# Patient Record
Sex: MALE | Race: WHITE | NOT HISPANIC OR LATINO | Employment: OTHER | URBAN - METROPOLITAN AREA
[De-identification: names, ages, dates, MRNs, and addresses within clinical notes are randomized per-mention and may not be internally consistent; named-entity substitution may affect disease eponyms.]

---

## 2017-01-31 ENCOUNTER — TELEPHONE (OUTPATIENT)
Dept: ENDOCRINOLOGY | Age: 77
End: 2017-01-31

## 2017-01-31 RX ORDER — INSULIN DETEMIR 100 [IU]/ML
50 INJECTION, SOLUTION SUBCUTANEOUS 2 TIMES DAILY
Qty: 90 ML | Refills: 1 | Status: SHIPPED | OUTPATIENT
Start: 2017-01-31 | End: 2017-08-15 | Stop reason: SDUPTHER

## 2017-01-31 RX ORDER — GLIPIZIDE 10 MG/1
TABLET ORAL
Qty: 360 TABLET | Refills: 1 | Status: SHIPPED | OUTPATIENT
Start: 2017-01-31 | End: 2017-09-11 | Stop reason: SDUPTHER

## 2017-01-31 RX ORDER — SITAGLIPTIN 100 MG/1
100 TABLET, FILM COATED ORAL DAILY
Qty: 30 TABLET | Refills: 5 | Status: SHIPPED | OUTPATIENT
Start: 2017-01-31 | End: 2017-04-28 | Stop reason: ALTCHOICE

## 2017-01-31 NOTE — TELEPHONE ENCOUNTER
----- Message from DANNY Degroot sent at 1/31/2017  1:17 PM EST -----  I reviewed his blood sugar readings which are staying too high consistently.  I'm going to increase his [id number] mg 2 pill twice daily with food.  In am also going up with him on Levemir 50 units twice daily.  The goal for his morning blood sugars of less than 110 mg/dL daily.  With the insulin increase I want him to monitor closely for hypoglycemic reactions.  He cannot restart his Oseni due to his active bladder cancer but I am going to put him on Januvia 100 mg once daily.  I sent a prescription for 30 days to his local pharmacy.  This is part of the Oseni that is not contraindicated with bladder disease.  If his blood sugars failed to improve I need to know sooner rather than later because we may need to go to mealtime insulin.      I spoke with the patient and let him know of the changes and to call back in two weeks with BG readings.

## 2017-03-04 DIAGNOSIS — E78.5 DYSLIPIDEMIA: ICD-10-CM

## 2017-03-06 RX ORDER — ROSUVASTATIN CALCIUM 10 MG/1
TABLET, COATED ORAL
Qty: 90 TABLET | Refills: 1 | OUTPATIENT
Start: 2017-03-06

## 2017-03-28 ENCOUNTER — OFFICE VISIT (OUTPATIENT)
Dept: FAMILY MEDICINE CLINIC | Facility: CLINIC | Age: 77
End: 2017-03-28

## 2017-03-28 VITALS
WEIGHT: 280 LBS | BODY MASS INDEX: 42.57 KG/M2 | HEART RATE: 79 BPM | TEMPERATURE: 98.1 F | RESPIRATION RATE: 16 BRPM | DIASTOLIC BLOOD PRESSURE: 72 MMHG | SYSTOLIC BLOOD PRESSURE: 128 MMHG

## 2017-03-28 DIAGNOSIS — M10.00 IDIOPATHIC GOUT, UNSPECIFIED CHRONICITY, UNSPECIFIED SITE: ICD-10-CM

## 2017-03-28 DIAGNOSIS — Z00.00 ANNUAL PHYSICAL EXAM: Primary | ICD-10-CM

## 2017-03-28 DIAGNOSIS — J30.1 SEASONAL ALLERGIC RHINITIS DUE TO POLLEN: ICD-10-CM

## 2017-03-28 DIAGNOSIS — E78.5 DYSLIPIDEMIA: ICD-10-CM

## 2017-03-28 PROCEDURE — G0438 PPPS, INITIAL VISIT: HCPCS | Performed by: FAMILY MEDICINE

## 2017-03-28 PROCEDURE — 99214 OFFICE O/P EST MOD 30 MIN: CPT | Performed by: FAMILY MEDICINE

## 2017-03-28 RX ORDER — ALLOPURINOL 300 MG/1
300 TABLET ORAL DAILY
Qty: 90 TABLET | Refills: 1 | Status: SHIPPED | OUTPATIENT
Start: 2017-03-28 | End: 2017-09-28 | Stop reason: SDUPTHER

## 2017-03-28 RX ORDER — ROSUVASTATIN CALCIUM 10 MG/1
10 TABLET, COATED ORAL DAILY
Qty: 90 TABLET | Refills: 1 | Status: SHIPPED | OUTPATIENT
Start: 2017-03-28 | End: 2017-09-28 | Stop reason: SDUPTHER

## 2017-03-28 RX ORDER — FLUTICASONE PROPIONATE 50 MCG
2 SPRAY, SUSPENSION (ML) NASAL DAILY
Qty: 3 EACH | Refills: 1 | Status: SHIPPED | OUTPATIENT
Start: 2017-03-28 | End: 2017-09-28 | Stop reason: SDUPTHER

## 2017-03-28 NOTE — PATIENT INSTRUCTIONS
Medicare Wellness  Personal Prevention Plan of Service     Date of Office Visit:  2017  Encounter Provider:  Magdy Ricardo MD  Place of Service:  [facility]  Patient Name: Jesus Beckham  :  1940    As part of the Medicare Wellness portion of your visit today, we are providing you with this personalized preventive plan of services (PPPS). This plan is based upon recommendations of the [place] Preventive Services Task Force (USPSTF) and the Advisory Committee on Immunization Practices (ACIP).    This lists the preventive care services that should be considered, and provides dates of when you are due. Items listed as completed are up-to-date and do not require any further intervention.    Health Maintenance   Topic Date Due   • PROSTATE CANCER SCREENING  2017   • PNEUMOCOCCAL VACCINES (65+ LOW/MEDIUM RISK) (1 of 2 - PCV13) 2017 (Originally 10/26/2006)   • HEMOGLOBIN A1C  2017   • DIABETIC EYE EXAM  2017   • DIABETIC FOOT EXAM  2017   • LIPID PANEL  2017   • URINE MICROALBUMIN  2017   • MEDICARE ANNUAL WELLNESS  2018   • COLONOSCOPY  2025   • INFLUENZA VACCINE  Completed   • AAA SCREEN (ONE-TIME)  Addressed   • ZOSTER VACCINE  Completed   • TDAP/TD VACCINES  Excluded       No orders of the defined types were placed in this encounter.      Return in about 6 months (around 2017) for Recheck.

## 2017-03-28 NOTE — PROGRESS NOTES
QUICK REFERENCE INFORMATION:  The ABCs of the Annual Wellness Visit    Initial Medicare Wellness Visit    HEALTH RISK ASSESSMENT    1940    Recent Hospitalizations:  Recently treated at the following:  Other: Psychiatric, for bladder cancer..        Current Medical Providers:  Patient Care Team:  Magdy Ricardo MD as PCP - General  Magdy Ricardo MD as PCP - Family Medicine        Smoking Status:  History   Smoking Status   • Former Smoker   Smokeless Tobacco   • Never Used       Alcohol Consumption:  History   Alcohol Use No       Depression Screen:   PHQ-9 Depression Screening 3/28/2017   Little interest or pleasure in doing things 0   Feeling down, depressed, or hopeless 0   Trouble falling or staying asleep, or sleeping too much 0   Feeling tired or having little energy 2   Poor appetite or overeating 0   Feeling bad about yourself - or that you are a failure or have let yourself or your family down 0   Trouble concentrating on things, such as reading the newspaper or watching television 0   Moving or speaking so slowly that other people could have noticed. Or the opposite - being so fidgety or restless that you have been moving around a lot more than usual 0   Thoughts that you would be better off dead, or of hurting yourself in some way 0   PHQ-9 Total Score 2       Health Habits and Functional and Cognitive Screening:  Functional & Cognitive Status 3/28/2017   Do you have difficulty preparing food and eating? No   Do you have difficulty bathing yourself? No   Do you have difficulty getting dressed? No   Do you have difficulty using the toilet? No   Do you have difficulty moving around from place to place? No   In the past year have you fallen or experienced a near fall? No   Do you need help using the phone?  No   Are you deaf or do you have serious difficulty hearing?  No   Do you need help with transportation? No   Do you need help shopping? No   Do you need help preparing meals?  No   Do you need help  with housework?  No   Do you need help with laundry? No   Do you need help taking your medications? No   Do you need help managing money? No   Do you have difficulty concentrating, remembering or making decisions? No       Health Habits  Current Diet: Well Balanced Diet  Dental Exam: Up to date  Eye Exam: Up to date  Exercise (times per week): 5 times per week  Current Exercise Activities Include: Swimming          Does the patient have evidence of cognitive impairment? No    Asprin use counseling:not applicable      Recent Lab Results:    Visual Acuity:  No exam data present    Age-appropriate Screening Schedule:  Refer to the list below for future screening recommendations based on patient's age, sex and/or medical conditions. Orders for these recommended tests are listed in the plan section. The patient has been provided with a written plan.    Health Maintenance   Topic Date Due   • PROSTATE CANCER SCREENING  04/19/2017   • PNEUMOCOCCAL VACCINES (65+ LOW/MEDIUM RISK) (1 of 2 - PCV13) 05/14/2017 (Originally 10/26/2006)   • HEMOGLOBIN A1C  06/08/2017   • DIABETIC EYE EXAM  09/07/2017   • DIABETIC FOOT EXAM  09/30/2017   • LIPID PANEL  12/08/2017   • URINE MICROALBUMIN  12/08/2017   • COLONOSCOPY  11/02/2025   • INFLUENZA VACCINE  Completed   • ZOSTER VACCINE  Completed   • TDAP/TD VACCINES  Excluded        Subjective   History of Present Illness    Jesus Beckham is a 76 y.o. male who presents for an Annual Wellness Visit.    The following portions of the patient's history were reviewed and updated as appropriate: allergies, current medications, past family history, past medical history, past social history, past surgical history and problem list.    Outpatient Medications Prior to Visit   Medication Sig Dispense Refill   • trospium 60 MG capsule sustained-release 24 hr capsule Take 1 capsule by mouth every morning. 30 capsule 5   • allopurinol (ZYLOPRIM) 300 MG tablet Take 1 tablet by mouth Daily. 90 tablet 1    • fluticasone (FLONASE) 50 MCG/ACT nasal spray 2 sprays into each nostril Daily. 3 each 1   • rosuvastatin (CRESTOR) 10 MG tablet Take 1 tablet by mouth Daily. 90 tablet 1   • Acetaminophen (TYLENOL PO) Take  by mouth as needed.     • folic acid-vit B6-vit B12 (FOLTABS) 0.8-10-0.115 MG tablet tablet Take 1 tablet by mouth Daily. 90 tablet 1   • glipiZIDE (GLUCOTROL) 10 MG tablet Take 2 po in am and 2 in pm 360 tablet 1   • glucose blood test strip CHECK BLOOD SUGARS 2 TIMES A DAY AS DIRECTED 180 each 1   • Insulin Pen Needle 32G X 4 MM misc INJECT UP TO THREE TIMES DAILY OR AS DIRECTED 500 each 1   • JANUVIA 100 MG tablet Take 1 tablet by mouth Daily. 30 tablet 5   • l-methylfolate-B6-B12 (METANX) 3-35-2 MG tablet tablet Take 1 tablet by mouth 2 (Two) Times a Day. 180 tablet 2   • LEVEMIR FLEXTOUCH 100 UNIT/ML injection Inject 50 Units under the skin 2 (Two) Times a Day. 90 mL 1   • lisinopril (PRINIVIL,ZESTRIL) 10 MG tablet Take 1 tablet by mouth Daily. 90 tablet 2   • MAGNESIUM PO Take 1 tablet by mouth daily.     • Omega-3 Fatty Acids (FISH OIL) 1000 MG capsule capsule Take  by mouth daily with breakfast.     • pregabalin (LYRICA) 100 MG capsule Take 1 capsule by mouth 3 (three) times a day. 270 capsule 1   • Probiotic Product (PROBIOTIC DAILY PO) Take  by mouth daily.     • Psyllium (METAMUCIL PO) Take 1 tablet by mouth daily.     • tamsulosin (FLOMAX) 0.4 MG capsule 24 hr capsule Take 0.4 mg by mouth 2 (Two) Times a Day.       No facility-administered medications prior to visit.        Patient Active Problem List   Diagnosis   • Gout   • Diabetic peripheral neuropathy   • Dyslipidemia   • Uncontrolled type 2 diabetes mellitus   • Primary osteoarthritis involving multiple joints   • Allergic rhinitis due to pollen   • Essential hypertension   • PVC (premature ventricular contraction)       Advanced Care Planning:  has an advanced directive - a copy HAS NOT been provided    Identification of Risk Factors:  Risk  factors include: weight  and cardiovascular risk.    Review of Systems    Compared to one year ago, the patient feels his physical health is the same.  Compared to one year ago, the patient feels his mental health is the same.    Objective     Physical Exam    Vitals:    03/28/17 0757 03/28/17 0817   BP: 128/72    Pulse: 79    Resp: 16    Temp: 98.1 °F (36.7 °C)    TempSrc: Oral    Weight: 280 lb (127 kg)    PainSc:    2       Body mass index is 42.57 kg/(m^2).  Discussed the patient's BMI with him. The BMI is above average; BMI management plan is completed.    Assessment/Plan   Patient Self-Management and Personalized Health Advice  The patient has been provided with information about: diet, exercise and weight management and preventive services including:   · Exercise counseling provided, Fall Risk assessment done, Nutrition counseling provided.    Visit Diagnoses:    ICD-10-CM ICD-9-CM   1. Annual physical exam Z00.00 V70.0   2. Dyslipidemia E78.5 272.4   3. Idiopathic gout, unspecified chronicity, unspecified site M10.00 274.9   4. Seasonal allergic rhinitis due to pollen J30.1 477.0       No orders of the defined types were placed in this encounter.      Outpatient Encounter Prescriptions as of 3/28/2017   Medication Sig Dispense Refill   • allopurinol (ZYLOPRIM) 300 MG tablet Take 1 tablet by mouth Daily. 90 tablet 1   • fluticasone (FLONASE) 50 MCG/ACT nasal spray 2 sprays into each nostril Daily. 3 each 1   • rosuvastatin (CRESTOR) 10 MG tablet Take 1 tablet by mouth Daily. 90 tablet 1   • trospium 60 MG capsule sustained-release 24 hr capsule Take 1 capsule by mouth every morning. 30 capsule 5   • [DISCONTINUED] allopurinol (ZYLOPRIM) 300 MG tablet Take 1 tablet by mouth Daily. 90 tablet 1   • [DISCONTINUED] fluticasone (FLONASE) 50 MCG/ACT nasal spray 2 sprays into each nostril Daily. 3 each 1   • [DISCONTINUED] rosuvastatin (CRESTOR) 10 MG tablet Take 1 tablet by mouth Daily. 90 tablet 1   • Acetaminophen  (TYLENOL PO) Take  by mouth as needed.     • folic acid-vit B6-vit B12 (FOLTABS) 0.8-10-0.115 MG tablet tablet Take 1 tablet by mouth Daily. 90 tablet 1   • glipiZIDE (GLUCOTROL) 10 MG tablet Take 2 po in am and 2 in pm 360 tablet 1   • glucose blood test strip CHECK BLOOD SUGARS 2 TIMES A DAY AS DIRECTED 180 each 1   • Insulin Pen Needle 32G X 4 MM misc INJECT UP TO THREE TIMES DAILY OR AS DIRECTED 500 each 1   • JANUVIA 100 MG tablet Take 1 tablet by mouth Daily. 30 tablet 5   • l-methylfolate-B6-B12 (METANX) 3-35-2 MG tablet tablet Take 1 tablet by mouth 2 (Two) Times a Day. 180 tablet 2   • LEVEMIR FLEXTOUCH 100 UNIT/ML injection Inject 50 Units under the skin 2 (Two) Times a Day. 90 mL 1   • lisinopril (PRINIVIL,ZESTRIL) 10 MG tablet Take 1 tablet by mouth Daily. 90 tablet 2   • MAGNESIUM PO Take 1 tablet by mouth daily.     • Omega-3 Fatty Acids (FISH OIL) 1000 MG capsule capsule Take  by mouth daily with breakfast.     • pregabalin (LYRICA) 100 MG capsule Take 1 capsule by mouth 3 (three) times a day. 270 capsule 1   • Probiotic Product (PROBIOTIC DAILY PO) Take  by mouth daily.     • Psyllium (METAMUCIL PO) Take 1 tablet by mouth daily.     • tamsulosin (FLOMAX) 0.4 MG capsule 24 hr capsule Take 0.4 mg by mouth 2 (Two) Times a Day.       No facility-administered encounter medications on file as of 3/28/2017.        Reviewed use of high risk medication in the elderly: yes  Reviewed for potential of harmful drug interactions in the elderly: yes    Follow Up:  Return in about 6 months (around 9/28/2017) for Recheck.     An After Visit Summary and PPPS with all of these plans were given to the patient.

## 2017-03-28 NOTE — PROGRESS NOTES
Subjective   Jesus Beckham is a 76 y.o. male.     History of Present Illness     Chief Complaint:   Chief Complaint   Patient presents with   • Hyperlipidemia     med refill - no daily asa / cigar smoker / phq2 /phq10 done jms  dx with bladder cancer    • Gout       Jesus Beckham 76 y.o. male who presents today for Medical Management of the below listed issues and medication refills.  he has a history of   Patient Active Problem List   Diagnosis   • Gout   • Diabetic peripheral neuropathy   • Dyslipidemia   • Uncontrolled type 2 diabetes mellitus   • Primary osteoarthritis involving multiple joints   • Allergic rhinitis due to pollen   • Essential hypertension   • PVC (premature ventricular contraction)   .  Since the last visit, he has overall felt well and has recovered from his bladder cancer surgery.  he has been compliant with   Current Outpatient Prescriptions:   •  allopurinol (ZYLOPRIM) 300 MG tablet, Take 1 tablet by mouth Daily., Disp: 90 tablet, Rfl: 1  •  fluticasone (FLONASE) 50 MCG/ACT nasal spray, 2 sprays into each nostril Daily., Disp: 3 each, Rfl: 1  •  rosuvastatin (CRESTOR) 10 MG tablet, Take 1 tablet by mouth Daily., Disp: 90 tablet, Rfl: 1  •  trospium 60 MG capsule sustained-release 24 hr capsule, Take 1 capsule by mouth every morning., Disp: 30 capsule, Rfl: 5  •  Acetaminophen (TYLENOL PO), Take  by mouth as needed., Disp: , Rfl:   •  folic acid-vit B6-vit B12 (FOLTABS) 0.8-10-0.115 MG tablet tablet, Take 1 tablet by mouth Daily., Disp: 90 tablet, Rfl: 1  •  glipiZIDE (GLUCOTROL) 10 MG tablet, Take 2 po in am and 2 in pm, Disp: 360 tablet, Rfl: 1  •  glucose blood test strip, CHECK BLOOD SUGARS 2 TIMES A DAY AS DIRECTED, Disp: 180 each, Rfl: 1  •  Insulin Pen Needle 32G X 4 MM misc, INJECT UP TO THREE TIMES DAILY OR AS DIRECTED, Disp: 500 each, Rfl: 1  •  JANUVIA 100 MG tablet, Take 1 tablet by mouth Daily., Disp: 30 tablet, Rfl: 5  •  l-methylfolate-B6-B12 (METANX) 3-35-2 MG tablet  tablet, Take 1 tablet by mouth 2 (Two) Times a Day., Disp: 180 tablet, Rfl: 2  •  LEVEMIR FLEXTOUCH 100 UNIT/ML injection, Inject 50 Units under the skin 2 (Two) Times a Day., Disp: 90 mL, Rfl: 1  •  lisinopril (PRINIVIL,ZESTRIL) 10 MG tablet, Take 1 tablet by mouth Daily., Disp: 90 tablet, Rfl: 2  •  MAGNESIUM PO, Take 1 tablet by mouth daily., Disp: , Rfl:   •  Omega-3 Fatty Acids (FISH OIL) 1000 MG capsule capsule, Take  by mouth daily with breakfast., Disp: , Rfl:   •  pregabalin (LYRICA) 100 MG capsule, Take 1 capsule by mouth 3 (three) times a day., Disp: 270 capsule, Rfl: 1  •  Probiotic Product (PROBIOTIC DAILY PO), Take  by mouth daily., Disp: , Rfl:   •  Psyllium (METAMUCIL PO), Take 1 tablet by mouth daily., Disp: , Rfl:   •  tamsulosin (FLOMAX) 0.4 MG capsule 24 hr capsule, Take 0.4 mg by mouth 2 (Two) Times a Day., Disp: , Rfl: .  he denies medication side effects.    All of the chronic condition(s) listed above are stable w/o issues.    /72  Pulse 79  Temp 98.1 °F (36.7 °C) (Oral)   Resp 16  Wt 280 lb (127 kg)  BMI 42.57 kg/m2    Results for orders placed or performed in visit on 11/29/16   Comprehensive metabolic panel   Result Value Ref Range    Glucose 124 (H) 65 - 99 mg/dL    BUN 19 8 - 23 mg/dL    Creatinine 0.99 0.76 - 1.27 mg/dL    eGFR Non African Am 74 >60 mL/min/1.73    eGFR African Am 89 >60 mL/min/1.73    BUN/Creatinine Ratio 19.2 7.0 - 25.0    Sodium 143 136 - 145 mmol/L    Potassium 5.0 3.5 - 5.2 mmol/L    Chloride 105 98 - 107 mmol/L    Total CO2 24.4 22.0 - 29.0 mmol/L    Calcium 9.6 8.6 - 10.5 mg/dL    Total Protein 6.4 6.0 - 8.5 g/dL    Albumin 4.50 3.50 - 5.20 g/dL    Globulin 1.9 gm/dL    A/G Ratio 2.4 g/dL    Total Bilirubin 0.4 0.1 - 1.2 mg/dL    Alkaline Phosphatase 75 39 - 117 U/L    AST (SGOT) 20 1 - 40 U/L    ALT (SGPT) 22 1 - 41 U/L   C-peptide   Result Value Ref Range    C-Peptide 2.8 1.1 - 4.4 ng/mL   Hemoglobin A1c   Result Value Ref Range    Hemoglobin A1C 7.00  (H) 4.80 - 5.60 %   T4 and TSH ([facility] Only)   Result Value Ref Range    TSH 2.080 0.270 - 4.200 mIU/mL    T4, Total 6.01 4.50 - 11.70 mcg/dL   Vitamin D 25 hydroxy   Result Value Ref Range    25 Hydroxy, Vitamin D 32.4 30.0 - 100.0 ng/mL   Uric acid   Result Value Ref Range    Uric Acid 3.9 3.4 - 7.0 mg/dL   Lipid panel   Result Value Ref Range    Total Cholesterol 109 0 - 200 mg/dL    Triglycerides 81 0 - 150 mg/dL    HDL Cholesterol 45 40 - 60 mg/dL    VLDL Cholesterol 16.2 5 - 40 mg/dL    LDL Cholesterol  48 0 - 100 mg/dL   MicroAlbumin, Urine, Random   Result Value Ref Range    Microalbumin, Urine 51.9 Not Estab. ug/mL         The following portions of the patient's history were reviewed and updated as appropriate: allergies, current medications, past family history, past medical history, past social history, past surgical history and problem list.    Review of Systems   Constitutional: Negative for activity change, chills, fatigue and fever.   Respiratory: Negative for cough and chest tightness.    Cardiovascular: Negative for chest pain and palpitations.   Gastrointestinal: Negative for abdominal pain and nausea.   Endocrine: Negative for cold intolerance and polydipsia.   Psychiatric/Behavioral: Negative for behavioral problems and dysphoric mood.   All other systems reviewed and are negative.      Objective   Physical Exam   Constitutional: He appears well-developed and well-nourished.   Neck: Neck supple. No thyromegaly present.   Cardiovascular: Normal rate and regular rhythm.    No murmur heard.  Pulmonary/Chest: Effort normal and breath sounds normal.   Abdominal: Bowel sounds are normal.   Psychiatric: He has a normal mood and affect. His behavior is normal.   Nursing note and vitals reviewed.      Assessment/Plan   Jesus was seen today for hyperlipidemia and gout.    Diagnoses and all orders for this visit:    Dyslipidemia  -     rosuvastatin (CRESTOR) 10 MG tablet; Take 1 tablet by mouth  Daily.    Idiopathic gout, unspecified chronicity, unspecified site  -     allopurinol (ZYLOPRIM) 300 MG tablet; Take 1 tablet by mouth Daily.    Seasonal allergic rhinitis due to pollen  -     fluticasone (FLONASE) 50 MCG/ACT nasal spray; 2 sprays into each nostril Daily.

## 2017-04-12 DIAGNOSIS — E78.5 DYSLIPIDEMIA: ICD-10-CM

## 2017-04-12 DIAGNOSIS — E55.9 VITAMIN D DEFICIENCY: Primary | ICD-10-CM

## 2017-04-12 DIAGNOSIS — M10.00 IDIOPATHIC GOUT, UNSPECIFIED CHRONICITY, UNSPECIFIED SITE: ICD-10-CM

## 2017-04-12 DIAGNOSIS — IMO0002 UNCONTROLLED TYPE 2 DIABETES MELLITUS WITH OTHER SPECIFIED COMPLICATION: Primary | ICD-10-CM

## 2017-04-14 ENCOUNTER — CONVERSION ENCOUNTER (OUTPATIENT)
Dept: ENDOCRINOLOGY | Age: 77
End: 2017-04-14

## 2017-04-14 ENCOUNTER — LAB (OUTPATIENT)
Dept: ENDOCRINOLOGY | Age: 77
End: 2017-04-14

## 2017-04-14 DIAGNOSIS — E55.9 VITAMIN D DEFICIENCY: ICD-10-CM

## 2017-04-14 DIAGNOSIS — M10.00 IDIOPATHIC GOUT, UNSPECIFIED CHRONICITY, UNSPECIFIED SITE: ICD-10-CM

## 2017-04-14 DIAGNOSIS — IMO0002 UNCONTROLLED TYPE 2 DIABETES MELLITUS WITH OTHER SPECIFIED COMPLICATION: ICD-10-CM

## 2017-04-14 DIAGNOSIS — E78.5 DYSLIPIDEMIA: ICD-10-CM

## 2017-04-15 LAB
25(OH)D3+25(OH)D2 SERPL-MCNC: 38.6 NG/ML (ref 30–100)
ALBUMIN SERPL-MCNC: 4.4 G/DL (ref 3.5–5.2)
ALBUMIN/GLOB SERPL: 1.8 G/DL
ALP SERPL-CCNC: 77 U/L (ref 39–117)
ALT SERPL-CCNC: 33 U/L (ref 1–41)
AST SERPL-CCNC: 32 U/L (ref 1–40)
BILIRUB SERPL-MCNC: 0.4 MG/DL (ref 0.1–1.2)
BUN SERPL-MCNC: 22 MG/DL (ref 8–23)
BUN/CREAT SERPL: 24.4 (ref 7–25)
C PEPTIDE SERPL-MCNC: 2.8 NG/ML (ref 1.1–4.4)
CALCIUM SERPL-MCNC: 9.7 MG/DL (ref 8.6–10.5)
CHLORIDE SERPL-SCNC: 104 MMOL/L (ref 98–107)
CHOLEST SERPL-MCNC: 122 MG/DL (ref 0–200)
CO2 SERPL-SCNC: 22.8 MMOL/L (ref 22–29)
CREAT SERPL-MCNC: 0.9 MG/DL (ref 0.76–1.27)
GLOBULIN SER CALC-MCNC: 2.5 GM/DL
GLUCOSE SERPL-MCNC: 190 MG/DL (ref 65–99)
HBA1C MFR BLD: 8.31 % (ref 4.8–5.6)
HDLC SERPL-MCNC: 38 MG/DL (ref 40–60)
LDLC SERPL CALC-MCNC: 50 MG/DL (ref 0–100)
MICROALBUMIN UR-MCNC: 44.8 UG/ML
POTASSIUM SERPL-SCNC: 5.3 MMOL/L (ref 3.5–5.2)
PROT SERPL-MCNC: 6.9 G/DL (ref 6–8.5)
SODIUM SERPL-SCNC: 140 MMOL/L (ref 136–145)
T4 SERPL-MCNC: 5.88 MCG/DL (ref 4.5–11.7)
TRIGL SERPL-MCNC: 170 MG/DL (ref 0–150)
TSH SERPL DL<=0.005 MIU/L-ACNC: 1.9 MIU/ML (ref 0.27–4.2)
URATE SERPL-MCNC: 3.3 MG/DL (ref 3.4–7)
VLDLC SERPL CALC-MCNC: 34 MG/DL (ref 5–40)

## 2017-04-28 ENCOUNTER — OFFICE VISIT (OUTPATIENT)
Dept: ENDOCRINOLOGY | Age: 77
End: 2017-04-28

## 2017-04-28 VITALS
BODY MASS INDEX: 40.11 KG/M2 | HEIGHT: 70 IN | DIASTOLIC BLOOD PRESSURE: 76 MMHG | WEIGHT: 280.2 LBS | SYSTOLIC BLOOD PRESSURE: 136 MMHG | RESPIRATION RATE: 16 BRPM

## 2017-04-28 DIAGNOSIS — M10.00 IDIOPATHIC GOUT, UNSPECIFIED CHRONICITY, UNSPECIFIED SITE: ICD-10-CM

## 2017-04-28 DIAGNOSIS — IMO0002 UNCONTROLLED TYPE 2 DIABETES MELLITUS WITH OTHER SPECIFIED COMPLICATION: Primary | ICD-10-CM

## 2017-04-28 DIAGNOSIS — E55.9 VITAMIN D DEFICIENCY: ICD-10-CM

## 2017-04-28 DIAGNOSIS — E78.5 DYSLIPIDEMIA: ICD-10-CM

## 2017-04-28 DIAGNOSIS — E11.42 DIABETIC PERIPHERAL NEUROPATHY (HCC): ICD-10-CM

## 2017-04-28 DIAGNOSIS — I10 ESSENTIAL HYPERTENSION: ICD-10-CM

## 2017-04-28 DIAGNOSIS — N40.0 BENIGN NON-NODULAR PROSTATIC HYPERPLASIA WITHOUT LOWER URINARY TRACT SYMPTOMS: ICD-10-CM

## 2017-04-28 PROCEDURE — 99214 OFFICE O/P EST MOD 30 MIN: CPT | Performed by: INTERNAL MEDICINE

## 2017-04-28 RX ORDER — ASCORBIC ACID 500 MG
500 TABLET ORAL DAILY
Status: ON HOLD | COMMUNITY
End: 2021-11-28

## 2017-04-28 RX ORDER — EMPAGLIFLOZIN AND LINAGLIPTIN 10; 5 MG/1; MG/1
1 TABLET, FILM COATED ORAL DAILY
Qty: 90 TABLET | Refills: 3 | Status: SHIPPED | OUTPATIENT
Start: 2017-04-28 | End: 2017-09-29 | Stop reason: SDUPTHER

## 2017-04-28 RX ORDER — GUAIFENESIN/PHENYLPROPANOLAMIN
EXPECTORANT ORAL
COMMUNITY
End: 2017-12-19 | Stop reason: ALTCHOICE

## 2017-04-28 RX ORDER — ERGOCALCIFEROL 1.25 MG/1
50000 CAPSULE ORAL WEEKLY
Qty: 13 CAPSULE | Refills: 3 | Status: SHIPPED | OUTPATIENT
Start: 2017-04-28 | End: 2017-09-29 | Stop reason: SDUPTHER

## 2017-04-28 RX ORDER — CELECOXIB 200 MG/1
200 CAPSULE ORAL DAILY
COMMUNITY
End: 2017-09-28

## 2017-04-28 RX ORDER — CHOLECALCIFEROL (VITAMIN D3) 125 MCG
CAPSULE ORAL
COMMUNITY
End: 2017-04-28 | Stop reason: ALTCHOICE

## 2017-04-28 NOTE — PROGRESS NOTES
"Subjective   Jesus Beckham is a 76 y.o. male seen for follow up for DM2, hyperlipidemia, vit d deficiency, lab review. Patient is checking BG twice a day. No BG readings. Patient states that medication was changed at last office visit and BG was running high. He was having bladder cancer treatment and BG stayed high during the treatment. He states that until about 10 days ago his medication was not keeping his BG within range. He would like to discuss changing back to [facility] and if so he needs a 90 day RX sent to his mail order. He needs a refill on insulin pen needles also sent to his mail order pharmacy.      History of Present Illness this is a 76-year-old gentleman known patient with type II diabetes hyper tension and dyslipidemia as well as vitamin D deficiency.  Since the month of November 2016 when he was diagnosed as having get bladder cancer he underwent surgery as well as chemotherapy and has been told that he is cancer free with further follow-up.  During his the chemotherapy his blood sugar was running high but lately is better but nevertheless it still is little bit high.    /76  Resp 16  Ht 70\" (177.8 cm)  Wt 280 lb 3.2 oz (127 kg)  BMI 40.2 kg/m2     No Known Allergies    Current Outpatient Prescriptions:   •  Acetaminophen (TYLENOL PO), Take  by mouth as needed., Disp: , Rfl:   •  allopurinol (ZYLOPRIM) 300 MG tablet, Take 1 tablet by mouth Daily., Disp: 90 tablet, Rfl: 1  •  celecoxib (CeleBREX) 200 MG capsule, Take 200 mg by mouth Daily., Disp: , Rfl:   •  Cholecalciferol (VITAMIN D3) 2000 UNITS tablet, Take  by mouth., Disp: , Rfl:   •  fluticasone (FLONASE) 50 MCG/ACT nasal spray, 2 sprays into each nostril Daily., Disp: 3 each, Rfl: 1  •  glipiZIDE (GLUCOTROL) 10 MG tablet, Take 2 po in am and 2 in pm, Disp: 360 tablet, Rfl: 1  •  glucose blood test strip, CHECK BLOOD SUGARS 2 TIMES A DAY AS DIRECTED, Disp: 200 each, Rfl: 1  •  Insulin Pen Needle 32G X 4 MM misc, INJECT UP TO THREE " TIMES DAILY OR AS DIRECTED, Disp: 500 each, Rfl: 1  •  JANUVIA 100 MG tablet, Take 1 tablet by mouth Daily., Disp: 30 tablet, Rfl: 5  •  l-methylfolate-B6-B12 (METANX) 3-35-2 MG tablet tablet, Take 1 tablet by mouth 2 (Two) Times a Day., Disp: 180 tablet, Rfl: 2  •  LEVEMIR FLEXTOUCH 100 UNIT/ML injection, Inject 50 Units under the skin 2 (Two) Times a Day., Disp: 90 mL, Rfl: 1  •  lisinopril (PRINIVIL,ZESTRIL) 10 MG tablet, Take 1 tablet by mouth Daily., Disp: 90 tablet, Rfl: 2  •  Multiple Vitamins-Minerals (MULTIVITAMIN ADULT PO), Take  by mouth., Disp: , Rfl:   •  Omega-3 Fatty Acids (FISH OIL) 1000 MG capsule capsule, Take  by mouth daily with breakfast., Disp: , Rfl:   •  pregabalin (LYRICA) 100 MG capsule, Take 1 capsule by mouth 3 (three) times a day., Disp: 270 capsule, Rfl: 1  •  rosuvastatin (CRESTOR) 10 MG tablet, Take 1 tablet by mouth Daily., Disp: 90 tablet, Rfl: 1  •  Saw Palmetto 500 MG capsule, Take  by mouth., Disp: , Rfl:   •  trospium 60 MG capsule sustained-release 24 hr capsule, Take 1 capsule by mouth every morning., Disp: 30 capsule, Rfl: 5  •  vitamin C (ASCORBIC ACID) 500 MG tablet, Take 500 mg by mouth Daily., Disp: , Rfl:     The following portions of the patient's history were reviewed and updated as appropriate: allergies, current medications, past family history, past medical history, past social history, past surgical history and problem list.    Review of Systems   Constitutional: Negative.    HENT: Negative.    Eyes: Negative.    Respiratory: Negative.    Cardiovascular: Negative.    Gastrointestinal: Negative.    Endocrine: Negative.    Genitourinary: Negative.    Musculoskeletal: Negative.    Skin: Negative.    Allergic/Immunologic: Negative.    Neurological: Negative.    Hematological: Negative.    Psychiatric/Behavioral: Negative.        Objective   Physical Exam   Constitutional: He is oriented to person, place, and time. He appears well-developed and well-nourished. No  distress.   HENT:   Head: Normocephalic and atraumatic.   Right Ear: External ear normal.   Left Ear: External ear normal.   Nose: Nose normal.   Mouth/Throat: Oropharynx is clear and moist.   Eyes: Conjunctivae and EOM are normal. Pupils are equal, round, and reactive to light. Right eye exhibits no discharge. Left eye exhibits no discharge. No scleral icterus.   Neck: Normal range of motion. Neck supple. No JVD present. No tracheal deviation present. No thyromegaly present.   Cardiovascular: Normal rate, regular rhythm, normal heart sounds and intact distal pulses.  Exam reveals no gallop and no friction rub.    No murmur heard.  Pulmonary/Chest: Effort normal and breath sounds normal. No stridor. No respiratory distress. He has no wheezes. He has no rales. He exhibits no tenderness.   Abdominal: Soft. Bowel sounds are normal. He exhibits no distension and no mass. There is no tenderness. There is no rebound and no guarding. No hernia.   Musculoskeletal: He exhibits no edema, tenderness or deformity.   Lymphadenopathy:     He has no cervical adenopathy.   Neurological: He is alert and oriented to person, place, and time. He has normal reflexes. He displays normal reflexes. No cranial nerve deficit. He exhibits normal muscle tone. Coordination normal.   Skin: Skin is warm. No rash noted. He is not diaphoretic. No erythema. No pallor.   Psychiatric: He has a normal mood and affect. His behavior is normal. Judgment and thought content normal.   Nursing note and vitals reviewed.     Results for orders placed or performed in visit on 04/14/17   Comprehensive Metabolic Panel   Result Value Ref Range    Glucose 190 (H) 65 - 99 mg/dL    BUN 22 8 - 23 mg/dL    Creatinine 0.90 0.76 - 1.27 mg/dL    eGFR Non African Am 82 >60 mL/min/1.73    eGFR African Am 100 >60 mL/min/1.73    BUN/Creatinine Ratio 24.4 7.0 - 25.0    Sodium 140 136 - 145 mmol/L    Potassium 5.3 (H) 3.5 - 5.2 mmol/L    Chloride 104 98 - 107 mmol/L    Total  CO2 22.8 22.0 - 29.0 mmol/L    Calcium 9.7 8.6 - 10.5 mg/dL    Total Protein 6.9 6.0 - 8.5 g/dL    Albumin 4.40 3.50 - 5.20 g/dL    Globulin 2.5 gm/dL    A/G Ratio 1.8 g/dL    Total Bilirubin 0.4 0.1 - 1.2 mg/dL    Alkaline Phosphatase 77 39 - 117 U/L    AST (SGOT) 32 1 - 40 U/L    ALT (SGPT) 33 1 - 41 U/L   C-Peptide   Result Value Ref Range    C-Peptide 2.8 1.1 - 4.4 ng/mL   Hemoglobin A1c   Result Value Ref Range    Hemoglobin A1C 8.31 (H) 4.80 - 5.60 %   Lipid Panel   Result Value Ref Range    Total Cholesterol 122 0 - 200 mg/dL    Triglycerides 170 (H) 0 - 150 mg/dL    HDL Cholesterol 38 (L) 40 - 60 mg/dL    VLDL Cholesterol 34 5 - 40 mg/dL    LDL Cholesterol  50 0 - 100 mg/dL   Uric Acid   Result Value Ref Range    Uric Acid 3.3 (L) 3.4 - 7.0 mg/dL   T4 & TSH ([facility])   Result Value Ref Range    TSH 1.900 0.270 - 4.200 mIU/mL    T4, Total 5.88 4.50 - 11.70 mcg/dL   MicroAlbumin, Urine, Random   Result Value Ref Range    Microalbumin, Urine 44.8 Not Estab. ug/mL           Assessment/Plan   Diagnoses and all orders for this visit:    Uncontrolled type 2 diabetes mellitus with other specified complication  -     T4 & TSH ([facility]); Future  -     T3, Free; Future  -     T4, Free; Future  -     Uric Acid; Future  -     Vitamin D 25 Hydroxy; Future  -     Comprehensive Metabolic Panel; Future  -     C-Peptide; Future  -     Hemoglobin A1c; Future  -     Lipid Panel; Future  -     MicroAlbumin, Urine, Random; Future  -     TestT+TestF+SHBG; Future  -     CBC & Differential; Future  -     PSA; Future    Essential hypertension  -     T4 & TSH ([facility]); Future  -     T3, Free; Future  -     T4, Free; Future  -     Uric Acid; Future  -     Vitamin D 25 Hydroxy; Future  -     Comprehensive Metabolic Panel; Future  -     C-Peptide; Future  -     Hemoglobin A1c; Future  -     Lipid Panel; Future  -     MicroAlbumin, Urine, Random; Future  -     TestT+TestF+SHBG; Future  -     CBC & Differential; Future  -     PSA;  Future    Dyslipidemia  -     T4 & TSH ([facility]); Future  -     T3, Free; Future  -     T4, Free; Future  -     Uric Acid; Future  -     Vitamin D 25 Hydroxy; Future  -     Comprehensive Metabolic Panel; Future  -     C-Peptide; Future  -     Hemoglobin A1c; Future  -     Lipid Panel; Future  -     MicroAlbumin, Urine, Random; Future  -     TestT+TestF+SHBG; Future  -     CBC & Differential; Future  -     PSA; Future    Idiopathic gout, unspecified chronicity, unspecified site  -     T4 & TSH ([facility]); Future  -     T3, Free; Future  -     T4, Free; Future  -     Uric Acid; Future  -     Vitamin D 25 Hydroxy; Future  -     Comprehensive Metabolic Panel; Future  -     C-Peptide; Future  -     Hemoglobin A1c; Future  -     Lipid Panel; Future  -     MicroAlbumin, Urine, Random; Future  -     TestT+TestF+SHBG; Future  -     CBC & Differential; Future  -     PSA; Future    Diabetic peripheral neuropathy  -     T4 & TSH ([facility]); Future  -     T3, Free; Future  -     T4, Free; Future  -     Uric Acid; Future  -     Vitamin D 25 Hydroxy; Future  -     Comprehensive Metabolic Panel; Future  -     C-Peptide; Future  -     Hemoglobin A1c; Future  -     Lipid Panel; Future  -     MicroAlbumin, Urine, Random; Future  -     TestT+TestF+SHBG; Future  -     CBC & Differential; Future  -     PSA; Future    Vitamin D deficiency  -     T4 & TSH ([facility]); Future  -     T3, Free; Future  -     T4, Free; Future  -     Uric Acid; Future  -     Vitamin D 25 Hydroxy; Future  -     Comprehensive Metabolic Panel; Future  -     C-Peptide; Future  -     Hemoglobin A1c; Future  -     Lipid Panel; Future  -     MicroAlbumin, Urine, Random; Future  -     TestT+TestF+SHBG; Future  -     CBC & Differential; Future  -     PSA; Future    Benign non-nodular prostatic hyperplasia without lower urinary tract symptoms  -     TestT+TestF+SHBG; Future  -     CBC & Differential; Future  -     PSA; Future    Other orders  -     GLYXAMBI 10-5 MG tablet;  Take 1 tablet by mouth Daily.  -     ergocalciferol (DRISDOL) 76708 UNITS capsule; Take 1 capsule by mouth 1 (One) Time Per Week.  -     Discontinue: Insulin Pen Needle 31G X 8 MM misc; Used daily for insulin injection  -     Insulin Pen Needle 32G X 4 MM misc; INJECT UP TO THREE TIMES DAILY OR AS DIRECTED               In summary I saw and examined this 76-year-old gentleman for above-mentioned problems.  I reviewed his laboratory evaluation of the 04/14/2017 and provided him with a hard copy of it.  His hemoglobin A1c which was 7.0 on the 12/08/2016 is now 8.31.  This is partly because of the stress of cancer treatment which he had observed happening regardless of what was being done to get his sugar under control he said he was running real high.  He is otherwise clinically and metabolically stable and therefore we will go ahead and the make some changes in his medications to improve his glycemic control.  He will see Ms. Elizabeth Valverde in 4 months with laboratory evaluation prior to each office visit.

## 2017-05-18 DIAGNOSIS — J30.1 SEASONAL ALLERGIC RHINITIS DUE TO POLLEN: ICD-10-CM

## 2017-05-18 RX ORDER — FLUTICASONE PROPIONATE 50 MCG
SPRAY, SUSPENSION (ML) NASAL
Qty: 48 G | Refills: 1 | OUTPATIENT
Start: 2017-05-18

## 2017-06-10 DIAGNOSIS — M15.9 PRIMARY OSTEOARTHRITIS INVOLVING MULTIPLE JOINTS: ICD-10-CM

## 2017-06-12 RX ORDER — CELECOXIB 200 MG/1
CAPSULE ORAL
Qty: 90 CAPSULE | Refills: 1 | OUTPATIENT
Start: 2017-06-12

## 2017-06-13 RX ORDER — PREGABALIN 100 MG/1
100 CAPSULE ORAL 3 TIMES DAILY
Qty: 270 CAPSULE | Refills: 1 | Status: SHIPPED | OUTPATIENT
Start: 2017-06-13 | End: 2017-06-23 | Stop reason: SDUPTHER

## 2017-06-20 ENCOUNTER — TELEPHONE (OUTPATIENT)
Dept: FAMILY MEDICINE CLINIC | Facility: CLINIC | Age: 77
End: 2017-06-20

## 2017-06-21 DIAGNOSIS — M19.90 OSTEOARTHRITIS, UNSPECIFIED OSTEOARTHRITIS TYPE, UNSPECIFIED SITE: Primary | ICD-10-CM

## 2017-06-21 RX ORDER — CELECOXIB 200 MG/1
200 CAPSULE ORAL DAILY
Qty: 90 CAPSULE | Refills: 1 | Status: SHIPPED | OUTPATIENT
Start: 2017-06-21 | End: 2017-09-28 | Stop reason: SDUPTHER

## 2017-06-23 RX ORDER — PREGABALIN 100 MG/1
100 CAPSULE ORAL 3 TIMES DAILY
Qty: 90 CAPSULE | Refills: 0 | OUTPATIENT
Start: 2017-06-23 | End: 2018-01-22 | Stop reason: SDUPTHER

## 2017-07-25 RX ORDER — LISINOPRIL 10 MG/1
10 TABLET ORAL DAILY
Qty: 90 TABLET | Refills: 2 | Status: SHIPPED | OUTPATIENT
Start: 2017-07-25 | End: 2017-09-29 | Stop reason: SDUPTHER

## 2017-08-15 RX ORDER — INSULIN DETEMIR 100 [IU]/ML
50 INJECTION, SOLUTION SUBCUTANEOUS 2 TIMES DAILY
Qty: 90 ML | Refills: 0 | Status: SHIPPED | OUTPATIENT
Start: 2017-08-15 | End: 2017-09-29 | Stop reason: SDUPTHER

## 2017-08-23 ENCOUNTER — RESULTS ENCOUNTER (OUTPATIENT)
Dept: ENDOCRINOLOGY | Age: 77
End: 2017-08-23

## 2017-08-23 DIAGNOSIS — E11.42 DIABETIC PERIPHERAL NEUROPATHY (HCC): ICD-10-CM

## 2017-08-23 DIAGNOSIS — E78.5 DYSLIPIDEMIA: ICD-10-CM

## 2017-08-23 DIAGNOSIS — E55.9 VITAMIN D DEFICIENCY: ICD-10-CM

## 2017-08-23 DIAGNOSIS — N40.0 BENIGN NON-NODULAR PROSTATIC HYPERPLASIA WITHOUT LOWER URINARY TRACT SYMPTOMS: ICD-10-CM

## 2017-08-23 DIAGNOSIS — I10 ESSENTIAL HYPERTENSION: ICD-10-CM

## 2017-08-23 DIAGNOSIS — IMO0002 UNCONTROLLED TYPE 2 DIABETES MELLITUS WITH OTHER SPECIFIED COMPLICATION: ICD-10-CM

## 2017-08-23 DIAGNOSIS — M10.00 IDIOPATHIC GOUT, UNSPECIFIED CHRONICITY, UNSPECIFIED SITE: ICD-10-CM

## 2017-09-06 DIAGNOSIS — IMO0002 UNCONTROLLED TYPE 2 DIABETES MELLITUS WITH OTHER SPECIFIED COMPLICATION: ICD-10-CM

## 2017-09-06 DIAGNOSIS — E55.9 VITAMIN D DEFICIENCY: ICD-10-CM

## 2017-09-06 DIAGNOSIS — E11.42 DIABETIC PERIPHERAL NEUROPATHY (HCC): Primary | ICD-10-CM

## 2017-09-06 DIAGNOSIS — M10.00 IDIOPATHIC GOUT, UNSPECIFIED CHRONICITY, UNSPECIFIED SITE: ICD-10-CM

## 2017-09-09 DIAGNOSIS — M10.00 IDIOPATHIC GOUT, UNSPECIFIED CHRONICITY, UNSPECIFIED SITE: ICD-10-CM

## 2017-09-09 DIAGNOSIS — E78.5 DYSLIPIDEMIA: ICD-10-CM

## 2017-09-11 RX ORDER — MECOBAL/LEVOMEFOLAT CA/B6 PHOS 2-3-35 MG
1 TABLET ORAL 2 TIMES DAILY
Qty: 180 TABLET | Refills: 0 | Status: SHIPPED | OUTPATIENT
Start: 2017-09-11 | End: 2017-09-29 | Stop reason: SDUPTHER

## 2017-09-11 RX ORDER — ROSUVASTATIN CALCIUM 10 MG/1
TABLET, COATED ORAL
Qty: 90 TABLET | Refills: 1 | OUTPATIENT
Start: 2017-09-11

## 2017-09-11 RX ORDER — GLIPIZIDE 10 MG/1
TABLET ORAL
Qty: 360 TABLET | Refills: 0 | Status: SHIPPED | OUTPATIENT
Start: 2017-09-11 | End: 2017-09-29 | Stop reason: SDUPTHER

## 2017-09-11 RX ORDER — ALLOPURINOL 300 MG/1
TABLET ORAL
Qty: 90 TABLET | Refills: 1 | OUTPATIENT
Start: 2017-09-11

## 2017-09-19 ENCOUNTER — LAB (OUTPATIENT)
Dept: ENDOCRINOLOGY | Age: 77
End: 2017-09-19

## 2017-09-19 DIAGNOSIS — M10.00 IDIOPATHIC GOUT, UNSPECIFIED CHRONICITY, UNSPECIFIED SITE: ICD-10-CM

## 2017-09-19 DIAGNOSIS — E78.5 DYSLIPIDEMIA: ICD-10-CM

## 2017-09-19 DIAGNOSIS — IMO0002 UNCONTROLLED TYPE 2 DIABETES MELLITUS WITH OTHER SPECIFIED COMPLICATION: ICD-10-CM

## 2017-09-19 DIAGNOSIS — E11.42 DIABETIC PERIPHERAL NEUROPATHY (HCC): ICD-10-CM

## 2017-09-19 DIAGNOSIS — E55.9 VITAMIN D DEFICIENCY: ICD-10-CM

## 2017-09-19 DIAGNOSIS — I10 ESSENTIAL HYPERTENSION: ICD-10-CM

## 2017-09-20 LAB
25(OH)D3+25(OH)D2 SERPL-MCNC: 36.9 NG/ML (ref 30–100)
ALBUMIN SERPL-MCNC: 4.5 G/DL (ref 3.5–5.2)
ALBUMIN/GLOB SERPL: 2 G/DL
ALP SERPL-CCNC: 86 U/L (ref 39–117)
ALT SERPL-CCNC: 32 U/L (ref 1–41)
AST SERPL-CCNC: 20 U/L (ref 1–40)
BILIRUB SERPL-MCNC: 0.4 MG/DL (ref 0.1–1.2)
BUN SERPL-MCNC: 18 MG/DL (ref 8–23)
BUN/CREAT SERPL: 18.4 (ref 7–25)
C PEPTIDE SERPL-MCNC: 3.5 NG/ML (ref 1.1–4.4)
CALCIUM SERPL-MCNC: 9.8 MG/DL (ref 8.6–10.5)
CHLORIDE SERPL-SCNC: 107 MMOL/L (ref 98–107)
CHOLEST SERPL-MCNC: 133 MG/DL (ref 0–200)
CO2 SERPL-SCNC: 24.8 MMOL/L (ref 22–29)
CREAT SERPL-MCNC: 0.98 MG/DL (ref 0.76–1.27)
GLOBULIN SER CALC-MCNC: 2.3 GM/DL
GLUCOSE SERPL-MCNC: 192 MG/DL (ref 65–99)
HBA1C MFR BLD: 8 % (ref 4.8–5.6)
HDLC SERPL-MCNC: 40 MG/DL (ref 40–60)
LDLC SERPL CALC-MCNC: 64 MG/DL (ref 0–100)
MICROALBUMIN UR-MCNC: 26.5 UG/ML
POTASSIUM SERPL-SCNC: 5 MMOL/L (ref 3.5–5.2)
PROT SERPL-MCNC: 6.8 G/DL (ref 6–8.5)
SODIUM SERPL-SCNC: 144 MMOL/L (ref 136–145)
TRIGL SERPL-MCNC: 145 MG/DL (ref 0–150)
URATE SERPL-MCNC: 3.1 MG/DL (ref 3.4–7)
VLDLC SERPL CALC-MCNC: 29 MG/DL (ref 5–40)

## 2017-09-28 ENCOUNTER — OFFICE VISIT (OUTPATIENT)
Dept: FAMILY MEDICINE CLINIC | Facility: CLINIC | Age: 77
End: 2017-09-28

## 2017-09-28 VITALS
TEMPERATURE: 97.6 F | BODY MASS INDEX: 39.69 KG/M2 | DIASTOLIC BLOOD PRESSURE: 68 MMHG | SYSTOLIC BLOOD PRESSURE: 123 MMHG | WEIGHT: 268 LBS | HEART RATE: 74 BPM | HEIGHT: 69 IN | RESPIRATION RATE: 16 BRPM

## 2017-09-28 DIAGNOSIS — J30.1 SEASONAL ALLERGIC RHINITIS DUE TO POLLEN: ICD-10-CM

## 2017-09-28 DIAGNOSIS — E78.5 DYSLIPIDEMIA: Primary | ICD-10-CM

## 2017-09-28 DIAGNOSIS — M19.90 OSTEOARTHRITIS, UNSPECIFIED OSTEOARTHRITIS TYPE, UNSPECIFIED SITE: ICD-10-CM

## 2017-09-28 DIAGNOSIS — M10.00 IDIOPATHIC GOUT, UNSPECIFIED CHRONICITY, UNSPECIFIED SITE: ICD-10-CM

## 2017-09-28 PROBLEM — N39.41 URGE INCONTINENCE: Status: ACTIVE | Noted: 2017-09-28

## 2017-09-28 PROCEDURE — 99214 OFFICE O/P EST MOD 30 MIN: CPT | Performed by: FAMILY MEDICINE

## 2017-09-28 RX ORDER — CELECOXIB 200 MG/1
200 CAPSULE ORAL DAILY
Qty: 90 CAPSULE | Refills: 1 | Status: SHIPPED | OUTPATIENT
Start: 2017-09-28 | End: 2018-03-13 | Stop reason: HOSPADM

## 2017-09-28 RX ORDER — TROSPIUM CHLORIDE ER 60 MG/1
60 CAPSULE ORAL EVERY MORNING
Qty: 90 CAPSULE | Refills: 1 | Status: CANCELLED | OUTPATIENT
Start: 2017-09-28

## 2017-09-28 RX ORDER — ROSUVASTATIN CALCIUM 10 MG/1
10 TABLET, COATED ORAL DAILY
Qty: 90 TABLET | Refills: 1 | Status: SHIPPED | OUTPATIENT
Start: 2017-09-28 | End: 2017-09-29 | Stop reason: SDUPTHER

## 2017-09-28 RX ORDER — FLUTICASONE PROPIONATE 50 MCG
2 SPRAY, SUSPENSION (ML) NASAL DAILY
Qty: 3 BOTTLE | Refills: 3 | Status: SHIPPED | OUTPATIENT
Start: 2017-09-28 | End: 2018-08-16 | Stop reason: SDUPTHER

## 2017-09-28 RX ORDER — ALLOPURINOL 300 MG/1
300 TABLET ORAL DAILY
Qty: 90 TABLET | Refills: 1 | Status: SHIPPED | OUTPATIENT
Start: 2017-09-28 | End: 2017-09-29 | Stop reason: SDUPTHER

## 2017-09-28 NOTE — PROGRESS NOTES
Subjective   Jesus Beckham is a 76 y.o. male.     History of Present Illness     Chief Complaint:   Chief Complaint   Patient presents with   • Hyperlipidemia     med refill    • Arthritis   • Gout       Jesus Beckham 76 y.o. male who presents today for Medical Management of the below listed issues and medication refills.  he has a problem list of   Patient Active Problem List   Diagnosis   • Gout   • Diabetic peripheral neuropathy   • Dyslipidemia   • Uncontrolled type 2 diabetes mellitus   • Primary osteoarthritis involving multiple joints   • Allergic rhinitis due to pollen   • Essential hypertension   • PVC (premature ventricular contraction)   • Vitamin D deficiency   • Benign non-nodular prostatic hyperplasia without lower urinary tract symptoms   • Osteoarthritis   • Urge incontinence   .  Since the last visit, he has overall felt well.  he has been compliant with   Current Outpatient Prescriptions:   •  allopurinol (ZYLOPRIM) 300 MG tablet, Take 1 tablet by mouth Daily., Disp: 90 tablet, Rfl: 1  •  celecoxib (CELEBREX) 200 MG capsule, Take 1 capsule by mouth Daily., Disp: 90 capsule, Rfl: 1  •  fluticasone (FLONASE) 50 MCG/ACT nasal spray, 2 sprays into each nostril Daily., Disp: 3 bottle, Rfl: 3  •  rosuvastatin (CRESTOR) 10 MG tablet, Take 1 tablet by mouth Daily., Disp: 90 tablet, Rfl: 1  •  Acetaminophen (TYLENOL PO), Take  by mouth as needed., Disp: , Rfl:   •  ergocalciferol (DRISDOL) 90216 UNITS capsule, Take 1 capsule by mouth 1 (One) Time Per Week., Disp: 13 capsule, Rfl: 3  •  glipiZIDE (GLUCOTROL) 10 MG tablet, Take 2 po in am and 2 in pm, Disp: 360 tablet, Rfl: 0  •  glucose blood test strip, CHECK BLOOD SUGARS 2 TIMES A DAY AS DIRECTED, Disp: 200 each, Rfl: 1  •  GLYXAMBI 10-5 MG tablet, Take 1 tablet by mouth Daily., Disp: 90 tablet, Rfl: 3  •  Insulin Pen Needle 32G X 4 MM misc, INJECT UP TO THREE TIMES DAILY OR AS DIRECTED, Disp: 500 each, Rfl: 1  •  L-Methylfolate-B6-B12 3-35-2 MG  "tablet, Take 1 tablet by mouth 2 (Two) Times a Day., Disp: 180 tablet, Rfl: 0  •  LEVEMIR FLEXTOUCH 100 UNIT/ML injection, Inject 50 Units under the skin 2 (Two) Times a Day., Disp: 90 mL, Rfl: 0  •  lisinopril (PRINIVIL,ZESTRIL) 10 MG tablet, Take 1 tablet by mouth Daily., Disp: 90 tablet, Rfl: 2  •  Multiple Vitamins-Minerals (MULTIVITAMIN ADULT PO), Take  by mouth., Disp: , Rfl:   •  Omega-3 Fatty Acids (FISH OIL) 1000 MG capsule capsule, Take  by mouth daily with breakfast., Disp: , Rfl:   •  pregabalin (LYRICA) 100 MG capsule, Take 1 capsule by mouth 3 (Three) Times a Day., Disp: 90 capsule, Rfl: 0  •  Saw Palmetto 500 MG capsule, Take  by mouth., Disp: , Rfl:   •  trospium 60 MG capsule sustained-release 24 hr capsule, Take 1 capsule by mouth every morning., Disp: 30 capsule, Rfl: 5  •  vitamin C (ASCORBIC ACID) 500 MG tablet, Take 500 mg by mouth Daily., Disp: , Rfl: .  he denies medication side effects.    All of the chronic condition(s) listed above are stable w/o issues.    /68  Pulse 74  Temp 97.6 °F (36.4 °C) (Oral)   Resp 16  Ht 69\" (175.3 cm)  Wt 268 lb (122 kg)  BMI 39.58 kg/m2    Results for orders placed or performed in visit on 09/19/17   Comprehensive Metabolic Panel   Result Value Ref Range    Glucose 192 (H) 65 - 99 mg/dL    BUN 18 8 - 23 mg/dL    Creatinine 0.98 0.76 - 1.27 mg/dL    eGFR Non African Am 75 >60 mL/min/1.73    eGFR African Am 90 >60 mL/min/1.73    BUN/Creatinine Ratio 18.4 7.0 - 25.0    Sodium 144 136 - 145 mmol/L    Potassium 5.0 3.5 - 5.2 mmol/L    Chloride 107 98 - 107 mmol/L    Total CO2 24.8 22.0 - 29.0 mmol/L    Calcium 9.8 8.6 - 10.5 mg/dL    Total Protein 6.8 6.0 - 8.5 g/dL    Albumin 4.50 3.50 - 5.20 g/dL    Globulin 2.3 gm/dL    A/G Ratio 2.0 g/dL    Total Bilirubin 0.4 0.1 - 1.2 mg/dL    Alkaline Phosphatase 86 39 - 117 U/L    AST (SGOT) 20 1 - 40 U/L    ALT (SGPT) 32 1 - 41 U/L   Lipid Panel   Result Value Ref Range    Total Cholesterol 133 0 - 200 mg/dL    " Triglycerides 145 0 - 150 mg/dL    HDL Cholesterol 40 40 - 60 mg/dL    VLDL Cholesterol 29 5 - 40 mg/dL    LDL Cholesterol  64 0 - 100 mg/dL   Vitamin D 25 Hydroxy   Result Value Ref Range    25 Hydroxy, Vitamin D 36.9 30.0 - 100.0 ng/mL   Hemoglobin A1c   Result Value Ref Range    Hemoglobin A1C 8.00 (H) 4.80 - 5.60 %   C-Peptide   Result Value Ref Range    C-Peptide 3.5 1.1 - 4.4 ng/mL   Uric Acid   Result Value Ref Range    Uric Acid 3.1 (L) 3.4 - 7.0 mg/dL   MicroAlbumin, Urine, Random   Result Value Ref Range    Microalbumin, Urine 26.5 Not Estab. ug/mL         The following portions of the patient's history were reviewed and updated as appropriate: allergies, current medications, past family history, past medical history, past social history, past surgical history and problem list.    Review of Systems   Constitutional: Negative for activity change, chills, fatigue and fever.   Respiratory: Negative for cough and shortness of breath.    Cardiovascular: Negative for chest pain and palpitations.   Gastrointestinal: Negative for abdominal pain.   Endocrine: Negative for cold intolerance.   Psychiatric/Behavioral: Negative for behavioral problems and dysphoric mood. The patient is not nervous/anxious.        Objective   Physical Exam   Constitutional: He appears well-developed and well-nourished.   Neck: Neck supple. No thyromegaly present.   Cardiovascular: Normal rate and regular rhythm.    No murmur heard.  Pulmonary/Chest: Effort normal and breath sounds normal.   Abdominal: Bowel sounds are normal.   Psychiatric: He has a normal mood and affect. His behavior is normal.   Nursing note and vitals reviewed.  Labs reviewed with pt today during visit. All questions answered.      Assessment/Plan   Jesus was seen today for hyperlipidemia, arthritis and gout.    Diagnoses and all orders for this visit:    Dyslipidemia  -     rosuvastatin (CRESTOR) 10 MG tablet; Take 1 tablet by mouth Daily.    Seasonal allergic  rhinitis due to pollen  -     fluticasone (FLONASE) 50 MCG/ACT nasal spray; 2 sprays into each nostril Daily.    Osteoarthritis, unspecified osteoarthritis type, unspecified site  -     celecoxib (CELEBREX) 200 MG capsule; Take 1 capsule by mouth Daily.    Idiopathic gout, unspecified chronicity, unspecified site  -     allopurinol (ZYLOPRIM) 300 MG tablet; Take 1 tablet by mouth Daily.    Other orders  -     Cancel: trospium 60 MG capsule sustained-release 24 hr capsule; Take 1 capsule by mouth Every Morning.

## 2017-09-29 ENCOUNTER — OFFICE VISIT (OUTPATIENT)
Dept: ENDOCRINOLOGY | Age: 77
End: 2017-09-29

## 2017-09-29 VITALS
DIASTOLIC BLOOD PRESSURE: 76 MMHG | HEIGHT: 69 IN | SYSTOLIC BLOOD PRESSURE: 126 MMHG | WEIGHT: 268 LBS | BODY MASS INDEX: 39.69 KG/M2

## 2017-09-29 DIAGNOSIS — M10.00 IDIOPATHIC GOUT, UNSPECIFIED CHRONICITY, UNSPECIFIED SITE: ICD-10-CM

## 2017-09-29 DIAGNOSIS — I10 ESSENTIAL HYPERTENSION: ICD-10-CM

## 2017-09-29 DIAGNOSIS — E78.5 DYSLIPIDEMIA: ICD-10-CM

## 2017-09-29 DIAGNOSIS — IMO0002 UNCONTROLLED TYPE 2 DIABETES MELLITUS WITH COMPLICATION, WITH LONG-TERM CURRENT USE OF INSULIN: Primary | ICD-10-CM

## 2017-09-29 DIAGNOSIS — E11.42 DIABETIC PERIPHERAL NEUROPATHY (HCC): ICD-10-CM

## 2017-09-29 PROCEDURE — 99214 OFFICE O/P EST MOD 30 MIN: CPT | Performed by: NURSE PRACTITIONER

## 2017-09-29 RX ORDER — LISINOPRIL 10 MG/1
10 TABLET ORAL DAILY
Qty: 90 TABLET | Refills: 1 | Status: SHIPPED | OUTPATIENT
Start: 2017-09-29 | End: 2017-12-19 | Stop reason: SDUPTHER

## 2017-09-29 RX ORDER — ALLOPURINOL 300 MG/1
300 TABLET ORAL DAILY
Qty: 90 TABLET | Refills: 1 | Status: SHIPPED | OUTPATIENT
Start: 2017-09-29 | End: 2017-12-19 | Stop reason: SDUPTHER

## 2017-09-29 RX ORDER — INSULIN DETEMIR 100 [IU]/ML
50 INJECTION, SOLUTION SUBCUTANEOUS 2 TIMES DAILY
Qty: 90 ML | Refills: 1 | Status: SHIPPED | OUTPATIENT
Start: 2017-09-29 | End: 2017-12-19 | Stop reason: SDUPTHER

## 2017-09-29 RX ORDER — ERGOCALCIFEROL 1.25 MG/1
50000 CAPSULE ORAL WEEKLY
Qty: 13 CAPSULE | Refills: 1 | Status: SHIPPED | OUTPATIENT
Start: 2017-09-29 | End: 2017-12-19 | Stop reason: SDUPTHER

## 2017-09-29 RX ORDER — GLIPIZIDE 10 MG/1
TABLET ORAL
Qty: 360 TABLET | Refills: 1 | Status: SHIPPED | OUTPATIENT
Start: 2017-09-29 | End: 2017-12-19 | Stop reason: SDUPTHER

## 2017-09-29 RX ORDER — ROSUVASTATIN CALCIUM 10 MG/1
10 TABLET, COATED ORAL DAILY
Qty: 90 TABLET | Refills: 1 | Status: SHIPPED | OUTPATIENT
Start: 2017-09-29 | End: 2017-12-19 | Stop reason: SDUPTHER

## 2017-09-29 RX ORDER — MECOBAL/LEVOMEFOLAT CA/B6 PHOS 2-3-35 MG
1 TABLET ORAL 2 TIMES DAILY
Qty: 180 TABLET | Refills: 1 | Status: SHIPPED | OUTPATIENT
Start: 2017-09-29 | End: 2017-10-24 | Stop reason: SDUPTHER

## 2017-09-29 RX ORDER — EMPAGLIFLOZIN AND LINAGLIPTIN 10; 5 MG/1; MG/1
1 TABLET, FILM COATED ORAL DAILY
Qty: 90 TABLET | Refills: 1 | Status: SHIPPED | OUTPATIENT
Start: 2017-09-29 | End: 2017-12-19

## 2017-09-29 NOTE — PATIENT INSTRUCTIONS
Continue all current meds  Call office if blood sugars start staying greater than 180 mg/dl  Or if drop less than 70.

## 2017-09-29 NOTE — PROGRESS NOTES
"Subjective   Jesus Beckham is a 76 y.o. male is here today for follow-up.  Chief Complaint   Patient presents with   • Diabetes     recent labs, testing BG 2 times daily, pt brought log book   • Hyperlipidemia   • Hypertension   • hyperuricemia   • Vitamin D Deficiency     /76  Ht 69\" (175.3 cm)  Wt 268 lb (122 kg)  BMI 39.58 kg/m2  Current Outpatient Prescriptions on File Prior to Visit   Medication Sig   • Acetaminophen (TYLENOL PO) Take  by mouth as needed.   • allopurinol (ZYLOPRIM) 300 MG tablet Take 1 tablet by mouth Daily.   • celecoxib (CELEBREX) 200 MG capsule Take 1 capsule by mouth Daily.   • ergocalciferol (DRISDOL) 33067 UNITS capsule Take 1 capsule by mouth 1 (One) Time Per Week.   • fluticasone (FLONASE) 50 MCG/ACT nasal spray 2 sprays into each nostril Daily.   • glipiZIDE (GLUCOTROL) 10 MG tablet Take 2 po in am and 2 in pm   • glucose blood test strip CHECK BLOOD SUGARS 2 TIMES A DAY AS DIRECTED   • GLYXAMBI 10-5 MG tablet Take 1 tablet by mouth Daily.   • Insulin Pen Needle 32G X 4 MM misc INJECT UP TO THREE TIMES DAILY OR AS DIRECTED   • L-Methylfolate-B6-B12 3-35-2 MG tablet Take 1 tablet by mouth 2 (Two) Times a Day.   • LEVEMIR FLEXTOUCH 100 UNIT/ML injection Inject 50 Units under the skin 2 (Two) Times a Day.   • lisinopril (PRINIVIL,ZESTRIL) 10 MG tablet Take 1 tablet by mouth Daily.   • Multiple Vitamins-Minerals (MULTIVITAMIN ADULT PO) Take  by mouth.   • Omega-3 Fatty Acids (FISH OIL) 1000 MG capsule capsule Take  by mouth daily with breakfast.   • pregabalin (LYRICA) 100 MG capsule Take 1 capsule by mouth 3 (Three) Times a Day.   • rosuvastatin (CRESTOR) 10 MG tablet Take 1 tablet by mouth Daily.   • Saw Palmetto 500 MG capsule Take  by mouth.   • trospium 60 MG capsule sustained-release 24 hr capsule Take 1 capsule by mouth every morning.   • vitamin C (ASCORBIC ACID) 500 MG tablet Take 500 mg by mouth Daily.     No current facility-administered medications on file prior to " visit.      Family History   Problem Relation Age of Onset   • Cancer Mother      Social History   Substance Use Topics   • Smoking status: Former Smoker   • Smokeless tobacco: Never Used   • Alcohol use No     No Known Allergies      History of Present Illness  Encounter Diagnoses   Name Primary?   • Uncontrolled type 2 diabetes mellitus with complication, with long-term current use of insulin Yes   • Diabetic peripheral neuropathy    • Essential hypertension    • Dyslipidemia    This is a 76-year-old male patient here today for routine follow-up visit.  He has been seen for the above-mentioned problems.  He had recent labs which were reviewed his provided a copy.  He has a history of arthritic pain as well as peripheral neuropathy.  He states he's been taken 3 or 4 Tylenol pills at a time.  He was educated today on maximum dose and how to dose Tylenol.  He also is aware of the risk of liver damage with taking too much of it.  He has a history of cancer that has been cured.  He states his blood sugars go up if he gets sick.  He will sometimes forget to take his insulin shot or his pills.  He recently bought a vibrating machine on [employer] which she states as a result of standing on it has helped him lose 12 pounds as well as improved circulation and neuropathy in his feet.  He has complaints that his medications are not being filled by his pharmacy.  We will contact his pharmacy today a refill his medications per his request  The following portions of the patient's history were reviewed and updated as appropriate: allergies, current medications, past family history, past medical history, past social history, past surgical history and problem list.    Review of Systems   Constitutional: Negative for fatigue.   HENT: Negative for trouble swallowing.    Eyes: Negative for visual disturbance.   Respiratory: Negative for shortness of breath.    Cardiovascular: Negative for leg swelling.   Endocrine: Negative for polyuria.    Skin: Negative for wound.   Neurological: Negative for numbness.       Objective   Physical Exam   Constitutional: He is oriented to person, place, and time. He appears well-developed and well-nourished. No distress.   HENT:   Head: Normocephalic and atraumatic.   Right Ear: External ear normal.   Left Ear: External ear normal.   Nose: Nose normal.   Eyes: Pupils are equal, round, and reactive to light. Right eye exhibits no discharge. Left eye exhibits no discharge.   Neck: Normal range of motion. Neck supple. Carotid bruit is not present. No tracheal deviation, no edema and no erythema present. No thyromegaly present.   Cardiovascular: Normal rate, regular rhythm, normal heart sounds and intact distal pulses.  Exam reveals no gallop and no friction rub.    No murmur heard.  Pulmonary/Chest: Effort normal and breath sounds normal. No respiratory distress. He has no wheezes. He has no rales.   Abdominal: Soft. Bowel sounds are normal. He exhibits no distension. There is no tenderness.   Musculoskeletal: Normal range of motion. He exhibits no edema or deformity.   Lymphadenopathy:     He has no cervical adenopathy.   Neurological: He is alert and oriented to person, place, and time. Coordination normal.   Skin: Skin is warm and dry. No rash noted. He is not diaphoretic. No erythema. No pallor.   Lipoatrophy in abdomen. Instruction in site rotation   Psychiatric: He has a normal mood and affect. His behavior is normal. Judgment and thought content normal.   Nursing note and vitals reviewed.    Results for orders placed or performed in visit on 09/19/17   Comprehensive Metabolic Panel   Result Value Ref Range    Glucose 192 (H) 65 - 99 mg/dL    BUN 18 8 - 23 mg/dL    Creatinine 0.98 0.76 - 1.27 mg/dL    eGFR Non African Am 75 >60 mL/min/1.73    eGFR African Am 90 >60 mL/min/1.73    BUN/Creatinine Ratio 18.4 7.0 - 25.0    Sodium 144 136 - 145 mmol/L    Potassium 5.0 3.5 - 5.2 mmol/L    Chloride 107 98 - 107 mmol/L     Total CO2 24.8 22.0 - 29.0 mmol/L    Calcium 9.8 8.6 - 10.5 mg/dL    Total Protein 6.8 6.0 - 8.5 g/dL    Albumin 4.50 3.50 - 5.20 g/dL    Globulin 2.3 gm/dL    A/G Ratio 2.0 g/dL    Total Bilirubin 0.4 0.1 - 1.2 mg/dL    Alkaline Phosphatase 86 39 - 117 U/L    AST (SGOT) 20 1 - 40 U/L    ALT (SGPT) 32 1 - 41 U/L   Lipid Panel   Result Value Ref Range    Total Cholesterol 133 0 - 200 mg/dL    Triglycerides 145 0 - 150 mg/dL    HDL Cholesterol 40 40 - 60 mg/dL    VLDL Cholesterol 29 5 - 40 mg/dL    LDL Cholesterol  64 0 - 100 mg/dL   Vitamin D 25 Hydroxy   Result Value Ref Range    25 Hydroxy, Vitamin D 36.9 30.0 - 100.0 ng/mL   Hemoglobin A1c   Result Value Ref Range    Hemoglobin A1C 8.00 (H) 4.80 - 5.60 %   C-Peptide   Result Value Ref Range    C-Peptide 3.5 1.1 - 4.4 ng/mL   Uric Acid   Result Value Ref Range    Uric Acid 3.1 (L) 3.4 - 7.0 mg/dL   MicroAlbumin, Urine, Random   Result Value Ref Range    Microalbumin, Urine 26.5 Not Estab. ug/mL         Assessment/Plan   Jesus was seen today for diabetes, hyperlipidemia, hypertension, hyperuricemia and vitamin d deficiency.    Diagnoses and all orders for this visit:    Uncontrolled type 2 diabetes mellitus with complication, with long-term current use of insulin    Diabetic peripheral neuropathy    Essential hypertension    Dyslipidemia        In summary, patient was seen and examined.  He will continue on his current medications as prescribed.  His hemoglobin A1c is 8 and he does not want to increase any medication to try to bring this down further.  He does not have any hypoglycemic events.  He is occasionally forgetful to take his medications.  He is working on weight loss.  I've advised to start rotating his insulin injections since he does have some lipoatrophy in his abdomen.  This also could be affecting his blood sugars.  He states his peripheral neuropathy is controlled.  He will follow-up in 4 months with labs prior.  Metabolically he is stable.  His  blood pressure is an satisfactory range.  No medications were changed at today's visit.  All his medications were reviewed.

## 2017-10-24 RX ORDER — MECOBAL/LEVOMEFOLAT CA/B6 PHOS 2-3-35 MG
1 TABLET ORAL 2 TIMES DAILY
Qty: 180 TABLET | Refills: 0 | Status: SHIPPED | OUTPATIENT
Start: 2017-10-24 | End: 2017-11-09 | Stop reason: SDUPTHER

## 2017-11-09 RX ORDER — MECOBAL/LEVOMEFOLAT CA/B6 PHOS 2-3-35 MG
1 TABLET ORAL 2 TIMES DAILY
Qty: 60 TABLET | Refills: 0 | Status: SHIPPED | OUTPATIENT
Start: 2017-11-09 | End: 2017-12-19 | Stop reason: SDUPTHER

## 2017-11-13 RX ORDER — GLIPIZIDE 10 MG/1
TABLET ORAL
Qty: 360 TABLET | Refills: 0 | Status: SHIPPED | OUTPATIENT
Start: 2017-11-13 | End: 2017-12-19 | Stop reason: SDUPTHER

## 2017-12-11 LAB
25(OH)D3+25(OH)D2 SERPL-MCNC: 41.9 NG/ML (ref 30–100)
ALBUMIN SERPL-MCNC: 4.5 G/DL (ref 3.5–4.8)
ALBUMIN/GLOB SERPL: 2 {RATIO} (ref 1.2–2.2)
ALP SERPL-CCNC: 82 IU/L (ref 39–117)
ALT SERPL-CCNC: 26 IU/L (ref 0–44)
AST SERPL-CCNC: 26 IU/L (ref 0–40)
BASOPHILS # BLD AUTO: 0 X10E3/UL (ref 0–0.2)
BASOPHILS NFR BLD AUTO: 0 %
BILIRUB SERPL-MCNC: 0.3 MG/DL (ref 0–1.2)
BUN SERPL-MCNC: 20 MG/DL (ref 8–27)
BUN/CREAT SERPL: 21 (ref 10–24)
C PEPTIDE SERPL-MCNC: 2.2 NG/ML (ref 1.1–4.4)
CALCIUM SERPL-MCNC: 9.4 MG/DL (ref 8.6–10.2)
CHLORIDE SERPL-SCNC: 107 MMOL/L (ref 96–106)
CHOLEST SERPL-MCNC: 132 MG/DL (ref 100–199)
CO2 SERPL-SCNC: 15 MMOL/L (ref 18–29)
CREAT SERPL-MCNC: 0.96 MG/DL (ref 0.76–1.27)
EOSINOPHIL # BLD AUTO: 0.1 X10E3/UL (ref 0–0.4)
EOSINOPHIL NFR BLD AUTO: 3 %
ERYTHROCYTE [DISTWIDTH] IN BLOOD BY AUTOMATED COUNT: 15.6 % (ref 12.3–15.4)
GFR SERPLBLD CREATININE-BSD FMLA CKD-EPI: 76 ML/MIN/1.73
GFR SERPLBLD CREATININE-BSD FMLA CKD-EPI: 88 ML/MIN/1.73
GLOBULIN SER CALC-MCNC: 2.3 G/DL (ref 1.5–4.5)
GLUCOSE SERPL-MCNC: 94 MG/DL (ref 65–99)
HBA1C MFR BLD: 7.5 % (ref 4.8–5.6)
HCT VFR BLD AUTO: 44.2 % (ref 37.5–51)
HDLC SERPL-MCNC: 42 MG/DL
HGB BLD-MCNC: 14.8 G/DL (ref 13–17.7)
IMM GRANULOCYTES # BLD: 0 X10E3/UL (ref 0–0.1)
IMM GRANULOCYTES NFR BLD: 0 %
INTERPRETATION: NORMAL
LDLC SERPL CALC-MCNC: 56 MG/DL (ref 0–99)
LYMPHOCYTES # BLD AUTO: 1.1 X10E3/UL (ref 0.7–3.1)
LYMPHOCYTES NFR BLD AUTO: 24 %
Lab: NORMAL
MCH RBC QN AUTO: 30.5 PG (ref 26.6–33)
MCHC RBC AUTO-ENTMCNC: 33.5 G/DL (ref 31.5–35.7)
MCV RBC AUTO: 91 FL (ref 79–97)
MONOCYTES # BLD AUTO: 0.4 X10E3/UL (ref 0.1–0.9)
MONOCYTES NFR BLD AUTO: 9 %
NEUTROPHILS # BLD AUTO: 3 X10E3/UL (ref 1.4–7)
NEUTROPHILS NFR BLD AUTO: 64 %
PLATELET # BLD AUTO: 142 X10E3/UL (ref 150–379)
POTASSIUM SERPL-SCNC: 5.3 MMOL/L (ref 3.5–5.2)
PROT SERPL-MCNC: 6.8 G/DL (ref 6–8.5)
PSA SERPL-MCNC: 0.4 NG/ML (ref 0–4)
RBC # BLD AUTO: 4.85 X10E6/UL (ref 4.14–5.8)
SHBG SERPL-SCNC: 25.3 NMOL/L (ref 19.3–76.4)
SODIUM SERPL-SCNC: 144 MMOL/L (ref 134–144)
T3FREE SERPL-MCNC: 3.1 PG/ML (ref 2–4.4)
T4 FREE SERPL-MCNC: 0.87 NG/DL (ref 0.82–1.77)
T4 SERPL-MCNC: 4.9 UG/DL (ref 4.5–12)
TESTOST FREE SERPL-MCNC: 2.8 PG/ML (ref 6.6–18.1)
TESTOST SERPL-MCNC: 142 NG/DL (ref 264–916)
TRIGL SERPL-MCNC: 169 MG/DL (ref 0–149)
TSH SERPL DL<=0.005 MIU/L-ACNC: 2.09 UIU/ML (ref 0.45–4.5)
URATE SERPL-MCNC: 3.4 MG/DL (ref 3.7–8.6)
VLDLC SERPL CALC-MCNC: 34 MG/DL (ref 5–40)
WBC # BLD AUTO: 4.7 X10E3/UL (ref 3.4–10.8)

## 2017-12-19 ENCOUNTER — OFFICE VISIT (OUTPATIENT)
Dept: ENDOCRINOLOGY | Age: 77
End: 2017-12-19

## 2017-12-19 VITALS
BODY MASS INDEX: 38.95 KG/M2 | RESPIRATION RATE: 16 BRPM | SYSTOLIC BLOOD PRESSURE: 128 MMHG | DIASTOLIC BLOOD PRESSURE: 70 MMHG | HEIGHT: 69 IN | WEIGHT: 263 LBS

## 2017-12-19 DIAGNOSIS — IMO0002 UNCONTROLLED TYPE 2 DIABETES MELLITUS WITH COMPLICATION, WITH LONG-TERM CURRENT USE OF INSULIN: Primary | ICD-10-CM

## 2017-12-19 DIAGNOSIS — I10 ESSENTIAL HYPERTENSION: ICD-10-CM

## 2017-12-19 DIAGNOSIS — M10.00 IDIOPATHIC GOUT, UNSPECIFIED CHRONICITY, UNSPECIFIED SITE: ICD-10-CM

## 2017-12-19 DIAGNOSIS — E78.5 DYSLIPIDEMIA: ICD-10-CM

## 2017-12-19 DIAGNOSIS — N40.0 BENIGN NON-NODULAR PROSTATIC HYPERPLASIA WITHOUT LOWER URINARY TRACT SYMPTOMS: ICD-10-CM

## 2017-12-19 DIAGNOSIS — E55.9 VITAMIN D DEFICIENCY: ICD-10-CM

## 2017-12-19 DIAGNOSIS — E11.42 DIABETIC PERIPHERAL NEUROPATHY (HCC): ICD-10-CM

## 2017-12-19 PROCEDURE — 99214 OFFICE O/P EST MOD 30 MIN: CPT | Performed by: INTERNAL MEDICINE

## 2017-12-19 RX ORDER — MECOBAL/LEVOMEFOLAT CA/B6 PHOS 2-3-35 MG
1 TABLET ORAL 2 TIMES DAILY
Qty: 180 TABLET | Refills: 3 | Status: SHIPPED | OUTPATIENT
Start: 2017-12-19 | End: 2018-05-22 | Stop reason: SDUPTHER

## 2017-12-19 RX ORDER — LISINOPRIL 10 MG/1
10 TABLET ORAL DAILY
Qty: 90 TABLET | Refills: 3 | Status: SHIPPED | OUTPATIENT
Start: 2017-12-19 | End: 2018-05-22 | Stop reason: SDUPTHER

## 2017-12-19 RX ORDER — ALLOPURINOL 300 MG/1
300 TABLET ORAL DAILY
Qty: 90 TABLET | Refills: 1 | Status: SHIPPED | OUTPATIENT
Start: 2017-12-19 | End: 2018-05-22 | Stop reason: SDUPTHER

## 2017-12-19 RX ORDER — GLIPIZIDE 10 MG/1
10 TABLET ORAL
Qty: 360 TABLET | Refills: 3 | Status: SHIPPED | OUTPATIENT
Start: 2017-12-19 | End: 2018-05-22 | Stop reason: SDUPTHER

## 2017-12-19 RX ORDER — TESTOSTERONE 16.2 MG/G
GEL TRANSDERMAL
Qty: 450 G | Refills: 1 | Status: SHIPPED | OUTPATIENT
Start: 2017-12-19 | End: 2018-05-22

## 2017-12-19 RX ORDER — ERGOCALCIFEROL 1.25 MG/1
50000 CAPSULE ORAL WEEKLY
Qty: 13 CAPSULE | Refills: 3 | Status: SHIPPED | OUTPATIENT
Start: 2017-12-19 | End: 2018-05-22 | Stop reason: SDUPTHER

## 2017-12-19 RX ORDER — EMPAGLIFLOZIN AND LINAGLIPTIN 25; 5 MG/1; MG/1
1 TABLET, FILM COATED ORAL
Qty: 90 TABLET | Refills: 3 | Status: SHIPPED | OUTPATIENT
Start: 2017-12-19 | End: 2018-05-22 | Stop reason: SDUPTHER

## 2017-12-19 RX ORDER — INSULIN DETEMIR 100 [IU]/ML
INJECTION, SOLUTION SUBCUTANEOUS
Qty: 90 ML | Refills: 3 | Status: SHIPPED | OUTPATIENT
Start: 2017-12-19 | End: 2018-05-22 | Stop reason: SDUPTHER

## 2017-12-19 RX ORDER — ROSUVASTATIN CALCIUM 10 MG/1
10 TABLET, COATED ORAL DAILY
Qty: 90 TABLET | Refills: 3 | Status: SHIPPED | OUTPATIENT
Start: 2017-12-19 | End: 2018-05-22 | Stop reason: SDUPTHER

## 2017-12-19 NOTE — PROGRESS NOTES
"Subjective   Jesus Beckham is a 77 y.o. male seen for follow up for DM2, hyperlipidemia, vit d deficiency, lab review. Patient states that he has not started taking Metanx due to not getting a RX from [facility]. His neuropathy in his hands and feet is worse. He is checking BG twice a day. He denies any problems or concerns.     History of Present Illness this is a 77-year-old gentleman known patient with type II diabetes hypertension and dyslipidemia as well as vitamin D deficiency.  Over the course of last 6 months he has had no significant health problems for which to go to the emergency room or hospital.    /70  Resp 16  Ht 175.3 cm (69\")  Wt 119 kg (263 lb)  BMI 38.84 kg/m2     No Known Allergies    Current Outpatient Prescriptions:   •  Acetaminophen (TYLENOL PO), Take  by mouth as needed., Disp: , Rfl:   •  allopurinol (ZYLOPRIM) 300 MG tablet, Take 1 tablet by mouth Daily., Disp: 90 tablet, Rfl: 1  •  celecoxib (CELEBREX) 200 MG capsule, Take 1 capsule by mouth Daily., Disp: 90 capsule, Rfl: 1  •  ergocalciferol (DRISDOL) 51689 units capsule, Take 1 capsule by mouth 1 (One) Time Per Week., Disp: 13 capsule, Rfl: 1  •  fluticasone (FLONASE) 50 MCG/ACT nasal spray, 2 sprays into each nostril Daily., Disp: 3 bottle, Rfl: 3  •  glipiZIDE (GLUCOTROL) 10 MG tablet, TAKE 2 TABLETS IN THE      MORNING AND 2 TABLETS IN   THE EVENING, Disp: 360 tablet, Rfl: 0  •  glucose blood test strip, CHECK BLOOD SUGARS 2 TIMES A DAY AS DIRECTED, Disp: 200 each, Rfl: 1  •  GLYXAMBI 10-5 MG tablet, Take 1 tablet by mouth Daily., Disp: 90 tablet, Rfl: 1  •  Insulin Pen Needle 32G X 4 MM misc, INJECT UP TO THREE TIMES DAILY OR AS DIRECTED, Disp: 500 each, Rfl: 1  •  L-Methylfolate-B6-B12 3-35-2 MG tablet, Take 1 tablet by mouth 2 (Two) Times a Day., Disp: 60 tablet, Rfl: 0  •  LEVEMIR FLEXTOUCH 100 UNIT/ML injection, Inject 50 Units under the skin 2 (Two) Times a Day., Disp: 90 mL, Rfl: 1  •  lisinopril (PRINIVIL,ZESTRIL) 10 " MG tablet, Take 1 tablet by mouth Daily., Disp: 90 tablet, Rfl: 1  •  Mirabegron ER (MYRBETRIQ) 25 MG tablet sustained-release 24 hour 24 hr tablet, Take 25 mg by mouth Daily., Disp: , Rfl:   •  Multiple Vitamins-Minerals (MULTIVITAMIN ADULT PO), Take  by mouth., Disp: , Rfl:   •  Omega-3 Fatty Acids (FISH OIL) 1000 MG capsule capsule, Take  by mouth daily with breakfast., Disp: , Rfl:   •  pregabalin (LYRICA) 100 MG capsule, Take 1 capsule by mouth 3 (Three) Times a Day., Disp: 90 capsule, Rfl: 0  •  rosuvastatin (CRESTOR) 10 MG tablet, Take 1 tablet by mouth Daily., Disp: 90 tablet, Rfl: 1  •  vitamin C (ASCORBIC ACID) 500 MG tablet, Take 500 mg by mouth Daily., Disp: , Rfl:       The following portions of the patient's history were reviewed and updated as appropriate: allergies, current medications, past family history, past medical history, past social history, past surgical history and problem list.    Review of Systems   Constitutional: Negative.    HENT: Negative.    Eyes: Negative.    Respiratory: Negative.    Cardiovascular: Negative.    Gastrointestinal: Negative.    Endocrine: Negative.    Genitourinary: Negative.    Musculoskeletal: Negative.    Skin: Negative.    Allergic/Immunologic: Negative.    Neurological: Negative.    Hematological: Negative.    Psychiatric/Behavioral: Negative.        Objective   Physical Exam   Constitutional: He is oriented to person, place, and time. He appears well-developed and well-nourished. No distress.   HENT:   Head: Normocephalic and atraumatic.   Right Ear: External ear normal.   Left Ear: External ear normal.   Nose: Nose normal.   Mouth/Throat: Oropharynx is clear and moist.   Eyes: Conjunctivae and EOM are normal. Pupils are equal, round, and reactive to light. Right eye exhibits no discharge. Left eye exhibits no discharge. No scleral icterus.   Neck: Normal range of motion. Neck supple. No JVD present. No tracheal deviation present. No thyromegaly present.    Cardiovascular: Normal rate, regular rhythm, normal heart sounds and intact distal pulses.  Exam reveals no gallop and no friction rub.    No murmur heard.  Pulmonary/Chest: Effort normal and breath sounds normal. No stridor. No respiratory distress. He has no wheezes. He has no rales. He exhibits no tenderness.   Abdominal: Soft. Bowel sounds are normal. He exhibits no distension and no mass. There is no tenderness. There is no rebound and no guarding. No hernia.   Musculoskeletal: He exhibits no edema, tenderness or deformity.   Lymphadenopathy:     He has no cervical adenopathy.   Neurological: He is alert and oriented to person, place, and time. He has normal reflexes. He displays normal reflexes. No cranial nerve deficit. He exhibits normal muscle tone. Coordination normal.   Skin: Skin is warm. No rash noted. He is not diaphoretic. No erythema. No pallor.   Psychiatric: He has a normal mood and affect. His behavior is normal. Judgment and thought content normal.   Nursing note and vitals reviewed.       Lab Results   Component Value Date    GLUCOSE 187 (H) 01/20/2016    BUN 20 12/06/2017    CREATININE 0.96 12/06/2017    EGFRIFNONA 76 12/06/2017    EGFRIFAFRI 88 12/06/2017    BCR 21 12/06/2017    K 5.3 (H) 12/06/2017    CO2 15 (L) 12/06/2017    CALCIUM 9.4 12/06/2017    PROTENTOTREF 6.8 12/06/2017    ALBUMIN 4.5 12/06/2017    LABIL2 2.0 12/06/2017    AST 26 12/06/2017    ALT 26 12/06/2017     Lab Results   Component Value Date    TSH 2.090 12/06/2017     Lab Results   Component Value Date    CHLPL 132 12/06/2017    CHLPL 133 09/19/2017    CHLPL 122 04/14/2017     Lab Results   Component Value Date    TRIG 169 (H) 12/06/2017    TRIG 145 09/19/2017    TRIG 170 (H) 04/14/2017     Lab Results   Component Value Date    HDL 42 12/06/2017    HDL 40 09/19/2017    HDL 38 (L) 04/14/2017     Lab Results   Component Value Date    LDL 56 12/06/2017    LDL 64 09/19/2017    LDL 50 04/14/2017     Lab Results   Component  Value Date    HGBA1C 7.5 (H) 12/06/2017           Assessment/Plan   Diagnoses and all orders for this visit:    Uncontrolled type 2 diabetes mellitus with complication, with long-term current use of insulin  -     T4 & TSH ([facility]); Future  -     TestT+TestF+SHBG; Future  -     Uric Acid; Future  -     Vitamin D 25 Hydroxy; Future  -     Comprehensive Metabolic Panel; Future  -     C-Peptide; Future  -     Hemoglobin A1c; Future  -     Lipid Panel; Future  -     PSA; Future  -     Hemoglobin & Hematocrit, Blood; Future    Essential hypertension  -     T4 & TSH ([facility]); Future  -     TestT+TestF+SHBG; Future  -     Uric Acid; Future  -     Vitamin D 25 Hydroxy; Future  -     Comprehensive Metabolic Panel; Future  -     C-Peptide; Future  -     Hemoglobin A1c; Future  -     Lipid Panel; Future  -     PSA; Future  -     Hemoglobin & Hematocrit, Blood; Future    Vitamin D deficiency  -     T4 & TSH ([facility]); Future  -     TestT+TestF+SHBG; Future  -     Uric Acid; Future  -     Vitamin D 25 Hydroxy; Future  -     Comprehensive Metabolic Panel; Future  -     C-Peptide; Future  -     Hemoglobin A1c; Future  -     Lipid Panel; Future  -     PSA; Future  -     Hemoglobin & Hematocrit, Blood; Future    Diabetic peripheral neuropathy  -     T4 & TSH ([facility]); Future  -     TestT+TestF+SHBG; Future  -     Uric Acid; Future  -     Vitamin D 25 Hydroxy; Future  -     Comprehensive Metabolic Panel; Future  -     C-Peptide; Future  -     Hemoglobin A1c; Future  -     Lipid Panel; Future  -     PSA; Future  -     Hemoglobin & Hematocrit, Blood; Future    Dyslipidemia  -     rosuvastatin (CRESTOR) 10 MG tablet; Take 1 tablet by mouth Daily.  -     T4 & TSH ([facility]); Future  -     TestT+TestF+SHBG; Future  -     Uric Acid; Future  -     Vitamin D 25 Hydroxy; Future  -     Comprehensive Metabolic Panel; Future  -     C-Peptide; Future  -     Hemoglobin A1c; Future  -     Lipid Panel; Future  -     PSA; Future  -     Hemoglobin &  Hematocrit, Blood; Future    Idiopathic gout, unspecified chronicity, unspecified site  -     allopurinol (ZYLOPRIM) 300 MG tablet; Take 1 tablet by mouth Daily.  -     T4 & TSH ([facility]); Future  -     TestT+TestF+SHBG; Future  -     Uric Acid; Future  -     Vitamin D 25 Hydroxy; Future  -     Comprehensive Metabolic Panel; Future  -     C-Peptide; Future  -     Hemoglobin A1c; Future  -     Lipid Panel; Future  -     PSA; Future  -     Hemoglobin & Hematocrit, Blood; Future    Benign non-nodular prostatic hyperplasia without lower urinary tract symptoms  -     T4 & TSH ([facility]); Future  -     TestT+TestF+SHBG; Future  -     Uric Acid; Future  -     Vitamin D 25 Hydroxy; Future  -     Comprehensive Metabolic Panel; Future  -     C-Peptide; Future  -     Hemoglobin A1c; Future  -     Lipid Panel; Future  -     PSA; Future  -     Hemoglobin & Hematocrit, Blood; Future    Other orders  -     GLYXAMBI 25-5 MG tablet; Take 1 tablet by mouth Daily With Breakfast.  -     lisinopril (PRINIVIL,ZESTRIL) 10 MG tablet; Take 1 tablet by mouth Daily.  -     LEVEMIR FLEXTOUCH 100 UNIT/ML injection; 50 units twice daily.  -     glipiZIDE (GLUCOTROL) 10 MG tablet; Take 1 tablet by mouth 2 (Two) Times a Day Before Meals.  -     ergocalciferol (DRISDOL) 73150 units capsule; Take 1 capsule by mouth 1 (One) Time Per Week.  -     L-Methylfolate-B6-B12 3-35-2 MG tablet; Take 1 tablet by mouth 2 (Two) Times a Day.  -     ANDROGEL PUMP 20.25 MG/ACT (1.62%) gel; 2 pump actuation on each shoulder daily             in summary I saw and examined this 77-year-old gentleman for above-mentioned problems.  I reviewed his laboratory evaluation of 12/06/2017 and provided him with a hard copy of it.  Overall he is clinically and metabolically stable and his hemoglobin A1c which was the 8.0 in the month of September is now 7.5.  We will continue all his current prescriptions and will see [facility] in 4 months or sooner if needed with  laboratory evaluation prior to each office visit.

## 2018-01-22 RX ORDER — PREGABALIN 100 MG/1
100 CAPSULE ORAL 3 TIMES DAILY
Qty: 90 CAPSULE | Refills: 5 | Status: ON HOLD | OUTPATIENT
Start: 2018-01-22 | End: 2018-03-13

## 2018-02-22 ENCOUNTER — HOSPITAL ENCOUNTER (INPATIENT)
Facility: HOSPITAL | Age: 78
LOS: 18 days | Discharge: NURSING, REHAB OR LONG-TERM CARE | End: 2018-03-13
Attending: EMERGENCY MEDICINE | Admitting: INTERNAL MEDICINE

## 2018-02-22 DIAGNOSIS — R26.89 DECREASED MOBILITY: ICD-10-CM

## 2018-02-22 DIAGNOSIS — R10.11 RUQ ABDOMINAL PAIN: ICD-10-CM

## 2018-02-22 DIAGNOSIS — R19.01 RUQ ABDOMINAL MASS: ICD-10-CM

## 2018-02-22 DIAGNOSIS — K80.20 CALCULUS OF GALLBLADDER WITHOUT CHOLECYSTITIS WITHOUT OBSTRUCTION: Primary | ICD-10-CM

## 2018-02-22 DIAGNOSIS — K81.0 GANGRENOUS CHOLECYSTITIS: ICD-10-CM

## 2018-02-22 DIAGNOSIS — A41.51 SEPSIS DUE TO ESCHERICHIA COLI (HCC): ICD-10-CM

## 2018-02-22 DIAGNOSIS — A41.9 SEPSIS, DUE TO UNSPECIFIED ORGANISM: ICD-10-CM

## 2018-02-22 PROCEDURE — 99285 EMERGENCY DEPT VISIT HI MDM: CPT

## 2018-02-22 RX ORDER — SODIUM CHLORIDE 0.9 % (FLUSH) 0.9 %
10 SYRINGE (ML) INJECTION AS NEEDED
Status: DISCONTINUED | OUTPATIENT
Start: 2018-02-22 | End: 2018-03-13 | Stop reason: HOSPADM

## 2018-02-23 ENCOUNTER — APPOINTMENT (OUTPATIENT)
Dept: CT IMAGING | Facility: HOSPITAL | Age: 78
End: 2018-02-23

## 2018-02-23 ENCOUNTER — APPOINTMENT (OUTPATIENT)
Dept: GENERAL RADIOLOGY | Facility: HOSPITAL | Age: 78
End: 2018-02-23

## 2018-02-23 ENCOUNTER — APPOINTMENT (OUTPATIENT)
Dept: CARDIOLOGY | Facility: HOSPITAL | Age: 78
End: 2018-02-23
Attending: INTERNAL MEDICINE

## 2018-02-23 PROBLEM — I95.9 HYPOTENSION: Status: ACTIVE | Noted: 2018-02-23

## 2018-02-23 PROBLEM — R60.0 LEG EDEMA: Status: ACTIVE | Noted: 2018-02-23

## 2018-02-23 PROBLEM — J96.01 ACUTE RESPIRATORY FAILURE WITH HYPOXIA (HCC): Status: ACTIVE | Noted: 2018-02-23

## 2018-02-23 PROBLEM — J21.0 RSV (ACUTE BRONCHIOLITIS DUE TO RESPIRATORY SYNCYTIAL VIRUS): Status: ACTIVE | Noted: 2018-02-23

## 2018-02-23 PROBLEM — A41.9 SEPSIS (HCC): Status: ACTIVE | Noted: 2018-02-23

## 2018-02-23 PROBLEM — D69.59 SECONDARY THROMBOCYTOPENIA: Status: ACTIVE | Noted: 2018-02-23

## 2018-02-23 PROBLEM — G47.33 OSA (OBSTRUCTIVE SLEEP APNEA): Status: ACTIVE | Noted: 2018-02-23

## 2018-02-23 LAB
ALBUMIN SERPL-MCNC: 3.9 G/DL (ref 3.5–5.2)
ALBUMIN/GLOB SERPL: 1.4 G/DL
ALP SERPL-CCNC: 72 U/L (ref 39–117)
ALT SERPL W P-5'-P-CCNC: 24 U/L (ref 1–41)
ANION GAP SERPL CALCULATED.3IONS-SCNC: 14.3 MMOL/L
ASCENDING AORTA: 3.2 CM
AST SERPL-CCNC: 23 U/L (ref 1–40)
B PERT DNA SPEC QL NAA+PROBE: NOT DETECTED
BACTERIA BLD CULT: ABNORMAL
BACTERIA BLD CULT: ABNORMAL
BASOPHILS # BLD AUTO: 0 10*3/MM3 (ref 0–0.2)
BASOPHILS NFR BLD AUTO: 0 % (ref 0–1.5)
BH CV ECHO MEAS - AO MAX PG (FULL): 2.6 MMHG
BH CV ECHO MEAS - AO MAX PG: 8.2 MMHG
BH CV ECHO MEAS - AO MEAN PG (FULL): 2 MMHG
BH CV ECHO MEAS - AO MEAN PG: 5 MMHG
BH CV ECHO MEAS - AO ROOT AREA (BSA CORRECTED): 1.4
BH CV ECHO MEAS - AO ROOT AREA: 8 CM^2
BH CV ECHO MEAS - AO ROOT DIAM: 3.2 CM
BH CV ECHO MEAS - AO V2 MAX: 143 CM/SEC
BH CV ECHO MEAS - AO V2 MEAN: 98 CM/SEC
BH CV ECHO MEAS - AO V2 VTI: 20.6 CM
BH CV ECHO MEAS - ASC AORTA: 3.2 CM
BH CV ECHO MEAS - AVA(I,A): 4.1 CM^2
BH CV ECHO MEAS - AVA(I,D): 4.1 CM^2
BH CV ECHO MEAS - AVA(V,A): 3.4 CM^2
BH CV ECHO MEAS - AVA(V,D): 3.4 CM^2
BH CV ECHO MEAS - BSA(HAYCOCK): 2.5 M^2
BH CV ECHO MEAS - BSA: 2.3 M^2
BH CV ECHO MEAS - BZI_BMI: 37.7 KILOGRAMS/M^2
BH CV ECHO MEAS - BZI_METRIC_HEIGHT: 177.8 CM
BH CV ECHO MEAS - BZI_METRIC_WEIGHT: 119.3 KG
BH CV ECHO MEAS - CONTRAST EF (2CH): 44.4 ML/M^2
BH CV ECHO MEAS - CONTRAST EF 4CH: 50.3 ML/M^2
BH CV ECHO MEAS - EDV(CUBED): 157.5 ML
BH CV ECHO MEAS - EDV(MOD-SP2): 162 ML
BH CV ECHO MEAS - EDV(MOD-SP4): 161 ML
BH CV ECHO MEAS - EDV(TEICH): 141.3 ML
BH CV ECHO MEAS - EF(CUBED): 72.8 %
BH CV ECHO MEAS - EF(MOD-SP2): 44.4 %
BH CV ECHO MEAS - EF(MOD-SP4): 50.3 %
BH CV ECHO MEAS - EF(TEICH): 64 %
BH CV ECHO MEAS - ESV(CUBED): 42.9 ML
BH CV ECHO MEAS - ESV(MOD-SP2): 90 ML
BH CV ECHO MEAS - ESV(MOD-SP4): 80 ML
BH CV ECHO MEAS - ESV(TEICH): 50.9 ML
BH CV ECHO MEAS - FS: 35.2 %
BH CV ECHO MEAS - IVS/LVPW: 1
BH CV ECHO MEAS - IVSD: 1.1 CM
BH CV ECHO MEAS - LAT PEAK E' VEL: 16 CM/SEC
BH CV ECHO MEAS - LV DIASTOLIC VOL/BSA (35-75): 68.7 ML/M^2
BH CV ECHO MEAS - LV MASS(C)D: 234.8 GRAMS
BH CV ECHO MEAS - LV MASS(C)DI: 100.1 GRAMS/M^2
BH CV ECHO MEAS - LV MAX PG: 5.6 MMHG
BH CV ECHO MEAS - LV MEAN PG: 3 MMHG
BH CV ECHO MEAS - LV SYSTOLIC VOL/BSA (12-30): 34.1 ML/M^2
BH CV ECHO MEAS - LV V1 MAX: 118 CM/SEC
BH CV ECHO MEAS - LV V1 MEAN: 75.5 CM/SEC
BH CV ECHO MEAS - LV V1 VTI: 20.4 CM
BH CV ECHO MEAS - LVIDD: 5.4 CM
BH CV ECHO MEAS - LVIDS: 3.5 CM
BH CV ECHO MEAS - LVLD AP2: 9.4 CM
BH CV ECHO MEAS - LVLD AP4: 10.3 CM
BH CV ECHO MEAS - LVLS AP2: 8.7 CM
BH CV ECHO MEAS - LVLS AP4: 8.4 CM
BH CV ECHO MEAS - LVOT AREA (M): 4.2 CM^2
BH CV ECHO MEAS - LVOT AREA: 4.2 CM^2
BH CV ECHO MEAS - LVOT DIAM: 2.3 CM
BH CV ECHO MEAS - LVPWD: 1.1 CM
BH CV ECHO MEAS - MED PEAK E' VEL: 13 CM/SEC
BH CV ECHO MEAS - MV A DUR: 0.08 SEC
BH CV ECHO MEAS - MV A MAX VEL: 90.3 CM/SEC
BH CV ECHO MEAS - MV DEC SLOPE: 1039 CM/SEC^2
BH CV ECHO MEAS - MV DEC TIME: 0.11 SEC
BH CV ECHO MEAS - MV E MAX VEL: 125 CM/SEC
BH CV ECHO MEAS - MV E/A: 1.4
BH CV ECHO MEAS - MV MAX PG: 11.4 MMHG
BH CV ECHO MEAS - MV MEAN PG: 6 MMHG
BH CV ECHO MEAS - MV P1/2T MAX VEL: 177 CM/SEC
BH CV ECHO MEAS - MV P1/2T: 49.9 MSEC
BH CV ECHO MEAS - MV V2 MAX: 169 CM/SEC
BH CV ECHO MEAS - MV V2 MEAN: 112 CM/SEC
BH CV ECHO MEAS - MV V2 VTI: 27.2 CM
BH CV ECHO MEAS - MVA P1/2T LCG: 1.2 CM^2
BH CV ECHO MEAS - MVA(P1/2T): 4.4 CM^2
BH CV ECHO MEAS - MVA(VTI): 3.1 CM^2
BH CV ECHO MEAS - PA ACC TIME: 0.1 SEC
BH CV ECHO MEAS - PA MAX PG (FULL): 3.8 MMHG
BH CV ECHO MEAS - PA MAX PG: 9.6 MMHG
BH CV ECHO MEAS - PA PR(ACCEL): 36.3 MMHG
BH CV ECHO MEAS - PA V2 MAX: 155 CM/SEC
BH CV ECHO MEAS - PVA(V,A): 4.1 CM^2
BH CV ECHO MEAS - PVA(V,D): 4.1 CM^2
BH CV ECHO MEAS - QP/QS: 1.1
BH CV ECHO MEAS - RAP SYSTOLE: 3 MMHG
BH CV ECHO MEAS - RV MAX PG: 5.9 MMHG
BH CV ECHO MEAS - RV MEAN PG: 3 MMHG
BH CV ECHO MEAS - RV V1 MAX: 121 CM/SEC
BH CV ECHO MEAS - RV V1 MEAN: 75.1 CM/SEC
BH CV ECHO MEAS - RV V1 VTI: 17.4 CM
BH CV ECHO MEAS - RVOT AREA: 5.3 CM^2
BH CV ECHO MEAS - RVOT DIAM: 2.6 CM
BH CV ECHO MEAS - SI(AO): 70.7 ML/M^2
BH CV ECHO MEAS - SI(CUBED): 48.9 ML/M^2
BH CV ECHO MEAS - SI(LVOT): 36.1 ML/M^2
BH CV ECHO MEAS - SI(MOD-SP2): 30.7 ML/M^2
BH CV ECHO MEAS - SI(MOD-SP4): 34.5 ML/M^2
BH CV ECHO MEAS - SI(TEICH): 38.6 ML/M^2
BH CV ECHO MEAS - SUP REN AO DIAM: 1.7 CM
BH CV ECHO MEAS - SV(AO): 165.7 ML
BH CV ECHO MEAS - SV(CUBED): 114.6 ML
BH CV ECHO MEAS - SV(LVOT): 84.8 ML
BH CV ECHO MEAS - SV(MOD-SP2): 72 ML
BH CV ECHO MEAS - SV(MOD-SP4): 81 ML
BH CV ECHO MEAS - SV(RVOT): 92.4 ML
BH CV ECHO MEAS - SV(TEICH): 90.4 ML
BH CV ECHO MEAS - TAPSE (>1.6): 3.8 CM2
BH CV VAS BP RIGHT ARM: NORMAL MMHG
BH CV XLRA - RV BASE: 3.2 CM
BH CV XLRA - TDI S': 22 CM/SEC
BILIRUB SERPL-MCNC: 1.3 MG/DL (ref 0.1–1.2)
BILIRUB UR QL STRIP: NEGATIVE
BUN BLD-MCNC: 31 MG/DL (ref 8–23)
BUN/CREAT SERPL: 25.4 (ref 7–25)
C PNEUM DNA NPH QL NAA+NON-PROBE: NOT DETECTED
CALCIUM SPEC-SCNC: 9.1 MG/DL (ref 8.6–10.5)
CHLORIDE SERPL-SCNC: 102 MMOL/L (ref 98–107)
CLARITY UR: CLEAR
CO2 SERPL-SCNC: 19.7 MMOL/L (ref 22–29)
COLOR UR: ABNORMAL
CREAT BLD-MCNC: 1.22 MG/DL (ref 0.76–1.27)
D-LACTATE SERPL-SCNC: 1.6 MMOL/L (ref 0.5–2)
D-LACTATE SERPL-SCNC: 1.7 MMOL/L (ref 0.5–2)
D-LACTATE SERPL-SCNC: 2 MMOL/L (ref 0.5–2)
DEPRECATED RDW RBC AUTO: 49.6 FL (ref 37–54)
E/E' RATIO: 9
EOSINOPHIL # BLD AUTO: 0 10*3/MM3 (ref 0–0.7)
EOSINOPHIL NFR BLD AUTO: 0 % (ref 0.3–6.2)
ERYTHROCYTE [DISTWIDTH] IN BLOOD BY AUTOMATED COUNT: 14.5 % (ref 11.5–14.5)
FLUAV AG NPH QL: NEGATIVE
FLUAV H1 2009 PAND RNA NPH QL NAA+PROBE: NOT DETECTED
FLUAV H1 HA GENE NPH QL NAA+PROBE: NOT DETECTED
FLUAV H3 RNA NPH QL NAA+PROBE: NOT DETECTED
FLUAV SUBTYP SPEC NAA+PROBE: NOT DETECTED
FLUBV AG NPH QL IA: NEGATIVE
FLUBV RNA ISLT QL NAA+PROBE: NOT DETECTED
GFR SERPL CREATININE-BSD FRML MDRD: 58 ML/MIN/1.73
GLOBULIN UR ELPH-MCNC: 2.8 GM/DL
GLUCOSE BLD-MCNC: 189 MG/DL (ref 65–99)
GLUCOSE BLDC GLUCOMTR-MCNC: 139 MG/DL (ref 70–130)
GLUCOSE BLDC GLUCOMTR-MCNC: 152 MG/DL (ref 70–130)
GLUCOSE BLDC GLUCOMTR-MCNC: 183 MG/DL (ref 70–130)
GLUCOSE BLDC GLUCOMTR-MCNC: 264 MG/DL (ref 70–130)
GLUCOSE UR STRIP-MCNC: ABNORMAL MG/DL
HADV DNA SPEC NAA+PROBE: NOT DETECTED
HBA1C MFR BLD: 7.7 % (ref 4.8–5.6)
HCOV 229E RNA SPEC QL NAA+PROBE: NOT DETECTED
HCOV HKU1 RNA SPEC QL NAA+PROBE: NOT DETECTED
HCOV NL63 RNA SPEC QL NAA+PROBE: NOT DETECTED
HCOV OC43 RNA SPEC QL NAA+PROBE: NOT DETECTED
HCT VFR BLD AUTO: 47.1 % (ref 40.4–52.2)
HGB BLD-MCNC: 15.5 G/DL (ref 13.7–17.6)
HGB UR QL STRIP.AUTO: NEGATIVE
HMPV RNA NPH QL NAA+NON-PROBE: NOT DETECTED
HOLD SPECIMEN: NORMAL
HOLD SPECIMEN: NORMAL
HPIV1 RNA SPEC QL NAA+PROBE: NOT DETECTED
HPIV2 RNA SPEC QL NAA+PROBE: NOT DETECTED
HPIV3 RNA NPH QL NAA+PROBE: NOT DETECTED
HPIV4 P GENE NPH QL NAA+PROBE: NOT DETECTED
IMM GRANULOCYTES # BLD: 0.02 10*3/MM3 (ref 0–0.03)
IMM GRANULOCYTES NFR BLD: 0.2 % (ref 0–0.5)
KETONES UR QL STRIP: NEGATIVE
LEFT ATRIUM VOLUME INDEX: 21 ML/M2
LEUKOCYTE ESTERASE UR QL STRIP.AUTO: NEGATIVE
LYMPHOCYTES # BLD AUTO: 0.38 10*3/MM3 (ref 0.9–4.8)
LYMPHOCYTES NFR BLD AUTO: 4.7 % (ref 19.6–45.3)
M PNEUMO IGG SER IA-ACNC: NOT DETECTED
MAXIMAL PREDICTED HEART RATE: 144 BPM
MCH RBC QN AUTO: 31.3 PG (ref 27–32.7)
MCHC RBC AUTO-ENTMCNC: 32.9 G/DL (ref 32.6–36.4)
MCV RBC AUTO: 95 FL (ref 79.8–96.2)
MONOCYTES # BLD AUTO: 0.15 10*3/MM3 (ref 0.2–1.2)
MONOCYTES NFR BLD AUTO: 1.9 % (ref 5–12)
NEUTROPHILS # BLD AUTO: 7.48 10*3/MM3 (ref 1.9–8.1)
NEUTROPHILS NFR BLD AUTO: 93.2 % (ref 42.7–76)
NITRITE UR QL STRIP: NEGATIVE
PH UR STRIP.AUTO: <=5 [PH] (ref 5–8)
PLATELET # BLD AUTO: 122 10*3/MM3 (ref 140–500)
PMV BLD AUTO: 11.8 FL (ref 6–12)
POTASSIUM BLD-SCNC: 3.9 MMOL/L (ref 3.5–5.2)
PROCALCITONIN SERPL-MCNC: 13.7 NG/ML (ref 0.1–0.25)
PROT SERPL-MCNC: 6.7 G/DL (ref 6–8.5)
PROT UR QL STRIP: NEGATIVE
RBC # BLD AUTO: 4.96 10*6/MM3 (ref 4.6–6)
RHINOVIRUS RNA SPEC NAA+PROBE: NOT DETECTED
RSV RNA NPH QL NAA+NON-PROBE: DETECTED
SODIUM BLD-SCNC: 136 MMOL/L (ref 136–145)
SP GR UR STRIP: >=1.03 (ref 1–1.03)
STRESS TARGET HR: 122 BPM
TROPONIN T SERPL-MCNC: 0.03 NG/ML (ref 0–0.03)
UROBILINOGEN UR QL STRIP: ABNORMAL
WBC NRBC COR # BLD: 8.03 10*3/MM3 (ref 4.5–10.7)
WHOLE BLOOD HOLD SPECIMEN: NORMAL
WHOLE BLOOD HOLD SPECIMEN: NORMAL

## 2018-02-23 PROCEDURE — 63710000001 INSULIN DETEMER PER 5 UNITS: Performed by: HOSPITALIST

## 2018-02-23 PROCEDURE — 93010 ELECTROCARDIOGRAM REPORT: CPT | Performed by: INTERNAL MEDICINE

## 2018-02-23 PROCEDURE — 99223 1ST HOSP IP/OBS HIGH 75: CPT | Performed by: INTERNAL MEDICINE

## 2018-02-23 PROCEDURE — 83036 HEMOGLOBIN GLYCOSYLATED A1C: CPT | Performed by: INTERNAL MEDICINE

## 2018-02-23 PROCEDURE — 82962 GLUCOSE BLOOD TEST: CPT

## 2018-02-23 PROCEDURE — 83605 ASSAY OF LACTIC ACID: CPT | Performed by: EMERGENCY MEDICINE

## 2018-02-23 PROCEDURE — 87486 CHLMYD PNEUM DNA AMP PROBE: CPT | Performed by: EMERGENCY MEDICINE

## 2018-02-23 PROCEDURE — 93306 TTE W/DOPPLER COMPLETE: CPT | Performed by: INTERNAL MEDICINE

## 2018-02-23 PROCEDURE — 87040 BLOOD CULTURE FOR BACTERIA: CPT | Performed by: EMERGENCY MEDICINE

## 2018-02-23 PROCEDURE — 83605 ASSAY OF LACTIC ACID: CPT | Performed by: INTERNAL MEDICINE

## 2018-02-23 PROCEDURE — 25010000003 CEFTRIAXONE PER 250 MG: Performed by: EMERGENCY MEDICINE

## 2018-02-23 PROCEDURE — 94799 UNLISTED PULMONARY SVC/PX: CPT

## 2018-02-23 PROCEDURE — 25010000002 ENOXAPARIN PER 10 MG: Performed by: INTERNAL MEDICINE

## 2018-02-23 PROCEDURE — 84145 PROCALCITONIN (PCT): CPT | Performed by: EMERGENCY MEDICINE

## 2018-02-23 PROCEDURE — 80053 COMPREHEN METABOLIC PANEL: CPT | Performed by: EMERGENCY MEDICINE

## 2018-02-23 PROCEDURE — 84484 ASSAY OF TROPONIN QUANT: CPT | Performed by: INTERNAL MEDICINE

## 2018-02-23 PROCEDURE — 87633 RESP VIRUS 12-25 TARGETS: CPT | Performed by: EMERGENCY MEDICINE

## 2018-02-23 PROCEDURE — 81003 URINALYSIS AUTO W/O SCOPE: CPT | Performed by: INTERNAL MEDICINE

## 2018-02-23 PROCEDURE — 87798 DETECT AGENT NOS DNA AMP: CPT | Performed by: EMERGENCY MEDICINE

## 2018-02-23 PROCEDURE — 94640 AIRWAY INHALATION TREATMENT: CPT

## 2018-02-23 PROCEDURE — 71046 X-RAY EXAM CHEST 2 VIEWS: CPT

## 2018-02-23 PROCEDURE — 71250 CT THORAX DX C-: CPT

## 2018-02-23 PROCEDURE — 93306 TTE W/DOPPLER COMPLETE: CPT

## 2018-02-23 PROCEDURE — 25010000002 AZITHROMYCIN PER 500 MG: Performed by: EMERGENCY MEDICINE

## 2018-02-23 PROCEDURE — 63710000001 INSULIN ASPART PER 5 UNITS: Performed by: INTERNAL MEDICINE

## 2018-02-23 PROCEDURE — 87150 DNA/RNA AMPLIFIED PROBE: CPT | Performed by: EMERGENCY MEDICINE

## 2018-02-23 PROCEDURE — 93005 ELECTROCARDIOGRAM TRACING: CPT | Performed by: INTERNAL MEDICINE

## 2018-02-23 PROCEDURE — 85025 COMPLETE CBC W/AUTO DIFF WBC: CPT | Performed by: EMERGENCY MEDICINE

## 2018-02-23 PROCEDURE — 87186 SC STD MICRODIL/AGAR DIL: CPT | Performed by: EMERGENCY MEDICINE

## 2018-02-23 PROCEDURE — 87804 INFLUENZA ASSAY W/OPTIC: CPT | Performed by: EMERGENCY MEDICINE

## 2018-02-23 PROCEDURE — 87581 M.PNEUMON DNA AMP PROBE: CPT | Performed by: EMERGENCY MEDICINE

## 2018-02-23 RX ORDER — IPRATROPIUM BROMIDE AND ALBUTEROL SULFATE 2.5; .5 MG/3ML; MG/3ML
3 SOLUTION RESPIRATORY (INHALATION)
Status: DISCONTINUED | OUTPATIENT
Start: 2018-02-23 | End: 2018-02-25

## 2018-02-23 RX ORDER — SENNA AND DOCUSATE SODIUM 50; 8.6 MG/1; MG/1
2 TABLET, FILM COATED ORAL 2 TIMES DAILY
Status: DISCONTINUED | OUTPATIENT
Start: 2018-02-23 | End: 2018-03-13 | Stop reason: HOSPADM

## 2018-02-23 RX ORDER — PREGABALIN 100 MG/1
100 CAPSULE ORAL 3 TIMES DAILY
Status: DISCONTINUED | OUTPATIENT
Start: 2018-02-23 | End: 2018-03-13 | Stop reason: HOSPADM

## 2018-02-23 RX ORDER — ACETAMINOPHEN 325 MG/1
650 TABLET ORAL EVERY 4 HOURS PRN
Status: DISCONTINUED | OUTPATIENT
Start: 2018-02-23 | End: 2018-03-07

## 2018-02-23 RX ORDER — NICOTINE POLACRILEX 4 MG
15 LOZENGE BUCCAL
Status: DISCONTINUED | OUTPATIENT
Start: 2018-02-23 | End: 2018-03-13 | Stop reason: HOSPADM

## 2018-02-23 RX ORDER — DEXTROSE MONOHYDRATE 25 G/50ML
25 INJECTION, SOLUTION INTRAVENOUS
Status: DISCONTINUED | OUTPATIENT
Start: 2018-02-23 | End: 2018-03-13 | Stop reason: HOSPADM

## 2018-02-23 RX ORDER — ONDANSETRON 2 MG/ML
4 INJECTION INTRAMUSCULAR; INTRAVENOUS EVERY 6 HOURS PRN
Status: DISCONTINUED | OUTPATIENT
Start: 2018-02-23 | End: 2018-03-13 | Stop reason: HOSPADM

## 2018-02-23 RX ORDER — SODIUM CHLORIDE 0.9 % (FLUSH) 0.9 %
1-10 SYRINGE (ML) INJECTION AS NEEDED
Status: DISCONTINUED | OUTPATIENT
Start: 2018-02-23 | End: 2018-03-13 | Stop reason: HOSPADM

## 2018-02-23 RX ORDER — ALLOPURINOL 300 MG/1
300 TABLET ORAL DAILY
Status: DISCONTINUED | OUTPATIENT
Start: 2018-02-23 | End: 2018-02-24

## 2018-02-23 RX ORDER — GLIPIZIDE 10 MG/1
10 TABLET ORAL
Status: DISCONTINUED | OUTPATIENT
Start: 2018-02-23 | End: 2018-03-13 | Stop reason: HOSPADM

## 2018-02-23 RX ORDER — FLUTICASONE PROPIONATE 50 MCG
2 SPRAY, SUSPENSION (ML) NASAL DAILY
Status: DISCONTINUED | OUTPATIENT
Start: 2018-02-23 | End: 2018-03-13 | Stop reason: HOSPADM

## 2018-02-23 RX ORDER — ROSUVASTATIN CALCIUM 10 MG/1
10 TABLET, COATED ORAL DAILY
Status: DISCONTINUED | OUTPATIENT
Start: 2018-02-23 | End: 2018-02-28

## 2018-02-23 RX ORDER — HYDROCODONE BITARTRATE AND ACETAMINOPHEN 5; 325 MG/1; MG/1
1 TABLET ORAL EVERY 4 HOURS PRN
Status: DISCONTINUED | OUTPATIENT
Start: 2018-02-23 | End: 2018-03-02

## 2018-02-23 RX ORDER — CEFTRIAXONE SODIUM 2 G/50ML
2 INJECTION, SOLUTION INTRAVENOUS ONCE
Status: COMPLETED | OUTPATIENT
Start: 2018-02-23 | End: 2018-02-23

## 2018-02-23 RX ADMIN — INSULIN ASPART 2 UNITS: 100 INJECTION, SOLUTION INTRAVENOUS; SUBCUTANEOUS at 18:41

## 2018-02-23 RX ADMIN — GLIPIZIDE 10 MG: 10 TABLET ORAL at 18:00

## 2018-02-23 RX ADMIN — SODIUM CHLORIDE 500 ML: 9 INJECTION, SOLUTION INTRAVENOUS at 04:51

## 2018-02-23 RX ADMIN — INSULIN DETEMIR 20 UNITS: 100 INJECTION, SOLUTION SUBCUTANEOUS at 22:35

## 2018-02-23 RX ADMIN — AZITHROMYCIN 500 MG: 500 INJECTION, POWDER, LYOPHILIZED, FOR SOLUTION INTRAVENOUS at 02:47

## 2018-02-23 RX ADMIN — INSULIN ASPART 2 UNITS: 100 INJECTION, SOLUTION INTRAVENOUS; SUBCUTANEOUS at 22:04

## 2018-02-23 RX ADMIN — ROSUVASTATIN CALCIUM 10 MG: 10 TABLET, FILM COATED ORAL at 09:47

## 2018-02-23 RX ADMIN — ACETAMINOPHEN 650 MG: 325 TABLET, FILM COATED ORAL at 14:52

## 2018-02-23 RX ADMIN — INSULIN DETEMIR 20 UNITS: 100 INJECTION, SOLUTION SUBCUTANEOUS at 09:47

## 2018-02-23 RX ADMIN — PREGABALIN 100 MG: 100 CAPSULE ORAL at 18:00

## 2018-02-23 RX ADMIN — ENOXAPARIN SODIUM 40 MG: 40 INJECTION SUBCUTANEOUS at 09:53

## 2018-02-23 RX ADMIN — DOCUSATE SODIUM -SENNOSIDES 2 TABLET: 50; 8.6 TABLET, COATED ORAL at 22:03

## 2018-02-23 RX ADMIN — IPRATROPIUM BROMIDE AND ALBUTEROL SULFATE 3 ML: .5; 3 SOLUTION RESPIRATORY (INHALATION) at 22:56

## 2018-02-23 RX ADMIN — ALLOPURINOL 300 MG: 300 TABLET ORAL at 09:47

## 2018-02-23 RX ADMIN — SODIUM CHLORIDE 3570 ML: 9 INJECTION, SOLUTION INTRAVENOUS at 00:34

## 2018-02-23 RX ADMIN — CEFTRIAXONE SODIUM 2 G: 2 INJECTION, SOLUTION INTRAVENOUS at 01:55

## 2018-02-23 RX ADMIN — ACETAMINOPHEN 650 MG: 325 TABLET, FILM COATED ORAL at 22:03

## 2018-02-23 RX ADMIN — PREGABALIN 100 MG: 100 CAPSULE ORAL at 22:04

## 2018-02-23 RX ADMIN — IPRATROPIUM BROMIDE AND ALBUTEROL SULFATE 3 ML: .5; 3 SOLUTION RESPIRATORY (INHALATION) at 16:06

## 2018-02-23 RX ADMIN — LINAGLIPTIN 5 MG: 5 TABLET, FILM COATED ORAL at 14:52

## 2018-02-23 RX ADMIN — PREGABALIN 100 MG: 100 CAPSULE ORAL at 09:47

## 2018-02-23 RX ADMIN — SODIUM CHLORIDE 500 ML: 9 INJECTION, SOLUTION INTRAVENOUS at 05:41

## 2018-02-23 RX ADMIN — IPRATROPIUM BROMIDE AND ALBUTEROL SULFATE 3 ML: .5; 3 SOLUTION RESPIRATORY (INHALATION) at 12:04

## 2018-02-23 RX ADMIN — SODIUM CHLORIDE 500 ML: 9 INJECTION, SOLUTION INTRAVENOUS at 06:43

## 2018-02-24 ENCOUNTER — APPOINTMENT (OUTPATIENT)
Dept: ULTRASOUND IMAGING | Facility: HOSPITAL | Age: 78
End: 2018-02-24

## 2018-02-24 PROBLEM — R10.11 RUQ ABDOMINAL PAIN: Status: ACTIVE | Noted: 2018-02-24

## 2018-02-24 PROBLEM — K80.20 CALCULUS OF GALLBLADDER WITHOUT CHOLECYSTITIS WITHOUT OBSTRUCTION: Status: ACTIVE | Noted: 2018-02-24

## 2018-02-24 PROBLEM — R78.81 BACTEREMIA DUE TO ESCHERICHIA COLI: Status: ACTIVE | Noted: 2018-02-24

## 2018-02-24 PROBLEM — B96.20 BACTEREMIA DUE TO ESCHERICHIA COLI: Status: ACTIVE | Noted: 2018-02-24

## 2018-02-24 LAB
ALBUMIN SERPL-MCNC: 3.1 G/DL (ref 3.5–5.2)
ALP SERPL-CCNC: 61 U/L (ref 39–117)
ALT SERPL W P-5'-P-CCNC: 30 U/L (ref 1–41)
ANION GAP SERPL CALCULATED.3IONS-SCNC: 10.1 MMOL/L
AST SERPL-CCNC: 37 U/L (ref 1–40)
BILIRUB CONJ SERPL-MCNC: 0.3 MG/DL (ref 0–0.3)
BILIRUB INDIRECT SERPL-MCNC: 0.3 MG/DL
BILIRUB SERPL-MCNC: 0.6 MG/DL (ref 0.1–1.2)
BUN BLD-MCNC: 25 MG/DL (ref 8–23)
BUN/CREAT SERPL: 25.5 (ref 7–25)
CALCIUM SPEC-SCNC: 8.5 MG/DL (ref 8.6–10.5)
CHLORIDE SERPL-SCNC: 102 MMOL/L (ref 98–107)
CO2 SERPL-SCNC: 20.9 MMOL/L (ref 22–29)
CREAT BLD-MCNC: 0.98 MG/DL (ref 0.76–1.27)
D-LACTATE SERPL-SCNC: 0.9 MMOL/L (ref 0.5–2)
DEPRECATED RDW RBC AUTO: 52.8 FL (ref 37–54)
ERYTHROCYTE [DISTWIDTH] IN BLOOD BY AUTOMATED COUNT: 15.1 % (ref 11.5–14.5)
GFR SERPL CREATININE-BSD FRML MDRD: 74 ML/MIN/1.73
GLUCOSE BLD-MCNC: 135 MG/DL (ref 65–99)
GLUCOSE BLDC GLUCOMTR-MCNC: 126 MG/DL (ref 70–130)
GLUCOSE BLDC GLUCOMTR-MCNC: 196 MG/DL (ref 70–130)
GLUCOSE BLDC GLUCOMTR-MCNC: 220 MG/DL (ref 70–130)
GLUCOSE BLDC GLUCOMTR-MCNC: 252 MG/DL (ref 70–130)
HCT VFR BLD AUTO: 42 % (ref 40.4–52.2)
HGB BLD-MCNC: 13.3 G/DL (ref 13.7–17.6)
MCH RBC QN AUTO: 30.5 PG (ref 27–32.7)
MCHC RBC AUTO-ENTMCNC: 31.7 G/DL (ref 32.6–36.4)
MCV RBC AUTO: 96.3 FL (ref 79.8–96.2)
PLATELET # BLD AUTO: 83 10*3/MM3 (ref 140–500)
PMV BLD AUTO: 12.2 FL (ref 6–12)
POTASSIUM BLD-SCNC: 4.2 MMOL/L (ref 3.5–5.2)
PROT SERPL-MCNC: 6.1 G/DL (ref 6–8.5)
RBC # BLD AUTO: 4.36 10*6/MM3 (ref 4.6–6)
SODIUM BLD-SCNC: 133 MMOL/L (ref 136–145)
TROPONIN T SERPL-MCNC: 0.03 NG/ML (ref 0–0.03)
TROPONIN T SERPL-MCNC: 0.04 NG/ML (ref 0–0.03)
WBC NRBC COR # BLD: 9.99 10*3/MM3 (ref 4.5–10.7)

## 2018-02-24 PROCEDURE — 25010000002 CEFTRIAXONE IN SWFI 2 GRAMS/20ML IV PUSH SYRINGE (SIMPLE): Performed by: INTERNAL MEDICINE

## 2018-02-24 PROCEDURE — 80048 BASIC METABOLIC PNL TOTAL CA: CPT | Performed by: INTERNAL MEDICINE

## 2018-02-24 PROCEDURE — 63710000001 INSULIN ASPART PER 5 UNITS: Performed by: INTERNAL MEDICINE

## 2018-02-24 PROCEDURE — 25010000002 ENOXAPARIN PER 10 MG: Performed by: INTERNAL MEDICINE

## 2018-02-24 PROCEDURE — 99221 1ST HOSP IP/OBS SF/LOW 40: CPT | Performed by: SURGERY

## 2018-02-24 PROCEDURE — 85027 COMPLETE CBC AUTOMATED: CPT | Performed by: INTERNAL MEDICINE

## 2018-02-24 PROCEDURE — 84484 ASSAY OF TROPONIN QUANT: CPT | Performed by: INTERNAL MEDICINE

## 2018-02-24 PROCEDURE — 83605 ASSAY OF LACTIC ACID: CPT | Performed by: INTERNAL MEDICINE

## 2018-02-24 PROCEDURE — 94799 UNLISTED PULMONARY SVC/PX: CPT

## 2018-02-24 PROCEDURE — 82962 GLUCOSE BLOOD TEST: CPT

## 2018-02-24 PROCEDURE — 99233 SBSQ HOSP IP/OBS HIGH 50: CPT | Performed by: INTERNAL MEDICINE

## 2018-02-24 PROCEDURE — 87040 BLOOD CULTURE FOR BACTERIA: CPT | Performed by: INTERNAL MEDICINE

## 2018-02-24 PROCEDURE — 76705 ECHO EXAM OF ABDOMEN: CPT

## 2018-02-24 PROCEDURE — 80076 HEPATIC FUNCTION PANEL: CPT | Performed by: SURGERY

## 2018-02-24 RX ADMIN — DOCUSATE SODIUM -SENNOSIDES 2 TABLET: 50; 8.6 TABLET, COATED ORAL at 22:22

## 2018-02-24 RX ADMIN — ROSUVASTATIN CALCIUM 10 MG: 10 TABLET, FILM COATED ORAL at 08:49

## 2018-02-24 RX ADMIN — IPRATROPIUM BROMIDE AND ALBUTEROL SULFATE 3 ML: .5; 3 SOLUTION RESPIRATORY (INHALATION) at 08:56

## 2018-02-24 RX ADMIN — INSULIN ASPART 4 UNITS: 100 INJECTION, SOLUTION INTRAVENOUS; SUBCUTANEOUS at 22:22

## 2018-02-24 RX ADMIN — IPRATROPIUM BROMIDE AND ALBUTEROL SULFATE 3 ML: .5; 3 SOLUTION RESPIRATORY (INHALATION) at 15:38

## 2018-02-24 RX ADMIN — IPRATROPIUM BROMIDE AND ALBUTEROL SULFATE 3 ML: .5; 3 SOLUTION RESPIRATORY (INHALATION) at 20:55

## 2018-02-24 RX ADMIN — CEFTRIAXONE 2 G: 2 INJECTION, POWDER, FOR SOLUTION INTRAMUSCULAR; INTRAVENOUS at 02:20

## 2018-02-24 RX ADMIN — DOCUSATE SODIUM -SENNOSIDES 2 TABLET: 50; 8.6 TABLET, COATED ORAL at 08:49

## 2018-02-24 RX ADMIN — INSULIN ASPART 3 UNITS: 100 INJECTION, SOLUTION INTRAVENOUS; SUBCUTANEOUS at 18:08

## 2018-02-24 RX ADMIN — ENOXAPARIN SODIUM 40 MG: 40 INJECTION SUBCUTANEOUS at 08:49

## 2018-02-24 RX ADMIN — ACETAMINOPHEN 650 MG: 325 TABLET, FILM COATED ORAL at 18:08

## 2018-02-24 RX ADMIN — GLIPIZIDE 10 MG: 10 TABLET ORAL at 18:08

## 2018-02-24 RX ADMIN — INSULIN DETEMIR 20 UNITS: 100 INJECTION, SOLUTION SUBCUTANEOUS at 08:49

## 2018-02-24 RX ADMIN — PREGABALIN 100 MG: 100 CAPSULE ORAL at 18:08

## 2018-02-24 RX ADMIN — PREGABALIN 100 MG: 100 CAPSULE ORAL at 08:49

## 2018-02-24 RX ADMIN — PREGABALIN 100 MG: 100 CAPSULE ORAL at 22:21

## 2018-02-24 RX ADMIN — FLUTICASONE PROPIONATE 2 SPRAY: 50 SPRAY, METERED NASAL at 08:49

## 2018-02-24 RX ADMIN — GLIPIZIDE 10 MG: 10 TABLET ORAL at 08:49

## 2018-02-24 RX ADMIN — INSULIN ASPART 2 UNITS: 100 INJECTION, SOLUTION INTRAVENOUS; SUBCUTANEOUS at 12:23

## 2018-02-24 RX ADMIN — HYDROCODONE BITARTRATE AND ACETAMINOPHEN 1 TABLET: 5; 325 TABLET ORAL at 22:21

## 2018-02-24 RX ADMIN — ALLOPURINOL 300 MG: 300 TABLET ORAL at 08:49

## 2018-02-24 RX ADMIN — INSULIN DETEMIR 20 UNITS: 100 INJECTION, SOLUTION SUBCUTANEOUS at 22:09

## 2018-02-24 RX ADMIN — LINAGLIPTIN 5 MG: 5 TABLET, FILM COATED ORAL at 08:50

## 2018-02-24 RX ADMIN — IPRATROPIUM BROMIDE AND ALBUTEROL SULFATE 3 ML: .5; 3 SOLUTION RESPIRATORY (INHALATION) at 12:16

## 2018-02-25 ENCOUNTER — APPOINTMENT (OUTPATIENT)
Dept: GENERAL RADIOLOGY | Facility: HOSPITAL | Age: 78
End: 2018-02-25

## 2018-02-25 ENCOUNTER — ANESTHESIA (OUTPATIENT)
Dept: PERIOP | Facility: HOSPITAL | Age: 78
End: 2018-02-25

## 2018-02-25 ENCOUNTER — ANESTHESIA EVENT (OUTPATIENT)
Dept: PERIOP | Facility: HOSPITAL | Age: 78
End: 2018-02-25

## 2018-02-25 PROBLEM — K80.00 CALCULUS OF GALLBLADDER WITH ACUTE CHOLECYSTITIS WITHOUT OBSTRUCTION: Status: ACTIVE | Noted: 2018-02-24

## 2018-02-25 PROBLEM — K81.0 ACUTE GANGRENOUS CHOLECYSTITIS: Status: ACTIVE | Noted: 2018-02-24

## 2018-02-25 PROBLEM — I48.91 A-FIB (HCC): Status: ACTIVE | Noted: 2018-02-25

## 2018-02-25 LAB
ALBUMIN SERPL-MCNC: 2.9 G/DL (ref 3.5–5.2)
ALBUMIN/GLOB SERPL: 0.9 G/DL
ALP SERPL-CCNC: 89 U/L (ref 39–117)
ALT SERPL W P-5'-P-CCNC: 42 U/L (ref 1–41)
AMYLASE SERPL-CCNC: 32 U/L (ref 28–100)
ANION GAP SERPL CALCULATED.3IONS-SCNC: 12.7 MMOL/L
AST SERPL-CCNC: 54 U/L (ref 1–40)
BACTERIA SPEC AEROBE CULT: ABNORMAL
BASOPHILS # BLD AUTO: 0 10*3/MM3 (ref 0–0.2)
BASOPHILS NFR BLD AUTO: 0 % (ref 0–1.5)
BILIRUB SERPL-MCNC: 0.6 MG/DL (ref 0.1–1.2)
BUN BLD-MCNC: 21 MG/DL (ref 8–23)
BUN/CREAT SERPL: 23.3 (ref 7–25)
CALCIUM SPEC-SCNC: 8.8 MG/DL (ref 8.6–10.5)
CHLORIDE SERPL-SCNC: 102 MMOL/L (ref 98–107)
CO2 SERPL-SCNC: 23.3 MMOL/L (ref 22–29)
CREAT BLD-MCNC: 0.9 MG/DL (ref 0.76–1.27)
DEPRECATED RDW RBC AUTO: 51.6 FL (ref 37–54)
EOSINOPHIL # BLD AUTO: 0.01 10*3/MM3 (ref 0–0.7)
EOSINOPHIL NFR BLD AUTO: 0.1 % (ref 0.3–6.2)
ERYTHROCYTE [DISTWIDTH] IN BLOOD BY AUTOMATED COUNT: 14.9 % (ref 11.5–14.5)
GFR SERPL CREATININE-BSD FRML MDRD: 82 ML/MIN/1.73
GLOBULIN UR ELPH-MCNC: 3.2 GM/DL
GLUCOSE BLD-MCNC: 158 MG/DL (ref 65–99)
GLUCOSE BLDC GLUCOMTR-MCNC: 202 MG/DL (ref 70–130)
GLUCOSE BLDC GLUCOMTR-MCNC: 207 MG/DL (ref 70–130)
GLUCOSE BLDC GLUCOMTR-MCNC: 207 MG/DL (ref 70–130)
GRAM STN SPEC: ABNORMAL
HCT VFR BLD AUTO: 41.5 % (ref 40.4–52.2)
HGB BLD-MCNC: 13.4 G/DL (ref 13.7–17.6)
IMM GRANULOCYTES # BLD: 0.02 10*3/MM3 (ref 0–0.03)
IMM GRANULOCYTES NFR BLD: 0.2 % (ref 0–0.5)
LIPASE SERPL-CCNC: 30 U/L (ref 13–60)
LYMPHOCYTES # BLD AUTO: 0.56 10*3/MM3 (ref 0.9–4.8)
LYMPHOCYTES NFR BLD AUTO: 6.4 % (ref 19.6–45.3)
MAGNESIUM SERPL-MCNC: 2.2 MG/DL (ref 1.6–2.4)
MCH RBC QN AUTO: 30.7 PG (ref 27–32.7)
MCHC RBC AUTO-ENTMCNC: 32.3 G/DL (ref 32.6–36.4)
MCV RBC AUTO: 95 FL (ref 79.8–96.2)
MONOCYTES # BLD AUTO: 0.73 10*3/MM3 (ref 0.2–1.2)
MONOCYTES NFR BLD AUTO: 8.4 % (ref 5–12)
NEUTROPHILS # BLD AUTO: 7.41 10*3/MM3 (ref 1.9–8.1)
NEUTROPHILS NFR BLD AUTO: 84.9 % (ref 42.7–76)
PLATELET # BLD AUTO: 83 10*3/MM3 (ref 140–500)
PMV BLD AUTO: 11.9 FL (ref 6–12)
POTASSIUM BLD-SCNC: 4.4 MMOL/L (ref 3.5–5.2)
POTASSIUM BLD-SCNC: 5.2 MMOL/L (ref 3.5–5.2)
PROT SERPL-MCNC: 6.1 G/DL (ref 6–8.5)
RBC # BLD AUTO: 4.37 10*6/MM3 (ref 4.6–6)
SODIUM BLD-SCNC: 138 MMOL/L (ref 136–145)
TROPONIN T SERPL-MCNC: 0.01 NG/ML (ref 0–0.03)
TROPONIN T SERPL-MCNC: 0.02 NG/ML (ref 0–0.03)
TROPONIN T SERPL-MCNC: 0.03 NG/ML (ref 0–0.03)
WBC NRBC COR # BLD: 8.73 10*3/MM3 (ref 4.5–10.7)

## 2018-02-25 PROCEDURE — 0 IOTHALAMATE 60 % SOLUTION: Performed by: SURGERY

## 2018-02-25 PROCEDURE — 88304 TISSUE EXAM BY PATHOLOGIST: CPT | Performed by: SURGERY

## 2018-02-25 PROCEDURE — 25010000002 PIPERACILLIN SOD-TAZOBACTAM PER 1 G: Performed by: SURGERY

## 2018-02-25 PROCEDURE — 84484 ASSAY OF TROPONIN QUANT: CPT | Performed by: INTERNAL MEDICINE

## 2018-02-25 PROCEDURE — 47563 LAPARO CHOLECYSTECTOMY/GRAPH: CPT | Performed by: SURGERY

## 2018-02-25 PROCEDURE — 93010 ELECTROCARDIOGRAM REPORT: CPT | Performed by: INTERNAL MEDICINE

## 2018-02-25 PROCEDURE — 93005 ELECTROCARDIOGRAM TRACING: CPT | Performed by: INTERNAL MEDICINE

## 2018-02-25 PROCEDURE — BF131ZZ FLUOROSCOPY OF GALLBLADDER AND BILE DUCTS USING LOW OSMOLAR CONTRAST: ICD-10-PCS | Performed by: SURGERY

## 2018-02-25 PROCEDURE — 74300 X-RAY BILE DUCTS/PANCREAS: CPT

## 2018-02-25 PROCEDURE — 84484 ASSAY OF TROPONIN QUANT: CPT | Performed by: NURSE PRACTITIONER

## 2018-02-25 PROCEDURE — 82962 GLUCOSE BLOOD TEST: CPT

## 2018-02-25 PROCEDURE — 94799 UNLISTED PULMONARY SVC/PX: CPT

## 2018-02-25 PROCEDURE — 99221 1ST HOSP IP/OBS SF/LOW 40: CPT | Performed by: INTERNAL MEDICINE

## 2018-02-25 PROCEDURE — 99232 SBSQ HOSP IP/OBS MODERATE 35: CPT | Performed by: INTERNAL MEDICINE

## 2018-02-25 PROCEDURE — 80053 COMPREHEN METABOLIC PANEL: CPT | Performed by: SURGERY

## 2018-02-25 PROCEDURE — 82150 ASSAY OF AMYLASE: CPT | Performed by: SURGERY

## 2018-02-25 PROCEDURE — 84132 ASSAY OF SERUM POTASSIUM: CPT | Performed by: NURSE PRACTITIONER

## 2018-02-25 PROCEDURE — 25010000002 HYDROMORPHONE PER 4 MG: Performed by: NURSE ANESTHETIST, CERTIFIED REGISTERED

## 2018-02-25 PROCEDURE — 71045 X-RAY EXAM CHEST 1 VIEW: CPT

## 2018-02-25 PROCEDURE — 25010000002 ONDANSETRON PER 1 MG: Performed by: INTERNAL MEDICINE

## 2018-02-25 PROCEDURE — 25010000002 CEFTRIAXONE IN SWFI 2 GRAMS/20ML IV PUSH SYRINGE (SIMPLE): Performed by: INTERNAL MEDICINE

## 2018-02-25 PROCEDURE — 25010000002 HYDROMORPHONE PER 4 MG

## 2018-02-25 PROCEDURE — 25010000002 SUCCINYLCHOLINE PER 20 MG: Performed by: NURSE ANESTHETIST, CERTIFIED REGISTERED

## 2018-02-25 PROCEDURE — 83690 ASSAY OF LIPASE: CPT | Performed by: SURGERY

## 2018-02-25 PROCEDURE — 83735 ASSAY OF MAGNESIUM: CPT | Performed by: NURSE PRACTITIONER

## 2018-02-25 PROCEDURE — 63710000001 INSULIN ASPART PER 5 UNITS: Performed by: INTERNAL MEDICINE

## 2018-02-25 PROCEDURE — 47563 LAPARO CHOLECYSTECTOMY/GRAPH: CPT | Performed by: PHYSICIAN ASSISTANT

## 2018-02-25 PROCEDURE — 85025 COMPLETE CBC W/AUTO DIFF WBC: CPT | Performed by: SURGERY

## 2018-02-25 PROCEDURE — 0FT44ZZ RESECTION OF GALLBLADDER, PERCUTANEOUS ENDOSCOPIC APPROACH: ICD-10-PCS | Performed by: SURGERY

## 2018-02-25 PROCEDURE — 25010000002 FENTANYL CITRATE (PF) 100 MCG/2ML SOLUTION: Performed by: ANESTHESIOLOGY

## 2018-02-25 PROCEDURE — 25010000002 PROPOFOL 10 MG/ML EMULSION: Performed by: NURSE ANESTHETIST, CERTIFIED REGISTERED

## 2018-02-25 RX ORDER — FLUMAZENIL 0.1 MG/ML
0.2 INJECTION INTRAVENOUS AS NEEDED
Status: DISCONTINUED | OUTPATIENT
Start: 2018-02-25 | End: 2018-02-25 | Stop reason: HOSPADM

## 2018-02-25 RX ORDER — FENTANYL CITRATE 50 UG/ML
50 INJECTION, SOLUTION INTRAMUSCULAR; INTRAVENOUS
Status: DISCONTINUED | OUTPATIENT
Start: 2018-02-25 | End: 2018-02-25 | Stop reason: HOSPADM

## 2018-02-25 RX ORDER — SUCCINYLCHOLINE CHLORIDE 20 MG/ML
INJECTION INTRAMUSCULAR; INTRAVENOUS AS NEEDED
Status: DISCONTINUED | OUTPATIENT
Start: 2018-02-25 | End: 2018-02-25 | Stop reason: SURG

## 2018-02-25 RX ORDER — PROMETHAZINE HYDROCHLORIDE 25 MG/1
25 SUPPOSITORY RECTAL ONCE AS NEEDED
Status: DISCONTINUED | OUTPATIENT
Start: 2018-02-25 | End: 2018-02-25 | Stop reason: HOSPADM

## 2018-02-25 RX ORDER — SODIUM CHLORIDE 9 MG/ML
INJECTION, SOLUTION INTRAVENOUS AS NEEDED
Status: DISCONTINUED | OUTPATIENT
Start: 2018-02-25 | End: 2018-02-25 | Stop reason: HOSPADM

## 2018-02-25 RX ORDER — SODIUM CHLORIDE, SODIUM LACTATE, POTASSIUM CHLORIDE, CALCIUM CHLORIDE 600; 310; 30; 20 MG/100ML; MG/100ML; MG/100ML; MG/100ML
9 INJECTION, SOLUTION INTRAVENOUS CONTINUOUS
Status: DISCONTINUED | OUTPATIENT
Start: 2018-02-25 | End: 2018-02-25

## 2018-02-25 RX ORDER — FENTANYL CITRATE 50 UG/ML
INJECTION, SOLUTION INTRAMUSCULAR; INTRAVENOUS
Status: COMPLETED
Start: 2018-02-25 | End: 2018-02-25

## 2018-02-25 RX ORDER — HYDROCODONE BITARTRATE AND ACETAMINOPHEN 7.5; 325 MG/1; MG/1
1 TABLET ORAL ONCE AS NEEDED
Status: DISCONTINUED | OUTPATIENT
Start: 2018-02-25 | End: 2018-02-25 | Stop reason: HOSPADM

## 2018-02-25 RX ORDER — PROMETHAZINE HYDROCHLORIDE 25 MG/ML
12.5 INJECTION, SOLUTION INTRAMUSCULAR; INTRAVENOUS ONCE AS NEEDED
Status: DISCONTINUED | OUTPATIENT
Start: 2018-02-25 | End: 2018-02-25 | Stop reason: HOSPADM

## 2018-02-25 RX ORDER — PROPOFOL 10 MG/ML
VIAL (ML) INTRAVENOUS AS NEEDED
Status: DISCONTINUED | OUTPATIENT
Start: 2018-02-25 | End: 2018-02-25 | Stop reason: SURG

## 2018-02-25 RX ORDER — FAMOTIDINE 10 MG/ML
20 INJECTION, SOLUTION INTRAVENOUS ONCE
Status: COMPLETED | OUTPATIENT
Start: 2018-02-25 | End: 2018-02-25

## 2018-02-25 RX ORDER — PROMETHAZINE HYDROCHLORIDE 25 MG/1
25 TABLET ORAL ONCE AS NEEDED
Status: DISCONTINUED | OUTPATIENT
Start: 2018-02-25 | End: 2018-02-25 | Stop reason: HOSPADM

## 2018-02-25 RX ORDER — HYDROMORPHONE HCL 110MG/55ML
0.5 PATIENT CONTROLLED ANALGESIA SYRINGE INTRAVENOUS
Status: DISCONTINUED | OUTPATIENT
Start: 2018-02-25 | End: 2018-02-25 | Stop reason: HOSPADM

## 2018-02-25 RX ORDER — PROMETHAZINE HYDROCHLORIDE 25 MG/1
12.5 TABLET ORAL ONCE AS NEEDED
Status: DISCONTINUED | OUTPATIENT
Start: 2018-02-25 | End: 2018-02-25 | Stop reason: HOSPADM

## 2018-02-25 RX ORDER — EPHEDRINE SULFATE 50 MG/ML
5 INJECTION, SOLUTION INTRAVENOUS ONCE AS NEEDED
Status: DISCONTINUED | OUTPATIENT
Start: 2018-02-25 | End: 2018-02-25 | Stop reason: HOSPADM

## 2018-02-25 RX ORDER — MAGNESIUM HYDROXIDE 1200 MG/15ML
LIQUID ORAL AS NEEDED
Status: DISCONTINUED | OUTPATIENT
Start: 2018-02-25 | End: 2018-02-25 | Stop reason: HOSPADM

## 2018-02-25 RX ORDER — LIDOCAINE HYDROCHLORIDE 20 MG/ML
INJECTION, SOLUTION INFILTRATION; PERINEURAL AS NEEDED
Status: DISCONTINUED | OUTPATIENT
Start: 2018-02-25 | End: 2018-02-25 | Stop reason: SURG

## 2018-02-25 RX ORDER — HYDROMORPHONE HCL 110MG/55ML
PATIENT CONTROLLED ANALGESIA SYRINGE INTRAVENOUS
Status: COMPLETED
Start: 2018-02-25 | End: 2018-02-25

## 2018-02-25 RX ORDER — DIPHENHYDRAMINE HYDROCHLORIDE 50 MG/ML
12.5 INJECTION INTRAMUSCULAR; INTRAVENOUS
Status: DISCONTINUED | OUTPATIENT
Start: 2018-02-25 | End: 2018-02-25 | Stop reason: HOSPADM

## 2018-02-25 RX ORDER — ONDANSETRON 2 MG/ML
4 INJECTION INTRAMUSCULAR; INTRAVENOUS ONCE AS NEEDED
Status: DISCONTINUED | OUTPATIENT
Start: 2018-02-25 | End: 2018-02-25 | Stop reason: HOSPADM

## 2018-02-25 RX ORDER — SODIUM CHLORIDE 0.9 % (FLUSH) 0.9 %
1-10 SYRINGE (ML) INJECTION AS NEEDED
Status: DISCONTINUED | OUTPATIENT
Start: 2018-02-25 | End: 2018-02-25 | Stop reason: HOSPADM

## 2018-02-25 RX ORDER — MEPERIDINE HYDROCHLORIDE 25 MG/ML
12.5 INJECTION INTRAMUSCULAR; INTRAVENOUS; SUBCUTANEOUS
Status: DISCONTINUED | OUTPATIENT
Start: 2018-02-25 | End: 2018-02-25 | Stop reason: HOSPADM

## 2018-02-25 RX ORDER — MIDAZOLAM HYDROCHLORIDE 1 MG/ML
2 INJECTION INTRAMUSCULAR; INTRAVENOUS
Status: DISCONTINUED | OUTPATIENT
Start: 2018-02-25 | End: 2018-02-25 | Stop reason: HOSPADM

## 2018-02-25 RX ORDER — HYDRALAZINE HYDROCHLORIDE 20 MG/ML
5 INJECTION INTRAMUSCULAR; INTRAVENOUS
Status: DISCONTINUED | OUTPATIENT
Start: 2018-02-25 | End: 2018-02-25 | Stop reason: HOSPADM

## 2018-02-25 RX ORDER — LABETALOL HYDROCHLORIDE 5 MG/ML
5 INJECTION, SOLUTION INTRAVENOUS
Status: DISCONTINUED | OUTPATIENT
Start: 2018-02-25 | End: 2018-02-25 | Stop reason: HOSPADM

## 2018-02-25 RX ORDER — BUPIVACAINE HYDROCHLORIDE AND EPINEPHRINE 5; 5 MG/ML; UG/ML
INJECTION, SOLUTION EPIDURAL; INTRACAUDAL; PERINEURAL AS NEEDED
Status: DISCONTINUED | OUTPATIENT
Start: 2018-02-25 | End: 2018-02-25 | Stop reason: HOSPADM

## 2018-02-25 RX ORDER — NALOXONE HCL 0.4 MG/ML
0.2 VIAL (ML) INJECTION AS NEEDED
Status: DISCONTINUED | OUTPATIENT
Start: 2018-02-25 | End: 2018-02-25 | Stop reason: HOSPADM

## 2018-02-25 RX ORDER — MIDAZOLAM HYDROCHLORIDE 1 MG/ML
1 INJECTION INTRAMUSCULAR; INTRAVENOUS
Status: DISCONTINUED | OUTPATIENT
Start: 2018-02-25 | End: 2018-02-25 | Stop reason: HOSPADM

## 2018-02-25 RX ORDER — IPRATROPIUM BROMIDE AND ALBUTEROL SULFATE 2.5; .5 MG/3ML; MG/3ML
3 SOLUTION RESPIRATORY (INHALATION) EVERY 4 HOURS PRN
Status: DISCONTINUED | OUTPATIENT
Start: 2018-02-25 | End: 2018-02-28

## 2018-02-25 RX ORDER — IPRATROPIUM BROMIDE AND ALBUTEROL SULFATE 2.5; .5 MG/3ML; MG/3ML
3 SOLUTION RESPIRATORY (INHALATION) ONCE
Status: COMPLETED | OUTPATIENT
Start: 2018-02-25 | End: 2018-02-25

## 2018-02-25 RX ORDER — OXYCODONE AND ACETAMINOPHEN 7.5; 325 MG/1; MG/1
1 TABLET ORAL ONCE AS NEEDED
Status: DISCONTINUED | OUTPATIENT
Start: 2018-02-25 | End: 2018-02-25 | Stop reason: HOSPADM

## 2018-02-25 RX ORDER — LIDOCAINE HYDROCHLORIDE 10 MG/ML
0.5 INJECTION, SOLUTION EPIDURAL; INFILTRATION; INTRACAUDAL; PERINEURAL ONCE AS NEEDED
Status: DISCONTINUED | OUTPATIENT
Start: 2018-02-25 | End: 2018-02-25 | Stop reason: HOSPADM

## 2018-02-25 RX ORDER — ROCURONIUM BROMIDE 10 MG/ML
INJECTION, SOLUTION INTRAVENOUS AS NEEDED
Status: DISCONTINUED | OUTPATIENT
Start: 2018-02-25 | End: 2018-02-25 | Stop reason: SURG

## 2018-02-25 RX ADMIN — HYDROMORPHONE HYDROCHLORIDE 0.5 MG: 2 INJECTION INTRAMUSCULAR; INTRAVENOUS; SUBCUTANEOUS at 10:20

## 2018-02-25 RX ADMIN — SUGAMMADEX 240 MG: 100 INJECTION, SOLUTION INTRAVENOUS at 10:00

## 2018-02-25 RX ADMIN — SODIUM CHLORIDE, POTASSIUM CHLORIDE, SODIUM LACTATE AND CALCIUM CHLORIDE: 600; 310; 30; 20 INJECTION, SOLUTION INTRAVENOUS at 09:57

## 2018-02-25 RX ADMIN — CEFTRIAXONE 2 G: 2 INJECTION, POWDER, FOR SOLUTION INTRAMUSCULAR; INTRAVENOUS at 02:14

## 2018-02-25 RX ADMIN — FENTANYL CITRATE 50 MCG: 50 INJECTION INTRAMUSCULAR; INTRAVENOUS at 08:51

## 2018-02-25 RX ADMIN — ROSUVASTATIN CALCIUM 10 MG: 10 TABLET, FILM COATED ORAL at 20:43

## 2018-02-25 RX ADMIN — IPRATROPIUM BROMIDE AND ALBUTEROL SULFATE 3 ML: .5; 3 SOLUTION RESPIRATORY (INHALATION) at 11:44

## 2018-02-25 RX ADMIN — IPRATROPIUM BROMIDE AND ALBUTEROL SULFATE 3 ML: .5; 3 SOLUTION RESPIRATORY (INHALATION) at 07:51

## 2018-02-25 RX ADMIN — ROCURONIUM BROMIDE 10 MG: 10 INJECTION INTRAVENOUS at 08:53

## 2018-02-25 RX ADMIN — PREGABALIN 100 MG: 100 CAPSULE ORAL at 18:38

## 2018-02-25 RX ADMIN — PREGABALIN 100 MG: 100 CAPSULE ORAL at 22:40

## 2018-02-25 RX ADMIN — PROPOFOL 170 MG: 10 INJECTION, EMULSION INTRAVENOUS at 08:31

## 2018-02-25 RX ADMIN — IPRATROPIUM BROMIDE AND ALBUTEROL SULFATE 3 ML: .5; 3 SOLUTION RESPIRATORY (INHALATION) at 15:21

## 2018-02-25 RX ADMIN — INSULIN ASPART 3 UNITS: 100 INJECTION, SOLUTION INTRAVENOUS; SUBCUTANEOUS at 18:39

## 2018-02-25 RX ADMIN — FENTANYL CITRATE 50 MCG: 50 INJECTION INTRAMUSCULAR; INTRAVENOUS at 09:09

## 2018-02-25 RX ADMIN — HYDROMORPHONE HYDROCHLORIDE 0.5 MG: 2 INJECTION INTRAMUSCULAR; INTRAVENOUS; SUBCUTANEOUS at 10:50

## 2018-02-25 RX ADMIN — FAMOTIDINE 20 MG: 10 INJECTION INTRAVENOUS at 07:51

## 2018-02-25 RX ADMIN — SUCCINYLCHOLINE CHLORIDE 140 MG: 20 INJECTION, SOLUTION INTRAMUSCULAR; INTRAVENOUS; PARENTERAL at 08:31

## 2018-02-25 RX ADMIN — METOPROLOL TARTRATE 2.5 MG: 5 INJECTION, SOLUTION INTRAVENOUS at 14:02

## 2018-02-25 RX ADMIN — INSULIN ASPART 3 UNITS: 100 INJECTION, SOLUTION INTRAVENOUS; SUBCUTANEOUS at 14:05

## 2018-02-25 RX ADMIN — PREGABALIN 100 MG: 100 CAPSULE ORAL at 14:05

## 2018-02-25 RX ADMIN — GLIPIZIDE 10 MG: 10 TABLET ORAL at 18:39

## 2018-02-25 RX ADMIN — ONDANSETRON 4 MG: 2 INJECTION INTRAMUSCULAR; INTRAVENOUS at 08:46

## 2018-02-25 RX ADMIN — FENTANYL CITRATE 50 MCG: 50 INJECTION INTRAMUSCULAR; INTRAVENOUS at 08:29

## 2018-02-25 RX ADMIN — HYDROCODONE BITARTRATE AND ACETAMINOPHEN 1 TABLET: 5; 325 TABLET ORAL at 19:38

## 2018-02-25 RX ADMIN — INSULIN DETEMIR 20 UNITS: 100 INJECTION, SOLUTION SUBCUTANEOUS at 22:41

## 2018-02-25 RX ADMIN — METOPROLOL TARTRATE 25 MG: 25 TABLET ORAL at 20:42

## 2018-02-25 RX ADMIN — FENTANYL CITRATE 50 MCG: 50 INJECTION INTRAMUSCULAR; INTRAVENOUS at 09:16

## 2018-02-25 RX ADMIN — ROCURONIUM BROMIDE 10 MG: 10 INJECTION INTRAVENOUS at 08:31

## 2018-02-25 RX ADMIN — LIDOCAINE HYDROCHLORIDE 70 MG: 20 INJECTION, SOLUTION INFILTRATION; PERINEURAL at 08:31

## 2018-02-25 RX ADMIN — SODIUM CHLORIDE, POTASSIUM CHLORIDE, SODIUM LACTATE AND CALCIUM CHLORIDE 9 ML/HR: 600; 310; 30; 20 INJECTION, SOLUTION INTRAVENOUS at 07:52

## 2018-02-25 RX ADMIN — TAZOBACTAM SODIUM AND PIPERACILLIN SODIUM 3.38 G: 375; 3 INJECTION, SOLUTION INTRAVENOUS at 20:42

## 2018-02-25 RX ADMIN — TAZOBACTAM SODIUM AND PIPERACILLIN SODIUM 3.38 G: 375; 3 INJECTION, SOLUTION INTRAVENOUS at 14:05

## 2018-02-25 RX ADMIN — INSULIN ASPART 3 UNITS: 100 INJECTION, SOLUTION INTRAVENOUS; SUBCUTANEOUS at 22:40

## 2018-02-25 NOTE — ANESTHESIA PROCEDURE NOTES
Airway  Urgency: elective    Date/Time: 2/25/2018 8:34 AM  Airway not difficult    General Information and Staff    Patient location during procedure: OR  Anesthesiologist: SOURAV ODELL  CRNA: SHANTEL HUTTON    Indications and Patient Condition  Indications for airway management: airway protection    Preoxygenated: yes  Mask difficulty assessment: 1 - vent by mask    Final Airway Details  Final airway type: endotracheal airway      Successful airway: ETT  Cuffed: yes   Successful intubation technique: direct laryngoscopy  Facilitating devices/methods: Bougie  Endotracheal tube insertion site: oral  Blade: Nasir  Blade size: #4  ETT size: 7.5 mm  Cormack-Lehane Classification: grade III - view of epiglottis only  Placement verified by: chest auscultation and capnometry   Cuff volume (mL): 5  Measured from: lips  ETT to lips (cm): 21  Number of attempts at approach: 1    Additional Comments  Smooth IV induction. Trachea intubated. Cuff up. Dentition intact without injury.

## 2018-02-25 NOTE — ANESTHESIA PREPROCEDURE EVALUATION
Anesthesia Evaluation     Patient summary reviewed and Nursing notes reviewed   NPO Solid Status: > 8 hours  NPO Liquid Status: > 8 hours           Airway   Mallampati: III  Neck ROM: full  possible difficult intubation  Dental    (+) poor dentition    Pulmonary    (+) sleep apnea, wheezes,   Cardiovascular     Rhythm: regular    (+) hypertension, dysrhythmias PAC, PVC, hyperlipidemia      Neuro/Psych  (+) numbness,     GI/Hepatic/Renal/Endo    (+) obesity, morbid obesity, diabetes mellitus using insulin,     Musculoskeletal     Abdominal   (+) obese,    Substance History      OB/GYN          Other   (+) arthritis   history of cancer                    Anesthesia Plan    ASA 3     general     intravenous induction   Anesthetic plan and risks discussed with patient.

## 2018-02-25 NOTE — ANESTHESIA POSTPROCEDURE EVALUATION
"Patient: Jesus Beckham    Procedure Summary     Date Anesthesia Start Anesthesia Stop Room / Location    02/25/18 0825 1022  [facility] /  [facility]       Procedure Diagnosis Surgeon Provider    CHOLECYSTECTOMY LAPAROSCOPIC INTRAOPERATIVE CHOLANGIOGRAM (N/A Abdomen) No diagnosis on file. MD Henry Díaz MD          Anesthesia Type: general  Last vitals  BP   179/92 (02/25/18 1025)   Temp   37.1 °C (98.7 °F) (02/25/18 1018)   Pulse   109 (02/25/18 1025)   Resp   26 (02/25/18 1025)     SpO2   94 % (02/25/18 1025)     Post Anesthesia Care and Evaluation    Patient location during evaluation: bedside  Patient participation: complete - patient participated  Level of consciousness: sleepy but conscious  Pain score: 0  Pain management: adequate  Airway patency: patent  Anesthetic complications: No anesthetic complications    Cardiovascular status: acceptable  Respiratory status: acceptable  Hydration status: acceptable    Comments: /92  Pulse 109  Temp 37.1 °C (98.7 °F) (Oral)   Resp 26  Ht 177.8 cm (70\")  Wt 119 kg (263 lb)  SpO2 94%  BMI 37.74 kg/m2        "

## 2018-02-26 LAB
ALBUMIN SERPL-MCNC: 2.4 G/DL (ref 3.5–5.2)
ALBUMIN/GLOB SERPL: 0.8 G/DL
ALP SERPL-CCNC: 83 U/L (ref 39–117)
ALT SERPL W P-5'-P-CCNC: 56 U/L (ref 1–41)
ANION GAP SERPL CALCULATED.3IONS-SCNC: 10.1 MMOL/L
AST SERPL-CCNC: 54 U/L (ref 1–40)
BASOPHILS # BLD AUTO: 0.01 10*3/MM3 (ref 0–0.2)
BASOPHILS NFR BLD AUTO: 0.1 % (ref 0–1.5)
BILIRUB SERPL-MCNC: 0.6 MG/DL (ref 0.1–1.2)
BUN BLD-MCNC: 27 MG/DL (ref 8–23)
BUN/CREAT SERPL: 30 (ref 7–25)
CALCIUM SPEC-SCNC: 8.2 MG/DL (ref 8.6–10.5)
CHLORIDE SERPL-SCNC: 97 MMOL/L (ref 98–107)
CO2 SERPL-SCNC: 23.9 MMOL/L (ref 22–29)
CREAT BLD-MCNC: 0.9 MG/DL (ref 0.76–1.27)
DEPRECATED RDW RBC AUTO: 51.6 FL (ref 37–54)
EOSINOPHIL # BLD AUTO: 0.03 10*3/MM3 (ref 0–0.7)
EOSINOPHIL NFR BLD AUTO: 0.4 % (ref 0.3–6.2)
ERYTHROCYTE [DISTWIDTH] IN BLOOD BY AUTOMATED COUNT: 14.9 % (ref 11.5–14.5)
GFR SERPL CREATININE-BSD FRML MDRD: 82 ML/MIN/1.73
GLOBULIN UR ELPH-MCNC: 3.2 GM/DL
GLUCOSE BLD-MCNC: 169 MG/DL (ref 65–99)
GLUCOSE BLDC GLUCOMTR-MCNC: 169 MG/DL (ref 70–130)
GLUCOSE BLDC GLUCOMTR-MCNC: 169 MG/DL (ref 70–130)
GLUCOSE BLDC GLUCOMTR-MCNC: 205 MG/DL (ref 70–130)
GLUCOSE BLDC GLUCOMTR-MCNC: 212 MG/DL (ref 70–130)
HCT VFR BLD AUTO: 38.6 % (ref 40.4–52.2)
HGB BLD-MCNC: 12.5 G/DL (ref 13.7–17.6)
IMM GRANULOCYTES # BLD: 0.03 10*3/MM3 (ref 0–0.03)
IMM GRANULOCYTES NFR BLD: 0.4 % (ref 0–0.5)
LYMPHOCYTES # BLD AUTO: 0.83 10*3/MM3 (ref 0.9–4.8)
LYMPHOCYTES NFR BLD AUTO: 10.2 % (ref 19.6–45.3)
MCH RBC QN AUTO: 30.7 PG (ref 27–32.7)
MCHC RBC AUTO-ENTMCNC: 32.4 G/DL (ref 32.6–36.4)
MCV RBC AUTO: 94.8 FL (ref 79.8–96.2)
MONOCYTES # BLD AUTO: 0.91 10*3/MM3 (ref 0.2–1.2)
MONOCYTES NFR BLD AUTO: 11.2 % (ref 5–12)
NEUTROPHILS # BLD AUTO: 6.3 10*3/MM3 (ref 1.9–8.1)
NEUTROPHILS NFR BLD AUTO: 77.7 % (ref 42.7–76)
PLATELET # BLD AUTO: 84 10*3/MM3 (ref 140–500)
PMV BLD AUTO: 12 FL (ref 6–12)
POTASSIUM BLD-SCNC: 4.8 MMOL/L (ref 3.5–5.2)
PROT SERPL-MCNC: 5.6 G/DL (ref 6–8.5)
RBC # BLD AUTO: 4.07 10*6/MM3 (ref 4.6–6)
SODIUM BLD-SCNC: 131 MMOL/L (ref 136–145)
TROPONIN T SERPL-MCNC: 0.02 NG/ML (ref 0–0.03)
WBC NRBC COR # BLD: 8.11 10*3/MM3 (ref 4.5–10.7)

## 2018-02-26 PROCEDURE — 85025 COMPLETE CBC W/AUTO DIFF WBC: CPT | Performed by: SURGERY

## 2018-02-26 PROCEDURE — 25010000002 PIPERACILLIN SOD-TAZOBACTAM PER 1 G: Performed by: SURGERY

## 2018-02-26 PROCEDURE — 82962 GLUCOSE BLOOD TEST: CPT

## 2018-02-26 PROCEDURE — 99232 SBSQ HOSP IP/OBS MODERATE 35: CPT | Performed by: INTERNAL MEDICINE

## 2018-02-26 PROCEDURE — 84484 ASSAY OF TROPONIN QUANT: CPT | Performed by: INTERNAL MEDICINE

## 2018-02-26 PROCEDURE — 97162 PT EVAL MOD COMPLEX 30 MIN: CPT

## 2018-02-26 PROCEDURE — 99024 POSTOP FOLLOW-UP VISIT: CPT | Performed by: SURGERY

## 2018-02-26 PROCEDURE — 80053 COMPREHEN METABOLIC PANEL: CPT | Performed by: SURGERY

## 2018-02-26 PROCEDURE — 97110 THERAPEUTIC EXERCISES: CPT

## 2018-02-26 PROCEDURE — 63710000001 INSULIN ASPART PER 5 UNITS: Performed by: INTERNAL MEDICINE

## 2018-02-26 PROCEDURE — 25010000002 HYDROMORPHONE PER 4 MG: Performed by: SURGERY

## 2018-02-26 RX ADMIN — HYDROMORPHONE HYDROCHLORIDE 0.5 MG: 1 INJECTION, SOLUTION INTRAMUSCULAR; INTRAVENOUS; SUBCUTANEOUS at 05:01

## 2018-02-26 RX ADMIN — METOPROLOL TARTRATE 25 MG: 25 TABLET ORAL at 21:30

## 2018-02-26 RX ADMIN — HYDROCODONE BITARTRATE AND ACETAMINOPHEN 1 TABLET: 5; 325 TABLET ORAL at 21:33

## 2018-02-26 RX ADMIN — HYDROMORPHONE HYDROCHLORIDE 0.5 MG: 1 INJECTION, SOLUTION INTRAMUSCULAR; INTRAVENOUS; SUBCUTANEOUS at 12:50

## 2018-02-26 RX ADMIN — ROSUVASTATIN CALCIUM 10 MG: 10 TABLET, FILM COATED ORAL at 09:06

## 2018-02-26 RX ADMIN — INSULIN DETEMIR 20 UNITS: 100 INJECTION, SOLUTION SUBCUTANEOUS at 21:34

## 2018-02-26 RX ADMIN — DOCUSATE SODIUM -SENNOSIDES 2 TABLET: 50; 8.6 TABLET, COATED ORAL at 09:05

## 2018-02-26 RX ADMIN — INSULIN DETEMIR 20 UNITS: 100 INJECTION, SOLUTION SUBCUTANEOUS at 09:07

## 2018-02-26 RX ADMIN — TAZOBACTAM SODIUM AND PIPERACILLIN SODIUM 3.38 G: 375; 3 INJECTION, SOLUTION INTRAVENOUS at 21:31

## 2018-02-26 RX ADMIN — LINAGLIPTIN 5 MG: 5 TABLET, FILM COATED ORAL at 09:05

## 2018-02-26 RX ADMIN — METOPROLOL TARTRATE 25 MG: 25 TABLET ORAL at 09:06

## 2018-02-26 RX ADMIN — FLUTICASONE PROPIONATE 2 SPRAY: 50 SPRAY, METERED NASAL at 09:05

## 2018-02-26 RX ADMIN — GLIPIZIDE 10 MG: 10 TABLET ORAL at 17:57

## 2018-02-26 RX ADMIN — PREGABALIN 100 MG: 100 CAPSULE ORAL at 21:30

## 2018-02-26 RX ADMIN — INSULIN ASPART 3 UNITS: 100 INJECTION, SOLUTION INTRAVENOUS; SUBCUTANEOUS at 11:53

## 2018-02-26 RX ADMIN — INSULIN ASPART 3 UNITS: 100 INJECTION, SOLUTION INTRAVENOUS; SUBCUTANEOUS at 21:30

## 2018-02-26 RX ADMIN — PREGABALIN 100 MG: 100 CAPSULE ORAL at 17:57

## 2018-02-26 RX ADMIN — DOCUSATE SODIUM -SENNOSIDES 2 TABLET: 50; 8.6 TABLET, COATED ORAL at 21:30

## 2018-02-26 RX ADMIN — TAZOBACTAM SODIUM AND PIPERACILLIN SODIUM 3.38 G: 375; 3 INJECTION, SOLUTION INTRAVENOUS at 15:21

## 2018-02-26 RX ADMIN — INSULIN ASPART 2 UNITS: 100 INJECTION, SOLUTION INTRAVENOUS; SUBCUTANEOUS at 09:06

## 2018-02-26 RX ADMIN — TAZOBACTAM SODIUM AND PIPERACILLIN SODIUM 3.38 G: 375; 3 INJECTION, SOLUTION INTRAVENOUS at 06:10

## 2018-02-26 RX ADMIN — HYDROCODONE BITARTRATE AND ACETAMINOPHEN 1 TABLET: 5; 325 TABLET ORAL at 15:21

## 2018-02-26 RX ADMIN — INSULIN ASPART 2 UNITS: 100 INJECTION, SOLUTION INTRAVENOUS; SUBCUTANEOUS at 17:57

## 2018-02-26 RX ADMIN — GLIPIZIDE 10 MG: 10 TABLET ORAL at 09:06

## 2018-02-26 RX ADMIN — PREGABALIN 100 MG: 100 CAPSULE ORAL at 09:06

## 2018-02-27 LAB
ALBUMIN SERPL-MCNC: 2.6 G/DL (ref 3.5–5.2)
ALBUMIN/GLOB SERPL: 0.8 G/DL
ALP SERPL-CCNC: 91 U/L (ref 39–117)
ALT SERPL W P-5'-P-CCNC: 54 U/L (ref 1–41)
ANION GAP SERPL CALCULATED.3IONS-SCNC: 9.6 MMOL/L
AST SERPL-CCNC: 45 U/L (ref 1–40)
BASOPHILS # BLD AUTO: 0.01 10*3/MM3 (ref 0–0.2)
BASOPHILS NFR BLD AUTO: 0.1 % (ref 0–1.5)
BILIRUB SERPL-MCNC: 0.6 MG/DL (ref 0.1–1.2)
BUN BLD-MCNC: 26 MG/DL (ref 8–23)
BUN/CREAT SERPL: 30.6 (ref 7–25)
CALCIUM SPEC-SCNC: 8.6 MG/DL (ref 8.6–10.5)
CHLORIDE SERPL-SCNC: 98 MMOL/L (ref 98–107)
CO2 SERPL-SCNC: 27.4 MMOL/L (ref 22–29)
CREAT BLD-MCNC: 0.85 MG/DL (ref 0.76–1.27)
CYTO UR: NORMAL
DEPRECATED RDW RBC AUTO: 51.5 FL (ref 37–54)
EOSINOPHIL # BLD AUTO: 0.08 10*3/MM3 (ref 0–0.7)
EOSINOPHIL NFR BLD AUTO: 1.1 % (ref 0.3–6.2)
ERYTHROCYTE [DISTWIDTH] IN BLOOD BY AUTOMATED COUNT: 14.7 % (ref 11.5–14.5)
GFR SERPL CREATININE-BSD FRML MDRD: 88 ML/MIN/1.73
GLOBULIN UR ELPH-MCNC: 3.1 GM/DL
GLUCOSE BLD-MCNC: 175 MG/DL (ref 65–99)
GLUCOSE BLDC GLUCOMTR-MCNC: 186 MG/DL (ref 70–130)
GLUCOSE BLDC GLUCOMTR-MCNC: 227 MG/DL (ref 70–130)
GLUCOSE BLDC GLUCOMTR-MCNC: 280 MG/DL (ref 70–130)
GLUCOSE BLDC GLUCOMTR-MCNC: 290 MG/DL (ref 70–130)
HCT VFR BLD AUTO: 38.5 % (ref 40.4–52.2)
HGB BLD-MCNC: 12.2 G/DL (ref 13.7–17.6)
IMM GRANULOCYTES # BLD: 0.06 10*3/MM3 (ref 0–0.03)
IMM GRANULOCYTES NFR BLD: 0.8 % (ref 0–0.5)
LAB AP CASE REPORT: NORMAL
LYMPHOCYTES # BLD AUTO: 0.98 10*3/MM3 (ref 0.9–4.8)
LYMPHOCYTES NFR BLD AUTO: 13 % (ref 19.6–45.3)
Lab: NORMAL
MCH RBC QN AUTO: 30.1 PG (ref 27–32.7)
MCHC RBC AUTO-ENTMCNC: 31.7 G/DL (ref 32.6–36.4)
MCV RBC AUTO: 95.1 FL (ref 79.8–96.2)
MONOCYTES # BLD AUTO: 1.01 10*3/MM3 (ref 0.2–1.2)
MONOCYTES NFR BLD AUTO: 13.4 % (ref 5–12)
NEUTROPHILS # BLD AUTO: 5.42 10*3/MM3 (ref 1.9–8.1)
NEUTROPHILS NFR BLD AUTO: 71.6 % (ref 42.7–76)
PATH REPORT.FINAL DX SPEC: NORMAL
PATH REPORT.GROSS SPEC: NORMAL
PLATELET # BLD AUTO: 107 10*3/MM3 (ref 140–500)
PMV BLD AUTO: 11.7 FL (ref 6–12)
POTASSIUM BLD-SCNC: 4.5 MMOL/L (ref 3.5–5.2)
PROT SERPL-MCNC: 5.7 G/DL (ref 6–8.5)
RBC # BLD AUTO: 4.05 10*6/MM3 (ref 4.6–6)
SODIUM BLD-SCNC: 135 MMOL/L (ref 136–145)
WBC NRBC COR # BLD: 7.56 10*3/MM3 (ref 4.5–10.7)

## 2018-02-27 PROCEDURE — 97110 THERAPEUTIC EXERCISES: CPT

## 2018-02-27 PROCEDURE — 25010000002 HYDROMORPHONE PER 4 MG: Performed by: SURGERY

## 2018-02-27 PROCEDURE — 82962 GLUCOSE BLOOD TEST: CPT

## 2018-02-27 PROCEDURE — 25010000002 PIPERACILLIN SOD-TAZOBACTAM PER 1 G: Performed by: SURGERY

## 2018-02-27 PROCEDURE — 80053 COMPREHEN METABOLIC PANEL: CPT | Performed by: INTERNAL MEDICINE

## 2018-02-27 PROCEDURE — 99024 POSTOP FOLLOW-UP VISIT: CPT | Performed by: SURGERY

## 2018-02-27 PROCEDURE — 25010000002 ENOXAPARIN PER 10 MG: Performed by: SURGERY

## 2018-02-27 PROCEDURE — 99232 SBSQ HOSP IP/OBS MODERATE 35: CPT | Performed by: INTERNAL MEDICINE

## 2018-02-27 PROCEDURE — 63710000001 INSULIN ASPART PER 5 UNITS: Performed by: INTERNAL MEDICINE

## 2018-02-27 PROCEDURE — 85025 COMPLETE CBC W/AUTO DIFF WBC: CPT | Performed by: INTERNAL MEDICINE

## 2018-02-27 RX ADMIN — DOCUSATE SODIUM -SENNOSIDES 2 TABLET: 50; 8.6 TABLET, COATED ORAL at 08:53

## 2018-02-27 RX ADMIN — HYDROMORPHONE HYDROCHLORIDE 0.5 MG: 1 INJECTION, SOLUTION INTRAMUSCULAR; INTRAVENOUS; SUBCUTANEOUS at 03:23

## 2018-02-27 RX ADMIN — METOPROLOL TARTRATE 25 MG: 25 TABLET ORAL at 08:53

## 2018-02-27 RX ADMIN — DOCUSATE SODIUM -SENNOSIDES 2 TABLET: 50; 8.6 TABLET, COATED ORAL at 22:37

## 2018-02-27 RX ADMIN — HYDROMORPHONE HYDROCHLORIDE 0.5 MG: 1 INJECTION, SOLUTION INTRAMUSCULAR; INTRAVENOUS; SUBCUTANEOUS at 08:52

## 2018-02-27 RX ADMIN — INSULIN ASPART 2 UNITS: 100 INJECTION, SOLUTION INTRAVENOUS; SUBCUTANEOUS at 08:53

## 2018-02-27 RX ADMIN — GLIPIZIDE 10 MG: 10 TABLET ORAL at 08:53

## 2018-02-27 RX ADMIN — INSULIN ASPART 4 UNITS: 100 INJECTION, SOLUTION INTRAVENOUS; SUBCUTANEOUS at 17:31

## 2018-02-27 RX ADMIN — PREGABALIN 100 MG: 100 CAPSULE ORAL at 22:37

## 2018-02-27 RX ADMIN — PREGABALIN 100 MG: 100 CAPSULE ORAL at 17:30

## 2018-02-27 RX ADMIN — FLUTICASONE PROPIONATE 2 SPRAY: 50 SPRAY, METERED NASAL at 08:58

## 2018-02-27 RX ADMIN — METOPROLOL TARTRATE 25 MG: 25 TABLET ORAL at 22:37

## 2018-02-27 RX ADMIN — HYDROMORPHONE HYDROCHLORIDE 0.5 MG: 1 INJECTION, SOLUTION INTRAMUSCULAR; INTRAVENOUS; SUBCUTANEOUS at 22:33

## 2018-02-27 RX ADMIN — INSULIN ASPART 3 UNITS: 100 INJECTION, SOLUTION INTRAVENOUS; SUBCUTANEOUS at 22:33

## 2018-02-27 RX ADMIN — TAZOBACTAM SODIUM AND PIPERACILLIN SODIUM 3.38 G: 375; 3 INJECTION, SOLUTION INTRAVENOUS at 15:00

## 2018-02-27 RX ADMIN — PREGABALIN 100 MG: 100 CAPSULE ORAL at 08:53

## 2018-02-27 RX ADMIN — HYDROMORPHONE HYDROCHLORIDE 0.5 MG: 1 INJECTION, SOLUTION INTRAMUSCULAR; INTRAVENOUS; SUBCUTANEOUS at 13:08

## 2018-02-27 RX ADMIN — INSULIN DETEMIR 20 UNITS: 100 INJECTION, SOLUTION SUBCUTANEOUS at 08:53

## 2018-02-27 RX ADMIN — LINAGLIPTIN 5 MG: 5 TABLET, FILM COATED ORAL at 08:53

## 2018-02-27 RX ADMIN — HYDROMORPHONE HYDROCHLORIDE 0.5 MG: 1 INJECTION, SOLUTION INTRAMUSCULAR; INTRAVENOUS; SUBCUTANEOUS at 05:44

## 2018-02-27 RX ADMIN — TAZOBACTAM SODIUM AND PIPERACILLIN SODIUM 3.38 G: 375; 3 INJECTION, SOLUTION INTRAVENOUS at 05:44

## 2018-02-27 RX ADMIN — ENOXAPARIN SODIUM 40 MG: 40 INJECTION SUBCUTANEOUS at 08:53

## 2018-02-27 RX ADMIN — INSULIN ASPART 4 UNITS: 100 INJECTION, SOLUTION INTRAVENOUS; SUBCUTANEOUS at 13:00

## 2018-02-27 RX ADMIN — GLIPIZIDE 10 MG: 10 TABLET ORAL at 17:30

## 2018-02-27 RX ADMIN — ROSUVASTATIN CALCIUM 10 MG: 10 TABLET, FILM COATED ORAL at 08:53

## 2018-02-28 ENCOUNTER — APPOINTMENT (OUTPATIENT)
Dept: MRI IMAGING | Facility: HOSPITAL | Age: 78
End: 2018-02-28

## 2018-02-28 ENCOUNTER — APPOINTMENT (OUTPATIENT)
Dept: GENERAL RADIOLOGY | Facility: HOSPITAL | Age: 78
End: 2018-02-28

## 2018-02-28 ENCOUNTER — APPOINTMENT (OUTPATIENT)
Dept: CT IMAGING | Facility: HOSPITAL | Age: 78
End: 2018-02-28

## 2018-02-28 LAB
ALBUMIN SERPL-MCNC: 2.6 G/DL (ref 3.5–5.2)
ALBUMIN/GLOB SERPL: 0.8 G/DL
ALP SERPL-CCNC: 90 U/L (ref 39–117)
ALT SERPL W P-5'-P-CCNC: 46 U/L (ref 1–41)
ANION GAP SERPL CALCULATED.3IONS-SCNC: 10.9 MMOL/L
AST SERPL-CCNC: 30 U/L (ref 1–40)
BASOPHILS # BLD AUTO: 0.02 10*3/MM3 (ref 0–0.2)
BASOPHILS NFR BLD AUTO: 0.3 % (ref 0–1.5)
BILIRUB SERPL-MCNC: 0.7 MG/DL (ref 0.1–1.2)
BUN BLD-MCNC: 25 MG/DL (ref 8–23)
BUN/CREAT SERPL: 28.7 (ref 7–25)
CALCIUM SPEC-SCNC: 8.5 MG/DL (ref 8.6–10.5)
CHLORIDE SERPL-SCNC: 99 MMOL/L (ref 98–107)
CO2 SERPL-SCNC: 27.1 MMOL/L (ref 22–29)
CREAT BLD-MCNC: 0.87 MG/DL (ref 0.76–1.27)
D-LACTATE SERPL-SCNC: 3.7 MMOL/L (ref 0.5–2)
DEPRECATED RDW RBC AUTO: 50.2 FL (ref 37–54)
EOSINOPHIL # BLD AUTO: 0.04 10*3/MM3 (ref 0–0.7)
EOSINOPHIL NFR BLD AUTO: 0.5 % (ref 0.3–6.2)
ERYTHROCYTE [DISTWIDTH] IN BLOOD BY AUTOMATED COUNT: 14.6 % (ref 11.5–14.5)
GFR SERPL CREATININE-BSD FRML MDRD: 85 ML/MIN/1.73
GLOBULIN UR ELPH-MCNC: 3.2 GM/DL
GLUCOSE BLD-MCNC: 166 MG/DL (ref 65–99)
GLUCOSE BLDC GLUCOMTR-MCNC: 197 MG/DL (ref 70–130)
GLUCOSE BLDC GLUCOMTR-MCNC: 209 MG/DL (ref 70–130)
GLUCOSE BLDC GLUCOMTR-MCNC: 218 MG/DL (ref 70–130)
GLUCOSE BLDC GLUCOMTR-MCNC: 279 MG/DL (ref 70–130)
GLUCOSE BLDC GLUCOMTR-MCNC: 279 MG/DL (ref 70–130)
HCT VFR BLD AUTO: 38.5 % (ref 40.4–52.2)
HGB BLD-MCNC: 12.2 G/DL (ref 13.7–17.6)
HOLD SPECIMEN: NORMAL
IMM GRANULOCYTES # BLD: 0.19 10*3/MM3 (ref 0–0.03)
IMM GRANULOCYTES NFR BLD: 2.5 % (ref 0–0.5)
LYMPHOCYTES # BLD AUTO: 1.03 10*3/MM3 (ref 0.9–4.8)
LYMPHOCYTES NFR BLD AUTO: 13.6 % (ref 19.6–45.3)
MCH RBC QN AUTO: 30 PG (ref 27–32.7)
MCHC RBC AUTO-ENTMCNC: 31.7 G/DL (ref 32.6–36.4)
MCV RBC AUTO: 94.6 FL (ref 79.8–96.2)
MONOCYTES # BLD AUTO: 0.95 10*3/MM3 (ref 0.2–1.2)
MONOCYTES NFR BLD AUTO: 12.5 % (ref 5–12)
NEUTROPHILS # BLD AUTO: 5.36 10*3/MM3 (ref 1.9–8.1)
NEUTROPHILS NFR BLD AUTO: 70.6 % (ref 42.7–76)
NRBC BLD MANUAL-RTO: 0 /100 WBC (ref 0–0)
NT-PROBNP SERPL-MCNC: 1443 PG/ML (ref 0–1800)
PLATELET # BLD AUTO: 148 10*3/MM3 (ref 140–500)
PMV BLD AUTO: 12.3 FL (ref 6–12)
POTASSIUM BLD-SCNC: 4 MMOL/L (ref 3.5–5.2)
PROCALCITONIN SERPL-MCNC: 1.93 NG/ML (ref 0.1–0.25)
PROT SERPL-MCNC: 5.8 G/DL (ref 6–8.5)
RBC # BLD AUTO: 4.07 10*6/MM3 (ref 4.6–6)
SODIUM BLD-SCNC: 137 MMOL/L (ref 136–145)
TROPONIN T SERPL-MCNC: <0.01 NG/ML (ref 0–0.03)
WBC NRBC COR # BLD: 7.59 10*3/MM3 (ref 4.5–10.7)

## 2018-02-28 PROCEDURE — 83605 ASSAY OF LACTIC ACID: CPT | Performed by: HOSPITALIST

## 2018-02-28 PROCEDURE — 74177 CT ABD & PELVIS W/CONTRAST: CPT

## 2018-02-28 PROCEDURE — 80053 COMPREHEN METABOLIC PANEL: CPT | Performed by: SURGERY

## 2018-02-28 PROCEDURE — 97110 THERAPEUTIC EXERCISES: CPT

## 2018-02-28 PROCEDURE — 25010000002 ENOXAPARIN PER 10 MG: Performed by: SURGERY

## 2018-02-28 PROCEDURE — 25010000002 PIPERACILLIN SOD-TAZOBACTAM PER 1 G: Performed by: SURGERY

## 2018-02-28 PROCEDURE — 94799 UNLISTED PULMONARY SVC/PX: CPT

## 2018-02-28 PROCEDURE — 82962 GLUCOSE BLOOD TEST: CPT

## 2018-02-28 PROCEDURE — 85025 COMPLETE CBC W/AUTO DIFF WBC: CPT | Performed by: INTERNAL MEDICINE

## 2018-02-28 PROCEDURE — 84484 ASSAY OF TROPONIN QUANT: CPT | Performed by: HOSPITALIST

## 2018-02-28 PROCEDURE — 25010000002 HYDROMORPHONE PER 4 MG: Performed by: SURGERY

## 2018-02-28 PROCEDURE — 71045 X-RAY EXAM CHEST 1 VIEW: CPT

## 2018-02-28 PROCEDURE — 99221 1ST HOSP IP/OBS SF/LOW 40: CPT | Performed by: ORTHOPAEDIC SURGERY

## 2018-02-28 PROCEDURE — 63710000001 INSULIN ASPART PER 5 UNITS: Performed by: INTERNAL MEDICINE

## 2018-02-28 PROCEDURE — 99232 SBSQ HOSP IP/OBS MODERATE 35: CPT | Performed by: INTERNAL MEDICINE

## 2018-02-28 PROCEDURE — 0 IOPAMIDOL 61 % SOLUTION: Performed by: INTERNAL MEDICINE

## 2018-02-28 PROCEDURE — 25010000002 VANCOMYCIN 10 G RECONSTITUTED SOLUTION: Performed by: INTERNAL MEDICINE

## 2018-02-28 PROCEDURE — 72148 MRI LUMBAR SPINE W/O DYE: CPT

## 2018-02-28 PROCEDURE — 84145 PROCALCITONIN (PCT): CPT | Performed by: HOSPITALIST

## 2018-02-28 PROCEDURE — 99024 POSTOP FOLLOW-UP VISIT: CPT | Performed by: SURGERY

## 2018-02-28 PROCEDURE — 87040 BLOOD CULTURE FOR BACTERIA: CPT | Performed by: SURGERY

## 2018-02-28 PROCEDURE — 83880 ASSAY OF NATRIURETIC PEPTIDE: CPT | Performed by: HOSPITALIST

## 2018-02-28 RX ORDER — SODIUM CHLORIDE 9 MG/ML
75 INJECTION, SOLUTION INTRAVENOUS CONTINUOUS
Status: DISCONTINUED | OUTPATIENT
Start: 2018-02-28 | End: 2018-03-02

## 2018-02-28 RX ORDER — IPRATROPIUM BROMIDE AND ALBUTEROL SULFATE 2.5; .5 MG/3ML; MG/3ML
3 SOLUTION RESPIRATORY (INHALATION)
Status: DISCONTINUED | OUTPATIENT
Start: 2018-03-01 | End: 2018-03-13 | Stop reason: HOSPADM

## 2018-02-28 RX ADMIN — FLUTICASONE PROPIONATE 2 SPRAY: 50 SPRAY, METERED NASAL at 08:43

## 2018-02-28 RX ADMIN — METOPROLOL TARTRATE 25 MG: 25 TABLET ORAL at 08:41

## 2018-02-28 RX ADMIN — HYDROCODONE BITARTRATE AND ACETAMINOPHEN 1 TABLET: 5; 325 TABLET ORAL at 08:39

## 2018-02-28 RX ADMIN — INSULIN ASPART 3 UNITS: 100 INJECTION, SOLUTION INTRAVENOUS; SUBCUTANEOUS at 22:50

## 2018-02-28 RX ADMIN — IOPAMIDOL 85 ML: 612 INJECTION, SOLUTION INTRAVENOUS at 17:19

## 2018-02-28 RX ADMIN — PREGABALIN 100 MG: 100 CAPSULE ORAL at 22:52

## 2018-02-28 RX ADMIN — HYDROMORPHONE HYDROCHLORIDE 0.5 MG: 1 INJECTION, SOLUTION INTRAMUSCULAR; INTRAVENOUS; SUBCUTANEOUS at 18:27

## 2018-02-28 RX ADMIN — GLIPIZIDE 10 MG: 10 TABLET ORAL at 18:35

## 2018-02-28 RX ADMIN — PREGABALIN 100 MG: 100 CAPSULE ORAL at 09:24

## 2018-02-28 RX ADMIN — HYDROMORPHONE HYDROCHLORIDE 0.5 MG: 1 INJECTION, SOLUTION INTRAMUSCULAR; INTRAVENOUS; SUBCUTANEOUS at 16:00

## 2018-02-28 RX ADMIN — HYDROMORPHONE HYDROCHLORIDE 0.5 MG: 1 INJECTION, SOLUTION INTRAMUSCULAR; INTRAVENOUS; SUBCUTANEOUS at 22:52

## 2018-02-28 RX ADMIN — GLIPIZIDE 10 MG: 10 TABLET ORAL at 08:40

## 2018-02-28 RX ADMIN — INSULIN ASPART 4 UNITS: 100 INJECTION, SOLUTION INTRAVENOUS; SUBCUTANEOUS at 18:35

## 2018-02-28 RX ADMIN — DOCUSATE SODIUM -SENNOSIDES 2 TABLET: 50; 8.6 TABLET, COATED ORAL at 22:52

## 2018-02-28 RX ADMIN — TAZOBACTAM SODIUM AND PIPERACILLIN SODIUM 3.38 G: 375; 3 INJECTION, SOLUTION INTRAVENOUS at 18:35

## 2018-02-28 RX ADMIN — HYDROMORPHONE HYDROCHLORIDE 0.5 MG: 1 INJECTION, SOLUTION INTRAMUSCULAR; INTRAVENOUS; SUBCUTANEOUS at 12:06

## 2018-02-28 RX ADMIN — DOCUSATE SODIUM -SENNOSIDES 2 TABLET: 50; 8.6 TABLET, COATED ORAL at 08:40

## 2018-02-28 RX ADMIN — HYDROMORPHONE HYDROCHLORIDE 0.5 MG: 1 INJECTION, SOLUTION INTRAMUSCULAR; INTRAVENOUS; SUBCUTANEOUS at 04:21

## 2018-02-28 RX ADMIN — LINAGLIPTIN 5 MG: 5 TABLET, FILM COATED ORAL at 08:41

## 2018-02-28 RX ADMIN — ENOXAPARIN SODIUM 40 MG: 40 INJECTION SUBCUTANEOUS at 08:58

## 2018-02-28 RX ADMIN — TAZOBACTAM SODIUM AND PIPERACILLIN SODIUM 3.38 G: 375; 3 INJECTION, SOLUTION INTRAVENOUS at 08:42

## 2018-02-28 RX ADMIN — TAZOBACTAM SODIUM AND PIPERACILLIN SODIUM 3.38 G: 375; 3 INJECTION, SOLUTION INTRAVENOUS at 00:03

## 2018-02-28 RX ADMIN — SODIUM CHLORIDE 100 ML/HR: 9 INJECTION, SOLUTION INTRAVENOUS at 21:39

## 2018-02-28 RX ADMIN — VANCOMYCIN HYDROCHLORIDE 2500 MG: 10 INJECTION, POWDER, LYOPHILIZED, FOR SOLUTION INTRAVENOUS at 23:28

## 2018-02-28 RX ADMIN — INSULIN ASPART 4 UNITS: 100 INJECTION, SOLUTION INTRAVENOUS; SUBCUTANEOUS at 12:07

## 2018-02-28 RX ADMIN — INSULIN ASPART 2 UNITS: 100 INJECTION, SOLUTION INTRAVENOUS; SUBCUTANEOUS at 08:41

## 2018-02-28 RX ADMIN — INSULIN DETEMIR 30 UNITS: 100 INJECTION, SOLUTION SUBCUTANEOUS at 22:50

## 2018-02-28 RX ADMIN — ROSUVASTATIN CALCIUM 10 MG: 10 TABLET, FILM COATED ORAL at 08:40

## 2018-03-01 LAB
ALBUMIN SERPL-MCNC: 2.5 G/DL (ref 3.5–5.2)
ALBUMIN/GLOB SERPL: 0.8 G/DL
ALP SERPL-CCNC: 86 U/L (ref 39–117)
ALT SERPL W P-5'-P-CCNC: 39 U/L (ref 1–41)
ANION GAP SERPL CALCULATED.3IONS-SCNC: 10.8 MMOL/L
AST SERPL-CCNC: 26 U/L (ref 1–40)
BACTERIA SPEC AEROBE CULT: NORMAL
BACTERIA SPEC AEROBE CULT: NORMAL
BASOPHILS # BLD AUTO: 0.03 10*3/MM3 (ref 0–0.2)
BASOPHILS NFR BLD AUTO: 0.4 % (ref 0–1.5)
BILIRUB SERPL-MCNC: 0.7 MG/DL (ref 0.1–1.2)
BUN BLD-MCNC: 21 MG/DL (ref 8–23)
BUN/CREAT SERPL: 24.7 (ref 7–25)
CALCIUM SPEC-SCNC: 8.1 MG/DL (ref 8.6–10.5)
CHLORIDE SERPL-SCNC: 102 MMOL/L (ref 98–107)
CO2 SERPL-SCNC: 27.2 MMOL/L (ref 22–29)
CREAT BLD-MCNC: 0.85 MG/DL (ref 0.76–1.27)
D-LACTATE SERPL-SCNC: 0.9 MMOL/L (ref 0.5–2)
DEPRECATED RDW RBC AUTO: 50.7 FL (ref 37–54)
EOSINOPHIL # BLD AUTO: 0.03 10*3/MM3 (ref 0–0.7)
EOSINOPHIL NFR BLD AUTO: 0.4 % (ref 0.3–6.2)
ERYTHROCYTE [DISTWIDTH] IN BLOOD BY AUTOMATED COUNT: 14.6 % (ref 11.5–14.5)
GFR SERPL CREATININE-BSD FRML MDRD: 88 ML/MIN/1.73
GLOBULIN UR ELPH-MCNC: 3 GM/DL
GLUCOSE BLD-MCNC: 160 MG/DL (ref 65–99)
GLUCOSE BLDC GLUCOMTR-MCNC: 151 MG/DL (ref 70–130)
GLUCOSE BLDC GLUCOMTR-MCNC: 276 MG/DL (ref 70–130)
GLUCOSE BLDC GLUCOMTR-MCNC: 311 MG/DL (ref 70–130)
GLUCOSE BLDC GLUCOMTR-MCNC: 322 MG/DL (ref 70–130)
HCT VFR BLD AUTO: 37.6 % (ref 40.4–52.2)
HGB BLD-MCNC: 12 G/DL (ref 13.7–17.6)
IMM GRANULOCYTES # BLD: 0.3 10*3/MM3 (ref 0–0.03)
IMM GRANULOCYTES NFR BLD: 3.6 % (ref 0–0.5)
LYMPHOCYTES # BLD AUTO: 1.01 10*3/MM3 (ref 0.9–4.8)
LYMPHOCYTES NFR BLD AUTO: 12.1 % (ref 19.6–45.3)
MCH RBC QN AUTO: 30.4 PG (ref 27–32.7)
MCHC RBC AUTO-ENTMCNC: 31.9 G/DL (ref 32.6–36.4)
MCV RBC AUTO: 95.2 FL (ref 79.8–96.2)
MONOCYTES # BLD AUTO: 0.87 10*3/MM3 (ref 0.2–1.2)
MONOCYTES NFR BLD AUTO: 10.4 % (ref 5–12)
NEUTROPHILS # BLD AUTO: 6.11 10*3/MM3 (ref 1.9–8.1)
NEUTROPHILS NFR BLD AUTO: 73.1 % (ref 42.7–76)
NRBC BLD MANUAL-RTO: 0 /100 WBC (ref 0–0)
PLATELET # BLD AUTO: 191 10*3/MM3 (ref 140–500)
PMV BLD AUTO: 11.6 FL (ref 6–12)
POTASSIUM BLD-SCNC: 4.2 MMOL/L (ref 3.5–5.2)
PROT SERPL-MCNC: 5.5 G/DL (ref 6–8.5)
RBC # BLD AUTO: 3.95 10*6/MM3 (ref 4.6–6)
SODIUM BLD-SCNC: 140 MMOL/L (ref 136–145)
WBC NRBC COR # BLD: 8.35 10*3/MM3 (ref 4.5–10.7)

## 2018-03-01 PROCEDURE — 94799 UNLISTED PULMONARY SVC/PX: CPT

## 2018-03-01 PROCEDURE — 97110 THERAPEUTIC EXERCISES: CPT

## 2018-03-01 PROCEDURE — 63710000001 INSULIN ASPART PER 5 UNITS: Performed by: INTERNAL MEDICINE

## 2018-03-01 PROCEDURE — 25010000002 PIPERACILLIN SOD-TAZOBACTAM PER 1 G: Performed by: INTERNAL MEDICINE

## 2018-03-01 PROCEDURE — 82962 GLUCOSE BLOOD TEST: CPT

## 2018-03-01 PROCEDURE — 85025 COMPLETE CBC W/AUTO DIFF WBC: CPT | Performed by: SURGERY

## 2018-03-01 PROCEDURE — 25010000002 ENOXAPARIN PER 10 MG: Performed by: SURGERY

## 2018-03-01 PROCEDURE — 80053 COMPREHEN METABOLIC PANEL: CPT | Performed by: SURGERY

## 2018-03-01 PROCEDURE — 25010000002 VANCOMYCIN: Performed by: INTERNAL MEDICINE

## 2018-03-01 PROCEDURE — 25010000002 PIPERACILLIN SOD-TAZOBACTAM PER 1 G: Performed by: SURGERY

## 2018-03-01 PROCEDURE — 25010000002 HYDROMORPHONE PER 4 MG: Performed by: SURGERY

## 2018-03-01 PROCEDURE — 99024 POSTOP FOLLOW-UP VISIT: CPT | Performed by: SURGERY

## 2018-03-01 PROCEDURE — 99233 SBSQ HOSP IP/OBS HIGH 50: CPT | Performed by: INTERNAL MEDICINE

## 2018-03-01 PROCEDURE — 83605 ASSAY OF LACTIC ACID: CPT | Performed by: HOSPITALIST

## 2018-03-01 RX ORDER — DIPHENHYDRAMINE HCL 25 MG
25 CAPSULE ORAL EVERY 6 HOURS PRN
Status: DISCONTINUED | OUTPATIENT
Start: 2018-03-01 | End: 2018-03-13 | Stop reason: HOSPADM

## 2018-03-01 RX ADMIN — LINAGLIPTIN 5 MG: 5 TABLET, FILM COATED ORAL at 16:36

## 2018-03-01 RX ADMIN — ENOXAPARIN SODIUM 40 MG: 40 INJECTION SUBCUTANEOUS at 16:36

## 2018-03-01 RX ADMIN — POLYETHYLENE GLYCOL 3350 17 G: 17 POWDER, FOR SOLUTION ORAL at 16:35

## 2018-03-01 RX ADMIN — INSULIN ASPART 5 UNITS: 100 INJECTION, SOLUTION INTRAVENOUS; SUBCUTANEOUS at 21:58

## 2018-03-01 RX ADMIN — TAZOBACTAM SODIUM AND PIPERACILLIN SODIUM 3.38 G: 375; 3 INJECTION, SOLUTION INTRAVENOUS at 08:58

## 2018-03-01 RX ADMIN — HYDROCODONE BITARTRATE AND ACETAMINOPHEN 1 TABLET: 5; 325 TABLET ORAL at 01:52

## 2018-03-01 RX ADMIN — TAZOBACTAM SODIUM AND PIPERACILLIN SODIUM 3.38 G: 375; 3 INJECTION, SOLUTION INTRAVENOUS at 01:52

## 2018-03-01 RX ADMIN — HYDROMORPHONE HYDROCHLORIDE 0.5 MG: 1 INJECTION, SOLUTION INTRAMUSCULAR; INTRAVENOUS; SUBCUTANEOUS at 21:59

## 2018-03-01 RX ADMIN — HYDROMORPHONE HYDROCHLORIDE 0.5 MG: 1 INJECTION, SOLUTION INTRAMUSCULAR; INTRAVENOUS; SUBCUTANEOUS at 12:59

## 2018-03-01 RX ADMIN — HYDROCODONE BITARTRATE AND ACETAMINOPHEN 1 TABLET: 5; 325 TABLET ORAL at 18:00

## 2018-03-01 RX ADMIN — INSULIN ASPART 4 UNITS: 100 INJECTION, SOLUTION INTRAVENOUS; SUBCUTANEOUS at 18:00

## 2018-03-01 RX ADMIN — HYDROCODONE BITARTRATE AND ACETAMINOPHEN 1 TABLET: 5; 325 TABLET ORAL at 09:34

## 2018-03-01 RX ADMIN — PREGABALIN 100 MG: 100 CAPSULE ORAL at 21:59

## 2018-03-01 RX ADMIN — TAZOBACTAM SODIUM AND PIPERACILLIN SODIUM 3.38 G: 375; 3 INJECTION, SOLUTION INTRAVENOUS at 16:48

## 2018-03-01 RX ADMIN — IPRATROPIUM BROMIDE AND ALBUTEROL SULFATE 3 ML: .5; 3 SOLUTION RESPIRATORY (INHALATION) at 12:20

## 2018-03-01 RX ADMIN — GLIPIZIDE 10 MG: 10 TABLET ORAL at 16:35

## 2018-03-01 RX ADMIN — DOCUSATE SODIUM -SENNOSIDES 2 TABLET: 50; 8.6 TABLET, COATED ORAL at 21:59

## 2018-03-01 RX ADMIN — HYDROMORPHONE HYDROCHLORIDE 0.5 MG: 1 INJECTION, SOLUTION INTRAMUSCULAR; INTRAVENOUS; SUBCUTANEOUS at 16:34

## 2018-03-01 RX ADMIN — INSULIN DETEMIR 30 UNITS: 100 INJECTION, SOLUTION SUBCUTANEOUS at 08:58

## 2018-03-01 RX ADMIN — IPRATROPIUM BROMIDE AND ALBUTEROL SULFATE 3 ML: .5; 3 SOLUTION RESPIRATORY (INHALATION) at 07:41

## 2018-03-01 RX ADMIN — FLUTICASONE PROPIONATE 2 SPRAY: 50 SPRAY, METERED NASAL at 08:58

## 2018-03-01 RX ADMIN — VANCOMYCIN HYDROCHLORIDE 1250 MG: 1 INJECTION, POWDER, LYOPHILIZED, FOR SOLUTION INTRAVENOUS at 12:58

## 2018-03-01 RX ADMIN — DOCUSATE SODIUM -SENNOSIDES 2 TABLET: 50; 8.6 TABLET, COATED ORAL at 16:35

## 2018-03-01 RX ADMIN — IPRATROPIUM BROMIDE AND ALBUTEROL SULFATE 3 ML: .5; 3 SOLUTION RESPIRATORY (INHALATION) at 19:05

## 2018-03-01 RX ADMIN — PREGABALIN 100 MG: 100 CAPSULE ORAL at 16:35

## 2018-03-01 NOTE — NURSING NOTE
2/28/18 1912 RN passed pt's room and saw Pt was sitting in chair shivering uncontrollably, pt reported SOA, legs and lower abdomen appeared mottled. Pt was diaphoretic and 02 sensor came off of head.  Retrieved 02 monitor from nursing desk to spot check 02 level difficult to obtain r/t pt movement.  02 level obtained 92. pt was able to squeeze both my hands strong and equal.  Pt reported this had happened before at home prior to admission. Blood glucose  From finger stick obtained 218. Left pupil irregular and larger than right.  Pt c/o back pain rates 10 on scale of 1-10. Pt was recently back from having CT of abd and pelvis and MRI of back r/t his c/o pain of 10 all day 2/28/18.  Dilaudid .5 mg had last been given at 1830.  Rapid was called to further evaluate pt's symptoms and condition at 1922.

## 2018-03-01 NOTE — PROGRESS NOTES
Continued Stay Note  [facility]     Patient Name: Jesus Beckham  MRN: [id number]  Today's Date: 3/1/2018    Admit Date: 2/22/2018          Discharge Plan       03/01/18 1746    Case Management/Social Work Plan    Plan Home with wife vs [facility]    Patient/Family In Agreement With Plan yes    Additional Comments Spoke with pt at bedside who requests referral to [facility].  Spoke with Jb with [facility] who will follow.  Await determination.  JOE Aly RN              Discharge Codes     None            Sheila Aly

## 2018-03-01 NOTE — PLAN OF CARE
Problem: Patient Care Overview (Adult)  Goal: Plan of Care Review  Outcome: Ongoing (interventions implemented as appropriate)   03/01/18 0326   Coping/Psychosocial Response Interventions   Plan Of Care Reviewed With patient   Patient Care Overview   Progress unable to show any progress toward functional goals   Outcome Evaluation   Outcome Summary/Follow up Plan Pt reports back pain at a 10 on scale of 1-10. using dilaudid intermittant with norco and pain is currently controlled within the current orders. Vancomycin added to antibiotics after lactic acid 3.7 and procal 1.93 both critically elevated 2/28/18 2030 N/s at 100cc/hr started. will continue to monitor.     Goal: Adult Individualization and Mutuality  Outcome: Ongoing (interventions implemented as appropriate)    Goal: Discharge Needs Assessment  Outcome: Ongoing (interventions implemented as appropriate)      Problem: Infection, Risk/Actual (Adult)  Goal: Identify Related Risk Factors and Signs and Symptoms  Outcome: Ongoing (interventions implemented as appropriate)    Goal: Infection Prevention/Resolution  Outcome: Ongoing (interventions implemented as appropriate)      Problem: Fall Risk (Adult)  Goal: Identify Related Risk Factors and Signs and Symptoms  Outcome: Ongoing (interventions implemented as appropriate)    Goal: Absence of Falls  Outcome: Ongoing (interventions implemented as appropriate)      Problem: Pain, Acute (Adult)  Goal: Identify Related Risk Factors and Signs and Symptoms  Outcome: Ongoing (interventions implemented as appropriate)    Goal: Acceptable Pain Control/Comfort Level  Outcome: Ongoing (interventions implemented as appropriate)      Problem: Respiratory Insufficiency (Adult)  Goal: Identify Related Risk Factors and Signs and Symptoms  Outcome: Ongoing (interventions implemented as appropriate)    Goal: Acid/Base Balance  Outcome: Ongoing (interventions implemented as appropriate)    Goal: Effective Ventilation  Outcome:  Ongoing (interventions implemented as appropriate)

## 2018-03-01 NOTE — PLAN OF CARE
Problem: Patient Care Overview (Adult)  Goal: Plan of Care Review   03/01/18 1059   Coping/Psychosocial Response Interventions   Plan Of Care Reviewed With patient   Patient Care Overview   Progress progress towards functional goals is fair   Outcome Evaluation   Outcome Summary/Follow up Plan Pt. improved with tolerance to ambulation with no s/s and requiring no supp O2 during ambulation. Pt. functional mobility continues to be limited by pain, improvement in sitting slowly and controlled. Multiple cues required for pt. to maintain appropriate form during gait with more assistance required and activity duration increases. Continued therapy will improve pt. endurance and safety with functional mobility.

## 2018-03-01 NOTE — PROGRESS NOTES
General Surgery  Progress Note    CC: Follow-up gangrenous cholecystitis    POD#4 laparoscopic cholecystectomy with intraoperative cholangiogram    S: Feels well, very talkative today. Tmax 100.5 yesterday.    O:  Vitals:    03/01/18 0741 03/01/18 1120 03/01/18 1220 03/01/18 1500   BP:  153/85  158/74   BP Location:  Right arm  Right arm   Patient Position:  Sitting  Sitting   Pulse: 77  62 79   Resp: 20 18 20 20   Temp:  96.3 °F (35.7 °C)  99.1 °F (37.3 °C)   TempSrc:  Oral  Oral   SpO2: 97%  93% 94%   Weight:       Height:               Intake & Output:  UOP: 1425 cc/24 hrs  FRANK: 30 cc/24 hrs    GENERAL: alert, well appearing, and in no distress  HEENT: normocephalic, atraumatic, moist mucous membranes, clear sclera   CHEST: clear to auscultation, no wheezes, rales or rhonchi, symmetric air entry  CARDIAC: regular rate and rhythm    ABDOMEN: soft, appropriately tender, mild distension, incisions C/D/I with dermabond, FRANK output minimal dark maroon fluid (possible bile mixed in)  EXTREMITIES: no cyanosis, clubbing, or edema   SKIN: Warm and moist, no rashes    LABS    Results from last 7 days  Lab Units 03/01/18 0429 02/28/18 0419 02/27/18  0348   WBC 10*3/mm3 8.35 7.59 7.56   HEMOGLOBIN g/dL 12.0* 12.2* 12.2*   HEMATOCRIT % 37.6* 38.5* 38.5*   PLATELETS 10*3/mm3 191 148 107*       Results from last 7 days  Lab Units 03/01/18 0429 02/28/18 0419 02/27/18  0348   SODIUM mmol/L 140 137 135*   POTASSIUM mmol/L 4.2 4.0 4.5   CHLORIDE mmol/L 102 99 98   CO2 mmol/L 27.2 27.1 27.4   BUN mg/dL 21 25* 26*   CREATININE mg/dL 0.85 0.87 0.85   CALCIUM mg/dL 8.1* 8.5* 8.6   BILIRUBIN mg/dL 0.7 0.7 0.6   ALK PHOS U/L 86 90 91   ALT (SGPT) U/L 39 46* 54*   AST (SGOT) U/L 26 30 45*   GLUCOSE mg/dL 160* 166* 175*         IMAGING:  CT ABDOMEN/PELVIS:  IMPRESSION:  1. Patient is 3 days post cholecystectomy with low density packing  material, small bubbles of gas, and fluid filling the gallbladder fossa.  Fluid partially extends into  the adjacent hepatic parenchyma and may  represent hemorrhagic material/blood products. There is mild perihepatic  fluid and a small amount of fluid is present within the lower pelvis.  Surgical drain extends into the right upper quadrant. Infected fluid  would be difficult to exclude though there is no evidence for mature or  drainable collection.  2. Tiny renal cysts. Duodenal diverticulum. 15 mm low-density lesion  medial spleen of doubtful significance.    A/P: 77 y.o. male POD#4 laparoscopic cholecystectomy with intraoperative cholangiogram for gangrenous cholecystitis    CT abd/pelvis yesterday with no signs of infection.The small air/fluid pocket in the gallbladder fossa is the piece of Surgicel I placed during the procedure. Continue diet as tolerated. I will likely D/C the FRANK drain tomorrow. From my standpoint he may be ready for discharge home tomorrow. I suspect his low grade temps may be related to his active RSV infection.    Maegan Correa MD  General and Endoscopic Surgery  [facility]    [address], [address]  [place], [place], [address]  P: [phone]  F: [phone]

## 2018-03-01 NOTE — PROGRESS NOTES
[facility] INPATIENT PROGRESS NOTE         [address]    3/1/2018      PATIENT IDENTIFICATION:  Name: Jesus Beckham ADMIT: 2018   : 1940  PCP: Magdy Ricardo MD    MRN: [id number] LOS: 6 days   AGE/SEX: 77 y.o. male  ROOM: [facility]/                     LOS 6    Reason for visit: Follow up respiratory failure with sepsis with Escherichia coli bacteremia and RSV      SUBJECTIVE:    No productive cough or chest pain. Less soa.  Still on 5L oxygen.       Objective   OBJECTIVE:    Vital Sign Min/Max for last 24 hours  Temp  Min: 96.3 °F (35.7 °C)  Max: 100.5 °F (38.1 °C)   BP  Min: 133/67  Max: 163/89   Pulse  Min: 62  Max: 121   Resp  Min: 16  Max: 20   SpO2  Min: 84 %  Max: 100 %   Flow (L/min)  Min: 2  Max: 6   Weight  Min: 119 kg (263 lb)  Max: 119 kg (263 lb)                         Body mass index is 37.74 kg/(m^2).    Intake/Output Summary (Last 24 hours) at 18 1420  Last data filed at 18 0741   Gross per 24 hour   Intake             1310 ml   Output              950 ml   Net              360 ml         Exam:  GEN:  No distress, appears stated age  EYES:   PERRL, anicteric sclera  ENT:    External ears/nose normal, OP clear  NECK:  No adenopathy, midline trachea  LUNGS: Normal chest on inspection, palpation and Diminished bilaterally on auscultation  CV:  Normal S1S2, without murmur  ABD:  Non tender, non distended, no hepatosplenomegaly, +BS  EXT:  No edema, cyanosis or clubbing    Scheduled meds:      enoxaparin 40 mg Subcutaneous Daily   fluticasone 2 spray Nasal Daily   glipiZIDE 10 mg Oral BID AC   insulin aspart 0-7 Units Subcutaneous 4x Daily With Meals & Nightly   insulin detemir 30 Units Subcutaneous Q12H   ipratropium-albuterol 3 mL Nebulization Q6H While Awake - RT   linagliptin 5 mg Oral Daily   piperacillin-tazobactam 3.375 g Intravenous Q8H   pregabalin 100 mg Oral TID   sennosides-docusate sodium 2 tablet Oral BID   vancomycin 10 mg/kg Intravenous Q12H      IV meds:                          Pharmacy to dose vancomycin     sodium chloride 75 mL/hr Last Rate: 75 mL/hr (03/01/18 0934)     Data Review:    Results from last 7 days  Lab Units 03/01/18 0429 02/28/18 0419 02/27/18 0348 02/26/18 0520 02/25/18 2037 02/25/18  0506   SODIUM mmol/L 140 137 135* 131*  --  138   POTASSIUM mmol/L 4.2 4.0 4.5 4.8 5.2 4.4   CHLORIDE mmol/L 102 99 98 97*  --  102   CO2 mmol/L 27.2 27.1 27.4 23.9  --  23.3   BUN mg/dL 21 25* 26* 27*  --  21   CREATININE mg/dL 0.85 0.87 0.85 0.90  --  0.90   GLUCOSE mg/dL 160* 166* 175* 169*  --  158*   CALCIUM mg/dL 8.1* 8.5* 8.6 8.2*  --  8.8         Estimated Creatinine Clearance: 95.6 mL/min (by C-G formula based on Cr of 0.85).    Results from last 7 days  Lab Units 03/01/18 0429 02/28/18 0419 02/27/18 0348 02/26/18 0520 02/25/18  0506   WBC 10*3/mm3 8.35 7.59 7.56 8.11 8.73   HEMOGLOBIN g/dL 12.0* 12.2* 12.2* 12.5* 13.4*   PLATELETS 10*3/mm3 191 148 107* 84* 83*           Results from last 7 days  Lab Units 03/01/18 0429 02/28/18 0419 02/27/18 0348 02/26/18 0520 02/25/18  0506   ALT (SGPT) U/L 39 46* 54* 56* 42*   AST (SGOT) U/L 26 30 45* 54* 54*           Results from last 7 days  Lab Units 03/01/18 0429 02/28/18 1953 02/24/18  0044 02/23/18  1755 02/23/18  1201 02/23/18  0031   PROCALCITONIN ng/mL  --  1.93*  --   --   --  13.70*   LACTATE mmol/L 0.9 3.7* 0.9 1.7 1.6 2.0         Ultrasound gallbladder reviewed: Shows tiny gallstones within  Gallbladder concerning for cystic duct obstruction.    CT chest viewed: bilateral lower lung field atelectasis.  No focal infiltrates seen.      Microbiology reviewed  RSV, PCR Detected (A)     Blood Culture ID, PCR - Blood, [[id number]] (Abnormal) Collected: 02/23/18 0118        Lab Status: Final result Specimen: Blood from Arm, Left Updated: 02/23/18 2128        BCID, PCR Escherichia coli. Identification by BCID PCR. (C)            )Assessment   ASSESSMENT:   Active Hospital Problems (**  Indicates Principal Problem)    Diagnosis Date Noted   • **Acute gangrenous cholecystitis [K81.0] 02/24/2018   • A-fib [I48.91] 02/25/2018   • Bacteremia due to Escherichia coli [R78.81] 02/24/2018   • RUQ abdominal pain [R10.11] 02/24/2018   • Sepsis [A41.9] 02/23/2018   • Secondary thrombocytopenia [D69.59] 02/23/2018   • Hypotension [I95.9] 02/23/2018   • CELINA (obstructive sleep apnea) [G47.33] 02/23/2018   • Acute respiratory failure with hypoxia [J96.01] 02/23/2018   • Leg edema [R60.0] 02/23/2018   • RSV (acute bronchiolitis due to respiratory syncytial virus) [J21.0] 02/23/2018   • Vitamin D deficiency [E55.9] 04/28/2017   • 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] 04/04/2016   • Allergic rhinitis due to pollen [J30.1] 04/04/2016   • Diabetic peripheral neuropathy [E11.42] 04/01/2016      Resolved Hospital Problems    Diagnosis Date Noted Date Resolved   No resolved problems to display.     Acute hypoxemic respiratory failure  Sepsis  Escherichia coli bacteremia  RSV  Acute gangrenous cholecystitis  Type 2 diabetes  CELINA  Hyponatremia    PLAN:  Encourage pulmonary toilet.  Needs to use IS more frequently.  Antibiotics per infectious disease recommendations.  Wean oxygen as able.  Increased activity.  Tolerating diet.      Julius Adair MD  Pulmonary and Critical Care Medicine  [facility]  3/1/2018    2:20 PM

## 2018-03-01 NOTE — PROGRESS NOTES
Dr. FERMIN Moore    [address]    3/1/2018    Patient ID:  Name:  Jesus Beckham  MRN:  [id number]  1940  77 y.o.  male            Reason for visit: Follow up respiratory failure with sepsis with Escherichia coli bacteremia and RSV    HPI: Called by the nurse to evaluate the patient.  The patient is asleep, calm, comfortable but arousable.  He is absolutely in no distress whatsoever.  He is on 5 L of supplemental oxygen and has no complaints.    Vitals:  Vitals:    02/1940 02/28/18 1944 02/28/18 2020 02/28/18 2300   BP:   145/80 147/73   BP Location:   Right arm Left arm   Patient Position:   Lying Lying   Pulse: 112 108 120 94   Resp:   20 16   Temp:   100.5 °F (38.1 °C) 98.9 °F (37.2 °C)   TempSrc:   Oral Oral   SpO2:   96% 96%   Weight:       Height:               Body mass index is 37.74 kg/(m^2).    Intake/Output Summary (Last 24 hours) at 03/01/18 0105  Last data filed at 02/28/18 2300   Gross per 24 hour   Intake              920 ml   Output             1685 ml   Net             -765 ml       Exam:  GEN:  No distress,Asleep, I had to wake him up.  LUNGS: Somewhat diminished breath sounds but no crackles or wheezing., nonlabored breathing  CV:  Normal S1S2, without murmur, trace edema in lower extremities      Scheduled meds:    enoxaparin 40 mg Subcutaneous Daily   fluticasone 2 spray Nasal Daily   glipiZIDE 10 mg Oral BID AC   insulin aspart 0-7 Units Subcutaneous 4x Daily With Meals & Nightly   insulin detemir 30 Units Subcutaneous Q12H   ipratropium-albuterol 3 mL Nebulization Q6H While Awake - RT   linagliptin 5 mg Oral Daily   piperacillin-tazobactam 3.375 g Intravenous Q8H   pregabalin 100 mg Oral TID   sennosides-docusate sodium 2 tablet Oral BID   vancomycin 10 mg/kg Intravenous Q12H     IV meds:                        Pharmacy to dose vancomycin     sodium chloride 100 mL/hr Last Rate: 100 mL/hr (02/28/18 2139)       Data Review:   I reviewed the patient's  medications and new clinical results.  Lab Results   Component Value Date    CALCIUM 8.5 (L) 02/28/2018    MG 2.2 02/25/2018       Results from last 7 days  Lab Units 02/28/18  1953 02/28/18  0419 02/27/18  0348 02/26/18  0520  02/23/18  0031   SODIUM mmol/L  --  137 135* 131*  < > 136   POTASSIUM mmol/L  --  4.0 4.5 4.8  < > 3.9   CHLORIDE mmol/L  --  99 98 97*  < > 102   CO2 mmol/L  --  27.1 27.4 23.9  < > 19.7*   BUN mg/dL  --  25* 26* 27*  < > 31*   CREATININE mg/dL  --  0.87 0.85 0.90  < > 1.22   CALCIUM mg/dL  --  8.5* 8.6 8.2*  < > 9.1   BILIRUBIN mg/dL  --  0.7 0.6 0.6  < > 1.3*   ALK PHOS U/L  --  90 91 83  < > 72   ALT (SGPT) U/L  --  46* 54* 56*  < > 24   AST (SGOT) U/L  --  30 45* 54*  < > 23   GLUCOSE mg/dL  --  166* 175* 169*  < > 189*   WBC 10*3/mm3  --  7.59 7.56 8.11  < > 8.03   HEMOGLOBIN g/dL  --  12.2* 12.2* 12.5*  < > 15.5   PLATELETS 10*3/mm3  --  148 107* 84*  < > 122*   PROBNP pg/mL 1443.0  --   --   --   --   --    PROCALCITONIN ng/mL 1.93*  --   --   --   --  13.70*   < > = values in this interval not displayed.    Results from last 7 days  Lab Units 02/24/18  1224 02/24/18  1131 02/23/18  0118 02/23/18  0031   BLOODCX  No growth at 4 days No growth at 4 days   Escherichia coli*   Escherichia coli*   GRAM STAIN RESULT   --   --  Anaerobic Bottle Gram negative bacilli  Aerobic Bottle Gram negative bacilli Aerobic Bottle Gram negative bacilli  Anaerobic Bottle Gram negative bacilli   BCIDPCR   --   --  Escherichia coli. Identification by BCID PCR.* Escherichia coli. Identification by BCID PCR.*       Results from last 7 days  Lab Units 02/23/18  0206   ADENOVIRUS DETECTION BY PCR  Not Detected   CORONAVIRUS 229E  Not Detected   CORONAVIRUS HKU1  Not Detected   CORONAVIRUS NL63  Not Detected   CORONAVIRUS OC43  Not Detected   HUMAN METAPNEUMOVIRUS  Not Detected   HUMAN RHINOVIRUS/ENTEROVIRUS  Not Detected   INFLUENZA B PCR  Not Detected   PARAINFLUENZA 1  Not Detected   PARAINFLUENZA VIRUS  2  Not Detected   PARAINFLUENZA VIRUS 3  Not Detected   PARAINFLUENZA VIRUS 4  Not Detected   BORDETELLA PERTUSSIS PCR  Not Detected   CHLAMYDOPHILA PNEUMONIAE PCR  Not Detected   MYCOPLAMA PNEUMO PCR  Not Detected   INFLUENZA A PCR  Not Detected   INFLUENZA A H3  Not Detected   INFLUENZA A H1  Not Detected   RSV, PCR  Detected*         Results from last 7 days  Lab Units 02/28/18 1953 02/26/18  0520 02/25/18 2037   TROPONIN T ng/mL <0.010 0.018 0.012       ASSESSMENT:   Principal Problem:    Acute gangrenous cholecystitis  Active Problems:    Diabetic peripheral neuropathy    Primary osteoarthritis involving multiple joints    Allergic rhinitis due to pollen    Vitamin D deficiency    Sepsis    Secondary thrombocytopenia    Hypotension    CELINA (obstructive sleep apnea)    Acute respiratory failure with hypoxia    Leg edema    RSV (acute bronchiolitis due to respiratory syncytial virus)    Bacteremia due to Escherichia coli    RUQ abdominal pain    A-fib      PLAN:  The patient's respiratory status is stable.  He is on 5-6 L of oxygen but asleep.  There is no distress.  Continue current care which includes antibiotics under the direction of infectious diseases.  We will check again on him tomorrow morning during rounding        Aquiles Moore MD  3/1/2018

## 2018-03-01 NOTE — CODE DOCUMENTATION
Labs sent down last pain med Dilaudid at 1830.  Pt able to follow commands very tired.  /80.  HR 98 sat 99 skin pink warm dry.  No further orders at this time

## 2018-03-01 NOTE — NURSING NOTE
Called [facility] and left a few messages for medical records in efforts to obtain past blood counts. Will continue attempting contact.

## 2018-03-01 NOTE — PLAN OF CARE
Problem: Patient Care Overview (Adult)  Goal: Plan of Care Review  Outcome: Ongoing (interventions implemented as appropriate)   03/01/18 1700   Coping/Psychosocial Response Interventions   Plan Of Care Reviewed With patient   Patient Care Overview   Progress improving   Outcome Evaluation   Outcome Summary/Follow up Plan patient in much better spirits today, temp at 99.2. miralax added for bowel regimen, back pain uncontrolled by PO alone, dilaudid as well. back brace to be ordered tomorrow per Ortho to work with PT. Weaned to room air. antibx given per orders. monitoring closely       Problem: Infection, Risk/Actual (Adult)  Goal: Infection Prevention/Resolution  Outcome: Ongoing (interventions implemented as appropriate)      Problem: Fall Risk (Adult)  Goal: Identify Related Risk Factors and Signs and Symptoms  Outcome: Outcome(s) achieved Date Met: 03/01/18    Goal: Absence of Falls  Outcome: Ongoing (interventions implemented as appropriate)      Problem: Pain, Acute (Adult)  Goal: Identify Related Risk Factors and Signs and Symptoms  Outcome: Outcome(s) achieved Date Met: 03/01/18      Problem: Respiratory Insufficiency (Adult)  Goal: Acid/Base Balance  Outcome: Ongoing (interventions implemented as appropriate)    Goal: Effective Ventilation  Outcome: Ongoing (interventions implemented as appropriate)      Problem: Perioperative Period (Adult)  Goal: Signs and Symptoms of Listed Potential Problems Will be Absent or Manageable (Perioperative Period)  Outcome: Ongoing (interventions implemented as appropriate)

## 2018-03-01 NOTE — PROGRESS NOTES
" LOS: 6 days     Name: Jesus Beckham  Age: 77 y.o.  Sex: male  :  1940  MRN: [id number]         Primary Care Physician: Magdy Ricardo MD    Subjective   Subjective  Had a low-grade fever with shaking chills yesterday evening.  Rapid response was called due to this.  Vancomycin added to the Zosyn he was already receiving.  He had CT scan of the abdomen and pelvis as well as MRI of the lumbar spine as discussed below.  Patient states he is overall feeling much better today.  He still has ongoing back pain.  Minimal abdominal soreness at this time.    Objective   Vital Signs  Temp:  [96.3 °F (35.7 °C)-100.5 °F (38.1 °C)] 96.3 °F (35.7 °C)  Heart Rate:  [] 77  Resp:  [16-20] 18  BP: (133-165)/(67-90) 153/85  Body mass index is 37.74 kg/(m^2).    Objective:  General Appearance:  Comfortable and in no acute distress.    Vital signs: (most recent): Blood pressure 153/85, pulse 77, temperature 96.3 °F (35.7 °C), temperature source Oral, resp. rate 18, height 177.8 cm (70\"), weight 119 kg (263 lb), SpO2 97 %.    Lungs:  Normal respiratory rate and normal effort.    Heart: Normal rate.  Regular rhythm.    Abdomen: Abdomen is soft.  (FRANK drain extending from the right upper quadrant)Bowel sounds are normal.   There is no abdominal tenderness.     Extremities: There is no local swelling or dependent edema.  (Still is tender to palpation over the midline lumbar spine)  Neurological: Patient is alert and oriented to person, place and time.    Skin:  Warm and dry.              Results Review:       I reviewed the patient's new clinical results.      Results from last 7 days  Lab Units 18  0429 18  0419 18  0348 18  0520 18  0506 18  0546 18  0031   WBC 10*3/mm3 8.35 7.59 7.56 8.11 8.73 9.99 8.03   HEMOGLOBIN g/dL 12.0* 12.2* 12.2* 12.5* 13.4* 13.3* 15.5   PLATELETS 10*3/mm3 191 148 107* 84* 83* 83* 122*       Results from last 7 days  Lab Units 18  0429 " 02/28/18  0419 02/27/18  0348 02/26/18  0520 02/25/18 2037 02/25/18  0506 02/24/18  0546 02/23/18  0031   SODIUM mmol/L 140 137 135* 131*  --  138 133* 136   POTASSIUM mmol/L 4.2 4.0 4.5 4.8 5.2 4.4 4.2 3.9   CHLORIDE mmol/L 102 99 98 97*  --  102 102 102   CO2 mmol/L 27.2 27.1 27.4 23.9  --  23.3 20.9* 19.7*   BUN mg/dL 21 25* 26* 27*  --  21 25* 31*   CREATININE mg/dL 0.85 0.87 0.85 0.90  --  0.90 0.98 1.22   CALCIUM mg/dL 8.1* 8.5* 8.6 8.2*  --  8.8 8.5* 9.1   GLUCOSE mg/dL 160* 166* 175* 169*  --  158* 135* 189*         Scheduled Meds:     enoxaparin 40 mg Subcutaneous Daily   fluticasone 2 spray Nasal Daily   glipiZIDE 10 mg Oral BID AC   insulin aspart 0-7 Units Subcutaneous 4x Daily With Meals & Nightly   insulin detemir 30 Units Subcutaneous Q12H   ipratropium-albuterol 3 mL Nebulization Q6H While Awake - RT   linagliptin 5 mg Oral Daily   piperacillin-tazobactam 3.375 g Intravenous Q8H   pregabalin 100 mg Oral TID   sennosides-docusate sodium 2 tablet Oral BID   vancomycin 10 mg/kg Intravenous Q12H     PRN Meds:   •  acetaminophen  •  dextrose  •  dextrose  •  diphenhydrAMINE  •  glucagon (human recombinant)  •  HYDROcodone-acetaminophen  •  HYDROmorphone  •  metoprolol tartrate  •  ondansetron  •  Pharmacy to dose vancomycin  •  polyethylene glycol  •  sodium chloride  •  sodium chloride  Continuous Infusions:    Pharmacy to dose vancomycin     sodium chloride 75 mL/hr Last Rate: 75 mL/hr (03/01/18 9316)       Assessment/Plan   Principal Problem:    Acute gangrenous cholecystitis  Active Problems:    Diabetic peripheral neuropathy    Primary osteoarthritis involving multiple joints    Allergic rhinitis due to pollen    Vitamin D deficiency    Sepsis    Secondary thrombocytopenia    Hypotension    CELINA (obstructive sleep apnea)    Acute respiratory failure with hypoxia    Leg edema    RSV (acute bronchiolitis due to respiratory syncytial virus)    Bacteremia due to Escherichia coli    RUQ abdominal pain     A-fib      Assessment & Plan    - Escherichia coli bacteremia secondary to gangrenous cholecystitis.   Now status post cholecystectomy on 2/25. Repeat blood cultures negative.  We will require 2 weeks of antibiotics through 3/10 per infectious disease with plans to change to levaquin at discharge.  - New fever last night prompted the addition of vancomycin and repeat blood cultures.  Infectious disease reevaluation noted.  Continue this current regimen and await additional input from surgical services regarding his abnormal CT and MRI  - Thrombocytopenia, reactive from infection and medication.   Resolved  - Diabetes mellitus type 2.  Continue sliding scale.  On linagliptin, Glucotrol, and levemir  - Heart rate now better controlled on metoprolol.  Holding off on anticoagulation, per cardiology.   - RSV with acute hypoxic respiratory failure.  He has been weaned to room air this morning  - Lower extremity edema.  Echo with ejection fraction 50%.  Diastolic function normal.  Monitor volume status closely.  - PT following    Jesus Tanner MD  [facility]  03/01/18  11:39 AM

## 2018-03-01 NOTE — THERAPY TREATMENT NOTE
Acute Care - Physical Therapy Treatment Note  [facility]     Patient Name: Jesus Beckham  : 1940  MRN: [id number]  Today's Date: 3/1/2018  Onset of Illness/Injury or Date of Surgery Date: 18     Referring Physician: Flores    Admit Date: 2018    Visit Dx:    ICD-10-CM ICD-9-CM   1. Calculus of gallbladder without cholecystitis without obstruction K80.20 574.20   2. Sepsis, due to unspecified organism A41.9 038.9     995.91   3. RUQ abdominal mass R19.01 789.31   4. RUQ abdominal pain R10.11 789.01   5. Gangrenous cholecystitis K81.0 575.0   6. Decreased mobility R26.89 781.99     Patient Active Problem List   Diagnosis   • Gout   • Diabetic peripheral neuropathy   • Dyslipidemia   • Uncontrolled type 2 diabetes mellitus   • Primary osteoarthritis involving multiple joints   • Allergic rhinitis due to pollen   • Essential hypertension   • PVC (premature ventricular contraction)   • Vitamin D deficiency   • Benign non-nodular prostatic hyperplasia without lower urinary tract symptoms   • Osteoarthritis   • Urge incontinence   • Sepsis   • Secondary thrombocytopenia   • Hypotension   • CELINA (obstructive sleep apnea)   • Acute respiratory failure with hypoxia   • Leg edema   • RSV (acute bronchiolitis due to respiratory syncytial virus)   • Acute gangrenous cholecystitis   • Bacteremia due to Escherichia coli   • RUQ abdominal pain   • A-fib               Adult Rehabilitation Note       18 1000 18 1300 18 0900    Rehab Assessment/Intervention    Discipline (P)  other (see comments)   Student PT  -EA other (see comments)   Student PT  -EE,EA,EE2 physical therapy assistant  -CW    Document Type (P)  therapy note (daily note)  -EA therapy note (daily note)  -EE,EA,EE2 therapy note (daily note)  -CW    Subjective Information (P)  agree to therapy;complains of;pain  -EA agree to therapy;complains of;weakness;pain  -EE,EA,EE2 agree to therapy;complains of;weakness;fatigue;pain   -CW    Patient Effort, Rehab Treatment (P)  good  -EA good  -EE,EA,EE2 adequate  -CW    Symptoms Noted During/After Treatment (P)  fatigue;increased pain  -EA fatigue;increased pain  -EE,EA,EE2     Precautions/Limitations (P)  fall precautions;other (see comments)   Contact percausions  -EA fall precautions;oxygen therapy device and L/min   2L O2  -EE,EA,EE2 fall precautions;oxygen therapy device and L/min   FRANK drain  -CW    Recorded by [EA] Bipin Live PT Student [EE,EA,EE2] Carmen Sarmiento, PT (r) [employer] PT Student (t) Carmen Sarmiento, PT (c) [CW] Jaya Gomez, PTA    Vital Signs    Pre SpO2 (%) (P)  95  -EA 93  -EE,EA,EE2     O2 Delivery Pre Treatment (P)  room air  -EA supplemental O2  -EE,EA,EE2 supplemental O2  -CW    O2 Delivery Intra Treatment (P)  room air  -EA supplemental O2  -EE,EA,EE2 room air  -CW    Post SpO2 (%) (P)  95  -EA 97  -EE,EA,EE2     O2 Delivery Post Treatment (P)  room air  -EA supplemental O2  -EE,EA,EE2 supplemental O2  -CW    Pre Patient Position (P)  Sitting  -EA Sitting  -EE,EA,EE2     Intra Patient Position (P)  Standing  -EA Standing  -EE,EA,EE2     Post Patient Position (P)  Sitting  -EA Sitting  -EE,EA,EE2     Recovery Time (P)  30 sec  -EA 15 sec  -EE,EA,EE2     Rest Breaks  (P)  4  -EA 6  -EE,EA,EE2     Recorded by [EA] [employer]in Live PT Student [EE,EA,EE2] Carmen Sarmiento, PT (r) [employer] PT Student (t) Carmen Sarmiento, PT (c) [CW] Jaya Garciaworth, PTA    Pain Assessment    Pain Assessment (P)  0-10  -EA 0-10  -EE,EA,EE2 0-10  -CW    Pain Score (P)  6  -EA 6  -EE,EA,EE2 5  -CW    Post Pain Score   5  -CW    Pain Location (P)  Back  -EA Back  -EE,EA,EE2 Back  -CW    Pain Orientation (P)  Lower  -EA Lower  -EE,EA,EE2 Lower  -CW    Pain Intervention(s) (P)  Ambulation/increased activity;Repositioned  -EA Ambulation/increased activity;Repositioned  -EE,EA,EE2 Repositioned;Ambulation/increased activity  -CW    Response to Interventions (P)  tolerated  -EA tolerated  -EE,EA,EE2 [place]   -CW    Recorded by [EA] [employer]in Live PT Student [EE,EA,EE2] Carmen Sarmiento, PT (r) [employer] PT Student (t) Carmen Sarmiento PT (c) [CW] Jaya Gomez, PADMINI    Cognitive Assessment/Intervention    Current Cognitive/Communication Assessment (P)  functional  -EA functional  -EE,EA,EE2 functional  -CW    Orientation Status (P)  oriented x 4  -EA oriented x 4  -EE,EA,EE2 oriented x 4  -CW    Follows Commands/Answers Questions (P)  100% of the time  -EA able to follow multi-step instructions;100% of the time  -EE,EA,EE2 100% of the time  -CW    Personal Safety (P)  WNL/WFL  -EA mild impairment;decreased awareness, need for safety;unaware of functional deficits   did not remember therapy yesterday  -EE,EA,EE2 WNL/WFL  -CW    Personal Safety Interventions (P)  fall prevention program maintained;gait belt;nonskid shoes/slippers when out of bed;supervised activity  -EA fall prevention program maintained;gait belt;nonskid shoes/slippers when out of bed;supervised activity  -EE,EA,EE2 fall prevention program maintained;gait belt;muscle strengthening facilitated;nonskid shoes/slippers when out of bed  -CW    Recorded by [EA] [employer]in Live PT Student [EE,EA,EE2] Carmen Sarmiento PT (r) [employer] Live PT Student (t) Carmen Sarmiento PT (c) [CW] Jaya Gomez, PTA    Bed Mobility, Assessment/Treatment    Bed Mob, Supine to Sit, [place] (P)  not tested  -EA not tested  -EE,EA,EE2 not tested  -CW    Bed Mobility, Comment (P)  in chair  -EA in Chair  -EE,EA,EE2 in chair  -CW    Recorded by [EA] [employer]in Live PT Student [EE,EA,EE2] Carmen Sarmiento PT (r) [employer] PT Student (t) Carmen Sarmiento PT (c) [CW] Jaya Gomez, PTA    Transfer Assessment/Treatment    Transfers, Sit-Stand [place] (P)  moderate assist (50% patient effort);2 person assist required;verbal cues required;nonverbal cues required (demo/gesture)  -EA moderate assist (50% patient effort);2 person assist required  -EE,EA,EE2 contact guard assist;minimum assist  (75% patient effort)  -CW    Transfers, Stand-Sit [place] (P)  minimum assist (75% patient effort);verbal cues required  -EA moderate assist (50% patient effort)  -EE,EA,EE2 contact guard assist  -CW    Transfers, Sit-Stand-Sit, Assist Device (P)  rolling walker  -EA rolling walker  -EE,EA,EE2 rolling walker  -CW    Transfer, Safety Issues (P)  weight-shifting ability decreased  -EA weight-shifting ability decreased  -EE,EA,EE2     Transfer, Impairments (P)  strength decreased;pain  -EA strength decreased;pain  -EE,EA,EE2     Transfer, Comment (P)  pt. improved in ability to weight shift forward with pain. continues to require 2 person assist with standing  -EA pain increases on leaning forward  -EE,EA,EE2     Recorded by [EA] [employer]in Live PT Student [EE,EA,EE2] Carmen Sarmiento, PT (r) [employer] PT Student (t) Carmen Sarmiento, PT (c) [CW] Jaya Gomez, PTA    Gait Assessment/Treatment    Gait, [place] Level (P)  contact guard assist;minimum assist (75% patient effort);1 person + 1 person to manage equipment;verbal cues required;nonverbal cues required (demo/gesture)  -EA minimum assist (75% patient effort)  -EE,EA,EE2 contact guard assist  -CW    Gait, Assistive Device (P)  rolling walker  -EA rolling walker  -EE,EA,EE2 rolling walker  -CW    Gait, Distance (Feet) (P)  150  -  -EE,EA,EE2 150  -CW    Gait, Gait Deviations (P)  maicol decreased;forward flexed posture;step length decreased  -EA maicol decreased;forward flexed posture;step length decreased;stride length decreased  -EE,EA,EE2 maicol decreased;forward flexed posture;step length decreased;stride length decreased  -CW    Gait, Impairments (P)  strength decreased;pain  -EA (P)  strength decreased;pain  -EA2     Gait, Comment (P)  Pt. has less pain during ambulation, need for assistance increases as activity progresses. Pt. has difficulty maintaining upright posture, however improved from previous treatment  -EA pain increased during  functional mobility, and with long periods of ambulation. Verbal and tactile cues to stand up straight, could not maintain  -EE,EA,EE2     Recorded by [EA] Bipin Live PT Student [EA2] [employer] PT Student  [EE,EA,EE2] Carmen Sarmiento, PT (r) [employer] Live PT Student (t) Carmen Sarmiento, PT (c) [CW] Jaya Gomez PTA    Positioning and Restraints    Pre-Treatment Position (P)  sitting in chair/recliner  -EA sitting in chair/recliner  -EE,EA,EE2 sitting in chair/recliner  -CW    Post Treatment Position (P)  chair  -EA chair  -EE,EA,EE2 chair  -CW    In Bed  sitting;call light within reach;encouraged to call for assist;exit alarm on  -EE,EA,EE2     In Chair (P)  sitting;call light within reach;encouraged to call for assist;exit alarm on;with family/caregiver  -EA  notified nsg;sitting;call light within reach;encouraged to call for assist;exit alarm on  -CW    Recorded by [EA] [employer]in Live PT Student [EE,EA,EE2] Carmen Sarmiento, PT (r) [employer]in Live PT Student (t) Carmen Sarmiento, PT (c) [CW] Jaya Gomez PTA      User Key  (r) = Recorded By, (t) = Taken By, (c) = Cosigned By    Initials Name Effective Dates    EE Carmen Sarmiento, PT 12/01/15 -     CW Jaya Gomez PTA 12/13/16 -     EA Bipin Live PT Student 02/05/18 -                 IP PT Goals       02/26/18 1526          Bed Mobility PT LTG    Bed Mobility PT LTG, Date Established 02/26/18  -EE      Bed Mobility PT LTG, Time to Achieve 1 wk  -EE      Bed Mobility PT LTG, Activity Type all bed mobility  -EE      Bed Mobility PT LTG, [place] Level supervision required  -EE      Transfer Training PT LTG    Transfer Training PT LTG, Date Established 02/26/18  -EE      Transfer Training PT LTG, Time to Achieve 1 wk  -EE      Transfer Training PT LTG, Activity Type all transfers  -EE      Transfer Training PT LTG, [place] Level supervision required  -EE      Transfer Training PT LTG, Assist Device walker, rolling  -EE      Gait Training PT LTG    Gait  Training Goal PT LTG, Date Established 02/26/18  -EE      Gait Training Goal PT LTG, Time to Achieve 1 wk  -EE      Gait Training Goal PT LTG, [place] Level supervision required  -EE      Gait Training Goal PT LTG, Assist Device walker, rolling  -EE      Gait Training Goal PT LTG, Distance to Achieve 150  -EE        User Key  (r) = Recorded By, (t) = Taken By, (c) = Cosigned By    Initials Name Provider Type    EE Carmen Sarmiento, PT Physical Therapist          Physical Therapy Education     Title: PT OT SLP Therapies (Active)     Topic: Physical Therapy (Active)     Point: Mobility training (Active)    Learning Progress Summary    Learner Readiness Method Response Comment Documented by Status   Patient Acceptance E NR  EA 03/01/18 1057 Active    Acceptance E NR  EA 02/28/18 1405 Active    Acceptance E,TB VU,DU  CW 02/27/18 0950 Done    Acceptance E NR  EE 02/26/18 1525 Active   Family Acceptance E NR  EA 03/01/18 1057 Active               Point: Body mechanics (Active)    Learning Progress Summary    Learner Readiness Method Response Comment Documented by Status   Patient Acceptance E NR  EA 03/01/18 1057 Active    Acceptance E NR  EA 02/28/18 1405 Active    Acceptance E,TB VU,DU  CW 02/27/18 0950 Done    Acceptance E NR  EE 02/26/18 1525 Active   Family Acceptance E NR  EA 03/01/18 1057 Active               Point: Precautions (Done)    Learning Progress Summary    Learner Readiness Method Response Comment Documented by Status   Patient Acceptance E,TB VU,DU  CW 02/27/18 0950 Done                      User Key     Initials Effective Dates Name Provider Type Discipline     12/01/15 -  Carmen Sarmiento, PT Physical Therapist PT     12/13/16 -  aJya Gomez, PTA Physical Therapy Assistant PT     02/05/18 -  Bipin Live, PT Student PT Student PT                    PT Recommendation and Plan  Anticipated Discharge Disposition: home with assist, home with home health  Planned Therapy Interventions: balance  training, bed mobility training, gait training, home exercise program, patient/family education, strengthening, stair training, transfer training  PT Frequency: daily  Plan of Care Review  Plan Of Care Reviewed With: (P) patient  Progress: (P) progress towards functional goals is fair  Outcome Summary/Follow up Plan: (P) Pt. improved with tolerance to ambulation with no s/s and requiring no supp O2 during ambulation. Pt. functional mobility continues to be limited by pain, improvement in sitting slowly and controlled. Multiple cues required for pt. to maintain appropriate form during gait with more assistance required and activity duration increases. Continued therapy will improve pt. endurance and safety with functional mobility.          Outcome Measures       03/01/18 1000 02/28/18 1300 02/27/18 0900    How much help from another person do you currently need...    Turning from your back to your side while in flat bed without using bedrails? (P)  3  -EA 3  -EE (r) EA (t) EE (c) 3  -CW    Moving from lying on back to sitting on the side of a flat bed without bedrails? (P)  3  -EA 3  -EE (r) EA (t) EE (c) 3  -CW    Moving to and from a bed to a chair (including a wheelchair)? (P)  3  -EA 3  -EE (r) EA (t) EE (c) 3  -CW    Standing up from a chair using your arms (e.g., wheelchair, bedside chair)? (P)  2  -EA 2  -EE (r) EA (t) EE (c) 3  -CW    Climbing 3-5 steps with a railing? (P)  2  -EA 2  -EE (r) EA (t) EE (c) 2  -CW    To walk in hospital room? (P)  3  -EA 3  -EE (r) EA (t) EE (c) 3  -CW    AM-PAC 6 Clicks Score 16  -EE (r) EA (t) 16  -EE (r) EA (t) 17  -CW    Functional Assessment    Outcome Measure Options (P)  AM-PAC 6 Clicks Basic Mobility (PT)  -EA AM-PAC 6 Clicks Basic Mobility (PT)  -EE (r) EA (t) EE (c) AM-PAC 6 Clicks Basic Mobility (PT)  -CW      02/26/18 1500          How much help from another person do you currently need...    Turning from your back to your side while in flat bed without using  bedrails? 3  -EE      Moving from lying on back to sitting on the side of a flat bed without bedrails? 3  -EE      Moving to and from a bed to a chair (including a wheelchair)? 3  -EE      Standing up from a chair using your arms (e.g., wheelchair, bedside chair)? 3  -EE      Climbing 3-5 steps with a railing? 2  -EE      To walk in hospital room? 3  -EE      AM-PAC 6 Clicks Score 17  -EE      Functional Assessment    Outcome Measure Options AM-PAC 6 Clicks Basic Mobility (PT)  -EE        User Key  (r) = Recorded By, (t) = Taken By, (c) = Cosigned By    Initials Name Provider Type    EE Carmen Sarmiento, PT Physical Therapist    FLOYD Gomez, PTA Physical Therapy Assistant    SANTY Live PT Student PT Student           Time Calculation:         PT Charges       03/01/18 1058          Time Calculation    Start Time (P)  1020  -EA      Stop Time (P)  1045  -EA      Time Calculation (min) (P)  25 min  -EA      PT Received On (P)  03/01/18  -EA      PT - Next Appointment (P)  03/02/18  -EA        User Key  (r) = Recorded By, (t) = Taken By, (c) = Cosigned By    Initials Name Provider Type    SANTY Live PT Student PT Student          Therapy Charges for Today     Code Description Service Date Service Provider Modifiers Qty    03765969658 HC PT THER SUPP EA 15 MIN 2/28/2018 Bipin Live PT Student GP 1    07727251572 HC PT THER PROC EA 15 MIN 2/28/2018 Bipin Live PT Student GP 2    16471843260 HC PT THER SUPP EA 15 MIN 3/1/2018 Bipin Live PT Student GP 1    66365990511 HC PT THER PROC EA 15 MIN 3/1/2018 Bipin Live PT Student GP 2          PT G-Codes  Outcome Measure Options: (P) AM-PAC 6 Clicks Basic Mobility (PT)    MAHAMED Roy  3/1/2018

## 2018-03-01 NOTE — PROGRESS NOTES
"Pharmacokinetic Consult - Vancomycin Dosing (Initial Note)    Jesus Beckham has a consult for pharmacy to dose vancomycin for sepsis.  Pharmacy dosing vancomycin per Dr. Harper's request.   Goal trough: 15-20 mg/L       Relevant clinical data and objective history reviewed:  77 y.o. male 177.8 cm (70\") 119 kg (263 lb)    Past Medical History:   Diagnosis Date   • Arthritis    • Cancer of bladder 2016   • Colon polyp    • Diabetes mellitus    • Gout    • Hyperlipidemia    • Irregular heartbeat    • Skin carcinoma    • Type 2 diabetes mellitus    • Vitamin D deficiency      Creatinine   Date Value Ref Range Status   02/28/2018 0.87 0.76 - 1.27 mg/dL Final   02/27/2018 0.85 0.76 - 1.27 mg/dL Final   02/26/2018 0.90 0.76 - 1.27 mg/dL Final     BUN   Date Value Ref Range Status   02/28/2018 25 (H) 8 - 23 mg/dL Final     Estimated Creatinine Clearance: 93.4 mL/min (by C-G formula based on Cr of 0.87).    Lab Results   Component Value Date    WBC 7.59 02/28/2018     Temp Readings from Last 3 Encounters:   02/28/18 99.7 °F (37.6 °C) (Oral)   09/28/17 97.6 °F (36.4 °C) (Oral)   03/28/17 98.1 °F (36.7 °C) (Oral)          Assessment/Plan  Will start vancomycin 1250mg IV q12h. Vancomycin level to be drawn with 4th dose. Pharmacy will continue to follow daily while on vancomycin and adjust as needed.     Marce Marina, [facility]    "

## 2018-03-01 NOTE — NURSING NOTE
Spoke with [facility], will provide information on back brace tomorrow morning for patient to be able to use with physical therapy.

## 2018-03-01 NOTE — PROGRESS NOTES
INFECTIOUS DISEASES PROGRESS NOTE    CC: f/u fever    S:   Asked to see again for elevated temperatures  Pt c/o acute on chronic lower back pain, non-radiating, constant, worse with being bedridden    No f/c/s  No n/v/d (+constipation)  No problem with piv    O:  Physical Exam:  Temp:  [97.1 °F (36.2 °C)-100.5 °F (38.1 °C)] 97.1 °F (36.2 °C)  Heart Rate:  [] 77  Resp:  [16-20] 20  BP: (133-165)/(60-90) 163/89  Physical Exam   Constitutional: He appears well-developed. No distress.   Cardiovascular: Normal rate and regular rhythm.    Pulmonary/Chest: Effort normal and breath sounds normal.   Abdominal: Soft. He exhibits distension. There is tenderness ( appropriately).   Neurological: He is alert.   Skin: Skin is warm. No erythema ( Surgical incisions look okay. RUQ FRANK drain with bloody material).        Diagnostics:     Creatinine 0.85  Procalcitonin 1.93  White count 8.35, 73% neutrophils, 12% monocytes, 10% monocytes  Hemoglobin 12.0.  Hematocrit 37.6.  Platelets 191.    Microbiology:  2/28 blood cultures 2/2 ngtd  2/24 BCx NGTD x 2  2/23 RVP + RSV  2/23 BCx E coli 2/2 2/23 Influenza neg     CT abdomen and pelvis: Fluid and low-density packing material within the gallbladder fossa with fluid extending into the hepatic parenchyma and small amount of fluid in the lower pelvis.  No definitive evidence of abscess or drainable fluid collection.    MRI lumbar spine: Lumbar spondyloarthropathy with signal changes in the right psoas muscle     Estimated Creatinine Clearance: 95.6 mL/min (by C-G formula based on Cr of 0.85).    Assessment/Plan   1.  Escherichia coli bacteremia setting of acute gangrenous cholecystitis status post laparoscopic cholecystectomy on February 25  - Continue Zosyn 3.375 g IV every 8 hours; ultimately hope to transition to oral antibiotics to complete a two-week course of antibiotics through March 10  - Repeat blood cultures negative to date  2.  RSV  - supportive care only   - Doubt  source of new fever   3.  New fever in adult  4. Postoperative fluid collections  5.  Abnormal lumbar MRI spine     Patient with gangrenous cholecystitis status post cholecystectomy on February 25 with low-grade fever to 100.5.  We have initiated vancomycin while evaluating for new bloodstream infection.  Dosing for goal trough of 15-20 and we'll check vancomycin trough tomorrow a.m.  We will see what orthopedics and general surgery have to say about abnormal imaging studies and continue on his Zosyn.      Nicanor Hernandez MD  8:29 AM  03/01/18

## 2018-03-02 LAB
ALBUMIN SERPL-MCNC: 2.6 G/DL (ref 3.5–5.2)
ALBUMIN/GLOB SERPL: 0.9 G/DL
ALP SERPL-CCNC: 81 U/L (ref 39–117)
ALT SERPL W P-5'-P-CCNC: 36 U/L (ref 1–41)
ANION GAP SERPL CALCULATED.3IONS-SCNC: 12.5 MMOL/L
AST SERPL-CCNC: 24 U/L (ref 1–40)
BASOPHILS # BLD AUTO: 0.03 10*3/MM3 (ref 0–0.2)
BASOPHILS NFR BLD AUTO: 0.4 % (ref 0–1.5)
BILIRUB SERPL-MCNC: 0.5 MG/DL (ref 0.1–1.2)
BUN BLD-MCNC: 19 MG/DL (ref 8–23)
BUN/CREAT SERPL: 19.8 (ref 7–25)
CALCIUM SPEC-SCNC: 8.3 MG/DL (ref 8.6–10.5)
CHLORIDE SERPL-SCNC: 101 MMOL/L (ref 98–107)
CO2 SERPL-SCNC: 26.5 MMOL/L (ref 22–29)
CREAT BLD-MCNC: 0.96 MG/DL (ref 0.76–1.27)
DEPRECATED RDW RBC AUTO: 49.7 FL (ref 37–54)
EOSINOPHIL # BLD AUTO: 0.12 10*3/MM3 (ref 0–0.7)
EOSINOPHIL NFR BLD AUTO: 1.6 % (ref 0.3–6.2)
ERYTHROCYTE [DISTWIDTH] IN BLOOD BY AUTOMATED COUNT: 14.6 % (ref 11.5–14.5)
GFR SERPL CREATININE-BSD FRML MDRD: 76 ML/MIN/1.73
GLOBULIN UR ELPH-MCNC: 2.9 GM/DL
GLUCOSE BLD-MCNC: 169 MG/DL (ref 65–99)
GLUCOSE BLDC GLUCOMTR-MCNC: 130 MG/DL (ref 70–130)
GLUCOSE BLDC GLUCOMTR-MCNC: 201 MG/DL (ref 70–130)
GLUCOSE BLDC GLUCOMTR-MCNC: 213 MG/DL (ref 70–130)
GLUCOSE BLDC GLUCOMTR-MCNC: 292 MG/DL (ref 70–130)
HCT VFR BLD AUTO: 35.8 % (ref 40.4–52.2)
HGB BLD-MCNC: 11.5 G/DL (ref 13.7–17.6)
IMM GRANULOCYTES # BLD: 0.27 10*3/MM3 (ref 0–0.03)
IMM GRANULOCYTES NFR BLD: 3.6 % (ref 0–0.5)
LYMPHOCYTES # BLD AUTO: 0.98 10*3/MM3 (ref 0.9–4.8)
LYMPHOCYTES NFR BLD AUTO: 13.1 % (ref 19.6–45.3)
MCH RBC QN AUTO: 30.3 PG (ref 27–32.7)
MCHC RBC AUTO-ENTMCNC: 32.1 G/DL (ref 32.6–36.4)
MCV RBC AUTO: 94.5 FL (ref 79.8–96.2)
MONOCYTES # BLD AUTO: 0.73 10*3/MM3 (ref 0.2–1.2)
MONOCYTES NFR BLD AUTO: 9.8 % (ref 5–12)
NEUTROPHILS # BLD AUTO: 5.33 10*3/MM3 (ref 1.9–8.1)
NEUTROPHILS NFR BLD AUTO: 71.5 % (ref 42.7–76)
NRBC BLD MANUAL-RTO: 0 /100 WBC (ref 0–0)
PLATELET # BLD AUTO: 217 10*3/MM3 (ref 140–500)
PMV BLD AUTO: 11.9 FL (ref 6–12)
POTASSIUM BLD-SCNC: 3.9 MMOL/L (ref 3.5–5.2)
PROT SERPL-MCNC: 5.5 G/DL (ref 6–8.5)
RBC # BLD AUTO: 3.79 10*6/MM3 (ref 4.6–6)
SODIUM BLD-SCNC: 140 MMOL/L (ref 136–145)
VANCOMYCIN TROUGH SERPL-MCNC: 12.6 MCG/ML (ref 5–20)
WBC NRBC COR # BLD: 7.46 10*3/MM3 (ref 4.5–10.7)

## 2018-03-02 PROCEDURE — 25010000002 HYDROMORPHONE PER 4 MG: Performed by: SURGERY

## 2018-03-02 PROCEDURE — 99024 POSTOP FOLLOW-UP VISIT: CPT | Performed by: SURGERY

## 2018-03-02 PROCEDURE — 99231 SBSQ HOSP IP/OBS SF/LOW 25: CPT | Performed by: ORTHOPAEDIC SURGERY

## 2018-03-02 PROCEDURE — 25010000002 PIPERACILLIN SOD-TAZOBACTAM PER 1 G: Performed by: INTERNAL MEDICINE

## 2018-03-02 PROCEDURE — 25010000002 VANCOMYCIN 10 G RECONSTITUTED SOLUTION: Performed by: INTERNAL MEDICINE

## 2018-03-02 PROCEDURE — 63710000001 INSULIN ASPART PER 5 UNITS: Performed by: INTERNAL MEDICINE

## 2018-03-02 PROCEDURE — 85025 COMPLETE CBC W/AUTO DIFF WBC: CPT | Performed by: SURGERY

## 2018-03-02 PROCEDURE — 80053 COMPREHEN METABOLIC PANEL: CPT | Performed by: SURGERY

## 2018-03-02 PROCEDURE — 63710000001 INSULIN DETEMER PER 5 UNITS: Performed by: INTERNAL MEDICINE

## 2018-03-02 PROCEDURE — 80202 ASSAY OF VANCOMYCIN: CPT | Performed by: INTERNAL MEDICINE

## 2018-03-02 PROCEDURE — 97110 THERAPEUTIC EXERCISES: CPT

## 2018-03-02 PROCEDURE — 82962 GLUCOSE BLOOD TEST: CPT

## 2018-03-02 PROCEDURE — 94799 UNLISTED PULMONARY SVC/PX: CPT

## 2018-03-02 PROCEDURE — 25010000002 ENOXAPARIN PER 10 MG: Performed by: SURGERY

## 2018-03-02 PROCEDURE — 99232 SBSQ HOSP IP/OBS MODERATE 35: CPT | Performed by: INTERNAL MEDICINE

## 2018-03-02 RX ORDER — HYDROCODONE BITARTRATE AND ACETAMINOPHEN 7.5; 325 MG/1; MG/1
1 TABLET ORAL EVERY 4 HOURS PRN
Status: DISCONTINUED | OUTPATIENT
Start: 2018-03-02 | End: 2018-03-05

## 2018-03-02 RX ORDER — CYCLOBENZAPRINE HCL 10 MG
10 TABLET ORAL 3 TIMES DAILY PRN
Status: DISCONTINUED | OUTPATIENT
Start: 2018-03-02 | End: 2018-03-07

## 2018-03-02 RX ADMIN — INSULIN ASPART 4 UNITS: 100 INJECTION, SOLUTION INTRAVENOUS; SUBCUTANEOUS at 21:26

## 2018-03-02 RX ADMIN — METOPROLOL TARTRATE 2.5 MG: 5 INJECTION, SOLUTION INTRAVENOUS at 10:22

## 2018-03-02 RX ADMIN — FLUTICASONE PROPIONATE 2 SPRAY: 50 SPRAY, METERED NASAL at 08:36

## 2018-03-02 RX ADMIN — TAZOBACTAM SODIUM AND PIPERACILLIN SODIUM 3.38 G: 375; 3 INJECTION, SOLUTION INTRAVENOUS at 16:27

## 2018-03-02 RX ADMIN — IPRATROPIUM BROMIDE AND ALBUTEROL SULFATE 3 ML: .5; 3 SOLUTION RESPIRATORY (INHALATION) at 21:22

## 2018-03-02 RX ADMIN — VANCOMYCIN HYDROCHLORIDE 1250 MG: 1 INJECTION, POWDER, LYOPHILIZED, FOR SOLUTION INTRAVENOUS at 11:29

## 2018-03-02 RX ADMIN — LINAGLIPTIN 5 MG: 5 TABLET, FILM COATED ORAL at 08:27

## 2018-03-02 RX ADMIN — TAZOBACTAM SODIUM AND PIPERACILLIN SODIUM 3.38 G: 375; 3 INJECTION, SOLUTION INTRAVENOUS at 08:36

## 2018-03-02 RX ADMIN — HYDROCODONE BITARTRATE AND ACETAMINOPHEN 1 TABLET: 7.5; 325 TABLET ORAL at 17:52

## 2018-03-02 RX ADMIN — INSULIN ASPART 3 UNITS: 100 INJECTION, SOLUTION INTRAVENOUS; SUBCUTANEOUS at 12:46

## 2018-03-02 RX ADMIN — HYDROMORPHONE HYDROCHLORIDE 0.5 MG: 1 INJECTION, SOLUTION INTRAMUSCULAR; INTRAVENOUS; SUBCUTANEOUS at 10:21

## 2018-03-02 RX ADMIN — PREGABALIN 100 MG: 100 CAPSULE ORAL at 20:18

## 2018-03-02 RX ADMIN — IPRATROPIUM BROMIDE AND ALBUTEROL SULFATE 3 ML: .5; 3 SOLUTION RESPIRATORY (INHALATION) at 12:02

## 2018-03-02 RX ADMIN — HYDROMORPHONE HYDROCHLORIDE 0.5 MG: 1 INJECTION, SOLUTION INTRAMUSCULAR; INTRAVENOUS; SUBCUTANEOUS at 06:01

## 2018-03-02 RX ADMIN — DOCUSATE SODIUM -SENNOSIDES 2 TABLET: 50; 8.6 TABLET, COATED ORAL at 20:18

## 2018-03-02 RX ADMIN — POLYETHYLENE GLYCOL 3350 17 G: 17 POWDER, FOR SOLUTION ORAL at 20:18

## 2018-03-02 RX ADMIN — HYDROCODONE BITARTRATE AND ACETAMINOPHEN 1 TABLET: 5; 325 TABLET ORAL at 08:28

## 2018-03-02 RX ADMIN — IPRATROPIUM BROMIDE AND ALBUTEROL SULFATE 3 ML: .5; 3 SOLUTION RESPIRATORY (INHALATION) at 06:36

## 2018-03-02 RX ADMIN — HYDROCODONE BITARTRATE AND ACETAMINOPHEN 1 TABLET: 5; 325 TABLET ORAL at 00:24

## 2018-03-02 RX ADMIN — CYCLOBENZAPRINE 10 MG: 10 TABLET, FILM COATED ORAL at 17:52

## 2018-03-02 RX ADMIN — PREGABALIN 100 MG: 100 CAPSULE ORAL at 17:52

## 2018-03-02 RX ADMIN — TAZOBACTAM SODIUM AND PIPERACILLIN SODIUM 3.38 G: 375; 3 INJECTION, SOLUTION INTRAVENOUS at 01:39

## 2018-03-02 RX ADMIN — GLIPIZIDE 10 MG: 10 TABLET ORAL at 08:27

## 2018-03-02 RX ADMIN — ACETAMINOPHEN 650 MG: 325 TABLET, FILM COATED ORAL at 10:21

## 2018-03-02 RX ADMIN — INSULIN DETEMIR 30 UNITS: 100 INJECTION, SOLUTION SUBCUTANEOUS at 00:16

## 2018-03-02 RX ADMIN — INSULIN DETEMIR 30 UNITS: 100 INJECTION, SOLUTION SUBCUTANEOUS at 08:30

## 2018-03-02 RX ADMIN — PREGABALIN 100 MG: 100 CAPSULE ORAL at 08:28

## 2018-03-02 RX ADMIN — DOCUSATE SODIUM -SENNOSIDES 2 TABLET: 50; 8.6 TABLET, COATED ORAL at 08:27

## 2018-03-02 RX ADMIN — INSULIN DETEMIR 30 UNITS: 100 INJECTION, SOLUTION SUBCUTANEOUS at 21:26

## 2018-03-02 RX ADMIN — VANCOMYCIN HYDROCHLORIDE 1250 MG: 1 INJECTION, POWDER, LYOPHILIZED, FOR SOLUTION INTRAVENOUS at 01:39

## 2018-03-02 RX ADMIN — GLIPIZIDE 10 MG: 10 TABLET ORAL at 17:52

## 2018-03-02 RX ADMIN — INSULIN ASPART 3 UNITS: 100 INJECTION, SOLUTION INTRAVENOUS; SUBCUTANEOUS at 17:52

## 2018-03-02 RX ADMIN — ENOXAPARIN SODIUM 40 MG: 40 INJECTION SUBCUTANEOUS at 08:27

## 2018-03-02 RX ADMIN — HYDROCODONE BITARTRATE AND ACETAMINOPHEN 1 TABLET: 7.5; 325 TABLET ORAL at 14:07

## 2018-03-02 NOTE — PLAN OF CARE
Problem: Patient Care Overview (Adult)  Goal: Plan of Care Review  Outcome: Ongoing (interventions implemented as appropriate)   03/02/18 0239   Coping/Psychosocial Response Interventions   Plan Of Care Reviewed With patient   Outcome Evaluation   Outcome Summary/Follow up Plan pt much more talkative today. pain controlled with dilaudid and lortab alternating. back brace to be ordered per ortho to work with PT. continued antibiotics per orders. will continue to monitor.     Goal: Adult Individualization and Mutuality  Outcome: Ongoing (interventions implemented as appropriate)    Goal: Discharge Needs Assessment  Outcome: Ongoing (interventions implemented as appropriate)      Problem: Infection, Risk/Actual (Adult)  Goal: Identify Related Risk Factors and Signs and Symptoms  Outcome: Ongoing (interventions implemented as appropriate)    Goal: Infection Prevention/Resolution  Outcome: Ongoing (interventions implemented as appropriate)      Problem: Fall Risk (Adult)  Goal: Absence of Falls  Outcome: Ongoing (interventions implemented as appropriate)      Problem: Pain, Acute (Adult)  Goal: Acceptable Pain Control/Comfort Level  Outcome: Outcome(s) achieved Date Met: 03/02/18      Problem: Respiratory Insufficiency (Adult)  Goal: Identify Related Risk Factors and Signs and Symptoms  Outcome: Ongoing (interventions implemented as appropriate)    Goal: Acid/Base Balance  Outcome: Ongoing (interventions implemented as appropriate)    Goal: Effective Ventilation  Outcome: Ongoing (interventions implemented as appropriate)      Problem: Perioperative Period (Adult)  Goal: Signs and Symptoms of Listed Potential Problems Will be Absent or Manageable (Perioperative Period)  Outcome: Ongoing (interventions implemented as appropriate)

## 2018-03-02 NOTE — DISCHARGE PLACEMENT REQUEST
"Chapincito Poli JOANNE (77 y.o. Male)     Date of Birth Social Security Number Address Home Phone MRN    1940  [id number] [place] [id number] [phone] [id number]    Voodoo Marital Status          Confucianist        Admission Date Admission Type Admitting Provider Attending Provider Department, Room/Bed    2/22/18 Emergency Patito Waggoner MD [place], Poli Armstrong MD [address], 549/1    Discharge Date Discharge Disposition Discharge Destination                      Attending Provider: Poli Tanner MD     Allergies:  No Known Allergies    Isolation:  Contact   Infection:  RSV (02/23/18)   Code Status:  Conditional    Ht:  177.8 cm (70\")   Wt:  119 kg (263 lb)    Admission Cmt:  None   Principal Problem:  Acute gangrenous cholecystitis [K81.0]                 Active Insurance as of 2/22/2018     Primary Coverage     Payor Plan Insurance Group Employer/Plan Group    HUMANA MEDICARE REPLACEMENT HUMANA MEDICARE REPL [id number]     Payor Plan Address Payor Plan Phone Number Effective From Effective To    [address] [address] [phone] 1/1/2018     [place] [address]       Subscriber Name Subscriber Birth Date Member ID       POLI MONROE 1940 [id number]                 Emergency Contacts      (Rel.) Home Phone Work Phone Mobile Phone    ChapincitoMya (Spouse) [phone] -- [phone]          "

## 2018-03-02 NOTE — NURSING NOTE
Pt. Sitting in chair and shivering nurse took temp axillary noted 99.7 was unable to obtain oral due to residents profusely shaking. Pt. Placed back to bed per this nurse and caregiver. Pt. AOX4 states he hurts all over, mainly his back which Is a frequent complaint. Pt. Pt provided tylenol with cool rag on forehead, pt. Given Lopressor for increasing HR and Hydromorphone for pain. No S/S of distress. Will continue to monitior

## 2018-03-02 NOTE — PROGRESS NOTES
Continued Stay Note  [facility]     Patient Name: Jesus Beckham  MRN: [id number]  Today's Date: 3/2/2018    Admit Date: 2/22/2018          Discharge Plan       03/02/18 1441    Case Management/Social Work Plan    Plan [facility]    Patient/Family In Agreement With Plan yes    Additional Comments Pt accepted to [facility] and precert started with anticipated discharged of Monday or Tuesday.  JOE Aly RN      03/02/18 1230    Case Management/Social Work Plan    Plan SNF    Patient/Family In Agreement With Plan yes    Additional Comments Received call from Sherrie with [facility] who states pt more appropriate for subacute rehab.  Discussed with pt and wife who request referral to [facility], [facility], and [facility].  Racquel/Trijunior and Josefa/Juli notified.  Await determination.  Will need precert.  JOE Aly RN              Discharge Codes     None            Sheila Aly

## 2018-03-02 NOTE — PROGRESS NOTES
AVSS, MAXIMUM TEMPERATURE 99, awake alert oriented He complains of continued back pain.  Worse when sitting up improved at rest.  On exam he has excellent strength in the lower extremity and sensation appears intact.  He has some tenderness to resisted flexion of the right hip.  It was noted on MRI that he had some slight increase in the size of the so as muscle on the right and the appearance was slightly increase the hyperintense T2 signal.  There was no evidence of the spinal cyst, hematoma or osteomyelitis.,  Or at least the changes were all consistent with postoperative findings.  I think that he has aggravated multilevel lumbar disc degeneration, he is obese, he is postop from a gallbladder procedure and likely has some so as irritation from that.  I'm going to sign off for the weekend at least but remain available if his condition should worsen.  Meantime I'm going to increase the hydrocodone to 7-1/2 mg every 4 hours, had a muscle relaxant and recommend discontinuing Dilaudid as soon as he tolerates that.  If discharged home this weekend and like to see him back in 2 weeks.  We'll also going to have the him wear a brace when up and about and see if that helps.

## 2018-03-02 NOTE — THERAPY TREATMENT NOTE
Acute Care - Physical Therapy Treatment Note  [facility]     Patient Name: Jesus Beckham  : 1940  MRN: [id number]  Today's Date: 3/2/2018  Onset of Illness/Injury or Date of Surgery Date: 18     Referring Physician: Flores    Admit Date: 2018    Visit Dx:    ICD-10-CM ICD-9-CM   1. Calculus of gallbladder without cholecystitis without obstruction K80.20 574.20   2. Sepsis, due to unspecified organism A41.9 038.9     995.91   3. RUQ abdominal mass R19.01 789.31   4. RUQ abdominal pain R10.11 789.01   5. Gangrenous cholecystitis K81.0 575.0   6. Decreased mobility R26.89 781.99     Patient Active Problem List   Diagnosis   • Gout   • Diabetic peripheral neuropathy   • Dyslipidemia   • Uncontrolled type 2 diabetes mellitus   • Primary osteoarthritis involving multiple joints   • Allergic rhinitis due to pollen   • Essential hypertension   • PVC (premature ventricular contraction)   • Vitamin D deficiency   • Benign non-nodular prostatic hyperplasia without lower urinary tract symptoms   • Osteoarthritis   • Urge incontinence   • Sepsis   • Secondary thrombocytopenia   • Hypotension   • CELINA (obstructive sleep apnea)   • Acute respiratory failure with hypoxia   • Leg edema   • RSV (acute bronchiolitis due to respiratory syncytial virus)   • Acute gangrenous cholecystitis   • Bacteremia due to Escherichia coli   • RUQ abdominal pain   • A-fib               Adult Rehabilitation Note       18 1500 18 1000 18 1300    Rehab Assessment/Intervention    Discipline physical therapy assistant  -CW other (see comments)   Student PT  -EE,EA,EE2 other (see comments)   Student PT  -EE,EA,EE2    Document Type therapy note (daily note)  -CW therapy note (daily note)  -EE,EA,EE2 therapy note (daily note)  -EE,EA,EE2    Subjective Information agree to therapy;complains of;weakness;pain  -CW agree to therapy;complains of;pain  -EE,EA,EE2 agree to therapy;complains of;weakness;pain  -EE,EA,EE2     Patient Effort, Rehab Treatment good  -CW good  -EE,EA,EE2 good  -EE,EA,EE2    Symptoms Noted During/After Treatment  fatigue;increased pain  -EE,EA,EE2 fatigue;increased pain  -EE,EA,EE2    Precautions/Limitations fall precautions  -CW fall precautions;other (see comments)   Contact percausions  -EE,EA,EE2 fall precautions;oxygen therapy device and L/min   2L O2  -EE,EA,EE2    Specific Treatment Considerations isolation  -CW      Recorded by [CW] Jaya Gomez, PTA [EE,EA,EE2] Carmen Sarmiento, PT (r) Bipin Live PT Student (t) Carmen Sarmiento PT (c) [EE,EA,EE2] Carmen Sarmiento PT (r) [employer] PT Student (t) Carmen Sarmiento PT (c)    Vital Signs    Pre SpO2 (%)  95  -EE,EA,EE2 93  -EE,EA,EE2    O2 Delivery Pre Treatment room air  -CW room air  -EE,EA,EE2 supplemental O2  -EE,EA,EE2    O2 Delivery Intra Treatment  room air  -EE,EA,EE2 supplemental O2  -EE,EA,EE2    Post SpO2 (%)  95  -EE,EA,EE2 97  -EE,EA,EE2    O2 Delivery Post Treatment  room air  -EE,EA,EE2 supplemental O2  -EE,EA,EE2    Pre Patient Position  Sitting  -EE,EA,EE2 Sitting  -EE,EA,EE2    Intra Patient Position  Standing  -EE,EA,EE2 Standing  -EE,EA,EE2    Post Patient Position  Sitting  -EE,EA,EE2 Sitting  -EE,EA,EE2    Recovery Time  30 sec  -EE,EA,EE2 15 sec  -EE,EA,EE2    Rest Breaks   4  -EE,EA,EE2 6  -EE,EA,EE2    Recorded by [CW] Jaya Gomez, PTA [EE,EA,EE2] Carmen Sarmiento PT (r) [employer] PT Student (t) Carmen Sarmiento PT (c) [EE,EA,EE2] Carmen Sarmiento PT (r) [employer] PT Student (t) Carmen Sarmiento PT (c)    Pain Assessment    Pain Assessment 0-10  -CW 0-10  -EE,EA,EE2 0-10  -EE,EA,EE2    Pain Score 9  -CW 6  -EE,EA,EE2 6  -EE,EA,EE2    Post Pain Score 9  -CW      Pain Location Back  -CW Back  -EE,EA,EE2 Back  -EE,EA,EE2    Pain Orientation Lower  -CW Lower  -EE,EA,EE2 Lower  -EE,EA,EE2    Pain Intervention(s) Repositioned;Ambulation/increased activity  -CW Ambulation/increased activity;Repositioned  -EE,EA,EE2 Ambulation/increased  activity;Repositioned  -EE,EA,EE2    Response to Interventions [place]  -CW tolerated  -EE,EA,EE2 tolerated  -EE,EA,EE2    Recorded by [CW] Jaya Gomez PTA [EE,EA,EE2] Carmen Sarmiento, PT (r) [employer] Live PT Student (t) Carmen Sarmiento PT (c) [EE,EA,EE2] Carmen Sarmiento PT (r) [employer]in Live PT Student (t) Carmen Sarmiento PT (c)    Cognitive Assessment/Intervention    Current Cognitive/Communication Assessment functional  -CW functional  -EE,EA,EE2 functional  -EE,EA,EE2    Orientation Status oriented x 4  -CW oriented x 4  -EE,EA,EE2 oriented x 4  -EE,EA,EE2    Follows Commands/Answers Questions 100% of the time  -% of the time  -EE,EA,EE2 able to follow multi-step instructions;100% of the time  -EE,EA,EE2    Personal Safety WNL/WFL  -CW WNL/WFL  -EE,EA,EE2 mild impairment;decreased awareness, need for safety;unaware of functional deficits   did not remember therapy yesterday  -EE,EA,EE2    Personal Safety Interventions fall prevention program maintained;gait belt;muscle strengthening facilitated;nonskid shoes/slippers when out of bed  -CW fall prevention program maintained;gait belt;nonskid shoes/slippers when out of bed;supervised activity  -EE,EA,EE2 fall prevention program maintained;gait belt;nonskid shoes/slippers when out of bed;supervised activity  -EE,EA,EE2    Recorded by [CW] Jaya Gomez, PTA [EE,EA,EE2] Carmen Sarmiento, PT (r) [employer] PT Student (t) Carmen Sarmiento PT (c) [EE,EA,EE2] Carmen Sarmiento PT (r) [employer] PT Student (t) Carmen Sarmiento PT (c)    Bed Mobility, Assessment/Treatment    Bed Mob, Supine to Sit, [place] not tested  -CW not tested  -EE,EA,EE2 not tested  -EE,EA,EE2    Bed Mobility, Comment in chair  -CW in chair  -EE,EA,EE2 in Chair  -EE,EA,EE2    Recorded by [CW] Jaya Gomez, PTA [EE,EA,EE2] Carmen Sarmiento, PT (r) [employer]in Live, PT Student (t) Carmen Sarmiento, PT (c) [EE,EA,EE2] Carmen Sarmiento PT (r) [employer]in Live, PT Student (t) Carmen Sarmiento, PT (c)    Transfer Assessment/Treatment     Transfers, Sit-Stand [place] moderate assist (50% patient effort)  -CW moderate assist (50% patient effort);2 person assist required;verbal cues required;nonverbal cues required (demo/gesture)  -EE,EA,EE2 moderate assist (50% patient effort);2 person assist required  -EE,EA,EE2    Transfers, Stand-Sit [place] moderate assist (50% patient effort)  -CW minimum assist (75% patient effort);verbal cues required  -EE,EA,EE2 moderate assist (50% patient effort)  -EE,EA,EE2    Transfers, Sit-Stand-Sit, Assist Device rolling walker  -CW rolling walker  -EE,EA,EE2 rolling walker  -EE,EA,EE2    Transfer, Safety Issues  weight-shifting ability decreased  -EE,EA,EE2 weight-shifting ability decreased  -EE,EA,EE2    Transfer, Impairments  strength decreased;pain  -EE,EA,EE2 strength decreased;pain  -EE,EA,EE2    Transfer, Comment  pt. improved in ability to weight shift forward with pain. continues to require 2 person assist with standing  -EE,EA,EE2 pain increases on leaning forward  -EE,EA,EE2    Recorded by [CW] Jaya Gomez, PTA [EE,EA,EE2] Carmen Sarmiento, PT (r) [employer], PT Student (t) Carmen Sarmiento, PT (c) [EE,EA,EE2] Carmen Sarmiento, PT (r) [employer], PT Student (t) Carmen Sarmiento, PT (c)    Gait Assessment/Treatment    Gait, [place] Level contact guard assist  -CW contact guard assist;minimum assist (75% patient effort);1 person + 1 person to manage equipment;verbal cues required;nonverbal cues required (demo/gesture)  -EE,EA,EE2 minimum assist (75% patient effort)  -EE,EA,EE2    Gait, Assistive Device rolling walker  -CW rolling walker  -EE,EA,EE2 rolling walker  -EE,EA,EE2    Gait, Distance (Feet) 150  -  -EE,EA,EE2 150  -EE,EA,EE2    Gait, Gait Deviations maicol decreased;step length decreased;stride length decreased  -CW maicol decreased;forward flexed posture;step length decreased  -EE,EA,EE2 maicol decreased;forward flexed posture;step length decreased;stride length decreased  -EE,EA,EE2     Gait, Impairments strength decreased;pain  -CW strength decreased;pain  -EE,EA,EE2 strength decreased;pain  -EE,EA,EE2    Gait, Comment  Pt. has less pain during ambulation, need for assistance increases as activity progresses. Pt. has difficulty maintaining upright posture, however improved from previous treatment  -EE,EA,EE2 pain increased during functional mobility, and with long periods of ambulation. Verbal and tactile cues to stand up straight, could not maintain  -EE,EA,EE2    Recorded by [CW] Jaya Gomez PTA [EE,EA,EE2] Carmen Sarmiento PT (r) [employer] PT Student (t) Carmen Sarmiento PT (c) [EE,EA,EE2] Carmen Sarmiento PT (r) [employer] PT Student (t) Carmen Sarmiento PT (c)    Positioning and Restraints    Pre-Treatment Position sitting in chair/recliner  -CW sitting in chair/recliner  -EE,EA,EE2 sitting in chair/recliner  -EE,EA,EE2    Post Treatment Position chair  -CW chair  -EE,EA,EE2 chair  -EE,EA,EE2    In Bed   sitting;call light within reach;encouraged to call for assist;exit alarm on  -EE,EA,EE2    In Chair notified nsg;sitting;call light within reach;encouraged to call for assist  -CW sitting;call light within reach;encouraged to call for assist;exit alarm on;with family/caregiver  -EE,EA,EE2     Recorded by [CW] Jaya Gomez, PTA [EE,EA,EE2] Carmen Sarmiento PT (r) [employer]in Live PT Student (t) Carmen Sarmiento PT (c) [EE,EA,EE2] Carmen Sarmiento PT (r) [employer]in Live PT Student (t) Carmen Sarmiento PT (c)      User Key  (r) = Recorded By, (t) = Taken By, (c) = Cosigned By    Initials Name Effective Dates    EE Carmen Sarmiento PT 12/01/15 -     CW Jaya Gomez, PTA 12/13/16 -     EA Bipin Live PT Student 02/05/18 -                 IP PT Goals       02/26/18 1526          Bed Mobility PT LTG    Bed Mobility PT LTG, Date Established 02/26/18  -EE      Bed Mobility PT LTG, Time to Achieve 1 wk  -EE      Bed Mobility PT LTG, Activity Type all bed mobility  -EE      Bed Mobility PT LTG, [place] Level  supervision required  -EE      Transfer Training PT LTG    Transfer Training PT LTG, Date Established 02/26/18  -EE      Transfer Training PT LTG, Time to Achieve 1 wk  -EE      Transfer Training PT LTG, Activity Type all transfers  -EE      Transfer Training PT LTG, [place] Level supervision required  -EE      Transfer Training PT LTG, Assist Device walker, rolling  -EE      Gait Training PT LTG    Gait Training Goal PT LTG, Date Established 02/26/18  -EE      Gait Training Goal PT LTG, Time to Achieve 1 wk  -EE      Gait Training Goal PT LTG, [place] Level supervision required  -EE      Gait Training Goal PT LTG, Assist Device walker, rolling  -EE      Gait Training Goal PT LTG, Distance to Achieve 150  -EE        User Key  (r) = Recorded By, (t) = Taken By, (c) = Cosigned By    Initials Name Provider Type    ORALIA Sarmiento, PT Physical Therapist          Physical Therapy Education     Title: PT OT SLP Therapies (Active)     Topic: Physical Therapy (Active)     Point: Mobility training (Done)    Learning Progress Summary    Learner Readiness Method Response Comment Documented by Status   Patient Acceptance E,TB MARYGOLDEN   03/02/18 1555 Done    Acceptance E,TB GOLDEN  RJ 03/01/18 1647 Done    Acceptance E NR  EA 03/01/18 1057 Active    Acceptance E NR  EA 02/28/18 1405 Active    Acceptance E,TB GOLDENMARY   02/27/18 0950 Done    Acceptance E NR  EE 02/26/18 1525 Active   Family Acceptance E,TB GOLDEN  RJ 03/01/18 1647 Done    Acceptance E NR  EA 03/01/18 1057 Active               Point: Body mechanics (Active)    Learning Progress Summary    Learner Readiness Method Response Comment Documented by Status   Patient Acceptance E,TB MARYGOLDEN   03/02/18 1555 Done    Acceptance E NR  EA 03/01/18 1057 Active    Acceptance E NR  EA 02/28/18 1405 Active    Acceptance E,TB MARY FRANKS   02/27/18 0950 Done    Acceptance E NR  EE 02/26/18 1525 Active   Family Acceptance E NR  EA 03/01/18 1057 Active               Point: Precautions  (Done)    Learning Progress Summary    Learner Readiness Method Response Comment Documented by Status   Patient Acceptance E,TB GOLDEN HERNANDEZ   03/02/18 1555 Done    Acceptance E,TB VU  RJ 03/01/18 1647 Done    Acceptance E,TB VUMARY   02/27/18 0950 Done   Family Acceptance E,TB VU  RJ 03/01/18 1647 Done                      User Key     Initials Effective Dates Name Provider Type Discipline     02/08/17 -  Alpa Vargas, RN Registered Nurse Nurse    EE 12/01/15 -  Carmen Sarmiento, PT Physical Therapist PT     12/13/16 -  Jaya Gomez, PTA Physical Therapy Assistant PT     02/05/18 -  Bipin Live, PT Student PT Student PT                    PT Recommendation and Plan  Anticipated Discharge Disposition: home with assist, home with home health  Planned Therapy Interventions: balance training, bed mobility training, gait training, home exercise program, patient/family education, strengthening, stair training, transfer training  PT Frequency: daily  Plan of Care Review  Plan Of Care Reviewed With: patient  Progress: progress towards functional goals is fair  Outcome Summary/Follow up Plan: Pt is increasing with activity tolerance but is unsafe with amb and transfers requiring more A          Outcome Measures       03/02/18 1500 03/01/18 1000 02/28/18 1300    How much help from another person do you currently need...    Turning from your back to your side while in flat bed without using bedrails? 3  -CW 3  -EE (r) EA (t) EE (c) 3  -EE (r) EA (t) EE (c)    Moving from lying on back to sitting on the side of a flat bed without bedrails? 3  -CW 3  -EE (r) EA (t) EE (c) 3  -EE (r) EA (t) EE (c)    Moving to and from a bed to a chair (including a wheelchair)? 3  -CW 3  -EE (r) EA (t) EE (c) 3  -EE (r) EA (t) EE (c)    Standing up from a chair using your arms (e.g., wheelchair, bedside chair)? 3  -CW 2  -EE (r) EA (t) EE (c) 2  -EE (r) EA (t) EE (c)    Climbing 3-5 steps with a railing? 2  -CW 2  -EE (r) EA (t) EE (c) 2   -EE (r) EA (t) EE (c)    To walk in hospital room? 3  -CW 3  -EE (r) EA (t) EE (c) 3  -EE (r) EA (t) EE (c)    AM-PAC 6 Clicks Score 17  -CW 16  -EE (r) EA (t) 16  -EE (r) EA (t)    Functional Assessment    Outcome Measure Options AM-PAC 6 Clicks Basic Mobility (PT)  -CW AM-PAC 6 Clicks Basic Mobility (PT)  -EE (r) EA (t) EE (c) AM-PAC 6 Clicks Basic Mobility (PT)  -EE (r) EA (t) EE (c)      User Key  (r) = Recorded By, (t) = Taken By, (c) = Cosigned By    Initials Name Provider Type    EE Carmen Sarmiento, PT Physical Therapist    CW Jaya Gomez, PADMINI Physical Therapy Assistant    SANTY Live, PT Student PT Student           Time Calculation:         PT Charges       03/02/18 1557          Time Calculation    Start Time 1531  -CW      Stop Time 1557  -CW      Time Calculation (min) 26 min  -CW      PT Received On 03/02/18  -CW      PT - Next Appointment 03/03/18  -CW        User Key  (r) = Recorded By, (t) = Taken By, (c) = Cosigned By    Initials Name Provider Type     Jaya Gomez PTA Physical Therapy Assistant          Therapy Charges for Today     Code Description Service Date Service Provider Modifiers Qty    16970868768 HC PT THER PROC EA 15 MIN 3/2/2018 Jaya Gomez PTA GP 2          PT G-Codes  Outcome Measure Options: AM-PAC 6 Clicks Basic Mobility (PT)    Jaya Gomez PTA  3/2/2018

## 2018-03-02 NOTE — PROGRESS NOTES
[facility] INPATIENT PROGRESS NOTE         [address]    3/2/2018      PATIENT IDENTIFICATION:  Name: Jesus Beckham ADMIT: 2018   : 1940  PCP: Magdy Ricardo MD    MRN: [id number] LOS: 7 days   AGE/SEX: 77 y.o. male  ROOM: [facility]/1                     LOS 7    Reason for visit: Follow up respiratory failure with sepsis with Escherichia coli bacteremia and RSV      SUBJECTIVE:    No productive cough or chest pain. Less soa.  Still on 4L oxygen.       Objective   OBJECTIVE:    Vital Sign Min/Max for last 24 hours  Temp  Min: 96.3 °F (35.7 °C)  Max: 99.7 °F (37.6 °C)   BP  Min: 128/72  Max: 158/74   Pulse  Min: 62  Max: 99   Resp  Min: 18  Max: 22   SpO2  Min: 92 %  Max: 98 %   Flow (L/min)  Min: 4  Max: 4   No Data Recorded                         Body mass index is 37.74 kg/(m^2).    Intake/Output Summary (Last 24 hours) at 18 1055  Last data filed at 18 0757   Gross per 24 hour   Intake           2517.5 ml   Output             1480 ml   Net           1037.5 ml         Exam:  GEN:  No distress, appears stated age  EYES:   PERRL, anicteric sclera  ENT:    External ears/nose normal, OP clear  NECK:  No adenopathy, midline trachea  LUNGS: Normal chest on inspection, palpation and Diminished bilaterally on auscultation  CV:  Normal S1S2, without murmur  ABD:  Non tender, non distended, no hepatosplenomegaly, +BS  EXT:  No edema, cyanosis or clubbing    Scheduled meds:      enoxaparin 40 mg Subcutaneous Daily   fluticasone 2 spray Nasal Daily   glipiZIDE 10 mg Oral BID AC   insulin aspart 0-7 Units Subcutaneous 4x Daily With Meals & Nightly   insulin detemir 30 Units Subcutaneous Q12H   ipratropium-albuterol 3 mL Nebulization Q6H While Awake - RT   linagliptin 5 mg Oral Daily   piperacillin-tazobactam 3.375 g Intravenous Q8H   pregabalin 100 mg Oral TID   sennosides-docusate sodium 2 tablet Oral BID   vancomycin 10 mg/kg Intravenous Q12H     IV meds:                           Pharmacy to dose vancomycin      Data Review:    Results from last 7 days  Lab Units 03/02/18  0323 03/01/18 0429 02/28/18  0419 02/27/18  0348 02/26/18  0520   SODIUM mmol/L 140 140 137 135* 131*   POTASSIUM mmol/L 3.9 4.2 4.0 4.5 4.8   CHLORIDE mmol/L 101 102 99 98 97*   CO2 mmol/L 26.5 27.2 27.1 27.4 23.9   BUN mg/dL 19 21 25* 26* 27*   CREATININE mg/dL 0.96 0.85 0.87 0.85 0.90   GLUCOSE mg/dL 169* 160* 166* 175* 169*   CALCIUM mg/dL 8.3* 8.1* 8.5* 8.6 8.2*         Estimated Creatinine Clearance: 84.6 mL/min (by C-G formula based on Cr of 0.96).    Results from last 7 days  Lab Units 03/02/18  0323 03/01/18  0429 02/28/18  0419 02/27/18  0348 02/26/18  0520   WBC 10*3/mm3 7.46 8.35 7.59 7.56 8.11   HEMOGLOBIN g/dL 11.5* 12.0* 12.2* 12.2* 12.5*   PLATELETS 10*3/mm3 217 191 148 107* 84*           Results from last 7 days  Lab Units 03/02/18  0323 03/01/18  0429 02/28/18  0419 02/27/18  0348 02/26/18  0520   ALT (SGPT) U/L 36 39 46* 54* 56*   AST (SGOT) U/L 24 26 30 45* 54*           Results from last 7 days  Lab Units 03/01/18  0429 02/28/18 1953 02/24/18  0044 02/23/18  1755 02/23/18  1201   PROCALCITONIN ng/mL  --  1.93*  --   --   --    LACTATE mmol/L 0.9 3.7* 0.9 1.7 1.6         Ultrasound gallbladder reviewed: Shows tiny gallstones within  Gallbladder concerning for cystic duct obstruction.    CT chest viewed: bilateral lower lung field atelectasis.  No focal infiltrates seen.      Microbiology reviewed  RSV, PCR Detected (A)     Blood Culture ID, PCR - Blood, [[id number]] (Abnormal) Collected: 02/23/18 0118        Lab Status: Final result Specimen: Blood from Arm, Left Updated: 02/23/18 2128        BCID, PCR Escherichia coli. Identification by BCID PCR. (C)            )Assessment   ASSESSMENT:   Active Hospital Problems (** Indicates Principal Problem)    Diagnosis Date Noted   • **Acute gangrenous cholecystitis [K81.0] 02/24/2018   • A-fib [I48.91] 02/25/2018   • Bacteremia due to Escherichia coli  [R78.81] 02/24/2018   • RUQ abdominal pain [R10.11] 02/24/2018   • Sepsis [A41.9] 02/23/2018   • Secondary thrombocytopenia [D69.59] 02/23/2018   • Hypotension [I95.9] 02/23/2018   • CELINA (obstructive sleep apnea) [G47.33] 02/23/2018   • Acute respiratory failure with hypoxia [J96.01] 02/23/2018   • Leg edema [R60.0] 02/23/2018   • RSV (acute bronchiolitis due to respiratory syncytial virus) [J21.0] 02/23/2018   • Vitamin D deficiency [E55.9] 04/28/2017   • 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] 04/04/2016   • Allergic rhinitis due to pollen [J30.1] 04/04/2016   • Diabetic peripheral neuropathy [E11.42] 04/01/2016      Resolved Hospital Problems    Diagnosis Date Noted Date Resolved   No resolved problems to display.     Acute hypoxemic respiratory failure  Sepsis  Escherichia coli bacteremia  RSV  Acute gangrenous cholecystitis  Type 2 diabetes  CELINA  Hyponatremia    PLAN:  Encourage pulmonary toilet.    Antibiotics per infectious disease recommendations.  Wean oxygen as able.  Increased activity.  Tolerating diet.  No BM yet still.      Julius Adair MD  Pulmonary and Critical Care Medicine  [facility]  3/2/2018    10:55 AM

## 2018-03-02 NOTE — PROGRESS NOTES
General Surgery  Progress Note    CC: Follow-up gangrenous cholecystitis    POD#5 laparoscopic cholecystectomy with intraoperative cholangiogram    S: Doing well, tolerating a diet without issue.     O:  Vitals:    03/02/18 1030 03/02/18 1154 03/02/18 1202 03/02/18 1521   BP:  156/88  135/85   BP Location:  Right arm  Left arm   Patient Position:  Lying  Sitting   Pulse:   119    Resp:  24 20 18   Temp: 99.7 °F (37.6 °C) 98.4 °F (36.9 °C)  98 °F (36.7 °C)   TempSrc: Axillary Oral  Oral   SpO2:   93%    Weight:       Height:               Intake & Output:  UOP: 1475 cc/24 hrs  FRANK: 3 cc/24 hrs    GENERAL: alert, well appearing, and in no distress  HEENT: normocephalic, atraumatic, moist mucous membranes, clear sclera   CHEST: clear to auscultation, no wheezes, rales or rhonchi, symmetric air entry  CARDIAC: regular rate and rhythm    ABDOMEN: soft, appropriately tender, mild distension, incisions C/D/I with dermabond, FRANK output minimal dark fluid  EXTREMITIES: no cyanosis, clubbing, or edema   SKIN: Warm and moist, no rashes    LABS    Results from last 7 days  Lab Units 03/02/18  0323 03/01/18  0429 02/28/18  0419   WBC 10*3/mm3 7.46 8.35 7.59   HEMOGLOBIN g/dL 11.5* 12.0* 12.2*   HEMATOCRIT % 35.8* 37.6* 38.5*   PLATELETS 10*3/mm3 217 191 148       Results from last 7 days  Lab Units 03/02/18  0323 03/01/18  0429 02/28/18  0419   SODIUM mmol/L 140 140 137   POTASSIUM mmol/L 3.9 4.2 4.0   CHLORIDE mmol/L 101 102 99   CO2 mmol/L 26.5 27.2 27.1   BUN mg/dL 19 21 25*   CREATININE mg/dL 0.96 0.85 0.87   CALCIUM mg/dL 8.3* 8.1* 8.5*   BILIRUBIN mg/dL 0.5 0.7 0.7   ALK PHOS U/L 81 86 90   ALT (SGPT) U/L 36 39 46*   AST (SGOT) U/L 24 26 30   GLUCOSE mg/dL 169* 160* 166*           A/P: 77 y.o. male POD#5 laparoscopic cholecystectomy with intraoperative cholangiogram for gangrenous cholecystitis    D/C FRANK drain today  OK to discharge home or to rehab at any time  He should follow-up with me in 2 weeks, and should call to  schedule the appointment.  Abx per ID regarding his bacteremia, which is resolved.  I will sign off, please call over the weekend for any issues or questions    Maegan Correa MD  General and Endoscopic Surgery  [facility]    [address], [address]  [place], [place], [address]  P: [phone]  F: [phone]

## 2018-03-02 NOTE — PROGRESS NOTES
" LOS: 7 days     Name: Jesus Beckham  Age: 77 y.o.  Sex: male  :  1940  MRN: [id number]         Primary Care Physician: Magdy Ricardo MD    Subjective   Subjective  Main complaint is ongoing back pain.  Denies much in the way of abdominal pain.  No nausea or vomiting.  Maximum temperature of 99    Objective   Vital Signs  Temp:  [96.3 °F (35.7 °C)-99.1 °F (37.3 °C)] 99 °F (37.2 °C)  Heart Rate:  [62-99] 88  Resp:  [18-22] 22  BP: (128-158)/(71-87) 148/87  Body mass index is 37.74 kg/(m^2).    Objective:  General Appearance:  Comfortable and in no acute distress.    Vital signs: (most recent): Blood pressure 148/87, pulse 88, temperature 99 °F (37.2 °C), temperature source Oral, resp. rate 22, height 177.8 cm (70\"), weight 119 kg (263 lb), SpO2 98 %.    Lungs:  Normal respiratory rate and normal effort.    Heart: Normal rate.  Regular rhythm.    Abdomen: Abdomen is soft.  (FRANK drain extending from right upper quadrant)Bowel sounds are normal.   There is no abdominal tenderness.     Extremities: There is no local swelling or dependent edema.    Neurological: Patient is alert and oriented to person, place and time.    Skin:  Warm and dry.              Results Review:       I reviewed the patient's new clinical results.      Results from last 7 days  Lab Units 18  0323 03/01/18  0429 02/28/18  0419 02/27/18  0348 02/26/18  0520 18  0506 18  0546   WBC 10*3/mm3 7.46 8.35 7.59 7.56 8.11 8.73 9.99   HEMOGLOBIN g/dL 11.5* 12.0* 12.2* 12.2* 12.5* 13.4* 13.3*   PLATELETS 10*3/mm3 217 191 148 107* 84* 83* 83*       Results from last 7 days  Lab Units 18  0323 18  04218  0348 18  0520 187 18  0506 18  0546   SODIUM mmol/L 140 140 137 135* 131*  --  138 133*   POTASSIUM mmol/L 3.9 4.2 4.0 4.5 4.8 5.2 4.4 4.2   CHLORIDE mmol/L 101 102 99 98 97*  --  102 102   CO2 mmol/L 26.5 27.2 27.1 27.4 23.9  --  23.3 20.9*   BUN mg/dL 19 21 25* 26* " 27*  --  21 25*   CREATININE mg/dL 0.96 0.85 0.87 0.85 0.90  --  0.90 0.98   CALCIUM mg/dL 8.3* 8.1* 8.5* 8.6 8.2*  --  8.8 8.5*   GLUCOSE mg/dL 169* 160* 166* 175* 169*  --  158* 135*                 Scheduled Meds:     enoxaparin 40 mg Subcutaneous Daily   fluticasone 2 spray Nasal Daily   glipiZIDE 10 mg Oral BID AC   insulin aspart 0-7 Units Subcutaneous 4x Daily With Meals & Nightly   insulin detemir 30 Units Subcutaneous Q12H   ipratropium-albuterol 3 mL Nebulization Q6H While Awake - RT   linagliptin 5 mg Oral Daily   piperacillin-tazobactam 3.375 g Intravenous Q8H   pregabalin 100 mg Oral TID   sennosides-docusate sodium 2 tablet Oral BID   vancomycin 10 mg/kg Intravenous Q12H     PRN Meds:   •  acetaminophen  •  cyclobenzaprine  •  dextrose  •  dextrose  •  diphenhydrAMINE  •  glucagon (human recombinant)  •  HYDROcodone-acetaminophen  •  HYDROmorphone  •  metoprolol tartrate  •  ondansetron  •  Pharmacy to dose vancomycin  •  polyethylene glycol  •  sodium chloride  •  sodium chloride  Continuous Infusions:    Pharmacy to dose vancomycin        Assessment/Plan   Principal Problem:    Acute gangrenous cholecystitis  Active Problems:    Diabetic peripheral neuropathy    Primary osteoarthritis involving multiple joints    Allergic rhinitis due to pollen    Vitamin D deficiency    Sepsis    Secondary thrombocytopenia    Hypotension    CELINA (obstructive sleep apnea)    Acute respiratory failure with hypoxia    Leg edema    RSV (acute bronchiolitis due to respiratory syncytial virus)    Bacteremia due to Escherichia coli    RUQ abdominal pain    A-fib      Assessment & Plan    - Escherichia coli bacteremia secondary to gangrenous cholecystitis.   Now status post cholecystectomy on 2/25. Repeat blood cultures negative.  He will require 2 weeks of antibiotics through 3/10 per infectious disease with plans to change to levaquin at discharge.  - New fever 2 on 2/28 prompted the addition of vancomycin and repeat blood  cultures.  Infectious disease reevaluation noted.  - Appreciate input from surgical services regarding postoperative abdominal CT as well as L-spine MRI.  No new infections found.  - Thrombocytopenia, reactive from infection and medication.   Resolved  - Diabetes mellitus type 2.  Continue sliding scale.  On linagliptin, Glucotrol, and levemir  - Heart rate now better controlled on metoprolol.  Holding off on anticoagulation, per cardiology.   - RSV with acute hypoxic respiratory failure, resolved.  He has been weaned to room air this morning  - Lower extremity edema.  Echo with ejection fraction 50%.  Diastolic function normal.  Monitor volume status closely.  - PT following    Jesus Tanner MD  [facility]  03/02/18  10:43 AM

## 2018-03-02 NOTE — PROGRESS NOTES
"Pharmacokinetic Evaluation - Vancomycin    Jesus Beckham is a 77 y.o. male on vancomycin pharmacy to dose.  MRN: [id number]  : 1940    Day of vancomycin therapy: 3  Indication: sepsis: gangrenous cholecystitis s/p cholecystectomy   Consulted by: Dr. Harper  Goal trough: 15-20mg/L    Current dose: 1250 q12h  Other antimicrobials: zosyn 3.375 q8h EI  Current duration of vancomycin entered: 3/4    Blood pressure 156/88, pulse 119, temperature 98.4 °F (36.9 °C), temperature source Oral, resp. rate 20, height 177.8 cm (70\"), weight 119 kg (263 lb), SpO2 93 %.    Results from last 7 days  Lab Units 18  0419   CREATININE mg/dL 0.96 0.85 0.87     Estimated Creatinine Clearance: 84.6 mL/min (by C-G formula based on Cr of 0.96).    Results from last 7 days  Lab Units 18  0419   WBC 10*3/mm3 7.46 8.35 7.59   HEMOGLOBIN g/dL 11.5* 12.0* 12.2*   HEMATOCRIT % 35.8* 37.6* 38.5*   PLATELETS 10*3/mm3 217 191 148       Cultures:    bcx: NG@24hrs   Bcx: NGTD   RVP: positive for RSV   Bcx: E.coli    Dosing hx (include troughs if drawn):  2328: Vanco 2500mg loading dose given  3/1 Vancomycin 1250mg q12h started   3/1 1258   3/2 0140, 1129   3/2 1042 Vancomycin trough: 12.6mg/L ( ~ 10 hrs from last dose. After 3 total doses)    Assessment:  Level is sub-therapeutic; drawn before steady state.  Renal function is stable. This level was only after 3 doses so I expect further accumulation as well as the timing of each dose was late/early in cases. Will plan to increase to 1500mg q12h and check another trough after a few more doses. Patient already rec'd 1250mg this morning so will time next dose earlier for the 1500mg. Ordered trough on 3/4 but suppose to stop that day so may not need level after all.     Plan:  1) Increase to vancomycin 1500 mg every 12 hours.  2) Next trough on 3/4 at 0830 after 3 total doses.  3) Monitor for uop and " scr.    Magdy Corey, [facility]

## 2018-03-02 NOTE — PROGRESS NOTES
Continued Stay Note  [facility]     Patient Name: Jesus Beckham  MRN: [id number]  Today's Date: 3/2/2018    Admit Date: 2/22/2018          Discharge Plan       03/02/18 1230    Case Management/Social Work Plan    Plan SNF    Patient/Family In Agreement With Plan yes    Additional Comments Received call from Sherrie with [facility] who states pt more appropriate for subacute rehab.  Discussed with pt and wife who request referral to [facility], and [facility].  Racquel/Nevaeh and Josefa/Juli notified.  Await determination.  Will need precert.  JOE Aly RN              Discharge Codes     None            Sheila Aly

## 2018-03-02 NOTE — NURSING NOTE
Noted plans proceeding for subacute and will sign off.   Thank you--Sherrie Lilly,  Rehab Adm Nurse

## 2018-03-02 NOTE — PROGRESS NOTES
LOS: 7 days     Chief Complaint: E coli bacteremia, low grade fever    Interval History:  Afebrile, continues to report significant back pain.  States abdominal pain is better.  Denies any nausea vomiting or diarrhea.  Actually reports constipation.  His breathing is also improving and he denies any cough rhinorrhea or sore throat.  He's tolerating antibiotics without a rash.    Vital Signs  Temp:  [96.3 °F (35.7 °C)-99.1 °F (37.3 °C)] 99 °F (37.2 °C)  Heart Rate:  [62-99] 88  Resp:  [18-22] 22  BP: (128-158)/(71-87) 148/87  95% on 2L NC    Physical Exam:  General:clamy  Cardiovascular: RRR, no le edema   Respiratory: occ crackles no wheezing   GI: Soft, distended, tender to deep palpation, positive bowel sounds bilaterally, laparoscopic incisions healing well without purulence or erythema  Skin: No rashes     Antibiotics:  Zosyn 3.375 g IV every 8 hours     Results Review:     I reviewed the patient's new clinical results.  I reviewed the patient's new imaging results and agree with the interpretation.    Lab Results   Component Value Date    WBC 7.46 03/02/2018    HGB 11.5 (L) 03/02/2018    HCT 35.8 (L) 03/02/2018    MCV 94.5 03/02/2018     03/02/2018     Lab Results   Component Value Date    GLUCOSE 169 (H) 03/02/2018    BUN 19 03/02/2018    CREATININE 0.96 03/02/2018    EGFRIFNONA 76 03/02/2018    EGFRIFAFRI 88 12/06/2017    BCR 19.8 03/02/2018    CO2 26.5 03/02/2018    CALCIUM 8.3 (L) 03/02/2018    PROTENTOTREF 6.8 12/06/2017    ALBUMIN 2.60 (L) 03/02/2018    LABIL2 0.9 03/02/2018    AST 24 03/02/2018    ALT 36 03/02/2018       Microbiology:  2/28 BCx NGTD x 2  2/24 BCx NGTD x 2  2/23 RVP + RSV  2/23 BCx E coli 2/2 2/23 Influenza neg    Assessment/Plan   1.  Fever - resolved  - Repeat blood cultures negative to date  - DC empiric vancomycin     2.  Escherichia coli bacteremia setting of acute gangrenous cholecystitis status post laparoscopic cholecystectomy on February 25  - CAT scan without any new  infection  - Continue Zosyn 3.375 g IV every 8 hours for the next few days.  When okay by surgery would switch to Levaquin 750 mg by mouth daily to complete a two-week course.  Antibiotics stop date 3/10    3.  RSV  - supportive care only     4.  Acute hypoxic respiratory failure - resolved  - Pulmonary medicine following     5.  Type 2 diabetes complicating above     As long as the patient remains afebrile he is okay for discharge from an ID standpoint.  If he has recurrent fevers we will reevaluate

## 2018-03-02 NOTE — NURSING NOTE
Per 3/1/18 PT documentation amb 150 ft with R wx with min A x1 and 1 to manage equipment.  Does not indicate need for inpatient acute rehab and minimum of 3 hrs. of therapy per day at present level.  Will follow peripherally re: if back pain issue persists and if further developments with that.   Discussed with Valery (CCP).   Thank you--Sherrie Lilly,  Rehab Adm Nurse

## 2018-03-03 PROBLEM — R78.81 BACTEREMIA DUE TO ESCHERICHIA COLI: Status: RESOLVED | Noted: 2018-02-24 | Resolved: 2018-03-03

## 2018-03-03 PROBLEM — J96.01 ACUTE RESPIRATORY FAILURE WITH HYPOXIA (HCC): Status: RESOLVED | Noted: 2018-02-23 | Resolved: 2018-03-03

## 2018-03-03 PROBLEM — A41.9 SEPSIS: Status: RESOLVED | Noted: 2018-02-23 | Resolved: 2018-03-03

## 2018-03-03 PROBLEM — D69.59 SECONDARY THROMBOCYTOPENIA: Status: RESOLVED | Noted: 2018-02-23 | Resolved: 2018-03-03

## 2018-03-03 PROBLEM — I95.9 HYPOTENSION: Status: RESOLVED | Noted: 2018-02-23 | Resolved: 2018-03-03

## 2018-03-03 PROBLEM — J21.0 RSV (ACUTE BRONCHIOLITIS DUE TO RESPIRATORY SYNCYTIAL VIRUS): Status: RESOLVED | Noted: 2018-02-23 | Resolved: 2018-03-03

## 2018-03-03 PROBLEM — B96.20 BACTEREMIA DUE TO ESCHERICHIA COLI: Status: RESOLVED | Noted: 2018-02-24 | Resolved: 2018-03-03

## 2018-03-03 LAB
ALBUMIN SERPL-MCNC: 2.3 G/DL (ref 3.5–5.2)
ALBUMIN/GLOB SERPL: 0.7 G/DL
ALP SERPL-CCNC: 91 U/L (ref 39–117)
ALT SERPL W P-5'-P-CCNC: 33 U/L (ref 1–41)
ANION GAP SERPL CALCULATED.3IONS-SCNC: 10.2 MMOL/L
AST SERPL-CCNC: 37 U/L (ref 1–40)
BASOPHILS # BLD AUTO: 0.02 10*3/MM3 (ref 0–0.2)
BASOPHILS NFR BLD AUTO: 0.2 % (ref 0–1.5)
BILIRUB SERPL-MCNC: 0.6 MG/DL (ref 0.1–1.2)
BUN BLD-MCNC: 16 MG/DL (ref 8–23)
BUN/CREAT SERPL: 20.5 (ref 7–25)
CALCIUM SPEC-SCNC: 8.4 MG/DL (ref 8.6–10.5)
CHLORIDE SERPL-SCNC: 100 MMOL/L (ref 98–107)
CO2 SERPL-SCNC: 27.8 MMOL/L (ref 22–29)
CREAT BLD-MCNC: 0.78 MG/DL (ref 0.76–1.27)
DEPRECATED RDW RBC AUTO: 50.7 FL (ref 37–54)
EOSINOPHIL # BLD AUTO: 0.13 10*3/MM3 (ref 0–0.7)
EOSINOPHIL NFR BLD AUTO: 1.5 % (ref 0.3–6.2)
ERYTHROCYTE [DISTWIDTH] IN BLOOD BY AUTOMATED COUNT: 14.9 % (ref 11.5–14.5)
GFR SERPL CREATININE-BSD FRML MDRD: 97 ML/MIN/1.73
GLOBULIN UR ELPH-MCNC: 3.2 GM/DL
GLUCOSE BLD-MCNC: 218 MG/DL (ref 65–99)
GLUCOSE BLDC GLUCOMTR-MCNC: 195 MG/DL (ref 70–130)
GLUCOSE BLDC GLUCOMTR-MCNC: 219 MG/DL (ref 70–130)
GLUCOSE BLDC GLUCOMTR-MCNC: 226 MG/DL (ref 70–130)
GLUCOSE BLDC GLUCOMTR-MCNC: 244 MG/DL (ref 70–130)
HCT VFR BLD AUTO: 34.9 % (ref 40.4–52.2)
HGB BLD-MCNC: 11.3 G/DL (ref 13.7–17.6)
IMM GRANULOCYTES # BLD: 0.07 10*3/MM3 (ref 0–0.03)
IMM GRANULOCYTES NFR BLD: 0.8 % (ref 0–0.5)
LYMPHOCYTES # BLD AUTO: 0.67 10*3/MM3 (ref 0.9–4.8)
LYMPHOCYTES NFR BLD AUTO: 7.8 % (ref 19.6–45.3)
MCH RBC QN AUTO: 30.4 PG (ref 27–32.7)
MCHC RBC AUTO-ENTMCNC: 32.4 G/DL (ref 32.6–36.4)
MCV RBC AUTO: 93.8 FL (ref 79.8–96.2)
MONOCYTES # BLD AUTO: 0.42 10*3/MM3 (ref 0.2–1.2)
MONOCYTES NFR BLD AUTO: 4.9 % (ref 5–12)
NEUTROPHILS # BLD AUTO: 7.31 10*3/MM3 (ref 1.9–8.1)
NEUTROPHILS NFR BLD AUTO: 84.8 % (ref 42.7–76)
PLAT MORPH BLD: NORMAL
PLATELET # BLD AUTO: 198 10*3/MM3 (ref 140–500)
PMV BLD AUTO: 11.9 FL (ref 6–12)
POTASSIUM BLD-SCNC: 4.3 MMOL/L (ref 3.5–5.2)
PROT SERPL-MCNC: 5.5 G/DL (ref 6–8.5)
RBC # BLD AUTO: 3.72 10*6/MM3 (ref 4.6–6)
RBC MORPH BLD: NORMAL
SODIUM BLD-SCNC: 138 MMOL/L (ref 136–145)
WBC MORPH BLD: NORMAL
WBC NRBC COR # BLD: 8.62 10*3/MM3 (ref 4.5–10.7)

## 2018-03-03 PROCEDURE — 85025 COMPLETE CBC W/AUTO DIFF WBC: CPT | Performed by: SURGERY

## 2018-03-03 PROCEDURE — 25010000002 FUROSEMIDE PER 20 MG: Performed by: INTERNAL MEDICINE

## 2018-03-03 PROCEDURE — 82962 GLUCOSE BLOOD TEST: CPT

## 2018-03-03 PROCEDURE — 94799 UNLISTED PULMONARY SVC/PX: CPT

## 2018-03-03 PROCEDURE — 63710000001 INSULIN ASPART PER 5 UNITS: Performed by: INTERNAL MEDICINE

## 2018-03-03 PROCEDURE — 25010000002 ENOXAPARIN PER 10 MG: Performed by: SURGERY

## 2018-03-03 PROCEDURE — 85007 BL SMEAR W/DIFF WBC COUNT: CPT | Performed by: SURGERY

## 2018-03-03 PROCEDURE — 97110 THERAPEUTIC EXERCISES: CPT | Performed by: PHYSICAL THERAPIST

## 2018-03-03 PROCEDURE — 25010000002 PIPERACILLIN SOD-TAZOBACTAM PER 1 G: Performed by: INTERNAL MEDICINE

## 2018-03-03 PROCEDURE — 99024 POSTOP FOLLOW-UP VISIT: CPT | Performed by: SURGERY

## 2018-03-03 PROCEDURE — 80053 COMPREHEN METABOLIC PANEL: CPT | Performed by: SURGERY

## 2018-03-03 RX ORDER — FUROSEMIDE 10 MG/ML
40 INJECTION INTRAMUSCULAR; INTRAVENOUS ONCE
Status: COMPLETED | OUTPATIENT
Start: 2018-03-03 | End: 2018-03-03

## 2018-03-03 RX ORDER — LEVOFLOXACIN 750 MG/1
750 TABLET ORAL EVERY 24 HOURS
Status: DISCONTINUED | OUTPATIENT
Start: 2018-03-03 | End: 2018-03-04

## 2018-03-03 RX ORDER — LACTULOSE 10 G/15ML
10 SOLUTION ORAL 3 TIMES DAILY PRN
Status: DISCONTINUED | OUTPATIENT
Start: 2018-03-03 | End: 2018-03-04

## 2018-03-03 RX ADMIN — INSULIN DETEMIR 30 UNITS: 100 INJECTION, SOLUTION SUBCUTANEOUS at 08:15

## 2018-03-03 RX ADMIN — IPRATROPIUM BROMIDE AND ALBUTEROL SULFATE 3 ML: .5; 3 SOLUTION RESPIRATORY (INHALATION) at 19:51

## 2018-03-03 RX ADMIN — INSULIN ASPART 3 UNITS: 100 INJECTION, SOLUTION INTRAVENOUS; SUBCUTANEOUS at 16:55

## 2018-03-03 RX ADMIN — PREGABALIN 100 MG: 100 CAPSULE ORAL at 20:46

## 2018-03-03 RX ADMIN — FUROSEMIDE 40 MG: 10 INJECTION, SOLUTION INTRAMUSCULAR; INTRAVENOUS at 12:40

## 2018-03-03 RX ADMIN — TAZOBACTAM SODIUM AND PIPERACILLIN SODIUM 3.38 G: 375; 3 INJECTION, SOLUTION INTRAVENOUS at 08:15

## 2018-03-03 RX ADMIN — DOCUSATE SODIUM -SENNOSIDES 2 TABLET: 50; 8.6 TABLET, COATED ORAL at 08:15

## 2018-03-03 RX ADMIN — LACTULOSE 10 G: 10 SOLUTION ORAL at 21:31

## 2018-03-03 RX ADMIN — ENOXAPARIN SODIUM 40 MG: 40 INJECTION SUBCUTANEOUS at 08:15

## 2018-03-03 RX ADMIN — TAZOBACTAM SODIUM AND PIPERACILLIN SODIUM 3.38 G: 375; 3 INJECTION, SOLUTION INTRAVENOUS at 00:27

## 2018-03-03 RX ADMIN — INSULIN DETEMIR 34 UNITS: 100 INJECTION, SOLUTION SUBCUTANEOUS at 20:47

## 2018-03-03 RX ADMIN — INSULIN ASPART 2 UNITS: 100 INJECTION, SOLUTION INTRAVENOUS; SUBCUTANEOUS at 08:15

## 2018-03-03 RX ADMIN — LEVOFLOXACIN 750 MG: 750 TABLET, FILM COATED ORAL at 20:46

## 2018-03-03 RX ADMIN — GLIPIZIDE 10 MG: 10 TABLET ORAL at 16:55

## 2018-03-03 RX ADMIN — FLUTICASONE PROPIONATE 2 SPRAY: 50 SPRAY, METERED NASAL at 08:25

## 2018-03-03 RX ADMIN — HYDROCODONE BITARTRATE AND ACETAMINOPHEN 1 TABLET: 7.5; 325 TABLET ORAL at 12:45

## 2018-03-03 RX ADMIN — LINAGLIPTIN 5 MG: 5 TABLET, FILM COATED ORAL at 08:15

## 2018-03-03 RX ADMIN — PREGABALIN 100 MG: 100 CAPSULE ORAL at 16:55

## 2018-03-03 RX ADMIN — INSULIN ASPART 3 UNITS: 100 INJECTION, SOLUTION INTRAVENOUS; SUBCUTANEOUS at 12:40

## 2018-03-03 RX ADMIN — PREGABALIN 100 MG: 100 CAPSULE ORAL at 08:15

## 2018-03-03 RX ADMIN — GLIPIZIDE 10 MG: 10 TABLET ORAL at 08:15

## 2018-03-03 RX ADMIN — ACETAMINOPHEN 650 MG: 325 TABLET, FILM COATED ORAL at 20:46

## 2018-03-03 RX ADMIN — DOCUSATE SODIUM -SENNOSIDES 2 TABLET: 50; 8.6 TABLET, COATED ORAL at 20:46

## 2018-03-03 RX ADMIN — HYDROCODONE BITARTRATE AND ACETAMINOPHEN 1 TABLET: 7.5; 325 TABLET ORAL at 08:25

## 2018-03-03 RX ADMIN — HYDROCODONE BITARTRATE AND ACETAMINOPHEN 1 TABLET: 7.5; 325 TABLET ORAL at 03:35

## 2018-03-03 RX ADMIN — INSULIN ASPART 3 UNITS: 100 INJECTION, SOLUTION INTRAVENOUS; SUBCUTANEOUS at 20:47

## 2018-03-03 RX ADMIN — CYCLOBENZAPRINE 10 MG: 10 TABLET, FILM COATED ORAL at 10:00

## 2018-03-03 RX ADMIN — LACTULOSE 10 G: 10 SOLUTION ORAL at 16:55

## 2018-03-03 NOTE — PROGRESS NOTES
"     LOS: 8 days   Primary Care Physician: Magdy Ricardo MD     Subjective   Feels the same.  Back is sore.  Anxious for discharge to rehabilitation.  Bowel still have not moved    Vital Signs  Body mass index is 37.74 kg/(m^2).  Temp:  [98 °F (36.7 °C)-98.9 °F (37.2 °C)] 98.4 °F (36.9 °C)  Heart Rate:  [] 107  Resp:  [18-22] 18  BP: (118-150)/(60-94) 118/86      Objective:  General Appearance:  In no acute distress (Morbidly obese).    Vital signs: (most recent): Blood pressure 118/86, pulse 107, temperature 98.4 °F (36.9 °C), temperature source Oral, resp. rate 18, height 177.8 cm (70\"), weight 119 kg (263 lb), SpO2 91 %.    Lungs:  There are rales and decreased breath sounds.  No wheezes or rhonchi.  (A few crackles at the bases)  Heart: Normal rate.  Regular rhythm.  Positive for murmur.    Abdomen: Abdomen is soft and non-distended.  Bowel sounds are normal.   There is no abdominal tenderness.   There is no splenomegaly. There is no hepatomegaly.   Extremities: There is dependent edema.  (1-2+ edema at the ankles.  His fingers are swollen.)  Neurological: Patient is alert.          Results Review:    I reviewed the patient's new clinical results.      Results from last 7 days  Lab Units 03/03/18  0552 03/02/18  0323   WBC 10*3/mm3 8.62 7.46   HEMOGLOBIN g/dL 11.3* 11.5*   PLATELETS 10*3/mm3 198 217       Results from last 7 days  Lab Units 03/03/18  0552 03/02/18  0323   SODIUM mmol/L 138 140   POTASSIUM mmol/L 4.3 3.9   CHLORIDE mmol/L 100 101   CO2 mmol/L 27.8 26.5   BUN mg/dL 16 19   CREATININE mg/dL 0.78 0.96   CALCIUM mg/dL 8.4* 8.3*   GLUCOSE mg/dL 218* 169*         Hemoglobin A1C:  Lab Results   Component Value Date    HGBA1C 7.70 (H) 02/23/2018       Glucose Range:  Glucose   Date/Time Value Ref Range Status   03/03/2018 1628 219 (H) 70 - 130 mg/dL Final   03/03/2018 1136 226 (H) 70 - 130 mg/dL Final   03/03/2018 0803 195 (H) 70 - 130 mg/dL Final   03/02/2018 2035 292 (H) 70 - 130 mg/dL Final "   03/02/2018 1607 201 (H) 70 - 130 mg/dL Final   03/02/2018 1125 213 (H) 70 - 130 mg/dL Final       No results found for: [id number]    Lab Results   Component Value Date    TSH 2.090 12/06/2017       Assessment & Plan      Medication Review: Yes    Active Hospital Problems (** Indicates Principal Problem)    Diagnosis Date Noted   • **Acute gangrenous cholecystitis [K81.0] 02/24/2018   • A-fib [I48.91] 02/25/2018   • RUQ abdominal pain [R10.11] 02/24/2018   • CELINA (obstructive sleep apnea) [G47.33] 02/23/2018   • Leg edema [R60.0] 02/23/2018   • Vitamin D deficiency [E55.9] 04/28/2017   • 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       Assessment/Plan  1.  Gangrenous cholecystitis with Escherichia coli bacteremia.  Okay to change to oral Levaquin per surgery and ID.  2 week total course is planned  2.  Lower and upper extremity edema, consistent with volume overload.  IV Lasix 40 mg ×1 dose.  Diastolic function normal and ejection fraction 50% this admission.  He is on Lyrica well but I think there is some volume excess in addition to that effect.  3.  Diabetes mellitus type 2.  Increase Levemir from 30 units twice daily to 34 units twice daily.  Continue sliding scale  4.  Weakness.  Will go to subacute rehabilitation once precert has been obtained.  5.  RSV infection with hypoxia, resolved  6.  PAF.  No anticoagulation per  cardiology      Patito Waggoner MD  03/03/18  5:32 PM

## 2018-03-03 NOTE — PLAN OF CARE
Problem: Patient Care Overview (Adult)  Goal: Plan of Care Review  Outcome: Ongoing (interventions implemented as appropriate)   03/03/18 1529   Coping/Psychosocial Response Interventions   Plan Of Care Reviewed With patient   Patient Care Overview   Progress improving   Outcome Evaluation   Outcome Summary/Follow up Plan Patient doing well with ambulation but continues to require vc's for upright posture. Also patient struggles with weakness during sit to stand transfer and relies heavily on UE support to transfer and while ambulating.

## 2018-03-03 NOTE — PLAN OF CARE
"Problem: Patient Care Overview (Adult)  Goal: Plan of Care Review  Outcome: Ongoing (interventions implemented as appropriate)   03/03/18 1740   Coping/Psychosocial Response Interventions   Plan Of Care Reviewed With patient   Patient Care Overview   Progress no change   Outcome Evaluation   Outcome Summary/Follow up Plan up in the chair all day. one short (30mins or less) of \"shaking\" that was controlled with minimal distraction. afebrile today. BG remain high--levemir adjusted. constant pain--treated x2 doses of lortab and 1 dose of flexeril. plan for discharge monday or tuesday. JONH Pendleton RN     Goal: Adult Individualization and Mutuality  Outcome: Ongoing (interventions implemented as appropriate)    Goal: Discharge Needs Assessment  Outcome: Ongoing (interventions implemented as appropriate)      Problem: Infection, Risk/Actual (Adult)  Goal: Identify Related Risk Factors and Signs and Symptoms  Outcome: Ongoing (interventions implemented as appropriate)    Goal: Infection Prevention/Resolution  Outcome: Ongoing (interventions implemented as appropriate)      Problem: Fall Risk (Adult)  Goal: Identify Related Risk Factors and Signs and Symptoms  Outcome: Ongoing (interventions implemented as appropriate)    Goal: Absence of Falls  Outcome: Ongoing (interventions implemented as appropriate)      Problem: Pain, Acute (Adult)  Goal: Identify Related Risk Factors and Signs and Symptoms  Outcome: Ongoing (interventions implemented as appropriate)        "

## 2018-03-03 NOTE — PLAN OF CARE
Problem: Perioperative Period (Adult)  Goal: Signs and Symptoms of Listed Potential Problems Will be Absent or Manageable (Perioperative Period)  Outcome: Resolved for upgrade, new template will be applied Date Met: 03/03/18

## 2018-03-03 NOTE — PLAN OF CARE
Problem: Patient Care Overview (Adult)  Goal: Plan of Care Review  Outcome: Ongoing (interventions implemented as appropriate)   03/03/18 0418   Coping/Psychosocial Response Interventions   Plan Of Care Reviewed With patient   Patient Care Overview   Progress improving   Outcome Evaluation   Outcome Summary/Follow up Plan VSS, am labs, minimal pain, encourage activity, still has not had BM, PRN meds given, IV antibx, possible d/c??       Problem: Infection, Risk/Actual (Adult)  Goal: Identify Related Risk Factors and Signs and Symptoms  Outcome: Ongoing (interventions implemented as appropriate)      Problem: Fall Risk (Adult)  Goal: Identify Related Risk Factors and Signs and Symptoms  Outcome: Ongoing (interventions implemented as appropriate)      Problem: Pain, Acute (Adult)  Goal: Identify Related Risk Factors and Signs and Symptoms  Outcome: Ongoing (interventions implemented as appropriate)

## 2018-03-03 NOTE — PROGRESS NOTES
LOS: 8 days   Patient Care Team:  Magdy Ricardo MD as PCP - General  Magdy Ricardo MD as PCP - Family Medicine  Staci Keyes MD as Consulting Physician (Endocrinology)  Jerrell Fry MD as Consulting Physician (Urology)    Subjective     Patient says he is doing very well since he walked around the nurse's station today and he feels better up walking and feels better today.  No shortness of breath on room air.  No significant cough    Review of Systems:          Objective     Vital Signs  Vital Sign Min/Max for last 24 hours  Temp  Min: 98 °F (36.7 °C)  Max: 98.9 °F (37.2 °C)   BP  Min: 118/86  Max: 150/85   Pulse  Min: 83  Max: 111   Resp  Min: 18  Max: 22   SpO2  Min: 90 %  Max: 95 %   No Data Recorded   No Data Recorded        Ventilator/Non-Invasive Ventilation Settings     None                       Body mass index is 37.74 kg/(m^2).  I/O last 3 completed shifts:  In: 1380 [P.O.:200; I.V.:820; IV Piggyback:360]  Out: 2745 [Urine:2725; Other:20]  I/O this shift:  In: 680 [P.O.:680]  Out: 1025 [Urine:1025]        Physical Exam:  General Appearance: Well-developed obese white male resting in a recliner does not appear in acute distress  Eyes: Conjunctiva are clear and anicteric  ENT: Mucous membranes are moist no erythema or exudates nasal septum midline and he has a Mallampati type II airway  Neck: No jugular venous distention trachea midline  Lungs: Clear nonlabored symmetric expansion his incentive spirometry he is pulling over 2000  Cardiac: Regular rate and rhythm  Abdomen: Abdominal binder in place not removed  : Not examined  Musc/Skel: Edema all 4 extremities 2+ upper extremities 3-4+ lower extremities  Skin: No jaundice no petechiae  Neuro: He is alert oriented pleasant cooperative following commands  Extremities/P Vascular: No clubbing or cyanosis he has palpable radial dorsalis pedis pulses  MSE: He is in very good spirits today       Labs:    Results from last 7 days  Lab  Units 03/03/18  0552 03/02/18  0323 03/01/18  0429 02/28/18  0419 02/27/18  0348 02/26/18  0520 02/25/18 2037 02/25/18  0506   GLUCOSE mg/dL 218* 169* 160* 166* 175* 169*  --  158*   SODIUM mmol/L 138 140 140 137 135* 131*  --  138   POTASSIUM mmol/L 4.3 3.9 4.2 4.0 4.5 4.8 5.2 4.4   MAGNESIUM mg/dL  --   --   --   --   --   --  2.2  --    CO2 mmol/L 27.8 26.5 27.2 27.1 27.4 23.9  --  23.3   CHLORIDE mmol/L 100 101 102 99 98 97*  --  102   ANION GAP mmol/L 10.2 12.5 10.8 10.9 9.6 10.1  --  12.7   CREATININE mg/dL 0.78 0.96 0.85 0.87 0.85 0.90  --  0.90   BUN mg/dL 16 19 21 25* 26* 27*  --  21   BUN / CREAT RATIO  20.5 19.8 24.7 28.7* 30.6* 30.0*  --  23.3   CALCIUM mg/dL 8.4* 8.3* 8.1* 8.5* 8.6 8.2*  --  8.8   EGFR IF NONAFRICN AM mL/min/1.73 97 76 88 85 88 82  --  82   ALK PHOS U/L 91 81 86 90 91 83  --  89   TOTAL PROTEIN g/dL 5.5* 5.5* 5.5* 5.8* 5.7* 5.6*  --  6.1   ALT (SGPT) U/L 33 36 39 46* 54* 56*  --  42*   AST (SGOT) U/L 37 24 26 30 45* 54*  --  54*   BILIRUBIN mg/dL 0.6 0.5 0.7 0.7 0.6 0.6  --  0.6   ALBUMIN g/dL 2.30* 2.60* 2.50* 2.60* 2.60* 2.40*  --  2.90*   GLOBULIN gm/dL 3.2 2.9 3.0 3.2 3.1 3.2  --  3.2   A/G RATIO g/dL 0.7 0.9 0.8 0.8 0.8 0.8  --  0.9     Estimated Creatinine Clearance: 101.6 mL/min (by C-G formula based on Cr of 0.78).        Results from last 7 days  Lab Units 03/03/18  0552 03/02/18  0323 03/01/18  0429 02/28/18  0419 02/27/18  0348 02/26/18  0520 02/25/18  0506   WBC 10*3/mm3 8.62 7.46 8.35 7.59 7.56 8.11 8.73   RBC 10*6/mm3 3.72* 3.79* 3.95* 4.07* 4.05* 4.07* 4.37*   HEMOGLOBIN g/dL 11.3* 11.5* 12.0* 12.2* 12.2* 12.5* 13.4*   HEMATOCRIT % 34.9* 35.8* 37.6* 38.5* 38.5* 38.6* 41.5   MCV fL 93.8 94.5 95.2 94.6 95.1 94.8 95.0   MCH pg 30.4 30.3 30.4 30.0 30.1 30.7 30.7   MCHC g/dL 32.4* 32.1* 31.9* 31.7* 31.7* 32.4* 32.3*   RDW % 14.9* 14.6* 14.6* 14.6* 14.7* 14.9* 14.9*   RDW-SD fl 50.7 49.7 50.7 50.2 51.5 51.6 51.6   MPV fL 11.9 11.9 11.6 12.3* 11.7 12.0 11.9   PLATELETS 10*3/mm3  198 217 191 148 107* 84* 83*   NEUTROPHIL % % 84.8* 71.5 73.1 70.6 71.6 77.7* 84.9*   LYMPHOCYTE % % 7.8* 13.1* 12.1* 13.6* 13.0* 10.2* 6.4*   MONOCYTES % % 4.9* 9.8 10.4 12.5* 13.4* 11.2 8.4   EOSINOPHIL % % 1.5 1.6 0.4 0.5 1.1 0.4 0.1*   BASOPHIL % % 0.2 0.4 0.4 0.3 0.1 0.1 0.0   IMM GRAN % % 0.8* 3.6* 3.6* 2.5* 0.8* 0.4 0.2   NEUTROS ABS 10*3/mm3 7.31 5.33 6.11 5.36 5.42 6.30 7.41   LYMPHS ABS 10*3/mm3 0.67* 0.98 1.01 1.03 0.98 0.83* 0.56*   MONOS ABS 10*3/mm3 0.42 0.73 0.87 0.95 1.01 0.91 0.73   EOS ABS 10*3/mm3 0.13 0.12 0.03 0.04 0.08 0.03 0.01   BASOS ABS 10*3/mm3 0.02 0.03 0.03 0.02 0.01 0.01 0.00   IMMATURE GRANS (ABS) 10*3/mm3 0.07* 0.27* 0.30* 0.19* 0.06* 0.03 0.02   NRBC /100 WBC  --  0.0 0.0 0.0  --   --   --            Results from last 7 days  Lab Units 02/28/18 1953 02/26/18  0520 02/25/18 2037   TROPONIN T ng/mL <0.010 0.018 0.012       Results from last 7 days  Lab Units 02/28/18 1953   PROBNP pg/mL 1443.0           Results from last 7 days  Lab Units 03/01/18  0429 02/28/18 1953   LACTATE mmol/L 0.9 3.7*   PROCALCITONIN ng/mL  --  1.93*         Microbiology Results (last 10 days)     Procedure Component Value - Date/Time    Blood Culture - Blood, [[id number]]  (Normal) Collected:  02/28/18 1832    Lab Status:  Preliminary result Specimen:  Blood from Arm, Left Updated:  03/02/18 1846     Blood Culture No growth at 2 days    Blood Culture - Blood, [[id number]]  (Normal) Collected:  02/28/18 1533    Lab Status:  Preliminary result Specimen:  Blood from Arm, Right Updated:  03/03/18 1546     Blood Culture No growth at 3 days    Blood Culture - Blood, [[id number]]  (Normal) Collected:  02/24/18 1224    Lab Status:  Final result Specimen:  Blood from Arm, Left Updated:  03/01/18 1246     Blood Culture No growth at 5 days    Blood Culture - Blood, [[id number]]  (Normal) Collected:  02/24/18 1131    Lab Status:  Final result Specimen:  Blood from Arm, Left Updated:  03/01/18 1146     Blood Culture No  growth at 5 days    Respiratory Panel, PCR - Swab, Nasopharynx [[id number]]  (Abnormal) Collected:  02/23/18 0206    Lab Status:  Final result Specimen:  Swab from Nasopharynx Updated:  02/23/18 0630     ADENOVIRUS, PCR Not Detected     Coronavirus 229E Not Detected     Coronavirus HKU1 Not Detected     Coronavirus NL63 Not Detected     Coronavirus OC43 Not Detected     Human Metapneumovirus Not Detected     Human Rhinovirus/Enterovirus Not Detected     Influenza B PCR Not Detected     Parainfluenza Virus 1 Not Detected     Parainfluenza Virus 2 Not Detected     Parainfluenza Virus 3 Not Detected     Parainfluenza Virus 4 Not Detected     Bordetella pertussis pcr Not Detected     Influenza A H1 2009 PCR Not Detected     Chlamydophila pneumoniae PCR Not Detected     Mycoplasma pneumo by PCR Not Detected     Influenza A PCR Not Detected     Influenza A H3 Not Detected     Influenza A H1 Not Detected     RSV, PCR Detected (A)    Blood Culture - Blood, [[id number]]  (Abnormal) Collected:  02/23/18 0118    Lab Status:  Final result Specimen:  Blood from Arm, Left Updated:  02/25/18 0704     Blood Culture --      Escherichia coli (A)     Gram Stain Result Anaerobic Bottle Gram negative bacilli      Aerobic Bottle Gram negative bacilli      Appended report. These results have been appended to a previously preliminary verified report.       Narrative:       Refer to LEONOR on specimen Collected 2/23/2018 0031.    Blood Culture ID, PCR - Blood, [[id number]]  (Abnormal) Collected:  02/23/18 0118    Lab Status:  Final result Specimen:  Blood from Arm, Left Updated:  02/23/18 2128     BCID, PCR Escherichia coli. Identification by BCID PCR. (C)    Blood Culture - Blood, [[id number]]  (Abnormal)  (Susceptibility) Collected:  02/23/18 0031    Lab Status:  Final result Specimen:  Blood from Arm, Left Updated:  02/25/18 0703     Blood Culture --      Escherichia coli (A)     Gram Stain Result Aerobic Bottle Gram negative bacilli       Anaerobic Bottle Gram negative bacilli    Susceptibility      Escherichia coli     LEONOR     Ampicillin >=32 ug/ml Resistant     Ampicillin + Sulbactam >=32 ug/ml Resistant     Cefazolin >=64 ug/ml Resistant     Cefepime <=1 ug/ml Susceptible     Cefoxitin >=64 ug/ml Resistant     Ceftriaxone <=1 ug/ml Susceptible     Ertapenem <=0.5 ug/ml Susceptible     Gentamicin <=1 ug/ml Susceptible     Levofloxacin 0.5 ug/ml Susceptible     Meropenem <=0.25 ug/ml Susceptible     Piperacillin + Tazobactam 8 ug/ml Susceptible     Trimethoprim + Sulfamethoxazole <=20 ug/ml Susceptible                    Influenza Antigen, Rapid - Swab, Nasopharynx [[id number]]  (Normal) Collected:  02/23/18 0031    Lab Status:  Final result Specimen:  Swab from Nasopharynx Updated:  02/23/18 0052     Influenza A Ag, EIA Negative     Influenza B Ag, EIA Negative    Blood Culture ID, PCR - Blood, [[id number]]  (Abnormal) Collected:  02/23/18 0031    Lab Status:  Final result Specimen:  Blood from Arm, Left Updated:  02/23/18 1407     BCID, PCR Escherichia coli. Identification by BCID PCR. (C)                enoxaparin 40 mg Subcutaneous Daily   fluticasone 2 spray Nasal Daily   glipiZIDE 10 mg Oral BID AC   insulin aspart 0-7 Units Subcutaneous 4x Daily With Meals & Nightly   insulin detemir 34 Units Subcutaneous Q12H   ipratropium-albuterol 3 mL Nebulization Q6H While Awake - RT   levoFLOXacin 750 mg Oral Q24H   linagliptin 5 mg Oral Daily   pregabalin 100 mg Oral TID   sennosides-docusate sodium 2 tablet Oral BID          Diagnostics:  Xr Chest 2 View    Result Date: 2/23/2018  PA AND LATERAL CHEST X-RAY  HISTORY: fever  COMPARISON: None.  FINDINGS: PA and lateral views of the chest were obtained. Patient is kyphotic. The lungs are under aerated with pulmonary vascular congestion bordering on mild edema. There is mild right lung base atelectasis. There is no convincing evidence of active air space disease process otherwise. Mild cardiomegaly. No  significant pleural fluid. Thoracolumbar kyphosis noted with multilevel spurring.        Under aeration with pulmonary vascular congestion bordering on mild edema/CHF  This report was finalized on 2/23/2018 12:58 AM by Jerrell Dumont MD.      Ct Chest Without Contrast    Result Date: 2/26/2018  CT CHEST WITHOUT CONTRAST  HISTORY: Rule out right middle lobe infiltrate. To evaluate source of fever.  TECHNIQUE: Axial CT images of the chest were obtained without administration of intravenous contrast. Coronal and sagittal reformats were obtained.  COMPARISON: Chest radiographs.  FINDINGS: The visualized thyroid gland is normal. Calcified plaque is seen within the aortic arch and the coronary arteries. Calcified lymph nodes are seen within the right hilum and the subcarinal region. The central airways are patent without endobronchial lesion. Bilateral lung fields are under aerated with lower lobe atelectatic changes. No focal infiltrate is identified. No suspicious pulmonary nodules.  The visualized upper abdomen demonstrates a distended gallbladder with dependent hyperdensity that may represent sludge and/or stones. Degenerative changes are seen within the thoracic spine.      1. Bilateral lung fields are under aerated with atelectatic changes within the bilateral lower lobes. No focal infiltrate is identified. 2. Mildly distended gallbladder with dependent hyperdensity that may represent stones.  Radiation dose reduction techniques were utilized, including automated exposure control and exposure modulation based on body size.  This report was finalized on 2/26/2018 2:27 PM by Dr. Gideon Shah MD.      Mri Lumbar Spine Without Contrast    Result Date: 2/28/2018  MRI LUMBAR SPINE WO CONTRAST-  INDICATIONS: Low back pain with radiculopathy.  TECHNIQUE: NONCONTRAST LUMBAR SPINE MRI   COMPARISON: None available  FINDINGS:   Marrow signal with heterogeneous with endplate changes noted, but no acute fracture identified.  Laminectomy changes at L3, L4 are noted.  Alignment is in range of normal.  Conus medullaris appears unremarkable.  Increased T2 signal within the upper aspect of the right psoas, which appears mildly thickened, for example image 21 of series 2 could for example represent strain/partial tear or inflammatory/infectious process, correlate clinically, follow-up evaluation with enhanced imaging could be considered if not contraindicated.   Axial levels:  T12/L1, L1/2: No significant disc bulge, central or neural foraminal stenosis.  L2/3: Left lateral recess is effaced by what may represent disc extrusion (enhanced imaging could take to exclude alternative possibilities of a collection or lesion), which also contributes to severe left neural foraminal narrowing (with exiting nerve root impingement) and, with broad-based disc bulge and facet and ligamentum flavum hypertrophy, moderate central stenosis, moderate right neural foraminal narrowing.  L3/4: Broad-based disc bulge is present, as well as central disc extrusion narrowing the anterior thecal space and, with facet ligamentum flavum hypertrophy moderate to prominent right, moderate left neural foraminal stenosis.  L4/5: Broad-based disc bulge with left paracentral disc extrusion apparent, as well as disc protrusion at the right neural foramina, result in effacement of the left lateral recess (with impingement of left traversing nerve root), narrowing of the anterior thecal space, and, with facet and ligamentum flavum hypertrophy, moderate bilateral neuroforaminal narrowing.  L5/S1: Broad-based disc bulge there is the anterior thecal space and, with facet and ligamentum flavum hypertrophy, contributes to moderate to prominent right, prominent left neuroforaminal narrowing.           1. 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  2. Signal changes in the upper right psoas muscle, could be posttraumatic in origin or may be inflammatory/infectious in nature, correlate clinically. Enhanced imaging could be considered for further evaluation.  This report was finalized on 2/28/2018 6:24 PM by Dr. Chris Posey MD.      Ct Abdomen Pelvis With Contrast    Result Date: 2/28/2018  CT ABDOMEN AND PELVIS WITH IV CONTRAST  HISTORY: 77-year-old male underwent laparoscopic cholecystectomy on 02/25/2018 for gangrenous cholecystitis and cholelithiasis.  TECHNIQUE: CT abdomen and pelvis with intravenous and oral contrast.  COMPARISON: CT chest without contrast 02/23/2018.  FINDINGS: There has been cholecystectomy and there is a drain extending into the right upper quadrant beneath the liver. There is material filling the gallbladder fossa. This includes a 4.5 x 2.5 x 2.8 cm low signal material that may represent bioabsorbable packing material. There are adjacent tiny bubbles of gas and there is also surrounding fluid. There is no mature or drainable collection. Thin band of fluid extends adjacent to the anterior liver. Minimal edema extends into the hepatic parenchyma just above anterior margin of the gallbladder fossa. Liver otherwise appears normal.  Within the medial spleen there is a 1.5 cm low-density lesion. Splenic size is normal. Adrenal glands, pancreas appear normal. There is a diverticulum emanating medially from the descending duodenum. A medial left lower pole renal cyst measures 1.3 cm. Right kidney appears normal and there is no hydronephrosis. Minimal free fluid extends into the pelvis. There is no bowel dilatation or obstruction.      1. Patient is 3 days post cholecystectomy with low density packing material, small bubbles of gas, and fluid filling the gallbladder fossa. Fluid partially extends into the adjacent hepatic parenchyma and may represent hemorrhagic material/blood products. There is mild perihepatic fluid and a  small amount of fluid is present within the lower pelvis. Surgical drain extends into the right upper quadrant. Infected fluid would be difficult to exclude though there is no evidence for mature or drainable collection. 2. Tiny renal cysts. Duodenal diverticulum. 15 mm low-density lesion medial spleen of doubtful significance.  Radiation dose reduction techniques were utilized, including automated exposure control and exposure modulation based on body size.  This report was finalized on 2/28/2018 5:44 PM by Dr. Pop Haider MD.      Us Gallbladder    Result Date: 2/24/2018  GALLBLADDER ULTRASOUND  HISTORY: Right upper quadrant pain and fever.  FINDINGS: The gallbladder contains several tiny gallstones near the neck of the gallbladder. The gallbladder is somewhat distended, measuring 11.3 cm in length and raising the concern of an element of cystic duct obstruction. This can also be seen on yesterday's chest CT scan. The common bile duct measures 4 mm. There is no wall thickening.  The visualized liver and pancreas and right kidney are unremarkable.  CONCLUSION: Distended gallbladder containing tiny gallstones in the neck of the gallbladder and concerning for cystic duct obstruction. The common bile duct is normal in caliber.  This report was finalized on 2/24/2018 9:15 AM by Dr. William Myers MD.      Xr Chest 1 View    Result Date: 2/28/2018  PORTABLE CHEST X-RAY  CLINICAL HISTORY: resp failure; K80.20-Calculus of gallbladder without cholecystitis without obstruction; A41.9-Sepsis, unspecified organism; R19.01-Right upper quadrant abdominal swelling, mass and lump; R10.11-Right upper quadrant pain; K81.0-Acute cholecystitis; R26.89-Other abnormalities of gait and mobility  COMPARISON: 02/25/2018.  FINDINGS: Portable AP view of the chest was obtained with overlying monitor leads in place. Lungs are poorly aerated with pulmonary vascular congestion and an opacity in the right lung base favored to reflect  atelectasis rather than pneumonia. There may be a small right effusion as well. Left lung poorly aerated but clear. Normal heart size. Mild aortic ectasia.        Poor inspiration with vascular congestion and right lung base opacity as above. Recommend follow-up  This report was finalized on 2/28/2018 9:34 PM by Jerrell Dumont MD.      Xr Chest 1 View    Result Date: 2/25/2018  ONE VIEW PORTABLE CHEST AT 12:25 PM  HISTORY: Recent gallbladder surgery. Shortness of breath.  There is somewhat decreased lung expansion with further accentuation of vascular congestion compared to 2 days ago. Some of this relates to the poor lung expansion but could also reflect a component of mild CHF. The heart remains enlarged.  This report was finalized on 2/25/2018 12:44 PM by Dr. William Myers MD.      Fl Cholangiogram Operative    Result Date: 2/25/2018  INTRAOPERATIVE CHOLANGIOGRAM  HISTORY: Gallstones.  FINDINGS:  Contrast fills the biliary system with emptying into the duodenum. There appears to be a small mobile air bubble in the common hepatic duct that is seen on early images. No definite gallstones are seen.  59 images were obtained and the fluoroscopy time measures 10 seconds.  This report was finalized on 2/25/2018 11:32 AM by Dr. William Myers MD.      Results for orders placed during the hospital encounter of 02/22/18   Adult Transthoracic Echo Complete W/ Cont if Necessary Per Protocol    Narrative · The study is technically difficult for diagnosis.  · Left ventricle not well visualized. Left ventricular systolic function   is normal. Calculated EF = 50.3%. Estimated EF was in agreement with the   calculated EF. Normal left ventricular cavity size and wall thickness   noted.  · Left ventricular diastolic function is normal.  · There is calcification of the aortic valve.              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         Assessment/Plan     1. Acute hypoxemic respiratory failure Resolved he is pulling over 2000 was incentive spirometer  2. Escherichia coli bacteremia  3. RSV infection  4. Acute gangrenous cholecystitis status post laparoscopic cholecystectomy on 3/26/18  5. Diabetes mellitus type 2  6. Hyponatremia  7. Right psoas abscess versus hematoma surgery following no intervention at this time    Plan for disposition: I don't have much to add from a pulmonary standpoint we'll see when necessary    Evaristo Burnett MD  03/03/18  6:44 PM    Time:

## 2018-03-03 NOTE — PROGRESS NOTES
Chief Complaint:    POD 6, S/P laparoscopic cholecystectomy with intraoperative cholangiogram  Right psoas abscess vs. hematoma    Subjective:    Tolerating diet.  No nausea, no vomiting.  Main complaint is back pain.    Objective:    Vitals:    03/03/18 1000 03/03/18 1132 03/03/18 1240 03/03/18 1400   BP:  135/94     BP Location:  Right arm     Patient Position:  Sitting     Pulse: 102 111 103 99   Resp:  20 18 18   Temp:  98.7 °F (37.1 °C)     TempSrc:  Oral     SpO2:  91% 93% 95%   Weight:       Height:           Lungs: Clear  Heart: Regular  Abdomen: Incisions okay. BS present.   Extremities: Warm    Labs reviewed.  WBC 8.62, hemoglobin 11.3.  Creat 0.78, CO2 27.8.  LFTs normal.    I reviewed the images and the reports of the of the CT scan of the abdomen and pelvis and MRI of the spine performed on 2/28/2018.  There is no inflammatory process involving the sole last muscle on the right.  On the right psoas muscle is enlarged to 43 mm across where the left measures 37 at the same level. I do not see a drainable fluid collection.    Assessment:    POD 6, S/P laparoscopic cholecystectomy with intraoperative cholangiogram  Right psoas abscess vs. hematoma    Plan:    I do not see cause for intervention and psoas muscle inflammatory process.  He does not appear toxic.  From my standpoint he may be discharged to rehab when he is otherwise medically stable.

## 2018-03-03 NOTE — THERAPY TREATMENT NOTE
Acute Care - Physical Therapy Treatment Note  [facility]     Patient Name: Jesus Beckham  : 1940  MRN: [id number]  Today's Date: 3/3/2018  Onset of Illness/Injury or Date of Surgery Date: 18     Referring Physician: Flores    Admit Date: 2018    Visit Dx:    ICD-10-CM ICD-9-CM   1. Calculus of gallbladder without cholecystitis without obstruction K80.20 574.20   2. Sepsis, due to unspecified organism A41.9 038.9     995.91   3. RUQ abdominal mass R19.01 789.31   4. RUQ abdominal pain R10.11 789.01   5. Gangrenous cholecystitis K81.0 575.0   6. Decreased mobility R26.89 781.99     Patient Active Problem List   Diagnosis   • Gout   • Diabetic peripheral neuropathy   • Dyslipidemia   • Uncontrolled type 2 diabetes mellitus   • Primary osteoarthritis involving multiple joints   • Allergic rhinitis due to pollen   • Essential hypertension   • PVC (premature ventricular contraction)   • Vitamin D deficiency   • Benign non-nodular prostatic hyperplasia without lower urinary tract symptoms   • Osteoarthritis   • Urge incontinence   • Sepsis   • Secondary thrombocytopenia   • Hypotension   • CELINA (obstructive sleep apnea)   • Acute respiratory failure with hypoxia   • Leg edema   • RSV (acute bronchiolitis due to respiratory syncytial virus)   • Acute gangrenous cholecystitis   • Bacteremia due to Escherichia coli   • RUQ abdominal pain   • A-fib               Adult Rehabilitation Note       18 1527 18 1500 18 1000    Rehab Assessment/Intervention    Discipline physical therapist  -KH physical therapy assistant  -CW other (see comments)   Student PT  -ORALIA,EA,EE2    Document Type therapy note (daily note)  -KH therapy note (daily note)  -CW therapy note (daily note)  -EE,EA,EE2    Subjective Information no complaints;agree to therapy  -KILLIAN agree to therapy;complains of;weakness;pain  -CW agree to therapy;complains of;pain  -EE,EA,EE2    Patient Effort, Rehab Treatment good  -KILLIAN good   -CW good  -EE,EA,EE2    Symptoms Noted During/After Treatment fatigue  -KH  fatigue;increased pain  -EE,EA,EE2    Precautions/Limitations fall precautions;brace on when up  -KH fall precautions  -CW fall precautions;other (see comments)   Contact percausions  -EE,EA,EE2    Specific Treatment Considerations isolation  - isolation  -CW     Recorded by [KH] Susan Wiggins, PT [CW] Jaya Gomez, PTA [EE,EA,EE2] Carmen Sarmiento, PT (r) Bipin Live, PT Student (t) Carmen Sarmiento, PT (c)    Vital Signs    Pre SpO2 (%)   95  -EE,EA,EE2    O2 Delivery Pre Treatment  room air  -CW room air  -EE,EA,EE2    O2 Delivery Intra Treatment   room air  -EE,EA,EE2    Post SpO2 (%)   95  -EE,EA,EE2    O2 Delivery Post Treatment   room air  -EE,EA,EE2    Pre Patient Position   Sitting  -EE,EA,EE2    Intra Patient Position   Standing  -EE,EA,EE2    Post Patient Position   Sitting  -EE,EA,EE2    Recovery Time   30 sec  -EE,EA,EE2    Rest Breaks    4  -EE,EA,EE2    Recorded by  [CW] Jaya Gomez, PTA [EE,EA,EE2] Carmen Sarmiento, PT (r) Bipin Live, PT Student (t) Carmen Sarmiento, PT (c)    Pain Assessment    Pain Assessment 0-10  -KH 0-10  -CW 0-10  -EE,EA,EE2    Pain Score 8  -KH 9  -CW 6  -EE,EA,EE2    Post Pain Score 8  -KH 9  -CW     Pain Type Surgical pain  -      Pain Location Back  -KH Back  -CW Back  -EE,EA,EE2    Pain Orientation Lower  -KH Lower  -CW Lower  -EE,EA,EE2    Pain Intervention(s) Ambulation/increased activity  -KH Repositioned;Ambulation/increased activity  -CW Ambulation/increased activity;Repositioned  -EE,EA,EE2    Response to Interventions  [place]  -CW tolerated  -EE,EA,EE2    Recorded by [KH] Susan Wiggins, PT [CW] Jaya Gomez, PTA [EE,EA,EE2] Carmen Sarmiento, PT (r) Bipin Live, PT Student (t) Carmen Sarmiento, PT (c)    Cognitive Assessment/Intervention    Current Cognitive/Communication Assessment functional  -KH functional  -CW functional  -EE,EA,EE2    Orientation Status oriented x 4  -KH oriented x 4  -CW oriented x  4  -EE,EA,EE2    Follows Commands/Answers Questions 100% of the time  -% of the time  -% of the time  -EE,EA,EE2    Personal Safety WNL/WFL  -KH WNL/WFL  -CW WNL/WFL  -EE,EA,EE2    Personal Safety Interventions fall prevention program maintained;gait belt;nonskid shoes/slippers when out of bed;supervised activity  -KH fall prevention program maintained;gait belt;muscle strengthening facilitated;nonskid shoes/slippers when out of bed  -CW fall prevention program maintained;gait belt;nonskid shoes/slippers when out of bed;supervised activity  -EE,EA,EE2    Recorded by [KH] Susan Wiggins, PT [CW] Jaya Gomez, PTA [EE,EA,EE2] Carmen Sarmiento, PT (r) Bipin Live, PT Student (t) Carmen Sarmiento, PT (c)    Bed Mobility, Assessment/Treatment    Bed Mob, Supine to Sit, [place] not tested  -KH not tested  -CW not tested  -EE,EA,EE2    Bed Mob, Sit to Supine, [place] not tested  -KH      Bed Mobility, Comment patient on BSC then up to chair  -KH in chair  -CW in chair  -EE,EA,EE2    Recorded by [KH] Susan Wiggins, PT [CW] Jaya Gomez, PTA [EE,EA,EE2] Carmen Sarmiento, PT (r) Bipin Live, PT Student (t) Carmen Sarmiento, PT (c)    Transfer Assessment/Treatment    Transfers, Sit-Stand [place] moderate assist (50% patient effort)  -KH moderate assist (50% patient effort)  -CW moderate assist (50% patient effort);2 person assist required;verbal cues required;nonverbal cues required (demo/gesture)  -EE,EA,EE2    Transfers, Stand-Sit [place] moderate assist (50% patient effort)  -KH moderate assist (50% patient effort)  -CW minimum assist (75% patient effort);verbal cues required  -EE,EA,EE2    Transfers, Sit-Stand-Sit, Assist Device rolling walker  -KH rolling walker  -CW rolling walker  -EE,EA,EE2    Transfer, Safety Issues weight-shifting ability decreased  -KH  weight-shifting ability decreased  -EE,EA,EE2    Transfer, Impairments strength decreased;pain  -KH  strength decreased;pain  -EE,EA,EE2     Transfer, Comment vc's to push off BSC to perform sit to stand transfer  -  pt. improved in ability to weight shift forward with pain. continues to require 2 person assist with standing  -EE,EA,EE2    Recorded by [KH] Susan Wiggins, PT [CW] Jaya Gomez, PTA [EE,EA,EE2] Carmen Sarmiento, PT (r) Bipin Live PT Student (t) Carmen Sarmiento PT (c)    Gait Assessment/Treatment    Gait, [place] Level contact guard assist  -KH contact guard assist  -CW contact guard assist;minimum assist (75% patient effort);1 person + 1 person to manage equipment;verbal cues required;nonverbal cues required (demo/gesture)  -EE,EA,EE2    Gait, Assistive Device rolling walker  -KH rolling walker  -CW rolling walker  -EE,EA,EE2    Gait, Distance (Feet) 150  -  -  -EE,EA,EE2    Gait, Gait Deviations maicol decreased;decreased heel strike;double stance time increased;step length decreased;stride length decreased;weight-shifting ability decreased  -KH maicol decreased;step length decreased;stride length decreased  -CW maicol decreased;forward flexed posture;step length decreased  -EE,EA,EE2    Gait, Impairments strength decreased;pain  -KH strength decreased;pain  -CW strength decreased;pain  -EE,EA,EE2    Gait, Comment vc's for upright posture  -  Pt. has less pain during ambulation, need for assistance increases as activity progresses. Pt. has difficulty maintaining upright posture, however improved from previous treatment  -EE,EA,EE2    Recorded by [KH] Susan Wiggins PT [CW] Jaya Gomez, PTA [EE,EA,EE2] Carmen Sarmiento, PT (r) Bipin Live, PT Student (t) Carmen Sarmiento PT (c)    Positioning and Restraints    Pre-Treatment Position bedside commode  -KH sitting in chair/recliner  -CW sitting in chair/recliner  -EE,EA,EE2    Post Treatment Position chair  -KH chair  -CW chair  -EE,EA,EE2    In Chair sitting;call light within reach;encouraged to call for assist  -KH notified nsg;sitting;call light within reach;encouraged to  call for assist  -CW sitting;call light within reach;encouraged to call for assist;exit alarm on;with family/caregiver  -EE,EA,EE2    Recorded by [KH] Susan Wiggins, PT [CW] Jaya Gomez, PTA [EE,EA,EE2] Carmen Sarmiento, PT (r) Bipin Live, PT Student (t) Carmen Sarmiento, PT (c)      User Key  (r) = Recorded By, (t) = Taken By, (c) = Cosigned By    Initials Name Effective Dates    EE Carmen Sarmiento, PT 12/01/15 -     KH Susan Wiggins, PT 06/22/16 -     CW Jaya Gomez, PTA 12/13/16 -     EA Bipin Live, PT Student 02/05/18 -                 IP PT Goals       02/26/18 1526          Bed Mobility PT LTG    Bed Mobility PT LTG, Date Established 02/26/18  -EE      Bed Mobility PT LTG, Time to Achieve 1 wk  -EE      Bed Mobility PT LTG, Activity Type all bed mobility  -EE      Bed Mobility PT LTG, [place] Level supervision required  -EE      Transfer Training PT LTG    Transfer Training PT LTG, Date Established 02/26/18  -EE      Transfer Training PT LTG, Time to Achieve 1 wk  -EE      Transfer Training PT LTG, Activity Type all transfers  -EE      Transfer Training PT LTG, [place] Level supervision required  -EE      Transfer Training PT LTG, Assist Device walker, rolling  -EE      Gait Training PT LTG    Gait Training Goal PT LTG, Date Established 02/26/18  -EE      Gait Training Goal PT LTG, Time to Achieve 1 wk  -EE      Gait Training Goal PT LTG, [place] Level supervision required  -EE      Gait Training Goal PT LTG, Assist Device walker, rolling  -EE      Gait Training Goal PT LTG, Distance to Achieve 150  -EE        User Key  (r) = Recorded By, (t) = Taken By, (c) = Cosigned By    Initials Name Provider Type    EE Carmen Sarmiento PT Physical Therapist          Physical Therapy Education     Title: PT OT SLP Therapies (Active)     Topic: Physical Therapy (Active)     Point: Mobility training (Done)    Learning Progress Summary    Learner Readiness Method Response Comment Documented by Status   Patient  Acceptance E VU  KH 03/03/18 1529 Done    Acceptance E,TB DU,VU  CW 03/02/18 1555 Done    Acceptance E,TB VU  RJ 03/01/18 1647 Done    Acceptance E NR  EA 03/01/18 1057 Active    Acceptance E NR  EA 02/28/18 1405 Active    Acceptance E,TB VU,DU  CW 02/27/18 0950 Done    Acceptance E NR  EE 02/26/18 1525 Active   Family Acceptance E,TB VU  RJ 03/01/18 1647 Done    Acceptance E NR  EA 03/01/18 1057 Active               Point: Body mechanics (Active)    Learning Progress Summary    Learner Readiness Method Response Comment Documented by Status   Patient Acceptance E NR  KH 03/03/18 1529 Active    Acceptance E,TB DU,VU  CW 03/02/18 1555 Done    Acceptance E NR  EA 03/01/18 1057 Active    Acceptance E NR  EA 02/28/18 1405 Active    Acceptance E,TB VU,DU  CW 02/27/18 0950 Done    Acceptance E NR  EE 02/26/18 1525 Active   Family Acceptance E NR  EA 03/01/18 1057 Active               Point: Precautions (Done)    Learning Progress Summary    Learner Readiness Method Response Comment Documented by Status   Patient Acceptance E VU  KH 03/03/18 1529 Done    Acceptance E,TB DU,VU  CW 03/02/18 1555 Done    Acceptance E,TB VU  RJ 03/01/18 1647 Done    Acceptance E,TB VU,DU  CW 02/27/18 0950 Done   Family Acceptance E,TB VU  RJ 03/01/18 1647 Done                      User Key     Initials Effective Dates Name Provider Type Discipline     02/08/17 -  Alpa Vargas, RN Registered Nurse Nurse     12/01/15 -  Carmen Sarmiento, PT Physical Therapist PT     06/22/16 -  Susan Wiggins, PT Physical Therapist PT     12/13/16 -  Jaya Gomez, PTA Physical Therapy Assistant PT     02/05/18 -  Bipin Live, PT Student PT Student PT                    PT Recommendation and Plan  Anticipated Discharge Disposition: home with assist, home with home health  Planned Therapy Interventions: balance training, bed mobility training, gait training, home exercise program, patient/family education, strengthening, stair training, transfer  training  PT Frequency: daily  Plan of Care Review  Plan Of Care Reviewed With: patient  Progress: improving  Outcome Summary/Follow up Plan: Patient doing well with ambulation but continues to require vc's for upright posture. Also patient struggles with weakness during sit to stand transfer and relies heavily on UE support to transfer and while ambulating.           Outcome Measures       03/03/18 1500 03/02/18 1500 03/01/18 1000    How much help from another person do you currently need...    Turning from your back to your side while in flat bed without using bedrails? 3  -KH 3  -CW 3  -EE (r) EA (t) EE (c)    Moving from lying on back to sitting on the side of a flat bed without bedrails? 3  -KH 3  -CW 3  -EE (r) EA (t) EE (c)    Moving to and from a bed to a chair (including a wheelchair)? 3  - 3  -CW 3  -EE (r) EA (t) EE (c)    Standing up from a chair using your arms (e.g., wheelchair, bedside chair)? 3  - 3  -CW 2  -EE (r) EA (t) EE (c)    Climbing 3-5 steps with a railing? 2  -KH 2  -CW 2  -EE (r) EA (t) EE (c)    To walk in hospital room? 3  -KH 3  -CW 3  -EE (r) EA (t) EE (c)    AM-PAC 6 Clicks Score 17  -KH 17  -CW 16  -EE (r) EA (t)    Functional Assessment    Outcome Measure Options AM-PAC 6 Clicks Basic Mobility (PT)  -KH AM-PAC 6 Clicks Basic Mobility (PT)  -CW AM-PAC 6 Clicks Basic Mobility (PT)  -EE (r) EA (t) EE (c)      User Key  (r) = Recorded By, (t) = Taken By, (c) = Cosigned By    Initials Name Provider Type    ORALIA Sarmiento, PT Physical Therapist    KILLIAN Wiggins, PT Physical Therapist    FLOYD Gomez, PTA Physical Therapy Assistant    SANTY Live, PT Student PT Student           Time Calculation:         PT Charges       03/03/18 1526          Time Calculation    Start Time 1516  -KH      Stop Time 1528  -KH      Time Calculation (min) 12 min  -      PT Received On 03/03/18  -      PT - Next Appointment 03/04/18  -        User Key  (r) = Recorded By, (t) = Taken By,  (c) = Cosigned By    Initials Name Provider Type    KILLIAN Wiggins PT Physical Therapist          Therapy Charges for Today     Code Description Service Date Service Provider Modifiers Qty    82665173431 HC PT THER PROC EA 15 MIN 3/3/2018 Susan Wiggins PT GP 1          PT G-Codes  Outcome Measure Options: AM-PAC 6 Clicks Basic Mobility (PT)    Susan Wiggins PT  3/3/2018

## 2018-03-04 ENCOUNTER — APPOINTMENT (OUTPATIENT)
Dept: CARDIOLOGY | Facility: HOSPITAL | Age: 78
End: 2018-03-04
Attending: INTERNAL MEDICINE

## 2018-03-04 ENCOUNTER — APPOINTMENT (OUTPATIENT)
Dept: GENERAL RADIOLOGY | Facility: HOSPITAL | Age: 78
End: 2018-03-04

## 2018-03-04 ENCOUNTER — APPOINTMENT (OUTPATIENT)
Dept: CT IMAGING | Facility: HOSPITAL | Age: 78
End: 2018-03-04

## 2018-03-04 LAB
ALBUMIN SERPL-MCNC: 2.4 G/DL (ref 3.5–5.2)
ALBUMIN/GLOB SERPL: 0.8 G/DL
ALP SERPL-CCNC: 111 U/L (ref 39–117)
ALT SERPL W P-5'-P-CCNC: 36 U/L (ref 1–41)
ANION GAP SERPL CALCULATED.3IONS-SCNC: 11.7 MMOL/L
APTT PPP: 31.4 SECONDS (ref 22.7–35.4)
AST SERPL-CCNC: 29 U/L (ref 1–40)
BASOPHILS # BLD AUTO: 0.02 10*3/MM3 (ref 0–0.2)
BASOPHILS NFR BLD AUTO: 0.2 % (ref 0–1.5)
BH CV LOW VAS RIGHT GASTRONEMIUS VESSEL: 1
BH CV LOWER VASCULAR LEFT COMMON FEMORAL AUGMENT: NORMAL
BH CV LOWER VASCULAR LEFT COMMON FEMORAL COMPETENT: NORMAL
BH CV LOWER VASCULAR LEFT COMMON FEMORAL COMPRESS: NORMAL
BH CV LOWER VASCULAR LEFT COMMON FEMORAL PHASIC: NORMAL
BH CV LOWER VASCULAR LEFT COMMON FEMORAL SPONT: NORMAL
BH CV LOWER VASCULAR LEFT DISTAL FEMORAL COMPRESS: NORMAL
BH CV LOWER VASCULAR LEFT GASTRONEMIUS COMPRESS: NORMAL
BH CV LOWER VASCULAR LEFT GREATER SAPH AK COMPRESS: NORMAL
BH CV LOWER VASCULAR LEFT GREATER SAPH BK COMPRESS: NORMAL
BH CV LOWER VASCULAR LEFT LESSER SAPH COMPRESS: NORMAL
BH CV LOWER VASCULAR LEFT MID FEMORAL AUGMENT: NORMAL
BH CV LOWER VASCULAR LEFT MID FEMORAL COMPETENT: NORMAL
BH CV LOWER VASCULAR LEFT MID FEMORAL COMPRESS: NORMAL
BH CV LOWER VASCULAR LEFT MID FEMORAL PHASIC: NORMAL
BH CV LOWER VASCULAR LEFT MID FEMORAL SPONT: NORMAL
BH CV LOWER VASCULAR LEFT PERONEAL COMPRESS: NORMAL
BH CV LOWER VASCULAR LEFT POPLITEAL AUGMENT: NORMAL
BH CV LOWER VASCULAR LEFT POPLITEAL COMPETENT: NORMAL
BH CV LOWER VASCULAR LEFT POPLITEAL COMPRESS: NORMAL
BH CV LOWER VASCULAR LEFT POPLITEAL PHASIC: NORMAL
BH CV LOWER VASCULAR LEFT POPLITEAL SPONT: NORMAL
BH CV LOWER VASCULAR LEFT POSTERIOR TIBIAL COMPRESS: NORMAL
BH CV LOWER VASCULAR LEFT PROXIMAL FEMORAL COMPRESS: NORMAL
BH CV LOWER VASCULAR LEFT SAPHENOFEMORAL JUNCTION COMPRESS: NORMAL
BH CV LOWER VASCULAR LEFT SAPHENOFEMORAL JUNCTION PHASIC: NORMAL
BH CV LOWER VASCULAR LEFT SAPHENOFEMORAL JUNCTION SPONT: NORMAL
BH CV LOWER VASCULAR RIGHT COMMON FEMORAL AUGMENT: NORMAL
BH CV LOWER VASCULAR RIGHT COMMON FEMORAL COMPETENT: NORMAL
BH CV LOWER VASCULAR RIGHT COMMON FEMORAL COMPRESS: NORMAL
BH CV LOWER VASCULAR RIGHT COMMON FEMORAL PHASIC: NORMAL
BH CV LOWER VASCULAR RIGHT COMMON FEMORAL SPONT: NORMAL
BH CV LOWER VASCULAR RIGHT DISTAL FEMORAL COMPRESS: NORMAL
BH CV LOWER VASCULAR RIGHT GASTRONEMIUS COMPRESS: NORMAL
BH CV LOWER VASCULAR RIGHT GASTRONEMIUS THROMBUS: NORMAL
BH CV LOWER VASCULAR RIGHT GREATER SAPH AK COMPRESS: NORMAL
BH CV LOWER VASCULAR RIGHT GREATER SAPH BK COMPRESS: NORMAL
BH CV LOWER VASCULAR RIGHT LESSER SAPH COMPRESS: NORMAL
BH CV LOWER VASCULAR RIGHT MID FEMORAL AUGMENT: NORMAL
BH CV LOWER VASCULAR RIGHT MID FEMORAL COMPETENT: NORMAL
BH CV LOWER VASCULAR RIGHT MID FEMORAL COMPRESS: NORMAL
BH CV LOWER VASCULAR RIGHT MID FEMORAL PHASIC: NORMAL
BH CV LOWER VASCULAR RIGHT MID FEMORAL SPONT: NORMAL
BH CV LOWER VASCULAR RIGHT PERONEAL COMPRESS: NORMAL
BH CV LOWER VASCULAR RIGHT POPLITEAL AUGMENT: NORMAL
BH CV LOWER VASCULAR RIGHT POPLITEAL COMPETENT: NORMAL
BH CV LOWER VASCULAR RIGHT POPLITEAL COMPRESS: NORMAL
BH CV LOWER VASCULAR RIGHT POPLITEAL PHASIC: NORMAL
BH CV LOWER VASCULAR RIGHT POPLITEAL SPONT: NORMAL
BH CV LOWER VASCULAR RIGHT POSTERIOR TIBIAL COMPRESS: NORMAL
BH CV LOWER VASCULAR RIGHT PROXIMAL FEMORAL COMPRESS: NORMAL
BH CV LOWER VASCULAR RIGHT SAPHENOFEMORAL JUNCTION COMPRESS: NORMAL
BH CV LOWER VASCULAR RIGHT SAPHENOFEMORAL JUNCTION PHASIC: NORMAL
BH CV LOWER VASCULAR RIGHT SAPHENOFEMORAL JUNCTION SPONT: NORMAL
BILIRUB SERPL-MCNC: 0.7 MG/DL (ref 0.1–1.2)
BILIRUB UR QL STRIP: NEGATIVE
BUN BLD-MCNC: 19 MG/DL (ref 8–23)
BUN/CREAT SERPL: 20 (ref 7–25)
CALCIUM SPEC-SCNC: 8.4 MG/DL (ref 8.6–10.5)
CHLORIDE SERPL-SCNC: 98 MMOL/L (ref 98–107)
CLARITY UR: CLEAR
CO2 SERPL-SCNC: 27.3 MMOL/L (ref 22–29)
COLOR UR: YELLOW
CREAT BLD-MCNC: 0.95 MG/DL (ref 0.76–1.27)
DEPRECATED RDW RBC AUTO: 51.1 FL (ref 37–54)
EOSINOPHIL # BLD AUTO: 0.1 10*3/MM3 (ref 0–0.7)
EOSINOPHIL NFR BLD AUTO: 0.8 % (ref 0.3–6.2)
ERYTHROCYTE [DISTWIDTH] IN BLOOD BY AUTOMATED COUNT: 14.8 % (ref 11.5–14.5)
GFR SERPL CREATININE-BSD FRML MDRD: 77 ML/MIN/1.73
GLOBULIN UR ELPH-MCNC: 3.2 GM/DL
GLUCOSE BLD-MCNC: 137 MG/DL (ref 65–99)
GLUCOSE BLDC GLUCOMTR-MCNC: 134 MG/DL (ref 70–130)
GLUCOSE BLDC GLUCOMTR-MCNC: 163 MG/DL (ref 70–130)
GLUCOSE BLDC GLUCOMTR-MCNC: 217 MG/DL (ref 70–130)
GLUCOSE BLDC GLUCOMTR-MCNC: 263 MG/DL (ref 70–130)
GLUCOSE BLDC GLUCOMTR-MCNC: 269 MG/DL (ref 70–130)
GLUCOSE UR STRIP-MCNC: ABNORMAL MG/DL
HCT VFR BLD AUTO: 37.9 % (ref 40.4–52.2)
HGB BLD-MCNC: 11.9 G/DL (ref 13.7–17.6)
HGB UR QL STRIP.AUTO: NEGATIVE
IMM GRANULOCYTES # BLD: 0.11 10*3/MM3 (ref 0–0.03)
IMM GRANULOCYTES NFR BLD: 0.8 % (ref 0–0.5)
INR PPP: 1.13 (ref 0.9–1.1)
KETONES UR QL STRIP: NEGATIVE
LEUKOCYTE ESTERASE UR QL STRIP.AUTO: NEGATIVE
LYMPHOCYTES # BLD AUTO: 0.42 10*3/MM3 (ref 0.9–4.8)
LYMPHOCYTES NFR BLD AUTO: 3.2 % (ref 19.6–45.3)
MCH RBC QN AUTO: 29.8 PG (ref 27–32.7)
MCHC RBC AUTO-ENTMCNC: 31.4 G/DL (ref 32.6–36.4)
MCV RBC AUTO: 94.8 FL (ref 79.8–96.2)
MONOCYTES # BLD AUTO: 0.4 10*3/MM3 (ref 0.2–1.2)
MONOCYTES NFR BLD AUTO: 3 % (ref 5–12)
NEUTROPHILS # BLD AUTO: 12.08 10*3/MM3 (ref 1.9–8.1)
NEUTROPHILS NFR BLD AUTO: 92 % (ref 42.7–76)
NITRITE UR QL STRIP: NEGATIVE
NRBC BLD MANUAL-RTO: 0 /100 WBC (ref 0–0)
PH UR STRIP.AUTO: 5.5 [PH] (ref 5–8)
PLATELET # BLD AUTO: 195 10*3/MM3 (ref 140–500)
PMV BLD AUTO: 11.5 FL (ref 6–12)
POTASSIUM BLD-SCNC: 4 MMOL/L (ref 3.5–5.2)
PROCALCITONIN SERPL-MCNC: 22.97 NG/ML (ref 0.1–0.25)
PROT SERPL-MCNC: 5.6 G/DL (ref 6–8.5)
PROT UR QL STRIP: NEGATIVE
PROTHROMBIN TIME: 14.3 SECONDS (ref 11.7–14.2)
RBC # BLD AUTO: 4 10*6/MM3 (ref 4.6–6)
SODIUM BLD-SCNC: 137 MMOL/L (ref 136–145)
SP GR UR STRIP: 1.02 (ref 1–1.03)
UROBILINOGEN UR QL STRIP: ABNORMAL
WBC NRBC COR # BLD: 13.13 10*3/MM3 (ref 4.5–10.7)

## 2018-03-04 PROCEDURE — 74018 RADEX ABDOMEN 1 VIEW: CPT

## 2018-03-04 PROCEDURE — 71045 X-RAY EXAM CHEST 1 VIEW: CPT

## 2018-03-04 PROCEDURE — 81003 URINALYSIS AUTO W/O SCOPE: CPT | Performed by: INTERNAL MEDICINE

## 2018-03-04 PROCEDURE — 85610 PROTHROMBIN TIME: CPT | Performed by: INTERNAL MEDICINE

## 2018-03-04 PROCEDURE — 97116 GAIT TRAINING THERAPY: CPT | Performed by: PHYSICAL THERAPIST

## 2018-03-04 PROCEDURE — 85730 THROMBOPLASTIN TIME PARTIAL: CPT | Performed by: INTERNAL MEDICINE

## 2018-03-04 PROCEDURE — 87086 URINE CULTURE/COLONY COUNT: CPT | Performed by: INTERNAL MEDICINE

## 2018-03-04 PROCEDURE — 25010000002 HEPARIN (PORCINE) PER 1000 UNITS: Performed by: INTERNAL MEDICINE

## 2018-03-04 PROCEDURE — 25010000002 ENOXAPARIN PER 10 MG: Performed by: SURGERY

## 2018-03-04 PROCEDURE — 71275 CT ANGIOGRAPHY CHEST: CPT

## 2018-03-04 PROCEDURE — 63710000001 INSULIN ASPART PER 5 UNITS: Performed by: INTERNAL MEDICINE

## 2018-03-04 PROCEDURE — 99233 SBSQ HOSP IP/OBS HIGH 50: CPT | Performed by: INTERNAL MEDICINE

## 2018-03-04 PROCEDURE — 85025 COMPLETE CBC W/AUTO DIFF WBC: CPT | Performed by: SURGERY

## 2018-03-04 PROCEDURE — 74177 CT ABD & PELVIS W/CONTRAST: CPT

## 2018-03-04 PROCEDURE — 99024 POSTOP FOLLOW-UP VISIT: CPT | Performed by: SURGERY

## 2018-03-04 PROCEDURE — 94799 UNLISTED PULMONARY SVC/PX: CPT

## 2018-03-04 PROCEDURE — 82962 GLUCOSE BLOOD TEST: CPT

## 2018-03-04 PROCEDURE — 25010000002 FUROSEMIDE PER 20 MG: Performed by: INTERNAL MEDICINE

## 2018-03-04 PROCEDURE — 93970 EXTREMITY STUDY: CPT

## 2018-03-04 PROCEDURE — 80053 COMPREHEN METABOLIC PANEL: CPT | Performed by: SURGERY

## 2018-03-04 PROCEDURE — 25010000002 CEFTRIAXONE IN SWFI 2 GRAMS/20ML IV PUSH SYRINGE (SIMPLE): Performed by: INTERNAL MEDICINE

## 2018-03-04 PROCEDURE — 0 IOPAMIDOL PER 1 ML: Performed by: INTERNAL MEDICINE

## 2018-03-04 PROCEDURE — 87040 BLOOD CULTURE FOR BACTERIA: CPT | Performed by: INTERNAL MEDICINE

## 2018-03-04 PROCEDURE — 84145 PROCALCITONIN (PCT): CPT | Performed by: INTERNAL MEDICINE

## 2018-03-04 RX ORDER — LACTULOSE 10 G/15ML
20 SOLUTION ORAL 3 TIMES DAILY PRN
Status: DISCONTINUED | OUTPATIENT
Start: 2018-03-04 | End: 2018-03-13 | Stop reason: HOSPADM

## 2018-03-04 RX ORDER — FUROSEMIDE 10 MG/ML
40 INJECTION INTRAMUSCULAR; INTRAVENOUS ONCE
Status: COMPLETED | OUTPATIENT
Start: 2018-03-04 | End: 2018-03-04

## 2018-03-04 RX ORDER — HEPARIN SODIUM 5000 [USP'U]/ML
40-80 INJECTION, SOLUTION INTRAVENOUS; SUBCUTANEOUS EVERY 6 HOURS PRN
Status: DISCONTINUED | OUTPATIENT
Start: 2018-03-04 | End: 2018-03-07

## 2018-03-04 RX ADMIN — INSULIN DETEMIR 34 UNITS: 100 INJECTION, SOLUTION SUBCUTANEOUS at 21:33

## 2018-03-04 RX ADMIN — DOCUSATE SODIUM -SENNOSIDES 2 TABLET: 50; 8.6 TABLET, COATED ORAL at 08:00

## 2018-03-04 RX ADMIN — IPRATROPIUM BROMIDE AND ALBUTEROL SULFATE 3 ML: .5; 3 SOLUTION RESPIRATORY (INHALATION) at 12:50

## 2018-03-04 RX ADMIN — INSULIN ASPART 4 UNITS: 100 INJECTION, SOLUTION INTRAVENOUS; SUBCUTANEOUS at 21:34

## 2018-03-04 RX ADMIN — PREGABALIN 100 MG: 100 CAPSULE ORAL at 21:34

## 2018-03-04 RX ADMIN — LACTULOSE 20 G: 10 SOLUTION ORAL at 10:05

## 2018-03-04 RX ADMIN — FUROSEMIDE 40 MG: 10 INJECTION, SOLUTION INTRAMUSCULAR; INTRAVENOUS at 10:45

## 2018-03-04 RX ADMIN — IOPAMIDOL 95 ML: 755 INJECTION, SOLUTION INTRAVENOUS at 20:00

## 2018-03-04 RX ADMIN — LACTULOSE 20 G: 10 SOLUTION ORAL at 21:35

## 2018-03-04 RX ADMIN — DOCUSATE SODIUM -SENNOSIDES 2 TABLET: 50; 8.6 TABLET, COATED ORAL at 21:34

## 2018-03-04 RX ADMIN — HYDROCODONE BITARTRATE AND ACETAMINOPHEN 1 TABLET: 7.5; 325 TABLET ORAL at 08:00

## 2018-03-04 RX ADMIN — CYCLOBENZAPRINE 10 MG: 10 TABLET, FILM COATED ORAL at 12:55

## 2018-03-04 RX ADMIN — IPRATROPIUM BROMIDE AND ALBUTEROL SULFATE 3 ML: .5; 3 SOLUTION RESPIRATORY (INHALATION) at 19:57

## 2018-03-04 RX ADMIN — HYDROCODONE BITARTRATE AND ACETAMINOPHEN 1 TABLET: 7.5; 325 TABLET ORAL at 01:29

## 2018-03-04 RX ADMIN — FLUTICASONE PROPIONATE 2 SPRAY: 50 SPRAY, METERED NASAL at 08:00

## 2018-03-04 RX ADMIN — INSULIN ASPART 3 UNITS: 100 INJECTION, SOLUTION INTRAVENOUS; SUBCUTANEOUS at 17:45

## 2018-03-04 RX ADMIN — PREGABALIN 100 MG: 100 CAPSULE ORAL at 15:25

## 2018-03-04 RX ADMIN — LINAGLIPTIN 5 MG: 5 TABLET, FILM COATED ORAL at 08:00

## 2018-03-04 RX ADMIN — GLIPIZIDE 10 MG: 10 TABLET ORAL at 08:00

## 2018-03-04 RX ADMIN — ENOXAPARIN SODIUM 40 MG: 40 INJECTION SUBCUTANEOUS at 08:00

## 2018-03-04 RX ADMIN — IPRATROPIUM BROMIDE AND ALBUTEROL SULFATE 3 ML: .5; 3 SOLUTION RESPIRATORY (INHALATION) at 08:55

## 2018-03-04 RX ADMIN — INSULIN ASPART 2 UNITS: 100 INJECTION, SOLUTION INTRAVENOUS; SUBCUTANEOUS at 08:00

## 2018-03-04 RX ADMIN — PREGABALIN 100 MG: 100 CAPSULE ORAL at 08:00

## 2018-03-04 RX ADMIN — INSULIN ASPART 4 UNITS: 100 INJECTION, SOLUTION INTRAVENOUS; SUBCUTANEOUS at 12:55

## 2018-03-04 RX ADMIN — GLIPIZIDE 10 MG: 10 TABLET ORAL at 17:45

## 2018-03-04 RX ADMIN — HEPARIN SODIUM 12.6 UNITS/KG/HR: 10000 INJECTION, SOLUTION INTRAVENOUS at 17:45

## 2018-03-04 RX ADMIN — HYDROCODONE BITARTRATE AND ACETAMINOPHEN 1 TABLET: 7.5; 325 TABLET ORAL at 15:25

## 2018-03-04 RX ADMIN — LACTULOSE 20 G: 10 SOLUTION ORAL at 17:45

## 2018-03-04 RX ADMIN — CEFTRIAXONE 2 G: 2 INJECTION, POWDER, FOR SOLUTION INTRAMUSCULAR; INTRAVENOUS at 10:45

## 2018-03-04 RX ADMIN — INSULIN DETEMIR 34 UNITS: 100 INJECTION, SOLUTION SUBCUTANEOUS at 08:00

## 2018-03-04 NOTE — PROGRESS NOTES
Vascular lab bilateral lower extremity venous doppler complete, prelim right leg new calf vein thrombosis without extension to popliteal vein, left leg negative DVT - MYRA Santos aware

## 2018-03-04 NOTE — THERAPY TREATMENT NOTE
Acute Care - Physical Therapy Treatment Note  [facility]     Patient Name: Jesus Beckham  : 1940  MRN: [id number]  Today's Date: 3/4/2018  Onset of Illness/Injury or Date of Surgery Date: 18     Referring Physician: Flores    Admit Date: 2018    Visit Dx:    ICD-10-CM ICD-9-CM   1. Calculus of gallbladder without cholecystitis without obstruction K80.20 574.20   2. Sepsis, due to unspecified organism A41.9 038.9     995.91   3. RUQ abdominal mass R19.01 789.31   4. RUQ abdominal pain R10.11 789.01   5. Gangrenous cholecystitis K81.0 575.0   6. Decreased mobility R26.89 781.99     Patient Active Problem List   Diagnosis   • Gout   • Diabetic peripheral neuropathy   • Dyslipidemia   • Uncontrolled type 2 diabetes mellitus   • Primary osteoarthritis involving multiple joints   • Allergic rhinitis due to pollen   • Essential hypertension   • PVC (premature ventricular contraction)   • Vitamin D deficiency   • Benign non-nodular prostatic hyperplasia without lower urinary tract symptoms   • Osteoarthritis   • Urge incontinence   • CELINA (obstructive sleep apnea)   • Leg edema   • Acute gangrenous cholecystitis   • RUQ abdominal pain   • A-fib               Adult Rehabilitation Note       18 1500 18 1527 18 1500    Rehab Assessment/Intervention    Discipline physical therapist  -LC physical therapist  -KH physical therapy assistant  -CW    Document Type therapy note (daily note)  - therapy note (daily note)  - therapy note (daily note)  -CW    Subjective Information agree to therapy;complains of;weakness;fatigue;pain  - no complaints;agree to therapy  - agree to therapy;complains of;weakness;pain  -CW    Patient Effort, Rehab Treatment adequate  - good  - good  -CW    Symptoms Noted During/After Treatment fatigue  - fatigue  -     Precautions/Limitations fall precautions;brace on when up;oxygen therapy device and L/min  - fall precautions;brace on when up  -  fall precautions  -CW    Specific Treatment Considerations isolation  -LC isolation  -KH isolation  -CW    Recorded by [LC] Sudhir Castrejon, PT DPT [KH] Susan Wiggins, PT [CW] Jaya Gomez, PTA    Vital Signs    O2 Delivery Pre Treatment supplemental O2  -LC  room air  -CW    O2 Delivery Intra Treatment supplemental O2  -LC      O2 Delivery Post Treatment supplemental O2  -LC      Recorded by [LC] Sudhir Castrejon, PT DPT  [CW] Jaya Gomez, PTA    Pain Assessment    Pain Assessment 0-10  -LC 0-10  -KH 0-10  -CW    Pain Score 8  -LC 8  -KH 9  -CW    Post Pain Score 8  -LC 8  -KH 9  -CW    Pain Type Surgical pain  -LC Surgical pain  -KH     Pain Location Back  -LC Back  -KH Back  -CW    Pain Orientation Lower  -LC Lower  -KH Lower  -CW    Pain Intervention(s) Repositioned  -LC Ambulation/increased activity  -KH Repositioned;Ambulation/increased activity  -CW    Response to Interventions   [place]  -CW    Recorded by [LC] Sudhir Castrejon, PT DPT [KH] Susan Wiggins, PT [CW] Jaya Gomez, PTA    Cognitive Assessment/Intervention    Current Cognitive/Communication Assessment functional  -LC functional  -KH functional  -CW    Orientation Status oriented x 4  -LC oriented x 4  -KH oriented x 4  -CW    Follows Commands/Answers Questions 100% of the time;able to follow multi-step instructions  -% of the time  -% of the time  -CW    Personal Safety WNL/WFL  -LC WNL/WFL  -KH WNL/WFL  -CW    Personal Safety Interventions fall prevention program maintained;gait belt;muscle strengthening facilitated;nonskid shoes/slippers when out of bed  -LC fall prevention program maintained;gait belt;nonskid shoes/slippers when out of bed;supervised activity  -KH fall prevention program maintained;gait belt;muscle strengthening facilitated;nonskid shoes/slippers when out of bed  -CW    Recorded by [LC] Sudhir Castrejon, PT DPT [KH] Susan Wiggins, PT [CW] Jaya Gomez, PTA    Bed Mobility, Assessment/Treatment    Bed  Mobility, Roll Right, [place] minimum assist (75% patient effort)  -LC      Bed Mobility, Scoot/Bridge, [place] minimum assist (75% patient effort)  -LC      Bed Mob, Supine to Sit, [place] minimum assist (75% patient effort)  -LC not tested  -KH not tested  -CW    Bed Mob, Sit to Supine, [place]  not tested  -KH     Bed Mobility, Safety Issues decreased use of legs for bridging/pushing  -LC      Bed Mobility, Impairments strength decreased;impaired balance;pain  -LC      Bed Mobility, Comment  patient on BSC then up to chair  -KH in chair  -CW    Recorded by [LC] Sudhir Castrejon, PT DPT [KH] Susan Wiggins, PT [CW] Jaya Gomez, PTA    Transfer Assessment/Treatment    Transfers, Sit-Stand [place] minimum assist (75% patient effort)  -LC moderate assist (50% patient effort)  -KH moderate assist (50% patient effort)  -CW    Transfers, Stand-Sit [place] minimum assist (75% patient effort)  -LC moderate assist (50% patient effort)  -KH moderate assist (50% patient effort)  -CW    Transfers, Sit-Stand-Sit, Assist Device rolling walker  -LC rolling walker  -KH rolling walker  -CW    Transfer, Safety Issues weight-shifting ability decreased  - weight-shifting ability decreased  -KH     Transfer, Impairments strength decreased;pain  -LC strength decreased;pain  -KH     Transfer, Comment  vc's to push off BSC to perform sit to stand transfer  -KH     Recorded by [LC] Sudhir Castrejon, PT DPT [KH] Susan Wiggins, PT [CW] Jaya Gomez, PTA    Gait Assessment/Treatment    Gait, [place] Level contact guard assist  -LC contact guard assist  -KH contact guard assist  -CW    Gait, Assistive Device rolling walker  -LC rolling walker  -KH rolling walker  -CW    Gait, Distance (Feet) 150  -  -  -CW    Gait, Gait Deviations  maicol decreased;decreased heel strike;double stance time increased;step length decreased;stride length decreased;weight-shifting ability decreased  -KH maicol  decreased;step length decreased;stride length decreased  -CW    Gait, Impairments strength decreased;pain  -LC strength decreased;pain  -KH strength decreased;pain  -CW    Gait, Comment  vc's for upright posture  -KH     Recorded by [LC] Sudhir Castrejon, PT DPT [KH] Susan Wiggins, PT [CW] Jaya Gomez, PTA    Positioning and Restraints    Pre-Treatment Position in bed  -LC bedside commode  -KH sitting in chair/recliner  -CW    Post Treatment Position chair  -LC chair  -KH chair  -CW    In Chair sitting;call light within reach;notified nsg;encouraged to call for assist;with family/caregiver  - sitting;call light within reach;encouraged to call for assist  -KH notified nsg;sitting;call light within reach;encouraged to call for assist  -CW    Recorded by [LC] Sudhir Castrejon, PT DPT [KH] Susan Wiggins, PT [CW] Jaya Gomez, PTA      User Key  (r) = Recorded By, (t) = Taken By, (c) = Cosigned By    Initials Name Effective Dates     Susan Wiggins, PT 06/22/16 -     LC Sudhir Castrejon, PT DPT 08/02/16 -     CW Jaya Gomez, PTA 12/13/16 -                 IP PT Goals       02/26/18 1526          Bed Mobility PT LTG    Bed Mobility PT LTG, Date Established 02/26/18  -EE      Bed Mobility PT LTG, Time to Achieve 1 wk  -EE      Bed Mobility PT LTG, Activity Type all bed mobility  -EE      Bed Mobility PT LTG, [place] Level supervision required  -EE      Transfer Training PT LTG    Transfer Training PT LTG, Date Established 02/26/18  -EE      Transfer Training PT LTG, Time to Achieve 1 wk  -EE      Transfer Training PT LTG, Activity Type all transfers  -EE      Transfer Training PT LTG, [place] Level supervision required  -EE      Transfer Training PT LTG, Assist Device walker, rolling  -EE      Gait Training PT LTG    Gait Training Goal PT LTG, Date Established 02/26/18  -EE      Gait Training Goal PT LTG, Time to Achieve 1 wk  -EE      Gait Training Goal PT LTG, [place] Level supervision required   -EE      Gait Training Goal PT LTG, Assist Device walker, rolling  -EE      Gait Training Goal PT LTG, Distance to Achieve 150  -EE        User Key  (r) = Recorded By, (t) = Taken By, (c) = Cosigned By    Initials Name Provider Type    ORALIA Sarmiento, PT Physical Therapist          Physical Therapy Education     Title: PT OT SLP Therapies (Active)     Topic: Physical Therapy (Active)     Point: Mobility training (Done)    Learning Progress Summary    Learner Readiness Method Response Comment Documented by Status   Patient Acceptance E,D VU,DU safety during transfers and ambulation LC 03/04/18 1515 Done    Acceptance E VU  KH 03/03/18 1529 Done    Acceptance E,TB DU,VU  CW 03/02/18 1555 Done    Acceptance E,TB VU  JR 03/01/18 1647 Done    Acceptance E NR  EA 03/01/18 1057 Active    Acceptance E NR  EA 02/28/18 1405 Active    Acceptance E,TB VU,DU  CW 02/27/18 0950 Done    Acceptance E NR  EE 02/26/18 1525 Active   Family Acceptance E,TB VU  RJ 03/01/18 1647 Done    Acceptance E NR  EA 03/01/18 1057 Active               Point: Body mechanics (Active)    Learning Progress Summary    Learner Readiness Method Response Comment Documented by Status   Patient Acceptance E NR  KH 03/03/18 1529 Active    Acceptance E,TB DU,VU  CW 03/02/18 1555 Done    Acceptance E NR  EA 03/01/18 1057 Active    Acceptance E NR  EA 02/28/18 1405 Active    Acceptance E,TB VU,DU  CW 02/27/18 0950 Done    Acceptance E NR  EE 02/26/18 1525 Active   Family Acceptance E NR  EA 03/01/18 1057 Active               Point: Precautions (Done)    Learning Progress Summary    Learner Readiness Method Response Comment Documented by Status   Patient Acceptance E VU  KH 03/03/18 1529 Done    Acceptance E,TB DU,VU  CW 03/02/18 1555 Done    Acceptance E,TB VU  RJ 03/01/18 1647 Done    Acceptance E,TB VU,DU  CW 02/27/18 0950 Done   Family Acceptance E,TB VU  RJ 03/01/18 1647 Done                      User Key     Initials Effective Dates Name Provider Type  Discipline    RJ 02/08/17 -  Alpa Vargas, RN Registered Nurse Nurse    EE 12/01/15 -  Carmen Sarmiento, PT Physical Therapist PT     06/22/16 -  Susan Wiggins, PT Physical Therapist PT     08/02/16 -  Sudhir Castrejon, PT DPT Physical Therapist PT    CW 12/13/16 -  Jaya Gomez, PTA Physical Therapy Assistant PT    EA 02/05/18 -  Bipin Live, PT Student PT Student PT                    PT Recommendation and Plan  Anticipated Discharge Disposition: home with assist, home with home health  Planned Therapy Interventions: balance training, bed mobility training, gait training, home exercise program, patient/family education, strengthening, stair training, transfer training  PT Frequency: daily  Plan of Care Review  Plan Of Care Reviewed With: patient, family  Progress: progress toward functional goals is gradual  Outcome Summary/Follow up Plan: Pt transferred supine to sit with min/mod x 1 and use of bedrails. Pt transferred sit to stand with min x 1 and RW. Pt ambulated 150 ft with RW and CGA x 1. Pt on 2L O2/NC at all times.          Outcome Measures       03/04/18 1500 03/03/18 1500 03/02/18 1500    How much help from another person do you currently need...    Turning from your back to your side while in flat bed without using bedrails? 3  - 3  -KH 3  -CW    Moving from lying on back to sitting on the side of a flat bed without bedrails? 3  - 3  -KH 3  -CW    Moving to and from a bed to a chair (including a wheelchair)? 3  - 3  -KH 3  -CW    Standing up from a chair using your arms (e.g., wheelchair, bedside chair)? 3  - 3  -KH 3  -CW    Climbing 3-5 steps with a railing? 2  - 2  -KH 2  -CW    To walk in hospital room? 3  - 3  -KH 3  -CW    AM-PAC 6 Clicks Score 17  -LC 17  -KH 17  -CW    Functional Assessment    Outcome Measure Options AM-PAC 6 Clicks Basic Mobility (PT)  -LC AM-PAC 6 Clicks Basic Mobility (PT)  -KH AM-PAC 6 Clicks Basic Mobility (PT)  -CW      User Key  (r) = Recorded By, (t) =  Taken By, (c) = Cosigned By    Initials Name Provider Type    KILLIAN Wiggins, PT Physical Therapist    LC Sudhir Castrejon, PT DPT Physical Therapist    CW Jaya Gomez, PTA Physical Therapy Assistant           Time Calculation:         PT Charges       03/04/18 1517          Time Calculation    Start Time 1445  -LC      Stop Time 1515  -LC      Time Calculation (min) 30 min  -      PT Received On 03/04/18  -LC      PT - Next Appointment 03/05/18  -      Time Calculation- PT    Total Timed Code Minutes- PT 30 minute(s)  -        User Key  (r) = Recorded By, (t) = Taken By, (c) = Cosigned By    Initials Name Provider Type     Sudhir Castrejon, PT DPT Physical Therapist          Therapy Charges for Today     Code Description Service Date Service Provider Modifiers Qty    [id number] HC GAIT TRAINING EA 15 MIN 3/4/2018 Sudhir Castrejon, PT DPT GP 2          PT G-Codes  Outcome Measure Options: AM-PAC 6 Clicks Basic Mobility (PT)    Sudhir Castrejon, PT DPT  3/4/2018

## 2018-03-04 NOTE — NURSING NOTE
IV RN at bedside to get an AC site for stat chest CT for PE protocol--pt refused x2 to be interrupted from dinner. Reinforced need for site and stat CT. refused

## 2018-03-04 NOTE — PROGRESS NOTES
"     LOS: 9 days   Primary Care Physician: Magdy Ricardo MD     Subjective   Feels \"lousy\".  No palpitations or chest pain.  Short of breath.  Back on oxygen.  Bowels have still not moved.    Vital Signs  Body mass index is 37.74 kg/(m^2).  Temp:  [98 °F (36.7 °C)-100.6 °F (38.1 °C)] 100.5 °F (38.1 °C)  Heart Rate:  [] 130  Resp:  [14-20] 20  BP: ()/(69-94) 152/76      Objective:  General Appearance:  In no acute distress (Morbidly obese.  Looks like he doesn't feel good).    Vital signs: (most recent): Blood pressure 152/76, pulse (!) 130, temperature 100.5 °F (38.1 °C), resp. rate 20, height 177.8 cm (70\"), weight 119 kg (263 lb), SpO2 92 %.    HEENT: (Neck supple.  No lymphadenopathy.  Trachea is midline.  Oropharynx clear.  No thrush)    Lungs:  There are decreased breath sounds.  No wheezes, rales or rhonchi.  (Poor air entry)  Heart: Tachycardia.  Regular rhythm.  No murmur.   Abdomen: Abdomen is distended.  (Obese)Bowel sounds are normal.   There is no abdominal tenderness.   There is no splenomegaly. There is no hepatomegaly.   Extremities: (Fingers are less swollen.  Still has 1-2+ edema at the ankles but a little better than yesterday.  Feet are swollen but no ulcerations or erythema.)  Pulses: There are decreased pulses.    Neurological: Patient is alert and oriented to person, place and time.    Skin:  Warm and dry.  No rash.         Results Review:    I reviewed the patient's new clinical results.      Results from last 7 days  Lab Units 03/04/18  0453 03/03/18  0552   WBC 10*3/mm3 13.13* 8.62   HEMOGLOBIN g/dL 11.9* 11.3*   PLATELETS 10*3/mm3 195 198       Results from last 7 days  Lab Units 03/04/18  0453 03/03/18  0552   SODIUM mmol/L 137 138   POTASSIUM mmol/L 4.0 4.3   CHLORIDE mmol/L 98 100   CO2 mmol/L 27.3 27.8   BUN mg/dL 19 16   CREATININE mg/dL 0.95 0.78   CALCIUM mg/dL 8.4* 8.4*   GLUCOSE mg/dL 137* 218*         Hemoglobin A1C:  Lab Results   Component Value Date    HGBA1C 7.70 " (H) 02/23/2018       Glucose Range:  Glucose   Date/Time Value Ref Range Status   03/04/2018 0742 163 (H) 70 - 130 mg/dL Final   03/04/2018 0311 134 (H) 70 - 130 mg/dL Final   03/03/2018 1927 244 (H) 70 - 130 mg/dL Final   03/03/2018 1628 219 (H) 70 - 130 mg/dL Final   03/03/2018 1136 226 (H) 70 - 130 mg/dL Final   03/03/2018 0803 195 (H) 70 - 130 mg/dL Final       No results found for: [id number]    Lab Results   Component Value Date    TSH 2.090 12/06/2017       Assessment & Plan      Medication Review: Yes    Active Hospital Problems (** Indicates Principal Problem)    Diagnosis Date Noted   • **Acute gangrenous cholecystitis [K81.0] 02/24/2018   • A-fib [I48.91] 02/25/2018   • RUQ abdominal pain [R10.11] 02/24/2018   • CELINA (obstructive sleep apnea) [G47.33] 02/23/2018   • Leg edema [R60.0] 02/23/2018   • Vitamin D deficiency [E55.9] 04/28/2017   • 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       Assessment/Plan  1.  Fever last night and today with tachycardia and leukocytosis.  Also now with increased shortness of breath and hypoxia.  He meets criteria for sepsis again.  Antibiotic changed to IV Rocephin per ID.  Chest x-ray ordered as well as urinalysis.  Venous Dopplers of the lower extremities ordered.  Increase lactulose.  Check KUB  2.  Diabetes mellitus type 2 with hyperglycemia.  Sugars are improved.  Levemir was increased  yesterday.  3.  Lower extremity edema, improved from yesterday.  Repeat dose of Lasix again today.  Bladder scan to make sure there is no urinary retention.  He voids small amounts at a time.  Echocardiogram this admission with EF 50% and normal diastolic function.  Patient is on Lyrica too.  4.  Acute hypoxic respiratory failure.  RSV infection at time of admission.  Chest x-ray ordered.  Continue incentive spirometry, mini nebs.  Likely has underlying COPD.  Patient smoked in the past.  May need CT of the chest pending clinical course and results of above studies.  5.  SVT secondary to above.  Electrolytes okay.  Identify and treat underlying issue.    Discussed with patient's daughter at bedside.  Discussed with nurse    Patito Waggoner MD  03/04/18  10:17 AM      Addendum: calf vein thrombus found on the right.  Will start IV heparin given previous concern for possible hematoma at the psoas muscle.  Discussed with Dr. Burnett.  Proceed with CT abdomen and chest pelvis.  Reimage lumbar spine given continued complaints of pain

## 2018-03-04 NOTE — NURSING NOTE
"Returned to pt to start 20 G in AC for stat CT. Pt asleep with head nodded toward tray and when I addressed him by name He responded \"young lady I am still eating and need more time\". Explained that the MD has ordered this CT stat to evaluated for blood clots in his lungs. Pt still refused to have IV placed at this time.    "

## 2018-03-04 NOTE — PROGRESS NOTES
LOS: 9 days     Chief Complaint: E coli bacteremia, low grade fever    Interval History:  Febrile yesterday afternoon and again this mornign. RN tells me he is more tachycardic and tachypneic. She says he has not had a BM in 6 days despite laxatives. He does not have associated vomiting. He denies abdominal pain but does have distension. He is a little short of air today. He was changed from Zosyn to PO levofloxacin last night.    ROS: no diarrhea, no rash    Vital Signs  Temp:  [98 °F (36.7 °C)-100.6 °F (38.1 °C)] 100.5 °F (38.1 °C)  Heart Rate:  [] 130  Resp:  [14-20] 20  BP: ()/(69-94) 152/76  95% on 2L NC    Physical Exam:  General: awake, alert, sitting in chair  Head: normocephalic, no trauma  Eyes: no scleral icterus, no conj pallor  ENT: MM dry, no thrush  Neck: supple  Cardiovascular: tachycardic, RR, no le edema   Respiratory: tachypneic on 2L NC; few scattered rales; no wheezing  GI: Soft, distended, non tender, distant bowel sounds, lap site healing well  : no Murray  MSK: no obvious joint abnormalities  Psych: calm  Vasc: no cyanosis  Skin: No rashes, some bruising    Antibiotics:  Levofloxacin 750 mg PO q24h     Results Review:    CBC, CMP, micro reviewed today    Lab Results   Component Value Date    WBC 13.13 (H) 03/04/2018    HGB 11.9 (L) 03/04/2018    HCT 37.9 (L) 03/04/2018    MCV 94.8 03/04/2018     03/04/2018     Lab Results   Component Value Date    GLUCOSE 137 (H) 03/04/2018    BUN 19 03/04/2018    CREATININE 0.95 03/04/2018    EGFRIFNONA 77 03/04/2018    EGFRIFAFRI 88 12/06/2017    BCR 20.0 03/04/2018    CO2 27.3 03/04/2018    CALCIUM 8.4 (L) 03/04/2018    PROTENTOTREF 6.8 12/06/2017    ALBUMIN 2.40 (L) 03/04/2018    LABIL2 0.8 03/04/2018    AST 29 03/04/2018    ALT 36 03/04/2018     Lab Results   Component Value Date    HGBA1C 7.70 (H) 02/23/2018       Microbiology:  2/28 BCx NGTD x 2  2/24 BCx NGTD x 2  2/23 RVP + RSV  2/23 BCx E coli 2/2 2/23 Influenza  neg    Assessment/Plan   1.  Sepsis - new to provider  -returned; elevated WBC, HR, and RR  -obtain BCx  -change levofloxacin to ceftriaxone 2 g IV q24h  -check procal and CXR     2.  Escherichia coli bacteremia setting of acute gangrenous cholecystitis   -s/p laparoscopic cholecystectomy on February 25, 2018  -antibiotics as above    3.  RSV  - supportive care    4.  Acute hypoxic respiratory failure - worse today  -CXR and procal as above     5.  Uncontrolled DM2 - complicating above     ID will follow.

## 2018-03-04 NOTE — PLAN OF CARE
Problem: Patient Care Overview (Adult)  Goal: Plan of Care Review  Outcome: Ongoing (interventions implemented as appropriate)   03/04/18 1516   Coping/Psychosocial Response Interventions   Plan Of Care Reviewed With patient;family   Patient Care Overview   Progress progress toward functional goals is gradual   Outcome Evaluation   Outcome Summary/Follow up Plan Pt transferred supine to sit with min/mod x 1 and use of bedrails. Pt transferred sit to stand with min x 1 and RW. Pt ambulated 150 ft with RW and CGA x 1. Pt on 2L O2/NC at all times.

## 2018-03-04 NOTE — PROGRESS NOTES
LOS: 9 days   Patient Care Team:  Magdy Ricardo MD as PCP - General  Magdy Ricardo MD as PCP - Family Medicine  Staci Keyes MD as Consulting Physician (Endocrinology)  Jerrell Fry MD as Consulting Physician (Urology)    Subjective     Dr. Waggoner asked me to revisit patient developed shortness of breath hypoxia and low-grade fever.  Patient just returned from lower extremity Dopplers he does have an acute right calf DVT.  He is not currently short of breath he was earlier and his hypoxia has improved.  He is not having chest pain but he is having back pain he says it's across his whole lower back.  He says he just cannot get comfortable maybe he was slightly tender when I lifted up on his right flank versus his left    Review of Systems:          Objective     Vital Signs  Vital Sign Min/Max for last 24 hours  Temp  Min: 98 °F (36.7 °C)  Max: 100.6 °F (38.1 °C)   BP  Min: 96/69  Max: 173/81   Pulse  Min: 83  Max: 132   Resp  Min: 14  Max: 20   SpO2  Min: 90 %  Max: 94 %   Flow (L/min)  Min: 2  Max: 2   No Data Recorded        Ventilator/Non-Invasive Ventilation Settings     None                       Body mass index is 37.74 kg/(m^2).  I/O last 3 completed shifts:  In: 1080 [P.O.:1080]  Out: 3125 [Urine:3125]  I/O this shift:  In: 490 [P.O.:490]  Out: 300 [Urine:300]        Physical Exam:  General Appearance: Well-developed obese white male Eyeing on a stretcher  Eyes: Conjunctiva are clear and anicteric  ENT: Mucous membranes are moist no erythema or exudates nasal septum midline and he has a Mallampati type II airway  Neck: No jugular venous distention trachea midline  Lungs: Clear nonlabored symmetric expansion   Cardiac: Regular rate and rhythm  Abdomen: Abdominal binder in place not removed  : Not examined  Musc/Skel: Edema all 4 extremities 2+ upper extremities 3-4+ lower extremities  Skin: No jaundice no petechiae  Neuro: He is alert oriented he is cooperative  Extremities/P  Vascular: No clubbing or cyanosis he has palpable radial dorsalis pedis pulses  MSE: He is not in very good spirits today       Labs:    Results from last 7 days  Lab Units 03/04/18  0453 03/03/18  0552 03/02/18  0323 03/01/18  0429 02/28/18  0419 02/27/18  0348 02/26/18  0520 02/25/18  2037   GLUCOSE mg/dL 137* 218* 169* 160* 166* 175* 169*  --    SODIUM mmol/L 137 138 140 140 137 135* 131*  --    POTASSIUM mmol/L 4.0 4.3 3.9 4.2 4.0 4.5 4.8 5.2   MAGNESIUM mg/dL  --   --   --   --   --   --   --  2.2   CO2 mmol/L 27.3 27.8 26.5 27.2 27.1 27.4 23.9  --    CHLORIDE mmol/L 98 100 101 102 99 98 97*  --    ANION GAP mmol/L 11.7 10.2 12.5 10.8 10.9 9.6 10.1  --    CREATININE mg/dL 0.95 0.78 0.96 0.85 0.87 0.85 0.90  --    BUN mg/dL 19 16 19 21 25* 26* 27*  --    BUN / CREAT RATIO  20.0 20.5 19.8 24.7 28.7* 30.6* 30.0*  --    CALCIUM mg/dL 8.4* 8.4* 8.3* 8.1* 8.5* 8.6 8.2*  --    EGFR IF NONAFRICN AM mL/min/1.73 77 97 76 88 85 88 82  --    ALK PHOS U/L 111 91 81 86 90 91 83  --    TOTAL PROTEIN g/dL 5.6* 5.5* 5.5* 5.5* 5.8* 5.7* 5.6*  --    ALT (SGPT) U/L 36 33 36 39 46* 54* 56*  --    AST (SGOT) U/L 29 37 24 26 30 45* 54*  --    BILIRUBIN mg/dL 0.7 0.6 0.5 0.7 0.7 0.6 0.6  --    ALBUMIN g/dL 2.40* 2.30* 2.60* 2.50* 2.60* 2.60* 2.40*  --    GLOBULIN gm/dL 3.2 3.2 2.9 3.0 3.2 3.1 3.2  --    A/G RATIO g/dL 0.8 0.7 0.9 0.8 0.8 0.8 0.8  --      Estimated Creatinine Clearance: 85.5 mL/min (by C-G formula based on Cr of 0.95).        Results from last 7 days  Lab Units 03/04/18  0453 03/03/18  0552 03/02/18  0323 03/01/18  0429 02/28/18  0419 02/27/18  0348 02/26/18  0520   WBC 10*3/mm3 13.13* 8.62 7.46 8.35 7.59 7.56 8.11   RBC 10*6/mm3 4.00* 3.72* 3.79* 3.95* 4.07* 4.05* 4.07*   HEMOGLOBIN g/dL 11.9* 11.3* 11.5* 12.0* 12.2* 12.2* 12.5*   HEMATOCRIT % 37.9* 34.9* 35.8* 37.6* 38.5* 38.5* 38.6*   MCV fL 94.8 93.8 94.5 95.2 94.6 95.1 94.8   MCH pg 29.8 30.4 30.3 30.4 30.0 30.1 30.7   MCHC g/dL 31.4* 32.4* 32.1* 31.9* 31.7* 31.7*  32.4*   RDW % 14.8* 14.9* 14.6* 14.6* 14.6* 14.7* 14.9*   RDW-SD fl 51.1 50.7 49.7 50.7 50.2 51.5 51.6   MPV fL 11.5 11.9 11.9 11.6 12.3* 11.7 12.0   PLATELETS 10*3/mm3 195 198 217 191 148 107* 84*   NEUTROPHIL % % 92.0* 84.8* 71.5 73.1 70.6 71.6 77.7*   LYMPHOCYTE % % 3.2* 7.8* 13.1* 12.1* 13.6* 13.0* 10.2*   MONOCYTES % % 3.0* 4.9* 9.8 10.4 12.5* 13.4* 11.2   EOSINOPHIL % % 0.8 1.5 1.6 0.4 0.5 1.1 0.4   BASOPHIL % % 0.2 0.2 0.4 0.4 0.3 0.1 0.1   IMM GRAN % % 0.8* 0.8* 3.6* 3.6* 2.5* 0.8* 0.4   NEUTROS ABS 10*3/mm3 12.08* 7.31 5.33 6.11 5.36 5.42 6.30   LYMPHS ABS 10*3/mm3 0.42* 0.67* 0.98 1.01 1.03 0.98 0.83*   MONOS ABS 10*3/mm3 0.40 0.42 0.73 0.87 0.95 1.01 0.91   EOS ABS 10*3/mm3 0.10 0.13 0.12 0.03 0.04 0.08 0.03   BASOS ABS 10*3/mm3 0.02 0.02 0.03 0.03 0.02 0.01 0.01   IMMATURE GRANS (ABS) 10*3/mm3 0.11* 0.07* 0.27* 0.30* 0.19* 0.06* 0.03   NRBC /100 WBC 0.0  --  0.0 0.0 0.0  --   --            Results from last 7 days  Lab Units 02/28/18 1953 02/26/18 0520 02/25/18 2037   TROPONIN T ng/mL <0.010 0.018 0.012       Results from last 7 days  Lab Units 02/28/18  1953   PROBNP pg/mL 1443.0           Results from last 7 days  Lab Units 03/04/18  0453 03/01/18  0429 02/28/18 1953   LACTATE mmol/L  --  0.9 3.7*   PROCALCITONIN ng/mL 22.97*  --  1.93*         Microbiology Results (last 10 days)     Procedure Component Value - Date/Time    Blood Culture - Blood, [[id number]]  (Normal) Collected:  02/28/18 1832    Lab Status:  Preliminary result Specimen:  Blood from Arm, Left Updated:  03/03/18 1846     Blood Culture No growth at 3 days    Blood Culture - Blood, [[id number]]  (Normal) Collected:  02/28/18 1533    Lab Status:  Preliminary result Specimen:  Blood from Arm, Right Updated:  03/04/18 1546     Blood Culture No growth at 4 days    Blood Culture - Blood, [[id number]]  (Normal) Collected:  02/24/18 1224    Lab Status:  Final result Specimen:  Blood from Arm, Left Updated:  03/01/18 1246     Blood Culture No  growth at 5 days    Blood Culture - Blood, [[id number]]  (Normal) Collected:  02/24/18 1131    Lab Status:  Final result Specimen:  Blood from Arm, Left Updated:  03/01/18 1146     Blood Culture No growth at 5 days    Respiratory Panel, PCR - Swab, Nasopharynx [[id number]]  (Abnormal) Collected:  02/23/18 0206    Lab Status:  Final result Specimen:  Swab from Nasopharynx Updated:  02/23/18 0630     ADENOVIRUS, PCR Not Detected     Coronavirus 229E Not Detected     Coronavirus HKU1 Not Detected     Coronavirus NL63 Not Detected     Coronavirus OC43 Not Detected     Human Metapneumovirus Not Detected     Human Rhinovirus/Enterovirus Not Detected     Influenza B PCR Not Detected     Parainfluenza Virus 1 Not Detected     Parainfluenza Virus 2 Not Detected     Parainfluenza Virus 3 Not Detected     Parainfluenza Virus 4 Not Detected     Bordetella pertussis pcr Not Detected     Influenza A H1 2009 PCR Not Detected     Chlamydophila pneumoniae PCR Not Detected     Mycoplasma pneumo by PCR Not Detected     Influenza A PCR Not Detected     Influenza A H3 Not Detected     Influenza A H1 Not Detected     RSV, PCR Detected (A)    Blood Culture - Blood, [[id number]]  (Abnormal) Collected:  02/23/18 0118    Lab Status:  Final result Specimen:  Blood from Arm, Left Updated:  02/25/18 0704     Blood Culture --      Escherichia coli (A)     Gram Stain Result Anaerobic Bottle Gram negative bacilli      Aerobic Bottle Gram negative bacilli      Appended report. These results have been appended to a previously preliminary verified report.       Narrative:       Refer to LEONOR on specimen Collected 2/23/2018 0031.    Blood Culture ID, PCR - Blood, [[id number]]  (Abnormal) Collected:  02/23/18 0118    Lab Status:  Final result Specimen:  Blood from Arm, Left Updated:  02/23/18 2128     BCID, PCR Escherichia coli. Identification by BCID PCR. (C)    Blood Culture - Blood, [[id number]]  (Abnormal)  (Susceptibility) Collected:  02/23/18 0031     Lab Status:  Final result Specimen:  Blood from Arm, Left Updated:  02/25/18 0703     Blood Culture --      Escherichia coli (A)     Gram Stain Result Aerobic Bottle Gram negative bacilli      Anaerobic Bottle Gram negative bacilli    Susceptibility      Escherichia coli     LEONOR     Ampicillin >=32 ug/ml Resistant     Ampicillin + Sulbactam >=32 ug/ml Resistant     Cefazolin >=64 ug/ml Resistant     Cefepime <=1 ug/ml Susceptible     Cefoxitin >=64 ug/ml Resistant     Ceftriaxone <=1 ug/ml Susceptible     Ertapenem <=0.5 ug/ml Susceptible     Gentamicin <=1 ug/ml Susceptible     Levofloxacin 0.5 ug/ml Susceptible     Meropenem <=0.25 ug/ml Susceptible     Piperacillin + Tazobactam 8 ug/ml Susceptible     Trimethoprim + Sulfamethoxazole <=20 ug/ml Susceptible                    Influenza Antigen, Rapid - Swab, Nasopharynx [[id number]]  (Normal) Collected:  02/23/18 0031    Lab Status:  Final result Specimen:  Swab from Nasopharynx Updated:  02/23/18 0052     Influenza A Ag, EIA Negative     Influenza B Ag, EIA Negative    Blood Culture ID, PCR - Blood, [[id number]]  (Abnormal) Collected:  02/23/18 0031    Lab Status:  Final result Specimen:  Blood from Arm, Left Updated:  02/23/18 1407     BCID, PCR Escherichia coli. Identification by BCID PCR. (C)                ceftriaxone 2 g Intravenous Q24H   enoxaparin 40 mg Subcutaneous Daily   fluticasone 2 spray Nasal Daily   glipiZIDE 10 mg Oral BID AC   insulin aspart 0-7 Units Subcutaneous 4x Daily With Meals & Nightly   insulin detemir 34 Units Subcutaneous Q12H   ipratropium-albuterol 3 mL Nebulization Q6H While Awake - RT   linagliptin 5 mg Oral Daily   pregabalin 100 mg Oral TID   sennosides-docusate sodium 2 tablet Oral BID          Diagnostics:  Xr Chest 2 View    Result Date: 2/23/2018  PA AND LATERAL CHEST X-RAY  HISTORY: fever  COMPARISON: None.  FINDINGS: PA and lateral views of the chest were obtained. Patient is kyphotic. The lungs are under aerated with  pulmonary vascular congestion bordering on mild edema. There is mild right lung base atelectasis. There is no convincing evidence of active air space disease process otherwise. Mild cardiomegaly. No significant pleural fluid. Thoracolumbar kyphosis noted with multilevel spurring.        Under aeration with pulmonary vascular congestion bordering on mild edema/CHF  This report was finalized on 2/23/2018 12:58 AM by Jerrell Dumont MD.      Ct Chest Without Contrast    Result Date: 2/26/2018  CT CHEST WITHOUT CONTRAST  HISTORY: Rule out right middle lobe infiltrate. To evaluate source of fever.  TECHNIQUE: Axial CT images of the chest were obtained without administration of intravenous contrast. Coronal and sagittal reformats were obtained.  COMPARISON: Chest radiographs.  FINDINGS: The visualized thyroid gland is normal. Calcified plaque is seen within the aortic arch and the coronary arteries. Calcified lymph nodes are seen within the right hilum and the subcarinal region. The central airways are patent without endobronchial lesion. Bilateral lung fields are under aerated with lower lobe atelectatic changes. No focal infiltrate is identified. No suspicious pulmonary nodules.  The visualized upper abdomen demonstrates a distended gallbladder with dependent hyperdensity that may represent sludge and/or stones. Degenerative changes are seen within the thoracic spine.      1. Bilateral lung fields are under aerated with atelectatic changes within the bilateral lower lobes. No focal infiltrate is identified. 2. Mildly distended gallbladder with dependent hyperdensity that may represent stones.  Radiation dose reduction techniques were utilized, including automated exposure control and exposure modulation based on body size.  This report was finalized on 2/26/2018 2:27 PM by Dr. Gideon Shah MD.      Mri Lumbar Spine Without Contrast    Result Date: 2/28/2018  MRI LUMBAR SPINE WO CONTRAST-  INDICATIONS: Low back pain  with radiculopathy.  TECHNIQUE: NONCONTRAST LUMBAR SPINE MRI   COMPARISON: None available  FINDINGS:   Marrow signal with heterogeneous with endplate changes noted, but no acute fracture identified. Laminectomy changes at L3, L4 are noted.  Alignment is in range of normal.  Conus medullaris appears unremarkable.  Increased T2 signal within the upper aspect of the right psoas, which appears mildly thickened, for example image 21 of series 2 could for example represent strain/partial tear or inflammatory/infectious process, correlate clinically, follow-up evaluation with enhanced imaging could be considered if not contraindicated.   Axial levels:  T12/L1, L1/2: No significant disc bulge, central or neural foraminal stenosis.  L2/3: Left lateral recess is effaced by what may represent disc extrusion (enhanced imaging could take to exclude alternative possibilities of a collection or lesion), which also contributes to severe left neural foraminal narrowing (with exiting nerve root impingement) and, with broad-based disc bulge and facet and ligamentum flavum hypertrophy, moderate central stenosis, moderate right neural foraminal narrowing.  L3/4: Broad-based disc bulge is present, as well as central disc extrusion narrowing the anterior thecal space and, with facet ligamentum flavum hypertrophy moderate to prominent right, moderate left neural foraminal stenosis.  L4/5: Broad-based disc bulge with left paracentral disc extrusion apparent, as well as disc protrusion at the right neural foramina, result in effacement of the left lateral recess (with impingement of left traversing nerve root), narrowing of the anterior thecal space, and, with facet and ligamentum flavum hypertrophy, moderate bilateral neuroforaminal narrowing.  L5/S1: Broad-based disc bulge there is the anterior thecal space and, with facet and ligamentum flavum hypertrophy, contributes to moderate to prominent right, prominent left neuroforaminal  narrowing.           1. 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  2. Signal changes in the upper right psoas muscle, could be posttraumatic in origin or may be inflammatory/infectious in nature, correlate clinically. Enhanced imaging could be considered for further evaluation.  This report was finalized on 2/28/2018 6:24 PM by Dr. Chris Posey MD.      Ct Abdomen Pelvis With Contrast    Result Date: 2/28/2018  CT ABDOMEN AND PELVIS WITH IV CONTRAST  HISTORY: 77-year-old male underwent laparoscopic cholecystectomy on 02/25/2018 for gangrenous cholecystitis and cholelithiasis.  TECHNIQUE: CT abdomen and pelvis with intravenous and oral contrast.  COMPARISON: CT chest without contrast 02/23/2018.  FINDINGS: There has been cholecystectomy and there is a drain extending into the right upper quadrant beneath the liver. There is material filling the gallbladder fossa. This includes a 4.5 x 2.5 x 2.8 cm low signal material that may represent bioabsorbable packing material. There are adjacent tiny bubbles of gas and there is also surrounding fluid. There is no mature or drainable collection. Thin band of fluid extends adjacent to the anterior liver. Minimal edema extends into the hepatic parenchyma just above anterior margin of the gallbladder fossa. Liver otherwise appears normal.  Within the medial spleen there is a 1.5 cm low-density lesion. Splenic size is normal. Adrenal glands, pancreas appear normal. There is a diverticulum emanating medially from the descending duodenum. A medial left lower pole renal cyst measures 1.3 cm. Right kidney appears normal and there is no hydronephrosis. Minimal free fluid extends into the pelvis. There is no bowel dilatation or obstruction.      1. Patient is 3 days post cholecystectomy with low density packing material, small bubbles  of gas, and fluid filling the gallbladder fossa. Fluid partially extends into the adjacent hepatic parenchyma and may represent hemorrhagic material/blood products. There is mild perihepatic fluid and a small amount of fluid is present within the lower pelvis. Surgical drain extends into the right upper quadrant. Infected fluid would be difficult to exclude though there is no evidence for mature or drainable collection. 2. Tiny renal cysts. Duodenal diverticulum. 15 mm low-density lesion medial spleen of doubtful significance.  Radiation dose reduction techniques were utilized, including automated exposure control and exposure modulation based on body size.  This report was finalized on 2/28/2018 5:44 PM by Dr. Pop Haider MD.      Us Gallbladder    Result Date: 2/24/2018  GALLBLADDER ULTRASOUND  HISTORY: Right upper quadrant pain and fever.  FINDINGS: The gallbladder contains several tiny gallstones near the neck of the gallbladder. The gallbladder is somewhat distended, measuring 11.3 cm in length and raising the concern of an element of cystic duct obstruction. This can also be seen on yesterday's chest CT scan. The common bile duct measures 4 mm. There is no wall thickening.  The visualized liver and pancreas and right kidney are unremarkable.  CONCLUSION: Distended gallbladder containing tiny gallstones in the neck of the gallbladder and concerning for cystic duct obstruction. The common bile duct is normal in caliber.  This report was finalized on 2/24/2018 9:15 AM by Dr. William Myers MD.      Xr Chest 1 View    Result Date: 2/28/2018  PORTABLE CHEST X-RAY  CLINICAL HISTORY: resp failure; K80.20-Calculus of gallbladder without cholecystitis without obstruction; A41.9-Sepsis, unspecified organism; R19.01-Right upper quadrant abdominal swelling, mass and lump; R10.11-Right upper quadrant pain; K81.0-Acute cholecystitis; R26.89-Other abnormalities of gait and mobility  COMPARISON: 02/25/2018.  FINDINGS:  Portable AP view of the chest was obtained with overlying monitor leads in place. Lungs are poorly aerated with pulmonary vascular congestion and an opacity in the right lung base favored to reflect atelectasis rather than pneumonia. There may be a small right effusion as well. Left lung poorly aerated but clear. Normal heart size. Mild aortic ectasia.        Poor inspiration with vascular congestion and right lung base opacity as above. Recommend follow-up  This report was finalized on 2/28/2018 9:34 PM by Jerrell Dumont MD.      Xr Chest 1 View    Result Date: 2/25/2018  ONE VIEW PORTABLE CHEST AT 12:25 PM  HISTORY: Recent gallbladder surgery. Shortness of breath.  There is somewhat decreased lung expansion with further accentuation of vascular congestion compared to 2 days ago. Some of this relates to the poor lung expansion but could also reflect a component of mild CHF. The heart remains enlarged.  This report was finalized on 2/25/2018 12:44 PM by Dr. William Myers MD.      Fl Cholangiogram Operative    Result Date: 2/25/2018  INTRAOPERATIVE CHOLANGIOGRAM  HISTORY: Gallstones.  FINDINGS:  Contrast fills the biliary system with emptying into the duodenum. There appears to be a small mobile air bubble in the common hepatic duct that is seen on early images. No definite gallstones are seen.  59 images were obtained and the fluoroscopy time measures 10 seconds.  This report was finalized on 2/25/2018 11:32 AM by Dr. William Myers MD.      Results for orders placed during the hospital encounter of 02/22/18   Adult Transthoracic Echo Complete W/ Cont if Necessary Per Protocol    Narrative · The study is technically difficult for diagnosis.  · Left ventricle not well visualized. Left ventricular systolic function   is normal. Calculated EF = 50.3%. Estimated EF was in agreement with the   calculated EF. Normal left ventricular cavity size and wall thickness   noted.  · Left ventricular diastolic function is  normal.  · There is calcification of the aortic valve.          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         Assessment/Plan     1. Sepsis patient has developed a fever and increasing white count increasing hypoxia and a markedly elevated pro-calcitonin.  DVT or DVT and PE could explain leukocytosis, fever, and hypoxia but hard to explain the markedly elevated pro-calcitonin.  I worry about that right psoas enlargementFor 2 reasons 1 could be an abscess explain the increased pro-calcitonin hard to was a hematoma and what is our risk of anticoagulating.  I discussed with Dr. Waggoner were going to get a CT scan of the chest abdomen and pelvis I will do the the chest with PE protocol.  The presence of a PE would alter the duration of anticoagulation.  And if  we saw a large hematoma it might push us towards IVC filter.  The course of we saw a developing abscess it might explain the elevated pro-calcitonin  2. Acute hypoxemic respiratory failure his chest x-ray is not worse if anything gets a little better he does have a calf DVT this raises the possibility of a pulmonary embolus and I think we need to proceed with a CT angiogram of the chest.    3. New right calf DVT franko with Dr. Waggoner  4. Escherichia coli bacteremia  5. RSV infection  6. Acute gangrenous cholecystitis status post laparoscopic cholecystectomy on 3/26/18  7. Diabetes mellitus type 2  8. Hyponatremia  9. Right psoas abscess versus hematoma surgery following I am going to get a repeat CT as noted above  10. Back pain question related to psoas lesion    Plan for disposition: I have spent over 40 minutes on patient's care today    Evaristo Burnett MD  03/04/18  4:15 PM    Time:

## 2018-03-04 NOTE — PLAN OF CARE
Problem: Patient Care Overview (Adult)  Goal: Plan of Care Review  Outcome: Ongoing (interventions implemented as appropriate)   03/04/18 0240   Coping/Psychosocial Response Interventions   Plan Of Care Reviewed With patient   Patient Care Overview   Progress improving   Outcome Evaluation   Outcome Summary/Follow up Plan VSS, am labs, encourage mobility, pain better controlled, temp once gave tylenol, plan for D/C Mon? await precert, PO antibx       Problem: Infection, Risk/Actual (Adult)  Goal: Identify Related Risk Factors and Signs and Symptoms  Outcome: Ongoing (interventions implemented as appropriate)      Problem: Fall Risk (Adult)  Goal: Identify Related Risk Factors and Signs and Symptoms  Outcome: Ongoing (interventions implemented as appropriate)      Problem: Pain, Acute (Adult)  Goal: Identify Related Risk Factors and Signs and Symptoms  Outcome: Ongoing (interventions implemented as appropriate)

## 2018-03-04 NOTE — PLAN OF CARE
Problem: Patient Care Overview (Adult)  Goal: Plan of Care Review  Outcome: Ongoing (interventions implemented as appropriate)   03/04/18 1805   Coping/Psychosocial Response Interventions   Plan Of Care Reviewed With patient   Patient Care Overview   Progress declining   Outcome Evaluation   Outcome Summary/Follow up Plan elevated HR this morning paired w/low grade temp--BC x2, procal (22.97); chest xray, KUB, UA (-), venous doppler (+ R calf thrombosis). Heparin gtt started this evening. CT chest for PE protocol ordered. CT abd/pelvis ordered. MRI lumbar spine ordered. All 3 to be completed tonight to r/o emboli and/or spinal abscess. PO abx changed back to IV rocephin. HR back down to high 90s-low 100s this afternoon and back on room air. continues to c/o constant pain (asleep when not stimulated). treated x2 w/lortab and x1 w/flexeril. lactulose dose increased and given x2--no BM as of yet. family called and updated. --JONH Pendleton RN     Goal: Adult Individualization and Mutuality  Outcome: Ongoing (interventions implemented as appropriate)    Goal: Discharge Needs Assessment  Outcome: Ongoing (interventions implemented as appropriate)      Problem: Infection, Risk/Actual (Adult)  Goal: Identify Related Risk Factors and Signs and Symptoms  Outcome: Ongoing (interventions implemented as appropriate)    Goal: Infection Prevention/Resolution  Outcome: Ongoing (interventions implemented as appropriate)      Problem: Fall Risk (Adult)  Goal: Identify Related Risk Factors and Signs and Symptoms  Outcome: Ongoing (interventions implemented as appropriate)    Goal: Absence of Falls  Outcome: Ongoing (interventions implemented as appropriate)      Problem: Pain, Acute (Adult)  Goal: Identify Related Risk Factors and Signs and Symptoms  Outcome: Ongoing (interventions implemented as appropriate)

## 2018-03-05 ENCOUNTER — APPOINTMENT (OUTPATIENT)
Dept: MRI IMAGING | Facility: HOSPITAL | Age: 78
End: 2018-03-05
Attending: INTERNAL MEDICINE

## 2018-03-05 PROBLEM — M46.46 DISCITIS OF LUMBAR REGION: Status: ACTIVE | Noted: 2018-03-05

## 2018-03-05 PROBLEM — I82.4Z1: Status: ACTIVE | Noted: 2018-03-05

## 2018-03-05 LAB
ANION GAP SERPL CALCULATED.3IONS-SCNC: 13.8 MMOL/L
APTT PPP: 40.4 SECONDS (ref 22.7–35.4)
APTT PPP: 65.4 SECONDS (ref 22.7–35.4)
APTT PPP: 80.9 SECONDS (ref 22.7–35.4)
BACTERIA SPEC AEROBE CULT: NORMAL
BACTERIA SPEC AEROBE CULT: NORMAL
BASOPHILS # BLD AUTO: 0.02 10*3/MM3 (ref 0–0.2)
BASOPHILS NFR BLD AUTO: 0.2 % (ref 0–1.5)
BUN BLD-MCNC: 21 MG/DL (ref 8–23)
BUN/CREAT SERPL: 20.4 (ref 7–25)
CALCIUM SPEC-SCNC: 8.7 MG/DL (ref 8.6–10.5)
CHLORIDE SERPL-SCNC: 98 MMOL/L (ref 98–107)
CO2 SERPL-SCNC: 26.2 MMOL/L (ref 22–29)
CREAT BLD-MCNC: 1.03 MG/DL (ref 0.76–1.27)
DEPRECATED RDW RBC AUTO: 50.1 FL (ref 37–54)
EOSINOPHIL # BLD AUTO: 0.04 10*3/MM3 (ref 0–0.7)
EOSINOPHIL NFR BLD AUTO: 0.4 % (ref 0.3–6.2)
ERYTHROCYTE [DISTWIDTH] IN BLOOD BY AUTOMATED COUNT: 14.7 % (ref 11.5–14.5)
GFR SERPL CREATININE-BSD FRML MDRD: 70 ML/MIN/1.73
GLUCOSE BLD-MCNC: 173 MG/DL (ref 65–99)
GLUCOSE BLDC GLUCOMTR-MCNC: 169 MG/DL (ref 70–130)
GLUCOSE BLDC GLUCOMTR-MCNC: 192 MG/DL (ref 70–130)
GLUCOSE BLDC GLUCOMTR-MCNC: 239 MG/DL (ref 70–130)
GLUCOSE BLDC GLUCOMTR-MCNC: 265 MG/DL (ref 70–130)
HCT VFR BLD AUTO: 38.3 % (ref 40.4–52.2)
HGB BLD-MCNC: 12.2 G/DL (ref 13.7–17.6)
IMM GRANULOCYTES # BLD: 0.06 10*3/MM3 (ref 0–0.03)
IMM GRANULOCYTES NFR BLD: 0.6 % (ref 0–0.5)
LYMPHOCYTES # BLD AUTO: 0.57 10*3/MM3 (ref 0.9–4.8)
LYMPHOCYTES NFR BLD AUTO: 5.5 % (ref 19.6–45.3)
MCH RBC QN AUTO: 29.8 PG (ref 27–32.7)
MCHC RBC AUTO-ENTMCNC: 31.9 G/DL (ref 32.6–36.4)
MCV RBC AUTO: 93.6 FL (ref 79.8–96.2)
MONOCYTES # BLD AUTO: 0.72 10*3/MM3 (ref 0.2–1.2)
MONOCYTES NFR BLD AUTO: 6.9 % (ref 5–12)
NEUTROPHILS # BLD AUTO: 9 10*3/MM3 (ref 1.9–8.1)
NEUTROPHILS NFR BLD AUTO: 86.4 % (ref 42.7–76)
NRBC BLD MANUAL-RTO: 0 /100 WBC (ref 0–0)
PLATELET # BLD AUTO: 198 10*3/MM3 (ref 140–500)
PMV BLD AUTO: 11.6 FL (ref 6–12)
POTASSIUM BLD-SCNC: 3.7 MMOL/L (ref 3.5–5.2)
RBC # BLD AUTO: 4.09 10*6/MM3 (ref 4.6–6)
SODIUM BLD-SCNC: 138 MMOL/L (ref 136–145)
WBC NRBC COR # BLD: 10.41 10*3/MM3 (ref 4.5–10.7)

## 2018-03-05 PROCEDURE — 72158 MRI LUMBAR SPINE W/O & W/DYE: CPT

## 2018-03-05 PROCEDURE — A9577 INJ MULTIHANCE: HCPCS | Performed by: HOSPITALIST

## 2018-03-05 PROCEDURE — 80048 BASIC METABOLIC PNL TOTAL CA: CPT | Performed by: INTERNAL MEDICINE

## 2018-03-05 PROCEDURE — 99024 POSTOP FOLLOW-UP VISIT: CPT | Performed by: SURGERY

## 2018-03-05 PROCEDURE — 85730 THROMBOPLASTIN TIME PARTIAL: CPT | Performed by: INTERNAL MEDICINE

## 2018-03-05 PROCEDURE — 85025 COMPLETE CBC W/AUTO DIFF WBC: CPT | Performed by: SURGERY

## 2018-03-05 PROCEDURE — 25010000002 CEFTRIAXONE IN SWFI 2 GRAMS/20ML IV PUSH SYRINGE (SIMPLE): Performed by: INTERNAL MEDICINE

## 2018-03-05 PROCEDURE — 94799 UNLISTED PULMONARY SVC/PX: CPT

## 2018-03-05 PROCEDURE — 25010000002 HEPARIN (PORCINE) PER 1000 UNITS: Performed by: INTERNAL MEDICINE

## 2018-03-05 PROCEDURE — 25010000002 HYDROMORPHONE PER 4 MG: Performed by: SURGERY

## 2018-03-05 PROCEDURE — 99233 SBSQ HOSP IP/OBS HIGH 50: CPT | Performed by: INTERNAL MEDICINE

## 2018-03-05 PROCEDURE — 99232 SBSQ HOSP IP/OBS MODERATE 35: CPT | Performed by: ORTHOPAEDIC SURGERY

## 2018-03-05 PROCEDURE — 82962 GLUCOSE BLOOD TEST: CPT

## 2018-03-05 PROCEDURE — 85730 THROMBOPLASTIN TIME PARTIAL: CPT | Performed by: HOSPITALIST

## 2018-03-05 PROCEDURE — 0 GADOBENATE DIMEGLUMINE 529 MG/ML SOLUTION: Performed by: HOSPITALIST

## 2018-03-05 PROCEDURE — 63710000001 INSULIN ASPART PER 5 UNITS: Performed by: INTERNAL MEDICINE

## 2018-03-05 RX ORDER — POTASSIUM CHLORIDE 750 MG/1
40 CAPSULE, EXTENDED RELEASE ORAL ONCE
Status: COMPLETED | OUTPATIENT
Start: 2018-03-05 | End: 2018-03-05

## 2018-03-05 RX ORDER — HYDROCODONE BITARTRATE AND ACETAMINOPHEN 10; 325 MG/1; MG/1
1 TABLET ORAL EVERY 4 HOURS PRN
Status: DISCONTINUED | OUTPATIENT
Start: 2018-03-05 | End: 2018-03-06

## 2018-03-05 RX ORDER — FUROSEMIDE 40 MG/1
40 TABLET ORAL DAILY
Status: DISCONTINUED | OUTPATIENT
Start: 2018-03-05 | End: 2018-03-06

## 2018-03-05 RX ORDER — CEFTRIAXONE SODIUM 2 G/50ML
2 INJECTION, SOLUTION INTRAVENOUS EVERY 24 HOURS
Status: DISCONTINUED | OUTPATIENT
Start: 2018-03-06 | End: 2018-03-09

## 2018-03-05 RX ORDER — SENNA AND DOCUSATE SODIUM 50; 8.6 MG/1; MG/1
2 TABLET, FILM COATED ORAL 2 TIMES DAILY
Status: DISCONTINUED | OUTPATIENT
Start: 2018-03-05 | End: 2018-03-05

## 2018-03-05 RX ADMIN — INSULIN ASPART 4 UNITS: 100 INJECTION, SOLUTION INTRAVENOUS; SUBCUTANEOUS at 20:46

## 2018-03-05 RX ADMIN — DOCUSATE SODIUM -SENNOSIDES 2 TABLET: 50; 8.6 TABLET, COATED ORAL at 11:27

## 2018-03-05 RX ADMIN — INSULIN ASPART 3 UNITS: 100 INJECTION, SOLUTION INTRAVENOUS; SUBCUTANEOUS at 17:38

## 2018-03-05 RX ADMIN — INSULIN ASPART 2 UNITS: 100 INJECTION, SOLUTION INTRAVENOUS; SUBCUTANEOUS at 11:40

## 2018-03-05 RX ADMIN — HYDROCODONE BITARTRATE AND ACETAMINOPHEN 1 TABLET: 10; 325 TABLET ORAL at 17:38

## 2018-03-05 RX ADMIN — GLIPIZIDE 10 MG: 10 TABLET ORAL at 17:38

## 2018-03-05 RX ADMIN — INSULIN DETEMIR 34 UNITS: 100 INJECTION, SOLUTION SUBCUTANEOUS at 21:23

## 2018-03-05 RX ADMIN — IPRATROPIUM BROMIDE AND ALBUTEROL SULFATE 3 ML: .5; 3 SOLUTION RESPIRATORY (INHALATION) at 13:58

## 2018-03-05 RX ADMIN — FUROSEMIDE 40 MG: 40 TABLET ORAL at 13:50

## 2018-03-05 RX ADMIN — FLUTICASONE PROPIONATE 2 SPRAY: 50 SPRAY, METERED NASAL at 11:28

## 2018-03-05 RX ADMIN — IPRATROPIUM BROMIDE AND ALBUTEROL SULFATE 3 ML: .5; 3 SOLUTION RESPIRATORY (INHALATION) at 20:08

## 2018-03-05 RX ADMIN — INSULIN DETEMIR 34 UNITS: 100 INJECTION, SOLUTION SUBCUTANEOUS at 11:30

## 2018-03-05 RX ADMIN — PREGABALIN 100 MG: 100 CAPSULE ORAL at 20:46

## 2018-03-05 RX ADMIN — HEPARIN SODIUM 18.6 UNITS/KG/HR: 10000 INJECTION, SOLUTION INTRAVENOUS at 21:21

## 2018-03-05 RX ADMIN — PREGABALIN 100 MG: 100 CAPSULE ORAL at 11:45

## 2018-03-05 RX ADMIN — PREGABALIN 100 MG: 100 CAPSULE ORAL at 17:38

## 2018-03-05 RX ADMIN — POTASSIUM CHLORIDE 40 MEQ: 750 CAPSULE, EXTENDED RELEASE ORAL at 13:50

## 2018-03-05 RX ADMIN — GLIPIZIDE 10 MG: 10 TABLET ORAL at 11:26

## 2018-03-05 RX ADMIN — HEPARIN SODIUM 16.6 UNITS/KG/HR: 10000 INJECTION, SOLUTION INTRAVENOUS at 07:00

## 2018-03-05 RX ADMIN — LINAGLIPTIN 5 MG: 5 TABLET, FILM COATED ORAL at 11:25

## 2018-03-05 RX ADMIN — HEPARIN SODIUM 9500 UNITS: 5000 INJECTION, SOLUTION INTRAVENOUS; SUBCUTANEOUS at 00:59

## 2018-03-05 RX ADMIN — HYDROCODONE BITARTRATE AND ACETAMINOPHEN 1 TABLET: 7.5; 325 TABLET ORAL at 11:26

## 2018-03-05 RX ADMIN — HYDROMORPHONE HYDROCHLORIDE 0.5 MG: 1 INJECTION, SOLUTION INTRAMUSCULAR; INTRAVENOUS; SUBCUTANEOUS at 09:20

## 2018-03-05 RX ADMIN — CEFTRIAXONE 2 G: 2 INJECTION, POWDER, FOR SOLUTION INTRAMUSCULAR; INTRAVENOUS at 11:25

## 2018-03-05 RX ADMIN — HYDROCODONE BITARTRATE AND ACETAMINOPHEN 1 TABLET: 10; 325 TABLET ORAL at 23:39

## 2018-03-05 RX ADMIN — GADOBENATE DIMEGLUMINE 20 ML: 529 INJECTION, SOLUTION INTRAVENOUS at 10:43

## 2018-03-05 RX ADMIN — HEPARIN SODIUM 4800 UNITS: 5000 INJECTION, SOLUTION INTRAVENOUS; SUBCUTANEOUS at 11:31

## 2018-03-05 NOTE — PLAN OF CARE
Problem: Patient Care Overview (Adult)  Goal: Plan of Care Review  Outcome: Ongoing (interventions implemented as appropriate)   03/04/18 1805 03/05/18 0131 03/05/18 1559   Coping/Psychosocial Response Interventions   Plan Of Care Reviewed With --  patient --    Patient Care Overview   Progress declining --  --    Outcome Evaluation   Outcome Summary/Follow up Plan --  --  VSS, HR in mid 90's to low 100's, new dx of Discitis and will most likely have surgery this week, has had a couple large BM's after manual disimpaction, pain controlled with PRN Lortab and Dilaudid      03/04/18 1805 03/05/18 0131 03/05/18 1559   Coping/Psychosocial Response Interventions   Plan Of Care Reviewed With --  patient --    Patient Care Overview   Progress declining --  --    Outcome Evaluation   Outcome Summary/Follow up Plan --  --  VSS, HR in mid 90's to low 100's, new dx of Discitis and will most likely have surgery this week, has had a couple large BM's after manual disimpaction, pain controlled with PRN Lortab and Dilaudid      03/04/18 1805 03/05/18 0131 03/05/18 1559   Coping/Psychosocial Response Interventions   Plan Of Care Reviewed With --  patient --    Patient Care Overview   Progress declining --  --    Outcome Evaluation   Outcome Summary/Follow up Plan --  --  VSS, HR in mid 90's to low 100's, new dx of Discitis and will most likely have surgery this week, has had a couple large BM's after manual disimpaction, pain controlled with PRN Lortab and Dilaudid     Goal: Adult Individualization and Mutuality  Outcome: Ongoing (interventions implemented as appropriate)      Problem: Infection, Risk/Actual (Adult)  Goal: Identify Related Risk Factors and Signs and Symptoms  Outcome: Ongoing (interventions implemented as appropriate)    Goal: Infection Prevention/Resolution  Outcome: Ongoing (interventions implemented as appropriate)      Problem: Fall Risk (Adult)  Goal: Identify Related Risk Factors and Signs and  Symptoms  Outcome: Ongoing (interventions implemented as appropriate)    Goal: Absence of Falls  Outcome: Ongoing (interventions implemented as appropriate)      Problem: Pain, Acute (Adult)  Goal: Identify Related Risk Factors and Signs and Symptoms  Outcome: Ongoing (interventions implemented as appropriate)      Problem: Respiratory Insufficiency (Adult)  Goal: Identify Related Risk Factors and Signs and Symptoms  Outcome: Ongoing (interventions implemented as appropriate)    Goal: Acid/Base Balance  Outcome: Ongoing (interventions implemented as appropriate)    Goal: Effective Ventilation  Outcome: Ongoing (interventions implemented as appropriate)

## 2018-03-05 NOTE — CONSULTS
Name: Jesus Beckham ADMIT: 2018   : 1940  PCP: Magdy Ricardo MD    MRN: [id number] LOS: 10 days   AGE/SEX: 77 y.o. male  ROOM: 549/1     Inpatient Vascular Surgery Consult  Consult performed by: MARTA GAY  Consult ordered by: ANAHI GEE MD     LOS: 10 days   Patient Care Team:  aMgdy Ricardo MD as PCP - General  Magdy Ricardo MD as PCP - Family Medicine  Staci Keyes MD as Consulting Physician (Endocrinology)  Jerrell Fry MD as Consulting Physician (Urology)    Subjective     History of Present Illness  77 y.o. male with acute deep vein thrombosis right lower extremity.  He has had multiple surgeries, sepsis, and decreased activity is etiology.  We have been asked to see for vena cava filter placement.  Patient currently is on intravenous heparin.  He had a CT angiogram of his chest performed yesterday which showed pulmonary infiltrates but no pulmonary embolus of certain. Patient needs to have spine surgery which is scheduled for 2018.  He will need to be off of heparin for that reason and postoperatively likely for a few days.  For this reason I agree with placement of filter.  Discussed with patient reasons.  Discussed with patient and potential removal of filter once over current process and problems resolved and back to normal activity.  We'll stop heparin in preoperative holding tomorrow and resume postoperatively.  Patient is to have spine surgery on .    Patient admitted with gangrenous acute cholecystitis and underwent laparoscopic cholecystectomy 2 days after admission.  He did have bacteremia with E coli.  He likely secondarily seeded his previous spine surgical site from 2016 and has had one surgery are ready of his spine earlier this admission and plans for a second surgery in a couple of days.    Other medical problems include diabetes mellitus, peripheral neuropathy, history coronary artery  disease and atrial fibrillation, and obstructive sleep apnea with obesity.        Review of Systems   Constitutional: Positive for activity change, appetite change and fever.   Cardiovascular: Positive for palpitations and leg swelling.   Gastrointestinal: Positive for nausea.   Musculoskeletal: Positive for arthralgias and back pain.   All other systems reviewed and are negative.      Past Medical History:   Diagnosis Date   • Arthritis    • Cancer of bladder 2016   • Colon polyp    • Diabetes mellitus    • Gout    • Hyperlipidemia    • Irregular heartbeat    • Skin carcinoma    • Type 2 diabetes mellitus    • Vitamin D deficiency        Past Surgical History:   Procedure Laterality Date   • APPENDECTOMY  1961   • CHOLECYSTECTOMY WITH INTRAOPERATIVE CHOLANGIOGRAM N/A 2/25/2018    Procedure: CHOLECYSTECTOMY LAPAROSCOPIC INTRAOPERATIVE CHOLANGIOGRAM;  Surgeon: Maegan Correa MD;  Location: [facility] OR;  Service:    • COLONOSCOPY  2010    h/o of polyps   • EYE SURGERY      1959 - glass removal from eye, lens transplant   • KNEE SURGERY  2006    rt knee       Family History   Problem Relation Age of Onset   • Cancer Mother        Social History   Substance Use Topics   • Smoking status: Former Smoker     Types: Cigars     Quit date: 2017   • Smokeless tobacco: Current User     Types: Chew   • Alcohol use No       Allergies: Review of patient's allergies indicates no known allergies.    Prescriptions Prior to Admission   Medication Sig Dispense Refill Last Dose   • allopurinol (ZYLOPRIM) 300 MG tablet Take 1 tablet by mouth Daily. 90 tablet 1 2/22/2018 at Unknown time   • ANDROGEL PUMP 20.25 MG/ACT (1.62%) gel 2 pump actuation on each shoulder daily 450 g 1 2/22/2018 at Unknown time   • celecoxib (CELEBREX) 200 MG capsule Take 1 capsule by mouth Daily. 90 capsule 1 2/22/2018 at Unknown time   • ergocalciferol (DRISDOL) 44322 units capsule Take 1 capsule by mouth 1 (One) Time Per Week. 13 capsule 3 Past Week at  Unknown time   • fluticasone (FLONASE) 50 MCG/ACT nasal spray 2 sprays into each nostril Daily. 3 bottle 3 2/22/2018 at Unknown time   • glipiZIDE (GLUCOTROL) 10 MG tablet Take 1 tablet by mouth 2 (Two) Times a Day Before Meals. 360 tablet 3 2/22/2018 at Unknown time   • GLYXAMBI 25-5 MG tablet Take 1 tablet by mouth Daily With Breakfast. 90 tablet 3 2/22/2018 at Unknown time   • L-Methylfolate-B6-B12 3-35-2 MG tablet Take 1 tablet by mouth 2 (Two) Times a Day. 180 tablet 3 2/22/2018 at Unknown time   • LEVEMIR FLEXTOUCH 100 UNIT/ML injection 50 units twice daily. 90 mL 3 2/22/2018 at Unknown time   • lisinopril (PRINIVIL,ZESTRIL) 10 MG tablet Take 1 tablet by mouth Daily. 90 tablet 3 2/22/2018 at Unknown time   • Mirabegron ER (MYRBETRIQ) 25 MG tablet sustained-release 24 hour 24 hr tablet Take 25 mg by mouth Daily.   2/22/2018 at Unknown time   • Multiple Vitamins-Minerals (MULTIVITAMIN ADULT PO) Take  by mouth.   2/22/2018 at Unknown time   • Omega-3 Fatty Acids (FISH OIL) 1000 MG capsule capsule Take  by mouth daily with breakfast.   2/22/2018 at Unknown time   • pregabalin (LYRICA) 100 MG capsule Take 1 capsule by mouth 3 (Three) Times a Day. 90 capsule 5 2/22/2018 at Unknown time   • rosuvastatin (CRESTOR) 10 MG tablet Take 1 tablet by mouth Daily. 90 tablet 3 2/22/2018 at Unknown time   • vitamin C (ASCORBIC ACID) 500 MG tablet Take 500 mg by mouth Daily.   2/22/2018 at Unknown time   • Acetaminophen (TYLENOL PO) Take  by mouth as needed.   Taking   • glucose blood test strip CHECK BLOOD SUGARS 2 TIMES A DAY AS DIRECTED 200 each 1 Taking   • Insulin Pen Needle 32G X 4 MM misc INJECT UP TO THREE TIMES DAILY OR AS DIRECTED 500 each 1 Taking       [START ON 3/6/2018] ceftriaxone 2 g Intravenous Q24H   fluticasone 2 spray Nasal Daily   furosemide 40 mg Oral Daily   glipiZIDE 10 mg Oral BID AC   insulin aspart 0-7 Units Subcutaneous 4x Daily With Meals & Nightly   insulin detemir 34 Units Subcutaneous Q12H    ipratropium-albuterol 3 mL Nebulization Q6H While Awake - RT   linagliptin 5 mg Oral Daily   pregabalin 100 mg Oral TID   sennosides-docusate sodium 2 tablet Oral BID       heparin (porcine) 12.6 Units/kg/hr Last Rate: 18.6 Units/kg/hr (03/05/18 1132)     •  acetaminophen  •  cyclobenzaprine  •  dextrose  •  dextrose  •  diphenhydrAMINE  •  glucagon (human recombinant)  •  heparin (porcine)  •  HYDROcodone-acetaminophen  •  HYDROmorphone  •  lactulose  •  metoprolol tartrate  •  ondansetron  •  polyethylene glycol  •  sodium chloride  •  sodium chloride      Objective     Physical Exam   Constitutional: He is oriented to person, place, and time. He appears well-developed and well-nourished.   HENT:   Head: Normocephalic and atraumatic.   Nose: Nose normal.   Eyes: EOM are normal. Pupils are equal, round, and reactive to light.   Neck: Normal range of motion. Neck supple.   Cardiovascular: An irregularly irregular rhythm present.  Occasional extrasystoles are present.   Pulses:       Femoral pulses are 2+ on the right side, and 2+ on the left side.  Mild bilateral calf swelling with doppler pedal pulses   Pulmonary/Chest: Effort normal. No respiratory distress. He has no wheezes. He has rales. He exhibits no tenderness.   Abdominal: Soft. Bowel sounds are decreased. There is generalized tenderness.   Musculoskeletal: He exhibits edema. He exhibits no tenderness or deformity.   Neurological: He is alert and oriented to person, place, and time.   Skin: Skin is warm and dry.   Psychiatric: He has a normal mood and affect. His behavior is normal. Judgment and thought content normal.   Vitals reviewed.        Results from last 7 days  Lab Units 03/05/18  0730 03/04/18  0453 03/03/18  0552 03/02/18  0323 03/01/18  0429 02/28/18  0419 02/27/18  0348   WBC 10*3/mm3 10.41 13.13* 8.62 7.46 8.35 7.59 7.56   HEMOGLOBIN g/dL 12.2* 11.9* 11.3* 11.5* 12.0* 12.2* 12.2*   PLATELETS 10*3/mm3 198 195 198 217 191 148 107*     Results  from last 7 days  Lab Units 03/05/18  0730 03/04/18  0453 03/03/18  0552 03/02/18  0323 03/01/18  0429 02/28/18  0419 02/27/18  0348   SODIUM mmol/L 138 137 138 140 140 137 135*   POTASSIUM mmol/L 3.7 4.0 4.3 3.9 4.2 4.0 4.5   CHLORIDE mmol/L 98 98 100 101 102 99 98   CO2 mmol/L 26.2 27.3 27.8 26.5 27.2 27.1 27.4   BUN mg/dL 21 19 16 19 21 25* 26*   CREATININE mg/dL 1.03 0.95 0.78 0.96 0.85 0.87 0.85   GLUCOSE mg/dL 173* 137* 218* 169* 160* 166* 175*   Estimated Creatinine Clearance: 78.9 mL/min (by C-G formula based on Cr of 1.03).  Results from last 7 days  Lab Units 03/04/18  1719   PROTIME Seconds 14.3*   INR  1.13*       Imaging Studies:    Coding  Active Hospital Problems (** Indicates Principal Problem)    Diagnosis Date Noted   • **Discitis of lumbar region [M46.46] 03/05/2018   • Acute deep vein thrombosis of calf, right [I82.4Z1] 03/05/2018   • A-fib [I48.91] 02/25/2018   • Acute gangrenous cholecystitis [K81.0] 02/24/2018   • RUQ abdominal pain [R10.11] 02/24/2018   • CELINA (obstructive sleep apnea) [G47.33] 02/23/2018   • Leg edema [R60.0] 02/23/2018   • Vitamin D deficiency [E55.9] 04/28/2017   • 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     Problem Points:  4:  Patient has a new problem, with additional work-up planned  Total problem points:4 or more    Data Points:  1:  I have reviewed or order clinical lab test  1:  I  have reviewed or order radiology test (except heart catheterization or echo)  2:  I have reviewed and summation of old records and/or discussed the patients care with another health care provider  Total data points:4 or more    Risk Points:  High:  Any illness that poses threat to life or body funciton    MDM Prob point Data point Risk   SF 1 1 Minimal   Low 2 2 Low   Mod 3 3 Moderate   High 4 4 High     Code MDM History Exam Time   93099 SF/Low Detailed Detailed 30   78377 Mod Comprehensive Comprehensive 50   65974 High Comprehensive Comprehensive 70     Detailed history:  4 elements HPI or status of 3 chronic problems; 2-9 system ROS  Comprehensive:  4 elements HPI or status of 3 chronic problems;  10 system ROS    Detailed Exam:  12 findings from any organ system  Comprehensive Exam:  2 findings from each of 9 systems.     [place]    Assessment/Plan     Principal Problem:    Discitis of lumbar region  Active Problems:    Diabetic peripheral neuropathy    Primary osteoarthritis involving multiple joints    Allergic rhinitis due to pollen    Vitamin D deficiency    CELINA (obstructive sleep apnea)    Leg edema    Acute gangrenous cholecystitis    RUQ abdominal pain    A-fib    Acute deep vein thrombosis of calf, right        77 y.o. male for inferior vena cava filter placement tomorrow.  See discussion above in this note.    I discussed the patients findings and my recommendations with patient and nursing staff.  Please call my office with any question: [phone]    Alexis Thakkar MD  18  4:35 PM

## 2018-03-05 NOTE — PLAN OF CARE
Problem: Patient Care Overview (Adult)  Goal: Plan of Care Review  Outcome: Ongoing (interventions implemented as appropriate)   03/05/18 0131   Coping/Psychosocial Response Interventions   Plan Of Care Reviewed With patient   Outcome Evaluation   Outcome Summary/Follow up Plan HR 90s-100s CT pt sat 90s on room air. 3rd dose of lactulose given still no BM yet. CT chest negative PE except artifact from movement and breathing basilar branch can't be fully assessed. CT abd and pelvis little change from previous without convincing evidence for frainable abcess recommended continuing surveilance to exclude developing abscess. severe constipation , colonic diverticulosis. MRI of spine still needs to be completed.     Goal: Adult Individualization and Mutuality  Outcome: Ongoing (interventions implemented as appropriate)    Goal: Discharge Needs Assessment  Outcome: Ongoing (interventions implemented as appropriate)      Problem: Infection, Risk/Actual (Adult)  Goal: Identify Related Risk Factors and Signs and Symptoms  Outcome: Ongoing (interventions implemented as appropriate)    Goal: Infection Prevention/Resolution  Outcome: Ongoing (interventions implemented as appropriate)      Problem: Fall Risk (Adult)  Goal: Identify Related Risk Factors and Signs and Symptoms  Outcome: Ongoing (interventions implemented as appropriate)    Goal: Absence of Falls  Outcome: Ongoing (interventions implemented as appropriate)      Problem: Pain, Acute (Adult)  Goal: Identify Related Risk Factors and Signs and Symptoms  Outcome: Ongoing (interventions implemented as appropriate)

## 2018-03-05 NOTE — PROGRESS NOTES
LOS: 10 days   Patient Care Team:  Magdy Ricardo MD as PCP - General  Magdy Ricardo MD as PCP - Family Medicine  Staci Keyes MD as Consulting Physician (Endocrinology)  Jerrell Fry MD as Consulting Physician (Urology)    Subjective     Patient is having worsening low back pain he is not having shortness of breath at this time he has spiked a fever again this morning.  He has had his MRI and CT scans completed.  Review of Systems:          Objective     Vital Signs  Vital Sign Min/Max for last 24 hours  Temp  Min: 98 °F (36.7 °C)  Max: 101.1 °F (38.4 °C)   BP  Min: 140/80  Max: 164/98   Pulse  Min: 96  Max: 102   Resp  Min: 18  Max: 20   SpO2  Min: 91 %  Max: 96 %   No Data Recorded   No Data Recorded        Ventilator/Non-Invasive Ventilation Settings     None                       Body mass index is 37.74 kg/(m^2).  I/O last 3 completed shifts:  In: 994 [P.O.:810; I.V.:184]  Out: 2425 [Urine:2425]           Physical Exam:  General Appearance: Well-developed obese white male sitting  up in a recliner doesn't look in terrible distress although he doesn't look like he is extremely comfortable either  Eyes: Conjunctiva are clear and anicteric  ENT: Mucous membranes are moist no erythema or exudates nasal septum midline and he has a Mallampati type II airway  Neck: No jugular venous distention trachea midline  Lungs: Clear nonlabored symmetric expansion   Cardiac: Regular rate and rhythm  Abdomen: Abdominal binder in place not removed  : Not examined  Musc/Skel: Edema all 4 extremities 2+ upper extremities 3-4+ lower extremities not really changed from yesterday  Skin: No jaundice no petechiae  Neuro: He is alert oriented he is cooperative  Extremities/P Vascular: No clubbing or cyanosis he has palpable radial dorsalis pedis pulses  MSE: He is in better spirits today       Labs:    Results from last 7 days  Lab Units 03/05/18  0730 03/04/18  0453 03/03/18  0552 03/02/18  0323 03/01/18  0429  02/28/18  0419 02/27/18  0348   GLUCOSE mg/dL 173* 137* 218* 169* 160* 166* 175*   SODIUM mmol/L 138 137 138 140 140 137 135*   POTASSIUM mmol/L 3.7 4.0 4.3 3.9 4.2 4.0 4.5   CO2 mmol/L 26.2 27.3 27.8 26.5 27.2 27.1 27.4   CHLORIDE mmol/L 98 98 100 101 102 99 98   ANION GAP mmol/L 13.8 11.7 10.2 12.5 10.8 10.9 9.6   CREATININE mg/dL 1.03 0.95 0.78 0.96 0.85 0.87 0.85   BUN mg/dL 21 19 16 19 21 25* 26*   BUN / CREAT RATIO  20.4 20.0 20.5 19.8 24.7 28.7* 30.6*   CALCIUM mg/dL 8.7 8.4* 8.4* 8.3* 8.1* 8.5* 8.6   EGFR IF NONAFRICN AM mL/min/1.73 70 77 97 76 88 85 88   ALK PHOS U/L  --  111 91 81 86 90 91   TOTAL PROTEIN g/dL  --  5.6* 5.5* 5.5* 5.5* 5.8* 5.7*   ALT (SGPT) U/L  --  36 33 36 39 46* 54*   AST (SGOT) U/L  --  29 37 24 26 30 45*   BILIRUBIN mg/dL  --  0.7 0.6 0.5 0.7 0.7 0.6   ALBUMIN g/dL  --  2.40* 2.30* 2.60* 2.50* 2.60* 2.60*   GLOBULIN gm/dL  --  3.2 3.2 2.9 3.0 3.2 3.1   A/G RATIO g/dL  --  0.8 0.7 0.9 0.8 0.8 0.8     Estimated Creatinine Clearance: 78.9 mL/min (by C-G formula based on Cr of 1.03).        Results from last 7 days  Lab Units 03/05/18  0730 03/04/18  0453 03/03/18  0552 03/02/18  0323 03/01/18  0429 02/28/18  0419 02/27/18  0348   WBC 10*3/mm3 10.41 13.13* 8.62 7.46 8.35 7.59 7.56   RBC 10*6/mm3 4.09* 4.00* 3.72* 3.79* 3.95* 4.07* 4.05*   HEMOGLOBIN g/dL 12.2* 11.9* 11.3* 11.5* 12.0* 12.2* 12.2*   HEMATOCRIT % 38.3* 37.9* 34.9* 35.8* 37.6* 38.5* 38.5*   MCV fL 93.6 94.8 93.8 94.5 95.2 94.6 95.1   MCH pg 29.8 29.8 30.4 30.3 30.4 30.0 30.1   MCHC g/dL 31.9* 31.4* 32.4* 32.1* 31.9* 31.7* 31.7*   RDW % 14.7* 14.8* 14.9* 14.6* 14.6* 14.6* 14.7*   RDW-SD fl 50.1 51.1 50.7 49.7 50.7 50.2 51.5   MPV fL 11.6 11.5 11.9 11.9 11.6 12.3* 11.7   PLATELETS 10*3/mm3 198 195 198 217 191 148 107*   NEUTROPHIL % % 86.4* 92.0* 84.8* 71.5 73.1 70.6 71.6   LYMPHOCYTE % % 5.5* 3.2* 7.8* 13.1* 12.1* 13.6* 13.0*   MONOCYTES % % 6.9 3.0* 4.9* 9.8 10.4 12.5* 13.4*   EOSINOPHIL % % 0.4 0.8 1.5 1.6 0.4 0.5 1.1    BASOPHIL % % 0.2 0.2 0.2 0.4 0.4 0.3 0.1   IMM GRAN % % 0.6* 0.8* 0.8* 3.6* 3.6* 2.5* 0.8*   NEUTROS ABS 10*3/mm3 9.00* 12.08* 7.31 5.33 6.11 5.36 5.42   LYMPHS ABS 10*3/mm3 0.57* 0.42* 0.67* 0.98 1.01 1.03 0.98   MONOS ABS 10*3/mm3 0.72 0.40 0.42 0.73 0.87 0.95 1.01   EOS ABS 10*3/mm3 0.04 0.10 0.13 0.12 0.03 0.04 0.08   BASOS ABS 10*3/mm3 0.02 0.02 0.02 0.03 0.03 0.02 0.01   IMMATURE GRANS (ABS) 10*3/mm3 0.06* 0.11* 0.07* 0.27* 0.30* 0.19* 0.06*   NRBC /100 WBC 0.0 0.0  --  0.0 0.0 0.0  --            Results from last 7 days  Lab Units 02/28/18 1953   TROPONIN T ng/mL <0.010       Results from last 7 days  Lab Units 02/28/18 1953   PROBNP pg/mL 1443.0           Results from last 7 days  Lab Units 03/04/18  0453 03/01/18  0429 02/28/18  1953   LACTATE mmol/L  --  0.9 3.7*   PROCALCITONIN ng/mL 22.97*  --  1.93*       Results from last 7 days  Lab Units 03/04/18  1719   INR  1.13*     Microbiology Results (last 10 days)     Procedure Component Value - Date/Time    Urine Culture - Urine, Urine, Clean Catch [[id number]]  (Normal) Collected:  03/04/18 1200    Lab Status:  Preliminary result Specimen:  Urine from Urine, Clean Catch Updated:  03/05/18 0653     Urine Culture No growth    Blood Culture - Blood, [[id number]]  (Normal) Collected:  03/04/18 1107    Lab Status:  Preliminary result Specimen:  Blood from Arm, Left Updated:  03/05/18 1131     Blood Culture No growth at 24 hours    Blood Culture - Blood, [[id number]]  (Normal) Collected:  03/04/18 1025    Lab Status:  Preliminary result Specimen:  Blood from Arm, Left Updated:  03/05/18 1101     Blood Culture No growth at 24 hours    Blood Culture - Blood, [[id number]]  (Normal) Collected:  02/28/18 1832    Lab Status:  Preliminary result Specimen:  Blood from Arm, Left Updated:  03/04/18 1846     Blood Culture No growth at 4 days    Blood Culture - Blood, [[id number]]  (Normal) Collected:  02/28/18 1533    Lab Status:  Preliminary result Specimen:  Blood from Arm,  Right Updated:  03/04/18 1546     Blood Culture No growth at 4 days    Blood Culture - Blood, [[id number]]  (Normal) Collected:  02/24/18 1224    Lab Status:  Final result Specimen:  Blood from Arm, Left Updated:  03/01/18 1246     Blood Culture No growth at 5 days    Blood Culture - Blood, [[id number]]  (Normal) Collected:  02/24/18 1131    Lab Status:  Final result Specimen:  Blood from Arm, Left Updated:  03/01/18 1146     Blood Culture No growth at 5 days                [START ON 3/6/2018] ceftriaxone 2 g Intravenous Q24H   fluticasone 2 spray Nasal Daily   furosemide 40 mg Oral Daily   glipiZIDE 10 mg Oral BID AC   insulin aspart 0-7 Units Subcutaneous 4x Daily With Meals & Nightly   insulin detemir 34 Units Subcutaneous Q12H   ipratropium-albuterol 3 mL Nebulization Q6H While Awake - RT   linagliptin 5 mg Oral Daily   pregabalin 100 mg Oral TID   sennosides-docusate sodium 2 tablet Oral BID       heparin (porcine) 12.6 Units/kg/hr Last Rate: 18.6 Units/kg/hr (03/05/18 1132)       Diagnostics:  Xr Chest 2 View    Result Date: 2/23/2018  PA AND LATERAL CHEST X-RAY  HISTORY: fever  COMPARISON: None.  FINDINGS: PA and lateral views of the chest were obtained. Patient is kyphotic. The lungs are under aerated with pulmonary vascular congestion bordering on mild edema. There is mild right lung base atelectasis. There is no convincing evidence of active air space disease process otherwise. Mild cardiomegaly. No significant pleural fluid. Thoracolumbar kyphosis noted with multilevel spurring.        Under aeration with pulmonary vascular congestion bordering on mild edema/CHF  This report was finalized on 2/23/2018 12:58 AM by Jerrell Dumont MD.      Ct Chest Without Contrast    Result Date: 2/26/2018  CT CHEST WITHOUT CONTRAST  HISTORY: Rule out right middle lobe infiltrate. To evaluate source of fever.  TECHNIQUE: Axial CT images of the chest were obtained without administration of intravenous contrast. Coronal and  sagittal reformats were obtained.  COMPARISON: Chest radiographs.  FINDINGS: The visualized thyroid gland is normal. Calcified plaque is seen within the aortic arch and the coronary arteries. Calcified lymph nodes are seen within the right hilum and the subcarinal region. The central airways are patent without endobronchial lesion. Bilateral lung fields are under aerated with lower lobe atelectatic changes. No focal infiltrate is identified. No suspicious pulmonary nodules.  The visualized upper abdomen demonstrates a distended gallbladder with dependent hyperdensity that may represent sludge and/or stones. Degenerative changes are seen within the thoracic spine.      1. Bilateral lung fields are under aerated with atelectatic changes within the bilateral lower lobes. No focal infiltrate is identified. 2. Mildly distended gallbladder with dependent hyperdensity that may represent stones.  Radiation dose reduction techniques were utilized, including automated exposure control and exposure modulation based on body size.  This report was finalized on 2/26/2018 2:27 PM by Dr. Gideon Shah MD.      Mri Lumbar Spine Without Contrast    Result Date: 2/28/2018  MRI LUMBAR SPINE WO CONTRAST-  INDICATIONS: Low back pain with radiculopathy.  TECHNIQUE: NONCONTRAST LUMBAR SPINE MRI   COMPARISON: None available  FINDINGS:   Marrow signal with heterogeneous with endplate changes noted, but no acute fracture identified. Laminectomy changes at L3, L4 are noted.  Alignment is in range of normal.  Conus medullaris appears unremarkable.  Increased T2 signal within the upper aspect of the right psoas, which appears mildly thickened, for example image 21 of series 2 could for example represent strain/partial tear or inflammatory/infectious process, correlate clinically, follow-up evaluation with enhanced imaging could be considered if not contraindicated.   Axial levels:  T12/L1, L1/2: No significant disc bulge, central or neural  foraminal stenosis.  L2/3: Left lateral recess is effaced by what may represent disc extrusion (enhanced imaging could take to exclude alternative possibilities of a collection or lesion), which also contributes to severe left neural foraminal narrowing (with exiting nerve root impingement) and, with broad-based disc bulge and facet and ligamentum flavum hypertrophy, moderate central stenosis, moderate right neural foraminal narrowing.  L3/4: Broad-based disc bulge is present, as well as central disc extrusion narrowing the anterior thecal space and, with facet ligamentum flavum hypertrophy moderate to prominent right, moderate left neural foraminal stenosis.  L4/5: Broad-based disc bulge with left paracentral disc extrusion apparent, as well as disc protrusion at the right neural foramina, result in effacement of the left lateral recess (with impingement of left traversing nerve root), narrowing of the anterior thecal space, and, with facet and ligamentum flavum hypertrophy, moderate bilateral neuroforaminal narrowing.  L5/S1: Broad-based disc bulge there is the anterior thecal space and, with facet and ligamentum flavum hypertrophy, contributes to moderate to prominent right, prominent left neuroforaminal narrowing.           1. 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  2. Signal changes in the upper right psoas muscle, could be posttraumatic in origin or may be inflammatory/infectious in nature, correlate clinically. Enhanced imaging could be considered for further evaluation.  This report was finalized on 2/28/2018 6:24 PM by Dr. Chris Posey MD.      Ct Abdomen Pelvis With Contrast    Result Date: 2/28/2018  CT ABDOMEN AND PELVIS WITH IV CONTRAST  HISTORY: 77-year-old male underwent laparoscopic cholecystectomy on 02/25/2018 for gangrenous cholecystitis and  cholelithiasis.  TECHNIQUE: CT abdomen and pelvis with intravenous and oral contrast.  COMPARISON: CT chest without contrast 02/23/2018.  FINDINGS: There has been cholecystectomy and there is a drain extending into the right upper quadrant beneath the liver. There is material filling the gallbladder fossa. This includes a 4.5 x 2.5 x 2.8 cm low signal material that may represent bioabsorbable packing material. There are adjacent tiny bubbles of gas and there is also surrounding fluid. There is no mature or drainable collection. Thin band of fluid extends adjacent to the anterior liver. Minimal edema extends into the hepatic parenchyma just above anterior margin of the gallbladder fossa. Liver otherwise appears normal.  Within the medial spleen there is a 1.5 cm low-density lesion. Splenic size is normal. Adrenal glands, pancreas appear normal. There is a diverticulum emanating medially from the descending duodenum. A medial left lower pole renal cyst measures 1.3 cm. Right kidney appears normal and there is no hydronephrosis. Minimal free fluid extends into the pelvis. There is no bowel dilatation or obstruction.      1. Patient is 3 days post cholecystectomy with low density packing material, small bubbles of gas, and fluid filling the gallbladder fossa. Fluid partially extends into the adjacent hepatic parenchyma and may represent hemorrhagic material/blood products. There is mild perihepatic fluid and a small amount of fluid is present within the lower pelvis. Surgical drain extends into the right upper quadrant. Infected fluid would be difficult to exclude though there is no evidence for mature or drainable collection. 2. Tiny renal cysts. Duodenal diverticulum. 15 mm low-density lesion medial spleen of doubtful significance.  Radiation dose reduction techniques were utilized, including automated exposure control and exposure modulation based on body size.  This report was finalized on 2/28/2018 5:44 PM by   Pop Haider MD.      Us Gallbladder    Result Date: 2/24/2018  GALLBLADDER ULTRASOUND  HISTORY: Right upper quadrant pain and fever.  FINDINGS: The gallbladder contains several tiny gallstones near the neck of the gallbladder. The gallbladder is somewhat distended, measuring 11.3 cm in length and raising the concern of an element of cystic duct obstruction. This can also be seen on yesterday's chest CT scan. The common bile duct measures 4 mm. There is no wall thickening.  The visualized liver and pancreas and right kidney are unremarkable.  CONCLUSION: Distended gallbladder containing tiny gallstones in the neck of the gallbladder and concerning for cystic duct obstruction. The common bile duct is normal in caliber.  This report was finalized on 2/24/2018 9:15 AM by Dr. William Myers MD.      Xr Chest 1 View    Result Date: 2/28/2018  PORTABLE CHEST X-RAY  CLINICAL HISTORY: resp failure; K80.20-Calculus of gallbladder without cholecystitis without obstruction; A41.9-Sepsis, unspecified organism; R19.01-Right upper quadrant abdominal swelling, mass and lump; R10.11-Right upper quadrant pain; K81.0-Acute cholecystitis; R26.89-Other abnormalities of gait and mobility  COMPARISON: 02/25/2018.  FINDINGS: Portable AP view of the chest was obtained with overlying monitor leads in place. Lungs are poorly aerated with pulmonary vascular congestion and an opacity in the right lung base favored to reflect atelectasis rather than pneumonia. There may be a small right effusion as well. Left lung poorly aerated but clear. Normal heart size. Mild aortic ectasia.        Poor inspiration with vascular congestion and right lung base opacity as above. Recommend follow-up  This report was finalized on 2/28/2018 9:34 PM by Jerrell Dumont MD.      Xr Chest 1 View    Result Date: 2/25/2018  ONE VIEW PORTABLE CHEST AT 12:25 PM  HISTORY: Recent gallbladder surgery. Shortness of breath.  There is somewhat decreased lung expansion with  further accentuation of vascular congestion compared to 2 days ago. Some of this relates to the poor lung expansion but could also reflect a component of mild CHF. The heart remains enlarged.  This report was finalized on 2/25/2018 12:44 PM by Dr. William Myers MD.      Fl Cholangiogram Operative    Result Date: 2/25/2018  INTRAOPERATIVE CHOLANGIOGRAM  HISTORY: Gallstones.  FINDINGS:  Contrast fills the biliary system with emptying into the duodenum. There appears to be a small mobile air bubble in the common hepatic duct that is seen on early images. No definite gallstones are seen.  59 images were obtained and the fluoroscopy time measures 10 seconds.  This report was finalized on 2/25/2018 11:32 AM by Dr. William Myers MD.      Results for orders placed during the hospital encounter of 02/22/18   Adult Transthoracic Echo Complete W/ Cont if Necessary Per Protocol    Narrative · The study is technically difficult for diagnosis.  · Left ventricle not well visualized. Left ventricular systolic function   is normal. Calculated EF = 50.3%. Estimated EF was in agreement with the   calculated EF. Normal left ventricular cavity size and wall thickness   noted.  · Left ventricular diastolic function is normal.  · There is calcification of the aortic valve.          Today's chest x-ray reviewed and compared it with the film prior there is still atelectasis at the bases right greater than left plus or minus a little infiltrate in anything I would say infiltrate looks better on the right on this film    Active Hospital Problems (** Indicates Principal Problem)    Diagnosis Date Noted   • **Discitis of lumbar region [M46.46] 03/05/2018   • Acute deep vein thrombosis of calf, right [I82.4Z1] 03/05/2018   • A-fib [I48.91] 02/25/2018   • Acute gangrenous cholecystitis [K81.0] 02/24/2018   • RUQ abdominal pain [R10.11] 02/24/2018   • CELINA (obstructive sleep apnea) [G47.33] 02/23/2018   • Leg edema [R60.0] 02/23/2018   • Vitamin  D deficiency [E55.9] 04/28/2017   • 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         Assessment/Plan     1. Sepsis See discussion below ID is handling antibiotics plan is for orthopedic surgery later this week  2. Acute hypoxemic respiratory failure CT just showed very small bilateral pleural effusions a little bit of basilar atelectasis no pulmonary embolus.  I think the hypoxia is due to some atelectasis.  3. New right calf DVT Patient is on a heparin drip, M concerned with planned major spinal surgery and this large gentleman is likely to be immobile and high risk for anticoagulation and the spinal surgery whether he should have a IVC filter placed prior to surgery.  If we had some time to treat this calf clot and that resolved that might be a different story but I am concerned and have a call out to Dr. Virgen do discuss.  4. Escherichia coli bacteremia  5. RSV infection  6. Acute gangrenous cholecystitis status post laparoscopic cholecystectomy on 3/26/18  7. Diabetes mellitus type 2  8. Hyponatremia  9. L2-3 and L3 4 discitis note plans for surgery think this likely explains his back pain.  10. Right psoas abscessAnd some edema possibly in the left psoas as well      Plan for disposition:     Evaristo Burnett MD  03/05/18  3:16 PM    Time:

## 2018-03-05 NOTE — SIGNIFICANT NOTE
03/05/18 1406   Rehab Treatment   Discipline physical therapy assistant   Treatment Not Performed patient/family declined treatment  (Pt refused PT this pm due to fatigue. Stated has been up and down a lot today. Notified MYRA Pierce. Will check back tomorrow.)   Recommendation   PT - Next Appointment 03/06/18

## 2018-03-05 NOTE — PROGRESS NOTES
LOS: 10 days     Chief Complaint: E coli bacteremia, low grade fever    Interval History: Low-grade fever yesterday morning none since that time.  Continues to report back pain.  Is very tired today after MRI of his back.  Finally started having bowel movements.  No abdominal pain nausea or vomiting.  Tolerating antibiotics without a rash.  No shortness of breath or cough.     Vital Signs  Temp:  [98 °F (36.7 °C)-100.5 °F (38.1 °C)] 98.2 °F (36.8 °C)  Heart Rate:  [] 101  Resp:  [18-20] 18  BP: (132-164)/(76-98) 164/98  95% on 2L NC    Physical Exam:  General: in NAD  Cardiovascular: tachycardic, RR, no le edema   Respiratory: Breathing comfortably on room air  GI: Soft, distended, non tender, distant bowel sounds, lap site healing well  Skin: No rashes, some bruising    Antibiotics:  Ceftriaxone 2 g IV every 24 hours     Results Review:    CBC, CMP, micro reviewed today    Lab Results   Component Value Date    WBC 10.41 03/05/2018    HGB 12.2 (L) 03/05/2018    HCT 38.3 (L) 03/05/2018    MCV 93.6 03/05/2018     03/05/2018     Lab Results   Component Value Date    GLUCOSE 173 (H) 03/05/2018    BUN 21 03/05/2018    CREATININE 1.03 03/05/2018    EGFRIFNONA 70 03/05/2018    EGFRIFAFRI 88 12/06/2017    BCR 20.4 03/05/2018    CO2 26.2 03/05/2018    CALCIUM 8.7 03/05/2018    PROTENTOTREF 6.8 12/06/2017    ALBUMIN 2.40 (L) 03/04/2018    LABIL2 0.8 03/04/2018    AST 29 03/04/2018    ALT 36 03/04/2018     Lab Results   Component Value Date    HGBA1C 7.70 (H) 02/23/2018       Microbiology:  3/4 BCx NGTD x 2  3/4 UCx NGTD  2/28 BCx NGTD x 2  2/24 BCx NGTD x 2  2/23 RVP + RSV  2/23 BCx E coli 2/2 2/23 Influenza neg    Radiology  CT chest abdomen and pelvis personally reviewed by me shows no PE.  Bilateral small pleural effusions.  Infiltrates.  No abscess in the surgical site.  Severe constipation.  Colonic diverticulosis.    Dopplers acute right lower extremity DVT    MRI discitis of L2-3 and L3-4, anterior  paravertebral soft tissue infection with myositis involving both psoas muscles as well as small right psoas abscesses.  L3 epidural phlegmonous change.  Possible pockets of epidural abscess at the L3 4 level versus infected facet joints.  Subdural empyema at L3.    Assessment/Plan   1.  Fever - persistent secondary to #2 below    2.  Discitis, paravertebral soft tissue infection, psoas muscle abscesses, possible epidural abscesses  - Plan for surgical intervention this week  -Given his persistent fever as well as the development of this infectious process while on antibiotics covering Escherichia coli suspect another bacterial entity is causing this.   - Continue ceftriaxone but hold additional antibiotics (unless the patient clinically decompensates) until operative cultures can be obtained to further guide treatment.  At this time the patient will require 8 weeks of antibiotics  - Check ESR and CRP in the morning    3.  Right lower extremity DVT  - Could explain the fever but not the high procalcitonin  - Management per primary team     4.  Escherichia coli bacteremia setting of acute gangrenous cholecystitis   -s/p laparoscopic cholecystectomy on February 25, 2018    5.  RSV  - supportive care    6.  Acute hypoxic respiratory failure - better  - Pulmonary medicine following     7.  Uncontrolled DM2 - complicating above     I discussed the need for surgery and antibiotic therapy with Dr. Miles

## 2018-03-05 NOTE — CONSULTS
Patient Care Team:  Magdy Ricardo MD as PCP - General  Magdy Ricardo MD as PCP - Family Medicine  Staci Keyes MD as Consulting Physician (Endocrinology)  Jerrell Fry MD as Consulting Physician (Urology)    Chief complaint back pain    Subjective   77 y.o. male admitted to [facility] to services of Jesus Tanner with Gangrenous cholecystitis with cholelithiasis and underwent Laparoscopic cholecystectomy with intraoperative cholangiogram.   He currently has Escherichia coli sepsis.  Over the past few days has complained on increasing low back and bilateral leg pain.  History of L2-L5 Laminectomy, Medial Facetectomy and Foraminotomy in 2016.  Patient reports continuous back pain since surgery that was relieved with sitting.  Over time, he was only to be on his feet for short periods of time and required frequent sitting.  He also complains of sepideh-lateral leg pain.  Leg pain is not constant and not consistently in one leg more than the other.  Also complains of intermittent tingling in both legs.  Denies any bladder or bowel dysfunction.  He recently had 2 bowel movements after days of constipation. I was asked to see the patient by Dr. Valdes for treatment of osteomyelitis/discitis.  The patient had a recent MRI showing possible epidural abscess as well as discitis at L2-L3 and L3-L4.  The patient has continued fevers despite antibiotic treatment of Escherichia coli.      Review of Systems   Pertinent items are noted in HPI    History  Past Medical History:   Diagnosis Date   • Arthritis    • Cancer of bladder 2016   • Colon polyp    • Diabetes mellitus    • Gout    • Hyperlipidemia    • Irregular heartbeat    • Skin carcinoma    • Type 2 diabetes mellitus    • Vitamin D deficiency      Past Surgical History:   Procedure Laterality Date   • APPENDECTOMY  1961   • CHOLECYSTECTOMY WITH INTRAOPERATIVE CHOLANGIOGRAM N/A 2/25/2018    Procedure: CHOLECYSTECTOMY LAPAROSCOPIC  INTRAOPERATIVE CHOLANGIOGRAM;  Surgeon: Maegan Correa MD;  Location: [facility] OR;  Service:    • COLONOSCOPY  2010    h/o of polyps   • EYE SURGERY      1959 - glass removal from eye, lens transplant   • KNEE SURGERY  2006    rt knee     Family History   Problem Relation Age of Onset   • Cancer Mother      Social History   Substance Use Topics   • Smoking status: Former Smoker     Types: Cigars     Quit date: 2017   • Smokeless tobacco: Current User     Types: Chew   • Alcohol use No     Prescriptions Prior to Admission   Medication Sig Dispense Refill Last Dose   • allopurinol (ZYLOPRIM) 300 MG tablet Take 1 tablet by mouth Daily. 90 tablet 1 2/22/2018 at Unknown time   • ANDROGEL PUMP 20.25 MG/ACT (1.62%) gel 2 pump actuation on each shoulder daily 450 g 1 2/22/2018 at Unknown time   • celecoxib (CELEBREX) 200 MG capsule Take 1 capsule by mouth Daily. 90 capsule 1 2/22/2018 at Unknown time   • ergocalciferol (DRISDOL) 41435 units capsule Take 1 capsule by mouth 1 (One) Time Per Week. 13 capsule 3 Past Week at Unknown time   • fluticasone (FLONASE) 50 MCG/ACT nasal spray 2 sprays into each nostril Daily. 3 bottle 3 2/22/2018 at Unknown time   • glipiZIDE (GLUCOTROL) 10 MG tablet Take 1 tablet by mouth 2 (Two) Times a Day Before Meals. 360 tablet 3 2/22/2018 at Unknown time   • GLYXAMBI 25-5 MG tablet Take 1 tablet by mouth Daily With Breakfast. 90 tablet 3 2/22/2018 at Unknown time   • L-Methylfolate-B6-B12 3-35-2 MG tablet Take 1 tablet by mouth 2 (Two) Times a Day. 180 tablet 3 2/22/2018 at Unknown time   • LEVEMIR FLEXTOUCH 100 UNIT/ML injection 50 units twice daily. 90 mL 3 2/22/2018 at Unknown time   • lisinopril (PRINIVIL,ZESTRIL) 10 MG tablet Take 1 tablet by mouth Daily. 90 tablet 3 2/22/2018 at Unknown time   • Mirabegron ER (MYRBETRIQ) 25 MG tablet sustained-release 24 hour 24 hr tablet Take 25 mg by mouth Daily.   2/22/2018 at Unknown time   • Multiple Vitamins-Minerals (MULTIVITAMIN ADULT PO)  Take  by mouth.   2/22/2018 at Unknown time   • Omega-3 Fatty Acids (FISH OIL) 1000 MG capsule capsule Take  by mouth daily with breakfast.   2/22/2018 at Unknown time   • pregabalin (LYRICA) 100 MG capsule Take 1 capsule by mouth 3 (Three) Times a Day. 90 capsule 5 2/22/2018 at Unknown time   • rosuvastatin (CRESTOR) 10 MG tablet Take 1 tablet by mouth Daily. 90 tablet 3 2/22/2018 at Unknown time   • vitamin C (ASCORBIC ACID) 500 MG tablet Take 500 mg by mouth Daily.   2/22/2018 at Unknown time   • Acetaminophen (TYLENOL PO) Take  by mouth as needed.   Taking   • glucose blood test strip CHECK BLOOD SUGARS 2 TIMES A DAY AS DIRECTED 200 each 1 Taking   • Insulin Pen Needle 32G X 4 MM misc INJECT UP TO THREE TIMES DAILY OR AS DIRECTED 500 each 1 Taking     Allergies:  Review of patient's allergies indicates no known allergies.    Objective     Vital Signs  Temp:  [98 °F (36.7 °C)-101.1 °F (38.4 °C)] 101.1 °F (38.4 °C)  Heart Rate:  [] 102  Resp:  [18-20] 20  BP: (140-164)/(80-98) 152/88    Physical Exam:    General Appearance alert, appears stated age and cooperative  Back tenderness to palpation and vertebral point tenderness  Lungs clear to auscultation, respirations regular, respirations even and respirations unlabored  Heart regular rhythm & normal rate, normal S1, S2, no murmur, no anjali, no rub and no click  Extremities moves extremities well, no edema, no cyanosis and no redness  Skin no bleeding, bruising or rash  Neurologic Mental Status orientated to person, place, time and situation and mood/affect normal, Motor strength is equal in upper and lower extremitits                                                            Results Review:    I reviewed the patient's new clinical results.     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  Patient has previous laminectomy.  Increased uptake is noted particularly at L2 to L4 disc spaces.  This likely represents discitis.  Patient also has inflammation and  possible abscess and the psoas.    Assessment/Plan     Principal Problem:    Discitis of lumbar region  Active Problems:    Diabetic peripheral neuropathy    Primary osteoarthritis involving multiple joints    Allergic rhinitis due to pollen    Vitamin D deficiency    CELINA (obstructive sleep apnea)    Leg edema    Acute gangrenous cholecystitis    RUQ abdominal pain    A-fib    Acute deep vein thrombosis of calf, right    Discitis L2 to 4  Plan:  I discussed various treatment options with the patient.  I'm concerned with the epidural collection of fluid which likely represents an epidural abscess.  He also severe stenosis at this level.  I recommended surgical debridement of his disc spaces as well as irrigation and drainage of the epidural abscess.  This will require stabilization.  I discussed the procedure of placing interbody spacers and pedicle screws for stabilization of the spine.  Cultures will be taken for appropriate treatment.  The patient will likely need long-term suppression.  I'm available to do this surgery on Wednesday, March 7.  The patient is agreeable with proceeding.    I discussed the patients findings and my recommendations with family.     Manish Marr DO  03/05/18  3:44 PM

## 2018-03-05 NOTE — PROGRESS NOTES
"    DAILY PROGRESS NOTE  [facility]    Patient Identification:  Name: Jesus Beckham  Age: 77 y.o.  Sex: male  :  1940  MRN: [id number]         Primary Care Physician: Magdy Ricardo MD    Subjective:  Interval History: c/o back pain - denies n/v/d - had BM s/p disimpaction - denies saddle anesthesia or loss of bowel control     Objective:fm x2 and rn at bs    Scheduled Meds:    ceftriaxone 2 g Intravenous Q24H   fluticasone 2 spray Nasal Daily   furosemide 40 mg Oral Daily   glipiZIDE 10 mg Oral BID AC   insulin aspart 0-7 Units Subcutaneous 4x Daily With Meals & Nightly   insulin detemir 34 Units Subcutaneous Q12H   ipratropium-albuterol 3 mL Nebulization Q6H While Awake - RT   linagliptin 5 mg Oral Daily   potassium chloride 40 mEq Oral Once   pregabalin 100 mg Oral TID   sennosides-docusate sodium 2 tablet Oral BID     Continuous Infusions:    heparin (porcine) 12.6 Units/kg/hr Last Rate: 18.6 Units/kg/hr (18 1132)       Vital signs in last 24 hours:  Temp:  [98 °F (36.7 °C)-101.1 °F (38.4 °C)] 101.1 °F (38.4 °C)  Heart Rate:  [] 101  Resp:  [18-20] 20  BP: (140-164)/(80-98) 152/88    Intake/Output:    Intake/Output Summary (Last 24 hours) at 18 1156  Last data filed at 18 0645   Gross per 24 hour   Intake              544 ml   Output             1550 ml   Net            -1006 ml       Exam:  /88 (BP Location: Right arm, Patient Position: Lying)  Pulse 101  Temp (!) 101.1 °F (38.4 °C) (Oral)   Resp 20  Ht 177.8 cm (70\")  Wt 119 kg (263 lb)  SpO2 91%  BMI 37.74 kg/m2    General Appearance:    Alert, cooperative, no distress, AAOx3, surprisingly not toxic - sitting in chair fairly comfortably                           Head:    Normocephalic, without obvious abnormality, atraumatic                         Throat:   Lips, tongue, gums normal; oral mucosa pink and moist                           Neck:   Supple, symmetrical, trachea midline, no JVD         "                 Lungs:    Clear to auscultation bilaterally, respirations unlabored                 Chest Wall:    No tenderness or deformity                          Heart:    Regular rate and rhythm, S1 and S2 normal                  Abdomen:     Soft, non-tender, bowel sounds active, binder                 Extremities:   3+ pitting edema                        Pulses:   Pulses palpable in all extremities                  Neurologic:   CNII-XII intact, moving all extremities      Data Review:  Labs in chart were reviewed.    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       Plan:  Lumbago - called by radiology over concerns for discitis     -RN to call his back surgeon Dr Valdes   -called ID to make aware of changes - recent change of LQN to Rocephin - further abx per ID recs    -secondary to seeding from Ecoli septicemia secondary to gangrenous cholecystitis s/p  cholecystectomy on 2/25 as well as concerning aspects for right psoas abscess which is now resolved   -increase Norco to 10mg prn pain     RSV - supportive care    AHRF - CT c/w Atelectasis and small effusions - on RA today - add Lasix    Acute RLE CVT - hep gtt for now     DM2 w/ PN w/ A1c 7.7 - PO plus basal/ssi -  #s decent    Edema - echo EF normal - add TEDs/elevate and start on PO Lasix today     Appreciate input and assistance from ALL    Junaid Miles MD  3/5/2018  11:56 AM

## 2018-03-05 NOTE — PROGRESS NOTES
Continued Stay Note  [facility]     Patient Name: Jesus Beckham  MRN: [id number]  Today's Date: 3/5/2018    Admit Date: 2018          Discharge Plan       18 1433    Case Management/Social Work Plan    Plan [facility]    Patient/Family In Agreement With Plan yes    Additional Comments Pt accepted to [facility].  Precert obtained, however not medically ready for discharge.  Precert will  Thursday morning and will need to reinitiate once ready.  Packet in CCP office.  JOE Aly RN              Discharge Codes     None            Sheila Aly

## 2018-03-05 NOTE — PROGRESS NOTES
Increasing temps over weekend - fever to 101 yesterday.  Back pain is actually less today than last week!  Moves legs well.  Still, repeat mri suggests L2-3, L3-4 disc space changes are worse, and hematogenous infection seems likely source. He is s/p L2 to L5 laminectomy.  Adequate drainage will require at least aggressive posterior discectomies and fusion with instrumentation.  He is on heparin for dvt and just ate.  We can probably shoot for I and D and definitive treatment midweek.  Will d/w Dr. Miles.

## 2018-03-05 NOTE — PROGRESS NOTES
Chief Complaint:    POD 6, S/P laparoscopic cholecystectomy with intraoperative cholangiogram  Right psoas abscess vs. hematoma    Subjective:    Tolerating diet.  No nausea, no vomiting.  Main complaint is constipation today.    Objective:    Vitals:    03/04/18 1635 03/04/18 1805 03/04/18 1958 03/04/18 2045   BP:    162/87   BP Location:    Right arm   Patient Position:    Lying   Pulse: 98 99 100 100   Resp:   18 18   Temp:    98.5 °F (36.9 °C)   TempSrc:    Oral   SpO2: 94% 92%     Weight:       Height:           Lungs: Clear  Heart: Regular  Abdomen: Incisions okay. BS present.   Extremities: Warm    Labs reviewed.  WBC 8.62, hemoglobin 11.3.  Creat 0.78, CO2 27.8.  LFTs normal.    Duplex shows new right leg calf vein thrombosis    Assessment:    POD 6, S/P laparoscopic cholecystectomy with intraoperative cholangiogram  Right psoas abscess vs. Hematoma  Right leg calf vein thrombosis    Plan:    I do not see cause for intervention and psoas muscle inflammatory process.  Continue senna and lactulose

## 2018-03-05 NOTE — PROGRESS NOTES
General Surgery  Progress Note    CC: Follow-up gangrenous cholecystitis    POD#8 laparoscopic cholecystectomy with intraoperative cholangiogram    S: Multiple BMs today after nursing disimpacted him this morning. MRI of lumbar spine shows empyema and multiple areas of discitis.    O:  Vitals:    03/05/18 0732 03/05/18 1124 03/05/18 1145 03/05/18 1358   BP:  152/88 152/88    BP Location:   Right arm    Patient Position:   Lying    Pulse: 101   102   Resp:   20 20   Temp:   (!) 101.1 °F (38.4 °C)    TempSrc:   Oral    SpO2: 92% 92% 91% 96%   Weight:       Height:               Intake & Output:  UOP: 1650 cc/24 hrs    GENERAL: alert, well appearing, and in no distress  HEENT: normocephalic, atraumatic, moist mucous membranes, clear sclera   CHEST: clear to auscultation, no wheezes, rales or rhonchi, symmetric air entry  CARDIAC: regular rate and rhythm    ABDOMEN: soft, appropriately tender, mild distension, incisions C/D/I with dermabond  EXTREMITIES: no cyanosis, clubbing, or edema   SKIN: Warm and moist, no rashes    LABS    Results from last 7 days  Lab Units 03/05/18  0730 03/04/18  0453 03/03/18  0552   WBC 10*3/mm3 10.41 13.13* 8.62   HEMOGLOBIN g/dL 12.2* 11.9* 11.3*   HEMATOCRIT % 38.3* 37.9* 34.9*   PLATELETS 10*3/mm3 198 195 198       Results from last 7 days  Lab Units 03/05/18  0730 03/04/18  0453 03/03/18  0552 03/02/18  0323   SODIUM mmol/L 138 137 138 140   POTASSIUM mmol/L 3.7 4.0 4.3 3.9   CHLORIDE mmol/L 98 98 100 101   CO2 mmol/L 26.2 27.3 27.8 26.5   BUN mg/dL 21 19 16 19   CREATININE mg/dL 1.03 0.95 0.78 0.96   CALCIUM mg/dL 8.7 8.4* 8.4* 8.3*   BILIRUBIN mg/dL  --  0.7 0.6 0.5   ALK PHOS U/L  --  111 91 81   ALT (SGPT) U/L  --  36 33 36   AST (SGOT) U/L  --  29 37 24   GLUCOSE mg/dL 173* 137* 218* 169*       Results from last 7 days  Lab Units 03/05/18  0730 03/05/18  0006 03/04/18  1719   INR   --   --  1.13*   APTT seconds 65.4* 40.4* 31.4       A/P: 77 y.o. male POD#8 laparoscopic  cholecystectomy with intraoperative cholangiogram for gangrenous cholecystitis    Spine surgery planned for this week with Dr. Valdes to treat his lumbar spine infection. Now having regular BMs. I reviewed his CT abd/pelvis done yesterday and everything appears to be in good order. The Surgicel (hemostatic dissolvable packing material) in his gallbladder fossa shows no signs of infection. The psoas abscesses identified on his MRI lumbar spine today are too small to see on CT scan, and therefore not large enough for percutaneous drainage. I suspect these will be taken care of during Dr. Valdes's I&D of the lumbar spine. Antibiotics per Infectious Disease. No further general surgery intervention needed. I will be available peripherally, and will see as needed.    Maegan Correa MD  General and Endoscopic Surgery  [facility]    [address], [address]  [place], [place], [address]  P: [phone]  F: [phone]

## 2018-03-06 ENCOUNTER — ANESTHESIA (OUTPATIENT)
Dept: PERIOP | Facility: HOSPITAL | Age: 78
End: 2018-03-06

## 2018-03-06 ENCOUNTER — APPOINTMENT (OUTPATIENT)
Dept: GENERAL RADIOLOGY | Facility: HOSPITAL | Age: 78
End: 2018-03-06

## 2018-03-06 ENCOUNTER — ANESTHESIA EVENT (OUTPATIENT)
Dept: PERIOP | Facility: HOSPITAL | Age: 78
End: 2018-03-06

## 2018-03-06 LAB
ANION GAP SERPL CALCULATED.3IONS-SCNC: 6.7 MMOL/L
APTT PPP: 73.4 SECONDS (ref 22.7–35.4)
BACTERIA SPEC AEROBE CULT: ABNORMAL
BACTERIA SPEC AEROBE CULT: ABNORMAL
BASOPHILS # BLD AUTO: 0.02 10*3/MM3 (ref 0–0.2)
BASOPHILS NFR BLD AUTO: 0.2 % (ref 0–1.5)
BUN BLD-MCNC: 21 MG/DL (ref 8–23)
BUN/CREAT SERPL: 25.3 (ref 7–25)
CALCIUM SPEC-SCNC: 8.4 MG/DL (ref 8.6–10.5)
CHLORIDE SERPL-SCNC: 100 MMOL/L (ref 98–107)
CO2 SERPL-SCNC: 31.3 MMOL/L (ref 22–29)
CREAT BLD-MCNC: 0.83 MG/DL (ref 0.76–1.27)
CRP SERPL-MCNC: 12.91 MG/DL (ref 0–0.5)
DEPRECATED RDW RBC AUTO: 49.8 FL (ref 37–54)
EOSINOPHIL # BLD AUTO: 0.1 10*3/MM3 (ref 0–0.7)
EOSINOPHIL NFR BLD AUTO: 1.1 % (ref 0.3–6.2)
ERYTHROCYTE [DISTWIDTH] IN BLOOD BY AUTOMATED COUNT: 14.6 % (ref 11.5–14.5)
ERYTHROCYTE [SEDIMENTATION RATE] IN BLOOD: 44 MM/HR (ref 0–20)
GFR SERPL CREATININE-BSD FRML MDRD: 90 ML/MIN/1.73
GLUCOSE BLD-MCNC: 109 MG/DL (ref 65–99)
GLUCOSE BLDC GLUCOMTR-MCNC: 109 MG/DL (ref 70–130)
GLUCOSE BLDC GLUCOMTR-MCNC: 127 MG/DL (ref 70–130)
GLUCOSE BLDC GLUCOMTR-MCNC: 72 MG/DL (ref 70–130)
GLUCOSE BLDC GLUCOMTR-MCNC: 72 MG/DL (ref 70–130)
GLUCOSE BLDC GLUCOMTR-MCNC: 75 MG/DL (ref 70–130)
GLUCOSE BLDC GLUCOMTR-MCNC: 94 MG/DL (ref 70–130)
HCT VFR BLD AUTO: 36.5 % (ref 40.4–52.2)
HGB BLD-MCNC: 11.6 G/DL (ref 13.7–17.6)
IMM GRANULOCYTES # BLD: 0.05 10*3/MM3 (ref 0–0.03)
IMM GRANULOCYTES NFR BLD: 0.6 % (ref 0–0.5)
LYMPHOCYTES # BLD AUTO: 0.99 10*3/MM3 (ref 0.9–4.8)
LYMPHOCYTES NFR BLD AUTO: 11 % (ref 19.6–45.3)
MCH RBC QN AUTO: 29.9 PG (ref 27–32.7)
MCHC RBC AUTO-ENTMCNC: 31.8 G/DL (ref 32.6–36.4)
MCV RBC AUTO: 94.1 FL (ref 79.8–96.2)
MONOCYTES # BLD AUTO: 0.79 10*3/MM3 (ref 0.2–1.2)
MONOCYTES NFR BLD AUTO: 8.8 % (ref 5–12)
NEUTROPHILS # BLD AUTO: 7.02 10*3/MM3 (ref 1.9–8.1)
NEUTROPHILS NFR BLD AUTO: 78.3 % (ref 42.7–76)
PLATELET # BLD AUTO: 202 10*3/MM3 (ref 140–500)
PMV BLD AUTO: 11.7 FL (ref 6–12)
POTASSIUM BLD-SCNC: 3.7 MMOL/L (ref 3.5–5.2)
RBC # BLD AUTO: 3.88 10*6/MM3 (ref 4.6–6)
SODIUM BLD-SCNC: 138 MMOL/L (ref 136–145)
WBC NRBC COR # BLD: 8.97 10*3/MM3 (ref 4.5–10.7)

## 2018-03-06 PROCEDURE — 94799 UNLISTED PULMONARY SVC/PX: CPT

## 2018-03-06 PROCEDURE — 25010000002 HEPARIN (PORCINE) PER 1000 UNITS: Performed by: SURGERY

## 2018-03-06 PROCEDURE — 0 IOPAMIDOL PER 1 ML: Performed by: SURGERY

## 2018-03-06 PROCEDURE — B5191ZA FLUOROSCOPY OF INFERIOR VENA CAVA USING LOW OSMOLAR CONTRAST, GUIDANCE: ICD-10-PCS | Performed by: SURGERY

## 2018-03-06 PROCEDURE — 25010000002 FENTANYL CITRATE (PF) 100 MCG/2ML SOLUTION: Performed by: NURSE ANESTHETIST, CERTIFIED REGISTERED

## 2018-03-06 PROCEDURE — 25010000002 HYDROMORPHONE PER 4 MG: Performed by: NURSE ANESTHETIST, CERTIFIED REGISTERED

## 2018-03-06 PROCEDURE — 25010000002 MIDAZOLAM PER 1 MG: Performed by: ANESTHESIOLOGY

## 2018-03-06 PROCEDURE — C1769 GUIDE WIRE: HCPCS | Performed by: SURGERY

## 2018-03-06 PROCEDURE — 85730 THROMBOPLASTIN TIME PARTIAL: CPT | Performed by: INTERNAL MEDICINE

## 2018-03-06 PROCEDURE — 85652 RBC SED RATE AUTOMATED: CPT | Performed by: INTERNAL MEDICINE

## 2018-03-06 PROCEDURE — 82962 GLUCOSE BLOOD TEST: CPT

## 2018-03-06 PROCEDURE — 06H03DZ INSERTION OF INTRALUMINAL DEVICE INTO INFERIOR VENA CAVA, PERCUTANEOUS APPROACH: ICD-10-PCS | Performed by: SURGERY

## 2018-03-06 PROCEDURE — 99232 SBSQ HOSP IP/OBS MODERATE 35: CPT | Performed by: INTERNAL MEDICINE

## 2018-03-06 PROCEDURE — 63710000001 INSULIN DETEMER PER 5 UNITS: Performed by: INTERNAL MEDICINE

## 2018-03-06 PROCEDURE — 25010000002 PROPOFOL 10 MG/ML EMULSION: Performed by: NURSE ANESTHETIST, CERTIFIED REGISTERED

## 2018-03-06 PROCEDURE — 80048 BASIC METABOLIC PNL TOTAL CA: CPT | Performed by: HOSPITALIST

## 2018-03-06 PROCEDURE — 25010000003 CEFTRIAXONE PER 250 MG: Performed by: INTERNAL MEDICINE

## 2018-03-06 PROCEDURE — C1880 VENA CAVA FILTER: HCPCS | Performed by: SURGERY

## 2018-03-06 PROCEDURE — 86140 C-REACTIVE PROTEIN: CPT | Performed by: INTERNAL MEDICINE

## 2018-03-06 PROCEDURE — 85025 COMPLETE CBC W/AUTO DIFF WBC: CPT | Performed by: SURGERY

## 2018-03-06 PROCEDURE — 25010000002 HEPARIN (PORCINE) PER 1000 UNITS: Performed by: INTERNAL MEDICINE

## 2018-03-06 DEVICE — FLTR VC CELECT PLAT UNISET W NAVALIGN DEL 7F: Type: IMPLANTABLE DEVICE | Site: VENA CAVA | Status: FUNCTIONAL

## 2018-03-06 RX ORDER — PROPOFOL 10 MG/ML
VIAL (ML) INTRAVENOUS CONTINUOUS PRN
Status: DISCONTINUED | OUTPATIENT
Start: 2018-03-06 | End: 2018-03-06 | Stop reason: SURG

## 2018-03-06 RX ORDER — SODIUM CHLORIDE, SODIUM LACTATE, POTASSIUM CHLORIDE, CALCIUM CHLORIDE 600; 310; 30; 20 MG/100ML; MG/100ML; MG/100ML; MG/100ML
9 INJECTION, SOLUTION INTRAVENOUS CONTINUOUS
Status: DISCONTINUED | OUTPATIENT
Start: 2018-03-06 | End: 2018-03-06

## 2018-03-06 RX ORDER — PROMETHAZINE HYDROCHLORIDE 25 MG/ML
12.5 INJECTION, SOLUTION INTRAMUSCULAR; INTRAVENOUS ONCE AS NEEDED
Status: DISCONTINUED | OUTPATIENT
Start: 2018-03-06 | End: 2018-03-06

## 2018-03-06 RX ORDER — MIDAZOLAM HYDROCHLORIDE 1 MG/ML
2 INJECTION INTRAMUSCULAR; INTRAVENOUS
Status: DISCONTINUED | OUTPATIENT
Start: 2018-03-06 | End: 2018-03-06 | Stop reason: HOSPADM

## 2018-03-06 RX ORDER — FENTANYL CITRATE 50 UG/ML
50 INJECTION, SOLUTION INTRAMUSCULAR; INTRAVENOUS
Status: DISCONTINUED | OUTPATIENT
Start: 2018-03-06 | End: 2018-03-06 | Stop reason: HOSPADM

## 2018-03-06 RX ORDER — OXYCODONE AND ACETAMINOPHEN 7.5; 325 MG/1; MG/1
1 TABLET ORAL ONCE AS NEEDED
Status: DISCONTINUED | OUTPATIENT
Start: 2018-03-06 | End: 2018-03-06

## 2018-03-06 RX ORDER — HYDROMORPHONE HCL 110MG/55ML
0.5 PATIENT CONTROLLED ANALGESIA SYRINGE INTRAVENOUS
Status: DISCONTINUED | OUTPATIENT
Start: 2018-03-06 | End: 2018-03-06

## 2018-03-06 RX ORDER — LIDOCAINE HYDROCHLORIDE 10 MG/ML
0.5 INJECTION, SOLUTION EPIDURAL; INFILTRATION; INTRACAUDAL; PERINEURAL ONCE AS NEEDED
Status: DISCONTINUED | OUTPATIENT
Start: 2018-03-06 | End: 2018-03-06 | Stop reason: HOSPADM

## 2018-03-06 RX ORDER — HYDRALAZINE HYDROCHLORIDE 20 MG/ML
5 INJECTION INTRAMUSCULAR; INTRAVENOUS
Status: DISCONTINUED | OUTPATIENT
Start: 2018-03-06 | End: 2018-03-06

## 2018-03-06 RX ORDER — LABETALOL HYDROCHLORIDE 5 MG/ML
5 INJECTION, SOLUTION INTRAVENOUS
Status: DISCONTINUED | OUTPATIENT
Start: 2018-03-06 | End: 2018-03-06

## 2018-03-06 RX ORDER — HYDROCODONE BITARTRATE AND ACETAMINOPHEN 7.5; 325 MG/1; MG/1
1 TABLET ORAL ONCE AS NEEDED
Status: DISCONTINUED | OUTPATIENT
Start: 2018-03-06 | End: 2018-03-06

## 2018-03-06 RX ORDER — PROMETHAZINE HYDROCHLORIDE 25 MG/1
25 SUPPOSITORY RECTAL ONCE AS NEEDED
Status: DISCONTINUED | OUTPATIENT
Start: 2018-03-06 | End: 2018-03-06

## 2018-03-06 RX ORDER — LIDOCAINE HYDROCHLORIDE 20 MG/ML
INJECTION, SOLUTION INFILTRATION; PERINEURAL AS NEEDED
Status: DISCONTINUED | OUTPATIENT
Start: 2018-03-06 | End: 2018-03-06 | Stop reason: SURG

## 2018-03-06 RX ORDER — FENTANYL CITRATE 50 UG/ML
50 INJECTION, SOLUTION INTRAMUSCULAR; INTRAVENOUS
Status: DISCONTINUED | OUTPATIENT
Start: 2018-03-06 | End: 2018-03-06

## 2018-03-06 RX ORDER — EPHEDRINE SULFATE 50 MG/ML
5 INJECTION, SOLUTION INTRAVENOUS ONCE AS NEEDED
Status: DISCONTINUED | OUTPATIENT
Start: 2018-03-06 | End: 2018-03-06

## 2018-03-06 RX ORDER — FAMOTIDINE 10 MG/ML
20 INJECTION, SOLUTION INTRAVENOUS ONCE
Status: COMPLETED | OUTPATIENT
Start: 2018-03-06 | End: 2018-03-06

## 2018-03-06 RX ORDER — PROMETHAZINE HYDROCHLORIDE 25 MG/1
25 TABLET ORAL ONCE AS NEEDED
Status: DISCONTINUED | OUTPATIENT
Start: 2018-03-06 | End: 2018-03-06

## 2018-03-06 RX ORDER — PROMETHAZINE HYDROCHLORIDE 25 MG/1
12.5 TABLET ORAL ONCE AS NEEDED
Status: DISCONTINUED | OUTPATIENT
Start: 2018-03-06 | End: 2018-03-06

## 2018-03-06 RX ORDER — NALOXONE HCL 0.4 MG/ML
0.2 VIAL (ML) INJECTION AS NEEDED
Status: DISCONTINUED | OUTPATIENT
Start: 2018-03-06 | End: 2018-03-06

## 2018-03-06 RX ORDER — FLUMAZENIL 0.1 MG/ML
0.2 INJECTION INTRAVENOUS AS NEEDED
Status: DISCONTINUED | OUTPATIENT
Start: 2018-03-06 | End: 2018-03-06

## 2018-03-06 RX ORDER — SODIUM CHLORIDE 0.9 % (FLUSH) 0.9 %
1-10 SYRINGE (ML) INJECTION AS NEEDED
Status: DISCONTINUED | OUTPATIENT
Start: 2018-03-06 | End: 2018-03-06 | Stop reason: HOSPADM

## 2018-03-06 RX ORDER — PROPOFOL 10 MG/ML
VIAL (ML) INTRAVENOUS AS NEEDED
Status: DISCONTINUED | OUTPATIENT
Start: 2018-03-06 | End: 2018-03-06 | Stop reason: SURG

## 2018-03-06 RX ORDER — HYDROCODONE BITARTRATE AND ACETAMINOPHEN 10; 325 MG/1; MG/1
1 TABLET ORAL EVERY 4 HOURS PRN
Status: DISCONTINUED | OUTPATIENT
Start: 2018-03-06 | End: 2018-03-07

## 2018-03-06 RX ORDER — ONDANSETRON 2 MG/ML
4 INJECTION INTRAMUSCULAR; INTRAVENOUS ONCE AS NEEDED
Status: DISCONTINUED | OUTPATIENT
Start: 2018-03-06 | End: 2018-03-06

## 2018-03-06 RX ORDER — MIDAZOLAM HYDROCHLORIDE 1 MG/ML
1 INJECTION INTRAMUSCULAR; INTRAVENOUS
Status: DISCONTINUED | OUTPATIENT
Start: 2018-03-06 | End: 2018-03-06 | Stop reason: HOSPADM

## 2018-03-06 RX ADMIN — PROPOFOL 70 MG: 10 INJECTION, EMULSION INTRAVENOUS at 11:32

## 2018-03-06 RX ADMIN — PROPOFOL 300 MCG/KG/MIN: 10 INJECTION, EMULSION INTRAVENOUS at 11:32

## 2018-03-06 RX ADMIN — IPRATROPIUM BROMIDE AND ALBUTEROL SULFATE 3 ML: .5; 3 SOLUTION RESPIRATORY (INHALATION) at 20:28

## 2018-03-06 RX ADMIN — IPRATROPIUM BROMIDE AND ALBUTEROL SULFATE 3 ML: .5; 3 SOLUTION RESPIRATORY (INHALATION) at 15:14

## 2018-03-06 RX ADMIN — HYDROCODONE BITARTRATE AND ACETAMINOPHEN 1 TABLET: 10; 325 TABLET ORAL at 14:34

## 2018-03-06 RX ADMIN — CEFTRIAXONE SODIUM 2 G: 2 INJECTION, SOLUTION INTRAVENOUS at 10:01

## 2018-03-06 RX ADMIN — LIDOCAINE HYDROCHLORIDE 100 MG: 20 INJECTION, SOLUTION INFILTRATION; PERINEURAL at 11:32

## 2018-03-06 RX ADMIN — DOCUSATE SODIUM -SENNOSIDES 2 TABLET: 50; 8.6 TABLET, COATED ORAL at 21:11

## 2018-03-06 RX ADMIN — IOPAMIDOL 40 ML: 510 INJECTION, SOLUTION INTRAVASCULAR at 12:00

## 2018-03-06 RX ADMIN — HYDROCODONE BITARTRATE AND ACETAMINOPHEN 1 TABLET: 10; 325 TABLET ORAL at 09:00

## 2018-03-06 RX ADMIN — IPRATROPIUM BROMIDE AND ALBUTEROL SULFATE 3 ML: .5; 3 SOLUTION RESPIRATORY (INHALATION) at 09:05

## 2018-03-06 RX ADMIN — FUROSEMIDE 40 MG: 40 TABLET ORAL at 07:58

## 2018-03-06 RX ADMIN — FENTANYL CITRATE 50 MCG: 50 INJECTION INTRAMUSCULAR; INTRAVENOUS at 12:50

## 2018-03-06 RX ADMIN — HYDROMORPHONE HYDROCHLORIDE 0.5 MG: 2 INJECTION INTRAMUSCULAR; INTRAVENOUS; SUBCUTANEOUS at 12:50

## 2018-03-06 RX ADMIN — PREGABALIN 100 MG: 100 CAPSULE ORAL at 07:58

## 2018-03-06 RX ADMIN — PREGABALIN 100 MG: 100 CAPSULE ORAL at 21:11

## 2018-03-06 RX ADMIN — FAMOTIDINE 20 MG: 10 INJECTION, SOLUTION INTRAVENOUS at 11:12

## 2018-03-06 RX ADMIN — HYDROCODONE BITARTRATE AND ACETAMINOPHEN 1 TABLET: 10; 325 TABLET ORAL at 04:45

## 2018-03-06 RX ADMIN — FLUTICASONE PROPIONATE 2 SPRAY: 50 SPRAY, METERED NASAL at 07:58

## 2018-03-06 RX ADMIN — HEPARIN SODIUM 18.6 UNITS/KG/HR: 10000 INJECTION, SOLUTION INTRAVENOUS at 09:42

## 2018-03-06 RX ADMIN — HEPARIN SODIUM 18.6 UNITS/KG/HR: 10000 INJECTION, SOLUTION INTRAVENOUS at 18:08

## 2018-03-06 RX ADMIN — INSULIN DETEMIR 34 UNITS: 100 INJECTION, SOLUTION SUBCUTANEOUS at 07:59

## 2018-03-06 RX ADMIN — PREGABALIN 100 MG: 100 CAPSULE ORAL at 15:49

## 2018-03-06 RX ADMIN — HYDROCODONE BITARTRATE AND ACETAMINOPHEN 1 TABLET: 10; 325 TABLET ORAL at 23:48

## 2018-03-06 RX ADMIN — SODIUM CHLORIDE, POTASSIUM CHLORIDE, SODIUM LACTATE AND CALCIUM CHLORIDE 9 ML/HR: 600; 310; 30; 20 INJECTION, SOLUTION INTRAVENOUS at 11:12

## 2018-03-06 RX ADMIN — FENTANYL CITRATE 50 MCG: 50 INJECTION INTRAMUSCULAR; INTRAVENOUS at 13:55

## 2018-03-06 RX ADMIN — HYDROMORPHONE HYDROCHLORIDE 0.5 MG: 2 INJECTION INTRAMUSCULAR; INTRAVENOUS; SUBCUTANEOUS at 13:55

## 2018-03-06 RX ADMIN — MIDAZOLAM 2 MG: 1 INJECTION INTRAMUSCULAR; INTRAVENOUS at 11:12

## 2018-03-06 NOTE — SIGNIFICANT NOTE
03/06/18 1101   Rehab Treatment   Discipline physical therapist   Treatment Not Performed patient unavailable for treatment  (Pt in OR today for back surgery. Will follow up tomorrow.)   Recommendation   PT - Next Appointment 03/07/18

## 2018-03-06 NOTE — ANESTHESIA PREPROCEDURE EVALUATION
Anesthesia Evaluation     Patient summary reviewed and Nursing notes reviewed   NPO Solid Status: > 8 hours  NPO Liquid Status: > 8 hours           Airway   Mallampati: III  Neck ROM: full  possible difficult intubation  Dental    (+) poor dentition    Pulmonary    (+) sleep apnea, wheezes,   Cardiovascular     Rhythm: regular    (+) hypertension, dysrhythmias PAC, PVC, DVT current, hyperlipidemia,       Neuro/Psych  (+) numbness,     GI/Hepatic/Renal/Endo    (+) obesity, morbid obesity,  diabetes mellitus type 2 using insulin,     Musculoskeletal     Abdominal   (+) obese,    Substance History      OB/GYN          Other   (+) arthritis   history of cancer    ROS/Med Hx Other: Pre op BG is 72 - treating with D50 and rechecking                    Anesthesia Plan    ASA 3     general     intravenous induction   Anesthetic plan and risks discussed with patient.    Plan discussed with CRNA.

## 2018-03-06 NOTE — PLAN OF CARE
Problem: Patient Care Overview (Adult)  Goal: Plan of Care Review  Outcome: Ongoing (interventions implemented as appropriate)   03/06/18 1341 03/06/18 1626   Coping/Psychosocial Response Interventions   Plan Of Care Reviewed With patient --    Patient Care Overview   Progress improving --    Outcome Evaluation   Outcome Summary/Follow up Plan --  VSS, held oral diabetics due to NPO status. Given long acting insulin. IVC filter placed today, dressing at site CDI, no warmth or edema. Heparin restarted at 1800, hold at 0000. NPO at 0000       Problem: Infection, Risk/Actual (Adult)  Goal: Identify Related Risk Factors and Signs and Symptoms  Outcome: Ongoing (interventions implemented as appropriate)    Goal: Infection Prevention/Resolution  Outcome: Ongoing (interventions implemented as appropriate)      Problem: Fall Risk (Adult)  Goal: Identify Related Risk Factors and Signs and Symptoms  Outcome: Ongoing (interventions implemented as appropriate)    Goal: Absence of Falls  Outcome: Ongoing (interventions implemented as appropriate)      Problem: Pain, Acute (Adult)  Goal: Identify Related Risk Factors and Signs and Symptoms  Outcome: Ongoing (interventions implemented as appropriate)      Problem: Respiratory Insufficiency (Adult)  Goal: Identify Related Risk Factors and Signs and Symptoms  Outcome: Ongoing (interventions implemented as appropriate)    Goal: Acid/Base Balance  Outcome: Ongoing (interventions implemented as appropriate)    Goal: Effective Ventilation  Outcome: Ongoing (interventions implemented as appropriate)      Problem: Perioperative Period (Adult)  Goal: Signs and Symptoms of Listed Potential Problems Will be Absent or Manageable (Perioperative Period)  Outcome: Ongoing (interventions implemented as appropriate)

## 2018-03-06 NOTE — PROGRESS NOTES
INFECTIOUS DISEASES PROGRESS NOTE    CC: f/u discitis, e coli bacteremia    S:   Feels about the same  No new neurologic sx.  Up walking at times.  Back pain actually improved as was before  No f/c/ns    O:  Physical Exam:  Temp:  [97.5 °F (36.4 °C)-101.1 °F (38.4 °C)] 97.5 °F (36.4 °C)  Heart Rate:  [] 84  Resp:  [18-20] 20  BP: (122-161)/() 140/64  Physical Exam   Constitutional: He appears well-developed. No distress.   Pulmonary/Chest: Effort normal.   Abdominal: Soft. He exhibits no distension. There is no tenderness.   Neurological: He is alert.   Skin: Skin is warm and dry.   Psychiatric: He has a normal mood and affect. His behavior is normal.        Diagnostics:    Cr 0.83  WBC 8.97 (p78, l 11, M 9, e1)  H/H 11.6/37         Microbiology:  3/4 BCx NGTD x 2  3/4 UCx NGTD  2/28 BCx neg x2  2/24 BCx neg x 2  2/23 RVP + RSV  2/23 BCx E coli 2/2 2/23 Influenza neg    Assessment/Plan   1.  Fever - persistent secondary to #2 below     2.  Discitis, paravertebral soft tissue infection, psoas muscle abscesses, possible epidural abscesses  - Plan for surgery tomorrow  - Continue ceftriaxone but hold additional antibiotics (unless the patient clinically decompensates) until operative cultures can be obtained to further guide treatment.  Plan long term abx  - Preoperative ESR 44 and CRP 12.9      3.  Right lower extremity DVT  - IVC filter today  - Management per primary team      4.  Escherichia coli bacteremia setting of acute gangrenous cholecystitis   -s/p laparoscopic cholecystectomy on February 25, 2018     5.  RSV  - supportive care     6.  Acute hypoxic respiratory failure - better  - Pulmonary medicine following      7.  Uncontrolled DM2 - complicating above      Nicanor Hernandez MD  9:39 AM  03/06/18

## 2018-03-06 NOTE — ANESTHESIA POSTPROCEDURE EVALUATION
Patient: Jesus Beckham    Procedure Summary     Date Anesthesia Start Anesthesia Stop Room / Location    03/06/18 1129 1204  [facility] 18 INV /  [facility] 18/19       Procedure Diagnosis Provider Provider    VENA CAVA FILTER INSERTION (Right Groin) Acute deep vein thrombosis of right tibial vein MD Henry Varghese MD          Anesthesia Type: general  Last vitals  BP   151/86 (03/06/18 1335)   Temp   36.8 °C (98.2 °F) (03/06/18 1320)   Pulse   76 (03/06/18 1335)   Resp   14 (03/06/18 1335)     SpO2   95 % (03/06/18 1335)     Post Anesthesia Care and Evaluation    Patient location during evaluation: PACU  Patient participation: complete - patient participated  Level of consciousness: awake  Pain management: adequate  Airway patency: patent  Anesthetic complications: No anesthetic complications    Cardiovascular status: acceptable  Respiratory status: acceptable  Hydration status: acceptable

## 2018-03-06 NOTE — PROGRESS NOTES
"    DAILY PROGRESS NOTE  [facility]    Patient Identification:  Name: Jesus Beckham  Age: 77 y.o.  Sex: male  :  1940  MRN: [id number]         Primary Care Physician: Magdy Ricardo MD    Subjective:  Interval History: main c/o is back pain worse w/ movement - baseline 7-8 w/ pain meds helping - denies cp/sob/n/v/d/bowel incontinence or saddle anesthesias     Objective:spouse at     Scheduled Meds:  ceftriaxone 2 g Intravenous Q24H   fluticasone 2 spray Nasal Daily   furosemide 40 mg Oral Daily   glipiZIDE 10 mg Oral BID AC   insulin aspart 0-7 Units Subcutaneous 4x Daily With Meals & Nightly   insulin detemir 34 Units Subcutaneous Q12H   ipratropium-albuterol 3 mL Nebulization Q6H While Awake - RT   linagliptin 5 mg Oral Daily   pregabalin 100 mg Oral TID   sennosides-docusate sodium 2 tablet Oral BID     Continuous Infusions:  heparin (porcine) 12.6 Units/kg/hr Last Rate: 18.6 Units/kg/hr (18 0942)       Vital signs in last 24 hours:  Temp:  [97.5 °F (36.4 °C)-98.7 °F (37.1 °C)] 98.2 °F (36.8 °C)  Heart Rate:  [75-98] 88  Resp:  [14-20] 16  BP: (122-164)/() 146/84    Intake/Output:    Intake/Output Summary (Last 24 hours) at 18 1525  Last data filed at 18 1400   Gross per 24 hour   Intake              474 ml   Output             2650 ml   Net            -2176 ml       Exam:  /84  Pulse 88  Temp 98.2 °F (36.8 °C) (Oral)   Resp 16  Ht 177.8 cm (70\")  Wt 119 kg (263 lb)  SpO2 98%  BMI 37.74 kg/m2    General Appearance:    Alert, cooperative, AAOx3                         Throat:   Lips, tongue, gums normal; oral mucosa pink and moist                           Neck:   Supple, symmetrical, trachea midline, no JVD                         Lungs:    Clear to auscultation bilaterally, respirations unlabored                          Heart:    Regular rate and rhythm, S1 and S2 normal                  Abdomen:     Soft, non-tender, bowel sounds active          "        Extremities:   Extremities normal, atraumatic, no cyanosis or edema                             Data Review:  Labs in chart were reviewed.    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       Plan:  Discitis secondary to seeding from Ecoli septicemia secondary to gangrenous cholecystitis s/p cholecystectomy on 2/25   -OR w/ Dr Valdes/Tejinder tomorrow              -Rocephin only per ID and await intraoperative cx's                     RSV - supportive care     AHRF - CT c/w Atelectasis and small effusions - added Lasix but will hold for now     Acute RLE CVT - hep gtt for now and s/p IVCF since will have to be off AC for days in postop spine      DM2 w/ PN w/ A1c 7.7 - hold basal tonight since NPO      Edema - echo EF normal - added TEDs/elevate and started on PO Lasix yesterday but will hold further for now     Appreciate input and assistance from ALL       Junaid  Poncho Miles MD  3/6/2018  3:25 PM

## 2018-03-06 NOTE — PLAN OF CARE
Problem: Patient Care Overview (Adult)  Goal: Plan of Care Review  Outcome: Ongoing (interventions implemented as appropriate)   03/06/18 0127   Coping/Psychosocial Response Interventions   Plan Of Care Reviewed With patient   Patient Care Overview   Progress improving   Outcome Evaluation   Outcome Summary/Follow up Plan VSS, HR 90s-low 100s, new dx discitis with surgery planned later this week. bowels are moving since disimpaction. pain controlled with PRN lortab currently. NPO since midnight for IVC filter placement planned for noon 3/6/18 consent signed and on the chart.     Goal: Adult Individualization and Mutuality  Outcome: Ongoing (interventions implemented as appropriate)    Goal: Discharge Needs Assessment  Outcome: Ongoing (interventions implemented as appropriate)      Problem: Infection, Risk/Actual (Adult)  Goal: Identify Related Risk Factors and Signs and Symptoms  Outcome: Ongoing (interventions implemented as appropriate)    Goal: Infection Prevention/Resolution  Outcome: Ongoing (interventions implemented as appropriate)      Problem: Fall Risk (Adult)  Goal: Identify Related Risk Factors and Signs and Symptoms  Outcome: Ongoing (interventions implemented as appropriate)    Goal: Absence of Falls  Outcome: Ongoing (interventions implemented as appropriate)      Problem: Pain, Acute (Adult)  Goal: Identify Related Risk Factors and Signs and Symptoms  Outcome: Ongoing (interventions implemented as appropriate)      Problem: Respiratory Insufficiency (Adult)  Goal: Identify Related Risk Factors and Signs and Symptoms  Outcome: Ongoing (interventions implemented as appropriate)    Goal: Acid/Base Balance  Outcome: Ongoing (interventions implemented as appropriate)    Goal: Effective Ventilation  Outcome: Ongoing (interventions implemented as appropriate)

## 2018-03-06 NOTE — PROGRESS NOTES
LOS: 11 days   Patient Care Team:  Magdy Ricardo MD as PCP - General  Magdy Ricardo MD as PCP - Family Medicine  Staci Keyes MD as Consulting Physician (Endocrinology)  Jerrell Fry MD as Consulting Physician (Urology)    [place]     Continues to have severe back pain but had his IVC filter placed today and says a very well he was happy with the operating room staff and anesthesia and he feels better about surgery tomorrow  Review of Systems:          Objective     Vital Signs  Vital Sign Min/Max for last 24 hours  Temp  Min: 97.5 °F (36.4 °C)  Max: 98.9 °F (37.2 °C)   BP  Min: 122/89  Max: 164/84   Pulse  Min: 75  Max: 98   Resp  Min: 14  Max: 20   SpO2  Min: 92 %  Max: 98 %   Flow (L/min)  Min: 2  Max: 4   No Data Recorded        Ventilator/Non-Invasive Ventilation Settings     None                       Body mass index is 37.74 kg/(m^2).  I/O last 3 completed shifts:  In: 408 [I.V.:408]  Out: 2200 [Urine:2200]  I/O this shift:  In: 250 [I.V.:200; IV Piggyback:50]  Out: 1050 [Urine:1050]        Physical Exam:  General Appearance: Well-developed obese white male Sitting in bed doesn't look in particular distress  Eyes: Conjunctiva are clear and anicteric  ENT: Mucous membranes are moist no erythema or exudates nasal septum midline and he has a Mallampati type II airway  Neck: No jugular venous distention trachea midline  Lungs: Clear nonlabored symmetric expansion   Cardiac: Regular rate and rhythm  Abdomen: Abdominal binder in place not removed  : Not examined  Musc/Skel: Edema all 4 extremities 1+ upper extremities 3+ lower extremities , right groin catheter site looks good soft no bleeding no hematoma appreciated  Skin: No jaundice no petechiae  Neuro: He is alert oriented he is cooperative  Extremities/P Vascular: No clubbing or cyanosis he has palpable radial dorsalis pedis pulses  MSE:  good spirits today       Labs:    Results from last 7 days  Lab Units 03/06/18  8447  03/05/18 0730 03/04/18 0453 03/03/18  0552 03/02/18  0323 03/01/18 0429 02/28/18 0419   GLUCOSE mg/dL 109* 173* 137* 218* 169* 160* 166*   SODIUM mmol/L 138 138 137 138 140 140 137   POTASSIUM mmol/L 3.7 3.7 4.0 4.3 3.9 4.2 4.0   CO2 mmol/L 31.3* 26.2 27.3 27.8 26.5 27.2 27.1   CHLORIDE mmol/L 100 98 98 100 101 102 99   ANION GAP mmol/L 6.7 13.8 11.7 10.2 12.5 10.8 10.9   CREATININE mg/dL 0.83 1.03 0.95 0.78 0.96 0.85 0.87   BUN mg/dL 21 21 19 16 19 21 25*   BUN / CREAT RATIO  25.3* 20.4 20.0 20.5 19.8 24.7 28.7*   CALCIUM mg/dL 8.4* 8.7 8.4* 8.4* 8.3* 8.1* 8.5*   EGFR IF NONAFRICN AM mL/min/1.73 90 70 77 97 76 88 85   ALK PHOS U/L  --   --  111 91 81 86 90   TOTAL PROTEIN g/dL  --   --  5.6* 5.5* 5.5* 5.5* 5.8*   ALT (SGPT) U/L  --   --  36 33 36 39 46*   AST (SGOT) U/L  --   --  29 37 24 26 30   BILIRUBIN mg/dL  --   --  0.7 0.6 0.5 0.7 0.7   ALBUMIN g/dL  --   --  2.40* 2.30* 2.60* 2.50* 2.60*   GLOBULIN gm/dL  --   --  3.2 3.2 2.9 3.0 3.2   A/G RATIO g/dL  --   --  0.8 0.7 0.9 0.8 0.8     Estimated Creatinine Clearance: 97.9 mL/min (by C-G formula based on Cr of 0.83).        Results from last 7 days  Lab Units 03/06/18  0437 03/05/18 0730 03/04/18 0453 03/03/18  0552 03/02/18  0323 03/01/18  0429 02/28/18  0419   WBC 10*3/mm3 8.97 10.41 13.13* 8.62 7.46 8.35 7.59   RBC 10*6/mm3 3.88* 4.09* 4.00* 3.72* 3.79* 3.95* 4.07*   HEMOGLOBIN g/dL 11.6* 12.2* 11.9* 11.3* 11.5* 12.0* 12.2*   HEMATOCRIT % 36.5* 38.3* 37.9* 34.9* 35.8* 37.6* 38.5*   MCV fL 94.1 93.6 94.8 93.8 94.5 95.2 94.6   MCH pg 29.9 29.8 29.8 30.4 30.3 30.4 30.0   MCHC g/dL 31.8* 31.9* 31.4* 32.4* 32.1* 31.9* 31.7*   RDW % 14.6* 14.7* 14.8* 14.9* 14.6* 14.6* 14.6*   RDW-SD fl 49.8 50.1 51.1 50.7 49.7 50.7 50.2   MPV fL 11.7 11.6 11.5 11.9 11.9 11.6 12.3*   PLATELETS 10*3/mm3 202 198 195 198 217 191 148   NEUTROPHIL % % 78.3* 86.4* 92.0* 84.8* 71.5 73.1 70.6   LYMPHOCYTE % % 11.0* 5.5* 3.2* 7.8* 13.1* 12.1* 13.6*   MONOCYTES % % 8.8 6.9 3.0* 4.9*  9.8 10.4 12.5*   EOSINOPHIL % % 1.1 0.4 0.8 1.5 1.6 0.4 0.5   BASOPHIL % % 0.2 0.2 0.2 0.2 0.4 0.4 0.3   IMM GRAN % % 0.6* 0.6* 0.8* 0.8* 3.6* 3.6* 2.5*   NEUTROS ABS 10*3/mm3 7.02 9.00* 12.08* 7.31 5.33 6.11 5.36   LYMPHS ABS 10*3/mm3 0.99 0.57* 0.42* 0.67* 0.98 1.01 1.03   MONOS ABS 10*3/mm3 0.79 0.72 0.40 0.42 0.73 0.87 0.95   EOS ABS 10*3/mm3 0.10 0.04 0.10 0.13 0.12 0.03 0.04   BASOS ABS 10*3/mm3 0.02 0.02 0.02 0.02 0.03 0.03 0.02   IMMATURE GRANS (ABS) 10*3/mm3 0.05* 0.06* 0.11* 0.07* 0.27* 0.30* 0.19*   NRBC /100 WBC  --  0.0 0.0  --  0.0 0.0 0.0           Results from last 7 days  Lab Units 02/28/18 1953   TROPONIN T ng/mL <0.010       Results from last 7 days  Lab Units 02/28/18 1953   PROBNP pg/mL 1443.0           Results from last 7 days  Lab Units 03/04/18  0453 03/01/18  0429 02/28/18 1953   LACTATE mmol/L  --  0.9 3.7*   PROCALCITONIN ng/mL 22.97*  --  1.93*       Results from last 7 days  Lab Units 03/04/18  1719   INR  1.13*     Microbiology Results (last 10 days)     Procedure Component Value - Date/Time    Urine Culture - Urine, Urine, Clean Catch [[id number]]  (Abnormal) Collected:  03/04/18 1200    Lab Status:  Final result Specimen:  Urine from Urine, Clean Catch Updated:  03/06/18 1139     Urine Culture --      10,000-20,000 CFU/mL Candida albicans (A)    Blood Culture - Blood, [[id number]]  (Normal) Collected:  03/04/18 1107    Lab Status:  Preliminary result Specimen:  Blood from Arm, Left Updated:  03/06/18 1131     Blood Culture No growth at 2 days    Blood Culture - Blood, [[id number]]  (Normal) Collected:  03/04/18 1025    Lab Status:  Preliminary result Specimen:  Blood from Arm, Left Updated:  03/06/18 1101     Blood Culture No growth at 2 days    Blood Culture - Blood, [[id number]]  (Normal) Collected:  02/28/18 1832    Lab Status:  Final result Specimen:  Blood from Arm, Left Updated:  03/05/18 1846     Blood Culture No growth at 5 days    Blood Culture - Blood, [[id number]]  (Normal)  Collected:  02/28/18 1533    Lab Status:  Final result Specimen:  Blood from Arm, Right Updated:  03/05/18 1546     Blood Culture No growth at 5 days                ceftriaxone 2 g Intravenous Q24H   fluticasone 2 spray Nasal Daily   glipiZIDE 10 mg Oral BID AC   insulin aspart 0-7 Units Subcutaneous 4x Daily With Meals & Nightly   ipratropium-albuterol 3 mL Nebulization Q6H While Awake - RT   linagliptin 5 mg Oral Daily   pregabalin 100 mg Oral TID   sennosides-docusate sodium 2 tablet Oral BID       heparin (porcine) 12.6 Units/kg/hr Last Rate: 18.6 Units/kg/hr (03/06/18 0942)       Diagnostics:  Xr Chest 2 View    Result Date: 2/23/2018  PA AND LATERAL CHEST X-RAY  HISTORY: fever  COMPARISON: None.  FINDINGS: PA and lateral views of the chest were obtained. Patient is kyphotic. The lungs are under aerated with pulmonary vascular congestion bordering on mild edema. There is mild right lung base atelectasis. There is no convincing evidence of active air space disease process otherwise. Mild cardiomegaly. No significant pleural fluid. Thoracolumbar kyphosis noted with multilevel spurring.        Under aeration with pulmonary vascular congestion bordering on mild edema/CHF  This report was finalized on 2/23/2018 12:58 AM by Jerrell Dumont MD.      Ct Chest Without Contrast    Result Date: 2/26/2018  CT CHEST WITHOUT CONTRAST  HISTORY: Rule out right middle lobe infiltrate. To evaluate source of fever.  TECHNIQUE: Axial CT images of the chest were obtained without administration of intravenous contrast. Coronal and sagittal reformats were obtained.  COMPARISON: Chest radiographs.  FINDINGS: The visualized thyroid gland is normal. Calcified plaque is seen within the aortic arch and the coronary arteries. Calcified lymph nodes are seen within the right hilum and the subcarinal region. The central airways are patent without endobronchial lesion. Bilateral lung fields are under aerated with lower lobe atelectatic changes.  No focal infiltrate is identified. No suspicious pulmonary nodules.  The visualized upper abdomen demonstrates a distended gallbladder with dependent hyperdensity that may represent sludge and/or stones. Degenerative changes are seen within the thoracic spine.      1. Bilateral lung fields are under aerated with atelectatic changes within the bilateral lower lobes. No focal infiltrate is identified. 2. Mildly distended gallbladder with dependent hyperdensity that may represent stones.  Radiation dose reduction techniques were utilized, including automated exposure control and exposure modulation based on body size.  This report was finalized on 2/26/2018 2:27 PM by Dr. Gideon Shah MD.      Mri Lumbar Spine Without Contrast    Result Date: 2/28/2018  MRI LUMBAR SPINE WO CONTRAST-  INDICATIONS: Low back pain with radiculopathy.  TECHNIQUE: NONCONTRAST LUMBAR SPINE MRI   COMPARISON: None available  FINDINGS:   Marrow signal with heterogeneous with endplate changes noted, but no acute fracture identified. Laminectomy changes at L3, L4 are noted.  Alignment is in range of normal.  Conus medullaris appears unremarkable.  Increased T2 signal within the upper aspect of the right psoas, which appears mildly thickened, for example image 21 of series 2 could for example represent strain/partial tear or inflammatory/infectious process, correlate clinically, follow-up evaluation with enhanced imaging could be considered if not contraindicated.   Axial levels:  T12/L1, L1/2: No significant disc bulge, central or neural foraminal stenosis.  L2/3: Left lateral recess is effaced by what may represent disc extrusion (enhanced imaging could take to exclude alternative possibilities of a collection or lesion), which also contributes to severe left neural foraminal narrowing (with exiting nerve root impingement) and, with broad-based disc bulge and facet and ligamentum flavum hypertrophy, moderate central stenosis, moderate right  neural foraminal narrowing.  L3/4: Broad-based disc bulge is present, as well as central disc extrusion narrowing the anterior thecal space and, with facet ligamentum flavum hypertrophy moderate to prominent right, moderate left neural foraminal stenosis.  L4/5: Broad-based disc bulge with left paracentral disc extrusion apparent, as well as disc protrusion at the right neural foramina, result in effacement of the left lateral recess (with impingement of left traversing nerve root), narrowing of the anterior thecal space, and, with facet and ligamentum flavum hypertrophy, moderate bilateral neuroforaminal narrowing.  L5/S1: Broad-based disc bulge there is the anterior thecal space and, with facet and ligamentum flavum hypertrophy, contributes to moderate to prominent right, prominent left neuroforaminal narrowing.           1. 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  2. Signal changes in the upper right psoas muscle, could be posttraumatic in origin or may be inflammatory/infectious in nature, correlate clinically. Enhanced imaging could be considered for further evaluation.  This report was finalized on 2/28/2018 6:24 PM by Dr. Chris Posey MD.      Ct Abdomen Pelvis With Contrast    Result Date: 2/28/2018  CT ABDOMEN AND PELVIS WITH IV CONTRAST  HISTORY: 77-year-old male underwent laparoscopic cholecystectomy on 02/25/2018 for gangrenous cholecystitis and cholelithiasis.  TECHNIQUE: CT abdomen and pelvis with intravenous and oral contrast.  COMPARISON: CT chest without contrast 02/23/2018.  FINDINGS: There has been cholecystectomy and there is a drain extending into the right upper quadrant beneath the liver. There is material filling the gallbladder fossa. This includes a 4.5 x 2.5 x 2.8 cm low signal material that may represent bioabsorbable packing material.  There are adjacent tiny bubbles of gas and there is also surrounding fluid. There is no mature or drainable collection. Thin band of fluid extends adjacent to the anterior liver. Minimal edema extends into the hepatic parenchyma just above anterior margin of the gallbladder fossa. Liver otherwise appears normal.  Within the medial spleen there is a 1.5 cm low-density lesion. Splenic size is normal. Adrenal glands, pancreas appear normal. There is a diverticulum emanating medially from the descending duodenum. A medial left lower pole renal cyst measures 1.3 cm. Right kidney appears normal and there is no hydronephrosis. Minimal free fluid extends into the pelvis. There is no bowel dilatation or obstruction.      1. Patient is 3 days post cholecystectomy with low density packing material, small bubbles of gas, and fluid filling the gallbladder fossa. Fluid partially extends into the adjacent hepatic parenchyma and may represent hemorrhagic material/blood products. There is mild perihepatic fluid and a small amount of fluid is present within the lower pelvis. Surgical drain extends into the right upper quadrant. Infected fluid would be difficult to exclude though there is no evidence for mature or drainable collection. 2. Tiny renal cysts. Duodenal diverticulum. 15 mm low-density lesion medial spleen of doubtful significance.  Radiation dose reduction techniques were utilized, including automated exposure control and exposure modulation based on body size.  This report was finalized on 2/28/2018 5:44 PM by Dr. Pop Haider MD.      Us Gallbladder    Result Date: 2/24/2018  GALLBLADDER ULTRASOUND  HISTORY: Right upper quadrant pain and fever.  FINDINGS: The gallbladder contains several tiny gallstones near the neck of the gallbladder. The gallbladder is somewhat distended, measuring 11.3 cm in length and raising the concern of an element of cystic duct obstruction. This can also be seen on yesterday's chest CT  scan. The common bile duct measures 4 mm. There is no wall thickening.  The visualized liver and pancreas and right kidney are unremarkable.  CONCLUSION: Distended gallbladder containing tiny gallstones in the neck of the gallbladder and concerning for cystic duct obstruction. The common bile duct is normal in caliber.  This report was finalized on 2/24/2018 9:15 AM by Dr. William Myers MD.      Xr Chest 1 View    Result Date: 2/28/2018  PORTABLE CHEST X-RAY  CLINICAL HISTORY: resp failure; K80.20-Calculus of gallbladder without cholecystitis without obstruction; A41.9-Sepsis, unspecified organism; R19.01-Right upper quadrant abdominal swelling, mass and lump; R10.11-Right upper quadrant pain; K81.0-Acute cholecystitis; R26.89-Other abnormalities of gait and mobility  COMPARISON: 02/25/2018.  FINDINGS: Portable AP view of the chest was obtained with overlying monitor leads in place. Lungs are poorly aerated with pulmonary vascular congestion and an opacity in the right lung base favored to reflect atelectasis rather than pneumonia. There may be a small right effusion as well. Left lung poorly aerated but clear. Normal heart size. Mild aortic ectasia.        Poor inspiration with vascular congestion and right lung base opacity as above. Recommend follow-up  This report was finalized on 2/28/2018 9:34 PM by Jerrell Dumont MD.      Xr Chest 1 View    Result Date: 2/25/2018  ONE VIEW PORTABLE CHEST AT 12:25 PM  HISTORY: Recent gallbladder surgery. Shortness of breath.  There is somewhat decreased lung expansion with further accentuation of vascular congestion compared to 2 days ago. Some of this relates to the poor lung expansion but could also reflect a component of mild CHF. The heart remains enlarged.  This report was finalized on 2/25/2018 12:44 PM by Dr. William Myers MD.      Fl Cholangiogram Operative    Result Date: 2/25/2018  INTRAOPERATIVE CHOLANGIOGRAM  HISTORY: Gallstones.  FINDINGS:  Contrast fills the  biliary system with emptying into the duodenum. There appears to be a small mobile air bubble in the common hepatic duct that is seen on early images. No definite gallstones are seen.  59 images were obtained and the fluoroscopy time measures 10 seconds.  This report was finalized on 2/25/2018 11:32 AM by Dr. William Myers MD.      Results for orders placed during the hospital encounter of 02/22/18   Adult Transthoracic Echo Complete W/ Cont if Necessary Per Protocol    Narrative · The study is technically difficult for diagnosis.  · Left ventricle not well visualized. Left ventricular systolic function   is normal. Calculated EF = 50.3%. Estimated EF was in agreement with the   calculated EF. Normal left ventricular cavity size and wall thickness   noted.  · Left ventricular diastolic function is normal.  · There is calcification of the aortic valve.          Today's chest x-ray reviewed and compared it with the film prior there is still atelectasis at the bases right greater than left plus or minus a little infiltrate in anything I would say infiltrate looks better on the right on this film    Active Hospital Problems (** Indicates Principal Problem)    Diagnosis Date Noted   • **Discitis of lumbar region [M46.46] 03/05/2018   • Acute deep vein thrombosis of calf, right [I82.4Z1] 03/05/2018   • A-fib [I48.91] 02/25/2018   • Acute gangrenous cholecystitis [K81.0] 02/24/2018   • RUQ abdominal pain [R10.11] 02/24/2018   • CELINA (obstructive sleep apnea) [G47.33] 02/23/2018   • Leg edema [R60.0] 02/23/2018   • Vitamin D deficiency [E55.9] 04/28/2017   • 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] 04/04/2016   • Allergic rhinitis due to pollen [J30.1] 04/04/2016   • Diabetic peripheral neuropathy [E11.42] 04/01/2016      Resolved Hospital Problems    Diagnosis Date Noted Date Resolved   • Bacteremia due to Escherichia coli [R78.81] 02/24/2018 03/03/2018   • Sepsis [A41.9] 02/23/2018 03/03/2018   • Secondary  thrombocytopenia [D69.59] 02/23/2018 03/03/2018   • Hypotension [I95.9] 02/23/2018 03/03/2018   • Acute respiratory failure with hypoxia [J96.01] 02/23/2018 03/03/2018   • RSV (acute bronchiolitis due to respiratory syncytial virus) [J21.0] 02/23/2018 03/03/2018         Assessment/Plan     1. Sepsis See discussion below ID is handling antibiotics plan is for orthopedic surgery later this week  2. Acute hypoxemic respiratory failure CT just showed very small bilateral pleural effusions a little bit of basilar atelectasis no pulmonary embolus.  I think the hypoxia is due to some atelectasis.  3. New right calf DVT Patient is on a heparin drip,Status post IVC filter placement in preparation for surgery tomorrow.  4. Escherichia coli bacteremia  5. RSV infection  6. Acute gangrenous cholecystitis status post laparoscopic cholecystectomy on 3/26/18  7. Diabetes mellitus type 2  8. Hyponatremia  9. L2-3 and L3 4 discitis note plans for surgery think this likely explains his back pain.  10. Right psoas abscessAnd some edema possibly in the left psoas as well      Plan for disposition:     Evaristo Burnett MD  03/06/18  3:49 PM    Time:

## 2018-03-06 NOTE — OP NOTE
VENA CAVA FILTER INSERTION  Procedure Note    Jesus Beckham  Admission date: 2/22/2018  Date of operation: 2/22/2018 - 3/6/2018    Pre-op Diagnosis:      * Acute deep vein thrombosis of right tibial vein [I82.441]    Post-Op Diagnosis Codes:     * Acute deep vein thrombosis of right tibial vein [I82.441]    Procedure performed: Ultrasound-guided access right femoral vein, inferior venacavogram, Celect vena cava filter placement    Assistants:  Rebecca SANCHES    Anesthesia: Monitor Anesthesia Care    Staff:   Circulator: Shannon Spurling, RN  Scrub Person: Namrata Vasques  Vascular Radiology Technician: Michelle Serra    Indications:  Pleasant gentleman with acute deep vein thrombosis right lower extremity.  He needs to have spine surgery.  He has been on intravenous heparin.  We have been asked to see to place filter so he can stop his heparin for the surgery.  Discussed procedure and risks of deep vein thrombosis and pulmonary embolus with patient and family.  We'll plan vena cava filter placement.  They are aware can remove filter and future once out to normal activity and acute process resolved.  All questions answered.       Procedure Details both groins prepped and draped in usual fashion.  1% Xylocaine with Marcaine was used for local anesthesia area ultrasound-guided access performed right femoral vein without difficulty.  Wire advanced without problems.  Dilator advanced and removed.  Sheath for filter placed to the distal inferior vena cava level.  Inferior venacavogram performed.  Renal veins and iliac vein bifurcation noted.  Celect filter advanced to the L2 vertebral body level below the renal veins and deployed without difficulty.  Sheath removed and pressure held right femoral vein puncture site for 5 minutes.  Dressing applied.  Hemostasis achieved.  Patient tolerated procedure well without problems.    Radiographic interpretation: Patent proximal iliac veins and inferior vena cava.   Celect filter at L2 vertebral body and good position.    Findings: See above    Estimated Blood Loss: minimal    Specimens: * No orders in the log *      Drains:           Complications: None    Condition: stable    Disposition: To recovery room    Alexis Thakkar MD     Date: 3/6/2018  Time: 12:08 PM    Active Hospital Problems (** Indicates Principal Problem)    Diagnosis Date Noted   • **Discitis of lumbar region [M46.46] 03/05/2018   • Acute deep vein thrombosis of calf, right [I82.4Z1] 03/05/2018   • A-fib [I48.91] 02/25/2018   • Acute gangrenous cholecystitis [K81.0] 02/24/2018   • RUQ abdominal pain [R10.11] 02/24/2018   • CELINA (obstructive sleep apnea) [G47.33] 02/23/2018   • Leg edema [R60.0] 02/23/2018   • Vitamin D deficiency [E55.9] 04/28/2017   • 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

## 2018-03-07 ENCOUNTER — ANESTHESIA (OUTPATIENT)
Dept: PERIOP | Facility: HOSPITAL | Age: 78
End: 2018-03-07

## 2018-03-07 ENCOUNTER — APPOINTMENT (OUTPATIENT)
Dept: GENERAL RADIOLOGY | Facility: HOSPITAL | Age: 78
End: 2018-03-07

## 2018-03-07 ENCOUNTER — ANESTHESIA EVENT (OUTPATIENT)
Dept: PERIOP | Facility: HOSPITAL | Age: 78
End: 2018-03-07

## 2018-03-07 PROBLEM — A41.51 SEPSIS DUE TO ESCHERICHIA COLI (HCC): Status: ACTIVE | Noted: 2018-02-22

## 2018-03-07 LAB
ABO GROUP BLD: NORMAL
APTT PPP: 32.8 SECONDS (ref 22.7–35.4)
BASOPHILS # BLD AUTO: 0.02 10*3/MM3 (ref 0–0.2)
BASOPHILS NFR BLD AUTO: 0.2 % (ref 0–1.5)
BLD GP AB SCN SERPL QL: NEGATIVE
DEPRECATED RDW RBC AUTO: 49.6 FL (ref 37–54)
EOSINOPHIL # BLD AUTO: 0.04 10*3/MM3 (ref 0–0.7)
EOSINOPHIL NFR BLD AUTO: 0.5 % (ref 0.3–6.2)
ERYTHROCYTE [DISTWIDTH] IN BLOOD BY AUTOMATED COUNT: 14.3 % (ref 11.5–14.5)
GLUCOSE BLDC GLUCOMTR-MCNC: 148 MG/DL (ref 70–130)
GLUCOSE BLDC GLUCOMTR-MCNC: 153 MG/DL (ref 70–130)
GLUCOSE BLDC GLUCOMTR-MCNC: 164 MG/DL (ref 70–130)
GLUCOSE BLDC GLUCOMTR-MCNC: 241 MG/DL (ref 70–130)
HCT VFR BLD AUTO: 37.3 % (ref 40.4–52.2)
HGB BLD-MCNC: 11.7 G/DL (ref 13.7–17.6)
IMM GRANULOCYTES # BLD: 0.04 10*3/MM3 (ref 0–0.03)
IMM GRANULOCYTES NFR BLD: 0.5 % (ref 0–0.5)
LYMPHOCYTES # BLD AUTO: 0.38 10*3/MM3 (ref 0.9–4.8)
LYMPHOCYTES NFR BLD AUTO: 4.3 % (ref 19.6–45.3)
MCH RBC QN AUTO: 29.7 PG (ref 27–32.7)
MCHC RBC AUTO-ENTMCNC: 31.4 G/DL (ref 32.6–36.4)
MCV RBC AUTO: 94.7 FL (ref 79.8–96.2)
MONOCYTES # BLD AUTO: 0.59 10*3/MM3 (ref 0.2–1.2)
MONOCYTES NFR BLD AUTO: 6.6 % (ref 5–12)
NEUTROPHILS # BLD AUTO: 7.81 10*3/MM3 (ref 1.9–8.1)
NEUTROPHILS NFR BLD AUTO: 87.9 % (ref 42.7–76)
PLATELET # BLD AUTO: 226 10*3/MM3 (ref 140–500)
PMV BLD AUTO: 11.3 FL (ref 6–12)
RBC # BLD AUTO: 3.94 10*6/MM3 (ref 4.6–6)
RH BLD: NEGATIVE
WBC NRBC COR # BLD: 8.88 10*3/MM3 (ref 4.5–10.7)

## 2018-03-07 PROCEDURE — 25010000002 MIDAZOLAM PER 1 MG: Performed by: ANESTHESIOLOGY

## 2018-03-07 PROCEDURE — 25010000002 SUCCINYLCHOLINE PER 20 MG: Performed by: NURSE ANESTHETIST, CERTIFIED REGISTERED

## 2018-03-07 PROCEDURE — 86901 BLOOD TYPING SEROLOGIC RH(D): CPT | Performed by: ORTHOPAEDIC SURGERY

## 2018-03-07 PROCEDURE — 0QD00ZZ EXTRACTION OF LUMBAR VERTEBRA, OPEN APPROACH: ICD-10-PCS | Performed by: ORTHOPAEDIC SURGERY

## 2018-03-07 PROCEDURE — 25010000002 HYDROMORPHONE PER 4 MG: Performed by: NURSE ANESTHETIST, CERTIFIED REGISTERED

## 2018-03-07 PROCEDURE — 87070 CULTURE OTHR SPECIMN AEROBIC: CPT | Performed by: ORTHOPAEDIC SURGERY

## 2018-03-07 PROCEDURE — 85025 COMPLETE CBC W/AUTO DIFF WBC: CPT | Performed by: SURGERY

## 2018-03-07 PROCEDURE — 63710000001 INSULIN ASPART PER 5 UNITS: Performed by: INTERNAL MEDICINE

## 2018-03-07 PROCEDURE — 00930ZZ DRAINAGE OF INTRACRANIAL EPIDURAL SPACE, OPEN APPROACH: ICD-10-PCS | Performed by: ORTHOPAEDIC SURGERY

## 2018-03-07 PROCEDURE — 76000 FLUOROSCOPY <1 HR PHYS/QHP: CPT

## 2018-03-07 PROCEDURE — 85730 THROMBOPLASTIN TIME PARTIAL: CPT | Performed by: INTERNAL MEDICINE

## 2018-03-07 PROCEDURE — C1713 ANCHOR/SCREW BN/BN,TIS/BN: HCPCS | Performed by: ORTHOPAEDIC SURGERY

## 2018-03-07 PROCEDURE — 86850 RBC ANTIBODY SCREEN: CPT | Performed by: ORTHOPAEDIC SURGERY

## 2018-03-07 PROCEDURE — 87205 SMEAR GRAM STAIN: CPT | Performed by: ORTHOPAEDIC SURGERY

## 2018-03-07 PROCEDURE — 25010000002 VANCOMYCIN PER 500 MG: Performed by: ORTHOPAEDIC SURGERY

## 2018-03-07 PROCEDURE — 25010000002 DEXAMETHASONE PER 1 MG: Performed by: NURSE ANESTHETIST, CERTIFIED REGISTERED

## 2018-03-07 PROCEDURE — 94799 UNLISTED PULMONARY SVC/PX: CPT

## 2018-03-07 PROCEDURE — 01NB0ZZ RELEASE LUMBAR NERVE, OPEN APPROACH: ICD-10-PCS | Performed by: ORTHOPAEDIC SURGERY

## 2018-03-07 PROCEDURE — 72100 X-RAY EXAM L-S SPINE 2/3 VWS: CPT

## 2018-03-07 PROCEDURE — 86900 BLOOD TYPING SEROLOGIC ABO: CPT | Performed by: ORTHOPAEDIC SURGERY

## 2018-03-07 PROCEDURE — 0SG107J FUSION OF 2 OR MORE LUMBAR VERTEBRAL JOINTS WITH AUTOLOGOUS TISSUE SUBSTITUTE, POSTERIOR APPROACH, ANTERIOR COLUMN, OPEN APPROACH: ICD-10-PCS | Performed by: ORTHOPAEDIC SURGERY

## 2018-03-07 PROCEDURE — 25010000002 FENTANYL CITRATE (PF) 100 MCG/2ML SOLUTION: Performed by: ANESTHESIOLOGY

## 2018-03-07 PROCEDURE — 82962 GLUCOSE BLOOD TEST: CPT

## 2018-03-07 PROCEDURE — 25010000003 CEFAZOLIN IN DEXTROSE 2-4 GM/100ML-% SOLUTION: Performed by: NURSE ANESTHETIST, CERTIFIED REGISTERED

## 2018-03-07 PROCEDURE — 87176 TISSUE HOMOGENIZATION CULTR: CPT | Performed by: ORTHOPAEDIC SURGERY

## 2018-03-07 PROCEDURE — 25010000002 FENTANYL CITRATE (PF) 100 MCG/2ML SOLUTION: Performed by: NURSE ANESTHETIST, CERTIFIED REGISTERED

## 2018-03-07 PROCEDURE — 25010000002 PROPOFOL 10 MG/ML EMULSION: Performed by: NURSE ANESTHETIST, CERTIFIED REGISTERED

## 2018-03-07 PROCEDURE — 25010000002 PHENYLEPHRINE PER 1 ML: Performed by: NURSE ANESTHETIST, CERTIFIED REGISTERED

## 2018-03-07 PROCEDURE — 25010000003 CEFTRIAXONE PER 250 MG: Performed by: INTERNAL MEDICINE

## 2018-03-07 PROCEDURE — 0SB20ZZ EXCISION OF LUMBAR VERTEBRAL DISC, OPEN APPROACH: ICD-10-PCS | Performed by: ORTHOPAEDIC SURGERY

## 2018-03-07 PROCEDURE — 87186 SC STD MICRODIL/AGAR DIL: CPT | Performed by: ORTHOPAEDIC SURGERY

## 2018-03-07 PROCEDURE — 25010000002 DEXAMETHASONE PER 1 MG: Performed by: ORTHOPAEDIC SURGERY

## 2018-03-07 PROCEDURE — 0SG10AJ FUSION OF 2 OR MORE LUMBAR VERTEBRAL JOINTS WITH INTERBODY FUSION DEVICE, POSTERIOR APPROACH, ANTERIOR COLUMN, OPEN APPROACH: ICD-10-PCS | Performed by: ORTHOPAEDIC SURGERY

## 2018-03-07 PROCEDURE — 25010000002 DEXAMETHASONE PER 1 MG

## 2018-03-07 DEVICE — BONE CCCS 20 TO 80 30CC: Type: IMPLANTABLE DEVICE | Site: SPINE LUMBAR | Status: FUNCTIONAL

## 2018-03-07 DEVICE — NOVEL SD, 28MM L, 9MM W, 10MM H, TITANIUM
Type: IMPLANTABLE DEVICE | Site: SPINE LUMBAR | Status: FUNCTIONAL
Brand: NOVEL SPINAL SPACER SYSTEM

## 2018-03-07 DEVICE — SEALANT WND FIBRIN TISSEEL VAPOR/HEAT/PREFIL/SYR 10ML: Type: IMPLANTABLE DEVICE | Site: SPINE LUMBAR | Status: FUNCTIONAL

## 2018-03-07 DEVICE — PRE-CONTOURED / C.P. TI ROD 5.5MM 7.5CM
Type: IMPLANTABLE DEVICE | Site: SPINE LUMBAR | Status: FUNCTIONAL
Brand: ILLICO

## 2018-03-07 DEVICE — BONE GRAFT KIT 7510600 INFUSE LARGE
Type: IMPLANTABLE DEVICE | Site: SPINE LUMBAR | Status: FUNCTIONAL
Brand: INFUSE® BONE GRAFT

## 2018-03-07 DEVICE — TI CANNULATED POLYAXIAL SCREW 6.5MM X 40MM
Type: IMPLANTABLE DEVICE | Site: SPINE LUMBAR | Status: FUNCTIONAL
Brand: ILLICO

## 2018-03-07 DEVICE — BONE CANC CHIPS 1/4MM 15CC: Type: IMPLANTABLE DEVICE | Site: SPINE LUMBAR | Status: FUNCTIONAL

## 2018-03-07 DEVICE — TITANIUM HEXALOBE SET SCREW
Type: IMPLANTABLE DEVICE | Site: SPINE LUMBAR | Status: FUNCTIONAL
Brand: ZODIAC

## 2018-03-07 RX ORDER — SODIUM CHLORIDE 450 MG/100ML
100 INJECTION, SOLUTION INTRAVENOUS CONTINUOUS
Status: DISCONTINUED | OUTPATIENT
Start: 2018-03-07 | End: 2018-03-08

## 2018-03-07 RX ORDER — DEXAMETHASONE SODIUM PHOSPHATE 4 MG/ML
4 INJECTION, SOLUTION INTRA-ARTICULAR; INTRALESIONAL; INTRAMUSCULAR; INTRAVENOUS; SOFT TISSUE EVERY 6 HOURS
Status: DISCONTINUED | OUTPATIENT
Start: 2018-03-07 | End: 2018-03-07 | Stop reason: HOSPADM

## 2018-03-07 RX ORDER — CEFAZOLIN SODIUM 2 G/100ML
2 INJECTION, SOLUTION INTRAVENOUS EVERY 8 HOURS
Status: COMPLETED | OUTPATIENT
Start: 2018-03-08 | End: 2018-03-08

## 2018-03-07 RX ORDER — SODIUM CHLORIDE, SODIUM LACTATE, POTASSIUM CHLORIDE, CALCIUM CHLORIDE 600; 310; 30; 20 MG/100ML; MG/100ML; MG/100ML; MG/100ML
9 INJECTION, SOLUTION INTRAVENOUS CONTINUOUS
Status: DISCONTINUED | OUTPATIENT
Start: 2018-03-07 | End: 2018-03-07

## 2018-03-07 RX ORDER — PROMETHAZINE HYDROCHLORIDE 25 MG/1
12.5 TABLET ORAL ONCE AS NEEDED
Status: DISCONTINUED | OUTPATIENT
Start: 2018-03-07 | End: 2018-03-07 | Stop reason: HOSPADM

## 2018-03-07 RX ORDER — PROMETHAZINE HYDROCHLORIDE 25 MG/ML
12.5 INJECTION, SOLUTION INTRAMUSCULAR; INTRAVENOUS ONCE AS NEEDED
Status: DISCONTINUED | OUTPATIENT
Start: 2018-03-07 | End: 2018-03-07 | Stop reason: HOSPADM

## 2018-03-07 RX ORDER — SUCCINYLCHOLINE CHLORIDE 20 MG/ML
INJECTION INTRAMUSCULAR; INTRAVENOUS AS NEEDED
Status: DISCONTINUED | OUTPATIENT
Start: 2018-03-07 | End: 2018-03-07 | Stop reason: SURG

## 2018-03-07 RX ORDER — HYDROMORPHONE HCL 110MG/55ML
0.5 PATIENT CONTROLLED ANALGESIA SYRINGE INTRAVENOUS
Status: DISCONTINUED | OUTPATIENT
Start: 2018-03-07 | End: 2018-03-07 | Stop reason: HOSPADM

## 2018-03-07 RX ORDER — SODIUM CHLORIDE 0.9 % (FLUSH) 0.9 %
1-10 SYRINGE (ML) INJECTION AS NEEDED
Status: DISCONTINUED | OUTPATIENT
Start: 2018-03-07 | End: 2018-03-13 | Stop reason: HOSPADM

## 2018-03-07 RX ORDER — DOCUSATE SODIUM 100 MG/1
100 CAPSULE, LIQUID FILLED ORAL 2 TIMES DAILY PRN
Status: DISCONTINUED | OUTPATIENT
Start: 2018-03-07 | End: 2018-03-13 | Stop reason: HOSPADM

## 2018-03-07 RX ORDER — SODIUM CHLORIDE 0.9 % (FLUSH) 0.9 %
1-10 SYRINGE (ML) INJECTION AS NEEDED
Status: DISCONTINUED | OUTPATIENT
Start: 2018-03-07 | End: 2018-03-07 | Stop reason: HOSPADM

## 2018-03-07 RX ORDER — PROMETHAZINE HYDROCHLORIDE 25 MG/1
25 SUPPOSITORY RECTAL ONCE AS NEEDED
Status: DISCONTINUED | OUTPATIENT
Start: 2018-03-07 | End: 2018-03-07 | Stop reason: HOSPADM

## 2018-03-07 RX ORDER — DEXAMETHASONE SODIUM PHOSPHATE 10 MG/ML
INJECTION INTRAMUSCULAR; INTRAVENOUS AS NEEDED
Status: DISCONTINUED | OUTPATIENT
Start: 2018-03-07 | End: 2018-03-07 | Stop reason: SURG

## 2018-03-07 RX ORDER — LIDOCAINE HYDROCHLORIDE 20 MG/ML
INJECTION, SOLUTION INFILTRATION; PERINEURAL AS NEEDED
Status: DISCONTINUED | OUTPATIENT
Start: 2018-03-07 | End: 2018-03-07 | Stop reason: SURG

## 2018-03-07 RX ORDER — MIDAZOLAM HYDROCHLORIDE 1 MG/ML
2 INJECTION INTRAMUSCULAR; INTRAVENOUS
Status: DISCONTINUED | OUTPATIENT
Start: 2018-03-07 | End: 2018-03-07 | Stop reason: HOSPADM

## 2018-03-07 RX ORDER — DEXAMETHASONE SODIUM PHOSPHATE 4 MG/ML
4 INJECTION, SOLUTION INTRA-ARTICULAR; INTRALESIONAL; INTRAMUSCULAR; INTRAVENOUS; SOFT TISSUE EVERY 6 HOURS
Status: DISCONTINUED | OUTPATIENT
Start: 2018-03-07 | End: 2018-03-10

## 2018-03-07 RX ORDER — HYDROCODONE BITARTRATE AND ACETAMINOPHEN 7.5; 325 MG/1; MG/1
1 TABLET ORAL ONCE AS NEEDED
Status: DISCONTINUED | OUTPATIENT
Start: 2018-03-07 | End: 2018-03-07 | Stop reason: HOSPADM

## 2018-03-07 RX ORDER — SENNA AND DOCUSATE SODIUM 50; 8.6 MG/1; MG/1
1 TABLET, FILM COATED ORAL NIGHTLY PRN
Status: DISCONTINUED | OUTPATIENT
Start: 2018-03-07 | End: 2018-03-13 | Stop reason: HOSPADM

## 2018-03-07 RX ORDER — FAMOTIDINE 10 MG/ML
20 INJECTION, SOLUTION INTRAVENOUS ONCE
Status: COMPLETED | OUTPATIENT
Start: 2018-03-07 | End: 2018-03-07

## 2018-03-07 RX ORDER — FENTANYL CITRATE 50 UG/ML
INJECTION, SOLUTION INTRAMUSCULAR; INTRAVENOUS AS NEEDED
Status: DISCONTINUED | OUTPATIENT
Start: 2018-03-07 | End: 2018-03-07 | Stop reason: SURG

## 2018-03-07 RX ORDER — EPHEDRINE SULFATE 50 MG/ML
5 INJECTION, SOLUTION INTRAVENOUS ONCE AS NEEDED
Status: DISCONTINUED | OUTPATIENT
Start: 2018-03-07 | End: 2018-03-07 | Stop reason: HOSPADM

## 2018-03-07 RX ORDER — FENTANYL CITRATE 50 UG/ML
50 INJECTION, SOLUTION INTRAMUSCULAR; INTRAVENOUS
Status: DISCONTINUED | OUTPATIENT
Start: 2018-03-07 | End: 2018-03-07 | Stop reason: HOSPADM

## 2018-03-07 RX ORDER — BISACODYL 5 MG/1
5 TABLET, DELAYED RELEASE ORAL DAILY PRN
Status: DISCONTINUED | OUTPATIENT
Start: 2018-03-07 | End: 2018-03-13 | Stop reason: HOSPADM

## 2018-03-07 RX ORDER — ONDANSETRON 2 MG/ML
4 INJECTION INTRAMUSCULAR; INTRAVENOUS ONCE AS NEEDED
Status: DISCONTINUED | OUTPATIENT
Start: 2018-03-07 | End: 2018-03-07 | Stop reason: HOSPADM

## 2018-03-07 RX ORDER — DEXAMETHASONE SODIUM PHOSPHATE 4 MG/ML
INJECTION, SOLUTION INTRA-ARTICULAR; INTRALESIONAL; INTRAMUSCULAR; INTRAVENOUS; SOFT TISSUE
Status: COMPLETED
Start: 2018-03-07 | End: 2018-03-07

## 2018-03-07 RX ORDER — LABETALOL HYDROCHLORIDE 5 MG/ML
5 INJECTION, SOLUTION INTRAVENOUS
Status: DISCONTINUED | OUTPATIENT
Start: 2018-03-07 | End: 2018-03-07 | Stop reason: HOSPADM

## 2018-03-07 RX ORDER — HYDRALAZINE HYDROCHLORIDE 20 MG/ML
5 INJECTION INTRAMUSCULAR; INTRAVENOUS
Status: DISCONTINUED | OUTPATIENT
Start: 2018-03-07 | End: 2018-03-07 | Stop reason: HOSPADM

## 2018-03-07 RX ORDER — ROCURONIUM BROMIDE 10 MG/ML
INJECTION, SOLUTION INTRAVENOUS AS NEEDED
Status: DISCONTINUED | OUTPATIENT
Start: 2018-03-07 | End: 2018-03-07 | Stop reason: SURG

## 2018-03-07 RX ORDER — FLUMAZENIL 0.1 MG/ML
0.2 INJECTION INTRAVENOUS AS NEEDED
Status: DISCONTINUED | OUTPATIENT
Start: 2018-03-07 | End: 2018-03-07 | Stop reason: HOSPADM

## 2018-03-07 RX ORDER — SODIUM CHLORIDE 9 MG/ML
INJECTION, SOLUTION INTRAVENOUS AS NEEDED
Status: DISCONTINUED | OUTPATIENT
Start: 2018-03-07 | End: 2018-03-07 | Stop reason: HOSPADM

## 2018-03-07 RX ORDER — HYDROCODONE BITARTRATE AND ACETAMINOPHEN 10; 325 MG/1; MG/1
2 TABLET ORAL EVERY 6 HOURS PRN
Status: DISCONTINUED | OUTPATIENT
Start: 2018-03-07 | End: 2018-03-13 | Stop reason: HOSPADM

## 2018-03-07 RX ORDER — LIDOCAINE HYDROCHLORIDE 10 MG/ML
0.5 INJECTION, SOLUTION EPIDURAL; INFILTRATION; INTRACAUDAL; PERINEURAL ONCE AS NEEDED
Status: DISCONTINUED | OUTPATIENT
Start: 2018-03-07 | End: 2018-03-07 | Stop reason: HOSPADM

## 2018-03-07 RX ORDER — PROPOFOL 10 MG/ML
VIAL (ML) INTRAVENOUS AS NEEDED
Status: DISCONTINUED | OUTPATIENT
Start: 2018-03-07 | End: 2018-03-07 | Stop reason: SURG

## 2018-03-07 RX ORDER — OXYCODONE AND ACETAMINOPHEN 7.5; 325 MG/1; MG/1
1 TABLET ORAL ONCE AS NEEDED
Status: DISCONTINUED | OUTPATIENT
Start: 2018-03-07 | End: 2018-03-07 | Stop reason: HOSPADM

## 2018-03-07 RX ORDER — NALOXONE HCL 0.4 MG/ML
0.1 VIAL (ML) INJECTION
Status: DISCONTINUED | OUTPATIENT
Start: 2018-03-07 | End: 2018-03-13 | Stop reason: HOSPADM

## 2018-03-07 RX ORDER — HYDROMORPHONE HCL 110MG/55ML
PATIENT CONTROLLED ANALGESIA SYRINGE INTRAVENOUS AS NEEDED
Status: DISCONTINUED | OUTPATIENT
Start: 2018-03-07 | End: 2018-03-07 | Stop reason: SURG

## 2018-03-07 RX ORDER — MIDAZOLAM HYDROCHLORIDE 1 MG/ML
1 INJECTION INTRAMUSCULAR; INTRAVENOUS
Status: DISCONTINUED | OUTPATIENT
Start: 2018-03-07 | End: 2018-03-07 | Stop reason: HOSPADM

## 2018-03-07 RX ORDER — CEFAZOLIN SODIUM 2 G/100ML
INJECTION, SOLUTION INTRAVENOUS AS NEEDED
Status: DISCONTINUED | OUTPATIENT
Start: 2018-03-07 | End: 2018-03-07 | Stop reason: SURG

## 2018-03-07 RX ORDER — NALOXONE HCL 0.4 MG/ML
0.2 VIAL (ML) INJECTION AS NEEDED
Status: DISCONTINUED | OUTPATIENT
Start: 2018-03-07 | End: 2018-03-07 | Stop reason: HOSPADM

## 2018-03-07 RX ORDER — PROMETHAZINE HYDROCHLORIDE 25 MG/1
25 TABLET ORAL ONCE AS NEEDED
Status: DISCONTINUED | OUTPATIENT
Start: 2018-03-07 | End: 2018-03-07 | Stop reason: HOSPADM

## 2018-03-07 RX ORDER — HYDROMORPHONE HCL IN 0.9% NACL 10 MG/50ML
PATIENT CONTROLLED ANALGESIA SYRINGE INTRAVENOUS CONTINUOUS
Status: DISCONTINUED | OUTPATIENT
Start: 2018-03-07 | End: 2018-03-08

## 2018-03-07 RX ORDER — DIPHENHYDRAMINE HYDROCHLORIDE 50 MG/ML
12.5 INJECTION INTRAMUSCULAR; INTRAVENOUS
Status: DISCONTINUED | OUTPATIENT
Start: 2018-03-07 | End: 2018-03-07 | Stop reason: HOSPADM

## 2018-03-07 RX ORDER — BISACODYL 10 MG
10 SUPPOSITORY, RECTAL RECTAL DAILY PRN
Status: DISCONTINUED | OUTPATIENT
Start: 2018-03-07 | End: 2018-03-13 | Stop reason: HOSPADM

## 2018-03-07 RX ORDER — MAGNESIUM HYDROXIDE 1200 MG/15ML
LIQUID ORAL AS NEEDED
Status: DISCONTINUED | OUTPATIENT
Start: 2018-03-07 | End: 2018-03-07 | Stop reason: HOSPADM

## 2018-03-07 RX ORDER — CYCLOBENZAPRINE HCL 10 MG
10 TABLET ORAL EVERY 8 HOURS SCHEDULED
Status: DISCONTINUED | OUTPATIENT
Start: 2018-03-07 | End: 2018-03-13 | Stop reason: HOSPADM

## 2018-03-07 RX ADMIN — SUCCINYLCHOLINE CHLORIDE 160 MG: 20 INJECTION, SOLUTION INTRAMUSCULAR; INTRAVENOUS; PARENTERAL at 13:11

## 2018-03-07 RX ADMIN — PHENYLEPHRINE HYDROCHLORIDE 100 MCG: 10 INJECTION INTRAVENOUS at 14:16

## 2018-03-07 RX ADMIN — HYDROMORPHONE HYDROCHLORIDE 0.5 MG: 2 INJECTION INTRAMUSCULAR; INTRAVENOUS; SUBCUTANEOUS at 13:51

## 2018-03-07 RX ADMIN — DEXAMETHASONE SODIUM PHOSPHATE 4 MG: 4 INJECTION, SOLUTION INTRAMUSCULAR; INTRAVENOUS at 23:00

## 2018-03-07 RX ADMIN — METOPROLOL TARTRATE 2.5 MG: 5 INJECTION, SOLUTION INTRAVENOUS at 20:53

## 2018-03-07 RX ADMIN — CEFAZOLIN SODIUM 2 G: 2 INJECTION, SOLUTION INTRAVENOUS at 14:09

## 2018-03-07 RX ADMIN — FLUTICASONE PROPIONATE 2 SPRAY: 50 SPRAY, METERED NASAL at 08:25

## 2018-03-07 RX ADMIN — MIDAZOLAM 1 MG: 1 INJECTION INTRAMUSCULAR; INTRAVENOUS at 10:27

## 2018-03-07 RX ADMIN — FAMOTIDINE 20 MG: 10 INJECTION, SOLUTION INTRAVENOUS at 10:27

## 2018-03-07 RX ADMIN — FENTANYL CITRATE 50 MCG: 50 INJECTION INTRAMUSCULAR; INTRAVENOUS at 12:52

## 2018-03-07 RX ADMIN — HYDROMORPHONE HYDROCHLORIDE 0.5 MG: 2 INJECTION INTRAMUSCULAR; INTRAVENOUS; SUBCUTANEOUS at 15:48

## 2018-03-07 RX ADMIN — PROPOFOL 150 MG: 10 INJECTION, EMULSION INTRAVENOUS at 13:10

## 2018-03-07 RX ADMIN — FENTANYL CITRATE 50 MCG: 50 INJECTION INTRAMUSCULAR; INTRAVENOUS at 18:09

## 2018-03-07 RX ADMIN — DEXAMETHASONE SODIUM PHOSPHATE 4 MG: 4 INJECTION, SOLUTION INTRA-ARTICULAR; INTRALESIONAL; INTRAMUSCULAR; INTRAVENOUS; SOFT TISSUE at 18:27

## 2018-03-07 RX ADMIN — FENTANYL CITRATE 50 MCG: 50 INJECTION, SOLUTION INTRAMUSCULAR; INTRAVENOUS at 16:01

## 2018-03-07 RX ADMIN — PREGABALIN 100 MG: 100 CAPSULE ORAL at 20:54

## 2018-03-07 RX ADMIN — ROCURONIUM BROMIDE 30 MG: 10 INJECTION INTRAVENOUS at 13:30

## 2018-03-07 RX ADMIN — INSULIN ASPART 3 UNITS: 100 INJECTION, SOLUTION INTRAVENOUS; SUBCUTANEOUS at 20:53

## 2018-03-07 RX ADMIN — SODIUM CHLORIDE 100 ML/HR: 4.5 INJECTION, SOLUTION INTRAVENOUS at 19:12

## 2018-03-07 RX ADMIN — ROCURONIUM BROMIDE 10 MG: 10 INJECTION INTRAVENOUS at 13:10

## 2018-03-07 RX ADMIN — FENTANYL CITRATE 50 MCG: 50 INJECTION, SOLUTION INTRAMUSCULAR; INTRAVENOUS at 15:55

## 2018-03-07 RX ADMIN — LIDOCAINE HYDROCHLORIDE 100 MG: 20 INJECTION, SOLUTION INFILTRATION; PERINEURAL at 13:10

## 2018-03-07 RX ADMIN — PHENYLEPHRINE HYDROCHLORIDE 100 MCG: 10 INJECTION INTRAVENOUS at 14:23

## 2018-03-07 RX ADMIN — PHENYLEPHRINE HYDROCHLORIDE 100 MCG: 10 INJECTION INTRAVENOUS at 13:20

## 2018-03-07 RX ADMIN — IPRATROPIUM BROMIDE AND ALBUTEROL SULFATE 3 ML: .5; 3 SOLUTION RESPIRATORY (INHALATION) at 07:30

## 2018-03-07 RX ADMIN — HYDROMORPHONE HYDROCHLORIDE 0.5 MG: 2 INJECTION INTRAMUSCULAR; INTRAVENOUS; SUBCUTANEOUS at 15:37

## 2018-03-07 RX ADMIN — DEXAMETHASONE SODIUM PHOSPHATE 4 MG: 4 INJECTION, SOLUTION INTRAMUSCULAR; INTRAVENOUS at 18:27

## 2018-03-07 RX ADMIN — FENTANYL CITRATE 50 MCG: 50 INJECTION, SOLUTION INTRAMUSCULAR; INTRAVENOUS at 14:02

## 2018-03-07 RX ADMIN — PHENYLEPHRINE HYDROCHLORIDE 200 MCG: 10 INJECTION INTRAVENOUS at 13:14

## 2018-03-07 RX ADMIN — PHENYLEPHRINE HYDROCHLORIDE 100 MCG: 10 INJECTION INTRAVENOUS at 14:31

## 2018-03-07 RX ADMIN — FENTANYL CITRATE 100 MCG: 50 INJECTION, SOLUTION INTRAMUSCULAR; INTRAVENOUS at 13:07

## 2018-03-07 RX ADMIN — PHENYLEPHRINE HYDROCHLORIDE 100 MCG: 10 INJECTION INTRAVENOUS at 14:46

## 2018-03-07 RX ADMIN — HYDROMORPHONE HYDROCHLORIDE 0.5 MG: 2 INJECTION INTRAMUSCULAR; INTRAVENOUS; SUBCUTANEOUS at 15:30

## 2018-03-07 RX ADMIN — HYDROCODONE BITARTRATE AND ACETAMINOPHEN 1 TABLET: 10; 325 TABLET ORAL at 06:02

## 2018-03-07 RX ADMIN — Medication: at 18:41

## 2018-03-07 RX ADMIN — PREGABALIN 100 MG: 100 CAPSULE ORAL at 08:25

## 2018-03-07 RX ADMIN — CEFTRIAXONE SODIUM 2 G: 2 INJECTION, SOLUTION INTRAVENOUS at 10:27

## 2018-03-07 RX ADMIN — IPRATROPIUM BROMIDE AND ALBUTEROL SULFATE 3 ML: .5; 3 SOLUTION RESPIRATORY (INHALATION) at 19:49

## 2018-03-07 RX ADMIN — MIDAZOLAM 1 MG: 1 INJECTION INTRAMUSCULAR; INTRAVENOUS at 12:52

## 2018-03-07 RX ADMIN — PHENYLEPHRINE HYDROCHLORIDE 100 MCG: 10 INJECTION INTRAVENOUS at 13:17

## 2018-03-07 RX ADMIN — FENTANYL CITRATE 50 MCG: 50 INJECTION INTRAMUSCULAR; INTRAVENOUS at 10:23

## 2018-03-07 RX ADMIN — DEXAMETHASONE SODIUM PHOSPHATE 4 MG: 10 INJECTION INTRAMUSCULAR; INTRAVENOUS at 13:31

## 2018-03-07 RX ADMIN — SODIUM CHLORIDE, POTASSIUM CHLORIDE, SODIUM LACTATE AND CALCIUM CHLORIDE: 600; 310; 30; 20 INJECTION, SOLUTION INTRAVENOUS at 13:01

## 2018-03-07 RX ADMIN — CYCLOBENZAPRINE 10 MG: 10 TABLET, FILM COATED ORAL at 22:59

## 2018-03-07 NOTE — PERIOPERATIVE NURSING NOTE
Pt can't move left leg. Dr stratton has been notified. Orders received. Will give. Ok to go to room after meds

## 2018-03-07 NOTE — PROGRESS NOTES
Alexis Thakkar MD       LOS: 12 days   Patient Care Team:  Magyd Ricardo MD as PCP - General  Magdy Ricardo MD as PCP - Family Medicine  Staci Keyes MD as Consulting Physician (Endocrinology)  Jerrell Fry MD as Consulting Physician (Urology)    Subjective     77 y.o. male doing well after vena cava filter placement yesterday area and right groin soft with no hematoma.  Minimal leg swelling no change.  Heparin stopped for back surgery today.    Review of Systems  Review of Systems - Negative except back pain      Objective     Vital Signs  Temp:  [97.5 °F (36.4 °C)-99 °F (37.2 °C)] 98.4 °F (36.9 °C)  Heart Rate:  [75-99] 99  Resp:  [14-24] 24  BP: (125-164)/(71-93) 141/87    Physical Exam  General: No acute distress. Alert and oriented x 4  HEENT: No jugular venous distension, trachea is midline  CV: RRR, S1S2  Resp: Clear unlabored breathing  Abd: Abdomen is soft, nontender, nondistended  Extremities: Right groin soft with no hematoma, leg swelling no change    Results Review:       Recent Results (from the past 12 hour(s))   POC Glucose Once    Collection Time: 03/06/18  8:37 PM   Result Value Ref Range    Glucose 109 70 - 130 mg/dL   aPTT    Collection Time: 03/07/18  4:34 AM   Result Value Ref Range    PTT 32.8 22.7 - 35.4 seconds   CBC Auto Differential    Collection Time: 03/07/18  4:34 AM   Result Value Ref Range    WBC 8.88 4.50 - 10.70 10*3/mm3    RBC 3.94 (L) 4.60 - 6.00 10*6/mm3    Hemoglobin 11.7 (L) 13.7 - 17.6 g/dL    Hematocrit 37.3 (L) 40.4 - 52.2 %    MCV 94.7 79.8 - 96.2 fL    MCH 29.7 27.0 - 32.7 pg    MCHC 31.4 (L) 32.6 - 36.4 g/dL    RDW 14.3 11.5 - 14.5 %    RDW-SD 49.6 37.0 - 54.0 fl    MPV 11.3 6.0 - 12.0 fL    Platelets 226 140 - 500 10*3/mm3    Neutrophil % 87.9 (H) 42.7 - 76.0 %    Lymphocyte % 4.3 (L) 19.6 - 45.3 %    Monocyte % 6.6 5.0 - 12.0 %    Eosinophil % 0.5 0.3 - 6.2 %    Basophil % 0.2 0.0 - 1.5 %    Immature Grans % 0.5 0.0 - 0.5 %    Neutrophils, Absolute  7.81 1.90 - 8.10 10*3/mm3    Lymphocytes, Absolute 0.38 (L) 0.90 - 4.80 10*3/mm3    Monocytes, Absolute 0.59 0.20 - 1.20 10*3/mm3    Eosinophils, Absolute 0.04 0.00 - 0.70 10*3/mm3    Basophils, Absolute 0.02 0.00 - 0.20 10*3/mm3    Immature Grans, Absolute 0.04 (H) 0.00 - 0.03 10*3/mm3   ]      Assessment/Plan           Principal Problem:    Discitis of lumbar region  Active Problems:    Diabetic peripheral neuropathy    Primary osteoarthritis involving multiple joints    Allergic rhinitis due to pollen    Vitamin D deficiency    CELINA (obstructive sleep apnea)    Leg edema    Acute gangrenous cholecystitis    RUQ abdominal pain    A-fib    Acute deep vein thrombosis of calf, right      Assessment & Plan  77 y.o. male doing well after vena cava filter placement.  Back surgery today.  Plans per medicine and orthopedics.  Antibiotics per infectious disease.  Patient and family aware can remove filter once over all surgeries and back to normal activity.  I will see as needed      Alexis Thakkar MD  03/07/18  6:47 AM

## 2018-03-07 NOTE — ANESTHESIA POSTPROCEDURE EVALUATION
Patient: Jesus Beckham    Procedure Summary     Date Anesthesia Start Anesthesia Stop Room / Location    03/07/18 1301 1736  [facility] / [facility]       Procedure Diagnosis Surgeon Provider    LUMBAR FUSION MINIMALLY INVASIVE TRANSFORAMINAL LUMBAR INTERBODY IRRIGATION AND DEBRIDEMENT AND FUSION.  IRRIGATION AND DEBRIDEMENT OF EPIDURAL ABCESS LUMBAR 2-4. BONE MORPHOGENIC PROTEIN, ALPHATEC NOVEL AND ILLICO, EMG AND SSEP NEUROMONITORING. (N/A Spine Lumbar) Sepsis due to Escherichia coli  (Sepsis due to Escherichia coli [A41.51]) DO Alexis Boss MD          Anesthesia Type: general  Last vitals  BP   156/82 (03/07/18 1800)   Temp   37.3 °C (99.2 °F) (03/07/18 1739)   Pulse   96 (03/07/18 1800)   Resp   12 (03/07/18 1800)     SpO2   94 % (03/07/18 1800)     Post Anesthesia Care and Evaluation    Patient location during evaluation: bedside  Patient participation: complete - patient participated  Level of consciousness: awake  Pain management: adequate  Airway patency: patent  Anesthetic complications: No anesthetic complications    Cardiovascular status: acceptable  Respiratory status: acceptable  Hydration status: acceptable

## 2018-03-07 NOTE — SIGNIFICANT NOTE
03/07/18 0950   Rehab Treatment   Discipline physical therapy assistant   Treatment Not Performed patient unavailable for treatment  (Pt off floor in OR for spinal surgery. Will follow-up tomorrow w/ post-op orders.)   Recommendation   PT - Next Appointment 03/08/18

## 2018-03-07 NOTE — PLAN OF CARE
Problem: Patient Care Overview (Adult)  Goal: Plan of Care Review  Outcome: Ongoing (interventions implemented as appropriate)   03/07/18 0441   Coping/Psychosocial Response Interventions   Plan Of Care Reviewed With patient   Patient Care Overview   Progress improving   Outcome Evaluation   Outcome Summary/Follow up Plan right groin puncture dry and intact pulses present heparin discontinued at 0000 pain controlled continues with urgency and frequency of urination npo after midnight for possible surgery       Problem: Infection, Risk/Actual (Adult)  Goal: Identify Related Risk Factors and Signs and Symptoms  Outcome: Ongoing (interventions implemented as appropriate)   03/07/18 0441   Infection, Risk/Actual   Infection, Risk/Actual: Related Risk Factors chronic illness/condition;surgery/procedure   Signs and Symptoms (Infection, Risk/Actual) pain       Problem: Fall Risk (Adult)  Goal: Identify Related Risk Factors and Signs and Symptoms  Outcome: Ongoing (interventions implemented as appropriate)   03/07/18 0441   Fall Risk   Fall Risk: Related Risk Factors fear of falling;gait/mobility problems   Fall Risk: Signs and Symptoms presence of risk factors       Problem: Pain, Acute (Adult)  Goal: Identify Related Risk Factors and Signs and Symptoms  Outcome: Ongoing (interventions implemented as appropriate)   03/07/18 0441   Pain, Acute   Related Risk Factors (Acute Pain) positioning;disease process   Signs and Symptoms (Acute Pain) verbalization of pain descriptors       Problem: Respiratory Insufficiency (Adult)  Goal: Identify Related Risk Factors and Signs and Symptoms  Outcome: Ongoing (interventions implemented as appropriate)   03/07/18 0441   Respiratory Insufficiency   Related Risk Factors (Respiratory Insufficiency) activity intolerance   Signs and Symptoms (Respiratory Insufficiency) shortness of breath

## 2018-03-07 NOTE — PROGRESS NOTES
"Adult Nutrition  Assessment/PES    Patient Name:  Jesus Beckham  YOB: 1940  MRN: [id number]  Admit Date:  2/22/2018    Assessment Date:  3/7/2018          Reason for Assessment       03/07/18 1054    Reason for Assessment    Reason For Assessment/Visit length of stay > 10 days    Diagnosis   Primary Problem:  Sepsis due to Escherichia coli                 Anthropometrics       03/07/18 1055    Anthropometrics (Special Considerations)    Height Used for Calculations 1.778 m (5' 10\")    Weight Used for Calculations 119 kg (263 lb)    RD Calculated IBW 73    RD Calculated %     RD Calculated BMI (kg/m2) 37.7    Body Mass Index (BMI)    BMI Grade 35 - 39.9 - obesity - grade II            Labs/Tests/Procedures/Meds       03/07/18 1055    Labs/Tests/Procedures/Meds    Diagnostic Test/Procedure Review reviewed    Labs/Tests Review Reviewed;Glucose;C-React PRO    Medication Review Reviewed, pertinent   glipizide    Significant Vitals reviewed            Physical Findings       03/07/18 1056    Physical Findings/Assessment    Additional Documentation --   B=19            Estimated/Assessed Needs       03/07/18 1056    Calculation Measurements    Weight Used For Calculations 119 kg (263 lb)    Height Used for Calculations 1.778 m (5' 10\")    Estimated/Assessed Energy Needs    Energy Need Method Kcal/kg    kcal/kg 20    20 Kcal/Kg (kcal) 2385.92    Estimated/Assessed Protein Needs    Weight Used for Protein Calculation 119 kg (263 lb)    Protein (gm/kg) 0.8    0.8 Gm Protein (gm) 95.44    Estimated/Assessed Fluid Needs    Fluid Need Method RDA method    RDA Method (mL)  2300            Nutrition Prescription Ordered       03/07/18 1056    Nutrition Prescription PO    Current PO Diet NPO   for spinal surgery            Evaluation of Received Nutrient/Fluid Intake       03/07/18 1056    PO Evaluation    Number of Meals 1    % PO Intake 100            Problem/Interventions:        Problem 1       " 03/07/18 1056    Nutrition Diagnoses Problem 1    Problem 1 Nutrition Appropriate for Condition at this Time                    Intervention Goal       03/07/18 1056    Intervention Goal    General Maintain nutrition;Meet nutritional needs for age/condition    PO Tolerate PO;Maintain intake    Weight Appropriate weight loss            Nutrition Intervention       03/07/18 1056    Nutrition Intervention    RD/Tech Action Follow Tx progress;Care plan reviewd              Education/Evaluation       03/07/18 1057    Education    Education Will Instruct as appropriate    Monitor/Evaluation    Monitor Per protocol    Education Follow-up Reinforce PRN        Electronically signed by:  Britney Chance RD  03/07/18 10:57 AM

## 2018-03-07 NOTE — H&P (VIEW-ONLY)
Patient Care Team:  Magdy Ricardo MD as PCP - General  Magdy Ricardo MD as PCP - Family Medicine  Staci Keyes MD as Consulting Physician (Endocrinology)  Jerrell Fry MD as Consulting Physician (Urology)    Chief complaint back pain    Subjective   77 y.o. male admitted to [facility] to services of Jesus Tanner with Gangrenous cholecystitis with cholelithiasis and underwent Laparoscopic cholecystectomy with intraoperative cholangiogram.   He currently has Escherichia coli sepsis.  Over the past few days has complained on increasing low back and bilateral leg pain.  History of L2-L5 Laminectomy, Medial Facetectomy and Foraminotomy in 2016.  Patient reports continuous back pain since surgery that was relieved with sitting.  Over time, he was only to be on his feet for short periods of time and required frequent sitting.  He also complains of sepideh-lateral leg pain.  Leg pain is not constant and not consistently in one leg more than the other.  Also complains of intermittent tingling in both legs.  Denies any bladder or bowel dysfunction.  He recently had 2 bowel movements after days of constipation. I was asked to see the patient by Dr. Valdes for treatment of osteomyelitis/discitis.  The patient had a recent MRI showing possible epidural abscess as well as discitis at L2-L3 and L3-L4.  The patient has continued fevers despite antibiotic treatment of Escherichia coli.      Review of Systems   Pertinent items are noted in HPI    History  Past Medical History:   Diagnosis Date   • Arthritis    • Cancer of bladder 2016   • Colon polyp    • Diabetes mellitus    • Gout    • Hyperlipidemia    • Irregular heartbeat    • Skin carcinoma    • Type 2 diabetes mellitus    • Vitamin D deficiency      Past Surgical History:   Procedure Laterality Date   • APPENDECTOMY  1961   • CHOLECYSTECTOMY WITH INTRAOPERATIVE CHOLANGIOGRAM N/A 2/25/2018    Procedure: CHOLECYSTECTOMY LAPAROSCOPIC  INTRAOPERATIVE CHOLANGIOGRAM;  Surgeon: Maegan Correa MD;  Location: [facility] OR;  Service:    • COLONOSCOPY  2010    h/o of polyps   • EYE SURGERY      1959 - glass removal from eye, lens transplant   • KNEE SURGERY  2006    rt knee     Family History   Problem Relation Age of Onset   • Cancer Mother      Social History   Substance Use Topics   • Smoking status: Former Smoker     Types: Cigars     Quit date: 2017   • Smokeless tobacco: Current User     Types: Chew   • Alcohol use No     Prescriptions Prior to Admission   Medication Sig Dispense Refill Last Dose   • allopurinol (ZYLOPRIM) 300 MG tablet Take 1 tablet by mouth Daily. 90 tablet 1 2/22/2018 at Unknown time   • ANDROGEL PUMP 20.25 MG/ACT (1.62%) gel 2 pump actuation on each shoulder daily 450 g 1 2/22/2018 at Unknown time   • celecoxib (CELEBREX) 200 MG capsule Take 1 capsule by mouth Daily. 90 capsule 1 2/22/2018 at Unknown time   • ergocalciferol (DRISDOL) 40984 units capsule Take 1 capsule by mouth 1 (One) Time Per Week. 13 capsule 3 Past Week at Unknown time   • fluticasone (FLONASE) 50 MCG/ACT nasal spray 2 sprays into each nostril Daily. 3 bottle 3 2/22/2018 at Unknown time   • glipiZIDE (GLUCOTROL) 10 MG tablet Take 1 tablet by mouth 2 (Two) Times a Day Before Meals. 360 tablet 3 2/22/2018 at Unknown time   • GLYXAMBI 25-5 MG tablet Take 1 tablet by mouth Daily With Breakfast. 90 tablet 3 2/22/2018 at Unknown time   • L-Methylfolate-B6-B12 3-35-2 MG tablet Take 1 tablet by mouth 2 (Two) Times a Day. 180 tablet 3 2/22/2018 at Unknown time   • LEVEMIR FLEXTOUCH 100 UNIT/ML injection 50 units twice daily. 90 mL 3 2/22/2018 at Unknown time   • lisinopril (PRINIVIL,ZESTRIL) 10 MG tablet Take 1 tablet by mouth Daily. 90 tablet 3 2/22/2018 at Unknown time   • Mirabegron ER (MYRBETRIQ) 25 MG tablet sustained-release 24 hour 24 hr tablet Take 25 mg by mouth Daily.   2/22/2018 at Unknown time   • Multiple Vitamins-Minerals (MULTIVITAMIN ADULT PO)  Take  by mouth.   2/22/2018 at Unknown time   • Omega-3 Fatty Acids (FISH OIL) 1000 MG capsule capsule Take  by mouth daily with breakfast.   2/22/2018 at Unknown time   • pregabalin (LYRICA) 100 MG capsule Take 1 capsule by mouth 3 (Three) Times a Day. 90 capsule 5 2/22/2018 at Unknown time   • rosuvastatin (CRESTOR) 10 MG tablet Take 1 tablet by mouth Daily. 90 tablet 3 2/22/2018 at Unknown time   • vitamin C (ASCORBIC ACID) 500 MG tablet Take 500 mg by mouth Daily.   2/22/2018 at Unknown time   • Acetaminophen (TYLENOL PO) Take  by mouth as needed.   Taking   • glucose blood test strip CHECK BLOOD SUGARS 2 TIMES A DAY AS DIRECTED 200 each 1 Taking   • Insulin Pen Needle 32G X 4 MM misc INJECT UP TO THREE TIMES DAILY OR AS DIRECTED 500 each 1 Taking     Allergies:  Review of patient's allergies indicates no known allergies.    Objective     Vital Signs  Temp:  [98 °F (36.7 °C)-101.1 °F (38.4 °C)] 101.1 °F (38.4 °C)  Heart Rate:  [] 102  Resp:  [18-20] 20  BP: (140-164)/(80-98) 152/88    Physical Exam:    General Appearance alert, appears stated age and cooperative  Back tenderness to palpation and vertebral point tenderness  Lungs clear to auscultation, respirations regular, respirations even and respirations unlabored  Heart regular rhythm & normal rate, normal S1, S2, no murmur, no anjali, no rub and no click  Extremities moves extremities well, no edema, no cyanosis and no redness  Skin no bleeding, bruising or rash  Neurologic Mental Status orientated to person, place, time and situation and mood/affect normal, Motor strength is equal in upper and lower extremitits                                                            Results Review:    I reviewed the patient's new clinical results.     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  Patient has previous laminectomy.  Increased uptake is noted particularly at L2 to L4 disc spaces.  This likely represents discitis.  Patient also has inflammation and  possible abscess and the psoas.    Assessment/Plan     Principal Problem:    Discitis of lumbar region  Active Problems:    Diabetic peripheral neuropathy    Primary osteoarthritis involving multiple joints    Allergic rhinitis due to pollen    Vitamin D deficiency    CELINA (obstructive sleep apnea)    Leg edema    Acute gangrenous cholecystitis    RUQ abdominal pain    A-fib    Acute deep vein thrombosis of calf, right    Discitis L2 to 4  Plan:  I discussed various treatment options with the patient.  I'm concerned with the epidural collection of fluid which likely represents an epidural abscess.  He also severe stenosis at this level.  I recommended surgical debridement of his disc spaces as well as irrigation and drainage of the epidural abscess.  This will require stabilization.  I discussed the procedure of placing interbody spacers and pedicle screws for stabilization of the spine.  Cultures will be taken for appropriate treatment.  The patient will likely need long-term suppression.  I'm available to do this surgery on Wednesday, March 7.  The patient is agreeable with proceeding.    I discussed the patients findings and my recommendations with family.     Manish Marr DO  03/05/18  3:44 PM

## 2018-03-07 NOTE — PLAN OF CARE
Problem: Perioperative Period (Adult)  Goal: Signs and Symptoms of Listed Potential Problems Will be Absent or Manageable (Perioperative Period)  Outcome: Ongoing (interventions implemented as appropriate)   03/07/18 0984   Perioperative Period   Problems Assessed (Perioperative Period) pain;situational response   Problems Present (Perioperative Period) pain;situational response

## 2018-03-07 NOTE — PROGRESS NOTES
Patient in the OR and able to be seen.  He has been afebrile.  We'll see the patient tomorrow to follow-up operative findings.  Continue ceftriaxone 2 g IV every 24 hours, certainly reasonable to start empiric vancomycin now that cultures have been obtained.  Ultimately final antibiotic choice to be determined based on culture results

## 2018-03-07 NOTE — ANESTHESIA PREPROCEDURE EVALUATION
Anesthesia Evaluation     Patient summary reviewed and Nursing notes reviewed   no history of anesthetic complications:  NPO Solid Status: > 8 hours  NPO Liquid Status: > 2 hours           Airway   Mallampati: III  TM distance: >3 FB  Neck ROM: full  Possible difficult intubation  Dental - normal exam     Pulmonary - normal exam    breath sounds clear to auscultation  (+) sleep apnea,   Cardiovascular     ECG reviewed  Rhythm: regular  Rate: normal    (+) hypertension, valvular problems/murmurs murmur, dysrhythmias (occ ectopic), murmur (2/6 sys murmur), DVT, hyperlipidemia,   (-) PVD    ROS comment: ECHO wnl    Neuro/Psych  (+) numbness (peripheral neuropathy),     GI/Hepatic/Renal/Endo    (+) obesity,   diabetes mellitus type 2,     Musculoskeletal     (+) back pain,   Abdominal   (+) obese,    Substance History - negative use     OB/GYN negative ob/gyn ROS         Other   (+) arthritis                     Anesthesia Plan    ASA 3     general     intravenous induction   Anesthetic plan and risks discussed with patient.

## 2018-03-07 NOTE — INTERVAL H&P NOTE
H&P reviewed. The patient was examined and there are no changes to the H&P. I will perform irrigation and debridement of epidural abscess with minimally invasive transforaminal lumbar interbody fusion L2-L3, L3-L4 and use of bone morphogenic protein.

## 2018-03-07 NOTE — ANESTHESIA PROCEDURE NOTES
Airway  Urgency: elective    Date/Time: 3/7/2018 1:12 PM  Airway not difficult    General Information and Staff    Patient location during procedure: OR  Anesthesiologist: MARTA SMALLWOOD  CRNA: OFELIA SALMERON    Indications and Patient Condition  Indications for airway management: airway protection    Preoxygenated: yes  MILS maintained throughout  Mask difficulty assessment: 2 - vent by mask + OA or adjuvant +/- NMBA    Final Airway Details  Final airway type: endotracheal airway      Successful airway: ETT  Cuffed: yes   Successful intubation technique: direct laryngoscopy  Facilitating devices/methods: intubating stylet  Endotracheal tube insertion site: oral  Blade: CMAC  Blade size: #3  ETT size: 8.0 mm  Cormack-Lehane Classification: grade I - full view of glottis  Placement verified by: chest auscultation   Measured from: lips  ETT to lips (cm): 21  Number of attempts at approach: 1    Additional Comments  Checked teeth, no damage

## 2018-03-08 LAB
ANION GAP SERPL CALCULATED.3IONS-SCNC: 13.3 MMOL/L
APTT PPP: 32 SECONDS (ref 22.7–35.4)
BASOPHILS # BLD AUTO: 0.01 10*3/MM3 (ref 0–0.2)
BASOPHILS NFR BLD AUTO: 0.1 % (ref 0–1.5)
BUN BLD-MCNC: 20 MG/DL (ref 8–23)
BUN/CREAT SERPL: 24.4 (ref 7–25)
CALCIUM SPEC-SCNC: 7.9 MG/DL (ref 8.6–10.5)
CHLORIDE SERPL-SCNC: 93 MMOL/L (ref 98–107)
CO2 SERPL-SCNC: 26.7 MMOL/L (ref 22–29)
CREAT BLD-MCNC: 0.82 MG/DL (ref 0.76–1.27)
DEPRECATED RDW RBC AUTO: 48.9 FL (ref 37–54)
EOSINOPHIL # BLD AUTO: 0 10*3/MM3 (ref 0–0.7)
EOSINOPHIL NFR BLD AUTO: 0 % (ref 0.3–6.2)
ERYTHROCYTE [DISTWIDTH] IN BLOOD BY AUTOMATED COUNT: 14.1 % (ref 11.5–14.5)
GFR SERPL CREATININE-BSD FRML MDRD: 91 ML/MIN/1.73
GLUCOSE BLD-MCNC: 303 MG/DL (ref 65–99)
GLUCOSE BLDC GLUCOMTR-MCNC: 336 MG/DL (ref 70–130)
GLUCOSE BLDC GLUCOMTR-MCNC: 341 MG/DL (ref 70–130)
GLUCOSE BLDC GLUCOMTR-MCNC: 369 MG/DL (ref 70–130)
GLUCOSE BLDC GLUCOMTR-MCNC: 372 MG/DL (ref 70–130)
HCT VFR BLD AUTO: 33.9 % (ref 40.4–52.2)
HGB BLD-MCNC: 10.7 G/DL (ref 13.7–17.6)
IMM GRANULOCYTES # BLD: 0.07 10*3/MM3 (ref 0–0.03)
IMM GRANULOCYTES NFR BLD: 0.6 % (ref 0–0.5)
LYMPHOCYTES # BLD AUTO: 0.42 10*3/MM3 (ref 0.9–4.8)
LYMPHOCYTES NFR BLD AUTO: 3.6 % (ref 19.6–45.3)
MCH RBC QN AUTO: 30 PG (ref 27–32.7)
MCHC RBC AUTO-ENTMCNC: 31.6 G/DL (ref 32.6–36.4)
MCV RBC AUTO: 95 FL (ref 79.8–96.2)
MONOCYTES # BLD AUTO: 0.54 10*3/MM3 (ref 0.2–1.2)
MONOCYTES NFR BLD AUTO: 4.7 % (ref 5–12)
NEUTROPHILS # BLD AUTO: 10.56 10*3/MM3 (ref 1.9–8.1)
NEUTROPHILS NFR BLD AUTO: 91 % (ref 42.7–76)
PLATELET # BLD AUTO: 233 10*3/MM3 (ref 140–500)
PMV BLD AUTO: 11.2 FL (ref 6–12)
POTASSIUM BLD-SCNC: 5 MMOL/L (ref 3.5–5.2)
RBC # BLD AUTO: 3.57 10*6/MM3 (ref 4.6–6)
SODIUM BLD-SCNC: 133 MMOL/L (ref 136–145)
WBC NRBC COR # BLD: 11.6 10*3/MM3 (ref 4.5–10.7)

## 2018-03-08 PROCEDURE — 25010000002 DEXAMETHASONE PER 1 MG: Performed by: ORTHOPAEDIC SURGERY

## 2018-03-08 PROCEDURE — 25010000003 CEFAZOLIN IN DEXTROSE 2-4 GM/100ML-% SOLUTION: Performed by: ORTHOPAEDIC SURGERY

## 2018-03-08 PROCEDURE — 85730 THROMBOPLASTIN TIME PARTIAL: CPT | Performed by: INTERNAL MEDICINE

## 2018-03-08 PROCEDURE — 97164 PT RE-EVAL EST PLAN CARE: CPT

## 2018-03-08 PROCEDURE — 63710000001 INSULIN DETEMER PER 5 UNITS: Performed by: HOSPITALIST

## 2018-03-08 PROCEDURE — 63710000001 INSULIN ASPART PER 5 UNITS: Performed by: INTERNAL MEDICINE

## 2018-03-08 PROCEDURE — 25010000003 CEFTRIAXONE PER 250 MG: Performed by: INTERNAL MEDICINE

## 2018-03-08 PROCEDURE — 82962 GLUCOSE BLOOD TEST: CPT

## 2018-03-08 PROCEDURE — 80048 BASIC METABOLIC PNL TOTAL CA: CPT | Performed by: ORTHOPAEDIC SURGERY

## 2018-03-08 PROCEDURE — 85025 COMPLETE CBC W/AUTO DIFF WBC: CPT | Performed by: SURGERY

## 2018-03-08 PROCEDURE — 94799 UNLISTED PULMONARY SVC/PX: CPT

## 2018-03-08 PROCEDURE — 99232 SBSQ HOSP IP/OBS MODERATE 35: CPT | Performed by: INTERNAL MEDICINE

## 2018-03-08 PROCEDURE — 97110 THERAPEUTIC EXERCISES: CPT

## 2018-03-08 RX ADMIN — SODIUM CHLORIDE 100 ML/HR: 4.5 INJECTION, SOLUTION INTRAVENOUS at 04:44

## 2018-03-08 RX ADMIN — LINAGLIPTIN 5 MG: 5 TABLET, FILM COATED ORAL at 08:13

## 2018-03-08 RX ADMIN — HYDROCODONE BITARTRATE AND ACETAMINOPHEN 2 TABLET: 10; 325 TABLET ORAL at 02:03

## 2018-03-08 RX ADMIN — CEFTRIAXONE SODIUM 2 G: 2 INJECTION, SOLUTION INTRAVENOUS at 10:49

## 2018-03-08 RX ADMIN — DEXAMETHASONE SODIUM PHOSPHATE 4 MG: 4 INJECTION, SOLUTION INTRAMUSCULAR; INTRAVENOUS at 10:49

## 2018-03-08 RX ADMIN — DEXAMETHASONE SODIUM PHOSPHATE 4 MG: 4 INJECTION, SOLUTION INTRAMUSCULAR; INTRAVENOUS at 21:21

## 2018-03-08 RX ADMIN — IPRATROPIUM BROMIDE AND ALBUTEROL SULFATE 3 ML: .5; 3 SOLUTION RESPIRATORY (INHALATION) at 20:55

## 2018-03-08 RX ADMIN — HYDROCODONE BITARTRATE AND ACETAMINOPHEN 2 TABLET: 10; 325 TABLET ORAL at 17:04

## 2018-03-08 RX ADMIN — INSULIN DETEMIR 40 UNITS: 100 INJECTION, SOLUTION SUBCUTANEOUS at 21:20

## 2018-03-08 RX ADMIN — INSULIN ASPART 5 UNITS: 100 INJECTION, SOLUTION INTRAVENOUS; SUBCUTANEOUS at 11:40

## 2018-03-08 RX ADMIN — PREGABALIN 100 MG: 100 CAPSULE ORAL at 16:44

## 2018-03-08 RX ADMIN — CEFAZOLIN SODIUM 2 G: 2 INJECTION, SOLUTION INTRAVENOUS at 09:37

## 2018-03-08 RX ADMIN — METOPROLOL TARTRATE 2.5 MG: 5 INJECTION, SOLUTION INTRAVENOUS at 01:44

## 2018-03-08 RX ADMIN — GLIPIZIDE 10 MG: 10 TABLET ORAL at 16:44

## 2018-03-08 RX ADMIN — INSULIN ASPART 5 UNITS: 100 INJECTION, SOLUTION INTRAVENOUS; SUBCUTANEOUS at 08:45

## 2018-03-08 RX ADMIN — CYCLOBENZAPRINE 10 MG: 10 TABLET, FILM COATED ORAL at 21:20

## 2018-03-08 RX ADMIN — CYCLOBENZAPRINE 10 MG: 10 TABLET, FILM COATED ORAL at 13:26

## 2018-03-08 RX ADMIN — PREGABALIN 100 MG: 100 CAPSULE ORAL at 08:12

## 2018-03-08 RX ADMIN — DEXAMETHASONE SODIUM PHOSPHATE 4 MG: 4 INJECTION, SOLUTION INTRAMUSCULAR; INTRAVENOUS at 04:11

## 2018-03-08 RX ADMIN — DEXAMETHASONE SODIUM PHOSPHATE 4 MG: 4 INJECTION, SOLUTION INTRAMUSCULAR; INTRAVENOUS at 16:44

## 2018-03-08 RX ADMIN — CEFAZOLIN SODIUM 2 G: 2 INJECTION, SOLUTION INTRAVENOUS at 16:44

## 2018-03-08 RX ADMIN — IPRATROPIUM BROMIDE AND ALBUTEROL SULFATE 3 ML: .5; 3 SOLUTION RESPIRATORY (INHALATION) at 14:23

## 2018-03-08 RX ADMIN — INSULIN ASPART 6 UNITS: 100 INJECTION, SOLUTION INTRAVENOUS; SUBCUTANEOUS at 21:20

## 2018-03-08 RX ADMIN — PREGABALIN 100 MG: 100 CAPSULE ORAL at 21:21

## 2018-03-08 RX ADMIN — FLUTICASONE PROPIONATE 2 SPRAY: 50 SPRAY, METERED NASAL at 09:37

## 2018-03-08 RX ADMIN — CYCLOBENZAPRINE 10 MG: 10 TABLET, FILM COATED ORAL at 06:20

## 2018-03-08 RX ADMIN — METOPROLOL TARTRATE 2.5 MG: 5 INJECTION, SOLUTION INTRAVENOUS at 09:38

## 2018-03-08 RX ADMIN — INSULIN ASPART 6 UNITS: 100 INJECTION, SOLUTION INTRAVENOUS; SUBCUTANEOUS at 16:43

## 2018-03-08 RX ADMIN — IPRATROPIUM BROMIDE AND ALBUTEROL SULFATE 3 ML: .5; 3 SOLUTION RESPIRATORY (INHALATION) at 09:06

## 2018-03-08 RX ADMIN — GLIPIZIDE 10 MG: 10 TABLET ORAL at 08:13

## 2018-03-08 RX ADMIN — CEFAZOLIN SODIUM 2 G: 2 INJECTION, SOLUTION INTRAVENOUS at 01:40

## 2018-03-08 NOTE — THERAPY RE-EVALUATION
Acute Care - Physical Therapy Re-Evaluation  [facility]     Patient Name: Jesus Beckham  : 1940  MRN: [id number]  Today's Date: 3/8/2018   Onset of Illness/Injury or Date of Surgery Date: 18  Date of Referral to PT: 18  Referring Physician: Tejinder      Admit Date: 2018     Visit Dx:    ICD-10-CM ICD-9-CM   1. Calculus of gallbladder without cholecystitis without obstruction K80.20 574.20   2. Sepsis, due to unspecified organism A41.9 038.9     995.91   3. RUQ abdominal mass R19.01 789.31   4. RUQ abdominal pain R10.11 789.01   5. Gangrenous cholecystitis K81.0 575.0   6. Decreased mobility R26.89 781.99   7. Sepsis due to Escherichia coli A41.51 038.42     995.91     Patient Active Problem List   Diagnosis   • Gout   • Diabetic peripheral neuropathy   • Dyslipidemia   • Uncontrolled type 2 diabetes mellitus   • Primary osteoarthritis involving multiple joints   • Allergic rhinitis due to pollen   • Essential hypertension   • PVC (premature ventricular contraction)   • Vitamin D deficiency   • Benign non-nodular prostatic hyperplasia without lower urinary tract symptoms   • Osteoarthritis   • Urge incontinence   • CELINA (obstructive sleep apnea)   • Leg edema   • Acute gangrenous cholecystitis   • RUQ abdominal pain   • A-fib   • Discitis of lumbar region   • Acute deep vein thrombosis of calf, right   • Sepsis due to Escherichia coli     Past Medical History:   Diagnosis Date   • Arthritis    • Cancer of bladder 2016   • Colon polyp    • Diabetes mellitus    • Gout    • Hyperlipidemia    • Irregular heartbeat    • Skin carcinoma    • Type 2 diabetes mellitus    • Vitamin D deficiency      Past Surgical History:   Procedure Laterality Date   • APPENDECTOMY     • CHOLECYSTECTOMY WITH INTRAOPERATIVE CHOLANGIOGRAM N/A 2018    Procedure: CHOLECYSTECTOMY LAPAROSCOPIC INTRAOPERATIVE CHOLANGIOGRAM;  Surgeon: Maegan Correa MD;  Location: [facility] MAIN OR;  Service:    • COLONOSCOPY   2010    h/o of polyps   • EYE SURGERY      1959 - glass removal from eye, lens transplant   • KNEE SURGERY  2006    rt knee   • VENA CAVA FILTER INSERTION Right 3/6/2018    Procedure: VENA CAVA FILTER INSERTION;  Surgeon: Alexis Thakkar MD;  Location: [facility] 18/19;  Service:           PT ASSESSMENT (last 72 hours)      PT Evaluation       03/08/18 1430 03/08/18 1340    Rehab Evaluation    Document Type re-evaluation  -EJ     Subjective Information agree to therapy;complains of;numbness  -EJ     Patient Effort, Rehab Treatment good  -EJ     Symptoms Noted During/After Treatment fatigue   numbness in  B feet  -EJ     General Information    Onset of Illness/Injury or Date of Surgery Date 03/07/18  -EJ     Referring Physician Tejinder  -EJ     General Observations supine in bed, no acute distress  -EJ     Precautions/Limitations fall precautions;spinal precautions  -EJ     Plans/Goals Discussed With patient and family;agreed upon  -EJ     Barriers to Rehab medically complex  -EJ     Living Environment    Lives With spouse  -EJ     Clinical Impression    Date of Referral to PT 03/08/18  -EJ     PT Diagnosis s/p Lumbar fusion  -EJ     Criteria for Skilled Therapeutic Interventions Met treatment indicated  -EJ     Impairments Found (describe specific impairments) gait, locomotion, and balance  -EJ     Pain Assessment    Pain Assessment 0-10  -EJ     Pain Score 4  -EJ     Pain Location Back  -EJ     Pain Orientation Lower  -EJ     Muscle Tone Assessment    Muscle Tone Assessment  Bilateral Lower Extremities  -LS    Bilateral Lower Extremities Muscle Tone Assessment  mildly decreased tone  -LS    Bed Mobility, Assessment/Treatment    Bed Mob, Supine to Sit, [place] verbal cues required;nonverbal cues required (demo/gesture);maximum assist (25% patient effort);2 person assist required  -EJ     Bed Mob, Sit to Supine, [place] not tested  -EJ     Bed Mobility, Safety Issues decreased use of legs for  bridging/pushing  -EJ     Bed Mobility, Impairments strength decreased  -EJ     Bed Mobility, Comment numbness in B LE  -EJ     Transfer Assessment/Treatment    Transfers, Bed-Chair [place] not appropriate to assess   not safe to attempt transfer to chair due to weakness  -EJ     Transfers, Sit-Stand [place] verbal cues required;nonverbal cues required (demo/gesture);moderate assist (50% patient effort);maximum assist (25% patient effort);2 person assist required  -EJ     Transfers, Stand-Sit [place] verbal cues required;nonverbal cues required (demo/gesture);moderate assist (50% patient effort);maximum assist (25% patient effort);2 person assist required  -EJ     Transfers, Sit-Stand-Sit, Assist Device standard walker  -EJ     Transfer, Impairments strength decreased  -EJ     Transfer, Comment cues for upright posture, leg weakness  -EJ     Gait Assessment/Treatment    Gait, [place] Level not tested;unable to perform;not appropriate to assess  -EJ     Balance Skills Training    Standing-Level of Assistance Maximum assistance;x2  -EJ     Static Standing Balance Support Left upper extremity supported;Right upper extremity supported;assistive device  -EJ     Standing-Balance Activities Weight Shift A-P  -EJ     Standing Balance # of Minutes 2  -EJ     Positioning and Restraints    Pre-Treatment Position in bed  -EJ     Post Treatment Position bed  -EJ     In Bed notified nsg;sitting EOB;call light within reach;encouraged to call for assist   on bed pain, wife present  -EJ       03/08/18 0800 03/08/18 0417    Muscle Tone Assessment    Muscle Tone Assessment Bilateral Lower Extremities  -LS Bilateral Lower Extremities  -SB    Bilateral Lower Extremities Muscle Tone Assessment mildly decreased tone  -LS mildly decreased tone  -SB      03/08/18 0203 03/07/18 2344    Muscle Tone Assessment    Muscle Tone Assessment Bilateral Lower Extremities  -SB Bilateral Lower Extremities  -SB    Bilateral Lower  Extremities Muscle Tone Assessment mildly decreased tone  -SB hypotonic  -SB      03/07/18 2240 03/07/18 2143    Muscle Tone Assessment    Muscle Tone Assessment Bilateral Lower Extremities  -SB Bilateral Lower Extremities  -SB    Bilateral Lower Extremities Muscle Tone Assessment hypotonic  -SB hypotonic  -SB      03/07/18 2036       Muscle Tone Assessment    Muscle Tone Assessment Bilateral Lower Extremities  -SB     Bilateral Lower Extremities Muscle Tone Assessment mildly decreased tone  -SB       User Key  (r) = Recorded By, (t) = Taken By, (c) = Cosigned By    Initials Name Provider Type    BASHIR Nova, PT Physical Therapist    AI Cruz, RN Registered Nurse    OLGA Kebede, RN Registered Nurse          Physical Therapy Education     Title: PT OT SLP Therapies (Active)     Topic: Physical Therapy (Active)     Point: Mobility training (Done)    Learning Progress Summary    Learner Readiness Method Response Comment Documented by Status   Patient Acceptance E,D VU,NR  EJ 03/08/18 1502 Done    Acceptance E,D VU,DU safety during transfers and ambulation LC 03/04/18 1515 Done    Acceptance E VU  KH 03/03/18 1529 Done    Acceptance E,TB DU,VU  CW 03/02/18 1555 Done    Acceptance E,TB VU  RJ 03/01/18 1647 Done    Acceptance E NR  EA 03/01/18 1057 Active    Acceptance E NR  EA 02/28/18 1405 Active    Acceptance E,TB VU,DU  CW 02/27/18 0950 Done    Acceptance E NR  EE 02/26/18 1525 Active   Family Acceptance E,D VU,NR  EJ 03/08/18 1502 Done    Acceptance E,TB VU  RJ 03/01/18 1647 Done    Acceptance E NR  EA 03/01/18 1057 Active               Point: Body mechanics (Done)    Learning Progress Summary    Learner Readiness Method Response Comment Documented by Status   Patient Acceptance E,D VU,NR  EJ 03/08/18 1502 Done    Acceptance E NR  KH 03/03/18 1529 Active    Acceptance E,TB DU,VU  CW 03/02/18 1555 Done    Acceptance E NR  EA 03/01/18 1057 Active    Acceptance E NR  EA 02/28/18 1405 Active     Acceptance E,TB VU,DU  CW 02/27/18 0950 Done    Acceptance E NR  EE 02/26/18 1525 Active   Family Acceptance E,D VU,NR  EJ 03/08/18 1502 Done    Acceptance E NR  EA 03/01/18 1057 Active               Point: Precautions (Done)    Learning Progress Summary    Learner Readiness Method Response Comment Documented by Status   Patient Acceptance E VU  KH 03/03/18 1529 Done    Acceptance E,TB DU,VU  CW 03/02/18 1555 Done    Acceptance E,TB VU  RJ 03/01/18 1647 Done    Acceptance E,TB VU,DU  CW 02/27/18 0950 Done   Family Acceptance E,TB VU  RJ 03/01/18 1647 Done                      User Key     Initials Effective Dates Name Provider Type Discipline     02/08/17 -  Alpa Vargas, RN Registered Nurse Nurse     12/01/15 -  Carmen Sarmiento, PT Physical Therapist PT    EJ 04/21/17 -  Eliane Nova, PT Physical Therapist PT     06/22/16 -  Susan Wiggins, PT Physical Therapist PT    LC 08/02/16 -  Sudhir Castrejon, PT DPT Physical Therapist PT    CW 12/13/16 - 03/06/18 Jaya Gomez, PTA Physical Therapy Assistant PT    EA 02/05/18 -  Bipin Live, PT Student PT Student PT                PT Recommendation and Plan  Anticipated Discharge Disposition: skilled nursing facility (pending progress)  Planned Therapy Interventions: bed mobility training, gait training, home exercise program, patient/family education, ROM (Range of Motion), strengthening, transfer training  PT Frequency: daily  Plan of Care Review  Plan Of Care Reviewed With: patient, spouse  Progress: improving  Outcome Summary/Follow up Plan: Pt is s/p lumbar fusion and I&D on 3/7/18.  He now presents with post op BLE numbness, back pain, weakness, and overall decreased functional mobility.  Pt c/o numbness in both feet. He was able to sit EOB with Max A x 2. He was able to stand x 2 with max A x 2, but unable to transfer to chair or commode due to significant weakness/numbness. Pt will continue to benefit from skilled PT          IP PT Goals       03/08/18  1503 02/26/18 1526       Bed Mobility PT LTG    Bed Mobility PT LTG, Date Established 03/08/18  -EJ 02/26/18  -EE     Bed Mobility PT LTG, Time to Achieve 1 wk  -EJ 1 wk  -EE     Bed Mobility PT LTG, Activity Type supine to sit/sit to supine  -EJ all bed mobility  -EE     Bed Mobility PT LTG, [place] Level minimum assist (75% patient effort)  -EJ supervision required  -EE     Bed Mobility PT LTG, Date Goal Reviewed 03/08/18  -EJ      Bed Mobility PT LTG, Outcome goal revised  -EJ      Transfer Training PT LTG    Transfer Training PT LTG, Date Established 03/08/18  -EJ 02/26/18  -EE     Transfer Training PT LTG, Time to Achieve 1 wk  -EJ 1 wk  -EE     Transfer Training PT LTG, Activity Type bed to chair /chair to bed;sit to stand/stand to sit  -EJ all transfers  -EE     Transfer Training PT LTG, [place] Level minimum assist (75% patient effort);2 person assist required  -EJ supervision required  -EE     Transfer Training PT LTG, Assist Device walker, rolling  -EJ walker, rolling  -EE     Transfer Training PT  LTG, Date Goal Reviewed 03/08/18  -EJ      Transfer Training PT LTG, Outcome goal revised  -EJ      Gait Training PT LTG    Gait Training Goal PT LTG, Date Established 03/08/18  -EJ 02/26/18  -EE     Gait Training Goal PT LTG, Time to Achieve 1 wk  -EJ 1 wk  -EE     Gait Training Goal PT LTG, [place] Level minimum assist (75% patient effort);2 person assist required  -EJ supervision required  -EE     Gait Training Goal PT LTG, Assist Device walker, rolling  -EJ walker, rolling  -EE     Gait Training Goal PT LTG, Distance to Achieve 20  -  -EE     Gait Training Goal PT LTG, Date Goal Reviewed 03/08/18  -EJ      Gait Training Goal PT LTG, Outcome goal revised  -EJ        User Key  (r) = Recorded By, (t) = Taken By, (c) = Cosigned By    Initials Name Provider Type    ORALIA Sarmiento, PT Physical Therapist    BASHIR Nova, PT Physical Therapist                Outcome Measures        03/08/18 1500          How much help from another person do you currently need...    Turning from your back to your side while in flat bed without using bedrails? 2  -EJ      Moving from lying on back to sitting on the side of a flat bed without bedrails? 2  -EJ      Moving to and from a bed to a chair (including a wheelchair)? 1  -EJ      Standing up from a chair using your arms (e.g., wheelchair, bedside chair)? 1  -EJ      Climbing 3-5 steps with a railing? 1  -EJ      To walk in hospital room? 1  -EJ      AM-PAC 6 Clicks Score 8  -EJ      Functional Assessment    Outcome Measure Options AM-PAC 6 Clicks Basic Mobility (PT)  -EJ        User Key  (r) = Recorded By, (t) = Taken By, (c) = Cosigned By    Initials Name Provider Type    BASHIR Nova PT Physical Therapist           Time Calculation:         PT Charges       03/08/18 1507          Time Calculation    Start Time 1430  -EJ      Stop Time 1455  -EJ      Time Calculation (min) 25 min  -EJ      PT Received On 03/08/18  -EJ      PT - Next Appointment 03/09/18  -EJ      PT Goal Re-Cert Due Date 03/15/18  -EJ        User Key  (r) = Recorded By, (t) = Taken By, (c) = Cosigned By    Initials Name Provider Type    BASHIR Nova PT Physical Therapist          Therapy Charges for Today     Code Description Service Date Service Provider Modifiers Qty    01515579427 HC PT THER PROC EA 15 MIN 3/8/2018 Eliane Nova, PT GP 1    60695723423 HC PT THER SUPP EA 15 MIN 3/8/2018 Eliane Nova, PT GP 1    82988700718 HC PT RE-EVAL ESTABLISHED PLAN 2 3/8/2018 Eliane Nova, PT GP 1          PT G-Codes  Outcome Measure Options: AM-PAC 6 Clicks Basic Mobility (PT)      Eliane Nova PT  3/8/2018

## 2018-03-08 NOTE — PLAN OF CARE
Problem: Patient Care Overview (Adult)  Goal: Plan of Care Review  Outcome: Ongoing (interventions implemented as appropriate)   03/08/18 0454   Coping/Psychosocial Response Interventions   Plan Of Care Reviewed With patient   Patient Care Overview   Progress progress towards functional goals is fair   Outcome Evaluation   Outcome Summary/Follow up Plan Pt's is postop from I&D of disk and Lumbar fusion. Pt placed on pca pump for pain control. Pt has remained flat on total bedrest throughout the night. Pt's drgs remain dry and intact. Murray continues until pt's movement is no longer restricted. Pt's bp pressure hypertensive and was treated with IV lopresser twice on night shift. Pt 's left leg and foot reamin numb to pt and pt can't move, most like secondary to lumbar surgery. Pt was started on iv steriods and flexeril to help resolve this issue. Pt remains upbeat.        Problem: Infection, Risk/Actual (Adult)  Goal: Infection Prevention/Resolution  Outcome: Ongoing (interventions implemented as appropriate)   03/08/18 0454   Infection, Risk/Actual (Adult)   Infection Prevention/Resolution making progress toward outcome       Problem: Fall Risk (Adult)  Goal: Absence of Falls  Outcome: Ongoing (interventions implemented as appropriate)   03/08/18 0454   Fall Risk (Adult)   Absence of Falls making progress toward outcome       Problem: Respiratory Insufficiency (Adult)  Goal: Effective Ventilation  Outcome: Ongoing (interventions implemented as appropriate)   03/08/18 0454   Respiratory Insufficiency (Adult)   Effective Ventilation making progress toward outcome       Problem: Perioperative Period (Adult)  Goal: Signs and Symptoms of Listed Potential Problems Will be Absent or Manageable (Perioperative Period)  Outcome: Ongoing (interventions implemented as appropriate)   03/08/18 0454   Perioperative Period   Problems Assessed (Perioperative Period) pain   Problems Present (Perioperative Period) pain

## 2018-03-08 NOTE — PROGRESS NOTES
LOS: 12 days   Patient Care Team:  Magdy Ricardo MD as PCP - General  Magdy Ricardo MD as PCP - Family Medicine  Staci Keyes MD as Consulting Physician (Endocrinology)  Jerrell Fry MD as Consulting Physician (Urology)    Subjective     Patient is awake he says he was quite confused for a while although he is starting to come around he has just little bits of memory from his OR and PACU stay.  Some back pain is not short of breath on 3 L nasal cannula O2  Review of Systems:          Objective     Vital Signs  Vital Sign Min/Max for last 24 hours  Temp  Min: 97.9 °F (36.6 °C)  Max: 99.2 °F (37.3 °C)   BP  Min: 132/74  Max: 170/84   Pulse  Min: 91  Max: 103   Resp  Min: 12  Max: 24   SpO2  Min: 86 %  Max: 98 %   Flow (L/min)  Min: 2  Max: 10   No Data Recorded        Ventilator/Non-Invasive Ventilation Settings     None                       Body mass index is 37.74 kg/(m^2).  I/O last 3 completed shifts:  In: 2920 [P.O.:320; I.V.:2550; IV Piggyback:50]  Out: 3335 [Urine:2935; Blood:400]           Physical Exam:  General Appearance: Well-developed obese white male lying in bed doesn't look in particular distress  Eyes: Conjunctiva are clear and anicteric  ENT: Mucous membranes are moist no erythema or exudates nasal septum midline and he has a Mallampati type II airway  Neck: No jugular venous distention trachea midline  Lungs: Clear nonlabored symmetric expansion   Cardiac: Regular rate and rhythm  Abdomen: Abdominal binder in place not removed  : Not examined  Musc/Skel: Edema all 4 extremities 1+ upper extremities 1+ lower extremities ,  Skin: No jaundice no petechiae  Neuro: He is alert oriented he is cooperative patient is moving his right lower extremity well to command he is not able to move his left lower extremity he has a little bit of motion of his hip that he can slide it is unable to move his toes do dorsiflexion plantar flexion or lift his leg.  And sensation is different when  you touch the right foot he says it feels like there is a heavy bag over it.  Extremities/P Vascular: No clubbing or cyanosis he has palpable radial dorsalis pedis pulses  MSE:  good spirits today       Labs:    Results from last 7 days  Lab Units 03/06/18 0437 03/05/18 0730 03/04/18 0453 03/03/18  0552 03/02/18  0323 03/01/18  0429   GLUCOSE mg/dL 109* 173* 137* 218* 169* 160*   SODIUM mmol/L 138 138 137 138 140 140   POTASSIUM mmol/L 3.7 3.7 4.0 4.3 3.9 4.2   CO2 mmol/L 31.3* 26.2 27.3 27.8 26.5 27.2   CHLORIDE mmol/L 100 98 98 100 101 102   ANION GAP mmol/L 6.7 13.8 11.7 10.2 12.5 10.8   CREATININE mg/dL 0.83 1.03 0.95 0.78 0.96 0.85   BUN mg/dL 21 21 19 16 19 21   BUN / CREAT RATIO  25.3* 20.4 20.0 20.5 19.8 24.7   CALCIUM mg/dL 8.4* 8.7 8.4* 8.4* 8.3* 8.1*   EGFR IF NONAFRICN AM mL/min/1.73 90 70 77 97 76 88   ALK PHOS U/L  --   --  111 91 81 86   TOTAL PROTEIN g/dL  --   --  5.6* 5.5* 5.5* 5.5*   ALT (SGPT) U/L  --   --  36 33 36 39   AST (SGOT) U/L  --   --  29 37 24 26   BILIRUBIN mg/dL  --   --  0.7 0.6 0.5 0.7   ALBUMIN g/dL  --   --  2.40* 2.30* 2.60* 2.50*   GLOBULIN gm/dL  --   --  3.2 3.2 2.9 3.0   A/G RATIO g/dL  --   --  0.8 0.7 0.9 0.8     Estimated Creatinine Clearance: 97.9 mL/min (by C-G formula based on Cr of 0.83).        Results from last 7 days  Lab Units 03/07/18 0434 03/06/18 0437 03/05/18  0730 03/04/18  0453 03/03/18  0552 03/02/18  0323 03/01/18  0429   WBC 10*3/mm3 8.88 8.97 10.41 13.13* 8.62 7.46 8.35   RBC 10*6/mm3 3.94* 3.88* 4.09* 4.00* 3.72* 3.79* 3.95*   HEMOGLOBIN g/dL 11.7* 11.6* 12.2* 11.9* 11.3* 11.5* 12.0*   HEMATOCRIT % 37.3* 36.5* 38.3* 37.9* 34.9* 35.8* 37.6*   MCV fL 94.7 94.1 93.6 94.8 93.8 94.5 95.2   MCH pg 29.7 29.9 29.8 29.8 30.4 30.3 30.4   MCHC g/dL 31.4* 31.8* 31.9* 31.4* 32.4* 32.1* 31.9*   RDW % 14.3 14.6* 14.7* 14.8* 14.9* 14.6* 14.6*   RDW-SD fl 49.6 49.8 50.1 51.1 50.7 49.7 50.7   MPV fL 11.3 11.7 11.6 11.5 11.9 11.9 11.6   PLATELETS 10*3/mm3 226 202 198  195 198 217 191   NEUTROPHIL % % 87.9* 78.3* 86.4* 92.0* 84.8* 71.5 73.1   LYMPHOCYTE % % 4.3* 11.0* 5.5* 3.2* 7.8* 13.1* 12.1*   MONOCYTES % % 6.6 8.8 6.9 3.0* 4.9* 9.8 10.4   EOSINOPHIL % % 0.5 1.1 0.4 0.8 1.5 1.6 0.4   BASOPHIL % % 0.2 0.2 0.2 0.2 0.2 0.4 0.4   IMM GRAN % % 0.5 0.6* 0.6* 0.8* 0.8* 3.6* 3.6*   NEUTROS ABS 10*3/mm3 7.81 7.02 9.00* 12.08* 7.31 5.33 6.11   LYMPHS ABS 10*3/mm3 0.38* 0.99 0.57* 0.42* 0.67* 0.98 1.01   MONOS ABS 10*3/mm3 0.59 0.79 0.72 0.40 0.42 0.73 0.87   EOS ABS 10*3/mm3 0.04 0.10 0.04 0.10 0.13 0.12 0.03   BASOS ABS 10*3/mm3 0.02 0.02 0.02 0.02 0.02 0.03 0.03   IMMATURE GRANS (ABS) 10*3/mm3 0.04* 0.05* 0.06* 0.11* 0.07* 0.27* 0.30*   NRBC /100 WBC  --   --  0.0 0.0  --  0.0 0.0           Results from last 7 days  Lab Units 02/28/18 1953   TROPONIN T ng/mL <0.010       Results from last 7 days  Lab Units 02/28/18 1953   PROBNP pg/mL 1443.0           Results from last 7 days  Lab Units 03/04/18  0453 03/01/18  0429 02/28/18 1953   LACTATE mmol/L  --  0.9 3.7*   PROCALCITONIN ng/mL 22.97*  --  1.93*       Results from last 7 days  Lab Units 03/04/18  1719   INR  1.13*     Microbiology Results (last 10 days)     Procedure Component Value - Date/Time    Urine Culture - Urine, Urine, Clean Catch [[id number]]  (Abnormal) Collected:  03/04/18 1200    Lab Status:  Final result Specimen:  Urine from Urine, Clean Catch Updated:  03/06/18 1139     Urine Culture --      10,000-20,000 CFU/mL Candida albicans (A)    Blood Culture - Blood, [[id number]]  (Normal) Collected:  03/04/18 1107    Lab Status:  Preliminary result Specimen:  Blood from Arm, Left Updated:  03/07/18 1131     Blood Culture No growth at 3 days    Blood Culture - Blood, [[id number]]  (Normal) Collected:  03/04/18 1025    Lab Status:  Preliminary result Specimen:  Blood from Arm, Left Updated:  03/07/18 1101     Blood Culture No growth at 3 days    Blood Culture - Blood, [[id number]]  (Normal) Collected:  02/28/18 1832    Lab  Status:  Final result Specimen:  Blood from Arm, Left Updated:  03/05/18 1846     Blood Culture No growth at 5 days    Blood Culture - Blood, [[id number]]  (Normal) Collected:  02/28/18 1533    Lab Status:  Final result Specimen:  Blood from Arm, Right Updated:  03/05/18 1546     Blood Culture No growth at 5 days                [START ON 3/8/2018] ceFAZolin 2 g Intravenous Q8H   ceftriaxone 2 g Intravenous Q24H   cyclobenzaprine 10 mg Oral Q8H   fluticasone 2 spray Nasal Daily   glipiZIDE 10 mg Oral BID AC   insulin aspart 0-7 Units Subcutaneous 4x Daily With Meals & Nightly   ipratropium-albuterol 3 mL Nebulization Q6H While Awake - RT   linagliptin 5 mg Oral Daily   pregabalin 100 mg Oral TID   sennosides-docusate sodium 2 tablet Oral BID       HYDROmorphone HCl-NaCl     sodium chloride 100 mL/hr Last Rate: 100 mL/hr (03/07/18 1912)       Diagnostics:  Xr Chest 2 View    Result Date: 2/23/2018  PA AND LATERAL CHEST X-RAY  HISTORY: fever  COMPARISON: None.  FINDINGS: PA and lateral views of the chest were obtained. Patient is kyphotic. The lungs are under aerated with pulmonary vascular congestion bordering on mild edema. There is mild right lung base atelectasis. There is no convincing evidence of active air space disease process otherwise. Mild cardiomegaly. No significant pleural fluid. Thoracolumbar kyphosis noted with multilevel spurring.        Under aeration with pulmonary vascular congestion bordering on mild edema/CHF  This report was finalized on 2/23/2018 12:58 AM by Jerrell Dumont MD.      Ct Chest Without Contrast    Result Date: 2/26/2018  CT CHEST WITHOUT CONTRAST  HISTORY: Rule out right middle lobe infiltrate. To evaluate source of fever.  TECHNIQUE: Axial CT images of the chest were obtained without administration of intravenous contrast. Coronal and sagittal reformats were obtained.  COMPARISON: Chest radiographs.  FINDINGS: The visualized thyroid gland is normal. Calcified plaque is seen within  the aortic arch and the coronary arteries. Calcified lymph nodes are seen within the right hilum and the subcarinal region. The central airways are patent without endobronchial lesion. Bilateral lung fields are under aerated with lower lobe atelectatic changes. No focal infiltrate is identified. No suspicious pulmonary nodules.  The visualized upper abdomen demonstrates a distended gallbladder with dependent hyperdensity that may represent sludge and/or stones. Degenerative changes are seen within the thoracic spine.      1. Bilateral lung fields are under aerated with atelectatic changes within the bilateral lower lobes. No focal infiltrate is identified. 2. Mildly distended gallbladder with dependent hyperdensity that may represent stones.  Radiation dose reduction techniques were utilized, including automated exposure control and exposure modulation based on body size.  This report was finalized on 2/26/2018 2:27 PM by Dr. Gideon Shah MD.      Mri Lumbar Spine Without Contrast    Result Date: 2/28/2018  MRI LUMBAR SPINE WO CONTRAST-  INDICATIONS: Low back pain with radiculopathy.  TECHNIQUE: NONCONTRAST LUMBAR SPINE MRI   COMPARISON: None available  FINDINGS:   Marrow signal with heterogeneous with endplate changes noted, but no acute fracture identified. Laminectomy changes at L3, L4 are noted.  Alignment is in range of normal.  Conus medullaris appears unremarkable.  Increased T2 signal within the upper aspect of the right psoas, which appears mildly thickened, for example image 21 of series 2 could for example represent strain/partial tear or inflammatory/infectious process, correlate clinically, follow-up evaluation with enhanced imaging could be considered if not contraindicated.   Axial levels:  T12/L1, L1/2: No significant disc bulge, central or neural foraminal stenosis.  L2/3: Left lateral recess is effaced by what may represent disc extrusion (enhanced imaging could take to exclude alternative  possibilities of a collection or lesion), which also contributes to severe left neural foraminal narrowing (with exiting nerve root impingement) and, with broad-based disc bulge and facet and ligamentum flavum hypertrophy, moderate central stenosis, moderate right neural foraminal narrowing.  L3/4: Broad-based disc bulge is present, as well as central disc extrusion narrowing the anterior thecal space and, with facet ligamentum flavum hypertrophy moderate to prominent right, moderate left neural foraminal stenosis.  L4/5: Broad-based disc bulge with left paracentral disc extrusion apparent, as well as disc protrusion at the right neural foramina, result in effacement of the left lateral recess (with impingement of left traversing nerve root), narrowing of the anterior thecal space, and, with facet and ligamentum flavum hypertrophy, moderate bilateral neuroforaminal narrowing.  L5/S1: Broad-based disc bulge there is the anterior thecal space and, with facet and ligamentum flavum hypertrophy, contributes to moderate to prominent right, prominent left neuroforaminal narrowing.           1. 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  2. Signal changes in the upper right psoas muscle, could be posttraumatic in origin or may be inflammatory/infectious in nature, correlate clinically. Enhanced imaging could be considered for further evaluation.  This report was finalized on 2/28/2018 6:24 PM by Dr. Chris Posey MD.      Ct Abdomen Pelvis With Contrast    Result Date: 2/28/2018  CT ABDOMEN AND PELVIS WITH IV CONTRAST  HISTORY: 77-year-old male underwent laparoscopic cholecystectomy on 02/25/2018 for gangrenous cholecystitis and cholelithiasis.  TECHNIQUE: CT abdomen and pelvis with intravenous and oral contrast.  COMPARISON: CT chest without contrast 02/23/2018.  FINDINGS:  There has been cholecystectomy and there is a drain extending into the right upper quadrant beneath the liver. There is material filling the gallbladder fossa. This includes a 4.5 x 2.5 x 2.8 cm low signal material that may represent bioabsorbable packing material. There are adjacent tiny bubbles of gas and there is also surrounding fluid. There is no mature or drainable collection. Thin band of fluid extends adjacent to the anterior liver. Minimal edema extends into the hepatic parenchyma just above anterior margin of the gallbladder fossa. Liver otherwise appears normal.  Within the medial spleen there is a 1.5 cm low-density lesion. Splenic size is normal. Adrenal glands, pancreas appear normal. There is a diverticulum emanating medially from the descending duodenum. A medial left lower pole renal cyst measures 1.3 cm. Right kidney appears normal and there is no hydronephrosis. Minimal free fluid extends into the pelvis. There is no bowel dilatation or obstruction.      1. Patient is 3 days post cholecystectomy with low density packing material, small bubbles of gas, and fluid filling the gallbladder fossa. Fluid partially extends into the adjacent hepatic parenchyma and may represent hemorrhagic material/blood products. There is mild perihepatic fluid and a small amount of fluid is present within the lower pelvis. Surgical drain extends into the right upper quadrant. Infected fluid would be difficult to exclude though there is no evidence for mature or drainable collection. 2. Tiny renal cysts. Duodenal diverticulum. 15 mm low-density lesion medial spleen of doubtful significance.  Radiation dose reduction techniques were utilized, including automated exposure control and exposure modulation based on body size.  This report was finalized on 2/28/2018 5:44 PM by Dr. Pop Haider MD.      Us Gallbladder    Result Date: 2/24/2018  GALLBLADDER ULTRASOUND  HISTORY: Right upper quadrant pain and fever.  FINDINGS:  The gallbladder contains several tiny gallstones near the neck of the gallbladder. The gallbladder is somewhat distended, measuring 11.3 cm in length and raising the concern of an element of cystic duct obstruction. This can also be seen on yesterday's chest CT scan. The common bile duct measures 4 mm. There is no wall thickening.  The visualized liver and pancreas and right kidney are unremarkable.  CONCLUSION: Distended gallbladder containing tiny gallstones in the neck of the gallbladder and concerning for cystic duct obstruction. The common bile duct is normal in caliber.  This report was finalized on 2/24/2018 9:15 AM by Dr. William Myers MD.      Xr Chest 1 View    Result Date: 2/28/2018  PORTABLE CHEST X-RAY  CLINICAL HISTORY: resp failure; K80.20-Calculus of gallbladder without cholecystitis without obstruction; A41.9-Sepsis, unspecified organism; R19.01-Right upper quadrant abdominal swelling, mass and lump; R10.11-Right upper quadrant pain; K81.0-Acute cholecystitis; R26.89-Other abnormalities of gait and mobility  COMPARISON: 02/25/2018.  FINDINGS: Portable AP view of the chest was obtained with overlying monitor leads in place. Lungs are poorly aerated with pulmonary vascular congestion and an opacity in the right lung base favored to reflect atelectasis rather than pneumonia. There may be a small right effusion as well. Left lung poorly aerated but clear. Normal heart size. Mild aortic ectasia.        Poor inspiration with vascular congestion and right lung base opacity as above. Recommend follow-up  This report was finalized on 2/28/2018 9:34 PM by Jerrell Dumont MD.      Xr Chest 1 View    Result Date: 2/25/2018  ONE VIEW PORTABLE CHEST AT 12:25 PM  HISTORY: Recent gallbladder surgery. Shortness of breath.  There is somewhat decreased lung expansion with further accentuation of vascular congestion compared to 2 days ago. Some of this relates to the poor lung expansion but could also reflect a  component of mild CHF. The heart remains enlarged.  This report was finalized on 2/25/2018 12:44 PM by Dr. William Myers MD.      Fl Cholangiogram Operative    Result Date: 2/25/2018  INTRAOPERATIVE CHOLANGIOGRAM  HISTORY: Gallstones.  FINDINGS:  Contrast fills the biliary system with emptying into the duodenum. There appears to be a small mobile air bubble in the common hepatic duct that is seen on early images. No definite gallstones are seen.  59 images were obtained and the fluoroscopy time measures 10 seconds.  This report was finalized on 2/25/2018 11:32 AM by Dr. William Myers MD.      Results for orders placed during the hospital encounter of 02/22/18   Adult Transthoracic Echo Complete W/ Cont if Necessary Per Protocol    Narrative · The study is technically difficult for diagnosis.  · Left ventricle not well visualized. Left ventricular systolic function   is normal. Calculated EF = 50.3%. Estimated EF was in agreement with the   calculated EF. Normal left ventricular cavity size and wall thickness   noted.  · Left ventricular diastolic function is normal.  · There is calcification of the aortic valve.          Today's chest x-ray reviewed and compared it with the film prior there is still atelectasis at the bases right greater than left plus or minus a little infiltrate in anything I would say infiltrate looks better on the right on this film    Active Hospital Problems (** Indicates Principal Problem)    Diagnosis Date Noted   • **Sepsis due to Escherichia coli [A41.51] 02/22/2018   • Discitis of lumbar region [M46.46] 03/05/2018   • Acute deep vein thrombosis of calf, right [I82.4Z1] 03/05/2018   • A-fib [I48.91] 02/25/2018   • Acute gangrenous cholecystitis [K81.0] 02/24/2018   • RUQ abdominal pain [R10.11] 02/24/2018   • CELINA (obstructive sleep apnea) [G47.33] 02/23/2018   • Leg edema [R60.0] 02/23/2018   • Vitamin D deficiency [E55.9] 04/28/2017   • 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] 04/04/2016   • Allergic rhinitis due to pollen [J30.1] 04/04/2016   • Diabetic peripheral neuropathy [E11.42] 04/01/2016      Resolved Hospital Problems    Diagnosis Date Noted Date Resolved   • Bacteremia due to Escherichia coli [R78.81] 02/24/2018 03/03/2018   • Sepsis [A41.9] 02/23/2018 03/03/2018   • Secondary thrombocytopenia [D69.59] 02/23/2018 03/03/2018   • Hypotension [I95.9] 02/23/2018 03/03/2018   • Acute respiratory failure with hypoxia [J96.01] 02/23/2018 03/03/2018   • RSV (acute bronchiolitis due to respiratory syncytial virus) [J21.0] 02/23/2018 03/03/2018         Assessment/Plan     1. Sepsis See discussion below ID is handling antibiotics  2. Acute hypoxemic respiratory failure CT just showed very small bilateral pleural effusions a little bit of basilar atelectasis no pulmonary embolus.  I think the hypoxia is due to some atelectasis.He needs to work on pulmonary hygiene postop aggressively and I reviewed that with him  3. New right calf DVT and has IVC filter heparin will be resumed when okay with surgery  4. Escherichia coli bacteremia  5. RSV infection  6. Acute gangrenous cholecystitis status post laparoscopic cholecystectomy on 3/26/18  7. Diabetes mellitus type 2  8. Hyponatremia  9. L2-3 and L3 4 discitis  andRight psoas abscess And some edema possibly in the left psoas as well patient underwent a I&D of epidural abscesses and minimally invasive transforaminal lumbar interbody fusion of L2-L3 and L3-L4 with use of bone morphogenic protein  10. decreased movement left lower extremity I spoke with nursing they have discussed with surgery and Decadron has been ordered obviously defer to surgery regarding this      Plan for disposition:     Evaristo Burnett MD  03/07/18  7:18 PM    Time:

## 2018-03-08 NOTE — PLAN OF CARE
Problem: Patient Care Overview (Adult)  Goal: Plan of Care Review  Outcome: Ongoing (interventions implemented as appropriate)   03/08/18 1503   Coping/Psychosocial Response Interventions   Plan Of Care Reviewed With patient;spouse   Patient Care Overview   Progress improving   Outcome Evaluation   Outcome Summary/Follow up Plan Pt is s/p lumbar fusion and I&D on 3/7/18. He now presents with post op BLE numbness, back pain, weakness, and overall decreased functional mobility. Pt c/o numbness in both feet. He was able to sit EOB with Max A x 2. He was able to stand x 2 with max A x 2, but unable to transfer to chair or commode due to significant weakness/numbness. Pt will continue to benefit from skilled PT       Problem: Inpatient Physical Therapy  Goal: Bed Mobility Goal LTG- PT   03/08/18 1503   Bed Mobility PT LTG   Bed Mobility PT LTG, Date Established 03/08/18   Bed Mobility PT LTG, Time to Achieve 1 wk   Bed Mobility PT LTG, Activity Type supine to sit/sit to supine   Bed Mobility PT LTG, [place] Level minimum assist (75% patient effort)   Bed Mobility PT LTG, Date Goal Reviewed 03/08/18   Bed Mobility PT LTG, Outcome goal revised     Goal: Transfer Training Goal 1 LTG- PT  Outcome: Ongoing (interventions implemented as appropriate)   03/08/18 1503   Transfer Training PT LTG   Transfer Training PT LTG, Date Established 03/08/18   Transfer Training PT LTG, Time to Achieve 1 wk   Transfer Training PT LTG, Activity Type bed to chair /chair to bed;sit to stand/stand to sit   Transfer Training PT LTG, [place] Level minimum assist (75% patient effort);2 person assist required   Transfer Training PT LTG, Assist Device walker, rolling   Transfer Training PT LTG, Date Goal Reviewed 03/08/18   Transfer Training PT LTG, Outcome goal revised     Goal: Gait Training Goal LTG- PT  Outcome: Ongoing (interventions implemented as appropriate)   03/08/18 1503   Gait Training PT LTG   Gait Training Goal PT LTG, Date  Established 03/08/18   Gait Training Goal PT LTG, Time to Achieve 1 wk   Gait Training Goal PT LTG, [place] Level minimum assist (75% patient effort);2 person assist required   Gait Training Goal PT LTG, Assist Device walker, rolling   Gait Training Goal PT LTG, Distance to Achieve 20   Gait Training Goal PT LTG, Date Goal Reviewed 03/08/18   Gait Training Goal PT LTG, Outcome goal revised

## 2018-03-08 NOTE — PROGRESS NOTES
Continued Stay Note  [facility]     Patient Name: Jesus Beckham  MRN: [id number]  Today's Date: 3/8/2018    Admit Date: 2/22/2018          Discharge Plan       03/08/18 1734    Case Management/Social Work Plan    Plan [place]    Patient/Family In Agreement With Plan yes    Additional Comments Met with pt who confirms plan for [place] at discharge.  Will need precert once medically ready.  JOE Aly RN              Discharge Codes     None            Sheila Aly

## 2018-03-08 NOTE — PROGRESS NOTES
Spine postop check  Patient is postoperative day 0 from the left L2 to L4.  I was called from the PACU nurse regarding weakness in the left leg.  I recommended starting Decadron.  The patient did have a incidental durotomy with exposed cauda equina.  However this was on the right side.  It is curious that his weakness is all on the left side.  I just examined the patient and he is able to flex his hip and fire his quads against gravity and some mild resistance.  He complains of numbness in the toes.  His sensation is grossly intact in the left lower extremity.  He has 0 ankle dorsiflexion and plantarflexion currently.  On the right side he has full strength in right lower extremity.  He is currently awake but not oriented.  His vital signs are stable.  I plan to continue Decadron and keep him flat until tomorrow.  I would prefer holding heparin until tomorrow.

## 2018-03-08 NOTE — PROGRESS NOTES
"INFECTIOUS DISEASES PROGRESS NOTE    CC: f/u sepsis    S:   S/p surgery yesterday  Feels much better  No back pain  \"best i've felt in years\"  No f/c/ns    O:  Physical Exam:  Temp:  [97.5 °F (36.4 °C)-99.2 °F (37.3 °C)] 97.9 °F (36.6 °C)  Heart Rate:  [] 93  Resp:  [12-24] 20  BP: (132-179)/() 156/100  Physical Exam   Constitutional: He appears well-developed. No distress.   Neurological: He is alert.   Skin: Skin is warm and dry.   Psychiatric: He has a normal mood and affect. His behavior is normal.        Diagnostics:    WBC 11.6 (P97, L 4, M 5)  H/H 10.7/34    Cr 0.82  glc 153-336    Microbiology:  3/7 Surgical cx x3 GNR  3/4 BCx NGTD x 2  3/4 UCx NGTD  2/28 BCx neg x2  2/24 BCx neg x 2  2/23 RVP + RSV  2/23 BCx E coli 2/2 2/23 Influenza neg       Estimated Creatinine Clearance: 99.1 mL/min (by C-G formula based on Cr of 0.82).    Assessment/Plan   1.  Fever - resolved      2.  Discitis, paravertebral soft tissue infection, psoas muscle abscesses, possible epidural abscesses  - s/p surgery with cx with GNR presumably e coli and back was seeded from bacteremic gallbladder infection  - Continue ceftriaxone Plan long term abx  - Preoperative ESR 44 and CRP 12.9       3.  Right lower extremity DVT  - IVC filter placed by vascular on 3/6  - Management per primary team      4.  Escherichia coli bacteremia setting of acute gangrenous cholecystitis   -s/p laparoscopic cholecystectomy on February 25, 2018  -on abx per #2      5.   Uncontrolled DM2 - complicating above  -post op glc out of control    Nicanor Hernandez MD  8:39 AM  03/08/18         "

## 2018-03-08 NOTE — PLAN OF CARE
Problem: Patient Care Overview (Adult)  Goal: Plan of Care Review  Outcome: Ongoing (interventions implemented as appropriate)   03/08/18 1519   Coping/Psychosocial Response Interventions   Plan Of Care Reviewed With patient;spouse   Patient Care Overview   Progress improving   Outcome Evaluation   Outcome Summary/Follow up Plan Pt is POD#1 s/p Lumbar Fusion and I&D on 3/7/2018. Bedrest DC'd this AM, patient can assist with rolling side to side, and has been up with PT, unable to stand on his own d/t numb feet. PCA DC'd, IV fluids Dc'd, see orders and MAR. Pt using prn PO Norco for pain which controls his pain adequately. Pt is on 2L per NC. Good appetite, on Cons Carb diet, bedside Accuchecks with PO insulin and S/S, see MAR. Doppler pedal pulses. Murray intact with good output, see flowsheets. Wife at bedside attentive to patient and assisting him.

## 2018-03-08 NOTE — BRIEF OP NOTE
LUMBAR FUSION MINIMALLY INVASIVE  Progress Note    Jesus Beckham  2/22/2018 - 3/7/2018    Pre-op Diagnosis:   Sepsis due to Escherichia coli [A41.51]  Discitis L2 to L4       Post-Op Diagnosis Codes:     * Sepsis due to Escherichia coli [A41.51]  Discitis L2 to L4    Procedure/CPT® Codes:      Procedure(s):  LUMBAR FUSION MINIMALLY INVASIVE TRANSFORAMINAL LUMBAR INTERBODY IRRIGATION AND DEBRIDEMENT AND FUSION.  IRRIGATION AND DEBRIDEMENT OF EPIDURAL ABCESS LUMBAR 2-4. BONE MORPHOGENIC PROTEIN, ALPHATEC NOVEL AND ILLICO, EMG AND SSEP NEUROMONITORING.    Surgeon(s):  Manish Marr DO    Anesthesia: General    Staff:   Circulator: Paige Aguilar RN  Radiology Technologist: Keya Guerrero, RRT; Carlo Tang RRT  Scrub Person: Katerin Bo; Adrienne Good RN  Vendor Representative: Nam Osborn  Assistant: ASPEN Echeverria    Estimated Blood Loss: 400 mL    Urine Voided: 185 mL    Specimens:                  ID Type Source Tests Collected by Time Destination   1 : lumbar 2-3 disc Tissue Back, Lower TISSUE / BONE CULTURE Manish Marr DO 3/7/2018 1411    2 : lumbar 2-3 disc Wound Back, Lower WOUND CULTURE Manish Marr, DO 3/7/2018 1415    3 : lumbar 2-3 disc Wound Back, Lower WOUND CULTURE Manish Marr DO 3/7/2018 1417          Drains:   Urethral Catheter 03/07/18 1315 100% silicone 16 10 10 (Active)   Daily Indications < 24 hr post op 3/7/2018  6:55 PM   Securement secured to upper leg with adhesive device 3/7/2018  6:55 PM   Urine Output (mL) 50 3/7/2018  6:55 PM           Findings: As above    Complications: Incidental durotomy repaired with 4 6-0 Prolene sutures      Manish Marr DO     Date: 3/7/2018  Time: 8:32 PM

## 2018-03-08 NOTE — PROGRESS NOTES
Orthopedic Spine Progress Note    Subjective     Post-Operative Day: 1 post-spine procedure I&D of epidural abscess with MIS TLIF L2-L4 and repair of incidental durotomy    Systemic or Specific Complaints: The patient reports that his back pain is significantly improved postoperatively. No pain at rest. He denies leg pain or numbness.  He has been experiencing left lower extremity weakness since he awoke from surgery.  He denies headache.    Objective     Vital signs in last 24 hours:  Temp:  [97.5 °F (36.4 °C)-99.2 °F (37.3 °C)] 97.9 °F (36.6 °C)  Heart Rate:  [] 93  Resp:  [12-24] 20  BP: (132-179)/() 156/100    General: alert, appears stated age and cooperative   Neurovascular: The patient is able to flex his knee against gravity.  He is unable to actively dorsiflex or plantarflex the left foot although I'm able to see some firing of the left tibialis anterior muscle.  His strength appears intact in the right lower extremity.  Sensation grossly intact to light touch in the lower extremities bilaterally.   Wound: Wound clean and dry no evidence of infection.   Range of Motion: limited   DVT Exam: No evidence of DVT seen on physical exam.     Data Review  CBC:  Results from last 7 days  Lab Units 03/08/18  0350   WBC 10*3/mm3 11.60*   RBC 10*6/mm3 3.57*   HEMOGLOBIN g/dL 10.7*   HEMATOCRIT % 33.9*   PLATELETS 10*3/mm3 233     Lab Results   Component Value Date    GLUCOSE 303 (H) 03/08/2018    BUN 20 03/08/2018    CREATININE 0.82 03/08/2018    EGFRIFNONA 91 03/08/2018    EGFRIFAFRI 88 12/06/2017    BCR 24.4 03/08/2018    K 5.0 03/08/2018    CO2 26.7 03/08/2018    CALCIUM 7.9 (L) 03/08/2018    PROTENTOTREF 6.8 12/06/2017    ALBUMIN 2.40 (L) 03/04/2018    LABIL2 0.8 03/04/2018    AST 29 03/04/2018    ALT 36 03/04/2018         Assessment/Plan     Status post-spine procedure: irrigation and debridement of lumbar spine with evacuation of epidural abscess and minimally invasive transforaminal lumbar interbody  fusion L2-3, L3-4 with repair of incidental durotomy.  Postoperative left lower extremity weakness.  He has been started on Decadron and seems to have had a modest improvement already.  Hyperglycemia. History of diabetes and current steroid use. Medical management deferred to hospitalist.  The patient may resume heparin if needed.  We typically prefer the patient to stay off any blood thinners for 3 days postoperatively.    Pain Relief: good relief      Activity: the patient's bed was elevated 30°.  If he does not develop a headache after 1 hour this can be further elevated to 60°.  If still no headache he may get up to the chair.  His activity can gradually be advanced at that time.  He may get up with physical therapy.    Weight Bearing: FWB     LOS: 13 days     Manish Marr DO    Date: 3/8/2018

## 2018-03-08 NOTE — PROGRESS NOTES
"    DAILY PROGRESS NOTE  [facility]    Patient Identification:  Name: Jesus Beckham  Age: 77 y.o.  Sex: male  :  1940  MRN: [id number]         Primary Care Physician: Magdy Ricardo MD    Subjective:  Interval History: Back pain is much better today and at baseline is now a 3-4 versus a 7-8 preoperatively.  Denies any chest pain or troubles breathing or nausea vomiting or diarrhea.  Admits to decreased sensation in feet and toes as well as decreased strength and further discussion with nurse she instructed me that surgery is aware of the above complaints and started the patient on dexamethasone.    Objective: Spouse and RN at bedside    Scheduled Meds:  ceFAZolin 2 g Intravenous Q8H   ceftriaxone 2 g Intravenous Q24H   cyclobenzaprine 10 mg Oral Q8H   dexamethasone 4 mg Intravenous Q6H   fluticasone 2 spray Nasal Daily   glipiZIDE 10 mg Oral BID AC   insulin aspart 0-7 Units Subcutaneous 4x Daily With Meals & Nightly   ipratropium-albuterol 3 mL Nebulization Q6H While Awake - RT   linagliptin 5 mg Oral Daily   pregabalin 100 mg Oral TID   sennosides-docusate sodium 2 tablet Oral BID     Continuous Infusions:  sodium chloride 100 mL/hr Last Rate: 100 mL/hr (18 0444)       Vital signs in last 24 hours:  Temp:  [97.5 °F (36.4 °C)-99.2 °F (37.3 °C)] 97.9 °F (36.6 °C)  Heart Rate:  [] 102  Resp:  [12-20] 20  BP: (132-179)/() 156/100    Intake/Output:    Intake/Output Summary (Last 24 hours) at 18 1123  Last data filed at 18 0904   Gross per 24 hour   Intake             4515 ml   Output             1890 ml   Net             2625 ml       Exam:  /100 (BP Location: Left arm, Patient Position: Lying)  Pulse 102  Temp 97.9 °F (36.6 °C) (Oral)   Resp 20  Ht 177.8 cm (70\")  Wt 119 kg (263 lb)  SpO2 94%  BMI 37.74 kg/m2    General Appearance:    Alert, cooperative, no distress, AAOx3, Seems much more comfortable regarding pain today                         " Throat:   Lips, tongue, gums normal; oral mucosa pink and moist                           Neck:   Supple, symmetrical, trachea midline, no JVD                         Lungs:    Clear to auscultation bilaterally, respirations unlabored                          Heart:    Regular rate and rhythm, S1 and S2 normal                  Abdomen:     Soft, non-tender, bowel sounds active                 Extremities:   1-2+ LLE edema - he is currently moving toes and feet                        Pulses:   Pulses palpable in lower extremities                               Data Review:  Labs in chart were reviewed.    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       Plan:  Discitis secondary to seeding from Ecoli septicemia secondary to gangrenous cholecystitis s/p cholecystectomy on 2/25   -POD1 lunbar fusion w/ I/D and  incidental durotomy repair and prelim Cxs growing GNR and Rocephin per ID    -leg weakness and paresthesias - d/w RN and surgery is aware         RSV - supportive care      AHRF - CT c/w Atelectasis and small effusions       Acute RLE CVT - off hep gtt for now until OK w/ spince surgery   -s/p IVCF since will have to be off AC        DM2 w/ PN w/ A1c 7.7 - postop hyperglycemia/leukocytosis secondary to steroids - resume Levemir stat at higher dosage of 40u bid        Edema - echo EF normal - added TEDs/elevate       Appreciate input and assistance from ALL    Junaid Miles MD  3/8/2018  11:23 AM

## 2018-03-09 LAB
APTT PPP: 31.9 SECONDS (ref 22.7–35.4)
BACTERIA SPEC AEROBE CULT: ABNORMAL
BACTERIA SPEC AEROBE CULT: NORMAL
BACTERIA SPEC AEROBE CULT: NORMAL
BASOPHILS # BLD AUTO: 0.01 10*3/MM3 (ref 0–0.2)
BASOPHILS NFR BLD AUTO: 0.1 % (ref 0–1.5)
DEPRECATED RDW RBC AUTO: 49.2 FL (ref 37–54)
EOSINOPHIL # BLD AUTO: 0 10*3/MM3 (ref 0–0.7)
EOSINOPHIL NFR BLD AUTO: 0 % (ref 0.3–6.2)
ERYTHROCYTE [DISTWIDTH] IN BLOOD BY AUTOMATED COUNT: 14.3 % (ref 11.5–14.5)
GLUCOSE BLDC GLUCOMTR-MCNC: 321 MG/DL (ref 70–130)
GLUCOSE BLDC GLUCOMTR-MCNC: 329 MG/DL (ref 70–130)
GLUCOSE BLDC GLUCOMTR-MCNC: 382 MG/DL (ref 70–130)
GLUCOSE BLDC GLUCOMTR-MCNC: 395 MG/DL (ref 70–130)
GRAM STN SPEC: ABNORMAL
HCT VFR BLD AUTO: 31 % (ref 40.4–52.2)
HGB BLD-MCNC: 9.9 G/DL (ref 13.7–17.6)
IMM GRANULOCYTES # BLD: 0.07 10*3/MM3 (ref 0–0.03)
IMM GRANULOCYTES NFR BLD: 0.6 % (ref 0–0.5)
LYMPHOCYTES # BLD AUTO: 0.62 10*3/MM3 (ref 0.9–4.8)
LYMPHOCYTES NFR BLD AUTO: 5.7 % (ref 19.6–45.3)
MCH RBC QN AUTO: 30 PG (ref 27–32.7)
MCHC RBC AUTO-ENTMCNC: 31.9 G/DL (ref 32.6–36.4)
MCV RBC AUTO: 93.9 FL (ref 79.8–96.2)
MONOCYTES # BLD AUTO: 0.74 10*3/MM3 (ref 0.2–1.2)
MONOCYTES NFR BLD AUTO: 6.8 % (ref 5–12)
NEUTROPHILS # BLD AUTO: 9.41 10*3/MM3 (ref 1.9–8.1)
NEUTROPHILS NFR BLD AUTO: 86.8 % (ref 42.7–76)
NRBC BLD MANUAL-RTO: 0 /100 WBC (ref 0–0)
PLATELET # BLD AUTO: 240 10*3/MM3 (ref 140–500)
PMV BLD AUTO: 11.3 FL (ref 6–12)
RBC # BLD AUTO: 3.3 10*6/MM3 (ref 4.6–6)
WBC NRBC COR # BLD: 10.85 10*3/MM3 (ref 4.5–10.7)

## 2018-03-09 PROCEDURE — 82962 GLUCOSE BLOOD TEST: CPT

## 2018-03-09 PROCEDURE — 63710000001 INSULIN ASPART PER 5 UNITS: Performed by: INTERNAL MEDICINE

## 2018-03-09 PROCEDURE — 97110 THERAPEUTIC EXERCISES: CPT

## 2018-03-09 PROCEDURE — 85025 COMPLETE CBC W/AUTO DIFF WBC: CPT | Performed by: SURGERY

## 2018-03-09 PROCEDURE — 85730 THROMBOPLASTIN TIME PARTIAL: CPT | Performed by: INTERNAL MEDICINE

## 2018-03-09 PROCEDURE — 99232 SBSQ HOSP IP/OBS MODERATE 35: CPT | Performed by: INTERNAL MEDICINE

## 2018-03-09 PROCEDURE — 94799 UNLISTED PULMONARY SVC/PX: CPT

## 2018-03-09 PROCEDURE — 25010000003 CEFTRIAXONE PER 250 MG: Performed by: INTERNAL MEDICINE

## 2018-03-09 PROCEDURE — 63710000001 INSULIN DETEMER PER 5 UNITS: Performed by: HOSPITALIST

## 2018-03-09 PROCEDURE — 25010000002 DEXAMETHASONE PER 1 MG: Performed by: ORTHOPAEDIC SURGERY

## 2018-03-09 RX ORDER — CEFTRIAXONE SODIUM 2 G/50ML
2 INJECTION, SOLUTION INTRAVENOUS EVERY 24 HOURS
Status: DISCONTINUED | OUTPATIENT
Start: 2018-03-09 | End: 2018-03-13 | Stop reason: HOSPADM

## 2018-03-09 RX ADMIN — FLUTICASONE PROPIONATE 2 SPRAY: 50 SPRAY, METERED NASAL at 09:35

## 2018-03-09 RX ADMIN — PREGABALIN 100 MG: 100 CAPSULE ORAL at 15:23

## 2018-03-09 RX ADMIN — IPRATROPIUM BROMIDE AND ALBUTEROL SULFATE 3 ML: .5; 3 SOLUTION RESPIRATORY (INHALATION) at 21:20

## 2018-03-09 RX ADMIN — CEFTRIAXONE SODIUM 2 G: 2 INJECTION, SOLUTION INTRAVENOUS at 12:29

## 2018-03-09 RX ADMIN — PREGABALIN 100 MG: 100 CAPSULE ORAL at 09:34

## 2018-03-09 RX ADMIN — GLIPIZIDE 10 MG: 10 TABLET ORAL at 17:56

## 2018-03-09 RX ADMIN — INSULIN ASPART 5 UNITS: 100 INJECTION, SOLUTION INTRAVENOUS; SUBCUTANEOUS at 09:34

## 2018-03-09 RX ADMIN — INSULIN DETEMIR 50 UNITS: 100 INJECTION, SOLUTION SUBCUTANEOUS at 15:22

## 2018-03-09 RX ADMIN — LINAGLIPTIN 5 MG: 5 TABLET, FILM COATED ORAL at 09:34

## 2018-03-09 RX ADMIN — IPRATROPIUM BROMIDE AND ALBUTEROL SULFATE 3 ML: .5; 3 SOLUTION RESPIRATORY (INHALATION) at 10:32

## 2018-03-09 RX ADMIN — INSULIN ASPART 5 UNITS: 100 INJECTION, SOLUTION INTRAVENOUS; SUBCUTANEOUS at 12:29

## 2018-03-09 RX ADMIN — DEXAMETHASONE SODIUM PHOSPHATE 4 MG: 4 INJECTION, SOLUTION INTRAMUSCULAR; INTRAVENOUS at 04:33

## 2018-03-09 RX ADMIN — INSULIN ASPART 6 UNITS: 100 INJECTION, SOLUTION INTRAVENOUS; SUBCUTANEOUS at 17:56

## 2018-03-09 RX ADMIN — DOCUSATE SODIUM -SENNOSIDES 2 TABLET: 50; 8.6 TABLET, COATED ORAL at 09:34

## 2018-03-09 RX ADMIN — HYDROCODONE BITARTRATE AND ACETAMINOPHEN 2 TABLET: 10; 325 TABLET ORAL at 12:29

## 2018-03-09 RX ADMIN — INSULIN ASPART 6 UNITS: 100 INJECTION, SOLUTION INTRAVENOUS; SUBCUTANEOUS at 20:58

## 2018-03-09 RX ADMIN — PREGABALIN 100 MG: 100 CAPSULE ORAL at 20:57

## 2018-03-09 RX ADMIN — DEXAMETHASONE SODIUM PHOSPHATE 4 MG: 4 INJECTION, SOLUTION INTRAMUSCULAR; INTRAVENOUS at 15:22

## 2018-03-09 RX ADMIN — CYCLOBENZAPRINE 10 MG: 10 TABLET, FILM COATED ORAL at 06:46

## 2018-03-09 RX ADMIN — DOCUSATE SODIUM -SENNOSIDES 2 TABLET: 50; 8.6 TABLET, COATED ORAL at 20:57

## 2018-03-09 RX ADMIN — CYCLOBENZAPRINE 10 MG: 10 TABLET, FILM COATED ORAL at 15:23

## 2018-03-09 RX ADMIN — IPRATROPIUM BROMIDE AND ALBUTEROL SULFATE 3 ML: .5; 3 SOLUTION RESPIRATORY (INHALATION) at 06:44

## 2018-03-09 RX ADMIN — POLYETHYLENE GLYCOL 3350 17 G: 17 POWDER, FOR SOLUTION ORAL at 15:23

## 2018-03-09 RX ADMIN — DEXAMETHASONE SODIUM PHOSPHATE 4 MG: 4 INJECTION, SOLUTION INTRAMUSCULAR; INTRAVENOUS at 21:04

## 2018-03-09 RX ADMIN — DEXAMETHASONE SODIUM PHOSPHATE 4 MG: 4 INJECTION, SOLUTION INTRAMUSCULAR; INTRAVENOUS at 09:35

## 2018-03-09 RX ADMIN — GLIPIZIDE 10 MG: 10 TABLET ORAL at 06:46

## 2018-03-09 RX ADMIN — CYCLOBENZAPRINE 10 MG: 10 TABLET, FILM COATED ORAL at 21:19

## 2018-03-09 NOTE — PLAN OF CARE
Problem: Patient Care Overview (Adult)  Goal: Plan of Care Review  Outcome: Ongoing (interventions implemented as appropriate)   03/09/18 0525   Coping/Psychosocial Response Interventions   Plan Of Care Reviewed With patient   Patient Care Overview   Progress improving   Outcome Evaluation   Outcome Summary/Follow up Plan Vitals stable. Pt on 1L nasal cannula. No falls. No c/o pain. Pt c/o numbness in bilateral feet, MD aware. Azul remains in place, azul care complete. Pedal pulses dopplered. Pt resting comfortably. Monitoring closely.      Goal: Adult Individualization and Mutuality  Outcome: Ongoing (interventions implemented as appropriate)    Goal: Discharge Needs Assessment  Outcome: Ongoing (interventions implemented as appropriate)      Problem: Infection, Risk/Actual (Adult)  Goal: Identify Related Risk Factors and Signs and Symptoms  Outcome: Outcome(s) achieved Date Met: 03/09/18 03/09/18 0525   Infection, Risk/Actual   Infection, Risk/Actual: Related Risk Factors chronic illness/condition;prolonged hospitalization;surgery/procedure   Signs and Symptoms (Infection, Risk/Actual) pain     Goal: Infection Prevention/Resolution  Outcome: Ongoing (interventions implemented as appropriate)   03/09/18 0525   Infection, Risk/Actual (Adult)   Infection Prevention/Resolution making progress toward outcome       Problem: Fall Risk (Adult)  Goal: Identify Related Risk Factors and Signs and Symptoms  Outcome: Outcome(s) achieved Date Met: 03/09/18 03/09/18 0525   Fall Risk   Fall Risk: Related Risk Factors age-related changes;gait/mobility problems;fear of falling   Fall Risk: Signs and Symptoms presence of risk factors     Goal: Absence of Falls  Outcome: Ongoing (interventions implemented as appropriate)   03/09/18 0525   Fall Risk (Adult)   Absence of Falls making progress toward outcome       Problem: Pain, Acute (Adult)  Goal: Identify Related Risk Factors and Signs and Symptoms  Outcome: Outcome(s) achieved  Date Met: 03/09/18 03/09/18 0525   Pain, Acute   Related Risk Factors (Acute Pain) positioning;surgery;disease process   Signs and Symptoms (Acute Pain) verbalization of pain descriptors     Goal: Acceptable Pain Control/Comfort Level  Outcome: Ongoing (interventions implemented as appropriate)   03/09/18 0525   Pain, Acute (Adult)   Acceptable Pain Control/Comfort Level making progress toward outcome       Problem: Respiratory Insufficiency (Adult)  Goal: Identify Related Risk Factors and Signs and Symptoms  Outcome: Outcome(s) achieved Date Met: 03/09/18 03/09/18 0525   Respiratory Insufficiency   Related Risk Factors (Respiratory Insufficiency) activity intolerance   Signs and Symptoms (Respiratory Insufficiency) abnormal breath sounds;decreased oxygen saturation     Goal: Acid/Base Balance  Outcome: Ongoing (interventions implemented as appropriate)   03/09/18 0525   Respiratory Insufficiency (Adult)   Acid/Base Balance making progress toward outcome     Goal: Effective Ventilation  Outcome: Ongoing (interventions implemented as appropriate)   03/09/18 0525   Respiratory Insufficiency (Adult)   Effective Ventilation making progress toward outcome       Problem: Perioperative Period (Adult)  Goal: Signs and Symptoms of Listed Potential Problems Will be Absent or Manageable (Perioperative Period)  Outcome: Ongoing (interventions implemented as appropriate)   03/09/18 0525   Perioperative Period   Problems Assessed (Perioperative Period) all   Problems Present (Perioperative Period) pain;situational response;infection     Goal: Signs and Symptoms of Listed Potential Problems Will be Absent or Manageable (Perioperative Period)  Outcome: Ongoing (interventions implemented as appropriate)   03/09/18 0525   Perioperative Period   Problems Assessed (Perioperative Period) all   Problems Present (Perioperative Period) pain;situational response;infection

## 2018-03-09 NOTE — PLAN OF CARE
Problem: Patient Care Overview (Adult)  Goal: Plan of Care Review  Outcome: Ongoing (interventions implemented as appropriate)   03/09/18 1514   Coping/Psychosocial Response Interventions   Plan Of Care Reviewed With patient   Outcome Evaluation   Outcome Summary/Follow up Plan Pt able to stand x2 at EOB. Still having B feet numbness limiting mobility, but able to perfrom increased ROM in BLE. Tolerated standing well w/ some dizziness.

## 2018-03-09 NOTE — THERAPY TREATMENT NOTE
Acute Care - Physical Therapy Treatment Note  [facility]     Patient Name: Jesus Beckham  : 1940  MRN: [id number]  Today's Date: 3/9/2018  Onset of Illness/Injury or Date of Surgery Date: 18  Date of Referral to PT: 18  Referring Physician: Tejinder    Admit Date: 2018    Visit Dx:    ICD-10-CM ICD-9-CM   1. Calculus of gallbladder without cholecystitis without obstruction K80.20 574.20   2. Sepsis, due to unspecified organism A41.9 038.9     995.91   3. RUQ abdominal mass R19.01 789.31   4. RUQ abdominal pain R10.11 789.01   5. Gangrenous cholecystitis K81.0 575.0   6. Decreased mobility R26.89 781.99   7. Sepsis due to Escherichia coli A41.51 038.42     995.91     Patient Active Problem List   Diagnosis   • Gout   • Diabetic peripheral neuropathy   • Dyslipidemia   • Uncontrolled type 2 diabetes mellitus   • Primary osteoarthritis involving multiple joints   • Allergic rhinitis due to pollen   • Essential hypertension   • PVC (premature ventricular contraction)   • Vitamin D deficiency   • Benign non-nodular prostatic hyperplasia without lower urinary tract symptoms   • Osteoarthritis   • Urge incontinence   • CELINA (obstructive sleep apnea)   • Leg edema   • Acute gangrenous cholecystitis   • RUQ abdominal pain   • A-fib   • Discitis of lumbar region   • Acute deep vein thrombosis of calf, right   • Sepsis due to Escherichia coli               Adult Rehabilitation Note       18 1500          Rehab Assessment/Intervention    Discipline physical therapy assistant  -RW      Document Type therapy note (daily note)  -RW      Subjective Information agree to therapy;complains of;numbness  -RW      Patient Effort, Rehab Treatment good  -RW      Symptoms Noted During/After Treatment fatigue;dizziness  -RW      Precautions/Limitations fall precautions;spinal precautions  -RW      Recorded by [RW] Tiffany Goins PTA      Pain Assessment    Pain Assessment No/denies pain  -RW      Recorded  by [RW] Tiffany Goins PTA      Cognitive Assessment/Intervention    Current Cognitive/Communication Assessment functional  -RW      Orientation Status oriented x 4  -RW      Follows Commands/Answers Questions 100% of the time  -RW      Personal Safety WNL/WFL  -RW      Personal Safety Interventions fall prevention program maintained;gait belt;nonskid shoes/slippers when out of bed  -RW      Recorded by [RW] Tiffany Goins PTA      Bed Mobility, Assessment/Treatment    Bed Mobility, Assistive Device bed rails;head of bed elevated  -RW      Bed Mobility, Roll Right, [place] moderate assist (50% patient effort);verbal cues required;nonverbal cues required (demo/gesture)  -RW      Bed Mob, Sidelying to Sit, [place] moderate assist (50% patient effort);2 person assist required;verbal cues required;nonverbal cues required (demo/gesture)  -RW      Bed Mob, Sit to Sidelying, [place] moderate assist (50% patient effort);2 person assist required;verbal cues required;nonverbal cues required (demo/gesture)  -RW      Bed Mobility, Safety Issues decreased use of legs for bridging/pushing  -RW      Bed Mobility, Impairments strength decreased  -RW      Bed Mobility, Comment Cues for log roll  -RW      Recorded by [RW] Tiffany Goins PTA      Transfer Assessment/Treatment    Transfers, Sit-Stand [place] minimum assist (75% patient effort);moderate assist (50% patient effort);2 person assist required;verbal cues required;nonverbal cues required (demo/gesture)  -RW      Transfers, Stand-Sit [place] minimum assist (75% patient effort);moderate assist (50% patient effort);2 person assist required;verbal cues required;nonverbal cues required (demo/gesture)  -RW      Transfers, Sit-Stand-Sit, Assist Device elevated surface;rolling walker  -RW      Transfer, Safety Issues weight-shifting ability decreased  -RW      Transfer, Impairments strength decreased;impaired balance;sensation decreased  -RW       Transfer, Comment Pt stood x2 at EOB. Stood ~25 seconds each attempt. C/o dizziness w/ standing  -RW      Recorded by [RW] Tiffany Goins PTA      Gait Assessment/Treatment    Gait, [place] Level not tested;unable to perform  -RW      Recorded by [RW] iTffany Goins PTA      Balance Skills Training    Standing-Level of Assistance Minimum assistance;x2  -RW      Static Standing Balance Support assistive device  -RW      Standing-Balance Activities Weight Shift A-P;Forward lean  -RW      Recorded by [RW] Tiffany Goins PTA      Therapy Exercises    Bilateral Lower Extremities AROM:;10 reps;sitting;hip flexion;LAQ  -RW      Recorded by [RW] Tiffany Goins PTA      Positioning and Restraints    Pre-Treatment Position in bed  -RW      Post Treatment Position bed  -RW      In Bed side lying right;call light within reach;encouraged to call for assist;with family/caregiver;SCD pump applied  -RW      Recorded by [RW] Tiffany Goins PTA        User Key  (r) = Recorded By, (t) = Taken By, (c) = Cosigned By    Initials Name Effective Dates    RW Tiffany Goins PTA 03/07/18 -                 IP PT Goals       03/08/18 1503 02/26/18 1526       Bed Mobility PT LTG    Bed Mobility PT LTG, Date Established 03/08/18  -EJ 02/26/18  -EE     Bed Mobility PT LTG, Time to Achieve 1 wk  -EJ 1 wk  -EE     Bed Mobility PT LTG, Activity Type supine to sit/sit to supine  -EJ all bed mobility  -EE     Bed Mobility PT LTG, [place] Level minimum assist (75% patient effort)  -EJ supervision required  -EE     Bed Mobility PT LTG, Date Goal Reviewed 03/08/18  -EJ      Bed Mobility PT LTG, Outcome goal revised  -EJ      Transfer Training PT LTG    Transfer Training PT LTG, Date Established 03/08/18  -EJ 02/26/18  -EE     Transfer Training PT LTG, Time to Achieve 1 wk  -EJ 1 wk  -EE     Transfer Training PT LTG, Activity Type bed to chair /chair to bed;sit to stand/stand to sit  -EJ all transfers  -EE     Transfer Training PT  LTG, [place] Level minimum assist (75% patient effort);2 person assist required  -EJ supervision required  -EE     Transfer Training PT LTG, Assist Device walker, rolling  -EJ walker, rolling  -EE     Transfer Training PT  LTG, Date Goal Reviewed 03/08/18  -EJ      Transfer Training PT LTG, Outcome goal revised  -EJ      Gait Training PT LTG    Gait Training Goal PT LTG, Date Established 03/08/18  -EJ 02/26/18  -EE     Gait Training Goal PT LTG, Time to Achieve 1 wk  -EJ 1 wk  -EE     Gait Training Goal PT LTG, [place] Level minimum assist (75% patient effort);2 person assist required  -EJ supervision required  -EE     Gait Training Goal PT LTG, Assist Device walker, rolling  -EJ walker, rolling  -EE     Gait Training Goal PT LTG, Distance to Achieve 20  -  -EE     Gait Training Goal PT LTG, Date Goal Reviewed 03/08/18  -EJ      Gait Training Goal PT LTG, Outcome goal revised  -EJ        User Key  (r) = Recorded By, (t) = Taken By, (c) = Cosigned By    Initials Name Provider Type    EE Carmen Sarmiento, PT Physical Therapist    EJ Eliane Nova, PT Physical Therapist          Physical Therapy Education     Title: PT OT SLP Therapies (Done)     Topic: Physical Therapy (Done)     Point: Mobility training (Done)    Learning Progress Summary    Learner Readiness Method Response Comment Documented by Status   Patient Acceptance E,TB,D VU,NR  RW 03/09/18 1528 Done    Acceptance E,TB,D VU,NR  RW 03/09/18 1515 Done    Acceptance E,D VU,NR  EJ 03/08/18 1502 Done    Acceptance E,D VU,DU safety during transfers and ambulation LC 03/04/18 1515 Done    Acceptance E VU  KH 03/03/18 1529 Done    Acceptance E,TB DU,VU  CW 03/02/18 1555 Done    Acceptance E,TB VU  RJ 03/01/18 1647 Done    Acceptance E NR  EA 03/01/18 1057 Active    Acceptance E NR  EA 02/28/18 1405 Active    Acceptance E,TB VU,DU  CW 02/27/18 0950 Done    Acceptance E NR  EE 02/26/18 1525 Active   Family Acceptance E,D VU,NR  EJ 03/08/18 1502 Done     Acceptance E,TB VU  RJ 03/01/18 1647 Done    Acceptance E NR  EA 03/01/18 1057 Active   Significant Other Acceptance E,TB,D VU,NR   03/09/18 1528 Done    Acceptance E,TB,D VU,NR   03/09/18 1515 Done               Point: Home exercise program (Done)    Learning Progress Summary    Learner Readiness Method Response Comment Documented by Status   Patient Acceptance E,TB,D VU,NR   03/09/18 1528 Done    Acceptance E,TB,D VU,NR   03/09/18 1515 Done   Significant Other Acceptance E,TB,D VU,NR   03/09/18 1528 Done    Acceptance E,TB,D VU,NR   03/09/18 1515 Done               Point: Body mechanics (Done)    Learning Progress Summary    Learner Readiness Method Response Comment Documented by Status   Patient Acceptance E,TB,D VU,NR   03/09/18 1528 Done    Acceptance E,TB,D VU,NR   03/09/18 1515 Done    Acceptance E,D VU,NR   03/08/18 1502 Done    Acceptance E NR  KH 03/03/18 1529 Active    Acceptance E,TB DU,VU   03/02/18 1555 Done    Acceptance E NR  EA 03/01/18 1057 Active    Acceptance E NR  EA 02/28/18 1405 Active    Acceptance E,TB VU,DU   02/27/18 0950 Done    Acceptance E NR   02/26/18 1525 Active   Family Acceptance E,D VU,NR  EJ 03/08/18 1502 Done    Acceptance E NR  EA 03/01/18 1057 Active   Significant Other Acceptance E,TB,D VU,NR   03/09/18 1528 Done    Acceptance E,TB,D VU,NR   03/09/18 1515 Done               Point: Precautions (Done)    Learning Progress Summary    Learner Readiness Method Response Comment Documented by Status   Patient Acceptance E,TB,D VU,NR   03/09/18 1528 Done    Acceptance E,TB,D VU,NR   03/09/18 1515 Done    Acceptance E VU   03/03/18 1529 Done    Acceptance E,TB DU,VU   03/02/18 1555 Done    Acceptance E,TB VU  RJ 03/01/18 1647 Done    Acceptance E,TB VU,DU   02/27/18 0950 Done   Family Acceptance E,TB VU  RJ 03/01/18 1647 Done   Significant Other Acceptance E,TB,D VU,NR  RW 03/09/18 1528 Done    Acceptance E,TB,D VU,NR  RW 03/09/18 1515 Done                       User Key     Initials Effective Dates Name Provider Type Discipline    RJ 02/08/17 -  Alpa Vargas, RN Registered Nurse Nurse    EE 12/01/15 -  Carmen Sarmiento, PT Physical Therapist PT    EJ 04/21/17 -  Eliane Nova, PT Physical Therapist PT    KH 06/22/16 -  Susan Wiggins, PT Physical Therapist PT    RW 03/07/18 -  Tiffany Goins, PTA Physical Therapy Assistant PT    LC 08/02/16 -  Sudhir Castrejon, PT DPT Physical Therapist PT    CW 12/13/16 - 03/06/18 Jaya Gomez, PTA Physical Therapy Assistant PT    EA 02/05/18 -  Bipin Live, PT Student PT Student PT                    PT Recommendation and Plan  Anticipated Discharge Disposition: skilled nursing facility (pending progress)  Planned Therapy Interventions: bed mobility training, gait training, home exercise program, patient/family education, ROM (Range of Motion), strengthening, transfer training  PT Frequency: daily  Plan of Care Review  Plan Of Care Reviewed With: patient  Outcome Summary/Follow up Plan: Pt able to stand x2 at EOB. Still having B feet numbness limiting mobility, but able to perfrom increased ROM in BLE. Tolerated standing well w/ some dizziness.           Outcome Measures       03/09/18 1500 03/08/18 1500       How much help from another person do you currently need...    Turning from your back to your side while in flat bed without using bedrails? 2  -RW 2  -EJ     Moving from lying on back to sitting on the side of a flat bed without bedrails? 2  -RW 2  -EJ     Moving to and from a bed to a chair (including a wheelchair)? 2  -RW 1  -EJ     Standing up from a chair using your arms (e.g., wheelchair, bedside chair)? 2  -RW 1  -EJ     Climbing 3-5 steps with a railing? 1  -RW 1  -EJ     To walk in hospital room? 1  -RW 1  -EJ     AM-PAC 6 Clicks Score 10  -RW 8  -EJ     Functional Assessment    Outcome Measure Options AM-PAC 6 Clicks Basic Mobility (PT)  -RW AM-PAC 6 Clicks Basic Mobility (PT)  -EJ       User Key  (r) =  Recorded By, (t) = Taken By, (c) = Cosigned By    Initials Name Provider Type    BASHIR Nova, PT Physical Therapist    RW Tiffany Goins PTA Physical Therapy Assistant           Time Calculation:         PT Charges       03/09/18 1446          Time Calculation    Start Time 1445  -RW      Stop Time 1506  -RW      Time Calculation (min) 21 min  -RW      PT Received On 03/09/18  -RW      PT - Next Appointment 03/10/18  -RW        User Key  (r) = Recorded By, (t) = Taken By, (c) = Cosigned By    Initials Name Provider Type    GERALDINE Goins PTA Physical Therapy Assistant          Therapy Charges for Today     Code Description Service Date Service Provider Modifiers Qty    33034621218 HC PT THER PROC EA 15 MIN 3/9/2018 Tiffany Goins PTA GP 1    63375122452 HC PT THER SUPP EA 15 MIN 3/9/2018 Tiffany Goins PTA GP 1          PT G-Codes  Outcome Measure Options: AM-PAC 6 Clicks Basic Mobility (PT)    Tiffany Goins PTA  3/9/2018

## 2018-03-09 NOTE — PROGRESS NOTES
Orthopedic Spine Progress Note    Subjective     Post-Operative Day: 2 post-spine procedure I&D of epidural abscess with MIS TLIF L2-L4 and repair of incidental durotomy    Systemic or Specific Complaints: No back painor leg pain. He reports bilateral foot numbness which is constant and some numbness in the legs as well. He has difficulty standing with assistance. Continued LLE weakness. No headache.    Objective     Vital signs in last 24 hours:  Temp:  [97.3 °F (36.3 °C)-98.5 °F (36.9 °C)] 97.3 °F (36.3 °C)  Heart Rate:  [] 87  Resp:  [16-20] 20  BP: (130-155)/(69-84) 141/79    General: alert, appears stated age and cooperative   Neurovascular: The patient is able to fire his quadriceps and hamstrings to move his leg against gravity. He is firing the left TA but unable to move the foot.  His strength appears intact in the right lower extremity.  Sensation to light touch diminished diffusely in the lower extremities bilaterally.   Wound: Wound clean and dry no evidence of infection.   Range of Motion: limited   DVT Exam: No evidence of DVT seen on physical exam.     Data Review  CBC:    Results from last 7 days  Lab Units 03/09/18  0313   WBC 10*3/mm3 10.85*   RBC 10*6/mm3 3.30*   HEMOGLOBIN g/dL 9.9*   HEMATOCRIT % 31.0*   PLATELETS 10*3/mm3 240     Lab Results   Component Value Date    GLUCOSE 303 (H) 03/08/2018    BUN 20 03/08/2018    CREATININE 0.82 03/08/2018    EGFRIFNONA 91 03/08/2018    EGFRIFAFRI 88 12/06/2017    BCR 24.4 03/08/2018    K 5.0 03/08/2018    CO2 26.7 03/08/2018    CALCIUM 7.9 (L) 03/08/2018    PROTENTOTREF 6.8 12/06/2017    ALBUMIN 2.40 (L) 03/04/2018    LABIL2 0.8 03/04/2018    AST 29 03/04/2018    ALT 36 03/04/2018         Assessment/Plan     Status post-spine procedure: irrigation and debridement of lumbar spine with evacuation of epidural abscess and minimally invasive transforaminal lumbar interbody fusion L2-3, L3-4 with repair of incidental durotomy.  Postoperative left lower  extremity weakness.  He has been started on Decadron and seems to have had a modest improvement already. Will wean to 2 mg today since patient is not in pain and his blood sugars remain elevated. Plan to d/c steroids Sunday.  Hyperglycemia. History of diabetes and current steroid use. Medical management deferred to hospitalist.  The patient may resume heparin if needed.    Abx per ID    Pain Relief: good relief      Activity: work with PT on LE strengthening    Weight Bearing: FWB as tolerated     LOS: 14 days     Manish Marr DO    Date: 3/9/2018

## 2018-03-09 NOTE — PROGRESS NOTES
LOS: 14 days     Chief Complaint: E coli bacteremia, sepsis, and discitis     Interval History:  Afebrile, significantly decreased back pain.  His only complaint today is lower extremity numbness and weakness left greater than right.  Continues to have mild abdominal pain after eating.  No nausea or vomiting.  Hasn't had a bowel movement in a few days.  Denies any rashes.  No shortness of breath or cough.      Vital Signs  Temp:  [97.3 °F (36.3 °C)-98.5 °F (36.9 °C)] 97.3 °F (36.3 °C)  Heart Rate:  [] 83  Resp:  [16-20] 20  BP: (130-155)/(69-84) 141/79  94% on 1L NC    Physical Exam:  General: in NAD  Cardiovascular: RRR, no le edema   Respiratory: occ crackles no wheezing   GI: Soft, distended, incisions healing well, positive bowel sounds bilaterally  Skin: No rashes     Antibiotics:  Ceftriaxone 2 g IV every 24 hours     Results Review:     I reviewed the patient's new clinical results.  I reviewed the patient's new imaging results and agree with the interpretation.    Lab Results   Component Value Date    WBC 10.85 (H) 03/09/2018    HGB 9.9 (L) 03/09/2018    HCT 31.0 (L) 03/09/2018    MCV 93.9 03/09/2018     03/09/2018     Lab Results   Component Value Date    GLUCOSE 303 (H) 03/08/2018    BUN 20 03/08/2018    CREATININE 0.82 03/08/2018    EGFRIFNONA 91 03/08/2018    EGFRIFAFRI 88 12/06/2017    BCR 24.4 03/08/2018    CO2 26.7 03/08/2018    CALCIUM 7.9 (L) 03/08/2018    PROTENTOTREF 6.8 12/06/2017    ALBUMIN 2.40 (L) 03/04/2018    LABIL2 0.8 03/04/2018    AST 29 03/04/2018    ALT 36 03/04/2018       Microbiology:  3/7 Operative back cx E coli  3/4 UCx 10-20K candida  2/28 BCx NGTD x 2  2/24 BCx NGTD x 2  2/23 RVP + RSV  2/23 BCx E coli 2/2 2/23 Influenza neg    Assessment/Plan   1.  Fever - resolved      2.  Discitis, paravertebral soft tissue infection, psoas muscle abscesses, possible epidural abscesses s/p incision and drainage, lumbar fusion on 3/7  - Operative cultures Escherichia coli  -  Continue ceftriaxone 2 g IV every 24 hours ×8 weeks.  Antibiotics stop date May 2  - Please obtain weekly CBC with differential and creatinine while the patient is on IV antibiotics and fax the results to the infectious disease clinic at [phone]  - ID clinic follow-up on May 2  - We will check a CMP tomorrow morning to ensure no abnormalities while on antibiotics  - I have discussed the above recommendations with CCP.  They will call me if there is any problems  - Surgery to evaluate lower extremity complaints    3.  Right lower extremity DVT  - IVC filter placed by vascular on 3/6  - Management per primary team      4.  Escherichia coli bacteremia setting of acute gangrenous cholecystitis   -s/p laparoscopic cholecystectomy on February 25, 2018  -on abx per #2      5.   Uncontrolled DM2 - complicating above  -post op glc out of control     Okay to discharge from an ID standpoint

## 2018-03-09 NOTE — PROGRESS NOTES
"    DAILY PROGRESS NOTE  [facility]    Patient Identification:  Name: Jesus Beckham  Age: 77 y.o.  Sex: male  :  1940  MRN: [id number]         Primary Care Physician: Magdy Ricardo MD    Subjective:  Interval History: feeling better another day but still w/ paresthesias/weakness > in LLE. Denies cp/sob/n/v/d    Objective:fm x1 at bs     Scheduled Meds:  ceftriaxone 2 g Intravenous Q24H   cyclobenzaprine 10 mg Oral Q8H   dexamethasone 4 mg Intravenous Q6H   fluticasone 2 spray Nasal Daily   glipiZIDE 10 mg Oral BID AC   insulin aspart 0-7 Units Subcutaneous 4x Daily With Meals & Nightly   insulin detemir 50 Units Subcutaneous Nightly   ipratropium-albuterol 3 mL Nebulization Q6H While Awake - RT   linagliptin 5 mg Oral Daily   pregabalin 100 mg Oral TID   sennosides-docusate sodium 2 tablet Oral BID     Continuous Infusions:     Vital signs in last 24 hours:  Temp:  [97.3 °F (36.3 °C)-98.5 °F (36.9 °C)] 97.3 °F (36.3 °C)  Heart Rate:  [] 87  Resp:  [16-20] 20  BP: (130-155)/(69-84) 141/79    Intake/Output:    Intake/Output Summary (Last 24 hours) at 18 1407  Last data filed at 18 1002   Gross per 24 hour   Intake              600 ml   Output             2050 ml   Net            -1450 ml       Exam:  /79 (BP Location: Right arm, Patient Position: Lying)  Pulse 87  Temp 97.3 °F (36.3 °C) (Oral)   Resp 20  Ht 177.8 cm (70\")  Wt 119 kg (263 lb)  SpO2 95%  BMI 37.74 kg/m2    General Appearance:    Alert, cooperative, no distress, AAOx3                         Throat:   Lips, tongue, gums normal; oral mucosa pink and moist                           Neck:   Supple, symmetrical, trachea midline, no JVD                         Lungs:    Clear to auscultation bilaterally, respirations unlabored                          Heart:    Regular rate and rhythm, S1 and S2 normal                  Abdomen:     Soft, non-tender, bowel sounds active                 Extremities:   " SCDsc, no cyanosis or edema                        Pulses:   Pulses palpable in lower extremities                                 Data Review:  Labs in chart were reviewed.    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       Plan:  Discitis secondary to seeding from Ecoli septicemia secondary to gangrenous cholecystitis s/p cholecystectomy on 2/25                        -POD2 lunbar fusion w/ I/D and incidental durotomy repair and prelim Cxs growing GNR and Rocephin per ID                         -leg weakness and paresthesias - d/w Dr Marr who expects improvement and is tapering steroids w/ DC of them on Sunday          AHRF - CT c/w Atelectasis and small effusions       Acute RLE CVT - off hep gtt for now until OK w/ spince surgery                        -s/p IVCF since will have to be off AC        DM2 w/ PN  w/ A1c 7.7 - postop hyperglycemia/leukocytosis secondary to steroids    -further increase Levemir to 50u bid - should improve w/ further steroid taper        Constipation - on SennaS bid - add Miralax daily     Junaid Miles MD  3/9/2018  2:07 PM

## 2018-03-09 NOTE — OP NOTE
PREOPERATIVE DIAGNOSES:   1.  Discitis L2 to L4  2. Lumbar spinal stenosis  L2-L3  3.  Lumbar spinal stenosis  L3-L4  4.  Epidural abscess    POSTOPERATIVE DIAGNOSES:  Same    PROCEDURES PERFORMED:  1. Anterior lumbar arthrodesis via transforaminal approach, including removal of the disk for decompression of the spinal nerve roots  L2-L3  2. Anterior lumbar arthrodesis via transforaminal approach, including removal of the disk for decompression of the spinal nerve roots  L3-L4  3. Segmental spinal instrumentation  L2, L3, and L4  4. Application of intervertebral biomechanical device L2-L3  5. Application of intervertebral biomechanical device L3-L4  6.  Irrigation and debridement of deep lumbar spine including the L2 to L4 disc spaces.  7.  Lumbar laminectomy, facetectomy, foraminotomy L2-L3 for debridement of subdural phlegmon   8.  Lumbar laminectomy, facetectomy, foraminotomy L3-L4 for debridement of subdural phlegmon  9.  Repair of incidental durotomy with 4 6-0 Prolene sutures  SURGEON: Manish Marr DO  ASSISTANT: Kathrine Riojas PA-C Please note I utilized the services of an assistant, specifically, Kathrine Riojas PA-C.  Kathrine participated in crucial portions of the operation.  The use of Kathrine greatly reduced overall operative time thereby reducing overall morbidity for the patient.      ANESTHESIA: General.   Estimated Blood Loss: 400 mL  Specimens:   Order Name Source Comment Collection Info Order Time   TISSUE / BONE CULTURE Back, Lower  Collected By: Manish Marr DO 3/7/2018  2:13 PM   WOUND CULTURE Back, Lower  Collected By: Manish Marr DO 3/7/2018  2:17 PM   WOUND CULTURE Back, Lower  Collected By: Manish Marr DO 3/7/2018  2:17 PM   BASIC METABOLIC PANEL   Collected By: Tenisha Guillaume 3/8/2018 12:00 AM     IMPLANTS: [employer]phate[employer] Spine.   COMPLICATIONS: None apparent.   DISPOSITION: Patient to recovery room in stable condition.   INDICATIONS: The patient is a 77 y.o. year old who was admitted for  gangrenous gallbladder which was removed.  The patient developed Escherichia coli sepsis and was found to have discitis on lumbar spine MRI.  I was asked to see the patient for treatment.  Patient also had severe stenosis from a book like a possible epidural abscess or chronic process in the epidural and subdural space.  This was primarily centered from L2 to L4.  Patient had a history of previous lumbar laminectomy at L3-L4 and L4-L5.  Surgery he was being treated for E coli sepsis with Rocephin.  He was still developing fevers and had back and leg pain.  I recommended debridement of the discitis and stabilization.  I explained risks of the surgery including but not limited to bleeding, infection, risk of anesthesia, blood clots, pulmonary embolus, myocardial infarction, stroke, nerve root damage causing complete or partial paralysis, dural tear causing spinal headache, risk of nonunion, risk of hardware complications, need for further surgery, lack of a guarantee, continued pain, and continued numbness.  I also discussed the FDA off label use of bone morphogenic protein.  I discussed potential risks including but not limited to; ectopic bone formation, subchondral cysts, seroma, chemical radiculitis, and the now disproven increased risk of cancer.  The patient had many questions about these risks, all of which were answered to the best of my ability in a language which he could understand. Informed consent obtained. The patient presents today for minimally invasive TLIF L2 to L4   .   DESCRIPTION OF PROCEDURE: After identifying the patient in the preoperative holding area, all labs and consent were found to be in order. KAYA hose and SCDs were applied for thromboembolic prophylaxis. The patient's back was marked with an indelible marker and he was transferred to the operative suite. In the OR, he was given the benefit of general anesthesia. The patient had neuromonitoring leads and Murray catheter placed. The  patient was then carefully positioned prone on a Nahid table, with all bony prominences padded. Next after time-out was performed, I localized the L2 to L4 interspace with fluoroscopy; making markings on the Ioban. I then started on the right-hand side with a 5 cm incision with a #10 blade. I then dissected with the Bovie to the fascia which I incised in line with the incision.  I placed two Nicole-Gelpi retractors.  I then removed overlying muscle to expose the facet joint of as well as the transverse processes, and I took an x-ray confirming position.  I then performed a laminectomy and facetectomy at L2-L3.  I'll be looking bone was saved for bone graft.  Once I reached the disc space I used a 11 blade to make an incision and used a pituitary to remove purulent disc.  Gross pus was expressed from the disc space.  This was cultured.  Disc material as well as subdural phlegmon was also sent for culture.  Kefzol was then administered.  I then performed a laminectomy and did not note any moiz epidural abscess but there was phlegmon the subdural area which was debrided as best I could with pituitary.  Next I proceeded to L3-L4 with the patient had previous surgery.  In the process of thinning out the lamina the drill caught the dura and caused a tear.  The cauda equina nerve roots were herniated through the dural tear.  I used a blunt probe to carefully place the nerve roots back into the dura and sutured the linear tear with several 6-0 Prolene sutures for watertight closure.  Before the repair I did a decompression of the lateral recess which had severe stenosis.  This included decompression of the exiting L3 nerve root.  Next I placed pedicle screws with the following technique.  First I used an awl followed by the Steffee probe tapping followed by placement of 6.5 x 45 mm screws.  The screws stimulated fine with neuro monitoring.  They looked good on x-ray.  I then irrigated with copious also normal saline and  proceeded to the left-hand side.  I made a similar incision with exposure to the spine.  First I performed a total facetectomy and laminectomy at both levels.  I debrided subdural phlegmon as best I could.  Again, there is no moiz pus in the epidural space.  Next I debrided the disc space with curettes and efren as thoroughly as possible.  This included debridement of bone.  I then trialed and placed a size 10 titanium spacers at both levels.  I placed a small platelet of infuse wrapped with autograft bone anterior to the cage.  I did this at both levels.  I then finished by placing pedicle screws with the technique described earlier.  X-ray was taken confirming location of the spacers.  They stimulated fine with neuromonitoring. I placed again cayden and placed set screws and performed compression. I then took final x-rays, showing good position of the hardware. I then irrigated out both wounds with copious amounts of normal saline. Gelfoam was placed over any exposed nerve roots and then the rest of the bone graft was placed over decorticated bone for posterolateral fusion. I then closed both wounds in a layered fashion and placed sterile dressings to the wound. The patient was then awakened from general anesthesia, transferred to his hospital bed, and taken to recovery room in stable condition.     DISPOSITION: The patient will be admitted to a spine bed, where he will be given pain medication, antibiotic prophylaxis, DVT prophylaxis, and physical therapy orders.  The patient will continue on Rocephin until cultures are back.

## 2018-03-09 NOTE — PROGRESS NOTES
LOS: 13 days   Patient Care Team:  Magdy Ricardo MD as PCP - General  Magdy Ricardo MD as PCP - Family Medicine  Staci Keyes MD as Consulting Physician (Endocrinology)  Jerrell Fry MD as Consulting Physician (Urology)    Subjective     Patient is awake he is still a little confused today he says that they tried to stand him up today and there was no way he can stand he said he can't feel either but really it feels just like big blobs on the end of his legs.  He still having trouble moving his left foot he can move his left leg a little more no shortness of breath on 2 L nasal cannula O2  Review of Systems:          Objective     Vital Signs  Vital Sign Min/Max for last 24 hours  Temp  Min: 97.5 °F (36.4 °C)  Max: 98.5 °F (36.9 °C)   BP  Min: 130/69  Max: 179/88   Pulse  Min: 82  Max: 103   Resp  Min: 12  Max: 20   SpO2  Min: 92 %  Max: 97 %   Flow (L/min)  Min: 2  Max: 3   No Data Recorded        Ventilator/Non-Invasive Ventilation Settings     None                       Body mass index is 37.74 kg/(m^2).  I/O last 3 completed shifts:  In: 5236 [P.O.:1780; I.V.:3456]  Out: 3140 [Urine:2740; Blood:400]           Physical Exam:  General Appearance: Well-developed obese white male lying in bed doesn't look in particular distress  Eyes: Conjunctiva are clear and anicteric  ENT: Mucous membranes are moist no erythema or exudates nasal septum midline and he has a Mallampati type II airway  Neck: No jugular venous distention trachea midline  Lungs: Clear nonlabored symmetric expansion His oxygen saturations are 96% on 2 L  Cardiac: Regular rate and rhythm  Abdomen: Abdominal binder in place not removed  : Not examined  Musc/Skel: Edema all 4 extremities 1+ upper extremities 1+ lower extremities ,  Skin: No jaundice no petechiae  Neuro: He is alert oriented he is cooperative patient is moving his right lower extremity well to command he he can lift both his right lower extremity is an inch or 2  off the bed but then has to stop for severe pain.  He has no dorsiflexion or plantarflexion or movement of his toes.  He can feel light touch and pressure on both feet and toes but he says they don't feel normal  Extremities/P Vascular: No clubbing or cyanosis he has palpable radial dorsalis pedis pulses  MSE:  He is not in such good spirits today       Labs:    Results from last 7 days  Lab Units 03/08/18  0350 03/06/18  0437 03/05/18  0730 03/04/18  0453 03/03/18  0552 03/02/18  0323   GLUCOSE mg/dL 303* 109* 173* 137* 218* 169*   SODIUM mmol/L 133* 138 138 137 138 140   POTASSIUM mmol/L 5.0 3.7 3.7 4.0 4.3 3.9   CO2 mmol/L 26.7 31.3* 26.2 27.3 27.8 26.5   CHLORIDE mmol/L 93* 100 98 98 100 101   ANION GAP mmol/L 13.3 6.7 13.8 11.7 10.2 12.5   CREATININE mg/dL 0.82 0.83 1.03 0.95 0.78 0.96   BUN mg/dL 20 21 21 19 16 19   BUN / CREAT RATIO  24.4 25.3* 20.4 20.0 20.5 19.8   CALCIUM mg/dL 7.9* 8.4* 8.7 8.4* 8.4* 8.3*   EGFR IF NONAFRICN AM mL/min/1.73 91 90 70 77 97 76   ALK PHOS U/L  --   --   --  111 91 81   TOTAL PROTEIN g/dL  --   --   --  5.6* 5.5* 5.5*   ALT (SGPT) U/L  --   --   --  36 33 36   AST (SGOT) U/L  --   --   --  29 37 24   BILIRUBIN mg/dL  --   --   --  0.7 0.6 0.5   ALBUMIN g/dL  --   --   --  2.40* 2.30* 2.60*   GLOBULIN gm/dL  --   --   --  3.2 3.2 2.9   A/G RATIO g/dL  --   --   --  0.8 0.7 0.9     Estimated Creatinine Clearance: 99.1 mL/min (by C-G formula based on Cr of 0.82).        Results from last 7 days  Lab Units 03/08/18  0350 03/07/18  0434 03/06/18  0437 03/05/18  0730 03/04/18  0453 03/03/18  0552 03/02/18  0323   WBC 10*3/mm3 11.60* 8.88 8.97 10.41 13.13* 8.62 7.46   RBC 10*6/mm3 3.57* 3.94* 3.88* 4.09* 4.00* 3.72* 3.79*   HEMOGLOBIN g/dL 10.7* 11.7* 11.6* 12.2* 11.9* 11.3* 11.5*   HEMATOCRIT % 33.9* 37.3* 36.5* 38.3* 37.9* 34.9* 35.8*   MCV fL 95.0 94.7 94.1 93.6 94.8 93.8 94.5   MCH pg 30.0 29.7 29.9 29.8 29.8 30.4 30.3   MCHC g/dL 31.6* 31.4* 31.8* 31.9* 31.4* 32.4* 32.1*   RDW %  14.1 14.3 14.6* 14.7* 14.8* 14.9* 14.6*   RDW-SD fl 48.9 49.6 49.8 50.1 51.1 50.7 49.7   MPV fL 11.2 11.3 11.7 11.6 11.5 11.9 11.9   PLATELETS 10*3/mm3 233 226 202 198 195 198 217   NEUTROPHIL % % 91.0* 87.9* 78.3* 86.4* 92.0* 84.8* 71.5   LYMPHOCYTE % % 3.6* 4.3* 11.0* 5.5* 3.2* 7.8* 13.1*   MONOCYTES % % 4.7* 6.6 8.8 6.9 3.0* 4.9* 9.8   EOSINOPHIL % % 0.0* 0.5 1.1 0.4 0.8 1.5 1.6   BASOPHIL % % 0.1 0.2 0.2 0.2 0.2 0.2 0.4   IMM GRAN % % 0.6* 0.5 0.6* 0.6* 0.8* 0.8* 3.6*   NEUTROS ABS 10*3/mm3 10.56* 7.81 7.02 9.00* 12.08* 7.31 5.33   LYMPHS ABS 10*3/mm3 0.42* 0.38* 0.99 0.57* 0.42* 0.67* 0.98   MONOS ABS 10*3/mm3 0.54 0.59 0.79 0.72 0.40 0.42 0.73   EOS ABS 10*3/mm3 0.00 0.04 0.10 0.04 0.10 0.13 0.12   BASOS ABS 10*3/mm3 0.01 0.02 0.02 0.02 0.02 0.02 0.03   IMMATURE GRANS (ABS) 10*3/mm3 0.07* 0.04* 0.05* 0.06* 0.11* 0.07* 0.27*   NRBC /100 WBC  --   --   --  0.0 0.0  --  0.0                       Results from last 7 days  Lab Units 03/04/18  0453   PROCALCITONIN ng/mL 22.97*       Results from last 7 days  Lab Units 03/04/18  1719   INR  1.13*     Microbiology Results (last 10 days)     Procedure Component Value - Date/Time    Wound Culture - Wound, Back, Lower [[id number]]  (Abnormal) Collected:  03/07/18 1417    Lab Status:  Preliminary result Specimen:  Wound from Back, Lower Updated:  03/08/18 0750     Wound Culture --      Scant growth (1+) Gram Negative Bacilli (A)     Gram Stain Result No organisms seen      Rare (1+) WBCs per low power field    Wound Culture - Wound, Back, Lower [[id number]]  (Abnormal) Collected:  03/07/18 1415    Lab Status:  Preliminary result Specimen:  Wound from Back, Lower Updated:  03/08/18 0746     Wound Culture --      Scant growth (1+) Gram Negative Bacilli (A)     Gram Stain Result No organisms seen      Rare (1+) WBCs per low power field    Tissue / Bone Culture - Tissue, Back, Lower [[id number]]  (Abnormal) Collected:  03/07/18 1411    Lab Status:  Preliminary result Specimen:   Tissue from Back, Lower Updated:  03/08/18 0743     Tissue Culture --      Light growth (2+) Gram Negative Bacilli (A)     Gram Stain Result No organisms seen      Rare (1+) WBCs seen    Urine Culture - Urine, Urine, Clean Catch [[id number]]  (Abnormal) Collected:  03/04/18 1200    Lab Status:  Final result Specimen:  Urine from Urine, Clean Catch Updated:  03/06/18 1139     Urine Culture --      10,000-20,000 CFU/mL Candida albicans (A)    Blood Culture - Blood, [[id number]]  (Normal) Collected:  03/04/18 1107    Lab Status:  Preliminary result Specimen:  Blood from Arm, Left Updated:  03/08/18 1131     Blood Culture No growth at 4 days    Blood Culture - Blood, [[id number]]  (Normal) Collected:  03/04/18 1025    Lab Status:  Preliminary result Specimen:  Blood from Arm, Left Updated:  03/08/18 1101     Blood Culture No growth at 4 days    Blood Culture - Blood, [[id number]]  (Normal) Collected:  02/28/18 1832    Lab Status:  Final result Specimen:  Blood from Arm, Left Updated:  03/05/18 1846     Blood Culture No growth at 5 days    Blood Culture - Blood, [[id number]]  (Normal) Collected:  02/28/18 1533    Lab Status:  Final result Specimen:  Blood from Arm, Right Updated:  03/05/18 1546     Blood Culture No growth at 5 days                ceftriaxone 2 g Intravenous Q24H   cyclobenzaprine 10 mg Oral Q8H   dexamethasone 4 mg Intravenous Q6H   fluticasone 2 spray Nasal Daily   glipiZIDE 10 mg Oral BID AC   insulin aspart 0-7 Units Subcutaneous 4x Daily With Meals & Nightly   insulin detemir 40 Units Subcutaneous Nightly   ipratropium-albuterol 3 mL Nebulization Q6H While Awake - RT   linagliptin 5 mg Oral Daily   pregabalin 100 mg Oral TID   sennosides-docusate sodium 2 tablet Oral BID          Diagnostics:  Xr Chest 2 View    Result Date: 2/23/2018  PA AND LATERAL CHEST X-RAY  HISTORY: fever  COMPARISON: None.  FINDINGS: PA and lateral views of the chest were obtained. Patient is kyphotic. The lungs are under aerated  with pulmonary vascular congestion bordering on mild edema. There is mild right lung base atelectasis. There is no convincing evidence of active air space disease process otherwise. Mild cardiomegaly. No significant pleural fluid. Thoracolumbar kyphosis noted with multilevel spurring.        Under aeration with pulmonary vascular congestion bordering on mild edema/CHF  This report was finalized on 2/23/2018 12:58 AM by Jerrell Dumont MD.      Ct Chest Without Contrast    Result Date: 2/26/2018  CT CHEST WITHOUT CONTRAST  HISTORY: Rule out right middle lobe infiltrate. To evaluate source of fever.  TECHNIQUE: Axial CT images of the chest were obtained without administration of intravenous contrast. Coronal and sagittal reformats were obtained.  COMPARISON: Chest radiographs.  FINDINGS: The visualized thyroid gland is normal. Calcified plaque is seen within the aortic arch and the coronary arteries. Calcified lymph nodes are seen within the right hilum and the subcarinal region. The central airways are patent without endobronchial lesion. Bilateral lung fields are under aerated with lower lobe atelectatic changes. No focal infiltrate is identified. No suspicious pulmonary nodules.  The visualized upper abdomen demonstrates a distended gallbladder with dependent hyperdensity that may represent sludge and/or stones. Degenerative changes are seen within the thoracic spine.      1. Bilateral lung fields are under aerated with atelectatic changes within the bilateral lower lobes. No focal infiltrate is identified. 2. Mildly distended gallbladder with dependent hyperdensity that may represent stones.  Radiation dose reduction techniques were utilized, including automated exposure control and exposure modulation based on body size.  This report was finalized on 2/26/2018 2:27 PM by Dr. Gideon Shah MD.      Mri Lumbar Spine Without Contrast    Result Date: 2/28/2018  MRI LUMBAR SPINE WO CONTRAST-  INDICATIONS: Low back  pain with radiculopathy.  TECHNIQUE: NONCONTRAST LUMBAR SPINE MRI   COMPARISON: None available  FINDINGS:   Marrow signal with heterogeneous with endplate changes noted, but no acute fracture identified. Laminectomy changes at L3, L4 are noted.  Alignment is in range of normal.  Conus medullaris appears unremarkable.  Increased T2 signal within the upper aspect of the right psoas, which appears mildly thickened, for example image 21 of series 2 could for example represent strain/partial tear or inflammatory/infectious process, correlate clinically, follow-up evaluation with enhanced imaging could be considered if not contraindicated.   Axial levels:  T12/L1, L1/2: No significant disc bulge, central or neural foraminal stenosis.  L2/3: Left lateral recess is effaced by what may represent disc extrusion (enhanced imaging could take to exclude alternative possibilities of a collection or lesion), which also contributes to severe left neural foraminal narrowing (with exiting nerve root impingement) and, with broad-based disc bulge and facet and ligamentum flavum hypertrophy, moderate central stenosis, moderate right neural foraminal narrowing.  L3/4: Broad-based disc bulge is present, as well as central disc extrusion narrowing the anterior thecal space and, with facet ligamentum flavum hypertrophy moderate to prominent right, moderate left neural foraminal stenosis.  L4/5: Broad-based disc bulge with left paracentral disc extrusion apparent, as well as disc protrusion at the right neural foramina, result in effacement of the left lateral recess (with impingement of left traversing nerve root), narrowing of the anterior thecal space, and, with facet and ligamentum flavum hypertrophy, moderate bilateral neuroforaminal narrowing.  L5/S1: Broad-based disc bulge there is the anterior thecal space and, with facet and ligamentum flavum hypertrophy, contributes to moderate to prominent right, prominent left neuroforaminal  narrowing.           1. 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  2. Signal changes in the upper right psoas muscle, could be posttraumatic in origin or may be inflammatory/infectious in nature, correlate clinically. Enhanced imaging could be considered for further evaluation.  This report was finalized on 2/28/2018 6:24 PM by Dr. Chris Posey MD.      Ct Abdomen Pelvis With Contrast    Result Date: 2/28/2018  CT ABDOMEN AND PELVIS WITH IV CONTRAST  HISTORY: 77-year-old male underwent laparoscopic cholecystectomy on 02/25/2018 for gangrenous cholecystitis and cholelithiasis.  TECHNIQUE: CT abdomen and pelvis with intravenous and oral contrast.  COMPARISON: CT chest without contrast 02/23/2018.  FINDINGS: There has been cholecystectomy and there is a drain extending into the right upper quadrant beneath the liver. There is material filling the gallbladder fossa. This includes a 4.5 x 2.5 x 2.8 cm low signal material that may represent bioabsorbable packing material. There are adjacent tiny bubbles of gas and there is also surrounding fluid. There is no mature or drainable collection. Thin band of fluid extends adjacent to the anterior liver. Minimal edema extends into the hepatic parenchyma just above anterior margin of the gallbladder fossa. Liver otherwise appears normal.  Within the medial spleen there is a 1.5 cm low-density lesion. Splenic size is normal. Adrenal glands, pancreas appear normal. There is a diverticulum emanating medially from the descending duodenum. A medial left lower pole renal cyst measures 1.3 cm. Right kidney appears normal and there is no hydronephrosis. Minimal free fluid extends into the pelvis. There is no bowel dilatation or obstruction.      1. Patient is 3 days post cholecystectomy with low density packing material, small bubbles  of gas, and fluid filling the gallbladder fossa. Fluid partially extends into the adjacent hepatic parenchyma and may represent hemorrhagic material/blood products. There is mild perihepatic fluid and a small amount of fluid is present within the lower pelvis. Surgical drain extends into the right upper quadrant. Infected fluid would be difficult to exclude though there is no evidence for mature or drainable collection. 2. Tiny renal cysts. Duodenal diverticulum. 15 mm low-density lesion medial spleen of doubtful significance.  Radiation dose reduction techniques were utilized, including automated exposure control and exposure modulation based on body size.  This report was finalized on 2/28/2018 5:44 PM by Dr. Pop Haider MD.      Us Gallbladder    Result Date: 2/24/2018  GALLBLADDER ULTRASOUND  HISTORY: Right upper quadrant pain and fever.  FINDINGS: The gallbladder contains several tiny gallstones near the neck of the gallbladder. The gallbladder is somewhat distended, measuring 11.3 cm in length and raising the concern of an element of cystic duct obstruction. This can also be seen on yesterday's chest CT scan. The common bile duct measures 4 mm. There is no wall thickening.  The visualized liver and pancreas and right kidney are unremarkable.  CONCLUSION: Distended gallbladder containing tiny gallstones in the neck of the gallbladder and concerning for cystic duct obstruction. The common bile duct is normal in caliber.  This report was finalized on 2/24/2018 9:15 AM by Dr. William Myers MD.      Xr Chest 1 View    Result Date: 2/28/2018  PORTABLE CHEST X-RAY  CLINICAL HISTORY: resp failure; K80.20-Calculus of gallbladder without cholecystitis without obstruction; A41.9-Sepsis, unspecified organism; R19.01-Right upper quadrant abdominal swelling, mass and lump; R10.11-Right upper quadrant pain; K81.0-Acute cholecystitis; R26.89-Other abnormalities of gait and mobility  COMPARISON: 02/25/2018.  FINDINGS:  Portable AP view of the chest was obtained with overlying monitor leads in place. Lungs are poorly aerated with pulmonary vascular congestion and an opacity in the right lung base favored to reflect atelectasis rather than pneumonia. There may be a small right effusion as well. Left lung poorly aerated but clear. Normal heart size. Mild aortic ectasia.        Poor inspiration with vascular congestion and right lung base opacity as above. Recommend follow-up  This report was finalized on 2/28/2018 9:34 PM by Jerrell Dumont MD.      Xr Chest 1 View    Result Date: 2/25/2018  ONE VIEW PORTABLE CHEST AT 12:25 PM  HISTORY: Recent gallbladder surgery. Shortness of breath.  There is somewhat decreased lung expansion with further accentuation of vascular congestion compared to 2 days ago. Some of this relates to the poor lung expansion but could also reflect a component of mild CHF. The heart remains enlarged.  This report was finalized on 2/25/2018 12:44 PM by Dr. William Myers MD.      Fl Cholangiogram Operative    Result Date: 2/25/2018  INTRAOPERATIVE CHOLANGIOGRAM  HISTORY: Gallstones.  FINDINGS:  Contrast fills the biliary system with emptying into the duodenum. There appears to be a small mobile air bubble in the common hepatic duct that is seen on early images. No definite gallstones are seen.  59 images were obtained and the fluoroscopy time measures 10 seconds.  This report was finalized on 2/25/2018 11:32 AM by Dr. William Myers MD.      Results for orders placed during the hospital encounter of 02/22/18   Adult Transthoracic Echo Complete W/ Cont if Necessary Per Protocol    Narrative · The study is technically difficult for diagnosis.  · Left ventricle not well visualized. Left ventricular systolic function   is normal. Calculated EF = 50.3%. Estimated EF was in agreement with the   calculated EF. Normal left ventricular cavity size and wall thickness   noted.  · Left ventricular diastolic function is  normal.  · There is calcification of the aortic valve.          Today's chest x-ray reviewed and compared it with the film prior there is still atelectasis at the bases right greater than left plus or minus a little infiltrate in anything I would say infiltrate looks better on the right on this film    Active Hospital Problems (** Indicates Principal Problem)    Diagnosis Date Noted   • **Sepsis due to Escherichia coli [A41.51] 02/22/2018   • Discitis of lumbar region [M46.46] 03/05/2018   • Acute deep vein thrombosis of calf, right [I82.4Z1] 03/05/2018   • A-fib [I48.91] 02/25/2018   • Acute gangrenous cholecystitis [K81.0] 02/24/2018   • RUQ abdominal pain [R10.11] 02/24/2018   • CELINA (obstructive sleep apnea) [G47.33] 02/23/2018   • Leg edema [R60.0] 02/23/2018   • Vitamin D deficiency [E55.9] 04/28/2017   • 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         Assessment/Plan     1. Sepsis See discussion below ID is handling antibiotics  2. Acute hypoxemic respiratory failure CT just showed very small bilateral pleural effusions a little bit of basilar atelectasis no pulmonary embolus.  I think the hypoxia is due to some atelectasis.He needs to work on pulmonary hygiene postop aggressively and I reviewed that with him Again today but his oxygenation is better I dropped him to 1 L and did that when I walked in the room and for the  sedimentation rate minutes I was with him his oxygen remained in the mid to low 90s.  3. New right calf DVT and has IVC filter heparin will be resumed when okay with surgery  4. Escherichia coli bacteremia  5. RSV infection  6. Acute gangrenous cholecystitis status post laparoscopic cholecystectomy on 3/26/18  7. Diabetes mellitus type 2  8. Hyponatremia  9. L2-3 and L3 4 discitis  andRight psoas abscess And some edema possibly in the left psoas as well patient underwent a I&D of epidural abscesses and minimally invasive transforaminal lumbar interbody fusion of L2-L3 and L3-L4 with use of bone morphogenic protein  10. decreased movement left lower extremity per surgery      Plan for disposition:     Evaristo Burnett MD  03/08/18  7:53 PM    Time:

## 2018-03-10 ENCOUNTER — APPOINTMENT (OUTPATIENT)
Dept: CARDIOLOGY | Facility: HOSPITAL | Age: 78
End: 2018-03-10
Attending: HOSPITALIST

## 2018-03-10 LAB
ALBUMIN SERPL-MCNC: 2.9 G/DL (ref 3.5–5.2)
ALBUMIN/GLOB SERPL: 1.2 G/DL
ALP SERPL-CCNC: 84 U/L (ref 39–117)
ALT SERPL W P-5'-P-CCNC: 25 U/L (ref 1–41)
ANION GAP SERPL CALCULATED.3IONS-SCNC: 9 MMOL/L
APTT PPP: 20 SECONDS (ref 22.7–35.4)
APTT PPP: 29.2 SECONDS (ref 22.7–35.4)
AST SERPL-CCNC: 22 U/L (ref 1–40)
BASOPHILS # BLD AUTO: 0.01 10*3/MM3 (ref 0–0.2)
BASOPHILS NFR BLD AUTO: 0.1 % (ref 0–1.5)
BH CV LOW VAS RIGHT GASTRONEMIUS VESSEL: 1
BH CV LOW VAS RIGHT PERONEAL VESSEL: 1
BH CV LOWER VASCULAR LEFT COMMON FEMORAL AUGMENT: NORMAL
BH CV LOWER VASCULAR LEFT COMMON FEMORAL COMPETENT: NORMAL
BH CV LOWER VASCULAR LEFT COMMON FEMORAL COMPRESS: NORMAL
BH CV LOWER VASCULAR LEFT COMMON FEMORAL PHASIC: NORMAL
BH CV LOWER VASCULAR LEFT COMMON FEMORAL SPONT: NORMAL
BH CV LOWER VASCULAR RIGHT COMMON FEMORAL AUGMENT: NORMAL
BH CV LOWER VASCULAR RIGHT COMMON FEMORAL COMPETENT: NORMAL
BH CV LOWER VASCULAR RIGHT COMMON FEMORAL COMPRESS: NORMAL
BH CV LOWER VASCULAR RIGHT COMMON FEMORAL PHASIC: NORMAL
BH CV LOWER VASCULAR RIGHT COMMON FEMORAL SPONT: NORMAL
BH CV LOWER VASCULAR RIGHT DISTAL FEMORAL COMPRESS: NORMAL
BH CV LOWER VASCULAR RIGHT GASTRONEMIUS COMPRESS: NORMAL
BH CV LOWER VASCULAR RIGHT GASTRONEMIUS THROMBUS: NORMAL
BH CV LOWER VASCULAR RIGHT GREATER SAPH AK COMPRESS: NORMAL
BH CV LOWER VASCULAR RIGHT GREATER SAPH BK COMPRESS: NORMAL
BH CV LOWER VASCULAR RIGHT LESSER SAPH COMPRESS: NORMAL
BH CV LOWER VASCULAR RIGHT MID FEMORAL AUGMENT: NORMAL
BH CV LOWER VASCULAR RIGHT MID FEMORAL COMPETENT: NORMAL
BH CV LOWER VASCULAR RIGHT MID FEMORAL COMPRESS: NORMAL
BH CV LOWER VASCULAR RIGHT MID FEMORAL PHASIC: NORMAL
BH CV LOWER VASCULAR RIGHT MID FEMORAL SPONT: NORMAL
BH CV LOWER VASCULAR RIGHT PERONEAL COMPRESS: NORMAL
BH CV LOWER VASCULAR RIGHT PERONEAL THROMBUS: NORMAL
BH CV LOWER VASCULAR RIGHT POPLITEAL AUGMENT: NORMAL
BH CV LOWER VASCULAR RIGHT POPLITEAL COMPETENT: NORMAL
BH CV LOWER VASCULAR RIGHT POPLITEAL COMPRESS: NORMAL
BH CV LOWER VASCULAR RIGHT POPLITEAL PHASIC: NORMAL
BH CV LOWER VASCULAR RIGHT POPLITEAL SPONT: NORMAL
BH CV LOWER VASCULAR RIGHT POSTERIOR TIBIAL COMPRESS: NORMAL
BH CV LOWER VASCULAR RIGHT PROXIMAL FEMORAL COMPRESS: NORMAL
BH CV LOWER VASCULAR RIGHT SAPHENOFEMORAL JUNCTION AUGMENT: NORMAL
BH CV LOWER VASCULAR RIGHT SAPHENOFEMORAL JUNCTION COMPETENT: NORMAL
BH CV LOWER VASCULAR RIGHT SAPHENOFEMORAL JUNCTION COMPRESS: NORMAL
BH CV LOWER VASCULAR RIGHT SAPHENOFEMORAL JUNCTION PHASIC: NORMAL
BH CV LOWER VASCULAR RIGHT SAPHENOFEMORAL JUNCTION SPONT: NORMAL
BILIRUB SERPL-MCNC: 0.3 MG/DL (ref 0.1–1.2)
BUN BLD-MCNC: 21 MG/DL (ref 8–23)
BUN/CREAT SERPL: 28 (ref 7–25)
CALCIUM SPEC-SCNC: 8.3 MG/DL (ref 8.6–10.5)
CHLORIDE SERPL-SCNC: 94 MMOL/L (ref 98–107)
CO2 SERPL-SCNC: 29 MMOL/L (ref 22–29)
CREAT BLD-MCNC: 0.75 MG/DL (ref 0.76–1.27)
DEPRECATED RDW RBC AUTO: 47.6 FL (ref 37–54)
DEPRECATED RDW RBC AUTO: 48.6 FL (ref 37–54)
EOSINOPHIL # BLD AUTO: 0 10*3/MM3 (ref 0–0.7)
EOSINOPHIL NFR BLD AUTO: 0 % (ref 0.3–6.2)
ERYTHROCYTE [DISTWIDTH] IN BLOOD BY AUTOMATED COUNT: 14 % (ref 11.5–14.5)
ERYTHROCYTE [DISTWIDTH] IN BLOOD BY AUTOMATED COUNT: 14.3 % (ref 11.5–14.5)
GFR SERPL CREATININE-BSD FRML MDRD: 101 ML/MIN/1.73
GLOBULIN UR ELPH-MCNC: 2.5 GM/DL
GLUCOSE BLD-MCNC: 367 MG/DL (ref 65–99)
GLUCOSE BLDC GLUCOMTR-MCNC: 303 MG/DL (ref 70–130)
GLUCOSE BLDC GLUCOMTR-MCNC: 304 MG/DL (ref 70–130)
GLUCOSE BLDC GLUCOMTR-MCNC: 381 MG/DL (ref 70–130)
GLUCOSE BLDC GLUCOMTR-MCNC: 389 MG/DL (ref 70–130)
HCT VFR BLD AUTO: 30.2 % (ref 40.4–52.2)
HCT VFR BLD AUTO: 32.3 % (ref 40.4–52.2)
HGB BLD-MCNC: 10.5 G/DL (ref 13.7–17.6)
HGB BLD-MCNC: 9.7 G/DL (ref 13.7–17.6)
IMM GRANULOCYTES # BLD: 0.21 10*3/MM3 (ref 0–0.03)
IMM GRANULOCYTES NFR BLD: 2.2 % (ref 0–0.5)
INR PPP: 1.04 (ref 0.9–1.1)
LYMPHOCYTES # BLD AUTO: 0.74 10*3/MM3 (ref 0.9–4.8)
LYMPHOCYTES # BLD MANUAL: 0.79 10*3/MM3 (ref 0.9–4.8)
LYMPHOCYTES NFR BLD AUTO: 7.7 % (ref 19.6–45.3)
LYMPHOCYTES NFR BLD MANUAL: 6 % (ref 5–12)
LYMPHOCYTES NFR BLD MANUAL: 7 % (ref 19.6–45.3)
MCH RBC QN AUTO: 30 PG (ref 27–32.7)
MCH RBC QN AUTO: 30.6 PG (ref 27–32.7)
MCHC RBC AUTO-ENTMCNC: 32.1 G/DL (ref 32.6–36.4)
MCHC RBC AUTO-ENTMCNC: 32.5 G/DL (ref 32.6–36.4)
MCV RBC AUTO: 93.5 FL (ref 79.8–96.2)
MCV RBC AUTO: 94.2 FL (ref 79.8–96.2)
METAMYELOCYTES NFR BLD MANUAL: 2 % (ref 0–0)
MONOCYTES # BLD AUTO: 0.63 10*3/MM3 (ref 0.2–1.2)
MONOCYTES # BLD AUTO: 0.68 10*3/MM3 (ref 0.2–1.2)
MONOCYTES NFR BLD AUTO: 6.5 % (ref 5–12)
NEUTROPHILS # BLD AUTO: 8.07 10*3/MM3 (ref 1.9–8.1)
NEUTROPHILS # BLD AUTO: 9.57 10*3/MM3 (ref 1.9–8.1)
NEUTROPHILS NFR BLD AUTO: 83.5 % (ref 42.7–76)
NEUTROPHILS NFR BLD MANUAL: 85 % (ref 42.7–76)
PLAT MORPH BLD: NORMAL
PLATELET # BLD AUTO: 237 10*3/MM3 (ref 140–500)
PLATELET # BLD AUTO: 313 10*3/MM3 (ref 140–500)
PMV BLD AUTO: 11 FL (ref 6–12)
PMV BLD AUTO: 11.5 FL (ref 6–12)
POTASSIUM BLD-SCNC: 4.7 MMOL/L (ref 3.5–5.2)
PROT SERPL-MCNC: 5.4 G/DL (ref 6–8.5)
PROTHROMBIN TIME: 13.4 SECONDS (ref 11.7–14.2)
RBC # BLD AUTO: 3.23 10*6/MM3 (ref 4.6–6)
RBC # BLD AUTO: 3.43 10*6/MM3 (ref 4.6–6)
RBC MORPH BLD: NORMAL
SCAN SLIDE: NORMAL
SODIUM BLD-SCNC: 132 MMOL/L (ref 136–145)
WBC MORPH BLD: NORMAL
WBC NRBC COR # BLD: 11.26 10*3/MM3 (ref 4.5–10.7)
WBC NRBC COR # BLD: 9.66 10*3/MM3 (ref 4.5–10.7)

## 2018-03-10 PROCEDURE — 94799 UNLISTED PULMONARY SVC/PX: CPT

## 2018-03-10 PROCEDURE — 25010000002 HEPARIN (PORCINE) PER 1000 UNITS: Performed by: HOSPITALIST

## 2018-03-10 PROCEDURE — 80053 COMPREHEN METABOLIC PANEL: CPT | Performed by: INTERNAL MEDICINE

## 2018-03-10 PROCEDURE — 82962 GLUCOSE BLOOD TEST: CPT

## 2018-03-10 PROCEDURE — 85025 COMPLETE CBC W/AUTO DIFF WBC: CPT | Performed by: HOSPITALIST

## 2018-03-10 PROCEDURE — 63710000001 INSULIN ASPART PER 5 UNITS: Performed by: INTERNAL MEDICINE

## 2018-03-10 PROCEDURE — 25010000002 DEXAMETHASONE PER 1 MG: Performed by: HOSPITALIST

## 2018-03-10 PROCEDURE — 85730 THROMBOPLASTIN TIME PARTIAL: CPT | Performed by: INTERNAL MEDICINE

## 2018-03-10 PROCEDURE — 25010000003 CEFTRIAXONE PER 250 MG: Performed by: INTERNAL MEDICINE

## 2018-03-10 PROCEDURE — 25010000002 DEXAMETHASONE PER 1 MG: Performed by: ORTHOPAEDIC SURGERY

## 2018-03-10 PROCEDURE — 85730 THROMBOPLASTIN TIME PARTIAL: CPT | Performed by: HOSPITALIST

## 2018-03-10 PROCEDURE — 85025 COMPLETE CBC W/AUTO DIFF WBC: CPT | Performed by: SURGERY

## 2018-03-10 PROCEDURE — 93971 EXTREMITY STUDY: CPT

## 2018-03-10 PROCEDURE — 97110 THERAPEUTIC EXERCISES: CPT

## 2018-03-10 PROCEDURE — 85007 BL SMEAR W/DIFF WBC COUNT: CPT | Performed by: HOSPITALIST

## 2018-03-10 PROCEDURE — 85610 PROTHROMBIN TIME: CPT | Performed by: HOSPITALIST

## 2018-03-10 RX ORDER — DEXAMETHASONE SODIUM PHOSPHATE 4 MG/ML
2 INJECTION, SOLUTION INTRA-ARTICULAR; INTRALESIONAL; INTRAMUSCULAR; INTRAVENOUS; SOFT TISSUE EVERY 8 HOURS
Status: DISCONTINUED | OUTPATIENT
Start: 2018-03-10 | End: 2018-03-11

## 2018-03-10 RX ADMIN — DOCUSATE SODIUM -SENNOSIDES 2 TABLET: 50; 8.6 TABLET, COATED ORAL at 21:23

## 2018-03-10 RX ADMIN — DEXAMETHASONE SODIUM PHOSPHATE 4 MG: 4 INJECTION, SOLUTION INTRAMUSCULAR; INTRAVENOUS at 11:14

## 2018-03-10 RX ADMIN — PREGABALIN 100 MG: 100 CAPSULE ORAL at 08:31

## 2018-03-10 RX ADMIN — IPRATROPIUM BROMIDE AND ALBUTEROL SULFATE 3 ML: .5; 3 SOLUTION RESPIRATORY (INHALATION) at 07:26

## 2018-03-10 RX ADMIN — INSULIN ASPART 5 UNITS: 100 INJECTION, SOLUTION INTRAVENOUS; SUBCUTANEOUS at 08:00

## 2018-03-10 RX ADMIN — IPRATROPIUM BROMIDE AND ALBUTEROL SULFATE 3 ML: .5; 3 SOLUTION RESPIRATORY (INHALATION) at 15:31

## 2018-03-10 RX ADMIN — CYCLOBENZAPRINE 10 MG: 10 TABLET, FILM COATED ORAL at 21:23

## 2018-03-10 RX ADMIN — DEXAMETHASONE SODIUM PHOSPHATE 2 MG: 4 INJECTION, SOLUTION INTRAMUSCULAR; INTRAVENOUS at 18:24

## 2018-03-10 RX ADMIN — DEXAMETHASONE SODIUM PHOSPHATE 4 MG: 4 INJECTION, SOLUTION INTRAMUSCULAR; INTRAVENOUS at 05:32

## 2018-03-10 RX ADMIN — GLIPIZIDE 10 MG: 10 TABLET ORAL at 17:30

## 2018-03-10 RX ADMIN — CEFTRIAXONE SODIUM 2 G: 2 INJECTION, SOLUTION INTRAVENOUS at 11:14

## 2018-03-10 RX ADMIN — CYCLOBENZAPRINE 10 MG: 10 TABLET, FILM COATED ORAL at 15:42

## 2018-03-10 RX ADMIN — POLYETHYLENE GLYCOL 3350 17 G: 17 POWDER, FOR SOLUTION ORAL at 08:30

## 2018-03-10 RX ADMIN — CYCLOBENZAPRINE 10 MG: 10 TABLET, FILM COATED ORAL at 07:34

## 2018-03-10 RX ADMIN — INSULIN ASPART 5 UNITS: 100 INJECTION, SOLUTION INTRAVENOUS; SUBCUTANEOUS at 11:59

## 2018-03-10 RX ADMIN — PREGABALIN 100 MG: 100 CAPSULE ORAL at 21:23

## 2018-03-10 RX ADMIN — HEPARIN SODIUM 12.6 UNITS/KG/HR: 10000 INJECTION, SOLUTION INTRAVENOUS at 18:28

## 2018-03-10 RX ADMIN — PREGABALIN 100 MG: 100 CAPSULE ORAL at 15:42

## 2018-03-10 RX ADMIN — IPRATROPIUM BROMIDE AND ALBUTEROL SULFATE 3 ML: .5; 3 SOLUTION RESPIRATORY (INHALATION) at 19:57

## 2018-03-10 RX ADMIN — INSULIN ASPART 6 UNITS: 100 INJECTION, SOLUTION INTRAVENOUS; SUBCUTANEOUS at 18:39

## 2018-03-10 RX ADMIN — INSULIN ASPART 6 UNITS: 100 INJECTION, SOLUTION INTRAVENOUS; SUBCUTANEOUS at 21:23

## 2018-03-10 RX ADMIN — FLUTICASONE PROPIONATE 2 SPRAY: 50 SPRAY, METERED NASAL at 08:32

## 2018-03-10 RX ADMIN — HYDROCODONE BITARTRATE AND ACETAMINOPHEN 2 TABLET: 10; 325 TABLET ORAL at 08:31

## 2018-03-10 RX ADMIN — GLIPIZIDE 10 MG: 10 TABLET ORAL at 08:29

## 2018-03-10 RX ADMIN — INSULIN DETEMIR 50 UNITS: 100 INJECTION, SOLUTION SUBCUTANEOUS at 08:31

## 2018-03-10 RX ADMIN — HYDROCODONE BITARTRATE AND ACETAMINOPHEN 2 TABLET: 10; 325 TABLET ORAL at 18:24

## 2018-03-10 RX ADMIN — LINAGLIPTIN 5 MG: 5 TABLET, FILM COATED ORAL at 08:31

## 2018-03-10 RX ADMIN — DOCUSATE SODIUM -SENNOSIDES 2 TABLET: 50; 8.6 TABLET, COATED ORAL at 08:30

## 2018-03-10 NOTE — PLAN OF CARE
Problem: Patient Care Overview (Adult)  Goal: Plan of Care Review  Outcome: Resolved for upgrade, new template will be applied Date Met: 03/09/18    Goal: Adult Individualization and Mutuality  Outcome: Resolved for upgrade, new template will be applied Date Met: 03/09/18    Goal: Discharge Needs Assessment  Outcome: Resolved for upgrade, new template will be applied Date Met: 03/09/18      Problem: Infection, Risk/Actual (Adult)  Goal: Infection Prevention/Resolution  Outcome: Resolved for upgrade, new template will be applied Date Met: 03/09/18      Problem: Fall Risk (Adult)  Goal: Absence of Falls  Outcome: Resolved for upgrade, new template will be applied Date Met: 03/09/18      Problem: Pain, Acute (Adult)  Goal: Acceptable Pain Control/Comfort Level  Outcome: Resolved for upgrade, new template will be applied Date Met: 03/09/18      Problem: Respiratory Insufficiency (Adult)  Goal: Acid/Base Balance  Outcome: Resolved for upgrade, new template will be applied Date Met: 03/09/18    Goal: Effective Ventilation  Outcome: Resolved for upgrade, new template will be applied Date Met: 03/09/18      Problem: Perioperative Period (Adult)  Goal: Signs and Symptoms of Listed Potential Problems Will be Absent or Manageable (Perioperative Period)  Outcome: Resolved for upgrade, new template will be applied Date Met: 03/09/18    Goal: Signs and Symptoms of Listed Potential Problems Will be Absent or Manageable (Perioperative Period)  Outcome: Resolved for upgrade, new template will be applied Date Met: 03/09/18

## 2018-03-10 NOTE — PLAN OF CARE
Problem: Fall Risk (Adult)  Goal: Identify Related Risk Factors and Signs and Symptoms  Outcome: Ongoing (interventions implemented as appropriate)   03/10/18 0644   Fall Risk (Adult)   Related Risk Factors (Fall Risk) age-related changes;gait/mobility problems;history of falls   Signs and Symptoms (Fall Risk) presence of risk factors     Goal: Absence of Fall  Outcome: Ongoing (interventions implemented as appropriate)   03/10/18 0644   Fall Risk (Adult)   Absence of Fall making progress toward outcome     Goal: Identify Related Risk Factors and Signs and Symptoms  Outcome: Ongoing (interventions implemented as appropriate)   03/10/18 0644   Fall Risk (Adult)   Related Risk Factors (Fall Risk) age-related changes;gait/mobility problems;history of falls   Signs and Symptoms (Fall Risk) presence of risk factors     Goal: Absence of Fall  Outcome: Ongoing (interventions implemented as appropriate)   03/10/18 0644   Fall Risk (Adult)   Absence of Fall making progress toward outcome       Problem: Infection, Risk/Actual (Adult)  Goal: Identify Related Risk Factors and Signs and Symptoms  Outcome: Ongoing (interventions implemented as appropriate)   03/10/18 0644   Infection, Risk/Actual (Adult)   Related Risk Factors (Infection, Risk/Actual) age extremes;surgery/procedure   Signs and Symptoms (Infection, Risk/Actual) blood glucose changes;weakness     Goal: Infection Prevention/Resolution  Outcome: Ongoing (interventions implemented as appropriate)      Problem: Patient Care Overview  Goal: Plan of Care Review  Outcome: Ongoing (interventions implemented as appropriate)   03/10/18 0644   Coping/Psychosocial   Plan of Care Reviewed With patient   Plan of Care Review   Progress improving   OTHER   Outcome Summary VSS. resting comfortably throughout the night. No c/o of pain. Murray in place and draining. Incisions CDI. Will continue to monitor.     Goal: Individualization and Mutuality  Outcome: Ongoing (interventions  implemented as appropriate)    Goal: Discharge Needs Assessment  Outcome: Ongoing (interventions implemented as appropriate)

## 2018-03-10 NOTE — PLAN OF CARE
Problem: Patient Care Overview  Goal: Plan of Care Review  Outcome: Ongoing (interventions implemented as appropriate)   03/10/18 7433   Coping/Psychosocial   Plan of Care Reviewed With patient   Plan of Care Review   Progress improving   OTHER   Outcome Summary Pt tolerated treatment well this date. Still has c/o B LE numbness limiting mobility. Required mod A x2 for bed mobility and for standing balance to transfer from bed to transport bed. Stand pivot performed. PT will continue to address functional mobility deficits as tolerated.

## 2018-03-10 NOTE — NURSING NOTE
Doctor abiel notified of pt. Ultrasound results stated contact Dr. stratton to see if they want to start back anticoagulant.

## 2018-03-10 NOTE — THERAPY TREATMENT NOTE
Acute Care - Physical Therapy Treatment Note  [facility]     Patient Name: Jesus Beckham  : 1940  MRN: [id number]  Today's Date: 3/10/2018     Date of Referral to PT: 18       Admit Date: 2018    Visit Dx:    ICD-10-CM ICD-9-CM   1. Calculus of gallbladder without cholecystitis without obstruction K80.20 574.20   2. Sepsis, due to unspecified organism A41.9 038.9     995.91   3. RUQ abdominal mass R19.01 789.31   4. RUQ abdominal pain R10.11 789.01   5. Gangrenous cholecystitis K81.0 575.0   6. Decreased mobility R26.89 781.99   7. Sepsis due to Escherichia coli A41.51 038.42     995.91     Patient Active Problem List   Diagnosis   • Gout   • Diabetic peripheral neuropathy   • Dyslipidemia   • Uncontrolled type 2 diabetes mellitus   • Primary osteoarthritis involving multiple joints   • Allergic rhinitis due to pollen   • Essential hypertension   • PVC (premature ventricular contraction)   • Vitamin D deficiency   • Benign non-nodular prostatic hyperplasia without lower urinary tract symptoms   • Osteoarthritis   • Urge incontinence   • CELINA (obstructive sleep apnea)   • Leg edema   • Acute gangrenous cholecystitis   • RUQ abdominal pain   • A-fib   • Discitis of lumbar region   • Acute deep vein thrombosis of calf, right   • Sepsis due to Escherichia coli       Therapy Treatment    Therapy Treatment / Health Promotion    Treatment Time/Intention  Discipline: physical therapy assistant  Document Type: therapy note (daily note)  Subjective Information: complains of, numbness (agree to therapy)  Mode of Treatment: physical therapy  Patient Effort: adequate  Plan of Care Review  Plan of Care Reviewed With: patient    Vitals/Pain/Safety  Pain Assessment  Additional Documentation: Pain Scale: Numbers Pre/Post-Treatment (Group)  Pain Scale: Numbers Pre/Post-Treatment  Pain Scale: Numbers, Pretreatment: 0/10 - no pain  Safety Issues, Functional Mobility  Impairments Affecting Function (Mobility):  balance, endurance/activity tolerance, strength  Comment, Safety Issues/Impairments (Mobility): non skid socks and gait belt donned  Positioning and Restraints  Pre-Treatment Position: in bed  Post Treatment Position: bed  In Bed: supine, with other staff (transport bed)    Mobility,ADL,Motor, Modality  Bed Mobility Assessment/Treatment  Bed Mobility Assessment/Treatment: supine-sit, sit-supine  Supine-Sit [place] (Bed Mobility): moderate assist (50% patient effort), 2 person assist, verbal cues  Sit-Supine [place] (Bed Mobility): moderate assist (50% patient effort), 2 person assist, verbal cues  Bed Mobility, Safety Issues: decreased use of legs for bridging/pushing  Assistive Device (Bed Mobility): draw sheet, head of bed elevated  Comment (Bed Mobility): log roll  Transfer Assessment/Treatment  Transfer Assessment/Treatment: sit-stand transfer, stand-sit transfer  Comment (Transfers): sit<>stand x2, from pt bed to transport bed pulled up at angle next to bed  Sit-Stand Transfer  Sit-Stand [place] (Transfers): moderate assist (50% patient effort), 2 person assist, verbal cues  Stand-Sit Transfer  Stand-Sit [place] (Transfers): moderate assist (50% patient effort), 2 person assist, verbal cues     Motor Skills Assessment/Interventions  Additional Documentation: Balance (Group)  Balance  Balance: static sitting balance, dynamic standing balance  Static Sitting Balance  Level of [place] (Supported Sitting, Static Balance): supervision  Sitting Position (Supported Sitting, Static Balance): sitting on edge of bed  Time Able to Maintain Position (Supported Sitting, Static Balance): 4 to 5 minutes  Dynamic Standing Balance  Level of [place], Reaches Outside Midline (Standing, Dynamic Balance): moderate assist, 50 to 74% patient effort  Time Able to Maintain Position, Reaches Outside Midline (Standing, Dynamic Balance): 15 to 30 seconds        ROM/MMT             Sensory, Edema, Orthotics           Cognition, Communication, Swallow  Cognitive Assessment/Intervention  Additional Documentation: Cognitive Assessment/Intervention (Group)  Cognitive Assessment/Intervention- PT/OT  Affect/Mental Status (Cognitive): WNL  Orientation Status (Cognition): oriented x 4  Follows Commands (Cognition): WNL  Cognitive Function (Cognitive): WNL    Outcome Summary             Physical Therapy Education     Title: PT OT SLP Therapies (Done)     Topic: Physical Therapy (Done)     Point: Mobility training (Done)    Learning Progress Summary     Learner Status Readiness Method Response Comment Documented by    Patient Done Acceptance E VU,NR  SM 03/10/18 1406     Done Acceptance E,TB,D VU,NR   03/09/18 1528     Done Acceptance E,TB,D VU,NR   03/09/18 1515     Done Acceptance E,D VU,NR   03/08/18 1502     Done Acceptance E,D VU,DU safety during transfers and ambulation LC 03/04/18 1515     Done Acceptance E VU  KH 03/03/18 1529     Done Acceptance E,TB DU,VU  CW 03/02/18 1555     Done Acceptance E,TB VU  RJ 03/01/18 1647     Active Acceptance E NR  EA 03/01/18 1057     Active Acceptance E NR  EA 02/28/18 1405     Done Acceptance E,TB VU,DU  CW 02/27/18 0950     Active Acceptance E NR  EE 02/26/18 1525    Family Done Acceptance E,D VU,NR  EJ 03/08/18 1502     Done Acceptance E,TB VU  RJ 03/01/18 1647     Active Acceptance E NR  EA 03/01/18 1057    Significant Other Done Acceptance E,TB,D VU,NR   03/09/18 1528     Done Acceptance E,TB,D VU,NR   03/09/18 1515          Point: Home exercise program (Done)    Learning Progress Summary     Learner Status Readiness Method Response Comment Documented by    Patient Done Acceptance E VU,NR  SM 03/10/18 1406     Done Acceptance E,TB,D VU,NR   03/09/18 1528     Done Acceptance E,TB,D VU,NR   03/09/18 1515    Significant Other Done Acceptance E,TB,D VU,NR   03/09/18 1528     Done Acceptance E,TB,D VU,NR   03/09/18 1515          Point: Body mechanics (Done)    Learning  Progress Summary     Learner Status Readiness Method Response Comment Documented by    Patient Done Acceptance E VU,NR   03/10/18 1406     Done Acceptance E,TB,D VU,NR   03/09/18 1528     Done Acceptance E,TB,D VU,NR   03/09/18 1515     Done Acceptance E,D VU,NR   03/08/18 1502     Active Acceptance E NR   03/03/18 1529     Done Acceptance E,TB DU,VU   03/02/18 1555     Active Acceptance E NR  EA 03/01/18 1057     Active Acceptance E NR  EA 02/28/18 1405     Done Acceptance E,TB VU,DU   02/27/18 0950     Active Acceptance E NR   02/26/18 1525    Family Done Acceptance E,D VU,NR   03/08/18 1502     Active Acceptance E NR   03/01/18 1057    Significant Other Done Acceptance E,TB,D VU,NR   03/09/18 1528     Done Acceptance E,TB,D VU,[facility] 03/09/18 1515          Point: Precautions (Done)    Learning Progress Summary     Learner Status Readiness Method Response Comment Documented by    Patient Done Acceptance E VU,NR   03/10/18 1406     Done Acceptance E,TB,D VU,NR   03/09/18 1528     Done Acceptance E,TB,D VU,NR   03/09/18 1515     Done Acceptance E VU   03/03/18 1529     Done Acceptance E,TB DU,VU   03/02/18 1555     Done Acceptance E,TB VU  RJ 03/01/18 1647     Done Acceptance E,TB VU,DU   02/27/18 0950    Family Done Acceptance E,TB VU   03/01/18 1647    Significant Other Done Acceptance E,TB,D VU,NR   03/09/18 1528     Done Acceptance E,TB,D VU,NR   03/09/18 1515                      User Key     Initials Effective Dates Name Provider Type Discipline     02/08/17 -  Alpa Vargas, RN Registered Nurse Nurse     12/01/15 -  Carmen Sarmiento, PT Physical Therapist PT     04/21/17 -  Eliane Nova, PT Physical Therapist PT     03/07/18 -  Anabel Lilly, PTA Physical Therapy Assistant PT     06/22/16 -  Susan Wiggins, PT Physical Therapist PT     03/07/18 -  Tiffany Goins, PTA Physical Therapy Assistant PT    LC 08/02/16 -  Sudhir Castrejon, PT DPT Physical  Therapist PT    CW 12/13/16 - 03/06/18 Jaya Gomez, PTA Physical Therapy Assistant PT    EA 02/05/18 -  Bipin Live, PT Student PT Student PT                    PT Recommendation and Plan     Plan of Care Reviewed With: patient  Progress: improving  Outcome Summary: Pt tolerated treatment well this date. Still has c/o B LE numbness limiting mobility. Required mod A x2 for bed mobility and for standing balance to transfer from bed to transport bed. Stand pivot performed. PT will continue to address functional mobility deficits as tolerated.          Outcome Measures     Row Name 03/10/18 1400 03/09/18 1500 03/08/18 1500       How much help from another person do you currently need...    Turning from your back to your side while in flat bed without using bedrails? 2  -SM 2  -RW 2  -EJ    Moving from lying on back to sitting on the side of a flat bed without bedrails? 2  -SM 2  -RW 2  -EJ    Moving to and from a bed to a chair (including a wheelchair)? 2  -SM 2  -RW 1  -EJ    Standing up from a chair using your arms (e.g., wheelchair, bedside chair)? 2  -SM 2  -RW 1  -EJ    Climbing 3-5 steps with a railing? 1  -SM 1  -RW 1  -EJ    To walk in hospital room? 1  -SM 1  -RW 1  -EJ    AM-PAC 6 Clicks Score 10  -SM 10  -RW 8  -EJ       Functional Assessment    Outcome Measure Options AM-PAC 6 Clicks Basic Mobility (PT)  -SM AM-PAC 6 Clicks Basic Mobility (PT)  -RW AM-PAC 6 Clicks Basic Mobility (PT)  -EJ      User Key  (r) = Recorded By, (t) = Taken By, (c) = Cosigned By    Initials Name Provider Type    EJ Eliane Nova, PT Physical Therapist     Anabel Lilly, PTA Physical Therapy Assistant    GERALDINE Goins, PADMINI Physical Therapy Assistant           Time Calculation:         PT Charges     Row Name 03/10/18 1409             Time Calculation    Start Time 1328  -      Stop Time 1352  -SM      Time Calculation (min) 24 min  -SM      PT Received On 03/10/18  -      PT - Next Appointment 03/11/18   -        User Key  (r) = Recorded By, (t) = Taken By, (c) = Cosigned By    Initials Name Provider Type     Anabel Lilly PTA Physical Therapy Assistant          Therapy Charges for Today     Code Description Service Date Service Provider Modifiers Qty    74203012642 HC PT THER PROC EA 15 MIN 3/10/2018 Anabel Lilly PTA GP 2    64289936369 HC PT THER SUPP EA 15 MIN 3/10/2018 Anabel Lilly PTA GP 1          PT G-Codes  Outcome Measure Options: AM-PAC 6 Clicks Basic Mobility (PT)    Anabel Lilly PTA  3/10/2018

## 2018-03-10 NOTE — PROGRESS NOTES
Right lower extremity venous doppler completed, Prelim positive for new calf vein thrombus given to MYRA Alejandra.

## 2018-03-10 NOTE — PROGRESS NOTES
"    DAILY PROGRESS NOTE  [facility]    Patient Identification:  Name: Jesus Beckham  Age: 77 y.o.  Sex: male  :  1940  MRN: [id number]         Primary Care Physician: Magdy Ricardo MD    Subjective:  Interval History: no new issues - no cp - cough is dry - denies n/v/d/f/c    Objective:multiple fm at      Scheduled Meds:  ceftriaxone 2 g Intravenous Q24H   cyclobenzaprine 10 mg Oral Q8H   dexamethasone 2 mg Intravenous Q8H   fluticasone 2 spray Nasal Daily   glipiZIDE 10 mg Oral BID AC   insulin aspart 0-7 Units Subcutaneous 4x Daily With Meals & Nightly   insulin detemir 55 Units Subcutaneous Q12H   ipratropium-albuterol 3 mL Nebulization Q6H While Awake - RT   linagliptin 5 mg Oral Daily   polyethylene glycol 17 g Oral Daily   pregabalin 100 mg Oral TID   sennosides-docusate sodium 2 tablet Oral BID     Continuous Infusions:     Vital signs in last 24 hours:  Temp:  [97.2 °F (36.2 °C)-98.9 °F (37.2 °C)] 98.9 °F (37.2 °C)  Heart Rate:  [] 89  Resp:  [16-20] 16  BP: (144-165)/(74-91) 161/91    Intake/Output:    Intake/Output Summary (Last 24 hours) at 03/10/18 1319  Last data filed at 03/10/18 1040   Gross per 24 hour   Intake                0 ml   Output             5250 ml   Net            -5250 ml       Exam:  /91 (BP Location: Right arm, Patient Position: Lying)   Pulse 89   Temp 98.9 °F (37.2 °C) (Oral)   Resp 16   Ht 177.8 cm (70\")   Wt 119 kg (263 lb)   SpO2 93%   BMI 37.74 kg/m²     General Appearance:    Alert, cooperative, no distress, AAOx3, clinically improving                         Throat:   Lips, tongue, gums normal; oral mucosa pink and moist                           Neck:   Supple, symmetrical, trachea midline, no JVD                         Lungs:    Clear to auscultation bilaterally, respirations unlabored                          Heart:    Regular rate and rhythm, S1 and S2 normal                  Abdomen:     Soft, non-tender, bowel sounds " active                 Extremities:   SCDs, no cyanosis or edema                        Pulses:   Pulses palpable in lower extremities                  Neurologic:   CNII-XII intact, moving all extremities LLE at ankle/foot/toes     Data Review:  Labs in chart were reviewed.    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       Plan:  Discitis secondary to seeding from Ecoli septicemia secondary to gangrenous cholecystitis s/p cholecystectomy on 2/25                        -POD3 lunbar fusion w/ I/D and incidental durotomy repair and Cxs growing MDR EColi sensitive to Rocephin per ID                         -leg weakness and paresthesias - d/w Dr Marr who expects improvement and is tapering steroids w/ DC of them on Sunday          AHRF - CT c/w Atelectasis and small effusions - encouraged IS       Acute RLE CVT -  off hep gtt for now until OK w/ spince surgery              -s/p IVCF since will have to be off AC    -recheck doppler        DM2 w/ PN w/ A1c 7.7 - postop hyperglycemia/leukocytosis secondary to steroids               -further increase Levemir to 55u bid - should improve w/ further steroid taper        Constipation - on SennaS bid - add Miralax daily     DC jorge alberto Miles MD  3/10/2018  1:19 PM

## 2018-03-10 NOTE — PROGRESS NOTES
Orthopedic Spine Progress Note    Subjective     Post-Operative Day: 3 post-spine procedure MIS TLIF L2-4  Systemic or Specific Complaints: No back pain.  Left foot still weak    Objective     Vital signs in last 24 hours:  Temp:  [97.2 °F (36.2 °C)-98.3 °F (36.8 °C)] 97.2 °F (36.2 °C)  Heart Rate:  [] 82  Resp:  [16-20] 16  BP: (144-165)/(74-87) 154/87    General: alert, appears stated age and cooperative   Neurovascular: stable   Wound: Wound clean and dry no evidence of infection.   Range of Motion: limited   DVT Exam: No evidence of DVT seen on physical exam.     Data Review  CBC:  Results from last 7 days  Lab Units 03/10/18  0515   WBC 10*3/mm3 9.66   RBC 10*6/mm3 3.23*   HEMOGLOBIN g/dL 9.7*   HEMATOCRIT % 30.2*   PLATELETS 10*3/mm3 237       Assessment/Plan     Status post-spine procedure: Doing well postoperatively.     Pain Relief: complete resolution    Continues current post-op course  AFO for left foot  Rehab    Activity: out of bed and ambulate    Weight Bearing: FWB     LOS: 15 days     Manish Marr DO    Date: 3/10/2018

## 2018-03-10 NOTE — PROGRESS NOTES
LOS: 14 days   Patient Care Team:  Magdy Ricardo MD as PCP - General  Magdy Ricardo MD as PCP - Family Medicine  Staci Keyes MD as Consulting Physician (Endocrinology)  Jerrell Fry MD as Consulting Physician (Urology)    Subjective     Patient's awake and alert today he is oriented he denies any shortness of breath is lying in bed he had a nasal cannula on but PO2 was turned off at the cannula off he still stayed 92-94%  Review of Systems:          Objective     Vital Signs  Vital Sign Min/Max for last 24 hours  Temp  Min: 97.3 °F (36.3 °C)  Max: 98.3 °F (36.8 °C)   BP  Min: 141/79  Max: 165/85   Pulse  Min: 83  Max: 101   Resp  Min: 18  Max: 20   SpO2  Min: 93 %  Max: 97 %   Flow (L/min)  Min: 1  Max: 2   No Data Recorded        Ventilator/Non-Invasive Ventilation Settings     None                       Body mass index is 37.74 kg/(m^2).  I/O last 3 completed shifts:  In: 1181 [P.O.:1180; I.V.:1]  Out: 5050 [Urine:5050]           Physical Exam:  General Appearance: Well-developed obese white male lying in bed doesn't look in particular distress  Eyes: Conjunctiva are clear and anicteric  ENT: Mucous membranes are moist no erythema or exudates nasal septum midline and he has a Mallampati type II airway  Neck: No jugular venous distention trachea midline  Lungs: Clear nonlabored symmetric expansion   Cardiac: Regular rate and rhythm  Abdomen: Abdominal binder in place not removed  : Not examined  Musc/Skel: Ace edema all 4 extremities  Skin: No jaundice no petechiae  Neuro: He is alert oriented he is cooperative patient is moving his right lower extremity well to command he he can lift both his Lower extremities although not real strong and pain limits that some he has pretty good dorsiflexion plantar flexion on the right he has no dorsiflexion plantar flexion or movement of his toes on the left  Extremities/P Vascular: No clubbing or cyanosis he has palpable radial dorsalis pedis  pulses  MSE:   better spirits today       Labs:    Results from last 7 days  Lab Units 03/08/18 0350 03/06/18 0437 03/05/18 0730 03/04/18 0453 03/03/18  0552   GLUCOSE mg/dL 303* 109* 173* 137* 218*   SODIUM mmol/L 133* 138 138 137 138   POTASSIUM mmol/L 5.0 3.7 3.7 4.0 4.3   CO2 mmol/L 26.7 31.3* 26.2 27.3 27.8   CHLORIDE mmol/L 93* 100 98 98 100   ANION GAP mmol/L 13.3 6.7 13.8 11.7 10.2   CREATININE mg/dL 0.82 0.83 1.03 0.95 0.78   BUN mg/dL 20 21 21 19 16   BUN / CREAT RATIO  24.4 25.3* 20.4 20.0 20.5   CALCIUM mg/dL 7.9* 8.4* 8.7 8.4* 8.4*   EGFR IF NONAFRICN AM mL/min/1.73 91 90 70 77 97   ALK PHOS U/L  --   --   --  111 91   TOTAL PROTEIN g/dL  --   --   --  5.6* 5.5*   ALT (SGPT) U/L  --   --   --  36 33   AST (SGOT) U/L  --   --   --  29 37   BILIRUBIN mg/dL  --   --   --  0.7 0.6   ALBUMIN g/dL  --   --   --  2.40* 2.30*   GLOBULIN gm/dL  --   --   --  3.2 3.2   A/G RATIO g/dL  --   --   --  0.8 0.7     Estimated Creatinine Clearance: 99.1 mL/min (by C-G formula based on Cr of 0.82).        Results from last 7 days  Lab Units 03/09/18 0313 03/08/18 0350 03/07/18 0434 03/06/18 0437 03/05/18 0730 03/04/18 0453 03/03/18  0552   WBC 10*3/mm3 10.85* 11.60* 8.88 8.97 10.41 13.13* 8.62   RBC 10*6/mm3 3.30* 3.57* 3.94* 3.88* 4.09* 4.00* 3.72*   HEMOGLOBIN g/dL 9.9* 10.7* 11.7* 11.6* 12.2* 11.9* 11.3*   HEMATOCRIT % 31.0* 33.9* 37.3* 36.5* 38.3* 37.9* 34.9*   MCV fL 93.9 95.0 94.7 94.1 93.6 94.8 93.8   MCH pg 30.0 30.0 29.7 29.9 29.8 29.8 30.4   MCHC g/dL 31.9* 31.6* 31.4* 31.8* 31.9* 31.4* 32.4*   RDW % 14.3 14.1 14.3 14.6* 14.7* 14.8* 14.9*   RDW-SD fl 49.2 48.9 49.6 49.8 50.1 51.1 50.7   MPV fL 11.3 11.2 11.3 11.7 11.6 11.5 11.9   PLATELETS 10*3/mm3 240 233 226 202 198 195 198   NEUTROPHIL % % 86.8* 91.0* 87.9* 78.3* 86.4* 92.0* 84.8*   LYMPHOCYTE % % 5.7* 3.6* 4.3* 11.0* 5.5* 3.2* 7.8*   MONOCYTES % % 6.8 4.7* 6.6 8.8 6.9 3.0* 4.9*   EOSINOPHIL % % 0.0* 0.0* 0.5 1.1 0.4 0.8 1.5   BASOPHIL % % 0.1 0.1  0.2 0.2 0.2 0.2 0.2   IMM GRAN % % 0.6* 0.6* 0.5 0.6* 0.6* 0.8* 0.8*   NEUTROS ABS 10*3/mm3 9.41* 10.56* 7.81 7.02 9.00* 12.08* 7.31   LYMPHS ABS 10*3/mm3 0.62* 0.42* 0.38* 0.99 0.57* 0.42* 0.67*   MONOS ABS 10*3/mm3 0.74 0.54 0.59 0.79 0.72 0.40 0.42   EOS ABS 10*3/mm3 0.00 0.00 0.04 0.10 0.04 0.10 0.13   BASOS ABS 10*3/mm3 0.01 0.01 0.02 0.02 0.02 0.02 0.02   IMMATURE GRANS (ABS) 10*3/mm3 0.07* 0.07* 0.04* 0.05* 0.06* 0.11* 0.07*   NRBC /100 WBC 0.0  --   --   --  0.0 0.0  --                        Results from last 7 days  Lab Units 03/04/18  0453   PROCALCITONIN ng/mL 22.97*       Results from last 7 days  Lab Units 03/04/18  1719   INR  1.13*     Microbiology Results (last 10 days)     Procedure Component Value - Date/Time    Wound Culture - Wound, Back, Lower [[id number]]  (Abnormal)  (Susceptibility) Collected:  03/07/18 1417    Lab Status:  Final result Specimen:  Wound from Back, Lower Updated:  03/09/18 0726     Wound Culture --      Scant growth (1+) Escherichia coli (A)     Gram Stain Result No organisms seen      Rare (1+) WBCs per low power field    Susceptibility      Escherichia coli     LEONOR     Ampicillin >=32 ug/ml Resistant     Ampicillin + Sulbactam >=32 ug/ml Resistant     Cefazolin >=64 ug/ml Resistant     Cefepime <=1 ug/ml Susceptible     Cefoxitin >=64 ug/ml Resistant     Ceftriaxone <=1 ug/ml Susceptible     Ertapenem <=0.5 ug/ml Susceptible     Gentamicin <=1 ug/ml Susceptible     Levofloxacin 1 ug/ml Susceptible     Meropenem <=0.25 ug/ml Susceptible     Piperacillin + Tazobactam 8 ug/ml Susceptible     Trimethoprim + Sulfamethoxazole <=20 ug/ml Susceptible                    Wound Culture - Wound, Back, Lower [[id number]]  (Abnormal) Collected:  03/07/18 1415    Lab Status:  Final result Specimen:  Wound from Back, Lower Updated:  03/09/18 0723     Wound Culture --      Scant growth (1+) Escherichia coli (A)      For susceptibility refer to previous CULTURE from 3/7/18          Gram Stain  Result No organisms seen      Rare (1+) WBCs per low power field    Tissue / Bone Culture - Tissue, Back, Lower [[id number]]  (Abnormal)  (Susceptibility) Collected:  03/07/18 1411    Lab Status:  Final result Specimen:  Tissue from Back, Lower Updated:  03/09/18 0725     Tissue Culture --      Light growth (2+) Escherichia coli (A)     Gram Stain Result No organisms seen      Rare (1+) WBCs seen    Susceptibility      Escherichia coli     LEONOR     Ampicillin >=32 ug/ml Resistant     Ampicillin + Sulbactam >=32 ug/ml Resistant     Cefazolin >=64 ug/ml Resistant     Cefepime <=1 ug/ml Susceptible     Cefoxitin >=64 ug/ml Resistant     Ceftriaxone <=1 ug/ml Susceptible     Ertapenem <=0.5 ug/ml Susceptible     Gentamicin <=1 ug/ml Susceptible     Levofloxacin 1 ug/ml Susceptible     Meropenem <=0.25 ug/ml Susceptible     Piperacillin + Tazobactam 8 ug/ml Susceptible     Trimethoprim + Sulfamethoxazole <=20 ug/ml Susceptible                    Urine Culture - Urine, Urine, Clean Catch [[id number]]  (Abnormal) Collected:  03/04/18 1200    Lab Status:  Final result Specimen:  Urine from Urine, Clean Catch Updated:  03/06/18 1139     Urine Culture --      10,000-20,000 CFU/mL Candida albicans (A)    Blood Culture - Blood, [[id number]]  (Normal) Collected:  03/04/18 1107    Lab Status:  Final result Specimen:  Blood from Arm, Left Updated:  03/09/18 1131     Blood Culture No growth at 5 days    Blood Culture - Blood, [[id number]]  (Normal) Collected:  03/04/18 1025    Lab Status:  Final result Specimen:  Blood from Arm, Left Updated:  03/09/18 1101     Blood Culture No growth at 5 days    Blood Culture - Blood, [[id number]]  (Normal) Collected:  02/28/18 1832    Lab Status:  Final result Specimen:  Blood from Arm, Left Updated:  03/05/18 1846     Blood Culture No growth at 5 days    Blood Culture - Blood, [[id number]]  (Normal) Collected:  02/28/18 1533    Lab Status:  Final result Specimen:  Blood from Arm, Right Updated:   03/05/18 1546     Blood Culture No growth at 5 days                ceftriaxone 2 g Intravenous Q24H   cyclobenzaprine 10 mg Oral Q8H   dexamethasone 4 mg Intravenous Q6H   fluticasone 2 spray Nasal Daily   glipiZIDE 10 mg Oral BID AC   insulin aspart 0-7 Units Subcutaneous 4x Daily With Meals & Nightly   insulin detemir 50 Units Subcutaneous Q12H   ipratropium-albuterol 3 mL Nebulization Q6H While Awake - RT   linagliptin 5 mg Oral Daily   polyethylene glycol 17 g Oral Daily   pregabalin 100 mg Oral TID   sennosides-docusate sodium 2 tablet Oral BID          Diagnostics:  Xr Chest 2 View    Result Date: 2/23/2018  PA AND LATERAL CHEST X-RAY  HISTORY: fever  COMPARISON: None.  FINDINGS: PA and lateral views of the chest were obtained. Patient is kyphotic. The lungs are under aerated with pulmonary vascular congestion bordering on mild edema. There is mild right lung base atelectasis. There is no convincing evidence of active air space disease process otherwise. Mild cardiomegaly. No significant pleural fluid. Thoracolumbar kyphosis noted with multilevel spurring.        Under aeration with pulmonary vascular congestion bordering on mild edema/CHF  This report was finalized on 2/23/2018 12:58 AM by Jerrell Dumont MD.      Ct Chest Without Contrast    Result Date: 2/26/2018  CT CHEST WITHOUT CONTRAST  HISTORY: Rule out right middle lobe infiltrate. To evaluate source of fever.  TECHNIQUE: Axial CT images of the chest were obtained without administration of intravenous contrast. Coronal and sagittal reformats were obtained.  COMPARISON: Chest radiographs.  FINDINGS: The visualized thyroid gland is normal. Calcified plaque is seen within the aortic arch and the coronary arteries. Calcified lymph nodes are seen within the right hilum and the subcarinal region. The central airways are patent without endobronchial lesion. Bilateral lung fields are under aerated with lower lobe atelectatic changes. No focal infiltrate is  identified. No suspicious pulmonary nodules.  The visualized upper abdomen demonstrates a distended gallbladder with dependent hyperdensity that may represent sludge and/or stones. Degenerative changes are seen within the thoracic spine.      1. Bilateral lung fields are under aerated with atelectatic changes within the bilateral lower lobes. No focal infiltrate is identified. 2. Mildly distended gallbladder with dependent hyperdensity that may represent stones.  Radiation dose reduction techniques were utilized, including automated exposure control and exposure modulation based on body size.  This report was finalized on 2/26/2018 2:27 PM by Dr. Gideon Shah MD.      Mri Lumbar Spine Without Contrast    Result Date: 2/28/2018  MRI LUMBAR SPINE WO CONTRAST-  INDICATIONS: Low back pain with radiculopathy.  TECHNIQUE: NONCONTRAST LUMBAR SPINE MRI   COMPARISON: None available  FINDINGS:   Marrow signal with heterogeneous with endplate changes noted, but no acute fracture identified. Laminectomy changes at L3, L4 are noted.  Alignment is in range of normal.  Conus medullaris appears unremarkable.  Increased T2 signal within the upper aspect of the right psoas, which appears mildly thickened, for example image 21 of series 2 could for example represent strain/partial tear or inflammatory/infectious process, correlate clinically, follow-up evaluation with enhanced imaging could be considered if not contraindicated.   Axial levels:  T12/L1, L1/2: No significant disc bulge, central or neural foraminal stenosis.  L2/3: Left lateral recess is effaced by what may represent disc extrusion (enhanced imaging could take to exclude alternative possibilities of a collection or lesion), which also contributes to severe left neural foraminal narrowing (with exiting nerve root impingement) and, with broad-based disc bulge and facet and ligamentum flavum hypertrophy, moderate central stenosis, moderate right neural foraminal  narrowing.  L3/4: Broad-based disc bulge is present, as well as central disc extrusion narrowing the anterior thecal space and, with facet ligamentum flavum hypertrophy moderate to prominent right, moderate left neural foraminal stenosis.  L4/5: Broad-based disc bulge with left paracentral disc extrusion apparent, as well as disc protrusion at the right neural foramina, result in effacement of the left lateral recess (with impingement of left traversing nerve root), narrowing of the anterior thecal space, and, with facet and ligamentum flavum hypertrophy, moderate bilateral neuroforaminal narrowing.  L5/S1: Broad-based disc bulge there is the anterior thecal space and, with facet and ligamentum flavum hypertrophy, contributes to moderate to prominent right, prominent left neuroforaminal narrowing.           1. 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  2. Signal changes in the upper right psoas muscle, could be posttraumatic in origin or may be inflammatory/infectious in nature, correlate clinically. Enhanced imaging could be considered for further evaluation.  This report was finalized on 2/28/2018 6:24 PM by Dr. Chris Posey MD.      Ct Abdomen Pelvis With Contrast    Result Date: 2/28/2018  CT ABDOMEN AND PELVIS WITH IV CONTRAST  HISTORY: 77-year-old male underwent laparoscopic cholecystectomy on 02/25/2018 for gangrenous cholecystitis and cholelithiasis.  TECHNIQUE: CT abdomen and pelvis with intravenous and oral contrast.  COMPARISON: CT chest without contrast 02/23/2018.  FINDINGS: There has been cholecystectomy and there is a drain extending into the right upper quadrant beneath the liver. There is material filling the gallbladder fossa. This includes a 4.5 x 2.5 x 2.8 cm low signal material that may represent bioabsorbable packing material. There are  adjacent tiny bubbles of gas and there is also surrounding fluid. There is no mature or drainable collection. Thin band of fluid extends adjacent to the anterior liver. Minimal edema extends into the hepatic parenchyma just above anterior margin of the gallbladder fossa. Liver otherwise appears normal.  Within the medial spleen there is a 1.5 cm low-density lesion. Splenic size is normal. Adrenal glands, pancreas appear normal. There is a diverticulum emanating medially from the descending duodenum. A medial left lower pole renal cyst measures 1.3 cm. Right kidney appears normal and there is no hydronephrosis. Minimal free fluid extends into the pelvis. There is no bowel dilatation or obstruction.      1. Patient is 3 days post cholecystectomy with low density packing material, small bubbles of gas, and fluid filling the gallbladder fossa. Fluid partially extends into the adjacent hepatic parenchyma and may represent hemorrhagic material/blood products. There is mild perihepatic fluid and a small amount of fluid is present within the lower pelvis. Surgical drain extends into the right upper quadrant. Infected fluid would be difficult to exclude though there is no evidence for mature or drainable collection. 2. Tiny renal cysts. Duodenal diverticulum. 15 mm low-density lesion medial spleen of doubtful significance.  Radiation dose reduction techniques were utilized, including automated exposure control and exposure modulation based on body size.  This report was finalized on 2/28/2018 5:44 PM by Dr. Pop Haider MD.      Us Gallbladder    Result Date: 2/24/2018  GALLBLADDER ULTRASOUND  HISTORY: Right upper quadrant pain and fever.  FINDINGS: The gallbladder contains several tiny gallstones near the neck of the gallbladder. The gallbladder is somewhat distended, measuring 11.3 cm in length and raising the concern of an element of cystic duct obstruction. This can also be seen on yesterday's chest CT scan. The  common bile duct measures 4 mm. There is no wall thickening.  The visualized liver and pancreas and right kidney are unremarkable.  CONCLUSION: Distended gallbladder containing tiny gallstones in the neck of the gallbladder and concerning for cystic duct obstruction. The common bile duct is normal in caliber.  This report was finalized on 2/24/2018 9:15 AM by Dr. William Myers MD.      Xr Chest 1 View    Result Date: 2/28/2018  PORTABLE CHEST X-RAY  CLINICAL HISTORY: resp failure; K80.20-Calculus of gallbladder without cholecystitis without obstruction; A41.9-Sepsis, unspecified organism; R19.01-Right upper quadrant abdominal swelling, mass and lump; R10.11-Right upper quadrant pain; K81.0-Acute cholecystitis; R26.89-Other abnormalities of gait and mobility  COMPARISON: 02/25/2018.  FINDINGS: Portable AP view of the chest was obtained with overlying monitor leads in place. Lungs are poorly aerated with pulmonary vascular congestion and an opacity in the right lung base favored to reflect atelectasis rather than pneumonia. There may be a small right effusion as well. Left lung poorly aerated but clear. Normal heart size. Mild aortic ectasia.        Poor inspiration with vascular congestion and right lung base opacity as above. Recommend follow-up  This report was finalized on 2/28/2018 9:34 PM by Jerrell Dumont MD.      Xr Chest 1 View    Result Date: 2/25/2018  ONE VIEW PORTABLE CHEST AT 12:25 PM  HISTORY: Recent gallbladder surgery. Shortness of breath.  There is somewhat decreased lung expansion with further accentuation of vascular congestion compared to 2 days ago. Some of this relates to the poor lung expansion but could also reflect a component of mild CHF. The heart remains enlarged.  This report was finalized on 2/25/2018 12:44 PM by Dr. William Myers MD.      Fl Cholangiogram Operative    Result Date: 2/25/2018  INTRAOPERATIVE CHOLANGIOGRAM  HISTORY: Gallstones.  FINDINGS:  Contrast fills the biliary  system with emptying into the duodenum. There appears to be a small mobile air bubble in the common hepatic duct that is seen on early images. No definite gallstones are seen.  59 images were obtained and the fluoroscopy time measures 10 seconds.  This report was finalized on 2/25/2018 11:32 AM by Dr. William Myers MD.      Results for orders placed during the hospital encounter of 02/22/18   Adult Transthoracic Echo Complete W/ Cont if Necessary Per Protocol    Narrative · The study is technically difficult for diagnosis.  · Left ventricle not well visualized. Left ventricular systolic function   is normal. Calculated EF = 50.3%. Estimated EF was in agreement with the   calculated EF. Normal left ventricular cavity size and wall thickness   noted.  · Left ventricular diastolic function is normal.  · There is calcification of the aortic valve.          Today's chest x-ray reviewed and compared it with the film prior there is still atelectasis at the bases right greater than left plus or minus a little infiltrate in anything I would say infiltrate looks better on the right on this film    Active Hospital Problems (** Indicates Principal Problem)    Diagnosis Date Noted   • **Sepsis due to Escherichia coli [A41.51] 02/22/2018   • Discitis of lumbar region [M46.46] 03/05/2018   • Acute deep vein thrombosis of calf, right [I82.4Z1] 03/05/2018   • A-fib [I48.91] 02/25/2018   • Acute gangrenous cholecystitis [K81.0] 02/24/2018   • RUQ abdominal pain [R10.11] 02/24/2018   • CELINA (obstructive sleep apnea) [G47.33] 02/23/2018   • Leg edema [R60.0] 02/23/2018   • Vitamin D deficiency [E55.9] 04/28/2017   • 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] 04/04/2016   • Allergic rhinitis due to pollen [J30.1] 04/04/2016   • Diabetic peripheral neuropathy [E11.42] 04/01/2016      Resolved Hospital Problems    Diagnosis Date Noted Date Resolved   • Bacteremia due to Escherichia coli [R78.81] 02/24/2018 03/03/2018   •  Sepsis [A41.9] 02/23/2018 03/03/2018   • Secondary thrombocytopenia [D69.59] 02/23/2018 03/03/2018   • Hypotension [I95.9] 02/23/2018 03/03/2018   • Acute respiratory failure with hypoxia [J96.01] 02/23/2018 03/03/2018   • RSV (acute bronchiolitis due to respiratory syncytial virus) [J21.0] 02/23/2018 03/03/2018         Assessment/Plan     1. Sepsis See discussion below ID is handling antibiotics  2. Acute hypoxemic respiratory failure Patient saturating 92-94% on room air currently  3. New right calf DVT and has IVC filter heparin will be resumed when okay with surgery  4. Escherichia coli bacteremia  5. RSV infection  6. Acute gangrenous cholecystitis status post laparoscopic cholecystectomy on 3/26/18  7. Diabetes mellitus type 2  8. Hyponatremia  9. L2-3 and L3 4 discitis  andRight psoas abscess And some edema possibly in the left psoas as well patient underwent a I&D of epidural abscesses and minimally invasive transforaminal lumbar interbody fusion of L2-L3 and L3-L4 with use of bone morphogenic protein  10. decreased movement left lower extremity per surgery      Plan for disposition:doing well on room air currently I don't have a whole lot to add we will see when necessary    Evaristo Burnett MD  03/09/18  7:53 PM    Time:

## 2018-03-11 PROBLEM — R10.11 RUQ ABDOMINAL PAIN: Status: RESOLVED | Noted: 2018-02-24 | Resolved: 2018-03-11

## 2018-03-11 PROBLEM — E55.9 VITAMIN D DEFICIENCY: Status: RESOLVED | Noted: 2017-04-28 | Resolved: 2018-03-11

## 2018-03-11 PROBLEM — K81.0 ACUTE GANGRENOUS CHOLECYSTITIS: Status: RESOLVED | Noted: 2018-02-24 | Resolved: 2018-03-11

## 2018-03-11 PROBLEM — R60.0 LEG EDEMA: Status: RESOLVED | Noted: 2018-02-23 | Resolved: 2018-03-11

## 2018-03-11 LAB
APTT PPP: 43.2 SECONDS (ref 22.7–35.4)
APTT PPP: 73.5 SECONDS (ref 22.7–35.4)
BASOPHILS # BLD AUTO: 0.02 10*3/MM3 (ref 0–0.2)
BASOPHILS NFR BLD AUTO: 0.2 % (ref 0–1.5)
DEPRECATED RDW RBC AUTO: 47.3 FL (ref 37–54)
EOSINOPHIL # BLD AUTO: 0.03 10*3/MM3 (ref 0–0.7)
EOSINOPHIL NFR BLD AUTO: 0.3 % (ref 0.3–6.2)
ERYTHROCYTE [DISTWIDTH] IN BLOOD BY AUTOMATED COUNT: 14.1 % (ref 11.5–14.5)
GLUCOSE BLDC GLUCOMTR-MCNC: 341 MG/DL (ref 70–130)
GLUCOSE BLDC GLUCOMTR-MCNC: 364 MG/DL (ref 70–130)
GLUCOSE BLDC GLUCOMTR-MCNC: 393 MG/DL (ref 70–130)
GLUCOSE BLDC GLUCOMTR-MCNC: 401 MG/DL (ref 70–130)
GLUCOSE BLDC GLUCOMTR-MCNC: 428 MG/DL (ref 70–130)
GLUCOSE BLDC GLUCOMTR-MCNC: 460 MG/DL (ref 70–130)
HCT VFR BLD AUTO: 32.3 % (ref 40.4–52.2)
HGB BLD-MCNC: 10.4 G/DL (ref 13.7–17.6)
IMM GRANULOCYTES # BLD: 0.52 10*3/MM3 (ref 0–0.03)
IMM GRANULOCYTES NFR BLD: 4.7 % (ref 0–0.5)
LYMPHOCYTES # BLD AUTO: 0.94 10*3/MM3 (ref 0.9–4.8)
LYMPHOCYTES NFR BLD AUTO: 8.4 % (ref 19.6–45.3)
MCH RBC QN AUTO: 30 PG (ref 27–32.7)
MCHC RBC AUTO-ENTMCNC: 32.2 G/DL (ref 32.6–36.4)
MCV RBC AUTO: 93.1 FL (ref 79.8–96.2)
MONOCYTES # BLD AUTO: 0.79 10*3/MM3 (ref 0.2–1.2)
MONOCYTES NFR BLD AUTO: 7.1 % (ref 5–12)
NEUTROPHILS # BLD AUTO: 8.84 10*3/MM3 (ref 1.9–8.1)
NEUTROPHILS NFR BLD AUTO: 79.3 % (ref 42.7–76)
NRBC BLD MANUAL-RTO: 0 /100 WBC (ref 0–0)
PLATELET # BLD AUTO: 259 10*3/MM3 (ref 140–500)
PMV BLD AUTO: 10.8 FL (ref 6–12)
RBC # BLD AUTO: 3.47 10*6/MM3 (ref 4.6–6)
WBC NRBC COR # BLD: 11.14 10*3/MM3 (ref 4.5–10.7)

## 2018-03-11 PROCEDURE — 25010000003 CEFTRIAXONE PER 250 MG: Performed by: INTERNAL MEDICINE

## 2018-03-11 PROCEDURE — 85730 THROMBOPLASTIN TIME PARTIAL: CPT | Performed by: HOSPITALIST

## 2018-03-11 PROCEDURE — 63710000001 INSULIN ASPART PER 5 UNITS: Performed by: INTERNAL MEDICINE

## 2018-03-11 PROCEDURE — 85025 COMPLETE CBC W/AUTO DIFF WBC: CPT | Performed by: HOSPITALIST

## 2018-03-11 PROCEDURE — 97110 THERAPEUTIC EXERCISES: CPT

## 2018-03-11 PROCEDURE — 94799 UNLISTED PULMONARY SVC/PX: CPT

## 2018-03-11 PROCEDURE — 82962 GLUCOSE BLOOD TEST: CPT

## 2018-03-11 PROCEDURE — 25010000002 HEPARIN (PORCINE) PER 1000 UNITS: Performed by: HOSPITALIST

## 2018-03-11 PROCEDURE — 25010000002 DEXAMETHASONE PER 1 MG: Performed by: HOSPITALIST

## 2018-03-11 RX ADMIN — CYCLOBENZAPRINE 10 MG: 10 TABLET, FILM COATED ORAL at 13:34

## 2018-03-11 RX ADMIN — INSULIN ASPART 6 UNITS: 100 INJECTION, SOLUTION INTRAVENOUS; SUBCUTANEOUS at 18:00

## 2018-03-11 RX ADMIN — INSULIN ASPART 15 UNITS: 100 INJECTION, SOLUTION INTRAVENOUS; SUBCUTANEOUS at 22:20

## 2018-03-11 RX ADMIN — IPRATROPIUM BROMIDE AND ALBUTEROL SULFATE 3 ML: .5; 3 SOLUTION RESPIRATORY (INHALATION) at 15:32

## 2018-03-11 RX ADMIN — BISACODYL 10 MG: 10 SUPPOSITORY RECTAL at 08:10

## 2018-03-11 RX ADMIN — DEXAMETHASONE SODIUM PHOSPHATE 2 MG: 4 INJECTION, SOLUTION INTRAMUSCULAR; INTRAVENOUS at 04:30

## 2018-03-11 RX ADMIN — HYDROCODONE BITARTRATE AND ACETAMINOPHEN 2 TABLET: 10; 325 TABLET ORAL at 17:59

## 2018-03-11 RX ADMIN — GLIPIZIDE 10 MG: 10 TABLET ORAL at 08:10

## 2018-03-11 RX ADMIN — INSULIN ASPART 5 UNITS: 100 INJECTION, SOLUTION INTRAVENOUS; SUBCUTANEOUS at 08:00

## 2018-03-11 RX ADMIN — DOCUSATE SODIUM -SENNOSIDES 2 TABLET: 50; 8.6 TABLET, COATED ORAL at 21:21

## 2018-03-11 RX ADMIN — CEFTRIAXONE SODIUM 2 G: 2 INJECTION, SOLUTION INTRAVENOUS at 12:26

## 2018-03-11 RX ADMIN — PREGABALIN 100 MG: 100 CAPSULE ORAL at 21:21

## 2018-03-11 RX ADMIN — PREGABALIN 100 MG: 100 CAPSULE ORAL at 08:10

## 2018-03-11 RX ADMIN — INSULIN ASPART 6 UNITS: 100 INJECTION, SOLUTION INTRAVENOUS; SUBCUTANEOUS at 12:00

## 2018-03-11 RX ADMIN — DEXAMETHASONE SODIUM PHOSPHATE 2 MG: 4 INJECTION, SOLUTION INTRAMUSCULAR; INTRAVENOUS at 12:00

## 2018-03-11 RX ADMIN — HEPARIN SODIUM 16.6 UNITS/KG/HR: 10000 INJECTION, SOLUTION INTRAVENOUS at 08:47

## 2018-03-11 RX ADMIN — CYCLOBENZAPRINE 10 MG: 10 TABLET, FILM COATED ORAL at 22:20

## 2018-03-11 RX ADMIN — CYCLOBENZAPRINE 10 MG: 10 TABLET, FILM COATED ORAL at 06:41

## 2018-03-11 RX ADMIN — HEPARIN SODIUM 16.6 UNITS/KG/HR: 10000 INJECTION, SOLUTION INTRAVENOUS at 23:17

## 2018-03-11 RX ADMIN — DOCUSATE SODIUM -SENNOSIDES 2 TABLET: 50; 8.6 TABLET, COATED ORAL at 08:10

## 2018-03-11 RX ADMIN — POLYETHYLENE GLYCOL 3350 17 G: 17 POWDER, FOR SOLUTION ORAL at 08:10

## 2018-03-11 RX ADMIN — PREGABALIN 100 MG: 100 CAPSULE ORAL at 17:59

## 2018-03-11 RX ADMIN — HYDROCODONE BITARTRATE AND ACETAMINOPHEN 2 TABLET: 10; 325 TABLET ORAL at 13:21

## 2018-03-11 RX ADMIN — HYDROCODONE BITARTRATE AND ACETAMINOPHEN 2 TABLET: 10; 325 TABLET ORAL at 08:10

## 2018-03-11 RX ADMIN — IPRATROPIUM BROMIDE AND ALBUTEROL SULFATE 3 ML: .5; 3 SOLUTION RESPIRATORY (INHALATION) at 21:30

## 2018-03-11 RX ADMIN — LINAGLIPTIN 5 MG: 5 TABLET, FILM COATED ORAL at 08:10

## 2018-03-11 RX ADMIN — FLUTICASONE PROPIONATE 2 SPRAY: 50 SPRAY, METERED NASAL at 08:11

## 2018-03-11 RX ADMIN — GLIPIZIDE 10 MG: 10 TABLET ORAL at 17:30

## 2018-03-11 RX ADMIN — HYDROCODONE BITARTRATE AND ACETAMINOPHEN 2 TABLET: 10; 325 TABLET ORAL at 00:06

## 2018-03-11 NOTE — PLAN OF CARE
Problem: Fall Risk (Adult)  Goal: Identify Related Risk Factors and Signs and Symptoms  Outcome: Ongoing (interventions implemented as appropriate)    Goal: Absence of Fall  Outcome: Ongoing (interventions implemented as appropriate)    Goal: Identify Related Risk Factors and Signs and Symptoms  Outcome: Ongoing (interventions implemented as appropriate)    Goal: Absence of Fall  Outcome: Ongoing (interventions implemented as appropriate)      Problem: Infection, Risk/Actual (Adult)  Goal: Identify Related Risk Factors and Signs and Symptoms  Outcome: Ongoing (interventions implemented as appropriate)    Goal: Infection Prevention/Resolution  Outcome: Ongoing (interventions implemented as appropriate)      Problem: Patient Care Overview  Goal: Plan of Care Review  Outcome: Ongoing (interventions implemented as appropriate)   03/10/18 1943   Coping/Psychosocial   Plan of Care Reviewed With patient   Plan of Care Review   Progress improving   OTHER   Outcome Summary pt. pain controlled with PRN pain medication, pt. sit up in chair today with out any s/s of discomfort or distress. Heparin gtt started per MD order and protocol. azul cathether removed today. patient due to void at 9pm        Problem: Skin Injury Risk (Adult)  Goal: Identify Related Risk Factors and Signs and Symptoms  Outcome: Ongoing (interventions implemented as appropriate)    Goal: Skin Health and Integrity  Outcome: Ongoing (interventions implemented as appropriate)

## 2018-03-11 NOTE — THERAPY TREATMENT NOTE
Acute Care - Physical Therapy Treatment Note  [facility]     Patient Name: Jesus Beckham  : 1940  MRN: [id number]  Today's Date: 3/11/2018     Date of Referral to PT: 18       Admit Date: 2018    Visit Dx:    ICD-10-CM ICD-9-CM   1. Calculus of gallbladder without cholecystitis without obstruction K80.20 574.20   2. Sepsis, due to unspecified organism A41.9 038.9     995.91   3. RUQ abdominal mass R19.01 789.31   4. RUQ abdominal pain R10.11 789.01   5. Gangrenous cholecystitis K81.0 575.0   6. Decreased mobility R26.89 781.99   7. Sepsis due to Escherichia coli A41.51 038.42     995.91     Patient Active Problem List   Diagnosis   • Gout   • Diabetic peripheral neuropathy   • Dyslipidemia   • Uncontrolled type 2 diabetes mellitus   • Primary osteoarthritis involving multiple joints   • Allergic rhinitis due to pollen   • Essential hypertension   • PVC (premature ventricular contraction)   • Vitamin D deficiency   • Benign non-nodular prostatic hyperplasia without lower urinary tract symptoms   • Osteoarthritis   • Urge incontinence   • CELINA (obstructive sleep apnea)   • Leg edema   • Acute gangrenous cholecystitis   • RUQ abdominal pain   • A-fib   • Discitis of lumbar region   • Acute deep vein thrombosis of calf, right   • Sepsis due to Escherichia coli       Therapy Treatment    Therapy Treatment / Health Promotion    Treatment Time/Intention  Discipline: physical therapist  Document Type: therapy note (daily note)  Subjective Information: complains of, weakness, fatigue, pain  Mode of Treatment: physical therapy  Care Plan Review: care plan/treatment goals reviewed, risks/benefits reviewed  Patient Effort: good  Plan of Care Review  Plan of Care Reviewed With: patient, daughter    Vitals/Pain/Safety  Vital Signs  O2 Delivery Pre Treatment: supplemental O2  O2 Delivery Intra Treatment: supplemental O2  O2 Delivery Post Treatment: supplemental O2  Pain Scale: Numbers  Pre/Post-Treatment  Pain Scale: Numbers, Pretreatment: 4/10  Pain Scale: Numbers, Post-Treatment: 4/10  Pain Location - Orientation: lower  Pain Location: back  Pain Intervention(s): Repositioned, Ambulation/increased activity  Pain Scale: Word Pre/Post-Treatment  Pain Location - Orientation: lower  Pain Location: back  Pain Intervention(s): Repositioned, Ambulation/increased activity  Pain Scale: FACES Pre/Post-Treatment  Pain Location - Orientation: lower  Pain Location: back  Pain Intervention(s): Repositioned, Ambulation/increased activity  Positioning and Restraints  Post Treatment Position: bed  In Chair: notified nsg, sitting, call light within reach, encouraged to call for assist, with other staff (CNA PRESENT TO GIVE Pt BATH.  )    Mobility,ADL,Motor, Modality  Bed Mobility Assessment/Treatment  Supine-Sit [place] (Bed Mobility): moderate assist (50% patient effort), 2 person assist, nonverbal cues (demo/gesture), verbal cues  Sit-Supine [place] (Bed Mobility): moderate assist (50% patient effort), nonverbal cues (demo/gesture), verbal cues, 2 person assist  Bed Mobility, Safety Issues: decreased use of arms for pushing/pulling, decreased use of legs for bridging/pushing  Assistive Device (Bed Mobility): bed rails, draw sheet  Sit-Stand Transfer  Sit-Stand [place] (Transfers): moderate assist (50% patient effort), nonverbal cues (demo/gesture), verbal cues, 2 person assist  Assistive Device (Sit-Stand Transfers): other (see comments) (RAISED BED IN ORDER TO ASSIST WITH STANDING.  )  Stand-Sit Transfer  Stand-Sit [place] (Transfers): moderate assist (50% patient effort), 2 person assist, nonverbal cues (demo/gesture), verbal cues  Toilet Transfer  Type (Toilet Transfer): stand pivot/stand step  [place] Level (Toilet Transfer): moderate assist (50% patient effort), 2 person assist, nonverbal cues (demo/gesture), verbal cues  Assistive Device (Toilet Transfer): commode, bedside without  drop arms     Therapeutic Exercise  Upper Extremity Range of Motion (Therapeutic Exercise): shoulder flexion/extension, bilateral, shoulder abduction/adduction, bilateral  Lower Extremity (Therapeutic Exercise): LAQ (long arc quad), bilateral, marching while seated  Exercise Type (Therapeutic Exercise): AAROM (active assistive range of motion), AROM (active range of motion)  Position (Therapeutic Exercise): seated  Sets/Reps (Therapeutic Exercise): 10 REPS EACH EXERCISE.    Static Sitting Balance  Level of [place] (Unsupported Sitting, Static Balance): contact guard assist  Sitting Position (Unsupported Sitting, Static Balance): sitting on edge of bed  Time Able to Maintain Position (Unsupported Sitting, Static Balance): more than 5 minutes  Comment (Unsupported Sitting, Static Balance): TRANSFERRED TO COMMODE CHAIR. SAT FOR 10 MIN.   Level of [place] (Supported Sitting, Static Balance): supervision  Dynamic Standing Balance  Level of [place], Reaches Outside Midline (Standing, Dynamic Balance): moderate assist, 50 to 74% patient effort, maximal assist, 25 to 49% patient effort, other (see comments) (STOOD IN ORDER TO BE CLEANSED AFTER BM. )  Time Able to Maintain Position, Reaches Outside Midline (Standing, Dynamic Balance): 1 to 2 minutes (AFTER BRIEF WAS APPLIED, Pt SAT BACK ON COMMODE. )  Comment, Resists Mild Perturbations (Sitting, Dynamic Balance): Pt TRANSFERRED TO CHAIR FROM [facility] WITH MOD A X 2.         ROM/MMT             Sensory, Edema, Orthotics          Cognition, Communication, Swallow  Cognitive Assessment/Intervention- PT/OT  Affect/Mental Status (Cognitive): WNL  Orientation Status (Cognition): oriented x 3  Cognitive Assessment Intervention- SLP  Cognitive Function (Cognition): WNL    Outcome Summary             Physical Therapy Education     Title: PT OT SLP Therapies (Done)     Topic: Physical Therapy (Done)     Point: Mobility training (Done)    Learning Progress Summary      Learner Status Readiness Method Response Comment Documented by    Patient Done Eager E,TB VU,DU  JR 03/11/18 1306     Done Acceptance E VU,NR  SM 03/10/18 1406     Done Acceptance E,TB,D VU,NR   03/09/18 1528     Done Acceptance E,TB,D VU,NR   03/09/18 1515     Done Acceptance E,D VU,NR  EJ 03/08/18 1502     Done Acceptance E,D VU,DU safety during transfers and ambulation LC 03/04/18 1515     Done Acceptance E VU   03/03/18 1529     Done Acceptance E,TB DU,VU  CW 03/02/18 1555     Done Acceptance E,TB VU  RJ 03/01/18 1647     Active Acceptance E NR  EA 03/01/18 1057     Active Acceptance E NR  EA 02/28/18 1405     Done Acceptance E,TB VU,DU  CW 02/27/18 0950     Active Acceptance E NR  EE 02/26/18 1525    Family Done Eager E,TB VU,DU  JR 03/11/18 1306     Done Acceptance E,D VU,NR  EJ 03/08/18 1502     Done Acceptance E,TB VU  RJ 03/01/18 1647     Active Acceptance E NR  EA 03/01/18 1057    Significant Other Done Acceptance E,TB,D VU,NR   03/09/18 1528     Done Acceptance E,TB,D VU,NR   03/09/18 1515          Point: Home exercise program (Done)    Learning Progress Summary     Learner Status Readiness Method Response Comment Documented by    Patient Done Eager E,TB VU,DU  JR 03/11/18 1306     Done Acceptance E VU,NR   03/10/18 1406     Done Acceptance E,TB,D VU,NR   03/09/18 1528     Done Acceptance E,TB,D VU,NR   03/09/18 1515    Family Done Eager E,TB VU,DU  JR 03/11/18 1306    Significant Other Done Acceptance E,TB,D VU,NR   03/09/18 1528     Done Acceptance E,TB,D VU,NR   03/09/18 1515          Point: Body mechanics (Done)    Learning Progress Summary     Learner Status Readiness Method Response Comment Documented by    Patient Done Eager E,TB VU,DU  JR 03/11/18 1306     Done Acceptance E VU,NR   03/10/18 1406     Done Acceptance E,TB,D VU,NR  RW 03/09/18 1528     Done Acceptance E,TB,D GOLDEN,NR  RW 03/09/18 1515     Done Acceptance E,KARRIE FRANKSNR   03/08/18 1502     Active Acceptance E NR  KH  03/03/18 1529     Done Acceptance E,TB DU,VU  CW 03/02/18 1555     Active Acceptance E NR  EA 03/01/18 1057     Active Acceptance E NR  EA 02/28/18 1405     Done Acceptance E,TB VU,DU  CW 02/27/18 0950     Active Acceptance E NR   02/26/18 1525    Family Done Eager E,TB VU,DU   03/11/18 1306     Done Acceptance E,D VU,NR  EJ 03/08/18 1502     Active Acceptance E NR  EA 03/01/18 1057    Significant Other Done Acceptance E,TB,D VU,NR   03/09/18 1528     Done Acceptance E,TB,D VU,NR   03/09/18 1515          Point: Precautions (Done)    Learning Progress Summary     Learner Status Readiness Method Response Comment Documented by    Patient Done Eager E,TB VU,DU   03/11/18 1306     Done Acceptance E VU,NR   03/10/18 1406     Done Acceptance E,TB,D VU,NR   03/09/18 1528     Done Acceptance E,TB,D VU,NR   03/09/18 1515     Done Acceptance E VU   03/03/18 1529     Done Acceptance E,TB DU,VU   03/02/18 1555     Done Acceptance E,TB VU  RJ 03/01/18 1647     Done Acceptance E,TB VU,DU   02/27/18 0950    Family Done Eager E,TB VU,DU   03/11/18 1306     Done Acceptance E,TB VU  RJ 03/01/18 1647    Significant Other Done Acceptance E,TB,D VU,NR   03/09/18 1528     Done Acceptance E,TB,D VU,NR   03/09/18 1515                      User Key     Initials Effective Dates Name Provider Type Discipline     02/08/17 -  Alpa Vargas, RN Registered Nurse Nurse     12/01/15 -  Carmen Sarmiento, PT Physical Therapist PT    EJ 04/21/17 -  Eliane Nova, PT Physical Therapist PT     03/07/18 -  Thuan Arango, PT Physical Therapist PT     03/07/18 -  Anabel Lilly, PTA Physical Therapy Assistant PT     06/22/16 -  Susan Wiggins, PT Physical Therapist PT     03/07/18 -  Tiffany Goins, PTA Physical Therapy Assistant PT     08/02/16 -  Sudhir Castrejon, PT DPT Physical Therapist PT    CW 12/13/16 - 03/06/18 Jaya Gomez, PTA Physical Therapy Assistant PT    EA 02/05/18 -  Bipin Live, PT Student  PT Student PT                    PT Recommendation and Plan     Plan of Care Reviewed With: patient, daughter  Progress: improving          Outcome Measures     Row Name 03/11/18 1309 03/10/18 1400 03/09/18 1500       How much help from another person do you currently need...    Turning from your back to your side while in flat bed without using bedrails? 2  -JR 2  -SM 2  -RW    Moving from lying on back to sitting on the side of a flat bed without bedrails? 2  -JR 2  -SM 2  -RW    Moving to and from a bed to a chair (including a wheelchair)? 2  -JR 2  -SM 2  -RW    Standing up from a chair using your arms (e.g., wheelchair, bedside chair)? 2  -JR 2  -SM 2  -RW    Climbing 3-5 steps with a railing? 1  -JR 1  -SM 1  -RW    To walk in hospital room? 1  -JR 1  -SM 1  -RW    AM-PAC 6 Clicks Score 10  -JR 10  -SM 10  -RW       Functional Assessment    Outcome Measure Options  -- AM-PAC 6 Clicks Basic Mobility (PT)  -SM AM-PAC 6 Clicks Basic Mobility (PT)  -RW    Row Name 03/08/18 1500             How much help from another person do you currently need...    Turning from your back to your side while in flat bed without using bedrails? 2  -EJ      Moving from lying on back to sitting on the side of a flat bed without bedrails? 2  -EJ      Moving to and from a bed to a chair (including a wheelchair)? 1  -EJ      Standing up from a chair using your arms (e.g., wheelchair, bedside chair)? 1  -EJ      Climbing 3-5 steps with a railing? 1  -EJ      To walk in hospital room? 1  -EJ      AM-PAC 6 Clicks Score 8  -EJ         Functional Assessment    Outcome Measure Options AM-PAC 6 Clicks Basic Mobility (PT)  -EJ        User Key  (r) = Recorded By, (t) = Taken By, (c) = Cosigned By    Initials Name Provider Type    BASHIR Nova, PT Physical Therapist    JR Thuan Arango, PT Physical Therapist    SM Anabel Lilly, PTA Physical Therapy Assistant    RW Tiffany Goins, PTA Physical Therapy Assistant           Time  Calculation:         PT Charges     Row Name 03/11/18 1309             Time Calculation    Start Time 1135  -JR      Stop Time 1208  -JR      Time Calculation (min) 33 min  -JR      PT Received On 03/11/18  -JR      PT - Next Appointment 03/12/18  -        User Key  (r) = Recorded By, (t) = Taken By, (c) = Cosigned By    Initials Name Provider Type     Thuan Arango, PT Physical Therapist          Therapy Charges for Today     Code Description Service Date Service Provider Modifiers Qty    37734539254 HC PT THER PROC EA 15 MIN 3/11/2018 Thuan Arango, PT GP 2    67303377125 HC PT THER SUPP EA 15 MIN 3/11/2018 Thuan Arango, PT GP 2          PT G-Codes  Outcome Measure Options: AM-PAC 6 Clicks Basic Mobility (PT)    Thuan Arango, PT  3/11/2018

## 2018-03-11 NOTE — PROGRESS NOTES
"    DAILY PROGRESS NOTE  [facility]    Patient Identification:  Name: Jesus Beckham  Age: 77 y.o.  Sex: male  :  1940  MRN: [id number]         Primary Care Physician: Magdy Ricardo MD    Subjective:  Interval History: no new issues - some improvement w/ left foot - denies cp/n/v - good BM     Objective:fm x3 at bs w/ rn     Scheduled Meds:    ceftriaxone 2 g Intravenous Q24H   cyclobenzaprine 10 mg Oral Q8H   fluticasone 2 spray Nasal Daily   glipiZIDE 10 mg Oral BID AC   insulin aspart 0-7 Units Subcutaneous 4x Daily With Meals & Nightly   insulin detemir 55 Units Subcutaneous Q12H   ipratropium-albuterol 3 mL Nebulization Q6H While Awake - RT   linagliptin 5 mg Oral Daily   polyethylene glycol 17 g Oral Daily   pregabalin 100 mg Oral TID   sennosides-docusate sodium 2 tablet Oral BID     Continuous Infusions:    heparin (porcine) 12.6 Units/kg/hr Last Rate: 16.6 Units/kg/hr (18 0847)       Vital signs in last 24 hours:  Temp:  [97.3 °F (36.3 °C)-98.6 °F (37 °C)] 98.6 °F (37 °C)  Heart Rate:  [] 92  Resp:  [16-18] 16  BP: (131-173)/(77-99) 131/82    Intake/Output:    Intake/Output Summary (Last 24 hours) at 18 1358  Last data filed at 18 1320   Gross per 24 hour   Intake             1220 ml   Output              800 ml   Net              420 ml       Exam:  /82 (BP Location: Right arm, Patient Position: Lying)   Pulse 92   Temp 98.6 °F (37 °C) (Oral)   Resp 16   Ht 177.8 cm (70\")   Wt 119 kg (263 lb)   SpO2 94%   BMI 37.74 kg/m²     General Appearance:    Alert, cooperative, no distress, AAOx3, nice to see in chair and out of bed                          Throat:   Lips, tongue, gums normal; oral mucosa pink and moist                           Neck:   Supple, symmetrical, trachea midline, no JVD                         Lungs:    Clear to auscultation bilaterally, respirations unlabored                          Heart:    Regular rate and rhythm, S1 and " S2 normal                  Abdomen:     Soft, non-tender, bowel sounds active                 Extremities:   No cyanosis or edema                      Data Review:  Labs in chart were reviewed.    Assessment:  Active Hospital Problems (** Indicates Principal Problem)    Diagnosis Date Noted   • **Sepsis due to Escherichia coli [A41.51] 02/22/2018   • Discitis of lumbar region [M46.46] 03/05/2018   • Acute deep vein thrombosis of calf, right [I82.4Z1] 03/05/2018   • A-fib [I48.91] 02/25/2018   • CELINA (obstructive sleep apnea) [G47.33] 02/23/2018   • Diabetic peripheral neuropathy [E11.42] 04/01/2016      Resolved Hospital Problems    Diagnosis Date Noted Date Resolved   • Acute gangrenous cholecystitis [K81.0] 02/24/2018 03/11/2018   • Bacteremia due to Escherichia coli [R78.81] 02/24/2018 03/03/2018   • RUQ abdominal pain [R10.11] 02/24/2018 03/11/2018   • Sepsis [A41.9] 02/23/2018 03/03/2018   • Secondary thrombocytopenia [D69.59] 02/23/2018 03/03/2018   • Hypotension [I95.9] 02/23/2018 03/03/2018   • Acute respiratory failure with hypoxia [J96.01] 02/23/2018 03/03/2018   • Leg edema [R60.0] 02/23/2018 03/11/2018   • RSV (acute bronchiolitis due to respiratory syncytial virus) [J21.0] 02/23/2018 03/03/2018   • Vitamin D deficiency [E55.9] 04/28/2017 03/11/2018   • 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] 04/04/2016 03/11/2018   • Allergic rhinitis due to pollen [J30.1] 04/04/2016 03/11/2018       Plan:  Discitis secondary to seeding from Ecoli septicemia secondary to gangrenous cholecystitis s/p cholecystectomy on 2/25                        -POD4 lunbar fusion w/ I/D and incidental durotomy repair and Cxs growing MDR EColi sensitive to Rocephin per ID                         -leg weakness and paresthesias - d/w Dr Marr and steroids will stop today        AHRF - CT c/w Atelectasis and small effusions - encourage IS daily      Acute RLE CVT - hep gtt restrated 3/10 once OK w/ spince  surgery              -s/p IVCF and will have to determine PO AC (I favor Eliquis vs Xarelto)                DM2 w/ PN w/ A1c 7.7 - postop hyperglycemia/leukocytosis secondary to steroids               -further increased Levemir to 55u bid - should improve now that steroids are DC'd     -today patient now remembered that his home dosage was actually 50u bid Levemir?!      Constipation - resolving and will decrease bowel regimen     WCC per RN - pic looked like bruising      Junaid Miles MD  3/11/2018  1:58 PM

## 2018-03-11 NOTE — NURSING NOTE
Call placed to [employer] message left for oto help desk to return call. Will try back in a.m if no return call received

## 2018-03-11 NOTE — PLAN OF CARE
Problem: Infection, Risk/Actual (Adult)  Goal: Infection Prevention/Resolution  Outcome: Ongoing (interventions implemented as appropriate)      Problem: Patient Care Overview  Goal: Plan of Care Review  Outcome: Ongoing (interventions implemented as appropriate)   03/11/18 0744   OTHER   Outcome Summary Heparin gtt infusing, increased rate to 16.6 u/kg/hr - next PTT at 0700. Pain controlled with PO meds. Patient voiding via urinal with incontinent episodes. Will continue to monitor.      Goal: Individualization and Mutuality  Outcome: Ongoing (interventions implemented as appropriate)    Goal: Discharge Needs Assessment  Outcome: Ongoing (interventions implemented as appropriate)      Problem: Skin Injury Risk (Adult)  Goal: Identify Related Risk Factors and Signs and Symptoms  Outcome: Ongoing (interventions implemented as appropriate)

## 2018-03-11 NOTE — PLAN OF CARE
Problem: Fall Risk (Adult)  Goal: Identify Related Risk Factors and Signs and Symptoms  Outcome: Ongoing (interventions implemented as appropriate)    Goal: Absence of Fall  Outcome: Ongoing (interventions implemented as appropriate)    Goal: Identify Related Risk Factors and Signs and Symptoms  Outcome: Ongoing (interventions implemented as appropriate)    Goal: Absence of Fall  Outcome: Ongoing (interventions implemented as appropriate)      Problem: Infection, Risk/Actual (Adult)  Goal: Identify Related Risk Factors and Signs and Symptoms  Outcome: Ongoing (interventions implemented as appropriate)    Goal: Infection Prevention/Resolution  Outcome: Ongoing (interventions implemented as appropriate)      Problem: Patient Care Overview  Goal: Plan of Care Review   03/11/18 1526   Coping/Psychosocial   Plan of Care Reviewed With patient   OTHER   Outcome Summary VSS. pain controlled with prn pain medication, pt had medium loose bowel movement today, states he is unable to feel when has to go the bathroom. othro Dr. Tubbs who is on call today. was notifed of pt.s and families concern. aptt noted therapeutic today redraw in a.m deep tissue injury noted on pt. coccyx Dr. beebe notified, picture taken and put in chart, wound consulted to see patient        Problem: Skin Injury Risk (Adult)  Goal: Identify Related Risk Factors and Signs and Symptoms  Outcome: Ongoing (interventions implemented as appropriate)

## 2018-03-11 NOTE — PLAN OF CARE
Problem: Patient Care Overview  Goal: Plan of Care Review   03/11/18 6931   Coping/Psychosocial   Plan of Care Reviewed With patient;daughter   Plan of Care Review   Progress improving   MOTIVATED TO WORK WITH PT.  WANTING TO DO MORE LOWER EXTREMITY EXERCISES.  STILL UNABLE TO ADVANCE FEET TO AMBULATE.

## 2018-03-12 LAB
APTT PPP: 86.6 SECONDS (ref 22.7–35.4)
BASOPHILS # BLD AUTO: 0.02 10*3/MM3 (ref 0–0.2)
BASOPHILS NFR BLD AUTO: 0.2 % (ref 0–1.5)
DEPRECATED RDW RBC AUTO: 48.8 FL (ref 37–54)
EOSINOPHIL # BLD AUTO: 0.07 10*3/MM3 (ref 0–0.7)
EOSINOPHIL NFR BLD AUTO: 0.6 % (ref 0.3–6.2)
ERYTHROCYTE [DISTWIDTH] IN BLOOD BY AUTOMATED COUNT: 14.4 % (ref 11.5–14.5)
GLUCOSE BLDC GLUCOMTR-MCNC: 283 MG/DL (ref 70–130)
GLUCOSE BLDC GLUCOMTR-MCNC: 303 MG/DL (ref 70–130)
GLUCOSE BLDC GLUCOMTR-MCNC: 319 MG/DL (ref 70–130)
GLUCOSE BLDC GLUCOMTR-MCNC: 369 MG/DL (ref 70–130)
HCT VFR BLD AUTO: 32.5 % (ref 40.4–52.2)
HGB BLD-MCNC: 10.1 G/DL (ref 13.7–17.6)
IMM GRANULOCYTES # BLD: 0.42 10*3/MM3 (ref 0–0.03)
IMM GRANULOCYTES NFR BLD: 3.6 % (ref 0–0.5)
LYMPHOCYTES # BLD AUTO: 1.16 10*3/MM3 (ref 0.9–4.8)
LYMPHOCYTES NFR BLD AUTO: 10.1 % (ref 19.6–45.3)
MCH RBC QN AUTO: 29.3 PG (ref 27–32.7)
MCHC RBC AUTO-ENTMCNC: 31.1 G/DL (ref 32.6–36.4)
MCV RBC AUTO: 94.2 FL (ref 79.8–96.2)
MONOCYTES # BLD AUTO: 0.76 10*3/MM3 (ref 0.2–1.2)
MONOCYTES NFR BLD AUTO: 6.6 % (ref 5–12)
NEUTROPHILS # BLD AUTO: 9.1 10*3/MM3 (ref 1.9–8.1)
NEUTROPHILS NFR BLD AUTO: 78.9 % (ref 42.7–76)
PLATELET # BLD AUTO: 258 10*3/MM3 (ref 140–500)
PMV BLD AUTO: 11 FL (ref 6–12)
RBC # BLD AUTO: 3.45 10*6/MM3 (ref 4.6–6)
WBC NRBC COR # BLD: 11.53 10*3/MM3 (ref 4.5–10.7)

## 2018-03-12 PROCEDURE — 94799 UNLISTED PULMONARY SVC/PX: CPT

## 2018-03-12 PROCEDURE — 63710000001 DIPHENHYDRAMINE PER 50 MG: Performed by: HOSPITALIST

## 2018-03-12 PROCEDURE — 97110 THERAPEUTIC EXERCISES: CPT

## 2018-03-12 PROCEDURE — 63710000001 INSULIN ASPART PER 5 UNITS: Performed by: INTERNAL MEDICINE

## 2018-03-12 PROCEDURE — 25010000003 CEFTRIAXONE PER 250 MG: Performed by: INTERNAL MEDICINE

## 2018-03-12 PROCEDURE — 85730 THROMBOPLASTIN TIME PARTIAL: CPT | Performed by: INTERNAL MEDICINE

## 2018-03-12 PROCEDURE — 82962 GLUCOSE BLOOD TEST: CPT

## 2018-03-12 PROCEDURE — L1930 AFO PLASTIC: HCPCS

## 2018-03-12 PROCEDURE — 85025 COMPLETE CBC W/AUTO DIFF WBC: CPT | Performed by: SURGERY

## 2018-03-12 PROCEDURE — 25010000002 HEPARIN (PORCINE) PER 1000 UNITS: Performed by: HOSPITALIST

## 2018-03-12 RX ORDER — CASTOR OIL AND BALSAM, PERU 788; 87 MG/G; MG/G
OINTMENT TOPICAL EVERY 12 HOURS SCHEDULED
Status: DISCONTINUED | OUTPATIENT
Start: 2018-03-12 | End: 2018-03-13 | Stop reason: HOSPADM

## 2018-03-12 RX ADMIN — PREGABALIN 100 MG: 100 CAPSULE ORAL at 15:11

## 2018-03-12 RX ADMIN — CASTOR OIL AND BALSAM, PERU: 788; 87 OINTMENT TOPICAL at 20:21

## 2018-03-12 RX ADMIN — INSULIN ASPART 4 UNITS: 100 INJECTION, SOLUTION INTRAVENOUS; SUBCUTANEOUS at 17:20

## 2018-03-12 RX ADMIN — FLUTICASONE PROPIONATE 2 SPRAY: 50 SPRAY, METERED NASAL at 08:26

## 2018-03-12 RX ADMIN — INSULIN ASPART 5 UNITS: 100 INJECTION, SOLUTION INTRAVENOUS; SUBCUTANEOUS at 11:51

## 2018-03-12 RX ADMIN — HEPARIN SODIUM 16.6 UNITS/KG/HR: 10000 INJECTION, SOLUTION INTRAVENOUS at 11:03

## 2018-03-12 RX ADMIN — DIPHENHYDRAMINE HYDROCHLORIDE 25 MG: 25 CAPSULE ORAL at 23:01

## 2018-03-12 RX ADMIN — HYDROCODONE BITARTRATE AND ACETAMINOPHEN 2 TABLET: 10; 325 TABLET ORAL at 17:20

## 2018-03-12 RX ADMIN — POLYETHYLENE GLYCOL 3350 17 G: 17 POWDER, FOR SOLUTION ORAL at 08:25

## 2018-03-12 RX ADMIN — IPRATROPIUM BROMIDE AND ALBUTEROL SULFATE 3 ML: .5; 3 SOLUTION RESPIRATORY (INHALATION) at 20:36

## 2018-03-12 RX ADMIN — CYCLOBENZAPRINE 10 MG: 10 TABLET, FILM COATED ORAL at 20:21

## 2018-03-12 RX ADMIN — HYDROCODONE BITARTRATE AND ACETAMINOPHEN 2 TABLET: 10; 325 TABLET ORAL at 08:25

## 2018-03-12 RX ADMIN — CYCLOBENZAPRINE 10 MG: 10 TABLET, FILM COATED ORAL at 06:41

## 2018-03-12 RX ADMIN — HYDROCODONE BITARTRATE AND ACETAMINOPHEN 2 TABLET: 10; 325 TABLET ORAL at 23:01

## 2018-03-12 RX ADMIN — IPRATROPIUM BROMIDE AND ALBUTEROL SULFATE 3 ML: .5; 3 SOLUTION RESPIRATORY (INHALATION) at 13:02

## 2018-03-12 RX ADMIN — GLIPIZIDE 10 MG: 10 TABLET ORAL at 08:25

## 2018-03-12 RX ADMIN — PREGABALIN 100 MG: 100 CAPSULE ORAL at 20:21

## 2018-03-12 RX ADMIN — DOCUSATE SODIUM -SENNOSIDES 2 TABLET: 50; 8.6 TABLET, COATED ORAL at 08:25

## 2018-03-12 RX ADMIN — DOCUSATE SODIUM -SENNOSIDES 2 TABLET: 50; 8.6 TABLET, COATED ORAL at 20:21

## 2018-03-12 RX ADMIN — GLIPIZIDE 10 MG: 10 TABLET ORAL at 17:19

## 2018-03-12 RX ADMIN — APIXABAN 5 MG: 5 TABLET, FILM COATED ORAL at 17:19

## 2018-03-12 RX ADMIN — PREGABALIN 100 MG: 100 CAPSULE ORAL at 08:25

## 2018-03-12 RX ADMIN — CYCLOBENZAPRINE 10 MG: 10 TABLET, FILM COATED ORAL at 14:55

## 2018-03-12 RX ADMIN — CEFTRIAXONE SODIUM 2 G: 2 INJECTION, SOLUTION INTRAVENOUS at 11:51

## 2018-03-12 RX ADMIN — INSULIN ASPART 6 UNITS: 100 INJECTION, SOLUTION INTRAVENOUS; SUBCUTANEOUS at 20:49

## 2018-03-12 RX ADMIN — LINAGLIPTIN 5 MG: 5 TABLET, FILM COATED ORAL at 08:25

## 2018-03-12 RX ADMIN — INSULIN ASPART 5 UNITS: 100 INJECTION, SOLUTION INTRAVENOUS; SUBCUTANEOUS at 08:26

## 2018-03-12 RX ADMIN — IPRATROPIUM BROMIDE AND ALBUTEROL SULFATE 3 ML: .5; 3 SOLUTION RESPIRATORY (INHALATION) at 07:12

## 2018-03-12 NOTE — THERAPY TREATMENT NOTE
Acute Care - Physical Therapy Treatment Note  [facility]     Patient Name: Jesus Beckham  : 1940  MRN: [id number]  Today's Date: 3/12/2018     Date of Referral to PT: 18       Admit Date: 2018    Visit Dx:    ICD-10-CM ICD-9-CM   1. Calculus of gallbladder without cholecystitis without obstruction K80.20 574.20   2. Sepsis, due to unspecified organism A41.9 038.9     995.91   3. RUQ abdominal mass R19.01 789.31   4. RUQ abdominal pain R10.11 789.01   5. Gangrenous cholecystitis K81.0 575.0   6. Decreased mobility R26.89 781.99   7. Sepsis due to Escherichia coli A41.51 038.42     995.91     Patient Active Problem List   Diagnosis   • Gout   • Diabetic peripheral neuropathy   • Dyslipidemia   • Uncontrolled type 2 diabetes mellitus   • Essential hypertension   • PVC (premature ventricular contraction)   • Benign non-nodular prostatic hyperplasia without lower urinary tract symptoms   • Osteoarthritis   • Urge incontinence   • CELINA (obstructive sleep apnea)   • A-fib   • Discitis of lumbar region   • Acute deep vein thrombosis of calf, right   • Sepsis due to Escherichia coli       Therapy Treatment    Therapy Treatment / Health Promotion    Treatment Time/Intention  Discipline: physical therapy assistant  Document Type: therapy note (daily note)  Subjective Information: complains of, weakness, fatigue, numbness  Comment: (S) BACK PRECAUTIONS  Treatment Considerations/Comments: pt fearful of falling due to bilat le numbness (perf AAROM LAQs EOB prior to pivot-tfer)  Plan of Care Review  Plan of Care Reviewed With: patient, family    Vitals/Pain/Safety  Pain Scale: Numbers Pre/Post-Treatment  Pain Scale: Numbers, Pretreatment: 3/10  Pain Location: back  Pain Intervention(s): Repositioned, Ambulation/increased activity  Pain Scale: Word Pre/Post-Treatment  Pain Location: back  Pain Intervention(s): Repositioned, Ambulation/increased activity  Pain Scale: FACES Pre/Post-Treatment  Pain Location:  back  Pain Intervention(s): Repositioned, Ambulation/increased activity  Positioning and Restraints  Pre-Treatment Position: in bed  In Chair: reclined, call light within reach, encouraged to call for assist, with family/caregiver, with nsg (nsg in rm for catheter placement, they will finish rm)    Mobility,ADL,Motor, Modality  Bed Mobility Assessment/Treatment  Supine-Sit Ramsey (Bed Mobility): moderate assist (50% patient effort)  Bed Mobility, Safety Issues: decreased use of arms for pushing/pulling, decreased use of legs for bridging/pushing, impaired trunk control for bed mobility  Assistive Device (Bed Mobility): bed rails, draw sheet  Bed-Chair Transfer  Bed-Chair Ramsey (Transfers): 2 person assist, maximum assist (25% patient effort), verbal cues, nonverbal cues (demo/gesture) (knees blocked to avoid buckling)  Sit-Stand Transfer  Sit-Stand Ramsey (Transfers): 2 person assist, maximum assist (25% patient effort), verbal cues, nonverbal cues (demo/gesture)  Assistive Device (Sit-Stand Transfers):  (HHA-2)  Stand-Sit Transfer  Stand-Sit Ramsey (Transfers): 2 person assist, moderate assist (50% patient effort), verbal cues  Gait/Stairs Assessment/Training  Ramsey Level (Gait): unable to assess                 ROM/MMT             Sensory, Edema, Orthotics          Cognition, Communication, Swallow       Outcome Summary             Physical Therapy Education     Title: PT OT SLP Therapies (Done)     Topic: Physical Therapy (Done)     Point: Mobility training (Done)    Learning Progress Summary     Learner Status Readiness Method Response Comment Documented by    Patient Done Eager JS SEQUEIRA DU  JR 03/11/18 1306     Done Acceptance GISELA KOVACS   03/10/18 1406     Done Acceptance JS SEQUEIRA D VU, NR  RW 03/09/18 1528     Done Acceptance JS SEQUEIRA D VU, NR  RW 03/09/18 1515     Done Acceptance KARRIE SEQUEIRA NR  EJ 03/08/18 1502     Done Acceptance KARRIE SEQUEIRA DU safety during transfers and ambulation  03/04/18  1515     Done Acceptance E VU   03/03/18 1529     Done Acceptance E,TB DU,VU  CW 03/02/18 1555     Done Acceptance E,TB VU  RJ 03/01/18 1647     Active Acceptance E NR  EA 03/01/18 1057     Active Acceptance E NR  EA 02/28/18 1405     Done Acceptance E,TB VU,DU  CW 02/27/18 0950     Active Acceptance E NR   02/26/18 1525    Family Done Eager E,TB VU,DU   03/11/18 1306     Done Acceptance E,D VU,NR   03/08/18 1502     Done Acceptance E,TB VU  RJ 03/01/18 1647     Active Acceptance E NR  EA 03/01/18 1057    Significant Other Done Acceptance E,TB,D VU,NR   03/09/18 1528     Done Acceptance E,TB,D VU,NR   03/09/18 1515          Point: Home exercise program (Done)    Learning Progress Summary     Learner Status Readiness Method Response Comment Documented by    Patient Done Eager E,TB VU,DU   03/11/18 1306     Done Acceptance E VU,NR   03/10/18 1406     Done Acceptance E,TB,D VU,NR   03/09/18 1528     Done Acceptance E,TB,D VU,NR   03/09/18 1515    Family Done Eager E,TB VU,DU   03/11/18 1306    Significant Other Done Acceptance E,TB,D VU,NR   03/09/18 1528     Done Acceptance E,TB,D VU,NR   03/09/18 1515          Point: Body mechanics (Done)    Learning Progress Summary     Learner Status Readiness Method Response Comment Documented by    Patient Done Eager E,TB VU,DU   03/11/18 1306     Done Acceptance E VU,NR   03/10/18 1406     Done Acceptance E,TB,D VU,NR   03/09/18 1528     Done Acceptance E,TB,D VU,NR   03/09/18 1515     Done Acceptance E,D VU,NR   03/08/18 1502     Active Acceptance E NR   03/03/18 1529     Done Acceptance E,TB DU,VU  CW 03/02/18 1555     Active Acceptance E NR  EA 03/01/18 1057     Active Acceptance E NR  EA 02/28/18 1405     Done Acceptance E,TB VU,DU   02/27/18 0950     Active Acceptance E NR  EE 02/26/18 1525    Family Done Eager E,MARY HAMPTON JR 03/11/18 1306     Done Acceptance E,GISELA BRAGG 03/08/18 1502     Active Acceptance E NR  EA 03/01/18 1057     Significant Other Done Acceptance E,TB,D VU,NR   03/09/18 1528     Done Acceptance E,TB,D VU,NR   03/09/18 1515          Point: Precautions (Done)    Learning Progress Summary     Learner Status Readiness Method Response Comment Documented by    Patient Done Eager E,TB VU,DU   03/11/18 1306     Done Acceptance E VU,NR   03/10/18 1406     Done Acceptance E,TB,D VU,NR   03/09/18 1528     Done Acceptance E,TB,D VU,NR   03/09/18 1515     Done Acceptance E VU   03/03/18 1529     Done Acceptance E,TB DU,VU   03/02/18 1555     Done Acceptance E,TB VU  RJ 03/01/18 1647     Done Acceptance E,TB VU,DU   02/27/18 0950    Family Done Eager E,TB VU,DU   03/11/18 1306     Done Acceptance E,TB VU   03/01/18 1647    Significant Other Done Acceptance E,TB,D VU,NR   03/09/18 1528     Done Acceptance E,TB,D VU,NR   03/09/18 1515                      User Key     Initials Effective Dates Name Provider Type Discipline     02/08/17 -  Alpa Vargas, RN Registered Nurse Nurse    EE 12/01/15 -  Carmen Sarmiento, PT Physical Therapist PT    EJ 04/21/17 -  Eliane Nova, PT Physical Therapist PT     03/07/18 -  Thuan Arango, PT Physical Therapist PT     03/07/18 -  Anabel Lilly, PTA Physical Therapy Assistant PT     06/22/16 -  Susan Wiggins, PT Physical Therapist PT     03/07/18 -  Tiffany Goins, PTA Physical Therapy Assistant PT     08/02/16 -  Sudhir Castrejon, PT DPT Physical Therapist PT    CW 12/13/16 - 03/06/18 Jaya Gomez, PTA Physical Therapy Assistant PT    EA 02/05/18 -  Bipin Live, PT Student PT Student PT                    PT Recommendation and Plan     Plan of Care Reviewed With: patient, family  Outcome Summary: dakota tfer to chair w/assist of 2; pt moving rt le once seated but seemed unaware he could          Outcome Measures     Row Name 03/12/18 1400 03/11/18 1309 03/10/18 1400       How much help from another person do you currently need...    Turning from your back to  your side while in flat bed without using bedrails? 2  - 2  -JR 2  -SM    Moving from lying on back to sitting on the side of a flat bed without bedrails? 2  -JM 2  -JR 2  -SM    Moving to and from a bed to a chair (including a wheelchair)? 2  -JM 2  -JR 2  -SM    Standing up from a chair using your arms (e.g., wheelchair, bedside chair)? 2  - 2  -JR 2  -SM    Climbing 3-5 steps with a railing? 1  - 1  -JR 1  -SM    To walk in hospital room? 1  - 1  -JR 1  -SM    AM-PAC 6 Clicks Score 10  - 10  -JR 10  -SM       Functional Assessment    Outcome Measure Options  --  -- AM-PAC 6 Clicks Basic Mobility (PT)  -      User Key  (r) = Recorded By, (t) = Taken By, (c) = Cosigned By    Initials Name Provider Type    KENY Lilly PTA Physical Therapy Assistant    JR Thuan Arango, PT Physical Therapist     Anabel Lilly PTA Physical Therapy Assistant           Time Calculation:         PT Charges     Row Name 03/12/18 1458             Time Calculation    Start Time 1418  -      Stop Time 1441  -      Time Calculation (min) 23 min  -      PT Received On 03/12/18  -KENY      PT - Next Appointment 03/13/18  -KENY        User Key  (r) = Recorded By, (t) = Taken By, (c) = Cosigned By    Initials Name Provider Type    KENY Lilly PTA Physical Therapy Assistant          Therapy Charges for Today     Code Description Service Date Service Provider Modifiers Qty    63986421196 HC PT THER PROC EA 15 MIN 3/12/2018 Izabel Lilly PTA GP 2    24999435466 HC PT THER SUPP EA 15 MIN 3/12/2018 Izabel Lilly PTA GP 1          PT G-Codes  Outcome Measure Options: AM-PAC 6 Clicks Basic Mobility (PT)    Izabel Lilly PTA  3/12/2018

## 2018-03-12 NOTE — NURSING NOTE
Called [facility] to follow up on ankle brace for left foot. Order being sent to brace department. Will monitor.

## 2018-03-12 NOTE — PLAN OF CARE
Problem: Patient Care Overview  Goal: Plan of Care Review   03/12/18 1501   Coping/Psychosocial   Plan of Care Reviewed With patient;family   OTHER   Outcome Summary dakota tfer to chair w/assist of 2; pt moving rt le once seated but seemed unaware he could

## 2018-03-12 NOTE — PROGRESS NOTES
Orthopedic Spine Progress Note    Subjective     Post-Operative Day: 5 post-spine procedure MIS TLIF L2-4 with evacuation of epidural abscess   Systemic or Specific Complaints: No back pain.  Left foot still weak. He continues to complain of bilateral numbness and tingling in his legs and feet.    Objective     Vital signs in last 24 hours:  Temp:  [97.8 °F (36.6 °C)-98.6 °F (37 °C)] 98.4 °F (36.9 °C)  Heart Rate:  [] 92  Resp:  [16-22] 22  BP: (120-162)/(68-87) 134/68    General: alert, appears stated age and cooperative   Neurovascular: Stable.  He still has significant weakness with left ankle plantar and dorsiflexion but shows improvement and ability to activate left tibialis anterior on command.he reports continued numbness in his feet and legs bilaterally.  He is able to appreciate light touch.   Wound: Wound clean and dry no evidence of infection.   Range of Motion: limited   DVT Exam: No evidence of DVT seen on physical exam.     Data Review  CBC:    Results from last 7 days  Lab Units 03/12/18  0554   WBC 10*3/mm3 11.53*   RBC 10*6/mm3 3.45*   HEMOGLOBIN g/dL 10.1*   HEMATOCRIT % 32.5*   PLATELETS 10*3/mm3 258     Lab Results   Component Value Date    GLUCOSE 367 (H) 03/10/2018    BUN 21 03/10/2018    CREATININE 0.75 (L) 03/10/2018    EGFRIFNONA 101 03/10/2018    EGFRIFAFRI 88 12/06/2017    BCR 28.0 (H) 03/10/2018    K 4.7 03/10/2018    CO2 29.0 03/10/2018    CALCIUM 8.3 (L) 03/10/2018    PROTENTOTREF 6.8 12/06/2017    ALBUMIN 2.90 (L) 03/10/2018    LABIL2 1.2 03/10/2018    AST 22 03/10/2018    ALT 25 03/10/2018       Assessment/Plan         Pain Relief: good resolution    Continues current post-op course. Abx per ID.  AFO for left foot  Rehab with PT  Plan to d/c to rehab when stable    Activity: out of bed and ambulate    Weight Bearing: FWB as tolerated     LOS: 17 days     Manish Marr DO    Date: 3/12/2018

## 2018-03-12 NOTE — NURSING NOTE
03/12/18 1703   Wound 03/11/18 0800 Right anterior coccyx pressure injury   Date first assessed/Time first assessed: 03/11/18 0800   Present On Admission : no;picture taken  Side: Right  Orientation: anterior  Location: coccyx  Type: pressure injury  Stage, Pressure Injury: deep tissue injury   Dressing Appearance open to air   Base maroon/purple   Periwound intact;dry;pink   Periwound Temperature warm   Periwound Skin Turgor soft   Drainage Amount none   Care, Wound (recommend venelex and repositioning)     CWOCN consult. Assessed DTI to coccyx that measures 2x1cm. Patient states it does not hurt. Patient is 263 lbs with recent back surgery. He is getting out of chair but it is difficult- his mobility is very limited- he is not standing he states. He is sitting in chair on lift pad. Recommend venelex to keep skin moist. The top layer of skin may shear off as healing from inside out occurs.

## 2018-03-12 NOTE — PLAN OF CARE
Problem: Patient Care Overview  Goal: Plan of Care Review  Outcome: Ongoing (interventions implemented as appropriate)   03/12/18 0541   Coping/Psychosocial   Plan of Care Reviewed With patient   Plan of Care Review   Progress no change   OTHER   Outcome Summary VSS, patient continuing to have large incontinent episodes, deep tissue injury on coccyx - pt refusing to turn - educated on risks, heparin gtt infusing awaiting PTT. No c/o pain at this time. Will continue to monitor.

## 2018-03-12 NOTE — PROGRESS NOTES
"    DAILY PROGRESS NOTE  [facility]    Patient Identification:  Name: Jesus Beckham  Age: 77 y.o.  Sex: male  :  1940  MRN: [id number]         Primary Care Physician: Magdy Ricardo MD    Subjective:  Interval History: no new issues but biggest complaint is not knowing when he has to urinate or deficate- some improvement w/ left foot - denies cp/n/v      Objective:fm x1 at bs      Scheduled Meds:    apixaban 5 mg Oral Q12H   ceftriaxone 2 g Intravenous Q24H   cyclobenzaprine 10 mg Oral Q8H   fluticasone 2 spray Nasal Daily   glipiZIDE 10 mg Oral BID AC   insulin aspart 0-7 Units Subcutaneous 4x Daily With Meals & Nightly   insulin detemir 60 Units Subcutaneous Q12H   ipratropium-albuterol 3 mL Nebulization Q6H While Awake - RT   linagliptin 5 mg Oral Daily   polyethylene glycol 17 g Oral Daily   pregabalin 100 mg Oral TID   sennosides-docusate sodium 2 tablet Oral BID     Continuous Infusions:       Vital signs in last 24 hours:  Temp:  [97.8 °F (36.6 °C)-98.6 °F (37 °C)] 97.9 °F (36.6 °C)  Heart Rate:  [] 99  Resp:  [16-22] 20  BP: (117-162)/(68-87) 117/85    Intake/Output:    Intake/Output Summary (Last 24 hours) at 18 1328  Last data filed at 18 2316   Gross per 24 hour   Intake              535 ml   Output                0 ml   Net              535 ml       Exam:  /85 (BP Location: Right arm, Patient Position: Lying)   Pulse 99   Temp 97.9 °F (36.6 °C) (Oral)   Resp 20   Ht 177.8 cm (70\")   Wt 119 kg (263 lb)   SpO2 92%   BMI 37.74 kg/m²     General Appearance:    Alert, cooperative, no distress, AAOx3, nice to see in chair and out of bed                          Throat:   Lips, tongue, gums normal; oral mucosa pink and moist                           Neck:   Supple, symmetrical, trachea midline, no JVD                         Lungs:    Clear to auscultation bilaterally, respirations unlabored                          Heart:    Regular rate and rhythm, S1 " and S2 normal                  Abdomen:     Soft, non-tender, bowel sounds active                 Extremities:   No cyanosis or edema                      Data Review:  Labs in chart were reviewed.    Assessment:  Active Hospital Problems (** Indicates Principal Problem)    Diagnosis Date Noted   • **Sepsis due to Escherichia coli [A41.51] 02/22/2018   • Discitis of lumbar region [M46.46] 03/05/2018   • Acute deep vein thrombosis of calf, right [I82.4Z1] 03/05/2018   • A-fib [I48.91] 02/25/2018   • CELINA (obstructive sleep apnea) [G47.33] 02/23/2018   • Diabetic peripheral neuropathy [E11.42] 04/01/2016      Resolved Hospital Problems    Diagnosis Date Noted Date Resolved   • Acute gangrenous cholecystitis [K81.0] 02/24/2018 03/11/2018   • Bacteremia due to Escherichia coli [R78.81] 02/24/2018 03/03/2018   • RUQ abdominal pain [R10.11] 02/24/2018 03/11/2018   • Sepsis [A41.9] 02/23/2018 03/03/2018   • Secondary thrombocytopenia [D69.59] 02/23/2018 03/03/2018   • Hypotension [I95.9] 02/23/2018 03/03/2018   • Acute respiratory failure with hypoxia [J96.01] 02/23/2018 03/03/2018   • Leg edema [R60.0] 02/23/2018 03/11/2018   • RSV (acute bronchiolitis due to respiratory syncytial virus) [J21.0] 02/23/2018 03/03/2018   • Vitamin D deficiency [E55.9] 04/28/2017 03/11/2018   • 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] 04/04/2016 03/11/2018   • Allergic rhinitis due to pollen [J30.1] 04/04/2016 03/11/2018       Plan:  Discitis secondary to seeding from Ecoli septicemia secondary to gangrenous cholecystitis s/p cholecystectomy on 2/25                        -POD5 lumbar fusion w/ I/D and incidental durotomy repair and Cxs growing MDR EColi sensitive to Rocephin per ID (2g IV every 24h x 8 weeks with stop date 5/2/18, weekly CBC with diff and creatnine faxed to ID clinic at [phone] and f/u ID clinic 5/2                        -leg weakness and paresthesias - off steroids       AHRF - CT c/w Atelectasis and small  effusions - encouraged IS daily      Acute RLE CVT - hep gtt restarted 3/10 which was transitioned to eliquis on 3/12              -s/p IVC filter, will need to have removed once he is more active               DM2 w/ PN w/ A1c 7.7 - postop hyperglycemia/leukocytosis secondary to steroids which were stopped 3/11              -further increase Levemir to 60 units bid         Constipation - resolving      Place condom cath    WCC per RN     DISPO: hopefully Wednesday to [facility], pt doesn't want acute rehab    Shaka Willams MD  3/12/2018  1:28 PM

## 2018-03-12 NOTE — PLAN OF CARE
Problem: Infection, Risk/Actual (Adult)  Goal: Infection Prevention/Resolution  Outcome: Ongoing (interventions implemented as appropriate)   03/12/18 1703   Infection, Risk/Actual (Adult)   Infection Prevention/Resolution making progress toward outcome       Problem: Patient Care Overview  Goal: Plan of Care Review  Outcome: Ongoing (interventions implemented as appropriate)   03/12/18 1703   Coping/Psychosocial   Plan of Care Reviewed With patient   Plan of Care Review   Progress improving   OTHER   Outcome Summary patient vss. pain well controlled. patient up in chair today for several hours. will monitor.       Problem: Skin Injury Risk (Adult)  Goal: Skin Health and Integrity  Outcome: Ongoing (interventions implemented as appropriate)   03/12/18 1703   Skin Injury Risk (Adult)   Skin Health and Integrity making progress toward outcome

## 2018-03-12 NOTE — PLAN OF CARE
Problem: Infection, Risk/Actual (Adult)  Goal: Infection Prevention/Resolution  Outcome: Ongoing (interventions implemented as appropriate)      Problem: Patient Care Overview  Goal: Individualization and Mutuality  Outcome: Ongoing (interventions implemented as appropriate)      Problem: Skin Injury Risk (Adult)  Goal: Identify Related Risk Factors and Signs and Symptoms  Outcome: Ongoing (interventions implemented as appropriate)    Goal: Skin Health and Integrity  Outcome: Ongoing (interventions implemented as appropriate)

## 2018-03-13 ENCOUNTER — APPOINTMENT (OUTPATIENT)
Dept: GENERAL RADIOLOGY | Facility: HOSPITAL | Age: 78
End: 2018-03-13
Attending: INTERNAL MEDICINE

## 2018-03-13 VITALS
HEART RATE: 108 BPM | TEMPERATURE: 98.4 F | OXYGEN SATURATION: 92 % | WEIGHT: 263 LBS | DIASTOLIC BLOOD PRESSURE: 97 MMHG | BODY MASS INDEX: 37.65 KG/M2 | RESPIRATION RATE: 20 BRPM | SYSTOLIC BLOOD PRESSURE: 178 MMHG | HEIGHT: 70 IN

## 2018-03-13 LAB
APTT PPP: 29 SECONDS (ref 22.7–35.4)
BASOPHILS # BLD AUTO: 0.04 10*3/MM3 (ref 0–0.2)
BASOPHILS NFR BLD AUTO: 0.4 % (ref 0–1.5)
DEPRECATED RDW RBC AUTO: 50.6 FL (ref 37–54)
EOSINOPHIL # BLD AUTO: 0.17 10*3/MM3 (ref 0–0.7)
EOSINOPHIL NFR BLD AUTO: 1.5 % (ref 0.3–6.2)
ERYTHROCYTE [DISTWIDTH] IN BLOOD BY AUTOMATED COUNT: 14.7 % (ref 11.5–14.5)
GLUCOSE BLDC GLUCOMTR-MCNC: 166 MG/DL (ref 70–130)
GLUCOSE BLDC GLUCOMTR-MCNC: 173 MG/DL (ref 70–130)
GLUCOSE BLDC GLUCOMTR-MCNC: 284 MG/DL (ref 70–130)
HCT VFR BLD AUTO: 34.2 % (ref 40.4–52.2)
HGB BLD-MCNC: 10.7 G/DL (ref 13.7–17.6)
IMM GRANULOCYTES # BLD: 0.4 10*3/MM3 (ref 0–0.03)
IMM GRANULOCYTES NFR BLD: 3.6 % (ref 0–0.5)
LYMPHOCYTES # BLD AUTO: 1.32 10*3/MM3 (ref 0.9–4.8)
LYMPHOCYTES NFR BLD AUTO: 11.9 % (ref 19.6–45.3)
MCH RBC QN AUTO: 30 PG (ref 27–32.7)
MCHC RBC AUTO-ENTMCNC: 31.3 G/DL (ref 32.6–36.4)
MCV RBC AUTO: 95.8 FL (ref 79.8–96.2)
MONOCYTES # BLD AUTO: 0.58 10*3/MM3 (ref 0.2–1.2)
MONOCYTES NFR BLD AUTO: 5.2 % (ref 5–12)
NEUTROPHILS # BLD AUTO: 8.55 10*3/MM3 (ref 1.9–8.1)
NEUTROPHILS NFR BLD AUTO: 77.4 % (ref 42.7–76)
PLATELET # BLD AUTO: 244 10*3/MM3 (ref 140–500)
PMV BLD AUTO: 10.9 FL (ref 6–12)
RBC # BLD AUTO: 3.57 10*6/MM3 (ref 4.6–6)
WBC NRBC COR # BLD: 11.06 10*3/MM3 (ref 4.5–10.7)

## 2018-03-13 PROCEDURE — 85025 COMPLETE CBC W/AUTO DIFF WBC: CPT | Performed by: SURGERY

## 2018-03-13 PROCEDURE — 02HV33Z INSERTION OF INFUSION DEVICE INTO SUPERIOR VENA CAVA, PERCUTANEOUS APPROACH: ICD-10-PCS | Performed by: INTERNAL MEDICINE

## 2018-03-13 PROCEDURE — C1751 CATH, INF, PER/CENT/MIDLINE: HCPCS

## 2018-03-13 PROCEDURE — 97110 THERAPEUTIC EXERCISES: CPT

## 2018-03-13 PROCEDURE — 63710000001 INSULIN ASPART PER 5 UNITS: Performed by: INTERNAL MEDICINE

## 2018-03-13 PROCEDURE — 94799 UNLISTED PULMONARY SVC/PX: CPT

## 2018-03-13 PROCEDURE — 82962 GLUCOSE BLOOD TEST: CPT

## 2018-03-13 PROCEDURE — 85730 THROMBOPLASTIN TIME PARTIAL: CPT | Performed by: INTERNAL MEDICINE

## 2018-03-13 PROCEDURE — 25010000003 CEFTRIAXONE PER 250 MG: Performed by: INTERNAL MEDICINE

## 2018-03-13 RX ORDER — BISACODYL 5 MG/1
5 TABLET, DELAYED RELEASE ORAL DAILY PRN
Start: 2018-03-13 | End: 2018-11-19

## 2018-03-13 RX ORDER — IPRATROPIUM BROMIDE AND ALBUTEROL SULFATE 2.5; .5 MG/3ML; MG/3ML
3 SOLUTION RESPIRATORY (INHALATION)
Qty: 360 ML
Start: 2018-03-13 | End: 2018-05-22

## 2018-03-13 RX ORDER — PREGABALIN 100 MG/1
100 CAPSULE ORAL 3 TIMES DAILY
Qty: 15 CAPSULE | Refills: 0 | OUTPATIENT
Start: 2018-03-13 | End: 2018-03-13

## 2018-03-13 RX ORDER — CEFTRIAXONE SODIUM 2 G/50ML
2 INJECTION, SOLUTION INTRAVENOUS EVERY 24 HOURS
Qty: 1500 ML | Refills: 1
Start: 2018-03-14 | End: 2018-05-03

## 2018-03-13 RX ORDER — PREGABALIN 100 MG/1
100 CAPSULE ORAL 3 TIMES DAILY
Qty: 15 CAPSULE | Refills: 0 | Status: SHIPPED | OUTPATIENT
Start: 2018-03-13 | End: 2018-03-20 | Stop reason: HOSPADM

## 2018-03-13 RX ORDER — HYDROCODONE BITARTRATE AND ACETAMINOPHEN 10; 325 MG/1; MG/1
2 TABLET ORAL EVERY 6 HOURS PRN
Qty: 12 TABLET | Refills: 0 | Status: SHIPPED | OUTPATIENT
Start: 2018-03-13 | End: 2018-03-20 | Stop reason: HOSPADM

## 2018-03-13 RX ORDER — CASTOR OIL AND BALSAM, PERU 788; 87 MG/G; MG/G
1 OINTMENT TOPICAL EVERY 12 HOURS SCHEDULED
Start: 2018-03-13 | End: 2018-04-03 | Stop reason: ALTCHOICE

## 2018-03-13 RX ORDER — CYCLOBENZAPRINE HCL 10 MG
10 TABLET ORAL EVERY 8 HOURS SCHEDULED
Start: 2018-03-13 | End: 2018-08-01

## 2018-03-13 RX ADMIN — INSULIN ASPART 4 UNITS: 100 INJECTION, SOLUTION INTRAVENOUS; SUBCUTANEOUS at 17:24

## 2018-03-13 RX ADMIN — INSULIN ASPART 2 UNITS: 100 INJECTION, SOLUTION INTRAVENOUS; SUBCUTANEOUS at 13:08

## 2018-03-13 RX ADMIN — CYCLOBENZAPRINE 10 MG: 10 TABLET, FILM COATED ORAL at 17:11

## 2018-03-13 RX ADMIN — IPRATROPIUM BROMIDE AND ALBUTEROL SULFATE 3 ML: .5; 3 SOLUTION RESPIRATORY (INHALATION) at 12:12

## 2018-03-13 RX ADMIN — HYDROCODONE BITARTRATE AND ACETAMINOPHEN 2 TABLET: 10; 325 TABLET ORAL at 05:55

## 2018-03-13 RX ADMIN — CASTOR OIL AND BALSAM, PERU: 788; 87 OINTMENT TOPICAL at 08:52

## 2018-03-13 RX ADMIN — HYDROCODONE BITARTRATE AND ACETAMINOPHEN 2 TABLET: 10; 325 TABLET ORAL at 19:52

## 2018-03-13 RX ADMIN — INSULIN ASPART 2 UNITS: 100 INJECTION, SOLUTION INTRAVENOUS; SUBCUTANEOUS at 08:52

## 2018-03-13 RX ADMIN — CASTOR OIL AND BALSAM, PERU: 788; 87 OINTMENT TOPICAL at 19:55

## 2018-03-13 RX ADMIN — IPRATROPIUM BROMIDE AND ALBUTEROL SULFATE 3 ML: .5; 3 SOLUTION RESPIRATORY (INHALATION) at 19:30

## 2018-03-13 RX ADMIN — HYDROCODONE BITARTRATE AND ACETAMINOPHEN 2 TABLET: 10; 325 TABLET ORAL at 13:11

## 2018-03-13 RX ADMIN — FLUTICASONE PROPIONATE 2 SPRAY: 50 SPRAY, METERED NASAL at 08:53

## 2018-03-13 RX ADMIN — PREGABALIN 100 MG: 100 CAPSULE ORAL at 08:51

## 2018-03-13 RX ADMIN — APIXABAN 5 MG: 5 TABLET, FILM COATED ORAL at 08:52

## 2018-03-13 RX ADMIN — GLIPIZIDE 10 MG: 10 TABLET ORAL at 17:11

## 2018-03-13 RX ADMIN — APIXABAN 5 MG: 5 TABLET, FILM COATED ORAL at 19:53

## 2018-03-13 RX ADMIN — DOCUSATE SODIUM 100 MG: 100 CAPSULE, LIQUID FILLED ORAL at 08:51

## 2018-03-13 RX ADMIN — CYCLOBENZAPRINE 10 MG: 10 TABLET, FILM COATED ORAL at 05:55

## 2018-03-13 RX ADMIN — CEFTRIAXONE SODIUM 2 G: 2 INJECTION, SOLUTION INTRAVENOUS at 13:08

## 2018-03-13 RX ADMIN — PREGABALIN 100 MG: 100 CAPSULE ORAL at 17:11

## 2018-03-13 RX ADMIN — IPRATROPIUM BROMIDE AND ALBUTEROL SULFATE 3 ML: .5; 3 SOLUTION RESPIRATORY (INHALATION) at 07:17

## 2018-03-13 RX ADMIN — DOCUSATE SODIUM -SENNOSIDES 2 TABLET: 50; 8.6 TABLET, COATED ORAL at 08:52

## 2018-03-13 RX ADMIN — DOCUSATE SODIUM -SENNOSIDES 2 TABLET: 50; 8.6 TABLET, COATED ORAL at 19:53

## 2018-03-13 RX ADMIN — LINAGLIPTIN 5 MG: 5 TABLET, FILM COATED ORAL at 08:52

## 2018-03-13 RX ADMIN — GLIPIZIDE 10 MG: 10 TABLET ORAL at 08:52

## 2018-03-13 RX ADMIN — POLYETHYLENE GLYCOL 3350 17 G: 17 POWDER, FOR SOLUTION ORAL at 08:52

## 2018-03-13 NOTE — THERAPY TREATMENT NOTE
Acute Care - Physical Therapy Treatment Note  [facility]     Patient Name: Jesus Beckham  : 1940  MRN: [id number]  Today's Date: 3/13/2018     Date of Referral to PT: 18       Admit Date: 2018    Visit Dx:    ICD-10-CM ICD-9-CM   1. Calculus of gallbladder without cholecystitis without obstruction K80.20 574.20   2. Sepsis, due to unspecified organism A41.9 038.9     995.91   3. RUQ abdominal mass R19.01 789.31   4. RUQ abdominal pain R10.11 789.01   5. Gangrenous cholecystitis K81.0 575.0   6. Decreased mobility R26.89 781.99   7. Sepsis due to Escherichia coli A41.51 038.42     995.91     Patient Active Problem List   Diagnosis   • Gout   • Diabetic peripheral neuropathy   • Dyslipidemia   • Uncontrolled type 2 diabetes mellitus   • Essential hypertension   • PVC (premature ventricular contraction)   • Benign non-nodular prostatic hyperplasia without lower urinary tract symptoms   • Osteoarthritis   • Urge incontinence   • CELINA (obstructive sleep apnea)   • A-fib   • Discitis of lumbar region   • Acute deep vein thrombosis of calf, right   • Sepsis due to Escherichia coli       Therapy Treatment    Therapy Treatment / Health Promotion    Treatment Time/Intention  Discipline: physical therapy assistant  Document Type: therapy note (daily note)  Subjective Information: complains of, weakness, fatigue, pain  Comment: pt fearful of falling (has new AFO for left but would not fit in shoe)  Treatment Considerations/Comments: wife to bring in larger shoes  Plan of Care Review  Plan of Care Reviewed With: patient, family    Vitals/Pain/Safety  Pain Scale: Numbers Pre/Post-Treatment  Pain Scale: Numbers, Pretreatment: 4/10  Pain Scale: Numbers, Post-Treatment: 7/10  Pain Location - Side: Left  Pain Location: hip  Pain Intervention(s): Medication (See MAR), Repositioned  Pain Scale: Word Pre/Post-Treatment  Pain Location - Side: Left  Pain Location: hip  Pain Intervention(s): Medication (See MAR),  Repositioned  Pain Scale: FACES Pre/Post-Treatment  Pain Location - Side: Left  Pain Location: hip  Pain Intervention(s): Medication (See MAR), Repositioned  Positioning and Restraints  Pre-Treatment Position: in bed  In Chair: sitting, call light within reach, encouraged to call for assist, with family/caregiver (fam present, will elevate legs soon)    Mobility,ADL,Motor, Modality  Bed Mobility Assessment/Treatment  Supine-Sit Swisher (Bed Mobility): 2 person assist, moderate assist (50% patient effort)  Bed Mobility, Safety Issues: decreased use of arms for pushing/pulling, decreased use of legs for bridging/pushing, impaired trunk control for bed mobility  Assistive Device (Bed Mobility): bed rails  Comment (Bed Mobility): cues each session for log rolling technique (assist to bend knees up)  Bed-Chair Transfer  Bed-Chair Swisher (Transfers): verbal cues, nonverbal cues (demo/gesture), moderate assist (50% patient effort), maximum assist (25% patient effort), 2 person assist (cues to lock in knees for support, also blocked, HHA-2)  Sit-Stand Transfer  Sit-Stand Swisher (Transfers): 2 person assist, moderate assist (50% patient effort), maximum assist (25% patient effort)  Assistive Device (Sit-Stand Transfers): walker, front-wheeled  Stand-Sit Transfer  Stand-Sit Swisher (Transfers): 2 person assist, minimum assist (75% patient effort), moderate assist (50% patient effort)  Assistive Device (Stand-Sit Transfers):  (HHA-2)                 ROM/MMT             Sensory, Edema, Orthotics          Cognition, Communication, Swallow       Outcome Summary             Physical Therapy Education     Title: PT OT SLP Therapies (Done)     Topic: Physical Therapy (Done)     Point: Mobility training (Done)    Learning Progress Summary     Learner Status Readiness Method Response Comment Documented by    Patient Done Acceptance E,TB,D VU,NR educ on focusing on what he CAN do not what he CAN'T do KENY 03/13/18  1424     Done Eager E,TB VU,DU  JR 03/11/18 1306     Done Acceptance E VU,NR   03/10/18 1406     Done Acceptance E,TB,D VU,NR  RW 03/09/18 1528     Done Acceptance E,TB,D VU,NR  RW 03/09/18 1515     Done Acceptance E,D VU,NR  EJ 03/08/18 1502     Done Acceptance E,D VU,DU safety during transfers and ambulation LC 03/04/18 1515     Done Acceptance E VU  KH 03/03/18 1529     Done Acceptance E,TB DU,VU  CW 03/02/18 1555     Done Acceptance E,TB VU  RJ 03/01/18 1647     Active Acceptance E NR  EA 03/01/18 1057     Active Acceptance E NR  EA 02/28/18 1405     Done Acceptance E,TB VU,DU  CW 02/27/18 0950     Active Acceptance E NR  EE 02/26/18 1525    Family Done Eager E,TB VU,DU  JR 03/11/18 1306     Done Acceptance E,D VU,NR  EJ 03/08/18 1502     Done Acceptance E,TB VU  RJ 03/01/18 1647     Active Acceptance E NR  EA 03/01/18 1057    Significant Other Done Acceptance E,TB,D VU,NR   03/09/18 1528     Done Acceptance E,TB,D VU,NR   03/09/18 1515          Point: Home exercise program (Done)    Learning Progress Summary     Learner Status Readiness Method Response Comment Documented by    Patient Done Acceptance E,TB,D VU,NR educ on focusing on what he CAN do not what he CAN'T do  03/13/18 1424     Done Eager E,TB VU,DU   03/11/18 1306     Done Acceptance E VU,NR   03/10/18 1406     Done Acceptance E,TB,D VU,NR   03/09/18 1528     Done Acceptance E,TB,D VU,NR   03/09/18 1515    Family Done Eager E,TB VU,DU   03/11/18 1306    Significant Other Done Acceptance E,TB,D VU,NR   03/09/18 1528     Done Acceptance E,TB,D VU,NR   03/09/18 1515          Point: Body mechanics (Done)    Learning Progress Summary     Learner Status Readiness Method Response Comment Documented by    Patient Done Acceptance E,TB,D VU,NR educ on focusing on what he CAN do not what he CAN'T do  03/13/18 1424     Done Eager JS SEQUEIRA DU JR 03/11/18 1306     Done Acceptance GISELA KOVACS   03/10/18 1406     Done Acceptance JS SEQUEIRA D VU, NR  RW  03/09/18 1528     Done Acceptance E,TB,D VU,NR  RW 03/09/18 1515     Done Acceptance E,D VU,NR  EJ 03/08/18 1502     Active Acceptance E NR  KH 03/03/18 1529     Done Acceptance E,TB DU,VU  CW 03/02/18 1555     Active Acceptance E NR  EA 03/01/18 1057     Active Acceptance E NR  EA 02/28/18 1405     Done Acceptance E,TB VU,DU  CW 02/27/18 0950     Active Acceptance E NR   02/26/18 1525    Family Done Eager E,TB VU,DU   03/11/18 1306     Done Acceptance E,D VU,NR  EJ 03/08/18 1502     Active Acceptance E NR  EA 03/01/18 1057    Significant Other Done Acceptance E,TB,D VU,NR   03/09/18 1528     Done Acceptance E,TB,D VU,NR   03/09/18 1515          Point: Precautions (Done)    Learning Progress Summary     Learner Status Readiness Method Response Comment Documented by    Patient Done Acceptance E,TB,D VU,NR educ on focusing on what he CAN do not what he CAN'T do  03/13/18 1424     Done Eager E,TB VU,DU   03/11/18 1306     Done Acceptance E VU,NR   03/10/18 1406     Done Acceptance E,TB,D VU,NR   03/09/18 1528     Done Acceptance E,TB,D VU,NR   03/09/18 1515     Done Acceptance E VU   03/03/18 1529     Done Acceptance E,TB DU,VU   03/02/18 1555     Done Acceptance E,TB VU  RJ 03/01/18 1647     Done Acceptance E,TB VU,DU   02/27/18 0950    Family Done Eager E,TB VU,DU   03/11/18 1306     Done Acceptance E,TB VU  RJ 03/01/18 1647    Significant Other Done Acceptance E,TB,D VU,NR   03/09/18 1528     Done Acceptance E,TB,D VU,NR   03/09/18 1515                      User Key     Initials Effective Dates Name Provider Type Discipline     02/08/17 -  Alpa Vargas, RN Registered Nurse Nurse    EE 12/01/15 -  Carmen Sarmiento, PT Physical Therapist PT     03/07/18 -  Izabel Lilly, PTA Physical Therapy Assistant PT    EJ 04/21/17 -  Eliane Nova, PT Physical Therapist PT    JR 03/07/18 -  Thuan Arango, PT Physical Therapist PT    SM 03/07/18 -  Anabel Lilly, PTA Physical Therapy  Assistant PT    KH 06/22/16 -  Susan Wiggins, PT Physical Therapist PT    RW 03/07/18 -  Tiffany Goins, PTA Physical Therapy Assistant PT    LC 08/02/16 -  Sudhir Castrejon, PT DPT Physical Therapist PT    CW 12/13/16 - 03/06/18 Jaya Gomez, PTA Physical Therapy Assistant PT    EA 02/05/18 -  Bipin Live, PT Student PT Student PT                    PT Recommendation and Plan     Plan of Care Reviewed With: patient, family  Outcome Summary: pt able to fully stand w/assist of 2, knees blocked; initially fearful, but able to successfully shuffle to chair          Outcome Measures     Row Name 03/12/18 1400 03/11/18 1309          How much help from another person do you currently need...    Turning from your back to your side while in flat bed without using bedrails? 2  -JM 2  -JR     Moving from lying on back to sitting on the side of a flat bed without bedrails? 2  -JM 2  -JR     Moving to and from a bed to a chair (including a wheelchair)? 2  -JM 2  -JR     Standing up from a chair using your arms (e.g., wheelchair, bedside chair)? 2  -JM 2  -JR     Climbing 3-5 steps with a railing? 1  -JM 1  -JR     To walk in hospital room? 1  -JM 1  -JR     AM-PAC 6 Clicks Score 10  -JM 10  -JR       User Key  (r) = Recorded By, (t) = Taken By, (c) = Cosigned By    Initials Name Provider Type    KENY Lilly PTA Physical Therapy Assistant    JR Thuan Arango, PT Physical Therapist           Time Calculation:         PT Charges     Row Name 03/13/18 1406             Time Calculation    Start Time 1320  -      Stop Time 1345  -      Time Calculation (min) 25 min  -KENY      PT Received On 03/13/18  -KENY      PT - Next Appointment 03/14/18  -KENY        User Key  (r) = Recorded By, (t) = Taken By, (c) = Cosigned By    Initials Name Provider Type    KENY Lilly PTA Physical Therapy Assistant          Therapy Charges for Today     Code Description Service Date Service Provider Modifiers Qty    39788216190 HC PT  THER PROC EA 15 MIN 3/12/2018 Izabel Lilly, PTA GP 2    45255668130 HC PT THER SUPP EA 15 MIN 3/12/2018 Izabel Lilly, PTA GP 1    14785401892 HC PT THER PROC EA 15 MIN 3/13/2018 Izabel Lilly PTA GP 2    92874450720 HC PT THER SUPP EA 15 MIN 3/13/2018 Izabel Lilly, PTA GP 2          PT G-Codes  Outcome Measure Options: AM-PAC 6 Clicks Basic Mobility (PT)    Izabel Lilly PTA  3/13/2018

## 2018-03-13 NOTE — PROGRESS NOTES
[facility]    Physicians Statement of Medical Necessity for Ambulance Transportation    It is medically necessary for:    Patient Name: Jesus Beckham    Insurance Information:      To be transported by ambulance:    From (if nursing facility, specify level of care: skilled, long term, etc): [facility]    To (specify level of care if nursing facility): [facility]    Date of Service: 3/13/18    For dialysis patients state date dialysis began:     Diagnosis: Sepsis    Past Medical/Surgical History:  Past Medical History:   Diagnosis Date   • Arthritis    • Cancer of bladder 2016   • Colon polyp    • Diabetes mellitus    • Gout    • Hyperlipidemia    • Irregular heartbeat    • Skin carcinoma    • Type 2 diabetes mellitus    • Vitamin D deficiency       Past Surgical History:   Procedure Laterality Date   • APPENDECTOMY  1961   • CHOLECYSTECTOMY WITH INTRAOPERATIVE CHOLANGIOGRAM N/A 2/25/2018    Procedure: CHOLECYSTECTOMY LAPAROSCOPIC INTRAOPERATIVE CHOLANGIOGRAM;  Surgeon: Maegan Correa MD;  Location: [facility];  Service:    • COLONOSCOPY  2010    h/o of polyps   • EYE SURGERY      1959 - glass removal from eye, lens transplant   • KNEE SURGERY  2006    rt knee   • VENA CAVA FILTER INSERTION Right 3/6/2018    Procedure: VENA CAVA FILTER INSERTION;  Surgeon: Alexis Thakkar MD;  Location: [facility] OR 18/19;  Service:         Current Objective Medical Evidence(including physical exam finding to support reason for limitations):    Immobilization syndrome    Other:     Physician Signature:           (RN,NP,PA,CAN, Discharge Planner) Date/Time:      Printed Name:    __________________________________    [facility]   Phone: [phone] Phone: [phone] Phone: [phone]   Fax: [phone] Fax: [phone] Fax: [phone]

## 2018-03-13 NOTE — PLAN OF CARE
Problem: Skin Injury Risk (Adult)  Intervention: Promote/Optimize Nutrition   03/13/18 1454   Nutrition Interventions   Oral Nutrition Promotion calorie dense liquids provided;nutritional therapy counseling provided

## 2018-03-13 NOTE — DISCHARGE SUMMARY
Name: Jesus Beckham  Age: 77 y.o.  Sex: male  :  1940  MRN: [id number]         Primary Care Physician: Magdy Ricardo MD      Date of Admission:  2018  Date of Discharge:  3/13/2018      CHIEF COMPLAINT  Fever and Fatigue      DISCHARGE DIAGNOSIS  Active Hospital Problems (** Indicates Principal Problem)    Diagnosis Date Noted   • **Acute gangrenous cholecystitis [K81.0] 2018   • Discitis of lumbar region [M46.46] 2018   • Acute deep vein thrombosis of calf, right [I82.4Z1] 2018   • A-fib [I48.91] 2018   • CELINA (obstructive sleep apnea) [G47.33] 2018   • Sepsis due to Escherichia coli [A41.51] 2018   • Diabetic peripheral neuropathy [E11.42] 2016      Resolved Hospital Problems    Diagnosis Date Noted Date Resolved   • Bacteremia due to Escherichia coli [R78.81] 2018   • RUQ abdominal pain [R10.11] 2018   • Sepsis [A41.9] 2018   • Secondary thrombocytopenia [D69.59] 2018   • Hypotension [I95.9] 2018   • Acute respiratory failure with hypoxia [J96.01] 2018   • Leg edema [R60.0] 2018   • RSV (acute bronchiolitis due to respiratory syncytial virus) [J21.0] 2018   • Vitamin D deficiency [E55.9] 2017   • 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] 2016   • Allergic rhinitis due to pollen [J30.1] 2016       SECONDARY DIAGNOSES  Past Medical History:   Diagnosis Date   • Arthritis    • Cancer of bladder    • Colon polyp    • Diabetes mellitus    • Gout    • Hyperlipidemia    • Irregular heartbeat    • Skin carcinoma    • Type 2 diabetes mellitus    • Vitamin D deficiency        CONSULTS   Consult Orders (all)     Start     Ordered    18 1424  Inpatient IV Team Consult  Once     Provider:  (Not yet assigned)    18 1424    18 1015  Inpatient  Case Management  Consult  Once     Provider:  (Not yet assigned)    03/12/18 1015    03/07/18 1904  Inpatient Consult to Hospitalist  Once,   Status:  Canceled     Specialty:  Hospitalist  Provider:  (Not yet assigned)    03/07/18 1903    03/05/18 1544  Inpatient Vascular Surgery Consult  Once     Specialty:  Vascular Surgery  Provider:  Alexis Thakkar MD    03/05/18 1546    03/01/18 1721  Inpatient Rehab Admission Consult  Once     Provider:  (Not yet assigned)    03/01/18 1721    02/28/18 1029  Inpatient Orthopedic Surgery Consult  Once     Specialty:  Orthopedic Surgery  Provider:  Kaiden Valdes MD    02/28/18 1028    02/25/18 1332  Inpatient Consult to Cardiology  Once,   Status:  Canceled     Specialty:  Cardiology  Provider:  Portia Jackson MD    02/25/18 1331    02/25/18 1329  Inpatient Consult to Cardiology  Once     Specialty:  Cardiology  Provider:  Tono Vaughn MD    02/25/18 1329    02/24/18 1046  Inpatient Consult to General Surgery  Once     Specialty:  General Surgery  Provider:  Leighann Dan MD    02/24/18 1046    02/23/18 1443  Inpatient Consult to Infectious Diseases  Once     Specialty:  Infectious Diseases  Provider:  Nicanor Hernandez MD    02/23/18 1442    02/23/18 0447  Inpatient Consult to Pulmonology  Once     Specialty:  Pulmonary Disease  Provider:  Gerber Ryan MD    02/23/18 0447    02/23/18 0121  [facility] (on-call MD unless specified)  Once     Specialty:  Internal Medicine  Provider:  (Not yet assigned)    02/23/18 0120        Consulting Physician(s)     Provider Relationship Specialty    Nicanor Hernandez MD Consulting Physician Infectious Diseases    Evaristo Burnett MD Consulting Physician Pulmonary Disease    Julius Adair MD Consulting Physician Pulmonary Disease    Alexis Thakkar MD Consulting Physician Vascular Surgery            PROCEDURES PERFORMED    Cholecystectomy for gangrenous cholecystitis by   Sunny on February 25 2018    IVC filter placement on March 6, 2018 by Dr. Thakkar    Surgical debridement of L2-L4 as well as irrigation and drainage of epidural abscess by Dr. Marr on March 7, 2018    Upper extremity Doppler    Interpretation Summary   · Acute right lower extremity deep vein thrombosis noted in the peroneal and gastrocnemius/soleal.  · All other right sided veins appeared normal.     CT ANGIOGRAM THORAX WITH CONTRAST, PULMONARY EMBOLISM PROTOCOL     FINDINGS CHEST CT: There is artifact from the patient's upper  extremities which were not elevated during image acquisition. In  addition, respiratory motion artifact on images obtained through the  lung bases degrades image quality and limits assessment of the smaller  branch vessels for pulmonary embolism. Within these limitations, small  right-sided pleural effusion has developed with some adjacent  consolidation of the right lower lobe favored to reflect secondary  atelectasis rather than pneumonia. There is a miniscule left effusion.  Left lung is clear otherwise. There are calcified residua of  granulomatous disease present. The bilateral main pulmonary arteries and  upper lobe branch vessels without any significant filling defects to  indicate pulmonary embolism. The larger basilar branch vessels without  significant embolus either. Some of the smaller branch vessels are  unable to be assessed due to respiratory motion and artifact. Aorta not  well opacified but nonaneurysmal. Normal heart size.        IMPRESSION:  1. No significant pulmonary embolus, the basilar branch vessels cannot  be fully assessed due to the aforementioned artifacts.  2. Small effusions, right greater than left with adjacent right lower  lobe consolidation favored to reflect atelectasis rather than pneumonia.           CT SCANS ABDOMEN AND PELVIS WITH IV CONTRAST     FINDINGS ABDOMEN CT:  Small nonenhancing splenic lesion is stable, favor  small cyst. Gallbladder  surgically absent. The drainage catheter has  been removed. Presumed packing material and fluid still occupies the  gallbladder fossa without significant change in appearance from  previous. A definite, well formed or drainable abscess is not  appreciated but continued follow-up is recommended. There is a stable  small left renal cyst medially. Remaining solid organs have an  unremarkable appearance. Stable duodenal diverticulum. Aorta  nonaneurysmal.      FINDINGS PELVIS CT:  There is a massive amount of colonic stool  suggesting severe constipation. Numerous colonic diverticula. The GI  tract not opacified for assessment but non obstructive in appearance.  The appendix is not identified with certainty on this exam without oral  contrast. Minimal scattered free fluid without loculated pelvic fluid  collection demonstrated.     IMPRESSION:   1. Stable small splenic and left renal lesions, likely cysts.  2. Stable duodenal diverticulum.  3. The gallbladder drainage catheter has been removed, the packing  material and fluid that occupies the gallbladder fossa shows little  change from previous and is again without convincing evidence of a  mature or drainable abscess. Continued surveillance however will be  needed to exclude developing abscess.  4. Severe constipation, colonic diverticulosis.    CT ABDOMEN AND PELVIS WITH IV CONTRAST     HISTORY: 77-year-old male underwent laparoscopic cholecystectomy on  02/25/2018 for gangrenous cholecystitis and cholelithiasis.     TECHNIQUE: CT abdomen and pelvis with intravenous and oral contrast.     COMPARISON: CT chest without contrast 02/23/2018.     FINDINGS: There has been cholecystectomy and there is a drain extending  into the right upper quadrant beneath the liver. There is material  filling the gallbladder fossa. This includes a 4.5 x 2.5 x 2.8 cm low  signal material that may represent bioabsorbable packing material. There  are adjacent tiny bubbles of gas and there is  also surrounding fluid.  There is no mature or drainable collection. Thin band of fluid extends  adjacent to the anterior liver. Minimal edema extends into the hepatic  parenchyma just above anterior margin of the gallbladder fossa. Liver  otherwise appears normal.     Within the medial spleen there is a 1.5 cm low-density lesion. Splenic  size is normal. Adrenal glands, pancreas appear normal. There is a  diverticulum emanating medially from the descending duodenum. A medial  left lower pole renal cyst measures 1.3 cm. Right kidney appears normal  and there is no hydronephrosis. Minimal free fluid extends into the  pelvis. There is no bowel dilatation or obstruction.     IMPRESSION:  1. Patient is 3 days post cholecystectomy with low density packing  material, small bubbles of gas, and fluid filling the gallbladder fossa.  Fluid partially extends into the adjacent hepatic parenchyma and may  represent hemorrhagic material/blood products. There is mild perihepatic  fluid and a small amount of fluid is present within the lower pelvis.  Surgical drain extends into the right upper quadrant. Infected fluid  would be difficult to exclude though there is no evidence for mature or  drainable collection.  2. Tiny renal cysts. Duodenal diverticulum. 15 mm low-density lesion  medial spleen of doubtful significance.      HOSPITAL COURSE    The patient is a 77-year-old male who came to the hospital for fever and fatigue.  Please see history and physical for complete details.  He was admitted to our service for sepsis, acute respiratory failure with hypoxia and was found to have RSV.  He had a complicated hospital course and has been in the hospital for the last 18 days.  Blood cultures came back positive for Escherichia coli which prompted CT abdomen which showed distended gallbladder and stones.  Surgery was consulted and he underwent cholecystectomy by Dr. Correa on February 25, 2018.  This showed gangrenous cholecystitis  with cholelithiasis.  He was continued on Rocephin 2 g every 24 hours.  Postoperatively he went into atrial fibrillation which was controlled.  He then spiked another fever which prompted addition of vancomycin to his antibiotics.  Repeat blood cultures were negative.  He started to complain worsening back pain and spine surgery son.  MRI revealed   discitis L2-L4 and there was concern of epidural abscess due to epidural collection of fluid.  He also had severe stenosis at this level.  He underwent surgical debridement of the disc spaces as well as irrigation and drainage of the epidural abscess by Dr. Marr on March 7, 2018.  Prior to this he was found to have an acute lower extremity calf DVT.  Vascular surgery was consulted to place IVC filter which was done on March 6, 2018.  He was continued off anticoagulation for a few days postoperatively and was restarted initially as heparin drip.  This was transitioned to Eliquis twice a day on March 12, 2018.  He has tolerated this quite well.  He continues with back pain and/or extremity numbness and weakness but this is improving.  He has minimal strength in the lower extremities but has been able to stand and pivot to a chair.  He has continued to work with physical therapy and has been on route for subacute rehabilitation today.  We will place a PICC line today.      He is to continue ceftriaxone 2 g IV every 24 hours for 8 weeks.  The stop date will be May 2, 2018.  He will need weekly CBCs with differential and creatinine to be faxed to the infectious disease clinic at [phone].  He will need to follow up with infectious diseases on May 2, 2018 as well.  Once he is more mobile he needs to follow-up with Dr. Thakkar to discuss removal of the IVC filter.  PHYSICAL EXAM  Temp:  [97.5 °F (36.4 °C)-98.6 °F (37 °C)] 98.6 °F (37 °C)  Heart Rate:  [] 111  Resp:  [16-20] 16  BP: (127-174)/(81-97) 152/97  Body mass index is 37.74 kg/m².  Physical Exam  General  Appearance:    Alert, cooperative, no distress, AAOx3, nice to see in chair and out of bed                          Throat:   Lips, tongue, gums normal; oral mucosa pink and moist                           Neck:    Supple, symmetrical, trachea midline, no JVD                         Lungs:    Clear to auscultation bilaterally, respirations unlabored                          Heart:    Regular rate and rhythm, S1 and S2 normal                  Abdomen:     Soft, non-tender, bowel sounds active                 Extremities:    No cyanosis or edema    CONDITION ON DISCHARGE  Stable.      DISCHARGE DISPOSITION   [facility]      ALLERGIES  No Known Allergies      DISCHARGE MEDICATIONS     Your medication list      START taking these medications      Instructions Last Dose Given Next Dose Due   apixaban 5 MG tablet tablet  Commonly known as:  ELIQUIS      Take 1 tablet by mouth Every 12 (Twelve) Hours.       bisacodyl 5 MG EC tablet  Commonly known as:  DULCOLAX      Take 1 tablet by mouth Daily As Needed for Constipation.       castor oil-balsam peru ointment      Apply 5 g topically Every 12 (Twelve) Hours.       cefTRIAXone 40 MG/ML IVPB  Commonly known as:  ROCEPHIN  Start taking on:  3/14/2018      Infuse 50 mL into a venous catheter Daily for 50 doses.       cyclobenzaprine 10 MG tablet  Commonly known as:  FLEXERIL      Take 1 tablet by mouth Every 8 (Eight) Hours.       HYDROcodone-acetaminophen  MG per tablet  Commonly known as:  NORCO      Take 2 tablets by mouth Every 6 (Six) Hours As Needed for Moderate Pain  for up to 3 days.       ipratropium-albuterol 0.5-2.5 mg/mL nebulizer  Commonly known as:  DUO-NEB      Take 3 mL by nebulization Every 6 (Six) Hours While Awake.       polyethylene glycol pack packet  Commonly known as:  MIRALAX      Take 17 g by mouth Daily As Needed (constipation).          CONTINUE taking these medications      Instructions Last Dose Given Next Dose Due   allopurinol 300 MG  tablet  Commonly known as:  ZYLOPRIM      Take 1 tablet by mouth Daily.       ANDROGEL PUMP 20.25 MG/ACT (1.62%) gel  Generic drug:  Testosterone      2 pump actuation on each shoulder daily       ergocalciferol 57058 units capsule  Commonly known as:  DRISDOL      Take 1 capsule by mouth 1 (One) Time Per Week.       fish oil 1000 MG capsule capsule      Take  by mouth daily with breakfast.       fluticasone 50 MCG/ACT nasal spray  Commonly known as:  FLONASE      2 sprays into each nostril Daily.       glipiZIDE 10 MG tablet  Commonly known as:  GLUCOTROL      Take 1 tablet by mouth 2 (Two) Times a Day Before Meals.       glucose blood test strip      CHECK BLOOD SUGARS 2 TIMES A DAY AS DIRECTED       GLYXAMBI 25-5 MG tablet  Generic drug:  Empagliflozin-Linagliptin      Take 1 tablet by mouth Daily With Breakfast.       Insulin Pen Needle 32G X 4 MM misc      INJECT UP TO THREE TIMES DAILY OR AS DIRECTED       L-Methylfolate-B6-B12 3-35-2 MG tablet      Take 1 tablet by mouth 2 (Two) Times a Day.       LEVEMIR FLEXTOUCH 100 UNIT/ML injection  Generic drug:  insulin detemir      50 units twice daily.       lisinopril 10 MG tablet  Commonly known as:  PRINIVIL,ZESTRIL      Take 1 tablet by mouth Daily.       MULTIVITAMIN ADULT PO      Take  by mouth.       MYRBETRIQ 25 MG tablet sustained-release 24 hour 24 hr tablet  Generic drug:  Mirabegron ER      Take 25 mg by mouth Daily.       pregabalin 100 MG capsule  Commonly known as:  LYRICA      Take 1 capsule by mouth 3 (Three) Times a Day.       rosuvastatin 10 MG tablet  Commonly known as:  CRESTOR      Take 1 tablet by mouth Daily.       TYLENOL PO      Take  by mouth as needed.       vitamin C 500 MG tablet  Commonly known as:  ASCORBIC ACID      Take 500 mg by mouth Daily.          STOP taking these medications    celecoxib 200 MG capsule  Commonly known as:  CELEBREX              Where to Get Your Medications      You can get these medications from any pharmacy     Bring a paper prescription for each of these medications   HYDROcodone-acetaminophen  MG per tablet   pregabalin 100 MG capsule     Information about where to get these medications is not yet available    Ask your nurse or doctor about these medications   apixaban 5 MG tablet tablet   bisacodyl 5 MG EC tablet   castor oil-balsam peru ointment   cefTRIAXone 40 MG/ML IVPB   cyclobenzaprine 10 MG tablet   ipratropium-albuterol 0.5-2.5 mg/mL nebulizer   polyethylene glycol pack packet          Activity Instructions     Discharge Activity       Patient to avoid forward bending and twisting at the waist.  No heavy lifting.  Needs to wear back brace anytime OOB, except with BRP      Future Appointments  Date Time Provider Department Center   3/22/2018 8:00 AM Magdy Ricardo MD [id number] None   3/23/2018 10:15 AM Maegan Correa MD [facility] None   5/2/2018 10:20 AM Pita Harper MD [facility] None   5/8/2018 8:30 AM [facility] None   5/22/2018 9:40 AM DANNY Degroot [facility] None   9/4/2018 8:00 AM [facility] None   9/18/2018 9:40 AM Staci Keyes MD [facility] None     Additional Instructions for the Follow-ups that You Need to Schedule     Discharge Follow-up with Specialty: Orthopedics; 2 Weeks    As directed      Specialty:  Orthopedics    Follow Up:  2 Weeks    Follow Up Details:  Return to the office to see Dr. Kaiden Valdes           Follow-up Information     Magdy Ricardo MD .    Specialty:  Family Medicine  Contact information:  [address]  [facility] [id number] [phone]             Maegan Corrae MD Follow up in 2 week(s).    Specialty:  General Surgery  Why:  Call for appointment.  Contact information:  [address] [facility] [address]  [facility] [id number] [phone]             Pita Harper MD Follow up on 5/2/2018.    Specialty:  Infectious Diseases  Contact information:  [address]  [facility]  [address]  [phone]             Alexis Thakkar MD Follow up in 4 week(s).    Specialty:  Vascular Surgery  Why:  Discuss removing your IVC filter  Contact information:  [address] [address] [phone]                   TEST  RESULTS PENDING AT DISCHARGE  None       CODE STATUS  Full Code        Shaka Willams MD  [facility]  03/13/18  2:46 PM      Time: greater than 30 minutes.

## 2018-03-13 NOTE — PLAN OF CARE
Problem: Fall Risk (Adult)  Goal: Identify Related Risk Factors and Signs and Symptoms  Outcome: Ongoing (interventions implemented as appropriate)   03/13/18 1703   Fall Risk (Adult)   Related Risk Factors (Fall Risk) gait/mobility problems   Signs and Symptoms (Fall Risk) presence of risk factors     Goal: Absence of Fall  Outcome: Ongoing (interventions implemented as appropriate)   03/13/18 1703   Fall Risk (Adult)   Absence of Fall making progress toward outcome     Goal: Identify Related Risk Factors and Signs and Symptoms   03/13/18 1703   Fall Risk (Adult)   Related Risk Factors (Fall Risk) gait/mobility problems   Signs and Symptoms (Fall Risk) presence of risk factors     Goal: Absence of Fall   03/13/18 1703   Fall Risk (Adult)   Absence of Fall making progress toward outcome       Problem: Infection, Risk/Actual (Adult)  Goal: Identify Related Risk Factors and Signs and Symptoms  Outcome: Ongoing (interventions implemented as appropriate)   03/13/18 1703   Infection, Risk/Actual (Adult)   Related Risk Factors (Infection, Risk/Actual) surgery/procedure   Signs and Symptoms (Infection, Risk/Actual) edema;weakness     Goal: Infection Prevention/Resolution  Outcome: Ongoing (interventions implemented as appropriate)   03/13/18 1703   Infection, Risk/Actual (Adult)   Infection Prevention/Resolution making progress toward outcome       Problem: Patient Care Overview  Goal: Interprofessional Rounds/Family Conf  Outcome: Ongoing (interventions implemented as appropriate)   03/13/18 1703   Interdisciplinary Rounds/Family Conf   Summary vss, picc line placed for 8 wks abx, waffle cushion and accumax pump for buttocks dti, maroon/purple in color, skin intact, condom cath for incontinence, inability to move right lower extremity, inability to move left foot. pain controlled with oral medications, plan for discharge today    Participants nursing       Problem: Skin Injury Risk (Adult)  Goal: Identify Related Risk  Factors and Signs and Symptoms  Outcome: Ongoing (interventions implemented as appropriate)   03/13/18 4989   Skin Injury Risk (Adult)   Related Risk Factors (Skin Injury Risk) mobility impaired;hospitalization prolonged

## 2018-03-13 NOTE — PROGRESS NOTES
Continued Stay Note  [facility]     Patient Name: Jesus Beckham  MRN: [id number]  Today's Date: 3/13/2018    Admit Date: 2/22/2018          Discharge Plan     Row Name 03/13/18 1415       Plan    Plan [facility]    Patient/Family in Agreement with Plan yes    Plan Comments Precert obtained and bed available for [facility].  JOE Aly RN              Discharge Codes    No documentation.           Sheila Aly

## 2018-03-13 NOTE — PROGRESS NOTES
Adult Nutrition  Assessment/PES    Patient Name:  Jesus Beckham  YOB: 1940  MRN: [id number]  Admit Date:  2/22/2018    Assessment Date:  3/13/2018    Follow up- now with right anterior coccyx pressure injury-deep tissue injury.  Patient eating well, agreed to try linwood BID to help promote skin integrity.           Adult Nutrition Assessment     Row Name 03/13/18 1451       Nutrition Prescription PO    Common Modifiers Consistent Carbohydrate       PO Evaluation    % PO Intake eating well    Row Name 03/13/18 1450       Reason for Assessment    Reason For Assessment follow-up protocol       Labs/Procedures/Meds    Lab Results Reviewed reviewed, pertinent       Physical Findings    Skin pressure injury   HAPU-deep tissue injury       Nutrition Prescription PO    Common Modifiers Consistent Carbohydrate          Problem/Interventions:                  Intervention Goal     Row Name 03/13/18 1451       Intervention Goal    General Maintain nutrition;Meet nutritional needs for age/condition    PO Tolerate PO;Maintain intake    Weight Maintain weight            Nutrition Intervention     Row Name 03/13/18 1451       Nutrition Intervention    RD/Tech Action Interview for preference;Follow Tx progress;Care plan reviewd;Encourage intake;Supplement provided            Nutrition Prescription     Row Name 03/13/18 1451       Nutrition Prescription PO    PO Prescription Begin/change supplement    Supplement Linwood    Supplement Frequency 2 times a day    New PO Prescription Ordered? Yes            Education/Evaluation     Row Name 03/13/18 1451       Education    Education Will Instruct as appropriate       Monitor/Evaluation    Monitor Per protocol;I&O;PO intake;Pertinent labs;Weight;Skin status;Symptoms    Education Follow-up Reinforce PRN        Electronically signed by:  Britney Chance RD  03/13/18 2:52 PM

## 2018-03-13 NOTE — PROGRESS NOTES
Continued Stay Note  [facility]     Patient Name: Jesus Beckham  MRN: [id number]  Today's Date: 3/13/2018    Admit Date: 2/22/2018          Discharge Plan     Row Name 03/13/18 0927       Plan    Plan [facility] pending precert    Patient/Family in Agreement with Plan yes    Plan Comments Met with pt and wife at bedside who confirm plan for [facility] at discharge.  Precert started.  Wheelchair van from [facility] to transport per pt, confirmed with [facility].  JOE Aly Rn              Discharge Codes    No documentation.           Sheila Aly

## 2018-03-13 NOTE — PLAN OF CARE
Problem: Fall Risk (Adult)  Goal: Identify Related Risk Factors and Signs and Symptoms  Outcome: Ongoing (interventions implemented as appropriate)    Goal: Absence of Fall  Outcome: Ongoing (interventions implemented as appropriate)    Goal: Identify Related Risk Factors and Signs and Symptoms  Outcome: Ongoing (interventions implemented as appropriate)    Goal: Absence of Fall  Outcome: Ongoing (interventions implemented as appropriate)      Problem: Patient Care Overview  Goal: Plan of Care Review  Outcome: Ongoing (interventions implemented as appropriate)   03/13/18 0150   Coping/Psychosocial   Plan of Care Reviewed With patient   Plan of Care Review   Progress improving   OTHER   Outcome Summary VSS, pain controlled with oral meds, condom cath to bedside drainage, will continue to monitor.

## 2018-03-13 NOTE — PLAN OF CARE
Problem: Patient Care Overview  Goal: Plan of Care Review  Outcome: Ongoing (interventions implemented as appropriate)   03/13/18 9085   Coping/Psychosocial   Plan of Care Reviewed With patient;family   OTHER   Outcome Summary pt able to fully stand w/assist of 2, knees blocked; initially fearful, but able to successfully shuffle to chair

## 2018-03-14 NOTE — PROGRESS NOTES
Case Management Discharge Note    Final Note: Pt discharged to [facility] for skilled rehab.     Destination - Selection Complete     Service Request Status Selected Specialties Address Phone Number Fax Number    [facility] Selected Skilled Nursing Facility [address], [facility] [address] [phone]    [facility] Pending - Request Sent N/A [address] [address] [phone]    [facility] Pending - Request Sent N/A [address] [address] [phone]      Durable Medical Equipment     No service coordination in this encounter.      Dialysis/Infusion     No service coordination in this encounter.      Home Medical Care     No service coordination in this encounter.      Social Care     No service coordination in this encounter.        Ambulance: Yellow    Final Discharge Disposition Code: 03 - skilled nursing facility (SNF)

## 2018-03-15 ENCOUNTER — HOSPITAL ENCOUNTER (OUTPATIENT)
Facility: HOSPITAL | Age: 78
Setting detail: OBSERVATION
LOS: 1 days | Discharge: NURSING, REHAB OR LONG-TERM CARE | End: 2018-03-20
Attending: EMERGENCY MEDICINE | Admitting: HOSPITALIST

## 2018-03-15 ENCOUNTER — APPOINTMENT (OUTPATIENT)
Dept: GENERAL RADIOLOGY | Facility: HOSPITAL | Age: 78
End: 2018-03-15

## 2018-03-15 DIAGNOSIS — I48.91 ATRIAL FIBRILLATION WITH RVR (HCC): Primary | ICD-10-CM

## 2018-03-15 DIAGNOSIS — I95.9 HYPOTENSION, UNSPECIFIED HYPOTENSION TYPE: ICD-10-CM

## 2018-03-15 DIAGNOSIS — R26.89 DECREASED MOBILITY: ICD-10-CM

## 2018-03-15 DIAGNOSIS — Z86.19 HISTORY OF SEPSIS: ICD-10-CM

## 2018-03-15 DIAGNOSIS — R77.8 ELEVATED TROPONIN: ICD-10-CM

## 2018-03-15 LAB
BASOPHILS # BLD AUTO: 0.02 10*3/MM3 (ref 0–0.2)
BASOPHILS NFR BLD AUTO: 0.1 % (ref 0–1.5)
DEPRECATED RDW RBC AUTO: 51.1 FL (ref 37–54)
EOSINOPHIL # BLD AUTO: 0.05 10*3/MM3 (ref 0–0.7)
EOSINOPHIL NFR BLD AUTO: 0.4 % (ref 0.3–6.2)
ERYTHROCYTE [DISTWIDTH] IN BLOOD BY AUTOMATED COUNT: 14.9 % (ref 11.5–14.5)
GLUCOSE BLDC GLUCOMTR-MCNC: 270 MG/DL (ref 70–130)
HCT VFR BLD AUTO: 40.6 % (ref 40.4–52.2)
HGB BLD-MCNC: 12.9 G/DL (ref 13.7–17.6)
IMM GRANULOCYTES # BLD: 0.19 10*3/MM3 (ref 0–0.03)
IMM GRANULOCYTES NFR BLD: 1.4 % (ref 0–0.5)
LYMPHOCYTES # BLD AUTO: 1.25 10*3/MM3 (ref 0.9–4.8)
LYMPHOCYTES NFR BLD AUTO: 9.1 % (ref 19.6–45.3)
MCH RBC QN AUTO: 30.4 PG (ref 27–32.7)
MCHC RBC AUTO-ENTMCNC: 31.8 G/DL (ref 32.6–36.4)
MCV RBC AUTO: 95.5 FL (ref 79.8–96.2)
MONOCYTES # BLD AUTO: 0.82 10*3/MM3 (ref 0.2–1.2)
MONOCYTES NFR BLD AUTO: 5.9 % (ref 5–12)
NEUTROPHILS # BLD AUTO: 11.48 10*3/MM3 (ref 1.9–8.1)
NEUTROPHILS NFR BLD AUTO: 83.1 % (ref 42.7–76)
PLATELET # BLD AUTO: 299 10*3/MM3 (ref 140–500)
PMV BLD AUTO: 11.8 FL (ref 6–12)
RBC # BLD AUTO: 4.25 10*6/MM3 (ref 4.6–6)
WBC NRBC COR # BLD: 13.81 10*3/MM3 (ref 4.5–10.7)

## 2018-03-15 PROCEDURE — 83605 ASSAY OF LACTIC ACID: CPT | Performed by: NURSE PRACTITIONER

## 2018-03-15 PROCEDURE — 87040 BLOOD CULTURE FOR BACTERIA: CPT | Performed by: NURSE PRACTITIONER

## 2018-03-15 PROCEDURE — G0378 HOSPITAL OBSERVATION PER HR: HCPCS

## 2018-03-15 PROCEDURE — 83036 HEMOGLOBIN GLYCOSYLATED A1C: CPT | Performed by: HOSPITALIST

## 2018-03-15 PROCEDURE — 96360 HYDRATION IV INFUSION INIT: CPT

## 2018-03-15 PROCEDURE — 84145 PROCALCITONIN (PCT): CPT | Performed by: NURSE PRACTITIONER

## 2018-03-15 PROCEDURE — 84484 ASSAY OF TROPONIN QUANT: CPT | Performed by: NURSE PRACTITIONER

## 2018-03-15 PROCEDURE — 99284 EMERGENCY DEPT VISIT MOD MDM: CPT

## 2018-03-15 PROCEDURE — 80053 COMPREHEN METABOLIC PANEL: CPT | Performed by: NURSE PRACTITIONER

## 2018-03-15 PROCEDURE — 81003 URINALYSIS AUTO W/O SCOPE: CPT | Performed by: NURSE PRACTITIONER

## 2018-03-15 PROCEDURE — 96361 HYDRATE IV INFUSION ADD-ON: CPT

## 2018-03-15 PROCEDURE — 82962 GLUCOSE BLOOD TEST: CPT

## 2018-03-15 PROCEDURE — 83880 ASSAY OF NATRIURETIC PEPTIDE: CPT | Performed by: NURSE PRACTITIONER

## 2018-03-15 PROCEDURE — 71046 X-RAY EXAM CHEST 2 VIEWS: CPT

## 2018-03-15 PROCEDURE — 85025 COMPLETE CBC W/AUTO DIFF WBC: CPT | Performed by: NURSE PRACTITIONER

## 2018-03-15 RX ADMIN — SODIUM CHLORIDE 2190 ML: 9 INJECTION, SOLUTION INTRAVENOUS at 23:43

## 2018-03-16 PROBLEM — I48.91 ATRIAL FIBRILLATION WITH RVR: Status: ACTIVE | Noted: 2018-03-16

## 2018-03-16 PROBLEM — R77.8 ELEVATED TROPONIN: Status: ACTIVE | Noted: 2018-03-16

## 2018-03-16 PROBLEM — R79.89 ELEVATED TROPONIN: Status: ACTIVE | Noted: 2018-03-16

## 2018-03-16 PROBLEM — Z86.19 HISTORY OF SEPSIS: Status: ACTIVE | Noted: 2018-03-16

## 2018-03-16 LAB
ALBUMIN SERPL-MCNC: 2.8 G/DL (ref 3.5–5.2)
ALBUMIN/GLOB SERPL: 0.8 G/DL
ALP SERPL-CCNC: 122 U/L (ref 39–117)
ALT SERPL W P-5'-P-CCNC: 22 U/L (ref 1–41)
ANION GAP SERPL CALCULATED.3IONS-SCNC: 11.7 MMOL/L
ANION GAP SERPL CALCULATED.3IONS-SCNC: 14.1 MMOL/L
AST SERPL-CCNC: 28 U/L (ref 1–40)
BILIRUB SERPL-MCNC: 0.5 MG/DL (ref 0.1–1.2)
BILIRUB UR QL STRIP: NEGATIVE
BUN BLD-MCNC: 26 MG/DL (ref 8–23)
BUN BLD-MCNC: 28 MG/DL (ref 8–23)
BUN/CREAT SERPL: 24.3 (ref 7–25)
BUN/CREAT SERPL: 28.3 (ref 7–25)
CALCIUM SPEC-SCNC: 8.5 MG/DL (ref 8.6–10.5)
CALCIUM SPEC-SCNC: 9.2 MG/DL (ref 8.6–10.5)
CHLORIDE SERPL-SCNC: 100 MMOL/L (ref 98–107)
CHLORIDE SERPL-SCNC: 94 MMOL/L (ref 98–107)
CLARITY UR: CLEAR
CO2 SERPL-SCNC: 26.3 MMOL/L (ref 22–29)
CO2 SERPL-SCNC: 27.9 MMOL/L (ref 22–29)
COLOR UR: YELLOW
CREAT BLD-MCNC: 0.92 MG/DL (ref 0.76–1.27)
CREAT BLD-MCNC: 1.15 MG/DL (ref 0.76–1.27)
D-LACTATE SERPL-SCNC: 1 MMOL/L (ref 0.5–2)
D-LACTATE SERPL-SCNC: 2.1 MMOL/L (ref 0.5–2)
DEPRECATED RDW RBC AUTO: 50.6 FL (ref 37–54)
ERYTHROCYTE [DISTWIDTH] IN BLOOD BY AUTOMATED COUNT: 14.8 % (ref 11.5–14.5)
GFR SERPL CREATININE-BSD FRML MDRD: 62 ML/MIN/1.73
GFR SERPL CREATININE-BSD FRML MDRD: 80 ML/MIN/1.73
GLOBULIN UR ELPH-MCNC: 3.3 GM/DL
GLUCOSE BLD-MCNC: 194 MG/DL (ref 65–99)
GLUCOSE BLD-MCNC: 280 MG/DL (ref 65–99)
GLUCOSE BLDC GLUCOMTR-MCNC: 154 MG/DL (ref 70–130)
GLUCOSE BLDC GLUCOMTR-MCNC: 173 MG/DL (ref 70–130)
GLUCOSE BLDC GLUCOMTR-MCNC: 182 MG/DL (ref 70–130)
GLUCOSE BLDC GLUCOMTR-MCNC: 197 MG/DL (ref 70–130)
GLUCOSE UR STRIP-MCNC: ABNORMAL MG/DL
HBA1C MFR BLD: 7.9 % (ref 4.8–5.6)
HCT VFR BLD AUTO: 34.1 % (ref 40.4–52.2)
HGB BLD-MCNC: 10.7 G/DL (ref 13.7–17.6)
HGB UR QL STRIP.AUTO: NEGATIVE
HOLD SPECIMEN: NORMAL
KETONES UR QL STRIP: NEGATIVE
LEUKOCYTE ESTERASE UR QL STRIP.AUTO: NEGATIVE
MAGNESIUM SERPL-MCNC: 1.9 MG/DL (ref 1.6–2.4)
MCH RBC QN AUTO: 30 PG (ref 27–32.7)
MCHC RBC AUTO-ENTMCNC: 31.4 G/DL (ref 32.6–36.4)
MCV RBC AUTO: 95.5 FL (ref 79.8–96.2)
NITRITE UR QL STRIP: NEGATIVE
NT-PROBNP SERPL-MCNC: 2134 PG/ML (ref 0–1800)
PH UR STRIP.AUTO: 5.5 [PH] (ref 5–8)
PHOSPHATE SERPL-MCNC: 4.9 MG/DL (ref 2.5–4.5)
PLATELET # BLD AUTO: 249 10*3/MM3 (ref 140–500)
PMV BLD AUTO: 11 FL (ref 6–12)
POTASSIUM BLD-SCNC: 4.6 MMOL/L (ref 3.5–5.2)
POTASSIUM BLD-SCNC: 5.4 MMOL/L (ref 3.5–5.2)
PROCALCITONIN SERPL-MCNC: 0.45 NG/ML (ref 0.1–0.25)
PROT SERPL-MCNC: 6.1 G/DL (ref 6–8.5)
PROT UR QL STRIP: NEGATIVE
RBC # BLD AUTO: 3.57 10*6/MM3 (ref 4.6–6)
SODIUM BLD-SCNC: 136 MMOL/L (ref 136–145)
SODIUM BLD-SCNC: 138 MMOL/L (ref 136–145)
SP GR UR STRIP: >=1.03 (ref 1–1.03)
TROPONIN T SERPL-MCNC: 0.03 NG/ML (ref 0–0.03)
TROPONIN T SERPL-MCNC: 0.05 NG/ML (ref 0–0.03)
UROBILINOGEN UR QL STRIP: ABNORMAL
WBC NRBC COR # BLD: 10.35 10*3/MM3 (ref 4.5–10.7)

## 2018-03-16 PROCEDURE — 83735 ASSAY OF MAGNESIUM: CPT | Performed by: HOSPITALIST

## 2018-03-16 PROCEDURE — 96365 THER/PROPH/DIAG IV INF INIT: CPT

## 2018-03-16 PROCEDURE — 94760 N-INVAS EAR/PLS OXIMETRY 1: CPT

## 2018-03-16 PROCEDURE — G0378 HOSPITAL OBSERVATION PER HR: HCPCS

## 2018-03-16 PROCEDURE — 93005 ELECTROCARDIOGRAM TRACING: CPT | Performed by: INTERNAL MEDICINE

## 2018-03-16 PROCEDURE — 82962 GLUCOSE BLOOD TEST: CPT

## 2018-03-16 PROCEDURE — 63710000001 INSULIN DETEMER PER 5 UNITS: Performed by: HOSPITALIST

## 2018-03-16 PROCEDURE — 84100 ASSAY OF PHOSPHORUS: CPT | Performed by: HOSPITALIST

## 2018-03-16 PROCEDURE — 85027 COMPLETE CBC AUTOMATED: CPT | Performed by: HOSPITALIST

## 2018-03-16 PROCEDURE — 99214 OFFICE O/P EST MOD 30 MIN: CPT | Performed by: INTERNAL MEDICINE

## 2018-03-16 PROCEDURE — 94799 UNLISTED PULMONARY SVC/PX: CPT

## 2018-03-16 PROCEDURE — 87040 BLOOD CULTURE FOR BACTERIA: CPT | Performed by: NURSE PRACTITIONER

## 2018-03-16 PROCEDURE — 83605 ASSAY OF LACTIC ACID: CPT | Performed by: NURSE PRACTITIONER

## 2018-03-16 PROCEDURE — 93005 ELECTROCARDIOGRAM TRACING: CPT | Performed by: NURSE PRACTITIONER

## 2018-03-16 PROCEDURE — 80048 BASIC METABOLIC PNL TOTAL CA: CPT | Performed by: HOSPITALIST

## 2018-03-16 PROCEDURE — 93010 ELECTROCARDIOGRAM REPORT: CPT | Performed by: INTERNAL MEDICINE

## 2018-03-16 PROCEDURE — 63710000001 INSULIN ASPART PER 5 UNITS: Performed by: HOSPITALIST

## 2018-03-16 PROCEDURE — 84484 ASSAY OF TROPONIN QUANT: CPT | Performed by: INTERNAL MEDICINE

## 2018-03-16 PROCEDURE — 25010000003 CEFTRIAXONE PER 250 MG: Performed by: HOSPITALIST

## 2018-03-16 RX ORDER — ALLOPURINOL 300 MG/1
300 TABLET ORAL DAILY
Status: DISCONTINUED | OUTPATIENT
Start: 2018-03-16 | End: 2018-03-20 | Stop reason: HOSPADM

## 2018-03-16 RX ORDER — OXYCODONE AND ACETAMINOPHEN 10; 325 MG/1; MG/1
1 TABLET ORAL ONCE AS NEEDED
Status: COMPLETED | OUTPATIENT
Start: 2018-03-16 | End: 2018-03-16

## 2018-03-16 RX ORDER — ASCORBIC ACID 500 MG
500 TABLET ORAL DAILY
Status: DISCONTINUED | OUTPATIENT
Start: 2018-03-16 | End: 2018-03-20 | Stop reason: HOSPADM

## 2018-03-16 RX ORDER — DIGOXIN 125 MCG
125 TABLET ORAL DAILY
Status: DISCONTINUED | OUTPATIENT
Start: 2018-03-17 | End: 2018-03-20 | Stop reason: HOSPADM

## 2018-03-16 RX ORDER — ONDANSETRON 2 MG/ML
4 INJECTION INTRAMUSCULAR; INTRAVENOUS EVERY 6 HOURS PRN
Status: DISCONTINUED | OUTPATIENT
Start: 2018-03-16 | End: 2018-03-20 | Stop reason: HOSPADM

## 2018-03-16 RX ORDER — IPRATROPIUM BROMIDE AND ALBUTEROL SULFATE 2.5; .5 MG/3ML; MG/3ML
3 SOLUTION RESPIRATORY (INHALATION)
Status: DISCONTINUED | OUTPATIENT
Start: 2018-03-16 | End: 2018-03-20 | Stop reason: HOSPADM

## 2018-03-16 RX ORDER — DIGOXIN 250 MCG
250 TABLET ORAL
Status: DISCONTINUED | OUTPATIENT
Start: 2018-03-16 | End: 2018-03-16

## 2018-03-16 RX ORDER — ROSUVASTATIN CALCIUM 10 MG/1
10 TABLET, COATED ORAL DAILY
Status: DISCONTINUED | OUTPATIENT
Start: 2018-03-16 | End: 2018-03-20 | Stop reason: HOSPADM

## 2018-03-16 RX ORDER — DEXTROSE MONOHYDRATE 25 G/50ML
25 INJECTION, SOLUTION INTRAVENOUS
Status: DISCONTINUED | OUTPATIENT
Start: 2018-03-16 | End: 2018-03-20 | Stop reason: HOSPADM

## 2018-03-16 RX ORDER — BISACODYL 5 MG/1
5 TABLET, DELAYED RELEASE ORAL DAILY PRN
Status: DISCONTINUED | OUTPATIENT
Start: 2018-03-16 | End: 2018-03-20 | Stop reason: HOSPADM

## 2018-03-16 RX ORDER — SODIUM CHLORIDE 0.9 % (FLUSH) 0.9 %
1-10 SYRINGE (ML) INJECTION AS NEEDED
Status: DISCONTINUED | OUTPATIENT
Start: 2018-03-16 | End: 2018-03-20 | Stop reason: HOSPADM

## 2018-03-16 RX ORDER — NITROGLYCERIN 0.4 MG/1
0.4 TABLET SUBLINGUAL
Status: DISCONTINUED | OUTPATIENT
Start: 2018-03-16 | End: 2018-03-20 | Stop reason: HOSPADM

## 2018-03-16 RX ORDER — DIGOXIN 125 MCG
125 TABLET ORAL DAILY
Status: DISCONTINUED | OUTPATIENT
Start: 2018-03-16 | End: 2018-03-16

## 2018-03-16 RX ORDER — BISACODYL 10 MG
10 SUPPOSITORY, RECTAL RECTAL DAILY PRN
Status: DISCONTINUED | OUTPATIENT
Start: 2018-03-16 | End: 2018-03-20 | Stop reason: HOSPADM

## 2018-03-16 RX ORDER — NICOTINE POLACRILEX 4 MG
15 LOZENGE BUCCAL
Status: DISCONTINUED | OUTPATIENT
Start: 2018-03-16 | End: 2018-03-20 | Stop reason: HOSPADM

## 2018-03-16 RX ORDER — ONDANSETRON 4 MG/1
4 TABLET, FILM COATED ORAL EVERY 6 HOURS PRN
Status: DISCONTINUED | OUTPATIENT
Start: 2018-03-16 | End: 2018-03-20 | Stop reason: HOSPADM

## 2018-03-16 RX ORDER — CEFTRIAXONE SODIUM 2 G/50ML
2 INJECTION, SOLUTION INTRAVENOUS EVERY 24 HOURS
Status: DISCONTINUED | OUTPATIENT
Start: 2018-03-16 | End: 2018-03-20 | Stop reason: HOSPADM

## 2018-03-16 RX ORDER — CYCLOBENZAPRINE HCL 10 MG
10 TABLET ORAL EVERY 8 HOURS SCHEDULED
Status: DISCONTINUED | OUTPATIENT
Start: 2018-03-16 | End: 2018-03-20 | Stop reason: HOSPADM

## 2018-03-16 RX ORDER — ACETAMINOPHEN 325 MG/1
650 TABLET ORAL EVERY 4 HOURS PRN
Status: DISCONTINUED | OUTPATIENT
Start: 2018-03-16 | End: 2018-03-20 | Stop reason: HOSPADM

## 2018-03-16 RX ORDER — ONDANSETRON 4 MG/1
4 TABLET, ORALLY DISINTEGRATING ORAL EVERY 6 HOURS PRN
Status: DISCONTINUED | OUTPATIENT
Start: 2018-03-16 | End: 2018-03-20 | Stop reason: HOSPADM

## 2018-03-16 RX ORDER — HYDROCODONE BITARTRATE AND ACETAMINOPHEN 10; 325 MG/1; MG/1
2 TABLET ORAL EVERY 6 HOURS PRN
Status: DISCONTINUED | OUTPATIENT
Start: 2018-03-16 | End: 2018-03-20

## 2018-03-16 RX ORDER — ACETAMINOPHEN 325 MG/1
650 TABLET ORAL EVERY 6 HOURS PRN
Status: DISCONTINUED | OUTPATIENT
Start: 2018-03-16 | End: 2018-03-20 | Stop reason: HOSPADM

## 2018-03-16 RX ORDER — PREGABALIN 100 MG/1
100 CAPSULE ORAL 3 TIMES DAILY
Status: DISCONTINUED | OUTPATIENT
Start: 2018-03-16 | End: 2018-03-20 | Stop reason: HOSPADM

## 2018-03-16 RX ADMIN — IPRATROPIUM BROMIDE AND ALBUTEROL SULFATE 3 ML: .5; 3 SOLUTION RESPIRATORY (INHALATION) at 12:00

## 2018-03-16 RX ADMIN — PREGABALIN 100 MG: 100 CAPSULE ORAL at 08:35

## 2018-03-16 RX ADMIN — CYCLOBENZAPRINE 10 MG: 10 TABLET, FILM COATED ORAL at 13:56

## 2018-03-16 RX ADMIN — ROSUVASTATIN CALCIUM 10 MG: 10 TABLET, FILM COATED ORAL at 08:34

## 2018-03-16 RX ADMIN — CYCLOBENZAPRINE 10 MG: 10 TABLET, FILM COATED ORAL at 06:05

## 2018-03-16 RX ADMIN — CEFTRIAXONE SODIUM 2 G: 2 INJECTION, SOLUTION INTRAVENOUS at 03:20

## 2018-03-16 RX ADMIN — DIGOXIN 250 MCG: 250 TABLET ORAL at 14:36

## 2018-03-16 RX ADMIN — CYCLOBENZAPRINE 10 MG: 10 TABLET, FILM COATED ORAL at 20:52

## 2018-03-16 RX ADMIN — INSULIN ASPART 2 UNITS: 100 INJECTION, SOLUTION INTRAVENOUS; SUBCUTANEOUS at 17:20

## 2018-03-16 RX ADMIN — APIXABAN 5 MG: 5 TABLET, FILM COATED ORAL at 20:52

## 2018-03-16 RX ADMIN — OXYCODONE HYDROCHLORIDE AND ACETAMINOPHEN 500 MG: 500 TABLET ORAL at 08:34

## 2018-03-16 RX ADMIN — ALLOPURINOL 300 MG: 300 TABLET ORAL at 08:34

## 2018-03-16 RX ADMIN — APIXABAN 5 MG: 5 TABLET, FILM COATED ORAL at 08:34

## 2018-03-16 RX ADMIN — APIXABAN 5 MG: 5 TABLET, FILM COATED ORAL at 03:20

## 2018-03-16 RX ADMIN — IPRATROPIUM BROMIDE AND ALBUTEROL SULFATE 3 ML: .5; 3 SOLUTION RESPIRATORY (INHALATION) at 20:34

## 2018-03-16 RX ADMIN — INSULIN DETEMIR 45 UNITS: 100 INJECTION, SOLUTION SUBCUTANEOUS at 22:32

## 2018-03-16 RX ADMIN — INSULIN ASPART 2 UNITS: 100 INJECTION, SOLUTION INTRAVENOUS; SUBCUTANEOUS at 11:58

## 2018-03-16 RX ADMIN — INSULIN ASPART 2 UNITS: 100 INJECTION, SOLUTION INTRAVENOUS; SUBCUTANEOUS at 08:35

## 2018-03-16 RX ADMIN — PREGABALIN 100 MG: 100 CAPSULE ORAL at 20:52

## 2018-03-16 RX ADMIN — HYDROCODONE BITARTRATE AND ACETAMINOPHEN 2 TABLET: 10; 325 TABLET ORAL at 06:19

## 2018-03-16 RX ADMIN — INSULIN DETEMIR 45 UNITS: 100 INJECTION, SOLUTION SUBCUTANEOUS at 08:35

## 2018-03-16 RX ADMIN — OXYCODONE HYDROCHLORIDE AND ACETAMINOPHEN 1 TABLET: 10; 325 TABLET ORAL at 01:07

## 2018-03-16 RX ADMIN — INSULIN ASPART 2 UNITS: 100 INJECTION, SOLUTION INTRAVENOUS; SUBCUTANEOUS at 22:32

## 2018-03-16 RX ADMIN — HYDROCODONE BITARTRATE AND ACETAMINOPHEN 2 TABLET: 10; 325 TABLET ORAL at 15:10

## 2018-03-16 RX ADMIN — PREGABALIN 100 MG: 100 CAPSULE ORAL at 15:51

## 2018-03-16 NOTE — NURSING NOTE
"Patient family member questioned the frequency of troponin lab draw. This nurse informed the order for the lab draw was entered as needed. Patient family member states \"if there are any complications we will see you in court.\" This nurse informed the charge nurse and the cardiologist that was consulted to see the patient today. Patient in bed eating lunch, family at bedside will continue to monitor patient.  "

## 2018-03-16 NOTE — ED PROVIDER NOTES
EMERGENCY DEPARTMENT ENCOUNTER    Room Number:  11/11  Date seen:  3/16/2018  Time seen: 10:25 PM  PCP: Magdy Ricardo MD    HPI:  Chief complaint:Rapid Heart Rate  Context:Jesus eBckham is a 77 y.o. male who presents to the ED from the NH after becoming tachycardic, diaphoretic and hypotensive earlier this afternoon. Patient held off on coming to the ED for several hours, thinking his symptoms would resolve. Patient is also c/o lower back pain and hip pain (L>R). There are no c/o chest pain, SOA, nausea, vomiting, diarrhea, difficulty urinating or other sx at this time. Patient has chronic incontinence. Patient has known sepsis and was recently d/c from here 2 days ago after being admitted for fever and fatigue.     Timing:constant  Duration: since PTA  Quality:tachycardia  Intensity/Severity:moderate  Associated Symptoms:diaphoresis, hypotension, lower back pain, hip pain  Aggravating Factors:none stated  Alleviating Factors:none stated  Previous Episodes: Patient has known sepsis and was recently d/c from here 2 days ago after being admitted for fever and fatigue  Treatment before arrival:none stated    MEDICAL RECORD REVIEW    Date of Admission:  2/22/2018  Date of Discharge:  3/13/2018    Cholecystectomy for gangrenous cholecystitis by Dr. Correa on February 25 2018     IVC filter placement on March 6, 2018 by Dr. Thakkar     Surgical debridement of L2-L4 as well as irrigation and drainage of epidural abscess by Dr. Marr on March 7, 2018     Upper extremity Doppler     Interpretation Summary   · Acute right lower extremity deep vein thrombosis noted in the peroneal and gastrocnemius/soleal.  · All other right sided veins appeared normal.      CT ANGIOGRAM THORAX WITH CONTRAST, PULMONARY EMBOLISM PROTOCOL     FINDINGS CHEST CT: There is artifact from the patient's upper  extremities which were not elevated during image acquisition. In  addition, respiratory motion artifact on images obtained through  the  lung bases degrades image quality and limits assessment of the smaller  branch vessels for pulmonary embolism. Within these limitations, small  right-sided pleural effusion has developed with some adjacent  consolidation of the right lower lobe favored to reflect secondary  atelectasis rather than pneumonia. There is a miniscule left effusion.  Left lung is clear otherwise. There are calcified residua of  granulomatous disease present. The bilateral main pulmonary arteries and  upper lobe branch vessels without any significant filling defects to  indicate pulmonary embolism. The larger basilar branch vessels without  significant embolus either. Some of the smaller branch vessels are  unable to be assessed due to respiratory motion and artifact. Aorta not  well opacified but nonaneurysmal. Normal heart size.        IMPRESSION:  1. No significant pulmonary embolus, the basilar branch vessels cannot  be fully assessed due to the aforementioned artifacts.  2. Small effusions, right greater than left with adjacent right lower  lobe consolidation favored to reflect atelectasis rather than pneumonia.           CT SCANS ABDOMEN AND PELVIS WITH IV CONTRAST     FINDINGS ABDOMEN CT:  Small nonenhancing splenic lesion is stable, favor  small cyst. Gallbladder surgically absent. The drainage catheter has  been removed. Presumed packing material and fluid still occupies the  gallbladder fossa without significant change in appearance from  previous. A definite, well formed or drainable abscess is not  appreciated but continued follow-up is recommended. There is a stable  small left renal cyst medially. Remaining solid organs have an  unremarkable appearance. Stable duodenal diverticulum. Aorta  nonaneurysmal.      FINDINGS PELVIS CT:  There is a massive amount of colonic stool  suggesting severe constipation. Numerous colonic diverticula. The GI  tract not opacified for assessment but non obstructive in appearance.  The  appendix is not identified with certainty on this exam without oral  contrast. Minimal scattered free fluid without loculated pelvic fluid  collection demonstrated.     IMPRESSION:   1. Stable small splenic and left renal lesions, likely cysts.  2. Stable duodenal diverticulum.  3. The gallbladder drainage catheter has been removed, the packing  material and fluid that occupies the gallbladder fossa shows little  change from previous and is again without convincing evidence of a  mature or drainable abscess. Continued surveillance however will be  needed to exclude developing abscess.  4. Severe constipation, colonic diverticulosis.     CT ABDOMEN AND PELVIS WITH IV CONTRAST     HISTORY: 77-year-old male underwent laparoscopic cholecystectomy on  02/25/2018 for gangrenous cholecystitis and cholelithiasis.     TECHNIQUE: CT abdomen and pelvis with intravenous and oral contrast.     COMPARISON: CT chest without contrast 02/23/2018.     FINDINGS: There has been cholecystectomy and there is a drain extending  into the right upper quadrant beneath the liver. There is material  filling the gallbladder fossa. This includes a 4.5 x 2.5 x 2.8 cm low  signal material that may represent bioabsorbable packing material. There  are adjacent tiny bubbles of gas and there is also surrounding fluid.  There is no mature or drainable collection. Thin band of fluid extends  adjacent to the anterior liver. Minimal edema extends into the hepatic  parenchyma just above anterior margin of the gallbladder fossa. Liver  otherwise appears normal.     Within the medial spleen there is a 1.5 cm low-density lesion. Splenic  size is normal. Adrenal glands, pancreas appear normal. There is a  diverticulum emanating medially from the descending duodenum. A medial  left lower pole renal cyst measures 1.3 cm. Right kidney appears normal  and there is no hydronephrosis. Minimal free fluid extends into the  pelvis. There is no bowel dilatation or  obstruction.     IMPRESSION:  1. Patient is 3 days post cholecystectomy with low density packing  material, small bubbles of gas, and fluid filling the gallbladder fossa.  Fluid partially extends into the adjacent hepatic parenchyma and may  represent hemorrhagic material/blood products. There is mild perihepatic  fluid and a small amount of fluid is present within the lower pelvis.  Surgical drain extends into the right upper quadrant. Infected fluid  would be difficult to exclude though there is no evidence for mature or  drainable collection.  2. Tiny renal cysts. Duodenal diverticulum. 15 mm low-density lesion  medial spleen of doubtful significance.        HOSPITAL COURSE     The patient is a 77-year-old male who came to the hospital for fever and fatigue.  Please see history and physical for complete details.  He was admitted to our service for sepsis, acute respiratory failure with hypoxia and was found to have RSV.  He had a complicated hospital course and has been in the hospital for the last 18 days.  Blood cultures came back positive for Escherichia coli which prompted CT abdomen which showed distended gallbladder and stones.  Surgery was consulted and he underwent cholecystectomy by Dr. Correa on February 25, 2018.  This showed gangrenous cholecystitis with cholelithiasis.  He was continued on Rocephin 2 g every 24 hours.  Postoperatively he went into atrial fibrillation which was controlled.  He then spiked another fever which prompted addition of vancomycin to his antibiotics.  Repeat blood cultures were negative.  He started to complain worsening back pain and spine surgery son.  MRI revealed   discitis L2-L4 and there was concern of epidural abscess due to epidural collection of fluid.  He also had severe stenosis at this level.  He underwent surgical debridement of the disc spaces as well as irrigation and drainage of the epidural abscess by Dr. Marr on March 7, 2018.  Prior to this he was found  to have an acute lower extremity calf DVT.  Vascular surgery was consulted to place IVC filter which was done on March 6, 2018.  He was continued off anticoagulation for a few days postoperatively and was restarted initially as heparin drip.  This was transitioned to Eliquis twice a day on March 12, 2018.  He has tolerated this quite well.  He continues with back pain and/or extremity numbness and weakness but this is improving.  He has minimal strength in the lower extremities but has been able to stand and pivot to a chair.  He has continued to work with physical therapy and has been on route for subacute rehabilitation today.  We will place a PICC line today.       He is to continue ceftriaxone 2 g IV every 24 hours for 8 weeks.  The stop date will be May 2, 2018.  He will need weekly CBCs with differential and creatinine to be faxed to the infectious disease clinic at [phone].  He will need to follow up with infectious diseases on May 2, 2018 as well.  Once he is more mobile he needs to follow-up with Dr. Thakkar to discuss removal of the IVC filter.    ALLERGIES  Review of patient's allergies indicates no known allergies.    PAST MEDICAL HISTORY  Active Ambulatory Problems     Diagnosis Date Noted   • Gout 04/01/2016   • Diabetic peripheral neuropathy 04/01/2016   • Dyslipidemia 04/01/2016   • Uncontrolled type 2 diabetes mellitus 04/01/2016   • Essential hypertension 07/29/2016   • PVC (premature ventricular contraction) 10/26/2016   • Benign non-nodular prostatic hyperplasia without lower urinary tract symptoms 04/28/2017   • Osteoarthritis 09/28/2017   • Urge incontinence 09/28/2017   • CELINA (obstructive sleep apnea) 02/23/2018   • Acute gangrenous cholecystitis 02/24/2018   • A-fib 02/25/2018   • Discitis of lumbar region 03/05/2018   • Acute deep vein thrombosis of calf, right 03/05/2018   • Sepsis due to Escherichia coli 02/22/2018     Resolved Ambulatory Problems     Diagnosis Date Noted   •  Primary osteoarthritis involving multiple joints 04/04/2016   • Allergic rhinitis due to pollen 04/04/2016   • Vitamin D deficiency 04/28/2017   • Sepsis 02/23/2018   • Secondary thrombocytopenia 02/23/2018   • Hypotension 02/23/2018   • Acute respiratory failure with hypoxia 02/23/2018   • Leg edema 02/23/2018   • RSV (acute bronchiolitis due to respiratory syncytial virus) 02/23/2018   • Bacteremia due to Escherichia coli 02/24/2018   • RUQ abdominal pain 02/24/2018     Past Medical History:   Diagnosis Date   • Arthritis    • Cancer of bladder 2016   • Colon polyp    • Diabetes mellitus    • Gout    • Hyperlipidemia    • Irregular heartbeat    • Skin carcinoma    • Type 2 diabetes mellitus    • Vitamin D deficiency        PAST SURGICAL HISTORY  Past Surgical History:   Procedure Laterality Date   • APPENDECTOMY  1961   • CHOLECYSTECTOMY WITH INTRAOPERATIVE CHOLANGIOGRAM N/A 2/25/2018    Procedure: CHOLECYSTECTOMY LAPAROSCOPIC INTRAOPERATIVE CHOLANGIOGRAM;  Surgeon: Maegan Correa MD;  Location: [facility];  Service:    • COLONOSCOPY  2010    h/o of polyps   • EYE SURGERY      1959 - glass removal from eye, lens transplant   • KNEE SURGERY  2006    rt knee   • VENA CAVA FILTER INSERTION Right 3/6/2018    Procedure: VENA CAVA FILTER INSERTION;  Surgeon: Alexis Thakkar MD;  Location: [facility] 18/19;  Service:        FAMILY HISTORY  Family History   Problem Relation Age of Onset   • Cancer Mother        SOCIAL HISTORY  Social History     Social History   • Marital status:      Spouse name: N/A   • Number of children: N/A   • Years of education: N/A     Occupational History   • Not on file.     Social History Main Topics   • Smoking status: Former Smoker     Types: Cigars     Quit date: 2017   • Smokeless tobacco: Current User     Types: Chew   • Alcohol use No   • Drug use: No   • Sexual activity: Defer     Other Topics Concern   • Not on file     Social History Narrative   • No narrative  on file       REVIEW OF SYSTEMS  Review of Systems   Constitutional: Positive for diaphoresis. Negative for activity change, appetite change and fever.   HENT: Negative for trouble swallowing.    Eyes: Negative for visual disturbance.   Respiratory: Negative for cough, chest tightness, shortness of breath and wheezing.    Cardiovascular: Negative for chest pain, palpitations and leg swelling.        Tachycardia and hypotension reported   Gastrointestinal: Negative for abdominal pain, diarrhea, nausea and vomiting.   Genitourinary: Negative for dysuria.        Incontinence is chronic   Musculoskeletal: Positive for back pain (lower) and myalgias (hip (R>L)).   Skin: Negative for rash.   Neurological: Negative for dizziness, speech difficulty and light-headedness.       PHYSICAL EXAM  ED Triage Vitals [03/15/18 2210]   Temp Heart Rate Resp BP SpO2   98.8 °F (37.1 °C) (!) 154 16 (!) 80/64 94 %      Temp src Heart Rate Source Patient Position BP Location FiO2 (%)   Tympanic Monitor -- Left arm --     Physical Exam   Constitutional: He is oriented to person, place, and time and well-developed, well-nourished, and in no distress. No distress.   HENT:   Head: Normocephalic and atraumatic.   Eyes: Pupils are equal, round, and reactive to light.   Neck: Normal range of motion. Neck supple.   Cardiovascular: Normal rate, S1 normal, S2 normal and normal heart sounds.  Exam reveals no gallop and no friction rub.    No murmur heard.  Pulmonary/Chest: Effort normal. No respiratory distress. He has no wheezes. He has no rales. He exhibits no tenderness.   Abdominal: Soft. There is no rebound and no guarding.   Musculoskeletal: Normal range of motion. He exhibits no deformity.   Lymphadenopathy:     He has no cervical adenopathy.   Neurological: He is alert and oriented to person, place, and time.   Skin: Skin is warm and dry.   Psychiatric: Affect normal.   Nursing note and vitals reviewed.      LAB RESULTS  Recent Results (from  the past 24 hour(s))   POC Glucose Once    Collection Time: 03/15/18 10:18 PM   Result Value Ref Range    Glucose 270 (H) 70 - 130 mg/dL   Comprehensive Metabolic Panel    Collection Time: 03/15/18 11:25 PM   Result Value Ref Range    Glucose 280 (H) 65 - 99 mg/dL    BUN 28 (H) 8 - 23 mg/dL    Creatinine 1.15 0.76 - 1.27 mg/dL    Sodium 136 136 - 145 mmol/L    Potassium 5.4 (H) 3.5 - 5.2 mmol/L    Chloride 94 (L) 98 - 107 mmol/L    CO2 27.9 22.0 - 29.0 mmol/L    Calcium 9.2 8.6 - 10.5 mg/dL    Total Protein 6.1 6.0 - 8.5 g/dL    Albumin 2.80 (L) 3.50 - 5.20 g/dL    ALT (SGPT) 22 1 - 41 U/L    AST (SGOT) 28 1 - 40 U/L    Alkaline Phosphatase 122 (H) 39 - 117 U/L    Total Bilirubin 0.5 0.1 - 1.2 mg/dL    eGFR Non African Amer 62 >60 mL/min/1.73    Globulin 3.3 gm/dL    A/G Ratio 0.8 g/dL    BUN/Creatinine Ratio 24.3 7.0 - 25.0    Anion Gap 14.1 mmol/L   BNP    Collection Time: 03/15/18 11:25 PM   Result Value Ref Range    proBNP 2,134.0 (H) 0.0 - 1,800.0 pg/mL   Troponin    Collection Time: 03/15/18 11:25 PM   Result Value Ref Range    Troponin T 0.051 (H) 0.000 - 0.030 ng/mL   CBC Auto Differential    Collection Time: 03/15/18 11:25 PM   Result Value Ref Range    WBC 13.81 (H) 4.50 - 10.70 10*3/mm3    RBC 4.25 (L) 4.60 - 6.00 10*6/mm3    Hemoglobin 12.9 (L) 13.7 - 17.6 g/dL    Hematocrit 40.6 40.4 - 52.2 %    MCV 95.5 79.8 - 96.2 fL    MCH 30.4 27.0 - 32.7 pg    MCHC 31.8 (L) 32.6 - 36.4 g/dL    RDW 14.9 (H) 11.5 - 14.5 %    RDW-SD 51.1 37.0 - 54.0 fl    MPV 11.8 6.0 - 12.0 fL    Platelets 299 140 - 500 10*3/mm3    Neutrophil % 83.1 (H) 42.7 - 76.0 %    Lymphocyte % 9.1 (L) 19.6 - 45.3 %    Monocyte % 5.9 5.0 - 12.0 %    Eosinophil % 0.4 0.3 - 6.2 %    Basophil % 0.1 0.0 - 1.5 %    Immature Grans % 1.4 (H) 0.0 - 0.5 %    Neutrophils, Absolute 11.48 (H) 1.90 - 8.10 10*3/mm3    Lymphocytes, Absolute 1.25 0.90 - 4.80 10*3/mm3    Monocytes, Absolute 0.82 0.20 - 1.20 10*3/mm3    Eosinophils, Absolute 0.05 0.00 - 0.70  10*3/mm3    Basophils, Absolute 0.02 0.00 - 0.20 10*3/mm3    Immature Grans, Absolute 0.19 (H) 0.00 - 0.03 10*3/mm3   Lactic Acid, Plasma    Collection Time: 03/15/18 11:25 PM   Result Value Ref Range    Lactate 2.1 (C) 0.5 - 2.0 mmol/L   Procalcitonin    Collection Time: 03/15/18 11:25 PM   Result Value Ref Range    Procalcitonin 0.45 (H) 0.10 - 0.25 ng/mL   Urinalysis With / Culture If Indicated - Urine, Catheter    Collection Time: 03/15/18 11:53 PM   Result Value Ref Range    Color, UA Yellow Yellow, Straw    Appearance, UA Clear Clear    pH, UA 5.5 5.0 - 8.0    Specific Gravity, UA >=1.030 1.005 - 1.030    Glucose, UA >=1000 mg/dL (3+) (A) Negative    Ketones, UA Negative Negative    Bilirubin, UA Negative Negative    Blood, UA Negative Negative    Protein, UA Negative Negative    Leuk Esterase, UA Negative Negative    Nitrite, UA Negative Negative    Urobilinogen, UA 0.2 E.U./dL 0.2 - 1.0 E.U./dL       I ordered the above labs and reviewed the results    RADIOLOGY  XR Chest 2 View   Final Result   Under aeration with improving right lung base atelectasis       This report was finalized on 3/15/2018 11:19 PM by Jerrell Dumont MD.              I ordered the above noted radiological studies and reviewed the images on the PACS system.      MEDICATIONS GIVEN IN ER  Medications   sodium chloride 0.9% - IBW for BMI>30 bolus 2,190 mL (not administered)       EKG  EKG           EKG time: 0026  Rhythm/Rate: rate98, sinus tachycardia, PACs  P waves and NV: nml  QRS, axis: nml   ST and T waves: no ST elevation     Interpreted Contemporaneously by me, independently viewed  Improved from prior 02/25/18    PROCEDURES  Procedures    COURSE & MEDICAL DECISION MAKING  Pertinent Labs and Imaging studies that were ordered and reviewed are noted above.  Results were reviewed/discussed with the patient and they were also made aware of online assess.  Pt also made aware that some labs, such as cultures, will not be resulted during  "ER visit and follow up with PMD is necessary.     PROGRESS AND CONSULTS    Progress Notes:    ED Course     12:11 AM  Dr. Weiss who transferred patient to the ED called to check on the patient. I informed him of anticipated admission and pertinent workup.     12:23 AM  Rechecked patient who is resting comfortably in NAD and appears improved since his arrival. BP- (!) 80/64 HR- (!) 151 Temp- 98.8 °F (37.1 °C) (Tympanic) O2 sat- 92%. Patient's family reports that when patient was tachycardic around the 150s in the past, patient may have been severely dehydrated. They suspect patient may have been dehydrated during today's episode. Informed patient and family of pertinent workup and plan to admit. Pt and family understand and agree with the plan. All questions answered.    12:38 PM  Reviewed pt's history and workup with Dr. Tabor.  After a bedside evaluation; Dr Tabor agrees with the plan of care.     12:48 AM  Based on the patient's lab findings and presenting symptoms, the doctor and I feel it is appropriate to admit the patient for further management, evaluation, and treatment. Spoke with Dr. Montelongo, [facility], regarding patient's medical history including necrotic gallbladder and discitis, symptoms today and pertinent workup. He agrees to admit.       Disposition vitals:  BP (!) 80/64 (BP Location: Right arm, Patient Position: Lying)   Pulse (!) 151   Temp 98.8 °F (37.1 °C) (Tympanic)   Resp 18   Ht 177.8 cm (70\")   Wt 121 kg (267 lb)   SpO2 92%   BMI 38.31 kg/m²       DIAGNOSIS  Final diagnoses:   Atrial fibrillation with RVR   Hypotension, unspecified hypotension type   Elevated troponin   History of sepsis       Documentation assistance provided by mary Fitzgerald for DANNY Blanco.  Information recorded by the mary was done at my direction and has been verified and validated by me.  Electronically signed by Arleth Fitzgerald on 3/15/2018 at time 12:45 AM     Arleth Fitzgerald  03/16/18 0053       Carmen " Monica Dacosta, APRN  03/16/18 0100

## 2018-03-16 NOTE — PLAN OF CARE
Problem: Patient Care Overview  Goal: Plan of Care Review   03/16/18 1663   Coping/Psychosocial   Plan of Care Reviewed With patient;spouse;family   Plan of Care Review   Progress improving   OTHER   Outcome Summary pt c/o polina hip pain controlled with medication. troponin draw today from picc line. n/o for dig. vitals stable. pt on 2Lo2 continue to monitor pt.     Goal: Individualization and Mutuality  Outcome: Ongoing (interventions implemented as appropriate)    Goal: Discharge Needs Assessment  Outcome: Ongoing (interventions implemented as appropriate)    Goal: Interprofessional Rounds/Family Conf  Outcome: Ongoing (interventions implemented as appropriate)      Problem: Fall Risk (Adult)  Goal: Identify Related Risk Factors and Signs and Symptoms  Outcome: Ongoing (interventions implemented as appropriate)    Goal: Absence of Fall  Outcome: Ongoing (interventions implemented as appropriate)      Problem: Pain, Chronic (Adult)  Goal: Identify Related Risk Factors and Signs and Symptoms  Outcome: Ongoing (interventions implemented as appropriate)    Goal: Acceptable Pain/Comfort Level and Functional Ability  Outcome: Ongoing (interventions implemented as appropriate)      Problem: Infection, Risk/Actual (Adult)  Goal: Identify Related Risk Factors and Signs and Symptoms  Outcome: Ongoing (interventions implemented as appropriate)    Goal: Infection Prevention/Resolution  Outcome: Ongoing (interventions implemented as appropriate)

## 2018-03-16 NOTE — PROGRESS NOTES
Discharge Planning Assessment  [facility]     Patient Name: Jesus Beckham  MRN: [id number]  Today's Date: 3/16/2018    Admit Date: 3/15/2018          Discharge Needs Assessment     Row Name 03/16/18 1130       Living Environment    Lives With spouse    Current Living Arrangements home/apartment/condo    Provides Primary Care For no one, unable/limited ability to care for self    Family Caregiver if Needed spouse    Family Caregiver Names Spouse, Mya Beckham [phone]    Quality of Family Relationships helpful       Resource/Environmental Concerns    Resource/Environmental Concerns none    Transportation Concerns car, none       Transition Planning    Patient/Family Anticipates Transition to other (see comments)   [facility]    Transportation Anticipated family or friend will provide       Discharge Needs Assessment    Readmission Within the Last 30 Days previous discharge plan unsuccessful    Equipment Currently Used at Home glucometer    Anticipated Changes Related to Illness inability to care for self    Patient's Choice of Community Agency(s) [facility]            Discharge Plan     Row Name 03/16/18 1136       Plan    Plan Return to skilled rehab/ [facility]/  Needs [employer]    Patient/Family in Agreement with Plan yes    Plan Comments Facesheet information was verified.  Patient was DC 3/13 to skilled rehab at [facility].  Plans to return at [place].  Spoke with [facility], she states she accepts back.  Will need [employer] Precert.  Partial packet in CCP office.........................Olivia Swenson RN        Destination     Service Request Status Selected Specialties Address Phone Number Fax Number    [facility] Pending - Request Sent N/A [address] [address] [phone]        Olivia Swenson RN 3/16/2018 0835    Will need [employer] Precert, no bed hold, will accept back skilled rehab/ [facility]                  Durable Medical Equipment     No service coordination in this encounter.      Dialysis/Infusion     No service coordination in this encounter.      Home Medical Care     No service coordination in this encounter.      Social Care     No service coordination in this encounter.                Demographic Summary     Row Name 03/16/18 1128       General Information    Admission Type observation    Arrived From other (see comments)   Skilled Rehab/ [facility]    Required Notices Provided Observation Status Notice    Referral Source admission list    Preferred Language English       Contact Information    Permission Granted to Share Info With ;family/designee    Contact Information Comments --   Spouse, Mya Beckham [phone]            Functional Status     Row Name 03/16/18 1133       Functional Status    Usual Activity Tolerance moderate    Current Activity Tolerance fair       Functional Status, IADL    Medications completely dependent    Meal Preparation completely dependent    Housekeeping completely dependent    Laundry completely dependent    Shopping completely dependent       Mental Status    General Appearance WDL WDL       Mental Status Summary    Recent Changes in Mental Status/Cognitive Functioning no changes            Psychosocial    No documentation.           Abuse/Neglect    No documentation.           Legal    No documentation.           Substance Abuse    No documentation.           Patient Forms    No documentation.         Olivia Swenson RN

## 2018-03-16 NOTE — ED PROVIDER NOTES
Pt presents to the ED with tachycardia, hypotension, and diaphoresis that began this this afternoon at his NH. Pt was recently admitted s/t sepsis and cholecystitis. On exam, A&Ox3, no acute distress. HEENT is unremarkable. Heart is RRR and without murmur, lungs are clear bilaterally. Lactate=2.1. WBC=13.81. HR has improved after IVF. I agree with the plan for admission.      Attestation:  The LETICIA and I have discussed this patient's history, physical exam, and treatment plan.  I have reviewed the documentation and personally had a face to face interaction with the patient. I affirm the documentation and agree with the treatment and plan.  The attached note describes my personal findings.      Documentation assistance provided by mary Meek for Dr Tabor. Information recorded by the scribe was done at my direction and has been verified and validated by me.     Lashay Meek  03/16/18 0058       Julius Tabor MD  03/16/18 0637

## 2018-03-16 NOTE — NURSING NOTE
03/16/18 1545   Wound 03/16/18 0600 coccyx   Date first assessed/Time first assessed: 03/16/18 0600   Present On Admission : yes;picture taken  Location: coccyx   Dressing Appearance intact   Base maroon/purple;other (see comments)  (blistered)   Periwound pink;blanchable   Periwound Temperature warm   Periwound Skin Turgor soft   Edges irregular   Wound length (cm) 3 cm   Wound width (cm) 3 cm   Drainage Amount none   Care, Wound barrier applied   Dressing Care, Wound border dressing;foam;low-adherent   CWOCN consult for skin. PI noted as above. Area is more blistered than last admission. Recommend silicone border dressing. I added an accumax pump to the bed. Patient can turn with assist. He has to move slowly related to pain but is agreeable. Will monitor.

## 2018-03-16 NOTE — DISCHARGE PLACEMENT REQUEST
"Chapincito Poli JOANNE (77 y.o. Male)     Date of Birth Social Security Number Address Home Phone MRN    1940  [id number] [place] [id number] [phone] [id number]    [place] Marital Status          Amish        Admission Date Admission Type Admitting Provider Attending Provider Department, Room/Bed    3/15/18 Emergency Krish Lockwood MD Beard, Lyle E, MD [address], [address]    Discharge Date Discharge Disposition Discharge Destination                       Attending Provider:  Krish Lockwood MD    Allergies:  No Known Allergies    Isolation:  None   Infection:  None   Code Status:  FULL    Ht:  177.8 cm (70\")   Wt:  120 kg (265 lb 3.2 oz)    Admission Cmt:  None   Principal Problem:  None                Active Insurance as of 3/15/2018     Primary Coverage     Payor Plan Insurance Group Employer/Plan Group    [employer] MEDICARE REPLACEMENT [employer] MEDICARE REPL [id number]     Payor Plan Address Payor Plan Phone Number Effective From Effective To    [address] [address] [phone] 1/1/2018     [place] [address]       Subscriber Name Subscriber Birth Date Member ID       POLI MONROE 1940 [id number]                 Emergency Contacts      (Rel.) Home Phone Work Phone Mobile Phone    ChapincitoMya (Spouse) [phone] -- [phone]              "

## 2018-03-16 NOTE — H&P
HISTORY AND PHYSICAL   [facility]        Patient Identification:  Name: Jesus Beckham  Age: 77 y.o.  Sex: male  :  1940  MRN: [id number]                     Primary Care Physician: Magdy Ricardo MD    Chief Complaint:  Rapid heartbeat and weakness    History of Present Illness:          The patient 77-year-old white male with history of recent hospitalization with sepsis with acute cholecystitis and bacteremia complicated by epidural abscess with drainage and history of A. fib.  He been at the skilled nursing facility and started having some tachycardia with feeling weak and sweaty.  His blood pressure was low and he was sent to the emergency room for further evaluation treatment.  He denied having any chest pain.  He was very weak and sweaty and a little short of breath.  He not had any nausea vomiting.  The patient was better with A. fib with rapid rate for further evaluation treatment.    Past Medical History:  Past Medical History:   Diagnosis Date   • Arthritis    • Cancer of bladder    • Colon polyp    • Diabetes mellitus    • Gout    • Hyperlipidemia    • Irregular heartbeat    • Skin carcinoma    • Type 2 diabetes mellitus    • Vitamin D deficiency      Past Surgical History:  Past Surgical History:   Procedure Laterality Date   • APPENDECTOMY     • CHOLECYSTECTOMY WITH INTRAOPERATIVE CHOLANGIOGRAM N/A 2018    Procedure: CHOLECYSTECTOMY LAPAROSCOPIC INTRAOPERATIVE CHOLANGIOGRAM;  Surgeon: Maegan Correa MD;  Location: [facility] OR;  Service:    • COLONOSCOPY      h/o of polyps   • EYE SURGERY       - glass removal from eye, lens transplant   • KNEE SURGERY      rt knee   • VENA CAVA FILTER INSERTION Right 3/6/2018    Procedure: VENA CAVA FILTER INSERTION;  Surgeon: Alexis Thakkar MD;  Location: [facility] OR ;  Service:       Home Meds:  Prescriptions Prior to Admission   Medication Sig Dispense Refill Last Dose   • Acetaminophen  (TYLENOL PO) Take  by mouth as needed.   Taking   • allopurinol (ZYLOPRIM) 300 MG tablet Take 1 tablet by mouth Daily. 90 tablet 1 2/22/2018 at Unknown time   • ANDROGEL PUMP 20.25 MG/ACT (1.62%) gel 2 pump actuation on each shoulder daily 450 g 1 2/22/2018 at Unknown time   • apixaban (ELIQUIS) 5 MG tablet tablet Take 1 tablet by mouth Every 12 (Twelve) Hours. 60 tablet     • bisacodyl (DULCOLAX) 5 MG EC tablet Take 1 tablet by mouth Daily As Needed for Constipation.      • castor oil-balsam peru (VENELEX) ointment Apply 5 g topically Every 12 (Twelve) Hours.      • cefTRIAXone (ROCEPHIN) 40 MG/ML IVPB Infuse 50 mL into a venous catheter Daily for 50 doses. 1500 mL 1    • cyclobenzaprine (FLEXERIL) 10 MG tablet Take 1 tablet by mouth Every 8 (Eight) Hours.      • ergocalciferol (DRISDOL) 11127 units capsule Take 1 capsule by mouth 1 (One) Time Per Week. 13 capsule 3 Past Week at Unknown time   • fluticasone (FLONASE) 50 MCG/ACT nasal spray 2 sprays into each nostril Daily. 3 bottle 3 2/22/2018 at Unknown time   • glipiZIDE (GLUCOTROL) 10 MG tablet Take 1 tablet by mouth 2 (Two) Times a Day Before Meals. 360 tablet 3 2/22/2018 at Unknown time   • glucose blood test strip CHECK BLOOD SUGARS 2 TIMES A DAY AS DIRECTED 200 each 1 Taking   • GLYXAMBI 25-5 MG tablet Take 1 tablet by mouth Daily With Breakfast. 90 tablet 3 2/22/2018 at Unknown time   • HYDROcodone-acetaminophen (NORCO)  MG per tablet Take 2 tablets by mouth Every 6 (Six) Hours As Needed for Moderate Pain  for up to 3 days. 12 tablet 0    • Insulin Pen Needle 32G X 4 MM misc INJECT UP TO THREE TIMES DAILY OR AS DIRECTED 500 each 1 Taking   • ipratropium-albuterol (DUO-NEB) 0.5-2.5 mg/mL nebulizer Take 3 mL by nebulization Every 6 (Six) Hours While Awake. 360 mL     • L-Methylfolate-B6-B12 3-35-2 MG tablet Take 1 tablet by mouth 2 (Two) Times a Day. 180 tablet 3 2/22/2018 at Unknown time   • LEVEMIR FLEXTOUCH 100 UNIT/ML injection 50 units twice daily.  90 mL 3 2/22/2018 at Unknown time   • lisinopril (PRINIVIL,ZESTRIL) 10 MG tablet Take 1 tablet by mouth Daily. 90 tablet 3 2/22/2018 at Unknown time   • Mirabegron ER (MYRBETRIQ) 25 MG tablet sustained-release 24 hour 24 hr tablet Take 25 mg by mouth Daily.   2/22/2018 at Unknown time   • Multiple Vitamins-Minerals (MULTIVITAMIN ADULT PO) Take  by mouth.   2/22/2018 at Unknown time   • Omega-3 Fatty Acids (FISH OIL) 1000 MG capsule capsule Take  by mouth daily with breakfast.   2/22/2018 at Unknown time   • polyethylene glycol (MIRALAX) pack packet Take 17 g by mouth Daily As Needed (constipation).      • pregabalin (LYRICA) 100 MG capsule Take 1 capsule by mouth 3 (Three) Times a Day. 15 capsule 0    • rosuvastatin (CRESTOR) 10 MG tablet Take 1 tablet by mouth Daily. 90 tablet 3 2/22/2018 at Unknown time   • vitamin C (ASCORBIC ACID) 500 MG tablet Take 500 mg by mouth Daily.   2/22/2018 at Unknown time       Allergies:  No Known Allergies  Immunizations:    There is no immunization history on file for this patient.  Social History:   Social History     Social History Narrative   • No narrative on file     Social History     Social History   • Marital status:      Spouse name: N/A   • Number of children: N/A   • Years of education: N/A     Occupational History   • Not on file.     Social History Main Topics   • Smoking status: Former Smoker     Types: Cigars     Quit date: 2017   • Smokeless tobacco: Current User     Types: Chew   • Alcohol use No   • Drug use: No   • Sexual activity: Defer     Other Topics Concern   • Not on file     Social History Narrative   • No narrative on file       Family History:  Family History   Problem Relation Age of Onset   • Cancer Mother         Review of Systems  See history of present illness and past medical history.  Patient denies  dizziness, syncope, falls, trauma, change in vision, change in hearing, change in taste, changes in weight, changes in appetite, focal  "weakness, numbness, or paresthesia.  Patient denies chest pain, palpitations, dyspnea, orthopnea, PND, cough, sinus pressure, rhinorrhea, epistaxis, hemoptysis, nausea, vomiting,hematemesis, diarrhea, constipation or hematchezia.  Denies cold or heat intolerance, polydipsia, polyuria, polyphagia. Denies hematuria, pyuria, dysuria, hesitancy, frequency or urgency.   Remainder of ROS is negative.    Objective:  tMax 24 hrs: Temp (24hrs), Av.9 °F (36.6 °C), Min:97.3 °F (36.3 °C), Max:98.8 °F (37.1 °C)    Vitals Ranges:   Temp:  [97.3 °F (36.3 °C)-98.8 °F (37.1 °C)] 97.6 °F (36.4 °C)  Heart Rate:  [] 95  Resp:  [16-20] 16  BP: ()/(57-85) 120/76      Exam:  /76 (BP Location: Left arm, Patient Position: Lying)   Pulse 95   Temp 97.6 °F (36.4 °C) (Oral)   Resp 16   Ht 177.8 cm (70\")   Wt 120 kg (265 lb 3.2 oz)   SpO2 94%   BMI 38.05 kg/m²     General Appearance:    Alert, cooperative, no distress, appears stated age   Head:    Normocephalic, without obvious abnormality, atraumatic   Eyes:    PERRL, conjunctiva/corneas clear, EOM's intact, both eyes   Ears:    Normal external ear canals, both ears   Nose:   Nares normal, septum midline, mucosa normal, no drainage    or sinus tenderness   Throat:   Lips, mucosa, and tongue normal   Neck:   Supple, symmetrical, trachea midline, no adenopathy;     thyroid:  no enlargement/tenderness/nodules; no carotid    bruit or JVD   Back:     Symmetric, no curvature, ROM normal, no CVA tenderness   Lungs:     Clear to auscultation bilaterally, respirations unlabored   Chest Wall:    No tenderness or deformity    Heart:    Regular rate and rhythm, S1 and S2 normal, no murmur, rub   or gallop   Abdomen:     Soft, non-tender, bowel sounds active all four quadrants,     no masses, no hepatomegaly, no splenomegaly   Extremities:   Extremities normal, atraumatic, no cyanosis or edema   Pulses:   2+ and symmetric all extremities   Skin:   Skin color, texture, turgor " normal, no rashes or lesions   Lymph nodes:   Cervical, supraclavicular, and axillary nodes normal   Neurologic:   Lower extremity weakness       .    Data Review:  Lab Results (last 72 hours)     Procedure Component Value Units Date/Time    Troponin [[id number]]  (Normal) Collected:  03/16/18 1441    Specimen:  Blood Updated:  03/16/18 1611     Troponin T 0.029 ng/mL     Narrative:       Troponin T Reference Ranges:  Less than 0.03 ng/mL:    Negative for AMI  0.03 to 0.09 ng/mL:      Indeterminant for AMI  Greater than 0.09 ng/mL: Positive for AMI    Blood Culture - Blood, [[id number]]  (Normal) Collected:  03/16/18 0103    Specimen:  Blood from Arm, Left Updated:  03/16/18 1316     Blood Culture No growth at less than 24 hours    POC Glucose Once [[id number]]  (Abnormal) Collected:  03/16/18 1151    Specimen:  Blood Updated:  03/16/18 1155     Glucose 182 (H) mg/dL     Narrative:       Meter: [id number] : [id number] Brynn CORREA    Blood Culture - Blood, [[id number]]  (Normal) Collected:  03/15/18 2325    Specimen:  Blood from Arm, Left Updated:  03/16/18 1146     Blood Culture No growth at less than 24 hours    Basic Metabolic Panel [[id number]]  (Abnormal) Collected:  03/16/18 0334    Specimen:  Blood Updated:  03/16/18 0419     Glucose 194 (H) mg/dL      BUN 26 (H) mg/dL      Creatinine 0.92 mg/dL      Sodium 138 mmol/L      Potassium 4.6 mmol/L      Chloride 100 mmol/L      CO2 26.3 mmol/L      Calcium 8.5 (L) mg/dL      eGFR Non African Amer 80 mL/min/1.73      BUN/Creatinine Ratio 28.3 (H)     Anion Gap 11.7 mmol/L     Narrative:       The MDRD GFR formula is only valid for adults with stable renal function between ages 18 and 70.    Magnesium [[id number]]  (Normal) Collected:  03/16/18 0334    Specimen:  Blood Updated:  03/16/18 0419     Magnesium 1.9 mg/dL     Phosphorus [[id number]]  (Abnormal) Collected:  03/16/18 0334    Specimen:  Blood Updated:  03/16/18 0419     Phosphorus 4.9 (H) mg/dL     Lactic  Acid, Reflex [[id number]]  (Normal) Collected:  03/16/18 0334    Specimen:  Blood Updated:  03/16/18 0406     Lactate 1.0 mmol/L     CBC (No Diff) [[id number]]  (Abnormal) Collected:  03/16/18 0334    Specimen:  Blood Updated:  03/16/18 0404     WBC 10.35 10*3/mm3      RBC 3.57 (L) 10*6/mm3      Hemoglobin 10.7 (L) g/dL      Hematocrit 34.1 (L) %      MCV 95.5 fL      MCH 30.0 pg      MCHC 31.4 (L) g/dL      RDW 14.8 (H) %      RDW-SD 50.6 fl      MPV 11.0 fL      Platelets 249 10*3/mm3     Lactic Acid, Reflex Timer (This will reflex a repeat order 3-3:15 hours after ordered.) [[id number]] Collected:  03/15/18 2325    Specimen:  Blood Updated:  03/16/18 0316     Extra Tube Hold for add-ons.     Comment: Auto resulted.       Hemoglobin A1c [[id number]]  (Abnormal) Collected:  03/15/18 2325    Specimen:  Blood Updated:  03/16/18 0232     Hemoglobin A1C 7.90 (H) %     Narrative:       Hemoglobin A1C Ranges:    Increased Risk for Diabetes  5.7% to 6.4%  Diabetes                     >= 6.5%  Diabetic Goal                < 7.0%    POC Glucose Once [[id number]]  (Abnormal) Collected:  03/16/18 0230    Specimen:  Blood Updated:  03/16/18 0232     Glucose 197 (H) mg/dL     Narrative:       Meter: [id number] : [id number] Kimberly CORREA    Procalcitonin [[id number]]  (Abnormal) Collected:  03/15/18 2325    Specimen:  Blood Updated:  03/16/18 0015     Procalcitonin 0.45 (H) ng/mL     Narrative:       As a Marker for Sepsis (Non-Neonates):   1. <0.5 ng/mL represents a low risk of severe sepsis and/or septic shock.  1. >2 ng/mL represents a high risk of severe sepsis and/or septic shock.    As a Marker for Lower Respiratory Tract Infections that require antibiotic therapy:  PCT on Admission     Antibiotic Therapy             6-12 Hrs later  > 0.5                Strongly Recommended            >0.25 - <0.5         Recommended  0.1 - 0.25           Discouraged                   Remeasure/reassess PCT  <0.1                  "Strongly Discouraged          Remeasure/reassess PCT      As 28 day mortality risk marker: \"Change in Procalcitonin Result\" (> 80 % or <=80 %) if Day 0 (or Day 1) and Day 4 values are available. Refer to [email or web address]-pct-[email or web address]/   Change in PCT <=80 %   A decrease of PCT levels below or equal to 80 % defines a positive change in PCT test result representing a higher risk for 28-day all-cause mortality of patients diagnosed with severe sepsis or septic shock.  Change in PCT > 80 %   A decrease of PCT levels of more than 80 % defines a negative change in PCT result representing a lower risk for 28-day all-cause mortality of patients diagnosed with severe sepsis or septic shock.                Urinalysis With / Culture If Indicated - Urine, Catheter [[id number]]  (Abnormal) Collected:  03/15/18 2353    Specimen:  Urine from Urine, Catheter Updated:  03/16/18 0012     Color, UA Yellow     Appearance, UA Clear     pH, UA 5.5     Specific Gravity, UA >=1.030     Glucose, UA >=1000 mg/dL (3+) (A)     Ketones, UA Negative     Bilirubin, UA Negative     Blood, UA Negative     Protein, UA Negative     Leuk Esterase, UA Negative     Nitrite, UA Negative     Urobilinogen, UA 0.2 E.U./dL    Narrative:       Urine microscopic not indicated.    Comprehensive Metabolic Panel [[id number]]  (Abnormal) Collected:  03/15/18 2325    Specimen:  Blood Updated:  03/16/18 0011     Glucose 280 (H) mg/dL      BUN 28 (H) mg/dL      Creatinine 1.15 mg/dL      Sodium 136 mmol/L      Potassium 5.4 (H) mmol/L      Chloride 94 (L) mmol/L      CO2 27.9 mmol/L      Calcium 9.2 mg/dL      Total Protein 6.1 g/dL      Albumin 2.80 (L) g/dL      ALT (SGPT) 22 U/L      AST (SGOT) 28 U/L      Alkaline Phosphatase 122 (H) U/L      Total Bilirubin 0.5 mg/dL      eGFR Non African Amer 62 mL/min/1.73      Globulin 3.3 gm/dL      A/G Ratio 0.8 g/dL      BUN/Creatinine Ratio 24.3     Anion Gap 14.1 mmol/L     Narrative:       The MDRD GFR formula is " only valid for adults with stable renal function between ages 18 and 70.    Lactic Acid, Plasma [[id number]]  (Abnormal) Collected:  03/15/18 2325    Specimen:  Blood Updated:  03/16/18 0011     Lactate 2.1 (C) mmol/L     Troponin [[id number]]  (Abnormal) Collected:  03/15/18 2325    Specimen:  Blood Updated:  03/16/18 0009     Troponin T 0.051 (H) ng/mL     Narrative:       Troponin T Reference Ranges:  Less than 0.03 ng/mL:    Negative for AMI  0.03 to 0.09 ng/mL:      Indeterminant for AMI  Greater than 0.09 ng/mL: Positive for AMI    BNP [[id number]]  (Abnormal) Collected:  03/15/18 2325    Specimen:  Blood Updated:  03/16/18 0007     proBNP 2,134.0 (H) pg/mL     Narrative:       Among patients with dyspnea, NT-proBNP is highly sensitive for the detection of acute congestive heart failure. In addition NT-proBNP of <300 pg/ml effectively rules out acute congestive heart failure with 99% negative predictive value.    CBC & Differential [[id number]] Collected:  03/15/18 2325    Specimen:  Blood Updated:  03/15/18 2347    Narrative:       The following orders were created for panel order CBC & Differential.  Procedure                               Abnormality         Status                     ---------                               -----------         ------                     CBC Auto Differential[[id number]]        Abnormal            Final result                 Please view results for these tests on the individual orders.    CBC Auto Differential [[id number]]  (Abnormal) Collected:  03/15/18 2325    Specimen:  Blood Updated:  03/15/18 2347     WBC 13.81 (H) 10*3/mm3      RBC 4.25 (L) 10*6/mm3      Hemoglobin 12.9 (L) g/dL      Hematocrit 40.6 %      MCV 95.5 fL      MCH 30.4 pg      MCHC 31.8 (L) g/dL      RDW 14.9 (H) %      RDW-SD 51.1 fl      MPV 11.8 fL      Platelets 299 10*3/mm3      Neutrophil % 83.1 (H) %      Lymphocyte % 9.1 (L) %      Monocyte % 5.9 %      Eosinophil % 0.4 %      Basophil % 0.1 %       Immature Grans % 1.4 (H) %      Neutrophils, Absolute 11.48 (H) 10*3/mm3      Lymphocytes, Absolute 1.25 10*3/mm3      Monocytes, Absolute 0.82 10*3/mm3      Eosinophils, Absolute 0.05 10*3/mm3      Basophils, Absolute 0.02 10*3/mm3      Immature Grans, Absolute 0.19 (H) 10*3/mm3     POC Glucose Once [[id number]]  (Abnormal) Collected:  03/15/18 2218    Specimen:  Blood Updated:  03/15/18 2222     Glucose 270 (H) mg/dL     Narrative:       Meter: [id number] : [id number] Americo RODRIGUEZ                Results from last 7 days  Lab Units 03/15/18  2325   HEMOGLOBIN A1C % 7.90*      Imaging Results (all)     Procedure Component Value Units Date/Time    XR Chest 2 View [[id number]] Collected:  03/15/18 2318     Updated:  03/15/18 2322    Narrative:       PA AND LATERAL CHEST X-RAY     HISTORY: coa, chest pain,     COMPARISON: March 13, 2018.     FINDINGS: PA and lateral views of the chest were obtained. The lungs are  poorly aerated. Right diaphragm is elevated with improving adjacent  atelectasis. Marked cardiomegaly unchanged. No edema or pleural fluid.  Right-sided PICC line is unchanged.             Impression:       Under aeration with improving right lung base atelectasis     This report was finalized on 3/15/2018 11:19 PM by Jerrell Dumont MD.           Patient Active Problem List   Diagnosis Code   • Gout M10.9   • Diabetic peripheral neuropathy E11.42   • Dyslipidemia E78.5   • Uncontrolled type 2 diabetes mellitus E11.65   • Essential hypertension I10   • PVC (premature ventricular contraction) I49.3   • Benign non-nodular prostatic hyperplasia without lower urinary tract symptoms N40.0   • Osteoarthritis M19.90   • Urge incontinence N39.41   • Hypotension I95.9   • CELINA (obstructive sleep apnea) G47.33   • Acute gangrenous cholecystitis K81.0   • A-fib I48.91   • Discitis of lumbar region M46.46   • Acute deep vein thrombosis of calf, right I82.4Z1   • Sepsis due to Escherichia coli A41.51   • Atrial  fibrillation with RVR I48.91   • Elevated troponin R74.8   • History of sepsis Z86.19       Assessment:  Active Hospital Problems (** Indicates Principal Problem)    Diagnosis Date Noted   • **Atrial fibrillation with RVR [I48.91] 03/16/2018   • Elevated troponin [R74.8] 03/16/2018   • History of sepsis [Z86.19] 03/16/2018   • Hypotension [I95.9] 02/23/2018   • Osteoarthritis [M19.90] 09/28/2017   • Essential hypertension [I10] 07/29/2016   • Gout [M10.9] 04/01/2016   • Diabetic peripheral neuropathy [E11.42] 04/01/2016   • Dyslipidemia [E78.5] 04/01/2016   • Uncontrolled type 2 diabetes mellitus [E11.65] 04/01/2016      Resolved Hospital Problems    Diagnosis Date Noted Date Resolved   No resolved problems to display.       Plan:  The patient's admitted the hospital will continue with IV antibiotics and medication for rate control of his A. fib.  He has converted back to sinus rhythm.  Cardiology's been consulted for further evaluation treatment.  He also had some skin breakdown and will last for the wound nurse to evaluate.  He still having difficulty with urinating and will ask urology to evaluate again.  He has been doing intermittent and out catheters nursing facility.    Krish Lockwood MD  3/16/2018  4:37 PM

## 2018-03-16 NOTE — PLAN OF CARE
Problem: Patient Care Overview  Goal: Plan of Care Review  Outcome: Ongoing (interventions implemented as appropriate)   03/16/18 0348   Coping/Psychosocial   Plan of Care Reviewed With patient   Plan of Care Review   Progress improving   OTHER   Outcome Summary admitted patient to unit, vss, nsr, infrequent c/o pain, cardiology consulted, wound care consulted, iv abx, am labs drawn from picc, resting well throughout shift, will continue to monitor     Goal: Individualization and Mutuality  Outcome: Ongoing (interventions implemented as appropriate)    Goal: Discharge Needs Assessment  Outcome: Ongoing (interventions implemented as appropriate)      Problem: Fall Risk (Adult)  Goal: Identify Related Risk Factors and Signs and Symptoms  Outcome: Ongoing (interventions implemented as appropriate)    Goal: Absence of Fall  Outcome: Ongoing (interventions implemented as appropriate)      Problem: Pain, Chronic (Adult)  Goal: Identify Related Risk Factors and Signs and Symptoms  Outcome: Ongoing (interventions implemented as appropriate)    Goal: Acceptable Pain/Comfort Level and Functional Ability  Outcome: Ongoing (interventions implemented as appropriate)      Problem: Infection, Risk/Actual (Adult)  Goal: Identify Related Risk Factors and Signs and Symptoms  Outcome: Ongoing (interventions implemented as appropriate)    Goal: Infection Prevention/Resolution  Outcome: Ongoing (interventions implemented as appropriate)

## 2018-03-16 NOTE — CONSULTS
"Adult Nutrition  Assessment/PES    Patient Name:  Jesus Beckham  YOB: 1940  MRN: [id number]  Admit Date:  3/15/2018    Assessment Date:  3/16/2018    Comments:  Nutrition assessment completed.          Adult Nutrition Assessment     Row Name 03/16/18 0919       Nutrition Prescription PO    Current PO Diet Regular    Common Modifiers Cardiac;Consistent Carbohydrate    Row Name 03/16/18 0916       Calculation Measurements    Weight Used For Calculations 120 kg (264 lb 8.8 oz)       Estimated/Assessed Needs    Additional Documentation Calorie Requirements (Group);Protein Requirements (Group);Fluid Requirements (Group)       Calorie Requirements    Estimated Calorie Requirement Comment [id number]   15-20       KCAL/KG    14 Kcal/Kg (kcal) 1680    15 Kcal/Kg (kcal) 1800    18 Kcal/Kg (kcal) 2160    20 Kcal/Kg (kcal) 2400    25 Kcal/Kg (kcal) 3000    30 Kcal/Kg (kcal) 3600    35 Kcal/Kg (kcal) 4200    40 Kcal/Kg (kcal) 4800    45 Kcal/Kg (kcal) 5400    50 Kcal/Kg (kcal) 6000       Tipton-St. Jeor Equation    RMR (Tipton-St. Jeor Equation) 1936.25       Protein Requirements    Est Protein Requirement Amount (gms/kg) 1.0 gm protein    Estimated Protein Requirements (gms/day) 120       Fluid Requirements    Estimated Fluid Requirements (mL/day) 2400    RDA Method (mL) 2400    Lynndyl-Segar Method (over 20 kg) 3900    Row Name 03/16/18 0915       Physical Findings    Overall Physical Appearance obese    Skin --   surgical wound on back, coccyx has nonblanchable area photo reviewed    Row Name 03/16/18 0914       Labs/Procedures/Meds    Lab Results Reviewed reviewed    Lab Results Comments glu, bun, phos, Hgb, Hct, wbc, alb    Row Name 03/16/18 0909       Anthropometrics    Height 177.8 cm (70\")    Weight 120 kg (265 lb 3.2 oz)       Ideal Body Weight (IBW)    Ideal Body Weight (IBW) (kg) 76.48    % Ideal Body Weight 157.29       Body Mass Index (BMI)    BMI (kg/m2) 38.13    BMI Assessment BMI 35-39.9: " obesity grade II       IBW Adjustment, Para/Tetraplegia    5% Adjustment, Para (IBW) 72.66    10% Adjustment, Para (IBW) 68.83    10% Adjustment, Tetra (IBW) 68.83    15% Adjustment, Tetra (IBW) 65.01    Row Name 03/16/18 0907       Reason for Assessment    Reason For Assessment identified at risk by screening criteria    Diagnosis other (see comments)   afib, hx of sean, DM, HTN, sleep apnea, discitis of lumbar region s/p debridment of abscess    Row Name 03/16/18 0651       Anthropometrics    Weight 120 kg (265 lb 3.2 oz)          Problem/Interventions:        Problem 1     Row Name 03/16/18 0919       Nutrition Diagnoses Problem 1    Problem 1 Nutrition Appropriate for Condition at this Time    Etiology (related to) MNT for Treatment/Condition                    Intervention Goal     Row Name 03/16/18 0920       Intervention Goal    General Maintain nutrition    PO Tolerate PO;PO intake (%)    PO Intake % 80 %    Weight Appropriate weight loss            Nutrition Intervention     Row Name 03/16/18 0920       Nutrition Intervention    RD/Tech Action Follow Tx progress;Care plan reviewd;Interview for preference              Education/Evaluation     Row Name 03/16/18 0920       Education    Education Will Instruct as appropriate       Monitor/Evaluation    Monitor Per protocol        Electronically signed by:  Leah Lujan RD  03/16/18 9:25 AM

## 2018-03-16 NOTE — ED TRIAGE NOTES
Tachycardia history of gangrene sean and sepsis.  Admitted to [facility] 2 days ago from here.  Has had Cardizem po and Norco 10 mg 2 tabs.

## 2018-03-16 NOTE — CONSULTS
"[facility]  Consult Note                                                                              3/16/2018  Jose Montelongo MD    Patient Identification:  Jesus Beckham:   77 y.o.  male  1940     Date of Admission:3/15/2018    CC: Tachycardia, diaphoresis and hypotension     History of Present Illness: Mr. Jesus Beckham is a 77 year old male patient of Dr. Burr.  With diabetes, hypertension, hyperlipidemia.  He was originally seen in 2016 by Dr. Fuentes for preoperative clearance  and had a normal stress test in .     Patient was recently admitted -3/13/18 for sepsis, acute respiratory failure with hypoxia with Escherichia coli sepsis and was found to have a distended gallbladder and stones. He underwent cholecystectomy and post operatively went into a fib (he received metoprolol tartrate).  He had an epidural abscess which required debridement.  He was also found to have an acute DVT in left lower leg and had an IVC filter placed.  He had an echocardiogram in hospital on 2018 that revealed normal normal left ventricular systolic function as well as diastolic function with calcification of the aortic valve.  At dismissal he was then a neck topics atrial rhythm with PVCs.  He received a PICC line for antibiotics and was discharged on 3/13/18 on Eliquis to [facility] for rehabilitation.      Patient's wife reported that patient had been tachycardic, diaphoretic and hypotensive at [facility] days ago when he got up to do physical therapy.  She believes he was \"orthostatic\".  He was placed back in bed and was given Cardizem with return of normal heart rate.  She states however yesterday while laying flat in bed he became again tachycardic and diaphoretic but refused to come back to the emergency room initially.. She also stated that he was given PO Cardizem but symptoms consisted so he was convinced to come to the ED for evaluation. He denied chest pain or " shortness of breath palpitations or chest pain. Labs included  and BP 80/64 - no EKG or strips present from event however the wife she states he was in atrial fibrillation and after fluids were given he converted to sinus rhythm.  Initial EKG reveals sinus rhythm.  CXR showed right lung base atelectasis. Labs included troponin 0.051, Cr 1.15, BUN 28, BNP 2134, lactate 2.1 and Procal 0.45. He was admitted for further evaluation.    We were consulted for atrial fibrillation. Current vitals include HR 90 (NSR) and /65. He denies chest pain, shortness of breath, palpitations, dizziness or syncope. He is not receiving an antiarrhythmic. He is receiving Eliquis. EKG on admission showed sinus rhythm with left axis deviation and is unchanged.    Previous Testing:    BLE Duplex 3/10/2018      ECHO 2/23/2018      Stress Test 10/26/2016      Past Medical History:  Past Medical History:   Diagnosis Date   • Arthritis    • Cancer of bladder 2016   • Colon polyp    • Diabetes mellitus    • Gout    • Hyperlipidemia    • Irregular heartbeat    • Skin carcinoma    • Type 2 diabetes mellitus    • Vitamin D deficiency        Past Surgical History:  Past Surgical History:   Procedure Laterality Date   • APPENDECTOMY  1961   • CHOLECYSTECTOMY WITH INTRAOPERATIVE CHOLANGIOGRAM N/A 2/25/2018    Procedure: CHOLECYSTECTOMY LAPAROSCOPIC INTRAOPERATIVE CHOLANGIOGRAM;  Surgeon: Maegan Correa MD;  Location: [facility];  Service:    • COLONOSCOPY  2010    h/o of polyps   • EYE SURGERY      1959 - glass removal from eye, lens transplant   • KNEE SURGERY  2006    rt knee   • VENA CAVA FILTER INSERTION Right 3/6/2018    Procedure: VENA CAVA FILTER INSERTION;  Surgeon: Alexis Thakkar MD;  Location: [facility] OR 18/19;  Service:        Allergies:  No Known Allergies    Home Meds:  Prescriptions Prior to Admission   Medication Sig Dispense Refill Last Dose   • Acetaminophen (TYLENOL PO) Take  by mouth as needed.   Taking    • allopurinol (ZYLOPRIM) 300 MG tablet Take 1 tablet by mouth Daily. 90 tablet 1 2/22/2018 at Unknown time   • ANDROGEL PUMP 20.25 MG/ACT (1.62%) gel 2 pump actuation on each shoulder daily 450 g 1 2/22/2018 at Unknown time   • apixaban (ELIQUIS) 5 MG tablet tablet Take 1 tablet by mouth Every 12 (Twelve) Hours. 60 tablet     • bisacodyl (DULCOLAX) 5 MG EC tablet Take 1 tablet by mouth Daily As Needed for Constipation.      • castor oil-balsam peru (VENELEX) ointment Apply 5 g topically Every 12 (Twelve) Hours.      • cefTRIAXone (ROCEPHIN) 40 MG/ML IVPB Infuse 50 mL into a venous catheter Daily for 50 doses. 1500 mL 1    • cyclobenzaprine (FLEXERIL) 10 MG tablet Take 1 tablet by mouth Every 8 (Eight) Hours.      • ergocalciferol (DRISDOL) 53800 units capsule Take 1 capsule by mouth 1 (One) Time Per Week. 13 capsule 3 Past Week at Unknown time   • fluticasone (FLONASE) 50 MCG/ACT nasal spray 2 sprays into each nostril Daily. 3 bottle 3 2/22/2018 at Unknown time   • glipiZIDE (GLUCOTROL) 10 MG tablet Take 1 tablet by mouth 2 (Two) Times a Day Before Meals. 360 tablet 3 2/22/2018 at Unknown time   • glucose blood test strip CHECK BLOOD SUGARS 2 TIMES A DAY AS DIRECTED 200 each 1 Taking   • GLYXAMBI 25-5 MG tablet Take 1 tablet by mouth Daily With Breakfast. 90 tablet 3 2/22/2018 at Unknown time   • HYDROcodone-acetaminophen (NORCO)  MG per tablet Take 2 tablets by mouth Every 6 (Six) Hours As Needed for Moderate Pain  for up to 3 days. 12 tablet 0    • Insulin Pen Needle 32G X 4 MM misc INJECT UP TO THREE TIMES DAILY OR AS DIRECTED 500 each 1 Taking   • ipratropium-albuterol (DUO-NEB) 0.5-2.5 mg/mL nebulizer Take 3 mL by nebulization Every 6 (Six) Hours While Awake. 360 mL     • L-Methylfolate-B6-B12 3-35-2 MG tablet Take 1 tablet by mouth 2 (Two) Times a Day. 180 tablet 3 2/22/2018 at Unknown time   • LEVEMIR FLEXTOUCH 100 UNIT/ML injection 50 units twice daily. 90 mL 3 2/22/2018 at Unknown time   •  lisinopril (PRINIVIL,ZESTRIL) 10 MG tablet Take 1 tablet by mouth Daily. 90 tablet 3 2/22/2018 at Unknown time   • Mirabegron ER (MYRBETRIQ) 25 MG tablet sustained-release 24 hour 24 hr tablet Take 25 mg by mouth Daily.   2/22/2018 at Unknown time   • Multiple Vitamins-Minerals (MULTIVITAMIN ADULT PO) Take  by mouth.   2/22/2018 at Unknown time   • Omega-3 Fatty Acids (FISH OIL) 1000 MG capsule capsule Take  by mouth daily with breakfast.   2/22/2018 at Unknown time   • polyethylene glycol (MIRALAX) pack packet Take 17 g by mouth Daily As Needed (constipation).      • pregabalin (LYRICA) 100 MG capsule Take 1 capsule by mouth 3 (Three) Times a Day. 15 capsule 0    • rosuvastatin (CRESTOR) 10 MG tablet Take 1 tablet by mouth Daily. 90 tablet 3 2/22/2018 at Unknown time   • vitamin C (ASCORBIC ACID) 500 MG tablet Take 500 mg by mouth Daily.   2/22/2018 at Unknown time       Current Meds  Scheduled Meds:  allopurinol 300 mg Oral Daily   apixaban 5 mg Oral Q12H   cefTRIAXone 2 g Intravenous Q24H   cyclobenzaprine 10 mg Oral Q8H   insulin aspart 0-9 Units Subcutaneous 4x Daily With Meals & Nightly   insulin detemir 45 Units Subcutaneous Q12H   ipratropium-albuterol 3 mL Nebulization Q6H While Awake - RT   pregabalin 100 mg Oral TID   rosuvastatin 10 mg Oral Daily   vitamin C 500 mg Oral Daily     Continuous Infusions:   PRN Meds:.•  acetaminophen  •  acetaminophen  •  bisacodyl  •  bisacodyl  •  dextrose  •  dextrose  •  glucagon (human recombinant)  •  HYDROcodone-acetaminophen  •  nitroglycerin  •  ondansetron **OR** ondansetron ODT **OR** ondansetron  •  sodium chloride    Social History:   Social History     Social History   • Marital status:      Spouse name: N/A   • Number of children: N/A   • Years of education: N/A     Occupational History   • Not on file.     Social History Main Topics   • Smoking status: Former Smoker     Types: Cigars     Quit date: 2017   • Smokeless tobacco: Current User     Types:  "Chew   • Alcohol use No   • Drug use: No   • Sexual activity: Defer     Other Topics Concern   • Not on file     Social History Narrative   • No narrative on file       Family History:  Family History   Problem Relation Age of Onset   • Cancer Mother        REVIEW OF SYSTEMS:   CONSTITUTIONAL: No weight loss,  HEENT: Eyes: No visual loss, blurred vision, double vision or yellow sclerae. Ears, Nose, Throat: No hearing loss, sneezing, congestion, runny nose or sore throat.   SKIN: No rash or itching.     RESPIRATORY: No shortness of breath, hemoptysis, cough or sputum.   GASTROINTESTINAL: No anorexia, nausea, vomiting or diarrhea. No abdominal pain, bright red blood per rectum or melena.  GENITOURINARY: No burning on urination, hematuria or increased frequency.  NEUROLOGICAL: No headache, dizziness, syncope, paralysis, ataxia, numbness or tingling in the extremities. No change in bowel or bladder control.   MUSCULOSKELETAL: No muscle.   HEMATOLOGIC: No anemia, bleeding or bruising.   LYMPHATICS: No enlarged nodes. No history of splenectomy.   PSYCHIATRIC: No history of depression, anxiety, hallucinations.   ENDOCRINOLOGIC: No reports of sweating, cold or heat intolerance. No polyuria or polydipsia.     Physical Exam    /65 (BP Location: Left arm, Patient Position: Lying)   Pulse 90   Temp 97.3 °F (36.3 °C) (Oral)   Resp 20   Ht 177.8 cm (70\")   Wt 120 kg (265 lb 3.2 oz)   SpO2 95%   BMI 38.05 kg/m²     General Appearance Well developed, cooperative and well nourished and no acute distress   Head Normocephalic, without abnormality, atraumatic   Ears Ears appear intact with no abnormalities noted   Throat No oral lesions, no thrush, oral mucosa moist   Neck No adenopathy, supple, trachea midline, no thyromegaly, no carotid bruit, no JVD   Back No skin lesions, erythema or scars, no tenderness to percussion or palptaion,range of motion is normal   Lungs Clear to auscultation,respirations regular, even and " unlabored   Heart Regular rhythm and normal rate, normal S1 and S2, 1/6 systolic ejection murmur left upper sternal border without radiation, no gallop, no rub, no click   Chest wall No abnormalities observed   Abdomen Normal bowel sounds, no masses, no hepatomegaly, Mild distention without tenderness    Extremities Moves all extremities well, and Ace edema left ankle., no cyanosis, no redness   Pulses Pulses palpable and equal bilaterally. Normal radial, carotid, femoral, dorsalis pedis and posterior tibial pulses bilaterally. Normal abdominal aorta   Skin No bleeding, bruising or rash   Psyhiatric Alert and oriented x 3, normal mood and affect      Results from last 7 days  Lab Units 03/16/18  0334 03/15/18  2325   SODIUM mmol/L 138 136   POTASSIUM mmol/L 4.6 5.4*   CHLORIDE mmol/L 100 94*   CO2 mmol/L 26.3 27.9   BUN mg/dL 26* 28*   CREATININE mg/dL 0.92 1.15   CALCIUM mg/dL 8.5* 9.2   BILIRUBIN mg/dL  --  0.5   ALK PHOS U/L  --  122*   ALT (SGPT) U/L  --  22   AST (SGOT) U/L  --  28   GLUCOSE mg/dL 194* 280*       Results from last 7 days  Lab Units 03/15/18  2325   TROPONIN T ng/mL 0.051*     )  Results from last 7 days  Lab Units 03/16/18  0334 03/15/18  2325 03/13/18  0515   WBC 10*3/mm3 10.35 13.81* 11.06*   HEMOGLOBIN g/dL 10.7* 12.9* 10.7*   HEMATOCRIT % 34.1* 40.6 34.2*   PLATELETS 10*3/mm3 249 299 244       Results from last 7 days  Lab Units 03/13/18  0515 03/12/18  0554 03/11/18  0706  03/10/18  1642   INR   --   --   --   --  1.04   APTT seconds 29.0 86.6* 73.5*  < > 20.0*   < > = values in this interval not displayed.    Results from last 7 days  Lab Units 03/16/18  0334   MAGNESIUM mg/dL 1.9     I personally viewed and interpreted the patient's EKG/Telemetry data      TELE 3/16/2018 6:53 a.m.        EKG 3/16/2018      EKG 2/25/2018      EKG 10/26/2016      Assessment and Plan  1.  Indeterminate troponin, check serial enzymes.  Of note patient had a negative stress perfusion study in October 2016.   This is likely tachycardia mediated however would consider Lexiscan perfusion study prior to dismissal  2.  Hypotension with tachycardia as an outpatient.  Seems improved since admission a few hours ago.  Repeat blood cultures with recent sepsis.  They have had recurrent atrial arrhythmias and will address as below.    3.  History of postoperative atrial fibrillation.  Does not appear to be in atrial fibrillation now but may have been in this and the rehabilitation facility.  Continue metoprolol as blood pressure allows may need to use digoxin if blood pressure remains low.  Add low-dose digoxin for now  4.  Recent sepsis with Escherichia coli and cholecystitis  5.  Status post recent cholecystectomy  6.  Epidural abscess status post drainage  7.  Status post IVC filter with history of DVT.  Also on Eliquis   8.  Diabetes      Mini Robertson  3/16/2018  9:31 AM    60min spent in reviewing records, discussion and examination of the patient and discussion with other members of the patient's medical team.     Dictated utilizing Dragon dictation

## 2018-03-17 LAB
ANION GAP SERPL CALCULATED.3IONS-SCNC: 10.2 MMOL/L
BASOPHILS # BLD AUTO: 0.03 10*3/MM3 (ref 0–0.2)
BASOPHILS NFR BLD AUTO: 0.3 % (ref 0–1.5)
BUN BLD-MCNC: 24 MG/DL (ref 8–23)
BUN/CREAT SERPL: 23.5 (ref 7–25)
CALCIUM SPEC-SCNC: 9 MG/DL (ref 8.6–10.5)
CHLORIDE SERPL-SCNC: 98 MMOL/L (ref 98–107)
CO2 SERPL-SCNC: 27.8 MMOL/L (ref 22–29)
CREAT BLD-MCNC: 1.02 MG/DL (ref 0.76–1.27)
DEPRECATED RDW RBC AUTO: 52.1 FL (ref 37–54)
EOSINOPHIL # BLD AUTO: 0.15 10*3/MM3 (ref 0–0.7)
EOSINOPHIL NFR BLD AUTO: 1.5 % (ref 0.3–6.2)
ERYTHROCYTE [DISTWIDTH] IN BLOOD BY AUTOMATED COUNT: 14.8 % (ref 11.5–14.5)
GFR SERPL CREATININE-BSD FRML MDRD: 71 ML/MIN/1.73
GLUCOSE BLD-MCNC: 85 MG/DL (ref 65–99)
GLUCOSE BLDC GLUCOMTR-MCNC: 150 MG/DL (ref 70–130)
GLUCOSE BLDC GLUCOMTR-MCNC: 159 MG/DL (ref 70–130)
GLUCOSE BLDC GLUCOMTR-MCNC: 171 MG/DL (ref 70–130)
GLUCOSE BLDC GLUCOMTR-MCNC: 200 MG/DL (ref 70–130)
GLUCOSE BLDC GLUCOMTR-MCNC: 97 MG/DL (ref 70–130)
HCT VFR BLD AUTO: 36 % (ref 40.4–52.2)
HGB BLD-MCNC: 11 G/DL (ref 13.7–17.6)
IMM GRANULOCYTES # BLD: 0.08 10*3/MM3 (ref 0–0.03)
IMM GRANULOCYTES NFR BLD: 0.8 % (ref 0–0.5)
LYMPHOCYTES # BLD AUTO: 0.99 10*3/MM3 (ref 0.9–4.8)
LYMPHOCYTES NFR BLD AUTO: 10.1 % (ref 19.6–45.3)
MCH RBC QN AUTO: 29.4 PG (ref 27–32.7)
MCHC RBC AUTO-ENTMCNC: 30.6 G/DL (ref 32.6–36.4)
MCV RBC AUTO: 96.3 FL (ref 79.8–96.2)
MONOCYTES # BLD AUTO: 0.65 10*3/MM3 (ref 0.2–1.2)
MONOCYTES NFR BLD AUTO: 6.6 % (ref 5–12)
NEUTROPHILS # BLD AUTO: 7.95 10*3/MM3 (ref 1.9–8.1)
NEUTROPHILS NFR BLD AUTO: 80.7 % (ref 42.7–76)
PLATELET # BLD AUTO: 272 10*3/MM3 (ref 140–500)
PMV BLD AUTO: 10.7 FL (ref 6–12)
POTASSIUM BLD-SCNC: 4.7 MMOL/L (ref 3.5–5.2)
RBC # BLD AUTO: 3.74 10*6/MM3 (ref 4.6–6)
SODIUM BLD-SCNC: 136 MMOL/L (ref 136–145)
WBC NRBC COR # BLD: 9.85 10*3/MM3 (ref 4.5–10.7)

## 2018-03-17 PROCEDURE — 94799 UNLISTED PULMONARY SVC/PX: CPT

## 2018-03-17 PROCEDURE — 97162 PT EVAL MOD COMPLEX 30 MIN: CPT

## 2018-03-17 PROCEDURE — 99225 PR SBSQ OBSERVATION CARE/DAY 25 MINUTES: CPT | Performed by: INTERNAL MEDICINE

## 2018-03-17 PROCEDURE — 63710000001 INSULIN ASPART PER 5 UNITS: Performed by: HOSPITALIST

## 2018-03-17 PROCEDURE — G8979 MOBILITY GOAL STATUS: HCPCS

## 2018-03-17 PROCEDURE — 25010000003 CEFTRIAXONE PER 250 MG: Performed by: HOSPITALIST

## 2018-03-17 PROCEDURE — G0378 HOSPITAL OBSERVATION PER HR: HCPCS

## 2018-03-17 PROCEDURE — 80048 BASIC METABOLIC PNL TOTAL CA: CPT | Performed by: HOSPITALIST

## 2018-03-17 PROCEDURE — 82962 GLUCOSE BLOOD TEST: CPT

## 2018-03-17 PROCEDURE — 51702 INSERT TEMP BLADDER CATH: CPT

## 2018-03-17 PROCEDURE — G8978 MOBILITY CURRENT STATUS: HCPCS

## 2018-03-17 PROCEDURE — 85025 COMPLETE CBC W/AUTO DIFF WBC: CPT | Performed by: HOSPITALIST

## 2018-03-17 PROCEDURE — 96366 THER/PROPH/DIAG IV INF ADDON: CPT

## 2018-03-17 PROCEDURE — 97110 THERAPEUTIC EXERCISES: CPT

## 2018-03-17 RX ORDER — TAMSULOSIN HYDROCHLORIDE 0.4 MG/1
0.4 CAPSULE ORAL DAILY
Status: DISCONTINUED | OUTPATIENT
Start: 2018-03-17 | End: 2018-03-20 | Stop reason: HOSPADM

## 2018-03-17 RX ADMIN — INSULIN ASPART 2 UNITS: 100 INJECTION, SOLUTION INTRAVENOUS; SUBCUTANEOUS at 21:48

## 2018-03-17 RX ADMIN — CYCLOBENZAPRINE 10 MG: 10 TABLET, FILM COATED ORAL at 22:33

## 2018-03-17 RX ADMIN — DIGOXIN 125 MCG: 0.12 TABLET ORAL at 11:44

## 2018-03-17 RX ADMIN — CYCLOBENZAPRINE 10 MG: 10 TABLET, FILM COATED ORAL at 04:59

## 2018-03-17 RX ADMIN — IPRATROPIUM BROMIDE AND ALBUTEROL SULFATE 3 ML: .5; 3 SOLUTION RESPIRATORY (INHALATION) at 12:29

## 2018-03-17 RX ADMIN — APIXABAN 5 MG: 5 TABLET, FILM COATED ORAL at 09:21

## 2018-03-17 RX ADMIN — INSULIN ASPART 2 UNITS: 100 INJECTION, SOLUTION INTRAVENOUS; SUBCUTANEOUS at 17:45

## 2018-03-17 RX ADMIN — INSULIN DETEMIR 45 UNITS: 100 INJECTION, SOLUTION SUBCUTANEOUS at 09:21

## 2018-03-17 RX ADMIN — PREGABALIN 100 MG: 100 CAPSULE ORAL at 09:21

## 2018-03-17 RX ADMIN — HYDROCODONE BITARTRATE AND ACETAMINOPHEN 2 TABLET: 10; 325 TABLET ORAL at 04:59

## 2018-03-17 RX ADMIN — PREGABALIN 100 MG: 100 CAPSULE ORAL at 22:33

## 2018-03-17 RX ADMIN — METOPROLOL TARTRATE 25 MG: 25 TABLET ORAL at 22:33

## 2018-03-17 RX ADMIN — HYDROCODONE BITARTRATE AND ACETAMINOPHEN 2 TABLET: 10; 325 TABLET ORAL at 11:45

## 2018-03-17 RX ADMIN — IPRATROPIUM BROMIDE AND ALBUTEROL SULFATE 3 ML: .5; 3 SOLUTION RESPIRATORY (INHALATION) at 07:19

## 2018-03-17 RX ADMIN — CEFTRIAXONE SODIUM 2 G: 2 INJECTION, SOLUTION INTRAVENOUS at 02:21

## 2018-03-17 RX ADMIN — PREGABALIN 100 MG: 100 CAPSULE ORAL at 16:06

## 2018-03-17 RX ADMIN — ROSUVASTATIN CALCIUM 10 MG: 10 TABLET, FILM COATED ORAL at 09:21

## 2018-03-17 RX ADMIN — INSULIN DETEMIR 45 UNITS: 100 INJECTION, SOLUTION SUBCUTANEOUS at 21:48

## 2018-03-17 RX ADMIN — APIXABAN 5 MG: 5 TABLET, FILM COATED ORAL at 22:33

## 2018-03-17 RX ADMIN — INSULIN ASPART 2 UNITS: 100 INJECTION, SOLUTION INTRAVENOUS; SUBCUTANEOUS at 11:45

## 2018-03-17 RX ADMIN — HYDROCODONE BITARTRATE AND ACETAMINOPHEN 2 TABLET: 10; 325 TABLET ORAL at 17:45

## 2018-03-17 RX ADMIN — ALLOPURINOL 300 MG: 300 TABLET ORAL at 09:21

## 2018-03-17 RX ADMIN — TAMSULOSIN HYDROCHLORIDE 0.4 MG: 0.4 CAPSULE ORAL at 22:33

## 2018-03-17 RX ADMIN — OXYCODONE HYDROCHLORIDE AND ACETAMINOPHEN 500 MG: 500 TABLET ORAL at 09:21

## 2018-03-17 RX ADMIN — CYCLOBENZAPRINE 10 MG: 10 TABLET, FILM COATED ORAL at 14:23

## 2018-03-17 NOTE — PLAN OF CARE
Problem: Pain, Chronic (Adult)  Goal: Identify Related Risk Factors and Signs and Symptoms  Outcome: Ongoing (interventions implemented as appropriate)    Goal: Acceptable Pain/Comfort Level and Functional Ability  Outcome: Ongoing (interventions implemented as appropriate)      Problem: Infection, Risk/Actual (Adult)  Goal: Identify Related Risk Factors and Signs and Symptoms  Outcome: Ongoing (interventions implemented as appropriate)

## 2018-03-17 NOTE — PLAN OF CARE
Problem: Patient Care Overview  Goal: Plan of Care Review  Outcome: Ongoing (interventions implemented as appropriate)   03/17/18 0251   Coping/Psychosocial   Plan of Care Reviewed With patient   Plan of Care Review   Progress improving   OTHER   Outcome Summary vss, infrequent c/o pain, iv antibiotics, picc line care, pt consulted to see today, resting well throughout shift, will continue to monitor

## 2018-03-17 NOTE — PROGRESS NOTES
"DAILY PROGRESS NOTE  [facility]    Patient Identification:  Name: Jesus Beckham  Age: 77 y.o.  Sex: male  :  1940  MRN: [id number]         Primary Care Physician: Magdy Ricardo MD    Subjective:  Interval History:He feels better today. No chest pain.    Objective:    Scheduled Meds:  allopurinol 300 mg Oral Daily   apixaban 5 mg Oral Q12H   cefTRIAXone 2 g Intravenous Q24H   cyclobenzaprine 10 mg Oral Q8H   digoxin 125 mcg Oral Daily   insulin aspart 0-9 Units Subcutaneous 4x Daily With Meals & Nightly   insulin detemir 45 Units Subcutaneous Q12H   ipratropium-albuterol 3 mL Nebulization Q6H While Awake - RT   pregabalin 100 mg Oral TID   rosuvastatin 10 mg Oral Daily   tamsulosin 0.4 mg Oral Daily   vitamin C 500 mg Oral Daily     Continuous Infusions:     Vital signs in last 24 hours:  Temp:  [97.3 °F (36.3 °C)-98.8 °F (37.1 °C)] 98.8 °F (37.1 °C)  Heart Rate:  [] 109  Resp:  [16-20] 18  BP: (119-150)/(77-89) 150/89    Intake/Output:  No intake or output data in the 24 hours ending 18 1428    Exam:  /89 (BP Location: Left arm, Patient Position: Lying)   Pulse 109   Temp 98.8 °F (37.1 °C) (Oral)   Resp 18   Ht 177.8 cm (70\")   Wt 116 kg (255 lb 11.2 oz)   SpO2 94%   BMI 36.69 kg/m²     General Appearance:    Alert, cooperative, no distress   Head:    Normocephalic, without obvious abnormality, atraumatic   Eyes:       Throat:   Lips, tongue, gums normal   Neck:   Supple, symmetrical, trachea midline, no JVD   Lungs:     Clear to auscultation bilaterally, respirations unlabored   Chest Wall:    No tenderness or deformity    Heart:    Regular rate and rhythm, S1 and S2 normal, no murmur,no  Rub or gallop   Abdomen:     Soft, non-tender, bowel sounds active, no masses, no organomegaly    Extremities:   Extremities normal, atraumatic, no cyanosis or edema   Pulses:      Skin:   Skin is warm and dry,  no rashes or palpable lesions   Neurologic:   Legs are weak    "   [unfilled]  Data Review:    Results from last 7 days  Lab Units 03/17/18  0501 03/16/18  0334 03/15/18  2325   SODIUM mmol/L 136 138 136   POTASSIUM mmol/L 4.7 4.6 5.4*   CHLORIDE mmol/L 98 100 94*   CO2 mmol/L 27.8 26.3 27.9   BUN mg/dL 24* 26* 28*   CREATININE mg/dL 1.02 0.92 1.15   GLUCOSE mg/dL 85 194* 280*   CALCIUM mg/dL 9.0 8.5* 9.2       Results from last 7 days  Lab Units 03/17/18  0501 03/16/18  0334 03/15/18  2325   WBC 10*3/mm3 9.85 10.35 13.81*   HEMOGLOBIN g/dL 11.0* 10.7* 12.9*   HEMATOCRIT % 36.0* 34.1* 40.6   PLATELETS 10*3/mm3 272 249 299           Results from last 7 days  Lab Units 03/15/18  2325   HEMOGLOBIN A1C % 7.90*       Lab Results  Lab Value Date/Time   TROPONINT 0.029 03/16/2018 1441   TROPONINT 0.051 (H) 03/15/2018 2325   TROPONINT <0.010 02/28/2018 1953   TROPONINT 0.018 02/26/2018 0520   TROPONINT 0.012 02/25/2018 2037   TROPONINT 0.019 02/25/2018 1252   TROPONINT 0.030 02/25/2018 0506   TROPONINT 0.033 (H) 02/24/2018 1440   TROPONINT 0.041 (H) 02/24/2018 0546   TROPONINT 0.029 02/23/2018 0031           Results from last 7 days  Lab Units 03/15/18  2325   ALK PHOS U/L 122*   BILIRUBIN mg/dL 0.5   ALT (SGPT) U/L 22   AST (SGOT) U/L 28           Results from last 7 days  Lab Units 03/15/18  2325   HEMOGLOBIN A1C % 7.90*     Glucose   Date/Time Value Ref Range Status   03/17/2018 1135 150 (H) 70 - 130 mg/dL Final   03/17/2018 0747 97 70 - 130 mg/dL Final   03/16/2018 2201 173 (H) 70 - 130 mg/dL Final   03/16/2018 1639 154 (H) 70 - 130 mg/dL Final   03/16/2018 1151 182 (H) 70 - 130 mg/dL Final   03/16/2018 0230 197 (H) 70 - 130 mg/dL Final   03/15/2018 2218 270 (H) 70 - 130 mg/dL Final       Results from last 7 days  Lab Units 03/10/18  1642   INR  1.04       Patient Active Problem List   Diagnosis Code   • Gout M10.9   • Diabetic peripheral neuropathy E11.42   • Dyslipidemia E78.5   • Uncontrolled type 2 diabetes mellitus E11.65   • Essential hypertension I10   • PVC (premature  ventricular contraction) I49.3   • Benign non-nodular prostatic hyperplasia without lower urinary tract symptoms N40.0   • Osteoarthritis M19.90   • Urge incontinence N39.41   • Hypotension I95.9   • CELINA (obstructive sleep apnea) G47.33   • Acute gangrenous cholecystitis K81.0   • A-fib I48.91   • Discitis of lumbar region M46.46   • Acute deep vein thrombosis of calf, right I82.4Z1   • Sepsis due to Escherichia coli A41.51   • Atrial fibrillation with RVR I48.91   • Elevated troponin R74.8   • History of sepsis Z86.19       Assessment:  Active Hospital Problems (** Indicates Principal Problem)    Diagnosis Date Noted   • **Atrial fibrillation with RVR [I48.91] 03/16/2018   • Elevated troponin [R74.8] 03/16/2018   • History of sepsis [Z86.19] 03/16/2018   • Hypotension [I95.9] 02/23/2018   • Osteoarthritis [M19.90] 09/28/2017   • Essential hypertension [I10] 07/29/2016   • Gout [M10.9] 04/01/2016   • Diabetic peripheral neuropathy [E11.42] 04/01/2016   • Dyslipidemia [E78.5] 04/01/2016   • Uncontrolled type 2 diabetes mellitus [E11.65] 04/01/2016      Resolved Hospital Problems    Diagnosis Date Noted Date Resolved   No resolved problems to display.       Plan:  Continue current RX. As per cardiology.  Rate control.    Krish Lockwood MD  3/17/2018  2:28 PM

## 2018-03-17 NOTE — THERAPY EVALUATION
Acute Care - Physical Therapy Initial Evaluation  [facility]     Patient Name: Jesus Beckham  : 1940  MRN: [id number]  Today's Date: 3/17/2018   Onset of Illness/Injury or Date of Surgery: 03/15/18     Referring Physician: Mandie      Admit Date: 3/15/2018    Visit Dx:     ICD-10-CM ICD-9-CM   1. Atrial fibrillation with RVR I48.91 427.31   2. Hypotension, unspecified hypotension type I95.9 458.9   3. Elevated troponin R74.8 790.6   4. History of sepsis Z86.19 V12.09   5. Decreased mobility R26.89 781.99     Patient Active Problem List   Diagnosis   • Gout   • Diabetic peripheral neuropathy   • Dyslipidemia   • Uncontrolled type 2 diabetes mellitus   • Essential hypertension   • PVC (premature ventricular contraction)   • Benign non-nodular prostatic hyperplasia without lower urinary tract symptoms   • Osteoarthritis   • Urge incontinence   • Hypotension   • CELINA (obstructive sleep apnea)   • Acute gangrenous cholecystitis   • A-fib   • Discitis of lumbar region   • Acute deep vein thrombosis of calf, right   • Sepsis due to Escherichia coli   • Atrial fibrillation with RVR   • Elevated troponin   • History of sepsis     Past Medical History:   Diagnosis Date   • Arthritis    • Cancer of bladder    • Colon polyp    • Diabetes mellitus    • Gout    • Hyperlipidemia    • Irregular heartbeat    • Skin carcinoma    • Type 2 diabetes mellitus    • Vitamin D deficiency      Past Surgical History:   Procedure Laterality Date   • APPENDECTOMY     • CHOLECYSTECTOMY WITH INTRAOPERATIVE CHOLANGIOGRAM N/A 2018    Procedure: CHOLECYSTECTOMY LAPAROSCOPIC INTRAOPERATIVE CHOLANGIOGRAM;  Surgeon: Maegan Correa MD;  Location: [facility] OR;  Service:    • COLONOSCOPY      h/o of polyps   • EYE SURGERY       - glass removal from eye, lens transplant   • KNEE SURGERY  2006    rt knee   • VENA CAVA FILTER INSERTION Right 3/6/2018    Procedure: VENA CAVA FILTER INSERTION;  Surgeon: Alexis LAZO  "MD Ariane;  Location: [facility] OR 18/19;  Service:         PT ASSESSMENT (last 72 hours)      Physical Therapy Evaluation     Row Name 03/17/18 0418          PT Evaluation Time/Intention    Subjective Information complains of;weakness;fatigue;pain  -EE     Document Type evaluation  -EE     Patient Effort good  -EE     Symptoms Noted During/After Treatment fatigue;increased pain  -EE     Row Name 03/17/18 2185          General Information    Onset of Illness/Injury or Date of Surgery 03/15/18  -EE     Referring Physician [place]  -EE     Patient Observations alert;cooperative;agree to therapy  -EE     Patient/Family Observations Supine in bed in no acute distress; spouse present at bedside.  -EE     Prior Level of Function --   admitted from subacute rehab; max x2 for tsf's  -EE     Equipment Currently Used at Home walker, rolling  -EE     Pertinent History of Current Functional Problem admitted from subacute rehab with afib w/RVR; pt reports R shoulder pain since \"pulling it\" earlier today while rolling in bed; prior admission at [facility] with lap sean on 2/25, IVC filter placement on 3/6, L2-4 fusion on 3/7  -EE     Existing Precautions/Restrictions fall;spinal  -EE     Barriers to Rehab previous functional deficit  -EE     Row Name 03/17/18 1374          Relationship/Environment    Lives With spouse;other (see comments)   admitted from [facility]; typically lives at home w/spouse  -EE     Row Name 03/17/18 1882          Cognitive Assessment/Interventions    Additional Documentation Cognitive Assessment/Intervention (Group)  -EE     Row Name 03/17/18 7216          Cognitive Assessment/Intervention- PT/OT    Orientation Status (Cognition) oriented x 4  -EE     Follows Commands (Cognition) over 90% accuracy;verbal cues/prompting required;repetition of directions required  -EE     Cognitive Function (Cognitive) WFL  -EE     Personal Safety Interventions fall prevention program maintained;muscle strengthening " facilitated;nonskid shoes/slippers when out of bed;supervised activity  -EE     Row Name 03/17/18 1655          Safety Issues, Functional Mobility    Safety Issues Affecting Function (Mobility) safety precaution awareness  -EE     Impairments Affecting Function (Mobility) balance;pain;strength;endurance/activity tolerance  -EE     Row Name 03/17/18 1655          Bed Mobility Assessment/Treatment    Bed Mobility Assessment/Treatment rolling right;supine-sit;sit-supine  -EE     Rolling Right [place] (Bed Mobility) moderate assist (50% patient effort);2 person assist;verbal cues;nonverbal cues (demo/gesture)  -EE     Supine-Sit [place] (Bed Mobility) moderate assist (50% patient effort);2 person assist;verbal cues;nonverbal cues (demo/gesture)  -EE     Sit-Supine [place] (Bed Mobility) maximum assist (25% patient effort);2 person assist;verbal cues;nonverbal cues (demo/gesture)  -EE     Bed Mobility, Safety Issues decreased use of arms for pushing/pulling;decreased use of legs for bridging/pushing  -     Assistive Device (Bed Mobility) bed rails;draw sheet  -     Comment (Bed Mobility) verbal cues for log roll; pt unable to push with R UE due to R shoulder pain  -     Row Name 03/17/18 1655          Transfer Assessment/Treatment    Comment (Transfers) not attempted; pt with significant pain and fatigue while sitting EOB  -     Row Name 03/17/18 1655          General ROM    GENERAL ROM COMMENTS B LEs grossly WFL; R UE deferred due to shoulder pain; L UE functional  -     Row Name 03/17/18 1655          General Assessment (Manual Muscle Testing)    General Manual Muscle Testing (MMT) Assessment lower extremity strength deficits identified  -     Comment, General Manual Muscle Testing (MMT) Assessment L ankle DF 1+/5; PF 2/5, L quad 3-/5, L hip 2+/5; R ankle 3/5, R quad 3-/5; R hip 2+/5  -     Row Name 03/17/18 1655          Motor Assessment/Intervention    Additional Documentation Balance  (Group);Therapeutic Exercise Interventions (Group)  -EE     Row Name 03/17/18 1655          Therapeutic Exercise    Additional Documentation --  -EE     Row Name 03/17/18 1655          Therapeutic Exercise    Upper Extremity (Therapeutic Exercise) other (see comments)   scapular retraction x5 reps  -EE     Lower Extremity (Therapeutic Exercise) quad sets, bilateral;gluteal sets;LAQ (long arc quad), bilateral  -EE     Lower Extremity Range of Motion (Therapeutic Exercise) ankle dorsiflexion/plantar flexion, bilateral  -EE     Exercise Type (Therapeutic Exercise) AAROM (active assistive range of motion);AROM (active range of motion)   assist for L ankle pumps  -EE     Position (Therapeutic Exercise) seated;supine  -EE     Sets/Reps (Therapeutic Exercise) 5  -EE     Row Name 03/17/18 1655          Balance    Balance static sitting balance  -EE     Row Name 03/17/18 1655          Static Sitting Balance    Level of [place] (Unsupported Sitting, Static Balance) contact guard assist;minimal assist, 75% patient effort   leans R initially; able to correct with cues  -EE     Sitting Position (Unsupported Sitting, Static Balance) sitting on edge of bed   feet supported on floor  -EE     Time Able to Maintain Position (Unsupported Sitting, Static Balance) 4 to 5 minutes  -EE     Comment (Unsupported Sitting, Static Balance) Cues required for posture; increased B hip pain while sitting EOB  -EE     Row Name 03/17/18 1655          Pain Assessment    Additional Documentation Pain Scale: Word Pre/Post-Treatment (Group)  -EE     Row Name 03/17/18 1655          Pain Scale: Numbers Pre/Post-Treatment    Pain Location - Side Bilateral  -EE     Pain Location hip   also reports R shoulder pain during bed mobility  -EE     Pre/Post Treatment Pain Comment pain worsened with activity and movement  -EE     Pain Intervention(s) Repositioned;Rest  -EE     Row Name 03/17/18 1655          Pain Scale: Word Pre/Post-Treatment    Pain: Word  Scale, Pretreatment 6 - moderate-severe pain  -EE     Row Name             Wound 03/16/18 0600 midline back    Wound - Properties Group Date first assessed: 03/16/18  -AD Time first assessed: 0600  -AD Present On Admission : yes;picture taken  -AD Orientation: midline  -AD Location: back  -AD    Row Name             Wound 03/16/18 0600 coccyx    Wound - Properties Group Date first assessed: 03/16/18  -AD Time first assessed: 0600  -AD Present On Admission : yes;picture taken  -AD Location: coccyx  -AD    Row Name 03/17/18 1655          Plan of Care Review    Plan of Care Reviewed With patient;spouse  -EE     Row Name 03/17/18 1655          Physical Therapy Clinical Impression    Patient/Family Goals Statement (PT Clinical Impression) Improve mobility; decrease pain  -EE     Criteria for Skilled Interventions Met (PT Clinical Impression) yes;treatment indicated  -EE     Pathology/Pathophysiology Noted (Describe Specifically for Each System) musculoskeletal;neuromuscular  -EE     Impairments Found (describe specific impairments) aerobic capacity/endurance;gait, locomotion, and balance;muscle performance  -EE     Rehab Potential (PT Clinical Summary) good, to achieve stated therapy goals  -EE     Row Name 03/17/18 1655          Physical Therapy Goals    Bed Mobility Goal Selection (PT) bed mobility, PT goal 1  -EE     Transfer Goal Selection (PT) transfer, PT goal 1  -EE     Gait Training Goal Selection (PT) --  -EE     Row Name 03/17/18 1655          Bed Mobility Goal 1 (PT)    Activity/Assistive Device (Bed Mobility Goal 1, PT) bed mobility activities, all  -EE     [place] Level/Cues Needed (Bed Mobility Goal 1, PT) minimum assist (75% or more patient effort)   x2 assist  -EE     Time Frame (Bed Mobility Goal 1, PT) 1 week  -EE     Row Name 03/17/18 1655          Transfer Goal 1 (PT)    Activity/Assistive Device (Transfer Goal 1, PT) sit-to-stand/stand-to-sit;bed-to-chair/chair-to-bed;walker, rolling  -EE      [place] Level/Cues Needed (Transfer Goal 1, PT) moderate assist (50-74% patient effort)   x2 assist  -EE     Time Frame (Transfer Goal 1, PT) 1 week  -EE     Row Name 03/17/18 1655          Patient Education Goal (PT)    Activity (Patient Education Goal, PT) Demonstrates HEP  -EE     [place]/Cues/Accuracy (Memory Goal 2, PT) demonstrates adequately;independent  -EE     Time Frame (Patient Education Goal, PT) 1 week  -EE     Row Name 03/17/18 1653          Positioning and Restraints    Pre-Treatment Position in bed  -EE     Post Treatment Position bed  -EE     In Bed fowlers;call light within reach;encouraged to call for assist;exit alarm on;with family/caregiver;LUE elevated;RUE elevated;legs elevated  -EE       User Key  (r) = Recorded By, (t) = Taken By, (c) = Cosigned By    Initials Name Provider Type    EE Carmen Sarmiento, MAHAMED Physical Therapist    RAVINDRA Brown, RN Registered Nurse          Physical Therapy Education     Title: PT OT SLP Therapies (Active)     Topic: Physical Therapy (Active)     Point: Mobility training (Active)    Learning Progress Summary     Learner Status Readiness Method Response Comment Documented by    Patient Active Acceptance E,TB NR  EE 03/17/18 1727          Point: Home exercise program (Active)    Learning Progress Summary     Learner Status Readiness Method Response Comment Documented by    Patient Active Acceptance E,TB NR  EE 03/17/18 1727          Point: Body mechanics (Active)    Learning Progress Summary     Learner Status Readiness Method Response Comment Documented by    Patient Active Acceptance E,TB NR  EE 03/17/18 1727          Point: Precautions (Active)    Learning Progress Summary     Learner Status Readiness Method Response Comment Documented by    Patient Active Acceptance E,TB NR  EE 03/17/18 1727                      User Key     Initials Effective Dates Name Provider Type [facility] 12/01/15 -  Carmen Sarmiento, PT Physical Therapist PT                 PT Recommendation and Plan  Anticipated Discharge Disposition (PT): skilled nursing facility (SNF)  Planned Therapy Interventions (PT Eval): balance training, bed mobility training, gait training, home exercise program, patient/family education, strengthening, transfer training, neuromuscular re-education  Therapy Frequency (PT Clinical Impression): daily  Outcome Summary/Treatment Plan (PT)  Anticipated Discharge Disposition (PT): skilled nursing facility (SNF)  Plan of Care Reviewed With: patient  Outcome Summary: Pt presents from subacute rehab w/a fib w/RVR; recent admission with lap sean, IVC filter placement, and lumbar fusion. Upon exam, pt demonstrates B hip and R shoulder pain, LE weakness, impaired balance, and decreased endurance limiting mobility. Pt required mod A x2 with bed mobility; unable to attempt transfers due to pain and fatigue. Pt instructed in HEP for continued strengthening; will benefit from continued PT to address impairments and increase independence with mobility. Recommend SNF at DC for continued rehabilitation prior to DC home.           Outcome Measures     Row Name 03/17/18 1700             How much help from another person do you currently need...    Turning from your back to your side while in flat bed without using bedrails? 2  -EE      Moving from lying on back to sitting on the side of a flat bed without bedrails? 2  -EE      Moving to and from a bed to a chair (including a wheelchair)? 1  -EE      Standing up from a chair using your arms (e.g., wheelchair, bedside chair)? 1  -EE      Climbing 3-5 steps with a railing? 1  -EE      To walk in hospital room? 1  -EE      AM-PAC 6 Clicks Score 8  -EE         Functional Assessment    Outcome Measure Options AM-PAC 6 Clicks Basic Mobility (PT)  -EE        User Key  (r) = Recorded By, (t) = Taken By, (c) = Cosigned By    Initials Name Provider Type    ORALIA Sarmiento, PT Physical Therapist           Time Calculation:         PT  Charges     Row Name 03/17/18 1729             Time Calculation    Start Time 1655  -EE      Stop Time 1712  -EE      Time Calculation (min) 17 min  -EE      PT Received On 03/17/18  -EE      PT - Next Appointment 03/19/18  -EE      PT Goal Re-Cert Due Date 03/24/18  -EE        User Key  (r) = Recorded By, (t) = Taken By, (c) = Cosigned By    Initials Name Provider Type    EE Carmen Sarmiento PT Physical Therapist          Therapy Charges for Today     Code Description Service Date Service Provider Modifiers Qty    31418629162 HC PT EVAL MOD COMPLEXITY 2 3/17/2018 Carmen Sarmiento, PT GP 1    57172590239 HC PT THER PROC EA 15 MIN 3/17/2018 Carmen Sarmiento, PT GP 1    61206402946 HC PT THER SUPP EA 15 MIN 3/17/2018 Carmen Sarmiento, PT GP 1    68507923344 HC PT MOBILITY CURRENT 3/17/2018 Carmen Sarmiento, PT GP, CM 1    64636348352 HC PT MOBILITY PROJECTED 3/17/2018 Carmen Sarmiento PT GP, CL 1          PT G-Codes  Outcome Measure Options: AM-PAC 6 Clicks Basic Mobility (PT)  Functional Limitation: Mobility: Walking and moving around  Mobility: Walking and Moving Around Current Status (): At least 80 percent but less than 100 percent impaired, limited or restricted  Mobility: Walking and Moving Around Goal Status (): At least 60 percent but less than 80 percent impaired, limited or restricted      Carmen Sarmiento PT  3/17/2018

## 2018-03-17 NOTE — PLAN OF CARE
Problem: Patient Care Overview  Goal: Plan of Care Review   03/17/18 8326   Coping/Psychosocial   Plan of Care Reviewed With patient   OTHER   Outcome Summary Pt presents from subacute rehab w/a fib w/RVR; recent admission with lap sean, IVC filter placement, and lumbar fusion. Upon exam, pt demonstrates B hip and R shoulder pain, LE weakness, impaired balance, and decreased endurance limiting mobility. Pt required mod A x2 with bed mobility; unable to attempt transfers due to pain and fatigue. Pt instructed in HEP for continued strengthening; will benefit from continued PT to address impairments and increase independence with mobility. Recommend SNF at DC for continued rehabilitation prior to DC home.

## 2018-03-17 NOTE — PLAN OF CARE
Problem: Patient Care Overview  Goal: Individualization and Mutuality  Outcome: Ongoing (interventions implemented as appropriate)    Goal: Discharge Needs Assessment  Outcome: Ongoing (interventions implemented as appropriate)    Goal: Interprofessional Rounds/Family Conf  Outcome: Ongoing (interventions implemented as appropriate)      Problem: Fall Risk (Adult)  Goal: Identify Related Risk Factors and Signs and Symptoms  Outcome: Ongoing (interventions implemented as appropriate)    Goal: Absence of Fall  Outcome: Ongoing (interventions implemented as appropriate)      Problem: Pain, Chronic (Adult)  Goal: Identify Related Risk Factors and Signs and Symptoms  Outcome: Ongoing (interventions implemented as appropriate)    Goal: Acceptable Pain/Comfort Level and Functional Ability  Outcome: Ongoing (interventions implemented as appropriate)      Problem: Infection, Risk/Actual (Adult)  Goal: Identify Related Risk Factors and Signs and Symptoms  Outcome: Ongoing (interventions implemented as appropriate)    Goal: Infection Prevention/Resolution  Outcome: Ongoing (interventions implemented as appropriate)

## 2018-03-17 NOTE — CONSULTS
[facility] Consult [facility]    Date of Hospital Admission:  3/15/2018  Today's Date:  3/17/2018                             Requesting Physician:  Krish Lockwood MD  Consulting Physician: Dr. Oswald    Reason for Consultation:  NGB    Patient Identification:   Jesus Beckham:   77 y.o.  male  1940  [id number]          Date:  3/17/2018     Chief Complaint   Patient presents with   • Rapid Heart Rate       History of Present Illness:     Mr. Beckham is a pleasant 77 y.o. year old male who has been admitted for evaluation of his AFIB.   He is currently a patient of [facility], Dr. Saleh, and has been treated for bladder cancer in the past.   Last cystoscope was 2017.  He has a history of urinary frequency and urgency. Patient reports having urinary incontinence and being unable to sense when he needs to urinate. Reports taking Flomax prior to his admission. Denies any gross hematuria or dysuria.   Is scheduled for follow up for his bladder cancer in 2018.         Past Medical History:     Past Medical History:   Diagnosis Date   • Arthritis    • Cancer of bladder    • Colon polyp    • Diabetes mellitus    • Gout    • Hyperlipidemia    • Irregular heartbeat    • Skin carcinoma    • Type 2 diabetes mellitus    • Vitamin D deficiency      Past Surgical History:     Past Surgical History:   Procedure Laterality Date   • APPENDECTOMY     • CHOLECYSTECTOMY WITH INTRAOPERATIVE CHOLANGIOGRAM N/A 2018    Procedure: CHOLECYSTECTOMY LAPAROSCOPIC INTRAOPERATIVE CHOLANGIOGRAM;  Surgeon: Maegan Correa MD;  Location: [facility];  Service:    • COLONOSCOPY      h/o of polyps   • EYE SURGERY       - glass removal from eye, lens transplant   • KNEE SURGERY      rt knee   • VENA CAVA FILTER INSERTION Right 3/6/2018    Procedure: VENA CAVA FILTER INSERTION;  Surgeon: Alexis Thakkar MD;  Location: [facility] ;  Service:      Social History:     Social History     Social  History   • Marital status:      Spouse name: N/A   • Number of children: N/A   • Years of education: N/A     Occupational History   • Not on file.     Social History Main Topics   • Smoking status: Former Smoker     Types: Cigars     Quit date: 2017   • Smokeless tobacco: Current User     Types: Chew   • Alcohol use No   • Drug use: No   • Sexual activity: Defer     Other Topics Concern   • Not on file     Social History Narrative   • No narrative on file     Family History:     Family History   Problem Relation Age of Onset   • Cancer Mother      Allergies:   No Known Allergies  Medications:     No current facility-administered medications on file prior to encounter.      Current Outpatient Prescriptions on File Prior to Encounter   Medication Sig Dispense Refill   • Acetaminophen (TYLENOL PO) Take  by mouth as needed.     • allopurinol (ZYLOPRIM) 300 MG tablet Take 1 tablet by mouth Daily. 90 tablet 1   • ANDROGEL PUMP 20.25 MG/ACT (1.62%) gel 2 pump actuation on each shoulder daily 450 g 1   • apixaban (ELIQUIS) 5 MG tablet tablet Take 1 tablet by mouth Every 12 (Twelve) Hours. 60 tablet    • bisacodyl (DULCOLAX) 5 MG EC tablet Take 1 tablet by mouth Daily As Needed for Constipation.     • castor oil-balsam peru (VENELEX) ointment Apply 5 g topically Every 12 (Twelve) Hours.     • cefTRIAXone (ROCEPHIN) 40 MG/ML IVPB Infuse 50 mL into a venous catheter Daily for 50 doses. 1500 mL 1   • cyclobenzaprine (FLEXERIL) 10 MG tablet Take 1 tablet by mouth Every 8 (Eight) Hours.     • ergocalciferol (DRISDOL) 70847 units capsule Take 1 capsule by mouth 1 (One) Time Per Week. 13 capsule 3   • fluticasone (FLONASE) 50 MCG/ACT nasal spray 2 sprays into each nostril Daily. 3 bottle 3   • glipiZIDE (GLUCOTROL) 10 MG tablet Take 1 tablet by mouth 2 (Two) Times a Day Before Meals. 360 tablet 3   • glucose blood test strip CHECK BLOOD SUGARS 2 TIMES A DAY AS DIRECTED 200 each 1   • GLYXAMBI 25-5 MG tablet Take 1 tablet by  mouth Daily With Breakfast. 90 tablet 3   • [] HYDROcodone-acetaminophen (NORCO)  MG per tablet Take 2 tablets by mouth Every 6 (Six) Hours As Needed for Moderate Pain  for up to 3 days. 12 tablet 0   • Insulin Pen Needle 32G X 4 MM misc INJECT UP TO THREE TIMES DAILY OR AS DIRECTED 500 each 1   • ipratropium-albuterol (DUO-NEB) 0.5-2.5 mg/mL nebulizer Take 3 mL by nebulization Every 6 (Six) Hours While Awake. 360 mL    • L-Methylfolate-B6-B12 3-35-2 MG tablet Take 1 tablet by mouth 2 (Two) Times a Day. 180 tablet 3   • LEVEMIR FLEXTOUCH 100 UNIT/ML injection 50 units twice daily. 90 mL 3   • lisinopril (PRINIVIL,ZESTRIL) 10 MG tablet Take 1 tablet by mouth Daily. 90 tablet 3   • Mirabegron ER (MYRBETRIQ) 25 MG tablet sustained-release 24 hour 24 hr tablet Take 25 mg by mouth Daily.     • Multiple Vitamins-Minerals (MULTIVITAMIN ADULT PO) Take  by mouth.     • Omega-3 Fatty Acids (FISH OIL) 1000 MG capsule capsule Take  by mouth daily with breakfast.     • polyethylene glycol (MIRALAX) pack packet Take 17 g by mouth Daily As Needed (constipation).     • pregabalin (LYRICA) 100 MG capsule Take 1 capsule by mouth 3 (Three) Times a Day. 15 capsule 0   • rosuvastatin (CRESTOR) 10 MG tablet Take 1 tablet by mouth Daily. 90 tablet 3   • vitamin C (ASCORBIC ACID) 500 MG tablet Take 500 mg by mouth Daily.       Current Meds:     allopurinol 300 mg Oral Daily   apixaban 5 mg Oral Q12H   cefTRIAXone 2 g Intravenous Q24H   cyclobenzaprine 10 mg Oral Q8H   digoxin 125 mcg Oral Daily   insulin aspart 0-9 Units Subcutaneous 4x Daily With Meals & Nightly   insulin detemir 45 Units Subcutaneous Q12H   ipratropium-albuterol 3 mL Nebulization Q6H While Awake - RT   pregabalin 100 mg Oral TID   rosuvastatin 10 mg Oral Daily   vitamin C 500 mg Oral Daily        Review of Systems:     Constitutional:  No recent weight gain/loss, fever, chills  Opthalmalogic: No change in vision, amaurosis fugax, blurred  "vision  Otolaryngeal:  No epistaxis  Cardiovascular: No recent chest pain  Pulmonary:  No cough, sputum production, hemoptysis  Gastrointestinal: No melena, hematochezia, BRBPR, hematemesis  Genitourinary:  See HPI  Hematologic:  No tendency for easy bruising  Musculoskeletal: No muscle pain  Neurologic:  No Seizures, new focal deficits     Physical Examination:     Vital signs: /80 (BP Location: Left arm, Patient Position: Lying)   Pulse 101   Temp 97.3 °F (36.3 °C) (Oral)   Resp 20   Ht 177.8 cm (70\")   Wt 116 kg (255 lb 11.2 oz)   SpO2 94%   BMI 36.69 kg/m²   94%     General: Alert and oriented x3. NAD.    HEENT: Moist mucous membranes of the oral mucosa & nasal mucosa.    Lips are not cyanotic.    Extraocular movements intact    Neck:  Trachea position is mid line.     Respiratory: Respiratory rhythm & depth: Normal.    Respiratory effort:  Normal.    On room air.      GI:  Non- distended abdomen. Non-tender.     Skin:  No rash. Warm.     Extremities: No clubbing & no cyanosis.    No edema    :  In brief.    Normal genitalia.    Spontaneously voiding.          Lab Trends/Serial Data   No results found for: CHOL  Lab Results   Component Value Date    HDL 42 12/06/2017    HDL 40 09/19/2017     No components found for: LDLCALC  No components found for: TRIGLYCDES  Lab Results   Component Value Date    CREATININE 1.02 03/17/2018    CREATININE 0.92 03/16/2018    CREATININE 1.15 03/15/2018    CREATININE 0.75 (L) 03/10/2018     No results found for: BNP  No components found for: TROPONIN  Lab Results   Component Value Date    HGB 11.0 (L) 03/17/2018    HGB 10.7 (L) 03/16/2018    HGB 12.9 (L) 03/15/2018    HGB 10.7 (L) 03/13/2018     Imaging Studies:          Assessment:     Patient Active Problem List   Diagnosis   • Gout   • Diabetic peripheral neuropathy   • Dyslipidemia   • Uncontrolled type 2 diabetes mellitus   • Essential hypertension   • PVC (premature ventricular contraction)   • Benign " non-nodular prostatic hyperplasia without lower urinary tract symptoms   • Osteoarthritis   • Urge incontinence   • Hypotension   • CELINA (obstructive sleep apnea)   • Acute gangrenous cholecystitis   • A-fib   • Discitis of lumbar region   • Acute deep vein thrombosis of calf, right   • Sepsis due to Escherichia coli   • Atrial fibrillation with RVR   • Elevated troponin   • History of sepsis       1. Hx of bladder cancer  2. Urinary incontinence  3. Urinary urgency and frequency  4. AFIB  5. Diabetes    Recommendations:     1. Start Flomax 0.4mg po qd.   2. Bladder scan patient today, >400cc insert azul catheter.  3. We will continue to follow this patient outpatient. Once discharged, please schedule outpatient follow up with Dr. Saleh with [facility], [phone].       I sincerely appreciate the opportunity to participate in your patient's care. Please feel free to contact me anytime if I can be of assistance in this or any other way.         DANNY Johns  [facility]  3/17/2018  12:17 PM

## 2018-03-17 NOTE — PROGRESS NOTES
[facility]  Progress note: 3/17/2018    Patient Identification:  Name:Jesus Beckham  Age:77 y.o.  Sex: male  :  1940  MRN: [id number]           CC:  Follow-up of tachycardia, hypotension indeterminate troponin.    Interval history:  Mild tachycardia and hypertension overnight. C/o right shoulder pain    Vital Signs:   Temp:  [97.3 °F (36.3 °C)-98.8 °F (37.1 °C)] 98.8 °F (37.1 °C)  Heart Rate:  [] 109  Resp:  [16-20] 18  BP: (119-150)/(77-89) 150/89  No intake or output data in the 24 hours ending 18 1611    Physical Examination:    General Appearance No acute distress   Neck No adenopathy, supple, trachea midline, no thyromegaly, no carotid bruit, no JVD   Lungs Clear to auscultation,respirations regular, even and unlabored   Heart Regular rhythm and normal rate, normal S1 and S2, no murmur, no gallop, no rub, no click   Chest wall No abnormalities observed   Abdomen Normal bowel sounds, no masses, no hepatomegaly, soft   Extremities Moves all extremities well, no edema, no cyanosis, no redness   Neurological Alert and oriented x 3     Lab Review:  Personally reviewed the labs, radiology imaging and other cardiac procedures.   Results from last 7 days  Lab Units 18  0501  03/15/18  2325   SODIUM mmol/L 136  < > 136   POTASSIUM mmol/L 4.7  < > 5.4*   CHLORIDE mmol/L 98  < > 94*   CO2 mmol/L 27.8  < > 27.9   BUN mg/dL 24*  < > 28*   CREATININE mg/dL 1.02  < > 1.15   CALCIUM mg/dL 9.0  < > 9.2   BILIRUBIN mg/dL  --   --  0.5   ALK PHOS U/L  --   --  122*   ALT (SGPT) U/L  --   --  22   AST (SGOT) U/L  --   --  28   GLUCOSE mg/dL 85  < > 280*   < > = values in this interval not displayed.    Results from last 7 days  Lab Units 18  1441 03/15/18  2325   TROPONIN T ng/mL 0.029 0.051*     )  Results from last 7 days  Lab Units 18  0501 18  0334 03/15/18  2325   WBC 10*3/mm3 9.85 10.35 13.81*   HEMOGLOBIN g/dL 11.0* 10.7* 12.9*   HEMATOCRIT % 36.0* 34.1* 40.6    PLATELETS 10*3/mm3 272 249 299       Results from last 7 days  Lab Units 03/13/18  0515 03/12/18  0554 03/11/18  0706  03/10/18  1642   INR   --   --   --   --  1.04   APTT seconds 29.0 86.6* 73.5*  < > 20.0*   < > = values in this interval not displayed.    Medication Review:   Meds reviewed  Scheduled Meds:  allopurinol 300 mg Oral Daily   apixaban 5 mg Oral Q12H   cefTRIAXone 2 g Intravenous Q24H   cyclobenzaprine 10 mg Oral Q8H   digoxin 125 mcg Oral Daily   insulin aspart 0-9 Units Subcutaneous 4x Daily With Meals & Nightly   insulin detemir 45 Units Subcutaneous Q12H   ipratropium-albuterol 3 mL Nebulization Q6H While Awake - RT   pregabalin 100 mg Oral TID   rosuvastatin 10 mg Oral Daily   tamsulosin 0.4 mg Oral Daily   vitamin C 500 mg Oral Daily     I personally viewed and interpreted the patient's EKG/Telemetry data: sinus    Assessment and Plan  1.  Indeterminate troponin, check serial enzymes.   Add low-dose beta blocker therapy  2.  Hypotension with tachycardia as an outpatient. As above  3.  History of postoperative atrial fibrillation. Add metoprolol and continue Eliquis  4.  Recent sepsis with Escherichia coli and cholecystitis  5.  Status post recent cholecystectomy  6.  Epidural abscess status post drainage  7.  Status post IVC filter with history of DVT.  Also on Eliquis   8.  Diabetes      Mini Robertson  3/17/68718:11 PM  25min spent in reviewing records, discussion and examination of the patient and discussion with other members of the patient's medical team.     Dictated utilizing Dragon dictation

## 2018-03-18 LAB
ANION GAP SERPL CALCULATED.3IONS-SCNC: 9.1 MMOL/L
BASOPHILS # BLD AUTO: 0.02 10*3/MM3 (ref 0–0.2)
BASOPHILS NFR BLD AUTO: 0.3 % (ref 0–1.5)
BUN BLD-MCNC: 19 MG/DL (ref 8–23)
BUN/CREAT SERPL: 21.8 (ref 7–25)
CALCIUM SPEC-SCNC: 8.6 MG/DL (ref 8.6–10.5)
CHLORIDE SERPL-SCNC: 96 MMOL/L (ref 98–107)
CO2 SERPL-SCNC: 29.9 MMOL/L (ref 22–29)
CREAT BLD-MCNC: 0.87 MG/DL (ref 0.76–1.27)
DEPRECATED RDW RBC AUTO: 50.4 FL (ref 37–54)
EOSINOPHIL # BLD AUTO: 0.19 10*3/MM3 (ref 0–0.7)
EOSINOPHIL NFR BLD AUTO: 2.5 % (ref 0.3–6.2)
ERYTHROCYTE [DISTWIDTH] IN BLOOD BY AUTOMATED COUNT: 14.6 % (ref 11.5–14.5)
GFR SERPL CREATININE-BSD FRML MDRD: 85 ML/MIN/1.73
GLUCOSE BLD-MCNC: 96 MG/DL (ref 65–99)
GLUCOSE BLDC GLUCOMTR-MCNC: 114 MG/DL (ref 70–130)
GLUCOSE BLDC GLUCOMTR-MCNC: 155 MG/DL (ref 70–130)
GLUCOSE BLDC GLUCOMTR-MCNC: 157 MG/DL (ref 70–130)
GLUCOSE BLDC GLUCOMTR-MCNC: 179 MG/DL (ref 70–130)
GLUCOSE BLDC GLUCOMTR-MCNC: 185 MG/DL (ref 70–130)
GLUCOSE BLDC GLUCOMTR-MCNC: 80 MG/DL (ref 70–130)
GLUCOSE BLDC GLUCOMTR-MCNC: 83 MG/DL (ref 70–130)
HCT VFR BLD AUTO: 34.1 % (ref 40.4–52.2)
HGB BLD-MCNC: 10.5 G/DL (ref 13.7–17.6)
IMM GRANULOCYTES # BLD: 0.09 10*3/MM3 (ref 0–0.03)
IMM GRANULOCYTES NFR BLD: 1.2 % (ref 0–0.5)
LYMPHOCYTES # BLD AUTO: 1.07 10*3/MM3 (ref 0.9–4.8)
LYMPHOCYTES NFR BLD AUTO: 13.8 % (ref 19.6–45.3)
MCH RBC QN AUTO: 29.5 PG (ref 27–32.7)
MCHC RBC AUTO-ENTMCNC: 30.8 G/DL (ref 32.6–36.4)
MCV RBC AUTO: 95.8 FL (ref 79.8–96.2)
MONOCYTES # BLD AUTO: 0.62 10*3/MM3 (ref 0.2–1.2)
MONOCYTES NFR BLD AUTO: 8 % (ref 5–12)
NEUTROPHILS # BLD AUTO: 5.76 10*3/MM3 (ref 1.9–8.1)
NEUTROPHILS NFR BLD AUTO: 74.2 % (ref 42.7–76)
PLATELET # BLD AUTO: 240 10*3/MM3 (ref 140–500)
PMV BLD AUTO: 10.8 FL (ref 6–12)
POTASSIUM BLD-SCNC: 4.3 MMOL/L (ref 3.5–5.2)
RBC # BLD AUTO: 3.56 10*6/MM3 (ref 4.6–6)
SODIUM BLD-SCNC: 135 MMOL/L (ref 136–145)
WBC NRBC COR # BLD: 7.75 10*3/MM3 (ref 4.5–10.7)

## 2018-03-18 PROCEDURE — G0378 HOSPITAL OBSERVATION PER HR: HCPCS

## 2018-03-18 PROCEDURE — 96366 THER/PROPH/DIAG IV INF ADDON: CPT

## 2018-03-18 PROCEDURE — 85025 COMPLETE CBC W/AUTO DIFF WBC: CPT | Performed by: HOSPITALIST

## 2018-03-18 PROCEDURE — 82962 GLUCOSE BLOOD TEST: CPT

## 2018-03-18 PROCEDURE — 63710000001 INSULIN ASPART PER 5 UNITS: Performed by: HOSPITALIST

## 2018-03-18 PROCEDURE — 94799 UNLISTED PULMONARY SVC/PX: CPT

## 2018-03-18 PROCEDURE — 80048 BASIC METABOLIC PNL TOTAL CA: CPT | Performed by: HOSPITALIST

## 2018-03-18 PROCEDURE — 99225 PR SBSQ OBSERVATION CARE/DAY 25 MINUTES: CPT | Performed by: INTERNAL MEDICINE

## 2018-03-18 PROCEDURE — 25010000003 CEFTRIAXONE PER 250 MG: Performed by: HOSPITALIST

## 2018-03-18 RX ADMIN — METOPROLOL TARTRATE 25 MG: 25 TABLET ORAL at 08:09

## 2018-03-18 RX ADMIN — APIXABAN 5 MG: 5 TABLET, FILM COATED ORAL at 08:09

## 2018-03-18 RX ADMIN — DIGOXIN 125 MCG: 0.12 TABLET ORAL at 08:09

## 2018-03-18 RX ADMIN — CEFTRIAXONE SODIUM 2 G: 2 INJECTION, SOLUTION INTRAVENOUS at 03:21

## 2018-03-18 RX ADMIN — IPRATROPIUM BROMIDE AND ALBUTEROL SULFATE 3 ML: .5; 3 SOLUTION RESPIRATORY (INHALATION) at 07:02

## 2018-03-18 RX ADMIN — CYCLOBENZAPRINE 10 MG: 10 TABLET, FILM COATED ORAL at 06:12

## 2018-03-18 RX ADMIN — METOPROLOL TARTRATE 25 MG: 25 TABLET ORAL at 21:39

## 2018-03-18 RX ADMIN — OXYCODONE HYDROCHLORIDE AND ACETAMINOPHEN 500 MG: 500 TABLET ORAL at 08:09

## 2018-03-18 RX ADMIN — INSULIN DETEMIR 45 UNITS: 100 INJECTION, SOLUTION SUBCUTANEOUS at 12:18

## 2018-03-18 RX ADMIN — INSULIN DETEMIR 45 UNITS: 100 INJECTION, SOLUTION SUBCUTANEOUS at 21:39

## 2018-03-18 RX ADMIN — INSULIN ASPART 2 UNITS: 100 INJECTION, SOLUTION INTRAVENOUS; SUBCUTANEOUS at 17:02

## 2018-03-18 RX ADMIN — PREGABALIN 100 MG: 100 CAPSULE ORAL at 15:48

## 2018-03-18 RX ADMIN — CYCLOBENZAPRINE 10 MG: 10 TABLET, FILM COATED ORAL at 21:38

## 2018-03-18 RX ADMIN — HYDROCODONE BITARTRATE AND ACETAMINOPHEN 2 TABLET: 10; 325 TABLET ORAL at 06:14

## 2018-03-18 RX ADMIN — ALLOPURINOL 300 MG: 300 TABLET ORAL at 08:09

## 2018-03-18 RX ADMIN — CYCLOBENZAPRINE 10 MG: 10 TABLET, FILM COATED ORAL at 15:14

## 2018-03-18 RX ADMIN — IPRATROPIUM BROMIDE AND ALBUTEROL SULFATE 3 ML: .5; 3 SOLUTION RESPIRATORY (INHALATION) at 20:24

## 2018-03-18 RX ADMIN — PREGABALIN 100 MG: 100 CAPSULE ORAL at 21:39

## 2018-03-18 RX ADMIN — PREGABALIN 100 MG: 100 CAPSULE ORAL at 08:09

## 2018-03-18 RX ADMIN — APIXABAN 5 MG: 5 TABLET, FILM COATED ORAL at 21:38

## 2018-03-18 RX ADMIN — INSULIN ASPART 2 UNITS: 100 INJECTION, SOLUTION INTRAVENOUS; SUBCUTANEOUS at 12:20

## 2018-03-18 RX ADMIN — HYDROCODONE BITARTRATE AND ACETAMINOPHEN 2 TABLET: 10; 325 TABLET ORAL at 16:07

## 2018-03-18 RX ADMIN — ROSUVASTATIN CALCIUM 10 MG: 10 TABLET, FILM COATED ORAL at 08:09

## 2018-03-18 NOTE — PROGRESS NOTES
[facility]  Progress note: 3/18/2018    Patient Identification:  Name:Jesus Beckham  Age:77 y.o.  Sex: male  :  1940  MRN: [id number]           CC:  Follow-up of tachycardia, hypotension indeterminate troponin.    Interval history:  Significant diuresis overnight after Murray placed.  Blood pressure control improved.  Chest pain or shortness of breath.  Had an episode of altered mental status earlier today though seems to have resolved.    Vital Signs:   Temp:  [98 °F (36.7 °C)-98.8 °F (37.1 °C)] 98 °F (36.7 °C)  Heart Rate:  [] 100  Resp:  [16-18] 16  BP: (111-150)/(57-89) 113/82    Intake/Output Summary (Last 24 hours) at 18 1246  Last data filed at 18 0620   Gross per 24 hour   Intake               50 ml   Output             3500 ml   Net            -3450 ml       Physical Examination:    General Appearance No acute distress   Neck No adenopathy, supple, trachea midline, no thyromegaly, no carotid bruit, no JVD   Lungs Clear to auscultation,respirations regular, even and unlabored   Heart Regular rhythm and normal rate, normal S1 and S2, no murmur, no gallop, no rub, no click   Chest wall No abnormalities observed   Abdomen Normal bowel sounds, no masses, no hepatomegaly, soft   Extremities Moves all extremities well, no edema, no cyanosis, no redness   Neurological Alert and oriented x 3     Lab Review:  Personally reviewed the labs, radiology imaging and other cardiac procedures.   Results from last 7 days  Lab Units 18  0315  03/15/18  2325   SODIUM mmol/L 135*  < > 136   POTASSIUM mmol/L 4.3  < > 5.4*   CHLORIDE mmol/L 96*  < > 94*   CO2 mmol/L 29.9*  < > 27.9   BUN mg/dL 19  < > 28*   CREATININE mg/dL 0.87  < > 1.15   CALCIUM mg/dL 8.6  < > 9.2   BILIRUBIN mg/dL  --   --  0.5   ALK PHOS U/L  --   --  122*   ALT (SGPT) U/L  --   --  22   AST (SGOT) U/L  --   --  28   GLUCOSE mg/dL 96  < > 280*   < > = values in this interval not displayed.    Results from last 7  days  Lab Units 03/16/18  1441 03/15/18  2325   TROPONIN T ng/mL 0.029 0.051*     )  Results from last 7 days  Lab Units 03/18/18  0315 03/17/18  0501 03/16/18  0334   WBC 10*3/mm3 7.75 9.85 10.35   HEMOGLOBIN g/dL 10.5* 11.0* 10.7*   HEMATOCRIT % 34.1* 36.0* 34.1*   PLATELETS 10*3/mm3 240 272 249       Results from last 7 days  Lab Units 03/13/18  0515 03/12/18  0554   APTT seconds 29.0 86.6*       Medication Review:   Meds reviewed  Scheduled Meds:  allopurinol 300 mg Oral Daily   apixaban 5 mg Oral Q12H   cefTRIAXone 2 g Intravenous Q24H   cyclobenzaprine 10 mg Oral Q8H   digoxin 125 mcg Oral Daily   insulin aspart 0-9 Units Subcutaneous 4x Daily With Meals & Nightly   insulin detemir 45 Units Subcutaneous Q12H   ipratropium-albuterol 3 mL Nebulization Q6H While Awake - RT   metoprolol tartrate 25 mg Oral Q12H   pregabalin 100 mg Oral TID   rosuvastatin 10 mg Oral Daily   tamsulosin 0.4 mg Oral Daily   vitamin C 500 mg Oral Daily       I personally viewed and interpreted the patient's EKG/Telemetry data    Assessment and Plan    1.  Indeterminate troponin, Likely due to tachycardia possibly atrial fibrillation as an outpatient.  No anginal symptoms.  May need to consider Lexiscan Cardiolite stress test.  We'll hold nothing by mouth after midnight and let Dr. Burr decide a.m.  2.  Hypotension with tachycardia as an outpatient. As above and both resolved.  3.  History of postoperative atrial fibrillation.  Continue metoprolol, digoxin and Eliquis.  4.  Recent sepsis with Escherichia coli and cholecystitis  5.  Status post recent cholecystectomy  6.  Epidural abscess status post drainage  7.  Status post IVC filter with history of DVT.  Also on Eliquis   8.  Diabetes  9.  Decreased mental status.  Noted this morning.  I'll back to baseline.  10.  Urinary retention marked diuresis after Murray placement.    Mini Robertson  3/18/015975:46 PM  25min spent in reviewing records, discussion and examination of the patient and  discussion with other members of the patient's medical team.     Dictated utilizing Dragon dictation

## 2018-03-18 NOTE — PROGRESS NOTES
Pt resting comfortably.   Azul catheter anchored 3/17/18 for urinary retention.   Flomax started yesterday.   Pt uncertain if he was taking Flomax outpatient recently.   States he wasn't having CIC.    Urine draining well, concentrated.   Cr 0.87  Hgb 10.5    Hx of Bladder Cancer  Urinary Retention - now with azul      Plan:  Continue Flomax 0.4mg qd  Keep azul anchored upon discharge  Schedule outpatient follow up with Dr. Saleh at [facility] at time of discharge for voiding trial.     Thank you for this opportunity to consult. Please contact us should you need further assistance in managing his care.   DANNY Johns [facility]

## 2018-03-18 NOTE — PLAN OF CARE
Problem: Patient Care Overview  Goal: Plan of Care Review  Outcome: Ongoing (interventions implemented as appropriate)   03/18/18 0511 03/18/18 0800 03/18/18 1270   Coping/Psychosocial   Plan of Care Reviewed With --  patient --    Plan of Care Review   Progress improving --  --    OTHER   Outcome Summary --  --  Patient lethargic today in am, BS 80 when checked before breakfast, patient drank orange juice at time, patient more alert throughout the day; hr sr, dressings intact, 1.5liter nc, patien to have stress test in am; npo after midnight.      Goal: Individualization and Mutuality  Outcome: Ongoing (interventions implemented as appropriate)    Goal: Interprofessional Rounds/Family Conf  Outcome: Ongoing (interventions implemented as appropriate)      Problem: Fall Risk (Adult)  Goal: Identify Related Risk Factors and Signs and Symptoms  Outcome: Ongoing (interventions implemented as appropriate)    Goal: Absence of Fall  Outcome: Ongoing (interventions implemented as appropriate)      Problem: Pain, Chronic (Adult)  Goal: Acceptable Pain/Comfort Level and Functional Ability  Outcome: Ongoing (interventions implemented as appropriate)      Problem: Infection, Risk/Actual (Adult)  Goal: Identify Related Risk Factors and Signs and Symptoms  Outcome: Ongoing (interventions implemented as appropriate)

## 2018-03-18 NOTE — PROGRESS NOTES
"DAILY PROGRESS NOTE  [facility]    Patient Identification:  Name: Jesus Beckham  Age: 77 y.o.  Sex: male  :  1940  MRN: [id number]         Primary Care Physician: Magdy Ricardo MD    Subjective:  Interval History:He feels better today. No chest pain.    Objective:    Scheduled Meds:    allopurinol 300 mg Oral Daily   apixaban 5 mg Oral Q12H   cefTRIAXone 2 g Intravenous Q24H   cyclobenzaprine 10 mg Oral Q8H   digoxin 125 mcg Oral Daily   insulin aspart 0-9 Units Subcutaneous 4x Daily With Meals & Nightly   insulin detemir 45 Units Subcutaneous Q12H   ipratropium-albuterol 3 mL Nebulization Q6H While Awake - RT   metoprolol tartrate 25 mg Oral Q12H   pregabalin 100 mg Oral TID   rosuvastatin 10 mg Oral Daily   tamsulosin 0.4 mg Oral Daily   vitamin C 500 mg Oral Daily     Continuous Infusions:     Vital signs in last 24 hours:  Temp:  [98 °F (36.7 °C)-99.8 °F (37.7 °C)] 99.8 °F (37.7 °C)  Heart Rate:  [] 102  Resp:  [16-18] 18  BP: (111-132)/(57-83) 117/83    Intake/Output:    Intake/Output Summary (Last 24 hours) at 18 1505  Last data filed at 18 0620   Gross per 24 hour   Intake               50 ml   Output             3500 ml   Net            -3450 ml       Exam:  /83 (BP Location: Left arm, Patient Position: Lying)   Pulse 102   Temp 99.8 °F (37.7 °C) (Oral)   Resp 18   Ht 177.8 cm (70\")   Wt 114 kg (251 lb)   SpO2 92%   BMI 36.01 kg/m²     General Appearance:    Alert, cooperative, no distress   Head:    Normocephalic, without obvious abnormality, atraumatic   Eyes:       Throat:   Lips, tongue, gums normal   Neck:   Supple, symmetrical, trachea midline, no JVD   Lungs:     Clear to auscultation bilaterally, respirations unlabored   Chest Wall:    No tenderness or deformity    Heart:    Regular rate and rhythm, S1 and S2 normal, no murmur,no  Rub or gallop   Abdomen:     Soft, non-tender, bowel sounds active, no masses, no organomegaly    Extremities:  "  Extremities normal, atraumatic, no cyanosis or edema   Pulses:      Skin:   Skin is warm and dry,  no rashes or palpable lesions   Neurologic:   Legs are weak      [unfilled]  Data Review:    Results from last 7 days  Lab Units 03/18/18  0315 03/17/18  0501 03/16/18  0334   SODIUM mmol/L 135* 136 138   POTASSIUM mmol/L 4.3 4.7 4.6   CHLORIDE mmol/L 96* 98 100   CO2 mmol/L 29.9* 27.8 26.3   BUN mg/dL 19 24* 26*   CREATININE mg/dL 0.87 1.02 0.92   GLUCOSE mg/dL 96 85 194*   CALCIUM mg/dL 8.6 9.0 8.5*       Results from last 7 days  Lab Units 03/18/18  0315 03/17/18  0501 03/16/18  0334   WBC 10*3/mm3 7.75 9.85 10.35   HEMOGLOBIN g/dL 10.5* 11.0* 10.7*   HEMATOCRIT % 34.1* 36.0* 34.1*   PLATELETS 10*3/mm3 240 272 249           Results from last 7 days  Lab Units 03/15/18  2325   HEMOGLOBIN A1C % 7.90*       Lab Results  Lab Value Date/Time   TROPONINT 0.029 03/16/2018 1441   TROPONINT 0.051 (H) 03/15/2018 2325   TROPONINT <0.010 02/28/2018 1953   TROPONINT 0.018 02/26/2018 0520   TROPONINT 0.012 02/25/2018 2037   TROPONINT 0.019 02/25/2018 1252   TROPONINT 0.030 02/25/2018 0506   TROPONINT 0.033 (H) 02/24/2018 1440   TROPONINT 0.041 (H) 02/24/2018 0546   TROPONINT 0.029 02/23/2018 0031           Results from last 7 days  Lab Units 03/15/18  2325   ALK PHOS U/L 122*   BILIRUBIN mg/dL 0.5   ALT (SGPT) U/L 22   AST (SGOT) U/L 28           Results from last 7 days  Lab Units 03/15/18  2325   HEMOGLOBIN A1C % 7.90*     Glucose   Date/Time Value Ref Range Status   03/18/2018 1144 179 (H) 70 - 130 mg/dL Final   03/18/2018 0956 114 70 - 130 mg/dL Final   03/18/2018 0818 83 70 - 130 mg/dL Final   03/18/2018 0737 80 70 - 130 mg/dL Final   03/17/2018 2152 159 (H) 70 - 130 mg/dL Final   03/17/2018 1948 200 (H) 70 - 130 mg/dL Final   03/17/2018 1654 171 (H) 70 - 130 mg/dL Final   03/17/2018 1135 150 (H) 70 - 130 mg/dL Final           Patient Active Problem List   Diagnosis Code   • Gout M10.9   • Diabetic peripheral neuropathy  E11.42   • Dyslipidemia E78.5   • Uncontrolled type 2 diabetes mellitus E11.65   • Essential hypertension I10   • PVC (premature ventricular contraction) I49.3   • Benign non-nodular prostatic hyperplasia without lower urinary tract symptoms N40.0   • Osteoarthritis M19.90   • Urge incontinence N39.41   • Hypotension I95.9   • CELINA (obstructive sleep apnea) G47.33   • Acute gangrenous cholecystitis K81.0   • A-fib I48.91   • Discitis of lumbar region M46.46   • Acute deep vein thrombosis of calf, right I82.4Z1   • Sepsis due to Escherichia coli A41.51   • Atrial fibrillation with RVR I48.91   • Elevated troponin R74.8   • History of sepsis Z86.19       Assessment:  Active Hospital Problems (** Indicates Principal Problem)    Diagnosis Date Noted   • **Atrial fibrillation with RVR [I48.91] 03/16/2018   • Elevated troponin [R74.8] 03/16/2018   • History of sepsis [Z86.19] 03/16/2018   • Hypotension [I95.9] 02/23/2018   • Osteoarthritis [M19.90] 09/28/2017   • Essential hypertension [I10] 07/29/2016   • Gout [M10.9] 04/01/2016   • Diabetic peripheral neuropathy [E11.42] 04/01/2016   • Dyslipidemia [E78.5] 04/01/2016   • Uncontrolled type 2 diabetes mellitus [E11.65] 04/01/2016      Resolved Hospital Problems    Diagnosis Date Noted Date Resolved   No resolved problems to display.       Plan:  Continue current RX. As per cardiology.  Rate control. ?? Stress test tomorrow.    Krish Lockwood MD  3/18/2018  3:05 PM

## 2018-03-18 NOTE — PLAN OF CARE
Problem: Patient Care Overview  Goal: Plan of Care Review  Outcome: Ongoing (interventions implemented as appropriate)   03/18/18 0511   Coping/Psychosocial   Plan of Care Reviewed With patient   Plan of Care Review   Progress improving   OTHER   Outcome Summary Upon first assessment pt was lethargic, disoriented to situation and place, and diaphoretic. Pt's was also barely able to wiggle toes on left foot while able to perform plantar flexion and dorsiflexion on the right. Everything else was symmetrical. Vitals were stable and blood sugars stable. Called [facility]. Spoke with Dr. Dempsey on the unit who asked me to continue to monitor. No new orders. Pt continued to complain of pain in left hip and left shoulder through the night. FC in place. Good output. 2LPM O2 NC. PICC care. Wound Care. VSS. afebrile. SR to ST. First dose of metroprolol last night . Responded well. HR in the 90s.      Goal: Individualization and Mutuality  Outcome: Ongoing (interventions implemented as appropriate)    Goal: Discharge Needs Assessment  Outcome: Ongoing (interventions implemented as appropriate)    Goal: Interprofessional Rounds/Family Conf  Outcome: Ongoing (interventions implemented as appropriate)      Problem: Fall Risk (Adult)  Goal: Identify Related Risk Factors and Signs and Symptoms  Outcome: Ongoing (interventions implemented as appropriate)    Goal: Absence of Fall  Outcome: Ongoing (interventions implemented as appropriate)      Problem: Pain, Chronic (Adult)  Goal: Identify Related Risk Factors and Signs and Symptoms  Outcome: Ongoing (interventions implemented as appropriate)    Goal: Acceptable Pain/Comfort Level and Functional Ability  Outcome: Ongoing (interventions implemented as appropriate)      Problem: Infection, Risk/Actual (Adult)  Goal: Identify Related Risk Factors and Signs and Symptoms  Outcome: Ongoing (interventions implemented as appropriate)    Goal: Infection Prevention/Resolution  Outcome: Ongoing  (interventions implemented as appropriate)

## 2018-03-19 LAB
ANION GAP SERPL CALCULATED.3IONS-SCNC: 10.3 MMOL/L
BASOPHILS # BLD AUTO: 0.04 10*3/MM3 (ref 0–0.2)
BASOPHILS NFR BLD AUTO: 0.6 % (ref 0–1.5)
BUN BLD-MCNC: 18 MG/DL (ref 8–23)
BUN/CREAT SERPL: 20.2 (ref 7–25)
CALCIUM SPEC-SCNC: 8.9 MG/DL (ref 8.6–10.5)
CHLORIDE SERPL-SCNC: 97 MMOL/L (ref 98–107)
CO2 SERPL-SCNC: 30.7 MMOL/L (ref 22–29)
CREAT BLD-MCNC: 0.89 MG/DL (ref 0.76–1.27)
DEPRECATED RDW RBC AUTO: 49.6 FL (ref 37–54)
EOSINOPHIL # BLD AUTO: 0.14 10*3/MM3 (ref 0–0.7)
EOSINOPHIL NFR BLD AUTO: 2.2 % (ref 0.3–6.2)
ERYTHROCYTE [DISTWIDTH] IN BLOOD BY AUTOMATED COUNT: 14.5 % (ref 11.5–14.5)
GFR SERPL CREATININE-BSD FRML MDRD: 83 ML/MIN/1.73
GLUCOSE BLD-MCNC: 98 MG/DL (ref 65–99)
GLUCOSE BLDC GLUCOMTR-MCNC: 139 MG/DL (ref 70–130)
GLUCOSE BLDC GLUCOMTR-MCNC: 187 MG/DL (ref 70–130)
GLUCOSE BLDC GLUCOMTR-MCNC: 234 MG/DL (ref 70–130)
GLUCOSE BLDC GLUCOMTR-MCNC: 67 MG/DL (ref 70–130)
HCT VFR BLD AUTO: 33.5 % (ref 40.4–52.2)
HGB BLD-MCNC: 10.4 G/DL (ref 13.7–17.6)
IMM GRANULOCYTES # BLD: 0.05 10*3/MM3 (ref 0–0.03)
IMM GRANULOCYTES NFR BLD: 0.8 % (ref 0–0.5)
LYMPHOCYTES # BLD AUTO: 1.14 10*3/MM3 (ref 0.9–4.8)
LYMPHOCYTES NFR BLD AUTO: 17.7 % (ref 19.6–45.3)
MCH RBC QN AUTO: 29.4 PG (ref 27–32.7)
MCHC RBC AUTO-ENTMCNC: 31 G/DL (ref 32.6–36.4)
MCV RBC AUTO: 94.6 FL (ref 79.8–96.2)
MONOCYTES # BLD AUTO: 0.54 10*3/MM3 (ref 0.2–1.2)
MONOCYTES NFR BLD AUTO: 8.4 % (ref 5–12)
NEUTROPHILS # BLD AUTO: 4.53 10*3/MM3 (ref 1.9–8.1)
NEUTROPHILS NFR BLD AUTO: 70.3 % (ref 42.7–76)
NRBC BLD MANUAL-RTO: 0 /100 WBC (ref 0–0)
PLATELET # BLD AUTO: 257 10*3/MM3 (ref 140–500)
PMV BLD AUTO: 10.8 FL (ref 6–12)
POTASSIUM BLD-SCNC: 4.3 MMOL/L (ref 3.5–5.2)
RBC # BLD AUTO: 3.54 10*6/MM3 (ref 4.6–6)
SODIUM BLD-SCNC: 138 MMOL/L (ref 136–145)
TSH SERPL DL<=0.05 MIU/L-ACNC: 2.83 MIU/ML (ref 0.27–4.2)
WBC NRBC COR # BLD: 6.44 10*3/MM3 (ref 4.5–10.7)

## 2018-03-19 PROCEDURE — 82962 GLUCOSE BLOOD TEST: CPT

## 2018-03-19 PROCEDURE — 99225 PR SBSQ OBSERVATION CARE/DAY 25 MINUTES: CPT | Performed by: INTERNAL MEDICINE

## 2018-03-19 PROCEDURE — G0378 HOSPITAL OBSERVATION PER HR: HCPCS

## 2018-03-19 PROCEDURE — 94799 UNLISTED PULMONARY SVC/PX: CPT

## 2018-03-19 PROCEDURE — 25010000003 CEFTRIAXONE PER 250 MG: Performed by: HOSPITALIST

## 2018-03-19 PROCEDURE — 97110 THERAPEUTIC EXERCISES: CPT

## 2018-03-19 PROCEDURE — 80048 BASIC METABOLIC PNL TOTAL CA: CPT | Performed by: HOSPITALIST

## 2018-03-19 PROCEDURE — 63710000001 INSULIN ASPART PER 5 UNITS: Performed by: HOSPITALIST

## 2018-03-19 PROCEDURE — 63710000001 INSULIN DETEMER PER 5 UNITS: Performed by: HOSPITALIST

## 2018-03-19 PROCEDURE — 85025 COMPLETE CBC W/AUTO DIFF WBC: CPT | Performed by: HOSPITALIST

## 2018-03-19 PROCEDURE — 84443 ASSAY THYROID STIM HORMONE: CPT | Performed by: HOSPITALIST

## 2018-03-19 RX ADMIN — PREGABALIN 100 MG: 100 CAPSULE ORAL at 16:41

## 2018-03-19 RX ADMIN — ROSUVASTATIN CALCIUM 10 MG: 10 TABLET, FILM COATED ORAL at 08:54

## 2018-03-19 RX ADMIN — DIGOXIN 125 MCG: 0.12 TABLET ORAL at 08:54

## 2018-03-19 RX ADMIN — HYDROCODONE BITARTRATE AND ACETAMINOPHEN 2 TABLET: 10; 325 TABLET ORAL at 07:30

## 2018-03-19 RX ADMIN — IPRATROPIUM BROMIDE AND ALBUTEROL SULFATE 3 ML: .5; 3 SOLUTION RESPIRATORY (INHALATION) at 12:38

## 2018-03-19 RX ADMIN — INSULIN ASPART 2 UNITS: 100 INJECTION, SOLUTION INTRAVENOUS; SUBCUTANEOUS at 11:56

## 2018-03-19 RX ADMIN — OXYCODONE HYDROCHLORIDE AND ACETAMINOPHEN 500 MG: 500 TABLET ORAL at 08:54

## 2018-03-19 RX ADMIN — IPRATROPIUM BROMIDE AND ALBUTEROL SULFATE 3 ML: .5; 3 SOLUTION RESPIRATORY (INHALATION) at 23:02

## 2018-03-19 RX ADMIN — HYDROCODONE BITARTRATE AND ACETAMINOPHEN 2 TABLET: 10; 325 TABLET ORAL at 19:40

## 2018-03-19 RX ADMIN — INSULIN DETEMIR 45 UNITS: 100 INJECTION, SOLUTION SUBCUTANEOUS at 22:03

## 2018-03-19 RX ADMIN — CYCLOBENZAPRINE 10 MG: 10 TABLET, FILM COATED ORAL at 14:45

## 2018-03-19 RX ADMIN — CYCLOBENZAPRINE 10 MG: 10 TABLET, FILM COATED ORAL at 22:03

## 2018-03-19 RX ADMIN — PREGABALIN 100 MG: 100 CAPSULE ORAL at 19:40

## 2018-03-19 RX ADMIN — METOPROLOL TARTRATE 25 MG: 25 TABLET ORAL at 08:54

## 2018-03-19 RX ADMIN — METOPROLOL TARTRATE 25 MG: 25 TABLET ORAL at 19:40

## 2018-03-19 RX ADMIN — APIXABAN 5 MG: 5 TABLET, FILM COATED ORAL at 19:40

## 2018-03-19 RX ADMIN — APIXABAN 5 MG: 5 TABLET, FILM COATED ORAL at 08:54

## 2018-03-19 RX ADMIN — CEFTRIAXONE SODIUM 2 G: 2 INJECTION, SOLUTION INTRAVENOUS at 02:48

## 2018-03-19 RX ADMIN — PREGABALIN 100 MG: 100 CAPSULE ORAL at 08:54

## 2018-03-19 RX ADMIN — ALLOPURINOL 300 MG: 300 TABLET ORAL at 08:54

## 2018-03-19 RX ADMIN — TAMSULOSIN HYDROCHLORIDE 0.4 MG: 0.4 CAPSULE ORAL at 08:54

## 2018-03-19 RX ADMIN — IPRATROPIUM BROMIDE AND ALBUTEROL SULFATE 3 ML: .5; 3 SOLUTION RESPIRATORY (INHALATION) at 07:47

## 2018-03-19 NOTE — PROGRESS NOTES
"DAILY PROGRESS NOTE  [facility]    Patient Identification:  Name: Jesus Beckham  Age: 77 y.o.  Sex: male  :  1940  MRN: [id number]         Primary Care Physician: Magdy Ricardo MD    Subjective:  Interval History:He feels better today. No chest pain.    Objective:    Scheduled Meds:    allopurinol 300 mg Oral Daily   apixaban 5 mg Oral Q12H   cefTRIAXone 2 g Intravenous Q24H   cyclobenzaprine 10 mg Oral Q8H   digoxin 125 mcg Oral Daily   insulin aspart 0-9 Units Subcutaneous 4x Daily With Meals & Nightly   insulin detemir 45 Units Subcutaneous Q12H   ipratropium-albuterol 3 mL Nebulization Q6H While Awake - RT   metoprolol tartrate 25 mg Oral Q12H   pregabalin 100 mg Oral TID   rosuvastatin 10 mg Oral Daily   tamsulosin 0.4 mg Oral Daily   vitamin C 500 mg Oral Daily     Continuous Infusions:     Vital signs in last 24 hours:  Temp:  [98.5 °F (36.9 °C)-99.8 °F (37.7 °C)] 98.6 °F (37 °C)  Heart Rate:  [] 107  Resp:  [16-18] 16  BP: (117-127)/(65-85) 127/85    Intake/Output:    Intake/Output Summary (Last 24 hours) at 18 1248  Last data filed at 18 0400   Gross per 24 hour   Intake               50 ml   Output             2100 ml   Net            -2050 ml       Exam:  /85 (BP Location: Left arm, Patient Position: Lying)   Pulse 107   Temp 98.6 °F (37 °C) (Oral)   Resp 16   Ht 177.8 cm (70\")   Wt 115 kg (253 lb 1.6 oz)   SpO2 99%   BMI 36.32 kg/m²     General Appearance:    Alert, cooperative, no distress   Head:    Normocephalic, without obvious abnormality, atraumatic   Eyes:       Throat:   Lips, tongue, gums normal   Neck:   Supple, symmetrical, trachea midline, no JVD   Lungs:     Clear to auscultation bilaterally, respirations unlabored   Chest Wall:    No tenderness or deformity    Heart:    Regular rate and rhythm, S1 and S2 normal, no murmur,no  Rub or gallop   Abdomen:     Soft, non-tender, bowel sounds active, no masses, no organomegaly  "   Extremities:   Extremities normal, atraumatic, no cyanosis or edema   Pulses:      Skin:   Skin is warm and dry,  no rashes or palpable lesions   Neurologic:   Legs are weak      [unfilled]  Data Review:    Results from last 7 days  Lab Units 03/19/18  0247 03/18/18  0315 03/17/18  0501   SODIUM mmol/L 138 135* 136   POTASSIUM mmol/L 4.3 4.3 4.7   CHLORIDE mmol/L 97* 96* 98   CO2 mmol/L 30.7* 29.9* 27.8   BUN mg/dL 18 19 24*   CREATININE mg/dL 0.89 0.87 1.02   GLUCOSE mg/dL 98 96 85   CALCIUM mg/dL 8.9 8.6 9.0       Results from last 7 days  Lab Units 03/19/18  0247 03/18/18  0315 03/17/18  0501   WBC 10*3/mm3 6.44 7.75 9.85   HEMOGLOBIN g/dL 10.4* 10.5* 11.0*   HEMATOCRIT % 33.5* 34.1* 36.0*   PLATELETS 10*3/mm3 257 240 272       Results from last 7 days  Lab Units 03/19/18  0247   TSH mIU/mL 2.830       Results from last 7 days  Lab Units 03/15/18  2325   HEMOGLOBIN A1C % 7.90*       Lab Results  Lab Value Date/Time   TROPONINT 0.029 03/16/2018 1441   TROPONINT 0.051 (H) 03/15/2018 2325   TROPONINT <0.010 02/28/2018 1953   TROPONINT 0.018 02/26/2018 0520   TROPONINT 0.012 02/25/2018 2037   TROPONINT 0.019 02/25/2018 1252   TROPONINT 0.030 02/25/2018 0506   TROPONINT 0.033 (H) 02/24/2018 1440   TROPONINT 0.041 (H) 02/24/2018 0546   TROPONINT 0.029 02/23/2018 0031           Results from last 7 days  Lab Units 03/15/18  2325   ALK PHOS U/L 122*   BILIRUBIN mg/dL 0.5   ALT (SGPT) U/L 22   AST (SGOT) U/L 28       Results from last 7 days  Lab Units 03/19/18  0247   TSH mIU/mL 2.830       Results from last 7 days  Lab Units 03/15/18  2325   HEMOGLOBIN A1C % 7.90*     Glucose   Date/Time Value Ref Range Status   03/19/2018 1145 187 (H) 70 - 130 mg/dL Final   03/19/2018 0727 67 (L) 70 - 130 mg/dL Final   03/18/2018 2132 185 (H) 70 - 130 mg/dL Final   03/18/2018 1956 155 (H) 70 - 130 mg/dL Final   03/18/2018 1651 157 (H) 70 - 130 mg/dL Final   03/18/2018 1144 179 (H) 70 - 130 mg/dL Final   03/18/2018 0956 114 70 - 130  mg/dL Final   03/18/2018 0818 83 70 - 130 mg/dL Final           Patient Active Problem List   Diagnosis Code   • Gout M10.9   • Diabetic peripheral neuropathy E11.42   • Dyslipidemia E78.5   • Uncontrolled type 2 diabetes mellitus E11.65   • Essential hypertension I10   • PVC (premature ventricular contraction) I49.3   • Benign non-nodular prostatic hyperplasia without lower urinary tract symptoms N40.0   • Osteoarthritis M19.90   • Urge incontinence N39.41   • Hypotension I95.9   • CELINA (obstructive sleep apnea) G47.33   • Acute gangrenous cholecystitis K81.0   • A-fib I48.91   • Discitis of lumbar region M46.46   • Acute deep vein thrombosis of calf, right I82.4Z1   • Sepsis due to Escherichia coli A41.51   • Atrial fibrillation with RVR I48.91   • Elevated troponin R74.8   • History of sepsis Z86.19       Assessment:  Active Hospital Problems (** Indicates Principal Problem)    Diagnosis Date Noted   • **Atrial fibrillation with RVR [I48.91] 03/16/2018   • Elevated troponin [R74.8] 03/16/2018   • History of sepsis [Z86.19] 03/16/2018   • Hypotension [I95.9] 02/23/2018   • Osteoarthritis [M19.90] 09/28/2017   • Essential hypertension [I10] 07/29/2016   • Gout [M10.9] 04/01/2016   • Diabetic peripheral neuropathy [E11.42] 04/01/2016   • Dyslipidemia [E78.5] 04/01/2016   • Uncontrolled type 2 diabetes mellitus [E11.65] 04/01/2016      Resolved Hospital Problems    Diagnosis Date Noted Date Resolved   No resolved problems to display.       Plan:  Continue current RX. As per cardiology.  Rate control. As per cardiology no Stress test.  ? Back to rehab soon.    Krish Lockwood MD  3/19/2018  12:48 PM

## 2018-03-19 NOTE — PROGRESS NOTES
Hospital Follow Up        Chief Complaint: Follow up paroxsymal atrial fibrillation, indeterminate troponin    Interval History: No chest pain or dyspnea.     Objective:     Objective:  Temp:  [98 °F (36.7 °C)-99.8 °F (37.7 °C)] 98.5 °F (36.9 °C)  Heart Rate:  [100-108] 105  Resp:  [16-18] 16  BP: (113-124)/(65-83) 121/72     Intake/Output Summary (Last 24 hours) at 03/19/18 0717  Last data filed at 03/19/18 0400   Gross per 24 hour   Intake               50 ml   Output             2100 ml   Net            -2050 ml     Body mass index is 36.32 kg/m².  1    03/17/18  0624 03/18/18  0600 03/19/18  0601   Weight: 116 kg (255 lb 11.2 oz) 114 kg (251 lb) 115 kg (253 lb 1.6 oz)     Weight change: 0.953 kg (2 lb 1.6 oz)      Physical Exam:   General : Alert, cooperative, in no acute distress.  Neuro: alert,cooperative and oriented  Lungs: CTAB. Normal respiratory effort and rate.  CV:: Regular rate and rhythm, normal S1 and S2, no murmurs, gallops or rubs.  ABD: Soft, nontender, non-distended. positive bowel sounds  Extr: No edema or cyanosis, moves all extremities    Lab Review:     Results from last 7 days  Lab Units 03/19/18  0247 03/18/18  0315  03/15/18  2325   SODIUM mmol/L 138 135*  < > 136   POTASSIUM mmol/L 4.3 4.3  < > 5.4*   CHLORIDE mmol/L 97* 96*  < > 94*   CO2 mmol/L 30.7* 29.9*  < > 27.9   BUN mg/dL 18 19  < > 28*   CREATININE mg/dL 0.89 0.87  < > 1.15   GLUCOSE mg/dL 98 96  < > 280*   CALCIUM mg/dL 8.9 8.6  < > 9.2   AST (SGOT) U/L  --   --   --  28   ALT (SGPT) U/L  --   --   --  22   < > = values in this interval not displayed.    Results from last 7 days  Lab Units 03/16/18  1441 03/15/18  2325   TROPONIN T ng/mL 0.029 0.051*       Results from last 7 days  Lab Units 03/19/18  0247 03/18/18  0315   WBC 10*3/mm3 6.44 7.75   HEMOGLOBIN g/dL 10.4* 10.5*   HEMATOCRIT % 33.5* 34.1*   PLATELETS 10*3/mm3 257 240       Results from last 7 days  Lab Units 03/13/18  0515   APTT seconds 29.0       Results from  last 7 days  Lab Units 03/16/18  0334   MAGNESIUM mg/dL 1.9           Invalid input(s): LDLCALC    Results from last 7 days  Lab Units 03/15/18  2325   PROBNP pg/mL 2,134.0*       Results from last 7 days  Lab Units 03/19/18  0247   TSH mIU/mL 2.830     I reviewed the patient's new clinical results.  I personally viewed and interpreted the patient's EKG  Current Medications:   Scheduled Meds:  allopurinol 300 mg Oral Daily   apixaban 5 mg Oral Q12H   cefTRIAXone 2 g Intravenous Q24H   cyclobenzaprine 10 mg Oral Q8H   digoxin 125 mcg Oral Daily   insulin aspart 0-9 Units Subcutaneous 4x Daily With Meals & Nightly   insulin detemir 45 Units Subcutaneous Q12H   ipratropium-albuterol 3 mL Nebulization Q6H While Awake - RT   metoprolol tartrate 25 mg Oral Q12H   pregabalin 100 mg Oral TID   rosuvastatin 10 mg Oral Daily   tamsulosin 0.4 mg Oral Daily   vitamin C 500 mg Oral Daily     Continuous Infusions:     Allergies:  No Known Allergies    Assessment/Plan:     1.  Indeterminate troponin.  Likely due to tachycardia since he did the same thing with atrial fibrillation with rapid ventricular rate during last admit.  Troponin has normalized.  No anginal symptoms.    2.  Hypotension with tachycardia as an outpatient. As above and both resolved.  3.  History of postoperative atrial fibrillation.  Continue metoprolol, digoxin and Eliquis.  4.  Recent sepsis with Escherichia coli and cholecystitis  5.  Status post recent cholecystectomy  6.  Epidural abscess status post drainage  7.  Status post IVC filter with history of DVT.  Also on Eliquis   8.  Diabetes  9.  Decreased mental status.  Noted this morning.  I'll back to baseline.  10.  Urinary retention. marked diuresis after Murray placement.     -  No plans for stress test at this time due to history of indeterminate troponin with afib with RVR in past which has normalized, lack of ischemic EKG changes and symptoms  -  Continue current management with metoprolol tartrate,  digoxin and apixaban      Tasha Burr MD  03/19/18  7:17 AM

## 2018-03-19 NOTE — PLAN OF CARE
Problem: Patient Care Overview  Goal: Plan of Care Review   03/19/18 1050   OTHER   Outcome Summary Limited progress towards goals during PT. Required frequent cues for bed mobility- required maximal assist for bed mobility. Performed sitting exercises, and discussed supine exercises for HEP. Planning for DC back to SNU.

## 2018-03-19 NOTE — PLAN OF CARE
Problem: Patient Care Overview  Goal: Plan of Care Review  Outcome: Ongoing (interventions implemented as appropriate)   03/19/18 0520   Coping/Psychosocial   Plan of Care Reviewed With patient   Plan of Care Review   Progress no change   OTHER   Outcome Summary Pt less lethargic tonight. Still disoriented to specifics of place, time, and situation. Pt inappropriate at times. 2LO2 NC. Dressings changed (next change is 3/22) Pt is still complaining of left hip and shoulder pain. Still unable to move LLE well. HR has remained elevated. 90s-110s. NPO for possible stress test in am. VSS. afebrile.      Goal: Individualization and Mutuality  Outcome: Ongoing (interventions implemented as appropriate)    Goal: Discharge Needs Assessment  Outcome: Ongoing (interventions implemented as appropriate)    Goal: Interprofessional Rounds/Family Conf  Outcome: Ongoing (interventions implemented as appropriate)      Problem: Fall Risk (Adult)  Goal: Identify Related Risk Factors and Signs and Symptoms  Outcome: Ongoing (interventions implemented as appropriate)    Goal: Absence of Fall  Outcome: Ongoing (interventions implemented as appropriate)      Problem: Pain, Chronic (Adult)  Goal: Identify Related Risk Factors and Signs and Symptoms  Outcome: Ongoing (interventions implemented as appropriate)    Goal: Acceptable Pain/Comfort Level and Functional Ability  Outcome: Ongoing (interventions implemented as appropriate)      Problem: Infection, Risk/Actual (Adult)  Goal: Identify Related Risk Factors and Signs and Symptoms  Outcome: Ongoing (interventions implemented as appropriate)    Goal: Infection Prevention/Resolution  Outcome: Ongoing (interventions implemented as appropriate)

## 2018-03-19 NOTE — THERAPY TREATMENT NOTE
Acute Care - Physical Therapy Treatment Note  [facility]     Patient Name: Jesus Beckham  : 1940  MRN: [id number]  Today's Date: 3/19/2018  Onset of Illness/Injury or Date of Surgery: 03/15/18     Referring Physician: Mandie    Admit Date: 3/15/2018    Visit Dx:    ICD-10-CM ICD-9-CM   1. Atrial fibrillation with RVR I48.91 427.31   2. Hypotension, unspecified hypotension type I95.9 458.9   3. Elevated troponin R74.8 790.6   4. History of sepsis Z86.19 V12.09   5. Decreased mobility R26.89 781.99     Patient Active Problem List   Diagnosis   • Gout   • Diabetic peripheral neuropathy   • Dyslipidemia   • Uncontrolled type 2 diabetes mellitus   • Essential hypertension   • PVC (premature ventricular contraction)   • Benign non-nodular prostatic hyperplasia without lower urinary tract symptoms   • Osteoarthritis   • Urge incontinence   • Hypotension   • CELINA (obstructive sleep apnea)   • Acute gangrenous cholecystitis   • A-fib   • Discitis of lumbar region   • Acute deep vein thrombosis of calf, right   • Sepsis due to Escherichia coli   • Atrial fibrillation with RVR   • Elevated troponin   • History of sepsis       Therapy Treatment    Therapy Treatment / Health Promotion    Treatment Time/Intention  Discipline: physical therapist (Angela Armstrong PT)  Document Type: therapy note (daily note) (Angela Armstrong PT)  Subjective Information: complains of, pain, weakness (Angela Armstrong PT)  Mode of Treatment: physical therapy (Angela Armstrong PT)  Patient Effort: good (Angela Armstrong PT)  Existing Precautions/Restrictions: fall (Angela Armstrong PT)  Patient Response to Treatment: limited by pain (Angela Armstrong PT)    Vitals/Pain/Safety  Vital Signs  O2 Delivery Pre Treatment: room air (Angela Armstrong PT)  Pain Assessment  Additional Documentation: Pain Scale: Word Pre/Post-Treatment (Group) (Angela Armstrong PT)  Pain Scale: Numbers Pre/Post-Treatment  Pain Location:  (back and B LE) (Angela  MAHAMED Armstrong)  Pain Scale: Word Pre/Post-Treatment  Pain: Word Scale, Pretreatment: 6 - moderate-severe pain (Angela Armstrong PT)  Pain: Word Scale, Post-Treatment: 8 - severe pain (Angela Armstrong PT)  Pain Location:  (back and B LE) (Angela Armstrong PT)  Pain Scale: FACES Pre/Post-Treatment  Pain Location:  (back and B LE) (Angela Armstrong PT)  Positioning and Restraints  Pre-Treatment Position: in bed (Angela Armstrong PT)  Post Treatment Position: bed (Angela Armstrong PT)  In Bed: supine, call light within reach, encouraged to call for assist, exit alarm on, with family/caregiver (Angela Armstrong PT)    Mobility,ADL,Motor, Modality  Bed Mobility Assessment/Treatment  Supine-Sit Parke (Bed Mobility): maximum assist (25% patient effort), moderate assist (50% patient effort), 2 person assist (Angela Armstrong PT)  Sit-Supine Parke (Bed Mobility): maximum assist (25% patient effort), 2 person assist (Angela Armstrong PT)  Comment (Bed Mobility): sitting balance w/ modA, some min A w/ VC (Angela Armstrong PT)  Transfer Assessment/Treatment  Comment (Transfers): not tested 2/2 weakness/pain (Angela Armstrong PT)     Therapeutic Exercise  Lower Extremity (Therapeutic Exercise): LAQ (long arc quad), bilateral, quad sets, bilateral (Angela Armstrong PT)  Sets/Reps (Therapeutic Exercise): 10 (Angela Armstrong PT)           ROM/MMT             Sensory, Edema, Orthotics          Cognition, Communication, Swallow  Cognitive Assessment/Intervention- PT/OT  Orientation Status (Cognition): oriented to, person (Angela Armstrong PT)  Follows Commands (Cognition): follows one step commands, delayed response/completion, increased processing time needed, physical/tactile prompts required, repetition of directions required, verbal cues/prompting required (Angela Armstrong PT)  Cognitive Function (Cognitive): safety deficit (Angela Armstrong PT)  Safety Deficit (Cognitive): moderate deficit, problem solving, safety  precautions awareness (Angela Armstrong, PT)    Outcome Summary               PT Rehab Goals     Row Name 03/17/18 0581             Bed Mobility Goal 1 (PT)    Activity/Assistive Device (Bed Mobility Goal 1, PT) bed mobility activities, all  -EE      Sunflower Level/Cues Needed (Bed Mobility Goal 1, PT) minimum assist (75% or more patient effort)   x2 assist  -EE      Time Frame (Bed Mobility Goal 1, PT) 1 week  -EE         Transfer Goal 1 (PT)    Activity/Assistive Device (Transfer Goal 1, PT) sit-to-stand/stand-to-sit;bed-to-chair/chair-to-bed;walker, rolling  -EE      Sunflower Level/Cues Needed (Transfer Goal 1, PT) moderate assist (50-74% patient effort)   x2 assist  -EE      Time Frame (Transfer Goal 1, PT) 1 week  -EE         Patient Education Goal (PT)    Activity (Patient Education Goal, PT) Demonstrates HEP  -EE      Sunflower/Cues/Accuracy (Memory Goal 2, PT) demonstrates adequately;independent  -EE      Time Frame (Patient Education Goal, PT) 1 week  -EE        User Key  (r) = Recorded By, (t) = Taken By, (c) = Cosigned By    Initials Name Provider Type    EE Carmen Sarmiento, PT Physical Therapist          Physical Therapy Education     Title: PT OT SLP Therapies (Active)     Topic: Physical Therapy (Active)     Point: Mobility training (Active)    Learning Progress Summary     Learner Status Readiness Method Response Comment Documented by    Patient Active Acceptance E NR  AR 03/19/18 1050     Done Acceptance E,TB VU  AH 03/18/18 0520     Active Acceptance E,TB NR  EE 03/17/18 1727          Point: Home exercise program (Active)    Learning Progress Summary     Learner Status Readiness Method Response Comment Documented by    Patient Active Acceptance E NR  AR 03/19/18 1050     Active Acceptance E,TB NR  EE 03/17/18 1727          Point: Body mechanics (Active)    Learning Progress Summary     Learner Status Readiness Method Response Comment Documented by    Patient Active Acceptance E NR  AR 03/19/18  1050     Done Acceptance E VU  DS 03/17/18 1825     Active Acceptance E,TB NR  EE 03/17/18 1727          Point: Precautions (Active)    Learning Progress Summary     Learner Status Readiness Method Response Comment Documented by    Patient Active Acceptance E NR  AR 03/19/18 1050     Active Acceptance E,TB NR  EE 03/17/18 1727                      User Key     Initials Effective Dates Name Provider Type Discipline    EE 12/01/15 -  Carmen Sarmiento, PT Physical Therapist PT    AR 06/27/16 -  Angela Armstrong, PT Physical Therapist PT     07/19/17 -  Angela Galan, RN Registered Nurse Nurse     11/13/17 -  Dipika Kitchen, RN Registered Nurse Nurse                    PT Recommendation and Plan     Outcome Summary: Limited progress towards goals during PT.  Required frequent cues for bed mobility- required maximal assist for bed mobility.   Performed sitting exercises, and discussed supine exercises for HEP.  Planning for DC back to SNU.            Outcome Measures     Row Name 03/19/18 1000 03/17/18 1700          How much help from another person do you currently need...    Turning from your back to your side while in flat bed without using bedrails? 2  -AR 2  -EE     Moving from lying on back to sitting on the side of a flat bed without bedrails? 2  -AR 2  -EE     Moving to and from a bed to a chair (including a wheelchair)? 1  -AR 1  -EE     Standing up from a chair using your arms (e.g., wheelchair, bedside chair)? 1  -AR 1  -EE     Climbing 3-5 steps with a railing? 1  -AR 1  -EE     To walk in hospital room? 1  -AR 1  -EE     AM-PAC 6 Clicks Score 8  -AR 8  -EE        Functional Assessment    Outcome Measure Options AM-PAC 6 Clicks Basic Mobility (PT)  -AR AM-PAC 6 Clicks Basic Mobility (PT)  -EE       User Key  (r) = Recorded By, (t) = Taken By, (c) = Cosigned By    Initials Name Provider Type    EE Carmen Sarmiento, PT Physical Therapist    AR Angela Armstrong, PT Physical Therapist           Time  Calculation:         PT Charges     Row Name 03/19/18 1053 03/19/18 1023          Time Calculation    Start Time 0959  -AR 0959  -AR     Stop Time 1023  -AR 1023  -AR     Time Calculation (min) 24 min  -AR 24 min  -AR     PT Received On 03/19/18  -AR  --     PT - Next Appointment 03/20/18  -AR  --       User Key  (r) = Recorded By, (t) = Taken By, (c) = Cosigned By    Initials Name Provider Type    AR Angela Armstrong PT Physical Therapist          Therapy Charges for Today     Code Description Service Date Service Provider Modifiers Qty    50426394198 HC PT THER PROC EA 15 MIN 3/19/2018 Angela Armstrong, PT GP 2    48136329366 HC PT THER SUPP EA 15 MIN 3/19/2018 Angela Armstrong PT GP 1          PT G-Codes  Outcome Measure Options: AM-PAC 6 Clicks Basic Mobility (PT)  Functional Limitation: Mobility: Walking and moving around  Mobility: Walking and Moving Around Current Status (): At least 80 percent but less than 100 percent impaired, limited or restricted  Mobility: Walking and Moving Around Goal Status (): At least 60 percent but less than 80 percent impaired, limited or restricted    Angela Armstrong PT  3/19/2018

## 2018-03-19 NOTE — PLAN OF CARE
"Problem: Patient Care Overview  Goal: Plan of Care Review   03/19/18 1529   Coping/Psychosocial   Plan of Care Reviewed With patient;family   Plan of Care Review   Progress improving   OTHER   Outcome Summary pt reports that he did not rest at all last night and is feeling tired this am. pain persists in left hip/lower back area- treated with prn pain medication was able to rest uninterupted after lunch and pt reports that he is \"finally in a comfortable position\". encouraged frequent turns and weight shifting- pt agreeable but had to reinforce/educate the importance of not laying on back constantly. pts refused long acting insulin this am as he reports he was \"too low last night and this am\" pt bs was 67 this am and noted to be symptomatic- diaphoresis present. treated with orange juice and was allowed breakfast as stress testing was not indicated per cardiology. pts symptoms resolved with eating. will continue to monitor. family is present at bedside.        Problem: Fall Risk (Adult)  Goal: Identify Related Risk Factors and Signs and Symptoms  Outcome: Ongoing (interventions implemented as appropriate)   03/19/18 1529   Fall Risk (Adult)   Related Risk Factors (Fall Risk) history of falls;gait/mobility problems;environment unfamiliar   Signs and Symptoms (Fall Risk) presence of risk factors     Goal: Absence of Fall  Outcome: Ongoing (interventions implemented as appropriate)   03/19/18 1529   Fall Risk (Adult)   Absence of Fall making progress toward outcome       Problem: Pain, Chronic (Adult)  Goal: Identify Related Risk Factors and Signs and Symptoms  Outcome: Ongoing (interventions implemented as appropriate)   03/19/18 1529   Pain, Chronic (Adult)   Related Risk Factors (Chronic Pain) disease process;pain control inadequate;prolonged healing   Signs and Symptoms (Chronic Pain) abnormal posturing/positioning;fatigue/weakness;verbalization of pain descriptors;verbalization of pain/discomfort for a prolonged " time period     Goal: Acceptable Pain/Comfort Level and Functional Ability  Outcome: Ongoing (interventions implemented as appropriate)   03/19/18 1529   Pain, Chronic (Adult)   Acceptable Pain/Comfort Level and Functional Ability making progress toward outcome       Problem: Infection, Risk/Actual (Adult)  Goal: Identify Related Risk Factors and Signs and Symptoms  Outcome: Ongoing (interventions implemented as appropriate)   03/19/18 1529   Infection, Risk/Actual (Adult)   Related Risk Factors (Infection, Risk/Actual) chronic illness/condition;exposure to microbes;prolonged hospitalization;medication effects;skin integrity impairment   Signs and Symptoms (Infection, Risk/Actual) blood glucose changes;diaphoresis;lab value changes;pain     Goal: Infection Prevention/Resolution  Outcome: Ongoing (interventions implemented as appropriate)   03/19/18 1529   Infection, Risk/Actual (Adult)   Infection Prevention/Resolution making progress toward outcome

## 2018-03-19 NOTE — PROGRESS NOTES
Continued Stay Note  [facility]     Patient Name: Jesus Beckham  MRN: [id number]  Today's Date: 3/19/2018    Admit Date: 3/15/2018          Discharge Plan     Row Name 03/19/18 1049       Plan    Plan [facility] - will need [employer] precert - facility to start precert today    Patient/Family in Agreement with Plan yes    Plan Comments Noted plans to start precert today. Spoke with RN and she says no stress test and OK to start precert for discharge. Called to [facility] and left vmm to be sure they started precert today. Packet is in the [facility] office.     3/19/18 1115 - Vmm from [facility] and they are starting precert.     3/19/18 1255 - Dr. Lockwood here and plans DC tomorrow.               Discharge Codes    No documentation.           Benson Kelley, RN

## 2018-03-20 VITALS
WEIGHT: 237.31 LBS | HEIGHT: 70 IN | OXYGEN SATURATION: 95 % | RESPIRATION RATE: 16 BRPM | TEMPERATURE: 98.8 F | BODY MASS INDEX: 33.97 KG/M2 | HEART RATE: 114 BPM | DIASTOLIC BLOOD PRESSURE: 79 MMHG | SYSTOLIC BLOOD PRESSURE: 130 MMHG

## 2018-03-20 LAB
BACTERIA SPEC AEROBE CULT: NORMAL
GLUCOSE BLDC GLUCOMTR-MCNC: 177 MG/DL (ref 70–130)
GLUCOSE BLDC GLUCOMTR-MCNC: 224 MG/DL (ref 70–130)
GLUCOSE BLDC GLUCOMTR-MCNC: 230 MG/DL (ref 70–130)
GLUCOSE BLDC GLUCOMTR-MCNC: 271 MG/DL (ref 70–130)

## 2018-03-20 PROCEDURE — 82962 GLUCOSE BLOOD TEST: CPT

## 2018-03-20 PROCEDURE — 94799 UNLISTED PULMONARY SVC/PX: CPT

## 2018-03-20 PROCEDURE — G0378 HOSPITAL OBSERVATION PER HR: HCPCS

## 2018-03-20 PROCEDURE — 99225 PR SBSQ OBSERVATION CARE/DAY 25 MINUTES: CPT | Performed by: INTERNAL MEDICINE

## 2018-03-20 PROCEDURE — 25010000003 CEFTRIAXONE PER 250 MG: Performed by: HOSPITALIST

## 2018-03-20 PROCEDURE — 63710000001 INSULIN ASPART PER 5 UNITS: Performed by: HOSPITALIST

## 2018-03-20 RX ORDER — OXYCODONE HYDROCHLORIDE AND ACETAMINOPHEN 5; 325 MG/1; MG/1
1 TABLET ORAL EVERY 4 HOURS PRN
Qty: 30 TABLET | Refills: 0 | Status: SHIPPED | OUTPATIENT
Start: 2018-03-20 | End: 2018-03-30

## 2018-03-20 RX ORDER — TAMSULOSIN HYDROCHLORIDE 0.4 MG/1
0.4 CAPSULE ORAL DAILY
Qty: 30 CAPSULE
Start: 2018-03-21 | End: 2021-09-13

## 2018-03-20 RX ORDER — PREGABALIN 100 MG/1
100 CAPSULE ORAL 3 TIMES DAILY
Qty: 90 CAPSULE | Refills: 0 | Status: SHIPPED | OUTPATIENT
Start: 2018-03-20 | End: 2018-04-03 | Stop reason: ALTCHOICE

## 2018-03-20 RX ORDER — DIGOXIN 125 MCG
125 TABLET ORAL DAILY
Start: 2018-03-21 | End: 2018-06-04 | Stop reason: SDUPTHER

## 2018-03-20 RX ORDER — OXYCODONE HYDROCHLORIDE AND ACETAMINOPHEN 5; 325 MG/1; MG/1
1 TABLET ORAL EVERY 4 HOURS PRN
Status: DISCONTINUED | OUTPATIENT
Start: 2018-03-20 | End: 2018-03-20 | Stop reason: HOSPADM

## 2018-03-20 RX ADMIN — OXYCODONE HYDROCHLORIDE AND ACETAMINOPHEN 1 TABLET: 5; 325 TABLET ORAL at 12:06

## 2018-03-20 RX ADMIN — ROSUVASTATIN CALCIUM 10 MG: 10 TABLET, FILM COATED ORAL at 08:13

## 2018-03-20 RX ADMIN — PREGABALIN 100 MG: 100 CAPSULE ORAL at 08:15

## 2018-03-20 RX ADMIN — OXYCODONE HYDROCHLORIDE AND ACETAMINOPHEN 500 MG: 500 TABLET ORAL at 08:14

## 2018-03-20 RX ADMIN — CYCLOBENZAPRINE 10 MG: 10 TABLET, FILM COATED ORAL at 06:20

## 2018-03-20 RX ADMIN — INSULIN ASPART 2 UNITS: 100 INJECTION, SOLUTION INTRAVENOUS; SUBCUTANEOUS at 08:15

## 2018-03-20 RX ADMIN — ALLOPURINOL 300 MG: 300 TABLET ORAL at 08:15

## 2018-03-20 RX ADMIN — OXYCODONE HYDROCHLORIDE AND ACETAMINOPHEN 1 TABLET: 5; 325 TABLET ORAL at 15:09

## 2018-03-20 RX ADMIN — TAMSULOSIN HYDROCHLORIDE 0.4 MG: 0.4 CAPSULE ORAL at 08:13

## 2018-03-20 RX ADMIN — IPRATROPIUM BROMIDE AND ALBUTEROL SULFATE 3 ML: .5; 3 SOLUTION RESPIRATORY (INHALATION) at 08:39

## 2018-03-20 RX ADMIN — METOPROLOL TARTRATE 25 MG: 25 TABLET ORAL at 20:55

## 2018-03-20 RX ADMIN — HYDROCODONE BITARTRATE AND ACETAMINOPHEN 2 TABLET: 10; 325 TABLET ORAL at 08:15

## 2018-03-20 RX ADMIN — IPRATROPIUM BROMIDE AND ALBUTEROL SULFATE 3 ML: .5; 3 SOLUTION RESPIRATORY (INHALATION) at 12:21

## 2018-03-20 RX ADMIN — OXYCODONE HYDROCHLORIDE AND ACETAMINOPHEN 1 TABLET: 5; 325 TABLET ORAL at 18:40

## 2018-03-20 RX ADMIN — APIXABAN 5 MG: 5 TABLET, FILM COATED ORAL at 08:15

## 2018-03-20 RX ADMIN — CYCLOBENZAPRINE 10 MG: 10 TABLET, FILM COATED ORAL at 15:09

## 2018-03-20 RX ADMIN — APIXABAN 5 MG: 5 TABLET, FILM COATED ORAL at 20:55

## 2018-03-20 RX ADMIN — INSULIN ASPART 4 UNITS: 100 INJECTION, SOLUTION INTRAVENOUS; SUBCUTANEOUS at 12:07

## 2018-03-20 RX ADMIN — CEFTRIAXONE SODIUM 2 G: 2 INJECTION, SOLUTION INTRAVENOUS at 03:41

## 2018-03-20 RX ADMIN — INSULIN DETEMIR 45 UNITS: 100 INJECTION, SOLUTION SUBCUTANEOUS at 08:15

## 2018-03-20 RX ADMIN — IPRATROPIUM BROMIDE AND ALBUTEROL SULFATE 3 ML: .5; 3 SOLUTION RESPIRATORY (INHALATION) at 19:25

## 2018-03-20 RX ADMIN — INSULIN ASPART 4 UNITS: 100 INJECTION, SOLUTION INTRAVENOUS; SUBCUTANEOUS at 18:40

## 2018-03-20 RX ADMIN — INSULIN DETEMIR 45 UNITS: 100 INJECTION, SOLUTION SUBCUTANEOUS at 20:55

## 2018-03-20 RX ADMIN — PREGABALIN 100 MG: 100 CAPSULE ORAL at 18:40

## 2018-03-20 RX ADMIN — DIGOXIN 125 MCG: 0.12 TABLET ORAL at 08:13

## 2018-03-20 RX ADMIN — CYCLOBENZAPRINE 10 MG: 10 TABLET, FILM COATED ORAL at 20:55

## 2018-03-20 NOTE — PLAN OF CARE
Problem: Patient Care Overview  Goal: Plan of Care Review  Outcome: Ongoing (interventions implemented as appropriate)   03/20/18 0814 03/20/18 1604   Coping/Psychosocial   Plan of Care Reviewed With patient;family --    Plan of Care Review   Progress --  no change   OTHER   Outcome Summary --  d/c per Dr. Lockwood to [facility] for [facility] (short-term); ambulance scheduled for 730pm      Goal: Individualization and Mutuality  Outcome: Ongoing (interventions implemented as appropriate)    Goal: Discharge Needs Assessment  Outcome: Ongoing (interventions implemented as appropriate)    Goal: Interprofessional Rounds/Family Conf  Outcome: Ongoing (interventions implemented as appropriate)      Problem: Fall Risk (Adult)  Goal: Identify Related Risk Factors and Signs and Symptoms  Outcome: Ongoing (interventions implemented as appropriate)    Goal: Absence of Fall  Outcome: Ongoing (interventions implemented as appropriate)      Problem: Pain, Chronic (Adult)  Goal: Identify Related Risk Factors and Signs and Symptoms  Outcome: Ongoing (interventions implemented as appropriate)    Goal: Acceptable Pain/Comfort Level and Functional Ability  Outcome: Ongoing (interventions implemented as appropriate)      Problem: Infection, Risk/Actual (Adult)  Goal: Identify Related Risk Factors and Signs and Symptoms  Outcome: Ongoing (interventions implemented as appropriate)    Goal: Infection Prevention/Resolution  Outcome: Ongoing (interventions implemented as appropriate)

## 2018-03-20 NOTE — PROGRESS NOTES
Hospital Follow Up        Chief Complaint: Follow up paroxsymal atrial fibrillation, indeterminate troponin    Interval History: No chest pain or dyspnea.  Continues to complain of back and hip pain.    Objective:     Objective:  Temp:  [98.1 °F (36.7 °C)-98.5 °F (36.9 °C)] 98.1 °F (36.7 °C)  Heart Rate:  [] 103  Resp:  [16-18] 18  BP: (102-119)/(72-76) 119/76     Intake/Output Summary (Last 24 hours) at 03/20/18 0719  Last data filed at 03/20/18 0500   Gross per 24 hour   Intake              360 ml   Output             3600 ml   Net            -3240 ml     Body mass index is 34.05 kg/m².  1    03/18/18  0600 03/19/18  0601 03/20/18  0500   Weight: 114 kg (251 lb) 115 kg (253 lb 1.6 oz) 108 kg (237 lb 5 oz)     Weight change: -7.161 kg (-15 lb 12.6 oz)      Physical Exam:   General : Alert, cooperative, in no acute distress.  Neuro: alert,cooperative and oriented  Lungs: CTAB. Normal respiratory effort and rate.  CV:: tachy, regular, rate and rhythm, normal S1 and S2, no murmurs, gallops or rubs.  ABD: Soft, nontender, non-distended. positive bowel sounds  Extr: No edema or cyanosis, moves all extremities    Lab Review:     Results from last 7 days  Lab Units 03/19/18  0247 03/18/18  0315  03/15/18  2325   SODIUM mmol/L 138 135*  < > 136   POTASSIUM mmol/L 4.3 4.3  < > 5.4*   CHLORIDE mmol/L 97* 96*  < > 94*   CO2 mmol/L 30.7* 29.9*  < > 27.9   BUN mg/dL 18 19  < > 28*   CREATININE mg/dL 0.89 0.87  < > 1.15   GLUCOSE mg/dL 98 96  < > 280*   CALCIUM mg/dL 8.9 8.6  < > 9.2   AST (SGOT) U/L  --   --   --  28   ALT (SGPT) U/L  --   --   --  22   < > = values in this interval not displayed.    Results from last 7 days  Lab Units 03/16/18  1441 03/15/18  2325   TROPONIN T ng/mL 0.029 0.051*       Results from last 7 days  Lab Units 03/19/18  0247 03/18/18  0315   WBC 10*3/mm3 6.44 7.75   HEMOGLOBIN g/dL 10.4* 10.5*   HEMATOCRIT % 33.5* 34.1*   PLATELETS 10*3/mm3 257 240           Results from last 7 days  Lab  Units 03/16/18  0334   MAGNESIUM mg/dL 1.9           Invalid input(s): LDLCALC    Results from last 7 days  Lab Units 03/15/18  2325   PROBNP pg/mL 2,134.0*       Results from last 7 days  Lab Units 03/19/18  0247   TSH mIU/mL 2.830     I reviewed the patient's new clinical results.  I personally viewed and interpreted the patient's EKG  Current Medications:   Scheduled Meds:  allopurinol 300 mg Oral Daily   apixaban 5 mg Oral Q12H   cefTRIAXone 2 g Intravenous Q24H   cyclobenzaprine 10 mg Oral Q8H   digoxin 125 mcg Oral Daily   insulin aspart 0-9 Units Subcutaneous 4x Daily With Meals & Nightly   insulin detemir 45 Units Subcutaneous Q12H   ipratropium-albuterol 3 mL Nebulization Q6H While Awake - RT   metoprolol tartrate 25 mg Oral Q12H   pregabalin 100 mg Oral TID   rosuvastatin 10 mg Oral Daily   tamsulosin 0.4 mg Oral Daily   vitamin C 500 mg Oral Daily     Continuous Infusions:     Allergies:  No Known Allergies    Assessment/Plan:      1.  Indeterminate troponin.  Likely due to tachycardia since he did the same thing with atrial fibrillation with rapid ventricular rate during last admit.  Troponin has normalized.  No anginal symptoms.  No need for stress test at this time.  2.  Hypotension with tachycardia as an outpatient. As above and both resolved.  3.  History of postoperative atrial fibrillation.  Continue metoprolol, digoxin and Eliquis.  4.  Recent sepsis with Escherichia coli and cholecystitis  5.  Status post recent cholecystectomy  6.  Epidural abscess status post drainage  7.  Status post IVC filter with history of DVT.  Also on Eliquis   8.  Diabetes  9.  Decreased mental status.  Noted this morning.  I'll back to baseline.  10.  Urinary retention. marked diuresis after Murray placement.      -  Continue current management with metoprolol tartrate, digoxin and apixaban   -  Ok for discharge from cardiac standpoint.  Will need 4 week follow up with me.    Tasha Burr MD  03/20/18  7:19 AM

## 2018-03-20 NOTE — PLAN OF CARE
Problem: Patient Care Overview  Goal: Plan of Care Review  Outcome: Ongoing (interventions implemented as appropriate)   03/20/18 0414   Coping/Psychosocial   Plan of Care Reviewed With patient   Plan of Care Review   Progress no change   OTHER   Outcome Summary Pt c/o persistent pain in back and left hip, medicating as ordered. NSR/ST during shift.  at HS check, administered Levemir and held Novolog as BS were low yesterday mrning. Will continue to monitor.     Goal: Individualization and Mutuality  Outcome: Ongoing (interventions implemented as appropriate)    Goal: Discharge Needs Assessment  Outcome: Ongoing (interventions implemented as appropriate)    Goal: Interprofessional Rounds/Family Conf  Outcome: Ongoing (interventions implemented as appropriate)      Problem: Fall Risk (Adult)  Goal: Absence of Fall  Outcome: Ongoing (interventions implemented as appropriate)      Problem: Pain, Chronic (Adult)  Goal: Acceptable Pain/Comfort Level and Functional Ability  Outcome: Ongoing (interventions implemented as appropriate)      Problem: Infection, Risk/Actual (Adult)  Goal: Infection Prevention/Resolution  Outcome: Ongoing (interventions implemented as appropriate)

## 2018-03-20 NOTE — DISCHARGE INSTR - LAB
Keep azul catheter in place following discharge.  Schedule outpatient follow up with Dr. Saleh at [facility] for voiding trial.

## 2018-03-20 NOTE — DISCHARGE SUMMARY
PHYSICIAN DISCHARGE SUMMARY                                                                        [facility]    Patient Identification:  Name: Jesus Beckham  Age: 77 y.o.  Sex: male  :  1940  MRN: [id number]  Primary Care Physician: Magdy Ricardo MD    Admit date: 3/15/2018  Discharge date and time:3/20/2018  Discharged Condition: fair    Discharge Diagnoses:  Active Hospital Problems (** Indicates Principal Problem)    Diagnosis Date Noted   • **Atrial fibrillation with RVR [I48.91] 2018   • Elevated troponin [R74.8] 2018   • History of sepsis [Z86.19] 2018   • Hypotension [I95.9] 2018   • Osteoarthritis [M19.90] 2017   • Essential hypertension [I10] 2016   • Gout [M10.9] 2016   • Diabetic peripheral neuropathy [E11.42] 2016   • Dyslipidemia [E78.5] 2016   • Uncontrolled type 2 diabetes mellitus [E11.65] 2016      Resolved Hospital Problems    Diagnosis Date Noted Date Resolved   No resolved problems to display.      Patient Active Problem List   Diagnosis Code   • Gout M10.9   • Diabetic peripheral neuropathy E11.42   • Dyslipidemia E78.5   • Uncontrolled type 2 diabetes mellitus E11.65   • Essential hypertension I10   • PVC (premature ventricular contraction) I49.3   • Benign non-nodular prostatic hyperplasia without lower urinary tract symptoms N40.0   • Osteoarthritis M19.90   • Urge incontinence N39.41   • Hypotension I95.9   • CELINA (obstructive sleep apnea) G47.33   • Acute gangrenous cholecystitis K81.0   • A-fib I48.91   • Discitis of lumbar region M46.46   • Acute deep vein thrombosis of calf, right I82.4Z1   • Sepsis due to Escherichia coli A41.51   • Atrial fibrillation with RVR I48.91   • Elevated troponin R74.8   • History of sepsis Z86.19       PMHX:   Past Medical History:   Diagnosis Date   • Arthritis    • Cancer of bladder    • Colon polyp     • Diabetes mellitus    • Gout    • Hyperlipidemia    • Irregular heartbeat    • Skin carcinoma    • Type 2 diabetes mellitus    • Vitamin D deficiency      PSHX:   Past Surgical History:   Procedure Laterality Date   • APPENDECTOMY  1961   • CHOLECYSTECTOMY WITH INTRAOPERATIVE CHOLANGIOGRAM N/A 2/25/2018    Procedure: CHOLECYSTECTOMY LAPAROSCOPIC INTRAOPERATIVE CHOLANGIOGRAM;  Surgeon: Maegan Correa MD;  Location: [facility];  Service:    • COLONOSCOPY  2010    h/o of polyps   • EYE SURGERY      1959 - glass removal from eye, lens transplant   • KNEE SURGERY  2006    rt knee   • LUMBAR FUSION N/A 3/7/2018    Procedure: LUMBAR FUSION MINIMALLY INVASIVE TRANSFORAMINAL LUMBAR INTERBODY IRRIGATION AND DEBRIDEMENT AND FUSION.  IRRIGATION AND DEBRIDEMENT OF EPIDURAL ABCESS LUMBAR 2-4. BONE MORPHOGENIC PROTEIN, ALPHATEC NOVEL AND ILLICO, EMG AND SSEP NEUROMONITORING.;  Surgeon: Manish Marr DO;  Location: [facility] OR;  Service: Orthopedic Spine   • VENA CAVA FILTER INSERTION Right 3/6/2018    Procedure: VENA CAVA FILTER INSERTION;  Surgeon: Alexis Thakkar MD;  Location: [facility] OR 18/19;  Service:        Hospital Course: Jesus Beckham  Is a 77-year-old white male with history of recent hospitalization with sepsis with acute cholecystitis and bacteremia complicated by epidural abscess with drainage and history of A. fib. He been at the skilled nursing facility and started having some tachycardia with feeling weak and sweaty. His blood pressure was low and he was sent to the emergency room for further evaluation treatment. He denied having any chest pain. He was very weak and sweaty and a little short of breath. He not had any nausea vomiting. The patient was better with A. fib with rapid rate for further evaluation treatment.           The patient was admitted the hospital and continue with his antibiotic therapy and had some adjustments of his medication for A. fib with rapid rate.  He did have  borderline troponin elevation but cardiology did not feel the need to do any stress test or further evaluation and felt it was probably just due to his tachycardia.  He was feeling better after couple of days and looked well enough to go back to the skilled nursing facility for continued rehabilitation and to finish his course of IV antibiotics.  He'll follow-up his primary care doctor after released from rehabilitation.    Consults:     Consults     Date and Time Order Name Status Description    3/16/2018 1645 Inpatient Urology Consult Completed     3/16/2018 0906 Inpatient Cardiology Consult Completed     3/16/2018 0043 [facility] (on-call MD unless specified) Completed     3/5/2018 1546 Inpatient Vascular Surgery Consult Completed     2/28/2018 1028 Inpatient Orthopedic Surgery Consult Completed     2/24/2018 1046 Inpatient Consult to General Surgery Completed     2/23/2018 1442 Inpatient Consult to Infectious Diseases Completed     2/23/2018 0447 Inpatient Consult to Pulmonology Completed     2/23/2018 0120 [facility] (on-call MD unless specified) Completed           Results from last 7 days  Lab Units 03/19/18  0247   WBC 10*3/mm3 6.44   HEMOGLOBIN g/dL 10.4*   HEMATOCRIT % 33.5*   PLATELETS 10*3/mm3 257       Results from last 7 days  Lab Units 03/19/18  0247   SODIUM mmol/L 138   POTASSIUM mmol/L 4.3   CHLORIDE mmol/L 97*   CO2 mmol/L 30.7*   BUN mg/dL 18   CREATININE mg/dL 0.89   GLUCOSE mg/dL 98   CALCIUM mg/dL 8.9     Significant Diagnostic Studies:   Lab Results   Component Value Date    WBC 6.44 03/19/2018    HGB 10.4 (L) 03/19/2018    HCT 33.5 (L) 03/19/2018     03/19/2018     Lab Results   Component Value Date     03/19/2018    K 4.3 03/19/2018    CL 97 (L) 03/19/2018    CO2 30.7 (H) 03/19/2018    BUN 18 03/19/2018    CREATININE 0.89 03/19/2018    GLUCOSE 98 03/19/2018     Lab Results   Component Value Date    CALCIUM 8.9 03/19/2018     No results found for: AST, ALT, ALKPHOS  No results found for:  APTT, INR  No results found for: COLORU, CLARITYU, SPECGRAV, PHUR, PROTEINUR, GLUCOSEU, KETONESU, BLOODU, NITRITE, LEUKOCYTESUR, BILIRUBINUR, UROBILINOGEN, RBCUA, WBCUA, BACTERIA  No results found for: TROPONINT, TROPONINI, BNP  No components found for: HGBA1C;2  No components found for: TSH;2  Imaging Results (all)     Procedure Component Value Units Date/Time    XR Chest 2 View [[id number]] Collected:  03/15/18 2318     Updated:  03/15/18 2322    Narrative:       PA AND LATERAL CHEST X-RAY     HISTORY: coa, chest pain,     COMPARISON: March 13, 2018.     FINDINGS: PA and lateral views of the chest were obtained. The lungs are  poorly aerated. Right diaphragm is elevated with improving adjacent  atelectasis. Marked cardiomegaly unchanged. No edema or pleural fluid.  Right-sided PICC line is unchanged.             Impression:       Under aeration with improving right lung base atelectasis     This report was finalized on 3/15/2018 11:19 PM by Jerrell Dumont MD.           Lab Results (last 7 days)     Procedure Component Value Units Date/Time    POC Glucose Once [[id number]]  (Abnormal) Collected:  03/20/18 1111    Specimen:  Blood Updated:  03/20/18 1115     Glucose 230 (H) mg/dL     Narrative:       Meter: [id number] : [id number] Gabi CORREA    POC Glucose Once [[id number]]  (Abnormal) Collected:  03/20/18 0738    Specimen:  Blood Updated:  03/20/18 0741     Glucose 177 (H) mg/dL     Narrative:       Meter: [id number] : [id number] Gabi CORREA    Blood Culture - Blood, [[id number]]  (Normal) Collected:  03/16/18 0103    Specimen:  Blood from Arm, Left Updated:  03/20/18 0116     Blood Culture No growth at 4 days    Blood Culture - Blood, [[id number]]  (Normal) Collected:  03/15/18 2325    Specimen:  Blood from Arm, Left Updated:  03/19/18 2346     Blood Culture No growth at 4 days    POC Glucose Once [[id number]]  (Abnormal) Collected:  03/19/18 2050    Specimen:  Blood Updated:  03/19/18 2111      Glucose 234 (H) mg/dL     Narrative:       Meter: [id number] : [id number] Adolfo SEQUEIRA NA    POC Glucose Once [[id number]]  (Abnormal) Collected:  03/19/18 1656    Specimen:  Blood Updated:  03/19/18 1704     Glucose 139 (H) mg/dL     Narrative:       Meter: [id number] : [id number] Adolfo Villarealette E NA    POC Glucose Once [[id number]]  (Abnormal) Collected:  03/19/18 1145    Specimen:  Blood Updated:  03/19/18 1147     Glucose 187 (H) mg/dL     Narrative:       Meter: [id number] : [id number] David Dickey NA    POC Glucose Once [[id number]]  (Abnormal) Collected:  03/19/18 0727    Specimen:  Blood Updated:  03/19/18 0729     Glucose 67 (L) mg/dL     Narrative:       Meter: [id number] : [id number] David Keli NA    TSH [[id number]]  (Normal) Collected:  03/19/18 0247    Specimen:  Blood Updated:  03/19/18 0406     TSH 2.830 mIU/mL     Basic Metabolic Panel [[id number]]  (Abnormal) Collected:  03/19/18 0247    Specimen:  Blood Updated:  03/19/18 0355     Glucose 98 mg/dL      BUN 18 mg/dL      Creatinine 0.89 mg/dL      Sodium 138 mmol/L      Potassium 4.3 mmol/L      Chloride 97 (L) mmol/L      CO2 30.7 (H) mmol/L      Calcium 8.9 mg/dL      eGFR Non African Amer 83 mL/min/1.73      BUN/Creatinine Ratio 20.2     Anion Gap 10.3 mmol/L     Narrative:       The MDRD GFR formula is only valid for adults with stable renal function between ages 18 and 70.    CBC & Differential [[id number]] Collected:  03/19/18 0247    Specimen:  Blood Updated:  03/19/18 0341    Narrative:       The following orders were created for panel order CBC & Differential.  Procedure                               Abnormality         Status                     ---------                               -----------         ------                     CBC Auto Differential[[id number]]        Abnormal            Final result                 Please view results for these tests on the individual orders.    CBC Auto Differential [[id number]]   (Abnormal) Collected:  03/19/18 0247    Specimen:  Blood Updated:  03/19/18 0341     WBC 6.44 10*3/mm3      RBC 3.54 (L) 10*6/mm3      Hemoglobin 10.4 (L) g/dL      Hematocrit 33.5 (L) %      MCV 94.6 fL      MCH 29.4 pg      MCHC 31.0 (L) g/dL      RDW 14.5 %      RDW-SD 49.6 fl      MPV 10.8 fL      Platelets 257 10*3/mm3      Neutrophil % 70.3 %      Lymphocyte % 17.7 (L) %      Monocyte % 8.4 %      Eosinophil % 2.2 %      Basophil % 0.6 %      Immature Grans % 0.8 (H) %      Neutrophils, Absolute 4.53 10*3/mm3      Lymphocytes, Absolute 1.14 10*3/mm3      Monocytes, Absolute 0.54 10*3/mm3      Eosinophils, Absolute 0.14 10*3/mm3      Basophils, Absolute 0.04 10*3/mm3      Immature Grans, Absolute 0.05 (H) 10*3/mm3      nRBC 0.0 /100 WBC     POC Glucose Once [[id number]]  (Abnormal) Collected:  03/18/18 2132    Specimen:  Blood Updated:  03/18/18 2133     Glucose 185 (H) mg/dL     Narrative:       Meter: [id number] : [id number] Adama Miller RN    POC Glucose Once [[id number]]  (Abnormal) Collected:  03/18/18 1956    Specimen:  Blood Updated:  03/18/18 1957     Glucose 155 (H) mg/dL     Narrative:       Meter: [id number] : [id number] Lonny CORREA    POC Glucose Once [[id number]]  (Abnormal) Collected:  03/18/18 1651    Specimen:  Blood Updated:  03/18/18 1652     Glucose 157 (H) mg/dL     Narrative:       Meter: [id number] : [id number] Brynn CORREA    POC Glucose Once [[id number]]  (Abnormal) Collected:  03/18/18 1144    Specimen:  Blood Updated:  03/18/18 1147     Glucose 179 (H) mg/dL     Narrative:       Meter: [id number] : [id number] Brynn CORREA    POC Glucose Once [[id number]]  (Normal) Collected:  03/18/18 0956    Specimen:  Blood Updated:  03/18/18 0957     Glucose 114 mg/dL     Narrative:       Meter: [id number] : [id number] Brynn CORREA    POC Glucose Once [[id number]]  (Normal) Collected:  03/18/18 0818    Specimen:  Blood Updated:  03/18/18 0819     Glucose 83 mg/dL      Narrative:       Meter: [id number] : [id number] Eladia Lewis RN    POC Glucose Once [[id number]]  (Normal) Collected:  03/18/18 0737    Specimen:  Blood Updated:  03/18/18 0739     Glucose 80 mg/dL     Narrative:       Meter: [id number] : [id number] Brynn CORREA    Basic Metabolic Panel [[id number]]  (Abnormal) Collected:  03/18/18 0315    Specimen:  Blood Updated:  03/18/18 0421     Glucose 96 mg/dL      BUN 19 mg/dL      Creatinine 0.87 mg/dL      Sodium 135 (L) mmol/L      Potassium 4.3 mmol/L      Chloride 96 (L) mmol/L      CO2 29.9 (H) mmol/L      Calcium 8.6 mg/dL      eGFR Non African Amer 85 mL/min/1.73      BUN/Creatinine Ratio 21.8     Anion Gap 9.1 mmol/L     Narrative:       The MDRD GFR formula is only valid for adults with stable renal function between ages 18 and 70.    CBC & Differential [[id number]] Collected:  03/18/18 0315    Specimen:  Blood Updated:  03/18/18 0401    Narrative:       The following orders were created for panel order CBC & Differential.  Procedure                               Abnormality         Status                     ---------                               -----------         ------                     CBC Auto Differential[[id number]]        Abnormal            Final result                 Please view results for these tests on the individual orders.    CBC Auto Differential [[id number]]  (Abnormal) Collected:  03/18/18 0315    Specimen:  Blood Updated:  03/18/18 0401     WBC 7.75 10*3/mm3      RBC 3.56 (L) 10*6/mm3      Hemoglobin 10.5 (L) g/dL      Hematocrit 34.1 (L) %      MCV 95.8 fL      MCH 29.5 pg      MCHC 30.8 (L) g/dL      RDW 14.6 (H) %      RDW-SD 50.4 fl      MPV 10.8 fL      Platelets 240 10*3/mm3      Neutrophil % 74.2 %      Lymphocyte % 13.8 (L) %      Monocyte % 8.0 %      Eosinophil % 2.5 %      Basophil % 0.3 %      Immature Grans % 1.2 (H) %      Neutrophils, Absolute 5.76 10*3/mm3      Lymphocytes, Absolute 1.07 10*3/mm3      Monocytes,  Absolute 0.62 10*3/mm3      Eosinophils, Absolute 0.19 10*3/mm3      Basophils, Absolute 0.02 10*3/mm3      Immature Grans, Absolute 0.09 (H) 10*3/mm3     POC Glucose Once [[id number]]  (Abnormal) Collected:  03/17/18 2152    Specimen:  Blood Updated:  03/17/18 2153     Glucose 159 (H) mg/dL     Narrative:       Meter: [id number] : [id number] Adama Miller RN    POC Glucose Once [[id number]]  (Abnormal) Collected:  03/17/18 1948    Specimen:  Blood Updated:  03/17/18 1949     Glucose 200 (H) mg/dL     Narrative:       Meter: [id number] : [id number] Lonny CORREA    POC Glucose Once [[id number]]  (Abnormal) Collected:  03/17/18 1654    Specimen:  Blood Updated:  03/17/18 1655     Glucose 171 (H) mg/dL     Narrative:       Meter: [id number] : [id number] Brynn CORREA    POC Glucose Once [[id number]]  (Abnormal) Collected:  03/17/18 1135    Specimen:  Blood Updated:  03/17/18 1138     Glucose 150 (H) mg/dL     Narrative:       Meter: [id number] : [id number] Brynn CORREA    POC Glucose Once [[id number]]  (Normal) Collected:  03/17/18 0747    Specimen:  Blood Updated:  03/17/18 0748     Glucose 97 mg/dL     Narrative:       Meter: [id number] : [id number] Brynn CORREA    Basic Metabolic Panel [[id number]]  (Abnormal) Collected:  03/17/18 0501    Specimen:  Blood Updated:  03/17/18 0543     Glucose 85 mg/dL      BUN 24 (H) mg/dL      Creatinine 1.02 mg/dL      Sodium 136 mmol/L      Potassium 4.7 mmol/L      Chloride 98 mmol/L      CO2 27.8 mmol/L      Calcium 9.0 mg/dL      eGFR Non African Amer 71 mL/min/1.73      BUN/Creatinine Ratio 23.5     Anion Gap 10.2 mmol/L     Narrative:       The MDRD GFR formula is only valid for adults with stable renal function between ages 18 and 70.    CBC & Differential [[id number]] Collected:  03/17/18 0501    Specimen:  Blood Updated:  03/17/18 0529    Narrative:       The following orders were created for panel order CBC & Differential.  Procedure                                Abnormality         Status                     ---------                               -----------         ------                     CBC Auto Differential[[id number]]        Abnormal            Final result                 Please view results for these tests on the individual orders.    CBC Auto Differential [[id number]]  (Abnormal) Collected:  03/17/18 0501    Specimen:  Blood Updated:  03/17/18 0529     WBC 9.85 10*3/mm3      RBC 3.74 (L) 10*6/mm3      Hemoglobin 11.0 (L) g/dL      Hematocrit 36.0 (L) %      MCV 96.3 (H) fL      MCH 29.4 pg      MCHC 30.6 (L) g/dL      RDW 14.8 (H) %      RDW-SD 52.1 fl      MPV 10.7 fL      Platelets 272 10*3/mm3      Neutrophil % 80.7 (H) %      Lymphocyte % 10.1 (L) %      Monocyte % 6.6 %      Eosinophil % 1.5 %      Basophil % 0.3 %      Immature Grans % 0.8 (H) %      Neutrophils, Absolute 7.95 10*3/mm3      Lymphocytes, Absolute 0.99 10*3/mm3      Monocytes, Absolute 0.65 10*3/mm3      Eosinophils, Absolute 0.15 10*3/mm3      Basophils, Absolute 0.03 10*3/mm3      Immature Grans, Absolute 0.08 (H) 10*3/mm3     POC Glucose Once [[id number]]  (Abnormal) Collected:  03/16/18 2201    Specimen:  Blood Updated:  03/16/18 2202     Glucose 173 (H) mg/dL     Narrative:       Meter: [id number] : [id number] Markel Spear CNA    POC Glucose Once [[id number]]  (Abnormal) Collected:  03/16/18 1639    Specimen:  Blood Updated:  03/16/18 1642     Glucose 154 (H) mg/dL     Narrative:       Meter: [id number] : [id number] Brynn CORREA    Troponin [[id number]]  (Normal) Collected:  03/16/18 1441    Specimen:  Blood Updated:  03/16/18 1611     Troponin T 0.029 ng/mL     Narrative:       Troponin T Reference Ranges:  Less than 0.03 ng/mL:    Negative for AMI  0.03 to 0.09 ng/mL:      Indeterminant for AMI  Greater than 0.09 ng/mL: Positive for AMI    POC Glucose Once [[id number]]  (Abnormal) Collected:  03/16/18 1151    Specimen:  Blood Updated:  03/16/18 1155     Glucose  182 (H) mg/dL     Narrative:       Meter: [id number] : [id number] Brynn CORREA    Basic Metabolic Panel [[id number]]  (Abnormal) Collected:  03/16/18 0334    Specimen:  Blood Updated:  03/16/18 0419     Glucose 194 (H) mg/dL      BUN 26 (H) mg/dL      Creatinine 0.92 mg/dL      Sodium 138 mmol/L      Potassium 4.6 mmol/L      Chloride 100 mmol/L      CO2 26.3 mmol/L      Calcium 8.5 (L) mg/dL      eGFR Non African Amer 80 mL/min/1.73      BUN/Creatinine Ratio 28.3 (H)     Anion Gap 11.7 mmol/L     Narrative:       The MDRD GFR formula is only valid for adults with stable renal function between ages 18 and 70.    Magnesium [[id number]]  (Normal) Collected:  03/16/18 0334    Specimen:  Blood Updated:  03/16/18 0419     Magnesium 1.9 mg/dL     Phosphorus [[id number]]  (Abnormal) Collected:  03/16/18 0334    Specimen:  Blood Updated:  03/16/18 0419     Phosphorus 4.9 (H) mg/dL     Lactic Acid, Reflex [[id number]]  (Normal) Collected:  03/16/18 0334    Specimen:  Blood Updated:  03/16/18 0406     Lactate 1.0 mmol/L     CBC (No Diff) [[id number]]  (Abnormal) Collected:  03/16/18 0334    Specimen:  Blood Updated:  03/16/18 0404     WBC 10.35 10*3/mm3      RBC 3.57 (L) 10*6/mm3      Hemoglobin 10.7 (L) g/dL      Hematocrit 34.1 (L) %      MCV 95.5 fL      MCH 30.0 pg      MCHC 31.4 (L) g/dL      RDW 14.8 (H) %      RDW-SD 50.6 fl      MPV 11.0 fL      Platelets 249 10*3/mm3     Lactic Acid, Reflex Timer (This will reflex a repeat order 3-3:15 hours after ordered.) [[id number]] Collected:  03/15/18 2325    Specimen:  Blood Updated:  03/16/18 0316     Extra Tube Hold for add-ons.     Comment: Auto resulted.       Hemoglobin A1c [[id number]]  (Abnormal) Collected:  03/15/18 2325    Specimen:  Blood Updated:  03/16/18 0232     Hemoglobin A1C 7.90 (H) %     Narrative:       Hemoglobin A1C Ranges:    Increased Risk for Diabetes  5.7% to 6.4%  Diabetes                     >= 6.5%  Diabetic Goal                < 7.0%    POC Glucose  "Once [[id number]]  (Abnormal) Collected:  03/16/18 0230    Specimen:  Blood Updated:  03/16/18 0232     Glucose 197 (H) mg/dL     Narrative:       Meter: [id number] : [id number] Kimberly CORREA    Procalcitonin [[id number]]  (Abnormal) Collected:  03/15/18 2325    Specimen:  Blood Updated:  03/16/18 0015     Procalcitonin 0.45 (H) ng/mL     Narrative:       As a Marker for Sepsis (Non-Neonates):   1. <0.5 ng/mL represents a low risk of severe sepsis and/or septic shock.  1. >2 ng/mL represents a high risk of severe sepsis and/or septic shock.    As a Marker for Lower Respiratory Tract Infections that require antibiotic therapy:  PCT on Admission     Antibiotic Therapy             6-12 Hrs later  > 0.5                Strongly Recommended            >0.25 - <0.5         Recommended  0.1 - 0.25           Discouraged                   Remeasure/reassess PCT  <0.1                 Strongly Discouraged          Remeasure/reassess PCT      As 28 day mortality risk marker: \"Change in Procalcitonin Result\" (> 80 % or <=80 %) if Day 0 (or Day 1) and Day 4 values are available. Refer to http://www.[employer]-pct-calculator.com/   Change in PCT <=80 %   A decrease of PCT levels below or equal to 80 % defines a positive change in PCT test result representing a higher risk for 28-day all-cause mortality of patients diagnosed with severe sepsis or septic shock.  Change in PCT > 80 %   A decrease of PCT levels of more than 80 % defines a negative change in PCT result representing a lower risk for 28-day all-cause mortality of patients diagnosed with severe sepsis or septic shock.                Urinalysis With / Culture If Indicated - Urine, Catheter [[id number]]  (Abnormal) Collected:  03/15/18 2353    Specimen:  Urine from Urine, Catheter Updated:  03/16/18 0012     Color, UA Yellow     Appearance, UA Clear     pH, UA 5.5     Specific Gravity, UA >=1.030     Glucose, UA >=1000 mg/dL (3+) (A)     Ketones, UA Negative     " Bilirubin, UA Negative     Blood, UA Negative     Protein, UA Negative     Leuk Esterase, UA Negative     Nitrite, UA Negative     Urobilinogen, UA 0.2 E.U./dL    Narrative:       Urine microscopic not indicated.    Comprehensive Metabolic Panel [[id number]]  (Abnormal) Collected:  03/15/18 2325    Specimen:  Blood Updated:  03/16/18 0011     Glucose 280 (H) mg/dL      BUN 28 (H) mg/dL      Creatinine 1.15 mg/dL      Sodium 136 mmol/L      Potassium 5.4 (H) mmol/L      Chloride 94 (L) mmol/L      CO2 27.9 mmol/L      Calcium 9.2 mg/dL      Total Protein 6.1 g/dL      Albumin 2.80 (L) g/dL      ALT (SGPT) 22 U/L      AST (SGOT) 28 U/L      Alkaline Phosphatase 122 (H) U/L      Total Bilirubin 0.5 mg/dL      eGFR Non African Amer 62 mL/min/1.73      Globulin 3.3 gm/dL      A/G Ratio 0.8 g/dL      BUN/Creatinine Ratio 24.3     Anion Gap 14.1 mmol/L     Narrative:       The MDRD GFR formula is only valid for adults with stable renal function between ages 18 and 70.    Lactic Acid, Plasma [[id number]]  (Abnormal) Collected:  03/15/18 2325    Specimen:  Blood Updated:  03/16/18 0011     Lactate 2.1 (C) mmol/L     Troponin [[id number]]  (Abnormal) Collected:  03/15/18 2325    Specimen:  Blood Updated:  03/16/18 0009     Troponin T 0.051 (H) ng/mL     Narrative:       Troponin T Reference Ranges:  Less than 0.03 ng/mL:    Negative for AMI  0.03 to 0.09 ng/mL:      Indeterminant for AMI  Greater than 0.09 ng/mL: Positive for AMI    BNP [[id number]]  (Abnormal) Collected:  03/15/18 2325    Specimen:  Blood Updated:  03/16/18 0007     proBNP 2,134.0 (H) pg/mL     Narrative:       Among patients with dyspnea, NT-proBNP is highly sensitive for the detection of acute congestive heart failure. In addition NT-proBNP of <300 pg/ml effectively rules out acute congestive heart failure with 99% negative predictive value.    CBC & Differential [[id number]] Collected:  03/15/18 2325    Specimen:  Blood Updated:  03/15/18 2347    Narrative:     "   The following orders were created for panel order CBC & Differential.  Procedure                               Abnormality         Status                     ---------                               -----------         ------                     CBC Auto Differential[[id number]]        Abnormal            Final result                 Please view results for these tests on the individual orders.    CBC Auto Differential [[id number]]  (Abnormal) Collected:  03/15/18 2325    Specimen:  Blood Updated:  03/15/18 2347     WBC 13.81 (H) 10*3/mm3      RBC 4.25 (L) 10*6/mm3      Hemoglobin 12.9 (L) g/dL      Hematocrit 40.6 %      MCV 95.5 fL      MCH 30.4 pg      MCHC 31.8 (L) g/dL      RDW 14.9 (H) %      RDW-SD 51.1 fl      MPV 11.8 fL      Platelets 299 10*3/mm3      Neutrophil % 83.1 (H) %      Lymphocyte % 9.1 (L) %      Monocyte % 5.9 %      Eosinophil % 0.4 %      Basophil % 0.1 %      Immature Grans % 1.4 (H) %      Neutrophils, Absolute 11.48 (H) 10*3/mm3      Lymphocytes, Absolute 1.25 10*3/mm3      Monocytes, Absolute 0.82 10*3/mm3      Eosinophils, Absolute 0.05 10*3/mm3      Basophils, Absolute 0.02 10*3/mm3      Immature Grans, Absolute 0.19 (H) 10*3/mm3     POC Glucose Once [[id number]]  (Abnormal) Collected:  03/15/18 2218    Specimen:  Blood Updated:  03/15/18 2222     Glucose 270 (H) mg/dL     Narrative:       Meter: [id number] : [id number] [facility]        /72 (BP Location: Left arm, Patient Position: Lying)   Pulse 109   Temp 98 °F (36.7 °C) (Oral)   Resp 18   Ht 177.8 cm (70\")   Wt 108 kg (237 lb 5 oz)   SpO2 91%   BMI 34.05 kg/m²     Discharge Exam:  General Appearance:    Alert, cooperative, no distress                          Head:    Normocephalic, without obvious abnormality, atraumatic                          Eyes:                            Throat:   Lips, tongue, gums normal                          Neck:   Supple, symmetrical, trachea midline, no JVD               "          Lungs:     Clear to auscultation bilaterally, respirations unlabored                Chest Wall:    No tenderness or deformity                        Heart:    irregular rate and rhythm, S1 and S2 normal, no murmur,no  Rub or gallop                  Abdomen:     Soft, non-tender, bowel sounds active, no masses, no organomegaly                  Extremities:   Extremities normal, atraumatic, no cyanosis or edema                             Skin:   Skin is warm and dry,  no rashes or palpable lesions                  Neurologic:   Weak in lower extremities     Disposition:  Skilled nursing facility    Patient Instructions:    Jesus Beckham   Home Medication Instructions ZULLY:[id number]    Printed on:03/20/18 9902   Medication Information                      Acetaminophen (TYLENOL PO)  Take  by mouth as needed.             allopurinol (ZYLOPRIM) 300 MG tablet  Take 1 tablet by mouth Daily.             ANDROGEL PUMP 20.25 MG/ACT (1.62%) gel  2 pump actuation on each shoulder daily             apixaban (ELIQUIS) 5 MG tablet tablet  Take 1 tablet by mouth Every 12 (Twelve) Hours.             bisacodyl (DULCOLAX) 5 MG EC tablet  Take 1 tablet by mouth Daily As Needed for Constipation.             castor oil-balsam peru (VENELEX) ointment  Apply 5 g topically Every 12 (Twelve) Hours.             cefTRIAXone (ROCEPHIN) 40 MG/ML IVPB  Infuse 50 mL into a venous catheter Daily for 50 doses.             cyclobenzaprine (FLEXERIL) 10 MG tablet  Take 1 tablet by mouth Every 8 (Eight) Hours.             digoxin (LANOXIN) 125 MCG tablet  Take 1 tablet by mouth Daily.             ergocalciferol (DRISDOL) 30717 units capsule  Take 1 capsule by mouth 1 (One) Time Per Week.             fluticasone (FLONASE) 50 MCG/ACT nasal spray  2 sprays into each nostril Daily.             glipiZIDE (GLUCOTROL) 10 MG tablet  Take 1 tablet by mouth 2 (Two) Times a Day Before Meals.             glucose blood test strip  CHECK BLOOD SUGARS  2 TIMES A DAY AS DIRECTED             GLYXAMBI 25-5 MG tablet  Take 1 tablet by mouth Daily With Breakfast.             Insulin Pen Needle 32G X 4 MM misc  INJECT UP TO THREE TIMES DAILY OR AS DIRECTED             ipratropium-albuterol (DUO-NEB) 0.5-2.5 mg/mL nebulizer  Take 3 mL by nebulization Every 6 (Six) Hours While Awake.             L-Methylfolate-B6-B12 3-35-2 MG tablet  Take 1 tablet by mouth 2 (Two) Times a Day.             LEVEMIR FLEXTOUCH 100 UNIT/ML injection  50 units twice daily.             lisinopril (PRINIVIL,ZESTRIL) 10 MG tablet  Take 1 tablet by mouth Daily.             metoprolol tartrate (LOPRESSOR) 25 MG tablet  Take 1 tablet by mouth Every 12 (Twelve) Hours.             Mirabegron ER (MYRBETRIQ) 25 MG tablet sustained-release 24 hour 24 hr tablet  Take 25 mg by mouth Daily.             Multiple Vitamins-Minerals (MULTIVITAMIN ADULT PO)  Take  by mouth.             Omega-3 Fatty Acids (FISH OIL) 1000 MG capsule capsule  Take  by mouth daily with breakfast.             oxyCODONE-acetaminophen (PERCOCET) 5-325 MG per tablet  Take 1 tablet by mouth Every 4 (Four) Hours As Needed for Severe Pain  for up to 10 days.             polyethylene glycol (MIRALAX) pack packet  Take 17 g by mouth Daily As Needed (constipation).             pregabalin (LYRICA) 100 MG capsule  Take 1 capsule by mouth 3 (Three) Times a Day.             rosuvastatin (CRESTOR) 10 MG tablet  Take 1 tablet by mouth Daily.             tamsulosin (FLOMAX) 0.4 MG capsule 24 hr capsule  Take 1 capsule by mouth Daily.             vitamin C (ASCORBIC ACID) 500 MG tablet  Take 500 mg by mouth Daily.               Future Appointments  Date Time Provider Department Center   5/2/2018 10:20 AM Pita Harper MD [facility] ID [facility] None   5/8/2018 8:30 AM [facility] END [facility] None   5/22/2018 9:40 AM DANNY Degroot [facility] None   9/4/2018 8:00 AM [facility] None   9/18/2018 9:40 AM Staci Keyes MD [facility]  END [facility] None      Contact information for follow-up providers     Magdy Ricardo MD Follow up.    Specialty:  Family Medicine  Contact information:  [address]  [address]  [facility] [address]  [phone]                   Contact information for after-discharge care     Destination     [facility] Follow up.    Specialties:  Skilled Nursing Facility, Intermediate Care Facility  Contact information:  [address] [facility] [address] [phone]                           Discharge Order     Start     Ordered    03/20/18 1354  Discharge patient  Once     Expected Discharge Date:  03/20/18    Discharge Disposition:  Skilled Nursing Facility (DC - External)        03/20/18 1353          Total time spent discharging patient including evaluation,post hospitalization follow up,  medication and post hospitalization instructions and education total time exceeds 30 minutes.    Signed:  Krish Lockwood MD  3/20/2018  1:54 PM

## 2018-03-20 NOTE — PROGRESS NOTES
Continued Stay Note  [facility]     Patient Name: Jesus Beckham  MRN: [id number]  Today's Date: 3/20/2018    Admit Date: 3/15/2018          Discharge Plan     Row Name 03/20/18 1253       Plan    Plan [facility]/ [employer] Prec[employer] obtained, bed is available    Plan Comments VM from [facility].  She has obtained precert approval, bed is available.........................Olivia Swenson RN    Row Name 03/20/18 0959       Plan    Plan [facility]- [employer] Prec[employer] has been started, bed is available    Patient/Family in Agreement with Plan yes    Plan Comments Spoke with [facility], she has started [employer] Prec[employer] and has a bed available for today. Anticipate DC orders..........................Olivia Swenson RN              Discharge Codes    No documentation.       Expected Discharge Date and Time     Expected Discharge Date Expected Discharge Time    Mar 20, 2018             Olivia Swenson RN

## 2018-03-20 NOTE — PROGRESS NOTES
Continued Stay Note  [facility]     Patient Name: Jesus Beckham  MRN: [id number]  Today's Date: 3/20/2018    Admit Date: 3/15/2018          Discharge Plan     Row Name 03/20/18 0959       Plan    Plan [facility]- [employer] Precert has been started, bed is available    Patient/Family in Agreement with Plan yes    Plan Comments Spoke with [facility], she has started [employer] Precert and has a bed available for today. Anticipate DC orders..........................Olivia Swenson RN              Discharge Codes    No documentation.       Expected Discharge Date and Time     Expected Discharge Date Expected Discharge Time    Mar 20, 2018             Olivia Swenson RN

## 2018-03-20 NOTE — PROGRESS NOTES
Continued Stay Note  [facility]     Patient Name: Jesus Beckham  MRN: [id number]  Today's Date: 3/20/2018    Admit Date: 3/15/2018          Discharge Plan     Row Name 03/20/18 1457       Plan    Plan [facility]/ [employer] Prec[employer] obtained    Plan Comments Spoke with spouse, Mya Beckham [phone].  She agrees with plan [facility] and [facility] and notified of ambulance disclaimer. [facility] and Mya/ spouse and [facility] was notified.  DC summary and prescriptions were faxed to [facility].  Packet in patient's chart.............................Olivia Swenson RN    Row Name 03/20/18 1253       Plan    Plan [facility]/ [employer] Prec[employer] obtained, bed is available    Plan Comments VM from [facility].  She has obtained precert approval, bed is available.........................Olivia Swenson RN              Discharge Codes    No documentation.       Expected Discharge Date and Time     Expected Discharge Date Expected Discharge Time    Mar 20, 2018             Olivia Swenson RN

## 2018-03-20 NOTE — PROGRESS NOTES
[facility]    Physicians Statement of Medical Necessity for Ambulance Transportation    It is medically necessary for:    Patient Name: Jesus Beckham    Insurance Information:  [employer] Medicare Replacement    To be transported by ambulance: yes    From (if nursing facility, specify level of care: skilled, senior care, etc): [facility]    To (specify level of care if nursing facility): [facility]    Date of Service:     For dialysis patients state date dialysis began:     Diagnosis: Atrial Fib    Past Medical/Surgical History:  Past Medical History:   Diagnosis Date   • Arthritis    • Cancer of bladder 2016   • Colon polyp    • Diabetes mellitus    • Gout    • Hyperlipidemia    • Irregular heartbeat    • Skin carcinoma    • Type 2 diabetes mellitus    • Vitamin D deficiency       Past Surgical History:   Procedure Laterality Date   • APPENDECTOMY  1961   • CHOLECYSTECTOMY WITH INTRAOPERATIVE CHOLANGIOGRAM N/A 2/25/2018    Procedure: CHOLECYSTECTOMY LAPAROSCOPIC INTRAOPERATIVE CHOLANGIOGRAM;  Surgeon: Maegan Correa MD;  Location: [facility]  Service:    • COLONOSCOPY  2010    h/o of polyps   • EYE SURGERY      1959 - glass removal from eye, lens transplant   • KNEE SURGERY  2006    rt knee   • VENA CAVA FILTER INSERTION Right 3/6/2018    Procedure: VENA CAVA FILTER INSERTION;  Surgeon: Alexis Thakkar MD;  Location: [facility] OR 18/19;  Service:         Current Objective Medical Evidence(including physical exam finding to support reason for limitations):    Immobilization syndrome    Other:     Physician Signature:           (RN,NP,PA,CAN, Discharge Planner)Olivia Swenson RN   Date/Time: 3/20/2018   10:46 AM       Printed Name:    __________________________________    [facility]   Phone: [phone] Phone: [phone] Phone: [phone]   Fax: [phone] Fax: [phone] Fax: [phone]

## 2018-03-21 LAB — BACTERIA SPEC AEROBE CULT: NORMAL

## 2018-03-23 NOTE — PROGRESS NOTES
Case Management Discharge Note    Final Note: DC to [facility] via ambulance    Destination - Selection Complete     Service Request Status Selected Specialties Address Phone Number Fax Number    [facility] Selected Skilled Nursing Facility [address], [facility] [id number] [phone]        Olivia Swenson, RN 3/16/2018 0835    Will need [employer] Precert, no bed hold, will accept back skilled rehab/ [facility]                 Durable Medical Equipment     No service coordination in this encounter.      Dialysis/Infusion     No service coordination in this encounter.      Home Medical Care     No service coordination in this encounter.      Social Care     No service coordination in this encounter.        [facility]: [facility]    Final Discharge Disposition Code: 03 - skilled nursing facility (SNF)

## 2018-04-02 ENCOUNTER — TELEPHONE (OUTPATIENT)
Dept: INFECTIOUS DISEASES | Facility: CLINIC | Age: 78
End: 2018-04-02

## 2018-04-02 NOTE — TELEPHONE ENCOUNTER
I spoke w/Rui @ [facility]; I told him that I was simply calling to make sure that someone from their facility was aware of patients Critically low glucose level.  Rui states that they are monitoring.

## 2018-04-03 ENCOUNTER — OFFICE VISIT (OUTPATIENT)
Dept: SURGERY | Facility: CLINIC | Age: 78
End: 2018-04-03

## 2018-04-03 DIAGNOSIS — K81.0 ACUTE GANGRENOUS CHOLECYSTITIS: Primary | ICD-10-CM

## 2018-04-03 PROCEDURE — 99024 POSTOP FOLLOW-UP VISIT: CPT | Performed by: SURGERY

## 2018-04-03 RX ORDER — ACETAMINOPHEN,DIPHENHYDRAMINE HCL 500; 25 MG/1; MG/1
2 TABLET, FILM COATED ORAL NIGHTLY PRN
COMMUNITY

## 2018-04-03 RX ORDER — OXYCODONE HYDROCHLORIDE AND ACETAMINOPHEN 5; 325 MG/1; MG/1
1 TABLET ORAL EVERY 6 HOURS PRN
COMMUNITY
End: 2018-05-22 | Stop reason: ALTCHOICE

## 2018-04-03 RX ORDER — MELATONIN 200 MCG
3 TABLET ORAL NIGHTLY PRN
COMMUNITY
End: 2018-11-19

## 2018-04-03 NOTE — PROGRESS NOTES
CHIEF COMPLAINT:   Chief Complaint   Patient presents with   • Post-op     PO Laparoscopic cholecystectomy with intraoperative cholangiogram 2/25/18       HISTORY OF PRESENT ILLNESS:  This is a 77 y.o. male who presents for a post-operative visit after undergoing a laparoscopic cholecystectomy with intraoperative cholangiogram on 2/25/2018 for acute gangrenous cholecystitis.  Postoperatively, he was found to also have a lumbar epidural abscess requiring open incision and drainage by Dr. Marr with the orthopedic spine surgery team and IVC filter placement by Dr. Alexis Thakkar.  He is still receiving Rocephin daily, with a stop date of May 8 as dictated by infectious disease for treatment of his bacteremia and epidural abscess.  He is also on Eliquis twice daily for treatment of her right lower extremity DVT.  He states his appetite is quite good and he has had no fevers, chills, or diarrhea.  His only complaint today is of persistent back pain and bilateral hip pain, left greater than right.  He also has struggled with some constipation but denies any nausea or vomiting.    Pathology:   1: GALLBLADDER, CHOLECYSTECTOMY SPECIMEN:               ACUTE CHOLECYSTITIS WITH NECROSIS AND EARLY ABSCESS FORMATION.               CHOLELITHIASIS.    PHYSICAL EXAM:  Lungs: Clear  Heart: RRR  ABD: Incisions are healing well without any erythema or signs of infection.  Ext: no significant edema, calves nontender    A/P:  This is a 77 y.o. male patient who is S/P laparoscopic cholecystectomy with intraoperative cholangiogram on 2/25/2018 for acute gangrenous cholecystitis with associated Escherichia coli bacteremia and epidural abscess    He is healing quite well from his gallbladder surgery.  While he was still in the hospital, I had discussed with him the benign pathology findings of cholecystitis with necrosis and abscess formation.  Unfortunately, he is mostly limited postoperatively due to his back issues, for which he is  still being seen by Dr. Marr.  Dr. Pita Harper with the infectious disease team is managing his antibiotics.  He can follow-up with me as needed and has no activity or dietary instructions regarding his gallbladder surgery.    Maegan Correa MD  General and Endoscopic Surgery  [facility]    [address], [address]  [place], [place], [address]  P: [phone]  F: [phone]

## 2018-04-09 ENCOUNTER — RESULTS ENCOUNTER (OUTPATIENT)
Dept: ENDOCRINOLOGY | Age: 78
End: 2018-04-09

## 2018-04-09 DIAGNOSIS — E78.5 DYSLIPIDEMIA: ICD-10-CM

## 2018-04-09 DIAGNOSIS — M10.00 IDIOPATHIC GOUT, UNSPECIFIED CHRONICITY, UNSPECIFIED SITE: ICD-10-CM

## 2018-04-09 DIAGNOSIS — IMO0002 UNCONTROLLED TYPE 2 DIABETES MELLITUS WITH COMPLICATION, WITH LONG-TERM CURRENT USE OF INSULIN: ICD-10-CM

## 2018-04-09 DIAGNOSIS — E11.42 DIABETIC PERIPHERAL NEUROPATHY (HCC): ICD-10-CM

## 2018-04-09 DIAGNOSIS — I10 ESSENTIAL HYPERTENSION: ICD-10-CM

## 2018-04-09 DIAGNOSIS — N40.0 BENIGN NON-NODULAR PROSTATIC HYPERPLASIA WITHOUT LOWER URINARY TRACT SYMPTOMS: ICD-10-CM

## 2018-04-09 DIAGNOSIS — E55.9 VITAMIN D DEFICIENCY: ICD-10-CM

## 2018-05-01 ENCOUNTER — OFFICE VISIT (OUTPATIENT)
Dept: CARDIOLOGY | Facility: CLINIC | Age: 78
End: 2018-05-01

## 2018-05-01 VITALS
DIASTOLIC BLOOD PRESSURE: 68 MMHG | SYSTOLIC BLOOD PRESSURE: 124 MMHG | HEIGHT: 70 IN | HEART RATE: 101 BPM | BODY MASS INDEX: 34.36 KG/M2 | WEIGHT: 240 LBS

## 2018-05-01 DIAGNOSIS — I48.0 PAROXYSMAL ATRIAL FIBRILLATION (HCC): Primary | ICD-10-CM

## 2018-05-01 DIAGNOSIS — IMO0002 UNCONTROLLED TYPE 2 DIABETES MELLITUS WITH COMPLICATION, WITH LONG-TERM CURRENT USE OF INSULIN: ICD-10-CM

## 2018-05-01 DIAGNOSIS — I10 ESSENTIAL HYPERTENSION: ICD-10-CM

## 2018-05-01 DIAGNOSIS — I49.3 PVC (PREMATURE VENTRICULAR CONTRACTION): ICD-10-CM

## 2018-05-01 DIAGNOSIS — I82.4Z1 ACUTE DEEP VEIN THROMBOSIS OF CALF, RIGHT (HCC): ICD-10-CM

## 2018-05-01 PROCEDURE — 93000 ELECTROCARDIOGRAM COMPLETE: CPT | Performed by: INTERNAL MEDICINE

## 2018-05-01 PROCEDURE — 99213 OFFICE O/P EST LOW 20 MIN: CPT | Performed by: INTERNAL MEDICINE

## 2018-05-01 RX ORDER — DOCUSATE SODIUM 100 MG/1
100 CAPSULE, LIQUID FILLED ORAL 2 TIMES DAILY
COMMUNITY
End: 2018-11-19

## 2018-05-01 RX ORDER — PREGABALIN 100 MG/1
100 CAPSULE ORAL 3 TIMES DAILY
COMMUNITY
End: 2018-06-20 | Stop reason: SDUPTHER

## 2018-05-01 NOTE — PROGRESS NOTES
Subjective:     Encounter Date:05/01/2018      Patient ID: Jesus Beckham is a 77 y.o. male.    Chief Complaint:  History of Present Illness    The patient is a 77-year-old male with a history of diabetes mellitus type 2, chronic lower extremity edema, hypertension, dyslipidemia, chronic lower back pain, bladder cancer, paroxsymal atrial fibrillation, left lower extremity DVT, who presents for hospital follow up.     I saw the patient initially in 10/2016 Percent of her preoperative evaluation before resection of bladder mass.  A preoperative EKG performed at that time showed evidence of ventricular bigeminy.  He was essentially sent to Dr. Ricardo's office for further evaluation and a repeat EKG at that time showed no evidence of PVCs but did show an ectopic atrial rhythm.  Following that office visit I set him up for a  Assessment echocardiogram in 10/2016 there were both unremarkable.  He was cleared for surgery at that time.    He was not seen again until 2/2018 when he was admitted for Escherichia coli sepsis, acute respiratory failure, and cholecystitis.  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  Additionally he underwent a cholecystectomy.  Postoperatively he went into atrial fibrillation for which she was managed with metoprolol tartrate.  During that admission he also was diagnosed with an acute DVT of his right lower extremity for which she had an IVC filter placed before his surgery and postoperatively was started on apixaban.  An echocardiogram during that admission performed on 2/23/2018 showed normal left ventricular systolic function and diastolic function with no significant valvular disease.  He was readmitted soon after discharge in 3/2018 with tachycardia, diaphoresis, and hypertension.  On arrival to the emergency room he was found to be in atrial fibrillation with rapid ventricular rate.  Additionally he was found to have an indeterminate  troponin.  However he had no EKG changes or symptoms suggestive of acute coronary syndrome.  At the time and felt that the indeterminate troponin was due to demand ischemia from his atrial fibrillation with rapid ventricular rate and did not recommend any further workup.  He eventually converted back to sinus rhythm and was managed with both metoprolol and digoxin.  He was continued on anticoagulation with apixaban.     Today he presents for hospital follow-up.  He is still currently residing in a nursing home for rehabilitation.  He is hoping to go home and another 2 weeks.  He denies any chest pain, shortness of breath, PND or orthopnea, near syncope or syncope, or palpitations.  His wife notes that his heart rate is in remaining a little high at rehabilitation.  He continues to have issues with significant left lower extremity edema for which they're planning on having him wear compression hose.      Review of Systems   Constitution: Negative for weakness and malaise/fatigue.   HENT: Negative for hearing loss, hoarse voice, nosebleeds and sore throat.    Eyes: Negative for pain.   Cardiovascular: Positive for leg swelling. Negative for chest pain, claudication, cyanosis, dyspnea on exertion, irregular heartbeat, near-syncope, orthopnea, palpitations, paroxysmal nocturnal dyspnea and syncope.   Respiratory: Negative for shortness of breath and snoring.    Endocrine: Negative for cold intolerance, heat intolerance, polydipsia, polyphagia and polyuria.   Skin: Negative for itching and rash.   Musculoskeletal: Negative for arthritis, falls, joint pain, joint swelling, muscle cramps, muscle weakness and myalgias.   Gastrointestinal: Negative for constipation, diarrhea, dysphagia, heartburn, hematemesis, hematochezia, melena, nausea and vomiting.   Genitourinary: Negative for frequency, hematuria and hesitancy.   Neurological: Negative for excessive daytime sleepiness, dizziness, headaches, light-headedness and  numbness.   Psychiatric/Behavioral: Negative for depression. The patient is not nervous/anxious.           Current Outpatient Prescriptions:   •  allopurinol (ZYLOPRIM) 300 MG tablet, Take 1 tablet by mouth Daily., Disp: 90 tablet, Rfl: 1  •  ANDROGEL PUMP 20.25 MG/ACT (1.62%) gel, 2 pump actuation on each shoulder daily (Patient taking differently: Apply 1 application topically Daily. 2 pump actuation on each shoulder daily), Disp: 450 g, Rfl: 1  •  apixaban (ELIQUIS) 5 MG tablet tablet, Take 1 tablet by mouth Every 12 (Twelve) Hours., Disp: 60 tablet, Rfl:   •  bisacodyl (DULCOLAX) 5 MG EC tablet, Take 1 tablet by mouth Daily As Needed for Constipation., Disp: , Rfl:   •  cefTRIAXone (ROCEPHIN) 40 MG/ML IVPB, Infuse 50 mL into a venous catheter Daily for 50 doses., Disp: 1500 mL, Rfl: 1  •  collagenase 250 UNIT/GM ointment, Apply 1 application topically Daily., Disp: , Rfl:   •  cyclobenzaprine (FLEXERIL) 10 MG tablet, Take 1 tablet by mouth Every 8 (Eight) Hours., Disp: , Rfl:   •  digoxin (LANOXIN) 125 MCG tablet, Take 1 tablet by mouth Daily., Disp: , Rfl:   •  diphenhydrAMINE-acetaminophen (TYLENOL PM EXTRA STRENGTH)  MG tablet per tablet, Take 1 tablet by mouth At Night As Needed for Sleep., Disp: , Rfl:   •  docusate sodium (COLACE) 100 MG capsule, Take 100 mg by mouth 2 (Two) Times a Day., Disp: , Rfl:   •  ergocalciferol (DRISDOL) 24354 units capsule, Take 1 capsule by mouth 1 (One) Time Per Week., Disp: 13 capsule, Rfl: 3  •  fluticasone (FLONASE) 50 MCG/ACT nasal spray, 2 sprays into each nostril Daily., Disp: 3 bottle, Rfl: 3  •  glipiZIDE (GLUCOTROL) 10 MG tablet, Take 1 tablet by mouth 2 (Two) Times a Day Before Meals., Disp: 360 tablet, Rfl: 3  •  glucose blood test strip, CHECK BLOOD SUGARS 2 TIMES A DAY AS DIRECTED, Disp: 200 each, Rfl: 1  •  GLYXAMBI 25-5 MG tablet, Take 1 tablet by mouth Daily With Breakfast., Disp: 90 tablet, Rfl: 3  •  Insulin Pen Needle 32G X 4 MM misc, INJECT UP TO  THREE TIMES DAILY OR AS DIRECTED, Disp: 500 each, Rfl: 1  •  ipratropium-albuterol (DUO-NEB) 0.5-2.5 mg/mL nebulizer, Take 3 mL by nebulization Every 6 (Six) Hours While Awake., Disp: 360 mL, Rfl:   •  L-Methylfolate-B6-B12 3-35-2 MG tablet, Take 1 tablet by mouth 2 (Two) Times a Day., Disp: 180 tablet, Rfl: 3  •  LEVEMIR FLEXTOUCH 100 UNIT/ML injection, 50 units twice daily. (Patient taking differently: Inject 50 Units under the skin 2 (Two) Times a Day. 50 units twice daily.), Disp: 90 mL, Rfl: 3  •  lisinopril (PRINIVIL,ZESTRIL) 10 MG tablet, Take 1 tablet by mouth Daily., Disp: 90 tablet, Rfl: 3  •  Melatonin 3 MG tablet, Take 3 mg by mouth At Night As Needed for Sleep., Disp: , Rfl:   •  metoprolol tartrate (LOPRESSOR) 25 MG tablet, Take 1 tablet by mouth Every 12 (Twelve) Hours., Disp: , Rfl:   •  Mirabegron ER (MYRBETRIQ) 25 MG tablet sustained-release 24 hour 24 hr tablet, Take 25 mg by mouth Daily., Disp: , Rfl:   •  Multiple Vitamins-Minerals (MULTIVITAMIN ADULT PO), Take 1 tablet by mouth Daily., Disp: , Rfl:   •  Nystatin 6194639 units capsule, Take  by mouth., Disp: , Rfl:   •  Omega-3 Fatty Acids (FISH OIL) 1000 MG capsule capsule, Take  by mouth daily with breakfast., Disp: , Rfl:   •  oxyCODONE-acetaminophen (PERCOCET) 5-325 MG per tablet, Take 1 tablet by mouth Every 6 (Six) Hours As Needed., Disp: , Rfl:   •  polyethylene glycol (MIRALAX) pack packet, Take 17 g by mouth Daily As Needed (constipation)., Disp: , Rfl:   •  pregabalin (LYRICA) 100 MG capsule, Take 100 mg by mouth 2 (Two) Times a Day., Disp: , Rfl:   •  rosuvastatin (CRESTOR) 10 MG tablet, Take 1 tablet by mouth Daily., Disp: 90 tablet, Rfl: 3  •  tamsulosin (FLOMAX) 0.4 MG capsule 24 hr capsule, Take 1 capsule by mouth Daily., Disp: 30 capsule, Rfl:   •  vitamin C (ASCORBIC ACID) 500 MG tablet, Take 500 mg by mouth Daily., Disp: , Rfl:     Past Medical History:   Diagnosis Date   • Acute deep vein thrombosis of calf, right    • Acute  embolism and thrombosis of deep vein of right distal lower extremity    • Acute gangrenous cholecystitis    • Arthritis    • Atrial fibrillation with RVR    • Benign prostatic hyperplasia without lower urinary tract symptoms    • Cancer of bladder 2016   • Colon polyp    • Diabetes mellitus     type 2   • Discitis of lumbar region    • Dyslipidemia    • Essential hypertension    • Gout    • History of sepsis    • Hyperlipidemia    • Hypotension    • Irregular heartbeat    • Muscle weakness    • CELINA (obstructive sleep apnea)    • Osteoarthritis    • Paroxysmal atrial fibrillation    • PVC (premature ventricular contraction)    • Rapid heartbeat    • Sepsis 2018   • Sepsis due to Escherichia coli    • Skin carcinoma    • Type 2 diabetes mellitus    • Urge incontinence    • Vitamin D deficiency    • Weakness      Past Surgical History:   Procedure Laterality Date   • APPENDECTOMY N/A 1961   • CHOLECYSTECTOMY WITH INTRAOPERATIVE CHOLANGIOGRAM N/A 2/25/2018    Procedure: CHOLECYSTECTOMY LAPAROSCOPIC INTRAOPERATIVE CHOLANGIOGRAM;  Surgeon: Maegan Correa MD;  Location: [facility] OR;  Service:    • COLONOSCOPY N/A 2010    h/o of polyps   • EYE SURGERY  1959    glass removal from eye, lens transplant   • LAMINECTOMY N/A 01/18/2016    L2-L3, L3-L4, L4-L5, and L5-S1 bilateral lamincectomy, medial facetectomy & [id number], Dr. Kaiden Valdes   • LUMBAR FUSION N/A 3/7/2018    Procedure: LUMBAR FUSION MINIMALLY INVASIVE TRANSFORAMINAL LUMBAR INTERBODY IRRIGATION AND DEBRIDEMENT AND FUSION.  IRRIGATION AND DEBRIDEMENT OF EPIDURAL ABCESS LUMBAR 2-4. BONE MORPHOGENIC PROTEIN, ALPHATEC NOVEL AND ILLICO, EMG AND SSEP NEUROMONITORING.;  Surgeon: Manish Marr DO;  Location: [facility] OR;  Service: Orthopedic Spine   • TOTAL KNEE ARTHROPLASTY Right 03/07/2005    Dr. Washington Evans   • VENA CAVA FILTER INSERTION Right 3/6/2018    Procedure: VENA CAVA FILTER INSERTION;  Surgeon: Alexis Thakkar MD;  Location: [facility] OR  "18/19;  Service:      Family History   Problem Relation Age of Onset   • Cancer Mother      Social History   Substance Use Topics   • Smoking status: Former Smoker     Types: Cigars     Quit date: 2017   • Smokeless tobacco: Current User     Types: Chew   • Alcohol use No           ECG 12 Lead  Date/Time: 5/1/2018 6:14 PM  Performed by: KATHERIN HOWARD  Authorized by: KATHERIN HOWARD   Comparison: compared with previous ECG   Comparison to previous ECG: Rate mildly elevated  Rhythm: sinus tachycardia  Ectopy: unifocal PVCs               Objective:         Visit Vitals  /68 (BP Location: Right arm, Patient Position: Sitting)   Pulse 101   Ht 177.8 cm (70\")   Wt 109 kg (240 lb)   BMI 34.44 kg/m²          Physical Exam   Constitutional: He is oriented to person, place, and time. He appears well-developed and well-nourished.   HENT:   Head: Normocephalic and atraumatic.   Neck: No JVD present. Carotid bruit is not present.   Cardiovascular: Normal rate, regular rhythm, S1 normal and S2 normal.  Exam reveals no gallop.    No murmur heard.  Pulses:       Radial pulses are 2+ on the right side, and 2+ on the left side.   2+ left lower extremity edema   Pulmonary/Chest: Effort normal and breath sounds normal.   Abdominal: Soft. Normal appearance.   Neurological: He is alert and oriented to person, place, and time.   Skin: Skin is warm, dry and intact.   Psychiatric: He has a normal mood and affect.       Lab Review:       Assessment:          Diagnosis Plan   1. Paroxysmal atrial fibrillation     2. PVC (premature ventricular contraction)     3. Essential hypertension     4. Acute deep vein thrombosis of calf, right     5. Uncontrolled type 2 diabetes mellitus with complication, with long-term current use of insulin            Plan:       1.  Paroxysmal atrial fibrillation.  He remains in sinus rhythm today although his heart rate remains a little bit high in the low 100s.  We discussed the possibility of titrating " his metoprolol dosage further but the patient is wife is concerned that this may cause increased fatigue.  We decided to monitor his heart rates for the time being and consider that option in the future.  He is on apixaban for anticoagulation.   2.  Hypertension.  Well-controlled on his current medications.  3.  Acute right lower extremity DVT.  On chronic anticoagulation.  4.  Diabetes mellitus type 2    We'll plan on seeing the patient back again in 6 months.    Atrial Fibrillation and Atrial Flutter  Assessment  • The patient has paroxysmal atrial fibrillation  • This is non-valvular in etiology  • The patient's CHADS2-VASc score is 4  • A GHR8MG3-BAEw score of 2 or more is considered a high risk for a thromboembolic event  • Apixaban prescribed    Plan  • Attempt to maintain sinus rhythm  • Continue apixaban for antithrombotic therapy, bleeding issues discussed  • Continue beta blocker and digoxin for rate control

## 2018-05-02 ENCOUNTER — OFFICE VISIT (OUTPATIENT)
Dept: INFECTIOUS DISEASES | Facility: CLINIC | Age: 78
End: 2018-05-02

## 2018-05-02 VITALS
DIASTOLIC BLOOD PRESSURE: 64 MMHG | WEIGHT: 240 LBS | HEIGHT: 70 IN | TEMPERATURE: 97.7 F | SYSTOLIC BLOOD PRESSURE: 99 MMHG | HEART RATE: 80 BPM | BODY MASS INDEX: 34.36 KG/M2

## 2018-05-02 DIAGNOSIS — R78.81 E COLI BACTEREMIA: ICD-10-CM

## 2018-05-02 DIAGNOSIS — B96.20 E COLI BACTEREMIA: ICD-10-CM

## 2018-05-02 DIAGNOSIS — Z79.2 LONG TERM CURRENT USE OF ANTIBIOTICS: ICD-10-CM

## 2018-05-02 DIAGNOSIS — G06.2 EPIDURAL ABSCESS: Primary | ICD-10-CM

## 2018-05-02 DIAGNOSIS — M46.20 VERTEBRAL OSTEOMYELITIS (HCC): ICD-10-CM

## 2018-05-02 PROCEDURE — 99214 OFFICE O/P EST MOD 30 MIN: CPT | Performed by: INTERNAL MEDICINE

## 2018-05-02 RX ORDER — LEVOFLOXACIN 750 MG/1
750 TABLET ORAL DAILY
Qty: 30 TABLET | Refills: 3 | Status: SHIPPED | OUTPATIENT
Start: 2018-05-02 | End: 2018-08-01

## 2018-05-02 NOTE — PROGRESS NOTES
Reason for clinic visits: Follow up    HPI: Jesus Beckham is a 77 y.o. male who was last seen in the hospital on March 9.  Since that time he has been receiving IV ceftriaxone.  He is currently in rehabilitation and is doing well.  He saw his orthopedic surgeon in the last 2 weeks without any concerns.  He continues to improve from a rehabilitation standpoint.  He denies any fevers chills or night sweats.  He is tolerating the antibiotics without abdominal pain nausea vomiting or diarrhea.  No rashes or skin lesions.  He was seen by general surgery without any concerns on their part.  He denies any shortness of breath or cough.    PMH:  • Arthritis     • Cancer of bladder 2016   • Diabetes mellitus     • Gout     • Hyperlipidemia     • Skin carcinoma     • Type 2 diabetes mellitus     • Vitamin D deficiency      Past Surgical History:   Procedure Laterality Date   • APPENDECTOMY N/A 1961   • CHOLECYSTECTOMY WITH INTRAOPERATIVE CHOLANGIOGRAM N/A 2/25/2018   • EYE SURGERY  1959    glass removal from eye, lens transplant   • LAMINECTOMY N/A 01/18/2016    L2-L3, L3-L4, L4-L5, and L5-S1 bilateral lamincectomy, medial facetectomy & [id number], Dr. Kaiden Valdes   • LUMBAR FUSION N/A 3/7/2018    Procedure: LUMBAR FUSION MINIMALLY INVASIVE TRANSFORAMINAL LUMBAR INTERBODY IRRIGATION AND DEBRIDEMENT AND FUSION.  IRRIGATION AND DEBRIDEMENT OF EPIDURAL ABCESS LUMBAR 2-4. BONE MORPHOGENIC PROTEIN, ALPHATEC NOVEL AND ILLICO, EMG AND SSEP NEUROMONITORING.;  Surgeon: Manish Marr DO;  Location: [facility];  Service: Orthopedic Spine   • TOTAL KNEE ARTHROPLASTY Right 03/07/2005    Dr. Washington Evans   • VENA CAVA FILTER INSERTION Right 3/6/2018    Procedure: VENA CAVA FILTER INSERTION;  Surgeon: Alexis Thakkar MD;  Location: [facility] OR 18/19;  Service:        Social History   reports that he quit smoking about 15 months ago. His smoking use included Cigars. His smokeless tobacco use includes Chew. He reports that  he does not drink alcohol or use drugs.    Family History  family history includes Cancer in his mother.    No Known Allergies    The medication list has been reviewed and updated.     Review of Systems  Pertinent items are noted in HPI, all other systems reviewed and negative    Vital Signs   Temp:  [97.7 °F (36.5 °C)] 97.7 °F (36.5 °C)  Heart Rate:  [80] 80  BP: (99)/(64) 99/64    Physical Exam:   General: In no acute distress  Cardiovascular: Normal rate, regular rhythm, no LE edema    Respiratory: Lungs are clear to ascultation bilaterally, no wheezing   GI: Abdomen is soft, non-tender, non-distended, positive bowel sounds bilaterally, incisions healed well without purulence or erythema  Skin: No rashes     Lab Results   Component Value Date    WBC 6.44 03/19/2018    HGB 10.4 (L) 03/19/2018    HCT 33.5 (L) 03/19/2018    MCV 94.6 03/19/2018     03/19/2018       Lab Results   Component Value Date    GLUCOSE 98 03/19/2018    BUN 18 03/19/2018    CREATININE 0.89 03/19/2018    EGFRIFNONA 83 03/19/2018    EGFRIFAFRI 88 12/06/2017    BCR 20.2 03/19/2018    CO2 30.7 (H) 03/19/2018    CALCIUM 8.9 03/19/2018    PROTENTOTREF 6.8 12/06/2017    ALBUMIN 2.80 (L) 03/15/2018    LABIL2 0.8 03/15/2018    AST 28 03/15/2018    ALT 22 03/15/2018       Lab Results   Component Value Date    SEDRATE 44 (H) 03/06/2018       Lab Results   Component Value Date    CRP 12.91 (H) 03/06/2018     3/7 Operative back cx E coli  3/4 UCx 10-20K candida  2/28 BCx Neg x 2  2/24 BCx Neg x 2  2/23 BCx E coli 2/2     Assessment:  This is a 77 y.o. male who presents to clinic today for follow up of Escherichia coli bacteremia, discitis, paravertebral soft tissue infection, psoas muscle abscess and epidural abscess.  He is status post incision and drainage and lumbar fusion on March 7.  He has completed an 8 week course of ceftriaxone.  He currently does not have any signs of symptoms concerning for persistent or recurrent infection.  We will  discontinue the PICC line in clinic.  I will transition him to oral Levaquin 750 mg by mouth daily ×3 months given the presence of hardware in his back.  We will also repeat inflammatory markers.    Plan:   1.  DC ceftriaxone  2.  PICC line was pulled in clinic  3.  Start Levaquin 750 mg by mouth daily ×3 months.  I talked extensively to the patient and his family regarding the possible side effects of the new antibiotic including but not limited to abdominal pain nausea vomiting diarrhea, rash, anaphylaxis reaction, peripheral neuropathy arthralgias and myalgias  4.  We'll ask the rehabilitation unit to obtain a CBC with differential, CMP, ESR and CRP and fax the results to us.    Return to Infectious Disease clinic in 3 months

## 2018-05-07 ENCOUNTER — TELEPHONE (OUTPATIENT)
Dept: INFECTIOUS DISEASES | Facility: CLINIC | Age: 78
End: 2018-05-07

## 2018-05-18 ENCOUNTER — TELEPHONE (OUTPATIENT)
Dept: FAMILY MEDICINE CLINIC | Facility: CLINIC | Age: 78
End: 2018-05-18

## 2018-05-18 NOTE — TELEPHONE ENCOUNTER
Keya with [facility] called patient is being released from rehab and they are going in for PT and OT Informed her patient needs to follow up in 2 weeks

## 2018-05-22 ENCOUNTER — OFFICE VISIT (OUTPATIENT)
Dept: ENDOCRINOLOGY | Age: 78
End: 2018-05-22

## 2018-05-22 VITALS — HEIGHT: 70 IN | SYSTOLIC BLOOD PRESSURE: 104 MMHG | DIASTOLIC BLOOD PRESSURE: 58 MMHG

## 2018-05-22 DIAGNOSIS — E79.0 HYPERURICEMIA: ICD-10-CM

## 2018-05-22 DIAGNOSIS — M10.00 IDIOPATHIC GOUT, UNSPECIFIED CHRONICITY, UNSPECIFIED SITE: ICD-10-CM

## 2018-05-22 DIAGNOSIS — I10 ESSENTIAL HYPERTENSION: ICD-10-CM

## 2018-05-22 DIAGNOSIS — E78.5 DYSLIPIDEMIA: ICD-10-CM

## 2018-05-22 DIAGNOSIS — E11.42 DIABETIC PERIPHERAL NEUROPATHY (HCC): ICD-10-CM

## 2018-05-22 DIAGNOSIS — IMO0002 UNCONTROLLED TYPE 2 DIABETES MELLITUS WITH COMPLICATION, WITH LONG-TERM CURRENT USE OF INSULIN: Primary | ICD-10-CM

## 2018-05-22 PROCEDURE — 99214 OFFICE O/P EST MOD 30 MIN: CPT | Performed by: NURSE PRACTITIONER

## 2018-05-22 RX ORDER — LISINOPRIL 10 MG/1
10 TABLET ORAL DAILY
Qty: 90 TABLET | Refills: 1 | Status: SHIPPED | OUTPATIENT
Start: 2018-05-22 | End: 2018-08-01 | Stop reason: ALTCHOICE

## 2018-05-22 RX ORDER — ROSUVASTATIN CALCIUM 10 MG/1
10 TABLET, COATED ORAL DAILY
Qty: 90 TABLET | Refills: 1 | Status: SHIPPED | OUTPATIENT
Start: 2018-05-22 | End: 2018-10-09 | Stop reason: SDUPTHER

## 2018-05-22 RX ORDER — ALLOPURINOL 300 MG/1
300 TABLET ORAL DAILY
Qty: 90 TABLET | Refills: 1 | Status: SHIPPED | OUTPATIENT
Start: 2018-05-22 | End: 2018-10-09 | Stop reason: SDUPTHER

## 2018-05-22 RX ORDER — EMPAGLIFLOZIN AND LINAGLIPTIN 25; 5 MG/1; MG/1
1 TABLET, FILM COATED ORAL
Qty: 90 TABLET | Refills: 1 | Status: SHIPPED | OUTPATIENT
Start: 2018-05-22 | End: 2018-10-09 | Stop reason: SDUPTHER

## 2018-05-22 RX ORDER — ALLOPURINOL 300 MG/1
300 TABLET ORAL DAILY
Qty: 90 TABLET | Refills: 1 | Status: SHIPPED | OUTPATIENT
Start: 2018-05-22 | End: 2018-05-22 | Stop reason: SDUPTHER

## 2018-05-22 RX ORDER — ERGOCALCIFEROL 1.25 MG/1
50000 CAPSULE ORAL WEEKLY
Qty: 13 CAPSULE | Refills: 1 | Status: SHIPPED | OUTPATIENT
Start: 2018-05-22 | End: 2018-10-09 | Stop reason: SDUPTHER

## 2018-05-22 RX ORDER — GLIPIZIDE 10 MG/1
10 TABLET ORAL
Qty: 180 TABLET | Refills: 1 | Status: SHIPPED | OUTPATIENT
Start: 2018-05-22 | End: 2018-05-22 | Stop reason: SDUPTHER

## 2018-05-22 RX ORDER — GLIPIZIDE 10 MG/1
10 TABLET ORAL
Qty: 180 TABLET | Refills: 1 | Status: SHIPPED | OUTPATIENT
Start: 2018-05-22 | End: 2018-10-09 | Stop reason: SDUPTHER

## 2018-05-22 RX ORDER — LISINOPRIL 10 MG/1
10 TABLET ORAL DAILY
Qty: 90 TABLET | Refills: 1 | Status: SHIPPED | OUTPATIENT
Start: 2018-05-22 | End: 2018-05-22 | Stop reason: SDUPTHER

## 2018-05-22 RX ORDER — MECOBAL/LEVOMEFOLAT CA/B6 PHOS 2-3-35 MG
1 TABLET ORAL 2 TIMES DAILY
Qty: 180 TABLET | Refills: 1 | Status: SHIPPED | OUTPATIENT
Start: 2018-05-22 | End: 2018-06-20 | Stop reason: SDUPTHER

## 2018-05-22 RX ORDER — ROSUVASTATIN CALCIUM 10 MG/1
10 TABLET, COATED ORAL DAILY
Qty: 90 TABLET | Refills: 1 | Status: SHIPPED | OUTPATIENT
Start: 2018-05-22 | End: 2018-05-22 | Stop reason: SDUPTHER

## 2018-05-22 RX ORDER — EMPAGLIFLOZIN AND LINAGLIPTIN 25; 5 MG/1; MG/1
1 TABLET, FILM COATED ORAL
Qty: 90 TABLET | Refills: 1 | Status: SHIPPED | OUTPATIENT
Start: 2018-05-22 | End: 2018-05-22 | Stop reason: SDUPTHER

## 2018-05-22 RX ORDER — FUROSEMIDE 40 MG/1
1 TABLET ORAL DAILY
COMMUNITY
Start: 2018-05-19 | End: 2018-06-04 | Stop reason: SDUPTHER

## 2018-05-22 RX ORDER — INSULIN DETEMIR 100 [IU]/ML
INJECTION, SOLUTION SUBCUTANEOUS
Qty: 90 ML | Refills: 1 | Status: SHIPPED | OUTPATIENT
Start: 2018-05-22 | End: 2018-10-09 | Stop reason: SDUPTHER

## 2018-05-22 RX ORDER — MECOBAL/LEVOMEFOLAT CA/B6 PHOS 2-3-35 MG
1 TABLET ORAL 2 TIMES DAILY
Qty: 180 TABLET | Refills: 1 | Status: SHIPPED | OUTPATIENT
Start: 2018-05-22 | End: 2018-05-22 | Stop reason: SDUPTHER

## 2018-05-22 RX ORDER — INSULIN DETEMIR 100 [IU]/ML
INJECTION, SOLUTION SUBCUTANEOUS
Qty: 90 ML | Refills: 1 | Status: SHIPPED | OUTPATIENT
Start: 2018-05-22 | End: 2018-05-22 | Stop reason: SDUPTHER

## 2018-05-22 NOTE — PROGRESS NOTES
"Subjective   Jesus Beckham is a 77 y.o. male is here today for follow-up.  Chief Complaint   Patient presents with   • Diabetes     no recent labs, pt tests BG 2x daily, pt brought BG logs   • Hyperlipidemia     pt is not currently doing androgel, hasnt done it since rehab   • Hypertension     pt concerned about BG running high   • Vitamin D Deficiency   • Peripheral Neuropathy     /58   Ht 177.8 cm (70\")   Current Outpatient Prescriptions on File Prior to Visit   Medication Sig   • allopurinol (ZYLOPRIM) 300 MG tablet Take 1 tablet by mouth Daily.   • apixaban (ELIQUIS) 5 MG tablet tablet Take 1 tablet by mouth Every 12 (Twelve) Hours.   • bisacodyl (DULCOLAX) 5 MG EC tablet Take 1 tablet by mouth Daily As Needed for Constipation.   • collagenase 250 UNIT/GM ointment Apply 1 application topically Daily.   • cyclobenzaprine (FLEXERIL) 10 MG tablet Take 1 tablet by mouth Every 8 (Eight) Hours.   • digoxin (LANOXIN) 125 MCG tablet Take 1 tablet by mouth Daily.   • diphenhydrAMINE-acetaminophen (TYLENOL PM EXTRA STRENGTH)  MG tablet per tablet Take 1 tablet by mouth At Night As Needed for Sleep.   • docusate sodium (COLACE) 100 MG capsule Take 100 mg by mouth 2 (Two) Times a Day.   • ergocalciferol (DRISDOL) 65367 units capsule Take 1 capsule by mouth 1 (One) Time Per Week.   • fluticasone (FLONASE) 50 MCG/ACT nasal spray 2 sprays into each nostril Daily.   • glipiZIDE (GLUCOTROL) 10 MG tablet Take 1 tablet by mouth 2 (Two) Times a Day Before Meals.   • glucose blood test strip CHECK BLOOD SUGARS 2 TIMES A DAY AS DIRECTED   • GLYXAMBI 25-5 MG tablet Take 1 tablet by mouth Daily With Breakfast.   • Insulin Pen Needle 32G X 4 MM misc INJECT UP TO THREE TIMES DAILY OR AS DIRECTED   • L-Methylfolate-B6-B12 3-35-2 MG tablet Take 1 tablet by mouth 2 (Two) Times a Day.   • LEVEMIR FLEXTOUCH 100 UNIT/ML injection 50 units twice daily. (Patient taking differently: Inject 50 Units under the skin 2 (Two) Times a " Day. 50 units twice daily.)   • levoFLOXacin (LEVAQUIN) 750 MG tablet Take 1 tablet by mouth Daily for 120 days.   • lisinopril (PRINIVIL,ZESTRIL) 10 MG tablet Take 1 tablet by mouth Daily.   • Melatonin 3 MG tablet Take 3 mg by mouth At Night As Needed for Sleep.   • metoprolol tartrate (LOPRESSOR) 25 MG tablet Take 1 tablet by mouth Every 12 (Twelve) Hours.   • Multiple Vitamins-Minerals (MULTIVITAMIN ADULT PO) Take 1 tablet by mouth Daily.   • Omega-3 Fatty Acids (FISH OIL) 1000 MG capsule capsule Take  by mouth daily with breakfast.   • polyethylene glycol (MIRALAX) pack packet Take 17 g by mouth Daily As Needed (constipation).   • pregabalin (LYRICA) 100 MG capsule Take 100 mg by mouth 3 (Three) Times a Day.   • rosuvastatin (CRESTOR) 10 MG tablet Take 1 tablet by mouth Daily.   • tamsulosin (FLOMAX) 0.4 MG capsule 24 hr capsule Take 1 capsule by mouth Daily. (Patient taking differently: Take 0.4 mg by mouth 2 (Two) Times a Day.)   • vitamin C (ASCORBIC ACID) 500 MG tablet Take 500 mg by mouth Daily.   • [DISCONTINUED] ANDROGEL PUMP 20.25 MG/ACT (1.62%) gel 2 pump actuation on each shoulder daily (Patient taking differently: Apply 1 application topically Daily. 2 pump actuation on each shoulder daily)   • [DISCONTINUED] ipratropium-albuterol (DUO-NEB) 0.5-2.5 mg/mL nebulizer Take 3 mL by nebulization Every 6 (Six) Hours While Awake.   • [DISCONTINUED] Mirabegron ER (MYRBETRIQ) 25 MG tablet sustained-release 24 hour 24 hr tablet Take 25 mg by mouth Daily.   • [DISCONTINUED] Nystatin 8355656 units capsule Take  by mouth.   • [DISCONTINUED] oxyCODONE-acetaminophen (PERCOCET) 5-325 MG per tablet Take 1 tablet by mouth Every 6 (Six) Hours As Needed.     No current facility-administered medications on file prior to visit.      Family History   Problem Relation Age of Onset   • Cancer Mother      Social History   Substance Use Topics   • Smoking status: Former Smoker     Types: Cigars     Quit date: 2017   • Smokeless  tobacco: Current User     Types: Chew   • Alcohol use No     No Known Allergies      History of Present Illness  Encounter Diagnoses   Name Primary?   • Uncontrolled type 2 diabetes mellitus with complication, with long-term current use of insulin Yes   • Diabetic peripheral neuropathy    • Dyslipidemia    • Hyperuricemia    77-year-old male patient here today for routine follow-up visit.  He has been in the hospital and rehabilitation for the last several months.  He is being seen today for the above-mentioned problems.  He was also started scale insulin while in rehabilitation and he states that his blood sugars stayed higher.  He's been home for the past 2 days and his blood sugars have since improved.  His blood sugar was 110 this morning.  He did not bring his blood glucose meter however he brought a record of his previous blood sugar readings for the past couple weeks and his blood sugars typically run in the 1-200 range.  He feels that his hemoglobin A1c is going to be higher as result of all the infection he's been dealing with while in hospital.  He states his gallbladder was gangrenous and had to be removed.  He also had several back surgeries as a result of infection.  He had a blood clot in his right leg and this has had a filter placed.  He does have swelling in his lower extremity especially on the left.  He's got a Murray catheter and will be seen in the urologist today.  They have questions regarding potassium supplementation however, he will have discussed that with his primary care provider.  He is on Lasix.  He will have extensive labs done at today's visit.  Currently he is in a wheelchair due to limited mobility following surgery.  He is wearing a back brace    The following portions of the patient's history were reviewed and updated as appropriate: allergies, current medications, past family history, past medical history, past social history, past surgical history and problem list.    Review of  Systems   Constitutional: Negative for fatigue.   HENT: Negative for trouble swallowing.    Eyes: Negative for visual disturbance.   Respiratory: Negative for shortness of breath.    Cardiovascular: Negative for leg swelling.   Endocrine: Negative for polyuria.   Skin: Negative for wound.   Neurological: Negative for numbness.       Objective   Physical Exam   Constitutional: He is oriented to person, place, and time. He appears well-developed and well-nourished. No distress.   HENT:   Head: Normocephalic and atraumatic.   Right Ear: External ear normal.   Left Ear: External ear normal.   Nose: Nose normal.   Eyes: Pupils are equal, round, and reactive to light. Right eye exhibits no discharge. Left eye exhibits no discharge.   Neck: Normal range of motion. Neck supple. Carotid bruit is not present. No tracheal deviation, no edema and no erythema present. No thyromegaly present.   Cardiovascular: Normal rate, regular rhythm, normal heart sounds and intact distal pulses.  Exam reveals no gallop and no friction rub.    No murmur heard.  Pulmonary/Chest: Effort normal and breath sounds normal. No respiratory distress. He has no wheezes. He has no rales.   Abdominal: Soft. Bowel sounds are normal. He exhibits no distension. There is no tenderness.   Genitourinary:   Genitourinary Comments: Has azul cath  Neurogenic bladder   Musculoskeletal: He exhibits edema. He exhibits no deformity.   In wheelchair  Can walk with a walker about 60 feet  Swelling in left leg, foot drop  Wearing back brace   Lymphadenopathy:     He has no cervical adenopathy.   Neurological: He is alert and oriented to person, place, and time. Coordination normal.   Skin: Skin is warm and dry. No rash noted. He is not diaphoretic. No erythema. No pallor.   Lipoatrophy in abdomen. Instruction in site rotation   Psychiatric: He has a normal mood and affect. His behavior is normal. Judgment and thought content normal.   Nursing note and vitals  reviewed.    Admission on 03/15/2018, Discharged on 03/20/2018   No results displayed because visit has over 200 results.        Lab Results   Component Value Date    HGBA1C 7.90 (H) 03/15/2018     Lab Results   Component Value Date    GLUCOSE 98 03/19/2018    BUN 18 03/19/2018    CREATININE 0.89 03/19/2018    EGFRIFNONA 83 03/19/2018    EGFRIFAFRI 88 12/06/2017    BCR 20.2 03/19/2018    K 4.3 03/19/2018    CO2 30.7 (H) 03/19/2018    CALCIUM 8.9 03/19/2018    PROTENTOTREF 6.8 12/06/2017    ALBUMIN 2.80 (L) 03/15/2018    LABIL2 0.8 03/15/2018    AST 28 03/15/2018    ALT 22 03/15/2018     Lab Results   Component Value Date    CHLPL 132 12/06/2017    TRIG 169 (H) 12/06/2017    HDL 42 12/06/2017    LDL 56 12/06/2017     Lab Results   Component Value Date    TSH 2.830 03/19/2018    W1YOUGS 4.9 12/06/2017         Assessment/Plan   Problems Addressed this Visit        Endocrine    Diabetic peripheral neuropathy    Relevant Medications    VOLTAREN 1 % gel gel    furosemide (LASIX) 40 MG tablet    Other Relevant Orders    Comprehensive Metabolic Panel    C-Peptide    Hemoglobin A1c    Lipid Panel    MicroAlbumin, Urine, Random - Urine, Clean Catch    T3, Free    T4, Free    TestT+TestF+SHBG    Vitamin D 25 Hydroxy    Hemoglobin & Hematocrit, Blood    PSA DIAGNOSTIC    Thyroid Panel With TSH    Uric Acid    Uncontrolled type 2 diabetes mellitus - Primary    Relevant Medications    VOLTAREN 1 % gel gel    furosemide (LASIX) 40 MG tablet    Other Relevant Orders    Comprehensive Metabolic Panel    C-Peptide    Hemoglobin A1c    Lipid Panel    MicroAlbumin, Urine, Random - Urine, Clean Catch    T3, Free    T4, Free    TestT+TestF+SHBG    Vitamin D 25 Hydroxy    Hemoglobin & Hematocrit, Blood    PSA DIAGNOSTIC    Thyroid Panel With TSH    Uric Acid       Other    Dyslipidemia    Relevant Medications    VOLTAREN 1 % gel gel    furosemide (LASIX) 40 MG tablet    Other Relevant Orders    Comprehensive Metabolic Panel    C-Peptide     Hemoglobin A1c    Lipid Panel    MicroAlbumin, Urine, Random - Urine, Clean Catch    T3, Free    T4, Free    TestT+TestF+SHBG    Vitamin D 25 Hydroxy    Hemoglobin & Hematocrit, Blood    PSA DIAGNOSTIC    Thyroid Panel With TSH    Uric Acid    Hyperuricemia    Relevant Medications    VOLTAREN 1 % gel gel    furosemide (LASIX) 40 MG tablet    Other Relevant Orders    Comprehensive Metabolic Panel    C-Peptide    Hemoglobin A1c    Lipid Panel    MicroAlbumin, Urine, Random - Urine, Clean Catch    T3, Free    T4, Free    TestT+TestF+SHBG    Vitamin D 25 Hydroxy    Hemoglobin & Hematocrit, Blood    PSA DIAGNOSTIC    Thyroid Panel With TSH    Uric Acid        In summary, patient was seen and examined.  He will continue on his current medications pending his lab results.  He will be notified of the results along with any further recommended changes.  Since his blood sugar has improved since he has been home the past 2 days no medications have been changed at today's visit.  I've asked that the patient keep me informed of his blood sugars within the next several weeks so that we can make changes if needed based on his home routine.  He is currently getting home health care.  He is on appointment to see urology today.  His wife is a nurse and she has been clamping his catheter due to neurogenic bladder.  They are hoping to have the Murray removed at today's visit with his urologist.  He has not been doing AndroGel since rehabilitation.  He will follow-up with me next visit Dr. Keyes and 78 months.  I've encouraged him to contact the office via eloise of blood sugars

## 2018-05-24 LAB
25(OH)D3+25(OH)D2 SERPL-MCNC: 35.5 NG/ML (ref 30–100)
ALBUMIN SERPL-MCNC: 3.8 G/DL (ref 3.5–5.2)
ALBUMIN/GLOB SERPL: 1.5 G/DL
ALP SERPL-CCNC: 109 U/L (ref 39–117)
ALT SERPL-CCNC: 17 U/L (ref 1–41)
AST SERPL-CCNC: 21 U/L (ref 1–40)
BILIRUB SERPL-MCNC: 0.4 MG/DL (ref 0.1–1.2)
BUN SERPL-MCNC: 21 MG/DL (ref 8–23)
BUN/CREAT SERPL: 22.8 (ref 7–25)
C PEPTIDE SERPL-MCNC: 7.7 NG/ML (ref 1.1–4.4)
CALCIUM SERPL-MCNC: 9.1 MG/DL (ref 8.6–10.5)
CHLORIDE SERPL-SCNC: 93 MMOL/L (ref 98–107)
CHOLEST SERPL-MCNC: 115 MG/DL (ref 0–200)
CO2 SERPL-SCNC: 28.6 MMOL/L (ref 22–29)
CREAT SERPL-MCNC: 0.92 MG/DL (ref 0.76–1.27)
FT4I SERPL CALC-MCNC: 1.6 (ref 1.2–4.9)
GFR SERPLBLD CREATININE-BSD FMLA CKD-EPI: 80 ML/MIN/1.73
GFR SERPLBLD CREATININE-BSD FMLA CKD-EPI: 97 ML/MIN/1.73
GLOBULIN SER CALC-MCNC: 2.5 GM/DL
GLUCOSE SERPL-MCNC: 271 MG/DL (ref 65–99)
HBA1C MFR BLD: 6.8 % (ref 4.8–5.6)
HCT VFR BLD AUTO: 38.4 % (ref 40.4–52.2)
HDLC SERPL-MCNC: 41 MG/DL (ref 40–60)
HGB BLD-MCNC: 11.8 G/DL (ref 13.7–17.6)
INTERPRETATION: NORMAL
LDLC SERPL CALC-MCNC: 40 MG/DL (ref 0–100)
Lab: NORMAL
POTASSIUM SERPL-SCNC: 5.4 MMOL/L (ref 3.5–5.2)
PROT SERPL-MCNC: 6.3 G/DL (ref 6–8.5)
PSA SERPL-MCNC: 0.24 NG/ML (ref 0–4)
SHBG SERPL-SCNC: 34 NMOL/L (ref 19.3–76.4)
SODIUM SERPL-SCNC: 136 MMOL/L (ref 136–145)
T3FREE SERPL-MCNC: 2.5 PG/ML (ref 2–4.4)
T3RU NFR SERPL: 27 % (ref 24–39)
T4 FREE SERPL-MCNC: 1.05 NG/DL (ref 0.93–1.7)
T4 SERPL-MCNC: 5.8 UG/DL (ref 4.5–12)
TESTOST FREE SERPL-MCNC: 1.3 PG/ML (ref 6.6–18.1)
TESTOST SERPL-MCNC: 61 NG/DL (ref 264–916)
TRIGL SERPL-MCNC: 172 MG/DL (ref 0–150)
TSH SERPL DL<=0.005 MIU/L-ACNC: 2.74 UIU/ML (ref 0.45–4.5)
UNABLE TO VOID: NORMAL
URATE SERPL-MCNC: 3.6 MG/DL (ref 3.4–7)
VLDLC SERPL CALC-MCNC: 34.4 MG/DL (ref 5–40)

## 2018-06-03 NOTE — PROGRESS NOTES
Subjective   Jesus Beckham is a 77 y.o. male.     CC: Hospital F/U for Sepsis    History of Present Illness     Pt returns today after recent hospitalization. That visit was as follows:    Admit date: 3/15/2018  Discharge date and time:3/20/2018  Discharged Condition: fair     Discharge Diagnoses:        Active Hospital Problems (** Indicates Principal Problem)     Diagnosis Date Noted   • **Atrial fibrillation with RVR [I48.91] 03/16/2018   • Elevated troponin [R74.8] 03/16/2018   • History of sepsis [Z86.19] 03/16/2018   • Hypotension [I95.9] 02/23/2018   • Osteoarthritis [M19.90] 09/28/2017   • Essential hypertension [I10] 07/29/2016   • Gout [M10.9] 04/01/2016   • Diabetic peripheral neuropathy [E11.42] 04/01/2016   • Dyslipidemia [E78.5] 04/01/2016   • Uncontrolled type 2 diabetes mellitus [E11.65] 04/01/2016     Hospital Course: Jesus Beckham  Is a 77-year-old white male with history of recent hospitalization with sepsis with acute cholecystitis and bacteremia complicated by epidural abscess with drainage and history of A. fib. He been at the skilled nursing facility and started having some tachycardia with feeling weak and sweaty. His blood pressure was low and he was sent to the emergency room for further evaluation treatment. He denied having any chest pain. He was very weak and sweaty and a little short of breath. He not had any nausea vomiting. The patient was better with A. fib with rapid rate for further evaluation treatment.           The patient was admitted the hospital and continue with his antibiotic therapy and had some adjustments of his medication for A. fib with rapid rate.  He did have borderline troponin elevation but cardiology did not feel the need to do any stress test or further evaluation and felt it was probably just due to his tachycardia.  He was feeling better after couple of days and looked well enough to go back to the skilled nursing facility for continued rehabilitation and  to finish his course of IV antibiotics.  He'll follow-up his primary care doctor after released from rehabilitation.    Upon d/c, he then f/u with cardiology on 5/1 and that Plan was as follows:    Plan  • Attempt to maintain sinus rhythm  • Continue apixaban for antithrombotic therapy, bleeding issues discussed  • Continue beta blocker and digoxin for rate control    The next day, he saw ID (Dr. Harper) and that A/P was as follows:    Assessment:  This is a 77 y.o. male who presents to clinic today for follow up of Escherichia coli bacteremia, discitis, paravertebral soft tissue infection, psoas muscle abscess and epidural abscess.  He is status post incision and drainage and lumbar fusion on March 7.  He has completed an 8 week course of ceftriaxone.  He currently does not have any signs of symptoms concerning for persistent or recurrent infection.  We will discontinue the PICC line in clinic.  I will transition him to oral Levaquin 750 mg by mouth daily ×3 months given the presence of hardware in his back.  We will also repeat inflammatory markers.     Plan:   1.  DC ceftriaxone  2.  PICC line was pulled in clinic  3.  Start Levaquin 750 mg by mouth daily ×3 months.  I talked extensively to the patient and his family regarding the possible side effects of the new antibiotic including but not limited to abdominal pain nausea vomiting diarrhea, rash, anaphylaxis reaction, peripheral neuropathy arthralgias and myalgias  4.  We'll ask the rehabilitation unit to obtain a CBC with differential, CMP, ESR and CRP and fax the results to us.     Return to Infectious Disease clinic in 3 months    Current outpatient and discharge medications have been reconciled for the patient.  Reviewed by: Magdy Ricardo MD    Currently wearing a catheter and is seeing urology shortly.      The following portions of the patient's history were reviewed and updated as appropriate: allergies, current medications, past family history, past  "medical history, past social history, past surgical history and problem list.    Review of Systems   Constitutional: Negative for activity change, chills, fatigue and fever.   Respiratory: Negative for cough and shortness of breath.    Cardiovascular: Negative for chest pain and palpitations.   Gastrointestinal: Negative for abdominal pain.   Endocrine: Negative for cold intolerance.   Psychiatric/Behavioral: Negative for behavioral problems and dysphoric mood. The patient is not nervous/anxious.      /67   Pulse 80   Temp 97.8 °F (36.6 °C) (Oral)   Resp 16   Ht 177.8 cm (70\")   Wt 110 kg (242 lb)   BMI 34.72 kg/m²     Objective   Physical Exam   Constitutional: He appears well-developed and well-nourished.   Neck: Neck supple. No thyromegaly present.   Cardiovascular: Normal rate and regular rhythm.    No murmur heard.  Pulmonary/Chest: Effort normal and breath sounds normal.   Abdominal: Bowel sounds are normal. There is no tenderness.   Psychiatric: He has a normal mood and affect. His behavior is normal.   Nursing note and vitals reviewed.  Hospital records reviewed with pt confirming HPI.    Assessment/Plan   Jesus was seen today for atrial fibrillation, blood infection and hypertension.    Diagnoses and all orders for this visit:    Hospital discharge follow-up    Bacteremia due to Escherichia coli    Discitis of lumbar region    Essential hypertension  -     Basic Metabolic Panel    Pt encouraged to slowly increase activity as tolerated and to keep all scheduled specialist appt.           "

## 2018-06-04 ENCOUNTER — OFFICE VISIT (OUTPATIENT)
Dept: FAMILY MEDICINE CLINIC | Facility: CLINIC | Age: 78
End: 2018-06-04

## 2018-06-04 ENCOUNTER — TELEPHONE (OUTPATIENT)
Dept: CARDIOLOGY | Facility: CLINIC | Age: 78
End: 2018-06-04

## 2018-06-04 VITALS
RESPIRATION RATE: 16 BRPM | TEMPERATURE: 97.8 F | BODY MASS INDEX: 34.65 KG/M2 | SYSTOLIC BLOOD PRESSURE: 104 MMHG | DIASTOLIC BLOOD PRESSURE: 67 MMHG | HEART RATE: 80 BPM | WEIGHT: 242 LBS | HEIGHT: 70 IN

## 2018-06-04 DIAGNOSIS — M46.46 DISCITIS OF LUMBAR REGION: ICD-10-CM

## 2018-06-04 DIAGNOSIS — E11.42 DIABETIC PERIPHERAL NEUROPATHY (HCC): ICD-10-CM

## 2018-06-04 DIAGNOSIS — IMO0002 UNCONTROLLED TYPE 2 DIABETES MELLITUS WITH COMPLICATION, WITH LONG-TERM CURRENT USE OF INSULIN: ICD-10-CM

## 2018-06-04 DIAGNOSIS — I10 ESSENTIAL HYPERTENSION: ICD-10-CM

## 2018-06-04 DIAGNOSIS — E78.5 DYSLIPIDEMIA: ICD-10-CM

## 2018-06-04 DIAGNOSIS — Z09 HOSPITAL DISCHARGE FOLLOW-UP: Primary | ICD-10-CM

## 2018-06-04 DIAGNOSIS — R78.81 BACTEREMIA DUE TO ESCHERICHIA COLI: ICD-10-CM

## 2018-06-04 DIAGNOSIS — B96.20 BACTEREMIA DUE TO ESCHERICHIA COLI: ICD-10-CM

## 2018-06-04 DIAGNOSIS — E79.0 HYPERURICEMIA: ICD-10-CM

## 2018-06-04 PROCEDURE — 99214 OFFICE O/P EST MOD 30 MIN: CPT | Performed by: FAMILY MEDICINE

## 2018-06-04 RX ORDER — FUROSEMIDE 40 MG/1
40 TABLET ORAL DAILY
Qty: 90 TABLET | Refills: 1 | Status: SHIPPED | OUTPATIENT
Start: 2018-06-04 | End: 2018-08-01 | Stop reason: DRUGHIGH

## 2018-06-04 RX ORDER — DIGOXIN 125 MCG
125 TABLET ORAL DAILY
Qty: 90 TABLET | Refills: 1 | Status: SHIPPED | OUTPATIENT
Start: 2018-06-04 | End: 2018-10-15 | Stop reason: SDUPTHER

## 2018-06-04 NOTE — TELEPHONE ENCOUNTER
Spouse called to request refill on medications Digoxin, Eliquis, lasix, Metoprolol. Informed spouse medications refilled. Thanks, Heidi

## 2018-06-05 LAB
BUN SERPL-MCNC: 20 MG/DL (ref 8–23)
BUN/CREAT SERPL: 21.7 (ref 7–25)
CALCIUM SERPL-MCNC: 9.2 MG/DL (ref 8.6–10.5)
CHLORIDE SERPL-SCNC: 97 MMOL/L (ref 98–107)
CO2 SERPL-SCNC: 26.4 MMOL/L (ref 22–29)
CREAT SERPL-MCNC: 0.92 MG/DL (ref 0.76–1.27)
GFR SERPLBLD CREATININE-BSD FMLA CKD-EPI: 80 ML/MIN/1.73
GFR SERPLBLD CREATININE-BSD FMLA CKD-EPI: 97 ML/MIN/1.73
GLUCOSE SERPL-MCNC: 344 MG/DL (ref 65–99)
POTASSIUM SERPL-SCNC: 4.7 MMOL/L (ref 3.5–5.2)
SODIUM SERPL-SCNC: 139 MMOL/L (ref 136–145)

## 2018-06-08 ENCOUNTER — TELEPHONE (OUTPATIENT)
Dept: FAMILY MEDICINE CLINIC | Facility: CLINIC | Age: 78
End: 2018-06-08

## 2018-06-08 DIAGNOSIS — Z79.899 ENCOUNTER FOR LONG-TERM (CURRENT) USE OF MEDICATIONS: ICD-10-CM

## 2018-06-08 DIAGNOSIS — IMO0002 UNCONTROLLED TYPE 2 DIABETES MELLITUS WITH COMPLICATION, WITH LONG-TERM CURRENT USE OF INSULIN: ICD-10-CM

## 2018-06-08 DIAGNOSIS — I10 ESSENTIAL HYPERTENSION: Primary | ICD-10-CM

## 2018-06-08 NOTE — TELEPHONE ENCOUNTER
Pt's wife called , asking if he needs to take potassium since he is on lasix.  Potassium level was within normal range on 06/04/2018 at 4.7

## 2018-06-13 ENCOUNTER — RESULTS ENCOUNTER (OUTPATIENT)
Dept: FAMILY MEDICINE CLINIC | Facility: CLINIC | Age: 78
End: 2018-06-13

## 2018-06-13 ENCOUNTER — TELEPHONE (OUTPATIENT)
Dept: FAMILY MEDICINE CLINIC | Facility: CLINIC | Age: 78
End: 2018-06-13

## 2018-06-13 ENCOUNTER — OFFICE VISIT (OUTPATIENT)
Dept: FAMILY MEDICINE CLINIC | Facility: CLINIC | Age: 78
End: 2018-06-13

## 2018-06-13 VITALS
RESPIRATION RATE: 16 BRPM | HEART RATE: 85 BPM | TEMPERATURE: 98.6 F | SYSTOLIC BLOOD PRESSURE: 97 MMHG | DIASTOLIC BLOOD PRESSURE: 59 MMHG

## 2018-06-13 DIAGNOSIS — I10 ESSENTIAL HYPERTENSION: ICD-10-CM

## 2018-06-13 DIAGNOSIS — Z79.899 ENCOUNTER FOR LONG-TERM (CURRENT) USE OF MEDICATIONS: ICD-10-CM

## 2018-06-13 DIAGNOSIS — E11.42 DIABETIC PERIPHERAL NEUROPATHY (HCC): ICD-10-CM

## 2018-06-13 DIAGNOSIS — IMO0002 UNCONTROLLED TYPE 2 DIABETES MELLITUS WITH COMPLICATION, WITH LONG-TERM CURRENT USE OF INSULIN: ICD-10-CM

## 2018-06-13 DIAGNOSIS — L03.116 CELLULITIS OF LEFT LOWER EXTREMITY: Primary | ICD-10-CM

## 2018-06-13 PROCEDURE — 99214 OFFICE O/P EST MOD 30 MIN: CPT | Performed by: FAMILY MEDICINE

## 2018-06-13 RX ORDER — MOXIFLOXACIN HYDROCHLORIDE 400 MG/1
400 TABLET ORAL DAILY
Qty: 10 TABLET | Refills: 0 | Status: SHIPPED | OUTPATIENT
Start: 2018-06-13 | End: 2018-06-23

## 2018-06-13 NOTE — PROGRESS NOTES
Chief Complaint   Patient presents with   • Wound on left leg, needs referral to wound care       Subjective   This patient presents the office to check on his left lower extremity.  He opened the car door and caused a wound on his left leg. Injury occurred 2-3 weeks ago.   Since that time it has continued to be angry.  He occasionally does have some blood on his compression stocking.  There is no pus draining from the wound currently.  He is diabetic.  He has not had fever.  Pain is moderate.   I have reviewed and updated his medications, medical history and problem list during today's office visit.     Patient Care Team:  Magdy Ricardo MD as PCP - General  Magdy Ricardo MD as PCP - Family Medicine  Staci Keyes MD as Consulting Physician (Endocrinology)  Jerrell Fry MD as Consulting Physician (Urology)  Carlos Manuel Saleh MD as Consulting Physician (Urology)    Social History   Substance Use Topics   • Smoking status: Former Smoker     Types: Cigars     Quit date: 2017   • Smokeless tobacco: Current User     Types: Chew   • Alcohol use No       Review of Systems   Constitutional: Negative for fever.   Skin:        See HPI       Objective     BP 97/59   Pulse 85   Temp 98.6 °F (37 °C) (Oral)   Resp 16     There is no height or weight on file to calculate BMI.    Physical Exam   Constitutional: He is oriented to person, place, and time. No distress.   Cardiovascular: Normal rate and normal heart sounds.    Pulmonary/Chest: Effort normal and breath sounds normal.   Neurological: He is alert and oriented to person, place, and time.   Skin: He is not diaphoretic.   Psychiatric: He has a normal mood and affect. His speech is normal. He is attentive.   Vitals reviewed.       LLE wound and cellulitis    Data Reviewed:             Assessment/Plan     Problem List Items Addressed This Visit     Diabetic peripheral neuropathy      Other Visit Diagnoses     Cellulitis of left lower extremity    -  Primary     Relevant Medications    moxifloxacin (AVELOX) 400 MG tablet    Other Relevant Orders    Ambulatory Referral to Wound Clinic          Orders Placed This Encounter   Procedures   • Ambulatory Referral to Wound Clinic     Referral Priority:   Routine     Referral Type:   Consultation     Referral Reason:   Specialty Services Required     Requested Specialty:   Wound Care     Number of Visits Requested:   1         Current Outpatient Prescriptions:   •  allopurinol (ZYLOPRIM) 300 MG tablet, Take 1 tablet by mouth Daily., Disp: 90 tablet, Rfl: 1  •  apixaban (ELIQUIS) 5 MG tablet tablet, Take 1 tablet by mouth Every 12 (Twelve) Hours., Disp: 180 tablet, Rfl: 1  •  bisacodyl (DULCOLAX) 5 MG EC tablet, Take 1 tablet by mouth Daily As Needed for Constipation., Disp: , Rfl:   •  collagenase 250 UNIT/GM ointment, Apply 1 application topically Daily., Disp: , Rfl:   •  cyclobenzaprine (FLEXERIL) 10 MG tablet, Take 1 tablet by mouth Every 8 (Eight) Hours., Disp: , Rfl:   •  digoxin (LANOXIN) 125 MCG tablet, Take 1 tablet by mouth Daily., Disp: 90 tablet, Rfl: 1  •  diphenhydrAMINE-acetaminophen (TYLENOL PM EXTRA STRENGTH)  MG tablet per tablet, Take 1 tablet by mouth At Night As Needed for Sleep., Disp: , Rfl:   •  docusate sodium (COLACE) 100 MG capsule, Take 100 mg by mouth 2 (Two) Times a Day., Disp: , Rfl:   •  ergocalciferol (DRISDOL) 52807 units capsule, Take 1 capsule by mouth 1 (One) Time Per Week., Disp: 13 capsule, Rfl: 1  •  fluticasone (FLONASE) 50 MCG/ACT nasal spray, 2 sprays into each nostril Daily., Disp: 3 bottle, Rfl: 3  •  furosemide (LASIX) 40 MG tablet, Take 1 tablet by mouth Daily., Disp: 90 tablet, Rfl: 1  •  glipiZIDE (GLUCOTROL) 10 MG tablet, Take 1 tablet by mouth 2 (Two) Times a Day Before Meals., Disp: 180 tablet, Rfl: 1  •  glucose blood test strip, CHECK BLOOD SUGARS 2 TIMES A DAY AS DIRECTED, Disp: 200 each, Rfl: 1  •  GLYXAMBI 25-5 MG tablet, Take 1 tablet by mouth Daily With Breakfast.,  Disp: 90 tablet, Rfl: 1  •  Insulin Pen Needle 32G X 4 MM misc, INJECT UP TO THREE TIMES DAILY OR AS DIRECTED, Disp: 500 each, Rfl: 1  •  L-Methylfolate-B6-B12 3-35-2 MG tablet, Take 1 tablet by mouth 2 (Two) Times a Day., Disp: 180 tablet, Rfl: 1  •  LEVEMIR FLEXTOUCH 100 UNIT/ML injection, 50 units twice daily., Disp: 90 mL, Rfl: 1  •  levoFLOXacin (LEVAQUIN) 750 MG tablet, Take 1 tablet by mouth Daily for 120 days., Disp: 30 tablet, Rfl: 3  •  lisinopril (PRINIVIL,ZESTRIL) 10 MG tablet, Take 1 tablet by mouth Daily., Disp: 90 tablet, Rfl: 1  •  Melatonin 3 MG tablet, Take 3 mg by mouth At Night As Needed for Sleep., Disp: , Rfl:   •  metoprolol tartrate (LOPRESSOR) 25 MG tablet, Take 1 tablet by mouth Every 12 (Twelve) Hours., Disp: 180 tablet, Rfl: 1  •  Multiple Vitamins-Minerals (MULTIVITAMIN ADULT PO), Take 1 tablet by mouth Daily., Disp: , Rfl:   •  Omega-3 Fatty Acids (FISH OIL) 1000 MG capsule capsule, Take  by mouth daily with breakfast., Disp: , Rfl:   •  polyethylene glycol (MIRALAX) pack packet, Take 17 g by mouth Daily As Needed (constipation)., Disp: , Rfl:   •  pregabalin (LYRICA) 100 MG capsule, Take 100 mg by mouth 3 (Three) Times a Day., Disp: , Rfl:   •  rosuvastatin (CRESTOR) 10 MG tablet, Take 1 tablet by mouth Daily., Disp: 90 tablet, Rfl: 1  •  tamsulosin (FLOMAX) 0.4 MG capsule 24 hr capsule, Take 1 capsule by mouth Daily. (Patient taking differently: Take 0.4 mg by mouth 2 (Two) Times a Day.), Disp: 30 capsule, Rfl:   •  vitamin C (ASCORBIC ACID) 500 MG tablet, Take 500 mg by mouth Daily., Disp: , Rfl:   •  VOLTAREN 1 % gel gel, , Disp: , Rfl:   •  moxifloxacin (AVELOX) 400 MG tablet, Take 1 tablet by mouth Daily for 10 days., Disp: 10 tablet, Rfl: 0    Return if symptoms worsen or fail to improve.

## 2018-06-14 LAB
BUN SERPL-MCNC: 17 MG/DL (ref 8–23)
BUN/CREAT SERPL: 19.3 (ref 7–25)
CALCIUM SERPL-MCNC: 9.2 MG/DL (ref 8.6–10.5)
CHLORIDE SERPL-SCNC: 96 MMOL/L (ref 98–107)
CO2 SERPL-SCNC: 26.5 MMOL/L (ref 22–29)
CREAT SERPL-MCNC: 0.88 MG/DL (ref 0.76–1.27)
GFR SERPLBLD CREATININE-BSD FMLA CKD-EPI: 102 ML/MIN/1.73
GFR SERPLBLD CREATININE-BSD FMLA CKD-EPI: 84 ML/MIN/1.73
GLUCOSE SERPL-MCNC: 314 MG/DL (ref 65–99)
POTASSIUM SERPL-SCNC: 4.7 MMOL/L (ref 3.5–5.2)
SODIUM SERPL-SCNC: 137 MMOL/L (ref 136–145)

## 2018-06-18 ENCOUNTER — TELEPHONE (OUTPATIENT)
Dept: ENDOCRINOLOGY | Age: 78
End: 2018-06-18

## 2018-06-18 NOTE — TELEPHONE ENCOUNTER
----- Message from Namrata Luque sent at 6/18/2018  8:30 AM EDT -----  Contact: PATIENT WIFE /MRS EMMANUEL   PATIENT   REQUEST TO REFILL  MEDICATION:    MEDICATION:  PRODIGY TEST STRIPS  -  TESTING 2 TIMES A DAY     MESSAGE:  PATIENT WIFE HAS CALLED IN REGARDS TO GETTING THE ABOVE MEDICATION CALLED IN. PATIENT WIFE IS STATING HER  USES PRODIGY METER .        PLEASE SENT IT TO THE FOLLOWING PHARMACY   Pharmacy:  [facility] - [place] [id number] [facility] - [phone] [facility] [phone] [facility] Phone:  [phone] Fax:  [phone]     PHONE NUMBER: [phone]          PRODIGY TEST STRIPS HAVE BEEN SENT INTO [facility]

## 2018-06-20 RX ORDER — PREGABALIN 100 MG/1
100 CAPSULE ORAL 3 TIMES DAILY
Qty: 90 CAPSULE | Refills: 1 | OUTPATIENT
Start: 2018-06-20 | End: 2018-08-28 | Stop reason: SDUPTHER

## 2018-06-20 RX ORDER — MECOBAL/LEVOMEFOLAT CA/B6 PHOS 2-3-35 MG
1 TABLET ORAL 2 TIMES DAILY
Qty: 180 TABLET | Refills: 0 | Status: SHIPPED | OUTPATIENT
Start: 2018-06-20 | End: 2018-08-27 | Stop reason: SDUPTHER

## 2018-06-20 NOTE — TELEPHONE ENCOUNTER
----- Message from Bart Warren sent at 6/20/2018  8:21 AM EDT -----  Contact: PATIENT  PATIENT CALLED TO HAVE PRESCRIPTIONS pregabalin (LYRICA) 100 MG capsule AND L-Methylfolate-B6-B12 3-35-2 MG tablet CALLED INTO [facility] - [facility] [address] - [phone]  - [phone] (Phone)  [phone] (Fax)    L-Methylfolate-B6-B12 3-35-2 MG has been sent in, lyrica has been sent to [facility] for approval.

## 2018-06-25 ENCOUNTER — OFFICE VISIT (OUTPATIENT)
Dept: WOUND CARE | Facility: HOSPITAL | Age: 78
End: 2018-06-25
Attending: SURGERY

## 2018-06-25 PROCEDURE — G0463 HOSPITAL OUTPT CLINIC VISIT: HCPCS

## 2018-07-02 ENCOUNTER — HOSPITAL ENCOUNTER (OUTPATIENT)
Dept: GENERAL RADIOLOGY | Facility: HOSPITAL | Age: 78
Discharge: HOME | End: 2018-07-02
Attending: SURGERY | Admitting: SURGERY

## 2018-07-02 ENCOUNTER — OFFICE VISIT (OUTPATIENT)
Dept: WOUND CARE | Facility: HOSPITAL | Age: 78
End: 2018-07-02
Attending: SURGERY

## 2018-07-02 DIAGNOSIS — L97.828 NON-PRESSURE CHRONIC ULCER OF OTHER PART OF LEFT LOWER LEG WITH OTHER SPECIFIED SEVERITY (HCC): ICD-10-CM

## 2018-07-02 DIAGNOSIS — Z00.00 ROUTINE GENERAL MEDICAL EXAMINATION AT A HEALTH CARE FACILITY: Primary | ICD-10-CM

## 2018-07-02 PROCEDURE — 87070 CULTURE OTHR SPECIMN AEROBIC: CPT | Performed by: SURGERY

## 2018-07-02 PROCEDURE — 87205 SMEAR GRAM STAIN: CPT | Performed by: SURGERY

## 2018-07-02 PROCEDURE — 87077 CULTURE AEROBIC IDENTIFY: CPT | Performed by: SURGERY

## 2018-07-02 PROCEDURE — 87147 CULTURE TYPE IMMUNOLOGIC: CPT | Performed by: SURGERY

## 2018-07-02 PROCEDURE — 87075 CULTR BACTERIA EXCEPT BLOOD: CPT | Performed by: SURGERY

## 2018-07-02 PROCEDURE — 73590 X-RAY EXAM OF LOWER LEG: CPT

## 2018-07-02 PROCEDURE — 87186 SC STD MICRODIL/AGAR DIL: CPT | Performed by: SURGERY

## 2018-07-05 LAB
BACTERIA SPEC AEROBE CULT: ABNORMAL
BACTERIA SPEC AEROBE CULT: ABNORMAL
GRAM STN SPEC: ABNORMAL
GRAM STN SPEC: ABNORMAL

## 2018-07-06 RX ORDER — BLOOD SUGAR DIAGNOSTIC
STRIP MISCELLANEOUS
Qty: 200 EACH | Refills: 0 | Status: SHIPPED | OUTPATIENT
Start: 2018-07-06 | End: 2018-07-09 | Stop reason: SDUPTHER

## 2018-07-07 LAB — BACTERIA SPEC ANAEROBE CULT: NORMAL

## 2018-07-09 ENCOUNTER — TRANSCRIBE ORDERS (OUTPATIENT)
Dept: ADMINISTRATIVE | Facility: HOSPITAL | Age: 78
End: 2018-07-09

## 2018-07-09 ENCOUNTER — OFFICE VISIT (OUTPATIENT)
Dept: WOUND CARE | Facility: HOSPITAL | Age: 78
End: 2018-07-09
Attending: SURGERY

## 2018-07-09 DIAGNOSIS — M79.605 LEFT LEG PAIN: Primary | ICD-10-CM

## 2018-07-09 RX ORDER — BLOOD SUGAR DIAGNOSTIC
STRIP MISCELLANEOUS
Qty: 200 EACH | Refills: 0 | Status: SHIPPED | OUTPATIENT
Start: 2018-07-09 | End: 2018-07-11 | Stop reason: CLARIF

## 2018-07-10 ENCOUNTER — TELEPHONE (OUTPATIENT)
Dept: INFECTIOUS DISEASES | Facility: CLINIC | Age: 78
End: 2018-07-10

## 2018-07-10 NOTE — TELEPHONE ENCOUNTER
Dr. Brand requested that Dr. Harper please call him regarding patient.  Dr. Brand states that it was not urgent and that he can wait until Dr. Harper is in the office on 07/11/18.

## 2018-07-11 ENCOUNTER — TELEPHONE (OUTPATIENT)
Dept: INFECTIOUS DISEASES | Facility: CLINIC | Age: 78
End: 2018-07-11

## 2018-07-11 ENCOUNTER — LAB (OUTPATIENT)
Dept: LAB | Facility: HOSPITAL | Age: 78
End: 2018-07-11
Attending: SURGERY

## 2018-07-11 ENCOUNTER — HOSPITAL ENCOUNTER (OUTPATIENT)
Dept: CARDIOLOGY | Facility: HOSPITAL | Age: 78
Discharge: HOME | End: 2018-07-11
Attending: SURGERY | Admitting: SURGERY

## 2018-07-11 ENCOUNTER — TRANSCRIBE ORDERS (OUTPATIENT)
Dept: LAB | Facility: HOSPITAL | Age: 78
End: 2018-07-11

## 2018-07-11 DIAGNOSIS — E11.622 DIABETES MELLITUS WITH SKIN ULCER (HCC): Primary | ICD-10-CM

## 2018-07-11 DIAGNOSIS — E11.622 DIABETES MELLITUS WITH SKIN ULCER (HCC): ICD-10-CM

## 2018-07-11 DIAGNOSIS — L98.499 DIABETES MELLITUS WITH SKIN ULCER (HCC): ICD-10-CM

## 2018-07-11 DIAGNOSIS — L98.499 DIABETES MELLITUS WITH SKIN ULCER (HCC): Primary | ICD-10-CM

## 2018-07-11 DIAGNOSIS — M79.605 LEFT LEG PAIN: ICD-10-CM

## 2018-07-11 LAB
ALBUMIN SERPL-MCNC: 3.9 G/DL (ref 3.5–5.2)
ALBUMIN/GLOB SERPL: 1.3 G/DL
ALP SERPL-CCNC: 113 U/L (ref 39–117)
ALT SERPL W P-5'-P-CCNC: 16 U/L (ref 1–41)
ANION GAP SERPL CALCULATED.3IONS-SCNC: 15.5 MMOL/L
AST SERPL-CCNC: 16 U/L (ref 1–40)
BASOPHILS # BLD AUTO: 0.02 10*3/MM3 (ref 0–0.2)
BASOPHILS NFR BLD AUTO: 0.3 % (ref 0–1.5)
BH CV LOWER VASCULAR LEFT COMMON FEMORAL AUGMENT: NORMAL
BH CV LOWER VASCULAR LEFT COMMON FEMORAL COMPETENT: NORMAL
BH CV LOWER VASCULAR LEFT COMMON FEMORAL COMPRESS: NORMAL
BH CV LOWER VASCULAR LEFT COMMON FEMORAL PHASIC: NORMAL
BH CV LOWER VASCULAR LEFT COMMON FEMORAL SPONT: NORMAL
BH CV LOWER VASCULAR LEFT DISTAL FEMORAL COMPRESS: NORMAL
BH CV LOWER VASCULAR LEFT GASTRONEMIUS COMPRESS: NORMAL
BH CV LOWER VASCULAR LEFT GREATER SAPH AK COMPRESS: NORMAL
BH CV LOWER VASCULAR LEFT GREATER SAPH BK AUGMENT: NORMAL
BH CV LOWER VASCULAR LEFT GREATER SAPH BK COMPETENT: NORMAL
BH CV LOWER VASCULAR LEFT GREATER SAPH BK COMPRESS: NORMAL
BH CV LOWER VASCULAR LEFT GREATER SAPH BK PHASIC: NORMAL
BH CV LOWER VASCULAR LEFT GREATER SAPH BK SPONT: NORMAL
BH CV LOWER VASCULAR LEFT MID FEMORAL AUGMENT: NORMAL
BH CV LOWER VASCULAR LEFT MID FEMORAL COMPETENT: NORMAL
BH CV LOWER VASCULAR LEFT MID FEMORAL COMPRESS: NORMAL
BH CV LOWER VASCULAR LEFT MID FEMORAL PHASIC: NORMAL
BH CV LOWER VASCULAR LEFT MID FEMORAL SPONT: NORMAL
BH CV LOWER VASCULAR LEFT PERONEAL COMPRESS: NORMAL
BH CV LOWER VASCULAR LEFT POPLITEAL AUGMENT: NORMAL
BH CV LOWER VASCULAR LEFT POPLITEAL COMPETENT: NORMAL
BH CV LOWER VASCULAR LEFT POPLITEAL COMPRESS: NORMAL
BH CV LOWER VASCULAR LEFT POPLITEAL PHASIC: NORMAL
BH CV LOWER VASCULAR LEFT POPLITEAL SPONT: NORMAL
BH CV LOWER VASCULAR LEFT POSTERIOR TIBIAL COMPRESS: NORMAL
BH CV LOWER VASCULAR LEFT PROXIMAL FEMORAL COMPRESS: NORMAL
BH CV LOWER VASCULAR LEFT SAPHENOFEMORAL JUNCTION AUGMENT: NORMAL
BH CV LOWER VASCULAR LEFT SAPHENOFEMORAL JUNCTION COMPETENT: NORMAL
BH CV LOWER VASCULAR LEFT SAPHENOFEMORAL JUNCTION COMPRESS: NORMAL
BH CV LOWER VASCULAR LEFT SAPHENOFEMORAL JUNCTION PHASIC: NORMAL
BH CV LOWER VASCULAR LEFT SAPHENOFEMORAL JUNCTION SPONT: NORMAL
BH CV LOWER VASCULAR RIGHT COMMON FEMORAL AUGMENT: NORMAL
BH CV LOWER VASCULAR RIGHT COMMON FEMORAL COMPETENT: NORMAL
BH CV LOWER VASCULAR RIGHT COMMON FEMORAL COMPRESS: NORMAL
BH CV LOWER VASCULAR RIGHT COMMON FEMORAL PHASIC: NORMAL
BH CV LOWER VASCULAR RIGHT COMMON FEMORAL SPONT: NORMAL
BILIRUB SERPL-MCNC: 0.4 MG/DL (ref 0.1–1.2)
BUN BLD-MCNC: 18 MG/DL (ref 8–23)
BUN/CREAT SERPL: 18.6 (ref 7–25)
CALCIUM SPEC-SCNC: 9.5 MG/DL (ref 8.6–10.5)
CHLORIDE SERPL-SCNC: 98 MMOL/L (ref 98–107)
CO2 SERPL-SCNC: 23.5 MMOL/L (ref 22–29)
CREAT BLD-MCNC: 0.97 MG/DL (ref 0.76–1.27)
CRP SERPL-MCNC: 1.65 MG/DL (ref 0–0.5)
DEPRECATED RDW RBC AUTO: 51.2 FL (ref 37–54)
EOSINOPHIL # BLD AUTO: 0.15 10*3/MM3 (ref 0–0.7)
EOSINOPHIL NFR BLD AUTO: 2 % (ref 0.3–6.2)
ERYTHROCYTE [DISTWIDTH] IN BLOOD BY AUTOMATED COUNT: 16.4 % (ref 11.5–14.5)
ERYTHROCYTE [SEDIMENTATION RATE] IN BLOOD: 19 MM/HR (ref 0–20)
GFR SERPL CREATININE-BSD FRML MDRD: 75 ML/MIN/1.73
GLOBULIN UR ELPH-MCNC: 3 GM/DL
GLUCOSE BLD-MCNC: 385 MG/DL (ref 65–99)
HCT VFR BLD AUTO: 43.5 % (ref 40.4–52.2)
HGB BLD-MCNC: 13.6 G/DL (ref 13.7–17.6)
IMM GRANULOCYTES # BLD: 0.04 10*3/MM3 (ref 0–0.03)
IMM GRANULOCYTES NFR BLD: 0.5 % (ref 0–0.5)
LYMPHOCYTES # BLD AUTO: 0.86 10*3/MM3 (ref 0.9–4.8)
LYMPHOCYTES NFR BLD AUTO: 11.4 % (ref 19.6–45.3)
MCH RBC QN AUTO: 27 PG (ref 27–32.7)
MCHC RBC AUTO-ENTMCNC: 31.3 G/DL (ref 32.6–36.4)
MCV RBC AUTO: 86.3 FL (ref 79.8–96.2)
MONOCYTES # BLD AUTO: 0.44 10*3/MM3 (ref 0.2–1.2)
MONOCYTES NFR BLD AUTO: 5.8 % (ref 5–12)
NEUTROPHILS # BLD AUTO: 6.05 10*3/MM3 (ref 1.9–8.1)
NEUTROPHILS NFR BLD AUTO: 80 % (ref 42.7–76)
PLATELET # BLD AUTO: 181 10*3/MM3 (ref 140–500)
PMV BLD AUTO: 11 FL (ref 6–12)
POTASSIUM BLD-SCNC: 4.7 MMOL/L (ref 3.5–5.2)
PROT SERPL-MCNC: 6.9 G/DL (ref 6–8.5)
RBC # BLD AUTO: 5.04 10*6/MM3 (ref 4.6–6)
SODIUM BLD-SCNC: 137 MMOL/L (ref 136–145)
WBC NRBC COR # BLD: 7.56 10*3/MM3 (ref 4.5–10.7)

## 2018-07-11 PROCEDURE — 85652 RBC SED RATE AUTOMATED: CPT

## 2018-07-11 PROCEDURE — 80053 COMPREHEN METABOLIC PANEL: CPT

## 2018-07-11 PROCEDURE — 86140 C-REACTIVE PROTEIN: CPT

## 2018-07-11 PROCEDURE — 36415 COLL VENOUS BLD VENIPUNCTURE: CPT

## 2018-07-11 PROCEDURE — 93971 EXTREMITY STUDY: CPT

## 2018-07-11 PROCEDURE — 85025 COMPLETE CBC W/AUTO DIFF WBC: CPT

## 2018-07-11 RX ORDER — LANCETS
EACH MISCELLANEOUS
Qty: 200 EACH | Refills: 0 | Status: SHIPPED | OUTPATIENT
Start: 2018-07-11 | End: 2018-11-02 | Stop reason: SDUPTHER

## 2018-07-11 RX ORDER — BLOOD-GLUCOSE METER
1 EACH MISCELLANEOUS ONCE
Qty: 1 KIT | Refills: 0 | Status: SHIPPED | OUTPATIENT
Start: 2018-07-11 | End: 2018-07-11

## 2018-07-11 NOTE — TELEPHONE ENCOUNTER
Can you please see my telephone encounter from today.  Can you call the patient tomorrow to make sure he is on appropriate antibiotics and holding the Levaquin and understands all these instructions.thanks

## 2018-07-11 NOTE — TELEPHONE ENCOUNTER
Spoke with  , the patient developed a leg wound which is now growing MRSA and Enterococcus faecalis.  He will be started on Bactrim double strength 1 tab by mouth twice a day + Augmentin 875 mg by mouth twice a day ×10 days.  While he is on those antibiotics he does not need to take the Levaquin.  As soon as he is done with antibiotics he should resume the Levaquin the next day.    I will have my staff call the patient tomorrow to confirm the above.

## 2018-07-12 NOTE — TELEPHONE ENCOUNTER
I spoke w/patient and confirmed that he is taking Bactrim DS 1 tab BID and Augmentin 875 BID x 10days and has dc'd LVQ.  Patient states that he is taking as mentioned above and that he understands that he will begin taking LVQ the day after he completes Bactrim and Augmentin.  I also informed the patient that if he has any further questions to please contact our office.

## 2018-07-16 ENCOUNTER — OFFICE VISIT (OUTPATIENT)
Dept: WOUND CARE | Facility: HOSPITAL | Age: 78
End: 2018-07-16
Attending: SURGERY

## 2018-07-17 ENCOUNTER — TRANSCRIBE ORDERS (OUTPATIENT)
Dept: ADMINISTRATIVE | Facility: HOSPITAL | Age: 78
End: 2018-07-17

## 2018-07-17 DIAGNOSIS — I70.248 ATHEROSCLEROSIS OF NATIVE ARTERY OF LEFT LOWER EXTREMITY WITH ULCERATION OF OTHER PART OF LOWER LEG (HCC): Primary | ICD-10-CM

## 2018-07-20 ENCOUNTER — HOSPITAL ENCOUNTER (OUTPATIENT)
Dept: CARDIOLOGY | Facility: HOSPITAL | Age: 78
Discharge: HOME | End: 2018-07-20
Attending: SURGERY | Admitting: SURGERY

## 2018-07-20 DIAGNOSIS — I70.248 ATHEROSCLEROSIS OF NATIVE ARTERY OF LEFT LOWER EXTREMITY WITH ULCERATION OF OTHER PART OF LOWER LEG (HCC): ICD-10-CM

## 2018-07-20 LAB
BH CV LOWER ARTERIAL LEFT ABI RATIO: 1.28
BH CV LOWER ARTERIAL LEFT DORSALIS PEDIS SYS MAX: 143 MMHG
BH CV LOWER ARTERIAL LEFT GREAT TOE SYS MAX: 87 MMHG
BH CV LOWER ARTERIAL LEFT POST TIBIAL SYS MAX: 151 MMHG
BH CV LOWER ARTERIAL LEFT TBI RATIO: 0.74
BH CV LOWER ARTERIAL RIGHT ABI RATIO: 1.31
BH CV LOWER ARTERIAL RIGHT DORSALIS PEDIS SYS MAX: 151 MMHG
BH CV LOWER ARTERIAL RIGHT GREAT TOE SYS MAX: 113 MMHG
BH CV LOWER ARTERIAL RIGHT POST TIBIAL SYS MAX: 155 MMHG
BH CV LOWER ARTERIAL RIGHT TBI RATIO: 0.96
UPPER ARTERIAL LEFT ARM BRACHIAL SYS MAX: 118 MMHG
UPPER ARTERIAL RIGHT ARM BRACHIAL SYS MAX: 117 MMHG

## 2018-07-20 PROCEDURE — 93922 UPR/L XTREMITY ART 2 LEVELS: CPT

## 2018-07-23 ENCOUNTER — OFFICE VISIT (OUTPATIENT)
Dept: WOUND CARE | Facility: HOSPITAL | Age: 78
End: 2018-07-23
Attending: SURGERY

## 2018-07-24 DIAGNOSIS — L98.499 SKIN ULCER, UNSPECIFIED ULCER STAGE (HCC): Primary | ICD-10-CM

## 2018-07-24 RX ORDER — TRAMADOL HYDROCHLORIDE 50 MG/1
50 TABLET ORAL EVERY 6 HOURS PRN
Qty: 60 TABLET | Refills: 0 | Status: SHIPPED | OUTPATIENT
Start: 2018-07-24 | End: 2018-08-09

## 2018-07-24 NOTE — TELEPHONE ENCOUNTER
Patient and wife made aware that he cannot take celebrex d/t eliquis. Told wife that we will start pt on tramadol 1 q6h prn pain #60 and that he will need to come in for a refill. Also that we can only treat for pain x3 months, and if he pain persist past that he will need to see pain management.

## 2018-07-30 ENCOUNTER — OFFICE VISIT (OUTPATIENT)
Dept: WOUND CARE | Facility: HOSPITAL | Age: 78
End: 2018-07-30
Attending: SURGERY

## 2018-07-30 DIAGNOSIS — L97.823 SKIN ULCER OF POPLITEAL REGION, LEFT, WITH NECROSIS OF MUSCLE (HCC): Primary | ICD-10-CM

## 2018-07-30 PROCEDURE — 87186 SC STD MICRODIL/AGAR DIL: CPT | Performed by: SURGERY

## 2018-07-30 PROCEDURE — 87176 TISSUE HOMOGENIZATION CULTR: CPT | Performed by: SURGERY

## 2018-07-30 PROCEDURE — 87147 CULTURE TYPE IMMUNOLOGIC: CPT | Performed by: SURGERY

## 2018-07-30 PROCEDURE — 87075 CULTR BACTERIA EXCEPT BLOOD: CPT | Performed by: SURGERY

## 2018-07-30 PROCEDURE — 87205 SMEAR GRAM STAIN: CPT | Performed by: SURGERY

## 2018-07-30 PROCEDURE — 87070 CULTURE OTHR SPECIMN AEROBIC: CPT | Performed by: SURGERY

## 2018-07-30 PROCEDURE — 87076 CULTURE ANAEROBE IDENT EACH: CPT | Performed by: SURGERY

## 2018-08-01 ENCOUNTER — OFFICE VISIT (OUTPATIENT)
Dept: INFECTIOUS DISEASES | Facility: CLINIC | Age: 78
End: 2018-08-01

## 2018-08-01 VITALS
DIASTOLIC BLOOD PRESSURE: 63 MMHG | HEART RATE: 98 BPM | SYSTOLIC BLOOD PRESSURE: 111 MMHG | HEIGHT: 70 IN | TEMPERATURE: 98.1 F | RESPIRATION RATE: 12 BRPM

## 2018-08-01 DIAGNOSIS — S81.802A LEG WOUND, LEFT, INITIAL ENCOUNTER: Primary | ICD-10-CM

## 2018-08-01 DIAGNOSIS — Z79.2 LONG TERM (CURRENT) USE OF ANTIBIOTICS: ICD-10-CM

## 2018-08-01 DIAGNOSIS — M46.40 DISCITIS, UNSPECIFIED SPINAL REGION: ICD-10-CM

## 2018-08-01 PROCEDURE — 99214 OFFICE O/P EST MOD 30 MIN: CPT | Performed by: INTERNAL MEDICINE

## 2018-08-01 RX ORDER — FUROSEMIDE 20 MG/1
10 TABLET ORAL 2 TIMES DAILY
COMMUNITY
End: 2019-11-12

## 2018-08-01 RX ORDER — SODIUM HYPOCHLORITE 0.5 %
SOLUTION, NON-ORAL MISCELLANEOUS
COMMUNITY
End: 2018-11-19

## 2018-08-01 RX ORDER — SULFAMETHOXAZOLE AND TRIMETHOPRIM 800; 160 MG/1; MG/1
1 TABLET ORAL 2 TIMES DAILY
Qty: 30 TABLET | Refills: 1 | Status: SHIPPED | OUTPATIENT
Start: 2018-08-01 | End: 2018-08-27 | Stop reason: SDUPTHER

## 2018-08-01 NOTE — PROGRESS NOTES
Reason for clinic visits: Follow up    HPI: Jesus Beckham is a 77 y.o. male who was last seen in clinic on 5/2.  As far as his back is concerned he has been doing well.  He has remained on his suppressive Levaquin.  His back pain has resolved.  He denies any fevers chills or night sweats.  Unfortunately he is left shin on a car door sometime in May and since that time he has had a nonhealing wound.  He has been followed in the wound care center and affect discussed this case with Dr. Brand.  He has a unusual case and that the wound is not healing and actually enlarging for unclear reasons.  He had vascular studies done which did not show any compromise blood flow.  He did not have any overt signs of infection blood cultures showed enterococcus and MRSA and he was treated with Augmentin and Bactrim for 2 weeks starting July 11.  Despite this his wound continues not to heal and the plan is for the patient to undergo hyperbaric oxygen treatment.  The patient reports significant left lower extremity pain associated with the wound.  He denies any abdominal pain nausea vomiting or diarrhea.  No shortness of breath or cough.  No dysuria increasing urinary frequency.  No rash.    PMH:  • Arthritis     • Cancer of bladder 2016   • Diabetes mellitus     • Gout     • Hyperlipidemia     • Skin carcinoma     • Type 2 diabetes mellitus     • Vitamin D deficiency      Past Surgical History:   Procedure Laterality Date   • APPENDECTOMY N/A 1961   • CHOLECYSTECTOMY WITH INTRAOPERATIVE CHOLANGIOGRAM N/A 2/25/2018   • EYE SURGERY  1959    glass removal from eye, lens transplant   • LAMINECTOMY N/A 01/18/2016    L2-L3, L3-L4, L4-L5, and L5-S1 bilateral lamincectomy, medial facetectomy & [id number], Dr. Kaiden Valdes   • LUMBAR FUSION N/A 3/7/2018    Procedure: LUMBAR FUSION MINIMALLY INVASIVE TRANSFORAMINAL LUMBAR INTERBODY IRRIGATION AND DEBRIDEMENT AND FUSION.  IRRIGATION AND DEBRIDEMENT OF EPIDURAL ABCESS LUMBAR 2-4. BONE  MORPHOGENIC PROTEIN, ALPHATEC NOVEL AND ILLICO, EMG AND SSEP NEUROMONITORING.;  Surgeon: Manish Marr DO;  Location: [facility];  Service: Orthopedic Spine   • TOTAL KNEE ARTHROPLASTY Right 03/07/2005    Dr. Washington Evans   • VENA CAVA FILTER INSERTION Right 3/6/2018    Procedure: VENA CAVA FILTER INSERTION;  Surgeon: Alexis Thakkar MD;  Location: [facility] 18/19;  Service:        Social History   reports that he quit smoking about 18 months ago. His smoking use included Cigars. His smokeless tobacco use includes Chew. He reports that he does not drink alcohol or use drugs.    Family History  family history includes Cancer in his mother.    No Known Allergies    The medication list has been reviewed and updated.     Review of Systems  Pertinent items are noted in HPI, all other systems reviewed and negative    Vital Signs   Temp:  [98.1 °F (36.7 °C)] 98.1 °F (36.7 °C)  Heart Rate:  [98] 98  Resp:  [12] 12  BP: (111)/(63) 111/63    Physical Exam:   General: In no acute distress  Cardiovascular: Normal rate, regular rhythm, no LE edema    Respiratory: Lungs are clear to ascultation bilaterally, no wheezing   GI: Abdomen is soft, non-tender, non-distended, positive bowel sounds bilaterally, incisions healed well without purulence or erythema  Skin: No rashes   Left lower extremity with a wound on his shin without erythema or purulence    Lab Results   Component Value Date    WBC 7.56 07/11/2018    HGB 13.6 (L) 07/11/2018    HCT 43.5 07/11/2018    MCV 86.3 07/11/2018     07/11/2018       Lab Results   Component Value Date    GLUCOSE 385 (H) 07/11/2018    BUN 18 07/11/2018    CREATININE 0.97 07/11/2018    EGFRIFNONA 75 07/11/2018    EGFRIFAFRI 102 06/13/2018    BCR 18.6 07/11/2018    CO2 23.5 07/11/2018    CALCIUM 9.5 07/11/2018    PROTENTOTREF 6.3 05/22/2018    ALBUMIN 3.90 07/11/2018    LABIL2 1.5 05/22/2018    AST 16 07/11/2018    ALT 16 07/11/2018       Lab Results   Component Value Date     SEDRATE 19 07/11/2018       Lab Results   Component Value Date    CRP 1.65 (H) 07/11/2018 7/30 L leg wound MRSA + corynebacterium  7/2 L leg wound MRSA and enterococcus     3/7 Operative back cx E coli  3/4 UCx 10-20K candida  2/28 BCx Neg x 2  2/24 BCx Neg x 2  2/23 BCx E coli 2/2     Assessment:  This is a 77 y.o. male who presents to clinic today for follow up of Escherichia coli bacteremia, discitis, paravertebral soft tissue infection, psoas muscle abscess and epidural abscess.  He is status post incision and drainage and lumbar fusion on March 7.  He has completed an 8 week course of ceftriaxone and a three-month course of suppressive Levaquin.  He is doing well without any active signs of infection.  Unfortunately he also developed a wound on his left lower extremity after trauma which is not healing.  He does not have any overt sign of infection but debridement cultures are growing MRSA.  At this time I will switch the patient to Bactrim.  Not only will this cover the Escherichia coli but also the MRSA found in his leg wound.  We will start him on Bactrim double strength 1 tab by mouth twice a day for the next month.    Plan:   1.  DC Levaquin   2.  Start Bactrim double strength 1 tab by mouth twice a day ×4 weeks    Return to Infectious Disease clinic in 4 weeks

## 2018-08-02 ENCOUNTER — OFFICE VISIT (OUTPATIENT)
Dept: WOUND CARE | Facility: HOSPITAL | Age: 78
End: 2018-08-02

## 2018-08-02 LAB
BACTERIA SPEC AEROBE CULT: ABNORMAL
BACTERIA SPEC AEROBE CULT: ABNORMAL
GLUCOSE BLDC GLUCOMTR-MCNC: 193 MG/DL (ref 70–130)
GLUCOSE BLDC GLUCOMTR-MCNC: 255 MG/DL (ref 70–130)
GRAM STN SPEC: ABNORMAL
GRAM STN SPEC: ABNORMAL

## 2018-08-02 PROCEDURE — G0277 HBOT, FULL BODY CHAMBER, 30M: HCPCS

## 2018-08-02 PROCEDURE — 82962 GLUCOSE BLOOD TEST: CPT

## 2018-08-03 ENCOUNTER — OFFICE VISIT (OUTPATIENT)
Dept: WOUND CARE | Facility: HOSPITAL | Age: 78
End: 2018-08-03

## 2018-08-03 LAB
GLUCOSE BLDC GLUCOMTR-MCNC: 204 MG/DL (ref 70–130)
GLUCOSE BLDC GLUCOMTR-MCNC: 309 MG/DL (ref 70–130)

## 2018-08-03 PROCEDURE — 82962 GLUCOSE BLOOD TEST: CPT

## 2018-08-03 PROCEDURE — G0277 HBOT, FULL BODY CHAMBER, 30M: HCPCS

## 2018-08-04 LAB — BACTERIA SPEC ANAEROBE CULT: NORMAL

## 2018-08-06 ENCOUNTER — OFFICE VISIT (OUTPATIENT)
Dept: WOUND CARE | Facility: HOSPITAL | Age: 78
End: 2018-08-06

## 2018-08-06 ENCOUNTER — OFFICE VISIT (OUTPATIENT)
Dept: WOUND CARE | Facility: HOSPITAL | Age: 78
End: 2018-08-06
Attending: SURGERY

## 2018-08-06 LAB
GLUCOSE BLDC GLUCOMTR-MCNC: 136 MG/DL (ref 70–130)
GLUCOSE BLDC GLUCOMTR-MCNC: 247 MG/DL (ref 70–130)

## 2018-08-06 PROCEDURE — G0277 HBOT, FULL BODY CHAMBER, 30M: HCPCS

## 2018-08-06 PROCEDURE — 82962 GLUCOSE BLOOD TEST: CPT

## 2018-08-07 ENCOUNTER — OFFICE VISIT (OUTPATIENT)
Dept: WOUND CARE | Facility: HOSPITAL | Age: 78
End: 2018-08-07

## 2018-08-07 LAB
GLUCOSE BLDC GLUCOMTR-MCNC: 168 MG/DL (ref 70–130)
GLUCOSE BLDC GLUCOMTR-MCNC: 294 MG/DL (ref 70–130)

## 2018-08-07 PROCEDURE — G0277 HBOT, FULL BODY CHAMBER, 30M: HCPCS

## 2018-08-07 PROCEDURE — 82962 GLUCOSE BLOOD TEST: CPT

## 2018-08-08 ENCOUNTER — OFFICE VISIT (OUTPATIENT)
Dept: WOUND CARE | Facility: HOSPITAL | Age: 78
End: 2018-08-08

## 2018-08-08 LAB
GLUCOSE BLDC GLUCOMTR-MCNC: 164 MG/DL (ref 70–130)
GLUCOSE BLDC GLUCOMTR-MCNC: 207 MG/DL (ref 70–130)

## 2018-08-08 PROCEDURE — G0277 HBOT, FULL BODY CHAMBER, 30M: HCPCS

## 2018-08-08 PROCEDURE — 82962 GLUCOSE BLOOD TEST: CPT

## 2018-08-09 ENCOUNTER — OFFICE VISIT (OUTPATIENT)
Dept: WOUND CARE | Facility: HOSPITAL | Age: 78
End: 2018-08-09

## 2018-08-09 ENCOUNTER — OFFICE VISIT (OUTPATIENT)
Dept: FAMILY MEDICINE CLINIC | Facility: CLINIC | Age: 78
End: 2018-08-09

## 2018-08-09 VITALS
BODY MASS INDEX: 34.07 KG/M2 | TEMPERATURE: 98.5 F | SYSTOLIC BLOOD PRESSURE: 116 MMHG | DIASTOLIC BLOOD PRESSURE: 63 MMHG | HEART RATE: 44 BPM | HEIGHT: 70 IN | WEIGHT: 238 LBS | RESPIRATION RATE: 14 BRPM

## 2018-08-09 DIAGNOSIS — F51.01 PRIMARY INSOMNIA: Primary | ICD-10-CM

## 2018-08-09 DIAGNOSIS — M79.605 PAIN OF LEFT LOWER EXTREMITY: ICD-10-CM

## 2018-08-09 LAB
GLUCOSE BLDC GLUCOMTR-MCNC: 248 MG/DL (ref 70–130)
GLUCOSE BLDC GLUCOMTR-MCNC: 352 MG/DL (ref 70–130)

## 2018-08-09 PROCEDURE — 82962 GLUCOSE BLOOD TEST: CPT

## 2018-08-09 PROCEDURE — 99213 OFFICE O/P EST LOW 20 MIN: CPT | Performed by: FAMILY MEDICINE

## 2018-08-09 PROCEDURE — G0277 HBOT, FULL BODY CHAMBER, 30M: HCPCS

## 2018-08-09 RX ORDER — TRAZODONE HYDROCHLORIDE 150 MG/1
TABLET ORAL
Qty: 60 TABLET | Refills: 5 | Status: SHIPPED | OUTPATIENT
Start: 2018-08-09 | End: 2019-02-16 | Stop reason: SDUPTHER

## 2018-08-09 RX ORDER — HYDROCODONE BITARTRATE AND ACETAMINOPHEN 5; 325 MG/1; MG/1
TABLET ORAL
Qty: 60 TABLET | Refills: 0 | Status: SHIPPED | OUTPATIENT
Start: 2018-08-09 | End: 2018-11-19

## 2018-08-09 NOTE — PROGRESS NOTES
"Subjective   Jesus Beckham is a 77 y.o. male.     CC: Insomnia    History of Present Illness     Pt with multiple medical issues comes in today reporting insomnia issues. Pt reports this is mainly due to his LLE pain (has neuropathic wound and is seeing Wound Care and getting Hyperbaric treatments. Reports the pain is the main  of the insomnia and that the Tramadol just doesn't help much.       The following portions of the patient's history were reviewed and updated as appropriate: allergies, current medications, past family history, past medical history, past social history, past surgical history and problem list.    Review of Systems   Constitutional: Negative for activity change, chills, fatigue and fever.   Respiratory: Negative for cough and shortness of breath.    Cardiovascular: Negative for chest pain and palpitations.   Gastrointestinal: Negative for abdominal pain.   Endocrine: Negative for cold intolerance.   Psychiatric/Behavioral: Positive for sleep disturbance. Negative for behavioral problems and dysphoric mood. The patient is not nervous/anxious.      /63   Pulse (!) 44   Temp 98.5 °F (36.9 °C) (Oral)   Resp 14   Ht 177.8 cm (70\")   Wt 108 kg (238 lb)   BMI 34.15 kg/m²     Objective   Physical Exam   Constitutional: He appears well-developed and well-nourished.   Neck: Neck supple. No thyromegaly present.   Cardiovascular: Normal rate and regular rhythm.    No murmur heard.  Pulmonary/Chest: Effort normal and breath sounds normal.   Abdominal: Bowel sounds are normal. There is no tenderness.   Psychiatric: He has a normal mood and affect. His behavior is normal.   Nursing note and vitals reviewed.    The patient has read and signed the [facility] Controlled Substance Contract.  I will continue to see patient for regular follow up appointments.  They are well controlled on their medication.  FAINA has been reviewed by me and is updated every 3 months. The patient is aware of " the potential for addiction and dependence.    Assessment/Plan   Jesus was seen today for difficulty sleeping.    Diagnoses and all orders for this visit:    Primary insomnia  -     traZODone (DESYREL) 150 MG tablet; Take 0.5-2 tabs qhs prn sleep    Pain of left lower extremity  -     HYDROcodone-acetaminophen (NORCO) 5-325 MG per tablet; Take 1-2 qhs prn pain

## 2018-08-10 ENCOUNTER — OFFICE VISIT (OUTPATIENT)
Dept: WOUND CARE | Facility: HOSPITAL | Age: 78
End: 2018-08-10

## 2018-08-10 LAB
GLUCOSE BLDC GLUCOMTR-MCNC: 191 MG/DL (ref 70–130)
GLUCOSE BLDC GLUCOMTR-MCNC: 245 MG/DL (ref 70–130)

## 2018-08-10 PROCEDURE — G0277 HBOT, FULL BODY CHAMBER, 30M: HCPCS

## 2018-08-10 PROCEDURE — 82962 GLUCOSE BLOOD TEST: CPT

## 2018-08-13 ENCOUNTER — OFFICE VISIT (OUTPATIENT)
Dept: WOUND CARE | Facility: HOSPITAL | Age: 78
End: 2018-08-13

## 2018-08-13 ENCOUNTER — OFFICE VISIT (OUTPATIENT)
Dept: WOUND CARE | Facility: HOSPITAL | Age: 78
End: 2018-08-13
Attending: SURGERY

## 2018-08-13 LAB
GLUCOSE BLDC GLUCOMTR-MCNC: 238 MG/DL (ref 70–130)
GLUCOSE BLDC GLUCOMTR-MCNC: 238 MG/DL (ref 70–130)

## 2018-08-13 PROCEDURE — 82962 GLUCOSE BLOOD TEST: CPT

## 2018-08-13 PROCEDURE — G0277 HBOT, FULL BODY CHAMBER, 30M: HCPCS

## 2018-08-14 ENCOUNTER — OFFICE VISIT (OUTPATIENT)
Dept: WOUND CARE | Facility: HOSPITAL | Age: 78
End: 2018-08-14

## 2018-08-14 LAB
GLUCOSE BLDC GLUCOMTR-MCNC: 266 MG/DL (ref 70–130)
GLUCOSE BLDC GLUCOMTR-MCNC: 279 MG/DL (ref 70–130)

## 2018-08-14 PROCEDURE — 82962 GLUCOSE BLOOD TEST: CPT

## 2018-08-14 PROCEDURE — G0277 HBOT, FULL BODY CHAMBER, 30M: HCPCS

## 2018-08-15 ENCOUNTER — OFFICE VISIT (OUTPATIENT)
Dept: WOUND CARE | Facility: HOSPITAL | Age: 78
End: 2018-08-15

## 2018-08-15 LAB
GLUCOSE BLDC GLUCOMTR-MCNC: 146 MG/DL (ref 70–130)
GLUCOSE BLDC GLUCOMTR-MCNC: 251 MG/DL (ref 70–130)
GLUCOSE BLDC GLUCOMTR-MCNC: 326 MG/DL (ref 70–130)

## 2018-08-15 PROCEDURE — 82962 GLUCOSE BLOOD TEST: CPT

## 2018-08-15 PROCEDURE — G0277 HBOT, FULL BODY CHAMBER, 30M: HCPCS

## 2018-08-16 ENCOUNTER — OFFICE VISIT (OUTPATIENT)
Dept: WOUND CARE | Facility: HOSPITAL | Age: 78
End: 2018-08-16

## 2018-08-16 ENCOUNTER — TELEPHONE (OUTPATIENT)
Dept: FAMILY MEDICINE CLINIC | Facility: CLINIC | Age: 78
End: 2018-08-16

## 2018-08-16 DIAGNOSIS — J30.1 CHRONIC SEASONAL ALLERGIC RHINITIS DUE TO POLLEN: ICD-10-CM

## 2018-08-16 LAB
GLUCOSE BLDC GLUCOMTR-MCNC: 179 MG/DL (ref 70–130)
GLUCOSE BLDC GLUCOMTR-MCNC: 209 MG/DL (ref 70–130)

## 2018-08-16 PROCEDURE — G0277 HBOT, FULL BODY CHAMBER, 30M: HCPCS

## 2018-08-16 PROCEDURE — 82962 GLUCOSE BLOOD TEST: CPT

## 2018-08-16 RX ORDER — FLUTICASONE PROPIONATE 50 MCG
2 SPRAY, SUSPENSION (ML) NASAL DAILY
Qty: 9.9 ML | Refills: 0 | Status: SHIPPED | OUTPATIENT
Start: 2018-08-16 | End: 2018-08-30

## 2018-08-16 RX ORDER — FLUTICASONE PROPIONATE 50 MCG
2 SPRAY, SUSPENSION (ML) NASAL DAILY
Qty: 3 BOTTLE | Refills: 3 | Status: SHIPPED | OUTPATIENT
Start: 2018-08-16 | End: 2019-12-30 | Stop reason: SDUPTHER

## 2018-08-17 ENCOUNTER — OFFICE VISIT (OUTPATIENT)
Dept: WOUND CARE | Facility: HOSPITAL | Age: 78
End: 2018-08-17

## 2018-08-17 LAB
GLUCOSE BLDC GLUCOMTR-MCNC: 188 MG/DL (ref 70–130)
GLUCOSE BLDC GLUCOMTR-MCNC: 215 MG/DL (ref 70–130)

## 2018-08-17 PROCEDURE — 82962 GLUCOSE BLOOD TEST: CPT

## 2018-08-17 PROCEDURE — G0277 HBOT, FULL BODY CHAMBER, 30M: HCPCS

## 2018-08-20 ENCOUNTER — OFFICE VISIT (OUTPATIENT)
Dept: WOUND CARE | Facility: HOSPITAL | Age: 78
End: 2018-08-20
Attending: SURGERY

## 2018-08-20 ENCOUNTER — OFFICE VISIT (OUTPATIENT)
Dept: WOUND CARE | Facility: HOSPITAL | Age: 78
End: 2018-08-20

## 2018-08-20 LAB
GLUCOSE BLDC GLUCOMTR-MCNC: 180 MG/DL (ref 70–130)
GLUCOSE BLDC GLUCOMTR-MCNC: 209 MG/DL (ref 70–130)

## 2018-08-20 PROCEDURE — G0277 HBOT, FULL BODY CHAMBER, 30M: HCPCS

## 2018-08-20 PROCEDURE — 82962 GLUCOSE BLOOD TEST: CPT

## 2018-08-21 ENCOUNTER — OFFICE VISIT (OUTPATIENT)
Dept: WOUND CARE | Facility: HOSPITAL | Age: 78
End: 2018-08-21

## 2018-08-21 LAB
GLUCOSE BLDC GLUCOMTR-MCNC: 280 MG/DL (ref 70–130)
GLUCOSE BLDC GLUCOMTR-MCNC: 305 MG/DL (ref 70–130)

## 2018-08-21 PROCEDURE — G0277 HBOT, FULL BODY CHAMBER, 30M: HCPCS

## 2018-08-21 PROCEDURE — 82962 GLUCOSE BLOOD TEST: CPT

## 2018-08-22 ENCOUNTER — TELEPHONE (OUTPATIENT)
Dept: INFECTIOUS DISEASES | Facility: CLINIC | Age: 78
End: 2018-08-22

## 2018-08-22 ENCOUNTER — OFFICE VISIT (OUTPATIENT)
Dept: WOUND CARE | Facility: HOSPITAL | Age: 78
End: 2018-08-22

## 2018-08-22 LAB
GLUCOSE BLDC GLUCOMTR-MCNC: 152 MG/DL (ref 70–130)
GLUCOSE BLDC GLUCOMTR-MCNC: 204 MG/DL (ref 70–130)

## 2018-08-22 PROCEDURE — G0277 HBOT, FULL BODY CHAMBER, 30M: HCPCS

## 2018-08-22 PROCEDURE — 82962 GLUCOSE BLOOD TEST: CPT

## 2018-08-22 NOTE — TELEPHONE ENCOUNTER
Left message with wife that they could come a few minutes early for our appt on 8/27 and then let Dr Brand's office know if Mr Beckham will be a few minutes late to their appt at 10:30. Josefa

## 2018-08-23 ENCOUNTER — OFFICE VISIT (OUTPATIENT)
Dept: WOUND CARE | Facility: HOSPITAL | Age: 78
End: 2018-08-23

## 2018-08-23 DIAGNOSIS — IMO0002 UNCONTROLLED TYPE 2 DIABETES MELLITUS WITH COMPLICATION, WITH LONG-TERM CURRENT USE OF INSULIN: Primary | ICD-10-CM

## 2018-08-23 DIAGNOSIS — E79.0 HYPERURICEMIA: ICD-10-CM

## 2018-08-23 DIAGNOSIS — E78.5 DYSLIPIDEMIA: ICD-10-CM

## 2018-08-23 DIAGNOSIS — E55.9 VITAMIN D DEFICIENCY: ICD-10-CM

## 2018-08-23 LAB
GLUCOSE BLDC GLUCOMTR-MCNC: 202 MG/DL (ref 70–130)
GLUCOSE BLDC GLUCOMTR-MCNC: 209 MG/DL (ref 70–130)

## 2018-08-23 PROCEDURE — G0277 HBOT, FULL BODY CHAMBER, 30M: HCPCS

## 2018-08-23 PROCEDURE — 82962 GLUCOSE BLOOD TEST: CPT

## 2018-08-24 ENCOUNTER — OFFICE VISIT (OUTPATIENT)
Dept: WOUND CARE | Facility: HOSPITAL | Age: 78
End: 2018-08-24

## 2018-08-24 LAB
GLUCOSE BLDC GLUCOMTR-MCNC: 212 MG/DL (ref 70–130)
GLUCOSE BLDC GLUCOMTR-MCNC: 278 MG/DL (ref 70–130)

## 2018-08-24 PROCEDURE — 82962 GLUCOSE BLOOD TEST: CPT

## 2018-08-24 PROCEDURE — G0277 HBOT, FULL BODY CHAMBER, 30M: HCPCS

## 2018-08-27 ENCOUNTER — OFFICE VISIT (OUTPATIENT)
Dept: WOUND CARE | Facility: HOSPITAL | Age: 78
End: 2018-08-27

## 2018-08-27 ENCOUNTER — OFFICE VISIT (OUTPATIENT)
Dept: WOUND CARE | Facility: HOSPITAL | Age: 78
End: 2018-08-27
Attending: SURGERY

## 2018-08-27 ENCOUNTER — APPOINTMENT (OUTPATIENT)
Dept: LAB | Facility: HOSPITAL | Age: 78
End: 2018-08-27

## 2018-08-27 ENCOUNTER — OFFICE VISIT (OUTPATIENT)
Dept: INFECTIOUS DISEASES | Facility: CLINIC | Age: 78
End: 2018-08-27

## 2018-08-27 VITALS
HEART RATE: 82 BPM | HEIGHT: 70 IN | SYSTOLIC BLOOD PRESSURE: 115 MMHG | TEMPERATURE: 98.1 F | DIASTOLIC BLOOD PRESSURE: 73 MMHG

## 2018-08-27 DIAGNOSIS — Z79.2 LONG TERM CURRENT USE OF ANTIBIOTICS: ICD-10-CM

## 2018-08-27 DIAGNOSIS — S81.802D WOUND OF LEFT LOWER EXTREMITY, SUBSEQUENT ENCOUNTER: ICD-10-CM

## 2018-08-27 DIAGNOSIS — M46.20 VERTEBRAL OSTEOMYELITIS, ACUTE (HCC): Primary | ICD-10-CM

## 2018-08-27 LAB
ALBUMIN SERPL-MCNC: 3.9 G/DL (ref 3.5–5.2)
ALBUMIN/GLOB SERPL: 1.3 G/DL
ALP SERPL-CCNC: 89 U/L (ref 39–117)
ALT SERPL W P-5'-P-CCNC: 25 U/L (ref 1–41)
ANION GAP SERPL CALCULATED.3IONS-SCNC: 12.4 MMOL/L
AST SERPL-CCNC: 21 U/L (ref 1–40)
BASOPHILS # BLD AUTO: 0.02 10*3/MM3 (ref 0–0.2)
BASOPHILS NFR BLD AUTO: 0.3 % (ref 0–1.5)
BILIRUB SERPL-MCNC: 0.3 MG/DL (ref 0.1–1.2)
BUN BLD-MCNC: 18 MG/DL (ref 8–23)
BUN/CREAT SERPL: 14.2 (ref 7–25)
CALCIUM SPEC-SCNC: 8.8 MG/DL (ref 8.6–10.5)
CHLORIDE SERPL-SCNC: 100 MMOL/L (ref 98–107)
CO2 SERPL-SCNC: 26.6 MMOL/L (ref 22–29)
CREAT BLD-MCNC: 1.27 MG/DL (ref 0.76–1.27)
DEPRECATED RDW RBC AUTO: 56.7 FL (ref 37–54)
EOSINOPHIL # BLD AUTO: 0.14 10*3/MM3 (ref 0–0.7)
EOSINOPHIL NFR BLD AUTO: 2.4 % (ref 0.3–6.2)
ERYTHROCYTE [DISTWIDTH] IN BLOOD BY AUTOMATED COUNT: 17.5 % (ref 11.5–14.5)
GFR SERPL CREATININE-BSD FRML MDRD: 55 ML/MIN/1.73
GLOBULIN UR ELPH-MCNC: 2.9 GM/DL
GLUCOSE BLD-MCNC: 312 MG/DL (ref 65–99)
GLUCOSE BLDC GLUCOMTR-MCNC: 214 MG/DL (ref 70–130)
GLUCOSE BLDC GLUCOMTR-MCNC: 244 MG/DL (ref 70–130)
HCT VFR BLD AUTO: 41.5 % (ref 40.4–52.2)
HGB BLD-MCNC: 12.6 G/DL (ref 13.7–17.6)
IMM GRANULOCYTES # BLD: 0.03 10*3/MM3 (ref 0–0.03)
IMM GRANULOCYTES NFR BLD: 0.5 % (ref 0–0.5)
LYMPHOCYTES # BLD AUTO: 0.99 10*3/MM3 (ref 0.9–4.8)
LYMPHOCYTES NFR BLD AUTO: 17.2 % (ref 19.6–45.3)
MCH RBC QN AUTO: 26.9 PG (ref 27–32.7)
MCHC RBC AUTO-ENTMCNC: 30.4 G/DL (ref 32.6–36.4)
MCV RBC AUTO: 88.7 FL (ref 79.8–96.2)
MONOCYTES # BLD AUTO: 0.3 10*3/MM3 (ref 0.2–1.2)
MONOCYTES NFR BLD AUTO: 5.2 % (ref 5–12)
NEUTROPHILS # BLD AUTO: 4.27 10*3/MM3 (ref 1.9–8.1)
NEUTROPHILS NFR BLD AUTO: 74.4 % (ref 42.7–76)
PLATELET # BLD AUTO: 162 10*3/MM3 (ref 140–500)
PMV BLD AUTO: 11.6 FL (ref 6–12)
POTASSIUM BLD-SCNC: 4.4 MMOL/L (ref 3.5–5.2)
PROT SERPL-MCNC: 6.8 G/DL (ref 6–8.5)
RBC # BLD AUTO: 4.68 10*6/MM3 (ref 4.6–6)
SODIUM BLD-SCNC: 139 MMOL/L (ref 136–145)
WBC NRBC COR # BLD: 5.75 10*3/MM3 (ref 4.5–10.7)

## 2018-08-27 PROCEDURE — G0277 HBOT, FULL BODY CHAMBER, 30M: HCPCS

## 2018-08-27 PROCEDURE — 36415 COLL VENOUS BLD VENIPUNCTURE: CPT | Performed by: INTERNAL MEDICINE

## 2018-08-27 PROCEDURE — 82962 GLUCOSE BLOOD TEST: CPT

## 2018-08-27 PROCEDURE — 80053 COMPREHEN METABOLIC PANEL: CPT | Performed by: INTERNAL MEDICINE

## 2018-08-27 PROCEDURE — 99214 OFFICE O/P EST MOD 30 MIN: CPT | Performed by: INTERNAL MEDICINE

## 2018-08-27 PROCEDURE — 85025 COMPLETE CBC W/AUTO DIFF WBC: CPT | Performed by: INTERNAL MEDICINE

## 2018-08-27 RX ORDER — SULFAMETHOXAZOLE AND TRIMETHOPRIM 800; 160 MG/1; MG/1
1 TABLET ORAL 2 TIMES DAILY
Qty: 30 TABLET | Refills: 3 | Status: SHIPPED | OUTPATIENT
Start: 2018-08-27 | End: 2018-09-26

## 2018-08-27 NOTE — PROGRESS NOTES
Reason for clinic visits: Follow up    HPI: Jesus Beckham is a 77 y.o. male who was last seen in clinic on 8/1.  Since that time he has been on Bactrim to cover both the Escherichia coli that he had in his back as well as the MRSA found in his left lower extremity wound.  He has tolerated the antibiotic therapy without any issues.  Specifically he denies any abdominal pain nausea vomiting or diarrhea.  No rash.  He denies any back pain.  No fevers chills or night sweats.  His left lower extremity wound continues to heal.  He is currently towards the end of his hyperbaric oxygen treatments.  He denies any erythema or purulence from the wound.    PMH:  • Arthritis     • Cancer of bladder 2016   • Diabetes mellitus     • Gout     • Hyperlipidemia     • Skin carcinoma     • Type 2 diabetes mellitus     • Vitamin D deficiency      Past Surgical History:   Procedure Laterality Date   • APPENDECTOMY N/A 1961   • CHOLECYSTECTOMY WITH INTRAOPERATIVE CHOLANGIOGRAM N/A 2/25/2018   • EYE SURGERY  1959    glass removal from eye, lens transplant   • LAMINECTOMY N/A 01/18/2016    L2-L3, L3-L4, L4-L5, and L5-S1 bilateral lamincectomy, medial facetectomy & [id number], Dr. Kaiden Valdes   • LUMBAR FUSION N/A 3/7/2018    Procedure: LUMBAR FUSION MINIMALLY INVASIVE TRANSFORAMINAL LUMBAR INTERBODY IRRIGATION AND DEBRIDEMENT AND FUSION.  IRRIGATION AND DEBRIDEMENT OF EPIDURAL ABCESS LUMBAR 2-4. BONE MORPHOGENIC PROTEIN, ALPHATEC NOVEL AND ILLICO, EMG AND SSEP NEUROMONITORING.;  Surgeon: Manish Marr DO;  Location: [facility] OR;  Service: Orthopedic Spine   • TOTAL KNEE ARTHROPLASTY Right 03/07/2005    Dr. Washington Evans   • VENA CAVA FILTER INSERTION Right 3/6/2018    Procedure: VENA CAVA FILTER INSERTION;  Surgeon: Alexis Thakkar MD;  Location: [facility] OR 18/19;  Service:        Social History   reports that he quit smoking about 19 months ago. His smoking use included Cigars. His smokeless tobacco use includes Chew. He  reports that he does not drink alcohol or use drugs.    Family History  family history includes Cancer in his mother.    No Known Allergies    The medication list has been reviewed and updated.     Review of Systems  Pertinent items are noted in HPI, all other systems reviewed and negative    Vital Signs   Temp:  [98.1 °F (36.7 °C)] 98.1 °F (36.7 °C)  Heart Rate:  [82] 82  BP: (115)/(73) 115/73    Physical Exam:   General: In no acute distress  Cardiovascular: Normal rate, regular rhythm, no LE edema    Respiratory: Lungs are clear to ascultation bilaterally, no wheezing   GI: Abdomen is soft, non-tender, non-distended, positive bowel sounds bilaterally, incisions healed well without purulence or erythema  Skin: No rashes   Left lower extremity with a wound on his shin without erythema or purulence    Lab Results   Component Value Date    WBC 5.75 08/27/2018    HGB 12.6 (L) 08/27/2018    HCT 41.5 08/27/2018    MCV 88.7 08/27/2018     08/27/2018       Lab Results   Component Value Date    GLUCOSE 312 (H) 08/27/2018    BUN 18 08/27/2018    CREATININE 1.27 08/27/2018    EGFRIFNONA 55 (L) 08/27/2018    EGFRIFAFRI 102 06/13/2018    BCR 14.2 08/27/2018    CO2 26.6 08/27/2018    CALCIUM 8.8 08/27/2018    PROTENTOTREF 6.3 05/22/2018    ALBUMIN 3.90 08/27/2018    LABIL2 1.5 05/22/2018    AST 21 08/27/2018    ALT 25 08/27/2018       Lab Results   Component Value Date    SEDRATE 19 07/11/2018       Lab Results   Component Value Date    CRP 1.65 (H) 07/11/2018 7/30 L leg wound MRSA + corynebacterium  7/2 L leg wound MRSA and enterococcus     3/7 Operative back cx E coli  3/4 UCx 10-20K candida  2/28 BCx Neg x 2  2/24 BCx Neg x 2  2/23 BCx E coli 2/2     Assessment:  This is a 77 y.o. male who presents to clinic today for follow up of Escherichia coli bacteremia, discitis, paravertebral soft tissue infection, psoas muscle abscess and epidural abscess.  He is status post incision and drainage and lumbar fusion on March  7.  He has completed an 8 week course of ceftriaxone and a three-month course of suppressive Levaquin.  He has been on a one-month therapy course of Bactrim.  This was switched when he developed an MRSA left lower extremity wound.  At this time we will continue him on Bactrim suppressive therapy for another 3 months.  I'll call and will be to continue suppressive therapy for a bout 1 year since his back s    Plan:   1. Continue Bactrim double strength 1 tab by mouth twice a day × 3 months  2.  Check a CBC with differential and CMP    Return to Infectious Disease clinic in 3 months

## 2018-08-28 ENCOUNTER — OFFICE VISIT (OUTPATIENT)
Dept: WOUND CARE | Facility: HOSPITAL | Age: 78
End: 2018-08-28

## 2018-08-28 LAB
GLUCOSE BLDC GLUCOMTR-MCNC: 173 MG/DL (ref 70–130)
GLUCOSE BLDC GLUCOMTR-MCNC: 198 MG/DL (ref 70–130)
GLUCOSE BLDC GLUCOMTR-MCNC: 203 MG/DL (ref 70–130)

## 2018-08-28 PROCEDURE — G0277 HBOT, FULL BODY CHAMBER, 30M: HCPCS

## 2018-08-28 PROCEDURE — 82962 GLUCOSE BLOOD TEST: CPT

## 2018-08-28 RX ORDER — MECOBAL/LEVOMEFOLAT CA/B6 PHOS 2-3-35 MG
TABLET ORAL
Qty: 180 TABLET | Refills: 0 | Status: SHIPPED | OUTPATIENT
Start: 2018-08-28 | End: 2018-10-31 | Stop reason: SDUPTHER

## 2018-08-29 ENCOUNTER — OFFICE VISIT (OUTPATIENT)
Dept: WOUND CARE | Facility: HOSPITAL | Age: 78
End: 2018-08-29

## 2018-08-29 LAB
GLUCOSE BLDC GLUCOMTR-MCNC: 233 MG/DL (ref 70–130)
GLUCOSE BLDC GLUCOMTR-MCNC: 241 MG/DL (ref 70–130)

## 2018-08-29 PROCEDURE — G0277 HBOT, FULL BODY CHAMBER, 30M: HCPCS

## 2018-08-29 PROCEDURE — 82962 GLUCOSE BLOOD TEST: CPT

## 2018-09-04 ENCOUNTER — OFFICE VISIT (OUTPATIENT)
Dept: WOUND CARE | Facility: HOSPITAL | Age: 78
End: 2018-09-04

## 2018-09-04 LAB
GLUCOSE BLDC GLUCOMTR-MCNC: 305 MG/DL (ref 70–130)
GLUCOSE BLDC GLUCOMTR-MCNC: 332 MG/DL (ref 70–130)

## 2018-09-04 PROCEDURE — 82962 GLUCOSE BLOOD TEST: CPT

## 2018-09-04 PROCEDURE — G0277 HBOT, FULL BODY CHAMBER, 30M: HCPCS

## 2018-09-05 ENCOUNTER — OFFICE VISIT (OUTPATIENT)
Dept: WOUND CARE | Facility: HOSPITAL | Age: 78
End: 2018-09-05

## 2018-09-05 LAB
GLUCOSE BLDC GLUCOMTR-MCNC: 198 MG/DL (ref 70–130)
GLUCOSE BLDC GLUCOMTR-MCNC: 260 MG/DL (ref 70–130)

## 2018-09-05 PROCEDURE — 82962 GLUCOSE BLOOD TEST: CPT

## 2018-09-05 PROCEDURE — G0277 HBOT, FULL BODY CHAMBER, 30M: HCPCS

## 2018-09-06 ENCOUNTER — OFFICE VISIT (OUTPATIENT)
Dept: WOUND CARE | Facility: HOSPITAL | Age: 78
End: 2018-09-06

## 2018-09-06 DIAGNOSIS — E78.5 DYSLIPIDEMIA: ICD-10-CM

## 2018-09-06 DIAGNOSIS — E55.9 VITAMIN D DEFICIENCY: ICD-10-CM

## 2018-09-06 DIAGNOSIS — IMO0002 UNCONTROLLED TYPE 2 DIABETES MELLITUS WITH COMPLICATION, WITH LONG-TERM CURRENT USE OF INSULIN: ICD-10-CM

## 2018-09-06 DIAGNOSIS — E79.0 HYPERURICEMIA: ICD-10-CM

## 2018-09-06 LAB — GLUCOSE BLDC GLUCOMTR-MCNC: 289 MG/DL (ref 70–130)

## 2018-09-06 PROCEDURE — G0277 HBOT, FULL BODY CHAMBER, 30M: HCPCS

## 2018-09-06 PROCEDURE — 82962 GLUCOSE BLOOD TEST: CPT

## 2018-09-06 RX ORDER — PREGABALIN 100 MG/1
CAPSULE ORAL
Qty: 21 CAPSULE | Refills: 0 | OUTPATIENT
Start: 2018-09-06 | End: 2018-10-04 | Stop reason: SDUPTHER

## 2018-09-07 ENCOUNTER — OFFICE VISIT (OUTPATIENT)
Dept: WOUND CARE | Facility: HOSPITAL | Age: 78
End: 2018-09-07

## 2018-09-07 LAB
25(OH)D3+25(OH)D2 SERPL-MCNC: 39.2 NG/ML (ref 30–100)
ALBUMIN SERPL-MCNC: 4.4 G/DL (ref 3.5–5.2)
ALBUMIN/GLOB SERPL: 2 G/DL
ALP SERPL-CCNC: 94 U/L (ref 39–117)
ALT SERPL-CCNC: 24 U/L (ref 1–41)
AST SERPL-CCNC: 20 U/L (ref 1–40)
BILIRUB SERPL-MCNC: 0.4 MG/DL (ref 0.1–1.2)
BUN SERPL-MCNC: 18 MG/DL (ref 8–23)
BUN/CREAT SERPL: 16.4 (ref 7–25)
C PEPTIDE SERPL-MCNC: 5.8 NG/ML (ref 1.1–4.4)
CALCIUM SERPL-MCNC: 9.6 MG/DL (ref 8.6–10.5)
CHLORIDE SERPL-SCNC: 97 MMOL/L (ref 98–107)
CHOLEST SERPL-MCNC: 130 MG/DL (ref 0–200)
CO2 SERPL-SCNC: 24.7 MMOL/L (ref 22–29)
CREAT SERPL-MCNC: 1.1 MG/DL (ref 0.76–1.27)
GLOBULIN SER CALC-MCNC: 2.2 GM/DL
GLUCOSE BLDC GLUCOMTR-MCNC: 253 MG/DL (ref 70–130)
GLUCOSE BLDC GLUCOMTR-MCNC: 308 MG/DL (ref 70–130)
GLUCOSE SERPL-MCNC: 276 MG/DL (ref 65–99)
HBA1C MFR BLD: 7.37 % (ref 4.8–5.6)
HDLC SERPL-MCNC: 40 MG/DL (ref 40–60)
INTERPRETATION: NORMAL
LDLC SERPL CALC-MCNC: 51 MG/DL (ref 0–100)
Lab: NORMAL
POTASSIUM SERPL-SCNC: 4.9 MMOL/L (ref 3.5–5.2)
PROT SERPL-MCNC: 6.6 G/DL (ref 6–8.5)
SODIUM SERPL-SCNC: 137 MMOL/L (ref 136–145)
T4 SERPL-MCNC: 5.68 MCG/DL (ref 4.5–11.7)
TRIGL SERPL-MCNC: 196 MG/DL (ref 0–150)
TSH SERPL DL<=0.005 MIU/L-ACNC: 2.75 MIU/ML (ref 0.27–4.2)
UNABLE TO VOID: NORMAL
URATE SERPL-MCNC: 2.5 MG/DL (ref 3.4–7)
VLDLC SERPL CALC-MCNC: 39.2 MG/DL (ref 5–40)

## 2018-09-07 PROCEDURE — G0277 HBOT, FULL BODY CHAMBER, 30M: HCPCS

## 2018-09-07 PROCEDURE — 82962 GLUCOSE BLOOD TEST: CPT

## 2018-09-10 ENCOUNTER — OFFICE VISIT (OUTPATIENT)
Dept: WOUND CARE | Facility: HOSPITAL | Age: 78
End: 2018-09-10
Attending: SURGERY

## 2018-09-10 ENCOUNTER — OFFICE VISIT (OUTPATIENT)
Dept: WOUND CARE | Facility: HOSPITAL | Age: 78
End: 2018-09-10

## 2018-09-10 LAB
GLUCOSE BLDC GLUCOMTR-MCNC: 260 MG/DL (ref 70–130)
GLUCOSE BLDC GLUCOMTR-MCNC: 278 MG/DL (ref 70–130)

## 2018-09-10 PROCEDURE — G0277 HBOT, FULL BODY CHAMBER, 30M: HCPCS

## 2018-09-10 PROCEDURE — 82962 GLUCOSE BLOOD TEST: CPT

## 2018-09-11 ENCOUNTER — OFFICE VISIT (OUTPATIENT)
Dept: WOUND CARE | Facility: HOSPITAL | Age: 78
End: 2018-09-11

## 2018-09-11 LAB
GLUCOSE BLDC GLUCOMTR-MCNC: 226 MG/DL (ref 70–130)
GLUCOSE BLDC GLUCOMTR-MCNC: 226 MG/DL (ref 70–130)

## 2018-09-11 PROCEDURE — G0277 HBOT, FULL BODY CHAMBER, 30M: HCPCS

## 2018-09-11 PROCEDURE — 82962 GLUCOSE BLOOD TEST: CPT

## 2018-09-12 ENCOUNTER — OFFICE VISIT (OUTPATIENT)
Dept: WOUND CARE | Facility: HOSPITAL | Age: 78
End: 2018-09-12

## 2018-09-12 LAB
GLUCOSE BLDC GLUCOMTR-MCNC: 230 MG/DL (ref 70–130)
GLUCOSE BLDC GLUCOMTR-MCNC: 247 MG/DL (ref 70–130)

## 2018-09-12 PROCEDURE — G0277 HBOT, FULL BODY CHAMBER, 30M: HCPCS

## 2018-09-12 PROCEDURE — 82962 GLUCOSE BLOOD TEST: CPT

## 2018-09-13 ENCOUNTER — OFFICE VISIT (OUTPATIENT)
Dept: WOUND CARE | Facility: HOSPITAL | Age: 78
End: 2018-09-13

## 2018-09-13 LAB
GLUCOSE BLDC GLUCOMTR-MCNC: 247 MG/DL (ref 70–130)
GLUCOSE BLDC GLUCOMTR-MCNC: 287 MG/DL (ref 70–130)

## 2018-09-13 PROCEDURE — G0277 HBOT, FULL BODY CHAMBER, 30M: HCPCS

## 2018-09-13 PROCEDURE — 99183 HYPERBARIC OXYGEN THERAPY: CPT

## 2018-09-13 PROCEDURE — 82962 GLUCOSE BLOOD TEST: CPT

## 2018-09-14 ENCOUNTER — OFFICE VISIT (OUTPATIENT)
Dept: WOUND CARE | Facility: HOSPITAL | Age: 78
End: 2018-09-14

## 2018-09-14 LAB
GLUCOSE BLDC GLUCOMTR-MCNC: 303 MG/DL (ref 70–130)
GLUCOSE BLDC GLUCOMTR-MCNC: 305 MG/DL (ref 70–130)

## 2018-09-14 PROCEDURE — G0277 HBOT, FULL BODY CHAMBER, 30M: HCPCS

## 2018-09-14 PROCEDURE — 82962 GLUCOSE BLOOD TEST: CPT

## 2018-09-17 ENCOUNTER — OFFICE VISIT (OUTPATIENT)
Dept: WOUND CARE | Facility: HOSPITAL | Age: 78
End: 2018-09-17

## 2018-09-17 ENCOUNTER — OFFICE VISIT (OUTPATIENT)
Dept: WOUND CARE | Facility: HOSPITAL | Age: 78
End: 2018-09-17
Attending: SURGERY

## 2018-09-17 LAB
GLUCOSE BLDC GLUCOMTR-MCNC: 219 MG/DL (ref 70–130)
GLUCOSE BLDC GLUCOMTR-MCNC: 264 MG/DL (ref 70–130)

## 2018-09-17 PROCEDURE — 82962 GLUCOSE BLOOD TEST: CPT

## 2018-09-17 PROCEDURE — G0277 HBOT, FULL BODY CHAMBER, 30M: HCPCS

## 2018-09-18 ENCOUNTER — OFFICE VISIT (OUTPATIENT)
Dept: ENDOCRINOLOGY | Age: 78
End: 2018-09-18

## 2018-09-18 VITALS
WEIGHT: 238 LBS | SYSTOLIC BLOOD PRESSURE: 114 MMHG | DIASTOLIC BLOOD PRESSURE: 62 MMHG | BODY MASS INDEX: 33.32 KG/M2 | RESPIRATION RATE: 16 BRPM | HEIGHT: 71 IN

## 2018-09-18 DIAGNOSIS — E79.0 HYPERURICEMIA: ICD-10-CM

## 2018-09-18 DIAGNOSIS — E11.42 DIABETIC PERIPHERAL NEUROPATHY (HCC): ICD-10-CM

## 2018-09-18 DIAGNOSIS — E78.5 DYSLIPIDEMIA: ICD-10-CM

## 2018-09-18 DIAGNOSIS — I10 ESSENTIAL HYPERTENSION: ICD-10-CM

## 2018-09-18 DIAGNOSIS — IMO0002 UNCONTROLLED TYPE 2 DIABETES MELLITUS WITH COMPLICATION, WITH LONG-TERM CURRENT USE OF INSULIN: Primary | ICD-10-CM

## 2018-09-18 DIAGNOSIS — G47.33 OSA (OBSTRUCTIVE SLEEP APNEA): ICD-10-CM

## 2018-09-18 DIAGNOSIS — E55.9 VITAMIN D DEFICIENCY: ICD-10-CM

## 2018-09-18 PROCEDURE — 99214 OFFICE O/P EST MOD 30 MIN: CPT | Performed by: INTERNAL MEDICINE

## 2018-09-18 NOTE — PROGRESS NOTES
"Subjective   Jesus Beckham is a 77 y.o. male seen for follow up for DM2, hyperlipidemia, HTN, vit d deficiency, lab review. He is checking BG 2 times a day. He has wound dressings on his left leg. He is having balance issues and is in a wheelchair today. He is currently taking an antibiotic for MRSA.     History of Present Illness this is a 77-year-old gentleman known patient with type II diabetes hypertension and dyslipidemia as well as vitamin D deficiency and hyperuricemia with peripheral neuropathy as well as peripheral vascular disease.    /62   Resp 16   Ht 179.1 cm (70.5\")   Wt 108 kg (238 lb)   BMI 33.67 kg/m²     No Known Allergies    Current Outpatient Prescriptions:   •  ACCU-CHEK FASTCLIX LANCETS [facility] [place] [facility], USE TO TEST BG 2X DAILY, Disp: 200 each, Rfl: 0  •  allopurinol (ZYLOPRIM) 300 MG tablet, Take 1 tablet by mouth Daily., Disp: 90 tablet, Rfl: 1  •  apixaban (ELIQUIS) 5 MG tablet tablet, Take 1 tablet by mouth Every 12 (Twelve) Hours., Disp: 180 tablet, Rfl: 1  •  bisacodyl (DULCOLAX) 5 MG EC tablet, Take 1 tablet by mouth Daily As Needed for Constipation., Disp: , Rfl:   •  dakin's (HYSEPT) 0.5 % solution topical solution, Apply  topically to the appropriate area as directed Daily., Disp: , Rfl:   •  digoxin (LANOXIN) 125 MCG tablet, Take 1 tablet by mouth Daily., Disp: 90 tablet, Rfl: 1  •  diphenhydrAMINE-acetaminophen (TYLENOL PM EXTRA STRENGTH)  MG tablet per tablet, Take 1 tablet by mouth At Night As Needed for Sleep., Disp: , Rfl:   •  docusate sodium (COLACE) 100 MG capsule, Take 100 mg by mouth 2 (Two) Times a Day., Disp: , Rfl:   •  ergocalciferol (DRISDOL) 68512 units capsule, Take 1 capsule by mouth 1 (One) Time Per Week., Disp: 13 capsule, Rfl: 1  •  fluticasone (FLONASE) 50 MCG/ACT nasal spray, 2 sprays into the nostril(s) as directed by provider Daily., Disp: 3 bottle, Rfl: 3  •  furosemide (LASIX) 20 MG tablet, Take 10 mg by mouth 2 (Two) Times a Day., Disp: , Rfl:   •  " glipiZIDE (GLUCOTROL) 10 MG tablet, Take 1 tablet by mouth 2 (Two) Times a Day Before Meals., Disp: 180 tablet, Rfl: 1  •  glucose blood (ACCU-CHEK SMARTVIEW) test strip, USE TO TEST BG 2X DAILY, Disp: 200 each, Rfl: 0  •  GLYXAMBI 25-5 MG tablet, Take 1 tablet by mouth Daily With Breakfast., Disp: 90 tablet, Rfl: 1  •  HYDROcodone-acetaminophen (NORCO) 5-325 MG per tablet, Take 1-2 qhs prn pain, Disp: 60 tablet, Rfl: 0  •  Insulin Pen Needle 32G X 4 MM misc, INJECT UP TO THREE TIMES DAILY OR AS DIRECTED, Disp: 500 each, Rfl: 1  •  L-Methylfolate-B6-B12 3-35-2 MG tablet, TAKE 1 TABLET TWICE DAILY, Disp: 180 tablet, Rfl: 0  •  LEVEMIR FLEXTOUCH 100 UNIT/ML injection, 50 units twice daily., Disp: 90 mL, Rfl: 1  •  Melatonin 3 MG tablet, Take 3 mg by mouth At Night As Needed for Sleep., Disp: , Rfl:   •  metoprolol tartrate (LOPRESSOR) 25 MG tablet, Take 1 tablet by mouth Every 12 (Twelve) Hours., Disp: 180 tablet, Rfl: 1  •  Multiple Vitamins-Minerals (MULTIVITAMIN ADULT PO), Take 1 tablet by mouth Daily., Disp: , Rfl:   •  Omega-3 Fatty Acids (FISH OIL) 1000 MG capsule capsule, Take  by mouth daily with breakfast., Disp: , Rfl:   •  pregabalin (LYRICA) 100 MG capsule, TAKE 1 CAPSULE THREE TIMES DAILY, Disp: 21 capsule, Rfl: 0  •  rosuvastatin (CRESTOR) 10 MG tablet, Take 1 tablet by mouth Daily., Disp: 90 tablet, Rfl: 1  •  sulfamethoxazole-trimethoprim (BACTRIM DS,SEPTRA DS) 800-160 MG per tablet, Take 1 tablet by mouth 2 (Two) Times a Day for 30 days., Disp: 30 tablet, Rfl: 3  •  tamsulosin (FLOMAX) 0.4 MG capsule 24 hr capsule, Take 1 capsule by mouth Daily. (Patient taking differently: Take 0.4 mg by mouth 2 (Two) Times a Day.), Disp: 30 capsule, Rfl:   •  traZODone (DESYREL) 150 MG tablet, Take 0.5-2 tabs qhs prn sleep, Disp: 60 tablet, Rfl: 5  •  vitamin C (ASCORBIC ACID) 500 MG tablet, Take 500 mg by mouth Daily., Disp: , Rfl:   •  VOLTAREN 1 % gel gel, , Disp: , Rfl:   •  Wound Dressings (MEDIHONEY WOUND/BURN  DRESSING) gel, Apply as directed, Disp: 44 mL, Rfl: 3      The following portions of the patient's history were reviewed and updated as appropriate: allergies, current medications, past family history, past medical history, past social history, past surgical history and problem list.    Review of Systems   Constitutional: Negative.    HENT: Negative.    Eyes: Negative.    Respiratory: Negative.    Cardiovascular: Negative.    Gastrointestinal: Negative.    Endocrine: Negative.    Genitourinary: Negative.    Musculoskeletal: Negative.    Skin: Negative.    Allergic/Immunologic: Negative.    Neurological: Negative.    Hematological: Negative.    Psychiatric/Behavioral: Negative.        Objective   Physical Exam   Constitutional: He is oriented to person, place, and time. He appears well-developed and well-nourished. No distress.   Patient is in a wheelchair for ambulation.  There is also an indwelling catheter with him on the wheelchair.   HENT:   Head: Normocephalic and atraumatic.   Right Ear: External ear normal.   Left Ear: External ear normal.   Nose: Nose normal.   Mouth/Throat: Oropharynx is clear and moist. No oropharyngeal exudate.   Eyes: Pupils are equal, round, and reactive to light. Conjunctivae and EOM are normal. Right eye exhibits no discharge. Left eye exhibits no discharge. No scleral icterus.   Neck: Normal range of motion. Neck supple. No JVD present. No tracheal deviation present. No thyromegaly present.   Cardiovascular: Normal rate, regular rhythm, normal heart sounds and intact distal pulses.  Exam reveals no gallop and no friction rub.    No murmur heard.  Pulmonary/Chest: Effort normal and breath sounds normal. No stridor. No respiratory distress. He has no wheezes. He has no rales. He exhibits no tenderness.   Abdominal: Soft. Bowel sounds are normal. He exhibits no distension and no mass. There is no tenderness. There is no rebound and no guarding. No hernia.   Musculoskeletal: He exhibits  no edema, tenderness or deformity.   Left leg is wrapped in a bandage because of cellulitis in his lower left leg.   Lymphadenopathy:     He has no cervical adenopathy.   Neurological: He is alert and oriented to person, place, and time. He has normal reflexes. He displays normal reflexes. No cranial nerve deficit or sensory deficit. He exhibits normal muscle tone. Coordination normal.   Skin: Skin is warm. No rash noted. He is not diaphoretic. No erythema. No pallor.   Psychiatric: He has a normal mood and affect. His behavior is normal. Judgment and thought content normal.   Nursing note and vitals reviewed.    Lab Results   Component Value Date    GLUCOSE 312 (H) 08/27/2018    BUN 18 09/06/2018    CREATININE 1.10 09/06/2018    EGFRIFNONA 65 09/06/2018    EGFRIFAFRI 79 09/06/2018    BCR 16.4 09/06/2018    K 4.9 09/06/2018    CO2 24.7 09/06/2018    CALCIUM 9.6 09/06/2018    PROTENTOTREF 6.6 09/06/2018    ALBUMIN 4.40 09/06/2018    LABIL2 2.0 09/06/2018    AST 20 09/06/2018    ALT 24 09/06/2018     Lab Results   Component Value Date    TSH 2.750 09/06/2018     Lab Results   Component Value Date    HGBA1C 7.37 (H) 09/06/2018     Lab Results   Component Value Date    CHLPL 130 09/06/2018    CHLPL 115 05/22/2018    CHLPL 132 12/06/2017     Lab Results   Component Value Date    TRIG 196 (H) 09/06/2018    TRIG 172 (H) 05/22/2018    TRIG 169 (H) 12/06/2017     Lab Results   Component Value Date    HDL 40 09/06/2018    HDL 41 05/22/2018    HDL 42 12/06/2017     Lab Results   Component Value Date    LDL 51 09/06/2018    LDL 40 05/22/2018    LDL 56 12/06/2017         Assessment/Plan   Diagnoses and all orders for this visit:    Uncontrolled type 2 diabetes mellitus with complication, with long-term current use of insulin (CMS/[facility])  -     T4 & TSH ([facility]); Future  -     Uric Acid; Future  -     Vitamin D 25 Hydroxy; Future  -     Comprehensive Metabolic Panel; Future  -     C-Peptide; Future  -     Hemoglobin A1c; Future  -      Lipid Panel; Future  -     MicroAlbumin, Urine, Random - Urine, Clean Catch; Future    Essential hypertension  -     T4 & TSH ([facility]); Future  -     Uric Acid; Future  -     Vitamin D 25 Hydroxy; Future  -     Comprehensive Metabolic Panel; Future  -     C-Peptide; Future  -     Hemoglobin A1c; Future  -     Lipid Panel; Future  -     MicroAlbumin, Urine, Random - Urine, Clean Catch; Future    CELINA (obstructive sleep apnea)  -     T4 & TSH ([facility]); Future  -     Uric Acid; Future  -     Vitamin D 25 Hydroxy; Future  -     Comprehensive Metabolic Panel; Future  -     C-Peptide; Future  -     Hemoglobin A1c; Future  -     Lipid Panel; Future  -     MicroAlbumin, Urine, Random - Urine, Clean Catch; Future    Diabetic peripheral neuropathy (CMS/HCC)  -     T4 & TSH ([facility]); Future  -     Uric Acid; Future  -     Vitamin D 25 Hydroxy; Future  -     Comprehensive Metabolic Panel; Future  -     C-Peptide; Future  -     Hemoglobin A1c; Future  -     Lipid Panel; Future  -     MicroAlbumin, Urine, Random - Urine, Clean Catch; Future    Dyslipidemia  -     T4 & TSH ([facility]); Future  -     Uric Acid; Future  -     Vitamin D 25 Hydroxy; Future  -     Comprehensive Metabolic Panel; Future  -     C-Peptide; Future  -     Hemoglobin A1c; Future  -     Lipid Panel; Future  -     MicroAlbumin, Urine, Random - Urine, Clean Catch; Future    Hyperuricemia  -     T4 & TSH ([facility]); Future  -     Uric Acid; Future  -     Vitamin D 25 Hydroxy; Future  -     Comprehensive Metabolic Panel; Future  -     C-Peptide; Future  -     Hemoglobin A1c; Future  -     Lipid Panel; Future  -     MicroAlbumin, Urine, Random - Urine, Clean Catch; Future    Vitamin D deficiency  -     T4 & TSH ([facility]); Future  -     Uric Acid; Future  -     Vitamin D 25 Hydroxy; Future  -     Comprehensive Metabolic Panel; Future  -     C-Peptide; Future  -     Hemoglobin A1c; Future  -     Lipid Panel; Future  -     MicroAlbumin, Urine, Random - Urine, Clean  Catch; Future             in summary I saw and examined this 77-year-old gentleman for above-mentioned problems.  I reviewed his laboratory evaluations of 09/06/2018 and provided him and his wife who was present during this office visit.  with a hard copy of it.  Considering all the comorbidities going on he is clinically and metabolically stable and therefore he will continue his current medications and will see Ms. Elizabeth Valverde in 4 months or sooner if needed with laboratory evaluation prior to each office visit.

## 2018-09-21 ENCOUNTER — OFFICE VISIT (OUTPATIENT)
Dept: WOUND CARE | Facility: HOSPITAL | Age: 78
End: 2018-09-21

## 2018-09-21 LAB
GLUCOSE BLDC GLUCOMTR-MCNC: 245 MG/DL (ref 70–130)
GLUCOSE BLDC GLUCOMTR-MCNC: 288 MG/DL (ref 70–130)

## 2018-09-21 PROCEDURE — 82962 GLUCOSE BLOOD TEST: CPT

## 2018-09-21 PROCEDURE — G0277 HBOT, FULL BODY CHAMBER, 30M: HCPCS

## 2018-09-24 ENCOUNTER — OFFICE VISIT (OUTPATIENT)
Dept: WOUND CARE | Facility: HOSPITAL | Age: 78
End: 2018-09-24
Attending: SURGERY

## 2018-09-24 ENCOUNTER — OFFICE VISIT (OUTPATIENT)
Dept: WOUND CARE | Facility: HOSPITAL | Age: 78
End: 2018-09-24

## 2018-09-24 LAB
GLUCOSE BLDC GLUCOMTR-MCNC: 266 MG/DL (ref 70–130)
GLUCOSE BLDC GLUCOMTR-MCNC: 309 MG/DL (ref 70–130)

## 2018-09-24 PROCEDURE — 82962 GLUCOSE BLOOD TEST: CPT

## 2018-09-24 PROCEDURE — G0277 HBOT, FULL BODY CHAMBER, 30M: HCPCS

## 2018-09-25 ENCOUNTER — OFFICE VISIT (OUTPATIENT)
Dept: WOUND CARE | Facility: HOSPITAL | Age: 78
End: 2018-09-25

## 2018-09-25 LAB
GLUCOSE BLDC GLUCOMTR-MCNC: 219 MG/DL (ref 70–130)
GLUCOSE BLDC GLUCOMTR-MCNC: 256 MG/DL (ref 70–130)

## 2018-09-25 PROCEDURE — 82962 GLUCOSE BLOOD TEST: CPT

## 2018-09-25 PROCEDURE — G0277 HBOT, FULL BODY CHAMBER, 30M: HCPCS

## 2018-09-26 ENCOUNTER — OFFICE VISIT (OUTPATIENT)
Dept: WOUND CARE | Facility: HOSPITAL | Age: 78
End: 2018-09-26

## 2018-09-26 LAB
GLUCOSE BLDC GLUCOMTR-MCNC: 242 MG/DL (ref 70–130)
GLUCOSE BLDC GLUCOMTR-MCNC: 267 MG/DL (ref 70–130)

## 2018-09-26 PROCEDURE — G0277 HBOT, FULL BODY CHAMBER, 30M: HCPCS

## 2018-09-26 PROCEDURE — 82962 GLUCOSE BLOOD TEST: CPT

## 2018-09-27 ENCOUNTER — OFFICE VISIT (OUTPATIENT)
Dept: WOUND CARE | Facility: HOSPITAL | Age: 78
End: 2018-09-27

## 2018-09-27 LAB
GLUCOSE BLDC GLUCOMTR-MCNC: 240 MG/DL (ref 70–130)
GLUCOSE BLDC GLUCOMTR-MCNC: 278 MG/DL (ref 70–130)

## 2018-09-27 PROCEDURE — G0277 HBOT, FULL BODY CHAMBER, 30M: HCPCS

## 2018-09-27 PROCEDURE — 82962 GLUCOSE BLOOD TEST: CPT

## 2018-09-28 ENCOUNTER — OFFICE VISIT (OUTPATIENT)
Dept: WOUND CARE | Facility: HOSPITAL | Age: 78
End: 2018-09-28

## 2018-09-28 LAB
GLUCOSE BLDC GLUCOMTR-MCNC: 288 MG/DL (ref 70–130)
GLUCOSE BLDC GLUCOMTR-MCNC: 338 MG/DL (ref 70–130)

## 2018-09-28 PROCEDURE — 82962 GLUCOSE BLOOD TEST: CPT

## 2018-09-28 PROCEDURE — G0277 HBOT, FULL BODY CHAMBER, 30M: HCPCS

## 2018-10-01 ENCOUNTER — OFFICE VISIT (OUTPATIENT)
Dept: WOUND CARE | Facility: HOSPITAL | Age: 78
End: 2018-10-01
Attending: SURGERY

## 2018-10-01 ENCOUNTER — OFFICE VISIT (OUTPATIENT)
Dept: WOUND CARE | Facility: HOSPITAL | Age: 78
End: 2018-10-01

## 2018-10-01 LAB
GLUCOSE BLDC GLUCOMTR-MCNC: 291 MG/DL (ref 70–130)
GLUCOSE BLDC GLUCOMTR-MCNC: 298 MG/DL (ref 70–130)

## 2018-10-01 PROCEDURE — G0277 HBOT, FULL BODY CHAMBER, 30M: HCPCS

## 2018-10-01 PROCEDURE — 82962 GLUCOSE BLOOD TEST: CPT

## 2018-10-02 ENCOUNTER — OFFICE VISIT (OUTPATIENT)
Dept: WOUND CARE | Facility: HOSPITAL | Age: 78
End: 2018-10-02

## 2018-10-02 LAB
GLUCOSE BLDC GLUCOMTR-MCNC: 231 MG/DL (ref 70–130)
GLUCOSE BLDC GLUCOMTR-MCNC: 277 MG/DL (ref 70–130)

## 2018-10-02 PROCEDURE — G0277 HBOT, FULL BODY CHAMBER, 30M: HCPCS

## 2018-10-02 PROCEDURE — 82962 GLUCOSE BLOOD TEST: CPT

## 2018-10-03 ENCOUNTER — OFFICE VISIT (OUTPATIENT)
Dept: WOUND CARE | Facility: HOSPITAL | Age: 78
End: 2018-10-03

## 2018-10-03 LAB
GLUCOSE BLDC GLUCOMTR-MCNC: 226 MG/DL (ref 70–130)
GLUCOSE BLDC GLUCOMTR-MCNC: 244 MG/DL (ref 70–130)

## 2018-10-03 PROCEDURE — G0277 HBOT, FULL BODY CHAMBER, 30M: HCPCS

## 2018-10-03 PROCEDURE — 82962 GLUCOSE BLOOD TEST: CPT

## 2018-10-04 ENCOUNTER — OFFICE VISIT (OUTPATIENT)
Dept: WOUND CARE | Facility: HOSPITAL | Age: 78
End: 2018-10-04

## 2018-10-04 LAB
GLUCOSE BLDC GLUCOMTR-MCNC: 317 MG/DL (ref 70–130)
GLUCOSE BLDC GLUCOMTR-MCNC: 335 MG/DL (ref 70–130)

## 2018-10-04 PROCEDURE — G0277 HBOT, FULL BODY CHAMBER, 30M: HCPCS

## 2018-10-04 PROCEDURE — 82962 GLUCOSE BLOOD TEST: CPT

## 2018-10-04 RX ORDER — PREGABALIN 100 MG/1
CAPSULE ORAL
Qty: 270 CAPSULE | Refills: 0 | Status: SHIPPED | OUTPATIENT
Start: 2018-10-04 | End: 2019-02-26 | Stop reason: SDUPTHER

## 2018-10-05 ENCOUNTER — OFFICE VISIT (OUTPATIENT)
Dept: WOUND CARE | Facility: HOSPITAL | Age: 78
End: 2018-10-05

## 2018-10-05 LAB
GLUCOSE BLDC GLUCOMTR-MCNC: 348 MG/DL (ref 70–130)
GLUCOSE BLDC GLUCOMTR-MCNC: 358 MG/DL (ref 70–130)

## 2018-10-05 PROCEDURE — G0277 HBOT, FULL BODY CHAMBER, 30M: HCPCS

## 2018-10-05 PROCEDURE — 82962 GLUCOSE BLOOD TEST: CPT

## 2018-10-08 ENCOUNTER — OFFICE VISIT (OUTPATIENT)
Dept: WOUND CARE | Facility: HOSPITAL | Age: 78
End: 2018-10-08

## 2018-10-08 ENCOUNTER — OFFICE VISIT (OUTPATIENT)
Dept: WOUND CARE | Facility: HOSPITAL | Age: 78
End: 2018-10-08
Attending: SURGERY

## 2018-10-08 LAB
GLUCOSE BLDC GLUCOMTR-MCNC: 261 MG/DL (ref 70–130)
GLUCOSE BLDC GLUCOMTR-MCNC: 327 MG/DL (ref 70–130)

## 2018-10-08 PROCEDURE — G0277 HBOT, FULL BODY CHAMBER, 30M: HCPCS

## 2018-10-08 PROCEDURE — 82962 GLUCOSE BLOOD TEST: CPT

## 2018-10-09 ENCOUNTER — OFFICE VISIT (OUTPATIENT)
Dept: WOUND CARE | Facility: HOSPITAL | Age: 78
End: 2018-10-09

## 2018-10-09 DIAGNOSIS — E78.5 DYSLIPIDEMIA: ICD-10-CM

## 2018-10-09 DIAGNOSIS — M10.00 IDIOPATHIC GOUT, UNSPECIFIED CHRONICITY, UNSPECIFIED SITE: ICD-10-CM

## 2018-10-09 LAB
GLUCOSE BLDC GLUCOMTR-MCNC: 302 MG/DL (ref 70–130)
GLUCOSE BLDC GLUCOMTR-MCNC: 366 MG/DL (ref 70–130)

## 2018-10-09 PROCEDURE — G0277 HBOT, FULL BODY CHAMBER, 30M: HCPCS

## 2018-10-09 PROCEDURE — 82962 GLUCOSE BLOOD TEST: CPT

## 2018-10-10 ENCOUNTER — OFFICE VISIT (OUTPATIENT)
Dept: WOUND CARE | Facility: HOSPITAL | Age: 78
End: 2018-10-10

## 2018-10-10 LAB
GLUCOSE BLDC GLUCOMTR-MCNC: 252 MG/DL (ref 70–130)
GLUCOSE BLDC GLUCOMTR-MCNC: 282 MG/DL (ref 70–130)

## 2018-10-10 PROCEDURE — 82962 GLUCOSE BLOOD TEST: CPT

## 2018-10-10 PROCEDURE — G0277 HBOT, FULL BODY CHAMBER, 30M: HCPCS

## 2018-10-10 RX ORDER — GLIPIZIDE 10 MG/1
TABLET ORAL
Qty: 180 TABLET | Refills: 1 | Status: SHIPPED | OUTPATIENT
Start: 2018-10-10 | End: 2018-11-19

## 2018-10-10 RX ORDER — EMPAGLIFLOZIN AND LINAGLIPTIN 25; 5 MG/1; MG/1
TABLET, FILM COATED ORAL
Qty: 90 TABLET | Refills: 1 | Status: SHIPPED | OUTPATIENT
Start: 2018-10-10 | End: 2018-11-19

## 2018-10-10 RX ORDER — ALLOPURINOL 300 MG/1
TABLET ORAL
Qty: 90 TABLET | Refills: 1 | Status: SHIPPED | OUTPATIENT
Start: 2018-10-10 | End: 2018-11-19

## 2018-10-10 RX ORDER — ROSUVASTATIN CALCIUM 10 MG/1
TABLET, COATED ORAL
Qty: 90 TABLET | Refills: 1 | Status: SHIPPED | OUTPATIENT
Start: 2018-10-10 | End: 2018-11-19

## 2018-10-10 RX ORDER — INSULIN DETEMIR 100 [IU]/ML
INJECTION, SOLUTION SUBCUTANEOUS
Qty: 90 ML | Refills: 1 | Status: SHIPPED | OUTPATIENT
Start: 2018-10-10 | End: 2018-11-19

## 2018-10-10 RX ORDER — ERGOCALCIFEROL 1.25 MG/1
CAPSULE ORAL
Qty: 13 CAPSULE | Refills: 1 | Status: SHIPPED | OUTPATIENT
Start: 2018-10-10 | End: 2018-11-19

## 2018-10-11 ENCOUNTER — OFFICE VISIT (OUTPATIENT)
Dept: WOUND CARE | Facility: HOSPITAL | Age: 78
End: 2018-10-11

## 2018-10-11 LAB
GLUCOSE BLDC GLUCOMTR-MCNC: 332 MG/DL (ref 70–130)
GLUCOSE BLDC GLUCOMTR-MCNC: 388 MG/DL (ref 70–130)

## 2018-10-11 PROCEDURE — G0277 HBOT, FULL BODY CHAMBER, 30M: HCPCS

## 2018-10-11 PROCEDURE — 82962 GLUCOSE BLOOD TEST: CPT

## 2018-10-12 ENCOUNTER — OFFICE VISIT (OUTPATIENT)
Dept: WOUND CARE | Facility: HOSPITAL | Age: 78
End: 2018-10-12

## 2018-10-12 LAB
GLUCOSE BLDC GLUCOMTR-MCNC: 280 MG/DL (ref 70–130)
GLUCOSE BLDC GLUCOMTR-MCNC: 322 MG/DL (ref 70–130)

## 2018-10-12 PROCEDURE — 82962 GLUCOSE BLOOD TEST: CPT

## 2018-10-12 PROCEDURE — G0277 HBOT, FULL BODY CHAMBER, 30M: HCPCS

## 2018-10-15 ENCOUNTER — LAB (OUTPATIENT)
Dept: LAB | Facility: HOSPITAL | Age: 78
End: 2018-10-15
Attending: SURGERY

## 2018-10-15 ENCOUNTER — OFFICE VISIT (OUTPATIENT)
Dept: WOUND CARE | Facility: HOSPITAL | Age: 78
End: 2018-10-15

## 2018-10-15 ENCOUNTER — OFFICE VISIT (OUTPATIENT)
Dept: WOUND CARE | Facility: HOSPITAL | Age: 78
End: 2018-10-15
Attending: SURGERY

## 2018-10-15 ENCOUNTER — TRANSCRIBE ORDERS (OUTPATIENT)
Dept: WOUND CARE | Facility: HOSPITAL | Age: 78
End: 2018-10-15

## 2018-10-15 DIAGNOSIS — E11.621 DIABETIC FOOT ULCER WITH OSTEOMYELITIS (HCC): Primary | ICD-10-CM

## 2018-10-15 DIAGNOSIS — L97.509 DIABETIC FOOT ULCER WITH OSTEOMYELITIS (HCC): Primary | ICD-10-CM

## 2018-10-15 DIAGNOSIS — M86.9 DIABETIC FOOT ULCER WITH OSTEOMYELITIS (HCC): Primary | ICD-10-CM

## 2018-10-15 DIAGNOSIS — E11.69 DIABETIC FOOT ULCER WITH OSTEOMYELITIS (HCC): Primary | ICD-10-CM

## 2018-10-15 DIAGNOSIS — E11.69 DIABETIC FOOT ULCER WITH OSTEOMYELITIS (HCC): ICD-10-CM

## 2018-10-15 DIAGNOSIS — M86.9 DIABETIC FOOT ULCER WITH OSTEOMYELITIS (HCC): ICD-10-CM

## 2018-10-15 DIAGNOSIS — L97.509 DIABETIC FOOT ULCER WITH OSTEOMYELITIS (HCC): ICD-10-CM

## 2018-10-15 DIAGNOSIS — E11.621 DIABETIC FOOT ULCER WITH OSTEOMYELITIS (HCC): ICD-10-CM

## 2018-10-15 LAB
ALBUMIN SERPL-MCNC: 4.3 G/DL (ref 3.5–5.2)
ALBUMIN/GLOB SERPL: 1.3 G/DL
ALP SERPL-CCNC: 94 U/L (ref 39–117)
ALT SERPL W P-5'-P-CCNC: 20 U/L (ref 1–41)
ANION GAP SERPL CALCULATED.3IONS-SCNC: 15.5 MMOL/L
AST SERPL-CCNC: 15 U/L (ref 1–40)
BILIRUB SERPL-MCNC: 0.4 MG/DL (ref 0.1–1.2)
BUN BLD-MCNC: 19 MG/DL (ref 8–23)
BUN/CREAT SERPL: 18.4 (ref 7–25)
CALCIUM SPEC-SCNC: 9.8 MG/DL (ref 8.6–10.5)
CHLORIDE SERPL-SCNC: 97 MMOL/L (ref 98–107)
CO2 SERPL-SCNC: 23.5 MMOL/L (ref 22–29)
CREAT BLD-MCNC: 1.03 MG/DL (ref 0.76–1.27)
CRP SERPL-MCNC: 1.72 MG/DL (ref 0–0.5)
ERYTHROCYTE [SEDIMENTATION RATE] IN BLOOD: 17 MM/HR (ref 0–20)
GFR SERPL CREATININE-BSD FRML MDRD: 70 ML/MIN/1.73
GLOBULIN UR ELPH-MCNC: 3.3 GM/DL
GLUCOSE BLD-MCNC: 145 MG/DL (ref 65–99)
GLUCOSE BLDC GLUCOMTR-MCNC: 199 MG/DL (ref 70–130)
POTASSIUM BLD-SCNC: 4.1 MMOL/L (ref 3.5–5.2)
PROT SERPL-MCNC: 7.6 G/DL (ref 6–8.5)
SODIUM BLD-SCNC: 136 MMOL/L (ref 136–145)

## 2018-10-15 PROCEDURE — 86140 C-REACTIVE PROTEIN: CPT

## 2018-10-15 PROCEDURE — 85652 RBC SED RATE AUTOMATED: CPT

## 2018-10-15 PROCEDURE — 36415 COLL VENOUS BLD VENIPUNCTURE: CPT

## 2018-10-15 PROCEDURE — G0277 HBOT, FULL BODY CHAMBER, 30M: HCPCS

## 2018-10-15 PROCEDURE — 82962 GLUCOSE BLOOD TEST: CPT

## 2018-10-15 PROCEDURE — 80053 COMPREHEN METABOLIC PANEL: CPT

## 2018-10-16 ENCOUNTER — OFFICE VISIT (OUTPATIENT)
Dept: WOUND CARE | Facility: HOSPITAL | Age: 78
End: 2018-10-16

## 2018-10-16 LAB
GLUCOSE BLDC GLUCOMTR-MCNC: 236 MG/DL (ref 70–130)
GLUCOSE BLDC GLUCOMTR-MCNC: 270 MG/DL (ref 70–130)

## 2018-10-16 PROCEDURE — 82962 GLUCOSE BLOOD TEST: CPT

## 2018-10-16 PROCEDURE — G0277 HBOT, FULL BODY CHAMBER, 30M: HCPCS

## 2018-10-16 RX ORDER — DIGOXIN 125 MCG
TABLET ORAL
Qty: 90 TABLET | Refills: 1 | Status: SHIPPED | OUTPATIENT
Start: 2018-10-16 | End: 2018-11-19

## 2018-10-16 RX ORDER — APIXABAN 5 MG/1
5 TABLET, FILM COATED ORAL EVERY 12 HOURS SCHEDULED
Qty: 180 TABLET | Refills: 1 | Status: SHIPPED | OUTPATIENT
Start: 2018-10-16 | End: 2019-03-04 | Stop reason: SDUPTHER

## 2018-10-16 RX ORDER — FUROSEMIDE 40 MG/1
TABLET ORAL
Qty: 90 TABLET | Refills: 1 | Status: SHIPPED | OUTPATIENT
Start: 2018-10-16 | End: 2018-11-19

## 2018-10-17 ENCOUNTER — OFFICE VISIT (OUTPATIENT)
Dept: WOUND CARE | Facility: HOSPITAL | Age: 78
End: 2018-10-17

## 2018-10-17 LAB
GLUCOSE BLDC GLUCOMTR-MCNC: 158 MG/DL (ref 70–130)
GLUCOSE BLDC GLUCOMTR-MCNC: 253 MG/DL (ref 70–130)

## 2018-10-17 PROCEDURE — 82962 GLUCOSE BLOOD TEST: CPT

## 2018-10-17 PROCEDURE — G0277 HBOT, FULL BODY CHAMBER, 30M: HCPCS

## 2018-10-18 ENCOUNTER — OFFICE VISIT (OUTPATIENT)
Dept: WOUND CARE | Facility: HOSPITAL | Age: 78
End: 2018-10-18

## 2018-10-18 LAB
GLUCOSE BLDC GLUCOMTR-MCNC: 174 MG/DL (ref 70–130)
GLUCOSE BLDC GLUCOMTR-MCNC: 253 MG/DL (ref 70–130)

## 2018-10-18 PROCEDURE — G0277 HBOT, FULL BODY CHAMBER, 30M: HCPCS

## 2018-10-18 PROCEDURE — 82962 GLUCOSE BLOOD TEST: CPT

## 2018-10-24 LAB — GLUCOSE BLDC GLUCOMTR-MCNC: 226 MG/DL (ref 70–130)

## 2018-10-29 ENCOUNTER — OFFICE VISIT (OUTPATIENT)
Dept: WOUND CARE | Facility: HOSPITAL | Age: 78
End: 2018-10-29
Attending: SURGERY

## 2018-10-31 ENCOUNTER — OFFICE VISIT (OUTPATIENT)
Dept: WOUND CARE | Facility: HOSPITAL | Age: 78
End: 2018-10-31

## 2018-11-03 RX ORDER — MECOBAL/LEVOMEFOLAT CA/B6 PHOS 2-3-35 MG
TABLET ORAL
Qty: 180 TABLET | Refills: 0 | Status: SHIPPED | OUTPATIENT
Start: 2018-11-03 | End: 2018-11-19

## 2018-11-03 RX ORDER — LANCETS
EACH MISCELLANEOUS
Qty: 200 EACH | Refills: 0 | Status: SHIPPED | OUTPATIENT
Start: 2018-11-03 | End: 2019-01-07 | Stop reason: SDUPTHER

## 2018-11-05 ENCOUNTER — OFFICE VISIT (OUTPATIENT)
Dept: WOUND CARE | Facility: HOSPITAL | Age: 78
End: 2018-11-05
Attending: SURGERY

## 2018-11-06 ENCOUNTER — OFFICE VISIT (OUTPATIENT)
Dept: CARDIOLOGY | Facility: CLINIC | Age: 78
End: 2018-11-06

## 2018-11-06 VITALS
BODY MASS INDEX: 34.36 KG/M2 | DIASTOLIC BLOOD PRESSURE: 68 MMHG | WEIGHT: 240 LBS | HEIGHT: 70 IN | SYSTOLIC BLOOD PRESSURE: 122 MMHG | HEART RATE: 75 BPM

## 2018-11-06 DIAGNOSIS — G47.33 OSA (OBSTRUCTIVE SLEEP APNEA): ICD-10-CM

## 2018-11-06 DIAGNOSIS — E78.5 DYSLIPIDEMIA: ICD-10-CM

## 2018-11-06 DIAGNOSIS — I10 ESSENTIAL HYPERTENSION: ICD-10-CM

## 2018-11-06 DIAGNOSIS — I48.0 PAROXYSMAL ATRIAL FIBRILLATION (HCC): Primary | ICD-10-CM

## 2018-11-06 DIAGNOSIS — Z95.828 S/P INSERTION OF IVC (INFERIOR VENA CAVAL) FILTER: ICD-10-CM

## 2018-11-06 DIAGNOSIS — E11.42 DIABETIC PERIPHERAL NEUROPATHY (HCC): ICD-10-CM

## 2018-11-06 PROBLEM — R77.8 ELEVATED TROPONIN: Status: RESOLVED | Noted: 2018-03-16 | Resolved: 2018-11-06

## 2018-11-06 PROBLEM — R79.89 ELEVATED TROPONIN: Status: RESOLVED | Noted: 2018-03-16 | Resolved: 2018-11-06

## 2018-11-06 PROBLEM — A41.51 SEPSIS DUE TO ESCHERICHIA COLI: Status: RESOLVED | Noted: 2018-02-22 | Resolved: 2018-11-06

## 2018-11-06 PROBLEM — I95.9 HYPOTENSION: Status: RESOLVED | Noted: 2018-02-23 | Resolved: 2018-11-06

## 2018-11-06 PROCEDURE — 99214 OFFICE O/P EST MOD 30 MIN: CPT | Performed by: INTERNAL MEDICINE

## 2018-11-06 PROCEDURE — 93000 ELECTROCARDIOGRAM COMPLETE: CPT | Performed by: INTERNAL MEDICINE

## 2018-11-06 RX ORDER — SULFAMETHOXAZOLE AND TRIMETHOPRIM 800; 160 MG/1; MG/1
1 TABLET ORAL 2 TIMES DAILY
COMMUNITY
End: 2018-11-28

## 2018-11-06 RX ORDER — FUROSEMIDE 40 MG/1
10 TABLET ORAL 2 TIMES DAILY
COMMUNITY
End: 2018-11-19

## 2018-11-06 NOTE — PROGRESS NOTES
Subjective:     Encounter Date:11/06/2018      Patient ID: Jesus Beckham is a 78 y.o. male.    Chief Complaint:  History of Present Illness    The patient is a 78-year-old male with a history of diabetes mellitus type 2, chronic lower extremity edema, hypertension, dyslipidemia, chronic lower back pain, bladder cancer, paroxsymal atrial fibrillation, left lower extremity DVT, who presents for hospital follow up.      I saw the patient initially in 10/2016 Percent of her preoperative evaluation before resection of bladder mass.  A preoperative EKG performed at that time showed evidence of ventricular bigeminy.  He was essentially sent to Dr. Ricardo's office for further evaluation and a repeat EKG at that time showed no evidence of PVCs but did show an ectopic atrial rhythm.  Following that office visit I set him up for a  Assessment echocardiogram in 10/2016 there were both unremarkable.  He was cleared for surgery at that time.     He was not seen again until 2/2018 when he was admitted for Escherichia coli sepsis, acute respiratory failure, and cholecystitis.  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  Additionally he underwent a cholecystectomy.  Postoperatively he went into atrial fibrillation for which she was managed with metoprolol tartrate.  During that admission he also was diagnosed with an acute DVT of his right lower extremity for which she had an IVC filter placed before his surgery and postoperatively was started on apixaban.  An echocardiogram during that admission performed on 2/23/2018 showed normal left ventricular systolic function and diastolic function with no significant valvular disease.  He was readmitted soon after discharge in 3/2018 with tachycardia, diaphoresis, and hypertension.  On arrival to the emergency room he was found to be in atrial fibrillation with rapid ventricular rate.  Additionally he was found to have an indeterminate  troponin.  However he had no EKG changes or symptoms suggestive of acute coronary syndrome.  At the time and felt that the indeterminate troponin was due to demand ischemia from his atrial fibrillation with rapid ventricular rate and did not recommend any further workup.  He eventually converted back to sinus rhythm and was managed with both metoprolol and digoxin.  He was continued on anticoagulation with apixaban.     I saw the patient back and follow-up in 5/2018 he appeared to be doing well overall.  He was noted to be a little tachycardic at the time that we decided not to increase his metoprolol dosage due to the concern that this was contributing to his fatigue.  Today presents for routine 6 month follow-up.  It appears that since I saw him last they decided to go ahead and discontinue his metoprolol tartrate.  He's been doing well off of this.  He remains on the digoxin and apixaban.  He reports that his chronic left lower extremity wound has been healing.  He continues to have a chronic indwelling Murray catheter that he is hoping to get out soon.  He denies any chest pain, palpitations, PND or orthopnea, near-syncope or syncope.  He has chronic bilateral lower extremity edema in the left lower extremity.  He remains on a low-dose of furosemide.  He still has his IVC filter in place.       Review of Systems   Constitution: Positive for malaise/fatigue. Negative for weakness.   HENT: Negative for hearing loss, hoarse voice, nosebleeds and sore throat.    Eyes: Negative for pain.   Cardiovascular: Positive for leg swelling. Negative for chest pain, claudication, cyanosis, dyspnea on exertion, irregular heartbeat, near-syncope, orthopnea, palpitations, paroxysmal nocturnal dyspnea and syncope.   Respiratory: Negative for shortness of breath and snoring.    Endocrine: Negative for cold intolerance, heat intolerance, polydipsia, polyphagia and polyuria.   Skin: Negative for itching and rash.   Musculoskeletal:  Positive for joint pain, joint swelling and myalgias. Negative for arthritis, falls, muscle cramps and muscle weakness.   Gastrointestinal: Negative for constipation, diarrhea, dysphagia, heartburn, hematemesis, hematochezia, melena, nausea and vomiting.   Genitourinary: Positive for incomplete emptying. Negative for frequency, hematuria and hesitancy.   Neurological: Positive for excessive daytime sleepiness. Negative for dizziness, headaches, light-headedness and numbness.   Psychiatric/Behavioral: Negative for depression. The patient is not nervous/anxious.           Current Outpatient Prescriptions:   •  ACCU-CHEK FASTCLIX LANCETS misc, USE  TO TEST BLOOD GLUCOSE TWICE DAILY, Disp: 200 each, Rfl: 0  •  allopurinol (ZYLOPRIM) 300 MG tablet, TAKE 1 TABLET EVERY DAY, Disp: 90 tablet, Rfl: 1  •  bisacodyl (DULCOLAX) 5 MG EC tablet, Take 1 tablet by mouth Daily As Needed for Constipation., Disp: , Rfl:   •  dakin's (HYSEPT) 0.5 % solution topical solution, Apply  topically to the appropriate area as directed Daily., Disp: , Rfl:   •  digoxin (LANOXIN) 125 MCG tablet, TAKE 1 TABLET EVERY DAY, Disp: 90 tablet, Rfl: 1  •  diphenhydrAMINE-acetaminophen (TYLENOL PM EXTRA STRENGTH)  MG tablet per tablet, Take 1 tablet by mouth At Night As Needed for Sleep., Disp: , Rfl:   •  docusate sodium (COLACE) 100 MG capsule, Take 100 mg by mouth 2 (Two) Times a Day., Disp: , Rfl:   •  ELIQUIS 5 MG tablet tablet, TAKE 1 TABLET BY MOUTH EVERY 12 (TWELVE) HOURS., Disp: 180 tablet, Rfl: 1  •  fluticasone (FLONASE) 50 MCG/ACT nasal spray, 2 sprays into the nostril(s) as directed by provider Daily., Disp: 3 bottle, Rfl: 3  •  furosemide (LASIX) 20 MG tablet, Take 10 mg by mouth 2 (Two) Times a Day., Disp: , Rfl:   •  furosemide (LASIX) 40 MG tablet, TAKE 1 TABLET EVERY DAY, Disp: 90 tablet, Rfl: 1  •  furosemide (LASIX) 40 MG tablet, Take 10 mg by mouth 2 (Two) Times a Day., Disp: , Rfl:   •  glipiZIDE (GLUCOTROL) 10 MG tablet, TAKE  1 TABLET TWICE DAILY BEFORE MEALS, Disp: 180 tablet, Rfl: 1  •  glucose blood (ACCU-CHEK SMARTVIEW) test strip, USE TO TEST BG 2X DAILY, Disp: 200 each, Rfl: 0  •  GLYXAMBI 25-5 MG tablet, TAKE 1 TABLET EVERY DAY WITH BREAKFAST, Disp: 90 tablet, Rfl: 1  •  HYDROcodone-acetaminophen (NORCO) 5-325 MG per tablet, Take 1-2 qhs prn pain, Disp: 60 tablet, Rfl: 0  •  Insulin Pen Needle 32G X 4 MM misc, INJECT UP TO THREE TIMES DAILY OR AS DIRECTED, Disp: 500 each, Rfl: 1  •  L-Methylfolate-B6-B12 3-35-2 MG tablet, TAKE 1 TABLET TWICE DAILY, Disp: 180 tablet, Rfl: 0  •  LEVEMIR FLEXTOUCH 100 UNIT/ML injection, INJECT  50 UNITS TWICE DAILY, Disp: 90 mL, Rfl: 1  •  Melatonin 3 MG tablet, Take 3 mg by mouth At Night As Needed for Sleep., Disp: , Rfl:   •  Multiple Vitamins-Minerals (MULTIVITAMIN ADULT PO), Take 1 tablet by mouth Daily., Disp: , Rfl:   •  Omega-3 Fatty Acids (FISH OIL) 1000 MG capsule capsule, Take  by mouth daily with breakfast., Disp: , Rfl:   •  pregabalin (LYRICA) 100 MG capsule, TAKE 1 CAPSULE THREE TIMES DAILY, Disp: 270 capsule, Rfl: 0  •  rosuvastatin (CRESTOR) 10 MG tablet, TAKE 1 TABLET EVERY DAY, Disp: 90 tablet, Rfl: 1  •  sulfamethoxazole-trimethoprim (BACTRIM DS,SEPTRA DS) 800-160 MG per tablet, Take 1 tablet by mouth 2 (Two) Times a Day., Disp: , Rfl:   •  tamsulosin (FLOMAX) 0.4 MG capsule 24 hr capsule, Take 1 capsule by mouth Daily. (Patient taking differently: Take 0.4 mg by mouth 2 (Two) Times a Day.), Disp: 30 capsule, Rfl:   •  traZODone (DESYREL) 150 MG tablet, Take 0.5-2 tabs qhs prn sleep, Disp: 60 tablet, Rfl: 5  •  vitamin C (ASCORBIC ACID) 500 MG tablet, Take 500 mg by mouth Daily., Disp: , Rfl:   •  vitamin D (ERGOCALCIFEROL) 42337 units capsule capsule, TAKE 1 CAPSULE ONE TIME WEEKLY, Disp: 13 capsule, Rfl: 1  •  VOLTAREN 1 % gel gel, , Disp: , Rfl:   •  Wound Dressings (MEDIHONEY WOUND/BURN DRESSING) gel, Apply as directed, Disp: 44 mL, Rfl: 3  •  metoprolol tartrate (LOPRESSOR)  25 MG tablet, TAKE 1 TABLET BY MOUTH EVERY 12 (TWELVE) HOURS., Disp: 180 tablet, Rfl: 1    Past Medical History:   Diagnosis Date   • Acute deep vein thrombosis of calf, right (CMS/HCC)    • Acute embolism and thrombosis of deep vein of right distal lower extremity (CMS/HCC)    • Acute gangrenous cholecystitis    • Arthritis    • Atrial fibrillation with RVR (CMS/HCC)    • Benign prostatic hyperplasia without lower urinary tract symptoms    • Cancer of bladder (CMS/HCC) 2016   • Colon polyp    • Diabetes mellitus (CMS/HCC)     type 2   • Discitis of lumbar region    • Dyslipidemia    • Essential hypertension    • Gout    • History of sepsis    • Hyperlipidemia    • Hypotension    • Irregular heartbeat    • Muscle weakness    • CELINA (obstructive sleep apnea)    • Osteoarthritis    • Paroxysmal atrial fibrillation (CMS/HCC)    • PVC (premature ventricular contraction)    • Rapid heartbeat    • Sepsis (CMS/HCC) 2018   • Sepsis due to Escherichia coli (CMS/HCC)    • Skin carcinoma    • Type 2 diabetes mellitus (CMS/HCC)    • Urge incontinence    • Vitamin D deficiency    • Weakness      Past Surgical History:   Procedure Laterality Date   • APPENDECTOMY N/A 1961   • CHOLECYSTECTOMY WITH INTRAOPERATIVE CHOLANGIOGRAM N/A 2/25/2018    Procedure: CHOLECYSTECTOMY LAPAROSCOPIC INTRAOPERATIVE CHOLANGIOGRAM;  Surgeon: Maegan Correa MD;  Location: [facility];  Service:    • COLONOSCOPY N/A 2010    h/o of polyps   • EYE SURGERY  1959    glass removal from eye, lens transplant   • LAMINECTOMY N/A 01/18/2016    L2-L3, L3-L4, L4-L5, and L5-S1 bilateral lamincectomy, medial facetectomy & [id number], Dr. Kaiden Valdes   • LUMBAR FUSION N/A 3/7/2018    Procedure: LUMBAR FUSION MINIMALLY INVASIVE TRANSFORAMINAL LUMBAR INTERBODY IRRIGATION AND DEBRIDEMENT AND FUSION.  IRRIGATION AND DEBRIDEMENT OF EPIDURAL ABCESS LUMBAR 2-4. BONE MORPHOGENIC PROTEIN, ALPHATEC NOVEL AND ILLICO, EMG AND SSEP NEUROMONITORING.;  Surgeon: Manish  "DO Tejinder;  Location: [facility] OR;  Service: Orthopedic Spine   • TOTAL KNEE ARTHROPLASTY Right 03/07/2005    Dr. Washington Evans   • VENA CAVA FILTER INSERTION Right 3/6/2018    Procedure: VENA CAVA FILTER INSERTION;  Surgeon: Alexis Thakkar MD;  Location: [facility] OR 18/19;  Service:      Family History   Problem Relation Age of Onset   • Cancer Mother      Social History   Substance Use Topics   • Smoking status: Former Smoker     Types: Cigars     Quit date: 2017   • Smokeless tobacco: Current User     Types: Chew   • Alcohol use No           ECG 12 Lead  Date/Time: 11/6/2018 12:42 PM  Performed by: KATHERIN HOWARD  Authorized by: KATHERIN HOWARD   Comparison: compared with previous ECG   Similar to previous ECG  Rhythm: sinus rhythm               Objective:         Visit Vitals  /68 (BP Location: Right arm, Patient Position: Sitting)   Pulse 75   Ht 177.8 cm (70\")   Wt 109 kg (240 lb)   BMI 34.44 kg/m²          Physical Exam   Constitutional: He is oriented to person, place, and time. He appears well-developed and well-nourished.   HENT:   Head: Normocephalic and atraumatic.   Neck: No JVD present. Carotid bruit is not present.   Cardiovascular: Normal rate, regular rhythm, S1 normal and S2 normal.  Exam reveals no gallop.    No murmur heard.  Pulses:       Radial pulses are 2+ on the right side, and 2+ on the left side.   No bilateral lower extremity edema   Pulmonary/Chest: Effort normal and breath sounds normal.   Abdominal: Soft. Normal appearance.   Neurological: He is alert and oriented to person, place, and time.   Skin: Skin is warm, dry and intact.   Psychiatric: He has a normal mood and affect.       Lab Review:       Assessment:          Diagnosis Plan   1. Paroxysmal atrial fibrillation (CMS/HCC)     2. Essential hypertension     3. Dyslipidemia     4. Diabetic peripheral neuropathy (CMS/HCC)     5. CELINA (obstructive sleep apnea)     6. S/P insertion of IVC (inferior vena caval) " filter  Ambulatory Referral to Vascular Surgery          Plan:       1.  Paroxysmal atrial fibrillation.  Remains in sinus rhythm today.  His heart rate appears normal despite being off metoprolol tartrate.  He remains on digoxin and anticoagulation with apxiaban.   2.  History of right lower extremity DVT.  He is also status post an IVC filter.  He is on chronic anticoagulation with apixaban.  I recommend that he remain on anticoagulation because I believe he is continues to be at high risk for DVT because of his relative immobility.  I think we are getting to the point where we could consider removal of his IVC filter.  We'll place a referral to Dr. Thakkar who placed the IVC filter back in 3/2018.  3.  Hypertension.  Well-controlled on his current medications.  4.  Chronic lower extremity edema.  Maintained with low-dose furosemide.  5.  Diabetes mellitus type 2  6.  Chronic urinary retention.  With a chronic indwelling Murray catheter which is managed by Dr. Saleh.    At this point he otherwise appears stable from a cardiac standpoint and I plan on seeing him back again in one year or sooner if further issues arise.    Atrial Fibrillation and Atrial Flutter  Assessment  • The patient has paroxysmal atrial fibrillation  • This is non-valvular in etiology  • The patient's CHADS2-VASc score is 4  • A [id number] score of 2 or more is considered a high risk for a thromboembolic event  • Apixaban prescribed    Plan  • Attempt to maintain sinus rhythm  • Continue apixaban for antithrombotic therapy, bleeding issues discussed  • Continue beta blocker and digoxin for rate control

## 2018-11-07 ENCOUNTER — OFFICE VISIT (OUTPATIENT)
Dept: WOUND CARE | Facility: HOSPITAL | Age: 78
End: 2018-11-07

## 2018-11-09 ENCOUNTER — OFFICE VISIT (OUTPATIENT)
Dept: WOUND CARE | Facility: HOSPITAL | Age: 78
End: 2018-11-09

## 2018-11-12 ENCOUNTER — OFFICE VISIT (OUTPATIENT)
Dept: WOUND CARE | Facility: HOSPITAL | Age: 78
End: 2018-11-12
Attending: SURGERY

## 2018-11-15 ENCOUNTER — APPOINTMENT (OUTPATIENT)
Dept: WOUND CARE | Facility: HOSPITAL | Age: 78
End: 2018-11-15

## 2018-11-19 ENCOUNTER — HOSPITAL ENCOUNTER (OUTPATIENT)
Dept: GENERAL RADIOLOGY | Facility: HOSPITAL | Age: 78
Discharge: HOME | End: 2018-11-19
Admitting: SURGERY

## 2018-11-19 ENCOUNTER — APPOINTMENT (OUTPATIENT)
Dept: PREADMISSION TESTING | Facility: HOSPITAL | Age: 78
End: 2018-11-19

## 2018-11-19 ENCOUNTER — OFFICE VISIT (OUTPATIENT)
Dept: WOUND CARE | Facility: HOSPITAL | Age: 78
End: 2018-11-19
Attending: SURGERY

## 2018-11-19 VITALS
RESPIRATION RATE: 16 BRPM | DIASTOLIC BLOOD PRESSURE: 68 MMHG | HEIGHT: 70 IN | BODY MASS INDEX: 34.36 KG/M2 | SYSTOLIC BLOOD PRESSURE: 109 MMHG | WEIGHT: 240 LBS | HEART RATE: 82 BPM | TEMPERATURE: 97.1 F | OXYGEN SATURATION: 95 %

## 2018-11-19 LAB
ALBUMIN SERPL-MCNC: 4.1 G/DL (ref 3.5–5.2)
ALBUMIN/GLOB SERPL: 1.3 G/DL
ALP SERPL-CCNC: 92 U/L (ref 39–117)
ALT SERPL W P-5'-P-CCNC: 25 U/L (ref 1–41)
ANION GAP SERPL CALCULATED.3IONS-SCNC: 13.2 MMOL/L
APTT PPP: 31.1 SECONDS (ref 22.7–35.4)
AST SERPL-CCNC: 18 U/L (ref 1–40)
BACTERIA UR QL AUTO: ABNORMAL /HPF
BILIRUB SERPL-MCNC: 0.4 MG/DL (ref 0.1–1.2)
BILIRUB UR QL STRIP: NEGATIVE
BUN BLD-MCNC: 17 MG/DL (ref 8–23)
BUN/CREAT SERPL: 15.9 (ref 7–25)
CALCIUM SPEC-SCNC: 9.8 MG/DL (ref 8.6–10.5)
CHLORIDE SERPL-SCNC: 97 MMOL/L (ref 98–107)
CLARITY UR: CLEAR
CO2 SERPL-SCNC: 26.8 MMOL/L (ref 22–29)
COLOR UR: YELLOW
CREAT BLD-MCNC: 1.07 MG/DL (ref 0.76–1.27)
DEPRECATED RDW RBC AUTO: 50.7 FL (ref 37–54)
ERYTHROCYTE [DISTWIDTH] IN BLOOD BY AUTOMATED COUNT: 15.8 % (ref 11.5–14.5)
GFR SERPL CREATININE-BSD FRML MDRD: 67 ML/MIN/1.73
GLOBULIN UR ELPH-MCNC: 3.2 GM/DL
GLUCOSE BLD-MCNC: 281 MG/DL (ref 65–99)
GLUCOSE UR STRIP-MCNC: ABNORMAL MG/DL
HCT VFR BLD AUTO: 41.6 % (ref 40.4–52.2)
HGB BLD-MCNC: 13.7 G/DL (ref 13.7–17.6)
HGB UR QL STRIP.AUTO: NEGATIVE
HYALINE CASTS UR QL AUTO: ABNORMAL /LPF
INR PPP: 1.08 (ref 0.9–1.1)
KETONES UR QL STRIP: NEGATIVE
LEUKOCYTE ESTERASE UR QL STRIP.AUTO: ABNORMAL
MCH RBC QN AUTO: 29.4 PG (ref 27–32.7)
MCHC RBC AUTO-ENTMCNC: 32.9 G/DL (ref 32.6–36.4)
MCV RBC AUTO: 89.3 FL (ref 79.8–96.2)
NITRITE UR QL STRIP: NEGATIVE
PH UR STRIP.AUTO: 6 [PH] (ref 5–8)
PLATELET # BLD AUTO: 177 10*3/MM3 (ref 140–500)
PMV BLD AUTO: 10.6 FL (ref 6–12)
POTASSIUM BLD-SCNC: 4.1 MMOL/L (ref 3.5–5.2)
PROT SERPL-MCNC: 7.3 G/DL (ref 6–8.5)
PROT UR QL STRIP: NEGATIVE
PROTHROMBIN TIME: 13.8 SECONDS (ref 11.7–14.2)
RBC # BLD AUTO: 4.66 10*6/MM3 (ref 4.6–6)
RBC # UR: ABNORMAL /HPF
REF LAB TEST METHOD: ABNORMAL
SODIUM BLD-SCNC: 137 MMOL/L (ref 136–145)
SP GR UR STRIP: >=1.03 (ref 1–1.03)
SQUAMOUS #/AREA URNS HPF: ABNORMAL /HPF
UROBILINOGEN UR QL STRIP: ABNORMAL
WBC NRBC COR # BLD: 5.75 10*3/MM3 (ref 4.5–10.7)
WBC UR QL AUTO: ABNORMAL /HPF
YEAST URNS QL MICRO: ABNORMAL /HPF

## 2018-11-19 PROCEDURE — 71046 X-RAY EXAM CHEST 2 VIEWS: CPT

## 2018-11-19 PROCEDURE — 85730 THROMBOPLASTIN TIME PARTIAL: CPT | Performed by: SURGERY

## 2018-11-19 PROCEDURE — 36415 COLL VENOUS BLD VENIPUNCTURE: CPT

## 2018-11-19 PROCEDURE — 80053 COMPREHEN METABOLIC PANEL: CPT | Performed by: SURGERY

## 2018-11-19 PROCEDURE — 85027 COMPLETE CBC AUTOMATED: CPT | Performed by: SURGERY

## 2018-11-19 PROCEDURE — 81001 URINALYSIS AUTO W/SCOPE: CPT | Performed by: SURGERY

## 2018-11-19 PROCEDURE — G0463 HOSPITAL OUTPT CLINIC VISIT: HCPCS

## 2018-11-19 PROCEDURE — 85610 PROTHROMBIN TIME: CPT | Performed by: SURGERY

## 2018-11-19 RX ORDER — ERGOCALCIFEROL 1.25 MG/1
50000 CAPSULE ORAL WEEKLY
COMMUNITY
End: 2019-02-26 | Stop reason: SDUPTHER

## 2018-11-19 RX ORDER — DIGOXIN 125 MCG
125 TABLET ORAL
COMMUNITY
End: 2019-03-04 | Stop reason: SDUPTHER

## 2018-11-19 RX ORDER — AMOXICILLIN 500 MG
2000 CAPSULE ORAL 2 TIMES DAILY WITH MEALS
COMMUNITY

## 2018-11-19 RX ORDER — MECOBAL/LEVOMEFOLAT CA/B6 PHOS 2-3-35 MG
1 TABLET ORAL 2 TIMES DAILY
COMMUNITY
End: 2019-01-07 | Stop reason: SDUPTHER

## 2018-11-19 RX ORDER — ROSUVASTATIN CALCIUM 10 MG/1
10 TABLET, COATED ORAL DAILY
COMMUNITY
End: 2019-02-26 | Stop reason: SDUPTHER

## 2018-11-19 RX ORDER — NYSTATIN 100000 [USP'U]/G
1 POWDER TOPICAL 2 TIMES DAILY
Status: ON HOLD | COMMUNITY
End: 2019-03-01

## 2018-11-19 RX ORDER — MINERAL OIL/PETROLATUM,WHITE 41.5-56.8%
1 OINTMENT (GRAM) OPHTHALMIC (EYE) 2 TIMES DAILY
COMMUNITY
End: 2020-11-17

## 2018-11-19 RX ORDER — CHOLECALCIFEROL (VITAMIN D3) 125 MCG
5 CAPSULE ORAL NIGHTLY
COMMUNITY

## 2018-11-19 RX ORDER — ALLOPURINOL 300 MG/1
300 TABLET ORAL DAILY
COMMUNITY
End: 2019-02-26 | Stop reason: SDUPTHER

## 2018-11-19 RX ORDER — GLIPIZIDE 10 MG/1
10 TABLET ORAL 2 TIMES DAILY
COMMUNITY
End: 2019-03-04 | Stop reason: SDUPTHER

## 2018-11-19 NOTE — DISCHARGE INSTRUCTIONS
Take the following medications the morning of surgery with a small sip of water: DIGOXIN        General Instructions:  • Do not eat solid food after midnight the night before surgery.  • You may drink clear liquids day of surgery but must stop at least one hour before your hospital arrival time.  • It is beneficial for you to have a clear drink that contains carbohydrates the day of surgery.  12 to 20 ounce bottle of G2 or Powerade Zero for diabetic patients.     Clear liquids are liquids you can see through.  Nothing red in color.     Plain water                               Sports drinks  Sodas                                   Gelatin (Jell-O)  Fruit juices without pulp such as white grape juice and apple juice  Popsicles that contain no fruit or yogurt  Tea or coffee (no cream or milk added)  Gatorade / Powerade  G2 / Powerade Zero    • If applicable bring your C-PAP/ BI-PAP machine.  • Bring any papers given to you in the doctor’s office.  • Wear clean comfortable clothes and socks.  • Do not wear contact lenses or make-up.  Bring a case for your glasses.   • Bring crutches or walker if applicable.  • Remove all piercings.  Leave jewelry and any other valuables at home.  • The Pre-Admission Testing nurse will instruct you to bring medications if unable to obtain an accurate list in Pre-Admission Testing.            Preventing a Surgical Site Infection:  • For 2 to 3 days before surgery, avoid shaving with a razor because the razor can irritate skin and make it easier to develop an infection.    • Any areas of open skin can increase the risk of a post-operative wound infection by allowing bacteria to enter and travel throughout the body.  Notify your surgeon if you have any skin wounds / rashes even if it is not near the expected surgical site.  The area will need assessed to determine if surgery should be delayed until it is healed.  • The night prior to surgery sleep in a clean bed with clean clothing.  Do  not allow pets to sleep with you.  • Shower on the morning of surgery using a fresh bar of anti-bacterial soap (such as Dial) and clean washcloth.  Dry with a clean towel and dress in clean clothing.  • Ask your surgeon if you will be receiving antibiotics prior to surgery.  • Make sure you, your family, and all healthcare providers clean their hands with soap and water or an alcohol based hand  before caring for you or your wound.    Day of surgery: 12/3/2018. MAIN OR. ARRIVAL TIME 830 AM  Upon arrival, a Pre-op nurse and Anesthesiologist will review your health history, obtain vital signs, and answer questions you may have.  The only belongings needed at this time will be your home medications and if applicable your C-PAP/BI-PAP machine.  If you are staying overnight your family can leave the rest of your belongings in the car and bring them to your room later.  A Pre-op nurse will start an IV and you may receive medication in preparation for surgery, including something to help you relax.  Your family will be able to see you in the Pre-op area.  While you are in surgery your family should notify the waiting room  if they leave the waiting room area and provide a contact phone number.    Please be aware that surgery does come with discomfort.  We want to make every effort to control your discomfort so please discuss any uncontrolled symptoms with your nurse.   Your doctor will most likely have prescribed pain medications.      If you are going home after surgery you will receive individualized written care instructions before being discharged.  A responsible adult must drive you to and from the hospital on the day of your surgery and stay with you for 24 hours.    If you are staying overnight following surgery, you will be transported to your hospital room following the recovery period.  [facility] has all private rooms.    You have received a list of surgical assistants for  your reference.  If you have any questions please call Pre-Admission Testing at [phone].  Deductibles and co-payments are collected on the day of service. Please be prepared to pay the required co-pay, deductible or deposit on the day of service as defined by your plan.

## 2018-11-26 ENCOUNTER — OFFICE VISIT (OUTPATIENT)
Dept: WOUND CARE | Facility: HOSPITAL | Age: 78
End: 2018-11-26
Attending: SURGERY

## 2018-11-28 ENCOUNTER — OFFICE VISIT (OUTPATIENT)
Dept: INFECTIOUS DISEASES | Facility: CLINIC | Age: 78
End: 2018-11-28

## 2018-11-28 VITALS
DIASTOLIC BLOOD PRESSURE: 73 MMHG | BODY MASS INDEX: 34.93 KG/M2 | HEART RATE: 90 BPM | HEIGHT: 70 IN | SYSTOLIC BLOOD PRESSURE: 135 MMHG | TEMPERATURE: 98.4 F | RESPIRATION RATE: 12 BRPM | WEIGHT: 244 LBS

## 2018-11-28 DIAGNOSIS — T14.8XXD WOUND HEALING, DELAYED: ICD-10-CM

## 2018-11-28 DIAGNOSIS — G06.2 EPIDURAL ABSCESS: Primary | ICD-10-CM

## 2018-11-28 PROCEDURE — 99214 OFFICE O/P EST MOD 30 MIN: CPT | Performed by: INTERNAL MEDICINE

## 2018-11-28 NOTE — PROGRESS NOTES
Reason for clinic visits: Follow up for Escherichia coli bacteremia, and epidural abscess    HPI: Jesus Beckham is a 78 y.o. male who was last seen in clinic in August.  Since that time he has been doing well.  He states his lower extremity wounds are healing well.  He has been having issues with bilateral groin yeast infections and has been treated with Diflucan and nystatin.  He denies any abdominal pain nausea vomiting or diarrhea.  No shortness of breath or cough.  He denies any back pain.  His incision remains well and healed.  He denies any rashes    PMH:  • Arthritis     • Cancer of bladder 2016   • Diabetes mellitus     • Gout     • Hyperlipidemia     • Skin carcinoma     • Type 2 diabetes mellitus     • Vitamin D deficiency      Past Surgical History:   Procedure Laterality Date   • APPENDECTOMY N/A 1961   • CHOLECYSTECTOMY WITH INTRAOPERATIVE CHOLANGIOGRAM N/A 2/25/2018   • EYE SURGERY  1959    glass removal from eye, lens transplant   • LAMINECTOMY N/A 01/18/2016    L2-L3, L3-L4, L4-L5, and L5-S1 bilateral lamincectomy, medial facetectomy & [id number], Dr. Kaiden Valdes   • LUMBAR FUSION N/A 3/7/2018    Procedure: LUMBAR FUSION MINIMALLY INVASIVE TRANSFORAMINAL LUMBAR INTERBODY IRRIGATION AND DEBRIDEMENT AND FUSION.  IRRIGATION AND DEBRIDEMENT OF EPIDURAL ABCESS LUMBAR 2-4. BONE MORPHOGENIC PROTEIN, ALPHATEC NOVEL AND ILLICO, EMG AND SSEP NEUROMONITORING.;  Surgeon: Manish Marr DO;  Location: [facility]  Service: Orthopedic Spine   • TOTAL KNEE ARTHROPLASTY Right 03/07/2005    Dr. Washington Evans   • VENA CAVA FILTER INSERTION Right 3/6/2018    Procedure: VENA CAVA FILTER INSERTION;  Surgeon: Alexis Thakkar MD;  Location: [facility] 18/19;  Service:        Social History   reports that he quit smoking about 22 months ago. His smoking use included cigars. His smokeless tobacco use includes chew. He reports that he does not drink alcohol or use drugs.    Family History  family history  includes Cancer in his mother.    Allergies   Allergen Reactions   • Other Other (See Comments)     ALCOHOL. DEATHLY ILL.        The medication list has been reviewed and updated.     Review of Systems  Pertinent items are noted in HPI, all other systems reviewed and negative    Vital Signs   Temp:  [98.4 °F (36.9 °C)] 98.4 °F (36.9 °C)  Heart Rate:  [90] 90  Resp:  [12] 12  BP: (135)/(73) 135/73    Physical Exam:   General: In no acute distress  Cardiovascular: Normal rate, regular rhythm, no LE edema    Respiratory: Lungs are clear to ascultation bilaterally, no wheezing   GI: Abdomen is soft, non-tender, non-distended, positive bowel sounds bilaterally, incisions healed well without purulence or erythema  Skin: No rashes   Left lower extremity with a wound on his shin without erythema or purulence    Lab Results   Component Value Date    WBC 5.75 11/19/2018    HGB 13.7 11/19/2018    HCT 41.6 11/19/2018    MCV 89.3 11/19/2018     11/19/2018       Lab Results   Component Value Date    GLUCOSE 281 (H) 11/19/2018    BUN 17 11/19/2018    CREATININE 1.07 11/19/2018    EGFRIFNONA 67 11/19/2018    EGFRIFAFRI 79 09/06/2018    BCR 15.9 11/19/2018    CO2 26.8 11/19/2018    CALCIUM 9.8 11/19/2018    PROTENTOTREF 6.6 09/06/2018    ALBUMIN 4.10 11/19/2018    LABIL2 2.0 09/06/2018    AST 18 11/19/2018    ALT 25 11/19/2018       Lab Results   Component Value Date    SEDRATE 17 10/15/2018       Lab Results   Component Value Date    CRP 1.72 (H) 10/15/2018     7/30 L leg wound MRSA + corynebacterium  7/2 L leg wound MRSA and enterococcus     3/7 Operative back cx E coli  3/4 UCx 10-20K candida  2/28 BCx Neg x 2  2/24 BCx Neg x 2  2/23 BCx E coli 2/2     Assessment:  This is a 78 y.o. male who presents to clinic today for follow up of Escherichia coli bacteremia, discitis, paravertebral soft tissue infection, psoas muscle abscess and epidural abscess.  He is status post incision and drainage and lumbar fusion on March 7.  He  has completed an 8 week course of ceftriaxone and a three-month course of suppressive Levaquin.  He was switched to Bactrim at the beginning of August.  At this time him and his family would like to discontinue all antibiotics.  They understand the risk of possible recurrent infection albeit the risk is low since he's been on suppressive antibiotics for close to one year.  I discussed this with them in detail the signs and symptoms of recurrent infection including but not limited to fever, increasing back pain, erythema swelling etc. the patient and his family voiced understanding and want to proceed    Plan:   1.DC Bactrim   2.  Check a CBC with differential and CMP    Return to Infectious Disease clinic prn

## 2018-11-30 ENCOUNTER — OFFICE VISIT (OUTPATIENT)
Dept: WOUND CARE | Facility: HOSPITAL | Age: 78
End: 2018-11-30
Attending: SURGERY

## 2018-11-30 PROCEDURE — G0463 HOSPITAL OUTPT CLINIC VISIT: HCPCS

## 2018-12-03 ENCOUNTER — APPOINTMENT (OUTPATIENT)
Dept: GENERAL RADIOLOGY | Facility: HOSPITAL | Age: 78
End: 2018-12-03

## 2018-12-03 ENCOUNTER — HOSPITAL ENCOUNTER (OUTPATIENT)
Facility: HOSPITAL | Age: 78
Setting detail: HOSPITAL OUTPATIENT SURGERY
Discharge: HOME | End: 2018-12-03
Attending: SURGERY | Admitting: SURGERY

## 2018-12-03 ENCOUNTER — ANESTHESIA EVENT (OUTPATIENT)
Dept: PERIOP | Facility: HOSPITAL | Age: 78
End: 2018-12-03

## 2018-12-03 ENCOUNTER — ANESTHESIA (OUTPATIENT)
Dept: PERIOP | Facility: HOSPITAL | Age: 78
End: 2018-12-03

## 2018-12-03 VITALS
HEART RATE: 70 BPM | SYSTOLIC BLOOD PRESSURE: 148 MMHG | RESPIRATION RATE: 16 BRPM | OXYGEN SATURATION: 96 % | DIASTOLIC BLOOD PRESSURE: 78 MMHG | BODY MASS INDEX: 33.29 KG/M2 | TEMPERATURE: 97.9 F | WEIGHT: 232.56 LBS | HEIGHT: 70 IN

## 2018-12-03 PROBLEM — I82.4Z1: Status: RESOLVED | Noted: 2018-03-05 | Resolved: 2018-12-03

## 2018-12-03 PROBLEM — I82.551 CHRONIC DEEP VEIN THROMBOSIS (DVT) OF RIGHT PERONEAL VEIN: Status: ACTIVE | Noted: 2018-12-03

## 2018-12-03 LAB
GLUCOSE BLDC GLUCOMTR-MCNC: 134 MG/DL (ref 70–130)
GLUCOSE BLDC GLUCOMTR-MCNC: 177 MG/DL (ref 70–130)

## 2018-12-03 PROCEDURE — 82962 GLUCOSE BLOOD TEST: CPT

## 2018-12-03 PROCEDURE — 25010000002 HEPARIN (PORCINE) PER 1000 UNITS: Performed by: SURGERY

## 2018-12-03 PROCEDURE — 25010000003 CEFAZOLIN IN DEXTROSE 2-4 GM/100ML-% SOLUTION: Performed by: SURGERY

## 2018-12-03 PROCEDURE — 25010000002 FENTANYL CITRATE (PF) 100 MCG/2ML SOLUTION: Performed by: ANESTHESIOLOGY

## 2018-12-03 PROCEDURE — C1769 GUIDE WIRE: HCPCS | Performed by: SURGERY

## 2018-12-03 PROCEDURE — 25010000002 PROPOFOL 10 MG/ML EMULSION: Performed by: NURSE ANESTHETIST, CERTIFIED REGISTERED

## 2018-12-03 PROCEDURE — 25010000002 FENTANYL CITRATE (PF) 100 MCG/2ML SOLUTION: Performed by: NURSE ANESTHETIST, CERTIFIED REGISTERED

## 2018-12-03 PROCEDURE — 0 IOPAMIDOL PER 1 ML: Performed by: SURGERY

## 2018-12-03 PROCEDURE — C1773 RET DEV, INSERTABLE: HCPCS | Performed by: SURGERY

## 2018-12-03 RX ORDER — PROMETHAZINE HYDROCHLORIDE 25 MG/ML
12.5 INJECTION, SOLUTION INTRAMUSCULAR; INTRAVENOUS ONCE AS NEEDED
Status: CANCELLED | OUTPATIENT
Start: 2018-12-03

## 2018-12-03 RX ORDER — FENTANYL CITRATE 50 UG/ML
50 INJECTION, SOLUTION INTRAMUSCULAR; INTRAVENOUS
Status: CANCELLED | OUTPATIENT
Start: 2018-12-03

## 2018-12-03 RX ORDER — HYDROMORPHONE HYDROCHLORIDE 1 MG/ML
0.5 INJECTION, SOLUTION INTRAMUSCULAR; INTRAVENOUS; SUBCUTANEOUS
Status: CANCELLED | OUTPATIENT
Start: 2018-12-03

## 2018-12-03 RX ORDER — SODIUM CHLORIDE 0.9 % (FLUSH) 0.9 %
1-10 SYRINGE (ML) INJECTION AS NEEDED
Status: DISCONTINUED | OUTPATIENT
Start: 2018-12-03 | End: 2018-12-03 | Stop reason: HOSPADM

## 2018-12-03 RX ORDER — PROMETHAZINE HYDROCHLORIDE 25 MG/1
25 SUPPOSITORY RECTAL ONCE AS NEEDED
Status: CANCELLED | OUTPATIENT
Start: 2018-12-03

## 2018-12-03 RX ORDER — LABETALOL HYDROCHLORIDE 5 MG/ML
5 INJECTION, SOLUTION INTRAVENOUS
Status: CANCELLED | OUTPATIENT
Start: 2018-12-03

## 2018-12-03 RX ORDER — FLUMAZENIL 0.1 MG/ML
0.2 INJECTION INTRAVENOUS AS NEEDED
Status: CANCELLED | OUTPATIENT
Start: 2018-12-03

## 2018-12-03 RX ORDER — HYDROCODONE BITARTRATE AND ACETAMINOPHEN 7.5; 325 MG/1; MG/1
1 TABLET ORAL ONCE AS NEEDED
Status: CANCELLED | OUTPATIENT
Start: 2018-12-03 | End: 2018-12-04

## 2018-12-03 RX ORDER — DIPHENHYDRAMINE HCL 25 MG
25 CAPSULE ORAL EVERY 6 HOURS PRN
Status: CANCELLED | OUTPATIENT
Start: 2018-12-03

## 2018-12-03 RX ORDER — MIDAZOLAM HYDROCHLORIDE 1 MG/ML
2 INJECTION INTRAMUSCULAR; INTRAVENOUS
Status: DISCONTINUED | OUTPATIENT
Start: 2018-12-03 | End: 2018-12-03 | Stop reason: HOSPADM

## 2018-12-03 RX ORDER — CEFAZOLIN SODIUM 2 G/100ML
2 INJECTION, SOLUTION INTRAVENOUS ONCE
Status: COMPLETED | OUTPATIENT
Start: 2018-12-03 | End: 2018-12-03

## 2018-12-03 RX ORDER — ONDANSETRON 2 MG/ML
4 INJECTION INTRAMUSCULAR; INTRAVENOUS ONCE AS NEEDED
Status: CANCELLED | OUTPATIENT
Start: 2018-12-03

## 2018-12-03 RX ORDER — PROMETHAZINE HYDROCHLORIDE 25 MG/1
12.5 TABLET ORAL ONCE AS NEEDED
Status: CANCELLED | OUTPATIENT
Start: 2018-12-03 | End: 2018-12-04

## 2018-12-03 RX ORDER — MIDAZOLAM HYDROCHLORIDE 1 MG/ML
1 INJECTION INTRAMUSCULAR; INTRAVENOUS
Status: DISCONTINUED | OUTPATIENT
Start: 2018-12-03 | End: 2018-12-03 | Stop reason: HOSPADM

## 2018-12-03 RX ORDER — FAMOTIDINE 10 MG/ML
20 INJECTION, SOLUTION INTRAVENOUS ONCE
Status: COMPLETED | OUTPATIENT
Start: 2018-12-03 | End: 2018-12-03

## 2018-12-03 RX ORDER — FAMOTIDINE 10 MG/ML
20 INJECTION, SOLUTION INTRAVENOUS ONCE
Status: DISCONTINUED | OUTPATIENT
Start: 2018-12-03 | End: 2018-12-03 | Stop reason: HOSPADM

## 2018-12-03 RX ORDER — OXYCODONE AND ACETAMINOPHEN 7.5; 325 MG/1; MG/1
1 TABLET ORAL ONCE AS NEEDED
Status: CANCELLED | OUTPATIENT
Start: 2018-12-03 | End: 2018-12-04

## 2018-12-03 RX ORDER — SODIUM CHLORIDE, SODIUM LACTATE, POTASSIUM CHLORIDE, CALCIUM CHLORIDE 600; 310; 30; 20 MG/100ML; MG/100ML; MG/100ML; MG/100ML
9 INJECTION, SOLUTION INTRAVENOUS CONTINUOUS
Status: DISCONTINUED | OUTPATIENT
Start: 2018-12-03 | End: 2018-12-03 | Stop reason: HOSPADM

## 2018-12-03 RX ORDER — PROMETHAZINE HYDROCHLORIDE 25 MG/1
25 TABLET ORAL ONCE AS NEEDED
Status: CANCELLED | OUTPATIENT
Start: 2018-12-03

## 2018-12-03 RX ORDER — EPHEDRINE SULFATE 50 MG/ML
5 INJECTION, SOLUTION INTRAVENOUS ONCE AS NEEDED
Status: CANCELLED | OUTPATIENT
Start: 2018-12-03

## 2018-12-03 RX ORDER — NALOXONE HCL 0.4 MG/ML
0.2 VIAL (ML) INJECTION AS NEEDED
Status: CANCELLED | OUTPATIENT
Start: 2018-12-03

## 2018-12-03 RX ORDER — FENTANYL CITRATE 50 UG/ML
INJECTION, SOLUTION INTRAMUSCULAR; INTRAVENOUS AS NEEDED
Status: DISCONTINUED | OUTPATIENT
Start: 2018-12-03 | End: 2018-12-03 | Stop reason: SURG

## 2018-12-03 RX ORDER — FENTANYL CITRATE 50 UG/ML
50 INJECTION, SOLUTION INTRAMUSCULAR; INTRAVENOUS
Status: DISCONTINUED | OUTPATIENT
Start: 2018-12-03 | End: 2018-12-03 | Stop reason: HOSPADM

## 2018-12-03 RX ORDER — DIPHENHYDRAMINE HYDROCHLORIDE 50 MG/ML
12.5 INJECTION INTRAMUSCULAR; INTRAVENOUS
Status: CANCELLED | OUTPATIENT
Start: 2018-12-03

## 2018-12-03 RX ORDER — LIDOCAINE HYDROCHLORIDE 10 MG/ML
0.5 INJECTION, SOLUTION EPIDURAL; INFILTRATION; INTRACAUDAL; PERINEURAL ONCE AS NEEDED
Status: DISCONTINUED | OUTPATIENT
Start: 2018-12-03 | End: 2018-12-03 | Stop reason: HOSPADM

## 2018-12-03 RX ORDER — PROPOFOL 10 MG/ML
VIAL (ML) INTRAVENOUS AS NEEDED
Status: DISCONTINUED | OUTPATIENT
Start: 2018-12-03 | End: 2018-12-03 | Stop reason: SURG

## 2018-12-03 RX ORDER — PROPOFOL 10 MG/ML
VIAL (ML) INTRAVENOUS CONTINUOUS PRN
Status: DISCONTINUED | OUTPATIENT
Start: 2018-12-03 | End: 2018-12-03 | Stop reason: SURG

## 2018-12-03 RX ORDER — LIDOCAINE HYDROCHLORIDE 20 MG/ML
INJECTION, SOLUTION INFILTRATION; PERINEURAL AS NEEDED
Status: DISCONTINUED | OUTPATIENT
Start: 2018-12-03 | End: 2018-12-03 | Stop reason: SURG

## 2018-12-03 RX ORDER — SODIUM CHLORIDE, SODIUM LACTATE, POTASSIUM CHLORIDE, CALCIUM CHLORIDE 600; 310; 30; 20 MG/100ML; MG/100ML; MG/100ML; MG/100ML
INJECTION, SOLUTION INTRAVENOUS CONTINUOUS PRN
Status: DISCONTINUED | OUTPATIENT
Start: 2018-12-03 | End: 2018-12-03 | Stop reason: SURG

## 2018-12-03 RX ADMIN — FENTANYL CITRATE 50 MCG: 50 INJECTION, SOLUTION INTRAMUSCULAR; INTRAVENOUS at 13:57

## 2018-12-03 RX ADMIN — FENTANYL CITRATE 50 MCG: 50 INJECTION INTRAMUSCULAR; INTRAVENOUS at 15:57

## 2018-12-03 RX ADMIN — SODIUM CHLORIDE, POTASSIUM CHLORIDE, SODIUM LACTATE AND CALCIUM CHLORIDE 9 ML/HR: 600; 310; 30; 20 INJECTION, SOLUTION INTRAVENOUS at 09:59

## 2018-12-03 RX ADMIN — SODIUM CHLORIDE, POTASSIUM CHLORIDE, SODIUM LACTATE AND CALCIUM CHLORIDE: 600; 310; 30; 20 INJECTION, SOLUTION INTRAVENOUS at 15:57

## 2018-12-03 RX ADMIN — FENTANYL CITRATE 50 MCG: 50 INJECTION INTRAMUSCULAR; INTRAVENOUS at 16:42

## 2018-12-03 RX ADMIN — CEFAZOLIN SODIUM 2 G: 2 INJECTION, SOLUTION INTRAVENOUS at 16:05

## 2018-12-03 RX ADMIN — IOPAMIDOL 50 ML: 510 INJECTION, SOLUTION INTRAVASCULAR at 16:53

## 2018-12-03 RX ADMIN — FAMOTIDINE 20 MG: 10 INJECTION, SOLUTION INTRAVENOUS at 09:59

## 2018-12-03 RX ADMIN — PROPOFOL 25 MCG/KG/MIN: 10 INJECTION, EMULSION INTRAVENOUS at 16:09

## 2018-12-03 RX ADMIN — LIDOCAINE HYDROCHLORIDE 80 MG: 20 INJECTION, SOLUTION INFILTRATION; PERINEURAL at 16:09

## 2018-12-03 RX ADMIN — PROPOFOL 50 MG: 10 INJECTION, EMULSION INTRAVENOUS at 16:09

## 2018-12-03 NOTE — ANESTHESIA POSTPROCEDURE EVALUATION
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, right (CMS/HCC)    Surgeon:  Alexis Thakkar MD Provider:  Rashawn Stevenson MD    Anesthesia Type:  MAC ASA Status:  3          Anesthesia Type: MAC  Last vitals  BP   148/78 (12/03/18 1715)   Temp   36.6 °C (97.9 °F) (12/03/18 1655)   Pulse   70 (12/03/18 1715)   Resp   16 (12/03/18 1715)     SpO2   96 % (12/03/18 1715)     Post Anesthesia Care and Evaluation    Patient location during evaluation: PHASE II  Patient participation: complete - patient participated  Level of consciousness: awake and alert  Pain management: adequate  Airway patency: patent  Anesthetic complications: No anesthetic complications  PONV Status: none  Cardiovascular status: acceptable  Respiratory status: acceptable  Hydration status: acceptable

## 2018-12-03 NOTE — ANESTHESIA PREPROCEDURE EVALUATION
Anesthesia Evaluation     Patient summary reviewed and Nursing notes reviewed   NPO Solid Status: > 8 hours  NPO Liquid Status: > 2 hours           Airway   Mallampati: II  No difficulty expected  Dental - normal exam     Pulmonary     breath sounds clear to auscultation  (+) sleep apnea,   Cardiovascular     Rhythm: regular    (+) hypertension, dysrhythmias Atrial Fib, PVD, DVT, hyperlipidemia,       Neuro/Psych  (+) weakness, numbness,     GI/Hepatic/Renal/Endo    (+)   diabetes mellitus,     Musculoskeletal     Abdominal   (+) obese,    Substance History      OB/GYN          Other   (+) arthritis                   Anesthesia Plan    ASA 3     MAC     intravenous induction   Anesthetic plan, all risks, benefits, and alternatives have been provided, discussed and informed consent has been obtained with: patient.

## 2018-12-03 NOTE — OP NOTE
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 Alexis Thakkar    Assistants:  None    Anesthesia: Monitor Anesthesia Care    Staff:   Circulator: Paige Black RN  Scrub Person: Bandar Wisdom; Izabel Keen  Vascular Radiology Technician: Michelle Serra    Indications:  Pleasant gentleman with history acute deep vein thrombosis in need of surgery and had filter placed March 2018.  Patient is on anticoagulant Eliquis.  Patient on recent venous Doppler showed chronic changes only of veins.  Patient for vena cava filter removal.  Risks discussed and agrees to proceed.       Procedure Details right neck prepped and draped in usual fashion.  1% Xylocaine with Marcaine used for local anesthesia.  Ultrasound access right jugular vein performed without problems.  Wire advanced without difficulty to the infrarenal vena cava level through the filter.  The filter removal sheath was then placed over the wire to the filter level in the infrarenal vena cava.  Venacavogram performed through the sheath confirming the filter and came was patent at that level and suprarenal level.  The snare was then placed.  The hook on the top of the vena cava filter was captured.  The filter was pulled into the sheath without difficulty and the filter removed in its entirety.  The filter was inspected on the back table and was intact.  The sheath was removed from the jugular vein.  Pressure held 5 minutes.  Hemostasis achieved.  Dressing applied.  Patient tolerated procedure well without problems.    Radiographic interpretation: Patent vena cava and vena cava  filter.    Findings: See above    Estimated Blood Loss: minimal    Specimens: * No orders in the log *      Drains:   Urethral Catheter Silicone 16 Fr. (Active)   Daily Indications Other (comment) 12/3/2018  9:34 AM   Site Assessment Other (Comment) 12/3/2018  9:34 AM   Collection Container Standard drainage bag 12/3/2018  9:34 AM   Securement Method Securing device 12/3/2018  9:34 AM       Complications: None    Condition: stable    Disposition: To recovery room    Alexis Thakkar MD     Date: 12/3/2018  Time: 4:50 PM    There are no hospital problems to display for this patient.

## 2018-12-03 NOTE — DISCHARGE INSTRUCTIONS
Moderate Conscious Sedation, Adult, Care After  Refer to this sheet in the next few weeks. These instructions provide you with information on caring for yourself after your procedure. Your health care provider may also give you more specific instructions. Your treatment has been planned according to current medical practices, but problems sometimes occur. Call your health care provider if you have any problems or questions after your procedure.  WHAT TO EXPECT AFTER THE PROCEDURE   After your procedure:  · You may feel sleepy, clumsy, and have poor balance for several hours.  · Vomiting may occur if you eat too soon after the procedure.  HOME CARE INSTRUCTIONS  · Do not participate in any activities where you could become injured for at least 24 hours. Do not:    Drive.    Swim.    Ride a bicycle.    Operate heavy machinery.    Cook.    Use power tools.    Climb ladders.    Work from a high place.  · Do not make important decisions or sign legal documents until you are improved.  · If you vomit, drink water, juice, or soup when you can drink without vomiting. Make sure you have little or no nausea before eating solid foods.  · Only take over-the-counter or prescription medicines for pain, discomfort, or fever as directed by your health care provider.  · Make sure you and your family fully understand everything about the medicines given to you, including what side effects may occur.  · You should not drink alcohol, take sleeping pills, or take medicines that cause drowsiness for at least 24 hours.  · If you smoke, do not smoke without supervision.  · If you are feeling better, you may resume normal activities 24 hours after you were sedated.  · Keep all appointments with your health care provider.  · You should have a responsible adult stay with you for the first 24 hours post procedure.  SEEK MEDICAL CARE IF:  · Your skin is pale or bluish in color.  · You continue to feel nauseous or vomit.  · Your pain is getting  worse and is not helped by medicine.  · You have bleeding or swelling.  · You are still sleepy or feeling clumsy after 24 hours.  SEEK IMMEDIATE MEDICAL CARE IF:  · You develop a rash.  · You have difficulty breathing.  · You develop any type of allergic problem.  · You have a fever.  MAKE SURE YOU:  · Understand these instructions.  · Will watch your condition.  · Will get help right away if you are not doing well or get worse.     This information is not intended to replace advice given to you by your health care provider. Make sure you discuss any questions you have with your health care provider.     Document Released: 10/08/2014 Document Revised: 01/08/2016 Document Reviewed: 10/08/2014  [employer] Interactive Patient Education ©2016 [employer]      Outpatient Surgery Guidelines, Adult  Outpatient procedures are those for which the person having the procedure is allowed to go home the same day as the procedure. Various procedures are done on an outpatient basis. You should follow some general guidelines if you will be having an outpatient procedure.  AFTER THE  PROCEDURE  After surgery, you will be taken to a recovery area, where your progress will be monitored. If there are no complications, you will be allowed to go home when you are awake, stable, and taking fluids well. You may have numbness around the surgical site. Healing will take some time. You will have tenderness at the surgical site and may have some swelling and bruising. You may also have some nausea.  HOME CARE INSTRUCTIONS  · Do not drive for 24 hours, or as directed by your health care provider. Do not drive while taking prescription pain medicines.  · Do not drink alcohol for 24 hours.  · Do not make important decisions or sign legal documents for 24 hours.  · Plan on having a responsible adult stay with your for 24 hours following your procedure.  · You may resume a normal diet and activities as directed.  · Do not lift anything heavier than  10 pounds (4.5 kg) or play contact sports until your health care provider says it is okay.  · Only take over-the-counter or prescription medicines as directed by your health care provider.  · Follow up with your health care provider as directed.  SEEK MEDICAL CARE IF:  · You have increased bleeding (more than a small spot) from the surgical site.  · You have redness, swelling, or increasing pain in the wound.  · You see pus coming from the wound.  · You have a fever > 101.  · You notice a bad smell coming from the wound or dressing.  · You feel lightheaded or faint.  · You develop a rash.  · You have trouble breathing.  · You develop allergies.  MAKE SURE YOU:  · Understand these instructions.  · Will watch your condition.  · Will get help right away if you are not doing well or get worse.

## 2018-12-10 ENCOUNTER — OFFICE VISIT (OUTPATIENT)
Dept: WOUND CARE | Facility: HOSPITAL | Age: 78
End: 2018-12-10
Attending: SURGERY

## 2018-12-10 PROCEDURE — G0463 HOSPITAL OUTPT CLINIC VISIT: HCPCS

## 2018-12-17 ENCOUNTER — OFFICE VISIT (OUTPATIENT)
Dept: WOUND CARE | Facility: HOSPITAL | Age: 78
End: 2018-12-17
Attending: SURGERY

## 2018-12-17 PROCEDURE — G0463 HOSPITAL OUTPT CLINIC VISIT: HCPCS

## 2018-12-26 ENCOUNTER — OFFICE VISIT (OUTPATIENT)
Dept: WOUND CARE | Facility: HOSPITAL | Age: 78
End: 2018-12-26
Attending: SURGERY

## 2018-12-26 PROCEDURE — G0463 HOSPITAL OUTPT CLINIC VISIT: HCPCS

## 2019-01-07 ENCOUNTER — APPOINTMENT (OUTPATIENT)
Dept: WOUND CARE | Facility: HOSPITAL | Age: 79
End: 2019-01-07
Attending: SURGERY

## 2019-01-08 ENCOUNTER — RESULTS ENCOUNTER (OUTPATIENT)
Dept: ENDOCRINOLOGY | Age: 79
End: 2019-01-08

## 2019-01-08 DIAGNOSIS — G47.33 OSA (OBSTRUCTIVE SLEEP APNEA): ICD-10-CM

## 2019-01-08 DIAGNOSIS — IMO0002 UNCONTROLLED TYPE 2 DIABETES MELLITUS WITH COMPLICATION, WITH LONG-TERM CURRENT USE OF INSULIN: ICD-10-CM

## 2019-01-08 DIAGNOSIS — I10 ESSENTIAL HYPERTENSION: ICD-10-CM

## 2019-01-08 DIAGNOSIS — E55.9 VITAMIN D DEFICIENCY: ICD-10-CM

## 2019-01-08 DIAGNOSIS — E79.0 HYPERURICEMIA: ICD-10-CM

## 2019-01-08 DIAGNOSIS — E11.42 DIABETIC PERIPHERAL NEUROPATHY (HCC): ICD-10-CM

## 2019-01-08 DIAGNOSIS — E78.5 DYSLIPIDEMIA: ICD-10-CM

## 2019-01-08 RX ORDER — LANCETS
EACH MISCELLANEOUS
Qty: 204 EACH | Refills: 0 | Status: SHIPPED | OUTPATIENT
Start: 2019-01-08 | End: 2019-03-13 | Stop reason: SDUPTHER

## 2019-01-08 RX ORDER — MECOBAL/LEVOMEFOLAT CA/B6 PHOS 2-3-35 MG
TABLET ORAL
Qty: 180 TABLET | Refills: 0 | Status: SHIPPED | OUTPATIENT
Start: 2019-01-08 | End: 2019-03-13 | Stop reason: SDUPTHER

## 2019-01-09 LAB
25(OH)D3+25(OH)D2 SERPL-MCNC: 35.7 NG/ML (ref 30–100)
ALBUMIN SERPL-MCNC: 4 G/DL (ref 3.5–5.2)
ALBUMIN/GLOB SERPL: 1.4 G/DL
ALP SERPL-CCNC: 89 U/L (ref 39–117)
ALT SERPL-CCNC: 21 U/L (ref 1–41)
AST SERPL-CCNC: 19 U/L (ref 1–40)
BILIRUB SERPL-MCNC: 0.4 MG/DL (ref 0.1–1.2)
BUN SERPL-MCNC: 21 MG/DL (ref 8–23)
BUN/CREAT SERPL: 23.1 (ref 7–25)
C PEPTIDE SERPL-MCNC: 2.2 NG/ML (ref 1.1–4.4)
CALCIUM SERPL-MCNC: 9.7 MG/DL (ref 8.6–10.5)
CHLORIDE SERPL-SCNC: 100 MMOL/L (ref 98–107)
CHOLEST SERPL-MCNC: 137 MG/DL (ref 0–200)
CO2 SERPL-SCNC: 27.7 MMOL/L (ref 22–29)
CREAT SERPL-MCNC: 0.91 MG/DL (ref 0.76–1.27)
GLOBULIN SER CALC-MCNC: 2.9 GM/DL
GLUCOSE SERPL-MCNC: 167 MG/DL (ref 65–99)
HBA1C MFR BLD: 8 % (ref 4.8–5.6)
HDLC SERPL-MCNC: 34 MG/DL (ref 40–60)
INTERPRETATION: NORMAL
LDLC SERPL CALC-MCNC: 58 MG/DL (ref 0–100)
Lab: NORMAL
POTASSIUM SERPL-SCNC: 4.3 MMOL/L (ref 3.5–5.2)
PROT SERPL-MCNC: 6.9 G/DL (ref 6–8.5)
SODIUM SERPL-SCNC: 140 MMOL/L (ref 136–145)
T4 SERPL-MCNC: 6.26 MCG/DL (ref 4.5–11.7)
TRIGL SERPL-MCNC: 225 MG/DL (ref 0–150)
TSH SERPL DL<=0.005 MIU/L-ACNC: 3.28 MIU/ML (ref 0.27–4.2)
UNABLE TO VOID: NORMAL
URATE SERPL-MCNC: 3.2 MG/DL (ref 3.4–7)
VLDLC SERPL CALC-MCNC: 45 MG/DL (ref 5–40)

## 2019-01-16 ENCOUNTER — OFFICE VISIT (OUTPATIENT)
Dept: WOUND CARE | Facility: HOSPITAL | Age: 79
End: 2019-01-16
Attending: SURGERY

## 2019-01-16 PROCEDURE — G0463 HOSPITAL OUTPT CLINIC VISIT: HCPCS

## 2019-02-05 ENCOUNTER — OFFICE VISIT (OUTPATIENT)
Dept: FAMILY MEDICINE CLINIC | Facility: CLINIC | Age: 79
End: 2019-02-05

## 2019-02-05 VITALS
WEIGHT: 235 LBS | HEIGHT: 70 IN | RESPIRATION RATE: 16 BRPM | SYSTOLIC BLOOD PRESSURE: 127 MMHG | HEART RATE: 70 BPM | BODY MASS INDEX: 33.64 KG/M2 | DIASTOLIC BLOOD PRESSURE: 59 MMHG | TEMPERATURE: 97.6 F

## 2019-02-05 DIAGNOSIS — M46.46 DISCITIS OF LUMBAR REGION: ICD-10-CM

## 2019-02-05 DIAGNOSIS — H00.012 HORDEOLUM OF RIGHT LOWER EYELID, UNSPECIFIED HORDEOLUM TYPE: Primary | ICD-10-CM

## 2019-02-05 PROCEDURE — 99213 OFFICE O/P EST LOW 20 MIN: CPT | Performed by: FAMILY MEDICINE

## 2019-02-05 RX ORDER — SULFACETAMIDE SODIUM 100 MG/ML
1 SOLUTION/ DROPS OPHTHALMIC 4 TIMES DAILY
Qty: 5 ML | Refills: 1 | Status: ON HOLD | OUTPATIENT
Start: 2019-02-05 | End: 2019-03-01

## 2019-02-05 NOTE — PROGRESS NOTES
"Subjective   Jesus Beckham is a 78 y.o. male.     CC: Right Eye Redness/Discharge    History of Present Illness     Pt comes in today with a 10 day h/o right eye irritation/mild discharge/white bump noted. No major vision changes. Reports using warm compresses with some help.      The following portions of the patient's history were reviewed and updated as appropriate: allergies, current medications, past family history, past medical history, past social history, past surgical history and problem list.    Review of Systems   Constitutional: Negative for activity change, chills, fatigue and fever.   Eyes: Positive for discharge and redness.   Respiratory: Negative for cough and shortness of breath.    Cardiovascular: Negative for chest pain and palpitations.   Gastrointestinal: Negative for abdominal pain.   Endocrine: Negative for cold intolerance.   Psychiatric/Behavioral: Negative for behavioral problems and dysphoric mood. The patient is not nervous/anxious.      /59   Pulse 70   Temp 97.6 °F (36.4 °C) (Oral)   Resp 16   Ht 177.8 cm (70\")   Wt 107 kg (235 lb)   BMI 33.72 kg/m²     Objective   Physical Exam   Constitutional: He appears well-developed and well-nourished.   Eyes:       Neck: Neck supple. No thyromegaly present.   Cardiovascular: Normal rate and regular rhythm.   No murmur heard.  Pulmonary/Chest: Effort normal and breath sounds normal.   Abdominal: Bowel sounds are normal. There is no tenderness.   Psychiatric: He has a normal mood and affect. His behavior is normal.   Nursing note and vitals reviewed.      Assessment/Plan   Jesus was seen today for right eye redness.    Diagnoses and all orders for this visit:    Hordeolum of right lower eyelid, unspecified hordeolum type  -     sulfacetamide (BLEPH-10) 10 % ophthalmic solution; Administer 1 drop to the right eye 4 (Four) Times a Day.    Discitis of lumbar region  -     Ambulatory Referral to Neurosurgery               "

## 2019-02-06 ENCOUNTER — OFFICE VISIT (OUTPATIENT)
Dept: WOUND CARE | Facility: HOSPITAL | Age: 79
End: 2019-02-06
Attending: SURGERY

## 2019-02-06 PROCEDURE — G0463 HOSPITAL OUTPT CLINIC VISIT: HCPCS

## 2019-02-16 DIAGNOSIS — F51.01 PRIMARY INSOMNIA: ICD-10-CM

## 2019-02-18 RX ORDER — TRAZODONE HYDROCHLORIDE 150 MG/1
TABLET ORAL
Qty: 60 TABLET | Refills: 0 | Status: SHIPPED | OUTPATIENT
Start: 2019-02-18 | End: 2019-04-22 | Stop reason: SDUPTHER

## 2019-02-26 ENCOUNTER — OFFICE VISIT (OUTPATIENT)
Dept: ENDOCRINOLOGY | Age: 79
End: 2019-02-26

## 2019-02-26 VITALS
RESPIRATION RATE: 16 BRPM | BODY MASS INDEX: 33.64 KG/M2 | DIASTOLIC BLOOD PRESSURE: 62 MMHG | HEIGHT: 70 IN | WEIGHT: 235 LBS | SYSTOLIC BLOOD PRESSURE: 108 MMHG

## 2019-02-26 DIAGNOSIS — E11.42 DIABETIC PERIPHERAL NEUROPATHY (HCC): ICD-10-CM

## 2019-02-26 DIAGNOSIS — I10 ESSENTIAL HYPERTENSION: ICD-10-CM

## 2019-02-26 DIAGNOSIS — E79.0 HYPERURICEMIA: ICD-10-CM

## 2019-02-26 DIAGNOSIS — N40.0 BENIGN NON-NODULAR PROSTATIC HYPERPLASIA WITHOUT LOWER URINARY TRACT SYMPTOMS: ICD-10-CM

## 2019-02-26 DIAGNOSIS — E11.65 UNCONTROLLED TYPE 2 DIABETES MELLITUS WITH HYPERGLYCEMIA (HCC): Primary | ICD-10-CM

## 2019-02-26 DIAGNOSIS — E78.5 DYSLIPIDEMIA: ICD-10-CM

## 2019-02-26 DIAGNOSIS — M10.00 IDIOPATHIC GOUT, UNSPECIFIED CHRONICITY, UNSPECIFIED SITE: ICD-10-CM

## 2019-02-26 DIAGNOSIS — E55.9 VITAMIN D DEFICIENCY: ICD-10-CM

## 2019-02-26 PROCEDURE — 99214 OFFICE O/P EST MOD 30 MIN: CPT | Performed by: INTERNAL MEDICINE

## 2019-02-26 RX ORDER — ERGOCALCIFEROL 1.25 MG/1
50000 CAPSULE ORAL WEEKLY
Qty: 13 CAPSULE | Refills: 3 | Status: SHIPPED | OUTPATIENT
Start: 2019-02-26 | End: 2019-03-20 | Stop reason: SDUPTHER

## 2019-02-26 RX ORDER — OMEGA-3-ACID ETHYL ESTERS 1 G/1
2 CAPSULE, LIQUID FILLED ORAL 2 TIMES DAILY
Qty: 120 CAPSULE | Refills: 5 | Status: SHIPPED | OUTPATIENT
Start: 2019-02-26 | End: 2019-02-28

## 2019-02-26 RX ORDER — ALLOPURINOL 300 MG/1
300 TABLET ORAL DAILY
Qty: 90 TABLET | Refills: 3 | Status: SHIPPED | OUTPATIENT
Start: 2019-02-26 | End: 2019-08-08 | Stop reason: SDUPTHER

## 2019-02-26 RX ORDER — ROSUVASTATIN CALCIUM 10 MG/1
10 TABLET, COATED ORAL DAILY
Qty: 90 TABLET | Refills: 3 | Status: SHIPPED | OUTPATIENT
Start: 2019-02-26 | End: 2019-03-04 | Stop reason: SDUPTHER

## 2019-02-26 RX ORDER — INSULIN GLARGINE 300 U/ML
100 INJECTION, SOLUTION SUBCUTANEOUS
Qty: 4 PEN | Refills: 5 | Status: SHIPPED | OUTPATIENT
Start: 2019-02-26 | End: 2019-10-29 | Stop reason: SDUPTHER

## 2019-02-26 RX ORDER — PREGABALIN 100 MG/1
CAPSULE ORAL
Qty: 270 CAPSULE | Refills: 1 | Status: SHIPPED | OUTPATIENT
Start: 2019-02-26 | End: 2019-03-19 | Stop reason: SDUPTHER

## 2019-02-26 RX ORDER — EMPAGLIFLOZIN AND LINAGLIPTIN 25; 5 MG/1; MG/1
1 TABLET, FILM COATED ORAL DAILY
Qty: 30 TABLET | Refills: 11 | Status: SHIPPED | OUTPATIENT
Start: 2019-02-26 | End: 2019-03-04 | Stop reason: SDUPTHER

## 2019-02-26 NOTE — PROGRESS NOTES
"Subjective   Jesus Beckham is a 78 y.o. male seen for follow up for DM2, hyperlipidemia, HTN, vit d deficiency, lab review. Patient is checking BG 2 times a day. No BG readings. He denies any problems or concerns. He just finished an antibiotic yesterday and states that while he was taking it his BG went up but stayed between .     History of Present Illness 78-year-old gentleman known patient with type 2 diabetes hypertension and dyslipidemia as well as vitamin D deficiency and gout and hyperuricemia.  Over the course of last 6 months he has had no significant health problems for which to go to the emergency room or hospital.    /62   Resp 16   Ht 177.8 cm (70\")   Wt 107 kg (235 lb)   BMI 33.72 kg/m²      Allergies   Allergen Reactions   • Other Other (See Comments)     ALCOHOL. DEATHLY ILL.        Current Outpatient Medications:   •  ACCU-CHEK FASTCLIX LANCETS misc, USE  TO TEST BLOOD GLUCOSE TWICE DAILY, Disp: 204 each, Rfl: 0  •  allopurinol (ZYLOPRIM) 300 MG tablet, Take 300 mg by mouth Daily., Disp: , Rfl:   •  digoxin (LANOXIN) 125 MCG tablet, Take 125 mcg by mouth Daily., Disp: , Rfl:   •  diphenhydrAMINE-acetaminophen (TYLENOL PM EXTRA STRENGTH)  MG tablet per tablet, Take 2 tablets by mouth At Night As Needed for Sleep., Disp: , Rfl:   •  ELIQUIS 5 MG tablet tablet, TAKE 1 TABLET BY MOUTH EVERY 12 (TWELVE) HOURS. (Patient taking differently: Take 5 mg by mouth Every 12 (Twelve) Hours. TO HOLD 48 HRS PRIOR TO SURGERY), Disp: 180 tablet, Rfl: 1  •  Empagliflozin-Linagliptin (GLYXAMBI) 25-5 MG tablet, Take 1 tablet by mouth Daily., Disp: , Rfl:   •  fluticasone (FLONASE) 50 MCG/ACT nasal spray, 2 sprays into the nostril(s) as directed by provider Daily., Disp: 3 bottle, Rfl: 3  •  furosemide (LASIX) 20 MG tablet, Take 10 mg by mouth 2 (Two) Times a Day., Disp: , Rfl:   •  glipiZIDE (GLUCOTROL) 10 MG tablet, Take 10 mg by mouth 2 (Two) Times a Day., Disp: , Rfl:   •  glucose blood " (ACCU-CHEK SMARTVIEW) test strip, USE  TO TEST BLOOD GLUCOSE TWICE DAILY, Disp: 200 each, Rfl: 0  •  hydrocortisone-bacitracin-zinc oxide-nystatin (MAGIC BARRIER), Apply 1 application topically to the appropriate area as directed As Needed., Disp: , Rfl:   •  insulin detemir (LEVEMIR) 100 UNIT/ML injection, Inject 50 Units under the skin into the appropriate area as directed 2 (Two) Times a Day., Disp: , Rfl:   •  Insulin Pen Needle 32G X 4 MM misc, INJECT UP TO THREE TIMES DAILY OR AS DIRECTED, Disp: 500 each, Rfl: 1  •  L-Methylfolate-B6-B12 3-35-2 MG tablet, TAKE 1 TABLET TWICE DAILY, Disp: 180 tablet, Rfl: 0  •  melatonin 5 MG tablet tablet, Take 5 mg by mouth Every Night., Disp: , Rfl:   •  Mirabegron ER (MYRBETRIQ) 25 MG tablet sustained-release 24 hour 24 hr tablet, Take 25 mg by mouth Daily., Disp: , Rfl:   •  Multiple Vitamins-Minerals (MULTIVITAMIN ADULT PO), Take 1 tablet by mouth Daily., Disp: , Rfl:   •  nystatin (nystatin) 792332 UNIT/GM powder, Apply 1 application topically to the appropriate area as directed 2 (Two) Times a Day. TO AFFECTED AREA. GROIN COCCYX, Disp: , Rfl:   •  Omega-3 Fatty Acids (FISH OIL) 1200 MG capsule capsule, Take 1,200 mg by mouth Daily. HOLD FOR SURGERY, Disp: , Rfl:   •  pregabalin (LYRICA) 100 MG capsule, TAKE 1 CAPSULE THREE TIMES DAILY (Patient taking differently: Take 100 mg by mouth 3 (Three) Times a Day. TAKE 1 CAPSULE THREE TIMES DAILY), Disp: 270 capsule, Rfl: 0  •  Probiotic Product (PROBIOTIC DAILY PO), Take 1 tablet by mouth Daily., Disp: , Rfl:   •  rosuvastatin (CRESTOR) 10 MG tablet, Take 10 mg by mouth Daily., Disp: , Rfl:   •  senna-docusate (SENNA-S) 8.6-50 MG per tablet, Take 1 tablet by mouth 2 (Two) Times a Day., Disp: , Rfl:   •  sulfacetamide (BLEPH-10) 10 % ophthalmic solution, Administer 1 drop to the right eye 4 (Four) Times a Day., Disp: 5 mL, Rfl: 1  •  tamsulosin (FLOMAX) 0.4 MG capsule 24 hr capsule, Take 1 capsule by mouth Daily. (Patient  taking differently: Take 0.4 mg by mouth 2 (Two) Times a Day.), Disp: 30 capsule, Rfl:   •  traZODone (DESYREL) 150 MG tablet, TAKE 1/2 - 2 TABLETS BY MOUTH EVERY NIGHT AT BEDTIME AS NEEDED FOR SLEEP, Disp: 60 tablet, Rfl: 0  •  Turmeric 450 MG capsule, Take 450 mg by mouth Daily. HOLD FOR SURGERY, Disp: , Rfl:   •  vitamin C (ASCORBIC ACID) 500 MG tablet, Take 500 mg by mouth Daily., Disp: , Rfl:   •  vitamin D (ERGOCALCIFEROL) 86482 units capsule capsule, Take 50,000 Units by mouth 1 (One) Time Per Week. MONDAY, Disp: , Rfl:   •  Wound Dressings (MEDIHONEY WOUND/BURN DRESSING) gel, Apply as directed, Disp: 44 mL, Rfl: 3      The following portions of the patient's history were reviewed and updated as appropriate: allergies, current medications, past family history, past medical history, past social history, past surgical history and problem list.    Review of Systems   Constitutional: Negative.    HENT: Negative.    Eyes: Negative.    Respiratory: Negative.    Cardiovascular: Negative.    Gastrointestinal: Negative.    Endocrine: Negative.    Genitourinary: Negative.    Musculoskeletal: Negative.    Skin: Negative.    Allergic/Immunologic: Negative.    Neurological: Negative.    Hematological: Negative.    Psychiatric/Behavioral: Negative.        Objective   Physical Exam   Constitutional: He is oriented to person, place, and time. He appears well-developed and well-nourished. No distress.   Patient is in a wheelchair for ambulation.  There is also an indwelling catheter with him on the wheelchair.   HENT:   Head: Normocephalic and atraumatic.   Right Ear: External ear normal.   Left Ear: External ear normal.   Nose: Nose normal.   Mouth/Throat: Oropharynx is clear and moist. No oropharyngeal exudate.   Eyes: Conjunctivae and EOM are normal. Pupils are equal, round, and reactive to light. Right eye exhibits no discharge. Left eye exhibits no discharge. No scleral icterus.   Neck: Normal range of motion. Neck  supple. No JVD present. No tracheal deviation present. No thyromegaly present.   Cardiovascular: Normal rate, regular rhythm, normal heart sounds and intact distal pulses. Exam reveals no gallop and no friction rub.   No murmur heard.  Pulmonary/Chest: Effort normal and breath sounds normal. No stridor. No respiratory distress. He has no wheezes. He has no rales. He exhibits no tenderness.   Abdominal: Soft. Bowel sounds are normal. He exhibits no distension and no mass. There is no tenderness. There is no rebound and no guarding. No hernia.   Musculoskeletal: He exhibits no edema, tenderness or deformity.   Left leg is wrapped in a bandage because of cellulitis in his lower left leg.   Lymphadenopathy:     He has no cervical adenopathy.   Neurological: He is alert and oriented to person, place, and time. He has normal reflexes. He displays normal reflexes. No cranial nerve deficit or sensory deficit. He exhibits normal muscle tone. Coordination normal.   Skin: Skin is warm. No rash noted. He is not diaphoretic. No erythema. No pallor.   Psychiatric: He has a normal mood and affect. His behavior is normal. Judgment and thought content normal.   Nursing note and vitals reviewed.       Results for orders placed or performed in visit on 01/08/19   T4 & TSH ([facility])   Result Value Ref Range    TSH 3.280 0.270 - 4.200 mIU/mL    T4, Total 6.26 4.50 - 11.70 mcg/dL   Uric Acid   Result Value Ref Range    Uric Acid 3.2 (L) 3.4 - 7.0 mg/dL   Vitamin D 25 Hydroxy   Result Value Ref Range    25 Hydroxy, Vitamin D 35.7 30.0 - 100.0 ng/ml   Comprehensive Metabolic Panel   Result Value Ref Range    Glucose 167 (H) 65 - 99 mg/dL    BUN 21 8 - 23 mg/dL    Creatinine 0.91 0.76 - 1.27 mg/dL    eGFR Non African Am 81 >60 mL/min/1.73    eGFR African Am 98 >60 mL/min/1.73    BUN/Creatinine Ratio 23.1 7.0 - 25.0    Sodium 140 136 - 145 mmol/L    Potassium 4.3 3.5 - 5.2 mmol/L    Chloride 100 98 - 107 mmol/L    Total CO2 27.7 22.0 - 29.0  mmol/L    Calcium 9.7 8.6 - 10.5 mg/dL    Total Protein 6.9 6.0 - 8.5 g/dL    Albumin 4.00 3.50 - 5.20 g/dL    Globulin 2.9 gm/dL    A/G Ratio 1.4 g/dL    Total Bilirubin 0.4 0.1 - 1.2 mg/dL    Alkaline Phosphatase 89 39 - 117 U/L    AST (SGOT) 19 1 - 40 U/L    ALT (SGPT) 21 1 - 41 U/L   C-Peptide   Result Value Ref Range    C-Peptide 2.2 1.1 - 4.4 ng/mL   Hemoglobin A1c   Result Value Ref Range    Hemoglobin A1C 8.00 (H) 4.80 - 5.60 %   Lipid Panel   Result Value Ref Range    Total Cholesterol 137 0 - 200 mg/dL    Triglycerides 225 (H) 0 - 150 mg/dL    HDL Cholesterol 34 (L) 40 - 60 mg/dL    VLDL Cholesterol 45 (H) 5 - 40 mg/dL    LDL Cholesterol  58 0 - 100 mg/dL   Unable To Void   Result Value Ref Range    Unable to Void Comment    Cardiovascular Risk Assessment   Result Value Ref Range    Interpretation Note    Diabetes Patient Education   Result Value Ref Range    PDF Image Not applicable          Assessment/Plan   Diagnoses and all orders for this visit:    Uncontrolled type 2 diabetes mellitus with hyperglycemia (CMS/[facility])  -     T4 & TSH ([facility]); Future  -     Uric Acid; Future  -     Vitamin D 25 Hydroxy; Future  -     Comprehensive Metabolic Panel; Future  -     C-Peptide; Future  -     Hemoglobin A1c; Future  -     Lipid Panel; Future  -     MicroAlbumin, Urine, Random - Urine, Clean Catch; Future    Essential hypertension  -     T4 & TSH ([facility]); Future  -     Uric Acid; Future  -     Vitamin D 25 Hydroxy; Future  -     Comprehensive Metabolic Panel; Future  -     C-Peptide; Future  -     Hemoglobin A1c; Future  -     Lipid Panel; Future  -     MicroAlbumin, Urine, Random - Urine, Clean Catch; Future    Vitamin D deficiency  -     T4 & TSH ([facility]); Future  -     Uric Acid; Future  -     Vitamin D 25 Hydroxy; Future  -     Comprehensive Metabolic Panel; Future  -     C-Peptide; Future  -     Hemoglobin A1c; Future  -     Lipid Panel; Future  -     MicroAlbumin, Urine, Random - Urine, Clean Catch;  Future    Diabetic peripheral neuropathy (CMS/HCC)  -     T4 & TSH ([facility]); Future  -     Uric Acid; Future  -     Vitamin D 25 Hydroxy; Future  -     Comprehensive Metabolic Panel; Future  -     C-Peptide; Future  -     Hemoglobin A1c; Future  -     Lipid Panel; Future  -     MicroAlbumin, Urine, Random - Urine, Clean Catch; Future    Benign non-nodular prostatic hyperplasia without lower urinary tract symptoms  -     T4 & TSH ([facility]); Future  -     Uric Acid; Future  -     Vitamin D 25 Hydroxy; Future  -     Comprehensive Metabolic Panel; Future  -     C-Peptide; Future  -     Hemoglobin A1c; Future  -     Lipid Panel; Future  -     MicroAlbumin, Urine, Random - Urine, Clean Catch; Future    Dyslipidemia  -     T4 & TSH ([facility]); Future  -     Uric Acid; Future  -     Vitamin D 25 Hydroxy; Future  -     Comprehensive Metabolic Panel; Future  -     C-Peptide; Future  -     Hemoglobin A1c; Future  -     Lipid Panel; Future  -     MicroAlbumin, Urine, Random - Urine, Clean Catch; Future    Idiopathic gout, unspecified chronicity, unspecified site  -     T4 & TSH ([facility]); Future  -     Uric Acid; Future  -     Vitamin D 25 Hydroxy; Future  -     Comprehensive Metabolic Panel; Future  -     C-Peptide; Future  -     Hemoglobin A1c; Future  -     Lipid Panel; Future  -     MicroAlbumin, Urine, Random - Urine, Clean Catch; Future    Hyperuricemia  -     T4 & TSH ([facility]); Future  -     Uric Acid; Future  -     Vitamin D 25 Hydroxy; Future  -     Comprehensive Metabolic Panel; Future  -     C-Peptide; Future  -     Hemoglobin A1c; Future  -     Lipid Panel; Future  -     MicroAlbumin, Urine, Random - Urine, Clean Catch; Future    Other orders  -     rosuvastatin (CRESTOR) 10 MG tablet; Take 1 tablet by mouth Daily.  -     GLYXAMBI 25-5 MG tablet; Take 1 tablet by mouth Daily.  -     allopurinol (ZYLOPRIM) 300 MG tablet; Take 1 tablet by mouth Daily.  -     TOUJEO MAX SOLOSTAR 300 UNIT/ML solution pen-injector;  Inject 100 Units under the skin into the appropriate area as directed Daily With Breakfast.  -     Insulin Pen Needle 32G X 4 MM misc; INJECT UP TO THREE TIMES DAILY OR AS DIRECTED  -     vitamin D (ERGOCALCIFEROL) 07147 units capsule capsule; Take 1 capsule by mouth 1 (One) Time Per Week. MONDAY  -     omega-3 acid ethyl esters (LOVAZA) 1 g capsule; Take 2 capsules by mouth 2 (Two) Times a Day.  -     pregabalin (LYRICA) 100 MG capsule; TAKE 1 CAPSULE THREE TIMES DAILY             In summary I saw and examined this 78-year-old gentleman for above-mentioned problems.  I reviewed his laboratory evaluation of January 19, 2019 and provided him and his wife who was present during this office visit with a hard copy of it.  Overall he is clinically and metabolically stable and therefore we will go ahead and continue all his current prescriptions.  He will see Ms. Elizabeth Valverde in 6 months or sooner if needed with laboratory evaluation prior to each office visit.

## 2019-02-27 ENCOUNTER — APPOINTMENT (OUTPATIENT)
Dept: WOUND CARE | Facility: HOSPITAL | Age: 79
End: 2019-02-27

## 2019-02-28 RX ORDER — FENOFIBRATE 145 MG/1
145 TABLET, COATED ORAL DAILY
Qty: 90 TABLET | Refills: 3 | Status: ON HOLD | OUTPATIENT
Start: 2019-02-28 | End: 2019-03-01

## 2019-03-01 ENCOUNTER — APPOINTMENT (OUTPATIENT)
Dept: CT IMAGING | Facility: HOSPITAL | Age: 79
End: 2019-03-01

## 2019-03-01 ENCOUNTER — HOSPITAL ENCOUNTER (INPATIENT)
Facility: HOSPITAL | Age: 79
LOS: 2 days | Discharge: HOME | End: 2019-03-03
Attending: EMERGENCY MEDICINE | Admitting: HOSPITALIST

## 2019-03-01 ENCOUNTER — TELEPHONE (OUTPATIENT)
Dept: ENDOCRINOLOGY | Age: 79
End: 2019-03-01

## 2019-03-01 ENCOUNTER — APPOINTMENT (OUTPATIENT)
Dept: WOUND CARE | Facility: HOSPITAL | Age: 79
End: 2019-03-01

## 2019-03-01 DIAGNOSIS — N39.0 URINARY TRACT INFECTION IN MALE: ICD-10-CM

## 2019-03-01 DIAGNOSIS — K52.9 COLITIS: ICD-10-CM

## 2019-03-01 DIAGNOSIS — R11.2 NAUSEA, VOMITING, AND DIARRHEA: Primary | ICD-10-CM

## 2019-03-01 DIAGNOSIS — R19.7 NAUSEA, VOMITING, AND DIARRHEA: Primary | ICD-10-CM

## 2019-03-01 LAB
ADV 40+41 DNA STL QL NAA+NON-PROBE: NOT DETECTED
ALBUMIN SERPL-MCNC: 3.8 G/DL (ref 3.5–5.2)
ALBUMIN/GLOB SERPL: 1.2 G/DL
ALP SERPL-CCNC: 78 U/L (ref 39–117)
ALT SERPL W P-5'-P-CCNC: 13 U/L (ref 1–41)
ANION GAP SERPL CALCULATED.3IONS-SCNC: 9.3 MMOL/L
AST SERPL-CCNC: 13 U/L (ref 1–40)
ASTRO TYP 1-8 RNA STL QL NAA+NON-PROBE: NOT DETECTED
BACTERIA UR QL AUTO: ABNORMAL /HPF
BASOPHILS # BLD AUTO: 0.04 10*3/MM3 (ref 0–0.2)
BASOPHILS NFR BLD AUTO: 0.4 % (ref 0–1.5)
BILIRUB SERPL-MCNC: 0.5 MG/DL (ref 0.1–1.2)
BILIRUB UR QL STRIP: NEGATIVE
BUN BLD-MCNC: 14 MG/DL (ref 8–23)
BUN/CREAT SERPL: 14 (ref 7–25)
C CAYETANENSIS DNA STL QL NAA+NON-PROBE: NOT DETECTED
C DIFF TOX GENS STL QL NAA+PROBE: NEGATIVE
CALCIUM SPEC-SCNC: 9.3 MG/DL (ref 8.6–10.5)
CAMPY SP DNA.DIARRHEA STL QL NAA+PROBE: NOT DETECTED
CHLORIDE SERPL-SCNC: 102 MMOL/L (ref 98–107)
CLARITY UR: ABNORMAL
CO2 SERPL-SCNC: 25.7 MMOL/L (ref 22–29)
COLOR UR: YELLOW
CREAT BLD-MCNC: 1 MG/DL (ref 0.76–1.27)
CRYPTOSP STL CULT: NOT DETECTED
D-LACTATE SERPL-SCNC: 1.2 MMOL/L (ref 0.5–2)
DEPRECATED RDW RBC AUTO: 52.6 FL (ref 37–54)
DIGOXIN SERPL-MCNC: 0.4 NG/ML (ref 0.6–1.2)
E COLI DNA SPEC QL NAA+PROBE: NOT DETECTED
E HISTOLYT AG STL-ACNC: NOT DETECTED
EAEC PAA PLAS AGGR+AATA ST NAA+NON-PRB: NOT DETECTED
EC STX1 + STX2 GENES STL NAA+PROBE: NOT DETECTED
EOSINOPHIL # BLD AUTO: 0.06 10*3/MM3 (ref 0–0.4)
EOSINOPHIL NFR BLD AUTO: 0.6 % (ref 0.3–6.2)
EPEC EAE GENE STL QL NAA+NON-PROBE: NOT DETECTED
ERYTHROCYTE [DISTWIDTH] IN BLOOD BY AUTOMATED COUNT: 16.3 % (ref 12.3–15.4)
ETEC LTA+ST1A+ST1B TOX ST NAA+NON-PROBE: NOT DETECTED
G LAMBLIA DNA SPEC QL NAA+PROBE: NOT DETECTED
GFR SERPL CREATININE-BSD FRML MDRD: 72 ML/MIN/1.73
GLOBULIN UR ELPH-MCNC: 3.2 GM/DL
GLUCOSE BLD-MCNC: 179 MG/DL (ref 65–99)
GLUCOSE BLDC GLUCOMTR-MCNC: 154 MG/DL (ref 70–130)
GLUCOSE BLDC GLUCOMTR-MCNC: 163 MG/DL (ref 70–130)
GLUCOSE UR STRIP-MCNC: ABNORMAL MG/DL
HBA1C MFR BLD: 7.8 % (ref 4.8–5.6)
HCT VFR BLD AUTO: 43.9 % (ref 37.5–51)
HGB BLD-MCNC: 13.8 G/DL (ref 13–17.7)
HGB UR QL STRIP.AUTO: ABNORMAL
HYALINE CASTS UR QL AUTO: ABNORMAL /LPF
IMM GRANULOCYTES # BLD AUTO: 0.06 10*3/MM3 (ref 0–0.05)
IMM GRANULOCYTES NFR BLD AUTO: 0.6 % (ref 0–0.5)
KETONES UR QL STRIP: ABNORMAL
LEUKOCYTE ESTERASE UR QL STRIP.AUTO: ABNORMAL
LIPASE SERPL-CCNC: 12 U/L (ref 13–60)
LYMPHOCYTES # BLD AUTO: 0.66 10*3/MM3 (ref 0.7–3.1)
LYMPHOCYTES NFR BLD AUTO: 6.1 % (ref 19.6–45.3)
MAGNESIUM SERPL-MCNC: 2 MG/DL (ref 1.6–2.4)
MCH RBC QN AUTO: 27.9 PG (ref 26.6–33)
MCHC RBC AUTO-ENTMCNC: 31.4 G/DL (ref 31.5–35.7)
MCV RBC AUTO: 88.7 FL (ref 79–97)
MONOCYTES # BLD AUTO: 0.59 10*3/MM3 (ref 0.1–0.9)
MONOCYTES NFR BLD AUTO: 5.5 % (ref 5–12)
NEUTROPHILS # BLD AUTO: 9.37 10*3/MM3 (ref 1.4–7)
NEUTROPHILS NFR BLD AUTO: 86.8 % (ref 42.7–76)
NITRITE UR QL STRIP: NEGATIVE
NOROVIRUS GI+II RNA STL QL NAA+NON-PROBE: NOT DETECTED
NRBC BLD AUTO-RTO: 0 /100 WBC (ref 0–0)
P SHIGELLOIDES DNA STL QL NAA+NON-PROBE: NOT DETECTED
PH UR STRIP.AUTO: <=5 [PH] (ref 5–8)
PLATELET # BLD AUTO: 209 10*3/MM3 (ref 140–450)
PMV BLD AUTO: 10.7 FL (ref 6–12)
POTASSIUM BLD-SCNC: 4.4 MMOL/L (ref 3.5–5.2)
PROT SERPL-MCNC: 7 G/DL (ref 6–8.5)
PROT UR QL STRIP: ABNORMAL
RBC # BLD AUTO: 4.95 10*6/MM3 (ref 4.14–5.8)
RBC # UR: ABNORMAL /HPF
REF LAB TEST METHOD: ABNORMAL
RV RNA STL NAA+PROBE: NOT DETECTED
SALMONELLA DNA SPEC QL NAA+PROBE: NOT DETECTED
SAPO I+II+IV+V RNA STL QL NAA+NON-PROBE: NOT DETECTED
SHIGELLA SP+EIEC IPAH STL QL NAA+PROBE: NOT DETECTED
SODIUM BLD-SCNC: 137 MMOL/L (ref 136–145)
SP GR UR STRIP: >=1.03 (ref 1–1.03)
SQUAMOUS #/AREA URNS HPF: ABNORMAL /HPF
UROBILINOGEN UR QL STRIP: ABNORMAL
V CHOLERAE DNA SPEC QL NAA+PROBE: NOT DETECTED
VIBRIO DNA SPEC NAA+PROBE: NOT DETECTED
WBC NRBC COR # BLD: 10.78 10*3/MM3 (ref 3.4–10.8)
WBC UR QL AUTO: ABNORMAL /HPF
YERSINIA STL CULT: NOT DETECTED

## 2019-03-01 PROCEDURE — 63710000001 DIPHENHYDRAMINE PER 50 MG: Performed by: HOSPITALIST

## 2019-03-01 PROCEDURE — 99284 EMERGENCY DEPT VISIT MOD MDM: CPT

## 2019-03-01 PROCEDURE — 84132 ASSAY OF SERUM POTASSIUM: CPT | Performed by: HOSPITALIST

## 2019-03-01 PROCEDURE — 83690 ASSAY OF LIPASE: CPT | Performed by: PHYSICIAN ASSISTANT

## 2019-03-01 PROCEDURE — G0378 HOSPITAL OBSERVATION PER HR: HCPCS

## 2019-03-01 PROCEDURE — 25010000002 PIPERACILLIN SOD-TAZOBACTAM PER 1 G: Performed by: HOSPITALIST

## 2019-03-01 PROCEDURE — 63710000001 INSULIN LISPRO (HUMAN) PER 5 UNITS: Performed by: HOSPITALIST

## 2019-03-01 PROCEDURE — 87086 URINE CULTURE/COLONY COUNT: CPT | Performed by: PHYSICIAN ASSISTANT

## 2019-03-01 PROCEDURE — 25010000002 DIPHENHYDRAMINE PER 50 MG

## 2019-03-01 PROCEDURE — P9612 CATHETERIZE FOR URINE SPEC: HCPCS

## 2019-03-01 PROCEDURE — 63710000001 INSULIN GLARGINE PER 5 UNITS: Performed by: HOSPITALIST

## 2019-03-01 PROCEDURE — 93005 ELECTROCARDIOGRAM TRACING: CPT | Performed by: HOSPITALIST

## 2019-03-01 PROCEDURE — 25010000002 LEVOFLOXACIN PER 250 MG: Performed by: PHYSICIAN ASSISTANT

## 2019-03-01 PROCEDURE — 80162 ASSAY OF DIGOXIN TOTAL: CPT | Performed by: PHYSICIAN ASSISTANT

## 2019-03-01 PROCEDURE — 87040 BLOOD CULTURE FOR BACTERIA: CPT | Performed by: PHYSICIAN ASSISTANT

## 2019-03-01 PROCEDURE — 81001 URINALYSIS AUTO W/SCOPE: CPT | Performed by: PHYSICIAN ASSISTANT

## 2019-03-01 PROCEDURE — 74177 CT ABD & PELVIS W/CONTRAST: CPT

## 2019-03-01 PROCEDURE — 83735 ASSAY OF MAGNESIUM: CPT | Performed by: PHYSICIAN ASSISTANT

## 2019-03-01 PROCEDURE — 83036 HEMOGLOBIN GLYCOSYLATED A1C: CPT | Performed by: HOSPITALIST

## 2019-03-01 PROCEDURE — 80053 COMPREHEN METABOLIC PANEL: CPT | Performed by: PHYSICIAN ASSISTANT

## 2019-03-01 PROCEDURE — 87493 C DIFF AMPLIFIED PROBE: CPT | Performed by: PHYSICIAN ASSISTANT

## 2019-03-01 PROCEDURE — 83735 ASSAY OF MAGNESIUM: CPT | Performed by: HOSPITALIST

## 2019-03-01 PROCEDURE — 25010000002 ONDANSETRON PER 1 MG: Performed by: PHYSICIAN ASSISTANT

## 2019-03-01 PROCEDURE — 85025 COMPLETE CBC W/AUTO DIFF WBC: CPT | Performed by: PHYSICIAN ASSISTANT

## 2019-03-01 PROCEDURE — 25010000002 IOPAMIDOL 61 % SOLUTION: Performed by: PHYSICIAN ASSISTANT

## 2019-03-01 PROCEDURE — 87507 IADNA-DNA/RNA PROBE TQ 12-25: CPT | Performed by: PHYSICIAN ASSISTANT

## 2019-03-01 PROCEDURE — 82962 GLUCOSE BLOOD TEST: CPT

## 2019-03-01 PROCEDURE — 93010 ELECTROCARDIOGRAM REPORT: CPT | Performed by: INTERNAL MEDICINE

## 2019-03-01 PROCEDURE — 83605 ASSAY OF LACTIC ACID: CPT | Performed by: PHYSICIAN ASSISTANT

## 2019-03-01 RX ORDER — ONDANSETRON 2 MG/ML
4 INJECTION INTRAMUSCULAR; INTRAVENOUS ONCE
Status: COMPLETED | OUTPATIENT
Start: 2019-03-01 | End: 2019-03-01

## 2019-03-01 RX ORDER — NICOTINE POLACRILEX 4 MG
15 LOZENGE BUCCAL
Status: DISCONTINUED | OUTPATIENT
Start: 2019-03-01 | End: 2019-03-03 | Stop reason: HOSPADM

## 2019-03-01 RX ORDER — SODIUM CHLORIDE 0.9 % (FLUSH) 0.9 %
10 SYRINGE (ML) INJECTION AS NEEDED
Status: DISCONTINUED | OUTPATIENT
Start: 2019-03-01 | End: 2019-03-03 | Stop reason: HOSPADM

## 2019-03-01 RX ORDER — ONDANSETRON 2 MG/ML
4 INJECTION INTRAMUSCULAR; INTRAVENOUS EVERY 6 HOURS PRN
Status: DISCONTINUED | OUTPATIENT
Start: 2019-03-01 | End: 2019-03-03 | Stop reason: HOSPADM

## 2019-03-01 RX ORDER — DIPHENHYDRAMINE HCL 25 MG
50 CAPSULE ORAL NIGHTLY PRN
Status: DISCONTINUED | OUTPATIENT
Start: 2019-03-01 | End: 2019-03-03 | Stop reason: HOSPADM

## 2019-03-01 RX ORDER — PREGABALIN 25 MG/1
25 CAPSULE ORAL EVERY 8 HOURS SCHEDULED
Status: DISCONTINUED | OUTPATIENT
Start: 2019-03-01 | End: 2019-03-03 | Stop reason: HOSPADM

## 2019-03-01 RX ORDER — ACETAMINOPHEN,DIPHENHYDRAMINE HCL 500; 25 MG/1; MG/1
2 TABLET, FILM COATED ORAL NIGHTLY PRN
Status: DISCONTINUED | OUTPATIENT
Start: 2019-03-01 | End: 2019-03-01 | Stop reason: CLARIF

## 2019-03-01 RX ORDER — ONDANSETRON 4 MG/1
4 TABLET, FILM COATED ORAL EVERY 6 HOURS PRN
Status: DISCONTINUED | OUTPATIENT
Start: 2019-03-01 | End: 2019-03-03 | Stop reason: HOSPADM

## 2019-03-01 RX ORDER — DIPHENHYDRAMINE HYDROCHLORIDE 50 MG/ML
INJECTION INTRAMUSCULAR; INTRAVENOUS
Status: COMPLETED
Start: 2019-03-01 | End: 2019-03-01

## 2019-03-01 RX ORDER — EMOLLIENT COMBINATION NO.10
EMULSION (GRAM) TOPICAL EVERY 6 HOURS SCHEDULED
Status: DISCONTINUED | OUTPATIENT
Start: 2019-03-01 | End: 2019-03-03 | Stop reason: HOSPADM

## 2019-03-01 RX ORDER — ACETAMINOPHEN 500 MG
1000 TABLET ORAL NIGHTLY PRN
Status: DISCONTINUED | OUTPATIENT
Start: 2019-03-01 | End: 2019-03-03 | Stop reason: HOSPADM

## 2019-03-01 RX ORDER — ROSUVASTATIN CALCIUM 10 MG/1
10 TABLET, COATED ORAL DAILY
Status: DISCONTINUED | OUTPATIENT
Start: 2019-03-01 | End: 2019-03-03 | Stop reason: HOSPADM

## 2019-03-01 RX ORDER — ONDANSETRON 4 MG/1
4 TABLET, ORALLY DISINTEGRATING ORAL EVERY 6 HOURS PRN
Status: DISCONTINUED | OUTPATIENT
Start: 2019-03-01 | End: 2019-03-03 | Stop reason: HOSPADM

## 2019-03-01 RX ORDER — DIGOXIN 125 MCG
125 TABLET ORAL
Status: DISCONTINUED | OUTPATIENT
Start: 2019-03-02 | End: 2019-03-03 | Stop reason: HOSPADM

## 2019-03-01 RX ORDER — ACETAMINOPHEN 325 MG/1
650 TABLET ORAL EVERY 4 HOURS PRN
Status: DISCONTINUED | OUTPATIENT
Start: 2019-03-01 | End: 2019-03-03 | Stop reason: HOSPADM

## 2019-03-01 RX ORDER — OXYBUTYNIN CHLORIDE 5 MG/1
5 TABLET, EXTENDED RELEASE ORAL DAILY
Status: DISCONTINUED | OUTPATIENT
Start: 2019-03-01 | End: 2019-03-03 | Stop reason: HOSPADM

## 2019-03-01 RX ORDER — L.ACID,PARA/B.BIFIDUM/S.THERM 8B CELL
1 CAPSULE ORAL DAILY
Status: DISCONTINUED | OUTPATIENT
Start: 2019-03-01 | End: 2019-03-03 | Stop reason: HOSPADM

## 2019-03-01 RX ORDER — ALLOPURINOL 300 MG/1
300 TABLET ORAL DAILY
Status: DISCONTINUED | OUTPATIENT
Start: 2019-03-01 | End: 2019-03-03 | Stop reason: HOSPADM

## 2019-03-01 RX ORDER — TAMSULOSIN HYDROCHLORIDE 0.4 MG/1
0.4 CAPSULE ORAL DAILY
Status: DISCONTINUED | OUTPATIENT
Start: 2019-03-01 | End: 2019-03-03 | Stop reason: HOSPADM

## 2019-03-01 RX ORDER — LEVOFLOXACIN 5 MG/ML
750 INJECTION, SOLUTION INTRAVENOUS ONCE
Status: COMPLETED | OUTPATIENT
Start: 2019-03-01 | End: 2019-03-01

## 2019-03-01 RX ORDER — TRAZODONE HYDROCHLORIDE 50 MG/1
25 TABLET ORAL NIGHTLY PRN
Status: DISCONTINUED | OUTPATIENT
Start: 2019-03-01 | End: 2019-03-03 | Stop reason: HOSPADM

## 2019-03-01 RX ORDER — SODIUM CHLORIDE 0.9 % (FLUSH) 0.9 %
3 SYRINGE (ML) INJECTION EVERY 12 HOURS SCHEDULED
Status: DISCONTINUED | OUTPATIENT
Start: 2019-03-01 | End: 2019-03-03 | Stop reason: HOSPADM

## 2019-03-01 RX ORDER — INSULIN GLARGINE 100 [IU]/ML
20 INJECTION, SOLUTION SUBCUTANEOUS NIGHTLY
Status: DISCONTINUED | OUTPATIENT
Start: 2019-03-01 | End: 2019-03-02

## 2019-03-01 RX ORDER — DIPHENHYDRAMINE HYDROCHLORIDE 50 MG/ML
25 INJECTION INTRAMUSCULAR; INTRAVENOUS ONCE
Status: COMPLETED | OUTPATIENT
Start: 2019-03-01 | End: 2019-03-01

## 2019-03-01 RX ORDER — DEXTROSE MONOHYDRATE 25 G/50ML
25 INJECTION, SOLUTION INTRAVENOUS
Status: DISCONTINUED | OUTPATIENT
Start: 2019-03-01 | End: 2019-03-03 | Stop reason: HOSPADM

## 2019-03-01 RX ORDER — SODIUM CHLORIDE 9 MG/ML
100 INJECTION, SOLUTION INTRAVENOUS CONTINUOUS
Status: DISCONTINUED | OUTPATIENT
Start: 2019-03-01 | End: 2019-03-02

## 2019-03-01 RX ORDER — SODIUM CHLORIDE 0.9 % (FLUSH) 0.9 %
3-10 SYRINGE (ML) INJECTION AS NEEDED
Status: DISCONTINUED | OUTPATIENT
Start: 2019-03-01 | End: 2019-03-03 | Stop reason: HOSPADM

## 2019-03-01 RX ADMIN — LEVOFLOXACIN 750 MG: 5 INJECTION, SOLUTION INTRAVENOUS at 10:15

## 2019-03-01 RX ADMIN — APIXABAN 5 MG: 5 TABLET, FILM COATED ORAL at 21:02

## 2019-03-01 RX ADMIN — SODIUM CHLORIDE 100 ML/HR: 9 INJECTION, SOLUTION INTRAVENOUS at 18:16

## 2019-03-01 RX ADMIN — Medication 1 CAPSULE: at 18:42

## 2019-03-01 RX ADMIN — PREGABALIN 25 MG: 25 CAPSULE ORAL at 21:02

## 2019-03-01 RX ADMIN — TAMSULOSIN HYDROCHLORIDE 0.4 MG: 0.4 CAPSULE ORAL at 18:42

## 2019-03-01 RX ADMIN — DIPHENHYDRAMINE HYDROCHLORIDE 25 MG: 50 INJECTION INTRAMUSCULAR; INTRAVENOUS at 10:47

## 2019-03-01 RX ADMIN — ONDANSETRON HYDROCHLORIDE 4 MG: 2 SOLUTION INTRAMUSCULAR; INTRAVENOUS at 06:01

## 2019-03-01 RX ADMIN — INSULIN LISPRO 2 UNITS: 100 INJECTION, SOLUTION INTRAVENOUS; SUBCUTANEOUS at 18:16

## 2019-03-01 RX ADMIN — DIPHENHYDRAMINE HYDROCHLORIDE 50 MG: 25 CAPSULE ORAL at 22:01

## 2019-03-01 RX ADMIN — INSULIN GLARGINE 20 UNITS: 100 INJECTION, SOLUTION SUBCUTANEOUS at 22:00

## 2019-03-01 RX ADMIN — INSULIN LISPRO 2 UNITS: 100 INJECTION, SOLUTION INTRAVENOUS; SUBCUTANEOUS at 22:00

## 2019-03-01 RX ADMIN — ROSUVASTATIN CALCIUM 10 MG: 10 TABLET, FILM COATED ORAL at 18:42

## 2019-03-01 RX ADMIN — OXYBUTYNIN CHLORIDE 5 MG: 5 TABLET, EXTENDED RELEASE ORAL at 21:00

## 2019-03-01 RX ADMIN — TAZOBACTAM SODIUM AND PIPERACILLIN SODIUM 3.38 G: 375; 3 INJECTION, SOLUTION INTRAVENOUS at 18:16

## 2019-03-01 RX ADMIN — SODIUM CHLORIDE 1000 ML: 9 INJECTION, SOLUTION INTRAVENOUS at 06:04

## 2019-03-01 RX ADMIN — SODIUM CHLORIDE, PRESERVATIVE FREE 3 ML: 5 INJECTION INTRAVENOUS at 21:02

## 2019-03-01 RX ADMIN — ALLOPURINOL 300 MG: 300 TABLET ORAL at 18:42

## 2019-03-01 RX ADMIN — IOPAMIDOL 85 ML: 612 INJECTION, SOLUTION INTRAVENOUS at 09:19

## 2019-03-01 RX ADMIN — ACETAMINOPHEN 650 MG: 325 TABLET, FILM COATED ORAL at 18:50

## 2019-03-01 RX ADMIN — DIPHENHYDRAMINE HYDROCHLORIDE 25 MG: 50 INJECTION, SOLUTION INTRAMUSCULAR; INTRAVENOUS at 10:47

## 2019-03-01 NOTE — TELEPHONE ENCOUNTER
----- Message from Staci Baeza MD sent at 2/28/2019  4:19 PM EST -----  Contact: PATIENT  Stop Lovaza and will switch to fenofibrate 145 mg daily  ----- Message -----  From: Basilia Choi MA  Sent: 2/28/2019   4:05 PM  To: Staci Baeza MD    Patient states that he was told this medication can cause bleeding problems and he is already taking Eliquis. Is it ok to still take?  ----- Message -----  From: Paige Ramos  Sent: 2/28/2019  11:45 AM  To: Basilia Choi MA    omega-3 acid ethyl esters (LOVAZA) 1 g capsule 120 capsule 5 2/26/2019    Sig - Route: Take 2 capsules by mouth 2 (Two) Times a Day.     PATIENT NEEDS TO KNOW WHY HE WAS PRESCRIBED THIS   DR BAEZA DID NOT MENTION THIS PRESCRIPTION WHILE HE WAS IN THE OFFICE    HE WOULD LIKE A RETURN CALL TO ADVISE HIM ON THIS MEDICATION    [phone]      Spoke to patient's wife. She expressed understanding.

## 2019-03-01 NOTE — H&P
HISTORY AND PHYSICAL   [facility]        Patient Identification:  Name: Jesus Beckham  Age: 78 y.o.  Sex: male  :  1940  MRN: [id number]                     Primary Care Physician: Magdy Ricardo MD    Chief Complaint:  Nausea vomiting and diarrhea    History of Present Illness:           Patient is a 78-year-old white male with history of DVT, arthritis, paroxysmal atrial fibrillation, BPH, history of bladder cancer, history of MRSA discitis, hypertension, gout, hyperlipidemia, obstructive sleep apnea, type 2 diabetes, and history of melanoma who is admitted with a 1 day history of having some nausea vomiting and diarrhea with some abdominal discomfort worse in the left lower quadrant.  He had a low-grade fever and recently been on antibiotics for urinary tract infection.  He had to catheterize himself but recently gotten off the catheter.  The patient was evaluated in the ER and CT scan showed a pancolitis with some inflammation of the terminal ileum as well.  He also was found to have another urinary tract infection and was started on some Levaquin and developed a rash after the IV infusion on his arm.  He denied vomiting any blood or passing blood in stool.  He has not been exposed to anyone with any kind of GI illness.  He denies any food source or water source of may have been improperly cooked or contaminated.  The patient was admitted for further evaluation treatment.    Past Medical History:  Past Medical History:   Diagnosis Date   • Acute embolism and thrombosis of deep vein of right distal lower extremity (CMS/HCC)    • Acute gangrenous cholecystitis     2018   • Arthritis    • Atrial fibrillation with RVR (CMS/HCC)     HISTORY   • Benign prostatic hyperplasia without lower urinary tract symptoms    • Cancer of bladder (CMS/HCC)    • Colon polyp    • Discitis of lumbar region    • DVT (deep venous thrombosis) (CMS/HCC)     2018   • Essential hypertension    •  Murray catheter in place     LOSS OF SENSATION BLADDER. BECAUSE OF WOUND AT THE TIME   • Gout    • Heel ulcer (CMS/HCC)     RIGHT. FOLLOWED BY WOUND CARE. GETTING BETTER   • History of sepsis    • Hyperlipidemia    • Loss, sensation     LOWER EXTREMTIES. RELATED TO LAST BACK SURGERY.   • MRSA infection     LEFT LEG.    • Open wound     LOW TO MIDDLE CRACK BUTT. SEES WOUND CARE. WILL BE RESTARTED ON DIFFUCAN AND NYSTATIN POWER. REDNESS IN GROIN   • Open wound     WITH MEDICATED DRESSING LEFT SHIN AREA.    • CELINA (obstructive sleep apnea)     NO MACHINE   • Osteoarthritis    • Paroxysmal atrial fibrillation (CMS/HCC)    • PVC (premature ventricular contraction)    • Sepsis (CMS/HCC) 02/2018   • Sepsis due to Escherichia coli (CMS/HCC)    • Skin carcinoma 2012    MELANOMA.2 PLACES BACK AND EAR   • Type 2 diabetes mellitus (CMS/HCC)    • Vitamin D deficiency    • Weakness      Past Surgical History:  Past Surgical History:   Procedure Laterality Date   • ABDOMINAL SURGERY     • APPENDECTOMY N/A 1961   • BACK SURGERY     • CHOLECYSTECTOMY WITH INTRAOPERATIVE CHOLANGIOGRAM N/A 2/25/2018    Procedure: CHOLECYSTECTOMY LAPAROSCOPIC INTRAOPERATIVE CHOLANGIOGRAM;  Surgeon: Maegan Correa MD;  Location: [facility] OR;  Service:    • COLONOSCOPY N/A 2010    h/o of polyps   • EYE SURGERY Bilateral 1959    glass removal from eye, lens transplant   • JOINT REPLACEMENT     • LAMINECTOMY N/A 01/18/2016    L2-L3, L3-L4, L4-L5, and L5-S1 bilateral lamincectomy, medial facetectomy & [id number], Dr. Kaiden Valdes   • LUMBAR FUSION N/A 3/7/2018    Procedure: LUMBAR FUSION MINIMALLY INVASIVE TRANSFORAMINAL LUMBAR INTERBODY IRRIGATION AND DEBRIDEMENT AND FUSION.  IRRIGATION AND DEBRIDEMENT OF EPIDURAL ABCESS LUMBAR 2-4. BONE MORPHOGENIC PROTEIN, ALPHATEC NOVEL AND ILLICO, EMG AND SSEP NEUROMONITORING.;  Surgeon: Manish Marr DO;  Location: [facility] OR;  Service: Orthopedic Spine   • SKIN BIOPSY     • TOTAL KNEE ARTHROPLASTY Right  03/07/2005    Dr. Washington Evans   • VENA CAVA FILTER INSERTION Right 3/6/2018    Procedure: VENA CAVA FILTER INSERTION;  Surgeon: Alexis Thakkar MD;  Location: [facility] OR 18/19;  Service:    • VENA CAVA FILTER REMOVAL N/A 12/3/2018    Procedure: VENA CAVA FILTER REMOVAL WITH VENOCAVAGRAM;  Surgeon: Alexis Thakkar MD;  Location: [facility] OR 18/19;  Service: Vascular      Home Meds:  Medications Prior to Admission   Medication Sig Dispense Refill Last Dose   • ACCU-CHEK FASTCLIX LANCETS misc USE  TO TEST BLOOD GLUCOSE TWICE DAILY 204 each 0 2/28/2019 at Unknown time   • allopurinol (ZYLOPRIM) 300 MG tablet Take 1 tablet by mouth Daily. 90 tablet 3 2/28/2019 at Unknown time   • digoxin (LANOXIN) 125 MCG tablet Take 125 mcg by mouth Daily.   2/28/2019 at Unknown time   • diphenhydrAMINE-acetaminophen (TYLENOL PM EXTRA STRENGTH)  MG tablet per tablet Take 2 tablets by mouth At Night As Needed for Sleep.   2/28/2019 at Unknown time   • ELIQUIS 5 MG tablet tablet TAKE 1 TABLET BY MOUTH EVERY 12 (TWELVE) HOURS. (Patient taking differently: Take 5 mg by mouth Every 12 (Twelve) Hours. TO HOLD 48 HRS PRIOR TO SURGERY) 180 tablet 1 2/28/2019 at Unknown time   • fluticasone (FLONASE) 50 MCG/ACT nasal spray 2 sprays into the nostril(s) as directed by provider Daily. 3 bottle 3 2/28/2019 at Unknown time   • glipiZIDE (GLUCOTROL) 10 MG tablet Take 10 mg by mouth 2 (Two) Times a Day.   2/28/2019 at Unknown time   • glucose blood (ACCU-CHEK SMARTVIEW) test strip USE  TO TEST BLOOD GLUCOSE TWICE DAILY 200 each 0 3/1/2019 at Unknown time   • GLYXAMBI 25-5 MG tablet Take 1 tablet by mouth Daily. 30 tablet 11 2/28/2019 at Unknown time   • hydrocortisone-bacitracin-zinc oxide-nystatin (MAGIC BARRIER) Apply 1 application topically to the appropriate area as directed As Needed.   3/1/2019 at Unknown time   • Insulin Pen Needle 32G X 4 MM misc INJECT UP TO THREE TIMES DAILY OR AS DIRECTED 500 each 1 2/28/2019 at  Unknown time   • L-Methylfolate-B6-B12 3-35-2 MG tablet TAKE 1 TABLET TWICE DAILY 180 tablet 0 2/28/2019 at Unknown time   • melatonin 5 MG tablet tablet Take 5 mg by mouth Every Night.   Past Week at Unknown time   • Mirabegron ER (MYRBETRIQ) 25 MG tablet sustained-release 24 hour 24 hr tablet Take 25 mg by mouth Daily.   2/28/2019 at Unknown time   • Multiple Vitamins-Minerals (MULTIVITAMIN ADULT PO) Take 1 tablet by mouth Daily.   2/28/2019 at Unknown time   • Omega-3 Fatty Acids (FISH OIL) 1200 MG capsule capsule Take 2,000 mg by mouth 2 (Two) Times a Day With Meals. HOLD FOR SURGERY    2/28/2019 at Unknown time   • pregabalin (LYRICA) 100 MG capsule TAKE 1 CAPSULE THREE TIMES DAILY 270 capsule 1 2/28/2019 at Unknown time   • Probiotic Product (PROBIOTIC DAILY PO) Take 1 tablet by mouth Daily.   2/28/2019 at Unknown time   • rosuvastatin (CRESTOR) 10 MG tablet Take 1 tablet by mouth Daily. 90 tablet 3 2/28/2019 at Unknown time   • senna-docusate (SENNA-S) 8.6-50 MG per tablet Take 1 tablet by mouth 2 (Two) Times a Day.   Past Month at Unknown time   • tamsulosin (FLOMAX) 0.4 MG capsule 24 hr capsule Take 1 capsule by mouth Daily. (Patient taking differently: Take 0.4 mg by mouth 2 (Two) Times a Day.) 30 capsule  2/28/2019 at Unknown time   • TOUJEO MAX SOLOSTAR 300 UNIT/ML solution pen-injector Inject 100 Units under the skin into the appropriate area as directed Daily With Breakfast. 4 pen 5 2/28/2019 at Unknown time   • traZODone (DESYREL) 150 MG tablet TAKE 1/2 - 2 TABLETS BY MOUTH EVERY NIGHT AT BEDTIME AS NEEDED FOR SLEEP 60 tablet 0 Past Week at Unknown time   • Turmeric 450 MG capsule Take 450 mg by mouth Daily. HOLD FOR SURGERY   2/28/2019 at Unknown time   • vitamin C (ASCORBIC ACID) 500 MG tablet Take 500 mg by mouth Daily.   2/28/2019 at Unknown time   • vitamin D (ERGOCALCIFEROL) 32856 units capsule capsule Take 1 capsule by mouth 1 (One) Time Per Week. MONDAY 13 capsule 3 Past Week at Unknown time    • furosemide (LASIX) 20 MG tablet Take 10 mg by mouth 2 (Two) Times a Day.   More than a month at Unknown time   • Wound Dressings (MEDIHONEY WOUND/BURN DRESSING) gel Apply as directed 44 mL 3 Taking     Current meds    Current Facility-Administered Medications:   •  [COMPLETED] Insert peripheral IV, , , Once **AND** sodium chloride 0.9 % flush 10 mL, 10 mL, Intravenous, PRN, Thuan Appiah PA  Allergies:  Allergies   Allergen Reactions   • Levaquin [Levofloxacin] Other (See Comments)     Redness, itching at IV site with IV Levaquin administration   • Other Other (See Comments)     ALCOHOL. DEATHLY ILL.      Immunizations:    There is no immunization history on file for this patient.  Social History:   Social History     Social History Narrative   • Not on file     Social History     Socioeconomic History   • Marital status:      Spouse name: Not on file   • Number of children: Not on file   • Years of education: Not on file   • Highest education level: Not on file   Social Needs   • Financial resource strain: Not on file   • Food insecurity - worry: Not on file   • Food insecurity - inability: Not on file   • Transportation needs - medical: Not on file   • Transportation needs - non-medical: Not on file   Occupational History   • Not on file   Tobacco Use   • Smoking status: Former Smoker     Types: Cigars     Last attempt to quit: 2017     Years since quittin.1   • Smokeless tobacco: Current User     Types: Chew   Substance and Sexual Activity   • Alcohol use: No   • Drug use: No   • Sexual activity: Defer   Other Topics Concern   • Not on file   Social History Narrative   • Not on file       Family History:  Family History   Problem Relation Age of Onset   • Cancer Mother         Review of Systems  See history of present illness and past medical history.  Patient denies headache, dizziness, syncope, falls, trauma, change in vision, change in hearing, change in taste, changes in weight, changes in  "appetite, focal weakness, numbness, or paresthesia.  Patient denies chest pain, palpitations, dyspnea, orthopnea, PND, cough, sinus pressure, rhinorrhea, epistaxis, hemoptysis,  hematemesis, constipation or hematchezia.  Denies cold or heat intolerance, polydipsia, polyuria, polyphagia. Denies hematuria, pyuria, dysuria, hesitancy, frequency or urgency.     Remainder of ROS is negative.    Objective:  tMax 24 hrs: Temp (24hrs), Av.3 °F (36.8 °C), Min:98.1 °F (36.7 °C), Max:98.5 °F (36.9 °C)    Vitals Ranges:   Temp:  [98.1 °F (36.7 °C)-98.5 °F (36.9 °C)] 98.1 °F (36.7 °C)  Heart Rate:  [78-98] 81  Resp:  [16-18] 18  BP: (111-142)/(66-77) 132/68      Exam:  /68 (BP Location: Right arm, Patient Position: Lying)   Pulse 81   Temp 98.1 °F (36.7 °C) (Oral)   Resp 18   Ht 177.8 cm (70\")   Wt 108 kg (237 lb 14 oz)   SpO2 96%   BMI 34.13 kg/m²     General Appearance:    Alert, cooperative, no distress, appears stated age   Head:    Normocephalic, without obvious abnormality, atraumatic   Eyes:    PERRL, conjunctiva/corneas clear, EOM's intact, both eyes   Ears:    Normal external ear canals, both ears   Nose:   Nares normal, septum midline, mucosa normal, no drainage    or sinus tenderness   Throat:   Lips, mucosa, and tongue normal   Neck:   Supple, symmetrical, trachea midline, no adenopathy;     thyroid:  no enlargement/tenderness/nodules; no carotid    bruit or JVD   Back:     Symmetric, no curvature, ROM normal, no CVA tenderness   Lungs:     Clear to auscultation bilaterally, respirations unlabored   Chest Wall:    No tenderness or deformity    Heart:    Regular rate and rhythm, S1 and S2 normal, no murmur, rub   or gallop   Abdomen:     Soft, mild left lower quadrant tenderness, bowel sounds active all four quadrants,     no masses, no hepatomegaly, no splenomegaly   Extremities:   Extremities normal, atraumatic, no cyanosis or edema   Pulses:   2+ and symmetric all extremities   Skin:   Skin color, " texture, turgor normal, no rashes or lesions   Lymph nodes:   Cervical, supraclavicular, and axillary nodes normal   Neurologic:   CNII-XII intact, normal strength, sensation intact throughout      .    Data Review:  Lab Results (last 72 hours)     Procedure Component Value Units Date/Time    Clostridium Difficile Toxin - Stool, Per Rectum [[id number]] Collected:  03/01/19 0540    Specimen:  Stool from Per Rectum Updated:  03/01/19 0856    Narrative:       The following orders were created for panel order Clostridium Difficile Toxin - Stool, Per Rectum.  Procedure                               Abnormality         Status                     ---------                               -----------         ------                     Clostridium Difficile To...[[id number]]  Normal              Final result                 Please view results for these tests on the individual orders.    Clostridium Difficile Toxin, PCR - Stool, Per Rectum [[id number]]  (Normal) Collected:  03/01/19 0540    Specimen:  Stool from Per Rectum Updated:  03/01/19 0856     C. Difficile Toxins by PCR Negative    Gastrointestinal Panel, PCR - Stool, Per Rectum [[id number]]  (Normal) Collected:  03/01/19 0540    Specimen:  Stool from Per Rectum Updated:  03/01/19 0737     Campylobacter Not Detected     Plesiomonas shigelloides Not Detected     Salmonella Not Detected     Vibrio Not Detected     Vibrio cholerae Not Detected     Yersinia enterocolitica Not Detected     Enteroaggregative E. coli (EAEC) Not Detected     Enteropathogenic E. coli (EPEC) Not Detected     Enterotoxigenic E. coli (ETEC) lt/st Not Detected     Shiga-like toxin-producing E. coli (STEC) stx1/stx2 Not Detected     E. coli O157 Not Detected     Shigella/Enteroinvasive E. coli (EIEC) Not Detected     Cryptosporidium Not Detected     Cyclospora cayetanensis Not Detected     Entamoeba histolytica Not Detected     Giardia lamblia Not Detected     Adenovirus F40/41 Not Detected      Astrovirus Not Detected     Norovirus GI/GII Not Detected     Rotavirus A Not Detected     Sapovirus (I, II, IV or V) Not Detected    Narrative:       If Aeromonas, Staphylococcus aureus or Bacillus cereus are suspected, please order [id number]: Stool Culture, Aeromonas, S aureus, B Cereus.    Blood Culture - Blood, Arm, Left [[id number]] Collected:  03/01/19 0603    Specimen:  Blood from Arm, Left Updated:  03/01/19 0719    Urine Culture - Urine, Urine, Catheter [[id number]] Collected:  03/01/19 0541    Specimen:  Urine, Catheter Updated:  03/01/19 0708    Urinalysis, Microscopic Only - Urine, Catheter [[id number]]  (Abnormal) Collected:  03/01/19 0541    Specimen:  Urine, Catheter Updated:  03/01/19 0624     RBC, UA 0-2 /HPF      WBC, UA Too Numerous to Count /HPF      Bacteria, UA 1+ /HPF      Squamous Epithelial Cells, UA None Seen /HPF      Hyaline Casts, UA 0-2 /LPF      Methodology Manual Light Microscopy    Urinalysis With Microscopic If Indicated (No Culture) - Urine, Catheter [[id number]]  (Abnormal) Collected:  03/01/19 0541    Specimen:  Urine, Catheter Updated:  03/01/19 0621     Color, UA Yellow     Appearance, UA Cloudy     pH, UA <=5.0     Specific Gravity, UA >=1.030     Glucose, UA >=1000 mg/dL (3+)     Ketones, UA Trace     Bilirubin, UA Negative     Blood, UA Moderate (2+)     Protein, UA Trace     Leuk Esterase, UA Moderate (2+)     Nitrite, UA Negative     Urobilinogen, UA 0.2 E.U./dL    Lactic Acid, Plasma [[id number]]  (Normal) Collected:  03/01/19 0514    Specimen:  Blood Updated:  03/01/19 0603     Lactate 1.2 mmol/L     Comprehensive Metabolic Panel [[id number]]  (Abnormal) Collected:  03/01/19 0514    Specimen:  Blood Updated:  03/01/19 0554     Glucose 179 mg/dL      BUN 14 mg/dL      Creatinine 1.00 mg/dL      Sodium 137 mmol/L      Potassium 4.4 mmol/L      Chloride 102 mmol/L      CO2 25.7 mmol/L      Calcium 9.3 mg/dL      Total Protein 7.0 g/dL      Albumin 3.80 g/dL      ALT (SGPT) 13 U/L       AST (SGOT) 13 U/L      Alkaline Phosphatase 78 U/L      Total Bilirubin 0.5 mg/dL      eGFR Non African Amer 72 mL/min/1.73      Globulin 3.2 gm/dL      A/G Ratio 1.2 g/dL      BUN/Creatinine Ratio 14.0     Anion Gap 9.3 mmol/L     Narrative:       The MDRD GFR formula is only valid for adults with stable renal function between ages 18 and 70.    Lipase [[id number]]  (Abnormal) Collected:  03/01/19 0514    Specimen:  Blood Updated:  03/01/19 0554     Lipase 12 U/L     Digoxin Level [[id number]]  (Abnormal) Collected:  03/01/19 0514    Specimen:  Blood Updated:  03/01/19 0554     Digoxin 0.40 ng/mL     Magnesium [[id number]]  (Normal) Collected:  03/01/19 0514    Specimen:  Blood Updated:  03/01/19 0554     Magnesium 2.0 mg/dL     CBC & Differential [[id number]] Collected:  03/01/19 0514    Specimen:  Blood Updated:  03/01/19 0533    Narrative:       The following orders were created for panel order CBC & Differential.  Procedure                               Abnormality         Status                     ---------                               -----------         ------                     CBC Auto Differential[[id number]]        Abnormal            Final result                 Please view results for these tests on the individual orders.    CBC Auto Differential [[id number]]  (Abnormal) Collected:  03/01/19 0514    Specimen:  Blood Updated:  03/01/19 0533     WBC 10.78 10*3/mm3      RBC 4.95 10*6/mm3      Hemoglobin 13.8 g/dL      Hematocrit 43.9 %      MCV 88.7 fL      MCH 27.9 pg      MCHC 31.4 g/dL      RDW 16.3 %      RDW-SD 52.6 fl      MPV 10.7 fL      Platelets 209 10*3/mm3      Neutrophil % 86.8 %      Lymphocyte % 6.1 %      Monocyte % 5.5 %      Eosinophil % 0.6 %      Basophil % 0.4 %      Immature Grans % 0.6 %      Neutrophils, Absolute 9.37 10*3/mm3      Lymphocytes, Absolute 0.66 10*3/mm3      Monocytes, Absolute 0.59 10*3/mm3      Eosinophils, Absolute 0.06 10*3/mm3      Basophils, Absolute 0.04  10*3/mm3      Immature Grans, Absolute 0.06 10*3/mm3      nRBC 0.0 /100 WBC     Blood Culture - Blood, Arm, Right [[id number]] Collected:  03/01/19 0514    Specimen:  Blood from Arm, Right Updated:  03/01/19 0527                   Imaging Results (all)     Procedure Component Value Units Date/Time    CT Abdomen Pelvis With Contrast [[id number]] Collected:  03/01/19 1108     Updated:  03/01/19 1109    Narrative:       CT ABDOMEN AND PELVIS WITH IV CONTRAST     HISTORY: 78-year-old male with left-sided abdominal pain and diarrhea.     TECHNIQUE: Radiation dose reduction techniques were utilized, including  automated exposure control and exposure modulation based on body size.   3 mm images were obtained through the abdomen and pelvis after the  administration of IV contrast. Compared with previous CT from  03/04/2018.     FINDINGS: There is mild fatty infiltration of the liver. There is a tiny  residual focus of pleural fluid at the cholecystectomy bed. There is no  biliary dilatation. Splenic size is normal. There has been interval  increase in size of a 2.3 cm low-attenuation or cystic lesion within the  spleen, previously measuring 1.3 cm. The pancreas appears unremarkable  other than a few calcifications at the pancreatic head. Adrenals and  kidneys appear unremarkable other than a stable 2 cm left renal  low-attenuation lesion. Characterization is limited by the streak  artifact related to posterior lumbar fusion. There is no formed stool  within the colon and there are long segments of colon which are  collapsed. There is mild circumferential thickening of long segments of  the colon. There is a moderate degree of sigmoid diverticulosis. The  appendix is surgically absent. There is also a thickened appearance of  the terminal ileum. There is no free fluid or lymphadenopathy. There are  moderately extensive abdominal aortic atherosclerotic changes without  aneurysmal dilatation.       Impression:       1. There is  nearly a pancolitis and there is also an enteritis involving  the terminal ileum. An infectious etiology is suspected. Please  correlate clinically and follow-up is recommended. There is no free  fluid.  2. Interval increase in a 2.3 cm splenic cystic lesion. A benign lesion  such as a splenic hemangioma is suspected. Given the increase in size,  reevaluation is recommended with a CT of the abdomen in 6 months.     Discussed with the ASPEN King.           Past Medical History:   Diagnosis Date   • Acute embolism and thrombosis of deep vein of right distal lower extremity (CMS/HCC)    • Acute gangrenous cholecystitis     FEB 2018   • Arthritis    • Atrial fibrillation with RVR (CMS/HCC)     HISTORY   • Benign prostatic hyperplasia without lower urinary tract symptoms    • Cancer of bladder (CMS/HCC) 2016   • Colon polyp    • Discitis of lumbar region    • DVT (deep venous thrombosis) (CMS/HCC)     MARCH 2018   • Essential hypertension    • Murray catheter in place     LOSS OF SENSATION BLADDER. BECAUSE OF WOUND AT THE TIME   • Gout    • Heel ulcer (CMS/HCC)     RIGHT. FOLLOWED BY WOUND CARE. GETTING BETTER   • History of sepsis    • Hyperlipidemia    • Loss, sensation     LOWER EXTREMTIES. RELATED TO LAST BACK SURGERY.   • MRSA infection     LEFT LEG.    • Open wound     LOW TO MIDDLE CRACK BUTT. SEES WOUND CARE. WILL BE RESTARTED ON DIFFUCAN AND NYSTATIN POWER. REDNESS IN GROIN   • Open wound     WITH MEDICATED DRESSING LEFT SHIN AREA.    • CELINA (obstructive sleep apnea)     NO MACHINE   • Osteoarthritis    • Paroxysmal atrial fibrillation (CMS/HCC)    • PVC (premature ventricular contraction)    • Sepsis (CMS/HCC) 02/2018   • Sepsis due to Escherichia coli (CMS/HCC)    • Skin carcinoma 2012    MELANOMA.2 PLACES BACK AND EAR   • Type 2 diabetes mellitus (CMS/HCC)    • Vitamin D deficiency    • Weakness        Assessment:  Active Hospital Problems    Diagnosis Date Noted   • **Colitis [K52.9] 03/01/2019   •  Nausea, vomiting, and diarrhea [R11.2, R19.7] 03/01/2019   • Urinary tract infection in male [N39.0] 03/01/2019   • CELINA (obstructive sleep apnea) [G47.33] 02/23/2018   • Osteoarthritis [M19.90] 09/28/2017   • Essential hypertension [I10] 07/29/2016   • Gout [M10.9] 04/01/2016   • Diabetic peripheral neuropathy (CMS/HCC) [E11.42] 04/01/2016   • Dyslipidemia [E78.5] 04/01/2016   • Uncontrolled type 2 diabetes mellitus (CMS/HCC) [E11.65] 04/01/2016      Resolved Hospital Problems   No resolved problems to display.       Plan:  The patient admitted to the hospital continue with some broad-spectrum IV antibiotics for colitis.  His C. difficile and gastrointestinal PCR studies were negative so far.  Will ask for GI medicine to consult.  Suspect his colitis is probably infectious in nature but need to think about other causes since his cultures are negative at this point.    Krish Lockwood MD  3/1/2019  4:43 PM

## 2019-03-01 NOTE — PLAN OF CARE
Problem: Patient Care Overview  Goal: Plan of Care Review  Outcome: Ongoing (interventions implemented as appropriate)   03/01/19 6418   Coping/Psychosocial   Plan of Care Reviewed With patient   Plan of Care Review   Progress improving   OTHER   Outcome Summary VSS. Admitted for colitis. Reaction (itching) to levaquin in ER so starting on zosyn. Wound consult placed. GI consulted. Continue to monitor.      Goal: Individualization and Mutuality  Outcome: Ongoing (interventions implemented as appropriate)    Goal: Discharge Needs Assessment  Outcome: Ongoing (interventions implemented as appropriate)    Goal: Interprofessional Rounds/Family Conf  Outcome: Ongoing (interventions implemented as appropriate)      Problem: Fall Risk (Adult)  Goal: Identify Related Risk Factors and Signs and Symptoms  Outcome: Outcome(s) achieved Date Met: 03/01/19    Goal: Absence of Fall  Outcome: Ongoing (interventions implemented as appropriate)      Problem: Skin Injury Risk (Adult)  Goal: Identify Related Risk Factors and Signs and Symptoms  Outcome: Outcome(s) achieved Date Met: 03/01/19    Goal: Skin Health and Integrity  Outcome: Ongoing (interventions implemented as appropriate)      Problem: Fluid Volume Deficit (Adult)  Goal: Identify Related Risk Factors and Signs and Symptoms  Outcome: Outcome(s) achieved Date Met: 03/01/19    Goal: Optimal Fluid Balance  Outcome: Ongoing (interventions implemented as appropriate)      Problem: Infection, Risk/Actual (Adult)  Goal: Identify Related Risk Factors and Signs and Symptoms  Outcome: Outcome(s) achieved Date Met: 03/01/19    Goal: Infection Prevention/Resolution  Outcome: Ongoing (interventions implemented as appropriate)

## 2019-03-01 NOTE — NURSING NOTE
"Pt seen for healing Stage 2 pressure injury left buttocks; currently using \"butt cream\" to area per pt.  Has been seeing Dr. Brand at [facility] for wound on buttocks and left leg; left leg healed.  Will continue Z Guard to buttocks as pt having diarrhea and is incontinent.  Heels off loaded with pillows under calves.  Pt alert and orient, family at bedside.   "

## 2019-03-01 NOTE — ED PROVIDER NOTES
EMERGENCY DEPARTMENT ENCOUNTER    CHIEF COMPLAINT  Chief Complaint: n/v/d  History given by: patient  History limited by: nothing  Room Number: 25/25  PMD: Mgady iRcardo MD      HPI:  Pt is a 78 y.o. male who presents complaining of n/v/d that began 24 hours ago and has not improved. Pt reports chills, and abd pain. Pt denies diaphoresis, and fever. Per pt's family, pt has a hx of diabetes, sepsis, back surgery, appendectomy, and cholecystectomy. Per pt's family member, pt is incontinent following his back surgery, and reports decreased sensation below the waist . Per pt's family member, pt had 8 mg Zofran about 8 hours PTA without relief. There are no other complaints at this time.       Duration:  24 hours  Onset: gradual  Timing: constant  Location: abdomen  Radiation: none  Quality: n/v/d  Intensity/Severity: moderate  Progression: unchanged  Associated Symptoms: chills, abd pain  Aggravating Factors: none  Alleviating Factors: none  Previous Episodes: none  Treatment before arrival: Pt had 8 mg Zofran PTA.    PAST MEDICAL HISTORY  Active Ambulatory Problems     Diagnosis Date Noted   • Gout 04/01/2016   • Diabetic peripheral neuropathy (CMS/[facility]) 04/01/2016   • Dyslipidemia 04/01/2016   • Uncontrolled type 2 diabetes mellitus (CMS/[facility]) 04/01/2016   • Essential hypertension 07/29/2016   • PVC (premature ventricular contraction) 10/26/2016   • Vitamin D deficiency 04/28/2017   • Benign non-nodular prostatic hyperplasia without lower urinary tract symptoms 04/28/2017   • Osteoarthritis 09/28/2017   • Urge incontinence 09/28/2017   • CELINA (obstructive sleep apnea) 02/23/2018   • Acute gangrenous cholecystitis 02/24/2018   • Discitis of lumbar region 03/05/2018   • Paroxysmal atrial fibrillation (CMS/[facility]) 03/16/2018   • History of sepsis 03/16/2018   • Hyperuricemia 05/22/2018   • Chronic deep vein thrombosis (DVT) of right peroneal vein (CMS/[facility]) 12/03/2018     Resolved Ambulatory Problems     Diagnosis Date Noted    • Primary osteoarthritis involving multiple joints 04/04/2016   • Allergic rhinitis due to pollen 04/04/2016   • Sepsis (CMS/[facility]) 02/23/2018   • Secondary thrombocytopenia 02/23/2018   • Hypotension 02/23/2018   • Acute respiratory failure with hypoxia (CMS/[facility]) 02/23/2018   • Leg edema 02/23/2018   • RSV (acute bronchiolitis due to respiratory syncytial virus) 02/23/2018   • Bacteremia due to Escherichia coli 02/24/2018   • RUQ abdominal pain 02/24/2018   • Acute deep vein thrombosis of calf, right (CMS/[facility]) 03/05/2018   • Sepsis due to Escherichia coli (CMS/[facility]) 02/22/2018   • Elevated troponin 03/16/2018     Past Medical History:   Diagnosis Date   • Acute embolism and thrombosis of deep vein of right distal lower extremity (CMS/[facility])    • Acute gangrenous cholecystitis    • Arthritis    • Atrial fibrillation with RVR (CMS/[facility])    • Benign prostatic hyperplasia without lower urinary tract symptoms    • Cancer of bladder (CMS/[facility]) 2016   • Colon polyp    • Discitis of lumbar region    • DVT (deep venous thrombosis) (CMS/[facility])    • Essential hypertension    • Murray catheter in place    • Gout    • Heel ulcer (CMS/[facility])    • History of sepsis    • Hyperlipidemia    • Loss, sensation    • MRSA infection    • Open wound    • Open wound    • CELINA (obstructive sleep apnea)    • Osteoarthritis    • Paroxysmal atrial fibrillation (CMS/[facility])    • PVC (premature ventricular contraction)    • Sepsis (CMS/[facility]) 02/2018   • Sepsis due to Escherichia coli (CMS/[facility])    • Skin carcinoma 2012   • Type 2 diabetes mellitus (CMS/[facility])    • Vitamin D deficiency    • Weakness        PAST SURGICAL HISTORY  Past Surgical History:   Procedure Laterality Date   • APPENDECTOMY N/A 1961   • CHOLECYSTECTOMY WITH INTRAOPERATIVE CHOLANGIOGRAM N/A 2/25/2018    Procedure: CHOLECYSTECTOMY LAPAROSCOPIC INTRAOPERATIVE CHOLANGIOGRAM;  Surgeon: Maegan Correa MD;  Location: [facility];  Service:    • COLONOSCOPY N/A 2010    h/o of polyps   • EYE  SURGERY Bilateral 1959    glass removal from eye, lens transplant   • LAMINECTOMY N/A 2016    L2-L3, L3-L4, L4-L5, and L5-S1 bilateral lamincectomy, medial facetectomy & [id number], Dr. Kaiden Valdes   • LUMBAR FUSION N/A 3/7/2018    Procedure: LUMBAR FUSION MINIMALLY INVASIVE TRANSFORAMINAL LUMBAR INTERBODY IRRIGATION AND DEBRIDEMENT AND FUSION.  IRRIGATION AND DEBRIDEMENT OF EPIDURAL ABCESS LUMBAR 2-4. BONE MORPHOGENIC PROTEIN, ALPHATEC NOVEL AND ILLICO, EMG AND SSEP NEUROMONITORING.;  Surgeon: Manish Marr DO;  Location: [facility];  Service: Orthopedic Spine   • TOTAL KNEE ARTHROPLASTY Right 2005    Dr. Washington Evans   • VENA CAVA FILTER INSERTION Right 3/6/2018    Procedure: VENA CAVA FILTER INSERTION;  Surgeon: Alexis Thakkar MD;  Location: [facility] ;  Service:    • VENA CAVA FILTER REMOVAL N/A 12/3/2018    Procedure: VENA CAVA FILTER REMOVAL WITH VENOCAVAGRAM;  Surgeon: Alexis Thakkar MD;  Location: [facility] ;  Service: Vascular       FAMILY HISTORY  Family History   Problem Relation Age of Onset   • Cancer Mother        SOCIAL HISTORY  Social History     Socioeconomic History   • Marital status:      Spouse name: Not on file   • Number of children: Not on file   • Years of education: Not on file   • Highest education level: Not on file   Social Needs   • Financial resource strain: Not on file   • Food insecurity - worry: Not on file   • Food insecurity - inability: Not on file   • Transportation needs - medical: Not on file   • Transportation needs - non-medical: Not on file   Occupational History   • Not on file   Tobacco Use   • Smoking status: Former Smoker     Types: Cigars     Last attempt to quit: 2017     Years since quittin.1   • Smokeless tobacco: Current User     Types: Chew   Substance and Sexual Activity   • Alcohol use: No   • Drug use: No   • Sexual activity: Defer   Other Topics Concern   • Not on file   Social History Narrative    • Not on file       ALLERGIES  Other    REVIEW OF SYSTEMS  Review of Systems   Constitutional: Positive for chills. Negative for activity change, appetite change, diaphoresis and fever.   HENT: Negative for congestion and sore throat.    Eyes: Negative.    Respiratory: Negative for cough and shortness of breath.    Cardiovascular: Negative for chest pain and leg swelling.   Gastrointestinal: Positive for abdominal pain, diarrhea, nausea and vomiting.   Endocrine: Negative.    Genitourinary: Negative for decreased urine volume and dysuria.   Musculoskeletal: Negative for neck pain.   Skin: Negative for rash and wound.   Allergic/Immunologic: Negative.    Neurological: Negative for weakness, numbness and headaches.   Hematological: Negative.    Psychiatric/Behavioral: Negative.    All other systems reviewed and are negative.      PHYSICAL EXAM  ED Triage Vitals [03/01/19 0351]   Temp Heart Rate Resp BP SpO2   98.5 °F (36.9 °C) 86 18 -- 95 %      Temp src Heart Rate Source Patient Position BP Location FiO2 (%)   Tympanic -- -- -- --       Physical Exam   Constitutional: He is oriented to person, place, and time. No distress.   HENT:   Head: Normocephalic and atraumatic.   Mouth/Throat: Mucous membranes are dry.   Eyes: EOM are normal. Pupils are equal, round, and reactive to light.   Neck: Normal range of motion. Neck supple.   Cardiovascular: Normal rate and regular rhythm. Exam reveals no gallop and no friction rub.   Murmur heard.   Systolic murmur is present with a grade of 2/6.  Pulmonary/Chest: Effort normal and breath sounds normal. No respiratory distress. He has no wheezes. He has no rhonchi. He has no rales.   Abdominal: Soft. Bowel sounds are normal. There is tenderness (diffuse). There is no rebound and no guarding.   Musculoskeletal: Normal range of motion. He exhibits no edema.   Neurological: He is alert and oriented to person, place, and time. He has normal sensation and normal strength.   Skin: Skin  is warm and dry. There is erythema (sacral).   Psychiatric: Mood and affect normal.   Nursing note and vitals reviewed.      LAB RESULTS  Lab Results (last 24 hours)     Procedure Component Value Units Date/Time    CBC & Differential [[id number]] Collected:  03/01/19 0514    Specimen:  Blood Updated:  03/01/19 0533    Narrative:       The following orders were created for panel order CBC & Differential.  Procedure                               Abnormality         Status                     ---------                               -----------         ------                     CBC Auto Differential[[id number]]        Abnormal            Final result                 Please view results for these tests on the individual orders.    Comprehensive Metabolic Panel [[id number]]  (Abnormal) Collected:  03/01/19 0514    Specimen:  Blood Updated:  03/01/19 0554     Glucose 179 mg/dL      BUN 14 mg/dL      Creatinine 1.00 mg/dL      Sodium 137 mmol/L      Potassium 4.4 mmol/L      Chloride 102 mmol/L      CO2 25.7 mmol/L      Calcium 9.3 mg/dL      Total Protein 7.0 g/dL      Albumin 3.80 g/dL      ALT (SGPT) 13 U/L      AST (SGOT) 13 U/L      Alkaline Phosphatase 78 U/L      Total Bilirubin 0.5 mg/dL      eGFR Non African Amer 72 mL/min/1.73      Globulin 3.2 gm/dL      A/G Ratio 1.2 g/dL      BUN/Creatinine Ratio 14.0     Anion Gap 9.3 mmol/L     Narrative:       The MDRD GFR formula is only valid for adults with stable renal function between ages 18 and 70.    Lipase [[id number]]  (Abnormal) Collected:  03/01/19 0514    Specimen:  Blood Updated:  03/01/19 0554     Lipase 12 U/L     Blood Culture - Blood, Arm, Right [[id number]] Collected:  03/01/19 0514    Specimen:  Blood from Arm, Right Updated:  03/01/19 0527    Lactic Acid, Plasma [[id number]]  (Normal) Collected:  03/01/19 0514    Specimen:  Blood Updated:  03/01/19 0603     Lactate 1.2 mmol/L     Digoxin Level [[id number]]  (Abnormal) Collected:  03/01/19 0514    Specimen:   Blood Updated:  03/01/19 0554     Digoxin 0.40 ng/mL     Magnesium [[id number]]  (Normal) Collected:  03/01/19 0514    Specimen:  Blood Updated:  03/01/19 0554     Magnesium 2.0 mg/dL     CBC Auto Differential [[id number]]  (Abnormal) Collected:  03/01/19 0514    Specimen:  Blood Updated:  03/01/19 0533     WBC 10.78 10*3/mm3      RBC 4.95 10*6/mm3      Hemoglobin 13.8 g/dL      Hematocrit 43.9 %      MCV 88.7 fL      MCH 27.9 pg      MCHC 31.4 g/dL      RDW 16.3 %      RDW-SD 52.6 fl      MPV 10.7 fL      Platelets 209 10*3/mm3      Neutrophil % 86.8 %      Lymphocyte % 6.1 %      Monocyte % 5.5 %      Eosinophil % 0.6 %      Basophil % 0.4 %      Immature Grans % 0.6 %      Neutrophils, Absolute 9.37 10*3/mm3      Lymphocytes, Absolute 0.66 10*3/mm3      Monocytes, Absolute 0.59 10*3/mm3      Eosinophils, Absolute 0.06 10*3/mm3      Basophils, Absolute 0.04 10*3/mm3      Immature Grans, Absolute 0.06 10*3/mm3      nRBC 0.0 /100 WBC     Gastrointestinal Panel, PCR - Stool, Per Rectum [[id number]] Collected:  03/01/19 0540    Specimen:  Stool from Per Rectum Updated:  03/01/19 0546    Clostridium Difficile Toxin - Stool, Per Rectum [[id number]] Collected:  03/01/19 0540    Specimen:  Stool from Per Rectum Updated:  03/01/19 0546    Narrative:       The following orders were created for panel order Clostridium Difficile Toxin - Stool, Per Rectum.  Procedure                               Abnormality         Status                     ---------                               -----------         ------                     Clostridium Difficile To...[[id number]]                      In process                   Please view results for these tests on the individual orders.    Clostridium Difficile Toxin, PCR - Stool, Per Rectum [[id number]] Collected:  03/01/19 0540    Specimen:  Stool from Per Rectum Updated:  03/01/19 0546    Urinalysis With Microscopic If Indicated (No Culture) - Urine, Catheter [[id number]]  (Abnormal)  Collected:  03/01/19 0541    Specimen:  Urine, Catheter Updated:  03/01/19 0621     Color, UA Yellow     Appearance, UA Cloudy     pH, UA <=5.0     Specific Gravity, UA >=1.030     Glucose, UA >=1000 mg/dL (3+)     Ketones, UA Trace     Bilirubin, UA Negative     Blood, UA Moderate (2+)     Protein, UA Trace     Leuk Esterase, UA Moderate (2+)     Nitrite, UA Negative     Urobilinogen, UA 0.2 E.U./dL    Urinalysis, Microscopic Only - Urine, Catheter [[id number]]  (Abnormal) Collected:  03/01/19 0541    Specimen:  Urine, Catheter Updated:  03/01/19 0624     RBC, UA 0-2 /HPF      WBC, UA Too Numerous to Count /HPF      Bacteria, UA 1+ /HPF      Squamous Epithelial Cells, UA None Seen /HPF      Hyaline Casts, UA 0-2 /LPF      Methodology Manual Light Microscopy          I ordered the above labs and reviewed the results    RADIOLOGY  No orders to display        I ordered the above noted radiological studies. Interpreted by radiologist. Reviewed by me in PACS.       PROCEDURES  Procedures      PROGRESS AND CONSULTS   0439 Ordered lab work for further evaluation. Ordered Zofran for treatment.     0454 Ordered breathing treatment.     0519 Ordered GI panel for further evaluation.     0610 Reviewed pt's case with Poornima Orozco PA-C, who agrees to assume care and treatment pending lab results.      MEDICAL DECISION MAKING  Results were reviewed/discussed with the patient and they were also made aware of online access. Pt also made aware that some labs, such as cultures, will not be resulted during ER visit and follow up with PMD is necessary.     MDM  Number of Diagnoses or Management Options     Amount and/or Complexity of Data Reviewed  Clinical lab tests: ordered and reviewed (Digoxin level: 0.40  Lipase: 12  Glucose: 179)  Decide to obtain previous medical records or to obtain history from someone other than the patient: yes (Epic)  Obtain history from someone other than the patient: yes (Patient's family)  Discuss the  patient with other providers: yes (Dr. Sharona MD)    Patient Progress  Patient progress: stable         DIAGNOSIS  Final diagnoses:   Nausea, vomiting, and diarrhea   Urinary tract infection in male       DISPOSITION  Pending    Latest Documented Vital Signs:  As of 6:38 AM  BP- 142/77 HR- 86 Temp- 98.5 °F (36.9 °C) (Tympanic) O2 sat- 96%    --  Documentation assistance provided by mary Talamantes for Thuan Appiah PA-C.  Information recorded by the scribe was done at my direction and has been verified and validated by me.          Candi Talamantes  03/01/19 0624       Thuan Appiah PA  03/04/19 2015

## 2019-03-01 NOTE — ED NOTES
Nursing report ED to floor  Jesus Beckham  78 y.o.  male    HPI (triage note):   Chief Complaint   Patient presents with   • Diarrhea   • Abdominal Pain   • Vomiting   • Nausea       Admitting doctor:   Krish Lockwood MD    Admitting diagnosis:   The primary encounter diagnosis was Nausea, vomiting, and diarrhea. A diagnosis of Urinary tract infection in male was also pertinent to this visit.    Code status:   Current Code Status     Date Active Code Status Order ID Comments User Context       Prior          Allergies:   Other    Weight:   There were no vitals filed for this visit.    Most recent vitals:   Vitals:    03/01/19 0648 03/01/19 0717 03/01/19 0927 03/01/19 0935   BP: 127/75 124/69 132/71    Pulse: 83 92 98 83   Resp:       Temp:       TempSrc:       SpO2: 95% 97% 97% 97%   Height:           Active LDAs/IV Access:   Lines, Drains & Airways    Active LDAs     Name:   Placement date:   Placement time:   Site:   Days:    Peripheral IV 03/01/19 Left Hand   03/01/19    --    Hand   less than 1                Labs (abnormal labs have a star):   Labs Reviewed   COMPREHENSIVE METABOLIC PANEL - Abnormal; Notable for the following components:       Result Value    Glucose 179 (*)     All other components within normal limits    Narrative:     The MDRD GFR formula is only valid for adults with stable renal function between ages 18 and 70.   LIPASE - Abnormal; Notable for the following components:    Lipase 12 (*)     All other components within normal limits   URINALYSIS W/ MICROSCOPIC IF INDICATED (NO CULTURE) - Abnormal; Notable for the following components:    Appearance, UA Cloudy (*)     Glucose, UA >=1000 mg/dL (3+) (*)     Ketones, UA Trace (*)     Blood, UA Moderate (2+) (*)     Protein, UA Trace (*)     Leuk Esterase, UA Moderate (2+) (*)     All other components within normal limits   DIGOXIN LEVEL - Abnormal; Notable for the following components:    Digoxin 0.40 (*)     All other components within normal  limits   CBC WITH AUTO DIFFERENTIAL - Abnormal; Notable for the following components:    MCHC 31.4 (*)     RDW 16.3 (*)     Neutrophil % 86.8 (*)     Lymphocyte % 6.1 (*)     Immature Grans % 0.6 (*)     Neutrophils, Absolute 9.37 (*)     Lymphocytes, Absolute 0.66 (*)     Immature Grans, Absolute 0.06 (*)     All other components within normal limits   URINALYSIS, MICROSCOPIC ONLY - Abnormal; Notable for the following components:    WBC, UA Too Numerous to Count (*)     Bacteria, UA 1+ (*)     All other components within normal limits   GASTROINTESTINAL PANEL, PCR - Normal    Narrative:     If Aeromonas, Staphylococcus aureus or Bacillus cereus are suspected, please order [id number]: Stool Culture, Aeromonas, S aureus, B Cereus.   CLOSTRIDIUM DIFFICILE TOXIN, PCR - Normal   LACTIC ACID, PLASMA - Normal   MAGNESIUM - Normal   CLOSTRIDIUM DIFFICILE TOXIN    Narrative:     The following orders were created for panel order Clostridium Difficile Toxin - Stool, Per Rectum.  Procedure                               Abnormality         Status                     ---------                               -----------         ------                     Clostridium Difficile To...[[id number]]  Normal              Final result                 Please view results for these tests on the individual orders.   BLOOD CULTURE   BLOOD CULTURE   URINE CULTURE   CBC AND DIFFERENTIAL    Narrative:     The following orders were created for panel order CBC & Differential.  Procedure                               Abnormality         Status                     ---------                               -----------         ------                     CBC Auto Differential[[id number]]        Abnormal            Final result                 Please view results for these tests on the individual orders.       EKG:   No orders to display       Meds given in ED:   Medications   sodium chloride 0.9 % flush 10 mL (not administered)   sodium chloride 0.9 % bolus  1,000 mL (0 mL Intravenous Stopped 3/1/19 0814)   ondansetron (ZOFRAN) injection 4 mg (4 mg Intravenous Given 3/1/19 0601)   iopamidol (ISOVUE-300) 61 % injection 100 mL (85 mL Intravenous Given by Other 3/1/19 0919)       Imaging results:  No radiology results for the last day    Ambulatory status:   - bedrest      Social issues:   Social History     Socioeconomic History   • Marital status:      Spouse name: Not on file   • Number of children: Not on file   • Years of education: Not on file   • Highest education level: Not on file   Social Needs   • Financial resource strain: Not on file   • Food insecurity - worry: Not on file   • Food insecurity - inability: Not on file   • Transportation needs - medical: Not on file   • Transportation needs - non-medical: Not on file   Occupational History   • Not on file   Tobacco Use   • Smoking status: Former Smoker     Types: Cigars     Last attempt to quit: 2017     Years since quittin.1   • Smokeless tobacco: Current User     Types: Chew   Substance and Sexual Activity   • Alcohol use: No   • Drug use: No   • Sexual activity: Defer   Other Topics Concern   • Not on file   Social History Narrative   • Not on file        Kendrick Murray RN  19 0939

## 2019-03-01 NOTE — ED PROVIDER NOTES
Pt presents to the ED c/o nausea, vomiting, and diarrhea that have been ongoing for the past 24 hours. Pt reports mucous in the stool, but denies blood in the stool. Pt also complains of left sided abd pain and subjective fever. Family states that the pt was recently on Bactrim and Augmentin to treat a UTI. On exam, awake, alert, and oriented. LLQ abd tenderness. Healing wound o the LLE. Generalized weakness in the BLE which is chronic d/t hx of spinal abscess. Plan for admission d/t UTI failed outpatient abx..     Attestation:  The LETICIA and I have discussed this patient's history, physical exam, and treatment plan.  I have reviewed the documentation and personally had a face to face interaction with the patient. I affirm the documentation and agree with the treatment and plan.  The attached note describes my personal findings.      Documentation assistance provided by mary Meek for Dr Tabor Information recorded by the scribe was done at my direction and has been verified and validated by me.        Lashay Meek  03/01/19 0703       Julius Tabor MD  03/01/19 0208

## 2019-03-01 NOTE — PROGRESS NOTES
"Pharmacy Consult - Zosyn Dosing (Initial Note)    Jesus Beckham has been consulted for pharmacy to dose Zosyn for colitis and UTI per Dr. Lockwood's request.     Duration of Therapy: 7 days    Other Antimicrobials: Levofloxacin 750 mg IV once    Relevant clinical data and objective history reviewed:  78 y.o. male 177.8 cm (70\") 108 kg (237 lb 14 oz)    Vitals:    03/01/19 1118 03/01/19 1220 03/01/19 1227 03/01/19 1348   BP: 136/77   132/68   BP Location: Right arm   Right arm   Pulse: 83   81   Resp: 16   18   Temp: 98.4 °F (36.9 °C)   98.1 °F (36.7 °C)   TempSrc: Oral   Oral   SpO2: 96% (!) 88% 95% 96%     Creatinine   Date Value Ref Range Status   03/01/2019 1.00 0.76 - 1.27 mg/dL Final     BUN   Date Value Ref Range Status   03/01/2019 14 8 - 23 mg/dL Final     Estimated Creatinine Clearance: 76.1 mL/min (by C-G formula based on SCr of 1 mg/dL).    Lab Results   Component Value Date    WBC 10.78 03/01/2019     Temp Readings from Last 3 Encounters:   03/01/19 98.1 °F (36.7 °C) (Oral)      Baseline culture/source/susceptibility:   3/1: Bcx-in process  3/1: Ucx-in process  3/1: C. Diff antigen-neg  3/1: GI panel-neg    Assessment/Plan    1. Will give Zosyn 3.375g IV once over 30 minutes now, then start Zosyn 3.375g IV q8h extended infusion based on risk stratification and patient's renal function (CrCl >20 mL/min).    2. Will monitor serum creatinine every 24 hours for the first 3 days then at least every 48 hours per dosing recommendations. SCr was 1 today (stable at baseline) before antibiotics were given.    3. Pharmacy will continue to follow daily while on Zosyn, and adjust if needed.     Thank you for allowing me to participate in your patient's care,  Elma Fitzgerald, Pharm.D., BCPS  "

## 2019-03-01 NOTE — ED NOTES
Called to room by patient. Pt complaining of burning at Iv infusion site. Redness noted above IV site, appears to follow vein path. IV Levaquin stopped. ASPEN Orozco notified. Pt denies any other symptoms.  25 mg IV benedryl administered per PA orders. Radha RENTERIA on 4E notified of changes.     Kendrick Murray, MYRA  03/01/19 2992

## 2019-03-01 NOTE — ED PROVIDER NOTES
EMERGENCY DEPARTMENT ENCOUNTER    Room number:  25/25  Date Seen:  3/1/2019  Time of transfer: 0610  PCP:  Magdy Ricardo MD        Laboratory Results  Recent Results (from the past 24 hour(s))   Comprehensive Metabolic Panel    Collection Time: 03/01/19  5:14 AM   Result Value Ref Range    Glucose 179 (H) 65 - 99 mg/dL    BUN 14 8 - 23 mg/dL    Creatinine 1.00 0.76 - 1.27 mg/dL    Sodium 137 136 - 145 mmol/L    Potassium 4.4 3.5 - 5.2 mmol/L    Chloride 102 98 - 107 mmol/L    CO2 25.7 22.0 - 29.0 mmol/L    Calcium 9.3 8.6 - 10.5 mg/dL    Total Protein 7.0 6.0 - 8.5 g/dL    Albumin 3.80 3.50 - 5.20 g/dL    ALT (SGPT) 13 1 - 41 U/L    AST (SGOT) 13 1 - 40 U/L    Alkaline Phosphatase 78 39 - 117 U/L    Total Bilirubin 0.5 0.1 - 1.2 mg/dL    eGFR Non African Amer 72 >60 mL/min/1.73    Globulin 3.2 gm/dL    A/G Ratio 1.2 g/dL    BUN/Creatinine Ratio 14.0 7.0 - 25.0    Anion Gap 9.3 mmol/L   Lipase    Collection Time: 03/01/19  5:14 AM   Result Value Ref Range    Lipase 12 (L) 13 - 60 U/L   Lactic Acid, Plasma    Collection Time: 03/01/19  5:14 AM   Result Value Ref Range    Lactate 1.2 0.5 - 2.0 mmol/L   Digoxin Level    Collection Time: 03/01/19  5:14 AM   Result Value Ref Range    Digoxin 0.40 (L) 0.60 - 1.20 ng/mL   Magnesium    Collection Time: 03/01/19  5:14 AM   Result Value Ref Range    Magnesium 2.0 1.6 - 2.4 mg/dL   CBC Auto Differential    Collection Time: 03/01/19  5:14 AM   Result Value Ref Range    WBC 10.78 3.40 - 10.80 10*3/mm3    RBC 4.95 4.14 - 5.80 10*6/mm3    Hemoglobin 13.8 13.0 - 17.7 g/dL    Hematocrit 43.9 37.5 - 51.0 %    MCV 88.7 79.0 - 97.0 fL    MCH 27.9 26.6 - 33.0 pg    MCHC 31.4 (L) 31.5 - 35.7 g/dL    RDW 16.3 (H) 12.3 - 15.4 %    RDW-SD 52.6 37.0 - 54.0 fl    MPV 10.7 6.0 - 12.0 fL    Platelets 209 140 - 450 10*3/mm3    Neutrophil % 86.8 (H) 42.7 - 76.0 %    Lymphocyte % 6.1 (L) 19.6 - 45.3 %    Monocyte % 5.5 5.0 - 12.0 %    Eosinophil % 0.6 0.3 - 6.2 %    Basophil % 0.4 0.0 - 1.5 %     Immature Grans % 0.6 (H) 0.0 - 0.5 %    Neutrophils, Absolute 9.37 (H) 1.40 - 7.00 10*3/mm3    Lymphocytes, Absolute 0.66 (L) 0.70 - 3.10 10*3/mm3    Monocytes, Absolute 0.59 0.10 - 0.90 10*3/mm3    Eosinophils, Absolute 0.06 0.00 - 0.40 10*3/mm3    Basophils, Absolute 0.04 0.00 - 0.20 10*3/mm3    Immature Grans, Absolute 0.06 (H) 0.00 - 0.05 10*3/mm3    nRBC 0.0 0.0 - 0.0 /100 WBC   Gastrointestinal Panel, PCR - Stool, Per Rectum    Collection Time: 03/01/19  5:40 AM   Result Value Ref Range    Campylobacter Not Detected Not Detected    Plesiomonas shigelloides Not Detected Not Detected    Salmonella Not Detected Not Detected    Vibrio Not Detected Not Detected    Vibrio cholerae Not Detected Not Detected    Yersinia enterocolitica Not Detected Not Detected    Enteroaggregative E. coli (EAEC) Not Detected Not Detected    Enteropathogenic E. coli (EPEC) Not Detected Not Detected    Enterotoxigenic E. coli (ETEC) lt/st Not Detected Not Detected    Shiga-like toxin-producing E. coli (STEC) stx1/stx2 Not Detected Not Detected    E. coli O157 Not Detected Not Detected    Shigella/Enteroinvasive E. coli (EIEC) Not Detected Not Detected    Cryptosporidium Not Detected Not Detected    Cyclospora cayetanensis Not Detected Not Detected    Entamoeba histolytica Not Detected Not Detected    Giardia lamblia Not Detected Not Detected    Adenovirus F40/41 Not Detected Not Detected    Astrovirus Not Detected Not Detected    Norovirus GI/GII Not Detected Not Detected    Rotavirus A Not Detected Not Detected    Sapovirus (I, II, IV or V) Not Detected Not Detected   Clostridium Difficile Toxin, PCR - Stool, Per Rectum    Collection Time: 03/01/19  5:40 AM   Result Value Ref Range    C. Difficile Toxins by PCR Negative Negative   Urinalysis With Microscopic If Indicated (No Culture) - Urine, Catheter    Collection Time: 03/01/19  5:41 AM   Result Value Ref Range    Color, UA Yellow Yellow, Straw    Appearance, UA Cloudy (A) Clear     pH, UA <=5.0 5.0 - 8.0    Specific Gravity, UA >=1.030 1.005 - 1.030    Glucose, UA >=1000 mg/dL (3+) (A) Negative    Ketones, UA Trace (A) Negative    Bilirubin, UA Negative Negative    Blood, UA Moderate (2+) (A) Negative    Protein, UA Trace (A) Negative    Leuk Esterase, UA Moderate (2+) (A) Negative    Nitrite, UA Negative Negative    Urobilinogen, UA 0.2 E.U./dL 0.2 - 1.0 E.U./dL   Urinalysis, Microscopic Only - Urine, Catheter    Collection Time: 03/01/19  5:41 AM   Result Value Ref Range    RBC, UA 0-2 None Seen, 0-2 /HPF    WBC, UA Too Numerous to Count (A) None Seen, 0-2 /HPF    Bacteria, UA 1+ (A) None Seen /HPF    Squamous Epithelial Cells, UA None Seen None Seen, 0-2 /HPF    Hyaline Casts, UA 0-2 None Seen /LPF    Methodology Manual Light Microscopy        Reviewed the above results      Radiology  CT Abdomen Pelvis With Contrast    (Results Pending)     CT abd/pel- diffuse colitis, thickening of the terminal colon, known splenic lesion that has increased in size compared to previous imaging (repeat imaging recommended in 6 months)    Reviewed the above results in PACS. Spoke with Dr Babb (radiologist) regarding CT abd/pel results       Procedures      Progress and Consult Notes       0610-  Assumed care from ASPEN Appiah.    0706- Rechecked pt. He is resting comfortably and is in no acute distress. Pt states that he had a azul catheter in place for about 1 year and it was removed about 1 month ago. Since then, he has had intermittent issues with bladder incontinence. He states that he has recently been on 2 rounds of abx for UTI  (prescribed by Dr Saleh (urologist)). He was on bactrim DS initially and finished taking augmentin about 4 days ago. Per family, yesterday, pt had onset of uncontrollable diarrhea which has been accompanied by nausea, vomiting, and left abd pain. He also still has burning with urination. He denies black or bloody stools, bloody emesis, fever, and chills.    Discussed with pt  and family about UTI indicated on UA. Informed them that because pt has abd tenderness on exam, we will obtain CT abd/pel for further evaluation. Discussed pt admission for further care and observation. Pt and family verbalize understanding and agreement with plan.     Physical exam  Constitutional: Non-toxic  HEENT: normocephalic  Cardiovascular: RRR  Lungs: CTAB  Abdomen: soft, left mid and left lower abd tenderness, palpable bladder distention  Musculoskeletal: Voluntarily moves extremities  Neuro: Alert and Appropriate    0716- Ordered urine culture and CT abd/pel for further evaluation.     0744- Per RN, pt urinated approximately 100ml urine. There was about 208ml of post void residual urine in the bladder.     0828- Sent call out to [facility]. Admission decision time= 0828    0853- Discussed case with Dr Lockwood ([facility] hospitalist)  Reviewed history, exam, results and treatments.  Discussed concerns and plan of care. Dr Lockwood accepts pt to be admitted to telemetry.    0940- C Diff toxin PCR is negative.     0956- Obtained CT abd/pel results-> diffuse colitis, thickening of the terminal colon, known splenic lesion that has increased in size compared to previous imaging (repeat imaging recommended in 6 months).     1001- Ordered levaquin for UTI and colitis.    1045- Per RN, after initiation of levaquin, pt had onset of itching and redness localized at the IV site. Discontinued levaquin and ordered benadryl.        Diagnosis  Final diagnoses:   Nausea, vomiting, and diarrhea   Urinary tract infection in male   Colitis         Disposition  Admitted- telemetry    Discussed treatment plan and reason for admission with pt/family and admitting physician.  Pt/family voiced understanding of the plan for admission for further testing/treatment as needed.       Latest Vitals  8:56 AM  Latest vital signs   BP- 124/69 HR- 92 Temp- 98.5 °F (36.9 °C) (Tympanic) O2 sat- 97%      Provider attestation  I personally reviewed the past  medical history, past surgical history, social history, family history, current medications, and allergies as they appear in the chart.        Documentation assistance provided by mary Briseno for Rosio Orozco PA-C. Information recorded by the scribe was done at my direction and has been verified and validated by me.      Scott Briseno  03/01/19 2826       Rosio Orozco PA  03/01/19 8486

## 2019-03-02 LAB
ANION GAP SERPL CALCULATED.3IONS-SCNC: 11.1 MMOL/L
BACTERIA SPEC AEROBE CULT: NORMAL
BASOPHILS # BLD AUTO: 0.03 10*3/MM3 (ref 0–0.2)
BASOPHILS NFR BLD AUTO: 0.5 % (ref 0–1.5)
BUN BLD-MCNC: 11 MG/DL (ref 8–23)
BUN/CREAT SERPL: 13.4 (ref 7–25)
CALCIUM SPEC-SCNC: 8.7 MG/DL (ref 8.6–10.5)
CHLORIDE SERPL-SCNC: 105 MMOL/L (ref 98–107)
CO2 SERPL-SCNC: 25.9 MMOL/L (ref 22–29)
CREAT BLD-MCNC: 0.82 MG/DL (ref 0.76–1.27)
DEPRECATED RDW RBC AUTO: 53 FL (ref 37–54)
EOSINOPHIL # BLD AUTO: 0.29 10*3/MM3 (ref 0–0.4)
EOSINOPHIL NFR BLD AUTO: 5.1 % (ref 0.3–6.2)
ERYTHROCYTE [DISTWIDTH] IN BLOOD BY AUTOMATED COUNT: 16.2 % (ref 12.3–15.4)
GFR SERPL CREATININE-BSD FRML MDRD: 91 ML/MIN/1.73
GLUCOSE BLD-MCNC: 128 MG/DL (ref 65–99)
GLUCOSE BLDC GLUCOMTR-MCNC: 144 MG/DL (ref 70–130)
GLUCOSE BLDC GLUCOMTR-MCNC: 186 MG/DL (ref 70–130)
GLUCOSE BLDC GLUCOMTR-MCNC: 192 MG/DL (ref 70–130)
GLUCOSE BLDC GLUCOMTR-MCNC: 206 MG/DL (ref 70–130)
HCT VFR BLD AUTO: 41.3 % (ref 37.5–51)
HGB BLD-MCNC: 12.6 G/DL (ref 13–17.7)
IMM GRANULOCYTES # BLD AUTO: 0.05 10*3/MM3 (ref 0–0.05)
IMM GRANULOCYTES NFR BLD AUTO: 0.9 % (ref 0–0.5)
LYMPHOCYTES # BLD AUTO: 0.81 10*3/MM3 (ref 0.7–3.1)
LYMPHOCYTES NFR BLD AUTO: 14.1 % (ref 19.6–45.3)
MAGNESIUM SERPL-MCNC: 1.9 MG/DL (ref 1.6–2.4)
MCH RBC QN AUTO: 27.5 PG (ref 26.6–33)
MCHC RBC AUTO-ENTMCNC: 30.5 G/DL (ref 31.5–35.7)
MCV RBC AUTO: 90 FL (ref 79–97)
MONOCYTES # BLD AUTO: 0.45 10*3/MM3 (ref 0.1–0.9)
MONOCYTES NFR BLD AUTO: 7.9 % (ref 5–12)
NEUTROPHILS # BLD AUTO: 4.1 10*3/MM3 (ref 1.4–7)
NEUTROPHILS NFR BLD AUTO: 71.5 % (ref 42.7–76)
NRBC BLD AUTO-RTO: 0 /100 WBC (ref 0–0)
PLATELET # BLD AUTO: 166 10*3/MM3 (ref 140–450)
PMV BLD AUTO: 10.5 FL (ref 6–12)
POTASSIUM BLD-SCNC: 3.6 MMOL/L (ref 3.5–5.2)
POTASSIUM BLD-SCNC: 3.9 MMOL/L (ref 3.5–5.2)
RBC # BLD AUTO: 4.59 10*6/MM3 (ref 4.14–5.8)
SODIUM BLD-SCNC: 142 MMOL/L (ref 136–145)
WBC NRBC COR # BLD: 5.73 10*3/MM3 (ref 3.4–10.8)

## 2019-03-02 PROCEDURE — 99222 1ST HOSP IP/OBS MODERATE 55: CPT | Performed by: INTERNAL MEDICINE

## 2019-03-02 PROCEDURE — 63710000001 INSULIN LISPRO (HUMAN) PER 5 UNITS: Performed by: HOSPITALIST

## 2019-03-02 PROCEDURE — 97110 THERAPEUTIC EXERCISES: CPT

## 2019-03-02 PROCEDURE — 80048 BASIC METABOLIC PNL TOTAL CA: CPT | Performed by: HOSPITALIST

## 2019-03-02 PROCEDURE — 97162 PT EVAL MOD COMPLEX 30 MIN: CPT

## 2019-03-02 PROCEDURE — 82962 GLUCOSE BLOOD TEST: CPT

## 2019-03-02 PROCEDURE — 25010000002 PIPERACILLIN SOD-TAZOBACTAM PER 1 G: Performed by: HOSPITALIST

## 2019-03-02 PROCEDURE — 63710000001 INSULIN GLARGINE PER 5 UNITS: Performed by: INTERNAL MEDICINE

## 2019-03-02 PROCEDURE — 85025 COMPLETE CBC W/AUTO DIFF WBC: CPT | Performed by: HOSPITALIST

## 2019-03-02 RX ORDER — INSULIN GLARGINE 100 [IU]/ML
40 INJECTION, SOLUTION SUBCUTANEOUS NIGHTLY
Status: DISCONTINUED | OUTPATIENT
Start: 2019-03-02 | End: 2019-03-03 | Stop reason: HOSPADM

## 2019-03-02 RX ADMIN — INSULIN LISPRO 4 UNITS: 100 INJECTION, SOLUTION INTRAVENOUS; SUBCUTANEOUS at 21:46

## 2019-03-02 RX ADMIN — ACETAMINOPHEN 650 MG: 325 TABLET, FILM COATED ORAL at 21:46

## 2019-03-02 RX ADMIN — TAMSULOSIN HYDROCHLORIDE 0.4 MG: 0.4 CAPSULE ORAL at 08:21

## 2019-03-02 RX ADMIN — ALLOPURINOL 300 MG: 300 TABLET ORAL at 08:20

## 2019-03-02 RX ADMIN — DIGOXIN 125 MCG: 125 TABLET ORAL at 12:07

## 2019-03-02 RX ADMIN — OXYBUTYNIN CHLORIDE 5 MG: 5 TABLET, EXTENDED RELEASE ORAL at 08:20

## 2019-03-02 RX ADMIN — SODIUM CHLORIDE 100 ML/HR: 9 INJECTION, SOLUTION INTRAVENOUS at 05:41

## 2019-03-02 RX ADMIN — APIXABAN 5 MG: 5 TABLET, FILM COATED ORAL at 21:46

## 2019-03-02 RX ADMIN — PREGABALIN 25 MG: 25 CAPSULE ORAL at 05:36

## 2019-03-02 RX ADMIN — APIXABAN 5 MG: 5 TABLET, FILM COATED ORAL at 08:21

## 2019-03-02 RX ADMIN — SODIUM CHLORIDE, PRESERVATIVE FREE 3 ML: 5 INJECTION INTRAVENOUS at 08:22

## 2019-03-02 RX ADMIN — Medication: at 16:04

## 2019-03-02 RX ADMIN — SODIUM CHLORIDE, PRESERVATIVE FREE 3 ML: 5 INJECTION INTRAVENOUS at 21:47

## 2019-03-02 RX ADMIN — INSULIN LISPRO 2 UNITS: 100 INJECTION, SOLUTION INTRAVENOUS; SUBCUTANEOUS at 17:46

## 2019-03-02 RX ADMIN — Medication: at 00:21

## 2019-03-02 RX ADMIN — TAZOBACTAM SODIUM AND PIPERACILLIN SODIUM 3.38 G: 375; 3 INJECTION, SOLUTION INTRAVENOUS at 08:21

## 2019-03-02 RX ADMIN — TAZOBACTAM SODIUM AND PIPERACILLIN SODIUM 3.38 G: 375; 3 INJECTION, SOLUTION INTRAVENOUS at 00:21

## 2019-03-02 RX ADMIN — PREGABALIN 25 MG: 25 CAPSULE ORAL at 21:46

## 2019-03-02 RX ADMIN — Medication: at 05:37

## 2019-03-02 RX ADMIN — Medication 1 CAPSULE: at 08:21

## 2019-03-02 RX ADMIN — TAZOBACTAM SODIUM AND PIPERACILLIN SODIUM 3.38 G: 375; 3 INJECTION, SOLUTION INTRAVENOUS at 16:04

## 2019-03-02 RX ADMIN — PREGABALIN 25 MG: 25 CAPSULE ORAL at 14:37

## 2019-03-02 RX ADMIN — ACETAMINOPHEN 650 MG: 325 TABLET, FILM COATED ORAL at 05:43

## 2019-03-02 RX ADMIN — INSULIN LISPRO 2 UNITS: 100 INJECTION, SOLUTION INTRAVENOUS; SUBCUTANEOUS at 12:07

## 2019-03-02 RX ADMIN — INSULIN GLARGINE 40 UNITS: 100 INJECTION, SOLUTION SUBCUTANEOUS at 21:45

## 2019-03-02 RX ADMIN — ROSUVASTATIN CALCIUM 10 MG: 10 TABLET, FILM COATED ORAL at 08:20

## 2019-03-02 NOTE — PLAN OF CARE
Problem: Patient Care Overview  Goal: Plan of Care Review  Outcome: Ongoing (interventions implemented as appropriate)    Goal: Individualization and Mutuality  Outcome: Ongoing (interventions implemented as appropriate)    Goal: Discharge Needs Assessment  Outcome: Ongoing (interventions implemented as appropriate)    Goal: Interprofessional Rounds/Family Conf  Outcome: Ongoing (interventions implemented as appropriate)      Problem: Fall Risk (Adult)  Goal: Absence of Fall  Outcome: Ongoing (interventions implemented as appropriate)      Problem: Skin Injury Risk (Adult)  Goal: Skin Health and Integrity  Outcome: Ongoing (interventions implemented as appropriate)      Problem: Fluid Volume Deficit (Adult)  Goal: Optimal Fluid Balance  Outcome: Ongoing (interventions implemented as appropriate)      Problem: Infection, Risk/Actual (Adult)  Goal: Infection Prevention/Resolution  Outcome: Ongoing (interventions implemented as appropriate)

## 2019-03-02 NOTE — PROGRESS NOTES
Name: Jesus Beckham ADMIT: 3/1/2019   : 1940  PCP: Magdy Ricardo MD    MRN: [id number] LOS: 1 days   AGE/SEX: 78 y.o. male  ROOM: [facility]/   Subjective   CC: abdominal pain, n/v/d  No acute events.  Patient is feeling much better than yesterday.  Tolerating full liquids.  No more n/v.  Had 2 loose stools today.      Objective   Vital Signs  Temp:  [97 °F (36.1 °C)-99.1 °F (37.3 °C)] 99.1 °F (37.3 °C)  Heart Rate:  [70-84] 77  Resp:  [16-18] 16  BP: (125-133)/(61-70) 133/63  SpO2:  [95 %-96 %] 96 %  on  Flow (L/min):  [1-2] 1.5;   Device (Oxygen Therapy): nasal cannula  Body mass index is 34.16 kg/m².    Physical Exam   Constitutional: He is oriented to person, place, and time. No distress.   HENT:   Head: Normocephalic and atraumatic.   Mouth/Throat: Oropharynx is clear and moist.   Eyes: Conjunctivae and EOM are normal. Pupils are equal, round, and reactive to light.   Neck: Normal range of motion. Neck supple. No JVD present.   Cardiovascular: Normal rate, regular rhythm and intact distal pulses.   Pulmonary/Chest: Effort normal and breath sounds normal.   Abdominal: Soft. Bowel sounds are normal. There is tenderness (mild, diffuse). There is no rebound and no guarding.   Musculoskeletal: He exhibits no edema or tenderness.   Neurological: He is alert and oriented to person, place, and time.   Skin: Skin is warm and dry. He is not diaphoretic.   Psychiatric: He has a normal mood and affect. His behavior is normal.   Nursing note and vitals reviewed.      Results Review:       I reviewed the patient's new clinical results.  Results from last 7 days   Lab Units 19  0422 19  0514   WBC 10*3/mm3 5.73 10.78   HEMOGLOBIN g/dL 12.6* 13.8   PLATELETS 10*3/mm3 166 209     Results from last 7 days   Lab Units 19  0422 19  2332 19  0514   SODIUM mmol/L 142  --  137   POTASSIUM mmol/L 3.9 3.6 4.4   CHLORIDE mmol/L 105  --  102   CO2 mmol/L 25.9  --  25.7   BUN mg/dL -      CREATININE mg/dL 0.82  --  1.00   GLUCOSE mg/dL 128*  --  179*   Estimated Creatinine Clearance: 92.8 mL/min (by C-G formula based on SCr of 0.82 mg/dL).  Results from last 7 days   Lab Units 03/01/19  0514   ALBUMIN g/dL 3.80   BILIRUBIN mg/dL 0.5   ALK PHOS U/L 78   AST (SGOT) U/L 13   ALT (SGPT) U/L 13     Results from last 7 days   Lab Units 03/02/19  0422 03/01/19  2332 03/01/19  0514   CALCIUM mg/dL 8.7  --  9.3   ALBUMIN g/dL  --   --  3.80   MAGNESIUM mg/dL  --  1.9 2.0     Results from last 7 days   Lab Units 03/01/19  0514   LACTATE mmol/L 1.2         allopurinol 300 mg Oral Daily   apixaban 5 mg Oral Q12H   digoxin 125 mcg Oral Daily   insulin glargine 40 Units Subcutaneous Nightly   insulin lispro 0-9 Units Subcutaneous 4x Daily With Meals & Nightly   lactobacillus acidophilus 1 capsule Oral Daily   oxybutynin XL 5 mg Oral Daily   piperacillin-tazobactam 3.375 g Intravenous Q8H   pregabalin 25 mg Oral Q8H   rosuvastatin 10 mg Oral Daily   sodium chloride 3 mL Intravenous Q12H   tamsulosin 0.4 mg Oral Daily   trolamine  Topical Q6H       Pharmacy to Dose Zosyn    Diet Regular; Consistent Carbohydrate      Assessment/Plan      Active Hospital Problems    Diagnosis Date Noted   • **Colitis presumed infectious [K52.9] 03/01/2019   • Nausea, vomiting, and diarrhea [R11.2, R19.7] 03/01/2019   • Urinary tract infection in male [N39.0] 03/01/2019   • Paroxysmal atrial fibrillation (CMS/[facility]) [I48.0] 03/16/2018   • CELINA (obstructive sleep apnea) [G47.33] 02/23/2018   • Osteoarthritis [M19.90] 09/28/2017   • Essential hypertension [I10] 07/29/2016   • Gout [M10.9] 04/01/2016   • Diabetic peripheral neuropathy (CMS/[facility]) [E11.42] 04/01/2016   • Dyslipidemia [E78.5] 04/01/2016   • Uncontrolled type 2 diabetes mellitus (CMS/[facility]) [E11.65] 04/01/2016      Resolved Hospital Problems   No resolved problems to display.   Colitis  - treating as infectious with zosyn-GI panel and cdiff are negative  - advancing diet, will  SLIV  - GI following, appreciate recs    Type 2 DM  - A1C 7.80%  - on 100 units of toujeo-will increase lantus to 40 units now that he is eating  - cover with ssi    PAF  - rate controlled  - continue AC with eliquis    DVT Prophylaxis  - as above    Disposition  Home in the next 1-2d.      Jerrell Gutierrez MD  [facility]  03/02/19  3:45 PM

## 2019-03-02 NOTE — CONSULTS
[facility]  Initial Inpatient Consult Note    Referring Provider: Dr. Lockwood    Reason for Consultation: Colitis    Subjective     History of present illness:    78 y.o. male with a history of colon polyp removed 5 years ago, presents with abdominal pain and diarrhea abdominal pain for 3-4 days, lower quadrants does not radiate.  Worse with movement better at rest.  Colicky in nature.  He is never had pain like this before.  Diarrhea for 3-4 days.  No sick contacts, no recent travel.  He has had a low-grade fever.  Chills.    Past Medical History:  Past Medical History:   Diagnosis Date   • Acute embolism and thrombosis of deep vein of right distal lower extremity (CMS/[facility])    • Acute gangrenous cholecystitis     FEB 2018   • Arthritis    • Atrial fibrillation with RVR (CMS/[facility])     HISTORY   • Benign prostatic hyperplasia without lower urinary tract symptoms    • Cancer of bladder (CMS/HCC) 2016   • Colon polyp    • Discitis of lumbar region    • DVT (deep venous thrombosis) (CMS/[facility])     MARCH 2018   • Essential hypertension    • Murray catheter in place     LOSS OF SENSATION BLADDER. BECAUSE OF WOUND AT THE TIME   • Gout    • Heel ulcer (CMS/[facility])     RIGHT. FOLLOWED BY WOUND CARE. GETTING BETTER   • History of sepsis    • Hyperlipidemia    • Loss, sensation     LOWER EXTREMTIES. RELATED TO LAST BACK SURGERY.   • MRSA infection     LEFT LEG.    • Open wound     LOW TO MIDDLE CRACK BUTT. SEES WOUND CARE. WILL BE RESTARTED ON DIFFUCAN AND NYSTATIN POWER. REDNESS IN GROIN   • Open wound     WITH MEDICATED DRESSING LEFT SHIN AREA.    • CELINA (obstructive sleep apnea)     NO MACHINE   • Osteoarthritis    • Paroxysmal atrial fibrillation (CMS/[facility])    • PVC (premature ventricular contraction)    • Sepsis (CMS/HCC) 02/2018   • Sepsis due to Escherichia coli (CMS/[facility])    • Skin carcinoma 2012    MELANOMA.2 PLACES BACK AND EAR   • Type 2 diabetes mellitus (CMS/[facility])    • Vitamin D deficiency    •  Weakness      Past Surgical History:  Past Surgical History:   Procedure Laterality Date   • ABDOMINAL SURGERY     • APPENDECTOMY N/A    • BACK SURGERY     • CHOLECYSTECTOMY WITH INTRAOPERATIVE CHOLANGIOGRAM N/A 2018    Procedure: CHOLECYSTECTOMY LAPAROSCOPIC INTRAOPERATIVE CHOLANGIOGRAM;  Surgeon: Maegan Correa MD;  Location: [facility];  Service:    • COLONOSCOPY N/A     h/o of polyps   • EYE SURGERY Bilateral     glass removal from eye, lens transplant   • JOINT REPLACEMENT     • LAMINECTOMY N/A 2016    L2-L3, L3-L4, L4-L5, and L5-S1 bilateral lamincectomy, medial facetectomy & [id number], Dr. Kaiden Valdes   • LUMBAR FUSION N/A 3/7/2018    Procedure: LUMBAR FUSION MINIMALLY INVASIVE TRANSFORAMINAL LUMBAR INTERBODY IRRIGATION AND DEBRIDEMENT AND FUSION.  IRRIGATION AND DEBRIDEMENT OF EPIDURAL ABCESS LUMBAR 2-4. BONE MORPHOGENIC PROTEIN, ALPHATEC NOVEL AND ILLICO, EMG AND SSEP NEUROMONITORING.;  Surgeon: Manish Marr DO;  Location: [facility];  Service: Orthopedic Spine   • SKIN BIOPSY     • TOTAL KNEE ARTHROPLASTY Right 2005    Dr. Washington Evans   • VENA CAVA FILTER INSERTION Right 3/6/2018    Procedure: VENA CAVA FILTER INSERTION;  Surgeon: Alexis Thakkar MD;  Location: [facility] ;  Service:    • VENA CAVA FILTER REMOVAL N/A 12/3/2018    Procedure: VENA CAVA FILTER REMOVAL WITH VENOCAVAGRAM;  Surgeon: Alexis Thakkar MD;  Location: [facility] ;  Service: Vascular      Social History:   Social History     Tobacco Use   • Smoking status: Former Smoker     Types: Cigars     Last attempt to quit: 2017     Years since quittin.1   • Smokeless tobacco: Current User     Types: Chew   Substance Use Topics   • Alcohol use: No      Family History:  Family History   Problem Relation Age of Onset   • Cancer Mother        Home Meds:  Medications Prior to Admission   Medication Sig Dispense Refill Last Dose   • ACCU-CHEK FASTCLIX LANCETS misc USE   TO TEST BLOOD GLUCOSE TWICE DAILY 204 each 0 2/28/2019 at Unknown time   • allopurinol (ZYLOPRIM) 300 MG tablet Take 1 tablet by mouth Daily. 90 tablet 3 2/28/2019 at Unknown time   • digoxin (LANOXIN) 125 MCG tablet Take 125 mcg by mouth Daily.   2/28/2019 at Unknown time   • diphenhydrAMINE-acetaminophen (TYLENOL PM EXTRA STRENGTH)  MG tablet per tablet Take 2 tablets by mouth At Night As Needed for Sleep.   2/28/2019 at Unknown time   • ELIQUIS 5 MG tablet tablet TAKE 1 TABLET BY MOUTH EVERY 12 (TWELVE) HOURS. (Patient taking differently: Take 5 mg by mouth Every 12 (Twelve) Hours. TO HOLD 48 HRS PRIOR TO SURGERY) 180 tablet 1 2/28/2019 at Unknown time   • fluticasone (FLONASE) 50 MCG/ACT nasal spray 2 sprays into the nostril(s) as directed by provider Daily. 3 bottle 3 2/28/2019 at Unknown time   • glipiZIDE (GLUCOTROL) 10 MG tablet Take 10 mg by mouth 2 (Two) Times a Day.   2/28/2019 at Unknown time   • glucose blood (ACCU-CHEK SMARTVIEW) test strip USE  TO TEST BLOOD GLUCOSE TWICE DAILY 200 each 0 3/1/2019 at Unknown time   • GLYXAMBI 25-5 MG tablet Take 1 tablet by mouth Daily. 30 tablet 11 2/28/2019 at Unknown time   • hydrocortisone-bacitracin-zinc oxide-nystatin (MAGIC BARRIER) Apply 1 application topically to the appropriate area as directed As Needed.   3/1/2019 at Unknown time   • Insulin Pen Needle 32G X 4 MM misc INJECT UP TO THREE TIMES DAILY OR AS DIRECTED 500 each 1 2/28/2019 at Unknown time   • L-Methylfolate-B6-B12 3-35-2 MG tablet TAKE 1 TABLET TWICE DAILY 180 tablet 0 2/28/2019 at Unknown time   • melatonin 5 MG tablet tablet Take 5 mg by mouth Every Night.   Past Week at Unknown time   • Mirabegron ER (MYRBETRIQ) 25 MG tablet sustained-release 24 hour 24 hr tablet Take 25 mg by mouth Daily.   2/28/2019 at Unknown time   • Multiple Vitamins-Minerals (MULTIVITAMIN ADULT PO) Take 1 tablet by mouth Daily.   2/28/2019 at Unknown time   • Omega-3 Fatty Acids (FISH OIL) 1200 MG capsule capsule  Take 2,000 mg by mouth 2 (Two) Times a Day With Meals. HOLD FOR SURGERY    2/28/2019 at Unknown time   • pregabalin (LYRICA) 100 MG capsule TAKE 1 CAPSULE THREE TIMES DAILY 270 capsule 1 2/28/2019 at Unknown time   • Probiotic Product (PROBIOTIC DAILY PO) Take 1 tablet by mouth Daily.   2/28/2019 at Unknown time   • rosuvastatin (CRESTOR) 10 MG tablet Take 1 tablet by mouth Daily. 90 tablet 3 2/28/2019 at Unknown time   • senna-docusate (SENNA-S) 8.6-50 MG per tablet Take 1 tablet by mouth 2 (Two) Times a Day.   Past Month at Unknown time   • tamsulosin (FLOMAX) 0.4 MG capsule 24 hr capsule Take 1 capsule by mouth Daily. (Patient taking differently: Take 0.4 mg by mouth 2 (Two) Times a Day.) 30 capsule  2/28/2019 at Unknown time   • TOUJEO MAX SOLOSTAR 300 UNIT/ML solution pen-injector Inject 100 Units under the skin into the appropriate area as directed Daily With Breakfast. 4 pen 5 2/28/2019 at Unknown time   • traZODone (DESYREL) 150 MG tablet TAKE 1/2 - 2 TABLETS BY MOUTH EVERY NIGHT AT BEDTIME AS NEEDED FOR SLEEP 60 tablet 0 Past Week at Unknown time   • Turmeric 450 MG capsule Take 450 mg by mouth Daily. HOLD FOR SURGERY   2/28/2019 at Unknown time   • vitamin C (ASCORBIC ACID) 500 MG tablet Take 500 mg by mouth Daily.   2/28/2019 at Unknown time   • vitamin D (ERGOCALCIFEROL) 23952 units capsule capsule Take 1 capsule by mouth 1 (One) Time Per Week. MONDAY 13 capsule 3 Past Week at Unknown time   • furosemide (LASIX) 20 MG tablet Take 10 mg by mouth 2 (Two) Times a Day.   More than a month at Unknown time   • Wound Dressings ([facility] WOUND/BURN DRESSING) gel Apply as directed 44 mL 3 Taking     Current Meds:     allopurinol 300 mg Oral Daily   apixaban 5 mg Oral Q12H   digoxin 125 mcg Oral Daily   insulin glargine 20 Units Subcutaneous Nightly   insulin lispro 0-9 Units Subcutaneous 4x Daily With Meals & Nightly   lactobacillus acidophilus 1 capsule Oral Daily   oxybutynin XL 5 mg Oral Daily    piperacillin-tazobactam 3.375 g Intravenous Q8H   pregabalin 25 mg Oral Q8H   rosuvastatin 10 mg Oral Daily   sodium chloride 3 mL Intravenous Q12H   tamsulosin 0.4 mg Oral Daily   trolamine  Topical Q6H     Allergies:  Allergies   Allergen Reactions   • Levaquin [Levofloxacin] Other (See Comments)     Redness, itching at IV site with IV Levaquin administration   • Other Other (See Comments)     ALCOHOL. DEATHLY ILL.      Review of Systems  He has abdominal pain and bloating, diarrhea, weakness and fatigue all other systems reviewed and negative     Objective     Vital Signs  Temp:  [97 °F (36.1 °C)-98.8 °F (37.1 °C)] 97.4 °F (36.3 °C)  Heart Rate:  [70-84] 70  Resp:  [16-18] 16  BP: (125-132)/(61-68) 132/61  Physical Exam:  General Appearance:    Alert, cooperative, in no acute distress   Head:    Normocephalic, without obvious abnormality, atraumatic   Eyes:            Lids and lashes normal, conjunctivae and sclerae normal, no   icterus   Throat:   No oral lesions, no thrush, oral mucosa moist   Neck:   No adenopathy, supple, trachea midline, no thyromegaly, no   carotid bruit, no JVD   Lungs:     Clear to auscultation,respirations regular, even and                   unlabored    Heart:    Regular rhythm and normal rate, normal S1 and S2, no            murmur, no gallop, no rub, no click   Chest Wall:    No abnormalities observed   Abdomen:     Normal bowel sounds, no masses, no organomegaly, soft        non-tender, non-distended, no guarding, no rebound                 tenderness   Rectal:     Deferred   Extremities:   no edema, no cyanosis, no redness   Skin:   No bleeding, bruising or rash   Lymph nodes:   No palpable adenopathy   Psychiatric:  Judgement and insight: normal   Orientation to person place and time: normal   Mood and affect: normal   Results Review:   I reviewed the patient's new clinical results.    Results from last 7 days   Lab Units 03/02/19 0422 03/01/19  0514   WBC 10*3/mm3 5.73 10.78    HEMOGLOBIN g/dL 12.6* 13.8   HEMATOCRIT % 41.3 43.9   PLATELETS 10*3/mm3 166 209     Results from last 7 days   Lab Units 03/02/19  0422 03/01/19  2332 03/01/19  0514   SODIUM mmol/L 142  --  137   POTASSIUM mmol/L 3.9 3.6 4.4   CHLORIDE mmol/L 105  --  102   CO2 mmol/L 25.9  --  25.7   BUN mg/dL 11  --  14   CREATININE mg/dL 0.82  --  1.00   CALCIUM mg/dL 8.7  --  9.3   BILIRUBIN mg/dL  --   --  0.5   ALK PHOS U/L  --   --  78   ALT (SGPT) U/L  --   --  13   AST (SGOT) U/L  --   --  13   GLUCOSE mg/dL 128*  --  179*         Lab Results   Lab Value Date/Time    LIPASE 12 (L) 03/01/2019 0514    LIPASE 30 02/25/2018 0506       Radiology:  CT Abdomen Pelvis With Contrast   Preliminary Result   1. There is nearly a pancolitis and there is also an enteritis involving   the terminal ileum. An infectious etiology is suspected. Please   correlate clinically and follow-up is recommended. There is no free   fluid.   2. Interval increase in a 2.3 cm splenic cystic lesion. A benign lesion   such as a splenic hemangioma is suspected. Given the increase in size,   reevaluation is recommended with a CT of the abdomen in 6 months.       Discussed with the SAPEN King.              Assessment/Plan   Patient Active Problem List   Diagnosis   • Gout   • Diabetic peripheral neuropathy (CMS/HCC)   • Dyslipidemia   • Uncontrolled type 2 diabetes mellitus (CMS/HCC)   • Essential hypertension   • PVC (premature ventricular contraction)   • Vitamin D deficiency   • Benign non-nodular prostatic hyperplasia without lower urinary tract symptoms   • Osteoarthritis   • Urge incontinence   • CELINA (obstructive sleep apnea)   • Acute gangrenous cholecystitis   • Discitis of lumbar region   • Paroxysmal atrial fibrillation (CMS/HCC)   • History of sepsis   • Hyperuricemia   • Chronic deep vein thrombosis (DVT) of right peroneal vein (CMS/HCC)   • Nausea, vomiting, and diarrhea   • Colitis   • Urinary tract infection in male     Problem  list    Abdominal pain  Diarrhea  Fevers  Pancolitis  Ileitis by imaging      Assessment/Plan    Agree with antibiotics per primary team  Stool studies per primary team  No indication for colonoscopy at this time  Likely colonoscopy in 6-8 weeks as he has a history of polyps  Likely home in the next couple days  Advance diet as tolerated    I discussed the patients findings and my recommendations with patient and nursing staff.    Samuel Carrasco MD

## 2019-03-02 NOTE — PLAN OF CARE
Problem: Patient Care Overview  Goal: Plan of Care Review  Outcome: Ongoing (interventions implemented as appropriate)   03/02/19 0324   Coping/Psychosocial   Plan of Care Reviewed With patient   Plan of Care Review   Progress no change   OTHER   Outcome Summary VSS; no BM during the night; pt had 4 beat V tach run w/o s/s, STAT labs and EKG done, results read to on call MD, no new orders at this time, Q 2 turned; barrier applied to wound; will continue to monitor.     Goal: Individualization and Mutuality  Outcome: Ongoing (interventions implemented as appropriate)    Goal: Discharge Needs Assessment  Outcome: Ongoing (interventions implemented as appropriate)    Goal: Interprofessional Rounds/Family Conf  Outcome: Ongoing (interventions implemented as appropriate)      Problem: Fall Risk (Adult)  Goal: Absence of Fall  Outcome: Ongoing (interventions implemented as appropriate)      Problem: Skin Injury Risk (Adult)  Goal: Skin Health and Integrity  Outcome: Ongoing (interventions implemented as appropriate)      Problem: Fluid Volume Deficit (Adult)  Goal: Optimal Fluid Balance  Outcome: Ongoing (interventions implemented as appropriate)      Problem: Infection, Risk/Actual (Adult)  Goal: Infection Prevention/Resolution  Outcome: Ongoing (interventions implemented as appropriate)

## 2019-03-02 NOTE — THERAPY EVALUATION
Acute Care - Physical Therapy Initial Evaluation  [facility]     Patient Name: Jesus Beckham  : 1940  MRN: [id number]  Today's Date: 3/2/2019   Onset of Illness/Injury or Date of Surgery: 19     Referring Physician: Brenda      Admit Date: 3/1/2019    Visit Dx:     ICD-10-CM ICD-9-CM   1. Nausea, vomiting, and diarrhea R11.2 787.91    R19.7 787.01   2. Urinary tract infection in male N39.0 599.0   3. Colitis K52.9 558.9     Patient Active Problem List   Diagnosis   • Gout   • Diabetic peripheral neuropathy (CMS/[facility])   • Dyslipidemia   • Uncontrolled type 2 diabetes mellitus (CMS/[facility])   • Essential hypertension   • PVC (premature ventricular contraction)   • Vitamin D deficiency   • Benign non-nodular prostatic hyperplasia without lower urinary tract symptoms   • Osteoarthritis   • Urge incontinence   • CELINA (obstructive sleep apnea)   • Acute gangrenous cholecystitis   • Discitis of lumbar region   • Paroxysmal atrial fibrillation (CMS/[facility])   • History of sepsis   • Hyperuricemia   • Chronic deep vein thrombosis (DVT) of right peroneal vein (CMS/[facility])   • Nausea, vomiting, and diarrhea   • Colitis   • Urinary tract infection in male     Past Medical History:   Diagnosis Date   • Acute embolism and thrombosis of deep vein of right distal lower extremity (CMS/[facility])    • Acute gangrenous cholecystitis     2018   • Arthritis    • Atrial fibrillation with RVR (CMS/[facility])     HISTORY   • Benign prostatic hyperplasia without lower urinary tract symptoms    • Cancer of bladder (CMS/[facility])    • Colon polyp    • Discitis of lumbar region    • DVT (deep venous thrombosis) (CMS/[facility])     2018   • Essential hypertension    • Murray catheter in place     LOSS OF SENSATION BLADDER. BECAUSE OF WOUND AT THE TIME   • Gout    • Heel ulcer (CMS/HCC)     RIGHT. FOLLOWED BY WOUND CARE. GETTING BETTER   • History of sepsis    • Hyperlipidemia    • Loss, sensation     LOWER EXTREMTIES. RELATED TO LAST BACK  SURGERY.   • MRSA infection     LEFT LEG.    • Open wound     LOW TO MIDDLE CRACK BUTT. SEES WOUND CARE. WILL BE RESTARTED ON DIFFUCAN AND NYSTATIN POWER. REDNESS IN GROIN   • Open wound     WITH MEDICATED DRESSING LEFT SHIN AREA.    • CELINA (obstructive sleep apnea)     NO MACHINE   • Osteoarthritis    • Paroxysmal atrial fibrillation (CMS/HCC)    • PVC (premature ventricular contraction)    • Sepsis (CMS/HCC) 02/2018   • Sepsis due to Escherichia coli (CMS/HCC)    • Skin carcinoma 2012    MELANOMA.2 PLACES BACK AND EAR   • Type 2 diabetes mellitus (CMS/HCC)    • Vitamin D deficiency    • Weakness      Past Surgical History:   Procedure Laterality Date   • ABDOMINAL SURGERY     • APPENDECTOMY N/A 1961   • BACK SURGERY     • CHOLECYSTECTOMY WITH INTRAOPERATIVE CHOLANGIOGRAM N/A 2/25/2018    Procedure: CHOLECYSTECTOMY LAPAROSCOPIC INTRAOPERATIVE CHOLANGIOGRAM;  Surgeon: Maegan Correa MD;  Location: [facility] OR;  Service:    • COLONOSCOPY N/A 2010    h/o of polyps   • EYE SURGERY Bilateral 1959    glass removal from eye, lens transplant   • JOINT REPLACEMENT     • LAMINECTOMY N/A 01/18/2016    L2-L3, L3-L4, L4-L5, and L5-S1 bilateral lamincectomy, medial facetectomy & [id number], Dr. Kaiden Valdes   • LUMBAR FUSION N/A 3/7/2018    Procedure: LUMBAR FUSION MINIMALLY INVASIVE TRANSFORAMINAL LUMBAR INTERBODY IRRIGATION AND DEBRIDEMENT AND FUSION.  IRRIGATION AND DEBRIDEMENT OF EPIDURAL ABCESS LUMBAR 2-4. BONE MORPHOGENIC PROTEIN, ALPHATEC NOVEL AND ILLICO, EMG AND SSEP NEUROMONITORING.;  Surgeon: Manish Marr DO;  Location: [facility] OR;  Service: Orthopedic Spine   • SKIN BIOPSY     • TOTAL KNEE ARTHROPLASTY Right 03/07/2005    Dr. Washington Evans   • VENA CAVA FILTER INSERTION Right 3/6/2018    Procedure: VENA CAVA FILTER INSERTION;  Surgeon: Alexis Thakkar MD;  Location: [facility] OR 18/19;  Service:    • VENA CAVA FILTER REMOVAL N/A 12/3/2018    Procedure: VENA CAVA FILTER REMOVAL WITH VENOCAVAGRAM;   Surgeon: Alexis Thakkar MD;  Location: [facility] 18/19;  Service: Vascular        PT ASSESSMENT (last 12 hours)      Physical Therapy Evaluation     Row Name 03/02/19 1140          PT Evaluation Time/Intention    Subjective Information  no complaints  -AR     Document Type  evaluation  -AR     Mode of Treatment  physical therapy  -AR     Patient Effort  good  -AR     Symptoms Noted During/After Treatment  fatigue  -AR     Row Name 03/02/19 1140          General Information    Patient Profile Reviewed?  yes  -AR     Onset of Illness/Injury or Date of Surgery  03/01/19  -AR     Referring Physician  Brenda  -AR     Prior Level of Function  mod assist: +falls  -AR     Equipment Currently Used at Home  -- mostly WC in home, RW some  -AR     Pertinent History of Current Functional Problem  colitis, DM, L foot drop, R offloading shoe  -AR     Existing Precautions/Restrictions  fall  -AR     Barriers to Rehab  none identified  -AR     Row Name 03/02/19 1140          Relationship/Environment    Lives With  spouse  -AR     Row Name 03/02/19 1140          Resource/Environmental Concerns    Current Living Arrangements  home/apartment/condo  -AR     Row Name 03/02/19 1140          Cognitive Assessment/Intervention- PT/OT    Orientation Status (Cognition)  oriented x 4  -AR     Follows Commands (Cognition)  WNL  -AR     Row Name 03/02/19 1140          Bed Mobility Assessment/Treatment    Bed Mobility Assessment/Treatment  supine-sit;sit-supine  -AR     Supine-Sit Al[place] (Bed Mobility)  minimum assist (75% patient effort)  -AR     Sit-Supine Aline (Bed Mobility)  not tested  -AR     Assistive Device (Bed Mobility)  bed rails;draw sheet;head of bed elevated  -AR     Row Name 03/02/19 1140          Transfer Assessment/Treatment    Transfer Assessment/Treatment  sit-stand transfer;stand-sit transfer  -AR     Comment (Transfers)  CGA from bed; min A from chair   -AR     Sit-Stand [place] (Transfers)   contact guard;minimum assist (75% patient effort)  -AR     Stand-Sit [place] (Transfers)  contact guard;minimum assist (75% patient effort)  -AR     Row Name 03/02/19 1140          Sit-Stand Transfer    Assistive Device (Sit-Stand Transfers)  walker, front-wheeled  -AR     Row Name 03/02/19 1140          Stand-Sit Transfer    Assistive Device (Stand-Sit Transfers)  walker, front-wheeled  -AR     Row Name 03/02/19 1140          Gait/Stairs Assessment/Training    Gait/Stairs Assessment/Training  gait pattern;distance ambulated  -AR     [place] Level (Gait)  contact guard  -AR     Assistive Device (Gait)  walker, front-wheeled  -AR     Distance in Feet (Gait)  2  -AR     Pattern (Gait)  swing-to  -AR     Deviations/Abnormal Patterns (Gait)  gait speed decreased;festinating/shuffling  -AR     Comment (Gait/Stairs)  few steps bed>chair  -AR     Row Name 03/02/19 1140          General ROM    GENERAL ROM COMMENTS  B LE WFL  -AR     Row Name 03/02/19 1140          MMT (Manual Muscle Testing)    General MMT Comments  B LE grossly 3+/5 except L ankle w/ AFO  -AR     Row Name 03/02/19 1140          Pain Assessment    Additional Documentation  Pain Scale: Numbers Pre/Post-Treatment (Group)  -AR     Row Name 03/02/19 1140          Pain Scale: Numbers Pre/Post-Treatment    Pain Scale: Numbers, Pretreatment  3/10  -AR     Pain Scale: Numbers, Post-Treatment  3/10  -AR     Pain Location  abdomen  -AR     Pain Intervention(s)  Repositioned  -AR     Row Name             Wound 03/01/19 1113 Left anterior;lower leg other (see comments)    Wound - Properties Group Date first assessed: 03/01/19  -KB Time first assessed: 1113  -KB Present On Admission : yes  -KB Side: Left  -KB Orientation: anterior;lower  -KB Location: leg  -KB Type: other (see comments)  -KB, healing     Row Name             Wound 03/01/19 1113 Left coccyx pressure injury    Wound - Properties Group Date first assessed: 03/01/19  -KB Time first assessed: 1113   -KB Side: Left  -KB Location: coccyx  -KB, buttcheek  Type: pressure injury  -KB Stage, Pressure Injury: Stage 2  -KB    Row Name 03/02/19 1140          Physical Therapy Clinical Impression    Patient/Family Goals Statement (PT Clinical Impression)  DC home  -AR     Criteria for Skilled Interventions Met (PT Clinical Impression)  yes  -AR     Pathology/Pathophysiology Noted (Describe Specifically for Each System)  musculoskeletal  -AR     Impairments Found (describe specific impairments)  aerobic capacity/endurance;gait, locomotion, and balance  -AR     Rehab Potential (PT Clinical Summary)  good, to achieve stated therapy goals  -AR     Row Name 03/02/19 1140          Vital Signs    Pre SpO2 (%)  95  -AR     O2 Delivery Pre Treatment  supplemental O2  -AR     Row Name 03/02/19 1140          Physical Therapy Goals    Bed Mobility Goal Selection (PT)  bed mobility, PT goal 1  -AR     Transfer Goal Selection (PT)  transfer, PT goal 1  -AR     Gait Training Goal Selection (PT)  gait training, PT goal 1  -AR     Row Name 03/02/19 1140          Bed Mobility Goal 1 (PT)    Activity/Assistive Device (Bed Mobility Goal 1, PT)  sit to supine/supine to sit  -AR     [place] Level/Cues Needed (Bed Mobility Goal 1, PT)  supervision required  -AR     Time Frame (Bed Mobility Goal 1, PT)  1 week  -AR     Row Name 03/02/19 1140          Transfer Goal 1 (PT)    Activity/Assistive Device (Transfer Goal 1, PT)  sit-to-stand/stand-to-sit;walker, rolling  -AR     [place] Level/Cues Needed (Transfer Goal 1, PT)  standby assist  -AR     Time Frame (Transfer Goal 1, PT)  1 week  -AR     Row Name 03/02/19 1140          Gait Training Goal 1 (PT)    Activity/Assistive Device (Gait Training Goal 1, PT)  gait (walking locomotion);walker, rolling  -AR     [place] Level (Gait Training Goal 1, PT)  standby assist  -AR     Distance (Gait Goal 1, PT)  50  -AR     Time Frame (Gait Training Goal 1, PT)  1 week  -AR     Row Name 03/02/19  1140          Positioning and Restraints    Pre-Treatment Position  in bed  -AR     Post Treatment Position  chair  -AR     In Chair  notified nsg;sitting;call light within reach;encouraged to call for assist;exit alarm on;with family/caregiver  -AR     Row Name 03/02/19 1140          Living Environment    Home Accessibility  -- ramp to enter  -AR       User Key  (r) = Recorded By, (t) = Taken By, (c) = Cosigned By    Initials Name Provider Type    Radha Dunn, RN Registered Nurse    Angela Winn, PT Physical Therapist        Physical Therapy Education     Title: PT OT SLP Therapies (In Progress)     Topic: Physical Therapy (In Progress)     Point: Mobility training (In Progress)     Learning Progress Summary           Patient Acceptance, E, NR by AR at 3/2/2019  1:12 PM                   Point: Home exercise program (In Progress)     Learning Progress Summary           Patient Acceptance, E, NR by AR at 3/2/2019  1:12 PM                   Point: Body mechanics (In Progress)     Learning Progress Summary           Patient Acceptance, E, NR by AR at 3/2/2019  1:12 PM                   Point: Precautions (In Progress)     Learning Progress Summary           Patient Acceptance, E, NR by AR at 3/2/2019  1:12 PM                               User Key     Initials Effective Dates Name Provider Type Discipline    AR 04/03/18 -  Angela Armstrong, MAHAMED Physical Therapist PT              PT Recommendation and Plan  Anticipated Discharge Disposition (PT): home with assist, home with home health(anticipating progress for DC to home w/ health)  Planned Therapy Interventions (PT Eval): balance training, bed mobility training, gait training, home exercise program, patient/family education, ROM (range of motion), stair training, strengthening, transfer training  Therapy Frequency (PT Clinical Impression): daily  Outcome Summary/Treatment Plan (PT)  Anticipated Discharge Disposition (PT): home with assist, home with  home health(anticipating progress for DC to home w/ health)  Plan of Care Reviewed With: patient  Outcome Summary: Pt admitted 3/1 w/ colitis.  H/o L foot drop wears AFO, R offloading shoe.  Pt/spouse reports pt lives at home, usesmostly WC in home but walks in bathroom, + falls.  Today pt able to ambulate few steps inroom w/ contact assist using RW demonstratinggeneralized weakness, impaired standing balance and gait pattern.  Plan to continue PT, anticipate progress for DC to home w/ home PT.      Time Calculation:   PT Charges     Row Name 03/02/19 1314             Time Calculation    Start Time  1140  -AR      Stop Time  1202  -AR      Time Calculation (min)  22 min  -AR      PT Received On  03/02/19  -AR      PT - Next Appointment  03/03/19  -AR      PT Goal Re-Cert Due Date  03/09/19  -AR        User Key  (r) = Recorded By, (t) = Taken By, (c) = Cosigned By    Initials Name Provider Type    AR Angela Armstrong PT Physical Therapist        Therapy Suggested Charges     Code   Minutes Charges    None           Therapy Charges for Today     Code Description Service Date Service Provider Modifiers Qty    98047117698 HC PT EVAL MOD COMPLEXITY 2 3/2/2019 Angela Armstrong, PT GP 1    96793295196 HC PT THER PROC EA 15 MIN 3/2/2019 Angela Armstrong, PT GP 1                 Angela Armstrong PT  3/2/2019

## 2019-03-02 NOTE — PLAN OF CARE
Problem: Patient Care Overview  Goal: Plan of Care Review   03/02/19 1312   Coping/Psychosocial   Plan of Care Reviewed With patient   OTHER   Outcome Summary Pt admitted 3/1 w/ colitis. H/o L foot drop wears AFO, R offloading shoe. Pt/spouse reports pt lives at home, uses mostly WC in home but walks in bathroom, + falls. Today pt able to ambulate few steps inroom w/ contact assist using RW demonstrating generalized weakness, impaired standing balance and gait pattern. Plan to continue PT, anticipate progress for DC to home w/ home PT.

## 2019-03-02 NOTE — PLAN OF CARE
Problem: Skin Injury Risk (Adult)  Intervention: Promote/Optimize Nutrition   03/02/19 1209   Nutrition Interventions   Oral Nutrition Promotion calorie dense foods provided;calorie dense liquids provided  ([place] at breakfast and dinner)

## 2019-03-02 NOTE — PLAN OF CARE
Problem: Patient Care Overview  Goal: Plan of Care Review  Outcome: Ongoing (interventions implemented as appropriate)   03/02/19 1652 03/02/19 1703   Coping/Psychosocial   Plan of Care Reviewed With --  patient   OTHER   Outcome Summary VSS. Multiple instances of bowel incontinence throughout theday, uses urinal appropriately. Contact precautions for hx of MRSA. Diet advanced to diabetic/CC. Fluids stopped. Offloading boot on both feet. Healing pressure wound on L butt cheek. Continue to monitor. --

## 2019-03-02 NOTE — CONSULTS
"Adult Nutrition  Assessment/PES    Patient Name:  Jesus Beckham  YOB: 1940  MRN: [id number]  Admit Date:  3/1/2019    Assessment Date:  3/2/2019    Comments:  Assessment completed due to skin status. Has healing stage II L buttocks, evaluated by [facility]. Patient has received malcolm past admissions. Will send twice a day. 100% intake so far.     Reason for Assessment     Row Name 03/02/19 1202          Reason for Assessment    Reason For Assessment  nurse/nurse practitioner consult     Identified At Risk by Screening Criteria  -- skin status         Nutrition/Diet History     Row Name 03/02/19 1203          Nutrition/Diet History    Typical Food/Fluid Intake  Admitted with pancolitis, NVD. Has been followed by [facility].  Has healing stage II on L buttocks.          Anthropometrics     Row Name 03/02/19 1203          Anthropometrics    Height  177.8 cm (70\")     Weight  108 kg (238 lb 1.6 oz)        Ideal Body Weight (IBW)    Ideal Body Weight (IBW) (kg)  76.48     % Ideal Body Weight  141.21        Body Mass Index (BMI)    BMI (kg/m2)  34.23     BMI Assessment  BMI 30-34.9: obesity grade I            Labs/Tests/Procedures/Meds     Row Name 03/02/19 1203          Labs/Procedures/Meds    Lab Results Reviewed  reviewed, pertinent     Lab Results Comments  HgAIC 7.8        Diagnostic Tests/Procedures    Diagnostic Test/Procedure Reviewed  reviewed, pertinent        Medications    Pertinent Medications Reviewed  reviewed, pertinent     Pertinent Medications Comments  insulin, lactobacillus, IV antibx, IVF         Physical Findings     Row Name 03/02/19 1204          Physical Findings    Skin  -- stage II L buttocks         Estimated/Assessed Needs     Row Name 03/02/19 1204 03/02/19 1203       Calculation Measurements    Weight Used For Calculations  108 kg (238 lb 1.6 oz)  --    Height  --  177.8 cm (70\")       Estimated/Assessed Needs    Additional Documentation  Calorie Requirements (Group);Fluid Requirements " "(Group);Protein Requirements (Group)  --       Calorie Requirements    Estimated Calorie Need Method  kcal/kg  --    Estimated Calorie Requirement Comment  [id number]  --       KCAL/KG    20 Kcal/Kg (kcal)  2160  --       [place]-St. Jeor Equation    RMR ([place]-St. Jeor Equation)  1811.25 x 1.2  --       Protein Requirements    Est Protein Requirement Amount (gms/kg)  1.3 gm protein  --    Estimated Protein Requirements (gms/day)  140.4  --       Fluid Requirements    Estimated Fluid Requirements (mL/day)  2100  --    Estimated Fluid Requirement Method  RDA Method  --    RDA Method (mL)  2100  --        Nutrition Prescription Ordered     Row Name 03/02/19 1205          Nutrition Prescription PO    Current PO Diet  Regular     Common Modifiers  Consistent Carbohydrate         Evaluation of Received Nutrient/Fluid Intake     Row Name 03/02/19 1205 03/02/19 1204       Calculation Measurements    Weight Used For Calculations  --  108 kg (238 lb 1.6 oz)       Fluid Intake Evaluation    IV Fluid (mL)  2400  --       PO Evaluation    Number of Meals  1  --    % PO Intake  100  --    Row Name 03/02/19 1203          Calculation Measurements    Height  177.8 cm (70\")         Evaluation of Prescribed Nutrient/Fluid Intake     Row Name 03/02/19 1204 03/02/19 1203       Calculation Measurements    Weight Used For Calculations  108 kg (238 lb 1.6 oz)  --    Height  --  177.8 cm (70\")            Problem/Interventions:  Problem 1     Row Name 03/02/19 1206          Nutrition Diagnoses Problem 1    Problem 1  Increased Nutrient Needs     Macronutrient  Protein     Etiology (related to)  Medical Diagnosis     Skin  Pressure injury     Signs/Symptoms (evidenced by)  PO Intake     Percent (%) intake recorded  100 %     Over number of meals  1                 Intervention Goal     Row Name 03/02/19 1207          Intervention Goal    General  Meet nutritional needs for age/condition;Disease management/therapy     PO  Maintain intake     "     Nutrition Intervention     Row Name 03/02/19 1207          Nutrition Intervention    RD/Tech Action  Follow Tx progress;Care plan reviewd;Supplement provided         Nutrition Prescription     Row Name 03/02/19 1207          Nutrition Prescription PO    PO Prescription  Begin/change supplement     Supplement  [place]     Supplement Frequency  2 times a day     New PO Prescription Ordered?  Yes         Education/Evaluation     Row Name 03/02/19 1207          Education    Education  Will Instruct as appropriate        Monitor/Evaluation    Monitor  Per protocol;PO intake;Supplement intake;Skin status           Electronically signed by:  Qing Diallo RD, LD  03/02/19 12:07 PM

## 2019-03-03 VITALS
HEART RATE: 72 BPM | SYSTOLIC BLOOD PRESSURE: 123 MMHG | OXYGEN SATURATION: 95 % | TEMPERATURE: 96.7 F | DIASTOLIC BLOOD PRESSURE: 68 MMHG | WEIGHT: 238.1 LBS | BODY MASS INDEX: 34.09 KG/M2 | HEIGHT: 70 IN | RESPIRATION RATE: 16 BRPM

## 2019-03-03 LAB — GLUCOSE BLDC GLUCOMTR-MCNC: 200 MG/DL (ref 70–130)

## 2019-03-03 PROCEDURE — 25010000002 PIPERACILLIN SOD-TAZOBACTAM PER 1 G: Performed by: HOSPITALIST

## 2019-03-03 PROCEDURE — 82962 GLUCOSE BLOOD TEST: CPT

## 2019-03-03 PROCEDURE — 63710000001 DIPHENHYDRAMINE PER 50 MG: Performed by: HOSPITALIST

## 2019-03-03 PROCEDURE — 63710000001 INSULIN LISPRO (HUMAN) PER 5 UNITS: Performed by: HOSPITALIST

## 2019-03-03 RX ORDER — AMOXICILLIN AND CLAVULANATE POTASSIUM 875; 125 MG/1; MG/1
1 TABLET, FILM COATED ORAL 2 TIMES DAILY
Qty: 16 TABLET | Refills: 0 | Status: SHIPPED | OUTPATIENT
Start: 2019-03-03 | End: 2019-03-11

## 2019-03-03 RX ORDER — SACCHAROMYCES BOULARDII 250 MG
250 CAPSULE ORAL 2 TIMES DAILY
Qty: 28 CAPSULE | Refills: 0 | Status: SHIPPED | OUTPATIENT
Start: 2019-03-03 | End: 2019-03-17

## 2019-03-03 RX ADMIN — Medication: at 05:56

## 2019-03-03 RX ADMIN — ACETAMINOPHEN 650 MG: 325 TABLET, FILM COATED ORAL at 03:41

## 2019-03-03 RX ADMIN — SODIUM CHLORIDE, PRESERVATIVE FREE 3 ML: 5 INJECTION INTRAVENOUS at 08:33

## 2019-03-03 RX ADMIN — INSULIN LISPRO 4 UNITS: 100 INJECTION, SOLUTION INTRAVENOUS; SUBCUTANEOUS at 08:32

## 2019-03-03 RX ADMIN — Medication: at 00:32

## 2019-03-03 RX ADMIN — TAMSULOSIN HYDROCHLORIDE 0.4 MG: 0.4 CAPSULE ORAL at 08:32

## 2019-03-03 RX ADMIN — PREGABALIN 25 MG: 25 CAPSULE ORAL at 05:50

## 2019-03-03 RX ADMIN — OXYBUTYNIN CHLORIDE 5 MG: 5 TABLET, EXTENDED RELEASE ORAL at 08:32

## 2019-03-03 RX ADMIN — TAZOBACTAM SODIUM AND PIPERACILLIN SODIUM 3.38 G: 375; 3 INJECTION, SOLUTION INTRAVENOUS at 08:33

## 2019-03-03 RX ADMIN — Medication 1 CAPSULE: at 08:32

## 2019-03-03 RX ADMIN — TAZOBACTAM SODIUM AND PIPERACILLIN SODIUM 3.38 G: 375; 3 INJECTION, SOLUTION INTRAVENOUS at 00:33

## 2019-03-03 RX ADMIN — APIXABAN 5 MG: 5 TABLET, FILM COATED ORAL at 08:32

## 2019-03-03 RX ADMIN — ROSUVASTATIN CALCIUM 10 MG: 10 TABLET, FILM COATED ORAL at 08:32

## 2019-03-03 RX ADMIN — ALLOPURINOL 300 MG: 300 TABLET ORAL at 08:32

## 2019-03-03 RX ADMIN — DIPHENHYDRAMINE HYDROCHLORIDE 50 MG: 25 CAPSULE ORAL at 00:31

## 2019-03-03 NOTE — DISCHARGE SUMMARY
Name: Jesus Beckham  Age: 78 y.o.  Sex: male  :  1940  MRN: [id number]         Primary Care Physician: Magdy Ricardo MD      Date of Admission:  3/1/2019  Date of Discharge:  3/3/2019      Chief Complaint:  Diarrhea; Abdominal Pain; Vomiting; and Nausea      Discharge Diagnosis:  Active Hospital Problems    Diagnosis Date Noted   • **Colitis presumed infectious [K52.9] 2019   • Nausea, vomiting, and diarrhea [R11.2, R19.7] 2019   • Urinary tract infection in male [N39.0] 2019   • Paroxysmal atrial fibrillation (CMS/HCC) [I48.0] 2018   • CELINA (obstructive sleep apnea) [G47.33] 2018   • Osteoarthritis [M19.90] 2017   • Essential hypertension [I10] 2016   • Gout [M10.9] 2016   • Diabetic peripheral neuropathy (CMS/[facility]) [E11.42] 2016   • Dyslipidemia [E78.5] 2016   • Uncontrolled type 2 diabetes mellitus (CMS/[facility]) [E11.65] 2016      Resolved Hospital Problems   No resolved problems to display.       Secondary Diagnoses:  Past Medical History:   Diagnosis Date   • Acute embolism and thrombosis of deep vein of right distal lower extremity (CMS/[facility])    • Acute gangrenous cholecystitis     2018   • Arthritis    • Atrial fibrillation with RVR (CMS/[facility])     HISTORY   • Benign prostatic hyperplasia without lower urinary tract symptoms    • Cancer of bladder (CMS/HCC)    • Colon polyp    • Discitis of lumbar region    • DVT (deep venous thrombosis) (CMS/HCC)     2018   • Essential hypertension    • Murray catheter in place     LOSS OF SENSATION BLADDER. BECAUSE OF WOUND AT THE TIME   • Gout    • Heel ulcer (CMS/HCC)     RIGHT. FOLLOWED BY WOUND CARE. GETTING BETTER   • History of sepsis    • Hyperlipidemia    • Loss, sensation     LOWER EXTREMTIES. RELATED TO LAST BACK SURGERY.   • MRSA infection     LEFT LEG.    • Open wound     LOW TO MIDDLE CRACK BUTT. SEES WOUND CARE. WILL BE RESTARTED ON DIFFUCAN AND NYSTATIN POWER. REDNESS IN  GROIN   • Open wound     WITH MEDICATED DRESSING LEFT SHIN AREA.    • CELINA (obstructive sleep apnea)     NO MACHINE   • Osteoarthritis    • Paroxysmal atrial fibrillation (CMS/HCC)    • PVC (premature ventricular contraction)    • Sepsis (CMS/HCC) 02/2018   • Sepsis due to Escherichia coli (CMS/HCC)    • Skin carcinoma 2012    MELANOMA.2 PLACES BACK AND EAR   • Type 2 diabetes mellitus (CMS/HCC)    • Vitamin D deficiency    • Weakness          Consults:  Consult Orders (all) (From admission, onward)    Start     Ordered    03/01/19 1732  Inpatient Nutrition Consult  Once     Provider:  (Not yet assigned)    03/01/19 1731    03/01/19 1701  Inpatient Gastroenterology Consult  Once     Specialty:  Gastroenterology  Provider:  Chad Sheikh MD    03/01/19 1700    03/01/19 0829  [facility] (on-call MD unless specified)  Once     Specialty:  Internal Medicine  Provider:  (Not yet assigned)    03/01/19 0828        Consulting Physician(s)     Provider Relationship Specialty    Chad Sheikh MD Consulting Physician Gastroenterology          Procedures Performed:  Ct Abdomen Pelvis With Contrast    Result Date: 3/1/2019  1. There is nearly a pancolitis and there is also an enteritis involving the terminal ileum. An infectious etiology is suspected. Please correlate clinically and follow-up is recommended. There is no free fluid. 2. Interval increase in a 2.3 cm splenic cystic lesion. A benign lesion such as a splenic hemangioma is suspected. Given the increase in size, reevaluation is recommended with a CT of the abdomen in 6 months.  Discussed with the ASPEN King.            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  Following these procedures he had complicated recovery.  He has chronic neuropathy and pain in his lower extremities.  He was admitted to the hospital with nausea  vomiting diarrhea and dehydration.  He also had some abdominal cramping and CT scan showed pancolitis.  He was started on Zosyn after his GI panel and C. difficile screen was negative.  He showed quick improvement over 48 hours here in the hospital is no longer having nausea vomiting or diarrhea and is tolerating regular diet.  I think at this point he is okay to transition to the outpatient setting.  Recommend continuing Augmentin for 8 more days.  He will need to follow-up with his gastroenterologist in 6 weeks to discuss colonoscopy for further evaluation of this.  The patient is followed by an outside gastroenterologist.  His diabetes has been a little difficult to manage secondary to high doses of insulin at home but is stabilized here in the hospital recommend resuming home medications at discharge.  The plan was discussed with the patient and his wife at the bedside.  I recommend using a bland low residue diet for the next 2-3 days and then can transition to regular diet at that time.  He acknowledged understanding and at this point all questions were addressed and answered and he stable for discharge home this afternoon.        Physical Exam:  Temp:  [96.7 °F (35.9 °C)-99.1 °F (37.3 °C)] 96.7 °F (35.9 °C)  Heart Rate:  [71-84] 72  Resp:  [16] 16  BP: (123-133)/(63-77) 123/68  Body mass index is 34.16 kg/m².  Physical Exam   Constitutional: He is oriented to person, place, and time. He appears well-developed and well-nourished.   Cardiovascular: Normal rate, regular rhythm and normal heart sounds.   Pulmonary/Chest: Effort normal and breath sounds normal.   Abdominal: Soft. Bowel sounds are normal.   Neurological: He is alert and oriented to person, place, and time.   Skin: Skin is warm and dry.   Nursing note and vitals reviewed.      Condition on Discharge:  Stable.    Discharge Disposition:   Home with family    Allergies:   Allergies   Allergen Reactions   • Levaquin [Levofloxacin] Other (See Comments)      Redness, itching at IV site with IV Levaquin administration   • Other Other (See Comments)     ALCOHOL. DEATHLY ILL.        Discharge Medications:      Discharge Medications      New Medications      Instructions Start Date   amoxicillin-clavulanate 875-125 MG per tablet  Commonly known as:  AUGMENTIN   1 tablet, Oral, 2 Times Daily      saccharomyces boulardii 250 MG capsule  Commonly known as:  FLORASTOR   250 mg, Oral, 2 Times Daily         Changes to Medications      Instructions Start Date   ELIQUIS 5 MG tablet tablet  Generic drug:  apixaban  What changed:  additional instructions   5 mg, Oral, Every 12 Hours Scheduled      tamsulosin 0.4 MG capsule 24 hr capsule  Commonly known as:  FLOMAX  What changed:  when to take this   0.4 mg, Oral, Daily         Continue These Medications      Instructions Start Date   ACCU-CHEK FASTCLIX LANCETS misc   USE  TO TEST BLOOD GLUCOSE TWICE DAILY      allopurinol 300 MG tablet  Commonly known as:  ZYLOPRIM   300 mg, Oral, Daily      digoxin 125 MCG tablet  Commonly known as:  LANOXIN   125 mcg, Oral, Daily Digoxin      fish oil 1200 MG capsule capsule   2,000 mg, Oral, 2 Times Daily With Meals, HOLD FOR SURGERY      fluticasone 50 MCG/ACT nasal spray  Commonly known as:  FLONASE   2 sprays, Nasal, Daily      furosemide 20 MG tablet  Commonly known as:  LASIX   10 mg, Oral, 2 Times Daily      glipiZIDE 10 MG tablet  Commonly known as:  GLUCOTROL   10 mg, Oral, 2 Times Daily      glucose blood test strip  Commonly known as:  ACCU-CHEK SMARTVIEW   USE  TO TEST BLOOD GLUCOSE TWICE DAILY      GLYXAMBI 25-5 MG tablet  Generic drug:  Empagliflozin-Linagliptin   1 tablet, Oral, Daily      hydrocortisone-bacitracin-zinc oxide-nystatin  Commonly known as:  MAGIC BARRIER   1 application, Topical, As Needed      Insulin Pen Needle 32G X 4 MM misc   INJECT UP TO THREE TIMES DAILY OR AS DIRECTED      L-Methylfolate-B6-B12 3-35-2 MG tablet   TAKE 1 TABLET TWICE DAILY      [facility]  WOUND/BURN DRESSING gel   Apply as directed      melatonin 5 MG tablet tablet   5 mg, Oral, Nightly      MULTIVITAMIN ADULT PO   1 tablet, Oral, Daily      MYRBETRIQ 25 MG tablet sustained-release 24 hour 24 hr tablet  Generic drug:  Mirabegron ER   25 mg, Oral, Daily      pregabalin 100 MG capsule  Commonly known as:  LYRICA   TAKE 1 CAPSULE THREE TIMES DAILY      PROBIOTIC DAILY PO   1 tablet, Oral, Daily      rosuvastatin 10 MG tablet  Commonly known as:  CRESTOR   10 mg, Oral, Daily      SENNA-S 8.6-50 MG per tablet  Generic drug:  senna-docusate   1 tablet, Oral, 2 Times Daily      TOUJEO MAX SOLOSTAR 300 UNIT/ML solution pen-injector  Generic drug:  Insulin Glargine   100 Units, Subcutaneous, Daily With Breakfast      traZODone 150 MG tablet  Commonly known as:  DESYREL   TAKE 1/2 - 2 TABLETS BY MOUTH EVERY NIGHT AT BEDTIME AS NEEDED FOR SLEEP      Turmeric 450 MG capsule   450 mg, Oral, Daily, HOLD FOR SURGERY       TYLENOL PM EXTRA STRENGTH  MG tablet per tablet  Generic drug:  diphenhydrAMINE-acetaminophen   2 tablets, Oral, Nightly PRN      vitamin C 500 MG tablet  Commonly known as:  ASCORBIC ACID   500 mg, Oral, Daily      vitamin D 77882 units capsule capsule  Commonly known as:  ERGOCALCIFEROL   50,000 Units, Oral, Weekly, MONDAY              Discharge Diet:   Diet Instructions     Diet: Consistent Carbohydrate, Regular      Discharge Diet:   Consistent Carbohydrate  Regular             Activity at Discharge:   Activity Instructions     Activity as Tolerated            Follow-up Appointments:  Future Appointments   Date Time Provider Department Center   7/3/2019 11:00 AM Charlie Bailon MD [facility] None   8/22/2019  8:10 AM [facility] None   9/5/2019  9:00 AM Elizabeth Valverde APRN [facility] None   11/13/2019 10:00 AM Tasha Burr MD [facility] None   2/18/2020  8:00 AM [facility] None   3/3/2020  9:40 AM Staci Keyes MD [facility]  None     Additional Instructions for the Follow-ups that You Need to Schedule     Discharge Follow-up with PCP   As directed       Currently Documented PCP:    Magdy Ricardo MD    PCP Phone Number:    [phone]     Follow Up Details:  2-4 weeks fro DM and colitis         Discharge Follow-up with Specified Provider: Dr Alvarado; 6 Weeks   As directed      To:  Dr Alvarado    Follow Up:  6 Weeks    Follow Up Details:  discuss colonoscopy           Follow-up Information     Magdy Ricardo MD .    Specialty:  Family Medicine  Why:  2-4 weeks fro DM and colitis  Contact information:  [address] [place] [address]  [phone]                 Additional Instructions for the Follow-ups that You Need to Schedule     Discharge Follow-up with PCP   As directed       Currently Documented PCP:    Magdy Ricardo MD    PCP Phone Number:    [phone]     Follow Up Details:  2-4 weeks fro DM and colitis         Discharge Follow-up with Specified Provider: Dr Alvarado; 6 Weeks   As directed      To:  Dr Alvarado    Follow Up:  6 Weeks    Follow Up Details:  discuss colonoscopy               Test Results Pending at Discharge:  None   Order Current Status    Blood Culture - Blood, Arm, Left Preliminary result    Blood Culture - Blood, Arm, Right Preliminary result           Code status:   Code Status and Medical Interventions:   Ordered at: 03/01/19 1701     Limited Support to NOT Include:    Cardioversion/Defibrillation    Intubation     Code Status:    No CPR     Medical Interventions (Level of Support Prior to Arrest):    Limited         Jose Montelongo MD  [facility]  03/03/19  10:36 AM      Time: greater than 30 minutes.

## 2019-03-03 NOTE — PLAN OF CARE
Problem: Patient Care Overview  Goal: Plan of Care Review  Outcome: Ongoing (interventions implemented as appropriate)   03/03/19 0300   Coping/Psychosocial   Plan of Care Reviewed With patient   Plan of Care Review   Progress improving   OTHER   Outcome Summary VSS; no c/o abd pain or n/v; leg pain (baseline) managed w/ prn tylenol; Q 2 turn;ointment and barrier cream applied to buttocks and fabiana area; continue to monitor.     Goal: Individualization and Mutuality  Outcome: Ongoing (interventions implemented as appropriate)    Goal: Discharge Needs Assessment  Outcome: Ongoing (interventions implemented as appropriate)    Goal: Interprofessional Rounds/Family Conf  Outcome: Ongoing (interventions implemented as appropriate)      Problem: Fall Risk (Adult)  Goal: Absence of Fall  Outcome: Ongoing (interventions implemented as appropriate)      Problem: Skin Injury Risk (Adult)  Goal: Skin Health and Integrity  Outcome: Ongoing (interventions implemented as appropriate)      Problem: Fluid Volume Deficit (Adult)  Goal: Optimal Fluid Balance  Outcome: Ongoing (interventions implemented as appropriate)      Problem: Infection, Risk/Actual (Adult)  Goal: Infection Prevention/Resolution  Outcome: Ongoing (interventions implemented as appropriate)

## 2019-03-04 ENCOUNTER — READMISSION MANAGEMENT (OUTPATIENT)
Dept: CALL CENTER | Facility: HOSPITAL | Age: 79
End: 2019-03-04

## 2019-03-04 RX ORDER — ROSUVASTATIN CALCIUM 10 MG/1
TABLET, COATED ORAL
Qty: 90 TABLET | Refills: 1 | Status: SHIPPED | OUTPATIENT
Start: 2019-03-04 | End: 2019-07-24 | Stop reason: SDUPTHER

## 2019-03-04 RX ORDER — GLIPIZIDE 10 MG/1
TABLET ORAL
Qty: 180 TABLET | Refills: 1 | Status: SHIPPED | OUTPATIENT
Start: 2019-03-04 | End: 2019-07-24 | Stop reason: SDUPTHER

## 2019-03-04 RX ORDER — EMPAGLIFLOZIN AND LINAGLIPTIN 25; 5 MG/1; MG/1
TABLET, FILM COATED ORAL
Qty: 90 TABLET | Refills: 1 | Status: SHIPPED | OUTPATIENT
Start: 2019-03-04 | End: 2019-07-24 | Stop reason: SDUPTHER

## 2019-03-04 NOTE — OUTREACH NOTE
Prep Survey      Responses   Facility patient discharged from?  [facility]   Is patient eligible?  Yes   Discharge diagnosis  Colitis presumed infectious, N/V/D, UTI in male, PAF, CELINA, Osteoarthritis, essential HTN, Gout, diabetic peripheral neuropathy, dyslipidemia, DM II uncontrolled   Does the patient have one of the following disease processes/diagnoses(primary or secondary)?  Other   Does the patient have Home health ordered?  No   Is there a DME ordered?  No   Comments regarding appointments  Pt. to schedule follow up appointment with PCP.    Prep survey completed?  Yes          Debra Jaramillo RN

## 2019-03-04 NOTE — PROGRESS NOTES
Case Management Discharge Note    Final Note: Pt discharged home with no needs identified.  Transported by private vehicle    Destination      No service has been selected for the patient.      Durable Medical Equipment      No service has been selected for the patient.      Dialysis/Infusion      No service has been selected for the patient.      Home Medical Care      No service has been selected for the patient.      Community Resources      No service has been selected for the patient.             Final Discharge Disposition Code: 01 - home or self-care

## 2019-03-05 RX ORDER — APIXABAN 5 MG/1
TABLET, FILM COATED ORAL
Qty: 180 TABLET | Refills: 1 | Status: SHIPPED | OUTPATIENT
Start: 2019-03-05 | End: 2019-07-24 | Stop reason: SDUPTHER

## 2019-03-05 RX ORDER — DIGOXIN 125 MCG
TABLET ORAL
Qty: 90 TABLET | Refills: 1 | Status: SHIPPED | OUTPATIENT
Start: 2019-03-05 | End: 2019-07-24 | Stop reason: SDUPTHER

## 2019-03-06 ENCOUNTER — READMISSION MANAGEMENT (OUTPATIENT)
Dept: CALL CENTER | Facility: HOSPITAL | Age: 79
End: 2019-03-06

## 2019-03-06 LAB
BACTERIA SPEC AEROBE CULT: NORMAL
BACTERIA SPEC AEROBE CULT: NORMAL

## 2019-03-06 NOTE — OUTREACH NOTE
Medical Week 1 Survey      Responses   Facility patient discharged from?  [facility]   Does the patient have one of the following disease processes/diagnoses(primary or secondary)?  Other   Is there a successful TCM telephone encounter documented?  No   Week 1 attempt successful?  No   Unsuccessful attempts  Attempt 1          Kay Espinoza RN

## 2019-03-07 ENCOUNTER — READMISSION MANAGEMENT (OUTPATIENT)
Dept: CALL CENTER | Facility: HOSPITAL | Age: 79
End: 2019-03-07

## 2019-03-11 ENCOUNTER — OFFICE VISIT (OUTPATIENT)
Dept: WOUND CARE | Facility: HOSPITAL | Age: 79
End: 2019-03-11

## 2019-03-11 PROCEDURE — G0463 HOSPITAL OUTPT CLINIC VISIT: HCPCS

## 2019-03-13 RX ORDER — LANCETS
EACH MISCELLANEOUS
Qty: 200 EACH | Refills: 0 | Status: SHIPPED | OUTPATIENT
Start: 2019-03-13 | End: 2019-05-15 | Stop reason: SDUPTHER

## 2019-03-13 RX ORDER — MECOBAL/LEVOMEFOLAT CA/B6 PHOS 2-3-35 MG
TABLET ORAL
Qty: 180 TABLET | Refills: 0 | Status: SHIPPED | OUTPATIENT
Start: 2019-03-13 | End: 2019-05-15 | Stop reason: SDUPTHER

## 2019-03-15 ENCOUNTER — READMISSION MANAGEMENT (OUTPATIENT)
Dept: CALL CENTER | Facility: HOSPITAL | Age: 79
End: 2019-03-15

## 2019-03-15 NOTE — OUTREACH NOTE
Medical Week 2 Survey      Responses   Facility patient discharged from?  [facility]   Does the patient have one of the following disease processes/diagnoses(primary or secondary)?  Other   Week 2 attempt successful?  No   Unsuccessful attempts  Attempt 1          Adrienne Leon RN

## 2019-03-17 ENCOUNTER — READMISSION MANAGEMENT (OUTPATIENT)
Dept: CALL CENTER | Facility: HOSPITAL | Age: 79
End: 2019-03-17

## 2019-03-17 NOTE — OUTREACH NOTE
Medical Week 2 Survey      Responses   Facility patient discharged from?  [place]   Does the patient have one of the following disease processes/diagnoses(primary or secondary)?  Other   Week 2 attempt successful?  No   Unsuccessful attempts  Attempt 2          Kay Espinoza RN

## 2019-03-19 RX ORDER — PREGABALIN 100 MG/1
CAPSULE ORAL
Qty: 270 CAPSULE | Refills: 1 | Status: SHIPPED | OUTPATIENT
Start: 2019-03-19 | End: 2019-09-05 | Stop reason: SDUPTHER

## 2019-03-19 RX ORDER — PREGABALIN 100 MG/1
CAPSULE ORAL
Qty: 270 CAPSULE | Refills: 1 | Status: CANCELLED | OUTPATIENT
Start: 2019-03-19

## 2019-03-20 RX ORDER — ERGOCALCIFEROL 1.25 MG/1
CAPSULE ORAL
Qty: 13 CAPSULE | Refills: 1 | Status: SHIPPED | OUTPATIENT
Start: 2019-03-20 | End: 2019-08-05 | Stop reason: SDUPTHER

## 2019-03-25 ENCOUNTER — OFFICE VISIT (OUTPATIENT)
Dept: WOUND CARE | Facility: HOSPITAL | Age: 79
End: 2019-03-25

## 2019-04-01 ENCOUNTER — OFFICE VISIT (OUTPATIENT)
Dept: WOUND CARE | Facility: HOSPITAL | Age: 79
End: 2019-04-01

## 2019-04-01 PROCEDURE — G0463 HOSPITAL OUTPT CLINIC VISIT: HCPCS

## 2019-04-15 ENCOUNTER — OFFICE VISIT (OUTPATIENT)
Dept: WOUND CARE | Facility: HOSPITAL | Age: 79
End: 2019-04-15

## 2019-04-15 PROCEDURE — G0463 HOSPITAL OUTPT CLINIC VISIT: HCPCS

## 2019-04-20 DIAGNOSIS — F51.01 PRIMARY INSOMNIA: ICD-10-CM

## 2019-04-21 RX ORDER — TRAZODONE HYDROCHLORIDE 150 MG/1
TABLET ORAL
Qty: 60 TABLET | Refills: 0 | OUTPATIENT
Start: 2019-04-21

## 2019-04-22 ENCOUNTER — TELEPHONE (OUTPATIENT)
Dept: FAMILY MEDICINE CLINIC | Facility: CLINIC | Age: 79
End: 2019-04-22

## 2019-04-22 DIAGNOSIS — F51.01 PRIMARY INSOMNIA: ICD-10-CM

## 2019-04-22 RX ORDER — TRAZODONE HYDROCHLORIDE 150 MG/1
TABLET ORAL
Qty: 60 TABLET | Refills: 5 | Status: SHIPPED | OUTPATIENT
Start: 2019-04-22 | End: 2019-08-08 | Stop reason: SDUPTHER

## 2019-05-06 ENCOUNTER — OFFICE VISIT (OUTPATIENT)
Dept: WOUND CARE | Facility: HOSPITAL | Age: 79
End: 2019-05-06

## 2019-05-06 PROCEDURE — G0463 HOSPITAL OUTPT CLINIC VISIT: HCPCS

## 2019-05-16 RX ORDER — LANCETS
EACH MISCELLANEOUS
Qty: 200 EACH | Refills: 0 | Status: SHIPPED | OUTPATIENT
Start: 2019-05-16 | End: 2019-07-23 | Stop reason: SDUPTHER

## 2019-05-16 RX ORDER — MECOBAL/LEVOMEFOLAT CA/B6 PHOS 2-3-35 MG
TABLET ORAL
Qty: 180 TABLET | Refills: 0 | Status: SHIPPED | OUTPATIENT
Start: 2019-05-16 | End: 2019-07-23 | Stop reason: SDUPTHER

## 2019-05-17 ENCOUNTER — DOCUMENTATION (OUTPATIENT)
Dept: CARDIOLOGY | Facility: CLINIC | Age: 79
End: 2019-05-17

## 2019-05-27 DIAGNOSIS — J30.1 CHRONIC SEASONAL ALLERGIC RHINITIS DUE TO POLLEN: ICD-10-CM

## 2019-05-28 RX ORDER — FLUTICASONE PROPIONATE 50 MCG
SPRAY, SUSPENSION (ML) NASAL
Qty: 48 G | Refills: 3 | OUTPATIENT
Start: 2019-05-28

## 2019-05-30 ENCOUNTER — TELEPHONE (OUTPATIENT)
Dept: FAMILY MEDICINE CLINIC | Facility: CLINIC | Age: 79
End: 2019-05-30

## 2019-06-10 ENCOUNTER — OFFICE VISIT (OUTPATIENT)
Dept: WOUND CARE | Facility: HOSPITAL | Age: 79
End: 2019-06-10

## 2019-06-10 PROCEDURE — G0463 HOSPITAL OUTPT CLINIC VISIT: HCPCS

## 2019-07-03 ENCOUNTER — OFFICE VISIT (OUTPATIENT)
Dept: NEUROSURGERY | Facility: CLINIC | Age: 79
End: 2019-07-03

## 2019-07-03 VITALS
SYSTOLIC BLOOD PRESSURE: 105 MMHG | DIASTOLIC BLOOD PRESSURE: 79 MMHG | HEART RATE: 72 BPM | BODY MASS INDEX: 34.07 KG/M2 | HEIGHT: 70 IN | WEIGHT: 238 LBS

## 2019-07-03 DIAGNOSIS — R29.898 BILATERAL LEG WEAKNESS: ICD-10-CM

## 2019-07-03 DIAGNOSIS — M51.36 DDD (DEGENERATIVE DISC DISEASE), LUMBAR: Primary | ICD-10-CM

## 2019-07-03 DIAGNOSIS — Z98.1 HISTORY OF LUMBAR SPINAL FUSION: ICD-10-CM

## 2019-07-03 PROBLEM — M51.369 DDD (DEGENERATIVE DISC DISEASE), LUMBAR: Status: ACTIVE | Noted: 2019-07-03

## 2019-07-03 PROCEDURE — 99204 OFFICE O/P NEW MOD 45 MIN: CPT | Performed by: NEUROLOGICAL SURGERY

## 2019-07-15 ENCOUNTER — OFFICE VISIT (OUTPATIENT)
Dept: WOUND CARE | Facility: HOSPITAL | Age: 79
End: 2019-07-15

## 2019-07-15 PROCEDURE — G0463 HOSPITAL OUTPT CLINIC VISIT: HCPCS

## 2019-07-23 ENCOUNTER — TELEPHONE (OUTPATIENT)
Dept: CARDIOLOGY | Facility: CLINIC | Age: 79
End: 2019-07-23

## 2019-07-23 ENCOUNTER — HOSPITAL ENCOUNTER (OUTPATIENT)
Dept: GENERAL RADIOLOGY | Facility: HOSPITAL | Age: 79
Discharge: HOME | End: 2019-07-23

## 2019-07-23 ENCOUNTER — HOSPITAL ENCOUNTER (OUTPATIENT)
Dept: MRI IMAGING | Facility: HOSPITAL | Age: 79
Discharge: HOME | End: 2019-07-23

## 2019-07-23 ENCOUNTER — HOSPITAL ENCOUNTER (OUTPATIENT)
Dept: MRI IMAGING | Facility: HOSPITAL | Age: 79
Discharge: HOME | End: 2019-07-23
Admitting: NEUROLOGICAL SURGERY

## 2019-07-23 DIAGNOSIS — M51.36 DDD (DEGENERATIVE DISC DISEASE), LUMBAR: ICD-10-CM

## 2019-07-23 DIAGNOSIS — R29.898 BILATERAL LEG WEAKNESS: ICD-10-CM

## 2019-07-23 DIAGNOSIS — Z98.1 HISTORY OF LUMBAR SPINAL FUSION: ICD-10-CM

## 2019-07-23 PROCEDURE — 72158 MRI LUMBAR SPINE W/O & W/DYE: CPT

## 2019-07-23 PROCEDURE — A9577 INJ MULTIHANCE: HCPCS | Performed by: NEUROLOGICAL SURGERY

## 2019-07-23 PROCEDURE — 72157 MRI CHEST SPINE W/O & W/DYE: CPT

## 2019-07-23 PROCEDURE — 0 GADOBENATE DIMEGLUMINE 529 MG/ML SOLUTION: Performed by: NEUROLOGICAL SURGERY

## 2019-07-23 PROCEDURE — 72114 X-RAY EXAM L-S SPINE BENDING: CPT

## 2019-07-23 PROCEDURE — 82565 ASSAY OF CREATININE: CPT

## 2019-07-23 RX ADMIN — GADOBENATE DIMEGLUMINE 20 ML: 529 INJECTION, SOLUTION INTRAVENOUS at 19:59

## 2019-07-23 RX ADMIN — GADOBENATE DIMEGLUMINE 20 ML: 529 INJECTION, SOLUTION INTRAVENOUS at 20:58

## 2019-07-23 NOTE — TELEPHONE ENCOUNTER
Discussed with Dr. Mcnair.  This should be fine to hold apixaban and proceed with colonoscopy.  He should resume apixaban post op ASAP. Thanks

## 2019-07-23 NOTE — TELEPHONE ENCOUNTER
This is a patient of Dr. Burr, last seen by her in the office on 11/06/18.  Can one of you please advise in her absence?    Patient is scheduled for colonoscopy on Monday, is needing clearance as well as instructions on holding eliquis.    Please and thank you.  Clarissa

## 2019-07-24 VITALS
HEIGHT: 70 IN | TEMPERATURE: 97.6 F | DIASTOLIC BLOOD PRESSURE: 52 MMHG | OXYGEN SATURATION: 95 % | HEART RATE: 84 BPM | HEART RATE: 77 BPM | SYSTOLIC BLOOD PRESSURE: 107 MMHG | DIASTOLIC BLOOD PRESSURE: 57 MMHG | OXYGEN SATURATION: 97 % | RESPIRATION RATE: 18 BRPM | OXYGEN SATURATION: 96 % | SYSTOLIC BLOOD PRESSURE: 104 MMHG | OXYGEN SATURATION: 91 % | RESPIRATION RATE: 16 BRPM | DIASTOLIC BLOOD PRESSURE: 61 MMHG | HEART RATE: 72 BPM | DIASTOLIC BLOOD PRESSURE: 58 MMHG | DIASTOLIC BLOOD PRESSURE: 59 MMHG | SYSTOLIC BLOOD PRESSURE: 115 MMHG | SYSTOLIC BLOOD PRESSURE: 125 MMHG | DIASTOLIC BLOOD PRESSURE: 60 MMHG | SYSTOLIC BLOOD PRESSURE: 111 MMHG | TEMPERATURE: 98.2 F | HEART RATE: 76 BPM | HEART RATE: 74 BPM | RESPIRATION RATE: 20 BRPM | OXYGEN SATURATION: 94 % | SYSTOLIC BLOOD PRESSURE: 112 MMHG | DIASTOLIC BLOOD PRESSURE: 70 MMHG | HEART RATE: 71 BPM | SYSTOLIC BLOOD PRESSURE: 118 MMHG | OXYGEN SATURATION: 89 % | DIASTOLIC BLOOD PRESSURE: 63 MMHG | HEART RATE: 73 BPM | SYSTOLIC BLOOD PRESSURE: 152 MMHG | SYSTOLIC BLOOD PRESSURE: 109 MMHG | WEIGHT: 242 LBS

## 2019-07-24 LAB — CREAT BLDA-MCNC: 1.7 MG/DL (ref 0.6–1.3)

## 2019-07-24 RX ORDER — APIXABAN 5 MG/1
TABLET, FILM COATED ORAL
Qty: 180 TABLET | Refills: 1 | Status: SHIPPED | OUTPATIENT
Start: 2019-07-24 | End: 2019-12-16 | Stop reason: SDUPTHER

## 2019-07-24 RX ORDER — MECOBAL/LEVOMEFOLAT CA/B6 PHOS 2-3-35 MG
TABLET ORAL
Qty: 180 TABLET | Refills: 0 | Status: SHIPPED | OUTPATIENT
Start: 2019-07-24 | End: 2019-10-01 | Stop reason: SDUPTHER

## 2019-07-24 RX ORDER — LANCETS
EACH MISCELLANEOUS
Qty: 204 EACH | Refills: 0 | Status: SHIPPED | OUTPATIENT
Start: 2019-07-24 | End: 2019-09-27 | Stop reason: SDUPTHER

## 2019-07-24 RX ORDER — DIGOXIN 125 MCG
TABLET ORAL
Qty: 90 TABLET | Refills: 1 | Status: SHIPPED | OUTPATIENT
Start: 2019-07-24 | End: 2020-02-21

## 2019-07-25 RX ORDER — EMPAGLIFLOZIN AND LINAGLIPTIN 25; 5 MG/1; MG/1
TABLET, FILM COATED ORAL
Qty: 90 TABLET | Refills: 1 | Status: SHIPPED | OUTPATIENT
Start: 2019-07-25 | End: 2019-12-16 | Stop reason: SDUPTHER

## 2019-07-25 RX ORDER — GLIPIZIDE 10 MG/1
TABLET ORAL
Qty: 180 TABLET | Refills: 1 | Status: SHIPPED | OUTPATIENT
Start: 2019-07-25 | End: 2019-12-16 | Stop reason: SDUPTHER

## 2019-07-25 RX ORDER — ROSUVASTATIN CALCIUM 10 MG/1
TABLET, COATED ORAL
Qty: 90 TABLET | Refills: 1 | Status: SHIPPED | OUTPATIENT
Start: 2019-07-25 | End: 2019-12-16 | Stop reason: SDUPTHER

## 2019-07-29 ENCOUNTER — AMBULATORY SURGICAL CENTER (OUTPATIENT)
Dept: URBAN - METROPOLITAN AREA SURGERY 17 | Facility: SURGERY | Age: 79
End: 2019-07-29
Payer: MEDICARE

## 2019-07-29 ENCOUNTER — OFFICE (OUTPATIENT)
Dept: URBAN - METROPOLITAN AREA PATHOLOGY 4 | Facility: PATHOLOGY | Age: 79
End: 2019-07-29
Payer: MEDICARE

## 2019-07-29 DIAGNOSIS — D12.4 BENIGN NEOPLASM OF DESCENDING COLON: ICD-10-CM

## 2019-07-29 DIAGNOSIS — Z86.010 PERSONAL HISTORY OF COLONIC POLYPS: ICD-10-CM

## 2019-07-29 DIAGNOSIS — D12.2 BENIGN NEOPLASM OF ASCENDING COLON: ICD-10-CM

## 2019-07-29 DIAGNOSIS — K57.30 DIVERTICULOSIS OF LARGE INTESTINE WITHOUT PERFORATION OR ABS: ICD-10-CM

## 2019-07-29 DIAGNOSIS — K63.5 POLYP OF COLON: ICD-10-CM

## 2019-07-29 LAB
GI HISTOLOGY: A. UNSPECIFIED: (no result)
GI HISTOLOGY: B. UNSPECIFIED: (no result)
GI HISTOLOGY: PDF REPORT: (no result)

## 2019-07-29 PROCEDURE — 88305 TISSUE EXAM BY PATHOLOGIST: CPT | Performed by: INTERNAL MEDICINE

## 2019-07-29 PROCEDURE — 45385 COLONOSCOPY W/LESION REMOVAL: CPT | Mod: PT | Performed by: INTERNAL MEDICINE

## 2019-08-05 RX ORDER — ERGOCALCIFEROL 1.25 MG/1
CAPSULE ORAL
Qty: 13 CAPSULE | Refills: 1 | Status: SHIPPED | OUTPATIENT
Start: 2019-08-05 | End: 2020-03-03

## 2019-08-07 ENCOUNTER — OFFICE VISIT (OUTPATIENT)
Dept: NEUROSURGERY | Facility: CLINIC | Age: 79
End: 2019-08-07

## 2019-08-07 VITALS
BODY MASS INDEX: 34.07 KG/M2 | HEART RATE: 66 BPM | SYSTOLIC BLOOD PRESSURE: 122 MMHG | DIASTOLIC BLOOD PRESSURE: 66 MMHG | WEIGHT: 238 LBS | HEIGHT: 70 IN

## 2019-08-07 DIAGNOSIS — R29.898 BILATERAL LEG WEAKNESS: Primary | ICD-10-CM

## 2019-08-07 DIAGNOSIS — G03.9 ADHESIVE ARACHNOIDITIS: ICD-10-CM

## 2019-08-07 DIAGNOSIS — Z98.1 HISTORY OF LUMBAR SPINAL FUSION: ICD-10-CM

## 2019-08-07 PROCEDURE — 99214 OFFICE O/P EST MOD 30 MIN: CPT | Performed by: NEUROLOGICAL SURGERY

## 2019-08-08 ENCOUNTER — OFFICE VISIT (OUTPATIENT)
Dept: FAMILY MEDICINE CLINIC | Facility: CLINIC | Age: 79
End: 2019-08-08

## 2019-08-08 VITALS
TEMPERATURE: 97.9 F | WEIGHT: 242 LBS | SYSTOLIC BLOOD PRESSURE: 102 MMHG | RESPIRATION RATE: 16 BRPM | HEART RATE: 78 BPM | BODY MASS INDEX: 34.65 KG/M2 | DIASTOLIC BLOOD PRESSURE: 59 MMHG | HEIGHT: 70 IN

## 2019-08-08 DIAGNOSIS — M10.00 IDIOPATHIC GOUT, UNSPECIFIED CHRONICITY, UNSPECIFIED SITE: ICD-10-CM

## 2019-08-08 DIAGNOSIS — F51.01 PRIMARY INSOMNIA: Primary | ICD-10-CM

## 2019-08-08 PROCEDURE — 99214 OFFICE O/P EST MOD 30 MIN: CPT | Performed by: FAMILY MEDICINE

## 2019-08-08 RX ORDER — ALLOPURINOL 300 MG/1
300 TABLET ORAL DAILY
Qty: 90 TABLET | Refills: 3 | Status: SHIPPED | OUTPATIENT
Start: 2019-08-08 | End: 2020-06-03 | Stop reason: SDUPTHER

## 2019-08-08 RX ORDER — TRAZODONE HYDROCHLORIDE 150 MG/1
TABLET ORAL
Qty: 180 TABLET | Refills: 3 | Status: SHIPPED | OUTPATIENT
Start: 2019-08-08 | End: 2020-06-03 | Stop reason: SDUPTHER

## 2019-08-08 RX ORDER — ALLOPURINOL 300 MG/1
300 TABLET ORAL DAILY
Qty: 90 TABLET | Refills: 3 | Status: CANCELLED | OUTPATIENT
Start: 2019-08-08

## 2019-08-08 NOTE — PROGRESS NOTES
"Subjective   Jesus Beckham is a 78 y.o. male.     CC: Mobility Limitations         Management of Insomnia         Management of Gout    History of Present Illness     Pt with extensive medical history comes in today c/o worsening gait/weakness issues and feels he would be safer in a wheelchair.  He got one two weeks ago (powered) and has done well since while using it. Has Lumbar DDD, prior Spinal Injury, and lots of Neuropathic Pain. Seeing Neurosurgery () and is UTD.   Pt also reports the Trazodone works well, no SEs, and desires a refill today. He also is doing well with the Allopurinol w/o issues and needs that too.    The following portions of the patient's history were reviewed and updated as appropriate: allergies, current medications, past family history, past medical history, past social history, past surgical history and problem list.    Review of Systems   Constitutional: Negative for activity change, chills, fatigue and fever.   Respiratory: Negative for cough and shortness of breath.    Cardiovascular: Negative for chest pain and palpitations.   Gastrointestinal: Negative for abdominal pain.   Endocrine: Negative for cold intolerance.   Neurological: Positive for weakness.        Balance issues   Psychiatric/Behavioral: Negative for behavioral problems and dysphoric mood. The patient is not nervous/anxious.        /59   Pulse 78   Temp 97.9 °F (36.6 °C) (Oral)   Resp 16   Ht 177.8 cm (70\")   Wt 110 kg (242 lb)   BMI 34.72 kg/m²     Objective   Physical Exam   Constitutional: He appears well-developed and well-nourished.   Neck: Neck supple. No thyromegaly present.   Cardiovascular: Normal rate and regular rhythm.   No murmur heard.  Pulmonary/Chest: Effort normal and breath sounds normal.   Abdominal: Bowel sounds are normal. There is no tenderness.   Psychiatric: He has a normal mood and affect. His behavior is normal.   Nursing note and vitals reviewed.      Assessment/Plan "   Jesus was seen today for mobility limitation, insomnia and gout.    Diagnoses and all orders for this visit:    Primary insomnia  -     traZODone (DESYREL) 150 MG tablet; TAKE 1/2 - 2 TABLETS BY MOUTH EVERY NIGHT AT BEDTIME AS NEEDED FOR SLEEP    Idiopathic gout, unspecified chronicity, unspecified site  -     allopurinol (ZYLOPRIM) 300 MG tablet; Take 1 tablet by mouth Daily.    Other orders  -     Cancel: allopurinol (ZYLOPRIM) 300 MG tablet; Take 1 tablet by mouth Daily.

## 2019-08-12 ENCOUNTER — RESULTS ENCOUNTER (OUTPATIENT)
Dept: ENDOCRINOLOGY | Age: 79
End: 2019-08-12

## 2019-08-12 DIAGNOSIS — E55.9 VITAMIN D DEFICIENCY: ICD-10-CM

## 2019-08-12 DIAGNOSIS — E78.5 DYSLIPIDEMIA: ICD-10-CM

## 2019-08-12 DIAGNOSIS — I10 ESSENTIAL HYPERTENSION: ICD-10-CM

## 2019-08-12 DIAGNOSIS — E11.65 UNCONTROLLED TYPE 2 DIABETES MELLITUS WITH HYPERGLYCEMIA (HCC): ICD-10-CM

## 2019-08-12 DIAGNOSIS — M10.00 IDIOPATHIC GOUT, UNSPECIFIED CHRONICITY, UNSPECIFIED SITE: ICD-10-CM

## 2019-08-12 DIAGNOSIS — E11.42 DIABETIC PERIPHERAL NEUROPATHY (HCC): ICD-10-CM

## 2019-08-12 DIAGNOSIS — N40.0 BENIGN NON-NODULAR PROSTATIC HYPERPLASIA WITHOUT LOWER URINARY TRACT SYMPTOMS: ICD-10-CM

## 2019-08-12 DIAGNOSIS — E79.0 HYPERURICEMIA: ICD-10-CM

## 2019-08-19 ENCOUNTER — OFFICE VISIT (OUTPATIENT)
Dept: WOUND CARE | Facility: HOSPITAL | Age: 79
End: 2019-08-19

## 2019-08-19 DIAGNOSIS — E79.0 HYPERURICEMIA: ICD-10-CM

## 2019-08-19 DIAGNOSIS — E11.65 UNCONTROLLED TYPE 2 DIABETES MELLITUS WITH HYPERGLYCEMIA (HCC): Primary | ICD-10-CM

## 2019-08-19 DIAGNOSIS — E55.9 VITAMIN D DEFICIENCY: ICD-10-CM

## 2019-08-19 PROCEDURE — G0463 HOSPITAL OUTPT CLINIC VISIT: HCPCS

## 2019-08-22 ENCOUNTER — LAB (OUTPATIENT)
Dept: ENDOCRINOLOGY | Age: 79
End: 2019-08-22

## 2019-08-22 DIAGNOSIS — E79.0 HYPERURICEMIA: ICD-10-CM

## 2019-08-22 DIAGNOSIS — E55.9 VITAMIN D DEFICIENCY: ICD-10-CM

## 2019-08-22 DIAGNOSIS — E11.65 UNCONTROLLED TYPE 2 DIABETES MELLITUS WITH HYPERGLYCEMIA (HCC): ICD-10-CM

## 2019-08-23 LAB
25(OH)D3+25(OH)D2 SERPL-MCNC: 36 NG/ML (ref 30–100)
ALBUMIN SERPL-MCNC: 4.4 G/DL (ref 3.5–5.2)
ALBUMIN/GLOB SERPL: 2 G/DL
ALP SERPL-CCNC: 79 U/L (ref 39–117)
ALT SERPL-CCNC: 43 U/L (ref 1–41)
AST SERPL-CCNC: 34 U/L (ref 1–40)
BILIRUB SERPL-MCNC: 0.3 MG/DL (ref 0.2–1.2)
BUN SERPL-MCNC: 18 MG/DL (ref 8–23)
BUN/CREAT SERPL: 17.8 (ref 7–25)
C PEPTIDE SERPL-MCNC: 4.2 NG/ML (ref 1.1–4.4)
CALCIUM SERPL-MCNC: 9.1 MG/DL (ref 8.6–10.5)
CHLORIDE SERPL-SCNC: 102 MMOL/L (ref 98–107)
CHOLEST SERPL-MCNC: 110 MG/DL (ref 0–200)
CO2 SERPL-SCNC: 25.8 MMOL/L (ref 22–29)
CREAT SERPL-MCNC: 1.01 MG/DL (ref 0.76–1.27)
GLOBULIN SER CALC-MCNC: 2.2 GM/DL
GLUCOSE SERPL-MCNC: 198 MG/DL (ref 65–99)
HBA1C MFR BLD: 8 % (ref 4.8–5.6)
HDLC SERPL-MCNC: 29 MG/DL (ref 40–60)
INTERPRETATION: NORMAL
LDLC SERPL CALC-MCNC: 37 MG/DL (ref 0–100)
Lab: NORMAL
POTASSIUM SERPL-SCNC: 4.3 MMOL/L (ref 3.5–5.2)
PROT SERPL-MCNC: 6.6 G/DL (ref 6–8.5)
SODIUM SERPL-SCNC: 141 MMOL/L (ref 136–145)
TRIGL SERPL-MCNC: 220 MG/DL (ref 0–150)
UNABLE TO VOID: NORMAL
URATE SERPL-MCNC: 2.9 MG/DL (ref 3.4–7)
VLDLC SERPL CALC-MCNC: 44 MG/DL

## 2019-09-05 ENCOUNTER — OFFICE VISIT (OUTPATIENT)
Dept: ENDOCRINOLOGY | Age: 79
End: 2019-09-05

## 2019-09-05 VITALS
SYSTOLIC BLOOD PRESSURE: 134 MMHG | BODY MASS INDEX: 35.07 KG/M2 | HEIGHT: 70 IN | WEIGHT: 245 LBS | DIASTOLIC BLOOD PRESSURE: 74 MMHG

## 2019-09-05 DIAGNOSIS — E78.5 DYSLIPIDEMIA: ICD-10-CM

## 2019-09-05 DIAGNOSIS — I10 ESSENTIAL HYPERTENSION: ICD-10-CM

## 2019-09-05 DIAGNOSIS — E55.9 VITAMIN D DEFICIENCY: ICD-10-CM

## 2019-09-05 DIAGNOSIS — E79.0 HYPERURICEMIA: ICD-10-CM

## 2019-09-05 DIAGNOSIS — E11.65 UNCONTROLLED TYPE 2 DIABETES MELLITUS WITH HYPERGLYCEMIA (HCC): Primary | ICD-10-CM

## 2019-09-05 DIAGNOSIS — E11.42 DIABETIC PERIPHERAL NEUROPATHY (HCC): ICD-10-CM

## 2019-09-05 PROCEDURE — 99214 OFFICE O/P EST MOD 30 MIN: CPT | Performed by: NURSE PRACTITIONER

## 2019-09-05 RX ORDER — PREGABALIN 100 MG/1
CAPSULE ORAL
Qty: 270 CAPSULE | Refills: 0 | Status: SHIPPED | OUTPATIENT
Start: 2019-09-05 | End: 2020-01-13

## 2019-09-05 NOTE — PATIENT INSTRUCTIONS
levemir 65 units in morning and 60 units in evening until you run out and then start toujeo 100 units once daily   lyrica sent to [facility] mail order

## 2019-09-05 NOTE — PROGRESS NOTES
"Subjective   Jesus Beckham is a 78 y.o. male is here today for follow-up.  Chief Complaint   Patient presents with   • Diabetes     recent labs, testing BG 2 times daily, pt brought log book   • Hyperlipidemia   • Hypertension   • hyperuricemia   • Vitamin D Deficiency     /74   Ht 177.8 cm (70\")   Wt 111 kg (245 lb)   BMI 35.15 kg/m²   Current Outpatient Medications on File Prior to Visit   Medication Sig   • ACCU-CHEK FASTCLIX LANCETS misc TEST BLOOD GLUCOSE TWICE DAILY   • allopurinol (ZYLOPRIM) 300 MG tablet Take 1 tablet by mouth Daily.   • cadexomer iodine (IODOSORB) 0.9 % gel Apply to left buttock wound daily   • CBD (cannabidiol) oral oil Take  by mouth.   • digoxin (LANOXIN) 125 MCG tablet TAKE 1 TABLET EVERY DAY   • diphenhydrAMINE-acetaminophen (TYLENOL PM EXTRA STRENGTH)  MG tablet per tablet Take 2 tablets by mouth At Night As Needed for Sleep.   • ELIQUIS 5 MG tablet tablet TAKE 1 TABLET BY MOUTH EVERY 12 HOURS.   • fenofibrate (TRICOR) 145 MG tablet Take 145 mg by mouth Daily.   • fluticasone (FLONASE) 50 MCG/ACT nasal spray 2 sprays into the nostril(s) as directed by provider Daily.   • furosemide (LASIX) 20 MG tablet Take 10 mg by mouth 2 (Two) Times a Day.   • glipiZIDE (GLUCOTROL) 10 MG tablet TAKE 1 TABLET TWICE DAILY BEFORE MEALS   • glucose blood (ACCU-CHEK SMARTVIEW) test strip TEST BLOOD GLUCOSE TWICE DAILY   • GLYXAMBI 25-5 MG tablet TAKE 1 TABLET EVERY DAY WITH BREAKFAST   • hydrocortisone-bacitracin-zinc oxide-nystatin (MAGIC BARRIER) Apply 1 application topically to the appropriate area as directed As Needed.   • insulin detemir (LEVEMIR) 100 UNIT/ML injection Inject 50 Units under the skin into the appropriate area as directed 2 (Two) Times a Day.   • Insulin Pen Needle 32G X 4 MM misc INJECT UP TO THREE TIMES DAILY OR AS DIRECTED   • L-Methylfolate-B6-B12 3-35-2 MG tablet TAKE 1 TABLET TWICE DAILY   • melatonin 5 MG tablet tablet Take 5 mg by mouth Every Night.   • " Mirabegron ER (MYRBETRIQ) 25 MG tablet sustained-release 24 hour 24 hr tablet Take 25 mg by mouth Daily.   • Multiple Vitamins-Minerals (MULTIVITAMIN ADULT PO) Take 1 tablet by mouth Daily.   • Omega-3 Fatty Acids (FISH OIL) 1200 MG capsule capsule Take 2,000 mg by mouth 2 (Two) Times a Day With Meals. HOLD FOR SURGERY    • pregabalin (LYRICA) 100 MG capsule TAKE 1 CAPSULE THREE TIMES DAILY   • Probiotic Product (PROBIOTIC DAILY PO) Take 1 tablet by mouth Daily.   • rosuvastatin (CRESTOR) 10 MG tablet TAKE 1 TABLET EVERY DAY   • senna-docusate (SENNA-S) 8.6-50 MG per tablet Take 1 tablet by mouth 2 (Two) Times a Day.   • tamsulosin (FLOMAX) 0.4 MG capsule 24 hr capsule Take 1 capsule by mouth Daily. (Patient taking differently: Take 0.4 mg by mouth 2 (Two) Times a Day.)   • TOUJEO MAX SOLOSTAR 300 UNIT/ML solution pen-injector Inject 100 Units under the skin into the appropriate area as directed Daily With Breakfast.   • traZODone (DESYREL) 150 MG tablet TAKE 1/2 - 2 TABLETS BY MOUTH EVERY NIGHT AT BEDTIME AS NEEDED FOR SLEEP   • Turmeric 450 MG capsule Take 450 mg by mouth Daily. HOLD FOR SURGERY   • vitamin C (ASCORBIC ACID) 500 MG tablet Take 500 mg by mouth Daily.   • vitamin D (ERGOCALCIFEROL) 35673 units capsule capsule TAKE 1 CAPSULE ONE TIME WEEKLY   • Wound Dressings ([facility] WOUND/BURN DRESSING) gel Apply as directed     No current facility-administered medications on file prior to visit.      Family History   Problem Relation Age of Onset   • Esophageal cancer Mother    • No Known Problems Father    • Diabetes Maternal Grandmother    • Heart attack Maternal Grandfather      Social History     Tobacco Use   • Smoking status: Former Smoker     Types: Cigars     Last attempt to quit: 2017     Years since quittin.6   • Smokeless tobacco: Current User     Types: Chew   Substance Use Topics   • Alcohol use: No   • Drug use: No     Allergies   Allergen Reactions   • Levaquin [Levofloxacin] Other (See  Comments)     Redness, itching at IV site with IV Levaquin administration   • Other Other (See Comments)     ALCOHOL. DEATHLY ILL.          History of Present Illness   Encounter Diagnoses   Name Primary?   • Diabetic peripheral neuropathy (CMS/HCC)    • Dyslipidemia    • Essential hypertension    • Hyperuricemia    • Vitamin D deficiency    • Uncontrolled type 2 diabetes mellitus with hyperglycemia (CMS/HCC) Yes     78-year-old male patient here today for follow-up visit.  He has been seen for the above-mentioned problems.  He had recent labs which were reviewed and he was provided a copy.  His hemoglobin A1c has gone up since his last visit.  He is currently using Levemir 60 units twice daily in place of his prescribed Toujeo due to his overwhelming supply of insulin he has at home.  He wants to finish the Levemir before he starts Toujeo.  His evening blood sugars are in the 200 range.  He admits to eating sweets and states he is not going to give them up.  He is coming by his wife at today's visit.  He is currently in rehab for mobility.  He does have peripheral neuropathy in his lower extremities.  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  He is given his insulin and appropriately in his upper thighs.  He was shown today where to inject subcutaneous injections.  He has a Murray catheter in place.  His left leg is wrapped with an Ace bandage to help with swelling.    The following portions of the patient's history were reviewed and updated as appropriate: allergies, current medications, past family history, past medical history, past social history, past surgical history and problem list.    Review of Systems   Constitutional: Negative.    Cardiovascular: Positive for leg swelling.   Endocrine: Negative.    Musculoskeletal: Positive for gait problem.        Sitting in wheelchair   Neurological: Positive for numbness.        Neuropathy in feet       Objective   Physical Exam   Constitutional:  He is oriented to person, place, and time. He appears well-developed and well-nourished. No distress.   HENT:   Head: Normocephalic and atraumatic.   Right Ear: External ear normal.   Left Ear: External ear normal.   Nose: Nose normal.   Eyes: Pupils are equal, round, and reactive to light. Right eye exhibits no discharge. Left eye exhibits no discharge.   Neck: Normal range of motion. Neck supple. Carotid bruit is not present. No tracheal deviation, no edema and no erythema present. No thyromegaly present.   Cardiovascular: Normal rate, regular rhythm, normal heart sounds and intact distal pulses. Exam reveals no gallop and no friction rub.   No murmur heard.  Pulmonary/Chest: Effort normal and breath sounds normal. No respiratory distress. He has no wheezes. He has no rales.   Abdominal: Soft. Bowel sounds are normal. He exhibits no distension. There is no tenderness.   Genitourinary:   Genitourinary Comments: Has azul cath  Neurogenic bladder   Musculoskeletal: He exhibits edema. He exhibits no deformity.   In wheelchair  Can walk with a walker about 60 feet  Swelling in left leg, foot drop  Wearing back brace  Ace wrap on left lower extremity  1+ edema bilateral lower extremities   Lymphadenopathy:     He has no cervical adenopathy.   Neurological: He is alert and oriented to person, place, and time. Coordination normal.   Skin: Skin is warm and dry. No rash noted. He is not diaphoretic. No erythema. No pallor.   Lipoatrophy in abdomen. Instruction in site rotation  Skin dry and flaky   Psychiatric: He has a normal mood and affect. His behavior is normal. Judgment and thought content normal.   Nursing note and vitals reviewed.      Results for orders placed or performed in visit on 08/22/19   Uric Acid   Result Value Ref Range    Uric Acid 2.9 (L) 3.4 - 7.0 mg/dL   C-Peptide   Result Value Ref Range    C-Peptide 4.2 1.1 - 4.4 ng/mL   Hemoglobin A1c   Result Value Ref Range    Hemoglobin A1C 8.00 (H) 4.80 - 5.60  %   Vitamin D 25 Hydroxy   Result Value Ref Range    25 Hydroxy, Vitamin D 36.0 30.0 - 100.0 ng/ml   Lipid Panel   Result Value Ref Range    Total Cholesterol 110 0 - 200 mg/dL    Triglycerides 220 (H) 0 - 150 mg/dL    HDL Cholesterol 29 (L) 40 - 60 mg/dL    VLDL Cholesterol 44 mg/dL    LDL Cholesterol  37 0 - 100 mg/dL   Comprehensive Metabolic Panel   Result Value Ref Range    Glucose 198 (H) 65 - 99 mg/dL    BUN 18 8 - 23 mg/dL    Creatinine 1.01 0.76 - 1.27 mg/dL    eGFR Non African Am 72 >60 mL/min/1.73    eGFR African Am 87 >60 mL/min/1.73    BUN/Creatinine Ratio 17.8 7.0 - 25.0    Sodium 141 136 - 145 mmol/L    Potassium 4.3 3.5 - 5.2 mmol/L    Chloride 102 98 - 107 mmol/L    Total CO2 25.8 22.0 - 29.0 mmol/L    Calcium 9.1 8.6 - 10.5 mg/dL    Total Protein 6.6 6.0 - 8.5 g/dL    Albumin 4.40 3.50 - 5.20 g/dL    Globulin 2.2 gm/dL    A/G Ratio 2.0 g/dL    Total Bilirubin 0.3 0.2 - 1.2 mg/dL    Alkaline Phosphatase 79 39 - 117 U/L    AST (SGOT) 34 1 - 40 U/L    ALT (SGPT) 43 (H) 1 - 41 U/L   Unable To Void   Result Value Ref Range    Unable to Void Comment    Cardiovascular Risk Assessment   Result Value Ref Range    Interpretation Note    Diabetes Patient Education   Result Value Ref Range    PDF Image Not applicable        Assessment/Plan   Problems Addressed this Visit        Cardiovascular and Mediastinum    Essential hypertension       Digestive    Vitamin D deficiency       Endocrine    Diabetic peripheral neuropathy (CMS/HCC)    Uncontrolled type 2 diabetes mellitus (CMS/HCC) - Primary       Other    Dyslipidemia    Hyperuricemia        Patient was seen and examined.  His blood sugars were reviewed.  He is currently checking blood sugars twice daily.  Clinically and metabolically he is stable.  His blood pressure is in satisfactory range.  His A1c is gone up to 8.  We discussed increasing his Levemir to 65 units in the morning and continue 60 units in the evening.  His highest blood sugar readings are in  the evening after eating sweets.  Patient states he has to eat sweets after each meal and he is not going to get this up.  He has had no significant hypoglycemic event.  His overall cholesterol is in stable range.  Vitamin D is stable.  He will follow-up in 6 months with dr cloud with labs.  He is currently using CBD oil which he states helps relieve some of his pain.  A prescription for Lyrica was sent to his mail order pharmacy per his request.  [facility] [place] was reviewed at today's visit

## 2019-09-30 RX ORDER — LANCETS
EACH MISCELLANEOUS
Qty: 204 EACH | Refills: 1 | Status: SHIPPED | OUTPATIENT
Start: 2019-09-30 | End: 2020-02-17

## 2019-10-02 ENCOUNTER — DOCUMENTATION (OUTPATIENT)
Dept: NEUROSURGERY | Facility: CLINIC | Age: 79
End: 2019-10-02

## 2019-10-02 RX ORDER — MECOBAL/LEVOMEFOLAT CA/B6 PHOS 2-3-35 MG
TABLET ORAL
Qty: 180 TABLET | Refills: 0 | Status: SHIPPED | OUTPATIENT
Start: 2019-10-02 | End: 2019-12-09 | Stop reason: SDUPTHER

## 2019-10-02 NOTE — PROGRESS NOTES
We found out that Dr. Jf Singh of [facility] is involved with the spinal cord stimulator research as it relates to spinal cord injury.  This patient may be a potential candidate for their study.  The contact number is [phone].  We can discuss this with the patient upon the return visit.

## 2019-10-29 ENCOUNTER — TELEPHONE (OUTPATIENT)
Dept: ENDOCRINOLOGY | Age: 79
End: 2019-10-29

## 2019-10-29 RX ORDER — INSULIN GLARGINE 300 U/ML
100 INJECTION, SOLUTION SUBCUTANEOUS
Qty: 4 PEN | Refills: 5 | Status: SHIPPED | OUTPATIENT
Start: 2019-10-29 | End: 2020-03-03 | Stop reason: SDUPTHER

## 2019-10-29 NOTE — TELEPHONE ENCOUNTER
NEEDS SCRIPT FOR TOUJEO MAX SOLO STAR    SEND TO NEW PHARMACY [facility]    90 DAY SUPPLY    PATIENT IS NOT OUT OF HIS MEDICINE

## 2019-11-04 ENCOUNTER — OFFICE VISIT (OUTPATIENT)
Dept: FAMILY MEDICINE CLINIC | Facility: CLINIC | Age: 79
End: 2019-11-04

## 2019-11-04 VITALS
DIASTOLIC BLOOD PRESSURE: 68 MMHG | HEART RATE: 78 BPM | SYSTOLIC BLOOD PRESSURE: 141 MMHG | HEIGHT: 70 IN | WEIGHT: 245 LBS | RESPIRATION RATE: 18 BRPM | BODY MASS INDEX: 35.07 KG/M2 | TEMPERATURE: 98.6 F

## 2019-11-04 DIAGNOSIS — Z00.00 MEDICARE ANNUAL WELLNESS VISIT, SUBSEQUENT: Primary | ICD-10-CM

## 2019-11-04 DIAGNOSIS — N63.0 BREAST MASS IN MALE: ICD-10-CM

## 2019-11-04 PROCEDURE — G0438 PPPS, INITIAL VISIT: HCPCS | Performed by: FAMILY MEDICINE

## 2019-11-04 PROCEDURE — 99214 OFFICE O/P EST MOD 30 MIN: CPT | Performed by: FAMILY MEDICINE

## 2019-11-04 PROCEDURE — 96160 PT-FOCUSED HLTH RISK ASSMT: CPT | Performed by: FAMILY MEDICINE

## 2019-11-04 NOTE — PROGRESS NOTES
"Subjective   Jesus Beckham is a 79 y.o. male.     CC: Breast Lump    History of Present Illness     Pt comes in today reporting a recently found lump of his right breast 4 weeks. Noticed around 6 weeks ago and doesn't hurt unless \"plays with it\".   FH: no breast issues.       The following portions of the patient's history were reviewed and updated as appropriate: allergies, current medications, past family history, past medical history, past social history, past surgical history and problem list.    Review of Systems   Constitutional: Positive for chills and fever.   Skin:        Breast lump       /68   Pulse 78   Temp 98.6 °F (37 °C) (Oral)   Resp 18   Ht 177.8 cm (70\")   Wt 111 kg (245 lb)   BMI 35.15 kg/m²     Objective   Physical Exam   Constitutional: He appears well-developed and well-nourished.   Pulmonary/Chest:   2 x 3 cm subareolar nodule right side noted   Psychiatric: He has a normal mood and affect.       Assessment/Plan   Jesus was seen today for [facility] wellness and breast lump.    Diagnoses and all orders for this visit:    Medicare annual wellness visit, subsequent    Breast mass in male  -     Mammo Diagnostic Bilateral With CAD; Future  -     US breast right complete; Future    Other orders  -     SCANNED - INFLUENZA               "

## 2019-11-04 NOTE — PATIENT INSTRUCTIONS
"  Medicare Wellness  Personal Prevention Plan of Service     Date of Office Visit:  2019  Encounter Provider:  Magdy Ricardo MD  Place of Service:  [facility]  Patient Name: Jesus Beckham  :  1940    As part of the Medicare Wellness portion of your visit today, we are providing you with this personalized preventive plan of services (PPPS). This plan is based upon recommendations of the [place] Preventive Services Task Force (USPSTF) and the Advisory Committee on Immunization Practices (ACIP).    This lists the preventive care services that should be considered, and provides dates of when you are due. Items listed as completed are up-to-date and do not require any further intervention.    Health Maintenance   Topic Date Due   • PNEUMOCOCCAL VACCINES (65+ LOW/MEDIUM RISK) (1 of 2 - PCV13) 10/26/2006   • URINE MICROALBUMIN  2018   • DIABETIC EYE EXAM  2018   • PROSTATE CANCER SCREENING  2019   • ZOSTER VACCINE (2 of 2) 2020 (Originally 2013)   • HEMOGLOBIN A1C  2020   • LIPID PANEL  2020   • MEDICARE ANNUAL WELLNESS  2020   • COLONOSCOPY  2029   • INFLUENZA VACCINE  Completed   • TDAP/TD VACCINES  Discontinued       Orders Placed This Encounter   Procedures   • Mammo Diagnostic Bilateral With CAD     Standing Status:   Future     Standing Expiration Date:   2020     Order Specific Question:   Is an Ultrasound Breast required?  If 'Yes\", Please enter an order for the US Breast as well.     Answer:   Yes     Order Specific Question:   Reason for Exam:     Answer:   lump   • US breast right complete     Standing Status:   Future     Standing Expiration Date:   2020     Order Specific Question:   Reason for Exam:     Answer:   lump   • SCANNED - INFLUENZA       Return if symptoms worsen or fail to improve.        "

## 2019-11-04 NOTE — PROGRESS NOTES
The ABCs of the Annual Wellness Visit  Subsequent Medicare Wellness Visit    Chief Complaint   Patient presents with   • medciare wellness     diabetic eye exam due    • breast lump     right breast x 4 weeks        Subjective   History of Present Illness:  Jesus Beckham is a 79 y.o. male who presents for a Subsequent Medicare Wellness Visit.    HEALTH RISK ASSESSMENT    Recent Hospitalizations:  Recently treated at the following:  [facility]    Current Medical Providers:  Patient Care Team:  Magdy Ricardo MD as PCP - General  Magdy Ricardo MD as PCP - Family Medicine  Staci Keyes MD as Consulting Physician (Endocrinology)  Jerrell Fry MD as Consulting Physician (Urology)  Carlos Manuel Saleh MD as Consulting Physician (Urology)  Thuan Fernandez MD as Consulting Physician (Ophthalmology)    Smoking Status:  Social History     Tobacco Use   Smoking Status Former Smoker   • Types: Cigars   • Last attempt to quit:    • Years since quittin.8   Smokeless Tobacco Current User   • Types: Chew       Alcohol Consumption:  Social History     Substance and Sexual Activity   Alcohol Use No       Depression Screen:   PHQ-2/PHQ-9 Depression Screening 2019   Little interest or pleasure in doing things 0   Feeling down, depressed, or hopeless 0   Trouble falling or staying asleep, or sleeping too much 0   Feeling tired or having little energy 0   Poor appetite or overeating 0   Feeling bad about yourself - or that you are a failure or have let yourself or your family down 0   Trouble concentrating on things, such as reading the newspaper or watching television 0   Moving or speaking so slowly that other people could have noticed. Or the opposite - being so fidgety or restless that you have been moving around a lot more than usual 0   Thoughts that you would be better off dead, or of hurting yourself in some way 0   Total Score 0   If you checked off any problems, how difficult  have these problems made it for you to do your work, take care of things at home, or get along with other people? Not difficult at all       Fall Risk Screen:  RAGHAVENDRA Fall Risk Assessment has not been completed.    Health Habits and Functional and Cognitive Screening:  Functional & Cognitive Status 11/4/2019   Do you have difficulty preparing food and eating? Yes   Do you have difficulty bathing yourself, getting dressed or grooming yourself? Yes   Do you have difficulty using the toilet? Yes   Do you have difficulty moving around from place to place? Yes   Do you have trouble with steps or getting out of a bed or a chair? Yes   Current Diet Well Balanced Diet   Dental Exam Up to date   Eye Exam Up to date   Exercise (times per week) 0 times per week   Current Exercise Activities Include None   Do you need help using the phone?  No   Are you deaf or do you have serious difficulty hearing?  No   Do you need help with transportation? Yes   Do you need help shopping? Yes   Do you need help preparing meals?  Yes   Do you need help with housework?  No   Do you need help with laundry? Yes   Do you need help taking your medications? No   Do you need help managing money? No   Do you ever drive or ride in a car without wearing a seat belt? No   Have you felt unusual stress, anger or loneliness in the last month? No   Who do you live with? Spouse   If you need help, do you have trouble finding someone available to you? Yes   Have you been bothered in the last four weeks by sexual problems? No   Do you have difficulty concentrating, remembering or making decisions? No         Does the patient have evidence of cognitive impairment? No    Asprin use counseling:Does not need ASA (and currently is not on it)    Age-appropriate Screening Schedule:  Refer to the list below for future screening recommendations based on patient's age, sex and/or medical conditions. Orders for these recommended tests are listed in the plan section. The  patient has been provided with a written plan.    Health Maintenance   Topic Date Due   • PNEUMOCOCCAL VACCINES (65+ LOW/MEDIUM RISK) (1 of 2 - PCV13) 10/26/2006   • URINE MICROALBUMIN  09/19/2018   • DIABETIC EYE EXAM  11/27/2018   • PROSTATE CANCER SCREENING  05/22/2019   • ZOSTER VACCINE (2 of 2) 08/08/2020 (Originally 11/19/2013)   • HEMOGLOBIN A1C  02/22/2020   • LIPID PANEL  08/22/2020   • COLONOSCOPY  07/29/2029   • INFLUENZA VACCINE  Completed   • TDAP/TD VACCINES  Discontinued          The following portions of the patient's history were reviewed and updated as appropriate: allergies, current medications, past family history, past medical history, past social history, past surgical history and problem list.    Outpatient Medications Prior to Visit   Medication Sig Dispense Refill   • ACCU-CHEK FASTCLIX LANCETS misc TEST BLOOD GLUCOSE TWICE DAILY 204 each 1   • allopurinol (ZYLOPRIM) 300 MG tablet Take 1 tablet by mouth Daily. 90 tablet 3   • cadexomer iodine (IODOSORB) 0.9 % gel Apply to left buttock wound daily 10 g 4   • CBD (cannabidiol) oral oil Take  by mouth.     • digoxin (LANOXIN) 125 MCG tablet TAKE 1 TABLET EVERY DAY 90 tablet 1   • diphenhydrAMINE-acetaminophen (TYLENOL PM EXTRA STRENGTH)  MG tablet per tablet Take 2 tablets by mouth At Night As Needed for Sleep.     • ELIQUIS 5 MG tablet tablet TAKE 1 TABLET BY MOUTH EVERY 12 HOURS. 180 tablet 1   • fenofibrate (TRICOR) 145 MG tablet Take 145 mg by mouth Daily.     • fluticasone (FLONASE) 50 MCG/ACT nasal spray 2 sprays into the nostril(s) as directed by provider Daily. 3 bottle 3   • furosemide (LASIX) 20 MG tablet Take 10 mg by mouth 2 (Two) Times a Day.     • glipiZIDE (GLUCOTROL) 10 MG tablet TAKE 1 TABLET TWICE DAILY BEFORE MEALS 180 tablet 1   • glucose blood (ACCU-CHEK SMARTVIEW) test strip TEST BLOOD GLUCOSE TWICE DAILY 200 each 0   • GLYXAMBI 25-5 MG tablet TAKE 1 TABLET EVERY DAY WITH BREAKFAST 90 tablet 1   •  hydrocortisone-bacitracin-zinc oxide-nystatin (MAGIC BARRIER) Apply 1 application topically to the appropriate area as directed As Needed.     • Insulin Pen Needle 32G X 4 MM misc INJECT UP TO THREE TIMES DAILY OR AS DIRECTED 500 each 1   • L-Methylfolate-B6-B12 3-35-2 MG tablet TAKE 1 TABLET TWICE DAILY 180 tablet 0   • melatonin 5 MG tablet tablet Take 5 mg by mouth Every Night.     • Mirabegron ER (MYRBETRIQ) 25 MG tablet sustained-release 24 hour 24 hr tablet Take 25 mg by mouth Daily.     • Multiple Vitamins-Minerals (MULTIVITAMIN ADULT PO) Take 1 tablet by mouth Daily.     • Omega-3 Fatty Acids (FISH OIL) 1200 MG capsule capsule Take 2,000 mg by mouth 2 (Two) Times a Day With Meals. HOLD FOR SURGERY      • pregabalin (LYRICA) 100 MG capsule TAKE 1 CAPSULE THREE TIMES DAILY 270 capsule 0   • Probiotic Product (PROBIOTIC DAILY PO) Take 1 tablet by mouth Daily.     • rosuvastatin (CRESTOR) 10 MG tablet TAKE 1 TABLET EVERY DAY 90 tablet 1   • senna-docusate (SENNA-S) 8.6-50 MG per tablet Take 1 tablet by mouth 2 (Two) Times a Day.     • tamsulosin (FLOMAX) 0.4 MG capsule 24 hr capsule Take 1 capsule by mouth Daily. (Patient taking differently: Take 0.4 mg by mouth 2 (Two) Times a Day.) 30 capsule    • TOUJEO MAX SOLOSTAR 300 UNIT/ML solution pen-injector injection Inject 100 Units under the skin into the appropriate area as directed Daily With Breakfast. 4 pen 5   • traZODone (DESYREL) 150 MG tablet TAKE 1/2 - 2 TABLETS BY MOUTH EVERY NIGHT AT BEDTIME AS NEEDED FOR SLEEP 180 tablet 3   • Turmeric 450 MG capsule Take 450 mg by mouth Daily. HOLD FOR SURGERY     • vitamin C (ASCORBIC ACID) 500 MG tablet Take 500 mg by mouth Daily.     • vitamin D (ERGOCALCIFEROL) 90915 units capsule capsule TAKE 1 CAPSULE ONE TIME WEEKLY 13 capsule 1   • Wound Dressings (MEDIHONEY WOUND/BURN DRESSING) gel Apply as directed 44 mL 3     No facility-administered medications prior to visit.        Patient Active Problem List   Diagnosis  "  • Gout   • Diabetic peripheral neuropathy (CMS/[facility])   • Dyslipidemia   • Uncontrolled type 2 diabetes mellitus (CMS/[facility])   • Essential hypertension   • PVC (premature ventricular contraction)   • Vitamin D deficiency   • Benign non-nodular prostatic hyperplasia without lower urinary tract symptoms   • Osteoarthritis   • Urge incontinence   • CELINA (obstructive sleep apnea)   • Acute gangrenous cholecystitis   • Discitis of lumbar region   • Paroxysmal atrial fibrillation (CMS/[facility])   • History of sepsis   • Hyperuricemia   • Chronic deep vein thrombosis (DVT) of right peroneal vein   • Nausea, vomiting, and diarrhea   • Colitis presumed infectious   • Urinary tract infection in male   • DDD (degenerative disc disease), lumbar   • Bilateral leg weakness   • History of lumbar spinal fusion   • Carpal tunnel syndrome       Advanced Care Planning:  Patient has an advance directive - a copy has not been provided. Have asked the patient to send this to us to add to record    Review of Systems    Compared to one year ago, the patient feels his physical health is worse.  Compared to one year ago, the patient feels his mental health is the same.    Reviewed chart for potential of high risk medication in the elderly: yes  Reviewed chart for potential of harmful drug interactions in the elderly:yes    Objective         Vitals:    11/04/19 1348   BP: 141/68   Pulse: 78   Resp: 18   Temp: 98.6 °F (37 °C)   TempSrc: Oral   Weight: 111 kg (245 lb)   Height: 177.8 cm (70\")   PainSc: 0-No pain       Body mass index is 35.15 kg/m².  Discussed the patient's BMI with him. The BMI is above average; BMI management plan is completed.    Physical Exam    Lab Results   Component Value Date     (H) 08/22/2019    CHLPL 110 08/22/2019    TRIG 220 (H) 08/22/2019    HDL 29 (L) 08/22/2019    LDL 37 08/22/2019    VLDL 44 08/22/2019    HGBA1C 8.00 (H) 08/22/2019        Assessment/Plan   Medicare Risks and Personalized Health Plan  CMS " Preventative Services Quick Reference  Cardiovascular risk    The above risks/problems have been discussed with the patient.  Pertinent information has been shared with the patient in the After Visit Summary.  Follow up plans and orders are seen below in the Assessment/Plan Section.    Diagnoses and all orders for this visit:    1. Medicare annual wellness visit, subsequent (Primary)    2. Breast mass in male  -     Mammo Diagnostic Bilateral With CAD; Future  -     US breast right complete; Future    Other orders  -     SCANNED - INFLUENZA      Follow Up:  No Follow-up on file.     An After Visit Summary and PPPS were given to the patient.

## 2019-11-06 ENCOUNTER — OFFICE VISIT (OUTPATIENT)
Dept: NEUROSURGERY | Facility: CLINIC | Age: 79
End: 2019-11-06

## 2019-11-06 VITALS
BODY MASS INDEX: 35.15 KG/M2 | DIASTOLIC BLOOD PRESSURE: 78 MMHG | HEIGHT: 70 IN | SYSTOLIC BLOOD PRESSURE: 119 MMHG | HEART RATE: 72 BPM

## 2019-11-06 DIAGNOSIS — R29.898 BILATERAL LEG WEAKNESS: Primary | ICD-10-CM

## 2019-11-06 DIAGNOSIS — Z98.1 HISTORY OF LUMBAR SPINAL FUSION: ICD-10-CM

## 2019-11-06 DIAGNOSIS — G03.9 ADHESIVE ARACHNOIDITIS: ICD-10-CM

## 2019-11-06 PROCEDURE — 99213 OFFICE O/P EST LOW 20 MIN: CPT | Performed by: NEUROLOGICAL SURGERY

## 2019-11-12 ENCOUNTER — OFFICE VISIT (OUTPATIENT)
Dept: CARDIOLOGY | Facility: CLINIC | Age: 79
End: 2019-11-12

## 2019-11-12 VITALS
SYSTOLIC BLOOD PRESSURE: 124 MMHG | HEIGHT: 70 IN | HEART RATE: 74 BPM | BODY MASS INDEX: 36.08 KG/M2 | DIASTOLIC BLOOD PRESSURE: 64 MMHG | WEIGHT: 252 LBS

## 2019-11-12 DIAGNOSIS — E11.42 DIABETIC PERIPHERAL NEUROPATHY (HCC): ICD-10-CM

## 2019-11-12 DIAGNOSIS — G47.33 OSA (OBSTRUCTIVE SLEEP APNEA): ICD-10-CM

## 2019-11-12 DIAGNOSIS — E78.5 DYSLIPIDEMIA: ICD-10-CM

## 2019-11-12 DIAGNOSIS — R01.1 HEART MURMUR: ICD-10-CM

## 2019-11-12 DIAGNOSIS — I49.3 PVC (PREMATURE VENTRICULAR CONTRACTION): ICD-10-CM

## 2019-11-12 DIAGNOSIS — I82.551 CHRONIC DEEP VEIN THROMBOSIS (DVT) OF RIGHT PERONEAL VEIN (HCC): ICD-10-CM

## 2019-11-12 DIAGNOSIS — I48.0 PAROXYSMAL ATRIAL FIBRILLATION (HCC): Primary | ICD-10-CM

## 2019-11-12 DIAGNOSIS — I10 ESSENTIAL HYPERTENSION: ICD-10-CM

## 2019-11-12 PROCEDURE — 99214 OFFICE O/P EST MOD 30 MIN: CPT | Performed by: INTERNAL MEDICINE

## 2019-11-12 PROCEDURE — 93000 ELECTROCARDIOGRAM COMPLETE: CPT | Performed by: INTERNAL MEDICINE

## 2019-11-12 NOTE — PROGRESS NOTES
Subjective:     Encounter Date:11/12/2019      Patient ID: Jesus Beckham is a 79 y.o. male.    Chief Complaint:  History of Present Illness    The patient is a 79-year-old male with a history of diabetes mellitus type 2, chronic lower extremity edema, hypertension, dyslipidemia, chronic lower back pain, bladder cancer, paroxsymal atrial fibrillation, prior left lower extremity DVT, who presents for follow up.      I saw the patient initially in 10/2016 for preoperative evaluation before resection of a bladder mass.  A preoperative EKG performed at that time showed evidence of ventricular bigeminy.  He was sent to Dr. Ricardo's office for further evaluation and a repeat EKG at that time showed no evidence of PVCs but did show an ectopic atrial rhythm.  Following that office visit I set him up for an echocardiogram and a stress test in 10/2016 that were both unremarkable.       He was not seen again until 2/2018 when he was admitted for Escherichia coli sepsis, acute respiratory failure, and cholecystitis.  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  Additionally he underwent a cholecystectomy.  Postoperatively he went into atrial fibrillation for which she was managed with metoprolol tartrate.  During that admission he also was diagnosed with an acute DVT of his right lower extremity for which she had an IVC filter placed before his surgery and postoperatively was started on apixaban.  An echocardiogram during that admission performed on 2/23/2018 showed normal left ventricular systolic function and diastolic function with no significant valvular disease.  He was readmitted soon after discharge in 3/2018 with tachycardia, diaphoresis, and hypertension.  On arrival to the emergency room he was found to be in atrial fibrillation with rapid ventricular rate.  Additionally he was found to have an indeterminate troponin.  However he had no EKG changes or symptoms  suggestive of acute coronary syndrome.  At the time and felt that the indeterminate troponin was due to demand ischemia from his atrial fibrillation with rapid ventricular rate and did not recommend any further workup.  He eventually converted back to sinus rhythm and was managed with both metoprolol and digoxin.  He was continued on anticoagulation with apixaban.  Since then the metoprolol was discontinued due to concerns it was causing fatigue.       Today he presents for a one-year follow-up.  When I saw him last he referred him back to Dr. Thakkar to have his IVC filter removed.  This was performed on 12/3/2018 successfully.  The patient reports that he continues to have issues with prolonged ambulation but his overall strength and exercise tolerance has improved since attending rehab at [facility].  He now uses a walker for short distances and otherwise uses a manual wheelchair at home and a mechanical wheelchair outside of his home.  He has started going back to the gym since finishing in [facility] and has been exercising regularly there.  In fact he reports that he did 32 minutes on the Nu[employer] yesterday and has been doing some strength training.  He denies any dyspnea on exertion out of the ordinary.  Denies any chest pain, palpitations, PND orthopnea, near-syncope or syncope.  He does report some chronic left lower extremity edema related to his prior DVT and wound.  He keeps that leg wrapped in order to help with the swelling.  He continues to have a chronic indwelling Murray catheter.  He and his wife report that he may require a suprapubic catheter placement at some point.      Review of Systems   Constitution: Negative for weakness and malaise/fatigue.   HENT: Negative for hearing loss, hoarse voice, nosebleeds and sore throat.    Eyes: Negative for pain.   Cardiovascular: Positive for leg swelling. Negative for chest pain, claudication, cyanosis, dyspnea on exertion, irregular heartbeat,  near-syncope, orthopnea, palpitations, paroxysmal nocturnal dyspnea and syncope.   Respiratory: Negative for shortness of breath and snoring.    Endocrine: Negative for cold intolerance, heat intolerance, polydipsia, polyphagia and polyuria.   Skin: Negative for itching and rash.   Musculoskeletal: Negative for arthritis, falls, joint pain, joint swelling, muscle cramps, muscle weakness and myalgias.   Gastrointestinal: Negative for constipation, diarrhea, dysphagia, heartburn, hematemesis, hematochezia, melena, nausea and vomiting.   Genitourinary: Negative for frequency, hematuria and hesitancy.   Neurological: Negative for excessive daytime sleepiness, dizziness, headaches, light-headedness and numbness.   Psychiatric/Behavioral: Negative for depression. The patient is not nervous/anxious.          Current Outpatient Medications:   •  ACCU-CHEK FASTCLIX LANCETS misc, TEST BLOOD GLUCOSE TWICE DAILY, Disp: 204 each, Rfl: 1  •  allopurinol (ZYLOPRIM) 300 MG tablet, Take 1 tablet by mouth Daily., Disp: 90 tablet, Rfl: 3  •  cadexomer iodine (IODOSORB) 0.9 % gel, Apply to left buttock wound daily, Disp: 10 g, Rfl: 4  •  CBD (cannabidiol) oral oil, Take  by mouth., Disp: , Rfl:   •  digoxin (LANOXIN) 125 MCG tablet, TAKE 1 TABLET EVERY DAY, Disp: 90 tablet, Rfl: 1  •  diphenhydrAMINE-acetaminophen (TYLENOL PM EXTRA STRENGTH)  MG tablet per tablet, Take 2 tablets by mouth At Night As Needed for Sleep., Disp: , Rfl:   •  ELIQUIS 5 MG tablet tablet, TAKE 1 TABLET BY MOUTH EVERY 12 HOURS., Disp: 180 tablet, Rfl: 1  •  fenofibrate (TRICOR) 145 MG tablet, Take 145 mg by mouth Daily., Disp: , Rfl:   •  fluticasone (FLONASE) 50 MCG/ACT nasal spray, 2 sprays into the nostril(s) as directed by provider Daily., Disp: 3 bottle, Rfl: 3  •  glipiZIDE (GLUCOTROL) 10 MG tablet, TAKE 1 TABLET TWICE DAILY BEFORE MEALS, Disp: 180 tablet, Rfl: 1  •  glucose blood (ACCU-CHEK SMARTVIEW) test strip, TEST BLOOD GLUCOSE TWICE DAILY,  Disp: 200 each, Rfl: 0  •  GLYXAMBI 25-5 MG tablet, TAKE 1 TABLET EVERY DAY WITH BREAKFAST, Disp: 90 tablet, Rfl: 1  •  hydrocortisone-bacitracin-zinc oxide-nystatin (MAGIC BARRIER), Apply 1 application topically to the appropriate area as directed As Needed., Disp: , Rfl:   •  Insulin Pen Needle 32G X 4 MM misc, INJECT UP TO THREE TIMES DAILY OR AS DIRECTED, Disp: 500 each, Rfl: 1  •  L-Methylfolate-B6-B12 3-35-2 MG tablet, TAKE 1 TABLET TWICE DAILY, Disp: 180 tablet, Rfl: 0  •  melatonin 5 MG tablet tablet, Take 5 mg by mouth Every Night., Disp: , Rfl:   •  Mirabegron ER (MYRBETRIQ) 25 MG tablet sustained-release 24 hour 24 hr tablet, Take 25 mg by mouth Daily., Disp: , Rfl:   •  Multiple Vitamins-Minerals (MULTIVITAMIN ADULT PO), Take 1 tablet by mouth Daily., Disp: , Rfl:   •  pregabalin (LYRICA) 100 MG capsule, TAKE 1 CAPSULE THREE TIMES DAILY, Disp: 270 capsule, Rfl: 0  •  Probiotic Product (PROBIOTIC DAILY PO), Take 1 tablet by mouth Daily., Disp: , Rfl:   •  rosuvastatin (CRESTOR) 10 MG tablet, TAKE 1 TABLET EVERY DAY, Disp: 90 tablet, Rfl: 1  •  senna-docusate (SENNA-S) 8.6-50 MG per tablet, Take 1 tablet by mouth 2 (Two) Times a Day., Disp: , Rfl:   •  tamsulosin (FLOMAX) 0.4 MG capsule 24 hr capsule, Take 1 capsule by mouth Daily. (Patient taking differently: Take 0.4 mg by mouth 2 (Two) Times a Day.), Disp: 30 capsule, Rfl:   •  TOUJEO MAX SOLOSTAR 300 UNIT/ML solution pen-injector injection, Inject 100 Units under the skin into the appropriate area as directed Daily With Breakfast., Disp: 4 pen, Rfl: 5  •  traZODone (DESYREL) 150 MG tablet, TAKE 1/2 - 2 TABLETS BY MOUTH EVERY NIGHT AT BEDTIME AS NEEDED FOR SLEEP, Disp: 180 tablet, Rfl: 3  •  Turmeric 450 MG capsule, Take 450 mg by mouth Daily. HOLD FOR SURGERY, Disp: , Rfl:   •  vitamin C (ASCORBIC ACID) 500 MG tablet, Take 500 mg by mouth Daily., Disp: , Rfl:   •  vitamin D (ERGOCALCIFEROL) 22754 units capsule capsule, TAKE 1 CAPSULE ONE TIME WEEKLY,  Disp: 13 capsule, Rfl: 1  •  Wound Dressings (MEDIHONEY WOUND/BURN DRESSING) gel, Apply as directed, Disp: 44 mL, Rfl: 3  •  Omega-3 Fatty Acids (FISH OIL) 1200 MG capsule capsule, Take 2,000 mg by mouth 2 (Two) Times a Day With Meals. HOLD FOR SURGERY , Disp: , Rfl:     Past Medical History:   Diagnosis Date   • Acute embolism and thrombosis of deep vein of right distal lower extremity (CMS/HCC)    • Acute gangrenous cholecystitis     FEB 2018   • Arthritis    • Atrial fibrillation with RVR (CMS/HCC)     HISTORY   • Benign prostatic hyperplasia without lower urinary tract symptoms    • Cancer of bladder (CMS/HCC) 2016   • Colon polyp    • Discitis of lumbar region    • DVT (deep venous thrombosis) (CMS/HCC)     MARCH 2018   • Essential hypertension    • Murray catheter in place     LOSS OF SENSATION BLADDER. BECAUSE OF WOUND AT THE TIME   • Gout    • Heel ulcer (CMS/HCC)     RIGHT. FOLLOWED BY WOUND CARE. GETTING BETTER   • History of sepsis    • Hyperlipidemia    • Loss, sensation     LOWER EXTREMTIES. RELATED TO LAST BACK SURGERY.   • MRSA infection     LEFT LEG.    • Open wound     LOW TO MIDDLE CRACK BUTT. SEES WOUND CARE. WILL BE RESTARTED ON DIFFUCAN AND NYSTATIN POWER. REDNESS IN GROIN   • Open wound     WITH MEDICATED DRESSING LEFT SHIN AREA.    • CELINA (obstructive sleep apnea)     NO MACHINE   • Osteoarthritis    • Paroxysmal atrial fibrillation (CMS/HCC)    • PVC (premature ventricular contraction)    • Sepsis (CMS/HCC) 02/2018   • Sepsis due to Escherichia coli (CMS/HCC)    • Skin carcinoma 2012    MELANOMA.2 PLACES BACK AND EAR   • Type 2 diabetes mellitus (CMS/HCC)    • Vitamin D deficiency    • Weakness        Past Surgical History:   Procedure Laterality Date   • ABDOMINAL SURGERY     • APPENDECTOMY N/A 1961   • BACK SURGERY     • CHOLECYSTECTOMY WITH INTRAOPERATIVE CHOLANGIOGRAM N/A 2/25/2018    Procedure: CHOLECYSTECTOMY LAPAROSCOPIC INTRAOPERATIVE CHOLANGIOGRAM;  Surgeon: Maegan Correa MD;   "Location: [facility] OR;  Service:    • COLONOSCOPY N/A     h/o of polyps   • EYE SURGERY Bilateral     glass removal from eye, lens transplant   • JOINT REPLACEMENT     • LAMINECTOMY N/A 2016    L2-L3, L3-L4, L4-L5, and L5-S1 bilateral lamincectomy, medial facetectomy & [id number], Dr. Kaiden Valdes   • LUMBAR FUSION N/A 3/7/2018    Procedure: LUMBAR FUSION MINIMALLY INVASIVE TRANSFORAMINAL LUMBAR INTERBODY IRRIGATION AND DEBRIDEMENT AND FUSION.  IRRIGATION AND DEBRIDEMENT OF EPIDURAL ABCESS LUMBAR 2-4. BONE MORPHOGENIC PROTEIN, ALPHATEC NOVEL AND ILLICO, EMG AND SSEP NEUROMONITORING.;  Surgeon: Manish Marr DO;  Location: [facility] OR;  Service: Orthopedic Spine   • SKIN BIOPSY     • TOTAL KNEE ARTHROPLASTY Right 2005    Dr. Washington Evans   • VENA CAVA FILTER INSERTION Right 3/6/2018    Procedure: VENA CAVA FILTER INSERTION;  Surgeon: Alexis Thakkar MD;  Location: [facility] ;  Service:    • VENA CAVA FILTER REMOVAL N/A 12/3/2018    Procedure: VENA CAVA FILTER REMOVAL WITH VENOCAVAGRAM;  Surgeon: Alexis Thakkar MD;  [facility] ;  Service: Vascular       Family History   Problem Relation Age of Onset   • Esophageal cancer Mother    • No Known Problems Father    • Diabetes Maternal Grandmother    • Heart attack Maternal Grandfather        Social History     Tobacco Use   • Smoking status: Former Smoker     Types: Cigars     Last attempt to quit: 2017     Years since quittin.8   • Smokeless tobacco: Current User     Types: Chew   Substance Use Topics   • Alcohol use: No   • Drug use: No         ECG 12 Lead  Date/Time: 2019 10:50 AM  Performed by: Tasha Burr MD  Authorized by: Tasha Burr MD   Comparison: compared with previous ECG   Similar to previous ECG  Rhythm: sinus rhythm  Conduction: 1st degree AV block               Objective:     Visit Vitals  /64   Pulse 74   Ht 177.8 cm (70\")   Wt 114 kg (252 lb)   BMI 36.16 kg/m² "         Physical Exam   Constitutional: He is oriented to person, place, and time. He appears well-developed and well-nourished.   HENT:   Head: Normocephalic and atraumatic.   Neck: No JVD present. Carotid bruit is not present.   Cardiovascular: Normal rate, regular rhythm, S1 normal and S2 normal. Exam reveals no gallop.   Murmur heard.   Systolic murmur is present with a grade of 2/6.  Pulses:       Radial pulses are 2+ on the right side, and 2+ on the left side.   Left lower extremity wrapped   Pulmonary/Chest: Effort normal and breath sounds normal.   Abdominal: Soft. Normal appearance.   Neurological: He is alert and oriented to person, place, and time.   Skin: Skin is warm, dry and intact.   Psychiatric: He has a normal mood and affect.       Lab Review:       Assessment:          Diagnosis Plan   1. Paroxysmal atrial fibrillation (CMS/HCC)     2. 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 Essential hypertension     4. PVC (premature ventricular contraction)     5. CELINA (obstructive sleep apnea)     6. Diabetic peripheral neuropathy (CMS/HCC)     7. Dyslipidemia     8. Heart murmur  Adult Transthoracic Echo Complete W/ Cont if Necessary Per Protocol          Plan:       1.  Paroxysmal atrial fibrillation.  Remains in sinus rhythm.  He is only on digoxin.  The family asked whether this can be discontinued.  I think this is reasonable to consider.  Will await these echocardiogram results and depending on that determine if we can discontinue the digoxin.  Otherwise continue antic regulation with apixaban not only for his history of atrial fibrillation but history of DVT and high risk for recurrent DVT due to relative immobility.  2.  History of left lower extremity DVT.  He is status post prior IVC filter which was removed back in 12/2018 by Dr. Thakkar.  Recommend continuing anticoagulation with apixaban he is I think he is at high risk for recurrent DVT due to his relative  immobility.  3.  Hypertension.  Well controlled.  4.  Chronic lower extremity edema.  Controlled with wrapping his legs.  5.  Diabetes mellitus type 2  6.  Chronic urinary retention.  Continues to have a chronic indwelling Murray catheter.  There is tentative plans for suprapubic catheter placement in the future.  7.  Systolic murmur.  Noted on exam today.  His last echocardiogram in 2/2018 showed no evidence of any significant valvular disease.  We will proceed with a repeat echocardiogram.    We will call and discuss results of the echocardiogram.  We will tentatively plan on seeing the patient back again in 1 year assuming his echo does not show any significant abnormalities.

## 2019-11-19 ENCOUNTER — HOSPITAL ENCOUNTER (OUTPATIENT)
Dept: MAMMOGRAPHY | Facility: HOSPITAL | Age: 79
Discharge: HOME | End: 2019-11-19
Admitting: FAMILY MEDICINE

## 2019-11-19 DIAGNOSIS — N63.0 BREAST MASS IN MALE: ICD-10-CM

## 2019-11-19 PROCEDURE — 77066 DX MAMMO INCL CAD BI: CPT

## 2019-11-21 ENCOUNTER — HOSPITAL ENCOUNTER (OUTPATIENT)
Dept: CARDIOLOGY | Facility: HOSPITAL | Age: 79
Discharge: HOME | End: 2019-11-21
Admitting: INTERNAL MEDICINE

## 2019-11-21 VITALS
OXYGEN SATURATION: 94 % | BODY MASS INDEX: 36.08 KG/M2 | WEIGHT: 252 LBS | HEIGHT: 70 IN | HEART RATE: 79 BPM | SYSTOLIC BLOOD PRESSURE: 138 MMHG | DIASTOLIC BLOOD PRESSURE: 60 MMHG

## 2019-11-21 LAB
AORTIC DIMENSIONLESS INDEX: 0.3 (DI)
AORTIC ROOT ANNULUS: 2.2 CM
ASCENDING AORTA: 3 CM
BH CV ECHO MEAS - ACS: 0.93 CM
BH CV ECHO MEAS - AO MAX PG (FULL): 20 MMHG
BH CV ECHO MEAS - AO MAX PG: 22.8 MMHG
BH CV ECHO MEAS - AO MEAN PG (FULL): 12.1 MMHG
BH CV ECHO MEAS - AO MEAN PG: 13.7 MMHG
BH CV ECHO MEAS - AO ROOT AREA (BSA CORRECTED): 1.7
BH CV ECHO MEAS - AO ROOT AREA: 9.6 CM^2
BH CV ECHO MEAS - AO ROOT DIAM: 3.5 CM
BH CV ECHO MEAS - AO V2 MAX: 238.9 CM/SEC
BH CV ECHO MEAS - AO V2 MEAN: 177.5 CM/SEC
BH CV ECHO MEAS - AO V2 VTI: 44.5 CM
BH CV ECHO MEAS - ASC AORTA: 3 CM
BH CV ECHO MEAS - AVA PLANIMETRY TRACED: 1.7 CM2
BH CV ECHO MEAS - AVA(I,A): 1.5 CM^2
BH CV ECHO MEAS - AVA(I,D): 1.5 CM^2
BH CV ECHO MEAS - AVA(V,A): 1.5 CM^2
BH CV ECHO MEAS - AVA(V,D): 1.5 CM^2
BH CV ECHO MEAS - BSA(HAYCOCK): 2.3 M^2
BH CV ECHO MEAS - BSA: 2.1 M^2
BH CV ECHO MEAS - BZI_BMI: 46.1 KILOGRAMS/M^2
BH CV ECHO MEAS - BZI_METRIC_HEIGHT: 157.5 CM
BH CV ECHO MEAS - BZI_METRIC_WEIGHT: 114.3 KG
BH CV ECHO MEAS - CONTRAST EF (2CH): 69 CM2
BH CV ECHO MEAS - CONTRAST EF 4CH: 66 CM2
BH CV ECHO MEAS - EDV(CUBED): 92.2 ML
BH CV ECHO MEAS - EDV(MOD-SP2): 86 ML
BH CV ECHO MEAS - EDV(MOD-SP4): 74 ML
BH CV ECHO MEAS - EDV(TEICH): 93.3 ML
BH CV ECHO MEAS - EF(CUBED): 75.9 %
BH CV ECHO MEAS - EF(TEICH): 68 %
BH CV ECHO MEAS - ESV(CUBED): 22.2 ML
BH CV ECHO MEAS - ESV(MOD-SP2): 26 ML
BH CV ECHO MEAS - ESV(MOD-SP4): 25 ML
BH CV ECHO MEAS - ESV(TEICH): 29.9 ML
BH CV ECHO MEAS - IVS/LVPW: 0.9
BH CV ECHO MEAS - IVSD: 1.2 CM
BH CV ECHO MEAS - LAT PEAK E' VEL: 7 CM/SEC
BH CV ECHO MEAS - LV DIASTOLIC VOL/BSA (35-75): 35.1 ML/M^2
BH CV ECHO MEAS - LV MASS(C)D: 227.3 GRAMS
BH CV ECHO MEAS - LV MASS(C)DI: 107.8 GRAMS/M^2
BH CV ECHO MEAS - LV MAX PG: 2.8 MMHG
BH CV ECHO MEAS - LV MEAN PG: 1.6 MMHG
BH CV ECHO MEAS - LV SYSTOLIC VOL/BSA (12-30): 11.9 ML/M^2
BH CV ECHO MEAS - LV V1 MAX: 84.4 CM/SEC
BH CV ECHO MEAS - LV V1 MEAN: 59.9 CM/SEC
BH CV ECHO MEAS - LV V1 VTI: 15 CM
BH CV ECHO MEAS - LVIDD: 4.5 CM
BH CV ECHO MEAS - LVIDS: 2.8 CM
BH CV ECHO MEAS - LVLD AP2: 7.7 CM
BH CV ECHO MEAS - LVLD AP4: 6.7 CM
BH CV ECHO MEAS - LVLS AP2: 6.4 CM
BH CV ECHO MEAS - LVLS AP4: 6.2 CM
BH CV ECHO MEAS - LVOT AREA (M): 4.2 CM^2
BH CV ECHO MEAS - LVOT AREA: 4.3 CM^2
BH CV ECHO MEAS - LVOT DIAM: 2.3 CM
BH CV ECHO MEAS - LVPWD: 1.4 CM
BH CV ECHO MEAS - MED PEAK E' VEL: 7 CM/SEC
BH CV ECHO MEAS - MV A DUR: 0.19 SEC
BH CV ECHO MEAS - MV A MAX VEL: 60.1 CM/SEC
BH CV ECHO MEAS - MV DEC SLOPE: 161.3 CM/SEC^2
BH CV ECHO MEAS - MV DEC TIME: 0.29 SEC
BH CV ECHO MEAS - MV E MAX VEL: 41.2 CM/SEC
BH CV ECHO MEAS - MV E/A: 0.68
BH CV ECHO MEAS - MV MAX PG: 1.9 MMHG
BH CV ECHO MEAS - MV MEAN PG: 0.95 MMHG
BH CV ECHO MEAS - MV P1/2T MAX VEL: 49 CM/SEC
BH CV ECHO MEAS - MV P1/2T: 89 MSEC
BH CV ECHO MEAS - MV V2 MAX: 69.5 CM/SEC
BH CV ECHO MEAS - MV V2 MEAN: 47 CM/SEC
BH CV ECHO MEAS - MV V2 VTI: 18.2 CM
BH CV ECHO MEAS - MVA P1/2T LCG: 4.5 CM^2
BH CV ECHO MEAS - MVA(P1/2T): 2.5 CM^2
BH CV ECHO MEAS - MVA(VTI): 3.6 CM^2
BH CV ECHO MEAS - PA MAX PG (FULL): 9.7 MMHG
BH CV ECHO MEAS - PA MAX PG: 11.9 MMHG
BH CV ECHO MEAS - PA V2 MAX: 172.4 CM/SEC
BH CV ECHO MEAS - PULM A REVS DUR: 0.13 SEC
BH CV ECHO MEAS - PULM A REVS VEL: 46.5 CM/SEC
BH CV ECHO MEAS - PULM DIAS VEL: 25.7 CM/SEC
BH CV ECHO MEAS - PULM S/D: 1.6
BH CV ECHO MEAS - PULM SYS VEL: 42.1 CM/SEC
BH CV ECHO MEAS - PVA(V,A): 1.5 CM^2
BH CV ECHO MEAS - PVA(V,D): 1.5 CM^2
BH CV ECHO MEAS - QP/QS: 0.95
BH CV ECHO MEAS - RAP SYSTOLE: 3 MMHG
BH CV ECHO MEAS - RV MAX PG: 2.2 MMHG
BH CV ECHO MEAS - RV MEAN PG: 1.3 MMHG
BH CV ECHO MEAS - RV V1 MAX: 74.7 CM/SEC
BH CV ECHO MEAS - RV V1 MEAN: 54.2 CM/SEC
BH CV ECHO MEAS - RV V1 VTI: 17.4 CM
BH CV ECHO MEAS - RVOT AREA: 3.6 CM^2
BH CV ECHO MEAS - RVOT DIAM: 2.1 CM
BH CV ECHO MEAS - SI(AO): 202 ML/M^2
BH CV ECHO MEAS - SI(CUBED): 33.2 ML/M^2
BH CV ECHO MEAS - SI(LVOT): 30.9 ML/M^2
BH CV ECHO MEAS - SI(MOD-SP2): 28.5 ML/M^2
BH CV ECHO MEAS - SI(MOD-SP4): 23.2 ML/M^2
BH CV ECHO MEAS - SI(TEICH): 30.1 ML/M^2
BH CV ECHO MEAS - SV(AO): 426 ML
BH CV ECHO MEAS - SV(CUBED): 69.9 ML
BH CV ECHO MEAS - SV(LVOT): 65.2 ML
BH CV ECHO MEAS - SV(MOD-SP2): 60 ML
BH CV ECHO MEAS - SV(MOD-SP4): 49 ML
BH CV ECHO MEAS - SV(RVOT): 62 ML
BH CV ECHO MEAS - SV(TEICH): 63.4 ML
BH CV ECHO MEAS - TAPSE (>1.6): 2.1 CM2
BH CV ECHO MEASUREMENTS AVERAGE E/E' RATIO: 5.89
BH CV XLRA - RV BASE: 2.4 CM
BH CV XLRA - RV LENGTH: 6.9 CM
BH CV XLRA - RV MID: 1.7 CM
BH CV XLRA - TDI S': 14 CM/SEC
LEFT ATRIUM VOLUME INDEX: 16 ML/M2
MAXIMAL PREDICTED HEART RATE: 142 BPM
SINUS: 3.3 CM
STJ: 2.8 CM
STRESS TARGET HR: 121 BPM

## 2019-11-21 PROCEDURE — 93306 TTE W/DOPPLER COMPLETE: CPT

## 2019-11-21 PROCEDURE — 93306 TTE W/DOPPLER COMPLETE: CPT | Performed by: INTERNAL MEDICINE

## 2019-11-21 PROCEDURE — 25010000002 PERFLUTREN (DEFINITY) 8.476 MG IN SODIUM CHLORIDE 0.9 % 10 ML INJECTION: Performed by: INTERNAL MEDICINE

## 2019-11-21 RX ADMIN — PERFLUTREN 2.5 ML: 6.52 INJECTION, SUSPENSION INTRAVENOUS at 07:39

## 2019-12-09 RX ORDER — MECOBAL/LEVOMEFOLAT CA/B6 PHOS 2-3-35 MG
TABLET ORAL
Qty: 180 TABLET | Refills: 0 | Status: SHIPPED | OUTPATIENT
Start: 2019-12-09 | End: 2020-02-05 | Stop reason: SDUPTHER

## 2019-12-16 RX ORDER — EMPAGLIFLOZIN AND LINAGLIPTIN 25; 5 MG/1; MG/1
TABLET, FILM COATED ORAL
Qty: 90 TABLET | Refills: 0 | Status: SHIPPED | OUTPATIENT
Start: 2019-12-16 | End: 2020-02-26

## 2019-12-16 RX ORDER — GLIPIZIDE 10 MG/1
TABLET ORAL
Qty: 180 TABLET | Refills: 0 | Status: SHIPPED | OUTPATIENT
Start: 2019-12-16 | End: 2020-02-26

## 2019-12-16 RX ORDER — FENOFIBRATE 145 MG/1
TABLET, COATED ORAL
Qty: 90 TABLET | Refills: 0 | Status: SHIPPED | OUTPATIENT
Start: 2019-12-16 | End: 2020-02-26

## 2019-12-16 RX ORDER — ROSUVASTATIN CALCIUM 10 MG/1
TABLET, COATED ORAL
Qty: 90 TABLET | Refills: 0 | Status: SHIPPED | OUTPATIENT
Start: 2019-12-16 | End: 2020-02-26

## 2019-12-16 RX ORDER — APIXABAN 5 MG/1
TABLET, FILM COATED ORAL
Qty: 180 TABLET | Refills: 2 | Status: SHIPPED | OUTPATIENT
Start: 2019-12-16 | End: 2020-09-11

## 2019-12-30 DIAGNOSIS — J30.1 CHRONIC SEASONAL ALLERGIC RHINITIS DUE TO POLLEN: ICD-10-CM

## 2019-12-30 RX ORDER — FLUTICASONE PROPIONATE 50 MCG
2 SPRAY, SUSPENSION (ML) NASAL DAILY
Qty: 3 BOTTLE | Refills: 3 | Status: SHIPPED | OUTPATIENT
Start: 2019-12-30 | End: 2020-06-03 | Stop reason: SDUPTHER

## 2020-01-09 ENCOUNTER — TELEPHONE (OUTPATIENT)
Dept: CARDIOLOGY | Facility: CLINIC | Age: 80
End: 2020-01-09

## 2020-01-09 NOTE — TELEPHONE ENCOUNTER
Patient is scheduled for urology procedure, they are going to change out the cath, and he is needing clearance as well instructions on holding Eliquis.      Procedure is scheduled for 1/24 with Dr. Saleh of [facility].    Please advise.  Thanks!

## 2020-01-13 RX ORDER — PREGABALIN 100 MG/1
CAPSULE ORAL
Qty: 270 CAPSULE | Refills: 0 | Status: SHIPPED | OUTPATIENT
Start: 2020-01-13 | End: 2020-03-25 | Stop reason: SDUPTHER

## 2020-01-17 ENCOUNTER — TELEPHONE (OUTPATIENT)
Dept: ENDOCRINOLOGY | Age: 80
End: 2020-01-17

## 2020-01-17 NOTE — TELEPHONE ENCOUNTER
LEFT VOICEMAIL ASKING PATIENT TO CALL OFFICE AND GIVE INFORMATION ON REFILL HE REQUESTED. HE LEFT VOICEMAIL BUT DID NOT LEAVE MEDICATION NAME.

## 2020-02-04 NOTE — PROGRESS NOTES
Subjective   Patient ID: Jesus Beckham is a 79 y.o. male is here today for follow-up after PT and referral to neurosurgery at , Dr Jf Singh who referred him to Dr Melchor at [facility], who is going to refer him to a urologist there to help him retain his bladder function without the catheter. They will follow up after his urology appt.      Patient was last seen 11.6.19 for bilateral leg pain, weakness and difficulty with his balance and gait.  He has suprapubic catheter.  He states that his bilateral leg pain and numbness has not improved.  He denies low back pain, he can stand and walk for short distances with a walker which is what he uses at home.  He uses an electric wheelchair when he is out the home.    Patient was seen at [facility] ER 1.26.20 for hematuria, patient states that he had blood clots in his bladder due to Eliquis, however, everything is fine now    ** pt unable to stand on scale **    Since I last saw him, he was seen by Jf Singh MD, regarding whether or not he would be a candidate for the spinal cord stimulation investigational trial. He recommended that the patient see Dr. Adrienne Rinaldi, Ph.D.  I told the patient and his wife that I thought it would be worthwhile to visit her and see if he is a candidate. He has already seen Dr. Fortune. Moreover, he has had a suprapubic catheter placed. His physical therapy at [facility] was exhausted for 2019 but I think he could probably benefit from some additional therapy sessions in 2020 and so I will renew that. Again, I pretty much ruled out the need for any kind of additional fusion surgery based on the last MRI done a year ago. I will keep an eye on him and I will order the new renewed physical therapy. I will see him in 4 months but he will work with the [place] doctors and see Dr. Rinaldi to see if he is a candidate for the trial.      Leg Pain    The pain is present in the right leg. The pain is at a severity of 5/10. The pain is  moderate. The pain has been constant since onset. Associated symptoms include numbness.   Extremity Weakness    The pain is present in the left lower leg, left upper leg, right upper leg and right lower leg. The problem occurs constantly. The problem has been unchanged. The pain is at a severity of 5/10. The pain is moderate. Associated symptoms include numbness.   Difficulty Walking   The problem occurs constantly. The problem has been unchanged. Associated symptoms include numbness and weakness. Pertinent negatives include no arthralgias or myalgias.       The following portions of the patient's history were reviewed and updated as appropriate: allergies, current medications, past family history, past medical history, past social history, past surgical history and problem list.    Review of Systems   Genitourinary: Positive for urgency.   Musculoskeletal: Positive for extremity weakness and gait problem. Negative for arthralgias, back pain and myalgias.   Neurological: Positive for weakness and numbness.   All other systems reviewed and are negative.      Objective   Physical Exam   Constitutional: He is oriented to person, place, and time. He appears well-developed and well-nourished.   HENT:   Head: Normocephalic and atraumatic.   Eyes: Pupils are equal, round, and reactive to light. Conjunctivae and EOM are normal.   Fundoscopic exam:       The right eye shows no papilledema. The right eye shows venous pulsations.        The left eye shows no papilledema. The left eye shows venous pulsations.   Neck: Carotid bruit is not present.   Neurological: He is oriented to person, place, and time. He has a normal Finger-Nose-Finger Test and a normal Heel to Shin Test. Gait normal.   Reflex Scores:       Tricep reflexes are 2+ on the right side and 2+ on the left side.       Bicep reflexes are 2+ on the right side and 2+ on the left side.       Brachioradialis reflexes are 2+ on the right side and 2+ on the left side.        Patellar reflexes are 2+ on the right side and 2+ on the left side.       Achilles reflexes are 2+ on the right side and 2+ on the left side.  Psychiatric: His speech is normal.     Neurologic Exam     Mental Status   Oriented to person, place, and time.   Registration of memory: Good recent and remote memory.   Attention: normal. Concentration: normal.   Speech: speech is normal   Level of consciousness: alert  Knowledge: consistent with education.     Cranial Nerves     CN II   Visual fields full to confrontation.   Visual acuity: normal    CN III, IV, VI   Pupils are equal, round, and reactive to light.  Extraocular motions are normal.     CN V   Facial sensation intact.   Right corneal reflex: normal  Left corneal reflex: normal    CN VII   Facial expression full, symmetric.   Right facial weakness: none  Left facial weakness: none    CN VIII   Hearing: intact    CN IX, X   Palate: symmetric    CN XI   Right sternocleidomastoid strength: normal  Left sternocleidomastoid strength: normal    CN XII   Tongue: not atrophic  Tongue deviation: none    Motor Exam   Muscle bulk: normal  Right arm tone: normal  Left arm tone: normal  Right leg tone: normal  Left leg tone: normal    Strength   Strength 5/5 except as noted.     Sensory Exam   Light touch normal.     Gait, Coordination, and Reflexes     Gait  Gait: normal    Coordination   Finger to nose coordination: normal  Heel to shin coordination: normal    Reflexes   Right brachioradialis: 2+  Left brachioradialis: 2+  Right biceps: 2+  Left biceps: 2+  Right triceps: 2+  Left triceps: 2+  Right patellar: 2+  Left patellar: 2+  Right achilles: 2+  Left achilles: 2+  Right : 2+  Left : 2+      Assessment/Plan   Independent Review of Radiographic Studies:    I reviewed the lumbar MRI that was done before his surgery on 3/5/2018 which showed evidence of discitis at L2-L3 and L3-L4 and evidence of an epidural abscess.  I reviewed x-rays done in February 2019  which showed good positioning of the hardware and interbody cages from L2-L4.  Agree with the report.    Medical Decision Making:    The patient will make an appointment to see Dr. Rinaldi in order to see if he is a candidate for the investigational trial involving spinal cord stimulation.  He will also work with Dr. Fortune.  I will renew his physical therapy at [place] and keep an eye on him and see him in 4 months.  His wife will let us know if he is excepted into the trial.      Jesus was seen today for leg pain, extremity weakness, difficulty walking and urinary incontinence.    Diagnoses and all orders for this visit:    Bilateral leg weakness  -     Ambulatory Referral to Physical Therapy Evaluate and treat    Adhesive arachnoiditis  -     Ambulatory Referral to Physical Therapy Evaluate and treat    History of lumbar spinal fusion  -     Ambulatory Referral to Physical Therapy Evaluate and treat      Return in about 4 months (around 6/10/2020).

## 2020-02-05 PROBLEM — D73.89 SPLENIC LESION: Status: ACTIVE | Noted: 2020-01-27

## 2020-02-05 PROBLEM — R31.9 HEMATURIA: Status: ACTIVE | Noted: 2020-01-27

## 2020-02-05 PROBLEM — K57.90 DIVERTICULOSIS: Status: ACTIVE | Noted: 2020-01-27

## 2020-02-05 PROBLEM — T83.010A SUPRAPUBIC CATHETER DYSFUNCTION: Status: ACTIVE | Noted: 2020-01-27

## 2020-02-05 PROBLEM — N13.9 LOWER OBSTRUCTIVE UROPATHY: Status: ACTIVE | Noted: 2020-01-27

## 2020-02-05 PROBLEM — Z79.01 CHRONIC ANTICOAGULATION: Status: ACTIVE | Noted: 2020-01-27

## 2020-02-05 RX ORDER — HYDROCODONE BITARTRATE AND ACETAMINOPHEN 5; 325 MG/1; MG/1
1 TABLET ORAL
COMMUNITY
Start: 2020-01-24 | End: 2020-10-01

## 2020-02-05 RX ORDER — ONDANSETRON 4 MG/1
4 TABLET, FILM COATED ORAL
COMMUNITY
Start: 2020-01-24 | End: 2020-11-17

## 2020-02-05 RX ORDER — MECOBAL/LEVOMEFOLAT CA/B6 PHOS 2-3-35 MG
1 TABLET ORAL DAILY
COMMUNITY
End: 2020-02-13

## 2020-02-09 NOTE — PROGRESS NOTES
Subjective   Jesus Beckham is a 79 y.o. male.     CC: Hospital F/U for Hematuria/Suprapubic Catheter Disfunction    History of Present Illness     Pt returns today after recent hospitalization. That visit was as follows:    Admit date: 1/26/2020    Discharge date and time: 1/28/2020 7:01 PM    Admitting Physician: Constanza Wolfe MD     Discharge Physician: Constanza Wolfe MD    Admission Diagnoses: Hematuria [R31.9]    Hospital Problems:   Principal Problem:  Suprapubic catheter dysfunction (CMS/HCC) (1/27/2020) POA: Yes  Active Problems:  Hematuria, symptomatic (1/27/2020) POA: Yes  Splenic lesion (1/27/2020) POA: Yes  Severe Diverticulosis on CT (1/27/2020) POA: Yes  Atrial fibrillation (CMS/HCC) (3/1/2018) POA: Yes  Chronic anticoagulation (1/27/2020) POA: Not Applicable  Poorly controlled type 2 diabetes mellitus (CMS/HCC) (1/27/2020) POA: Yes  Lower obstructive uropathy secondary to hematuria with clotting (1/27/2020) POA: Yes    Discharge Diagnoses: As above    Discharged Condition: good    Hospital Course:     Jesus Beckham 79 yr/o with PMH including neuropathy, melanoma, gout, DVT, diabetes, bladder cancer, atrial fibrillation, arthritis, neurogenic bladder.     Patient presents to the ER yesterday with gross hematuria. Patient currently has suprapubic catheter secondary to neurogenic bladder. Patient takes Eliquis for atrial fibrillation which was on hold for the procedure. Patient had a suprapubic catheter placed 3 days ago and patient had no output in the catheter secondary to the hematuria and was having bladder distention and discomfort. Urology was consulted and saw the patient in the ER and patient was noted to have a large clot in the bladder. Patient had a Murray catheter placed via the penis and was placed on bladder irrigation. Patient symptoms have greatly improved and he is feeling much better today with the CBI going. His anticoagulation is on hold.     Serology work-up  notes sodium 137, potassium 4.4, glucose 272, BUN 24, creatinine 1.1. WBC 5.55, hemoglobin 14. Lipase 122.     UA with 5+ ketones, large amount of blood with greater than 180 RBCs, WBC none.     CT of the abdomen and pelvis notes - Large amount of hyperdense material in the lumen of the urinary bladder, likely representing blood products, exceeding what would be expected at this postoperative stage. Urological consultation recommended.  3.5-cm hypodense lesion in the spleen, possibly a cyst or hemangioma, but incompletely characterized on this examination.  Severe diverticulosis, without evidence of diverticulitis.    Pt was evaluated by urology recc CBI and h/h to assess hemoglobin. Pt's urine improve and azul was d/c and resume use of suprapubic. Pt to hold OAC and to call urology for resumption of med.     Pt stable for d/c - all questions answered.     Current outpatient and discharge medications have been reconciled for the patient.  Reviewed by: Magdy Ricardo MD    Pt has seen urology in f/u already.    The following portions of the patient's history were reviewed and updated as appropriate: allergies, current medications, past family history, past medical history, past social history, past surgical history and problem list.    Review of Systems   Constitutional: Negative for activity change, chills, fatigue and fever.   Respiratory: Negative for cough and shortness of breath.    Cardiovascular: Negative for chest pain and palpitations.   Gastrointestinal: Negative for abdominal pain.   Endocrine: Negative for cold intolerance.   Psychiatric/Behavioral: Negative for behavioral problems and dysphoric mood. The patient is not nervous/anxious.        Objective   Physical Exam   Constitutional: He appears well-developed and well-nourished.   Neck: Neck supple. No thyromegaly present.   Cardiovascular: Normal rate and regular rhythm.   Murmur heard.   Systolic murmur is present with a grade of  2/6.  Pulmonary/Chest: Effort normal and breath sounds normal.   Abdominal: Bowel sounds are normal. There is no tenderness.   Psychiatric: He has a normal mood and affect. His behavior is normal.   Nursing note and vitals reviewed.  Hospital records reviewed with pt confirming HPI.      Assessment/Plan   Jesus was seen today for hematuria.    Diagnoses and all orders for this visit:    Suprapubic catheter dysfunction, subsequent encounter    Hematuria, unspecified type    Splenic lesion  -     CT abdomen w contrast; Future    Hospital discharge follow-up    Pt doing better and is encouraged to keep his Urology f/u.

## 2020-02-10 ENCOUNTER — OFFICE VISIT (OUTPATIENT)
Dept: NEUROSURGERY | Facility: CLINIC | Age: 80
End: 2020-02-10

## 2020-02-10 ENCOUNTER — OFFICE VISIT (OUTPATIENT)
Dept: FAMILY MEDICINE CLINIC | Facility: CLINIC | Age: 80
End: 2020-02-10

## 2020-02-10 VITALS
SYSTOLIC BLOOD PRESSURE: 117 MMHG | HEART RATE: 72 BPM | BODY MASS INDEX: 35.87 KG/M2 | HEIGHT: 70 IN | DIASTOLIC BLOOD PRESSURE: 71 MMHG

## 2020-02-10 VITALS
HEART RATE: 74 BPM | DIASTOLIC BLOOD PRESSURE: 72 MMHG | SYSTOLIC BLOOD PRESSURE: 124 MMHG | TEMPERATURE: 98 F | RESPIRATION RATE: 16 BRPM | WEIGHT: 250 LBS | BODY MASS INDEX: 35.79 KG/M2 | HEIGHT: 70 IN

## 2020-02-10 DIAGNOSIS — D73.89 SPLENIC LESION: ICD-10-CM

## 2020-02-10 DIAGNOSIS — T83.010D SUPRAPUBIC CATHETER DYSFUNCTION, SUBSEQUENT ENCOUNTER: Primary | ICD-10-CM

## 2020-02-10 DIAGNOSIS — R31.9 HEMATURIA, UNSPECIFIED TYPE: ICD-10-CM

## 2020-02-10 DIAGNOSIS — R29.898 BILATERAL LEG WEAKNESS: Primary | ICD-10-CM

## 2020-02-10 DIAGNOSIS — Z98.1 HISTORY OF LUMBAR SPINAL FUSION: ICD-10-CM

## 2020-02-10 DIAGNOSIS — Z09 HOSPITAL DISCHARGE FOLLOW-UP: ICD-10-CM

## 2020-02-10 DIAGNOSIS — G03.9 ADHESIVE ARACHNOIDITIS: ICD-10-CM

## 2020-02-10 PROCEDURE — 99213 OFFICE O/P EST LOW 20 MIN: CPT | Performed by: FAMILY MEDICINE

## 2020-02-10 PROCEDURE — 99213 OFFICE O/P EST LOW 20 MIN: CPT | Performed by: NEUROLOGICAL SURGERY

## 2020-02-10 RX ORDER — NYSTATIN 100000 [USP'U]/G
POWDER TOPICAL AS NEEDED
COMMUNITY
Start: 2020-02-04 | End: 2021-04-02

## 2020-02-13 RX ORDER — MECOBAL/LEVOMEFOLAT CA/B6 PHOS 2-3-35 MG
TABLET ORAL
Qty: 180 TABLET | Refills: 0 | Status: SHIPPED | OUTPATIENT
Start: 2020-02-13 | End: 2020-04-19

## 2020-02-17 RX ORDER — LANCETS
EACH MISCELLANEOUS
Qty: 204 EACH | Refills: 1 | Status: SHIPPED | OUTPATIENT
Start: 2020-02-17 | End: 2022-01-19 | Stop reason: SDUPTHER

## 2020-02-19 LAB
25(OH)D3+25(OH)D2 SERPL-MCNC: 33.8 NG/ML (ref 30–100)
ALBUMIN SERPL-MCNC: 4.1 G/DL (ref 3.5–5.2)
ALBUMIN/GLOB SERPL: 1.7 G/DL
ALP SERPL-CCNC: 66 U/L (ref 39–117)
ALT SERPL-CCNC: 42 U/L (ref 1–41)
AST SERPL-CCNC: 49 U/L (ref 1–40)
BILIRUB SERPL-MCNC: 0.4 MG/DL (ref 0.2–1.2)
BUN SERPL-MCNC: 18 MG/DL (ref 8–23)
BUN/CREAT SERPL: 16.7 (ref 7–25)
C PEPTIDE SERPL-MCNC: 3.1 NG/ML (ref 1.1–4.4)
CALCIUM SERPL-MCNC: 9.3 MG/DL (ref 8.6–10.5)
CHLORIDE SERPL-SCNC: 102 MMOL/L (ref 98–107)
CHOLEST SERPL-MCNC: 136 MG/DL (ref 0–200)
CO2 SERPL-SCNC: 23.5 MMOL/L (ref 22–29)
CREAT SERPL-MCNC: 1.08 MG/DL (ref 0.76–1.27)
GLOBULIN SER CALC-MCNC: 2.4 GM/DL
GLUCOSE SERPL-MCNC: 154 MG/DL (ref 65–99)
HBA1C MFR BLD: 8.5 % (ref 4.8–5.6)
HDLC SERPL-MCNC: 31 MG/DL (ref 40–60)
INTERPRETATION: NORMAL
LDLC SERPL CALC-MCNC: 63 MG/DL (ref 0–100)
Lab: NORMAL
POTASSIUM SERPL-SCNC: 4.3 MMOL/L (ref 3.5–5.2)
PROT SERPL-MCNC: 6.5 G/DL (ref 6–8.5)
SODIUM SERPL-SCNC: 141 MMOL/L (ref 136–145)
T4 SERPL-MCNC: 7.1 MCG/DL (ref 4.5–11.7)
TRIGL SERPL-MCNC: 212 MG/DL (ref 0–150)
TSH SERPL DL<=0.005 MIU/L-ACNC: 4.09 UIU/ML (ref 0.27–4.2)
UNABLE TO VOID: NORMAL
URATE SERPL-MCNC: 3.3 MG/DL (ref 3.4–7)
VLDLC SERPL CALC-MCNC: 42.4 MG/DL

## 2020-02-21 RX ORDER — DIGOXIN 125 MCG
TABLET ORAL
Qty: 90 TABLET | Refills: 2 | Status: SHIPPED | OUTPATIENT
Start: 2020-02-21 | End: 2020-09-28

## 2020-02-26 RX ORDER — EMPAGLIFLOZIN AND LINAGLIPTIN 25; 5 MG/1; MG/1
TABLET, FILM COATED ORAL
Qty: 90 TABLET | Refills: 0 | Status: SHIPPED | OUTPATIENT
Start: 2020-02-26 | End: 2020-04-27

## 2020-02-26 RX ORDER — FENOFIBRATE 145 MG/1
TABLET, COATED ORAL
Qty: 90 TABLET | Refills: 0 | Status: SHIPPED | OUTPATIENT
Start: 2020-02-26 | End: 2020-04-27

## 2020-02-26 RX ORDER — ROSUVASTATIN CALCIUM 10 MG/1
TABLET, COATED ORAL
Qty: 90 TABLET | Refills: 0 | Status: SHIPPED | OUTPATIENT
Start: 2020-02-26 | End: 2020-04-27

## 2020-02-26 RX ORDER — GLIPIZIDE 10 MG/1
TABLET ORAL
Qty: 180 TABLET | Refills: 0 | Status: SHIPPED | OUTPATIENT
Start: 2020-02-26 | End: 2020-04-27

## 2020-03-03 ENCOUNTER — OFFICE VISIT (OUTPATIENT)
Dept: ENDOCRINOLOGY | Age: 80
End: 2020-03-03

## 2020-03-03 VITALS
BODY MASS INDEX: 35.79 KG/M2 | WEIGHT: 250 LBS | HEIGHT: 70 IN | DIASTOLIC BLOOD PRESSURE: 70 MMHG | RESPIRATION RATE: 16 BRPM | SYSTOLIC BLOOD PRESSURE: 132 MMHG

## 2020-03-03 DIAGNOSIS — E11.65 UNCONTROLLED TYPE 2 DIABETES MELLITUS WITH HYPERGLYCEMIA (HCC): Primary | ICD-10-CM

## 2020-03-03 DIAGNOSIS — E55.9 VITAMIN D DEFICIENCY: ICD-10-CM

## 2020-03-03 DIAGNOSIS — E79.0 HYPERURICEMIA: ICD-10-CM

## 2020-03-03 DIAGNOSIS — E78.5 DYSLIPIDEMIA: ICD-10-CM

## 2020-03-03 DIAGNOSIS — I10 ESSENTIAL HYPERTENSION: ICD-10-CM

## 2020-03-03 DIAGNOSIS — Z86.19 HISTORY OF SEPSIS: ICD-10-CM

## 2020-03-03 PROCEDURE — 99214 OFFICE O/P EST MOD 30 MIN: CPT | Performed by: INTERNAL MEDICINE

## 2020-03-03 RX ORDER — INSULIN GLARGINE 300 U/ML
112 INJECTION, SOLUTION SUBCUTANEOUS
Qty: 12 PEN | Refills: 3 | Status: SHIPPED | OUTPATIENT
Start: 2020-03-03 | End: 2020-10-01

## 2020-03-03 RX ORDER — ERGOCALCIFEROL 1.25 MG/1
50000 CAPSULE ORAL 2 TIMES WEEKLY
Qty: 26 CAPSULE | Refills: 3 | Status: SHIPPED | OUTPATIENT
Start: 2020-03-05 | End: 2021-01-25

## 2020-03-03 NOTE — PROGRESS NOTES
"Subjective   Jesus Beckham is a 79 y.o. male seen for follow up for DM2, hyperlipidemia, HTN, vit d deficiency, lab review. Patient is checking BG 2 times a day. Patient denies any problems or concerns.     History of Present Illness 79-year-old gentleman known patient with type 2 diabetes hypertension and dyslipidemia as well as vitamin D deficiency and obesity with hyperuricemia.  He said that under normal circumstances he is glycemic control is very satisfactory however due to multiple infections and bladder problems he has been in and out of the hospital which interferes with his hemoglobin A1c being in a healthy range.  /70   Resp 16   Ht 177.8 cm (70\")   Wt 113 kg (250 lb)   BMI 35.87 kg/m²   Allergies   Allergen Reactions   • Levaquin [Levofloxacin] Other (See Comments)     Redness, itching at IV site with IV Levaquin administration   • Other Other (See Comments)     ALCOHOL. DEATHLY ILL.    • Alcohol Nausea And Vomiting     \"Deathly sick, headaches, cold sweats\"  Cant take any medication that contains alcohol       Current Outpatient Medications:   •  ACCU-CHEK FASTCLIX LANCETS misc, TEST BLOOD GLUCOSE TWICE DAILY, Disp: 204 each, Rfl: 1  •  allopurinol (ZYLOPRIM) 300 MG tablet, Take 1 tablet by mouth Daily., Disp: 90 tablet, Rfl: 3  •  cadexomer iodine (IODOSORB) 0.9 % gel, Apply to left buttock wound daily, Disp: 10 g, Rfl: 4  •  CBD (cannabidiol) oral oil, Take 1 dose by mouth Daily., Disp: , Rfl:   •  Diaper Rash Products (DESITIN) ointment, Apply  topically to the appropriate area as directed., Disp: , Rfl:   •  digoxin (LANOXIN) 125 MCG tablet, TAKE 1 TABLET EVERY DAY, Disp: 90 tablet, Rfl: 2  •  diphenhydrAMINE-acetaminophen (TYLENOL PM EXTRA STRENGTH)  MG tablet per tablet, Take 2 tablets by mouth At Night As Needed for Sleep., Disp: , Rfl:   •  ELIQUIS 5 MG tablet tablet, TAKE 1 TABLET BY MOUTH EVERY 12 HOURS., Disp: 180 tablet, Rfl: 2  •  fenofibrate (TRICOR) 145 MG tablet, TAKE " 1 TABLET EVERY DAY, Disp: 90 tablet, Rfl: 0  •  fluticasone (FLONASE) 50 MCG/ACT nasal spray, 2 sprays into the nostril(s) as directed by provider Daily., Disp: 3 bottle, Rfl: 3  •  glipizide (GLUCOTROL) 10 MG tablet, TAKE 1 TABLET TWICE DAILY BEFORE MEALS, Disp: 180 tablet, Rfl: 0  •  glucose blood (ACCU-CHEK SMARTVIEW) test strip, TEST BLOOD GLUCOSE TWICE DAILY, Disp: 200 each, Rfl: 1  •  GLYXAMBI 25-5 MG tablet, TAKE 1 TABLET EVERY DAY WITH BREAKFAST, Disp: 90 tablet, Rfl: 0  •  HYDROcodone-acetaminophen (NORCO) 5-325 MG per tablet, Take 1 tablet by mouth., Disp: , Rfl:   •  hydrocortisone-bacitracin-zinc oxide-nystatin (MAGIC BARRIER), Apply 1 application topically to the appropriate area as directed As Needed., Disp: , Rfl:   •  Insulin Pen Needle 32G X 4 MM misc, INJECT UP TO THREE TIMES DAILY OR AS DIRECTED, Disp: 500 each, Rfl: 1  •  L-Methylfolate-B6-B12 3-35-2 MG tablet, TAKE 1 TABLET TWICE DAILY, Disp: 180 tablet, Rfl: 0  •  melatonin 5 MG tablet tablet, Take 5 mg by mouth Every Night., Disp: , Rfl:   •  Mirabegron ER (MYRBETRIQ) 25 MG tablet sustained-release 24 hour 24 hr tablet, Take 25 mg by mouth Daily., Disp: , Rfl:   •  Multiple Vitamins-Minerals (MULTIVITAMIN ADULT PO), Take 1 tablet by mouth Daily., Disp: , Rfl:   •  mupirocin (BACTROBAN) 2 % ointment, , Disp: , Rfl:   •  NYSTATIN 273432 UNIT/GM topical powder, , Disp: , Rfl:   •  Omega-3 Fatty Acids (FISH OIL) 1200 MG capsule capsule, Take 2,000 mg by mouth 2 (Two) Times a Day With Meals. HOLD FOR SURGERY , Disp: , Rfl:   •  ondansetron (ZOFRAN) 4 MG tablet, Take 4 mg by mouth., Disp: , Rfl:   •  pregabalin (LYRICA) 100 MG capsule, TAKE 1 CAPSULE THREE TIMES DAILY, Disp: 270 capsule, Rfl: 0  •  Probiotic Product (PROBIOTIC DAILY PO), Take 1 tablet by mouth Daily., Disp: , Rfl:   •  rosuvastatin (CRESTOR) 10 MG tablet, TAKE 1 TABLET EVERY DAY, Disp: 90 tablet, Rfl: 0  •  senna-docusate (SENNA-S) 8.6-50 MG per tablet, Take 1 tablet by mouth 2  (Two) Times a Day., Disp: , Rfl:   •  tamsulosin (FLOMAX) 0.4 MG capsule 24 hr capsule, Take 1 capsule by mouth Daily. (Patient taking differently: Take 0.4 mg by mouth 2 (Two) Times a Day.), Disp: 30 capsule, Rfl:   •  TOUJEO MAX SOLOSTAR 300 UNIT/ML solution pen-injector injection, Inject 100 Units under the skin into the appropriate area as directed Daily With Breakfast., Disp: 4 pen, Rfl: 5  •  traZODone (DESYREL) 150 MG tablet, TAKE 1/2 - 2 TABLETS BY MOUTH EVERY NIGHT AT BEDTIME AS NEEDED FOR SLEEP, Disp: 180 tablet, Rfl: 3  •  Turmeric 450 MG capsule, Take 450 mg by mouth Daily. HOLD FOR SURGERY, Disp: , Rfl:   •  vitamin C (ASCORBIC ACID) 500 MG tablet, Take 500 mg by mouth Daily., Disp: , Rfl:   •  vitamin D (ERGOCALCIFEROL) 87988 units capsule capsule, TAKE 1 CAPSULE ONE TIME WEEKLY, Disp: 13 capsule, Rfl: 1  •  Wound Dressings (MEDIHONEY WOUND/BURN DRESSING) gel, Apply as directed, Disp: 44 mL, Rfl: 3      The following portions of the patient's history were reviewed and updated as appropriate: allergies, current medications, past family history, past medical history, past social history, past surgical history and problem list.    Review of Systems   Constitutional: Negative.    HENT: Negative.    Eyes: Negative.    Respiratory: Negative.    Cardiovascular: Negative.    Gastrointestinal: Negative.    Endocrine: Negative.    Genitourinary: Negative.    Musculoskeletal: Negative.    Skin: Negative.    Allergic/Immunologic: Negative.    Neurological: Negative.    Hematological: Negative.    Psychiatric/Behavioral: Negative.    The above review of systems were reviewed, corroborated and accepted.    Objective   Physical Exam   Constitutional: He is oriented to person, place, and time. He appears well-developed and well-nourished. No distress.   HENT:   Head: Normocephalic and atraumatic.   Right Ear: External ear normal.   Left Ear: External ear normal.   Nose: Nose normal.   Eyes: Pupils are equal, round,  and reactive to light. Right eye exhibits no discharge. Left eye exhibits no discharge.   Neck: Normal range of motion. Neck supple. Carotid bruit is not present. No tracheal deviation, no edema and no erythema present. No thyromegaly present.   Cardiovascular: Normal rate, regular rhythm, normal heart sounds and intact distal pulses. Exam reveals no gallop and no friction rub.   No murmur heard.  Pulmonary/Chest: Effort normal and breath sounds normal. No respiratory distress. He has no wheezes. He has no rales.   Abdominal: Soft. Bowel sounds are normal. He exhibits no distension. There is no tenderness.   Genitourinary:   Genitourinary Comments: Has azul cath  Neurogenic bladder   Musculoskeletal: He exhibits edema. He exhibits no deformity.   In wheelchair  Can walk with a walker about 60 feet  Swelling in left leg, foot drop  Wearing back brace  Ace wrap on left lower extremity  1+ edema bilateral lower extremities   Lymphadenopathy:     He has no cervical adenopathy.   Neurological: He is alert and oriented to person, place, and time. Coordination normal.   Skin: Skin is warm and dry. No rash noted. He is not diaphoretic. No erythema. No pallor.   Lipoatrophy in abdomen. Instruction in site rotation  Skin dry and flaky   Psychiatric: He has a normal mood and affect. His behavior is normal. Judgment and thought content normal.   Nursing note and vitals reviewed.  No significant change since 9/5/2019 office visit.  .lastcmp  Lab Results   Component Value Date    HGBA1C 8.50 (H) 02/18/2020     Lab Results   Component Value Date    TSH 4.090 02/18/2020     Lab Results   Component Value Date    CHLPL 136 02/18/2020    CHLPL 110 08/22/2019    CHLPL 137 01/08/2019     Lab Results   Component Value Date    TRIG 212 (H) 02/18/2020    TRIG 220 (H) 08/22/2019    TRIG 225 (H) 01/08/2019     Lab Results   Component Value Date    HDL 31 (L) 02/18/2020    HDL 29 (L) 08/22/2019    HDL 34 (L) 01/08/2019     Lab Results    Component Value Date    LDL 63 02/18/2020    LDL 37 08/22/2019    LDL 58 01/08/2019         Assessment/Plan   Jesus was seen today for diabetes.    Diagnoses and all orders for this visit:    Uncontrolled type 2 diabetes mellitus with hyperglycemia (CMS/[facility])  -     T4 & TSH ([facility]); Future  -     Uric Acid; Future  -     Vitamin D 25 Hydroxy; Future  -     Comprehensive Metabolic Panel; Future  -     C-Peptide; Future  -     Hemoglobin A1c; Future  -     Lipid Panel; Future  -     MicroAlbumin, Urine, Random - Urine, Clean Catch; Future    Essential hypertension  -     T4 & TSH ([facility]); Future  -     Uric Acid; Future  -     Vitamin D 25 Hydroxy; Future  -     Comprehensive Metabolic Panel; Future  -     C-Peptide; Future  -     Hemoglobin A1c; Future  -     Lipid Panel; Future  -     MicroAlbumin, Urine, Random - Urine, Clean Catch; Future    Vitamin D deficiency  -     T4 & TSH ([facility]); Future  -     Uric Acid; Future  -     Vitamin D 25 Hydroxy; Future  -     Comprehensive Metabolic Panel; Future  -     C-Peptide; Future  -     Hemoglobin A1c; Future  -     Lipid Panel; Future  -     MicroAlbumin, Urine, Random - Urine, Clean Catch; Future    Dyslipidemia  -     T4 & TSH ([facility]); Future  -     Uric Acid; Future  -     Vitamin D 25 Hydroxy; Future  -     Comprehensive Metabolic Panel; Future  -     C-Peptide; Future  -     Hemoglobin A1c; Future  -     Lipid Panel; Future  -     MicroAlbumin, Urine, Random - Urine, Clean Catch; Future    History of sepsis  -     T4 & TSH ([facility]); Future  -     Uric Acid; Future  -     Vitamin D 25 Hydroxy; Future  -     Comprehensive Metabolic Panel; Future  -     C-Peptide; Future  -     Hemoglobin A1c; Future  -     Lipid Panel; Future  -     MicroAlbumin, Urine, Random - Urine, Clean Catch; Future    Hyperuricemia  -     T4 & TSH ([facility]); Future  -     Uric Acid; Future  -     Vitamin D 25 Hydroxy; Future  -     Comprehensive Metabolic Panel; Future  -      C-Peptide; Future  -     Hemoglobin A1c; Future  -     Lipid Panel; Future  -     MicroAlbumin, Urine, Random - Urine, Clean Catch; Future    Other orders  -     TOUJEO MAX SOLOSTAR 300 UNIT/ML solution pen-injector injection; Inject 112 Units under the skin into the appropriate area as directed Daily With Breakfast.  -     ergocalciferol (DRISDOL) 1.25 MG (40286 UT) capsule; Take 1 capsule by mouth 2 (Two) Times a Week.      In summary I saw and examined this 79-year-old gentleman for above-mentioned problems.  I reviewed his laboratory evaluations of 2/18/2020 and provided him and his wife who was present during this office visit with a hard copy of it.  His hemoglobin A1c is 8.5 otherwise rest of his laboratory evaluation shows that he is clinically and metabolically stable.  He asked to be given a chance and see if he can avoid going to the hospital and also he is on the waiting list to go to [facility] rehab which will give him physical therapy 3 days a week.  I will see him in 6 months or sooner if needed with laboratory evaluation prior to each office visit.

## 2020-03-25 RX ORDER — PREGABALIN 100 MG/1
100 CAPSULE ORAL 3 TIMES DAILY
Qty: 270 CAPSULE | Refills: 1 | Status: SHIPPED | OUTPATIENT
Start: 2020-03-25 | End: 2020-10-09 | Stop reason: SDUPTHER

## 2020-03-25 NOTE — TELEPHONE ENCOUNTER
pregabalin 100 mg   Please send script to   Send to [facility] pharmacy  90 day supply    Patient is just about out

## 2020-04-19 RX ORDER — MECOBAL/LEVOMEFOLAT CA/B6 PHOS 2-3-35 MG
TABLET ORAL
Qty: 180 TABLET | Refills: 0 | Status: SHIPPED | OUTPATIENT
Start: 2020-04-19 | End: 2020-06-10

## 2020-04-27 RX ORDER — FENOFIBRATE 145 MG/1
TABLET, COATED ORAL
Qty: 90 TABLET | Refills: 0 | Status: SHIPPED | OUTPATIENT
Start: 2020-04-27 | End: 2020-06-25

## 2020-04-27 RX ORDER — EMPAGLIFLOZIN AND LINAGLIPTIN 25; 5 MG/1; MG/1
TABLET, FILM COATED ORAL
Qty: 90 TABLET | Refills: 0 | Status: SHIPPED | OUTPATIENT
Start: 2020-04-27 | End: 2020-06-25

## 2020-04-27 RX ORDER — ROSUVASTATIN CALCIUM 10 MG/1
TABLET, COATED ORAL
Qty: 90 TABLET | Refills: 0 | Status: SHIPPED | OUTPATIENT
Start: 2020-04-27 | End: 2020-07-30

## 2020-04-27 RX ORDER — GLIPIZIDE 10 MG/1
TABLET ORAL
Qty: 180 TABLET | Refills: 0 | Status: SHIPPED | OUTPATIENT
Start: 2020-04-27 | End: 2020-06-25

## 2020-05-23 DIAGNOSIS — F51.01 PRIMARY INSOMNIA: ICD-10-CM

## 2020-05-23 DIAGNOSIS — M10.00 IDIOPATHIC GOUT, UNSPECIFIED CHRONICITY, UNSPECIFIED SITE: ICD-10-CM

## 2020-05-26 RX ORDER — TRAZODONE HYDROCHLORIDE 150 MG/1
TABLET ORAL
Qty: 180 TABLET | Refills: 3 | OUTPATIENT
Start: 2020-05-26

## 2020-05-26 RX ORDER — ALLOPURINOL 300 MG/1
TABLET ORAL
Qty: 90 TABLET | Refills: 3 | OUTPATIENT
Start: 2020-05-26

## 2020-06-03 ENCOUNTER — OFFICE VISIT (OUTPATIENT)
Dept: FAMILY MEDICINE CLINIC | Facility: CLINIC | Age: 80
End: 2020-06-03

## 2020-06-03 VITALS
HEIGHT: 70 IN | DIASTOLIC BLOOD PRESSURE: 63 MMHG | TEMPERATURE: 97.7 F | HEART RATE: 70 BPM | SYSTOLIC BLOOD PRESSURE: 116 MMHG | RESPIRATION RATE: 16 BRPM | WEIGHT: 256 LBS | BODY MASS INDEX: 36.65 KG/M2

## 2020-06-03 DIAGNOSIS — M10.00 IDIOPATHIC GOUT, UNSPECIFIED CHRONICITY, UNSPECIFIED SITE: Primary | ICD-10-CM

## 2020-06-03 DIAGNOSIS — F51.01 PRIMARY INSOMNIA: ICD-10-CM

## 2020-06-03 DIAGNOSIS — E11.65 UNCONTROLLED TYPE 2 DIABETES MELLITUS WITH HYPERGLYCEMIA (HCC): ICD-10-CM

## 2020-06-03 DIAGNOSIS — J30.1 CHRONIC SEASONAL ALLERGIC RHINITIS DUE TO POLLEN: ICD-10-CM

## 2020-06-03 PROCEDURE — 99214 OFFICE O/P EST MOD 30 MIN: CPT | Performed by: FAMILY MEDICINE

## 2020-06-03 RX ORDER — TRAZODONE HYDROCHLORIDE 150 MG/1
TABLET ORAL
Qty: 180 TABLET | Refills: 3 | Status: SHIPPED | OUTPATIENT
Start: 2020-06-03 | End: 2021-06-10 | Stop reason: SDUPTHER

## 2020-06-03 RX ORDER — MAGNESIUM HYDROXIDE 1200 MG/15ML
LIQUID ORAL
COMMUNITY
Start: 2020-04-04 | End: 2021-09-13

## 2020-06-03 RX ORDER — ALLOPURINOL 300 MG/1
300 TABLET ORAL DAILY
Qty: 90 TABLET | Refills: 3 | Status: SHIPPED | OUTPATIENT
Start: 2020-06-03 | End: 2021-03-24

## 2020-06-03 RX ORDER — FLUTICASONE PROPIONATE 50 MCG
2 SPRAY, SUSPENSION (ML) NASAL DAILY
Qty: 3 BOTTLE | Refills: 3 | Status: SHIPPED | OUTPATIENT
Start: 2020-06-03 | End: 2021-06-14 | Stop reason: SDUPTHER

## 2020-06-03 NOTE — PROGRESS NOTES
Subjective   Jesus Beckham is a 79 y.o. male.     History of Present Illness     Chief Complaint:   Chief Complaint   Patient presents with   • gout     med refill    • Insomnia   • Allergies   • diabetic shoe form completed     per pt         Jesus Beckham 79 y.o. male who presents today for Medical Management of the below listed issues and medication refills.  he has a problem list of   Patient Active Problem List   Diagnosis   • Gout   • Diabetic peripheral neuropathy (CMS/HCC)   • Dyslipidemia   • Uncontrolled type 2 diabetes mellitus (CMS/HCC)   • Essential hypertension   • PVC (premature ventricular contraction)   • Vitamin D deficiency   • Benign non-nodular prostatic hyperplasia without lower urinary tract symptoms   • Osteoarthritis   • Urge incontinence   • CELINA (obstructive sleep apnea)   • Acute gangrenous cholecystitis   • Discitis of lumbar region   • Paroxysmal atrial fibrillation (CMS/HCC)   • History of sepsis   • Hyperuricemia   • Chronic deep vein thrombosis (DVT) of right peroneal vein   • Nausea, vomiting, and diarrhea   • Colitis presumed infectious   • Urinary tract infection in male   • DDD (degenerative disc disease), lumbar   • Bilateral leg weakness   • History of lumbar spinal fusion   • Carpal tunnel syndrome   • Adhesive arachnoiditis   • Heart murmur   • Chronic anticoagulation   • Diverticulosis   • Hematuria   • Lower obstructive uropathy   • Splenic lesion   • Suprapubic catheter dysfunction (CMS/HCC)   .  Since the last visit, he has overall felt well.  he has been compliant with   Current Outpatient Medications:   •  ACCU-CHEK FASTCLIX LANCETS misc, TEST BLOOD GLUCOSE TWICE DAILY, Disp: 204 each, Rfl: 1  •  allopurinol (ZYLOPRIM) 300 MG tablet, Take 1 tablet by mouth Daily., Disp: 90 tablet, Rfl: 3  •  cadexomer iodine (IODOSORB) 0.9 % gel, Apply to left buttock wound daily, Disp: 10 g, Rfl: 4  •  CBD (cannabidiol) oral oil, Take 1 dose by mouth Daily., Disp: , Rfl:   •   Diaper Rash Products (DESITIN) ointment, Apply  topically to the appropriate area as directed., Disp: , Rfl:   •  digoxin (LANOXIN) 125 MCG tablet, TAKE 1 TABLET EVERY DAY, Disp: 90 tablet, Rfl: 2  •  diphenhydrAMINE-acetaminophen (TYLENOL PM EXTRA STRENGTH)  MG tablet per tablet, Take 2 tablets by mouth At Night As Needed for Sleep., Disp: , Rfl:   •  ELIQUIS 5 MG tablet tablet, TAKE 1 TABLET BY MOUTH EVERY 12 HOURS., Disp: 180 tablet, Rfl: 2  •  ergocalciferol (DRISDOL) 1.25 MG (71022 UT) capsule, Take 1 capsule by mouth 2 (Two) Times a Week., Disp: 26 capsule, Rfl: 3  •  fenofibrate (TRICOR) 145 MG tablet, TAKE 1 TABLET EVERY DAY, Disp: 90 tablet, Rfl: 0  •  fluticasone (FLONASE) 50 MCG/ACT nasal spray, 2 sprays into the nostril(s) as directed by provider Daily., Disp: 3 bottle, Rfl: 3  •  glipizide (GLUCOTROL) 10 MG tablet, TAKE 1 TABLET TWICE DAILY BEFORE MEALS, Disp: 180 tablet, Rfl: 0  •  glucose blood (ACCU-CHEK SMARTVIEW) test strip, TEST BLOOD GLUCOSE TWICE DAILY, Disp: 200 each, Rfl: 1  •  GLYXAMBI 25-5 MG tablet, TAKE 1 TABLET EVERY DAY WITH BREAKFAST, Disp: 90 tablet, Rfl: 0  •  HYDROcodone-acetaminophen (NORCO) 5-325 MG per tablet, Take 1 tablet by mouth., Disp: , Rfl:   •  hydrocortisone-bacitracin-zinc oxide-nystatin (MAGIC BARRIER), Apply 1 application topically to the appropriate area as directed As Needed., Disp: , Rfl:   •  Insulin Pen Needle 32G X 4 MM misc, INJECT UP TO THREE TIMES DAILY OR AS DIRECTED, Disp: 500 each, Rfl: 1  •  L-Methylfolate-B6-B12 3-35-2 MG tablet, TAKE 1 TABLET TWICE DAILY, Disp: 180 tablet, Rfl: 0  •  melatonin 5 MG tablet tablet, Take 5 mg by mouth Every Night., Disp: , Rfl:   •  Mirabegron ER (MYRBETRIQ) 25 MG tablet sustained-release 24 hour 24 hr tablet, Take 25 mg by mouth Daily., Disp: , Rfl:   •  Multiple Vitamins-Minerals (MULTIVITAMIN ADULT PO), Take 1 tablet by mouth Daily., Disp: , Rfl:   •  mupirocin (BACTROBAN) 2 % ointment, , Disp: , Rfl:   •  NYSTATIN  "005183 UNIT/GM topical powder, , Disp: , Rfl:   •  Omega-3 Fatty Acids (FISH OIL) 1200 MG capsule capsule, Take 2,000 mg by mouth 2 (Two) Times a Day With Meals. HOLD FOR SURGERY , Disp: , Rfl:   •  ondansetron (ZOFRAN) 4 MG tablet, Take 4 mg by mouth., Disp: , Rfl:   •  pregabalin (LYRICA) 100 MG capsule, Take 1 capsule by mouth 3 (Three) Times a Day., Disp: 270 capsule, Rfl: 1  •  Probiotic Product (PROBIOTIC DAILY PO), Take 1 tablet by mouth Daily., Disp: , Rfl:   •  rosuvastatin (CRESTOR) 10 MG tablet, TAKE 1 TABLET EVERY DAY, Disp: 90 tablet, Rfl: 0  •  senna-docusate (SENNA-S) 8.6-50 MG per tablet, Take 1 tablet by mouth 2 (Two) Times a Day., Disp: , Rfl:   •  sodium chloride (NS) 0.9 % irrigation,  MLS TO IRRIGATE CATHERTER QD PRN, Disp: , Rfl:   •  tamsulosin (FLOMAX) 0.4 MG capsule 24 hr capsule, Take 1 capsule by mouth Daily. (Patient taking differently: Take 0.4 mg by mouth 2 (Two) Times a Day.), Disp: 30 capsule, Rfl:   •  TOUJEO MAX SOLOSTAR 300 UNIT/ML solution pen-injector injection, Inject 112 Units under the skin into the appropriate area as directed Daily With Breakfast., Disp: 12 pen, Rfl: 3  •  traZODone (DESYREL) 150 MG tablet, TAKE 1/2 - 2 TABLETS BY MOUTH EVERY NIGHT AT BEDTIME AS NEEDED FOR SLEEP, Disp: 180 tablet, Rfl: 3  •  Turmeric 450 MG capsule, Take 450 mg by mouth Daily. HOLD FOR SURGERY, Disp: , Rfl:   •  vitamin C (ASCORBIC ACID) 500 MG tablet, Take 500 mg by mouth Daily., Disp: , Rfl:   •  Wound Dressings (MEDIHONEY WOUND/BURN DRESSING) gel, Apply as directed, Disp: 44 mL, Rfl: 3.  he denies medication side effects.    All of the other chronic condition(s) listed above are stable w/o issues.    /63   Pulse 70   Temp 97.7 °F (36.5 °C) (Oral)   Resp 16   Ht 177.8 cm (70\")   Wt 116 kg (256 lb)   BMI 36.73 kg/m²     Results for orders placed or performed in visit on 11/12/19   Adult Transthoracic Echo Complete W/ Cont if Necessary Per Protocol   Result Value Ref Range "    BSA 2.1 m^2    IVSd 1.2 cm    LVIDd 4.5 cm    LVIDs 2.8 cm    LVPWd 1.4 cm    IVS/LVPW 0.9     EDV(Teich) 93.3 ml    ESV(Teich) 29.9 ml    EF(Teich) 68.0 %    EDV(cubed) 92.2 ml    ESV(cubed) 22.2 ml    EF(cubed) 75.9 %    LV mass(C)d 227.3 grams    LV mass(C)dI 107.8 grams/m^2    SV(Teich) 63.4 ml    SI(Teich) 30.1 ml/m^2    SV(cubed) 69.9 ml    SI(cubed) 33.2 ml/m^2    Ao root diam 3.5 cm    Ao root area 9.6 cm^2    ACS 0.93 cm    asc Aorta Diam 3.0 cm    LVOT diam 2.3 cm    LVOT area 4.3 cm^2    LVOT area(traced) 4.2 cm^2    RVOT diam 2.1 cm    RVOT area 3.6 cm^2    LVLd ap4 6.7 cm    EDV(MOD-sp4) 74.0 ml    LVLs ap4 6.2 cm    ESV(MOD-sp4) 25.0 ml    LVLd ap2 7.7 cm    EDV(MOD-sp2) 86.0 ml    LVLs ap2 6.4 cm    ESV(MOD-sp2) 26.0 ml    SV(MOD-sp4) 49.0 ml    SI(MOD-sp4) 23.2 ml/m^2    SV(MOD-sp2) 60.0 ml    SI(MOD-sp2) 28.5 ml/m^2    Ao root area (BSA corrected) 1.7     LV Fitzpatrick Vol (BSA corrected) 35.1 ml/m^2    LV Sys Vol (BSA corrected) 11.9 ml/m^2    MV A dur 0.19 sec    MV E max daxa 41.2 cm/sec    MV A max daxa 60.1 cm/sec    MV E/A 0.68     MV V2 max 69.5 cm/sec    MV max PG 1.9 mmHg    MV V2 mean 47.0 cm/sec    MV mean PG 0.95 mmHg    MV V2 VTI 18.2 cm    MVA(VTI) 3.6 cm^2    MV P1/2t max daxa 49.0 cm/sec    MV P1/2t 89.0 msec    MVA(P1/2t) 2.5 cm^2    MV dec slope 161.3 cm/sec^2    MV dec time 0.29 sec    Ao pk daxa 238.9 cm/sec    Ao max PG 22.8 mmHg    Ao max PG (full) 20.0 mmHg    Ao V2 mean 177.5 cm/sec    Ao mean PG 13.7 mmHg    Ao mean PG (full) 12.1 mmHg    Ao V2 VTI 44.5 cm    ADELA(I,A) 1.5 cm^2    ADELA(I,D) 1.5 cm^2    ADELA(V,A) 1.5 cm^2    ADELA(V,D) 1.5 cm^2    LV V1 max PG 2.8 mmHg    LV V1 mean PG 1.6 mmHg    LV V1 max 84.4 cm/sec    LV V1 mean 59.9 cm/sec    LV V1 VTI 15.0 cm    SV(Ao) 426.0 ml    SI(Ao) 202.0 ml/m^2    SV(LVOT) 65.2 ml    SV(RVOT) 62.0 ml    SI(LVOT) 30.9 ml/m^2    PA V2 max 172.4 cm/sec    PA max PG 11.9 mmHg    PA max PG (full) 9.7 mmHg    BH CV ECHO ANIL - PVA(V,A) 1.5 cm^2      CV ECHO ANIL - PVA(V,D) 1.5 cm^2    RV V1 max PG 2.2 mmHg    RV V1 mean PG 1.3 mmHg    RV V1 max 74.7 cm/sec    RV V1 mean 54.2 cm/sec    RV V1 VTI 17.4 cm    Pulm Sys Jorge 42.1 cm/sec    Pulm Fitzpatrick Jorge 25.7 cm/sec    Pulm S/D 1.6     Qp/Qs 0.95     Pulm A Revs Dur 0.13 sec    Pulm A Revs Jorge 46.5 cm/sec    MVA P1/2T LCG 4.5 cm^2     CV ECHO ANIL - BZI_BMI 46.1 kilograms/m^2     CV ECHO ANIL - BSA(HAYCOCK) 2.3 m^2     CV ECHO ANIL - BZI_METRIC_WEIGHT 114.3 kg     CV ECHO ANLI - BZI_METRIC_HEIGHT 157.5 cm    TDI S' 14.00 cm/sec    RV Base 2.40 cm    RV Length 6.90 cm    RV Mid 1.70 cm    Ao root annulus 2.20 cm    Sinus 3.30 cm    STJ 2.80 cm    Ascending aorta 3.00 cm    Dimensionless Index 0.3 (DI)    LA Volume Index 16.0 mL/m2    ADELA (traced) 1.7 cm2    Avg E/e' ratio 5.89     Lat Peak E' Jorge 7.0 cm/sec    Med Peak E' Jorge 7.00 cm/sec    RAP systole 3 mmHg    EF - Contrast (2Ch) 69.0 cm2    EF - Contrast (4Ch) 66.0 cm2    TAPSE (>1.6) 2.10 cm2    Target HR (85%) 121 bpm    Max. Pred. HR (100%) 142 bpm           The following portions of the patient's history were reviewed and updated as appropriate: allergies, current medications, past family history, past medical history, past social history, past surgical history and problem list.    Review of Systems   Constitutional: Negative for activity change, chills, fatigue and fever.   Respiratory: Negative for cough and shortness of breath.    Cardiovascular: Negative for chest pain and palpitations.   Gastrointestinal: Negative for abdominal pain.   Endocrine: Negative for cold intolerance.   Psychiatric/Behavioral: Negative for behavioral problems and dysphoric mood. The patient is not nervous/anxious.        Objective   Physical Exam   Constitutional: He appears well-developed and well-nourished.   Neck: Neck supple. No thyromegaly present.   Cardiovascular: Normal rate and regular rhythm.   Murmur heard.   Systolic murmur is present with a grade of  2/6.  Pulmonary/Chest: Effort normal and breath sounds normal.   Abdominal: Bowel sounds are normal. There is no tenderness.    Jesus had a diabetic foot exam performed today.   During the foot exam he had a monofilament test performed.    Neurological Sensory Findings - Unaltered hot/cold right ankle/foot discrimination and unaltered hot/cold left ankle/foot discrimination. Altered sharp/dull right ankle/foot discrimination and altered sharp/dull left ankle/foot discrimination. Right ankle/foot altered proprioception and left ankle/foot altered proprioception.  Vascular Status -  His right foot exhibits normal foot vasculature  and no edema. His left foot exhibits normal foot vasculature  and no edema.  Skin Integrity  -  His right foot skin is intact.His left foot skin is intact..  Psychiatric: He has a normal mood and affect. His behavior is normal.   Nursing note and vitals reviewed.  Labs from endocrine reviewed at today's visit.    Assessment/Plan   Jesus was seen today for gout, insomnia, allergies and diabetic shoe form completed.    Diagnoses and all orders for this visit:    Idiopathic gout, unspecified chronicity, unspecified site  -     allopurinol (ZYLOPRIM) 300 MG tablet; Take 1 tablet by mouth Daily.    Primary insomnia  -     traZODone (DESYREL) 150 MG tablet; TAKE 1/2 - 2 TABLETS BY MOUTH EVERY NIGHT AT BEDTIME AS NEEDED FOR SLEEP    Chronic seasonal allergic rhinitis due to pollen  -     fluticasone (FLONASE) 50 MCG/ACT nasal spray; 2 sprays into the nostril(s) as directed by provider Daily.    Uncontrolled type 2 diabetes mellitus with hyperglycemia (CMS/[facility])

## 2020-06-10 RX ORDER — MECOBAL/LEVOMEFOLAT CA/B6 PHOS 2-3-35 MG
TABLET ORAL
Qty: 180 TABLET | Refills: 0 | Status: SHIPPED | OUTPATIENT
Start: 2020-06-10 | End: 2020-09-10

## 2020-06-22 ENCOUNTER — TELEPHONE (OUTPATIENT)
Dept: NEUROSURGERY | Facility: CLINIC | Age: 80
End: 2020-06-22

## 2020-06-22 NOTE — TELEPHONE ENCOUNTER
Patient called back to say he cannot do the telephone visit with Dr. Bailon this Wednesday and was asking if he could change it to this Friday? I did not know if that is possible. Pt number is [phone].

## 2020-06-22 NOTE — TELEPHONE ENCOUNTER
I l/m to verify the possible date as it reads 11/26/20 and that is 5 months away. When he calls he can be r/s'd by anyone.

## 2020-06-22 NOTE — TELEPHONE ENCOUNTER
Patient called back and actually wants to do a telephone visit this Friday instead. Pt number is [phone].

## 2020-06-22 NOTE — TELEPHONE ENCOUNTER
Patient called and said he is unable to do the Telephone Visit scheduled for Wednesday 6/24/20 at 11:45 AM with Dr. Bailon. Patient said 11/26/20 would work for him. Please call patient back at [phone]. Thanks.

## 2020-06-24 ENCOUNTER — TELEPHONE (OUTPATIENT)
Dept: NEUROSURGERY | Facility: CLINIC | Age: 80
End: 2020-06-24

## 2020-06-24 NOTE — TELEPHONE ENCOUNTER
Called patient to check him in for televisit today and had to leave a message for him to call back

## 2020-06-25 RX ORDER — EMPAGLIFLOZIN AND LINAGLIPTIN 25; 5 MG/1; MG/1
TABLET, FILM COATED ORAL
Qty: 90 TABLET | Refills: 0 | Status: SHIPPED | OUTPATIENT
Start: 2020-06-25 | End: 2021-06-09 | Stop reason: SDUPTHER

## 2020-06-25 RX ORDER — GLIPIZIDE 10 MG/1
TABLET ORAL
Qty: 180 TABLET | Refills: 0 | Status: SHIPPED | OUTPATIENT
Start: 2020-06-25 | End: 2021-06-09 | Stop reason: SDUPTHER

## 2020-06-25 RX ORDER — FENOFIBRATE 145 MG/1
TABLET, COATED ORAL
Qty: 90 TABLET | Refills: 0 | Status: SHIPPED | OUTPATIENT
Start: 2020-06-25 | End: 2020-09-04

## 2020-06-26 ENCOUNTER — TELEPHONE (OUTPATIENT)
Dept: NEUROSURGERY | Facility: CLINIC | Age: 80
End: 2020-06-26

## 2020-06-26 NOTE — TELEPHONE ENCOUNTER
PATIENT CALLED TO RESCHEDULE IS TELEVISIT.  PATIENT HAD A COUPLE OF EMERGENCIES WHICH REMOVED HIM FROM THE TELEPHONE TO REC'D HIS PREVIOUS TELEPHONE VISIT CALL.  PLEASE CALL PATIENT TO RESCHEDULE.    [phone]

## 2020-07-20 ENCOUNTER — TELEPHONE (OUTPATIENT)
Dept: ENDOCRINOLOGY | Age: 80
End: 2020-07-20

## 2020-07-20 NOTE — TELEPHONE ENCOUNTER
Pt is needing a new script along with a refill on medication I tried to call pt back to get mediation name but he did not answer,

## 2020-07-30 RX ORDER — ROSUVASTATIN CALCIUM 10 MG/1
TABLET, COATED ORAL
Qty: 90 TABLET | Refills: 0 | Status: SHIPPED | OUTPATIENT
Start: 2020-07-30 | End: 2020-09-25

## 2020-08-06 NOTE — PROGRESS NOTES
Subjective   History of Present Illness: Jesus Beckham is a 79 y.o. male for televisit follow up after PT and consultations with Dr Rinaldi and Tong.  Patient states that PT at [facility] is helping a lot, however,he has not made an appt to see Dr Rinaldi about investigational trial for SCS.    He saw his urologist today and had cystoscopy which showed no cancer, he still has a catheter    Patient was last seen 2.10.20 for leg pain, weakness, problems with his balance and gait and urinary incontinence.    Patient states that he is not currently having a lot of low back pain, but has bilateral leg pain, numbness bilateral lower extremities, however, he feels that his legs are not as weak and are getting stronger and his balance is improving slowly, he is walking every day    You have chosen to receive care through a telephone visit. Do you consent to use a telephone visit for your medical care today? Yes    This was a tele-visit from my office.  He was at home.  It lasted 7 minutes.    It has been about 6 months since I have seen him. He was able to get enrolled in physical therapy at [facility] and is making great progress. Apparently he is walking without assistance for as much as 500 steps. He was confined to a wheelchair the last time I saw him so he is very pleased with the results. Obviously I endorse whatever they are doing at [facility]. Will keep an eye on him and I will see him in 6 months.       Difficulty Walking   The problem occurs constantly. The problem has been gradually improving. Associated symptoms include numbness and weakness. Pertinent negatives include no arthralgias, joint swelling or myalgias.   Extremity Weakness    The pain is present in the left lower leg, left upper leg, right upper leg and right lower leg. The problem occurs constantly. The problem has been unchanged. Associated symptoms include numbness.   Leg Pain    The pain is present in the left leg and right leg. The pain is at a  severity of 5/10. Associated symptoms include numbness.       The following portions of the patient's history were reviewed and updated as appropriate: allergies, current medications, past family history, past medical history, past social history, past surgical history and problem list.    Review of Systems   HENT: Negative.    Eyes: Negative.    Respiratory: Negative.    Cardiovascular: Negative.    Gastrointestinal: Negative.    Endocrine: Negative.    Genitourinary: Positive for urgency.   Musculoskeletal: Positive for back pain and extremity weakness. Negative for arthralgias, gait problem, joint swelling and myalgias.   Allergic/Immunologic: Negative.    Neurological: Positive for weakness and numbness.   Hematological: Negative.    Psychiatric/Behavioral: Negative.        Objective             Physical Exam   Deferred  Neurologic Exam   Deferred        Assessment/Plan   Independent Review of Radiographic Studies:      I personally reviewed the images from the following studies.    I reviewed the lumbar MRI that was done before his surgery on 3/5/2018 which showed evidence of discitis at L2-L3 and L3-L4 and evidence of an epidural abscess.  I reviewed x-rays done in February 2019 which showed good positioning of the hardware and interbody cages from L2-L4.  Agree with the report.    Medical Decision Making:      He is making great progress at [facility].  He will continue that.  I will see him in 6 months in a face-to-face visit.      Jesus was seen today for leg pain, numbness, extremity weakness and difficulty walking.    Diagnoses and all orders for this visit:    Bilateral leg weakness    Adhesive arachnoiditis    History of lumbar spinal fusion      Return in about 6 months (around 2/12/2021).

## 2020-08-12 ENCOUNTER — OFFICE VISIT (OUTPATIENT)
Dept: NEUROSURGERY | Facility: CLINIC | Age: 80
End: 2020-08-12

## 2020-08-12 DIAGNOSIS — G03.9 ADHESIVE ARACHNOIDITIS: ICD-10-CM

## 2020-08-12 DIAGNOSIS — Z98.1 HISTORY OF LUMBAR SPINAL FUSION: ICD-10-CM

## 2020-08-12 DIAGNOSIS — R29.898 BILATERAL LEG WEAKNESS: Primary | ICD-10-CM

## 2020-08-12 PROCEDURE — 99213 OFFICE O/P EST LOW 20 MIN: CPT | Performed by: NEUROLOGICAL SURGERY

## 2020-08-24 ENCOUNTER — RESULTS ENCOUNTER (OUTPATIENT)
Dept: ENDOCRINOLOGY | Age: 80
End: 2020-08-24

## 2020-08-24 DIAGNOSIS — Z86.19 HISTORY OF SEPSIS: ICD-10-CM

## 2020-08-24 DIAGNOSIS — E78.5 DYSLIPIDEMIA: ICD-10-CM

## 2020-08-24 DIAGNOSIS — I10 ESSENTIAL HYPERTENSION: ICD-10-CM

## 2020-08-24 DIAGNOSIS — E79.0 HYPERURICEMIA: ICD-10-CM

## 2020-08-24 DIAGNOSIS — E11.65 UNCONTROLLED TYPE 2 DIABETES MELLITUS WITH HYPERGLYCEMIA (HCC): ICD-10-CM

## 2020-08-24 DIAGNOSIS — E55.9 VITAMIN D DEFICIENCY: ICD-10-CM

## 2020-09-04 RX ORDER — FENOFIBRATE 145 MG/1
TABLET, COATED ORAL
Qty: 90 TABLET | Refills: 0 | Status: SHIPPED | OUTPATIENT
Start: 2020-09-04 | End: 2020-12-17

## 2020-09-10 RX ORDER — MECOBAL/LEVOMEFOLAT CA/B6 PHOS 2-3-35 MG
TABLET ORAL
Qty: 180 TABLET | Refills: 0 | Status: SHIPPED | OUTPATIENT
Start: 2020-09-10 | End: 2020-12-17

## 2020-09-11 RX ORDER — APIXABAN 5 MG/1
TABLET, FILM COATED ORAL
Qty: 180 TABLET | Refills: 3 | Status: SHIPPED | OUTPATIENT
Start: 2020-09-11 | End: 2021-11-05

## 2020-09-17 ENCOUNTER — LAB (OUTPATIENT)
Dept: ENDOCRINOLOGY | Age: 80
End: 2020-09-17

## 2020-09-17 DIAGNOSIS — D73.89 SPLENIC LESION: ICD-10-CM

## 2020-09-17 DIAGNOSIS — E11.65 UNCONTROLLED TYPE 2 DIABETES MELLITUS WITH HYPERGLYCEMIA (HCC): Primary | ICD-10-CM

## 2020-09-17 DIAGNOSIS — E79.0 HYPERURICEMIA: ICD-10-CM

## 2020-09-17 DIAGNOSIS — E55.9 VITAMIN D DEFICIENCY: ICD-10-CM

## 2020-09-17 DIAGNOSIS — E11.65 UNCONTROLLED TYPE 2 DIABETES MELLITUS WITH HYPERGLYCEMIA (HCC): ICD-10-CM

## 2020-09-18 LAB
25(OH)D3+25(OH)D2 SERPL-MCNC: 50.2 NG/ML (ref 30–100)
ALBUMIN SERPL-MCNC: 4.3 G/DL (ref 3.5–5.2)
ALBUMIN/GLOB SERPL: 2.4 G/DL
ALP SERPL-CCNC: 73 U/L (ref 39–117)
ALT SERPL-CCNC: 49 U/L (ref 1–41)
AST SERPL-CCNC: 48 U/L (ref 1–40)
BILIRUB SERPL-MCNC: 0.4 MG/DL (ref 0–1.2)
BUN SERPL-MCNC: 18 MG/DL (ref 8–23)
BUN/CREAT SERPL: 16.4 (ref 7–25)
C PEPTIDE SERPL-MCNC: 3.3 NG/ML (ref 1.1–4.4)
CALCIUM SERPL-MCNC: 9 MG/DL (ref 8.6–10.5)
CHLORIDE SERPL-SCNC: 101 MMOL/L (ref 98–107)
CHOLEST SERPL-MCNC: 124 MG/DL (ref 0–200)
CO2 SERPL-SCNC: 23.9 MMOL/L (ref 22–29)
CREAT SERPL-MCNC: 1.1 MG/DL (ref 0.76–1.27)
FT4I SERPL CALC-MCNC: 1.6 (ref 1.2–4.9)
GLOBULIN SER CALC-MCNC: 1.8 GM/DL
GLUCOSE SERPL-MCNC: 141 MG/DL (ref 65–99)
HBA1C MFR BLD: 7.9 % (ref 4.8–5.6)
HDLC SERPL-MCNC: 25 MG/DL (ref 40–60)
INTERPRETATION: NORMAL
LDLC SERPL CALC-MCNC: 34 MG/DL (ref 0–100)
Lab: NORMAL
POTASSIUM SERPL-SCNC: 4.3 MMOL/L (ref 3.5–5.2)
PROT SERPL-MCNC: 6.1 G/DL (ref 6–8.5)
SODIUM SERPL-SCNC: 140 MMOL/L (ref 136–145)
T3FREE SERPL-MCNC: 2.5 PG/ML (ref 2–4.4)
T3RU NFR SERPL: 25 % (ref 24–39)
T4 FREE SERPL-MCNC: 1.11 NG/DL (ref 0.93–1.7)
T4 SERPL-MCNC: 6.5 UG/DL (ref 4.5–12)
TRIGL SERPL-MCNC: 324 MG/DL (ref 0–150)
TSH SERPL DL<=0.005 MIU/L-ACNC: 4.25 UIU/ML (ref 0.45–4.5)
UNABLE TO VOID: NORMAL
URATE SERPL-MCNC: 3.5 MG/DL (ref 3.4–7)
VLDLC SERPL CALC-MCNC: 64.8 MG/DL

## 2020-09-25 RX ORDER — ROSUVASTATIN CALCIUM 10 MG/1
TABLET, COATED ORAL
Qty: 90 TABLET | Refills: 0 | Status: SHIPPED | OUTPATIENT
Start: 2020-09-25 | End: 2021-06-09 | Stop reason: SDUPTHER

## 2020-09-28 ENCOUNTER — OFFICE VISIT (OUTPATIENT)
Dept: WOUND CARE | Facility: HOSPITAL | Age: 80
End: 2020-09-28

## 2020-09-28 PROCEDURE — G0463 HOSPITAL OUTPT CLINIC VISIT: HCPCS

## 2020-09-28 RX ORDER — DIGOXIN 125 MCG
TABLET ORAL
Qty: 90 TABLET | Refills: 0 | Status: SHIPPED | OUTPATIENT
Start: 2020-09-28 | End: 2020-11-17

## 2020-10-01 ENCOUNTER — OFFICE VISIT (OUTPATIENT)
Dept: ENDOCRINOLOGY | Age: 80
End: 2020-10-01

## 2020-10-01 VITALS
SYSTOLIC BLOOD PRESSURE: 124 MMHG | DIASTOLIC BLOOD PRESSURE: 62 MMHG | HEIGHT: 70 IN | BODY MASS INDEX: 35.86 KG/M2 | WEIGHT: 250.5 LBS

## 2020-10-01 DIAGNOSIS — E78.5 DYSLIPIDEMIA: ICD-10-CM

## 2020-10-01 DIAGNOSIS — E11.65 UNCONTROLLED TYPE 2 DIABETES MELLITUS WITH HYPERGLYCEMIA (HCC): Primary | ICD-10-CM

## 2020-10-01 DIAGNOSIS — E55.9 VITAMIN D DEFICIENCY: ICD-10-CM

## 2020-10-01 DIAGNOSIS — E79.0 HYPERURICEMIA: ICD-10-CM

## 2020-10-01 DIAGNOSIS — I10 ESSENTIAL HYPERTENSION: ICD-10-CM

## 2020-10-01 PROCEDURE — 99214 OFFICE O/P EST MOD 30 MIN: CPT | Performed by: NURSE PRACTITIONER

## 2020-10-01 RX ORDER — INSULIN GLARGINE 300 U/ML
116 INJECTION, SOLUTION SUBCUTANEOUS
Qty: 11.6 ML | Refills: 5 | Status: SHIPPED | OUTPATIENT
Start: 2020-10-01 | End: 2021-03-25

## 2020-10-01 NOTE — PROGRESS NOTES
"Subjective   Jesus Beckham is a 79 y.o. male is here today for follow-up.  Chief Complaint   Patient presents with   • Diabetes     F/U uncontrolled type 2 diabetes, recent labs, checks BS 2x a day,  has reading, recent eye exam 2020     /62   Ht 177.8 cm (70\")   Wt 114 kg (250 lb 8 oz)   BMI 35.94 kg/m²   Current Outpatient Medications on File Prior to Visit   Medication Sig   • ACCU-CHEK FASTCLIX LANCETS misc TEST BLOOD GLUCOSE TWICE DAILY   • allopurinol (ZYLOPRIM) 300 MG tablet Take 1 tablet by mouth Daily.   • cadexomer iodine (IODOSORB) 0.9 % gel Apply to left buttock wound daily   • CBD (cannabidiol) oral oil Take 1 dose by mouth Daily.   • Diaper Rash Products (DESITIN) ointment Apply  topically to the appropriate area as directed.   • digoxin (LANOXIN) 125 MCG tablet TAKE 1 TABLET EVERY DAY   • diphenhydrAMINE-acetaminophen (TYLENOL PM EXTRA STRENGTH)  MG tablet per tablet Take 2 tablets by mouth At Night As Needed for Sleep.   • ELIQUIS 5 MG tablet tablet TAKE 1 TABLET BY MOUTH EVERY 12 HOURS.   • ergocalciferol (DRISDOL) 1.25 MG (09459 UT) capsule Take 1 capsule by mouth 2 (Two) Times a Week.   • fenofibrate (TRICOR) 145 MG tablet TAKE 1 TABLET EVERY DAY   • fluticasone (FLONASE) 50 MCG/ACT nasal spray 2 sprays into the nostril(s) as directed by provider Daily.   • glipizide (GLUCOTROL) 10 MG tablet TAKE 1 TABLET TWICE DAILY BEFORE MEALS   • glucose blood (Accu-Chek SmartView) test strip CHECK BLOOD GLUCOSE TWICE DAILY   • GLYXAMBI 25-5 MG tablet TAKE 1 TABLET EVERY DAY WITH BREAKFAST   • hydrocortisone-bacitracin-zinc oxide-nystatin (MAGIC BARRIER) Apply 1 application topically to the appropriate area as directed As Needed.   • Insulin Pen Needle 32G X 4 MM misc INJECT UP TO THREE TIMES DAILY OR AS DIRECTED   • L-Methylfolate-B6-B12 3-35-2 MG tablet TAKE 1 TABLET TWICE DAILY   • melatonin 5 MG tablet tablet Take 5 mg by mouth Every Night.   • Mirabegron ER (MYRBETRIQ) 25 MG tablet " sustained-release 24 hour 24 hr tablet Take 25 mg by mouth Daily.   • Multiple Vitamins-Minerals (MULTIVITAMIN ADULT PO) Take 1 tablet by mouth Daily.   • mupirocin (BACTROBAN) 2 % ointment    • NYSTATIN 901043 UNIT/GM topical powder    • Omega-3 Fatty Acids (FISH OIL) 1200 MG capsule capsule Take 2,000 mg by mouth 2 (Two) Times a Day With Meals. HOLD FOR SURGERY    • ondansetron (ZOFRAN) 4 MG tablet Take 4 mg by mouth.   • pregabalin (LYRICA) 100 MG capsule Take 1 capsule by mouth 3 (Three) Times a Day.   • Probiotic Product (PROBIOTIC DAILY PO) Take 1 tablet by mouth Daily.   • rosuvastatin (CRESTOR) 10 MG tablet TAKE 1 TABLET EVERY DAY   • senna-docusate (SENNA-S) 8.6-50 MG per tablet Take 1 tablet by mouth 2 (Two) Times a Day.   • sodium chloride (NS) 0.9 % irrigation  MLS TO IRRIGATE CATHERTER QD PRN   • tamsulosin (FLOMAX) 0.4 MG capsule 24 hr capsule Take 1 capsule by mouth Daily. (Patient taking differently: Take 0.4 mg by mouth 2 (Two) Times a Day.)   • TOUJEO MAX SOLOSTAR 300 UNIT/ML solution pen-injector injection Inject 112 Units under the skin into the appropriate area as directed Daily With Breakfast.   • traZODone (DESYREL) 150 MG tablet TAKE 1/2 - 2 TABLETS BY MOUTH EVERY NIGHT AT BEDTIME AS NEEDED FOR SLEEP   • Turmeric 450 MG capsule Take 450 mg by mouth Daily. HOLD FOR SURGERY   • vitamin C (ASCORBIC ACID) 500 MG tablet Take 500 mg by mouth Daily.   • Wound Dressings ([facility] WOUND/BURN DRESSING) gel Apply as directed   • [DISCONTINUED] HYDROcodone-acetaminophen (NORCO) 5-325 MG per tablet Take 1 tablet by mouth.     No current facility-administered medications on file prior to visit.      Family History   Problem Relation Age of Onset   • Esophageal cancer Mother    • No Known Problems Father    • Diabetes Maternal Grandmother    • Heart attack Maternal Grandfather      Social History     Tobacco Use   • Smoking status: Former Smoker     Types: Cigars     Quit date: 2017     Years since  "quitting: 3.7   • Smokeless tobacco: Current User     Types: Chew   Substance Use Topics   • Alcohol use: No   • Drug use: No     Allergies   Allergen Reactions   • Levaquin [Levofloxacin] Other (See Comments)     Redness, itching at IV site with IV Levaquin administration   • Other Other (See Comments)     ALCOHOL. DEATHLY ILL.    • Alcohol Nausea And Vomiting     \"Deathly sick, headaches, cold sweats\"  Cant take any medication that contains alcohol         History of Present Illness   Encounter Diagnoses   Name Primary?   • Uncontrolled type 2 diabetes mellitus with hyperglycemia (CMS/[facility]) Yes   • Hyperuricemia    • Dyslipidemia    • Essential hypertension    • Vitamin D deficiency      Jesus is a 73-year-old male being seen for uncontrolled type 2 diabetes, hyperuricemia, dyslipidemia, hypertension, and vitamin D deficiency.  His labs were reviewed and he was provided a copy.  His medication list was reviewed for accuracy.  Patient states that he generally feels well but has tingling and neuropathy and his legs that is related to a back surgery.  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  Also has 2+ pitting edema bilaterally and lower extremities.  Patient's blood glucose readings have been 150s on average upon rising in the morning.  We discussed increasing his Toujeo from 112 units to 116 units.  Patient denies difficulty with breathing, heart palpitations.  He does not have issues with low blood sugars.  He is up-to-date on his eye exam and sees Dr. Fernandez at the eye [facility] [place].  He is up-to-date on his foot exam.  His triglyceride level was elevated but he is currently on medications.   The following portions of the patient's history were reviewed and updated as appropriate: allergies, current medications, past family history, past medical history, past social history, past surgical history and problem list.    Review of Systems   Constitutional: Negative for appetite " change, fatigue and unexpected weight change.   Cardiovascular: Negative.    Gastrointestinal: Negative for abdominal pain, constipation and diarrhea.   Endocrine: Negative.    Neurological: Positive for numbness (and tingling and burning sensation, neuropathy and  in hands). Negative for dizziness, light-headedness and headaches.   Psychiatric/Behavioral: Negative for sleep disturbance.       Objective   Physical Exam  Vitals signs and nursing note reviewed.   Constitutional:       General: He is not in acute distress.     Appearance: He is well-developed. He is not diaphoretic.   HENT:      Head: Normocephalic and atraumatic.      Right Ear: External ear normal.      Left Ear: External ear normal.      Nose: Nose normal.   Eyes:      General:         Right eye: No discharge.         Left eye: No discharge.      Pupils: Pupils are equal, round, and reactive to light.   Neck:      Musculoskeletal: Normal range of motion and neck supple. No edema or erythema.      Thyroid: No thyromegaly.      Vascular: No carotid bruit.      Trachea: No tracheal deviation.   Cardiovascular:      Rate and Rhythm: Normal rate and regular rhythm.      Heart sounds: Normal heart sounds. No murmur. No friction rub. No gallop.    Pulmonary:      Effort: Pulmonary effort is normal. No respiratory distress.      Breath sounds: Normal breath sounds. No wheezing or rales.   Abdominal:      General: Bowel sounds are normal. There is no distension.      Palpations: Abdomen is soft.      Tenderness: There is no abdominal tenderness.   Musculoskeletal: Normal range of motion.         General: No deformity.   Lymphadenopathy:      Cervical: No cervical adenopathy.   Skin:     General: Skin is warm and dry.      Coloration: Skin is not pale.      Findings: No erythema or rash.   Neurological:      Mental Status: He is alert and oriented to person, place, and time.      Coordination: Coordination normal.   Psychiatric:         Behavior: Behavior  normal.         Thought Content: Thought content normal.         Judgment: Judgment normal.       Results for orders placed or performed in visit on 09/17/20   C-Peptide    Specimen: Blood   Result Value Ref Range    C-Peptide 3.3 1.1 - 4.4 ng/mL   Thyroid Panel With TSH    Specimen: Blood   Result Value Ref Range    TSH 4.250 0.450 - 4.500 uIU/mL    T4, Total 6.5 4.5 - 12.0 ug/dL    T3 Uptake 25 24 - 39 %    Free Thyroxine Index 1.6 1.2 - 4.9   T4, Free    Specimen: Blood   Result Value Ref Range    Free T4 1.11 0.93 - 1.70 ng/dL   T3, Free    Specimen: Blood   Result Value Ref Range    T3, Free 2.5 2.0 - 4.4 pg/mL   Vitamin D 25 Hydroxy    Specimen: Blood   Result Value Ref Range    25 Hydroxy, Vitamin D 50.2 30.0 - 100.0 ng/ml   Lipid Panel    Specimen: Blood   Result Value Ref Range    Total Cholesterol 124 0 - 200 mg/dL    Triglycerides 324 (H) 0 - 150 mg/dL    HDL Cholesterol 25 (L) 40 - 60 mg/dL    VLDL Cholesterol Lance 64.8 mg/dL    LDL Chol Calc (NIH) 34 0 - 100 mg/dL   Hemoglobin A1c    Specimen: Blood   Result Value Ref Range    Hemoglobin A1C 7.90 (H) 4.80 - 5.60 %   Comprehensive Metabolic Panel    Specimen: Blood   Result Value Ref Range    Glucose 141 (H) 65 - 99 mg/dL    BUN 18 8 - 23 mg/dL    Creatinine 1.10 0.76 - 1.27 mg/dL    eGFR Non African Am 65 >60 mL/min/1.73    eGFR African Am 78 >60 mL/min/1.73    BUN/Creatinine Ratio 16.4 7.0 - 25.0    Sodium 140 136 - 145 mmol/L    Potassium 4.3 3.5 - 5.2 mmol/L    Chloride 101 98 - 107 mmol/L    Total CO2 23.9 22.0 - 29.0 mmol/L    Calcium 9.0 8.6 - 10.5 mg/dL    Total Protein 6.1 6.0 - 8.5 g/dL    Albumin 4.30 3.50 - 5.20 g/dL    Globulin 1.8 gm/dL    A/G Ratio 2.4 g/dL    Total Bilirubin 0.4 0.0 - 1.2 mg/dL    Alkaline Phosphatase 73 39 - 117 U/L    AST (SGOT) 48 (H) 1 - 40 U/L    ALT (SGPT) 49 (H) 1 - 41 U/L   Uric Acid    Specimen: Blood   Result Value Ref Range    Uric Acid 3.5 3.4 - 7.0 mg/dL   Unable To Void   Result Value Ref Range    Unable to  Void Comment    Cardiovascular Risk Assessment   Result Value Ref Range    Interpretation Note    Diabetes Patient Education   Result Value Ref Range    PDF Image Not applicable          Assessment/Plan   Problems Addressed this Visit        Cardiovascular and Mediastinum    Essential hypertension       Digestive    Vitamin D deficiency       Endocrine    Uncontrolled type 2 diabetes mellitus (CMS/[facility]) - Primary       Other    Dyslipidemia    Hyperuricemia      Diagnoses       Codes Comments    Uncontrolled type 2 diabetes mellitus with hyperglycemia (CMS/[facility])    -  Primary ICD-10-CM: E11.65  ICD-9-CM: 250.02     Hyperuricemia     ICD-10-CM: E79.0  ICD-9-CM: 790.6     Dyslipidemia     ICD-10-CM: E78.5  ICD-9-CM: 272.4     Essential hypertension     ICD-10-CM: I10  ICD-9-CM: 401.9     Vitamin D deficiency     ICD-10-CM: E55.9  ICD-9-CM: 268.9         In summary Jesus is a 79-year-old male who was seen and physically examined for uncontrolled type 2 diabetes, hyperuricemia, dyslipidemia, hypertension, and vitamin D deficiency.  Labs reviewed near provided a copy.  His medication list was reviewed for accuracy.   Uncontrolled type 2 diabetes: Jesus currently takes Toujeo, Glyxambi, and glipizide.  His A1c had improved from 8.5-7.9.  Blood sugars are taken every morning and average around 150.  He has increasing his Toujeo from 112 units every morning to 116 units.  Instructed him to reach out if he is consistently having blood sugars below 70.  He is up-to-date on his eye exam and sees Dr. Fernandez at the eye [facility] [place].  He is foot exam is up-to-date.  Patient has numbness and tingling in his legs that is related to a previous back surgery.  He is wheelchair-bound.  He also has tingling in his hands that is also related to the back surgery.  Patient has 2+ pitting edema bilaterally lower extremities.    Hyperuricemia: Patient's uric acid level is in goal range.  He currently takes allopurinol 300 mg daily.  He  denies kidney stone and unilateral joint pain.  Dyslipidemia: Triglyceride level is above goal range but has improved from previous visit.  He is currently taking an omega-3 fatty acid and fenofibrate and rosuvastatin.  We discussed lifestyle modification with further reducing his triglyceride level.  Hypertension: Patient blood pressure is stable at 124/62.  D deficiency: Patient's vitamin D level is at goal range.  Is currently taking ergocalciferol 50,000 units twice a week.  All patient questions were answered and he was instructed to schedule follow-up appointment for 6 months with labs prior to the visit.  He is active in my chart and was encouraged to send a message or notify the office if he has any questions or concerns.    Increase toujeo to 116 units daily

## 2020-10-09 RX ORDER — PREGABALIN 100 MG/1
100 CAPSULE ORAL 3 TIMES DAILY
Qty: 270 CAPSULE | Refills: 0 | Status: SHIPPED | OUTPATIENT
Start: 2020-10-09 | End: 2021-01-22 | Stop reason: SDUPTHER

## 2020-10-26 ENCOUNTER — OFFICE VISIT (OUTPATIENT)
Dept: WOUND CARE | Facility: HOSPITAL | Age: 80
End: 2020-10-26

## 2020-11-16 ENCOUNTER — OFFICE VISIT (OUTPATIENT)
Dept: WOUND CARE | Facility: HOSPITAL | Age: 80
End: 2020-11-16

## 2020-11-16 PROCEDURE — 97602 WOUND(S) CARE NON-SELECTIVE: CPT

## 2020-11-16 NOTE — PROGRESS NOTES
Date of Office Visit: 2020  Encounter Provider: DANNY Coleman  Primary Cardiologist: Dr. Burr  Place of Service: [facility] CARDIOLOGY  Patient Name: Jesus Beckham  :1940      Subjective:     Chief Complaint:  Yearly follow up PAF    History of Present Illness:  Jesus Beckham is a pleasant 80 y.o. male who is new to me .  Outside records have been requested and reviewed by me if available.     This is a patient of Dr. Burr with a history of paroxysmal atrial fibrillation, prior right lower extremity DVT prior IVC filter removed in , chronic lower extremity edema, hypertension, dyslipidemia, type 2 diabetes, bladder cancer with chronic indwelling catheter, chronic low back pain, obesity prior epidural abscess.    2016 patient was seen for preop evaluation for resection of bladder mass.  He had evidence of ventricular bigeminy so was referred by his PCPs office and repeat EKG did not show evidence of PVCs but he did have an ectopic atrial rhythm.  He was set up for an echocardiogram and stress test in 2016 both of which were unremarkable.    2018 patient was admitted with E. coli sepsis, acute respiratory failure, cholecystitis and found to have an epidural abscess which required debridement and he also underwent cholecystectomy.  Postoperatively he developed atrial fibrillation which was managed with metoprolol tartrate.  During that admission he was diagnosed with acute DVT and an IVC filter placed prior to surgery and later started on apixaban.  That admission 2018 echo showed normal LV systolic and diastolic function with no significant valvular disease.    2018 patient was readmitted shortly after discharge with diaphoresis, hypertension and tachycardia.  He was found to be in atrial fibrillation with rapid ventricular rate.  He had an indeterminate troponin but no evidence of acute coronary syndrome.  He eventually  converted back to sinus rhythm and was managed with both metoprolol and digoxin as well as apixaban.  Later metoprolol was discontinued due to concerns for fatigue.    11/12/2019 patient was last in the office to see Dr. Burr.  He was participating at [facility] rehab was overall improving but still had issues with prolonged ambulation.  He had no chest pain, palpitations, PND, orthopnea, syncope, near syncope and no dyspnea out of the ordinary.  He continued to have chronic  leg swelling from prior DVT and wound and was wrapping his leg.  Apixaban was recommended to continue this who is high risk for recurrent DVT due to his immobility.  Patient was noted to have a new systolic murmur and had follow-up echo 11/21/2019 with normal EF 61 to 65% with normal LV cavity size and diastolic function as well as normal wall motion, normal biatrial sizes, aortic sclerosis without significant stenosis or regurgitation with moderate calcification of the aortic valve.  There was discussion about possible discontinuation of digoxin.      He is presenting today for 1 year follow-up.  He is present today with his wife.  He has been doing well from a cardiac standpoint.  He denies any new problems.  He denies chest pain, shortness of breath, palpitations or rapid heartbeat.  He has chronic lower extremity edema that is at baseline and improves with his compression stockings and elevation.  He does not have any open areas on his legs or weeping at this time.  He does have a sore on his coccyx and is following with wound care and it is improving.  He does a little bit of walking at home with use of a walker and will walk out to his mailbox otherwise he uses a motorized scooter.  They have not checked his blood pressure at home recently but it is not been an issue.  They deny any falls or unusual bleeding.  Occasionally he will have some mild hematuria from his suprapubic catheter that resolves within a couple hours.  This is not new or  worsening.  He will have occasional lightheadedness in the morning time which typically correlates with low blood sugar.  He has not had any orthostatic dizziness.  He has not had any falls.  The last 2 days he has had some slight pain in his throat with eating and thought it might be related to a blocked saliva duct.  They have an appointment with his PCP today to discuss.        Past Medical History:   Diagnosis Date   • Acute embolism and thrombosis of deep vein of right distal lower extremity (CMS/HCC)    • Acute gangrenous cholecystitis     FEB 2018   • Allergic    • Arthritis    • Atrial fibrillation with RVR (CMS/HCC)     HISTORY   • Benign prostatic hyperplasia without lower urinary tract symptoms    • Cancer of bladder (CMS/HCC) 2016   • Colon polyp    • Discitis of lumbar region    • DVT (deep venous thrombosis) (CMS/[facility])     MARCH 2018   • Essential hypertension    • Murray catheter in place     LOSS OF SENSATION BLADDER. BECAUSE OF WOUND AT THE TIME   • Gout    • Heel ulcer (CMS/HCC)     RIGHT. FOLLOWED BY WOUND CARE. GETTING BETTER   • History of sepsis    • Hyperlipidemia    • Loss, sensation     LOWER EXTREMTIES. RELATED TO LAST BACK SURGERY.   • MRSA infection     LEFT LEG.    • Open wound     LOW TO MIDDLE CRACK BUTT. SEES WOUND CARE. WILL BE RESTARTED ON DIFFUCAN AND NYSTATIN POWER. REDNESS IN GROIN   • Open wound     WITH MEDICATED DRESSING LEFT SHIN AREA.    • CELINA (obstructive sleep apnea)     NO MACHINE   • Osteoarthritis    • Paroxysmal atrial fibrillation (CMS/HCC)    • PVC (premature ventricular contraction)    • Sepsis (CMS/HCC) 02/2018   • Sepsis due to Escherichia coli (CMS/HCC)    • Skin carcinoma 2012    MELANOMA.2 PLACES BACK AND EAR   • Type 2 diabetes mellitus (CMS/HCC)    • Vitamin D deficiency    • Weakness      Past Surgical History:   Procedure Laterality Date   • ABDOMINAL SURGERY     • APPENDECTOMY N/A 1961   • BACK SURGERY     • CHOLECYSTECTOMY WITH INTRAOPERATIVE CHOLANGIOGRAM  N/A 2/25/2018    Procedure: CHOLECYSTECTOMY LAPAROSCOPIC INTRAOPERATIVE CHOLANGIOGRAM;  Surgeon: Maegan Correa MD;  Location: [facility];  Service:    • COLONOSCOPY N/A 2010    h/o of polyps   • EYE SURGERY Bilateral 1959    glass removal from eye, lens transplant   • JOINT REPLACEMENT     • LAMINECTOMY N/A 01/18/2016    L2-L3, L3-L4, L4-L5, and L5-S1 bilateral lamincectomy, medial facetectomy & [id number], Dr. Kaiden Valdes   • LUMBAR FUSION N/A 3/7/2018    Procedure: LUMBAR FUSION MINIMALLY INVASIVE TRANSFORAMINAL LUMBAR INTERBODY IRRIGATION AND DEBRIDEMENT AND FUSION.  IRRIGATION AND DEBRIDEMENT OF EPIDURAL ABCESS LUMBAR 2-4. BONE MORPHOGENIC PROTEIN, ALPHATEC NOVEL AND ILLICO, EMG AND SSEP NEUROMONITORING.;  Surgeon: Mnaish Marr DO;  Location: [facility];  Service: Orthopedic Spine   • SKIN BIOPSY     • TOTAL KNEE ARTHROPLASTY Right 03/07/2005    Dr. Washington Evans   • VENA CAVA FILTER INSERTION Right 3/6/2018    Procedure: VENA CAVA FILTER INSERTION;  Surgeon: Alexis Thakkar MD;  Location: [facility] 18/19;  Service:    • VENA CAVA FILTER REMOVAL N/A 12/3/2018    Procedure: VENA CAVA FILTER REMOVAL WITH VENOCAVAGRAM;  Surgeon: Alexis Thakkar MD;  Location: [facility] 18/19;  Service: Vascular     Outpatient Medications Prior to Visit   Medication Sig Dispense Refill   • ACCU-CHEK FASTCLIX LANCETS misc TEST BLOOD GLUCOSE TWICE DAILY 204 each 1   • allopurinol (ZYLOPRIM) 300 MG tablet Take 1 tablet by mouth Daily. 90 tablet 3   • CBD (cannabidiol) oral oil Take 1 dose by mouth Daily.     • Diaper Rash Products (DESITIN) ointment Apply  topically to the appropriate area as directed.     • diphenhydrAMINE-acetaminophen (TYLENOL PM EXTRA STRENGTH)  MG tablet per tablet Take 2 tablets by mouth At Night As Needed for Sleep.     • ELIQUIS 5 MG tablet tablet TAKE 1 TABLET BY MOUTH EVERY 12 HOURS. 180 tablet 3   • ergocalciferol (DRISDOL) 1.25 MG (79750 UT) capsule Take 1  capsule by mouth 2 (Two) Times a Week. 26 capsule 3   • fenofibrate (TRICOR) 145 MG tablet TAKE 1 TABLET EVERY DAY 90 tablet 0   • fluticasone (FLONASE) 50 MCG/ACT nasal spray 2 sprays into the nostril(s) as directed by provider Daily. 3 bottle 3   • glipizide (GLUCOTROL) 10 MG tablet TAKE 1 TABLET TWICE DAILY BEFORE MEALS 180 tablet 0   • glucose blood (Accu-Chek SmartView) test strip CHECK BLOOD GLUCOSE TWICE DAILY 200 each 0   • GLYXAMBI 25-5 MG tablet TAKE 1 TABLET EVERY DAY WITH BREAKFAST 90 tablet 0   • hydrocortisone-bacitracin-zinc oxide-nystatin (MAGIC BARRIER) Apply 1 application topically to the appropriate area as directed As Needed.     • Insulin Pen Needle 32G X 4 MM misc INJECT UP TO THREE TIMES DAILY OR AS DIRECTED 500 each 1   • L-Methylfolate-B6-B12 3-35-2 MG tablet TAKE 1 TABLET TWICE DAILY 180 tablet 0   • melatonin 5 MG tablet tablet Take 5 mg by mouth Every Night.     • Mirabegron ER (MYRBETRIQ) 25 MG tablet sustained-release 24 hour 24 hr tablet Take 25 mg by mouth Daily.     • Multiple Vitamins-Minerals (MULTIVITAMIN ADULT PO) Take 1 tablet by mouth Daily.     • NYSTATIN 388356 UNIT/GM topical powder Apply  topically to the appropriate area as directed As Needed.     • Omega-3 Fatty Acids (FISH OIL) 1200 MG capsule capsule Take 2,000 mg by mouth 2 (Two) Times a Day With Meals. HOLD FOR SURGERY      • polyethylene glycol (MiraLax) 17 GM/SCOOP powder Take 17 g by mouth Daily. Once a day     • pregabalin (LYRICA) 100 MG capsule Take 1 capsule by mouth 3 (Three) Times a Day. 270 capsule 0   • rosuvastatin (CRESTOR) 10 MG tablet TAKE 1 TABLET EVERY DAY 90 tablet 0   • sodium chloride (NS) 0.9 % irrigation  MLS TO IRRIGATE CATHERTER QD PRN     • tamsulosin (FLOMAX) 0.4 MG capsule 24 hr capsule Take 1 capsule by mouth Daily. (Patient taking differently: Take 0.4 mg by mouth 2 (Two) Times a Day.) 30 capsule    • traZODone (DESYREL) 150 MG tablet TAKE 1/2 - 2 TABLETS BY MOUTH EVERY NIGHT AT  BEDTIME AS NEEDED FOR SLEEP 180 tablet 3   • vitamin C (ASCORBIC ACID) 500 MG tablet Take 500 mg by mouth Daily.     • digoxin (LANOXIN) 125 MCG tablet TAKE 1 TABLET EVERY DAY 90 tablet 0   • Tojesus Hernandez SoloStar 300 UNIT/ML solution pen-injector injection Inject 116 Units under the skin into the appropriate area as directed Daily With Breakfast for 30 days. 11.6 mL 5   • cadexomer iodine (IODOSORB) 0.9 % gel Apply to left buttock wound daily 10 g 4   • mupirocin (BACTROBAN) 2 % ointment      • ondansetron (ZOFRAN) 4 MG tablet Take 4 mg by mouth.     • Probiotic Product (PROBIOTIC DAILY PO) Take 1 tablet by mouth Daily.     • sennosides-docusate (Senna-S) 8.6-50 MG per tablet Take 1 tablet by mouth 2 (Two) Times a Day.     • Turmeric 450 MG capsule Take 450 mg by mouth Daily. HOLD FOR SURGERY     • Wound Dressings (MEDIHONEY WOUND/BURN DRESSING) gel Apply as directed 44 mL 3     No facility-administered medications prior to visit.        Allergies as of 11/17/2020 - Reviewed 11/17/2020   Allergen Reaction Noted   • Levaquin [levofloxacin] Other (See Comments) 03/01/2019   • Other Other (See Comments) 11/19/2018   • Alcohol Nausea And Vomiting 10/07/2016     Social History     Socioeconomic History   • Marital status:      Spouse name: Not on file   • Number of children: 2   • Years of education: Not on file   • Highest education level: Some college, no degree   Occupational History   • Occupation: RETIRED   Social Needs   • Financial resource strain: Patient refused   • Food insecurity     Worry: Patient refused     Inability: Patient refused   • Transportation needs     Medical: Patient refused     Non-medical: Patient refused   Tobacco Use   • Smoking status: Former Smoker     Types: Cigars     Quit date: 2017     Years since quitting: 3.8   • Smokeless tobacco: Current User     Types: Chew   • Tobacco comment: Caffeine daily   Substance and Sexual Activity   • Alcohol use: No   • Drug use: No   • Sexual  "activity: Defer     Family History   Problem Relation Age of Onset   • Esophageal cancer Mother    • No Known Problems Father    • Diabetes Maternal Grandmother    • Heart attack Maternal Grandfather      Review of Systems   Constitution: Negative for chills, fever and malaise/fatigue.   HENT: Positive for sore throat. Negative for ear pain, hearing loss and nosebleeds.         Throat pain with eating- sees PCP for this today   Eyes: Negative for double vision, pain, vision loss in left eye and vision loss in right eye.   Cardiovascular: Positive for leg swelling. Negative for chest pain, dyspnea on exertion, irregular heartbeat, near-syncope, orthopnea, palpitations, paroxysmal nocturnal dyspnea and syncope.   Respiratory: Negative for cough, shortness of breath, snoring and wheezing.    Endocrine: Negative for cold intolerance and heat intolerance.   Hematologic/Lymphatic: Negative for bleeding problem.   Skin: Negative for color change, itching, rash and unusual hair distribution.   Musculoskeletal: Positive for joint pain, joint swelling and myalgias.   Gastrointestinal: Negative for abdominal pain, diarrhea, hematochezia, melena, nausea and vomiting.   Genitourinary: Positive for hematuria (occasional with suprapubic catheter). Negative for decreased libido, frequency, hesitancy and incomplete emptying.   Neurological: Positive for light-headedness. Negative for excessive daytime sleepiness, dizziness, headaches, loss of balance, numbness, paresthesias and seizures.   Psychiatric/Behavioral: Negative for depression.          Objective:     Vitals:    11/17/20 1022 11/17/20 1043   BP: 130/80    BP Location: Right arm    Patient Position: Sitting    Pulse: 70 72   SpO2:  96%   Weight: 114 kg (252 lb 3.2 oz)    Height: 177.8 cm (70\")      Body mass index is 36.19 kg/m².    PHYSICAL EXAM:  Vitals signs and nursing note reviewed.   Constitutional:       General: Not in acute distress.     Appearance: Well-developed " and not in distress. Obese. Not diaphoretic.   HENT:      Head: Normocephalic and atraumatic.   Neck:      Vascular: No JVD.   Pulmonary:      Effort: Pulmonary effort is normal. No respiratory distress.      Breath sounds: Normal breath sounds. No decreased breath sounds. No wheezing. No rhonchi. No rales.   Cardiovascular:      Normal rate. Regular rhythm. Normal S1. Normal S2.      Murmurs: There is a grade 2/6 harsh midsystolic murmur at the URSB, radiating to the neck.      Comments: Compression stockings bilaterally  Pulses:     Intact distal pulses.   Edema:     Pretibial: bilateral 1+ edema of the pretibial area.     Ankle: bilateral 1+ edema of the ankle.     Feet: bilateral 1+ edema of the feet.  Abdominal:      General: Bowel sounds are normal. There is no distension.      Palpations: Abdomen is soft.      Tenderness: There is no abdominal tenderness.      Comments: Obese abdomen   Musculoskeletal:      Comments: Using power wheelchair   Skin:     General: Skin is warm and dry.      Findings: No erythema.   Neurological:      Mental Status: Alert, oriented to person, place, and time and oriented to person, place and time.   Psychiatric:         Attention and Perception: Attention normal.         Mood and Affect: Mood normal.         Speech: Speech normal.         Behavior: Behavior normal.         Thought Content: Thought content normal.         Cognition and Memory: Cognition normal.         Judgment: Judgment normal.              ECG 12 Lead    Date/Time: 11/17/2020 10:27 AM  Performed by: Analilia Lockett APRN  Authorized by: Analilia Lockett APRN   Comparison: compared with previous ECG from 11/12/2019  Similar to previous ECG  Comparison to previous ECG: Artifact  Rhythm: sinus rhythm  Rate: normal  BPM: 70  Conduction: 1st degree AV block  Q waves: V1 and V2    Other findings: non-specific ST-T wave changes and T wave abnormality    Clinical impression: abnormal EKG  Comments: Unchanged  ECG  Indication: PAF, aortic sclerosis/systolic murmur            Most recent lab review:  9/17/2020 thyroid panel Free T4 and T3 normal, vitamin D normal, lipid panel elevated triglycerides 324, HDL low 25, LDL 34, total cholesterol 124, hemoglobin A1c 7.90%, CMP normal except glucose elevated 141, minimally elevated LFTs AST 48 (40), ALT 49 (41)  Assessment:       Diagnosis Plan   1. Paroxysmal atrial fibrillation (CMS/HCC)     2. Aortic valve sclerosis     3. 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 Uncontrolled type 2 diabetes mellitus with hyperglycemia (CMS/HCC)     5. Chronic anticoagulation     6. Heart murmur     7. Essential hypertension     8. Diabetic peripheral neuropathy (CMS/HCC)     9. Suprapubic catheter dysfunction, subsequent encounter     10. Obesity (BMI 30-39.9)         Plan:     1.  Paroxysmal atrial fibrillation:  Remains in sinus rhythm with no major ECG changes today.   Metoprolol was stopped as it caused fatigue. He is on apixaban for both PAF and prior DVT with high risk for recurrence due to immobility.   Heart rate in the 70s today.  Per conversation with Dr. Burr last visit the plan was to possibly stop digoxin.  I discussed with her today we will stop digoxin.  2.  History of lower extremity DVT:  He is status post prior IVC filter which was removed back in 12/2018 by Dr. Thakkar.    He remains on anticoagulation with apixaban and he is felt to be high risk for recurrent DVT due to his relative immobility.  3.  Hypertension: BP overall well controlled.  4.  Chronic lower extremity edema/wound: Controlled with wrapping his legs/compression stockings.  He sees wound care for coccyx wound.  He denies any open wounds on his legs.  5.  Diabetes mellitus type 2: Managed by endocrinology  6.  Chronic urinary retention: History of bladder cancer.   Patient has suprapubic catheter.  He has intermittent hematuria that resolved within a couple hours.  They  will call us with worsening.  Otherwise continue on apixaban for now.  Followed by urology  7.    Aortic sclerosis: Detected on 11/2019 echo when he was found to have a systolic murmur.  No stenosis or regurgitation.  We reviewed signs and symptoms of aortic stenosis or worsening valvular disease for which to notify us.  8.  Hyperlipidemia/hypertriglyceridemia: Managed by endocrinology.      Stop digoxin.  They will call with rapid heartbeat or palpitations.  They will sporadically check his blood pressure and heart rate and call with heart rates above 100.  Bleeding/safety precautions reviewed      Follow up with Dr. Burr in 1 year, unless otherwise needed sooner.  I advised the patient to contact our office with any questions or concerns.      I have reviewed this case with Dr. Burr. It has been a pleasure to participate in this patient's care. Please feel free to contact me with any questions or concerns.     Analilia Lockett, DANNY  11/17/2020             Your medication list          Accurate as of November 17, 2020 10:51 AM. If you have any questions, ask your nurse or doctor.            CHANGE how you take these medications      Instructions Last Dose Given Next Dose Due   tamsulosin 0.4 MG capsule 24 hr capsule  Commonly known as: FLOMAX  What changed: when to take this      Take 1 capsule by mouth Daily.          CONTINUE taking these medications      Instructions Last Dose Given Next Dose Due   Accu-Chek FastClix Lancets misc      TEST BLOOD GLUCOSE TWICE DAILY       allopurinol 300 MG tablet  Commonly known as: ZYLOPRIM      Take 1 tablet by mouth Daily.       CBD oral oil  Commonly known as: cannabidiol      Take 1 dose by mouth Daily.       Desitin ointment      Apply  topically to the appropriate area as directed.       Eliquis 5 MG tablet tablet  Generic drug: apixaban      TAKE 1 TABLET BY MOUTH EVERY 12 HOURS.       ergocalciferol 1.25 MG (18565 UT) capsule  Commonly known as: Drisdol      Take 1  capsule by mouth 2 (Two) Times a Week.       fenofibrate 145 MG tablet  Commonly known as: TRICOR      TAKE 1 TABLET EVERY DAY       fish oil 1200 MG capsule capsule      Take 2,000 mg by mouth 2 (Two) Times a Day With Meals. HOLD FOR SURGERY       fluticasone 50 MCG/ACT nasal spray  Commonly known as: FLONASE      2 sprays into the nostril(s) as directed by provider Daily.       glipizide 10 MG tablet  Commonly known as: GLUCOTROL      TAKE 1 TABLET TWICE DAILY BEFORE MEALS       glucose blood test strip  Commonly known as: Accu-Chek SmartView      CHECK BLOOD GLUCOSE TWICE DAILY       Glyxambi 25-5 MG tablet  Generic drug: Empagliflozin-linaGLIPtin      TAKE 1 TABLET EVERY DAY WITH BREAKFAST       hydrocortisone-bacitracin-zinc oxide-nystatin  Commonly known as: MAGIC BARRIER      Apply 1 application topically to the appropriate area as directed As Needed.       Insulin Pen Needle 32G X 4 MM misc      INJECT UP TO THREE TIMES DAILY OR AS DIRECTED       L-Methylfolate-B6-B12 3-35-2 MG tablet      TAKE 1 TABLET TWICE DAILY       melatonin 5 MG tablet tablet      Take 5 mg by mouth Every Night.       MiraLax 17 GM/SCOOP powder  Generic drug: polyethylene glycol      Take 17 g by mouth Daily. Once a day       multivitamin with minerals tablet tablet      Take 1 tablet by mouth Daily.       Myrbetriq 25 MG tablet sustained-release 24 hour 24 hr tablet  Generic drug: Mirabegron ER      Take 25 mg by mouth Daily.       nystatin 104717 UNIT/GM powder  Generic drug: nystatin      Apply  topically to the appropriate area as directed As Needed.       pregabalin 100 MG capsule  Commonly known as: LYRICA      Take 1 capsule by mouth 3 (Three) Times a Day.       rosuvastatin 10 MG tablet  Commonly known as: CRESTOR      TAKE 1 TABLET EVERY DAY       sodium chloride 0.9 % irrigation  Commonly known as: NS       MLS TO IRRIGATE CATHERTER QD PRN       Toujeo Max SoloStar 300 UNIT/ML solution pen-injector injection  Generic  drug: Insulin Glargine (2 Unit Dial)      Inject 116 Units under the skin into the appropriate area as directed Daily With Breakfast for 30 days.       traZODone 150 MG tablet  Commonly known as: DESYREL      TAKE 1/2 - 2 TABLETS BY MOUTH EVERY NIGHT AT BEDTIME AS NEEDED FOR SLEEP       Tylenol PM Extra Strength  MG tablet per tablet  Generic drug: diphenhydrAMINE-acetaminophen      Take 2 tablets by mouth At Night As Needed for Sleep.       vitamin C 500 MG tablet  Commonly known as: ASCORBIC ACID      Take 500 mg by mouth Daily.          STOP taking these medications    digoxin 125 MCG tablet  Commonly known as: LANOXIN  Stopped by: DANNY Coleman        Iodosorb 0.9 % gel  Generic drug: cadexomer iodine  Stopped by: DANNY Coleman        Medihoney Wound/Burn Dressing gel  Stopped by: DANNY Coleman        mupirocin 2 % ointment  Commonly known as: BACTROBAN  Stopped by: DANNY Coleman        ondansetron 4 MG tablet  Commonly known as: ZOFRAN  Stopped by: DANNY Coleman        PROBIOTIC DAILY PO  Stopped by: DANNY Coleman        Senna-S 8.6-50 MG per tablet  Generic drug: sennosides-docusate  Stopped by: DANNY Coleman        Turmeric 450 MG capsule  Stopped by: DANNY Coleman               The above medication changes may not have been made by this provider.  Medication list was updated to reflect medications patient is currently taking including medication changes and discontinuations made by other healthcare providers or the patients themselves.     Dictated utilizing Dragon Dictation System.

## 2020-11-17 ENCOUNTER — TELEMEDICINE (OUTPATIENT)
Dept: FAMILY MEDICINE CLINIC | Facility: CLINIC | Age: 80
End: 2020-11-17

## 2020-11-17 ENCOUNTER — OFFICE VISIT (OUTPATIENT)
Dept: CARDIOLOGY | Facility: CLINIC | Age: 80
End: 2020-11-17

## 2020-11-17 VITALS
HEIGHT: 70 IN | DIASTOLIC BLOOD PRESSURE: 80 MMHG | BODY MASS INDEX: 36.11 KG/M2 | OXYGEN SATURATION: 96 % | HEART RATE: 72 BPM | WEIGHT: 252.2 LBS | SYSTOLIC BLOOD PRESSURE: 130 MMHG

## 2020-11-17 DIAGNOSIS — K11.5 STONE OF SALIVARY GLAND: Primary | ICD-10-CM

## 2020-11-17 DIAGNOSIS — E11.65 UNCONTROLLED TYPE 2 DIABETES MELLITUS WITH HYPERGLYCEMIA (HCC): ICD-10-CM

## 2020-11-17 DIAGNOSIS — I10 ESSENTIAL HYPERTENSION: ICD-10-CM

## 2020-11-17 DIAGNOSIS — T83.010D SUPRAPUBIC CATHETER DYSFUNCTION, SUBSEQUENT ENCOUNTER: ICD-10-CM

## 2020-11-17 DIAGNOSIS — R01.1 HEART MURMUR: ICD-10-CM

## 2020-11-17 DIAGNOSIS — I48.0 PAROXYSMAL ATRIAL FIBRILLATION (HCC): Primary | ICD-10-CM

## 2020-11-17 DIAGNOSIS — E66.9 OBESITY (BMI 30-39.9): ICD-10-CM

## 2020-11-17 DIAGNOSIS — I82.551 CHRONIC DEEP VEIN THROMBOSIS (DVT) OF RIGHT PERONEAL VEIN (HCC): ICD-10-CM

## 2020-11-17 DIAGNOSIS — E11.42 DIABETIC PERIPHERAL NEUROPATHY (HCC): ICD-10-CM

## 2020-11-17 DIAGNOSIS — Z79.01 CHRONIC ANTICOAGULATION: ICD-10-CM

## 2020-11-17 DIAGNOSIS — I35.8 AORTIC VALVE SCLEROSIS: ICD-10-CM

## 2020-11-17 PROCEDURE — 99442 PR PHYS/QHP TELEPHONE EVALUATION 11-20 MIN: CPT | Performed by: FAMILY MEDICINE

## 2020-11-17 PROCEDURE — 93000 ELECTROCARDIOGRAM COMPLETE: CPT | Performed by: NURSE PRACTITIONER

## 2020-11-17 PROCEDURE — 99214 OFFICE O/P EST MOD 30 MIN: CPT | Performed by: NURSE PRACTITIONER

## 2020-11-17 RX ORDER — POLYETHYLENE GLYCOL 3350 17 G/17G
17 POWDER, FOR SOLUTION ORAL DAILY
COMMUNITY
End: 2022-12-28

## 2020-11-17 RX ORDER — CLINDAMYCIN HYDROCHLORIDE 150 MG/1
150 CAPSULE ORAL 3 TIMES DAILY
Qty: 30 CAPSULE | Refills: 0 | Status: SHIPPED | OUTPATIENT
Start: 2020-11-17 | End: 2020-11-27

## 2020-11-17 NOTE — PROGRESS NOTES
Subjective   Jesus Beckham is a 80 y.o. male.     CC: Telephone visit  for Swollen Salivary Gland    History of Present Illness     Pt seen today on TELEHEALTH visit for a several day h/o right mandibular region swelling and tenderness with eating. A little better between meals but then back when eats. No f/c/HAs.      The following portions of the patient's history were reviewed and updated as appropriate: allergies, current medications, past family history, past medical history, past social history, past surgical history and problem list.    Review of Systems   Constitutional: Negative for activity change, chills and fever.   HENT:        Swollen salivary gland   Respiratory: Negative for cough.    Cardiovascular: Negative for chest pain.   Psychiatric/Behavioral: Negative for dysphoric mood.       Objective   Physical Exam  Constitutional:       General: He is not in acute distress.     Appearance: He is well-developed.   Pulmonary:      Effort: Pulmonary effort is normal.   Neurological:      Mental Status: He is alert and oriented to person, place, and time.   Psychiatric:         Behavior: Behavior normal.         Thought Content: Thought content normal.         Assessment/Plan   Diagnoses and all orders for this visit:    1. Stone of salivary gland (Primary)  -     clindamycin (Cleocin) 150 MG capsule; Take 1 capsule by mouth 3 (Three) Times a Day for 10 days.  Dispense: 30 capsule; Refill: 0    Sour things and water discussed. If not appreciably better by next Monday, will have ENT see pt.    Spent  17   minutes with chart and interview and consent for this encounter given by the patient.  You have chosen to receive care through a telehealth visit.  Do you consent to use a TELEPHONE connection for your medical care today? Yes  Unable to complete visit using a video connection to the patient. A phone visit was used to complete this visits. Total time of discussion was 17 minutes.

## 2020-12-03 ENCOUNTER — OFFICE VISIT (OUTPATIENT)
Dept: FAMILY MEDICINE CLINIC | Facility: CLINIC | Age: 80
End: 2020-12-03

## 2020-12-03 VITALS
HEART RATE: 75 BPM | WEIGHT: 252 LBS | HEIGHT: 70 IN | SYSTOLIC BLOOD PRESSURE: 133 MMHG | TEMPERATURE: 96.9 F | RESPIRATION RATE: 16 BRPM | BODY MASS INDEX: 36.08 KG/M2 | DIASTOLIC BLOOD PRESSURE: 75 MMHG

## 2020-12-03 DIAGNOSIS — K11.5 STONE OF SALIVARY GLAND: Primary | ICD-10-CM

## 2020-12-03 PROCEDURE — 99212 OFFICE O/P EST SF 10 MIN: CPT | Performed by: FAMILY MEDICINE

## 2020-12-03 RX ORDER — MIRABEGRON 50 MG/1
50 TABLET, FILM COATED, EXTENDED RELEASE ORAL DAILY
COMMUNITY
Start: 2020-11-18

## 2020-12-03 NOTE — PROGRESS NOTES
"Subjective   Jesus Beckham is a 80 y.o. male.     CC: Management of Salivary Stone    History of Present Illness     Pt comes in today after seeing me on a telehealth visit 11/17/20 for the above. That HPI was as follows:    Pt seen today on TELEHEALTH visit for a several day h/o right mandibular region swelling and tenderness with eating. A little better between meals but then back when eats. No f/c/HAs.       1. Stone of salivary gland (Primary)  -     clindamycin (Cleocin) 150 MG capsule; Take 1 capsule by mouth 3 (Three) Times a Day for 10 days.  Dispense: 30 capsule; Refill: 0     Sour things and water discussed. If not appreciably better by next Monday, will have ENT see pt.    Pt reports he is back to normal and feeling much better.     The following portions of the patient's history were reviewed and updated as appropriate: allergies, current medications, past family history, past medical history, past social history, past surgical history and problem list.    Review of Systems   Constitutional: Negative for activity change, chills and fever.   Respiratory: Negative for cough.    Cardiovascular: Negative for chest pain.   Psychiatric/Behavioral: Negative for dysphoric mood.       /75   Pulse 75   Temp 96.9 °F (36.1 °C) (Oral)   Resp 16   Ht 177.8 cm (70\")   Wt 114 kg (252 lb)   BMI 36.16 kg/m²     Objective   Physical Exam  Constitutional:       General: He is not in acute distress.     Appearance: He is well-developed.   Neck:      Comments: No enlargement or tenderness on exam today.  Cardiovascular:      Rate and Rhythm: Normal rate and regular rhythm.      Heart sounds: Murmur present. Systolic murmur present with a grade of 2/6.   Pulmonary:      Effort: Pulmonary effort is normal.      Breath sounds: Normal breath sounds.   Neurological:      Mental Status: He is alert and oriented to person, place, and time.   Psychiatric:         Behavior: Behavior normal.         Thought Content: " Thought content normal.         Assessment/Plan   Diagnoses and all orders for this visit:    1. Stone of salivary gland (Primary)

## 2020-12-15 ENCOUNTER — OFFICE VISIT (OUTPATIENT)
Dept: WOUND CARE | Facility: HOSPITAL | Age: 80
End: 2020-12-15

## 2020-12-15 PROCEDURE — G0463 HOSPITAL OUTPT CLINIC VISIT: HCPCS

## 2020-12-17 RX ORDER — FENOFIBRATE 145 MG/1
TABLET, COATED ORAL
Qty: 90 TABLET | Refills: 0 | Status: SHIPPED | OUTPATIENT
Start: 2020-12-17 | End: 2021-02-11

## 2020-12-17 RX ORDER — MECOBAL/LEVOMEFOLAT CA/B6 PHOS 2-3-35 MG
TABLET ORAL
Qty: 180 TABLET | Refills: 1 | Status: SHIPPED | OUTPATIENT
Start: 2020-12-17 | End: 2021-06-09 | Stop reason: SDUPTHER

## 2021-01-12 ENCOUNTER — OFFICE VISIT (OUTPATIENT)
Dept: WOUND CARE | Facility: HOSPITAL | Age: 81
End: 2021-01-12

## 2021-01-12 PROCEDURE — G0463 HOSPITAL OUTPT CLINIC VISIT: HCPCS

## 2021-01-13 RX ORDER — PEN NEEDLE, DIABETIC 32GX 5/32"
NEEDLE, DISPOSABLE MISCELLANEOUS
Qty: 500 EACH | Refills: 1 | Status: SHIPPED | OUTPATIENT
Start: 2021-01-13 | End: 2021-06-09 | Stop reason: SDUPTHER

## 2021-01-22 DIAGNOSIS — E11.42 DIABETIC PERIPHERAL NEUROPATHY (HCC): Primary | ICD-10-CM

## 2021-01-22 RX ORDER — PREGABALIN 100 MG/1
100 CAPSULE ORAL 3 TIMES DAILY
Qty: 270 CAPSULE | Refills: 0 | Status: SHIPPED | OUTPATIENT
Start: 2021-01-22 | End: 2021-01-27 | Stop reason: SDUPTHER

## 2021-01-22 NOTE — TELEPHONE ENCOUNTER
----- Message from Basilia Choi MA sent at 1/22/2021  9:54 AM EST -----  Patient left voicemail and is requesting a refill to [facility] pharmacy for generic lyrica.    nv 4/21 ginger roth 10/20 ginger toledo scanned in

## 2021-01-25 RX ORDER — ERGOCALCIFEROL 1.25 MG/1
CAPSULE ORAL
Qty: 26 CAPSULE | Refills: 0 | Status: SHIPPED | OUTPATIENT
Start: 2021-01-25 | End: 2021-04-01

## 2021-01-27 ENCOUNTER — TELEPHONE (OUTPATIENT)
Dept: FAMILY MEDICINE CLINIC | Facility: CLINIC | Age: 81
End: 2021-01-27

## 2021-01-27 DIAGNOSIS — E11.42 DIABETIC PERIPHERAL NEUROPATHY (HCC): ICD-10-CM

## 2021-01-27 RX ORDER — PREGABALIN 100 MG/1
100 CAPSULE ORAL 3 TIMES DAILY
Qty: 270 CAPSULE | Refills: 0 | Status: SHIPPED | OUTPATIENT
Start: 2021-01-27 | End: 2021-07-01 | Stop reason: SDUPTHER

## 2021-01-27 NOTE — TELEPHONE ENCOUNTER
PATIENT STATES THAT HE WOULD LIKE A CALL BACK REGARDING CERTIAN PROCEDURES THAT HE MAY HAVE    PATIENT CAN BE REACHED AT  [phone]

## 2021-01-28 DIAGNOSIS — R60.0 LOCALIZED EDEMA: Primary | ICD-10-CM

## 2021-02-02 ENCOUNTER — OFFICE VISIT (OUTPATIENT)
Dept: WOUND CARE | Facility: HOSPITAL | Age: 81
End: 2021-02-02

## 2021-02-02 PROCEDURE — 97602 WOUND(S) CARE NON-SELECTIVE: CPT

## 2021-02-09 NOTE — PROGRESS NOTES
Subjective   Patient ID: Jesus Beckham is a 80 y.o. male is here today for follow-up. Patient was last seen in the office on 8/12/2020 with Dr. Charlie Bailon MD for leg pain with numbness and extremity weakness. Difficulty walking. No new imaging.     Today, the patient reports bilateral leg weakness.  Patient states he has pain in his legs and feet. Numbness and tingling are present.    You have chosen to receive care through a telephone visit. Do you consent to use a telephone visit for your medical care today? Yes    This was a televisit from my office lasting 7 minutes.  The patient was at home.  Its been about 6 months since he has been here in the office.  Is not been able to go to therapy because of Covid but he does his exercises on his own.  He still able to walk without the wheelchair but he [facility] [employer].  He does have pain in his legs but Tylenol seems to help it.  This probably from the arachnoiditis from the original infection.  Overall doing reasonably well.  We will continue to follow him and get x-rays in 6 months and see him on that day.      Leg Pain   The pain is present in the left leg, left knee, left foot, left hip, left ankle, left toes, right hip, right thigh, right ankle, right heel, right foot, right toes, right knee, right leg, left heel and left thigh. The quality of the pain is described as stabbing and burning. The pain is at a severity of 8/10. The pain has been constant since onset. Associated symptoms include muscle weakness, numbness and tingling. Pertinent negatives include no inability to bear weight. He has tried acetaminophen for the symptoms. The treatment provided mild relief.       The following portions of the patient's history were reviewed and updated as appropriate: allergies, current medications, past family history, past medical history, past social history, past surgical history and problem list.    Review of Systems   Constitutional: Negative  for chills and fever.   HENT: Negative for congestion.    Musculoskeletal: Positive for gait problem.   Neurological: Positive for tingling and numbness.           Objective     There were no vitals filed for this visit.  There is no height or weight on file to calculate BMI.      Physical Exam   Deferred  Neurologic Exam  Deferred      Assessment/Plan   Independent Review of Radiographic Studies:      I personally reviewed the images from the following studies.    I reviewed x-rays done in February 2019 which showed good positioning of the hardware and interbody cages from L2-L4.  Agree with the report.       Medical Decision Making:      Doing well overall.  I will see him in 6 months with new x-rays.  He will continue his home PT.      Diagnoses and all orders for this visit:    1. Adhesive arachnoiditis (Primary)  -     XR Spine Lumbar Complete With Flex & Ext; Future    2. History of lumbar spinal fusion  -     XR Spine Lumbar Complete With Flex & Ext; Future    3. Bilateral leg weakness  -     XR Spine Lumbar Complete With Flex & Ext; Future      Return in about 6 months (around 8/15/2021) for Face-to-face.

## 2021-02-11 ENCOUNTER — APPOINTMENT (OUTPATIENT)
Dept: CARDIOLOGY | Facility: HOSPITAL | Age: 81
End: 2021-02-11

## 2021-02-11 RX ORDER — FENOFIBRATE 145 MG/1
TABLET, COATED ORAL
Qty: 90 TABLET | Refills: 0 | Status: SHIPPED | OUTPATIENT
Start: 2021-02-11 | End: 2021-06-09 | Stop reason: SDUPTHER

## 2021-02-15 ENCOUNTER — OFFICE VISIT (OUTPATIENT)
Dept: NEUROSURGERY | Facility: CLINIC | Age: 81
End: 2021-02-15

## 2021-02-15 DIAGNOSIS — Z98.1 HISTORY OF LUMBAR SPINAL FUSION: ICD-10-CM

## 2021-02-15 DIAGNOSIS — R29.898 BILATERAL LEG WEAKNESS: ICD-10-CM

## 2021-02-15 DIAGNOSIS — G03.9 ADHESIVE ARACHNOIDITIS: Primary | ICD-10-CM

## 2021-02-15 PROCEDURE — 99441 PR PHYS/QHP TELEPHONE EVALUATION 5-10 MIN: CPT | Performed by: NEUROLOGICAL SURGERY

## 2021-02-26 ENCOUNTER — HOSPITAL ENCOUNTER (OUTPATIENT)
Dept: CARDIOLOGY | Facility: HOSPITAL | Age: 81
Discharge: HOME | End: 2021-02-26
Admitting: FAMILY MEDICINE

## 2021-02-26 DIAGNOSIS — R60.0 LOCALIZED EDEMA: ICD-10-CM

## 2021-02-26 LAB
BH CV LOW VAS LEFT POPLITEAL SPONT: 1
BH CV LOWER VASCULAR LEFT COMMON FEMORAL AUGMENT: NORMAL
BH CV LOWER VASCULAR LEFT COMMON FEMORAL COMPETENT: NORMAL
BH CV LOWER VASCULAR LEFT COMMON FEMORAL COMPRESS: NORMAL
BH CV LOWER VASCULAR LEFT COMMON FEMORAL PHASIC: NORMAL
BH CV LOWER VASCULAR LEFT COMMON FEMORAL SPONT: NORMAL
BH CV LOWER VASCULAR LEFT DISTAL FEMORAL COMPRESS: NORMAL
BH CV LOWER VASCULAR LEFT GASTRONEMIUS COMPRESS: NORMAL
BH CV LOWER VASCULAR LEFT GREATER SAPH AK COMPRESS: NORMAL
BH CV LOWER VASCULAR LEFT GREATER SAPH BK COMPRESS: NORMAL
BH CV LOWER VASCULAR LEFT LESSER SAPH COMPRESS: NORMAL
BH CV LOWER VASCULAR LEFT MID FEMORAL AUGMENT: NORMAL
BH CV LOWER VASCULAR LEFT MID FEMORAL COMPETENT: NORMAL
BH CV LOWER VASCULAR LEFT MID FEMORAL COMPRESS: NORMAL
BH CV LOWER VASCULAR LEFT MID FEMORAL PHASIC: NORMAL
BH CV LOWER VASCULAR LEFT MID FEMORAL SPONT: NORMAL
BH CV LOWER VASCULAR LEFT PERONEAL COMPRESS: NORMAL
BH CV LOWER VASCULAR LEFT POPLITEAL AUGMENT: NORMAL
BH CV LOWER VASCULAR LEFT POPLITEAL COMPETENT: NORMAL
BH CV LOWER VASCULAR LEFT POPLITEAL COMPRESS: NORMAL
BH CV LOWER VASCULAR LEFT POPLITEAL PHASIC: NORMAL
BH CV LOWER VASCULAR LEFT POPLITEAL SPONT: NORMAL
BH CV LOWER VASCULAR LEFT POSTERIOR TIBIAL COMPRESS: NORMAL
BH CV LOWER VASCULAR LEFT PROFUNDA FEMORAL COMPRESS: NORMAL
BH CV LOWER VASCULAR LEFT PROXIMAL FEMORAL COMPRESS: NORMAL
BH CV LOWER VASCULAR LEFT SAPHENOFEMORAL JUNCTION COMPRESS: NORMAL
BH CV LOWER VASCULAR RIGHT COMMON FEMORAL AUGMENT: NORMAL
BH CV LOWER VASCULAR RIGHT COMMON FEMORAL COMPETENT: NORMAL
BH CV LOWER VASCULAR RIGHT COMMON FEMORAL COMPRESS: NORMAL
BH CV LOWER VASCULAR RIGHT COMMON FEMORAL PHASIC: NORMAL
BH CV LOWER VASCULAR RIGHT COMMON FEMORAL SPONT: NORMAL

## 2021-02-26 PROCEDURE — 93971 EXTREMITY STUDY: CPT

## 2021-03-02 ENCOUNTER — APPOINTMENT (OUTPATIENT)
Dept: WOUND CARE | Facility: HOSPITAL | Age: 81
End: 2021-03-02

## 2021-03-02 PROBLEM — I87.2 VENOUS INSUFFICIENCY OF LEFT LEG: Status: ACTIVE | Noted: 2021-03-02

## 2021-03-02 NOTE — PROGRESS NOTES
Subjective   Jesus Beckham is a 80 y.o. male.     History of Present Illness     Chief Complaint:   Chief Complaint   Patient presents with   • edema     left lleg  - several months     • diabetic foot exam     diabetic foot form completed    • medicare wellness       Jesus Beckham 80 y.o. male who presents today for Medical Management of the below listed issues and medication refills.  he has a problem list of   Patient Active Problem List   Diagnosis   • Gout   • Diabetic peripheral neuropathy (CMS/[facility])   • Dyslipidemia   • Uncontrolled type 2 diabetes mellitus (CMS/[facility])   • Essential hypertension   • PVC (premature ventricular contraction)   • Vitamin D deficiency   • Benign non-nodular prostatic hyperplasia without lower urinary tract symptoms   • Osteoarthritis   • Urge incontinence   • CELINA (obstructive sleep apnea)   • Acute gangrenous cholecystitis   • Discitis of lumbar region   • Paroxysmal atrial fibrillation (CMS/[facility])   • History of sepsis   • Hyperuricemia   • Chronic deep vein thrombosis (DVT) of right peroneal vein (CMS/[facility])   • Nausea, vomiting, and diarrhea   • Colitis presumed infectious   • Urinary tract infection in male   • DDD (degenerative disc disease), lumbar   • Bilateral leg weakness   • History of lumbar spinal fusion   • Carpal tunnel syndrome   • Adhesive arachnoiditis   • Heart murmur   • Chronic anticoagulation   • Diverticulosis   • Hematuria   • Lower obstructive uropathy   • Splenic lesion   • Suprapubic catheter dysfunction (CMS/[facility])   • Obesity (BMI 30-39.9)   • Aortic valve sclerosis   • Venous insufficiency of left leg   • Memory difficulties   .  Since the last visit, he has overall felt well. Pt had a left LE venous duplex on 2/26/21 showing:    · There was deep venous valvular incompetence noted in the left popliteal.  · All other left sided veins appeared normal.    Pt never got the call to see vascular per pt., but he declines any further intervention and  declines.      he has been compliant with   Current Outpatient Medications:   •  ACCU-CHEK FASTCLIX LANCETS mis, TEST BLOOD GLUCOSE TWICE DAILY, Disp: 204 each, Rfl: 1  •  allopurinol (ZYLOPRIM) 300 MG tablet, Take 1 tablet by mouth Daily., Disp: 90 tablet, Rfl: 3  •  CBD (cannabidiol) oral oil, Take 1 dose by mouth Daily., Disp: , Rfl:   •  Diaper Rash Products (DESITIN) ointment, Apply  topically to the appropriate area as directed., Disp: , Rfl:   •  diphenhydrAMINE-acetaminophen (TYLENOL PM EXTRA STRENGTH)  MG tablet per tablet, Take 2 tablets by mouth At Night As Needed for Sleep., Disp: , Rfl:   •  donepezil (Aricept) 5 MG tablet, Take 1 tablet by mouth Every Night., Disp: 180 tablet, Rfl: 1  •  Droplet Pen Needles 32G X 4 MM misc, INJECT UP TO THREE TIMES DAILY OR AS DIRECTED, Disp: 500 each, Rfl: 1  •  ELIQUIS 5 MG tablet tablet, TAKE 1 TABLET BY MOUTH EVERY 12 HOURS., Disp: 180 tablet, Rfl: 3  •  fenofibrate (TRICOR) 145 MG tablet, TAKE 1 TABLET EVERY DAY, Disp: 90 tablet, Rfl: 0  •  fluticasone (FLONASE) 50 MCG/ACT nasal spray, 2 sprays into the nostril(s) as directed by provider Daily., Disp: 3 bottle, Rfl: 3  •  glipizide (GLUCOTROL) 10 MG tablet, TAKE 1 TABLET TWICE DAILY BEFORE MEALS, Disp: 180 tablet, Rfl: 0  •  glucose blood (Accu-Chek SmartView) test strip, CHECK BLOOD GLUCOSE TWICE DAILY, Disp: 200 each, Rfl: 0  •  GLYXAMBI 25-5 MG tablet, TAKE 1 TABLET EVERY DAY WITH BREAKFAST, Disp: 90 tablet, Rfl: 0  •  hydrocortisone-bacitracin-zinc oxide-nystatin (MAGIC BARRIER), Apply 1 application topically to the appropriate area as directed As Needed., Disp: , Rfl:   •  L-Methylfolate-B6-B12 3-35-2 MG tablet, TAKE 1 TABLET TWICE DAILY, Disp: 180 tablet, Rfl: 1  •  melatonin 5 MG tablet tablet, Take 5 mg by mouth Every Night., Disp: , Rfl:   •  Multiple Vitamins-Minerals (MULTIVITAMIN ADULT PO), Take 1 tablet by mouth Daily., Disp: , Rfl:   •  Myrbetriq 50 MG tablet sustained-release 24 hour 24 hr  "tablet, , Disp: , Rfl:   •  NYSTATIN 706960 UNIT/GM topical powder, Apply  topically to the appropriate area as directed As Needed., Disp: , Rfl:   •  Omega-3 Fatty Acids (FISH OIL) 1200 MG capsule capsule, Take 2,000 mg by mouth 2 (Two) Times a Day With Meals. HOLD FOR SURGERY , Disp: , Rfl:   •  polyethylene glycol (MiraLax) 17 GM/SCOOP powder, Take 17 g by mouth Daily. Once a day, Disp: , Rfl:   •  pregabalin (LYRICA) 100 MG capsule, Take 1 capsule by mouth 3 (Three) Times a Day., Disp: 270 capsule, Rfl: 0  •  rosuvastatin (CRESTOR) 10 MG tablet, TAKE 1 TABLET EVERY DAY, Disp: 90 tablet, Rfl: 0  •  sodium chloride (NS) 0.9 % irrigation,  MLS TO IRRIGATE CATHERTER QD PRN, Disp: , Rfl:   •  tamsulosin (FLOMAX) 0.4 MG capsule 24 hr capsule, Take 1 capsule by mouth Daily. (Patient taking differently: Take 0.4 mg by mouth 2 (Two) Times a Day.), Disp: 30 capsule, Rfl:   •  Toujeo Max SoloStar 300 UNIT/ML solution pen-injector injection, Inject 116 Units under the skin into the appropriate area as directed Daily With Breakfast for 30 days., Disp: 11.6 mL, Rfl: 5  •  traZODone (DESYREL) 150 MG tablet, TAKE 1/2 - 2 TABLETS BY MOUTH EVERY NIGHT AT BEDTIME AS NEEDED FOR SLEEP, Disp: 180 tablet, Rfl: 3  •  vitamin C (ASCORBIC ACID) 500 MG tablet, Take 500 mg by mouth Daily., Disp: , Rfl:   •  vitamin D (ERGOCALCIFEROL) 1.25 MG (91960 UT) capsule capsule, TAKE 1 CAPSULE TWICE WEEKLY, Disp: 26 capsule, Rfl: 0.  he denies medication side effects.    All of the other chronic condition(s) listed above are stable w/o issues.    /75   Pulse 78   Temp 97.5 °F (36.4 °C) (Oral)   Resp 16   Ht 177.8 cm (70\")   Wt 113 kg (250 lb)   BMI 35.87 kg/m²     Results for orders placed or performed during the hospital encounter of 02/26/21   Duplex Venous Lower Extremity - Left CAR   Result Value Ref Range    Right Common Femoral Spont Y     Right Common Femoral Phasic Y     Right Common Femoral Augment Y     Right Common " Femoral Competent Y     Right Common Femoral Compress C     Left Common Femoral Spont Y     Left Common Femoral Phasic Y     Left Common Femoral Augment Y     Left Common Femoral Competent Y     Left Common Femoral Compress C     Left Saphenofemoral Junction Compress C     Left Profunda Femoral Compress C     Left Proximal Femoral Compress C     Left Mid Femoral Spont Y     Left Mid Femoral Phasic Y     Left Mid Femoral Augment Y     Left Mid Femoral Competent Y     Left Mid Femoral Compress C     Left Distal Femoral Compress C     Left Popliteal Spont Y     Left Popliteal Phasic Y     Left Popliteal Augment Y     Left Popliteal Competent N     Left Popliteal Compress C     Left Posterior Tibial Compress C     Left Peroneal Compress C     Left GastronemiusSoleal Compress C     Left Greater Saph AK Compress C     Left Greater Saph BK Compress C     Left Lesser Saph Compress C     Left Popliteal Spont 1            The following portions of the patient's history were reviewed and updated as appropriate: allergies, current medications, past family history, past medical history, past social history, past surgical history and problem list.    Review of Systems   Constitutional: Negative for activity change, chills and fever.   Respiratory: Negative for cough.    Cardiovascular: Negative for chest pain.   Neurological:        Slowly worsening memory issues   Psychiatric/Behavioral: Negative for dysphoric mood.       Objective   Physical Exam  Constitutional:       General: He is not in acute distress.     Appearance: He is well-developed.   Cardiovascular:      Rate and Rhythm: Normal rate.      Pulses:           Dorsalis pedis pulses are 1+ on the right side and 1+ on the left side.        Posterior tibial pulses are 1+ on the right side and 1+ on the left side.      Heart sounds: Murmur present. Systolic murmur present with a grade of 2/6.   Pulmonary:      Effort: Pulmonary effort is normal.      Breath sounds: Normal  breath sounds.   Musculoskeletal:      Right foot: No bunion.      Left foot: No bunion.   Feet:      Right foot:      Protective Sensation: 10 sites tested. 0 sites sensed.      Left foot:      Protective Sensation: 10 sites tested. 0 sites sensed.   Neurological:      Mental Status: He is alert and oriented to person, place, and time.   Psychiatric:         Behavior: Behavior normal.         Thought Content: Thought content normal.     Recent Venous Duplex of the LLE shows deep venous valvular incompetence as the cause of the LE edema.    Assessment/Plan   Diagnoses and all orders for this visit:    1. Medicare annual wellness visit, subsequent (Primary)    2. Venous insufficiency of left leg    3. Memory difficulties  -     donepezil (Aricept) 5 MG tablet; Take 1 tablet by mouth Every Night.  Dispense: 180 tablet; Refill: 1    Pt getting relief from Jobst stockings and elevation and is to continue.

## 2021-03-03 ENCOUNTER — OFFICE VISIT (OUTPATIENT)
Dept: FAMILY MEDICINE CLINIC | Facility: CLINIC | Age: 81
End: 2021-03-03

## 2021-03-03 VITALS
DIASTOLIC BLOOD PRESSURE: 75 MMHG | HEART RATE: 78 BPM | BODY MASS INDEX: 35.79 KG/M2 | SYSTOLIC BLOOD PRESSURE: 133 MMHG | HEIGHT: 70 IN | RESPIRATION RATE: 16 BRPM | TEMPERATURE: 97.5 F | WEIGHT: 250 LBS

## 2021-03-03 DIAGNOSIS — I87.2 VENOUS INSUFFICIENCY OF LEFT LEG: ICD-10-CM

## 2021-03-03 DIAGNOSIS — R41.3 MEMORY DIFFICULTIES: ICD-10-CM

## 2021-03-03 DIAGNOSIS — Z00.00 MEDICARE ANNUAL WELLNESS VISIT, SUBSEQUENT: Primary | ICD-10-CM

## 2021-03-03 PROCEDURE — 1170F FXNL STATUS ASSESSED: CPT | Performed by: FAMILY MEDICINE

## 2021-03-03 PROCEDURE — 96160 PT-FOCUSED HLTH RISK ASSMT: CPT | Performed by: FAMILY MEDICINE

## 2021-03-03 PROCEDURE — G0439 PPPS, SUBSEQ VISIT: HCPCS | Performed by: FAMILY MEDICINE

## 2021-03-03 PROCEDURE — 1160F RVW MEDS BY RX/DR IN RCRD: CPT | Performed by: FAMILY MEDICINE

## 2021-03-03 PROCEDURE — 99213 OFFICE O/P EST LOW 20 MIN: CPT | Performed by: FAMILY MEDICINE

## 2021-03-03 RX ORDER — DONEPEZIL HYDROCHLORIDE 5 MG/1
5 TABLET, FILM COATED ORAL NIGHTLY
Qty: 180 TABLET | Refills: 1 | Status: SHIPPED | OUTPATIENT
Start: 2021-03-03 | End: 2022-02-08 | Stop reason: SDUPTHER

## 2021-03-03 NOTE — PATIENT INSTRUCTIONS
Medicare Wellness  Personal Prevention Plan of Service     Date of Office Visit:  2021  Encounter Provider:  Magdy Ricardo MD  Place of Service:  [facility]  Patient Name: Jesus Beckham  :  1940    As part of the Medicare Wellness portion of your visit today, we are providing you with this personalized preventive plan of services (PPPS). This plan is based upon recommendations of the [place] Preventive Services Task Force (USPSTF) and the Advisory Committee on Immunization Practices (ACIP).    This lists the preventive care services that should be considered, and provides dates of when you are due. Items listed as completed are up-to-date and do not require any further intervention.    Health Maintenance   Topic Date Due   • COVID-19 Vaccine (1 of 2) 10/26/1956   • Pneumococcal Vaccine 65+ (1 of 1 - PPSV23) 10/26/2006   • HEPATITIS C SCREENING  2016   • URINE MICROALBUMIN  2018   • PROSTATE CANCER SCREENING  2019   • ZOSTER VACCINE (2 of 2) 2021 (Originally 2013)   • HEMOGLOBIN A1C  2021   • LIPID PANEL  2021   • DIABETIC EYE EXAM  2021   • ANNUAL WELLNESS VISIT  2022   • COLONOSCOPY  2029   • INFLUENZA VACCINE  Completed   • MENINGOCOCCAL VACCINE  Aged Out   • TDAP/TD VACCINES  Discontinued       No orders of the defined types were placed in this encounter.      Return in about 6 months (around 9/3/2021) for Recheck.

## 2021-03-03 NOTE — PROGRESS NOTES
The ABCs of the Annual Wellness Visit  Subsequent Medicare Wellness Visit    Chief Complaint   Patient presents with   • edema     left lleg  - several months     • diabetic foot exam     diabetic foot form completed    • medicare wellness       Subjective   History of Present Illness:  Jesus Beckham is a 80 y.o. male who presents for a Subsequent Medicare Wellness Visit.    HEALTH RISK ASSESSMENT    Recent Hospitalizations:  No hospitalization(s) within the last year.    Current Medical Providers:  Patient Care Team:  Magdy Ricardo MD as PCP - General  Magdy Ricardo MD as PCP - Family Medicine  Staci Keyes MD as Consulting Physician (Endocrinology)  Jerrell Fry MD as Consulting Physician (Urology)  Carlos Manuel Saleh MD as Consulting Physician (Urology)  Thuan Fernandez MD as Consulting Physician (Ophthalmology)    Smoking Status:  Social History     Tobacco Use   Smoking Status Former Smoker   • Types: Cigars   • Quit date:    • Years since quittin.1   Smokeless Tobacco Current User   • Types: Chew   Tobacco Comment    Caffeine daily       Alcohol Consumption:  Social History     Substance and Sexual Activity   Alcohol Use No       Depression Screen:   PHQ-2/PHQ-9 Depression Screening 3/3/2021   Little interest or pleasure in doing things 0   Feeling down, depressed, or hopeless 0   Trouble falling or staying asleep, or sleeping too much 0   Feeling tired or having little energy 0   Poor appetite or overeating 0   Feeling bad about yourself - or that you are a failure or have let yourself or your family down 0   Trouble concentrating on things, such as reading the newspaper or watching television 0   Moving or speaking so slowly that other people could have noticed. Or the opposite - being so fidgety or restless that you have been moving around a lot more than usual 0   Thoughts that you would be better off dead, or of hurting yourself in some way 0   Total Score 0   If you  checked off any problems, how difficult have these problems made it for you to do your work, take care of things at home, or get along with other people? Not difficult at all       Fall Risk Screen:  RAGHAVENDRA Fall Risk Assessment has not been completed.    Health Habits and Functional and Cognitive Screening:  Functional & Cognitive Status 3/3/2021   Do you have difficulty preparing food and eating? Yes   Do you have difficulty bathing yourself, getting dressed or grooming yourself? Yes   Do you have difficulty using the toilet? Yes   Do you have difficulty moving around from place to place? Yes   Do you have trouble with steps or getting out of a bed or a chair? Yes   Current Diet Well Balanced Diet   Dental Exam Up to date   Eye Exam Up to date   Exercise (times per week) 0 times per week   Current Exercises Include No Regular Exercise   Current Exercise Activities Include -   Do you need help using the phone?  No   Are you deaf or do you have serious difficulty hearing?  No   Do you need help with transportation? Yes   Do you need help shopping? Yes   Do you need help preparing meals?  Yes   Do you need help with housework?  Yes   Do you need help with laundry? Yes   Do you need help taking your medications? Yes   Do you need help managing money? No   Do you ever drive or ride in a car without wearing a seat belt? No   Have you felt unusual stress, anger or loneliness in the last month? No   Who do you live with? Spouse   If you need help, do you have trouble finding someone available to you? Yes   Have you been bothered in the last four weeks by sexual problems? No   Do you have difficulty concentrating, remembering or making decisions? Yes         Does the patient have evidence of cognitive impairment? No    Asprin use counseling:Does not need ASA (and currently is not on it)    Age-appropriate Screening Schedule:  Refer to the list below for future screening recommendations based on patient's age, sex and/or  medical conditions. Orders for these recommended tests are listed in the plan section. The patient has been provided with a written plan.    Health Maintenance   Topic Date Due   • URINE MICROALBUMIN  09/19/2018   • PROSTATE CANCER SCREENING  05/22/2019   • ZOSTER VACCINE (2 of 2) 12/11/2021 (Originally 11/19/2013)   • HEMOGLOBIN A1C  03/17/2021   • LIPID PANEL  09/17/2021   • DIABETIC EYE EXAM  12/01/2021   • COLONOSCOPY  07/29/2029   • INFLUENZA VACCINE  Completed   • TDAP/TD VACCINES  Discontinued          The following portions of the patient's history were reviewed and updated as appropriate: allergies, current medications, past family history, past medical history, past social history, past surgical history and problem list.    Outpatient Medications Prior to Visit   Medication Sig Dispense Refill   • ACCU-CHEK FASTCLIX LANCETS misc TEST BLOOD GLUCOSE TWICE DAILY 204 each 1   • allopurinol (ZYLOPRIM) 300 MG tablet Take 1 tablet by mouth Daily. 90 tablet 3   • CBD (cannabidiol) oral oil Take 1 dose by mouth Daily.     • Diaper Rash Products (DESITIN) ointment Apply  topically to the appropriate area as directed.     • diphenhydrAMINE-acetaminophen (TYLENOL PM EXTRA STRENGTH)  MG tablet per tablet Take 2 tablets by mouth At Night As Needed for Sleep.     • Droplet Pen Needles 32G X 4 MM misc INJECT UP TO THREE TIMES DAILY OR AS DIRECTED 500 each 1   • ELIQUIS 5 MG tablet tablet TAKE 1 TABLET BY MOUTH EVERY 12 HOURS. 180 tablet 3   • fenofibrate (TRICOR) 145 MG tablet TAKE 1 TABLET EVERY DAY 90 tablet 0   • fluticasone (FLONASE) 50 MCG/ACT nasal spray 2 sprays into the nostril(s) as directed by provider Daily. 3 bottle 3   • glipizide (GLUCOTROL) 10 MG tablet TAKE 1 TABLET TWICE DAILY BEFORE MEALS 180 tablet 0   • glucose blood (Accu-Chek SmartView) test strip CHECK BLOOD GLUCOSE TWICE DAILY 200 each 0   • GLYXAMBI 25-5 MG tablet TAKE 1 TABLET EVERY DAY WITH BREAKFAST 90 tablet 0   •  hydrocortisone-bacitracin-zinc oxide-nystatin (MAGIC BARRIER) Apply 1 application topically to the appropriate area as directed As Needed.     • L-Methylfolate-B6-B12 3-35-2 MG tablet TAKE 1 TABLET TWICE DAILY 180 tablet 1   • melatonin 5 MG tablet tablet Take 5 mg by mouth Every Night.     • Multiple Vitamins-Minerals (MULTIVITAMIN ADULT PO) Take 1 tablet by mouth Daily.     • Myrbetriq 50 MG tablet sustained-release 24 hour 24 hr tablet      • NYSTATIN 520182 UNIT/GM topical powder Apply  topically to the appropriate area as directed As Needed.     • Omega-3 Fatty Acids (FISH OIL) 1200 MG capsule capsule Take 2,000 mg by mouth 2 (Two) Times a Day With Meals. HOLD FOR SURGERY      • polyethylene glycol (MiraLax) 17 GM/SCOOP powder Take 17 g by mouth Daily. Once a day     • pregabalin (LYRICA) 100 MG capsule Take 1 capsule by mouth 3 (Three) Times a Day. 270 capsule 0   • rosuvastatin (CRESTOR) 10 MG tablet TAKE 1 TABLET EVERY DAY 90 tablet 0   • sodium chloride (NS) 0.9 % irrigation  MLS TO IRRIGATE CATHERTER QD PRN     • tamsulosin (FLOMAX) 0.4 MG capsule 24 hr capsule Take 1 capsule by mouth Daily. (Patient taking differently: Take 0.4 mg by mouth 2 (Two) Times a Day.) 30 capsule    • Toujeo Max SoloStar 300 UNIT/ML solution pen-injector injection Inject 116 Units under the skin into the appropriate area as directed Daily With Breakfast for 30 days. 11.6 mL 5   • traZODone (DESYREL) 150 MG tablet TAKE 1/2 - 2 TABLETS BY MOUTH EVERY NIGHT AT BEDTIME AS NEEDED FOR SLEEP 180 tablet 3   • vitamin C (ASCORBIC ACID) 500 MG tablet Take 500 mg by mouth Daily.     • vitamin D (ERGOCALCIFEROL) 1.25 MG (24466 UT) capsule capsule TAKE 1 CAPSULE TWICE WEEKLY 26 capsule 0     No facility-administered medications prior to visit.        Patient Active Problem List   Diagnosis   • Gout   • Diabetic peripheral neuropathy (CMS/HCC)   • Dyslipidemia   • Uncontrolled type 2 diabetes mellitus (CMS/HCC)   • Essential  "hypertension   • PVC (premature ventricular contraction)   • Vitamin D deficiency   • Benign non-nodular prostatic hyperplasia without lower urinary tract symptoms   • Osteoarthritis   • Urge incontinence   • CELINA (obstructive sleep apnea)   • Acute gangrenous cholecystitis   • Discitis of lumbar region   • Paroxysmal atrial fibrillation (CMS/HCC)   • History of sepsis   • Hyperuricemia   • Chronic deep vein thrombosis (DVT) of right peroneal vein (CMS/HCC)   • Nausea, vomiting, and diarrhea   • Colitis presumed infectious   • Urinary tract infection in male   • DDD (degenerative disc disease), lumbar   • Bilateral leg weakness   • History of lumbar spinal fusion   • Carpal tunnel syndrome   • Adhesive arachnoiditis   • Heart murmur   • Chronic anticoagulation   • Diverticulosis   • Hematuria   • Lower obstructive uropathy   • Splenic lesion   • Suprapubic catheter dysfunction (CMS/HCC)   • Obesity (BMI 30-39.9)   • Aortic valve sclerosis   • Venous insufficiency of left leg       Advanced Care Planning:  ACP discussion was held with the patient during this visit. Patient has an advance directive in EMR which is still valid.     Review of Systems    Compared to one year ago, the patient feels his physical health is the same.  Compared to one year ago, the patient feels his mental health is the same.    Reviewed chart for potential of high risk medication in the elderly: yes  Reviewed chart for potential of harmful drug interactions in the elderly:yes    Objective         Vitals:    03/03/21 0909   BP: 133/75   Pulse: 78   Resp: 16   Temp: 97.5 °F (36.4 °C)   TempSrc: Oral   Weight: 113 kg (250 lb)   Height: 177.8 cm (70\")   PainSc: 0-No pain       Body mass index is 35.87 kg/m².  Discussed the patient's BMI with him. The BMI is above average; BMI management plan is completed.    Physical Exam          Assessment/Plan   Medicare Risks and Personalized Health Plan  CMS Preventative Services Quick " Reference  Cardiovascular risk    The above risks/problems have been discussed with the patient.  Pertinent information has been shared with the patient in the After Visit Summary.  Follow up plans and orders are seen below in the Assessment/Plan Section.    Diagnoses and all orders for this visit:    1. Medicare annual wellness visit, subsequent (Primary)    2. Venous insufficiency of left leg      Follow Up:  No follow-ups on file.     An After Visit Summary and PPPS were given to the patient.

## 2021-03-04 DIAGNOSIS — Z23 IMMUNIZATION DUE: ICD-10-CM

## 2021-03-18 DIAGNOSIS — E11.65 UNCONTROLLED TYPE 2 DIABETES MELLITUS WITH HYPERGLYCEMIA (HCC): ICD-10-CM

## 2021-03-18 DIAGNOSIS — E79.0 HYPERURICEMIA: ICD-10-CM

## 2021-03-18 DIAGNOSIS — I10 ESSENTIAL HYPERTENSION: ICD-10-CM

## 2021-03-18 DIAGNOSIS — E55.9 VITAMIN D DEFICIENCY: ICD-10-CM

## 2021-03-18 DIAGNOSIS — M10.00 IDIOPATHIC GOUT, UNSPECIFIED CHRONICITY, UNSPECIFIED SITE: Primary | ICD-10-CM

## 2021-03-18 DIAGNOSIS — E78.5 DYSLIPIDEMIA: ICD-10-CM

## 2021-03-19 ENCOUNTER — LAB (OUTPATIENT)
Dept: ENDOCRINOLOGY | Age: 81
End: 2021-03-19

## 2021-03-19 DIAGNOSIS — E79.0 HYPERURICEMIA: ICD-10-CM

## 2021-03-19 DIAGNOSIS — E11.65 UNCONTROLLED TYPE 2 DIABETES MELLITUS WITH HYPERGLYCEMIA (HCC): ICD-10-CM

## 2021-03-19 DIAGNOSIS — M10.00 IDIOPATHIC GOUT, UNSPECIFIED CHRONICITY, UNSPECIFIED SITE: ICD-10-CM

## 2021-03-19 DIAGNOSIS — E78.5 DYSLIPIDEMIA: ICD-10-CM

## 2021-03-19 DIAGNOSIS — E55.9 VITAMIN D DEFICIENCY: ICD-10-CM

## 2021-03-19 DIAGNOSIS — I10 ESSENTIAL HYPERTENSION: ICD-10-CM

## 2021-03-20 LAB
25(OH)D3+25(OH)D2 SERPL-MCNC: 66.5 NG/ML (ref 30–100)
ALBUMIN SERPL-MCNC: 4.5 G/DL (ref 3.7–4.7)
ALBUMIN/GLOB SERPL: 2.1 {RATIO} (ref 1.2–2.2)
ALP SERPL-CCNC: 61 IU/L (ref 39–117)
ALT SERPL-CCNC: 29 IU/L (ref 0–44)
AST SERPL-CCNC: 30 IU/L (ref 0–40)
BILIRUB SERPL-MCNC: 0.3 MG/DL (ref 0–1.2)
BUN SERPL-MCNC: 22 MG/DL (ref 8–27)
BUN/CREAT SERPL: 21 (ref 10–24)
C PEPTIDE SERPL-MCNC: 2.9 NG/ML (ref 1.1–4.4)
CALCIUM SERPL-MCNC: 9.6 MG/DL (ref 8.6–10.2)
CHLORIDE SERPL-SCNC: 108 MMOL/L (ref 96–106)
CHOLEST SERPL-MCNC: 131 MG/DL (ref 100–199)
CO2 SERPL-SCNC: 22 MMOL/L (ref 20–29)
CREAT SERPL-MCNC: 1.04 MG/DL (ref 0.76–1.27)
GLOBULIN SER CALC-MCNC: 2.1 G/DL (ref 1.5–4.5)
GLUCOSE SERPL-MCNC: 125 MG/DL (ref 65–99)
HBA1C MFR BLD: 6.3 % (ref 4.8–5.6)
HDLC SERPL-MCNC: 50 MG/DL
IMP & REVIEW OF LAB RESULTS: NORMAL
LDLC SERPL CALC-MCNC: 62 MG/DL (ref 0–99)
Lab: NORMAL
POTASSIUM SERPL-SCNC: 4.7 MMOL/L (ref 3.5–5.2)
PROT SERPL-MCNC: 6.6 G/DL (ref 6–8.5)
SODIUM SERPL-SCNC: 144 MMOL/L (ref 134–144)
SPECIMEN STATUS: NORMAL
TRIGL SERPL-MCNC: 105 MG/DL (ref 0–149)
URATE SERPL-MCNC: 3 MG/DL (ref 3.8–8.4)
VLDLC SERPL CALC-MCNC: 19 MG/DL (ref 5–40)

## 2021-03-23 DIAGNOSIS — M10.00 IDIOPATHIC GOUT, UNSPECIFIED CHRONICITY, UNSPECIFIED SITE: ICD-10-CM

## 2021-03-24 RX ORDER — ALLOPURINOL 300 MG/1
TABLET ORAL
Qty: 90 TABLET | Refills: 3 | Status: SHIPPED | OUTPATIENT
Start: 2021-03-24 | End: 2022-02-08 | Stop reason: SDUPTHER

## 2021-03-25 RX ORDER — INSULIN GLARGINE 300 U/ML
INJECTION, SOLUTION SUBCUTANEOUS
Qty: 36 ML | Refills: 0 | Status: SHIPPED | OUTPATIENT
Start: 2021-03-25 | End: 2021-04-02

## 2021-04-01 RX ORDER — ERGOCALCIFEROL 1.25 MG/1
CAPSULE ORAL
Qty: 26 CAPSULE | Refills: 0 | Status: SHIPPED | OUTPATIENT
Start: 2021-04-01 | End: 2021-06-18 | Stop reason: SDUPTHER

## 2021-04-02 ENCOUNTER — OFFICE VISIT (OUTPATIENT)
Dept: ENDOCRINOLOGY | Age: 81
End: 2021-04-02

## 2021-04-02 VITALS
SYSTOLIC BLOOD PRESSURE: 118 MMHG | DIASTOLIC BLOOD PRESSURE: 76 MMHG | HEIGHT: 70 IN | WEIGHT: 276.4 LBS | BODY MASS INDEX: 39.57 KG/M2

## 2021-04-02 DIAGNOSIS — E55.9 VITAMIN D DEFICIENCY: ICD-10-CM

## 2021-04-02 DIAGNOSIS — Z79.4 CONTROLLED TYPE 2 DIABETES MELLITUS WITH COMPLICATION, WITH LONG-TERM CURRENT USE OF INSULIN (HCC): Primary | ICD-10-CM

## 2021-04-02 DIAGNOSIS — E11.42 DIABETIC PERIPHERAL NEUROPATHY (HCC): ICD-10-CM

## 2021-04-02 DIAGNOSIS — E11.8 CONTROLLED TYPE 2 DIABETES MELLITUS WITH COMPLICATION, WITH LONG-TERM CURRENT USE OF INSULIN (HCC): Primary | ICD-10-CM

## 2021-04-02 DIAGNOSIS — E78.5 DYSLIPIDEMIA: ICD-10-CM

## 2021-04-02 DIAGNOSIS — E79.0 HYPERURICEMIA: ICD-10-CM

## 2021-04-02 PROCEDURE — 99214 OFFICE O/P EST MOD 30 MIN: CPT | Performed by: NURSE PRACTITIONER

## 2021-04-02 RX ORDER — INSULIN GLARGINE 300 U/ML
100 INJECTION, SOLUTION SUBCUTANEOUS DAILY
Qty: 36 ML | Refills: 0
Start: 2021-04-02 | End: 2021-06-09 | Stop reason: SDUPTHER

## 2021-04-02 NOTE — PROGRESS NOTES
"  Subjective    Jesus Beckham is a 80 y.o. male. he is here today for follow-up.  Chief Complaint   Patient presents with   • Diabetes     /76   Ht 177.8 cm (70\")   Wt 125 kg (276 lb 6.4 oz)   BMI 39.66 kg/m²       History of Present Illness   Encounter Diagnoses   Name Primary?   • Controlled type 2 diabetes mellitus with complication, with long-term current use of insulin (CMS/[facility]) Yes   • Vitamin D deficiency    • Dyslipidemia    • Diabetic peripheral neuropathy (CMS/HCC)    • Hyperuricemia      80-year-old male patient here today for follow-up visit.  He has been seen for the above-mentioned problems.  He is accompanied by his wife.  His medication list was reviewed and updated for accuracy.  He denies needing any refills.  He has adjusted his insulin on his own due to having some low blood sugars in the morning.  He has decreased his basal insulin from 125 down to 100.  He has had some weight gain according to the scales.  He plans on changing his diet and eating more salad.  He has some pain in his right wrist and plans on following up with hand surgeon.  He is currently checking his blood sugars twice daily which were reviewed.    The following portions of the patient's history were reviewed and updated as appropriate:   Past Medical History:   Diagnosis Date   • Acute embolism and thrombosis of deep vein of right distal lower extremity (CMS/HCC)    • Acute gangrenous cholecystitis     FEB 2018   • Allergic    • Arthritis    • Atrial fibrillation with RVR (CMS/HCC)     HISTORY   • Benign prostatic hyperplasia without lower urinary tract symptoms    • Cancer of bladder (CMS/HCC) 2016   • Colon polyp    • Discitis of lumbar region    • DVT (deep venous thrombosis) (CMS/HCC)     MARCH 2018   • Essential hypertension    • Murray catheter in place     LOSS OF SENSATION BLADDER. BECAUSE OF WOUND AT THE TIME   • Gout    • Heel ulcer (CMS/HCC)     RIGHT. FOLLOWED BY WOUND CARE. GETTING BETTER   • History " of sepsis    • Hyperlipidemia    • Loss, sensation     LOWER EXTREMTIES. RELATED TO LAST BACK SURGERY.   • MRSA infection     LEFT LEG.    • Open wound     LOW TO MIDDLE CRACK BUTT. SEES WOUND CARE. WILL BE RESTARTED ON DIFFUCAN AND NYSTATIN POWER. REDNESS IN GROIN   • Open wound     WITH MEDICATED DRESSING LEFT SHIN AREA.    • CELINA (obstructive sleep apnea)     NO MACHINE   • Osteoarthritis    • Paroxysmal atrial fibrillation (CMS/HCC)    • PVC (premature ventricular contraction)    • Sepsis (CMS/HCC) 02/2018   • Sepsis due to Escherichia coli (CMS/HCC)    • Skin carcinoma 2012    MELANOMA.2 PLACES BACK AND EAR   • Type 2 diabetes mellitus (CMS/HCC)    • Vitamin D deficiency    • Weakness      Past Surgical History:   Procedure Laterality Date   • ABDOMINAL SURGERY     • APPENDECTOMY N/A 1961   • BACK SURGERY     • CHOLECYSTECTOMY WITH INTRAOPERATIVE CHOLANGIOGRAM N/A 2/25/2018    Procedure: CHOLECYSTECTOMY LAPAROSCOPIC INTRAOPERATIVE CHOLANGIOGRAM;  Surgeon: Maegan Correa MD;  Location: [facility] OR;  Service:    • COLONOSCOPY N/A 2010    h/o of polyps   • EYE SURGERY Bilateral 1959    glass removal from eye, lens transplant   • JOINT REPLACEMENT     • LAMINECTOMY N/A 01/18/2016    L2-L3, L3-L4, L4-L5, and L5-S1 bilateral lamincectomy, medial facetectomy & [id number], Dr. Kaiden Valdes   • LUMBAR FUSION N/A 3/7/2018    Procedure: LUMBAR FUSION MINIMALLY INVASIVE TRANSFORAMINAL LUMBAR INTERBODY IRRIGATION AND DEBRIDEMENT AND FUSION.  IRRIGATION AND DEBRIDEMENT OF EPIDURAL ABCESS LUMBAR 2-4. BONE MORPHOGENIC PROTEIN, ALPHATEC NOVEL AND ILLICO, EMG AND SSEP NEUROMONITORING.;  Surgeon: Manish Marr DO;  Location: [facility] OR;  Service: Orthopedic Spine   • SKIN BIOPSY     • TOTAL KNEE ARTHROPLASTY Right 03/07/2005    Dr. Washington Evans   • VENA CAVA FILTER INSERTION Right 3/6/2018    Procedure: VENA CAVA FILTER INSERTION;  Surgeon: Alexis Thakkar MD;  Location: [facility] OR 18/19;  Service:    • VENA  CAVA FILTER REMOVAL N/A 12/3/2018    Procedure: VENA CAVA FILTER REMOVAL WITH VENOCAVAGRAM;  Surgeon: Alexis Thakkar MD;  Location: [facility] 18/19;  Service: Vascular     Family History   Problem Relation Age of Onset   • Esophageal cancer Mother    • No Known Problems Father    • Diabetes Maternal Grandmother    • Heart attack Maternal Grandfather        Current Outpatient Medications   Medication Sig Dispense Refill   • ACCU-CHEK FASTCLIX LANCETS misc TEST BLOOD GLUCOSE TWICE DAILY 204 each 1   • allopurinol (ZYLOPRIM) 300 MG tablet TAKE 1 TABLET EVERY DAY 90 tablet 3   • diphenhydrAMINE-acetaminophen (TYLENOL PM EXTRA STRENGTH)  MG tablet per tablet Take 2 tablets by mouth At Night As Needed for Sleep.     • donepezil (Aricept) 5 MG tablet Take 1 tablet by mouth Every Night. 180 tablet 1   • Droplet Pen Needles 32G X 4 MM misc INJECT UP TO THREE TIMES DAILY OR AS DIRECTED 500 each 1   • ELIQUIS 5 MG tablet tablet TAKE 1 TABLET BY MOUTH EVERY 12 HOURS. 180 tablet 3   • fenofibrate (TRICOR) 145 MG tablet TAKE 1 TABLET EVERY DAY 90 tablet 0   • fluticasone (FLONASE) 50 MCG/ACT nasal spray 2 sprays into the nostril(s) as directed by provider Daily. 3 bottle 3   • glipizide (GLUCOTROL) 10 MG tablet TAKE 1 TABLET TWICE DAILY BEFORE MEALS 180 tablet 0   • glucose blood (Accu-Chek SmartView) test strip CHECK BLOOD GLUCOSE TWICE DAILY 200 each 0   • GLYXAMBI 25-5 MG tablet TAKE 1 TABLET EVERY DAY WITH BREAKFAST 90 tablet 0   • L-Methylfolate-B6-B12 3-35-2 MG tablet TAKE 1 TABLET TWICE DAILY 180 tablet 1   • melatonin 5 MG tablet tablet Take 5 mg by mouth Every Night.     • Multiple Vitamins-Minerals (MULTIVITAMIN ADULT PO) Take 1 tablet by mouth Daily.     • mupirocin (BACTROBAN) 2 % ointment      • Myrbetriq 50 MG tablet sustained-release 24 hour 24 hr tablet      • Omega-3 Fatty Acids (FISH OIL) 1200 MG capsule capsule Take 2,000 mg by mouth 2 (Two) Times a Day With Meals. HOLD FOR SURGERY      •  "polyethylene glycol (MiraLax) 17 GM/SCOOP powder Take 17 g by mouth Daily. Once a day     • pregabalin (LYRICA) 100 MG capsule Take 1 capsule by mouth 3 (Three) Times a Day. 270 capsule 0   • rosuvastatin (CRESTOR) 10 MG tablet TAKE 1 TABLET EVERY DAY 90 tablet 0   • sodium chloride (NS) 0.9 % irrigation  MLS TO IRRIGATE CATHERTER QD PRN     • tamsulosin (FLOMAX) 0.4 MG capsule 24 hr capsule Take 1 capsule by mouth Daily. (Patient taking differently: Take 0.4 mg by mouth 2 (Two) Times a Day.) 30 capsule    • Toujeo Max SoloStar 300 UNIT/ML solution pen-injector injection INJECT 116 UNITS UNDER THE SKIN INTO THE APPROPRIATE AREA AS DIRECTED DAILY WITH BREAKFAST  36 mL 0   • traZODone (DESYREL) 150 MG tablet TAKE 1/2 - 2 TABLETS BY MOUTH EVERY NIGHT AT BEDTIME AS NEEDED FOR SLEEP 180 tablet 3   • vitamin C (ASCORBIC ACID) 500 MG tablet Take 500 mg by mouth Daily.     • vitamin D (ERGOCALCIFEROL) 1.25 MG (42023 UT) capsule capsule TAKE 1 CAPSULE TWICE WEEKLY 26 capsule 0     No current facility-administered medications for this visit.     Allergies   Allergen Reactions   • Levaquin [Levofloxacin] Other (See Comments)     Redness, itching at IV site with IV Levaquin administration   • Other Other (See Comments)     ALCOHOL. DEATHLY ILL.    • Alcohol Nausea And Vomiting     \"Deathly sick, headaches, cold sweats\"  Cant take any medication that contains alcohol     Social History     Socioeconomic History   • Marital status:      Spouse name: Not on file   • Number of children: 2   • Years of education: Not on file   • Highest education level: Some college, no degree   Tobacco Use   • Smoking status: Former Smoker     Types: Cigars     Quit date: 2017     Years since quittin.2   • Smokeless tobacco: Current User     Types: Chew   • Tobacco comment: Caffeine daily   Substance and Sexual Activity   • Alcohol use: No   • Drug use: No   • Sexual activity: Defer       Review of Systems  Review of Systems " "  Constitutional: Negative for appetite change and fatigue.   Eyes: Negative for visual disturbance.   Respiratory: Negative for cough.    Cardiovascular: Negative for leg swelling.   Gastrointestinal: Negative for constipation and diarrhea.   Endocrine: Negative for cold intolerance, heat intolerance, polydipsia, polyphagia and polyuria.   Genitourinary: Negative for frequency.   Neurological: Negative for numbness.        Objective    /76   Ht 177.8 cm (70\")   Wt 125 kg (276 lb 6.4 oz)   BMI 39.66 kg/m²   Physical Exam  Vitals and nursing note reviewed.   Constitutional:       General: He is not in acute distress.     Appearance: Normal appearance. He is well-developed and normal weight. He is not diaphoretic.   HENT:      Head: Normocephalic and atraumatic.      Comments: bilat hearing aids      Right Ear: External ear normal.      Left Ear: External ear normal.      Nose: Nose normal.   Eyes:      General:         Right eye: No discharge.         Left eye: No discharge.      Pupils: Pupils are equal, round, and reactive to light.   Neck:      Thyroid: No thyromegaly.      Vascular: No carotid bruit.      Trachea: No tracheal deviation.   Cardiovascular:      Rate and Rhythm: Normal rate and regular rhythm.      Heart sounds: Murmur heard.   No friction rub. No gallop.    Pulmonary:      Effort: Pulmonary effort is normal. No respiratory distress.      Breath sounds: Normal breath sounds. No wheezing or rales.   Abdominal:      General: Bowel sounds are normal. There is no distension.      Palpations: Abdomen is soft.      Tenderness: There is no abdominal tenderness.   Musculoskeletal:         General: Swelling present. No deformity. Normal range of motion.      Cervical back: Normal range of motion and neck supple. No edema or erythema.   Lymphadenopathy:      Cervical: No cervical adenopathy.   Skin:     General: Skin is warm and dry.      Coloration: Skin is not pale.      Findings: No erythema or " rash.   Neurological:      Mental Status: He is alert and oriented to person, place, and time.      Coordination: Coordination normal.   Psychiatric:         Behavior: Behavior normal.         Thought Content: Thought content normal.         Judgment: Judgment normal.         Lab Review  Glucose (mg/dL)   Date Value   03/02/2019 128 (H)   03/01/2019 179 (H)   11/19/2018 281 (H)     Sodium (mmol/L)   Date Value   03/19/2021 144   09/17/2020 140   02/18/2020 141   01/27/2020 136 (L)   01/26/2020 137   01/08/2020 137     Potassium (mmol/L)   Date Value   03/19/2021 4.7   09/17/2020 4.3   02/18/2020 4.3   01/27/2020 4.6   01/26/2020 4.4   01/08/2020 4.3     Chloride (mmol/L)   Date Value   03/19/2021 108 (H)   09/17/2020 101   02/18/2020 102   01/27/2020 99   01/26/2020 95 (L)   01/08/2020 97 (L)     Total CO2 (mmol/L)   Date Value   03/19/2021 22   09/17/2020 23.9   02/18/2020 23.5   01/27/2020 26   01/26/2020 28   01/08/2020 28     BUN (mg/dL)   Date Value   03/19/2021 22   09/17/2020 18   02/18/2020 18   01/27/2020 20   01/26/2020 24 (H)   01/08/2020 23 (H)     Creatinine (mg/dL)   Date Value   03/19/2021 1.04   09/17/2020 1.10   02/18/2020 1.08   01/27/2020 0.9   01/26/2020 1.1   01/08/2020 1.0     Hemoglobin A1C (%)   Date Value   03/19/2021 6.3 (H)   09/17/2020 7.90 (H)   02/18/2020 8.50 (H)   01/27/2020 8.5 (H)   03/01/2019 7.80 (H)   03/15/2018 7.90 (H)   02/23/2018 7.70 (H)     Triglycerides (mg/dL)   Date Value   03/19/2021 105   09/17/2020 324 (H)   02/18/2020 212 (H)     LDL Cholesterol  (mg/dL)   Date Value   02/18/2020 63   08/22/2019 37   01/08/2019 58     LDL Chol Calc (NIH) (mg/dL)   Date Value   03/19/2021 62   09/17/2020 34       Assessment/Plan      1. Controlled type 2 diabetes mellitus with complication, with long-term current use of insulin (CMS/[facility])    2. Vitamin D deficiency    3. Dyslipidemia    4. Diabetic peripheral neuropathy (CMS/[facility])    5. Hyperuricemia    .    Medications  prescribed:  Outpatient Encounter Medications as of 4/2/2021   Medication Sig Dispense Refill   • ACCU-CHEK FASTCLIX LANCETS mis TEST BLOOD GLUCOSE TWICE DAILY 204 each 1   • allopurinol (ZYLOPRIM) 300 MG tablet TAKE 1 TABLET EVERY DAY 90 tablet 3   • diphenhydrAMINE-acetaminophen (TYLENOL PM EXTRA STRENGTH)  MG tablet per tablet Take 2 tablets by mouth At Night As Needed for Sleep.     • donepezil (Aricept) 5 MG tablet Take 1 tablet by mouth Every Night. 180 tablet 1   • Droplet Pen Needles 32G X 4 MM misc INJECT UP TO THREE TIMES DAILY OR AS DIRECTED 500 each 1   • ELIQUIS 5 MG tablet tablet TAKE 1 TABLET BY MOUTH EVERY 12 HOURS. 180 tablet 3   • fenofibrate (TRICOR) 145 MG tablet TAKE 1 TABLET EVERY DAY 90 tablet 0   • fluticasone (FLONASE) 50 MCG/ACT nasal spray 2 sprays into the nostril(s) as directed by provider Daily. 3 bottle 3   • glipizide (GLUCOTROL) 10 MG tablet TAKE 1 TABLET TWICE DAILY BEFORE MEALS 180 tablet 0   • glucose blood (Accu-Chek SmartView) test strip CHECK BLOOD GLUCOSE TWICE DAILY 200 each 0   • GLYXAMBI 25-5 MG tablet TAKE 1 TABLET EVERY DAY WITH BREAKFAST 90 tablet 0   • L-Methylfolate-B6-B12 3-35-2 MG tablet TAKE 1 TABLET TWICE DAILY 180 tablet 1   • melatonin 5 MG tablet tablet Take 5 mg by mouth Every Night.     • Multiple Vitamins-Minerals (MULTIVITAMIN ADULT PO) Take 1 tablet by mouth Daily.     • mupirocin (BACTROBAN) 2 % ointment      • Myrbetriq 50 MG tablet sustained-release 24 hour 24 hr tablet      • Omega-3 Fatty Acids (FISH OIL) 1200 MG capsule capsule Take 2,000 mg by mouth 2 (Two) Times a Day With Meals. HOLD FOR SURGERY      • polyethylene glycol (MiraLax) 17 GM/SCOOP powder Take 17 g by mouth Daily. Once a day     • pregabalin (LYRICA) 100 MG capsule Take 1 capsule by mouth 3 (Three) Times a Day. 270 capsule 0   • rosuvastatin (CRESTOR) 10 MG tablet TAKE 1 TABLET EVERY DAY 90 tablet 0   • sodium chloride (NS) 0.9 % irrigation  MLS TO IRRIGATE CATHERTER QD  PRN     • tamsulosin (FLOMAX) 0.4 MG capsule 24 hr capsule Take 1 capsule by mouth Daily. (Patient taking differently: Take 0.4 mg by mouth 2 (Two) Times a Day.) 30 capsule    • Toujeo Max SoloStar 300 UNIT/ML solution pen-injector injection INJECT 116 UNITS UNDER THE SKIN INTO THE APPROPRIATE AREA AS DIRECTED DAILY WITH BREAKFAST  36 mL 0   • traZODone (DESYREL) 150 MG tablet TAKE 1/2 - 2 TABLETS BY MOUTH EVERY NIGHT AT BEDTIME AS NEEDED FOR SLEEP 180 tablet 3   • vitamin C (ASCORBIC ACID) 500 MG tablet Take 500 mg by mouth Daily.     • vitamin D (ERGOCALCIFEROL) 1.25 MG (47622 UT) capsule capsule TAKE 1 CAPSULE TWICE WEEKLY 26 capsule 0   • [DISCONTINUED] CBD (cannabidiol) oral oil Take 1 dose by mouth Daily.     • [DISCONTINUED] Diaper Rash Products (DESITIN) ointment Apply  topically to the appropriate area as directed.     • [DISCONTINUED] hydrocortisone-bacitracin-zinc oxide-nystatin (MAGIC BARRIER) Apply 1 application topically to the appropriate area as directed As Needed.     • [DISCONTINUED] NYSTATIN 027510 UNIT/GM topical powder Apply  topically to the appropriate area as directed As Needed.       No facility-administered encounter medications on file as of 4/2/2021.       Orders placed during this encounter include:  No orders of the defined types were placed in this encounter.    Patient was seen and examined.  Metabolically and clinically he has significantly improved.  His A1c is 6.3.  He was counseled today on treatment of any hypoglycemic event of blood sugar less than 70.  His medication list was reviewed and updated for accuracy.  He denies needing any refills.  He is up-to-date on his eye exam and sees Dr. Fernandez with [facility] [place].  He has neuropathy in his feet.  He has limited mobility and is in a wheelchair.  Blood sugars are in satisfactory range she does have occasional blood sugar less than 70.  He is adjusting his insulin.  He will continue all his current medications as prescribed.   Uric acid, cholesterol, and vitamin D are all in satisfactory range.

## 2021-05-09 NOTE — PROGRESS NOTES
"Subjective   Jesus Beckham is a 80 y.o. male.     CC: Wrist Pain    History of Present Illness     Pt comes in today c/o right wrist pain for several months w/o injury or known reason. Last uric acid (3/21) was 3.0. Pt does have to pull himself up in his bed to transfer to his wheelchair repetitively. Difficulty moving the right thumb.       The following portions of the patient's history were reviewed and updated as appropriate: allergies, current medications, past family history, past medical history, past social history, past surgical history and problem list.    Review of Systems   Constitutional: Negative for activity change, chills and fever.   Respiratory: Negative for cough.    Cardiovascular: Negative for chest pain.   Musculoskeletal:        Wrist pain   Psychiatric/Behavioral: Negative for dysphoric mood.       /72   Pulse 78   Temp 97.8 °F (36.6 °C) (Oral)   Resp 16   Ht 177.8 cm (70\")   BMI 39.66 kg/m²     Objective   Physical Exam  Constitutional:       General: He is not in acute distress.     Appearance: He is well-developed.   Pulmonary:      Effort: Pulmonary effort is normal.   Musculoskeletal:         General: Tenderness (positive Finkelstein right wrist noted) present.   Neurological:      Mental Status: He is alert and oriented to person, place, and time.   Psychiatric:         Behavior: Behavior normal.         Thought Content: Thought content normal.         Assessment/Plan   Diagnoses and all orders for this visit:    1. Flexor tenosynovitis of thumb (Primary)  -     Ambulatory Referral to Hand Surgery    Script to Rx Alternative sent for topical application.  Ancillary, pt's Rolator Walker is broken and he needs a new one for mobility.         "

## 2021-05-10 ENCOUNTER — OFFICE VISIT (OUTPATIENT)
Dept: FAMILY MEDICINE CLINIC | Facility: CLINIC | Age: 81
End: 2021-05-10

## 2021-05-10 VITALS
HEIGHT: 70 IN | BODY MASS INDEX: 39.66 KG/M2 | SYSTOLIC BLOOD PRESSURE: 124 MMHG | DIASTOLIC BLOOD PRESSURE: 72 MMHG | RESPIRATION RATE: 16 BRPM | HEART RATE: 78 BPM | TEMPERATURE: 97.8 F

## 2021-05-10 DIAGNOSIS — M65.9 FLEXOR TENOSYNOVITIS OF THUMB: Primary | ICD-10-CM

## 2021-05-10 PROCEDURE — 99213 OFFICE O/P EST LOW 20 MIN: CPT | Performed by: FAMILY MEDICINE

## 2021-06-09 RX ORDER — ROSUVASTATIN CALCIUM 10 MG/1
10 TABLET, COATED ORAL DAILY
Qty: 90 TABLET | Refills: 1 | Status: SHIPPED | OUTPATIENT
Start: 2021-06-09 | End: 2021-10-15

## 2021-06-09 RX ORDER — MECOBAL/LEVOMEFOLAT CA/B6 PHOS 2-3-35 MG
1 TABLET ORAL 2 TIMES DAILY
Qty: 180 TABLET | Refills: 1 | Status: SHIPPED | OUTPATIENT
Start: 2021-06-09 | End: 2021-10-15

## 2021-06-09 RX ORDER — GLIPIZIDE 10 MG/1
10 TABLET ORAL
Qty: 180 TABLET | Refills: 1 | Status: SHIPPED | OUTPATIENT
Start: 2021-06-09 | End: 2021-10-15

## 2021-06-09 RX ORDER — INSULIN GLARGINE 300 U/ML
100 INJECTION, SOLUTION SUBCUTANEOUS DAILY
Qty: 31 ML | Refills: 1
Start: 2021-06-09 | End: 2021-06-18 | Stop reason: SDUPTHER

## 2021-06-09 RX ORDER — EMPAGLIFLOZIN AND LINAGLIPTIN 25; 5 MG/1; MG/1
1 TABLET, FILM COATED ORAL
Qty: 90 TABLET | Refills: 1 | Status: SHIPPED | OUTPATIENT
Start: 2021-06-09 | End: 2021-10-15

## 2021-06-09 RX ORDER — FENOFIBRATE 145 MG/1
145 TABLET, COATED ORAL DAILY
Qty: 90 TABLET | Refills: 1 | Status: SHIPPED | OUTPATIENT
Start: 2021-06-09 | End: 2021-10-15

## 2021-06-09 RX ORDER — PEN NEEDLE, DIABETIC 32GX 5/32"
NEEDLE, DISPOSABLE MISCELLANEOUS
Qty: 300 EACH | Refills: 1 | Status: SHIPPED | OUTPATIENT
Start: 2021-06-09 | End: 2022-11-15

## 2021-06-14 ENCOUNTER — OFFICE VISIT (OUTPATIENT)
Dept: FAMILY MEDICINE CLINIC | Facility: CLINIC | Age: 81
End: 2021-06-14

## 2021-06-14 VITALS
SYSTOLIC BLOOD PRESSURE: 114 MMHG | WEIGHT: 260 LBS | HEIGHT: 70 IN | RESPIRATION RATE: 16 BRPM | DIASTOLIC BLOOD PRESSURE: 71 MMHG | BODY MASS INDEX: 37.22 KG/M2 | TEMPERATURE: 97.6 F | HEART RATE: 72 BPM

## 2021-06-14 DIAGNOSIS — J30.1 CHRONIC SEASONAL ALLERGIC RHINITIS DUE TO POLLEN: Chronic | ICD-10-CM

## 2021-06-14 DIAGNOSIS — F51.01 PRIMARY INSOMNIA: Primary | Chronic | ICD-10-CM

## 2021-06-14 PROCEDURE — 99213 OFFICE O/P EST LOW 20 MIN: CPT | Performed by: FAMILY MEDICINE

## 2021-06-14 RX ORDER — TRAZODONE HYDROCHLORIDE 150 MG/1
450 TABLET ORAL NIGHTLY
Qty: 270 TABLET | Refills: 3 | Status: SHIPPED | OUTPATIENT
Start: 2021-06-14 | End: 2022-06-15 | Stop reason: SDUPTHER

## 2021-06-14 RX ORDER — FLUTICASONE PROPIONATE 50 MCG
2 SPRAY, SUSPENSION (ML) NASAL DAILY
Qty: 48 G | Refills: 3 | Status: SHIPPED | OUTPATIENT
Start: 2021-06-14 | End: 2022-06-26 | Stop reason: SDUPTHER

## 2021-06-19 RX ORDER — ERGOCALCIFEROL 1.25 MG/1
50000 CAPSULE ORAL 2 TIMES WEEKLY
Qty: 26 CAPSULE | Refills: 1 | Status: SHIPPED | OUTPATIENT
Start: 2021-06-21 | End: 2021-12-23

## 2021-06-19 RX ORDER — INSULIN GLARGINE 300 U/ML
100 INJECTION, SOLUTION SUBCUTANEOUS DAILY
Qty: 10 PEN | Refills: 1 | Status: SHIPPED | OUTPATIENT
Start: 2021-06-19 | End: 2022-01-19 | Stop reason: SDUPTHER

## 2021-06-23 ENCOUNTER — TELEPHONE (OUTPATIENT)
Dept: FAMILY MEDICINE CLINIC | Facility: CLINIC | Age: 81
End: 2021-06-23

## 2021-06-23 NOTE — TELEPHONE ENCOUNTER
Pharmacy Name: [facility] - [facility] [address] - [phone]  - [phone]      Pharmacy representative name: ABEL     Pharmacy representative phone number: [phone] REFERENCE NUMBER: [phone]    What medication are you calling in regards to: traZODone (DESYREL) 150 MG tablet    What question does the pharmacy have: DOSAGE INSTRUCTION CLARIFICATION.     Who is the provider that prescribed the medication: TERRIE MILES MD    Additional notes: THE PHARMACY HAS SOME CONCERNS WITH THIS MEDICATION AND WOULD LIKE IF YOU COULD CALL THEM BACK. THE PHARMACY NEEDS CLARIFICATION FOR THE MEDICATION DIRECTIONS.

## 2021-06-23 NOTE — TELEPHONE ENCOUNTER
PATIENT WAS LAST SEEN 06/14/2021 BY DR MILES - 1. Primary insomnia (Primary)  -     traZODone (DESYREL) 150 MG tablet; Take 3 tablets by mouth Every Night.  Dispense: 270 tablet; Refill: 3    I SPOKE WITH KACY AT [facility]  I ALSO SPOKE WITH SARA THE PHARMACY

## 2021-07-01 DIAGNOSIS — E11.42 DIABETIC PERIPHERAL NEUROPATHY (HCC): ICD-10-CM

## 2021-07-01 RX ORDER — PREGABALIN 100 MG/1
100 CAPSULE ORAL 3 TIMES DAILY
Qty: 270 CAPSULE | Refills: 1 | Status: SHIPPED | OUTPATIENT
Start: 2021-07-01 | End: 2022-01-20 | Stop reason: SDUPTHER

## 2021-08-13 NOTE — PROGRESS NOTES
Subjective   Patient ID: Jesus Beckham is a 80 y.o. male is here today for follow-up. He was last seen 2/15/21 for Adhesive arachnoiditis, History of lumbar spinal fusion, Bilateral leg weakness    Today patient reports his legs from his knees down hurt constantly.  His feet hurt constantly.    This patient is in a motorized wheelchair.  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  The infection seems to have settled down but he was seen by me and diagnosed with arachnoiditis from all the inflammatory and scarring resulting from the infection and I have been seeing him intermittently ever since.  He knows that there is really no primary surgical treatment for his arachnoiditis.  He is also being evaluated by Dr. Gallardo of urology for a bladder stimulator.  Reminded him that there is really no primary surgical treatment for arachnoiditis.  A spinal cord stimulator can be considered later on but I would not do that until he has fully recovered from his bladder stimulator surgery which apparently is tomorrow.  He just takes Tylenol for the pain and says it is not sufficient.  I suggested sending him to pain management for medical management.  Perhaps low-dose narcotics would be helpful to him.  We will send her to Dr. Holguin for that who might consider doing a spinal cord stimulator trial later on once he recovers from his bladder stimulator surgery.  I will see him in 6 months.    Leg Pain   The pain is present in the left leg, right foot and left foot. The quality of the pain is described as aching, burning, cramping, shooting and stabbing. The pain is at a severity of 6/10. The pain has been constant since onset. He has tried acetaminophen for the symptoms. The treatment provided mild relief.       The following portions of the patient's history were reviewed and updated as appropriate: allergies, current medications, past family history, past medical history, past social  "history, past surgical history and problem list.    Review of Systems   Constitutional: Negative for chills and fever.   HENT: Negative for congestion.    All other systems reviewed and are negative.          Objective     Vitals:    08/16/21 1315   BP: 138/78   Temp: 98 °F (36.7 °C)   Weight: Comment: in wheelchair   Height: 177.8 cm (70\")     Body mass index is 37.31 kg/m².      Physical Exam  Constitutional:       Appearance: He is well-developed.   HENT:      Head: Normocephalic and atraumatic.   Eyes:      Extraocular Movements: EOM normal.      Conjunctiva/sclera: Conjunctivae normal.      Pupils: Pupils are equal, round, and reactive to light.   Neck:      Vascular: No carotid bruit.   Neurological:      Mental Status: He is oriented to person, place, and time.      Coordination: Finger-Nose-Finger Test and Heel to Shin Test normal.      Gait: Gait is intact.      Deep Tendon Reflexes:      Reflex Scores:       Tricep reflexes are 2+ on the right side and 2+ on the left side.       Bicep reflexes are 2+ on the right side and 2+ on the left side.       Brachioradialis reflexes are 2+ on the right side and 2+ on the left side.       Patellar reflexes are 2+ on the right side and 2+ on the left side.       Achilles reflexes are 2+ on the right side and 2+ on the left side.  Psychiatric:         Speech: Speech normal.       Neurologic Exam     Mental Status   Oriented to person, place, and time.   Registration of memory: Good recent and remote memory.   Attention: normal. Concentration: normal.   Speech: speech is normal   Level of consciousness: alert  Knowledge: consistent with education.     Cranial Nerves     CN II   Visual fields full to confrontation.   Visual acuity: normal    CN III, IV, VI   Pupils are equal, round, and reactive to light.  Extraocular motions are normal.     CN V   Facial sensation intact.   Right corneal reflex: normal  Left corneal reflex: normal    CN VII   Facial expression full, " symmetric.   Right facial weakness: none  Left facial weakness: none    CN VIII   Hearing: intact    CN IX, X   Palate: symmetric    CN XI   Right sternocleidomastoid strength: normal  Left sternocleidomastoid strength: normal    CN XII   Tongue: not atrophic  Tongue deviation: none    Motor Exam   Muscle bulk: normal  Right arm tone: normal  Left arm tone: normal  Right leg tone: normal  Left leg tone: normal    Strength   Strength 5/5 except as noted.   Right iliopsoas: 4/5  Left iliopsoas: 4/5  Right quadriceps: 4/5  Left quadriceps: 4/5  Right hamstrin/5  Left hamstrin/5  Right glutei: 4/5  Left glutei: 4/5  Right anterior tibial: 4/5  Left anterior tibial: 4/5  Right posterior tibial: 4/5  Left posterior tibial: 4/5  Right peroneal: 4/5  Left peroneal: 4/5  Right gastroc: 4/5  Left gastroc: 4/5    Sensory Exam   Light touch normal.     Gait, Coordination, and Reflexes     Gait  Gait: normal    Coordination   Finger to nose coordination: normal  Heel to shin coordination: normal    Reflexes   Right brachioradialis: 2+  Left brachioradialis: 2+  Right biceps: 2+  Left biceps: 2+  Right triceps: 2+  Left triceps: 2+  Right patellar: 2+  Left patellar: 2+  Right achilles: 2+  Left achilles: 2+  Right : 2+  Left : 2+          Assessment/Plan   Independent Review of Radiographic Studies:      I personally reviewed the images from the following studies.    I reviewed x-rays done in 2019 which showed good positioning of the hardware and interbody cages from L2-L4.  Agree with the report.    Medical Decision Making:      He will work with Dr. Lobato and hopefully get his bladder stimulator placed.  We will send him to Dr. Holguin for medical management.  Perhaps he could be considered later on for spinal cord stimulator given his underlying diagnosis of arachnoiditis.  I will see him in 6 months and will keep an eye on him.  Continues to have back and bilateral leg pain with weakness.  He is no  longer going to [facility] for PT because of the Covid crisis.  He just takes Tylenol for his pain.      Diagnoses and all orders for this visit:    1. Adhesive arachnoiditis (Primary)  -     Ambulatory Referral to Pain Management    2. Bilateral leg weakness  -     Ambulatory Referral to Pain Management    3. History of lumbar spinal fusion  -     Ambulatory Referral to Pain Management      Return in about 6 months (around 2/16/2022) for Face-to-face.

## 2021-08-16 ENCOUNTER — OFFICE VISIT (OUTPATIENT)
Dept: NEUROSURGERY | Facility: CLINIC | Age: 81
End: 2021-08-16

## 2021-08-16 VITALS
DIASTOLIC BLOOD PRESSURE: 78 MMHG | HEIGHT: 70 IN | TEMPERATURE: 98 F | BODY MASS INDEX: 37.31 KG/M2 | SYSTOLIC BLOOD PRESSURE: 138 MMHG

## 2021-08-16 DIAGNOSIS — G03.9 ADHESIVE ARACHNOIDITIS: Primary | ICD-10-CM

## 2021-08-16 DIAGNOSIS — R29.898 BILATERAL LEG WEAKNESS: ICD-10-CM

## 2021-08-16 DIAGNOSIS — Z98.1 HISTORY OF LUMBAR SPINAL FUSION: ICD-10-CM

## 2021-08-16 PROCEDURE — 99214 OFFICE O/P EST MOD 30 MIN: CPT | Performed by: NEUROLOGICAL SURGERY

## 2021-08-25 RX ORDER — BLOOD SUGAR DIAGNOSTIC
STRIP MISCELLANEOUS
Qty: 200 EACH | Refills: 3 | Status: SHIPPED | OUTPATIENT
Start: 2021-08-25 | End: 2022-01-19 | Stop reason: SDUPTHER

## 2021-08-27 ENCOUNTER — LAB (OUTPATIENT)
Dept: LAB | Facility: HOSPITAL | Age: 81
End: 2021-08-27

## 2021-08-27 ENCOUNTER — TRANSCRIBE ORDERS (OUTPATIENT)
Dept: CARDIOLOGY | Facility: HOSPITAL | Age: 81
End: 2021-08-27

## 2021-08-27 ENCOUNTER — HOSPITAL ENCOUNTER (OUTPATIENT)
Dept: CARDIOLOGY | Facility: HOSPITAL | Age: 81
Discharge: HOME | End: 2021-08-27

## 2021-08-27 DIAGNOSIS — R32 URINARY INCONTINENCE, UNSPECIFIED TYPE: ICD-10-CM

## 2021-08-27 DIAGNOSIS — Z01.818 PRE-OP EXAM: Primary | ICD-10-CM

## 2021-08-27 DIAGNOSIS — Z01.818 PRE-OP EXAM: ICD-10-CM

## 2021-08-27 LAB
ANION GAP SERPL CALCULATED.3IONS-SCNC: 11.5 MMOL/L (ref 5–15)
BUN SERPL-MCNC: 20 MG/DL (ref 8–23)
BUN/CREAT SERPL: 18.3 (ref 7–25)
CALCIUM SPEC-SCNC: 9.1 MG/DL (ref 8.6–10.5)
CHLORIDE SERPL-SCNC: 98 MMOL/L (ref 98–107)
CO2 SERPL-SCNC: 24.5 MMOL/L (ref 22–29)
CREAT SERPL-MCNC: 1.09 MG/DL (ref 0.76–1.27)
DEPRECATED RDW RBC AUTO: 46.4 FL (ref 37–54)
ERYTHROCYTE [DISTWIDTH] IN BLOOD BY AUTOMATED COUNT: 14 % (ref 12.3–15.4)
GFR SERPL CREATININE-BSD FRML MDRD: 65 ML/MIN/1.73
GLUCOSE SERPL-MCNC: 274 MG/DL (ref 65–99)
HCT VFR BLD AUTO: 43 % (ref 37.5–51)
HGB BLD-MCNC: 14.6 G/DL (ref 13–17.7)
MCH RBC QN AUTO: 30.7 PG (ref 26.6–33)
MCHC RBC AUTO-ENTMCNC: 34 G/DL (ref 31.5–35.7)
MCV RBC AUTO: 90.5 FL (ref 79–97)
PLATELET # BLD AUTO: 146 10*3/MM3 (ref 140–450)
PMV BLD AUTO: 10.7 FL (ref 6–12)
POTASSIUM SERPL-SCNC: 4.4 MMOL/L (ref 3.5–5.2)
RBC # BLD AUTO: 4.75 10*6/MM3 (ref 4.14–5.8)
SODIUM SERPL-SCNC: 134 MMOL/L (ref 136–145)
WBC # BLD AUTO: 4.99 10*3/MM3 (ref 3.4–10.8)

## 2021-08-27 PROCEDURE — 36415 COLL VENOUS BLD VENIPUNCTURE: CPT

## 2021-08-27 PROCEDURE — 93005 ELECTROCARDIOGRAM TRACING: CPT | Performed by: UROLOGY

## 2021-08-27 PROCEDURE — 80048 BASIC METABOLIC PNL TOTAL CA: CPT

## 2021-08-27 PROCEDURE — 93010 ELECTROCARDIOGRAM REPORT: CPT | Performed by: INTERNAL MEDICINE

## 2021-08-27 PROCEDURE — 85027 COMPLETE CBC AUTOMATED: CPT

## 2021-09-07 ENCOUNTER — APPOINTMENT (OUTPATIENT)
Dept: WOUND CARE | Facility: HOSPITAL | Age: 81
End: 2021-09-07

## 2021-09-07 ENCOUNTER — TELEPHONE (OUTPATIENT)
Dept: ENDOCRINOLOGY | Age: 81
End: 2021-09-07

## 2021-09-07 NOTE — TELEPHONE ENCOUNTER
Spoke to patient about rescheduling his appointment with Yasmine Fong.    We have his labs and follow up appointment rescheduled. Patient understood

## 2021-09-13 ENCOUNTER — OFFICE VISIT (OUTPATIENT)
Dept: PAIN MEDICINE | Facility: CLINIC | Age: 81
End: 2021-09-13

## 2021-09-13 VITALS
OXYGEN SATURATION: 93 % | RESPIRATION RATE: 18 BRPM | BODY MASS INDEX: 37.31 KG/M2 | HEART RATE: 73 BPM | TEMPERATURE: 97.1 F | HEIGHT: 70 IN | DIASTOLIC BLOOD PRESSURE: 79 MMHG | SYSTOLIC BLOOD PRESSURE: 144 MMHG

## 2021-09-13 DIAGNOSIS — Z79.01 CHRONIC ANTICOAGULATION: ICD-10-CM

## 2021-09-13 DIAGNOSIS — G89.4 CHRONIC PAIN SYNDROME: Primary | ICD-10-CM

## 2021-09-13 DIAGNOSIS — G03.9 ADHESIVE ARACHNOIDITIS: ICD-10-CM

## 2021-09-13 LAB
POC AMPHETAMINES: NEGATIVE
POC BARBITURATES: NEGATIVE
POC BENZODIAZEPHINES: NEGATIVE
POC COCAINE: NEGATIVE
POC METHADONE: NEGATIVE
POC METHAMPHETAMINE SCREEN URINE: NEGATIVE
POC OPIATES: NEGATIVE
POC OXYCODONE: NEGATIVE
POC PHENCYCLIDINE: NEGATIVE
POC PROPOXYPHENE: NEGATIVE
POC THC: NEGATIVE
POC TRICYCLIC ANTIDEPRESSANTS: NEGATIVE

## 2021-09-13 PROCEDURE — 99214 OFFICE O/P EST MOD 30 MIN: CPT | Performed by: ANESTHESIOLOGY

## 2021-09-13 PROCEDURE — 80305 DRUG TEST PRSMV DIR OPT OBS: CPT | Performed by: ANESTHESIOLOGY

## 2021-09-13 RX ORDER — OXYCODONE HYDROCHLORIDE AND ACETAMINOPHEN 5; 325 MG/1; MG/1
1 TABLET ORAL 2 TIMES DAILY PRN
Qty: 60 TABLET | Refills: 0 | Status: SHIPPED | OUTPATIENT
Start: 2021-09-13 | End: 2021-10-13 | Stop reason: SDUPTHER

## 2021-09-13 NOTE — PROGRESS NOTES
CHIEF COMPLAINT  Mr. Beckham has bilateral leg pain that started in 2018. He has a history of back surgery in Jan 2016 and March 2018. He has previously had PT for his leg and back pain. He states that he had seen a Chiropractor and has used a TENS unit.    Subjective   Jesus Beckham is a 80 y.o. male.   He presents to the office for evaluation of leg pain. He was referred here by Dr. Bailon.  His primary care physician is Dr. Ricardo..     He greatly minimizes back pain, noting decrease in back pain issues after recovery from the March 2018 surgery.  His decompressive surgery was in 2016.  He was ill developing sepsis in March 2018 and had flare of back pain during that time which led to the discovery of spinal infection.    Leg Pain   The pain is present in the left leg and right leg. The quality of the pain is described as aching, burning and cramping. The pain is severe. The pain has been worsening since onset. Associated symptoms include numbness. The symptoms are aggravated by movement and weight bearing.        PEG Assessment   What number best describes your pain on average in the past week?8  What number best describes how, during the past week, pain has interfered with your enjoyment of life?7  What number best describes how, during the past week, pain has interfered with your general activity?  7    --  The aforementioned information the Chief Complaint section and above subjective data including any HPI data, and also the Review of Systems data, has been personally reviewed and affirmed.  --        Current Outpatient Medications:   •  ACCU-CHEK FASTCLIX LANCETS misc, TEST BLOOD GLUCOSE TWICE DAILY, Disp: 204 each, Rfl: 1  •  allopurinol (ZYLOPRIM) 300 MG tablet, TAKE 1 TABLET EVERY DAY, Disp: 90 tablet, Rfl: 3  •  diphenhydrAMINE-acetaminophen (TYLENOL PM EXTRA STRENGTH)  MG tablet per tablet, Take 2 tablets by mouth At Night As Needed for Sleep., Disp: , Rfl:   •  donepezil (Aricept) 5 MG  tablet, Take 1 tablet by mouth Every Night., Disp: 180 tablet, Rfl: 1  •  ELIQUIS 5 MG tablet tablet, TAKE 1 TABLET BY MOUTH EVERY 12 HOURS., Disp: 180 tablet, Rfl: 3  •  fenofibrate (TRICOR) 145 MG tablet, Take 1 tablet by mouth Daily., Disp: 90 tablet, Rfl: 1  •  fluticasone (FLONASE) 50 MCG/ACT nasal spray, 2 sprays into the nostril(s) as directed by provider Daily., Disp: 48 g, Rfl: 3  •  glipizide (GLUCOTROL) 10 MG tablet, Take 1 tablet by mouth 2 (Two) Times a Day Before Meals., Disp: 180 tablet, Rfl: 1  •  glucose blood (Accu-Chek SmartView) test strip, Dx code 11.42 testing bs 2 x day, Disp: 200 each, Rfl: 3  •  Glyxambi 25-5 MG tablet, Take 1 tablet by mouth Daily With Breakfast., Disp: 90 tablet, Rfl: 1  •  Insulin Pen Needle (Droplet Pen Needles) 32G X 4 MM misc, USING 3 TIMES DAILY, Disp: 300 each, Rfl: 1  •  L-Methylfolate-B6-B12 3-35-2 MG tablet, Take 1 tablet by mouth 2 (Two) Times a Day., Disp: 180 tablet, Rfl: 1  •  melatonin 5 MG tablet tablet, Take 5 mg by mouth Every Night., Disp: , Rfl:   •  Multiple Vitamins-Minerals (MULTIVITAMIN ADULT PO), Take 1 tablet by mouth Daily., Disp: , Rfl:   •  Myrbetriq 50 MG tablet sustained-release 24 hour 24 hr tablet, , Disp: , Rfl:   •  Omega-3 Fatty Acids (FISH OIL) 1200 MG capsule capsule, Take 2,000 mg by mouth 2 (Two) Times a Day With Meals. HOLD FOR SURGERY , Disp: , Rfl:   •  polyethylene glycol (MiraLax) 17 GM/SCOOP powder, Take 17 g by mouth Daily. Once a day, Disp: , Rfl:   •  pregabalin (LYRICA) 100 MG capsule, Take 1 capsule by mouth 3 (Three) Times a Day., Disp: 270 capsule, Rfl: 1  •  rosuvastatin (CRESTOR) 10 MG tablet, Take 1 tablet by mouth Daily., Disp: 90 tablet, Rfl: 1  •  Toujeo Max SoloStar 300 UNIT/ML solution pen-injector injection, Inject 100 Units under the skin into the appropriate area as directed Daily., Disp: 10 pen, Rfl: 1  •  traZODone (DESYREL) 150 MG tablet, Take 3 tablets by mouth Every Night., Disp: 270 tablet, Rfl: 3  •   "vitamin C (ASCORBIC ACID) 500 MG tablet, Take 500 mg by mouth Daily., Disp: , Rfl:   •  vitamin D (ERGOCALCIFEROL) 1.25 MG (29251 UT) capsule capsule, Take 1 capsule by mouth 2 (Two) Times a Week., Disp: 26 capsule, Rfl: 1  •  oxyCODONE-acetaminophen (PERCOCET) 5-325 MG per tablet, Take 1 tablet by mouth 2 (Two) Times a Day As Needed for Severe Pain ., Disp: 60 tablet, Rfl: 0    The following portions of the patient's history were reviewed and updated as appropriate: allergies, current medications, past family history, past medical history, past social history, past surgical history and problem list.    -------    The following portions of the patient's history were reviewed and updated as appropriate: allergies, current medications, past family history, past medical history, past social history, past surgical history and problem list.    Allergies   Allergen Reactions   • Levaquin [Levofloxacin] Other (See Comments)     Redness, itching at IV site with IV Levaquin administration   • Other Other (See Comments)     ALCOHOL. DEATHLY ILL.    • Alcohol Nausea And Vomiting     \"Deathly sick, headaches, cold sweats\"  Cant take any medication that contains alcohol       Current Outpatient Medications on File Prior to Visit   Medication Sig Dispense Refill   • ACCU-CHEK FASTCLIX LANCETS misc TEST BLOOD GLUCOSE TWICE DAILY 204 each 1   • allopurinol (ZYLOPRIM) 300 MG tablet TAKE 1 TABLET EVERY DAY 90 tablet 3   • diphenhydrAMINE-acetaminophen (TYLENOL PM EXTRA STRENGTH)  MG tablet per tablet Take 2 tablets by mouth At Night As Needed for Sleep.     • donepezil (Aricept) 5 MG tablet Take 1 tablet by mouth Every Night. 180 tablet 1   • ELIQUIS 5 MG tablet tablet TAKE 1 TABLET BY MOUTH EVERY 12 HOURS. 180 tablet 3   • fenofibrate (TRICOR) 145 MG tablet Take 1 tablet by mouth Daily. 90 tablet 1   • fluticasone (FLONASE) 50 MCG/ACT nasal spray 2 sprays into the nostril(s) as directed by provider Daily. 48 g 3   • glipizide " (GLUCOTROL) 10 MG tablet Take 1 tablet by mouth 2 (Two) Times a Day Before Meals. 180 tablet 1   • glucose blood (Accu-Chek SmartView) test strip Dx code 11.42 testing bs 2 x day 200 each 3   • Glyxambi 25-5 MG tablet Take 1 tablet by mouth Daily With Breakfast. 90 tablet 1   • Insulin Pen Needle (Droplet Pen Needles) 32G X 4 MM misc USING 3 TIMES DAILY 300 each 1   • L-Methylfolate-B6-B12 3-35-2 MG tablet Take 1 tablet by mouth 2 (Two) Times a Day. 180 tablet 1   • melatonin 5 MG tablet tablet Take 5 mg by mouth Every Night.     • Multiple Vitamins-Minerals (MULTIVITAMIN ADULT PO) Take 1 tablet by mouth Daily.     • Myrbetriq 50 MG tablet sustained-release 24 hour 24 hr tablet      • Omega-3 Fatty Acids (FISH OIL) 1200 MG capsule capsule Take 2,000 mg by mouth 2 (Two) Times a Day With Meals. HOLD FOR SURGERY      • polyethylene glycol (MiraLax) 17 GM/SCOOP powder Take 17 g by mouth Daily. Once a day     • pregabalin (LYRICA) 100 MG capsule Take 1 capsule by mouth 3 (Three) Times a Day. 270 capsule 1   • rosuvastatin (CRESTOR) 10 MG tablet Take 1 tablet by mouth Daily. 90 tablet 1   • Toujeo Max SoloStar 300 UNIT/ML solution pen-injector injection Inject 100 Units under the skin into the appropriate area as directed Daily. 10 pen 1   • traZODone (DESYREL) 150 MG tablet Take 3 tablets by mouth Every Night. 270 tablet 3   • vitamin C (ASCORBIC ACID) 500 MG tablet Take 500 mg by mouth Daily.     • vitamin D (ERGOCALCIFEROL) 1.25 MG (61285 UT) capsule capsule Take 1 capsule by mouth 2 (Two) Times a Week. 26 capsule 1   • [DISCONTINUED] mupirocin (BACTROBAN) 2 % ointment      • [DISCONTINUED] sodium chloride (NS) 0.9 % irrigation  MLS TO IRRIGATE CATHERTER QD PRN     • [DISCONTINUED] tamsulosin (FLOMAX) 0.4 MG capsule 24 hr capsule Take 1 capsule by mouth Daily. (Patient taking differently: Take 0.4 mg by mouth 2 (Two) Times a Day.) 30 capsule      No current facility-administered medications on file prior to  visit.       Patient Active Problem List   Diagnosis   • Gout   • Diabetic peripheral neuropathy (CMS/HCC)   • Dyslipidemia   • Essential hypertension   • PVC (premature ventricular contraction)   • Vitamin D deficiency   • Benign non-nodular prostatic hyperplasia without lower urinary tract symptoms   • Osteoarthritis   • Urge incontinence   • CELINA (obstructive sleep apnea)   • Acute gangrenous cholecystitis   • Discitis of lumbar region   • Paroxysmal atrial fibrillation (CMS/HCC)   • History of sepsis   • Hyperuricemia   • Chronic deep vein thrombosis (DVT) of right peroneal vein (CMS/HCC)   • Nausea, vomiting, and diarrhea   • Colitis presumed infectious   • Urinary tract infection in male   • DDD (degenerative disc disease), lumbar   • Bilateral leg weakness   • History of lumbar spinal fusion   • Carpal tunnel syndrome   • Adhesive arachnoiditis   • Heart murmur   • Chronic anticoagulation   • Diverticulosis   • Hematuria   • Lower obstructive uropathy   • Splenic lesion   • Suprapubic catheter dysfunction (CMS/HCC)   • Obesity (BMI 30-39.9)   • Aortic valve sclerosis   • Venous insufficiency of left leg   • Memory difficulties   • Chronic pain syndrome       Past Medical History:   Diagnosis Date   • Acute embolism and thrombosis of deep vein of right distal lower extremity (CMS/HCC)    • Acute gangrenous cholecystitis     FEB 2018   • Allergic    • Arthritis    • Atrial fibrillation with RVR (CMS/HCC)     HISTORY   • Benign prostatic hyperplasia without lower urinary tract symptoms    • Cancer of bladder (CMS/HCC) 2016   • Colon polyp    • Discitis of lumbar region    • DVT (deep venous thrombosis) (CMS/HCC)     MARCH 2018   • Essential hypertension    • Murray catheter in place     LOSS OF SENSATION BLADDER. BECAUSE OF WOUND AT THE TIME   • Gout    • Heel ulcer (CMS/HCC)     RIGHT. FOLLOWED BY WOUND CARE. GETTING BETTER   • History of sepsis    • Hyperlipidemia    • Loss, sensation     LOWER EXTREMTIES.  RELATED TO LAST BACK SURGERY.   • MRSA infection     LEFT LEG.    • Open wound     LOW TO MIDDLE CRACK BUTT. SEES WOUND CARE. WILL BE RESTARTED ON DIFFUCAN AND NYSTATIN POWER. REDNESS IN GROIN   • Open wound     WITH MEDICATED DRESSING LEFT SHIN AREA.    • CELINA (obstructive sleep apnea)     NO MACHINE   • Osteoarthritis    • Paroxysmal atrial fibrillation (CMS/HCC)    • PVC (premature ventricular contraction)    • Sepsis (CMS/HCC) 02/2018   • Sepsis due to Escherichia coli (CMS/HCC)    • Skin carcinoma 2012    MELANOMA.2 PLACES BACK AND EAR   • Type 2 diabetes mellitus (CMS/HCC)    • Vitamin D deficiency    • Weakness        Past Surgical History:   Procedure Laterality Date   • ABDOMINAL SURGERY     • APPENDECTOMY N/A 1961   • CHOLECYSTECTOMY WITH INTRAOPERATIVE CHOLANGIOGRAM N/A 2/25/2018    Procedure: CHOLECYSTECTOMY LAPAROSCOPIC INTRAOPERATIVE CHOLANGIOGRAM;  Surgeon: Maegan Correa MD;  Location: [facility] OR;  Service:    • COLONOSCOPY N/A 2010    h/o of polyps   • EYE SURGERY Bilateral 1959    glass removal from eye, lens transplant   • JOINT REPLACEMENT     • LAMINECTOMY N/A 01/18/2016    L2-L3, L3-L4, L4-L5, and L5-S1 bilateral lamincectomy, medial facetectomy & [id number], Dr. Kaiden Valdes   • LUMBAR FUSION N/A 3/7/2018    Procedure: LUMBAR FUSION MINIMALLY INVASIVE TRANSFORAMINAL LUMBAR INTERBODY IRRIGATION AND DEBRIDEMENT AND FUSION.  IRRIGATION AND DEBRIDEMENT OF EPIDURAL ABCESS LUMBAR 2-4. BONE MORPHOGENIC PROTEIN, ALPHATEC NOVEL AND ILLICO, EMG AND SSEP NEUROMONITORING.;  Surgeon: Manish Marr DO;  Location: [facility] OR;  Service: Orthopedic Spine   • SKIN BIOPSY     • TOTAL KNEE ARTHROPLASTY Right 03/07/2005    Dr. Washington Evans   • VENA CAVA FILTER INSERTION Right 3/6/2018    Procedure: VENA CAVA FILTER INSERTION;  Surgeon: Alexis Thakkar MD;  Location: [facility] OR 18/19;  Service:    • VENA CAVA FILTER REMOVAL N/A 12/3/2018    Procedure: VENA CAVA FILTER REMOVAL WITH  VENOCAVAGRAM;  Surgeon: Alexis Thakkar MD;  Location: [facility] OR ;  Service: Vascular       Family History   Problem Relation Age of Onset   • Esophageal cancer Mother    • No Known Problems Father    • Diabetes Maternal Grandmother    • Heart attack Maternal Grandfather        Social History     Socioeconomic History   • Marital status:      Spouse name: Not on file   • Number of children: 2   • Years of education: Not on file   • Highest education level: Some college, no degree   Tobacco Use   • Smoking status: Former Smoker     Types: Cigars     Quit date:      Years since quittin.7   • Smokeless tobacco: Former User     Types: Chew   • Tobacco comment: Caffeine daily   Substance and Sexual Activity   • Alcohol use: Not Currently   • Drug use: No   • Sexual activity: Defer       -------      REVIEW OF PERTINENT MEDICAL DATA    Review of the office note from 2021 from the referring physician who is neurosurgeon Dr. Bailon. There is a history of weakness in both legs a history of spinal fusion and a history of adhesive arachnoiditis. Constant leg pain from the knees down. Dr. Bailon notes that there could be a utility for spinal stimulation in the future but he wants to make sure that all inflammation from the infection has settled down. The operation previously was L2-3 and L3-4. There is also a note that he is being considered for a bladder stimulator. The neurosurgeon is recommending medication management for the time being and consideration for spinal cord stimulation trial in the future.    E-paris report is reviewed:  I reviewed the document in the electronic form under the PDMP tab in the Epic EMR...  - In this function, the report is a current report in as close to real-time as possible.  - The report was available for immediate review.    - I did maria elena the report as reviewed.  - There is not concern for aberrant behavior based on this ekasper  "review.  .      Recent labs include August 27 CBC. White count was 5. Hemoglobin 14.6. Platelet count 146,000.    Metabolic panel from August 27 had a glucose of 274. Sodium 134. GFR 65.    Hemoglobin A1c was 6.3. This hemoglobin A1c was on March 19 of this year.    I reviewed report of MRI of the lumbar spine from July 23, 2019. There is an L2-L4 fusion with posterior rods and interbody graft material. Grade 1 listhesis of L4 upon L5. Moderate facet disease at L4-5. Moderate facet disease at L5-S1. \"Eccentric position of cauda equina laterally to the right at L4 level… May be secondary to arachnoiditis\".        Review of Systems   Constitutional: Positive for fatigue.   HENT: Positive for congestion.    Eyes: Negative for visual disturbance.   Respiratory: Positive for cough and shortness of breath. Negative for wheezing.    Cardiovascular: Negative for chest pain.   Gastrointestinal: Negative for constipation.   Genitourinary: Negative for difficulty urinating.   Musculoskeletal: Positive for back pain.   Neurological: Positive for weakness and numbness.   Psychiatric/Behavioral: Positive for sleep disturbance. Negative for suicidal ideas. The patient is not nervous/anxious.          Vitals:    09/13/21 0901   BP: 144/79   Pulse: 73   Resp: 18   Temp: 97.1 °F (36.2 °C)   SpO2: 93%   Height: 177.8 cm (70\")   PainSc:   7   PainLoc: Leg     -------    Screener & Opioid Assessment for Patients with Pain (SOAPP alexis 1.0-SF)    A questionnaire for patients being considered for chronic opioid therapy.  Patient asked to answer each question as honestly as possible.  Confidential results.     1) How often do you have mood swings?       0 = Never  2) How often do you smoke a cigarette within an hour after you wake up?   0 = Never  3) How often have you taken medication other than the way that it was prescribed?  0 = Never  4) How often have you used illegal drugs (I.e. marijuana,cocaine,etc.) in the past 5 years? 0 = " Never  5) How often, in your lifetime, have you had legal problems or been arrested?   0 = Never    Any additional information you wish to share about the above answers?      No     SCORE = sum of the ratings from questions 1 through 5....      0    A score equal to or greater than 4 is considered positive.    - at a cutoff of 4, NPV is 0.85 & PPV is 0.69  - This assessment alone is not the only determining factor in deciding on a treatment plan.      -------    -------    Opioid Risk Tool for Male Patients    This tool should be administered to patients upon an initial visit prior to beginning opioid therapy for pain management.  The ORT has been validated for both male and female patients with chronic pain, and there are specific validated tools for each.      Fam Hx of Substance Abuse:  Alcohol=3, Illegal Drugs=3, Rx Drugs=4   TOTAL: Not Applicable  Personal History of Substance Abuse:  Alcohol=3, Illegal Drugs=4, Rx Drugs=5 TOTAL: Not Applicable  Age Between 16 - 45 years:  yes=1        TOTAL: Not Applicable  History of Preadolescent Sexual Abuse:  yes = N/a in ORT for males    TOTAL: Not Applicable  Psychological Disease:  ADD/OCD/Schizophrenia/Bipolar = 2 ; Depression=1  TOTAL: 0    SCORING TOTALS:          SUM of TOTALS: 0    Interpretation:  - score of 3 or lower indicates low risk for future opioid abuse  - score of 4 to 7 indicates moderate risk for opioid abuse  - score of 8 or higher indicates a high risk for opioid abuse.  - this assessment alone is not the only determining factor in developing a treatment plan    Citation... Dr. Ro Mack, Pain Med. 2005; 6 (6) : 432.  -------          Objective   Physical Exam  Vitals and nursing note reviewed.   Constitutional:       General: He is not in acute distress.     Appearance: Normal appearance. He is well-developed. He is not toxic-appearing.   HENT:      Head: Normocephalic and atraumatic.      Right Ear: Hearing and external ear normal.      Left Ear:  Hearing and external ear normal.      Nose: Nose normal.   Eyes:      General: Lids are normal.      Conjunctiva/sclera: Conjunctivae normal.      Pupils: Pupils are equal, round, and reactive to light.   Pulmonary:      Effort: Pulmonary effort is normal. No respiratory distress.   Musculoskeletal:      Right upper leg: No edema.      Left upper leg: No edema.      Left lower leg: No swelling.   Neurological:      Mental Status: He is alert and oriented to person, place, and time.      Cranial Nerves: Cranial nerves are intact. No cranial nerve deficit.      Sensory: Sensory deficit present.      Motor: Weakness and atrophy present.      Gait: Gait abnormal.      Deep Tendon Reflexes:      Reflex Scores:       Patellar reflexes are 1+ on the right side and 1+ on the left side.       Achilles reflexes are 0 on the right side and 0 on the left side.     Comments: Some decreased light touch sensation in a stocking distribution bilaterally.   Psychiatric:         Behavior: Behavior normal.         Assessment/Plan   Diagnoses and all orders for this visit:    1. Chronic pain syndrome (Primary)    2. Adhesive arachnoiditis    3. Chronic anticoagulation    Other orders  -     oxyCODONE-acetaminophen (PERCOCET) 5-325 MG per tablet; Take 1 tablet by mouth 2 (Two) Times a Day As Needed for Severe Pain .  Dispense: 60 tablet; Refill: 0      So in summary this 80-year-old gentleman, who is having a Medtronic InterStim device placed for bladder stimulation tomorrow, presents today complaining of bilateral leg pain, secondary to adhesive arachnoiditis.  He minimizes any back pain issues.  He has very significant incident pain and he is reliant on assistive devices and his spouse for most any ambulation.    Neurosurgical plan of care includes potential consideration for dorsal column stimulation in the future, but at this time limiting factors include first the placement of a bladder stimulator tomorrow and recovery time from  that, assurances that there is no evidence of spinal infection prior to placements of leads into the thoracolumbar or thoracic area, and also the need to manage anticoagulants.    Another consideration would be that he would need a device that favors treatment of neuropathy in the setting of diabetes, likely with a paresthesia free system, and hopefully the programming options pretty simple is leaving it on and not requiring patient demand adjustments.    The neurosurgeon was recommending consideration of medication options for his pain at this time, at a minimum in the short-term, and I agree that currently that is the best short-term option.  I discussed with the patient raising concerns about long-term opiate therapy and he and his spouse voiced understanding.  We talked about assessing the benefit and establishing functional goals.  Admittedly his functional goals are modest to be able to ambulate better and try to build up some leg strength so he is not confined to wheelchair as much.    --- Follow-up 1 month  -- drug screen for confirmation    --Regarding postoperative pain control, if his urologist feels that he needs more or different pain medication for the acute postoperative period that be very appropriate and that would definitely not be considered any type of medication management agreement violation or disruption.    Discussed with the patient regarding long-term side effects of opioids including but not limited to dependence, addiction, sedation, respiratory depression, opioid induced hormonal suppression, hyperalgesia, and elevated risk of myocardial infarction.    A medication management agreement is signed by the patient and the provider today.  Reviewed with the patient that this contract is specific to controlled substances, specifically pain medication.  Reviewed core of pain medicine is to decrease pain and increase functional level.  Reviewed the medication must be picked up as a written  prescription from this office and will not be called into any pharmacy.  Reviewed the use of Willis within the office setting.  Reviewed causes for potential of discontinuation of opiates,  including but not limited to consideration for diversion, obtaining other controlled substances from other license practitioners, or  inappropriate office behavior.  Patient understands to use a controlled substance as prescribed by physician and to avoid improper use of controlled substances.  Patient will also verbalized understanding that prescriptions to be filled at the same pharmacy  unless office staff is made aware of this.  Patient understands they can be submitted to random urine drug screens and/or random pill counts on any request.  Patient understands they are not to receive early refills.  The patient must produce an official police report for any effort to replace controlled substances that are lost or stolen.  No use of illegal street drugs while receiving controlled substances from this prescribing provider.  The patient is to take medication as prescribed  and not deviate from the normal schedule without consultation from the provider.  Patient is not to share the medication with others or to take medication with alcohol or other sedatives.  Use caution when driving or operating machinery.  Alert office if the patient becomes pregnant or begins nursing a child.  Reviewed the use of controlled substances recreates a risk for respiratory depression, which may result in serious harm or even death.  Must always keep the prescription in the original container.  Patient is to store controlled substances in a locked cabinet or other secure storage unit that is cool, dry and out of sunlight.  Patient must immediately notify office if any controlled substances is stolen or improperly taken by another individual.  Reviewed risk for physical dependence, tolerance and addiction.    ----------  Education about SCS therapy:     -  This was an extended office visit in which we entered into discussion about advanced pain relieving techniques, and discussed implantable pain therapies.  We discussed advanced neuromodulation in the form of Spinal Cord Stimulation.  This is a reasonable therapy for patients who have exhausted basic nonnarcotic options, basic modalities and physical therapies, and do not have any other reasonable surgical options.  This therapy as an alternative to long term high dose opioid therapy.    -  Risks include but are not limited to bleeding, infection, injury, paralysis, nerve injury, dural puncture, and risk for postprocedural pain.  Implanted equipment risks include but are not limited to lead migration, lead fracture, risk of loss of pain relieving stimulation, risk of electrical shock, and risk of system failure.    - We discussed the theory and basic science behind SCS therapy including but not limited to energy delivery and relevant anatomy, in terms that are easy to understand and also with use of illustrative devices.  Spinal Cord Stimulation therapies apply an electromagnetic field to a specific area on the spinal cord (Dorsal Column) to attempt to block transmission of painful signals from the peripheral nerves to the brain.    -  We discussed that prior to trialing, I request that patients review relevant materials and perform some research, and also have a follow up education session with a device specialist from the .  Also, insurance requires a presurgical psychological evaluation.  When these have been completed, and all the patient's questions have been answered to their satisfaction, then we will plan to request authorization for trialing.   - We discussed the trialing process (aka Phase 1)  that usually lasts a week, and the temporary nature of this trial.  Trial success will determine whether or not we proceed to implant.  We discussed reasonable expectations, and that I feel that  consistent 50% pain relief is medically successful and is a reasonable expectation to justify moving forward to permanent implant.    -  Additional risks of Phase 1 include but are not limited to bleeding on insertion, bleeding on lead removal, and procedural site soreness.  - We discussed the percutaneous surgical implant, including postsurgical restrictions, risks, and alternatives.   For spinal cord stimulation implanted device (aka SCS Phase 2) there is usually a midline vertical incision for the spinally implanted leads, and also a horizontal incision in the posterior lumbar flank for implantation of the battery & computer (aka IPG).  The leads are tunneled from the midline incision to the medial aspect of the battery pocket incision.    -  Postoperative restrictions include limiting the following activity as much as possible for 90 days:  Lifting >10 lbs, bending at the waist, stretching/reaching overhead, and twisting.  ----------             FAINA REPORT    As part of the patient's treatment plan, I am prescribing controlled substances. The patient has been made aware of appropriate use of such medications, including potential risk of somnolence, limited ability to drive and/or work safely, and the potential for dependence or overdose. It has also bee made clear that these medications are for use by this patient only, without concomitant use of alcohol or other substances unless prescribed.     Patient has completed prescribing agreement detailing terms of continued prescribing of controlled substances, including monitoring FAINA reports, urine drug screening, and pill counts if necessary. The patient is aware that inappropriate use will results in cessation of prescribing such medications.    FAINA report has been reviewed and scanned into the patient's chart.    As the clinician, I personally reviewed the FAINA from as above while the patient was in the office today.    History and physical exam  exhibit continued safe and appropriate use of controlled substances.     ---     Dictated utilizing Dragon dictation.     ---    Vitals:    09/13/21 0901   PainSc:   7   PainLoc: Leg          Jesus Beckham reports a pain score of 7.  Given his pain assessment as noted, treatment options were discussed and the following options were decided upon as a follow-up plan to address the patient's pain: prescription for opiod analgesics.

## 2021-09-17 ENCOUNTER — OFFICE VISIT (OUTPATIENT)
Dept: WOUND CARE | Facility: HOSPITAL | Age: 81
End: 2021-09-17

## 2021-09-17 ENCOUNTER — LAB REQUISITION (OUTPATIENT)
Dept: LAB | Facility: HOSPITAL | Age: 81
End: 2021-09-17

## 2021-09-17 DIAGNOSIS — L97.812 NON-PRESSURE CHRONIC ULCER OF OTHER PART OF RIGHT LOWER LEG WITH FAT LAYER EXPOSED (HCC): ICD-10-CM

## 2021-09-17 LAB — QT INTERVAL: 375 MS

## 2021-09-17 PROCEDURE — 87186 SC STD MICRODIL/AGAR DIL: CPT | Performed by: SURGERY

## 2021-09-17 PROCEDURE — G0463 HOSPITAL OUTPT CLINIC VISIT: HCPCS

## 2021-09-17 PROCEDURE — 97602 WOUND(S) CARE NON-SELECTIVE: CPT

## 2021-09-17 PROCEDURE — 87205 SMEAR GRAM STAIN: CPT | Performed by: SURGERY

## 2021-09-17 PROCEDURE — 87070 CULTURE OTHR SPECIMN AEROBIC: CPT | Performed by: SURGERY

## 2021-09-17 PROCEDURE — 87075 CULTR BACTERIA EXCEPT BLOOD: CPT | Performed by: SURGERY

## 2021-09-20 LAB
BACTERIA SPEC AEROBE CULT: ABNORMAL
GRAM STN SPEC: ABNORMAL

## 2021-09-21 ENCOUNTER — OFFICE VISIT (OUTPATIENT)
Dept: WOUND CARE | Facility: HOSPITAL | Age: 81
End: 2021-09-21

## 2021-09-22 LAB — BACTERIA SPEC ANAEROBE CULT: NORMAL

## 2021-09-23 ENCOUNTER — PATIENT ROUNDING (BHMG ONLY) (OUTPATIENT)
Dept: PAIN MEDICINE | Facility: CLINIC | Age: 81
End: 2021-09-23

## 2021-09-24 ENCOUNTER — OFFICE VISIT (OUTPATIENT)
Dept: WOUND CARE | Facility: HOSPITAL | Age: 81
End: 2021-09-24

## 2021-09-28 ENCOUNTER — OFFICE VISIT (OUTPATIENT)
Dept: ENDOCRINOLOGY | Age: 81
End: 2021-09-28

## 2021-09-28 VITALS — HEIGHT: 70 IN | DIASTOLIC BLOOD PRESSURE: 76 MMHG | BODY MASS INDEX: 37.31 KG/M2 | SYSTOLIC BLOOD PRESSURE: 124 MMHG

## 2021-09-28 DIAGNOSIS — Z79.4 CONTROLLED TYPE 2 DIABETES MELLITUS WITHOUT COMPLICATION, WITH LONG-TERM CURRENT USE OF INSULIN (HCC): Primary | ICD-10-CM

## 2021-09-28 DIAGNOSIS — E78.5 DYSLIPIDEMIA: ICD-10-CM

## 2021-09-28 DIAGNOSIS — E11.9 CONTROLLED TYPE 2 DIABETES MELLITUS WITHOUT COMPLICATION, WITH LONG-TERM CURRENT USE OF INSULIN (HCC): Primary | ICD-10-CM

## 2021-09-28 DIAGNOSIS — E11.42 DIABETIC PERIPHERAL NEUROPATHY (HCC): ICD-10-CM

## 2021-09-28 DIAGNOSIS — E55.9 VITAMIN D DEFICIENCY: ICD-10-CM

## 2021-09-28 PROCEDURE — 99214 OFFICE O/P EST MOD 30 MIN: CPT | Performed by: NURSE PRACTITIONER

## 2021-09-28 RX ORDER — SULFAMETHOXAZOLE AND TRIMETHOPRIM 800; 160 MG/1; MG/1
TABLET ORAL
COMMUNITY
Start: 2021-09-14 | End: 2021-11-18

## 2021-09-30 LAB
25(OH)D3+25(OH)D2 SERPL-MCNC: 55.3 NG/ML (ref 30–100)
ALBUMIN SERPL-MCNC: 4.4 G/DL (ref 3.5–5.2)
ALBUMIN/CREAT UR: 223 MG/G CREAT (ref 0–29)
ALBUMIN/GLOB SERPL: 2.6 G/DL
ALP SERPL-CCNC: 55 U/L (ref 39–117)
ALT SERPL-CCNC: 30 U/L (ref 1–41)
AST SERPL-CCNC: 34 U/L (ref 1–40)
BILIRUB SERPL-MCNC: 0.4 MG/DL (ref 0–1.2)
BUN SERPL-MCNC: 26 MG/DL (ref 8–23)
BUN/CREAT SERPL: 23.9 (ref 7–25)
CALCIUM SERPL-MCNC: 9.3 MG/DL (ref 8.6–10.5)
CHLORIDE SERPL-SCNC: 103 MMOL/L (ref 98–107)
CHOLEST SERPL-MCNC: 143 MG/DL (ref 0–200)
CO2 SERPL-SCNC: 25.4 MMOL/L (ref 22–29)
CREAT SERPL-MCNC: 1.09 MG/DL (ref 0.76–1.27)
CREAT UR-MCNC: 85.3 MG/DL
GLOBULIN SER CALC-MCNC: 1.7 GM/DL
GLUCOSE SERPL-MCNC: 112 MG/DL (ref 65–99)
HBA1C MFR BLD: 6.9 % (ref 4.8–5.6)
HDLC SERPL-MCNC: 41 MG/DL (ref 40–60)
IMP & REVIEW OF LAB RESULTS: NORMAL
LDLC SERPL CALC-MCNC: 73 MG/DL (ref 0–100)
MICROALBUMIN UR-MCNC: 189.9 UG/ML
POTASSIUM SERPL-SCNC: 4.2 MMOL/L (ref 3.5–5.2)
PROT SERPL-MCNC: 6.1 G/DL (ref 6–8.5)
SODIUM SERPL-SCNC: 139 MMOL/L (ref 136–145)
TRIGL SERPL-MCNC: 173 MG/DL (ref 0–150)
VLDLC SERPL CALC-MCNC: 29 MG/DL (ref 5–40)

## 2021-09-30 RX ORDER — LISINOPRIL 2.5 MG/1
2.5 TABLET ORAL DAILY
Qty: 90 TABLET | Refills: 1 | Status: SHIPPED | OUTPATIENT
Start: 2021-09-30 | End: 2022-01-19 | Stop reason: SDUPTHER

## 2021-09-30 NOTE — PROGRESS NOTES
Called and talked to patient and wife-    A1C 6.9 which is good    Vitamin D is normal    Lipid panel showed elevated triglycerides, need to watch diet.  If continues then we may need to consider Vascepa.    Urine showed protein. We need to start on lisinopril 2.5 mg for kidney protection and monitor.

## 2021-10-01 ENCOUNTER — OFFICE VISIT (OUTPATIENT)
Dept: WOUND CARE | Facility: HOSPITAL | Age: 81
End: 2021-10-01

## 2021-10-08 ENCOUNTER — OFFICE VISIT (OUTPATIENT)
Dept: WOUND CARE | Facility: HOSPITAL | Age: 81
End: 2021-10-08

## 2021-10-13 ENCOUNTER — OFFICE VISIT (OUTPATIENT)
Dept: PAIN MEDICINE | Facility: CLINIC | Age: 81
End: 2021-10-13

## 2021-10-13 VITALS
RESPIRATION RATE: 16 BRPM | BODY MASS INDEX: 37.31 KG/M2 | SYSTOLIC BLOOD PRESSURE: 128 MMHG | OXYGEN SATURATION: 96 % | DIASTOLIC BLOOD PRESSURE: 75 MMHG | HEART RATE: 74 BPM | TEMPERATURE: 97.3 F | HEIGHT: 70 IN

## 2021-10-13 DIAGNOSIS — Z79.899 ENCOUNTER FOR LONG-TERM (CURRENT) USE OF HIGH-RISK MEDICATION: ICD-10-CM

## 2021-10-13 DIAGNOSIS — G89.4 CHRONIC PAIN SYNDROME: Primary | ICD-10-CM

## 2021-10-13 DIAGNOSIS — T40.2X5A THERAPEUTIC OPIOID INDUCED CONSTIPATION: ICD-10-CM

## 2021-10-13 DIAGNOSIS — G03.9 ADHESIVE ARACHNOIDITIS: ICD-10-CM

## 2021-10-13 DIAGNOSIS — K59.03 THERAPEUTIC OPIOID INDUCED CONSTIPATION: ICD-10-CM

## 2021-10-13 PROCEDURE — 99214 OFFICE O/P EST MOD 30 MIN: CPT | Performed by: NURSE PRACTITIONER

## 2021-10-13 RX ORDER — OXYCODONE HYDROCHLORIDE AND ACETAMINOPHEN 5; 325 MG/1; MG/1
1 TABLET ORAL 3 TIMES DAILY PRN
Qty: 90 TABLET | Refills: 0 | Status: SHIPPED | OUTPATIENT
Start: 2021-10-13 | End: 2021-11-08 | Stop reason: SDUPTHER

## 2021-10-13 NOTE — PROGRESS NOTES
CHIEF COMPLAINT  F/U for leg pain.  Pt states his pain level has stayed the same.   Subjective   Jesus Beckham is a 80 y.o. male  who presents for follow-up.  He has a history of leg pain.  Today his pain is 6/10VAS in severity.  His last office visit he was started on Percocet 5/325 twice daily PRN. He continues with Percocet 5/325 2/day. This medication regimen decreases his pain to the point that it is tolerable, but states that the percocet 5/325 does not last through the day.  His nighttime pain is well controlled. He states he takes 1 tablet at bedtime and he is able to go to sleep right away until he wakes up due to his bladder.  He does report constipation with this medication.  He has been utilizing miralax and while this is helpful he still feels his emptying is incomplete.     He had his bladder simulator placed and he is scheduled for reprogramming tomorrow. He states currently he has quite a bit of discomfort from the stimulator currently.     Patient remained masked during entire encounter. No cough present. I donned a mask and eye protection throughout entire visit. Prior to donning mask and eye protection, hand hygiene was performed, as well as when it was doffed.  I was closer than 6 feet, but not for an extended period of time. No obvious exposure to any bodily fluids.    Leg Pain   The pain is present in the left leg and right leg. The quality of the pain is described as aching, burning and cramping. The pain is at a severity of 6/10. The pain is severe. The pain has been worsening since onset. Associated symptoms include numbness (waist down). The symptoms are aggravated by movement and weight bearing.      PEG Assessment   What number best describes your pain on average in the past week?7  What number best describes how, during the past week, pain has interfered with your enjoyment of life?7  What number best describes how, during the past week, pain has interfered with your general activity?   7    The following portions of the patient's history were reviewed and updated as appropriate: allergies, current medications, past family history, past medical history, past social history, past surgical history and problem list.    Review of Systems   Constitutional: Positive for fatigue. Negative for activity change and fever.   HENT: Negative for congestion.    Eyes: Negative for visual disturbance.   Respiratory: Negative for cough and chest tightness.    Cardiovascular: Negative for chest pain.   Gastrointestinal: Positive for constipation. Negative for abdominal pain and diarrhea.   Genitourinary: Negative for difficulty urinating and dysuria.   Neurological: Positive for weakness, light-headedness and numbness (waist down). Negative for dizziness and headaches.   Psychiatric/Behavioral: Negative for agitation, sleep disturbance and suicidal ideas. The patient is not nervous/anxious.      --  The aforementioned information the Chief Complaint section and above subjective data including any HPI data, and also the Review of Systems data, has been personally reviewed and affirmed.  --    Office visit from 8/16/2021 with Dr. Bailon reviewed.  Patient has a history of adhesive arachnoiditis, lumbar fusion, bilateral leg weakness.  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  There is no primary surgical treatment for arachnoiditis.  A spinal cord stimulator may be considered later on but would wait on this until he is fully recovered from pending bladder stimulator surgery.    Office visit from 9/13/2021 with Dr. Holguin reviewed.  Patient presents for evaluation of leg pain was referred by Dr. Bailon.  Patient minimizes his back pain.  He has a history of adhesive arachnoiditis and multiple back surgeries.  Percocet 5/325 prescribed.  Discussion of potential consideration for dorsal column stimulation in the future, current limiting factors include pending placement of  "bladder stimulator, and assurances that there is no evidence of spinal infection prior to placement of leads in the thoracolumbar thoracic area, as well as chronic anticoagulation.    Vitals:    10/13/21 0855   BP: 128/75   Pulse: 74   Resp: 16   Temp: 97.3 °F (36.3 °C)   SpO2: 96%   Weight: Comment: unable to stand   Height: 177.8 cm (70\")   PainSc:   6   PainLoc: Leg     Objective   Physical Exam  Vitals and nursing note reviewed.   Constitutional:       Appearance: Normal appearance. He is well-developed.   Eyes:      General: Lids are normal.   Cardiovascular:      Rate and Rhythm: Normal rate.   Pulmonary:      Effort: Pulmonary effort is normal.   Genitourinary:     Comments: F/C in place  Musculoskeletal:      Cervical back: Normal range of motion.   Neurological:      Mental Status: He is alert and oriented to person, place, and time.      Motor: Weakness and atrophy present.      Gait: Gait abnormal (wheelchair).   Psychiatric:         Speech: Speech normal.         Behavior: Behavior normal.         Judgment: Judgment normal.       Assessment/Plan   Diagnoses and all orders for this visit:    1. Chronic pain syndrome (Primary)    2. Adhesive arachnoiditis    3. Encounter for long-term (current) use of high-risk medication    4. Therapeutic opioid induced constipation      --- The urine drug screen confirmation from 9/13/2021 has been reviewed and the result is appropriate based on patient history and FAINA report  --- Increase Percocet 5/325 to TID PRN  --- Trial Relistor for OIC.  Samples provided in office today. Instructed to take 1-3 tablets daily in the morning for constipation. Discussed medication with the patient.  Included in this discussion was the potential for side effects and adverse events.  Patient verbalized understanding and wished to proceed.  Patient will notify office if he would like a prescription sent to his pharmacy.   --- Follow-up 1 month or sooner if needed.      FAINA " REPORT  As part of the patient's treatment plan, I am prescribing controlled substances. The patient has been made aware of appropriate use of such medications, including potential risk of somnolence, limited ability to drive and/or work safely, and the potential for dependence or overdose. It has also bee made clear that these medications are for use by this patient only, without concomitant use of alcohol or other substances unless prescribed.     Patient has completed prescribing agreement detailing terms of continued prescribing of controlled substances, including monitoring FAINA reports, urine drug screening, and pill counts if necessary. The patient is aware that inappropriate use will results in cessation of prescribing such medications.    As the clinician, I personally reviewed the FAINA from 10/13/2021 while the patient was in the office today.    History and physical exam exhibit continued safe and appropriate use of controlled substances.    Dictated utilizing Dragon dictation.

## 2021-10-13 NOTE — TELEPHONE ENCOUNTER
FAINA reviewed and appropriate.  UDS 9/13/21 appropriate.  Refill sent.  This patient is under the care of my colleague and I am covering patient care for him at this time.  I have reviewed pertinent information/documentation as necessary and will continue the plan of care as previously directed to the best of my ability.

## 2021-10-15 ENCOUNTER — OFFICE VISIT (OUTPATIENT)
Dept: WOUND CARE | Facility: HOSPITAL | Age: 81
End: 2021-10-15

## 2021-10-15 RX ORDER — MECOBAL/LEVOMEFOLAT CA/B6 PHOS 2-3-35 MG
TABLET ORAL
Qty: 180 TABLET | Refills: 1 | Status: SHIPPED | OUTPATIENT
Start: 2021-10-15 | End: 2022-01-19 | Stop reason: SDUPTHER

## 2021-10-15 RX ORDER — EMPAGLIFLOZIN AND LINAGLIPTIN 25; 5 MG/1; MG/1
TABLET, FILM COATED ORAL
Qty: 90 TABLET | Refills: 1 | Status: SHIPPED | OUTPATIENT
Start: 2021-10-15 | End: 2022-01-19 | Stop reason: SDUPTHER

## 2021-10-15 RX ORDER — ROSUVASTATIN CALCIUM 10 MG/1
TABLET, COATED ORAL
Qty: 90 TABLET | Refills: 1 | Status: SHIPPED | OUTPATIENT
Start: 2021-10-15 | End: 2022-07-07 | Stop reason: SDUPTHER

## 2021-10-15 RX ORDER — GLIPIZIDE 10 MG/1
TABLET ORAL
Qty: 180 TABLET | Refills: 1 | Status: SHIPPED | OUTPATIENT
Start: 2021-10-15 | End: 2022-01-19 | Stop reason: SDUPTHER

## 2021-10-15 RX ORDER — FENOFIBRATE 145 MG/1
TABLET, COATED ORAL
Qty: 90 TABLET | Refills: 1 | Status: SHIPPED | OUTPATIENT
Start: 2021-10-15 | End: 2022-01-19 | Stop reason: SDUPTHER

## 2021-10-22 ENCOUNTER — OFFICE VISIT (OUTPATIENT)
Dept: WOUND CARE | Facility: HOSPITAL | Age: 81
End: 2021-10-22

## 2021-10-29 ENCOUNTER — OFFICE VISIT (OUTPATIENT)
Dept: WOUND CARE | Facility: HOSPITAL | Age: 81
End: 2021-10-29

## 2021-11-05 ENCOUNTER — OFFICE VISIT (OUTPATIENT)
Dept: WOUND CARE | Facility: HOSPITAL | Age: 81
End: 2021-11-05

## 2021-11-05 RX ORDER — APIXABAN 5 MG/1
TABLET, FILM COATED ORAL
Qty: 180 TABLET | Refills: 3 | Status: SHIPPED | OUTPATIENT
Start: 2021-11-05 | End: 2022-01-18 | Stop reason: SDUPTHER

## 2021-11-08 ENCOUNTER — OFFICE VISIT (OUTPATIENT)
Dept: PAIN MEDICINE | Facility: CLINIC | Age: 81
End: 2021-11-08

## 2021-11-08 DIAGNOSIS — T40.2X5A THERAPEUTIC OPIOID INDUCED CONSTIPATION: ICD-10-CM

## 2021-11-08 DIAGNOSIS — G03.9 ADHESIVE ARACHNOIDITIS: ICD-10-CM

## 2021-11-08 DIAGNOSIS — G89.4 CHRONIC PAIN SYNDROME: Primary | ICD-10-CM

## 2021-11-08 DIAGNOSIS — Z79.899 ENCOUNTER FOR LONG-TERM (CURRENT) USE OF HIGH-RISK MEDICATION: ICD-10-CM

## 2021-11-08 DIAGNOSIS — K59.03 THERAPEUTIC OPIOID INDUCED CONSTIPATION: ICD-10-CM

## 2021-11-08 PROCEDURE — 99443 PR PHYS/QHP TELEPHONE EVALUATION 21-30 MIN: CPT | Performed by: NURSE PRACTITIONER

## 2021-11-08 NOTE — PROGRESS NOTES
TELEMEDICINE - Telephone VISIT    You have chosen to receive care through a telephone visit. Do you consent to use a telephone visit for your medical care today? Yes    Identity verified via  and address  Location of patient:private residence  Location of Provider: home office  Anyone else present: Yes, if yes- who: wife, permission obtained to conduct visit with wife present  Type of Technology used: Telephone    CHIEF COMPLAINT: leg pain    Subjective   Jesus Beckham is a 81 y.o. male  who presents for a video visit follow-up.He has a history of bilateral leg pain.    At his last office visit we increased his percocet to 5/325 TID.  This medication regimen decreases his pain by a moderate amount.  ADLs by self. He denies any side effects including somnolence or constipation.  He states that without the medication his pain is unbearable.     Patient's wife voices concern about some depression symptoms that she thinks is related to the percocet.  Patient states this is very intermittent and he does not think this is related to the percocet as these symptoms were occurring prior to being on percocet.  He states that his symptoms seem to be associated with dark/gloomy weather and that this is not affecting his daily life.     Leg Pain   The pain is present in the left leg and right leg. The quality of the pain is described as aching, burning and cramping. The pain is at a severity of 7/10. The pain is severe. The pain has been worsening since onset. Associated symptoms include numbness (waist down). The symptoms are aggravated by movement and weight bearing.      The following portions of the patient's history were reviewed and updated as appropriate: allergies, current medications, past family history, past medical history, past social history, past surgical history and problem list.    Review of Systems   Constitutional: Negative for chills and fever.   HENT: Positive for congestion. Negative for sinus  pressure, sinus pain and sore throat.    Respiratory: Positive for shortness of breath (with exertion). Negative for cough.    Cardiovascular: Negative for chest pain.   Gastrointestinal: Positive for abdominal pain (bladder spasms). Negative for constipation and diarrhea.   Genitourinary: Positive for difficulty urinating (suprapubic catheter).   Musculoskeletal: Positive for back pain (bilateral leg pain).   Neurological: Positive for weakness (waist down) and numbness (waist down).   Psychiatric/Behavioral: Negative for confusion and suicidal ideas. The patient is not nervous/anxious.      --  The aforementioned information the Chief Complaint section and above subjective data including any HPI data, and also the Review of Systems data, has been personally reviewed and affirmed.  --    Vitals:    11/08/21 1810   PainSc:   7   PainLoc: Comment: bilateral legs     Objective   Physical Exam  Neurological:      Mental Status: He is alert.   Psychiatric:         Attention and Perception: Attention normal.         Mood and Affect: Mood normal.         Speech: Speech normal.     The rest of the physical exam is deferred due to this being a telephone visit.     Assessment/Plan   Diagnoses and all orders for this visit:    1. Chronic pain syndrome (Primary)    2. Adhesive arachnoiditis    3. Encounter for long-term (current) use of high-risk medication    4. Therapeutic opioid induced constipation      ----------------    Our practice is offering alternative &/or electronic methods to continue to follow our patients while at the same time further the efforts toward social distancing, in accordance with our organizational policies, professional societies' guidance, and gubernatorial mandates.  I support the Healthy at Home campaign and in this visit I have counseled the patient on our needs to limit in-person office visits and physical encounters with medical facilities whenever possible.  I have also educated the patient  on the medical necessities of maintaining social distancing while we continue to function during this crisis period.      The patient had obstacles which preclude consideration for a Video Visit. The patient agreed to a Telephone Encounter.    TIME:  21 minutes    ----------------    --- The urine drug screen confirmation from 9/13/2021 has been reviewed and the result is appropriate based on patient history and FAINA report  --- CSA updated 9/13/2021  --- Refill Percocet 5/325. DNF 11/12/2021 applied. Patient appears stable with current regimen. No adverse effects. Regarding continuation of opioids, there is no evidence of aberrant behavior or any red flags.  The patient continues with appropriate response to opioid therapy. ADL's remain intact by self.   --- Follow-up 1 month or sooner if needed, F/U with Dr. Holguin in 2 months per patient request.      FAINA REPORT  As part of the patient's treatment plan, I am prescribing controlled substances. The patient has been made aware of appropriate use of such medications, including potential risk of somnolence, limited ability to drive and/or work safely, and the potential for dependence or overdose. It has also bee made clear that these medications are for use by this patient only, without concomitant use of alcohol or other substances unless prescribed.     Patient has completed prescribing agreement detailing terms of continued prescribing of controlled substances, including monitoring FAINA reports, urine drug screening, and pill counts if necessary. The patient is aware that inappropriate use will results in cessation of prescribing such medications.    As the clinician, I personally reviewed the FAINA from 11/8/2021 while the patient was in the office today.    History and physical exam exhibit continued safe and appropriate use of controlled substances.    -------    EMR Dragon/Transcription may have been used for potions of this note.

## 2021-11-09 RX ORDER — OXYCODONE HYDROCHLORIDE AND ACETAMINOPHEN 5; 325 MG/1; MG/1
1 TABLET ORAL 3 TIMES DAILY PRN
Qty: 90 TABLET | Refills: 0 | Status: SHIPPED | OUTPATIENT
Start: 2021-11-09 | End: 2021-11-11 | Stop reason: SDUPTHER

## 2021-11-09 NOTE — TELEPHONE ENCOUNTER
FAINA reviewed and appropriate.  UDS 9/13/21 appropriate.  This patient is under the care of my colleague and I am covering patient care for him at this time.  I have reviewed pertinent information/documentation as necessary and will continue the plan of care as previously directed to the best of my ability.

## 2021-11-11 RX ORDER — OXYCODONE HYDROCHLORIDE AND ACETAMINOPHEN 5; 325 MG/1; MG/1
1 TABLET ORAL 3 TIMES DAILY PRN
Qty: 90 TABLET | Refills: 0 | Status: SHIPPED | OUTPATIENT
Start: 2021-11-11 | End: 2021-12-08 | Stop reason: SDUPTHER

## 2021-11-11 NOTE — TELEPHONE ENCOUNTER
I called [facility] mail delivery pharmacy spoke with pharmacist Cata Adams. She was able to cancel percocet Rx. Can you please resend to local [facility] pharmacy on file for pt? Thank you.

## 2021-11-12 ENCOUNTER — OFFICE VISIT (OUTPATIENT)
Dept: WOUND CARE | Facility: HOSPITAL | Age: 81
End: 2021-11-12

## 2021-11-18 ENCOUNTER — OFFICE VISIT (OUTPATIENT)
Dept: CARDIOLOGY | Facility: CLINIC | Age: 81
End: 2021-11-18

## 2021-11-18 VITALS
HEIGHT: 70 IN | HEART RATE: 73 BPM | OXYGEN SATURATION: 97 % | SYSTOLIC BLOOD PRESSURE: 116 MMHG | BODY MASS INDEX: 37.94 KG/M2 | WEIGHT: 265 LBS | DIASTOLIC BLOOD PRESSURE: 70 MMHG

## 2021-11-18 DIAGNOSIS — Z79.01 CHRONIC ANTICOAGULATION: ICD-10-CM

## 2021-11-18 DIAGNOSIS — E78.5 DYSLIPIDEMIA: ICD-10-CM

## 2021-11-18 DIAGNOSIS — I82.551 CHRONIC DEEP VEIN THROMBOSIS (DVT) OF RIGHT PERONEAL VEIN (HCC): ICD-10-CM

## 2021-11-18 DIAGNOSIS — I10 ESSENTIAL HYPERTENSION: ICD-10-CM

## 2021-11-18 DIAGNOSIS — Z79.4 TYPE 2 DIABETES MELLITUS WITH OTHER SPECIFIED COMPLICATION, WITH LONG-TERM CURRENT USE OF INSULIN (HCC): ICD-10-CM

## 2021-11-18 DIAGNOSIS — I49.3 PVC (PREMATURE VENTRICULAR CONTRACTION): ICD-10-CM

## 2021-11-18 DIAGNOSIS — I87.2 VENOUS INSUFFICIENCY OF LEFT LEG: ICD-10-CM

## 2021-11-18 DIAGNOSIS — I48.0 PAROXYSMAL ATRIAL FIBRILLATION (HCC): Primary | ICD-10-CM

## 2021-11-18 DIAGNOSIS — E11.69 TYPE 2 DIABETES MELLITUS WITH OTHER SPECIFIED COMPLICATION, WITH LONG-TERM CURRENT USE OF INSULIN (HCC): ICD-10-CM

## 2021-11-18 DIAGNOSIS — R06.09 DYSPNEA ON EXERTION: ICD-10-CM

## 2021-11-18 DIAGNOSIS — I35.8 AORTIC VALVE SCLEROSIS: ICD-10-CM

## 2021-11-18 DIAGNOSIS — G47.33 OSA (OBSTRUCTIVE SLEEP APNEA): ICD-10-CM

## 2021-11-18 PROCEDURE — 93000 ELECTROCARDIOGRAM COMPLETE: CPT | Performed by: INTERNAL MEDICINE

## 2021-11-18 PROCEDURE — 99214 OFFICE O/P EST MOD 30 MIN: CPT | Performed by: INTERNAL MEDICINE

## 2021-11-18 NOTE — PROGRESS NOTES
Subjective:     Encounter Date:11/18/2021      Patient ID: Jesus Beckham is a 81 y.o. male.    Chief Complaint:  History of Present Illness    The patient is a 81-year-old male with a history of diabetes mellitus type 2, chronic lower extremity edema, hypertension, dyslipidemia, chronic lower back pain, bladder cancer, paroxsymal atrial fibrillation, prior left lower extremity DVT, who presents for follow up.      He presents today for annual follow-up.  He reports over the last year he is noted worsening dyspnea on exertion.  He reports he gets so out of breath and fatigued with just walking short distances with his walker that he has to stop and rest before doing anything further.  Chronic bilateral lower extremity edema is largely stable.  He denies any chest pain, palpitations, orthopnea, near-syncope or syncope.  He reports lightheadedness first thing in the morning that is largely stable.  He is to have issues with a lot of back pain.    Prior History:  I saw the patient initially in 10/2016 for preoperative evaluation before resection of a bladder mass.  A preoperative EKG performed at that time showed evidence of ventricular bigeminy.  He was sent to Dr. Ricardo's office for further evaluation and a repeat EKG at that time showed no evidence of PVCs but did show an ectopic atrial rhythm.  Following that office visit I set him up for an echocardiogram and a stress test in 10/2016 that were both unremarkable.       He was not seen again until 2/2018 when he was admitted for Escherichia coli sepsis, acute respiratory failure, and cholecystitis.  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  Additionally he underwent a cholecystectomy.  Postoperatively he went into atrial fibrillation for which she was managed with metoprolol tartrate.  During that admission he also was diagnosed with an acute DVT of his right lower extremity for which she had an IVC filter  placed before his surgery and postoperatively was started on apixaban.  An echocardiogram during that admission performed on 2/23/2018 showed normal left ventricular systolic function and diastolic function with no significant valvular disease.  He was readmitted soon after discharge in 3/2018 with tachycardia, diaphoresis, and hypertension.  On arrival to the emergency room he was found to be in atrial fibrillation with rapid ventricular rate.  Additionally he was found to have an indeterminate troponin.  However he had no EKG changes or symptoms suggestive of acute coronary syndrome.  At the time and felt that the indeterminate troponin was due to demand ischemia from his atrial fibrillation with rapid ventricular rate and did not recommend any further workup.  He eventually converted back to sinus rhythm and was managed with both metoprolol and digoxin.  He was continued on anticoagulation with apixaban.  Since then the metoprolol was discontinued due to concerns it was causing fatigue.       He eventually had his IVC filter removed in 12/2018.     His mobility has declined over the years to the point he is only able to ambulate short distances with a walker and is otherwise using a wheelchair. Due to incontinence issues he has a suprapubic catheter in place.  He has had a bladder stimulator placed.    He underwent an echocardiogram in 11/2019 to evaluate a systolic murmur noted on exam.  This is performed on 11/21/2019.  Showed normal left ventricular systolic function wall motion with an EF of 60 to 65%, normal diastolic function, aortic valve sclerosis, and otherwise no significant valvular disease.      Review of Systems   Constitutional: Positive for malaise/fatigue.   HENT: Negative for hearing loss, hoarse voice, nosebleeds and sore throat.    Eyes: Negative for pain.   Cardiovascular: Positive for dyspnea on exertion and leg swelling. Negative for chest pain, claudication, cyanosis, irregular heartbeat,  near-syncope, orthopnea, palpitations, paroxysmal nocturnal dyspnea and syncope.   Respiratory: Negative for shortness of breath and snoring.    Endocrine: Negative for cold intolerance, heat intolerance, polydipsia, polyphagia and polyuria.   Skin: Negative for itching and rash.   Musculoskeletal: Positive for back pain. Negative for arthritis, falls, joint pain, joint swelling, muscle cramps, muscle weakness and myalgias.   Gastrointestinal: Negative for constipation, diarrhea, dysphagia, heartburn, hematemesis, hematochezia, melena, nausea and vomiting.   Genitourinary: Negative for frequency, hematuria and hesitancy.   Neurological: Positive for light-headedness. Negative for excessive daytime sleepiness, dizziness, headaches, numbness and weakness.   Psychiatric/Behavioral: Negative for depression. The patient is not nervous/anxious.          Current Outpatient Medications:   •  ACCU-CHEK FASTCLIX LANCETS misc, TEST BLOOD GLUCOSE TWICE DAILY, Disp: 204 each, Rfl: 1  •  allopurinol (ZYLOPRIM) 300 MG tablet, TAKE 1 TABLET EVERY DAY, Disp: 90 tablet, Rfl: 3  •  diphenhydrAMINE-acetaminophen (TYLENOL PM EXTRA STRENGTH)  MG tablet per tablet, Take 2 tablets by mouth At Night As Needed for Sleep., Disp: , Rfl:   •  donepezil (Aricept) 5 MG tablet, Take 1 tablet by mouth Every Night., Disp: 180 tablet, Rfl: 1  •  Eliquis 5 MG tablet tablet, TAKE 1 TABLET BY MOUTH EVERY 12 HOURS., Disp: 180 tablet, Rfl: 3  •  fenofibrate (TRICOR) 145 MG tablet, TAKE 1 TABLET EVERY DAY, Disp: 90 tablet, Rfl: 1  •  fluticasone (FLONASE) 50 MCG/ACT nasal spray, 2 sprays into the nostril(s) as directed by provider Daily., Disp: 48 g, Rfl: 3  •  glipizide (GLUCOTROL) 10 MG tablet, TAKE 1 TABLET TWICE A DAY BEFORE MEALS., Disp: 180 tablet, Rfl: 1  •  glucose blood (Accu-Chek SmartView) test strip, Dx code 11.42 testing bs 2 x day, Disp: 200 each, Rfl: 3  •  Glyxambi 25-5 MG tablet, TAKE 1 TABLET EVERY DAY WITH BREAKFAST, Disp: 90  tablet, Rfl: 1  •  Insulin Pen Needle (Droplet Pen Needles) 32G X 4 MM misc, USING 3 TIMES DAILY, Disp: 300 each, Rfl: 1  •  L-Methylfolate-B6-B12 3-35-2 MG tablet, TAKE 1 TABLET TWICE DAILY, Disp: 180 tablet, Rfl: 1  •  lisinopril (PRINIVIL,ZESTRIL) 2.5 MG tablet, Take 1 tablet by mouth Daily., Disp: 90 tablet, Rfl: 1  •  melatonin 5 MG tablet tablet, Take 5 mg by mouth Every Night., Disp: , Rfl:   •  Multiple Vitamins-Minerals (MULTIVITAMIN ADULT PO), Take 1 tablet by mouth Daily., Disp: , Rfl:   •  Myrbetriq 50 MG tablet sustained-release 24 hour 24 hr tablet, , Disp: , Rfl:   •  Omega-3 Fatty Acids (FISH OIL) 1200 MG capsule capsule, Take 2,000 mg by mouth 2 (Two) Times a Day With Meals. HOLD FOR SURGERY , Disp: , Rfl:   •  oxyCODONE-acetaminophen (PERCOCET) 5-325 MG per tablet, Take 1 tablet by mouth 3 (Three) Times a Day As Needed for Severe Pain . DNF 11/12/2021, Disp: 90 tablet, Rfl: 0  •  polyethylene glycol (MiraLax) 17 GM/SCOOP powder, Take 17 g by mouth Daily. Once a day, Disp: , Rfl:   •  pregabalin (LYRICA) 100 MG capsule, Take 1 capsule by mouth 3 (Three) Times a Day., Disp: 270 capsule, Rfl: 1  •  rosuvastatin (CRESTOR) 10 MG tablet, TAKE 1 TABLET EVERY DAY, Disp: 90 tablet, Rfl: 1  •  Toujeo Max SoloStar 300 UNIT/ML solution pen-injector injection, Inject 100 Units under the skin into the appropriate area as directed Daily., Disp: 10 pen, Rfl: 1  •  traZODone (DESYREL) 150 MG tablet, Take 3 tablets by mouth Every Night., Disp: 270 tablet, Rfl: 3  •  vitamin C (ASCORBIC ACID) 500 MG tablet, Take 500 mg by mouth Daily., Disp: , Rfl:   •  vitamin D (ERGOCALCIFEROL) 1.25 MG (48363 UT) capsule capsule, Take 1 capsule by mouth 2 (Two) Times a Week., Disp: 26 capsule, Rfl: 1    Past Medical History:   Diagnosis Date   • Acute embolism and thrombosis of deep vein of right distal lower extremity (HCC)    • Acute gangrenous cholecystitis     FEB 2018   • Allergic    • Arthritis    • Atrial fibrillation with  RVR (HCC)     HISTORY   • Benign prostatic hyperplasia without lower urinary tract symptoms    • Cancer of bladder (HCC) 2016   • Colon polyp    • Discitis of lumbar region    • DVT (deep venous thrombosis) (HCC)     MARCH 2018   • Essential hypertension    • Murray catheter in place     LOSS OF SENSATION BLADDER. BECAUSE OF WOUND AT THE TIME   • Gout    • Heel ulcer (HCC)     RIGHT. FOLLOWED BY WOUND CARE. GETTING BETTER   • History of sepsis    • Hyperlipidemia    • Loss, sensation     LOWER EXTREMTIES. RELATED TO LAST BACK SURGERY.   • MRSA infection     LEFT LEG.    • Open wound     LOW TO MIDDLE CRACK BUTT. SEES WOUND CARE. WILL BE RESTARTED ON DIFFUCAN AND NYSTATIN POWER. REDNESS IN GROIN   • Open wound     WITH MEDICATED DRESSING LEFT SHIN AREA.    • CELINA (obstructive sleep apnea)     NO MACHINE   • Osteoarthritis    • Paroxysmal atrial fibrillation (HCC)    • PVC (premature ventricular contraction)    • Sepsis (HCC) 02/2018   • Sepsis due to Escherichia coli (HCC)    • Skin carcinoma 2012    MELANOMA.2 PLACES BACK AND EAR   • Type 2 diabetes mellitus (HCC)    • Vitamin D deficiency    • Weakness        Past Surgical History:   Procedure Laterality Date   • ABDOMINAL SURGERY     • APPENDECTOMY N/A 1961   • CHOLECYSTECTOMY WITH INTRAOPERATIVE CHOLANGIOGRAM N/A 2/25/2018    Procedure: CHOLECYSTECTOMY LAPAROSCOPIC INTRAOPERATIVE CHOLANGIOGRAM;  Surgeon: Maegan Correa MD;  Location: [facility] OR;  Service:    • COLONOSCOPY N/A 2010    h/o of polyps   • EYE SURGERY Bilateral 1959    glass removal from eye, lens transplant   • JOINT REPLACEMENT     • LAMINECTOMY N/A 01/18/2016    L2-L3, L3-L4, L4-L5, and L5-S1 bilateral lamincectomy, medial facetectomy & [id number], Dr. Kaiden Valdes   • LUMBAR FUSION N/A 3/7/2018    Procedure: LUMBAR FUSION MINIMALLY INVASIVE TRANSFORAMINAL LUMBAR INTERBODY IRRIGATION AND DEBRIDEMENT AND FUSION.  IRRIGATION AND DEBRIDEMENT OF EPIDURAL ABCESS LUMBAR 2-4. BONE MORPHOGENIC  "PROTEIN, ALPHATEC NOVEL AND ILLICO, EMG AND SSEP NEUROMONITORING.;  Surgeon: Manish Marr DO;  Location: [facility] MAIN OR;  Service: Orthopedic Spine   • SKIN BIOPSY     • TOTAL KNEE ARTHROPLASTY Right 2005    Dr. [place] Evans   • VENA CAVA FILTER INSERTION Right 3/6/2018    Procedure: VENA CAVA FILTER INSERTION;  Surgeon: Alexis Thakkar MD;  Location: [facility] OR ;  Service:    • VENA CAVA FILTER REMOVAL N/A 12/3/2018    Procedure: VENA CAVA FILTER REMOVAL WITH VENOCAVAGRAM;  Surgeon: Alexis Thakkar MD;  Location: [facility] OR ;  Service: Vascular       Family History   Problem Relation Age of Onset   • Esophageal cancer Mother    • No Known Problems Father    • Diabetes Maternal Grandmother    • Heart attack Maternal Grandfather        Social History     Tobacco Use   • Smoking status: Former Smoker     Types: Cigars     Quit date:      Years since quittin.8   • Smokeless tobacco: Former User     Types: Chew   • Tobacco comment: Caffeine daily   Substance Use Topics   • Alcohol use: Not Currently   • Drug use: No         ECG 12 Lead    Date/Time: 2021 12:28 PM  Performed by: Tasha Burr MD  Authorized by: Tasha Burr MD   Comparison: compared with previous ECG   Comparison to previous ECG: Artifact is new  Rhythm: sinus rhythm  Ectopy: atrial premature contractions  Conduction: 1st degree AV block  Comments: Baseline artifact due to bladder stimulator               Objective:     Visit Vitals  /70 (BP Location: Left arm, Patient Position: Sitting, Cuff Size: Large Adult)   Pulse 73   Ht 177.8 cm (70\")   Wt 120 kg (265 lb) Comment: per pt   SpO2 97%   BMI 38.02 kg/m²         Constitutional:       Appearance: Normal appearance. Well-developed.   HENT:      Head: Normocephalic and atraumatic.   Neck:      Vascular: No carotid bruit or JVD.   Pulmonary:      Effort: Pulmonary effort is normal.      Breath sounds: Normal breath sounds.   Cardiovascular:    "   Normal rate. Regular rhythm.      Murmurs: There is a grade 2/6 systolic murmur.      No gallop.   Pulses:     Radial: 2+ bilaterally.  Edema:     Peripheral edema absent.   Abdominal:      Palpations: Abdomen is soft.   Skin:     General: Skin is warm and dry.   Neurological:      Mental Status: Alert and oriented to person, place, and time.             Assessment:          Diagnosis Plan   1. Paroxysmal atrial fibrillation (HCC)     2. Dyslipidemia     3. PVC (premature ventricular contraction)     4. Essential hypertension     5. Aortic valve sclerosis     6. Venous insufficiency of left leg     7. 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 Chronic anticoagulation     9. CELINA (obstructive sleep apnea)            Plan:         1.  Dyspnea on exertion.  Concerned this may be an anginal equivalent.  He does have significant risk factors for coronary artery disease.  We will proceed with a stress test.  We will also proceed with an echocardiogram to look for any change in his aortic valve function and for any other causes of his shortness of breath.  2.  Paroxysmal atrial fibrillation.  Appears to be in sinus rhythm today.  He is on chronic anticoagulation with apixaban.  3.  History of DVT.  On chronic anticoagulation with apixaban.  He is at high risk for recurrent thromboembolic event due to his limited mobility.  4.  Hypertension.  Well-controlled on his current regimen medications.  5.  Hyperlipidemia.  On rosuvastatin which is managed by Dr. Hernandez.  6.  Diabetes mellitus type 2  7.  Obstructive sleep apnea    I will call and discuss results of his stress test and his echocardiogram and determine further recommendations based on those results.

## 2021-11-19 ENCOUNTER — OFFICE VISIT (OUTPATIENT)
Dept: WOUND CARE | Facility: HOSPITAL | Age: 81
End: 2021-11-19

## 2021-11-19 PROCEDURE — G0463 HOSPITAL OUTPT CLINIC VISIT: HCPCS

## 2021-11-23 ENCOUNTER — TELEPHONE (OUTPATIENT)
Dept: CARDIOLOGY | Facility: CLINIC | Age: 81
End: 2021-11-23

## 2021-11-23 NOTE — TELEPHONE ENCOUNTER
Received a fax from [facility] Dr. Saleh needing cardiac clearance to hold his Eliquis 3 days prior to having botox of the bladder (not yet scheduled but would like to get done ASAP)    I have placed the clearance form in your in box for review and signature

## 2021-11-28 ENCOUNTER — APPOINTMENT (OUTPATIENT)
Dept: GENERAL RADIOLOGY | Facility: HOSPITAL | Age: 81
End: 2021-11-28

## 2021-11-28 ENCOUNTER — APPOINTMENT (OUTPATIENT)
Dept: CT IMAGING | Facility: HOSPITAL | Age: 81
End: 2021-11-28

## 2021-11-28 ENCOUNTER — HOSPITAL ENCOUNTER (OUTPATIENT)
Facility: HOSPITAL | Age: 81
Setting detail: OBSERVATION
Discharge: HOME | End: 2021-11-29
Attending: EMERGENCY MEDICINE | Admitting: EMERGENCY MEDICINE

## 2021-11-28 DIAGNOSIS — S70.01XA CONTUSION OF RIGHT HIP, INITIAL ENCOUNTER: ICD-10-CM

## 2021-11-28 DIAGNOSIS — R93.0 ABNORMAL HEAD CT: ICD-10-CM

## 2021-11-28 DIAGNOSIS — E11.42 DIABETIC PERIPHERAL NEUROPATHY (HCC): ICD-10-CM

## 2021-11-28 DIAGNOSIS — M19.90 OSTEOARTHRITIS, UNSPECIFIED OSTEOARTHRITIS TYPE, UNSPECIFIED SITE: Primary | ICD-10-CM

## 2021-11-28 DIAGNOSIS — S40.011A CONTUSION OF RIGHT SHOULDER, INITIAL ENCOUNTER: ICD-10-CM

## 2021-11-28 DIAGNOSIS — S80.01XA CONTUSION OF RIGHT KNEE, INITIAL ENCOUNTER: ICD-10-CM

## 2021-11-28 PROBLEM — R90.89 ABNORMAL CT OF BRAIN: Status: ACTIVE | Noted: 2021-11-28

## 2021-11-28 PROBLEM — Z93.59 SUPRAPUBIC CATHETER: Status: ACTIVE | Noted: 2021-11-28

## 2021-11-28 LAB
ANION GAP SERPL CALCULATED.3IONS-SCNC: 7.6 MMOL/L (ref 5–15)
BASOPHILS # BLD AUTO: 0.05 10*3/MM3 (ref 0–0.2)
BASOPHILS NFR BLD AUTO: 0.8 % (ref 0–1.5)
BUN SERPL-MCNC: 23 MG/DL (ref 8–23)
BUN/CREAT SERPL: 21.7 (ref 7–25)
CALCIUM SPEC-SCNC: 9.4 MG/DL (ref 8.6–10.5)
CHLORIDE SERPL-SCNC: 107 MMOL/L (ref 98–107)
CO2 SERPL-SCNC: 28.4 MMOL/L (ref 22–29)
CREAT SERPL-MCNC: 1.06 MG/DL (ref 0.76–1.27)
DEPRECATED RDW RBC AUTO: 48.2 FL (ref 37–54)
EOSINOPHIL # BLD AUTO: 0.18 10*3/MM3 (ref 0–0.4)
EOSINOPHIL NFR BLD AUTO: 2.9 % (ref 0.3–6.2)
ERYTHROCYTE [DISTWIDTH] IN BLOOD BY AUTOMATED COUNT: 14.5 % (ref 12.3–15.4)
GFR SERPL CREATININE-BSD FRML MDRD: 67 ML/MIN/1.73
GLUCOSE BLDC GLUCOMTR-MCNC: 75 MG/DL (ref 70–130)
GLUCOSE BLDC GLUCOMTR-MCNC: 87 MG/DL (ref 70–130)
GLUCOSE SERPL-MCNC: 70 MG/DL (ref 65–99)
HCT VFR BLD AUTO: 40.4 % (ref 37.5–51)
HGB BLD-MCNC: 14 G/DL (ref 13–17.7)
IMM GRANULOCYTES # BLD AUTO: 0.05 10*3/MM3 (ref 0–0.05)
IMM GRANULOCYTES NFR BLD AUTO: 0.8 % (ref 0–0.5)
LYMPHOCYTES # BLD AUTO: 1.37 10*3/MM3 (ref 0.7–3.1)
LYMPHOCYTES NFR BLD AUTO: 22.3 % (ref 19.6–45.3)
MCH RBC QN AUTO: 31.6 PG (ref 26.6–33)
MCHC RBC AUTO-ENTMCNC: 34.7 G/DL (ref 31.5–35.7)
MCV RBC AUTO: 91.2 FL (ref 79–97)
MONOCYTES # BLD AUTO: 0.46 10*3/MM3 (ref 0.1–0.9)
MONOCYTES NFR BLD AUTO: 7.5 % (ref 5–12)
NEUTROPHILS NFR BLD AUTO: 4.04 10*3/MM3 (ref 1.7–7)
NEUTROPHILS NFR BLD AUTO: 65.7 % (ref 42.7–76)
NRBC BLD AUTO-RTO: 0 /100 WBC (ref 0–0.2)
PLATELET # BLD AUTO: 148 10*3/MM3 (ref 140–450)
PMV BLD AUTO: 11 FL (ref 6–12)
POTASSIUM SERPL-SCNC: 4.6 MMOL/L (ref 3.5–5.2)
RBC # BLD AUTO: 4.43 10*6/MM3 (ref 4.14–5.8)
SARS-COV-2 RNA PNL SPEC NAA+PROBE: NOT DETECTED
SODIUM SERPL-SCNC: 143 MMOL/L (ref 136–145)
WBC NRBC COR # BLD: 6.15 10*3/MM3 (ref 3.4–10.8)

## 2021-11-28 PROCEDURE — G0378 HOSPITAL OBSERVATION PER HR: HCPCS

## 2021-11-28 PROCEDURE — 82962 GLUCOSE BLOOD TEST: CPT

## 2021-11-28 PROCEDURE — 70450 CT HEAD/BRAIN W/O DYE: CPT

## 2021-11-28 PROCEDURE — 73562 X-RAY EXAM OF KNEE 3: CPT

## 2021-11-28 PROCEDURE — C9803 HOPD COVID-19 SPEC COLLECT: HCPCS

## 2021-11-28 PROCEDURE — 96374 THER/PROPH/DIAG INJ IV PUSH: CPT

## 2021-11-28 PROCEDURE — 87635 SARS-COV-2 COVID-19 AMP PRB: CPT | Performed by: EMERGENCY MEDICINE

## 2021-11-28 PROCEDURE — 85025 COMPLETE CBC W/AUTO DIFF WBC: CPT | Performed by: EMERGENCY MEDICINE

## 2021-11-28 PROCEDURE — 25010000002 HYDROMORPHONE PER 4 MG: Performed by: NURSE PRACTITIONER

## 2021-11-28 PROCEDURE — 73030 X-RAY EXAM OF SHOULDER: CPT

## 2021-11-28 PROCEDURE — 80048 BASIC METABOLIC PNL TOTAL CA: CPT | Performed by: EMERGENCY MEDICINE

## 2021-11-28 PROCEDURE — 73502 X-RAY EXAM HIP UNI 2-3 VIEWS: CPT

## 2021-11-28 PROCEDURE — 99284 EMERGENCY DEPT VISIT MOD MDM: CPT

## 2021-11-28 RX ORDER — MULTIPLE VITAMINS W/ MINERALS TAB 9MG-400MCG
1 TAB ORAL DAILY
Status: DISCONTINUED | OUTPATIENT
Start: 2021-11-29 | End: 2021-11-29 | Stop reason: HOSPADM

## 2021-11-28 RX ORDER — NITROGLYCERIN 0.4 MG/1
0.4 TABLET SUBLINGUAL
Status: DISCONTINUED | OUTPATIENT
Start: 2021-11-28 | End: 2021-11-29 | Stop reason: HOSPADM

## 2021-11-28 RX ORDER — NICOTINE POLACRILEX 4 MG
15 LOZENGE BUCCAL
Status: DISCONTINUED | OUTPATIENT
Start: 2021-11-28 | End: 2021-11-28

## 2021-11-28 RX ORDER — HYDROMORPHONE HYDROCHLORIDE 1 MG/ML
0.5 INJECTION, SOLUTION INTRAMUSCULAR; INTRAVENOUS; SUBCUTANEOUS ONCE
Status: COMPLETED | OUTPATIENT
Start: 2021-11-28 | End: 2021-11-28

## 2021-11-28 RX ORDER — TRAZODONE HYDROCHLORIDE 50 MG/1
150 TABLET ORAL NIGHTLY
Status: DISCONTINUED | OUTPATIENT
Start: 2021-11-28 | End: 2021-11-29 | Stop reason: HOSPADM

## 2021-11-28 RX ORDER — SODIUM CHLORIDE 0.9 % (FLUSH) 0.9 %
10 SYRINGE (ML) INJECTION EVERY 12 HOURS SCHEDULED
Status: DISCONTINUED | OUTPATIENT
Start: 2021-11-28 | End: 2021-11-29 | Stop reason: HOSPADM

## 2021-11-28 RX ORDER — OXYBUTYNIN CHLORIDE 10 MG/1
10 TABLET, EXTENDED RELEASE ORAL DAILY
Status: DISCONTINUED | OUTPATIENT
Start: 2021-11-29 | End: 2021-11-29 | Stop reason: HOSPADM

## 2021-11-28 RX ORDER — NICOTINE POLACRILEX 4 MG
15 LOZENGE BUCCAL
Status: DISCONTINUED | OUTPATIENT
Start: 2021-11-28 | End: 2021-11-29 | Stop reason: HOSPADM

## 2021-11-28 RX ORDER — FLUTICASONE PROPIONATE 50 MCG
2 SPRAY, SUSPENSION (ML) NASAL DAILY
Status: DISCONTINUED | OUTPATIENT
Start: 2021-11-29 | End: 2021-11-29 | Stop reason: HOSPADM

## 2021-11-28 RX ORDER — OXYCODONE HYDROCHLORIDE AND ACETAMINOPHEN 5; 325 MG/1; MG/1
1 TABLET ORAL ONCE
Status: COMPLETED | OUTPATIENT
Start: 2021-11-28 | End: 2021-11-28

## 2021-11-28 RX ORDER — ALLOPURINOL 300 MG/1
300 TABLET ORAL DAILY
Status: DISCONTINUED | OUTPATIENT
Start: 2021-11-29 | End: 2021-11-29 | Stop reason: HOSPADM

## 2021-11-28 RX ORDER — ACETAMINOPHEN 325 MG/1
650 TABLET ORAL EVERY 4 HOURS PRN
Status: DISCONTINUED | OUTPATIENT
Start: 2021-11-28 | End: 2021-11-29 | Stop reason: HOSPADM

## 2021-11-28 RX ORDER — ACETAMINOPHEN 650 MG/1
650 SUPPOSITORY RECTAL EVERY 4 HOURS PRN
Status: DISCONTINUED | OUTPATIENT
Start: 2021-11-28 | End: 2021-11-29 | Stop reason: HOSPADM

## 2021-11-28 RX ORDER — ACETAMINOPHEN 160 MG/5ML
650 SOLUTION ORAL EVERY 4 HOURS PRN
Status: DISCONTINUED | OUTPATIENT
Start: 2021-11-28 | End: 2021-11-29 | Stop reason: HOSPADM

## 2021-11-28 RX ORDER — SODIUM CHLORIDE 0.9 % (FLUSH) 0.9 %
10 SYRINGE (ML) INJECTION AS NEEDED
Status: DISCONTINUED | OUTPATIENT
Start: 2021-11-28 | End: 2021-11-29 | Stop reason: HOSPADM

## 2021-11-28 RX ORDER — ROSUVASTATIN CALCIUM 20 MG/1
10 TABLET, COATED ORAL DAILY
Status: DISCONTINUED | OUTPATIENT
Start: 2021-11-29 | End: 2021-11-29 | Stop reason: HOSPADM

## 2021-11-28 RX ORDER — DONEPEZIL HYDROCHLORIDE 10 MG/1
5 TABLET, FILM COATED ORAL NIGHTLY
Status: DISCONTINUED | OUTPATIENT
Start: 2021-11-28 | End: 2021-11-29 | Stop reason: HOSPADM

## 2021-11-28 RX ORDER — CHOLECALCIFEROL (VITAMIN D3) 125 MCG
5 CAPSULE ORAL NIGHTLY
Status: DISCONTINUED | OUTPATIENT
Start: 2021-11-28 | End: 2021-11-29 | Stop reason: HOSPADM

## 2021-11-28 RX ORDER — DEXTROSE MONOHYDRATE 25 G/50ML
25 INJECTION, SOLUTION INTRAVENOUS
Status: DISCONTINUED | OUTPATIENT
Start: 2021-11-28 | End: 2021-11-28

## 2021-11-28 RX ORDER — FENOFIBRATE 145 MG/1
145 TABLET, COATED ORAL DAILY
Refills: 1 | Status: DISCONTINUED | OUTPATIENT
Start: 2021-11-29 | End: 2021-11-29 | Stop reason: HOSPADM

## 2021-11-28 RX ORDER — INSULIN LISPRO 100 [IU]/ML
0-9 INJECTION, SOLUTION INTRAVENOUS; SUBCUTANEOUS
Status: DISCONTINUED | OUTPATIENT
Start: 2021-11-28 | End: 2021-11-28

## 2021-11-28 RX ORDER — ONDANSETRON 2 MG/ML
4 INJECTION INTRAMUSCULAR; INTRAVENOUS EVERY 6 HOURS PRN
Status: DISCONTINUED | OUTPATIENT
Start: 2021-11-28 | End: 2021-11-29 | Stop reason: HOSPADM

## 2021-11-28 RX ORDER — INSULIN LISPRO 100 [IU]/ML
0-9 INJECTION, SOLUTION INTRAVENOUS; SUBCUTANEOUS
Status: DISCONTINUED | OUTPATIENT
Start: 2021-11-28 | End: 2021-11-29 | Stop reason: HOSPADM

## 2021-11-28 RX ORDER — LISINOPRIL 2.5 MG/1
2.5 TABLET ORAL DAILY
Status: DISCONTINUED | OUTPATIENT
Start: 2021-11-29 | End: 2021-11-29 | Stop reason: HOSPADM

## 2021-11-28 RX ORDER — DEXTROSE MONOHYDRATE 25 G/50ML
25 INJECTION, SOLUTION INTRAVENOUS
Status: DISCONTINUED | OUTPATIENT
Start: 2021-11-28 | End: 2021-11-29 | Stop reason: HOSPADM

## 2021-11-28 RX ADMIN — Medication 5 MG: at 21:49

## 2021-11-28 RX ADMIN — APIXABAN 5 MG: 5 TABLET, FILM COATED ORAL at 23:33

## 2021-11-28 RX ADMIN — SODIUM CHLORIDE, PRESERVATIVE FREE 10 ML: 5 INJECTION INTRAVENOUS at 20:33

## 2021-11-28 RX ADMIN — TRAZODONE HYDROCHLORIDE 150 MG: 50 TABLET ORAL at 22:24

## 2021-11-28 RX ADMIN — HYDROMORPHONE HYDROCHLORIDE 0.5 MG: 1 INJECTION, SOLUTION INTRAMUSCULAR; INTRAVENOUS; SUBCUTANEOUS at 20:31

## 2021-11-28 RX ADMIN — OXYCODONE HYDROCHLORIDE AND ACETAMINOPHEN 1 TABLET: 5; 325 TABLET ORAL at 11:31

## 2021-11-28 RX ADMIN — DONEPEZIL HYDROCHLORIDE 5 MG: 10 TABLET, FILM COATED ORAL at 21:49

## 2021-11-29 ENCOUNTER — TELEPHONE (OUTPATIENT)
Dept: NEUROSURGERY | Facility: CLINIC | Age: 81
End: 2021-11-29

## 2021-11-29 ENCOUNTER — APPOINTMENT (OUTPATIENT)
Dept: MRI IMAGING | Facility: HOSPITAL | Age: 81
End: 2021-11-29

## 2021-11-29 ENCOUNTER — READMISSION MANAGEMENT (OUTPATIENT)
Dept: CALL CENTER | Facility: HOSPITAL | Age: 81
End: 2021-11-29

## 2021-11-29 VITALS
DIASTOLIC BLOOD PRESSURE: 79 MMHG | TEMPERATURE: 98.06 F | WEIGHT: 282.2 LBS | HEIGHT: 70 IN | BODY MASS INDEX: 40.4 KG/M2 | RESPIRATION RATE: 16 BRPM | HEART RATE: 73 BPM | OXYGEN SATURATION: 94 % | SYSTOLIC BLOOD PRESSURE: 131 MMHG

## 2021-11-29 DIAGNOSIS — R90.89 ABNORMAL CT OF BRAIN: Primary | ICD-10-CM

## 2021-11-29 LAB
ANION GAP SERPL CALCULATED.3IONS-SCNC: 6.7 MMOL/L (ref 5–15)
BUN SERPL-MCNC: 22 MG/DL (ref 8–23)
BUN/CREAT SERPL: 20.2 (ref 7–25)
CALCIUM SPEC-SCNC: 9.3 MG/DL (ref 8.6–10.5)
CHLORIDE SERPL-SCNC: 105 MMOL/L (ref 98–107)
CO2 SERPL-SCNC: 29.3 MMOL/L (ref 22–29)
CREAT SERPL-MCNC: 1.09 MG/DL (ref 0.76–1.27)
DEPRECATED RDW RBC AUTO: 45.9 FL (ref 37–54)
ERYTHROCYTE [DISTWIDTH] IN BLOOD BY AUTOMATED COUNT: 14 % (ref 12.3–15.4)
GFR SERPL CREATININE-BSD FRML MDRD: 65 ML/MIN/1.73
GLUCOSE BLDC GLUCOMTR-MCNC: 121 MG/DL (ref 70–130)
GLUCOSE BLDC GLUCOMTR-MCNC: 84 MG/DL (ref 70–130)
GLUCOSE SERPL-MCNC: 94 MG/DL (ref 65–99)
HBA1C MFR BLD: 6.66 % (ref 4.8–5.6)
HCT VFR BLD AUTO: 38.8 % (ref 37.5–51)
HGB BLD-MCNC: 13.2 G/DL (ref 13–17.7)
MCH RBC QN AUTO: 30.7 PG (ref 26.6–33)
MCHC RBC AUTO-ENTMCNC: 34 G/DL (ref 31.5–35.7)
MCV RBC AUTO: 90.2 FL (ref 79–97)
PLATELET # BLD AUTO: 134 10*3/MM3 (ref 140–450)
PMV BLD AUTO: 10.8 FL (ref 6–12)
POTASSIUM SERPL-SCNC: 4.5 MMOL/L (ref 3.5–5.2)
RBC # BLD AUTO: 4.3 10*6/MM3 (ref 4.14–5.8)
SODIUM SERPL-SCNC: 141 MMOL/L (ref 136–145)
WBC NRBC COR # BLD: 6.17 10*3/MM3 (ref 3.4–10.8)

## 2021-11-29 PROCEDURE — 85027 COMPLETE CBC AUTOMATED: CPT | Performed by: NURSE PRACTITIONER

## 2021-11-29 PROCEDURE — 83036 HEMOGLOBIN GLYCOSYLATED A1C: CPT | Performed by: NURSE PRACTITIONER

## 2021-11-29 PROCEDURE — G0378 HOSPITAL OBSERVATION PER HR: HCPCS

## 2021-11-29 PROCEDURE — 82962 GLUCOSE BLOOD TEST: CPT

## 2021-11-29 PROCEDURE — 80048 BASIC METABOLIC PNL TOTAL CA: CPT | Performed by: NURSE PRACTITIONER

## 2021-11-29 PROCEDURE — 99213 OFFICE O/P EST LOW 20 MIN: CPT | Performed by: NURSE PRACTITIONER

## 2021-11-29 RX ORDER — OXYCODONE HYDROCHLORIDE AND ACETAMINOPHEN 5; 325 MG/1; MG/1
1 TABLET ORAL 3 TIMES DAILY PRN
Status: DISCONTINUED | OUTPATIENT
Start: 2021-11-29 | End: 2021-11-29 | Stop reason: HOSPADM

## 2021-11-29 RX ORDER — TIZANIDINE 4 MG/1
4 TABLET ORAL EVERY 8 HOURS PRN
Status: DISCONTINUED | OUTPATIENT
Start: 2021-11-29 | End: 2021-11-29 | Stop reason: HOSPADM

## 2021-11-29 RX ADMIN — MULTIPLE VITAMINS W/ MINERALS TAB 1 TABLET: TAB at 08:17

## 2021-11-29 RX ADMIN — APIXABAN 5 MG: 5 TABLET, FILM COATED ORAL at 08:17

## 2021-11-29 RX ADMIN — OXYCODONE AND ACETAMINOPHEN 1 TABLET: 5; 325 TABLET ORAL at 11:03

## 2021-11-29 RX ADMIN — OXYBUTYNIN CHLORIDE 10 MG: 10 TABLET, EXTENDED RELEASE ORAL at 08:17

## 2021-11-29 RX ADMIN — ROSUVASTATIN CALCIUM 10 MG: 20 TABLET, FILM COATED ORAL at 08:17

## 2021-11-29 RX ADMIN — ALLOPURINOL 300 MG: 300 TABLET ORAL at 08:17

## 2021-11-29 RX ADMIN — FENOFIBRATE 145 MG: 145 TABLET ORAL at 08:17

## 2021-11-29 RX ADMIN — FLUTICASONE PROPIONATE 2 SPRAY: 50 SPRAY, METERED NASAL at 08:17

## 2021-11-29 RX ADMIN — TIZANIDINE 4 MG: 4 TABLET ORAL at 07:27

## 2021-11-29 NOTE — TELEPHONE ENCOUNTER
----- Message from DANNY Mccall sent at 11/29/2021  1:03 PM EST -----  Regarding: f/up  He will need a follow-up appointment with Dr. Bailon in 6 weeks with a CT head without contrast to evaluate any growth of the 3 mm increased attenuation that could possibly be mets vs. Demineralization.  Thank you!

## 2021-11-30 ENCOUNTER — TRANSITIONAL CARE MANAGEMENT TELEPHONE ENCOUNTER (OUTPATIENT)
Dept: CALL CENTER | Facility: HOSPITAL | Age: 81
End: 2021-11-30

## 2021-11-30 NOTE — OUTREACH NOTE
Call Center TCM Note      Responses   [facility] patient discharged from? [place]   Does the patient have one of the following disease processes/diagnoses(primary or secondary)? Other   TCM attempt successful? Yes   Discharge diagnosis abnormal CT of brain, Paroxysmal A-fib, T2DM, suprapubic catheter   Meds reviewed with patient/caregiver? Yes   Does the patient have all medications ordered at discharge? N/A   Prescription comments NO CHANGES MADE TO PT REGULAR MEDICATIONS    Is the patient taking all medications as directed (includes completed medication regime)? Yes   Does the patient have a primary care provider?  Yes   Does the patient have an appointment with their PCP within 7 days of discharge? No   Comments regarding PCP Pt has many upcoming appts, and wants to have fwp with neurosrgn Dr Hooker so he has more answers before seeing PCP Dr Ricardo. Pt will call to sched this appt, but would like Dr Ricardo notified of hospital stay.    Has the patient kept scheduled appointments due by today? Yes   Has home health visited the patient within 72 hours of discharge? N/A   What DME was ordered? walker   Has all DME been delivered? Yes   Psychosocial issues? No   Did the patient receive a copy of their discharge instructions? Yes   Nursing interventions Reviewed instructions with patient   What is the patient's perception of their health status since discharge? Improving   Is the patient/caregiver able to teach back signs and symptoms related to disease process for when to call PCP? Yes   Is the patient/caregiver able to teach back signs and symptoms related to disease process for when to call 911? Yes   Is the patient/caregiver able to teach back the hierarchy of who to call/visit for symptoms/problems? PCP, Specialist, Home health nurse, Urgent Care, ED, 911 Yes   If the patient is a current smoker, are they able to teach back resources for cessation? Not a smoker   TCM call completed? Yes   Wrap up  additional comments Pt states he is feeling quite well. New walker in place, pt says this is making such a difference in his ability to get around. No changes to medications at d/c. No questions at this time. Pt has many upcoming appts, and wants to have fwp with neurosrgn Dr Hooker so he has more answers before seeing PCP Dr Ricardo. Pt will call to sched this appt, but would like Dr Ricardo notified of hospital stay.           Debra See MA    11/30/2021, 14:02 EST

## 2021-11-30 NOTE — OUTREACH NOTE
Prep Survey      Responses   [facility] facility patient discharged from? [place]   Is LACE score < 7 ? Yes   Emergency Room discharge w/ pulse ox? No   Eligibility [facility]   Date of Admission 11/28/21   Date of Discharge 11/29/21   Discharge Disposition Home or Self Care   Discharge diagnosis abnormal CT of brain, Paroxysmal A-fib, T2DM, suprapubic catheter   Does the patient have one of the following disease processes/diagnoses(primary or secondary)? Other   Does the patient have Home health ordered? No   Is there a DME ordered? Yes   What DME was ordered? walker   Prep survey completed? Yes          Elvia Stevenson RN

## 2021-12-08 ENCOUNTER — OFFICE VISIT (OUTPATIENT)
Dept: PAIN MEDICINE | Facility: CLINIC | Age: 81
End: 2021-12-08

## 2021-12-08 VITALS
BODY MASS INDEX: 39.94 KG/M2 | RESPIRATION RATE: 20 BRPM | TEMPERATURE: 96.9 F | SYSTOLIC BLOOD PRESSURE: 126 MMHG | DIASTOLIC BLOOD PRESSURE: 75 MMHG | OXYGEN SATURATION: 95 % | WEIGHT: 279 LBS | HEIGHT: 70 IN | HEART RATE: 75 BPM

## 2021-12-08 DIAGNOSIS — Z79.899 ENCOUNTER FOR LONG-TERM (CURRENT) USE OF HIGH-RISK MEDICATION: ICD-10-CM

## 2021-12-08 DIAGNOSIS — G89.4 CHRONIC PAIN SYNDROME: Primary | ICD-10-CM

## 2021-12-08 DIAGNOSIS — G03.9 ADHESIVE ARACHNOIDITIS: ICD-10-CM

## 2021-12-08 PROCEDURE — 99214 OFFICE O/P EST MOD 30 MIN: CPT | Performed by: NURSE PRACTITIONER

## 2021-12-08 RX ORDER — OXYCODONE HYDROCHLORIDE AND ACETAMINOPHEN 5; 325 MG/1; MG/1
1 TABLET ORAL 3 TIMES DAILY PRN
Qty: 90 TABLET | Refills: 0 | Status: SHIPPED | OUTPATIENT
Start: 2021-12-08 | End: 2022-01-10 | Stop reason: SDUPTHER

## 2021-12-08 NOTE — PROGRESS NOTES
"CHIEF COMPLAINT  F/u bilat leg pain. Pt sts pain has worsened since last ov.     Subjective   Jesus Beckham is a 81 y.o. male  who presents for follow-up.  He presents with his wife who is helpful in providing history.  He has a history of bilateral leg pain.  Today his pain is 8/10VAS in severity. He continues with Percocet 5/325 3/day.  He states this is particularly helpful at night, but he has quite a lot of pain in the morning.  He states after he takes his morning dose of percocet 5/325 his pain does improve but \"doesn't go away\".  I recommend he take his morning Percocet prior to getting out of bed (he does sleep in a hospital bed) and getting dressed as these are particular painful activities and his pain is worst in the mornings.  Patient and wife agreeable to this plan. He denies any side effects including somnolence. No issues with constipation at this time.     Discussed that we will not be able to fully eliminate his chronic pain with medication, he states understanding.     Patient has fallen since his last office visit. He states he was getting out of his van after [employer] and was trying to also put his glasses in his pocket while using a walker. He states \"I fell doing something I shouldn't do, doing two things at once\". Hospital admit from 11/28/2021 to 11/29/2021 reviewed.  Patient mated following fall and found to have abnormal CT scan of the brain.  He was unable to tolerate MRI of the brain and was evaluated by neurosurgery who recommends follow-up as outpatient with repeat CT imaging.    He is obtaining cardiac clearance for botox treatments to his bladder.     Patient remained masked during entire encounter. No cough present. I donned a mask and eye protection throughout entire visit. Prior to donning mask and eye protection, hand hygiene was performed, as well as when it was doffed.  I was closer than 6 feet, but not for an extended period of time. No obvious exposure to any bodily " fluids.    Leg Pain   The pain is present in the left leg and right leg. The quality of the pain is described as aching, burning and cramping. The pain is at a severity of 8/10. The pain is severe. The pain has been worsening since onset. Pertinent negatives include no numbness. The symptoms are aggravated by movement and weight bearing.      PEG Assessment   What number best describes your pain on average in the past week?8  What number best describes how, during the past week, pain has interfered with your enjoyment of life?8  What number best describes how, during the past week, pain has interfered with your general activity?  8    The following portions of the patient's history were reviewed and updated as appropriate: allergies, current medications, past family history, past medical history, past social history, past surgical history and problem list.    Review of Systems   Constitutional: Negative for activity change, fatigue and fever.   HENT: Negative for congestion.    Eyes: Negative for visual disturbance.   Respiratory: Negative for cough and shortness of breath.    Cardiovascular: Negative for chest pain.   Gastrointestinal: Negative for constipation and diarrhea.   Genitourinary: Negative for difficulty urinating.   Musculoskeletal: Positive for arthralgias (bilat leg) and gait problem (w/c).   Neurological: Negative for dizziness, weakness, light-headedness, numbness and headaches.   Psychiatric/Behavioral: Negative for agitation, sleep disturbance and suicidal ideas. The patient is not nervous/anxious.      --  The aforementioned information the Chief Complaint section and above subjective data including any HPI data, and also the Review of Systems data, has been personally reviewed and affirmed.  --    Vitals:    12/08/21 1003   BP: 126/75   Pulse: 75   Resp: 20   Temp: 96.9 °F (36.1 °C)   SpO2: 95%   Weight: 127 kg (279 lb)  Comment: pt unable to stand for wt std wt from 2 weeks ago at [facility]  "  Height: 177.8 cm (70\")   PainSc:   8   PainLoc: Leg  Comment: bilat     Objective   Physical Exam  Vitals and nursing note reviewed.   Constitutional:       Appearance: Normal appearance. He is well-developed.   Eyes:      General: Lids are normal.   Cardiovascular:      Rate and Rhythm: Normal rate.   Pulmonary:      Effort: Pulmonary effort is normal.   Musculoskeletal:      Cervical back: Normal range of motion.      Right upper leg: Tenderness present.      Left upper leg: Tenderness present.      Right lower leg: Tenderness present.      Left lower leg: Tenderness present.      Right foot: Tenderness present.      Left foot: Tenderness present.   Skin:     Findings: Abrasion (right knee) present.   Neurological:      Mental Status: He is alert and oriented to person, place, and time.   Psychiatric:         Speech: Speech normal.         Behavior: Behavior normal.         Judgment: Judgment normal.       Assessment/Plan   Diagnoses and all orders for this visit:    1. Chronic pain syndrome (Primary)    2. Adhesive arachnoiditis    3. Encounter for long-term (current) use of high-risk medication      --- The urine drug screen confirmation from 9/13/2021 has been reviewed and the result is appropriate based on patient history and [employer] report  --- CSA updated 9/13/2021  --- Refill Percocet 5/325. DNF 12/12/2021 Patient appears stable with current regimen. No adverse effects. Regarding continuation of opioids, there is no evidence of aberrant behavior or any red flags.  The patient continues with appropriate response to opioid therapy. ADL's remain intact by self.   --- Trial adjusting how he is taking the percocet in the mornings which would include taking this PRIOR to doing the painful activities of getting out of bed and getting dressed.   --- Follow-up 1 month at previously scheduled office visit with Dr. Holguin (per patient request).      FAINA REPORT  As part of the patient's treatment plan, I am " prescribing controlled substances. The patient has been made aware of appropriate use of such medications, including potential risk of somnolence, limited ability to drive and/or work safely, and the potential for dependence or overdose. It has also bee made clear that these medications are for use by this patient only, without concomitant use of alcohol or other substances unless prescribed.     Patient has completed prescribing agreement detailing terms of continued prescribing of controlled substances, including monitoring [employer]INA reports, urine drug screening, and pill counts if necessary. The patient is aware that inappropriate use will results in cessation of prescribing such medications.    As the clinician, I personally reviewed the [employer]INA from 12/8/2021 while the patient was in the office today.    History and physical exam exhibit continued safe and appropriate use of controlled substances.    Dictated utilizing Dragon dictation.     This document is intended for medical expert use only. Reading of this document by patients and/or patient's family without participating medical staff guidance may result in misinterpretation and unintended morbidity.   Any interpretation of such data is the responsibility of the patient and/or family member responsible for the patient in concert with their primary or specialist providers, not to be left for sources of online searches such as [employer], [employer] or similar queries. Relying on these approaches to knowledge may result in misinterpretation, misguided goals of care and even death should patients or family members try recommendations outside of the realm of professional medical care in a supervised way.

## 2021-12-09 ENCOUNTER — HOSPITAL ENCOUNTER (OUTPATIENT)
Dept: CARDIOLOGY | Facility: HOSPITAL | Age: 81
Discharge: HOME | End: 2021-12-09

## 2021-12-09 VITALS — WEIGHT: 279 LBS | OXYGEN SATURATION: 98 % | HEIGHT: 70 IN | HEART RATE: 70 BPM | BODY MASS INDEX: 39.94 KG/M2

## 2021-12-09 DIAGNOSIS — E78.5 DYSLIPIDEMIA: ICD-10-CM

## 2021-12-09 DIAGNOSIS — Z79.4 TYPE 2 DIABETES MELLITUS WITH OTHER SPECIFIED COMPLICATION, WITH LONG-TERM CURRENT USE OF INSULIN (HCC): ICD-10-CM

## 2021-12-09 DIAGNOSIS — I49.3 PVC (PREMATURE VENTRICULAR CONTRACTION): ICD-10-CM

## 2021-12-09 DIAGNOSIS — R06.09 DYSPNEA ON EXERTION: ICD-10-CM

## 2021-12-09 DIAGNOSIS — I48.0 PAROXYSMAL ATRIAL FIBRILLATION (HCC): ICD-10-CM

## 2021-12-09 DIAGNOSIS — I35.8 AORTIC VALVE SCLEROSIS: ICD-10-CM

## 2021-12-09 DIAGNOSIS — E11.69 TYPE 2 DIABETES MELLITUS WITH OTHER SPECIFIED COMPLICATION, WITH LONG-TERM CURRENT USE OF INSULIN (HCC): ICD-10-CM

## 2021-12-09 LAB
BH CV NUCLEAR PRIOR STUDY: 2
BH CV REST NUCLEAR ISOTOPE DOSE: 59.9 MCI
BH CV STRESS BP STAGE 1: NORMAL
BH CV STRESS COMMENTS STAGE 1: NORMAL
BH CV STRESS DOSE REGADENOSON STAGE 1: 0.4
BH CV STRESS DURATION MIN STAGE 1: 0
BH CV STRESS DURATION SEC STAGE 1: 10
BH CV STRESS HR STAGE 1: 80
BH CV STRESS NUCLEAR ISOTOPE DOSE: 59.9 MCI
BH CV STRESS PROTOCOL 1: NORMAL
BH CV STRESS RECOVERY BP: NORMAL MMHG
BH CV STRESS RECOVERY HR: 77 BPM
BH CV STRESS STAGE 1: 1
LV EF NUC BP: 52 %
MAXIMAL PREDICTED HEART RATE: 140 BPM
PERCENT MAX PREDICTED HR: 57.14 %
STRESS BASELINE BP: NORMAL MMHG
STRESS BASELINE HR: 71 BPM
STRESS PERCENT HR: 67 %
STRESS POST EXERCISE DUR SEC: 10 SEC
STRESS POST PEAK BP: NORMAL MMHG
STRESS POST PEAK HR: 80 BPM
STRESS TARGET HR: 119 BPM

## 2021-12-09 PROCEDURE — 93017 CV STRESS TEST TRACING ONLY: CPT

## 2021-12-09 PROCEDURE — 93306 TTE W/DOPPLER COMPLETE: CPT | Performed by: INTERNAL MEDICINE

## 2021-12-09 PROCEDURE — A9555 RB82 RUBIDIUM: HCPCS | Performed by: INTERNAL MEDICINE

## 2021-12-09 PROCEDURE — 78492 MYOCRD IMG PET MLT RST&STRS: CPT | Performed by: INTERNAL MEDICINE

## 2021-12-09 PROCEDURE — 25010000002 REGADENOSON 0.4 MG/5ML SOLUTION: Performed by: INTERNAL MEDICINE

## 2021-12-09 PROCEDURE — 93016 CV STRESS TEST SUPVJ ONLY: CPT | Performed by: INTERNAL MEDICINE

## 2021-12-09 PROCEDURE — 25010000002 PERFLUTREN (DEFINITY) 8.476 MG IN SODIUM CHLORIDE (PF) 0.9 % 10 ML INJECTION: Performed by: INTERNAL MEDICINE

## 2021-12-09 PROCEDURE — 93018 CV STRESS TEST I&R ONLY: CPT | Performed by: INTERNAL MEDICINE

## 2021-12-09 PROCEDURE — 78492 MYOCRD IMG PET MLT RST&STRS: CPT

## 2021-12-09 PROCEDURE — 93306 TTE W/DOPPLER COMPLETE: CPT

## 2021-12-09 PROCEDURE — 0 RUBIDIUM CHLORIDE: Performed by: INTERNAL MEDICINE

## 2021-12-09 RX ADMIN — REGADENOSON 0.4 MG: 0.08 INJECTION, SOLUTION INTRAVENOUS at 12:44

## 2021-12-09 RX ADMIN — PERFLUTREN 1 ML: 6.52 INJECTION, SUSPENSION INTRAVENOUS at 12:02

## 2021-12-10 LAB
AORTIC DIMENSIONLESS INDEX: 0.3 (DI)
ASCENDING AORTA: 3.7 CM
BH CV ECHO MEAS - ACS: 1.7 CM
BH CV ECHO MEAS - AI DEC SLOPE: 83.4 CM/SEC^2
BH CV ECHO MEAS - AI MAX PG: 16.8 MMHG
BH CV ECHO MEAS - AI MAX VEL: 205.1 CM/SEC
BH CV ECHO MEAS - AI P1/2T: 720.1 MSEC
BH CV ECHO MEAS - AO MAX PG (FULL): 23.6 MMHG
BH CV ECHO MEAS - AO MAX PG: 25.9 MMHG
BH CV ECHO MEAS - AO MEAN PG (FULL): 14.4 MMHG
BH CV ECHO MEAS - AO MEAN PG: 15.7 MMHG
BH CV ECHO MEAS - AO ROOT AREA (BSA CORRECTED): 1.6
BH CV ECHO MEAS - AO ROOT AREA: 11 CM^2
BH CV ECHO MEAS - AO ROOT DIAM: 3.7 CM
BH CV ECHO MEAS - AO V2 MAX: 254.6 CM/SEC
BH CV ECHO MEAS - AO V2 MEAN: 185.6 CM/SEC
BH CV ECHO MEAS - AO V2 VTI: 68.1 CM
BH CV ECHO MEAS - ASC AORTA: 3.7 CM
BH CV ECHO MEAS - AVA(I,A): 0.96 CM^2
BH CV ECHO MEAS - AVA(I,D): 0.96 CM^2
BH CV ECHO MEAS - AVA(V,A): 0.96 CM^2
BH CV ECHO MEAS - AVA(V,D): 0.96 CM^2
BH CV ECHO MEAS - BSA(HAYCOCK): 2.6 M^2
BH CV ECHO MEAS - BSA: 2.4 M^2
BH CV ECHO MEAS - BZI_BMI: 40 KILOGRAMS/M^2
BH CV ECHO MEAS - BZI_METRIC_HEIGHT: 177.8 CM
BH CV ECHO MEAS - BZI_METRIC_WEIGHT: 126.6 KG
BH CV ECHO MEAS - CONTRAST EF (2CH): 51 CM2
BH CV ECHO MEAS - CONTRAST EF 4CH: 56 CM2
BH CV ECHO MEAS - EDV(CUBED): 65.7 ML
BH CV ECHO MEAS - EDV(MOD-SP2): 105 ML
BH CV ECHO MEAS - EDV(MOD-SP4): 137 ML
BH CV ECHO MEAS - EDV(TEICH): 71.5 ML
BH CV ECHO MEAS - EF(CUBED): 75.3 %
BH CV ECHO MEAS - EF(MOD-BP): 54 %
BH CV ECHO MEAS - EF(MOD-SP2): 51.4 %
BH CV ECHO MEAS - EF(MOD-SP4): 56.2 %
BH CV ECHO MEAS - EF(TEICH): 67.8 %
BH CV ECHO MEAS - ESV(MOD-SP2): 51 ML
BH CV ECHO MEAS - ESV(MOD-SP4): 60 ML
BH CV ECHO MEAS - ESV(TEICH): 23 ML
BH CV ECHO MEAS - IVS/LVPW: 1.1
BH CV ECHO MEAS - IVSD: 0.97 CM
BH CV ECHO MEAS - LAT PEAK E' VEL: 8.7 CM/SEC
BH CV ECHO MEAS - LV DIASTOLIC VOL/BSA (35-75): 57 ML/M^2
BH CV ECHO MEAS - LV MASS(C)D: 117.8 GRAMS
BH CV ECHO MEAS - LV MASS(C)DI: 49 GRAMS/M^2
BH CV ECHO MEAS - LV MAX PG: 2.3 MMHG
BH CV ECHO MEAS - LV MEAN PG: 1.3 MMHG
BH CV ECHO MEAS - LV SYSTOLIC VOL/BSA (12-30): 25 ML/M^2
BH CV ECHO MEAS - LV V1 MAX: 76.6 CM/SEC
BH CV ECHO MEAS - LV V1 MEAN: 53.8 CM/SEC
BH CV ECHO MEAS - LV V1 VTI: 20.5 CM
BH CV ECHO MEAS - LVIDD: 4 CM
BH CV ECHO MEAS - LVIDS: 2.5 CM
BH CV ECHO MEAS - LVLD AP2: 8.6 CM
BH CV ECHO MEAS - LVLD AP4: 8.7 CM
BH CV ECHO MEAS - LVLS AP2: 7.6 CM
BH CV ECHO MEAS - LVLS AP4: 7.6 CM
BH CV ECHO MEAS - LVOT AREA (M): 3.1 CM^2
BH CV ECHO MEAS - LVOT AREA: 3.2 CM^2
BH CV ECHO MEAS - LVOT DIAM: 2 CM
BH CV ECHO MEAS - LVPWD: 0.9 CM
BH CV ECHO MEAS - MED PEAK E' VEL: 7.9 CM/SEC
BH CV ECHO MEAS - MR MAX PG: 23.8 MMHG
BH CV ECHO MEAS - MR MAX VEL: 243.7 CM/SEC
BH CV ECHO MEAS - MV A DUR: 0.17 SEC
BH CV ECHO MEAS - MV A MAX VEL: 79.4 CM/SEC
BH CV ECHO MEAS - MV DEC SLOPE: 457.7 CM/SEC^2
BH CV ECHO MEAS - MV DEC TIME: 0.19 SEC
BH CV ECHO MEAS - MV E MAX VEL: 53.3 CM/SEC
BH CV ECHO MEAS - MV E/A: 0.67
BH CV ECHO MEAS - MV MAX PG: 3 MMHG
BH CV ECHO MEAS - MV MEAN PG: 1.2 MMHG
BH CV ECHO MEAS - MV P1/2T MAX VEL: 77.6 CM/SEC
BH CV ECHO MEAS - MV P1/2T: 49.6 MSEC
BH CV ECHO MEAS - MV V2 MAX: 86.1 CM/SEC
BH CV ECHO MEAS - MV V2 MEAN: 50.1 CM/SEC
BH CV ECHO MEAS - MV V2 VTI: 24.7 CM
BH CV ECHO MEAS - MVA P1/2T LCG: 2.8 CM^2
BH CV ECHO MEAS - MVA(P1/2T): 4.4 CM^2
BH CV ECHO MEAS - MVA(VTI): 2.6 CM^2
BH CV ECHO MEAS - PA ACC TIME: 0.11 SEC
BH CV ECHO MEAS - PA MAX PG (FULL): 3.7 MMHG
BH CV ECHO MEAS - PA MAX PG: 5.9 MMHG
BH CV ECHO MEAS - PA PR(ACCEL): 29.9 MMHG
BH CV ECHO MEAS - PA V2 MAX: 122 CM/SEC
BH CV ECHO MEAS - PULM A REVS DUR: 0.14 SEC
BH CV ECHO MEAS - PULM A REVS VEL: 33 CM/SEC
BH CV ECHO MEAS - PULM DIAS VEL: 34.5 CM/SEC
BH CV ECHO MEAS - PULM S/D: 1.2
BH CV ECHO MEAS - PULM SYS VEL: 40.3 CM/SEC
BH CV ECHO MEAS - PVA(V,A): 2.7 CM^2
BH CV ECHO MEAS - PVA(V,D): 2.7 CM^2
BH CV ECHO MEAS - QP/QS: 0.97
BH CV ECHO MEAS - RAP SYSTOLE: 3 MMHG
BH CV ECHO MEAS - RV MAX PG: 2.2 MMHG
BH CV ECHO MEAS - RV MEAN PG: 1.2 MMHG
BH CV ECHO MEAS - RV V1 MAX: 74.9 CM/SEC
BH CV ECHO MEAS - RV V1 MEAN: 52.2 CM/SEC
BH CV ECHO MEAS - RV V1 VTI: 14.5 CM
BH CV ECHO MEAS - RVOT AREA: 4.3 CM^2
BH CV ECHO MEAS - RVOT DIAM: 2.3 CM
BH CV ECHO MEAS - RVSP: 24 MMHG
BH CV ECHO MEAS - SI(AO): 311.4 ML/M^2
BH CV ECHO MEAS - SI(CUBED): 20.6 ML/M^2
BH CV ECHO MEAS - SI(LVOT): 27.1 ML/M^2
BH CV ECHO MEAS - SI(MOD-SP2): 22.5 ML/M^2
BH CV ECHO MEAS - SI(MOD-SP4): 32 ML/M^2
BH CV ECHO MEAS - SI(TEICH): 20.1 ML/M^2
BH CV ECHO MEAS - SV(AO): 748.7 ML
BH CV ECHO MEAS - SV(CUBED): 49.5 ML
BH CV ECHO MEAS - SV(LVOT): 65.1 ML
BH CV ECHO MEAS - SV(MOD-SP2): 54 ML
BH CV ECHO MEAS - SV(MOD-SP4): 77 ML
BH CV ECHO MEAS - SV(RVOT): 62.8 ML
BH CV ECHO MEAS - SV(TEICH): 48.4 ML
BH CV ECHO MEAS - TAPSE (>1.6): 2.3 CM
BH CV ECHO MEAS - TR MAX VEL: 230.6 CM/SEC
BH CV ECHO MEASUREMENTS AVERAGE E/E' RATIO: 6.42
BH CV XLRA - RV BASE: 3.4 CM
BH CV XLRA - RV LENGTH: 6.9 CM
BH CV XLRA - RV MID: 2.5 CM
BH CV XLRA - TDI S': 6.7 CM/SEC
LEFT ATRIUM VOLUME INDEX: 29 ML/M2
LV EF 2D ECHO EST: 54 %
MAXIMAL PREDICTED HEART RATE: 140 BPM
SINUS: 3.2 CM
STJ: 2.8 CM
STRESS TARGET HR: 119 BPM

## 2021-12-13 ENCOUNTER — TELEPHONE (OUTPATIENT)
Dept: CARDIOLOGY | Facility: CLINIC | Age: 81
End: 2021-12-13

## 2021-12-13 NOTE — TELEPHONE ENCOUNTER
I spoke with him regarding his test results.  His stress test demonstrated no evidence of ischemia.  Echocardiogram demonstrated normal LV function and moderate aortic stenosis.  Ultimately, I would like Dr. Burr to review his results to determine next steps.

## 2021-12-13 NOTE — TELEPHONE ENCOUNTER
[phone]  8:37 am    Received a call to discuss test results from last week.  Please advise.     [facility]

## 2021-12-14 NOTE — TELEPHONE ENCOUNTER
I reviewed his test findings.  No changes needed in management from a cardiac standpoint.  I do want to follow-up with him in 6 months.    He is otherwise okay to proceed with planned Botox injection of his bladder with his urologist.  I have gone ahead and signed a clearance form in place and in my out box.    Otherwise please see that he scheduled for 6-month follow-up appointment with me.

## 2021-12-17 ENCOUNTER — OFFICE VISIT (OUTPATIENT)
Dept: WOUND CARE | Facility: HOSPITAL | Age: 81
End: 2021-12-17

## 2021-12-17 PROCEDURE — G0463 HOSPITAL OUTPT CLINIC VISIT: HCPCS

## 2021-12-17 PROCEDURE — 97602 WOUND(S) CARE NON-SELECTIVE: CPT

## 2021-12-23 RX ORDER — ERGOCALCIFEROL 1.25 MG/1
CAPSULE ORAL
Qty: 26 CAPSULE | Refills: 1 | Status: SHIPPED | OUTPATIENT
Start: 2021-12-23 | End: 2022-12-30 | Stop reason: SDUPTHER

## 2021-12-29 ENCOUNTER — OFFICE VISIT (OUTPATIENT)
Dept: WOUND CARE | Facility: HOSPITAL | Age: 81
End: 2021-12-29

## 2021-12-29 DIAGNOSIS — Z79.4 CONTROLLED TYPE 2 DIABETES MELLITUS WITHOUT COMPLICATION, WITH LONG-TERM CURRENT USE OF INSULIN: ICD-10-CM

## 2021-12-29 DIAGNOSIS — E55.9 VITAMIN D DEFICIENCY: ICD-10-CM

## 2021-12-29 DIAGNOSIS — E11.9 CONTROLLED TYPE 2 DIABETES MELLITUS WITHOUT COMPLICATION, WITH LONG-TERM CURRENT USE OF INSULIN: ICD-10-CM

## 2021-12-29 DIAGNOSIS — E78.5 DYSLIPIDEMIA: Primary | ICD-10-CM

## 2021-12-29 DIAGNOSIS — I10 ESSENTIAL HYPERTENSION: ICD-10-CM

## 2021-12-29 PROCEDURE — G0463 HOSPITAL OUTPT CLINIC VISIT: HCPCS

## 2022-01-10 ENCOUNTER — APPOINTMENT (OUTPATIENT)
Dept: CT IMAGING | Facility: HOSPITAL | Age: 82
End: 2022-01-10

## 2022-01-10 ENCOUNTER — HOSPITAL ENCOUNTER (OUTPATIENT)
Dept: CT IMAGING | Facility: HOSPITAL | Age: 82
Discharge: HOME | End: 2022-01-10
Admitting: NURSE PRACTITIONER

## 2022-01-10 ENCOUNTER — OFFICE VISIT (OUTPATIENT)
Dept: PAIN MEDICINE | Facility: CLINIC | Age: 82
End: 2022-01-10

## 2022-01-10 VITALS
OXYGEN SATURATION: 93 % | HEIGHT: 70 IN | HEART RATE: 73 BPM | BODY MASS INDEX: 40.03 KG/M2 | DIASTOLIC BLOOD PRESSURE: 75 MMHG | SYSTOLIC BLOOD PRESSURE: 134 MMHG | TEMPERATURE: 96.3 F

## 2022-01-10 DIAGNOSIS — G89.4 CHRONIC PAIN SYNDROME: Primary | ICD-10-CM

## 2022-01-10 DIAGNOSIS — R90.89 ABNORMAL CT OF BRAIN: ICD-10-CM

## 2022-01-10 DIAGNOSIS — R39.82 CHRONIC BLADDER PAIN: ICD-10-CM

## 2022-01-10 DIAGNOSIS — Z79.899 ENCOUNTER FOR LONG-TERM (CURRENT) USE OF HIGH-RISK MEDICATION: ICD-10-CM

## 2022-01-10 DIAGNOSIS — M51.36 DDD (DEGENERATIVE DISC DISEASE), LUMBAR: ICD-10-CM

## 2022-01-10 DIAGNOSIS — G03.9 ADHESIVE ARACHNOIDITIS: ICD-10-CM

## 2022-01-10 PROCEDURE — 70450 CT HEAD/BRAIN W/O DYE: CPT

## 2022-01-10 PROCEDURE — 99213 OFFICE O/P EST LOW 20 MIN: CPT | Performed by: ANESTHESIOLOGY

## 2022-01-10 RX ORDER — OXYCODONE HYDROCHLORIDE AND ACETAMINOPHEN 5; 325 MG/1; MG/1
1 TABLET ORAL 3 TIMES DAILY PRN
Qty: 90 TABLET | Refills: 0 | Status: SHIPPED | OUTPATIENT
Start: 2022-01-10 | End: 2022-04-07 | Stop reason: SDUPTHER

## 2022-01-10 NOTE — PROGRESS NOTES
CHIEF COMPLAINT  Follow-up for leg pain.    Subjective   Jesus Beckham is a 81 y.o. male  who presents for follow-up.  He has a history of back pain with history of arachnoiditis, and also he has severe bladder pain as a separate issue.  He is pending botox injections in a few weeks.      Patient remained masked during entire encounter. No cough present. I donned a mask and eye protection throughout entire visit. Prior to donning mask and eye protection, hand hygiene was performed, as well as when it was doffed.  I was closer than 6 feet, but not for an extended period of time. No obvious exposure to any bodily fluids.    He complains of some mild to moderate back pain in addition to the bilateral leg pain.  He has had multiple back surgeries in the past including January 2016 and March 2018.  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  Aching and burning and cramping pains have been present in the legs.    He uses his electric wheelchair.  Great difficulty when ambulating at home outside of the chair.  He has concern for falls which are shared with his wife but he is not having any noted balance issues or discombobulated shin or any other intoxicating side effects from the pain medication.    He does have lumbosacral area pain that he attributes to his bladder problems.  He has great deal of painful flareup in the mornings.  He attributes this to bladder fullness and feels some better once he is able to get moving.  With a history of bladder spasm he is anticipating having Botox injections in a few weeks.  He has had moderate sustained relief of 6 months in duration from Botox injections previously and is looking forward to this again.    Being slight increase in medication management helped him modestly with regards to the multiple painful elements.  Taking him pain medication pill in the morning has helped him to be able to overcome the difficulties of getting out of bed and starting the day and with the  "activity he does feel some better as was reflected upon previously.       PEG Assessment   What number best describes your pain on average in the past week?7  What number best describes how, during the past week, pain has interfered with your enjoyment of life?7  What number best describes how, during the past week, pain has interfered with your general activity?  7    --  The aforementioned information the Chief Complaint section and above subjective data including any HPI data, and also the Review of Systems data, has been personally reviewed and affirmed.  --        The following portions of the patient's history were reviewed and updated as appropriate: allergies, current medications, past family history, past medical history, past social history, past surgical history and problem list.    -------    The following portions of the patient's history were reviewed and updated as appropriate: allergies, current medications, past family history, past medical history, past social history, past surgical history and problem list.    Allergies   Allergen Reactions   • Levaquin [Levofloxacin] Other (See Comments)     Redness, itching at IV site with IV Levaquin administration   • Other Other (See Comments)     ALCOHOL. DEATHLY ILL.    • Alcohol Nausea And Vomiting     \"Deathly sick, headaches, cold sweats\"  Cant take any medication that contains alcohol         Current Outpatient Medications:   •  ACCU-CHEK FASTCLIX LANCETS misc, TEST BLOOD GLUCOSE TWICE DAILY, Disp: 204 each, Rfl: 1  •  allopurinol (ZYLOPRIM) 300 MG tablet, TAKE 1 TABLET EVERY DAY, Disp: 90 tablet, Rfl: 3  •  Ascorbic Acid 300 MG chewable tablet, Chew 300 mg Every Morning., Disp: , Rfl:   •  diphenhydrAMINE-acetaminophen (TYLENOL PM EXTRA STRENGTH)  MG tablet per tablet, Take 2 tablets by mouth At Night As Needed for Sleep., Disp: , Rfl:   •  donepezil (Aricept) 5 MG tablet, Take 1 tablet by mouth Every Night., Disp: 180 tablet, Rfl: 1  •  Eliquis " 5 MG tablet tablet, TAKE 1 TABLET BY MOUTH EVERY 12 HOURS., Disp: 180 tablet, Rfl: 3  •  fenofibrate (TRICOR) 145 MG tablet, TAKE 1 TABLET EVERY DAY, Disp: 90 tablet, Rfl: 1  •  fluticasone (FLONASE) 50 MCG/ACT nasal spray, 2 sprays into the nostril(s) as directed by provider Daily., Disp: 48 g, Rfl: 3  •  glipizide (GLUCOTROL) 10 MG tablet, TAKE 1 TABLET TWICE A DAY BEFORE MEALS., Disp: 180 tablet, Rfl: 1  •  glucose blood (Accu-Chek SmartView) test strip, Dx code 11.42 testing bs 2 x day, Disp: 200 each, Rfl: 3  •  Glyxambi 25-5 MG tablet, TAKE 1 TABLET EVERY DAY WITH BREAKFAST, Disp: 90 tablet, Rfl: 1  •  Insulin Pen Needle (Droplet Pen Needles) 32G X 4 MM misc, USING 3 TIMES DAILY, Disp: 300 each, Rfl: 1  •  L-Methylfolate-B6-B12 3-35-2 MG tablet, TAKE 1 TABLET TWICE DAILY, Disp: 180 tablet, Rfl: 1  •  lisinopril (PRINIVIL,ZESTRIL) 2.5 MG tablet, Take 1 tablet by mouth Daily., Disp: 90 tablet, Rfl: 1  •  melatonin 5 MG tablet tablet, Take 5 mg by mouth Every Night., Disp: , Rfl:   •  Multiple Vitamins-Minerals (MULTIVITAMIN ADULT PO), Take 1 tablet by mouth Daily., Disp: , Rfl:   •  Myrbetriq 50 MG tablet sustained-release 24 hour 24 hr tablet, Take 50 mg by mouth Daily., Disp: , Rfl:   •  Omega-3 Fatty Acids (FISH OIL) 1200 MG capsule capsule, Take 2,000 mg by mouth 2 (Two) Times a Day With Meals. HOLD FOR SURGERY , Disp: , Rfl:   •  oxyCODONE-acetaminophen (PERCOCET) 5-325 MG per tablet, Take 1 tablet by mouth 3 (Three) Times a Day As Needed for Severe Pain ., Disp: 90 tablet, Rfl: 0  •  polyethylene glycol (MiraLax) 17 GM/SCOOP powder, Take 17 g by mouth Daily. Once a day, Disp: , Rfl:   •  pregabalin (LYRICA) 100 MG capsule, Take 1 capsule by mouth 3 (Three) Times a Day., Disp: 270 capsule, Rfl: 1  •  rosuvastatin (CRESTOR) 10 MG tablet, TAKE 1 TABLET EVERY DAY, Disp: 90 tablet, Rfl: 1  •  Tojesus Hernandez SoloStar 300 UNIT/ML solution pen-injector injection, Inject 100 Units under the skin into the appropriate  area as directed Daily., Disp: 10 pen, Rfl: 1  •  traZODone (DESYREL) 150 MG tablet, Take 3 tablets by mouth Every Night. (Patient taking differently: Take 150 mg by mouth Every Night. Can take up to 2.5 tablets), Disp: 270 tablet, Rfl: 3  •  vitamin D (ERGOCALCIFEROL) 1.25 MG (70072 UT) capsule capsule, TAKE 1 CAPSULE TWICE WEEKLY, Disp: 26 capsule, Rfl: 1    Current Outpatient Medications on File Prior to Visit   Medication Sig Dispense Refill   • ACCU-CHEK FASTCLIX LANCETS misc TEST BLOOD GLUCOSE TWICE DAILY 204 each 1   • allopurinol (ZYLOPRIM) 300 MG tablet TAKE 1 TABLET EVERY DAY 90 tablet 3   • Ascorbic Acid 300 MG chewable tablet Chew 300 mg Every Morning.     • diphenhydrAMINE-acetaminophen (TYLENOL PM EXTRA STRENGTH)  MG tablet per tablet Take 2 tablets by mouth At Night As Needed for Sleep.     • donepezil (Aricept) 5 MG tablet Take 1 tablet by mouth Every Night. 180 tablet 1   • Eliquis 5 MG tablet tablet TAKE 1 TABLET BY MOUTH EVERY 12 HOURS. 180 tablet 3   • fenofibrate (TRICOR) 145 MG tablet TAKE 1 TABLET EVERY DAY 90 tablet 1   • fluticasone (FLONASE) 50 MCG/ACT nasal spray 2 sprays into the nostril(s) as directed by provider Daily. 48 g 3   • glipizide (GLUCOTROL) 10 MG tablet TAKE 1 TABLET TWICE A DAY BEFORE MEALS. 180 tablet 1   • glucose blood (Accu-Chek SmartView) test strip Dx code 11.42 testing bs 2 x day 200 each 3   • Glyxambi 25-5 MG tablet TAKE 1 TABLET EVERY DAY WITH BREAKFAST 90 tablet 1   • Insulin Pen Needle (Droplet Pen Needles) 32G X 4 MM misc USING 3 TIMES DAILY 300 each 1   • L-Methylfolate-B6-B12 3-35-2 MG tablet TAKE 1 TABLET TWICE DAILY 180 tablet 1   • lisinopril (PRINIVIL,ZESTRIL) 2.5 MG tablet Take 1 tablet by mouth Daily. 90 tablet 1   • melatonin 5 MG tablet tablet Take 5 mg by mouth Every Night.     • Multiple Vitamins-Minerals (MULTIVITAMIN ADULT PO) Take 1 tablet by mouth Daily.     • Myrbetriq 50 MG tablet sustained-release 24 hour 24 hr tablet Take 50 mg by mouth  Daily.     • Omega-3 Fatty Acids (FISH OIL) 1200 MG capsule capsule Take 2,000 mg by mouth 2 (Two) Times a Day With Meals. HOLD FOR SURGERY      • polyethylene glycol (MiraLax) 17 GM/SCOOP powder Take 17 g by mouth Daily. Once a day     • pregabalin (LYRICA) 100 MG capsule Take 1 capsule by mouth 3 (Three) Times a Day. 270 capsule 1   • rosuvastatin (CRESTOR) 10 MG tablet TAKE 1 TABLET EVERY DAY 90 tablet 1   • Toujeo Max SoloStar 300 UNIT/ML solution pen-injector injection Inject 100 Units under the skin into the appropriate area as directed Daily. 10 pen 1   • traZODone (DESYREL) 150 MG tablet Take 3 tablets by mouth Every Night. (Patient taking differently: Take 150 mg by mouth Every Night. Can take up to 2.5 tablets) 270 tablet 3   • vitamin D (ERGOCALCIFEROL) 1.25 MG (74859 UT) capsule capsule TAKE 1 CAPSULE TWICE WEEKLY 26 capsule 1     No current facility-administered medications on file prior to visit.       Patient Active Problem List   Diagnosis   • Gout   • Diabetic peripheral neuropathy (HCC)   • Dyslipidemia   • Essential hypertension   • PVC (premature ventricular contraction)   • Vitamin D deficiency   • Benign non-nodular prostatic hyperplasia without lower urinary tract symptoms   • Osteoarthritis   • Urge incontinence   • CELINA (obstructive sleep apnea)   • Acute gangrenous cholecystitis   • Discitis of lumbar region   • Paroxysmal atrial fibrillation (HCC)   • History of sepsis   • Hyperuricemia   • Chronic deep vein thrombosis (DVT) of right peroneal vein (HCC)   • Nausea, vomiting, and diarrhea   • Colitis presumed infectious   • Urinary tract infection in male   • DDD (degenerative disc disease), lumbar   • Bilateral leg weakness   • History of lumbar spinal fusion   • Carpal tunnel syndrome   • Adhesive arachnoiditis   • Heart murmur   • Chronic anticoagulation   • Diverticulosis   • Hematuria   • Lower obstructive uropathy   • Splenic lesion   • Suprapubic catheter dysfunction (HCC)   • Obesity  (BMI 30-39.9)   • Aortic valve sclerosis   • Venous insufficiency of left leg   • Memory difficulties   • Chronic pain syndrome   • Type 2 diabetes mellitus, with long-term current use of insulin ([facility])   • Encounter for long-term (current) use of high-risk medication   • Therapeutic opioid induced constipation   • Abnormal CT of brain   • Suprapubic catheter ([facility])   • Chronic bladder pain       Past Medical History:   Diagnosis Date   • Acute embolism and thrombosis of deep vein of right distal lower extremity ([facility])    • Acute gangrenous cholecystitis     FEB 2018   • Allergic    • Arthritis    • Atrial fibrillation with RVR ([facility])     HISTORY   • Benign prostatic hyperplasia without lower urinary tract symptoms    • Cancer of bladder ([facility]) 2016   • Colon polyp    • Discitis of lumbar region    • DVT (deep venous thrombosis) ([facility])     MARCH 2018   • Essential hypertension    • Murray catheter in place     LOSS OF SENSATION BLADDER. BECAUSE OF WOUND AT THE TIME   • Gout    • Heel ulcer ([facility])     RIGHT. FOLLOWED BY WOUND CARE. GETTING BETTER   • History of sepsis    • Hyperlipidemia    • Loss, sensation     LOWER EXTREMTIES. RELATED TO LAST BACK SURGERY.   • MRSA infection     LEFT LEG.    • Open wound     LOW TO MIDDLE CRACK BUTT. SEES WOUND CARE. WILL BE RESTARTED ON DIFFUCAN AND NYSTATIN POWER. REDNESS IN GROIN   • Open wound     WITH MEDICATED DRESSING LEFT SHIN AREA.    • CELINA (obstructive sleep apnea)     NO MACHINE   • Osteoarthritis    • Paroxysmal atrial fibrillation ([facility])    • PVC (premature ventricular contraction)    • Sepsis ([facility]) 02/2018   • Sepsis due to Escherichia coli ([facility])    • Skin carcinoma 2012    MELANOMA.2 PLACES BACK AND EAR   • Type 2 diabetes mellitus ([facility])    • Vitamin D deficiency    • Weakness        Past Surgical History:   Procedure Laterality Date   • ABDOMINAL SURGERY     • APPENDECTOMY N/A 1961   • CHOLECYSTECTOMY WITH INTRAOPERATIVE CHOLANGIOGRAM N/A 2/25/2018    Procedure:  CHOLECYSTECTOMY LAPAROSCOPIC INTRAOPERATIVE CHOLANGIOGRAM;  Surgeon: Maegan Correa MD;  Location: [facility] OR;  Service:    • COLONOSCOPY N/A     h/o of polyps   • EYE SURGERY Bilateral     glass removal from eye, lens transplant   • JOINT REPLACEMENT     • LAMINECTOMY N/A 2016    L2-L3, L3-L4, L4-L5, and L5-S1 bilateral lamincectomy, medial facetectomy & [id number], Dr. Kaiden Valdes   • LUMBAR FUSION N/A 3/7/2018    Procedure: LUMBAR FUSION MINIMALLY INVASIVE TRANSFORAMINAL LUMBAR INTERBODY IRRIGATION AND DEBRIDEMENT AND FUSION.  IRRIGATION AND DEBRIDEMENT OF EPIDURAL ABCESS LUMBAR 2-4. BONE MORPHOGENIC PROTEIN, ALPHATEC NOVEL AND ILLICO, EMG AND SSEP NEUROMONITORING.;  Surgeon: Manish Marr DO;  Location: [facility];  Service: Orthopedic Spine   • SKIN BIOPSY     • TOTAL KNEE ARTHROPLASTY Right 2005    Dr. Washington Evans   • VENA CAVA FILTER INSERTION Right 3/6/2018    Procedure: VENA CAVA FILTER INSERTION;  Surgeon: Alexis Thakkar MD;  Location: [facility] ;  Service:    • VENA CAVA FILTER REMOVAL N/A 12/3/2018    Procedure: VENA CAVA FILTER REMOVAL WITH VENOCAVAGRAM;  Surgeon: Alexis Thakkar MD;  Location: [facility] ;  Service: Vascular       Family History   Problem Relation Age of Onset   • Esophageal cancer Mother    • No Known Problems Father    • Diabetes Maternal Grandmother    • Heart attack Maternal Grandfather        Social History     Socioeconomic History   • Marital status:    • Number of children: 2   • Highest education level: Some college, no degree   Tobacco Use   • Smoking status: Former Smoker     Types: Cigars     Quit date: 2017     Years since quittin.0   • Smokeless tobacco: Former User     Types: Chew   • Tobacco comment: Caffeine daily   Vaping Use   • Vaping Use: Never used   Substance and Sexual Activity   • Alcohol use: Not Currently   • Drug use: No   • Sexual activity: Defer       -------          Review of  "Systems   Constitutional: Positive for fatigue.   HENT: Positive for hearing loss.    Eyes: Negative for visual disturbance.   Respiratory: Positive for cough and shortness of breath. Negative for wheezing.    Cardiovascular: Negative for chest pain.   Gastrointestinal: Negative for constipation and diarrhea.   Genitourinary: Positive for difficulty urinating (has a azul cath).   Musculoskeletal: Positive for arthralgias. Negative for back pain.   Neurological: Positive for weakness and numbness.   Psychiatric/Behavioral: Negative for sleep disturbance and suicidal ideas. The patient is not nervous/anxious.      E-paris report is reviewed:  I reviewed the document in the electronic form under the PDMP tab in the Epic EMR...  - In this function, the report is a current report in as close to real-time as possible.  - The report was available for immediate review.    - I did maria elena the report as reviewed.  - There is not concern for aberrant behavior based on this ekasper review.      Vitals:    01/10/22 1047   BP: 134/75   Pulse: 73   Temp: 96.3 °F (35.7 °C)   SpO2: 93%   Weight: Comment: unable to get wait   Height: 177.8 cm (70\")   PainSc:   7   PainLoc: Leg         Objective   Physical Exam  VSS, NNR, NCAT, NMNA, NRD, AAOx3.  Abnormal gait.  Electric wheelchair noted.  Some swelling in the legs.  Healing wound on the right knee.  No evidence of cellulitis.  Azul catheter in place.      Assessment/Plan   Diagnoses and all orders for this visit:    1. Chronic pain syndrome (Primary)    2. Encounter for long-term (current) use of high-risk medication  -     oxyCODONE-acetaminophen (PERCOCET) 5-325 MG per tablet; Take 1 tablet by mouth 3 (Three) Times a Day As Needed for Severe Pain .  Dispense: 90 tablet; Refill: 0    3. DDD (degenerative disc disease), lumbar    4. Adhesive arachnoiditis    5. Chronic bladder pain        --- Follow-up 2 months  -- He concurs with the plan to not make any medication changes or opiate " escalation or anything at this time pending a Hoper recapturing relief from Botox therapy for his bladder problem.  That is anticipated to be done in a few weeks.  -- continue percocet  -- continue pregabalin  -- may consider duloxetine if needed       -------    Functional goals:  1) Reduction in reported pain levels  2) Improvements in ability to independently perform activities of daily living  3) Improvement in ability to participate in family and social activities.  4) Improvements in mood    He is not able to golf or fish, but he continues to be involved in family and social activities, including [employer] and also his Desk.    -------          FAINA REPORT  As part of the patient's treatment plan, I am prescribing controlled substances. The patient has been made aware of appropriate use of such medications, including potential risk of somnolence, limited ability to drive and/or work safely, and the potential for dependence or overdose. It has also bee made clear that these medications are for use by this patient only, without concomitant use of alcohol or other substances unless prescribed.     Patient has completed prescribing agreement detailing terms of continued prescribing of controlled substances, including monitoring FAINA reports, urine drug screening, and pill counts if necessary. The patient is aware that inappropriate use will results in cessation of prescribing such medications.    As the clinician, I personally reviewed the FAINA from as above while the patient was in the office today.    History and physical exam exhibit continued safe and appropriate use of controlled substances.    ---  Patient appears stable with current regimen. No adverse effects. Regarding continuation of opioids, there is no evidence of aberrant behavior or any red flags.  The patient continues with appropriate response to opioid therapy. ADL's remain intact by self.     he does have a significant  history of plls/aa & also bladder issues, and demonstrates moderate improvement with multimodal therapy including opioid therapy, which allows him to engage in ADLs and family activities. The continuation of opioid therapy is therefore not contraindicated. We will however respect the March 2016 CDC Guidelines and will plan to avoid overexposure to opioids and avoid dose escalation.    --     Dictated utilizing Dragon dictation.     This document is intended for medical expert use only. Reading of this document by patients and/or patient's family without participating medical staff guidance may result in misinterpretation and unintended morbidity.   Any interpretation of such data is the responsibility of the patient and/or family member responsible for the patient in concert with their primary or specialist providers, not to be left for sources of online searches such as [employer], [employer] or similar queries. Relying on these approaches to knowledge may result in misinterpretation, misguided goals of care and even death should patients or family members try recommendations outside of the realm of professional medical care in a supervised way.    --    Vitals:    01/10/22 1047   PainSc:   7   PainLoc: Leg        Jesus Beckham reports a pain score of 7.  Given his pain assessment as noted, treatment options were discussed and the following options were decided upon as a follow-up plan to address the patient's pain: continuation of current treatment plan for pain, prescription for non-opiod analgesics and prescription for opiod analgesics.

## 2022-01-11 DIAGNOSIS — E78.5 DYSLIPIDEMIA: ICD-10-CM

## 2022-01-11 DIAGNOSIS — E11.9 CONTROLLED TYPE 2 DIABETES MELLITUS WITHOUT COMPLICATION, WITH LONG-TERM CURRENT USE OF INSULIN: ICD-10-CM

## 2022-01-11 DIAGNOSIS — E55.9 VITAMIN D DEFICIENCY: Primary | ICD-10-CM

## 2022-01-11 DIAGNOSIS — Z79.4 CONTROLLED TYPE 2 DIABETES MELLITUS WITHOUT COMPLICATION, WITH LONG-TERM CURRENT USE OF INSULIN: ICD-10-CM

## 2022-01-12 LAB
25(OH)D3+25(OH)D2 SERPL-MCNC: 75.2 NG/ML (ref 30–100)
ALBUMIN SERPL-MCNC: 4.2 G/DL (ref 3.7–4.7)
ALBUMIN/GLOB SERPL: 1.8 {RATIO} (ref 1.2–2.2)
ALP SERPL-CCNC: 67 IU/L (ref 44–121)
ALT SERPL-CCNC: 35 IU/L (ref 0–44)
AST SERPL-CCNC: 28 IU/L (ref 0–40)
BILIRUB SERPL-MCNC: 0.3 MG/DL (ref 0–1.2)
BUN SERPL-MCNC: 24 MG/DL (ref 8–27)
BUN/CREAT SERPL: 22 (ref 10–24)
CALCIUM SERPL-MCNC: 9.3 MG/DL (ref 8.6–10.2)
CHLORIDE SERPL-SCNC: 101 MMOL/L (ref 96–106)
CHOLEST SERPL-MCNC: 121 MG/DL (ref 100–199)
CO2 SERPL-SCNC: 24 MMOL/L (ref 20–29)
CREAT SERPL-MCNC: 1.11 MG/DL (ref 0.76–1.27)
CREAT UR-MCNC: NORMAL MG/DL
GLOBULIN SER CALC-MCNC: 2.4 G/DL (ref 1.5–4.5)
GLUCOSE SERPL-MCNC: 94 MG/DL (ref 65–99)
HBA1C MFR BLD: 6.9 % (ref 4.8–5.6)
HDLC SERPL-MCNC: 42 MG/DL
IMP & REVIEW OF LAB RESULTS: NORMAL
LDLC SERPL CALC-MCNC: 58 MG/DL (ref 0–99)
MICROALBUMIN UR-MCNC: NORMAL
POTASSIUM SERPL-SCNC: 4.6 MMOL/L (ref 3.5–5.2)
PROT SERPL-MCNC: 6.6 G/DL (ref 6–8.5)
SODIUM SERPL-SCNC: 144 MMOL/L (ref 134–144)
SPECIMEN STATUS: NORMAL
TRIGL SERPL-MCNC: 116 MG/DL (ref 0–149)
VLDLC SERPL CALC-MCNC: 21 MG/DL (ref 5–40)

## 2022-01-14 ENCOUNTER — OFFICE VISIT (OUTPATIENT)
Dept: WOUND CARE | Facility: HOSPITAL | Age: 82
End: 2022-01-14

## 2022-01-14 PROCEDURE — G0463 HOSPITAL OUTPT CLINIC VISIT: HCPCS

## 2022-01-19 DIAGNOSIS — Z79.4 TYPE 2 DIABETES MELLITUS WITH OTHER SPECIFIED COMPLICATION, WITH LONG-TERM CURRENT USE OF INSULIN: Primary | ICD-10-CM

## 2022-01-19 DIAGNOSIS — E11.69 TYPE 2 DIABETES MELLITUS WITH OTHER SPECIFIED COMPLICATION, WITH LONG-TERM CURRENT USE OF INSULIN: Primary | ICD-10-CM

## 2022-01-19 RX ORDER — FENOFIBRATE 145 MG/1
145 TABLET, COATED ORAL DAILY
Qty: 90 TABLET | Refills: 1 | Status: SHIPPED | OUTPATIENT
Start: 2022-01-19 | End: 2022-05-25

## 2022-01-19 RX ORDER — GLIPIZIDE 10 MG/1
10 TABLET ORAL
Qty: 180 TABLET | Refills: 1 | Status: SHIPPED | OUTPATIENT
Start: 2022-01-19 | End: 2022-05-25

## 2022-01-19 RX ORDER — LANCETS
1 EACH MISCELLANEOUS 2 TIMES DAILY
Qty: 204 EACH | Refills: 2 | Status: SHIPPED | OUTPATIENT
Start: 2022-01-19 | End: 2022-11-15

## 2022-01-19 RX ORDER — EMPAGLIFLOZIN AND LINAGLIPTIN 25; 5 MG/1; MG/1
1 TABLET, FILM COATED ORAL
Qty: 90 TABLET | Refills: 1 | Status: SHIPPED | OUTPATIENT
Start: 2022-01-19 | End: 2022-05-25

## 2022-01-19 RX ORDER — INSULIN GLARGINE 300 U/ML
100 INJECTION, SOLUTION SUBCUTANEOUS DAILY
Qty: 10 PEN | Refills: 1 | Status: SHIPPED | OUTPATIENT
Start: 2022-01-19 | End: 2022-09-21 | Stop reason: SDUPTHER

## 2022-01-19 RX ORDER — LISINOPRIL 2.5 MG/1
2.5 TABLET ORAL DAILY
Qty: 90 TABLET | Refills: 1 | Status: SHIPPED | OUTPATIENT
Start: 2022-01-19 | End: 2022-05-25

## 2022-01-19 RX ORDER — BLOOD SUGAR DIAGNOSTIC
STRIP MISCELLANEOUS
Qty: 200 EACH | Refills: 3 | Status: SHIPPED | OUTPATIENT
Start: 2022-01-19 | End: 2022-11-15

## 2022-01-19 RX ORDER — MECOBAL/LEVOMEFOLAT CA/B6 PHOS 2-3-35 MG
1 TABLET ORAL 2 TIMES DAILY
Qty: 180 TABLET | Refills: 1 | Status: SHIPPED | OUTPATIENT
Start: 2022-01-19 | End: 2022-11-07 | Stop reason: SDUPTHER

## 2022-01-20 ENCOUNTER — TELEPHONE (OUTPATIENT)
Dept: ENDOCRINOLOGY | Age: 82
End: 2022-01-20

## 2022-01-20 DIAGNOSIS — E11.42 DIABETIC PERIPHERAL NEUROPATHY: ICD-10-CM

## 2022-01-20 RX ORDER — PREGABALIN 100 MG/1
100 CAPSULE ORAL 3 TIMES DAILY
Qty: 270 CAPSULE | Refills: 0 | Status: SHIPPED | OUTPATIENT
Start: 2022-01-20 | End: 2022-01-21 | Stop reason: SDUPTHER

## 2022-01-21 DIAGNOSIS — E11.42 DIABETIC PERIPHERAL NEUROPATHY: ICD-10-CM

## 2022-01-21 RX ORDER — PREGABALIN 100 MG/1
100 CAPSULE ORAL 3 TIMES DAILY
Qty: 270 CAPSULE | Refills: 0 | Status: SHIPPED | OUTPATIENT
Start: 2022-01-21 | End: 2022-07-21

## 2022-01-25 ENCOUNTER — OFFICE VISIT (OUTPATIENT)
Dept: ENDOCRINOLOGY | Age: 82
End: 2022-01-25

## 2022-01-25 VITALS
WEIGHT: 279 LBS | OXYGEN SATURATION: 97 % | HEIGHT: 60 IN | DIASTOLIC BLOOD PRESSURE: 88 MMHG | HEART RATE: 69 BPM | BODY MASS INDEX: 54.77 KG/M2 | SYSTOLIC BLOOD PRESSURE: 133 MMHG

## 2022-01-25 DIAGNOSIS — Z79.4 CONTROLLED TYPE 2 DIABETES MELLITUS WITH OTHER SPECIFIED COMPLICATION, WITH LONG-TERM CURRENT USE OF INSULIN: Primary | ICD-10-CM

## 2022-01-25 DIAGNOSIS — E78.5 DYSLIPIDEMIA: ICD-10-CM

## 2022-01-25 DIAGNOSIS — R80.8 OTHER PROTEINURIA: ICD-10-CM

## 2022-01-25 DIAGNOSIS — E55.9 VITAMIN D DEFICIENCY: ICD-10-CM

## 2022-01-25 DIAGNOSIS — E11.69 CONTROLLED TYPE 2 DIABETES MELLITUS WITH OTHER SPECIFIED COMPLICATION, WITH LONG-TERM CURRENT USE OF INSULIN: Primary | ICD-10-CM

## 2022-01-25 PROBLEM — R80.9 PROTEINURIA: Status: ACTIVE | Noted: 2022-01-25

## 2022-01-25 PROCEDURE — 99214 OFFICE O/P EST MOD 30 MIN: CPT | Performed by: NURSE PRACTITIONER

## 2022-01-25 NOTE — PROGRESS NOTES
Chief Complaint  Chief Complaint   Patient presents with   • Diabetes       Subjective          History of Present Illness    Jesus Beckham 81 y.o. presents for a follow-up evaluation for type 2 DM2     He has been diabetic since about 1998 and started insulin in about 2016.      Pt is on the following medications for their DM: Toujeo 100 units daily, glyambi 25-5 daily with breakfast, glipizide 10mg BID        Pt complains of constipation due to pain medication, and numbness and tingling in feet, shortness of breath.    Had a stress test and echo with Cardiology recently and everything is good.      Denies diarrhea, chest pain and vision changes.      Pt does not have a history of DM retinopathy.  Last eye exam was 09/21 by Dr. Fernandez with [facility]     Pt does not have a history of nephropathy.  Patient is currently taking ACE - lisinopril 2.5 mg daily     Pt does have neuropathy.  Current takes Lyrica 100mg TID for this condition which is prescribed by this office.     Pt does not have a history of CAD or CVA.    Last A1C in 01/22 was 6.9    Last microalbumin in 09/21 was positive        Blood Sugars    Blood glucoses are checked 2/day.    Fasting blood glucoses:     Pre-meal blood glucoses:     Pt has no episodes of hypoglycemia.  Patient feels bad when his sugars gets 80 or below      Patient is not physical active- is in wheelchair.          Hyperlipidemia     Pt denies any muscle/body aches, chest pain, or shortness of breath    Pt is currently taking crestor 10 HS, omega 3 2,000 BID and fenofibrate 145 daily    Last lipid panel in 01/22 showed Total 121, Triglycerides 116, HDL 42 and LDL 58          Vitamin D Deficiency    Current treatment includes 50,000 units 2 times a week    Last Vit D level was 75.2 in 1/22          Patient is having implants for teeth         I have reviewed the patient's allergies, medicines, past medical hx, family hx and social hx.    Objective   Vital Signs:  "  /88   Pulse 69   Ht 70 cm (27.56\")   Wt 127 kg (279 lb)   SpO2 97%   .27 kg/m²       Physical Exam   Physical Exam  Constitutional:       General: He is not in acute distress.     Appearance: Normal appearance. He is not diaphoretic.   HENT:      Head: Normocephalic and atraumatic.   Eyes:      General:         Right eye: No discharge.         Left eye: No discharge.   Cardiovascular:      Rate and Rhythm: Normal rate and regular rhythm.      Heart sounds: Murmur heard.   No friction rub. No gallop.    Pulmonary:      Effort: Pulmonary effort is normal. No respiratory distress.      Breath sounds: Normal breath sounds. No wheezing.   Skin:     General: Skin is warm and dry.   Neurological:      Mental Status: He is alert.   Psychiatric:         Mood and Affect: Mood normal.         Behavior: Behavior normal.                    Results Review:   Hemoglobin A1C   Date Value Ref Range Status   01/11/2022 6.9 (H) 4.8 - 5.6 % Final     Comment:              Prediabetes: 5.7 - 6.4           Diabetes: >6.4           Glycemic control for adults with diabetes: <7.0     11/29/2021 6.66 (H) 4.80 - 5.60 % Final   01/27/2020 8.5 (H) 4.3 - 5.6 % Final     Comment:     (note)  A1C% Reference Range:  4.3 - 5.6  Normal range  5.7 - 6.4  Pre-diabetic -increased risk for developing diabetes mellitus.  >=6.5      Diabetic -diagnostic of diabetes mellitus.  Note: For diagnosis of diabetes in individuals without unequivocal   hyperglycemia, results should be confirmed by repeat testing.    Patients with conditions that shorten erythrocyte survival, such as   recovery from acute blood loss, hemolytic anemia, kidney disease,   or the presence of unstable hemogloblins like HbSS, HbCC, and HbSC   may yield falsely decreased HbA1c test results. Iron deficiency may   yield falsely increased HbA1c test results.     Triglycerides   Date Value Ref Range Status   01/11/2022 116 0 - 149 mg/dL Final     HDL Cholesterol   Date " Value Ref Range Status   01/11/2022 42 >39 mg/dL Final     LDL Chol Calc (NIH)   Date Value Ref Range Status   01/11/2022 58 0 - 99 mg/dL Final     VLDL Cholesterol Lance   Date Value Ref Range Status   01/11/2022 21 5 - 40 mg/dL Final         Assessment and Plan {CC Problem List  Visit Diagnosis  ROS  Review (Popup)  Health Maintenance  Quality  BestPractice  Medications  SmartSets  SnapShot Encounters  Media :23  Diagnoses and all orders for this visit:    1. Controlled type 2 diabetes mellitus with other specified complication, with long-term current use of insulin (HCC) (Primary)  -     Comprehensive Metabolic Panel; Future  -     Hemoglobin A1c; Future  -     Microalbumin / Creatinine Urine Ratio - Urine, Clean Catch; Future    A1C of 6.9 is at goal with no hypoglycemia  Continue Toujeo 100 units daily, glyambi 25-5 daily with breakfast and glipizide 10mg BID  Continue with BG checks twice daily  Check labs prior to next visit       2. Other proteinuria  -     Microalbumin / Creatinine Urine Ratio - Urine, Clean Catch; Future    Pt started on ACE with no problem  Continue lisinopril 2.5 mg daily      3. Dyslipidemia  -     Comprehensive Metabolic Panel; Future  -     Lipid Panel; Future    Lipid panel showed normal levels  Continue crestor 10 HS, omega 3 2,000 BID and fenofibrate 145 daily      4. Vitamin D deficiency  -     Vitamin D 25 Hydroxy; Future    Levels higher side of normal  Decrease to vitamin D 50,000 units once a week        No refills needed at this time      RTC in 4 months with me,with labs prior and 8 months with Dr. Hernandez      Follow Up     Patient was given instructions and counseling regarding her condition or for health maintenance advice. Please see specific information pulled into the AVS if appropriate.              Yasmine Fong, DANNY  01/25/22

## 2022-02-08 ENCOUNTER — TELEPHONE (OUTPATIENT)
Dept: FAMILY MEDICINE CLINIC | Facility: CLINIC | Age: 82
End: 2022-02-08

## 2022-02-08 DIAGNOSIS — M10.00 IDIOPATHIC GOUT, UNSPECIFIED CHRONICITY, UNSPECIFIED SITE: ICD-10-CM

## 2022-02-08 DIAGNOSIS — R41.3 MEMORY DIFFICULTIES: ICD-10-CM

## 2022-02-08 RX ORDER — ALLOPURINOL 300 MG/1
300 TABLET ORAL DAILY
Qty: 30 TABLET | Refills: 0 | Status: SHIPPED | OUTPATIENT
Start: 2022-02-08 | End: 2022-02-17

## 2022-02-08 RX ORDER — DONEPEZIL HYDROCHLORIDE 5 MG/1
5 TABLET, FILM COATED ORAL NIGHTLY
Qty: 60 TABLET | Refills: 0 | Status: SHIPPED | OUTPATIENT
Start: 2022-02-08 | End: 2022-02-17

## 2022-02-08 NOTE — TELEPHONE ENCOUNTER
Caller: Jesus Beckham    Relationship: Self    Best call back number: [phone]    Requested Prescriptions:   Requested Prescriptions     Pending Prescriptions Disp Refills   • allopurinol (ZYLOPRIM) 300 MG tablet 90 tablet 3     Sig: Take 1 tablet by mouth Daily.   • donepezil (Aricept) 5 MG tablet 180 tablet 1     Sig: Take 1 tablet by mouth Every Night.        Pharmacy where request should be sent: [facility] - [address], [address] - [phone]  - [phone] FX     Additional details provided by patient: PATIENT HAS 2 WEEKS    Does the patient have less than a 3 day supply:  [] Yes  [x] No    Bart Shore Rep   02/08/22 10:24 EST

## 2022-02-11 NOTE — PROGRESS NOTES
Subjective   Patient ID: Jesus Beckham is a 81 y.o. male is here today for a 6 month follow-up of adhesive arachnoiditis.  Pt last seen on 8/16/21 and reported constant leg/knee/foot pain.  Pt was referred to pain management at the visit.  Was taking Tylenol for the pain at the visit.  HX of spinal infection with surgery on L2/3 L3/4.    Today, Mr. Beckham reports constant back pain that radiates into bilateral legs with numbness, tingling and weakness. He denies loss of bowel control.        He is here with his wife today.  This is the every 6-month visit that have ranged over the last few years.  He got his bladder stimulator and it seems to help to a certain extent but he still gets Botox injections for his bladder problems.  He continues to work with Dr. Holguin.  To my knowledge he has not gotten a spinal cord stimulator trial for his pain in his legs from his arachnoiditis.  I reminded him that if he benefited from a spinal cord stimulator trial and I be happy to put the permanent device in.  We concluded long ago that the reason that a primary surgery that is going to be helpful for his problem.  We will see him in 6 months and sooner if he has a successful trial.      Back Pain  The problem occurs constantly. The problem has been gradually worsening since onset. The pain is present in the lumbar spine. The quality of the pain is described as aching and shooting. The pain radiates to the right thigh, right foot, right knee, left thigh, left knee and left foot. The symptoms are aggravated by position. Associated symptoms include bladder incontinence, numbness, tingling and weakness. Pertinent negatives include no bowel incontinence, chest pain or fever.       The following portions of the patient's history were reviewed and updated as appropriate: allergies, current medications, past family history, past medical history, past social history, past surgical history and problem list.    Review of Systems    Constitutional: Negative for fever.   Respiratory: Positive for shortness of breath. Negative for chest tightness.    Cardiovascular: Negative for chest pain.   Gastrointestinal: Negative for bowel incontinence.   Genitourinary: Positive for bladder incontinence.   Musculoskeletal: Positive for back pain.   Neurological: Positive for tingling, weakness and numbness.   All other systems reviewed and are negative.      Objective   Physical Exam  Constitutional:       Appearance: He is well-developed.   HENT:      Head: Normocephalic and atraumatic.   Eyes:      Extraocular Movements: EOM normal.      Conjunctiva/sclera: Conjunctivae normal.      Pupils: Pupils are equal, round, and reactive to light.   Neck:      Vascular: No carotid bruit.   Neurological:      Mental Status: He is oriented to person, place, and time.      Coordination: Finger-Nose-Finger Test and Heel to Shin Test normal.      Gait: Gait is intact.      Deep Tendon Reflexes:      Reflex Scores:       Tricep reflexes are 2+ on the right side and 2+ on the left side.       Bicep reflexes are 2+ on the right side and 2+ on the left side.       Brachioradialis reflexes are 2+ on the right side and 2+ on the left side.       Patellar reflexes are 2+ on the right side and 2+ on the left side.       Achilles reflexes are 2+ on the right side and 2+ on the left side.  Psychiatric:         Speech: Speech normal.       Neurologic Exam     Mental Status   Oriented to person, place, and time.   Registration of memory: Good recent and remote memory.   Attention: normal. Concentration: normal.   Speech: speech is normal   Level of consciousness: alert  Knowledge: consistent with education.     Cranial Nerves     CN II   Visual fields full to confrontation.   Visual acuity: normal    CN III, IV, VI   Pupils are equal, round, and reactive to light.  Extraocular motions are normal.     CN V   Facial sensation intact.   Right corneal reflex: normal  Left corneal  reflex: normal    CN VII   Facial expression full, symmetric.   Right facial weakness: none  Left facial weakness: none    CN VIII   Hearing: intact    CN IX, X   Palate: symmetric    CN XI   Right sternocleidomastoid strength: normal  Left sternocleidomastoid strength: normal    CN XII   Tongue: not atrophic  Tongue deviation: none    Motor Exam   Muscle bulk: normal  Right arm tone: normal  Left arm tone: normal  Right leg tone: normal  Left leg tone: normal    Strength   Strength 5/5 except as noted.   Right neck flexion: 4/5  Left neck flexion: 4/5  Right neck extension: 4/5  Left neck extension: 4/5  Right deltoid: 4/5  Left deltoid: 4/5  Right biceps: 4/5  Left biceps: 4/5  Right triceps: 4/5  Left triceps: 4/5  Right wrist flexion: 4/5  Left wrist flexion: 4/5  Right wrist extension: 4/5  Left wrist extension: 4/5  Right interossei: 4/5  Left interossei: 4/5  Right abdominals: 4/5  Left abdominals: 4/5  Right iliopsoas: 4/5  Left iliopsoas: 4/5  Right quadriceps: 4/5  Left quadriceps: 4/5  Right hamstrin/5  Left hamstrin/5  Right glutei: 4/5  Left glutei: 4/5  Right anterior tibial: 4/5  Left anterior tibial: 4/5  Right posterior tibial: 4/5  Left posterior tibial: 4/5  Right peroneal: 4/5  Left peroneal: 4/5  Right gastroc: 4/5  Left gastroc: 4/5    Sensory Exam   Light touch normal.     Gait, Coordination, and Reflexes     Gait  Gait: normal    Coordination   Finger to nose coordination: normal  Heel to shin coordination: normal    Reflexes   Right brachioradialis: 2+  Left brachioradialis: 2+  Right biceps: 2+  Left biceps: 2+  Right triceps: 2+  Left triceps: 2+  Right patellar: 2+  Left patellar: 2+  Right achilles: 2+  Left achilles: 2+  Right : 2+  Left : 2+      Assessment/Plan   Independent Review of Radiographic Studies:      I reviewed x-rays done in 2019 which showed good positioning of the hardware and interbody cages from L2-L4.  Agree with the report.    Medical  Decision Making:      We will keep an eye on him and I will see him in 6 months.  If he has a spinal cord stimulator trial by Dr. Holguin it helps, he can certainly call and be reevaluated sooner regarding placement of a permanent implant.      Diagnoses and all orders for this visit:    1. Adhesive arachnoiditis (Primary)    2. History of lumbar spinal fusion    3. Bilateral leg weakness      Return in about 6 months (around 8/16/2022) for Face to face.

## 2022-02-16 ENCOUNTER — TELEPHONE (OUTPATIENT)
Dept: PAIN MEDICINE | Facility: CLINIC | Age: 82
End: 2022-02-16

## 2022-02-16 ENCOUNTER — OFFICE VISIT (OUTPATIENT)
Dept: NEUROSURGERY | Facility: CLINIC | Age: 82
End: 2022-02-16

## 2022-02-16 VITALS
HEIGHT: 70 IN | BODY MASS INDEX: 39.94 KG/M2 | WEIGHT: 279 LBS | DIASTOLIC BLOOD PRESSURE: 68 MMHG | TEMPERATURE: 97.5 F | OXYGEN SATURATION: 93 % | SYSTOLIC BLOOD PRESSURE: 132 MMHG | HEART RATE: 73 BPM

## 2022-02-16 DIAGNOSIS — G03.9 ADHESIVE ARACHNOIDITIS: Primary | ICD-10-CM

## 2022-02-16 DIAGNOSIS — Z98.1 HISTORY OF LUMBAR SPINAL FUSION: ICD-10-CM

## 2022-02-16 DIAGNOSIS — R29.898 BILATERAL LEG WEAKNESS: ICD-10-CM

## 2022-02-16 PROCEDURE — 99213 OFFICE O/P EST LOW 20 MIN: CPT | Performed by: NEUROLOGICAL SURGERY

## 2022-02-16 RX ORDER — SULFAMETHOXAZOLE AND TRIMETHOPRIM 800; 160 MG/1; MG/1
TABLET ORAL
COMMUNITY
Start: 2022-02-08 | End: 2022-06-23

## 2022-02-16 NOTE — TELEPHONE ENCOUNTER
Provider: TALYA  Caller: DEONNA MONROE  Relationship to Patient: SPOUSE  Pharmacy: N/A  Phone Number: [phone]  Reason for Call: PATIENT HAS STOPPED TAKING PERCOCET EXCEPT FOR 1/2 PILL AT BEDTIME  When was the patient last seen: 1.10.22

## 2022-02-17 DIAGNOSIS — R41.3 MEMORY DIFFICULTIES: ICD-10-CM

## 2022-02-17 DIAGNOSIS — M10.00 IDIOPATHIC GOUT, UNSPECIFIED CHRONICITY, UNSPECIFIED SITE: ICD-10-CM

## 2022-02-17 RX ORDER — DONEPEZIL HYDROCHLORIDE 5 MG/1
TABLET, FILM COATED ORAL
Qty: 30 TABLET | Refills: 0 | Status: SHIPPED | OUTPATIENT
Start: 2022-02-17 | End: 2022-03-14

## 2022-02-17 RX ORDER — ALLOPURINOL 300 MG/1
300 TABLET ORAL DAILY
Qty: 30 TABLET | Refills: 0 | Status: SHIPPED | OUTPATIENT
Start: 2022-02-17 | End: 2022-06-15 | Stop reason: SDUPTHER

## 2022-03-14 ENCOUNTER — OFFICE VISIT (OUTPATIENT)
Dept: PAIN MEDICINE | Facility: CLINIC | Age: 82
End: 2022-03-14

## 2022-03-14 ENCOUNTER — TELEPHONE (OUTPATIENT)
Dept: PAIN MEDICINE | Facility: CLINIC | Age: 82
End: 2022-03-14

## 2022-03-14 VITALS
TEMPERATURE: 97.3 F | SYSTOLIC BLOOD PRESSURE: 122 MMHG | OXYGEN SATURATION: 94 % | DIASTOLIC BLOOD PRESSURE: 53 MMHG | HEIGHT: 70 IN | HEART RATE: 74 BPM | BODY MASS INDEX: 40.03 KG/M2 | RESPIRATION RATE: 12 BRPM

## 2022-03-14 DIAGNOSIS — G03.9 ADHESIVE ARACHNOIDITIS: ICD-10-CM

## 2022-03-14 DIAGNOSIS — R39.82 CHRONIC BLADDER PAIN: ICD-10-CM

## 2022-03-14 DIAGNOSIS — R41.3 MEMORY DIFFICULTIES: ICD-10-CM

## 2022-03-14 DIAGNOSIS — G89.4 CHRONIC PAIN SYNDROME: Primary | ICD-10-CM

## 2022-03-14 DIAGNOSIS — M54.16 LUMBAR RADICULOPATHY: ICD-10-CM

## 2022-03-14 DIAGNOSIS — Z79.899 ENCOUNTER FOR LONG-TERM (CURRENT) USE OF HIGH-RISK MEDICATION: ICD-10-CM

## 2022-03-14 DIAGNOSIS — M51.36 DDD (DEGENERATIVE DISC DISEASE), LUMBAR: ICD-10-CM

## 2022-03-14 PROCEDURE — 80305 DRUG TEST PRSMV DIR OPT OBS: CPT | Performed by: NURSE PRACTITIONER

## 2022-03-14 PROCEDURE — 99214 OFFICE O/P EST MOD 30 MIN: CPT | Performed by: NURSE PRACTITIONER

## 2022-03-14 RX ORDER — DONEPEZIL HYDROCHLORIDE 5 MG/1
TABLET, FILM COATED ORAL
Qty: 30 TABLET | Refills: 0 | Status: SHIPPED | OUTPATIENT
Start: 2022-03-14 | End: 2022-04-13

## 2022-03-14 NOTE — TELEPHONE ENCOUNTER
Please contact Dr. Burr for cardiac clearance to hold eliquis x 8 days for spinal cord stimulator trial.

## 2022-03-14 NOTE — PROGRESS NOTES
CHIEF COMPLAINT    FOLLOW UP LEG PAIN.     2 MONTH F/U states his pain has consistently increased over the last 2 months.     Subjective   Jesus Beckham is a 81 y.o. male  who presents for follow-up.  He has a history of leg pain related to adhesive arachnoiditis.  Today his pain is 7/10VAS in severity. He continues with Percocet 5/325 1/day. He is only utilizing this at night which decreases his pain and allows him to sleep. He also continues his Lyrica 100 mg TID and this is helpful to his pain (This is managed by Dr. Hernandez). He was previously having constipation, he states he is not having constipation anymore. ADLs assisted by wife.     Patient remained masked during entire encounter. No cough present. I donned a mask and eye protection throughout entire visit. Prior to donning mask and eye protection, hand hygiene was performed, as well as when it was doffed.  I was closer than 6 feet, but not for an extended period of time. No obvious exposure to any bodily fluids.    Leg Pain   The pain is present in the left leg and right leg. The quality of the pain is described as aching, burning and cramping. The pain is at a severity of 7/10. The pain is severe. The pain has been worsening since onset. Associated symptoms include numbness (waist down , hands). The symptoms are aggravated by movement and weight bearing.      PEG Assessment   What number best describes your pain on average in the past week?9  What number best describes how, during the past week, pain has interfered with your enjoyment of life?9  What number best describes how, during the past week, pain has interfered with your general activity?  9    Review of Pertinent Medical Data ---  Office visit from 1/10/2022 with Dr. Holguin reviewed.  Patient has a history of arachnoiditis as well as severe bladder pain which is separate issue.  Plan is to continue Percocet 5/325 up to 3 times a day as needed for severe pain with no changes as well as continue  "pregabalin.  May consider Cymbalta if needed.  Follow-up in 2 months.    Office visit from 2/16/2020 with Dr. Bailon reviewed.  Patient seen for 6-month follow-up of adhesive arachnoiditis.  Patient has history of spinal infection with surgery on L2-3 and L3-4.  Patient continues to work with Dr. Holguin he has not had a spinal cord stimulator trial for his pain in his legs from his arachnoiditis.  If he undergoes successful trial will see sooner reguarding placement of permanent implant.    The following portions of the patient's history were reviewed and updated as appropriate: allergies, current medications, past family history, past medical history, past social history, past surgical history and problem list.    Review of Systems   Constitutional: Positive for fatigue. Negative for activity change and fever.   HENT: Negative for congestion.    Eyes: Negative for visual disturbance.   Respiratory: Negative for cough and chest tightness.    Cardiovascular: Negative for chest pain.   Gastrointestinal: Negative for abdominal pain, constipation and diarrhea.   Genitourinary: Positive for difficulty urinating (catheter). Negative for dysuria.   Neurological: Positive for weakness, light-headedness and numbness (waist down , hands). Negative for dizziness and headaches.   Psychiatric/Behavioral: Negative for agitation, sleep disturbance and suicidal ideas. The patient is nervous/anxious.      --  The aforementioned information the Chief Complaint section and above subjective data including any HPI data, and also the Review of Systems data, has been personally reviewed and affirmed.  --    Vitals:    03/14/22 0902   BP: 122/53   Pulse: 74   Resp: 12   Temp: 97.3 °F (36.3 °C)   SpO2: 94%   Weight: Comment: UNABLE TO STAND   Height: 177.8 cm (70\")   PainSc:   7   PainLoc: Leg     Objective   Physical Exam  Vitals and nursing note reviewed.   Constitutional:       Appearance: Normal appearance. He is well-developed. "   Eyes:      General: Lids are normal.   Cardiovascular:      Rate and Rhythm: Normal rate.   Pulmonary:      Effort: Pulmonary effort is normal.   Genitourinary:     Comments: F/C  Musculoskeletal:      Lumbar back: Tenderness present. Decreased range of motion.   Neurological:      Mental Status: He is alert and oriented to person, place, and time.      Gait: Gait abnormal (wheelchair).   Psychiatric:         Attention and Perception: Attention normal.         Mood and Affect: Mood normal.         Speech: Speech normal.         Behavior: Behavior normal.         Judgment: Judgment normal.       Assessment/Plan   Diagnoses and all orders for this visit:    1. Chronic pain syndrome (Primary)  -     Urine Drug Screen Confirmation - Urine, Clean Catch; Future  -     POC Urine Drug Screen, Triage    2. Encounter for long-term (current) use of high-risk medication  -     Urine Drug Screen Confirmation - Urine, Clean Catch; Future  -     POC Urine Drug Screen, Triage    3. Adhesive arachnoiditis  -     Urine Drug Screen Confirmation - Urine, Clean Catch; Future  -     POC Urine Drug Screen, Triage    4. Chronic bladder pain  -     Urine Drug Screen Confirmation - Urine, Clean Catch; Future  -     POC Urine Drug Screen, Triage    5. DDD (degenerative disc disease), lumbar  -     Urine Drug Screen Confirmation - Urine, Clean Catch; Future  -     POC Urine Drug Screen, Triage    6. Lumbar radiculopathy      --- Discussed with Dr. Holguin.  OK to move forward with SCS trial. We will first need to obtain cardiac clearance to hold his eliquis for 7-8 days (plan for 3-4 day trial). We will reach out to Dr. Burr regarding this.    --- Once we have cardiac clearance will move forward with psychological evaluation and education system.  Plan will be to use the [facility] system.     ----------  Education about SCS therapy:    -  This was an extended office visit in which we entered into discussion about advanced pain  relieving techniques, and discussed implantable pain therapies.  We discussed advanced neuromodulation in the form of Spinal Cord Stimulation.  This is a reasonable therapy for patients who have exhausted basic nonnarcotic options, basic modalities and physical therapies, and do not have any other reasonable surgical options.  This therapy as an alternative to long term high dose opioid therapy.    -  Risks include but are not limited to bleeding, infection, injury, paralysis, nerve injury, dural puncture, and risk for postprocedural pain.  Implanted equipment risks include but are not limited to lead migration, lead fracture, risk of loss of pain relieving stimulation, risk of electrical shock, and risk of system failure.    - We discussed the theory and basic science behind SCS therapy including but not limited to energy delivery and relevant anatomy, in terms that are easy to understand and also with use of illustrative devices.  Spinal Cord Stimulation therapies apply an electromagnetic field to a specific area on the spinal cord (Dorsal Column) to attempt to block transmission of painful signals from the peripheral nerves to the brain.    -  We discussed that prior to trialing, I request that patients review relevant materials and perform some research, and also have a follow up education session with a device specialist from the .  Also, insurance requires a presurgical psychological evaluation.  When these have been completed, and all the patient's questions have been answered to their satisfaction, then we will plan to request authorization for trialing.   - We discussed the trialing process (aka Phase 1)  that usually lasts a week, and the temporary nature of this trial.  Trial success will determine whether or not we proceed to implant.  We discussed reasonable expectations, and that I feel that consistent 50% pain relief is medically successful and is a reasonable expectation to justify moving  forward to permanent implant.    -  Additional risks of Phase 1 include but are not limited to bleeding on insertion, bleeding on lead removal, and procedural site soreness.  ----------    --- Routine UDS in office today as part of monitoring requirements for controlled substances.  The specimen was viewed and the immunoassay result reviewed and is NEG.  This specimen will be sent to [facility] laboratory for confirmation.     --- He has decreased his Percocet 5/325 to just at night. Will plan to decrease sig to #30. No refill currently needed. Wife will call when refill is needed.   --- CSA updated 9/13/2021  --- Follow-up 2 months or sooner if needed.      FAINA REPORT  As part of the patient's treatment plan, I am prescribing controlled substances. The patient has been made aware of appropriate use of such medications, including potential risk of somnolence, limited ability to drive and/or work safely, and the potential for dependence or overdose. It has also bee made clear that these medications are for use by this patient only, without concomitant use of alcohol or other substances unless prescribed.     Patient has completed prescribing agreement detailing terms of continued prescribing of controlled substances, including monitoring FAINA reports, urine drug screening, and pill counts if necessary. The patient is aware that inappropriate use will results in cessation of prescribing such medications.    As the clinician, I personally reviewed the FAINA from 3/14/2022 while the patient was in the office today.    History and physical exam exhibit continued safe and appropriate use of controlled substances.    Dictated utilizing Dragon dictation.     This document is intended for medical expert use only. Reading of this document by patients and/or patient's family without participating medical staff guidance may result in misinterpretation and unintended morbidity.   Any interpretation of such data is the  responsibility of the patient and/or family member responsible for the patient in concert with their primary or specialist providers, not to be left for sources of online searches such as [employer], [employer] or similar queries. Relying on these approaches to knowledge may result in misinterpretation, misguided goals of care and even death should patients or family members try recommendations outside of the realm of professional medical care in a supervised way.

## 2022-03-18 ENCOUNTER — TELEPHONE (OUTPATIENT)
Dept: CARDIOLOGY | Facility: CLINIC | Age: 82
End: 2022-03-18

## 2022-03-18 NOTE — TELEPHONE ENCOUNTER
[phone]    [facility] called stating they would like the pt to hold his eliquis for 8 days while they do a spine stimulator trial.  Can you advise?    [facility] RMA

## 2022-03-29 ENCOUNTER — TELEPHONE (OUTPATIENT)
Dept: PAIN MEDICINE | Facility: CLINIC | Age: 82
End: 2022-03-29

## 2022-03-29 DIAGNOSIS — G03.9 ADHESIVE ARACHNOIDITIS: ICD-10-CM

## 2022-03-29 DIAGNOSIS — G89.4 CHRONIC PAIN SYNDROME: Primary | ICD-10-CM

## 2022-03-29 NOTE — TELEPHONE ENCOUNTER
Bonnie, Please notify patient that we have received clearance to hold his Eliquis for a spinal cord simulator trial. I am going to order the psychological evaluation and we will also get him set up for an education session with [facility]. Once those are complete we can move forward with the trial.   Thanks, SUAD Weber, Please set Mr. Beckham up for an education session with [facility] for SCS. Thanks

## 2022-04-07 DIAGNOSIS — Z79.899 ENCOUNTER FOR LONG-TERM (CURRENT) USE OF HIGH-RISK MEDICATION: ICD-10-CM

## 2022-04-07 RX ORDER — OXYCODONE HYDROCHLORIDE AND ACETAMINOPHEN 5; 325 MG/1; MG/1
1 TABLET ORAL NIGHTLY PRN
Qty: 30 TABLET | Refills: 0 | Status: SHIPPED | OUTPATIENT
Start: 2022-04-07 | End: 2022-05-13 | Stop reason: SDUPTHER

## 2022-04-07 NOTE — TELEPHONE ENCOUNTER
Medication Refill Request    Date of phone call: 04/07/2022    Medication being requested: Oxycodone 5-325 mg sig: Take 1 tablet by mouth 3 (Three) Times a Day As Needed for Severe Pain   Qty: 90    Date of last visit: 03/14/2022    Date of last refill:     FAINA up to date?:     Next Follow up?: 05/13/2022    Any new pertinent information? (i.e, new medication allergies, new use of medications, change in patient's health or condition, non-compliance or inconsistency with prescribing agreement?):

## 2022-04-13 DIAGNOSIS — R41.3 MEMORY DIFFICULTIES: ICD-10-CM

## 2022-04-13 RX ORDER — DONEPEZIL HYDROCHLORIDE 5 MG/1
TABLET, FILM COATED ORAL
Qty: 30 TABLET | Refills: 1 | Status: SHIPPED | OUTPATIENT
Start: 2022-04-13 | End: 2022-06-15 | Stop reason: SDUPTHER

## 2022-04-22 ENCOUNTER — PREP FOR SURGERY (OUTPATIENT)
Dept: SURGERY | Facility: SURGERY CENTER | Age: 82
End: 2022-04-22

## 2022-04-22 DIAGNOSIS — Z98.1 HISTORY OF LUMBAR SPINAL FUSION: ICD-10-CM

## 2022-04-22 DIAGNOSIS — M54.16 LUMBAR RADICULOPATHY: Primary | ICD-10-CM

## 2022-04-22 RX ORDER — SODIUM CHLORIDE 0.9 % (FLUSH) 0.9 %
10 SYRINGE (ML) INJECTION AS NEEDED
Status: CANCELLED | OUTPATIENT
Start: 2022-04-22

## 2022-04-22 RX ORDER — SODIUM CHLORIDE 0.9 % (FLUSH) 0.9 %
10 SYRINGE (ML) INJECTION EVERY 12 HOURS SCHEDULED
Status: CANCELLED | OUTPATIENT
Start: 2022-04-22

## 2022-04-29 ENCOUNTER — TELEPHONE (OUTPATIENT)
Dept: PAIN MEDICINE | Facility: CLINIC | Age: 82
End: 2022-04-29

## 2022-04-29 NOTE — TELEPHONE ENCOUNTER
Anna Marie emailed me about this pt: I also educated Jesus Beckham over the phone on 3/31.     Sheila RENTERIA

## 2022-05-11 NOTE — OUTREACH NOTE
Medical Week 1 Survey      Responses   Facility patient discharged from?  [facility]   Does the patient have one of the following disease processes/diagnoses(primary or secondary)?  Other   Is there a successful TCM telephone encounter documented?  No   Week 1 attempt successful?  Yes   Call start time  1139   Call end time  1149   Discharge diagnosis  Colitis presumed infectious, N/V/D, UTI in male, PAF, CELINA, Osteoarthritis, essential HTN, Gout, diabetic peripheral neuropathy, dyslipidemia, DM II uncontrolled   Medication alerts for this patient  On new Insulin and he is doing very well   Meds reviewed with patient/caregiver?  Yes   Is the patient having any side effects they believe may be caused by any medication additions or changes?  No   Does the patient have all medications ordered at discharge?  Yes   Is the patient taking all medications as directed (includes completed medication regime)?  Yes   Comments regarding appointments  Pt. to schedule follow up appointment with PCP.    Does the patient have a primary care provider?   Yes   Has the patient kept scheduled appointments due by today?  Yes   Has home health visited the patient within 72 hours of discharge?  N/A   Psychosocial issues?  No   Did the patient receive a copy of their discharge instructions?  Yes   Nursing interventions  Reviewed instructions with patient   What is the patient's perception of their health status since discharge?  Improving   Is the patient/caregiver able to teach back signs and symptoms related to disease process for when to call PCP?  Yes   Is the patient/caregiver able to teach back signs and symptoms related to disease process for when to call 911?  Yes   Is the patient/caregiver able to teach back the hierarchy of who to call/visit for symptoms/problems? PCP, Specialist, Home health nurse, Urgent Care, ED, 911  Yes   Additional teach back comments  Pt doing well. He was very appreciative of his care with all staff   Week  1 call completed?  Yes          Charlene Cotter RN   None

## 2022-05-12 ENCOUNTER — LAB (OUTPATIENT)
Dept: ENDOCRINOLOGY | Age: 82
End: 2022-05-12

## 2022-05-12 DIAGNOSIS — E11.9 CONTROLLED TYPE 2 DIABETES MELLITUS WITHOUT COMPLICATION, WITH LONG-TERM CURRENT USE OF INSULIN: ICD-10-CM

## 2022-05-12 DIAGNOSIS — Z79.4 CONTROLLED TYPE 2 DIABETES MELLITUS WITHOUT COMPLICATION, WITH LONG-TERM CURRENT USE OF INSULIN: ICD-10-CM

## 2022-05-12 DIAGNOSIS — R80.8 OTHER PROTEINURIA: ICD-10-CM

## 2022-05-12 DIAGNOSIS — Z79.4 CONTROLLED TYPE 2 DIABETES MELLITUS WITH OTHER SPECIFIED COMPLICATION, WITH LONG-TERM CURRENT USE OF INSULIN: ICD-10-CM

## 2022-05-12 DIAGNOSIS — E78.5 DYSLIPIDEMIA: ICD-10-CM

## 2022-05-12 DIAGNOSIS — I10 ESSENTIAL HYPERTENSION: ICD-10-CM

## 2022-05-12 DIAGNOSIS — E11.69 CONTROLLED TYPE 2 DIABETES MELLITUS WITH OTHER SPECIFIED COMPLICATION, WITH LONG-TERM CURRENT USE OF INSULIN: ICD-10-CM

## 2022-05-12 DIAGNOSIS — E55.9 VITAMIN D DEFICIENCY: ICD-10-CM

## 2022-05-13 ENCOUNTER — OFFICE VISIT (OUTPATIENT)
Dept: PAIN MEDICINE | Facility: CLINIC | Age: 82
End: 2022-05-13

## 2022-05-13 VITALS
OXYGEN SATURATION: 95 % | DIASTOLIC BLOOD PRESSURE: 73 MMHG | HEIGHT: 70 IN | TEMPERATURE: 96.4 F | HEART RATE: 73 BPM | BODY MASS INDEX: 40.03 KG/M2 | RESPIRATION RATE: 12 BRPM | SYSTOLIC BLOOD PRESSURE: 135 MMHG

## 2022-05-13 DIAGNOSIS — G03.9 ADHESIVE ARACHNOIDITIS: ICD-10-CM

## 2022-05-13 DIAGNOSIS — G89.4 CHRONIC PAIN SYNDROME: ICD-10-CM

## 2022-05-13 DIAGNOSIS — Z79.899 ENCOUNTER FOR LONG-TERM (CURRENT) USE OF HIGH-RISK MEDICATION: Primary | ICD-10-CM

## 2022-05-13 DIAGNOSIS — M54.16 LUMBAR RADICULOPATHY: ICD-10-CM

## 2022-05-13 DIAGNOSIS — Z79.899 ENCOUNTER FOR LONG-TERM (CURRENT) USE OF HIGH-RISK MEDICATION: ICD-10-CM

## 2022-05-13 LAB
25(OH)D3+25(OH)D2 SERPL-MCNC: 48.8 NG/ML (ref 30–100)
ALBUMIN SERPL-MCNC: 4.2 G/DL (ref 3.7–4.7)
ALBUMIN/CREAT UR: 170 MG/G CREAT (ref 0–29)
ALBUMIN/GLOB SERPL: 1.8 {RATIO} (ref 1.2–2.2)
ALP SERPL-CCNC: 58 IU/L (ref 44–121)
ALT SERPL-CCNC: 36 IU/L (ref 0–44)
AST SERPL-CCNC: 34 IU/L (ref 0–40)
BILIRUB SERPL-MCNC: 0.3 MG/DL (ref 0–1.2)
BUN SERPL-MCNC: 17 MG/DL (ref 8–27)
BUN/CREAT SERPL: 16 (ref 10–24)
CALCIUM SERPL-MCNC: 9.2 MG/DL (ref 8.6–10.2)
CHLORIDE SERPL-SCNC: 105 MMOL/L (ref 96–106)
CHOLEST SERPL-MCNC: 139 MG/DL (ref 100–199)
CO2 SERPL-SCNC: 22 MMOL/L (ref 20–29)
CREAT SERPL-MCNC: 1.04 MG/DL (ref 0.76–1.27)
CREAT UR-MCNC: 53.2 MG/DL
EGFRCR SERPLBLD CKD-EPI 2021: 73 ML/MIN/1.73
GLOBULIN SER CALC-MCNC: 2.3 G/DL (ref 1.5–4.5)
GLUCOSE SERPL-MCNC: 85 MG/DL (ref 65–99)
HBA1C MFR BLD: 7.2 % (ref 4.8–5.6)
HDLC SERPL-MCNC: 42 MG/DL
IMP & REVIEW OF LAB RESULTS: NORMAL
LDLC SERPL CALC-MCNC: 71 MG/DL (ref 0–99)
MICROALBUMIN UR-MCNC: 90.7 UG/ML
POTASSIUM SERPL-SCNC: 4.5 MMOL/L (ref 3.5–5.2)
PROT SERPL-MCNC: 6.5 G/DL (ref 6–8.5)
SODIUM SERPL-SCNC: 143 MMOL/L (ref 134–144)
TRIGL SERPL-MCNC: 148 MG/DL (ref 0–149)
VLDLC SERPL CALC-MCNC: 26 MG/DL (ref 5–40)

## 2022-05-13 PROCEDURE — 99214 OFFICE O/P EST MOD 30 MIN: CPT | Performed by: NURSE PRACTITIONER

## 2022-05-13 NOTE — PROGRESS NOTES
CHIEF COMPLAINT  FOLLOW UP LEG PAIN  2 Month evaluation- patient in office reports emmett has gotten slightly worse over the last months . Wants to discuss moving forward with SCS states he has already completed PSYCH EVAL.     Subjective   Jesus Beckham is a 81 y.o. male  who presents for follow-up.  He has a history of leg pain.  Today his pain is 8/10VAS in severity.  He continues with Percocet 5/325 1/day. He utilizes this at night and it decreases his pain and allows him to sleep. He does state that his pain still wakes him up at night.  Discussed taking a second percocet in the middle of the night, he states he does not want to take more percocet.     He also continues with Lyrica which is managed by Dr. Hernandez.     Patient has history of spinal infection with surgery on L2-3 and L3-4. With a diagnosis of adhesive arachnoiditis. He is followed by Dr. Bailon    Patient remained masked during entire encounter. No cough present. I donned a mask and eye protection throughout entire visit. Prior to donning mask and eye protection, hand hygiene was performed, as well as when it was doffed.  I was closer than 6 feet, but not for an extended period of time. No obvious exposure to any bodily fluids.    Leg Pain   The pain is present in the left leg and right leg. The quality of the pain is described as aching, burning and cramping. The pain is at a severity of 8/10. The pain is severe. The pain has been worsening since onset. Associated symptoms include numbness (hands/waist down). The symptoms are aggravated by movement and weight bearing (lying down).      PEG Assessment   What number best describes your pain on average in the past week?9  What number best describes how, during the past week, pain has interfered with your enjoyment of life?9  What number best describes how, during the past week, pain has interfered with your general activity?  9    The following portions of the patient's history were reviewed and  "updated as appropriate: allergies, current medications, past family history, past medical history, past social history, past surgical history and problem list.    Review of Systems   Constitutional: Positive for fatigue. Negative for activity change and fever.   HENT: Negative for congestion.    Eyes: Negative for visual disturbance.   Respiratory: Negative for cough and chest tightness.    Gastrointestinal: Negative for abdominal pain, constipation and diarrhea.   Genitourinary: Positive for difficulty urinating (catheter). Negative for dysuria.   Neurological: Positive for dizziness, weakness, light-headedness and numbness (hands/waist down). Negative for headaches.   Psychiatric/Behavioral: Negative for agitation, sleep disturbance and suicidal ideas. The patient is not nervous/anxious.      --  The aforementioned information the Chief Complaint section and above subjective data including any HPI data, and also the Review of Systems data, has been personally reviewed and affirmed.  --    Vitals:    05/13/22 0848   BP: 135/73   BP Location: Left arm   Patient Position: Sitting   Pulse: 73   Resp: 12   Temp: 96.4 °F (35.8 °C)   SpO2: 95%   Weight: Comment: unable to stand   Height: 177.8 cm (70\")   PainSc:   8   PainLoc: Comment: leg     Objective   Physical Exam  Vitals and nursing note reviewed.   Constitutional:       Appearance: Normal appearance. He is well-developed. He is obese.   Eyes:      General: Lids are normal.   Cardiovascular:      Rate and Rhythm: Normal rate.   Pulmonary:      Effort: Pulmonary effort is normal.   Musculoskeletal:      Cervical back: Normal range of motion.      Lumbar back: Decreased range of motion.   Neurological:      Mental Status: He is alert and oriented to person, place, and time.      Motor: Weakness (bilateral lower extremity) present.      Gait: Gait abnormal (wheelchair).   Psychiatric:         Attention and Perception: Attention normal.         Mood and Affect: Mood " normal.         Speech: Speech normal.         Behavior: Behavior normal.         Judgment: Judgment normal.       Assessment & Plan   Diagnoses and all orders for this visit:    1. Encounter for long-term (current) use of high-risk medication (Primary)    2. Chronic pain syndrome    3. Adhesive arachnoiditis    4. Lumbar radiculopathy      --- He is pending SCS phase 1 trial once authorized by insurance. He has completed both the education session and has a favorable psychological eval. We have clearance for a 8 day eliquis hold from cardiology (planning for a 3-4 day trial)  --- Patient declines to increase Percocet at this time.   --- The urine drug screen confirmation from 3/14/2022 has been reviewed and the result is appropriate based on patient history and FAINA report  --- CSA updated 9/13/2021  --- Refill Percocet 5/325. Patient appears stable with current regimen. No adverse effects. Regarding continuation of opioids, there is no evidence of aberrant behavior or any red flags.  The patient continues with appropriate response to opioid therapy. ADL's remain intact by self.   --- Follow-up 2 months or sooner if needed.      FAINA REPORT  As part of the patient's treatment plan, I am prescribing controlled substances. The patient has been made aware of appropriate use of such medications, including potential risk of somnolence, limited ability to drive and/or work safely, and the potential for dependence or overdose. It has also bee made clear that these medications are for use by this patient only, without concomitant use of alcohol or other substances unless prescribed.     Patient has completed prescribing agreement detailing terms of continued prescribing of controlled substances, including monitoring FAINA reports, urine drug screening, and pill counts if necessary. The patient is aware that inappropriate use will results in cessation of prescribing such medications.    As the clinician, I personally  reviewed the FAINA from 5/13/2022 while the patient was in the office today.    History and physical exam exhibit continued safe and appropriate use of controlled substances.    Dictated utilizing Dragon dictation.     This document is intended for medical expert use only. Reading of this document by patients and/or patient's family without participating medical staff guidance may result in misinterpretation and unintended morbidity.   Any interpretation of such data is the responsibility of the patient and/or family member responsible for the patient in concert with their primary or specialist providers, not to be left for sources of online searches such as [employer] ([employer]), Hello [employer] or similar queries. Relying on these approaches to knowledge may result in misinterpretation, misguided goals of care and even death should patients or family members try recommendations outside of the realm of professional medical care in a supervised way.

## 2022-05-14 RX ORDER — OXYCODONE HYDROCHLORIDE AND ACETAMINOPHEN 5; 325 MG/1; MG/1
1 TABLET ORAL NIGHTLY PRN
Qty: 30 TABLET | Refills: 0 | Status: SHIPPED | OUTPATIENT
Start: 2022-05-14 | End: 2022-06-20 | Stop reason: SDUPTHER

## 2022-05-23 DIAGNOSIS — R41.3 MEMORY DIFFICULTIES: ICD-10-CM

## 2022-05-24 RX ORDER — DONEPEZIL HYDROCHLORIDE 5 MG/1
TABLET, FILM COATED ORAL
Qty: 60 TABLET | Refills: 5 | OUTPATIENT
Start: 2022-05-24

## 2022-05-25 DIAGNOSIS — E11.69 TYPE 2 DIABETES MELLITUS WITH OTHER SPECIFIED COMPLICATION, WITH LONG-TERM CURRENT USE OF INSULIN: ICD-10-CM

## 2022-05-25 DIAGNOSIS — Z79.4 TYPE 2 DIABETES MELLITUS WITH OTHER SPECIFIED COMPLICATION, WITH LONG-TERM CURRENT USE OF INSULIN: ICD-10-CM

## 2022-05-25 RX ORDER — GLIPIZIDE 10 MG/1
TABLET ORAL
Qty: 180 TABLET | Refills: 1 | Status: SHIPPED | OUTPATIENT
Start: 2022-05-25 | End: 2022-09-21 | Stop reason: SDUPTHER

## 2022-05-25 RX ORDER — EMPAGLIFLOZIN AND LINAGLIPTIN 25; 5 MG/1; MG/1
1 TABLET, FILM COATED ORAL
Qty: 90 TABLET | Refills: 1 | Status: SHIPPED | OUTPATIENT
Start: 2022-05-25 | End: 2023-02-27

## 2022-05-25 RX ORDER — FENOFIBRATE 145 MG/1
145 TABLET, COATED ORAL DAILY
Qty: 90 TABLET | Refills: 1 | Status: SHIPPED | OUTPATIENT
Start: 2022-05-25 | End: 2023-01-03

## 2022-05-25 RX ORDER — LISINOPRIL 2.5 MG/1
2.5 TABLET ORAL DAILY
Qty: 90 TABLET | Refills: 1 | Status: SHIPPED | OUTPATIENT
Start: 2022-05-25 | End: 2022-12-01

## 2022-06-01 ENCOUNTER — TELEMEDICINE (OUTPATIENT)
Dept: ENDOCRINOLOGY | Age: 82
End: 2022-06-01

## 2022-06-01 VITALS — HEIGHT: 69 IN | BODY MASS INDEX: 41.47 KG/M2 | WEIGHT: 280 LBS

## 2022-06-01 DIAGNOSIS — E11.29 CONTROLLED TYPE 2 DIABETES MELLITUS WITH MICROALBUMINURIA, WITH LONG-TERM CURRENT USE OF INSULIN: Primary | ICD-10-CM

## 2022-06-01 DIAGNOSIS — E78.5 DYSLIPIDEMIA: ICD-10-CM

## 2022-06-01 DIAGNOSIS — R80.1 PERSISTENT PROTEINURIA: ICD-10-CM

## 2022-06-01 DIAGNOSIS — R80.9 CONTROLLED TYPE 2 DIABETES MELLITUS WITH MICROALBUMINURIA, WITH LONG-TERM CURRENT USE OF INSULIN: Primary | ICD-10-CM

## 2022-06-01 DIAGNOSIS — Z79.4 CONTROLLED TYPE 2 DIABETES MELLITUS WITH MICROALBUMINURIA, WITH LONG-TERM CURRENT USE OF INSULIN: Primary | ICD-10-CM

## 2022-06-01 DIAGNOSIS — E55.9 VITAMIN D DEFICIENCY: ICD-10-CM

## 2022-06-01 PROCEDURE — 99214 OFFICE O/P EST MOD 30 MIN: CPT | Performed by: NURSE PRACTITIONER

## 2022-06-10 ENCOUNTER — TRANSCRIBE ORDERS (OUTPATIENT)
Dept: SURGERY | Facility: SURGERY CENTER | Age: 82
End: 2022-06-10

## 2022-06-10 DIAGNOSIS — Z41.9 SURGERY, ELECTIVE: Primary | ICD-10-CM

## 2022-06-10 PROBLEM — M54.16 LUMBAR RADICULOPATHY: Status: ACTIVE | Noted: 2022-06-10

## 2022-06-15 DIAGNOSIS — Z01.818 PRE-OP EVALUATION: Primary | ICD-10-CM

## 2022-06-20 DIAGNOSIS — Z79.899 ENCOUNTER FOR LONG-TERM (CURRENT) USE OF HIGH-RISK MEDICATION: ICD-10-CM

## 2022-06-20 NOTE — TELEPHONE ENCOUNTER
Medication Refill Request    Date of phone call: 6/20/2022    Medication being requested: Oxycodone 5-325 mg sig: Take 1 tablet by mouth At Night As Needed for Severe Pain   Qty: 30    Date of last visit: 5/13/2022    Date of last refill:     FAINA up to date?:     Next Follow up?: 7/5/2022    Any new pertinent information? (i.e, new medication allergies, new use of medications, change in patient's health or condition, non-compliance or inconsistency with prescribing agreement?):

## 2022-06-21 RX ORDER — OXYCODONE HYDROCHLORIDE AND ACETAMINOPHEN 5; 325 MG/1; MG/1
1 TABLET ORAL 2 TIMES DAILY PRN
Qty: 50 TABLET | Refills: 0 | Status: SHIPPED | OUTPATIENT
Start: 2022-06-21 | End: 2022-08-04 | Stop reason: SDUPTHER

## 2022-06-23 ENCOUNTER — OFFICE VISIT (OUTPATIENT)
Dept: CARDIOLOGY | Facility: CLINIC | Age: 82
End: 2022-06-23

## 2022-06-23 VITALS
BODY MASS INDEX: 40.09 KG/M2 | OXYGEN SATURATION: 96 % | HEIGHT: 70 IN | SYSTOLIC BLOOD PRESSURE: 136 MMHG | HEART RATE: 83 BPM | DIASTOLIC BLOOD PRESSURE: 70 MMHG | WEIGHT: 280 LBS

## 2022-06-23 DIAGNOSIS — I49.3 PVC (PREMATURE VENTRICULAR CONTRACTION): ICD-10-CM

## 2022-06-23 DIAGNOSIS — I35.0 NONRHEUMATIC AORTIC VALVE STENOSIS: ICD-10-CM

## 2022-06-23 DIAGNOSIS — I48.0 PAROXYSMAL ATRIAL FIBRILLATION: Primary | ICD-10-CM

## 2022-06-23 DIAGNOSIS — I10 ESSENTIAL HYPERTENSION: ICD-10-CM

## 2022-06-23 DIAGNOSIS — G47.33 OSA (OBSTRUCTIVE SLEEP APNEA): ICD-10-CM

## 2022-06-23 DIAGNOSIS — E78.5 DYSLIPIDEMIA: ICD-10-CM

## 2022-06-23 PROCEDURE — 93000 ELECTROCARDIOGRAM COMPLETE: CPT | Performed by: INTERNAL MEDICINE

## 2022-06-23 PROCEDURE — 99214 OFFICE O/P EST MOD 30 MIN: CPT | Performed by: INTERNAL MEDICINE

## 2022-06-23 NOTE — PROGRESS NOTES
Subjective:     Encounter Date:06/23/2022      Patient ID: Jesus Beckham is a 81 y.o. male.    Chief Complaint:  History of Present Illness    The patient is a 81-year-old male with a history of diabetes mellitus type 2, chronic lower extremity edema, hypertension, dyslipidemia, chronic lower back pain, bladder cancer, paroxsymal atrial fibrillation, prior left lower extremity DVT, who presents for follow up.      He presents today for routine 6-month follow-up.  At his last office visit he reported symptoms of increasing shortness of breath.  I recommended proceeding with a stress test and an echocardiogram.  These were both performed on 12/9/2021.  His stress test was negative for ischemia.  Echocardiogram showed normal left ventricular systolic function and wall motion with an EF of 54%, grade 1 diastolic dysfunction, severe aortic valve calcification with at least moderate aortic valve stenosis, and a normal right ventricular systolic pressure.    He reports his dyspnea on exertion is largely stable.  He uses a walker to move ambulate at home but otherwise is in a wheelchair.  His mobility is primarily limited by his back pain and resulting neuropathy.  He is planning on undergoing a back stimulator placement on 7/1.  He denies any chest pain, palpitations, orthopnea, near-syncope or syncope.  He has issues with lightheadedness that he largely attributes to episodes of hypoglycemia.  He denies any bleeding issues.  He and his wife report that his lower extremity edema is largely stable.     Prior History:  I saw the patient initially in 10/2016 for preoperative evaluation before resection of a bladder mass.  A preoperative EKG performed at that time showed evidence of ventricular bigeminy.  He was sent to Dr. Ricardo's office for further evaluation and a repeat EKG at that time showed no evidence of PVCs but did show an ectopic atrial rhythm.  Following that office visit I set him up for an echocardiogram  and a stress test in 10/2016 that were both unremarkable.       He was not seen again until 2/2018 when he was admitted for Escherichia coli sepsis, acute respiratory failure, and cholecystitis.  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  Additionally he underwent a cholecystectomy.  Postoperatively he went into atrial fibrillation for which she was managed with metoprolol tartrate.  During that admission he also was diagnosed with an acute DVT of his right lower extremity for which she had an IVC filter placed before his surgery and postoperatively was started on apixaban.  An echocardiogram during that admission performed on 2/23/2018 showed normal left ventricular systolic function and diastolic function with no significant valvular disease.  He was readmitted soon after discharge in 3/2018 with tachycardia, diaphoresis, and hypertension.  On arrival to the emergency room he was found to be in atrial fibrillation with rapid ventricular rate.  Additionally he was found to have an indeterminate troponin.  However he had no EKG changes or symptoms suggestive of acute coronary syndrome.  At the time and felt that the indeterminate troponin was due to demand ischemia from his atrial fibrillation with rapid ventricular rate and did not recommend any further workup.  He eventually converted back to sinus rhythm and was managed with both metoprolol and digoxin.  He was continued on anticoagulation with apixaban.  Since then the metoprolol was discontinued due to concerns it was causing fatigue.       He eventually had his IVC filter removed in 12/2018.      His mobility has declined over the years to the point he is only able to ambulate short distances with a walker and is otherwise using a wheelchair. Due to incontinence issues he has a suprapubic catheter in place.  He has had a bladder stimulator placed.     He underwent an echocardiogram in 11/2019 to evaluate a  systolic murmur noted on exam.  This is performed on 11/21/2019.  Showed normal left ventricular systolic function wall motion with an EF of 60 to 65%, normal diastolic function, aortic valve sclerosis, and otherwise no significant valvular disease.    Review of Systems   Constitutional: Positive for malaise/fatigue.   HENT: Negative for hearing loss, hoarse voice, nosebleeds and sore throat.    Eyes: Negative for pain.   Cardiovascular: Positive for dyspnea on exertion and leg swelling. Negative for chest pain, claudication, cyanosis, irregular heartbeat, near-syncope, orthopnea, palpitations, paroxysmal nocturnal dyspnea and syncope.   Respiratory: Negative for shortness of breath and snoring.    Endocrine: Negative for cold intolerance, heat intolerance, polydipsia, polyphagia and polyuria.   Skin: Negative for itching and rash.   Musculoskeletal: Negative for arthritis, falls, joint pain, joint swelling, muscle cramps, muscle weakness and myalgias.   Gastrointestinal: Negative for constipation, diarrhea, dysphagia, heartburn, hematemesis, hematochezia, melena, nausea and vomiting.   Genitourinary: Negative for frequency, hematuria and hesitancy.   Neurological: Positive for light-headedness and paresthesias. Negative for excessive daytime sleepiness, dizziness, headaches, numbness and weakness.   Psychiatric/Behavioral: Negative for depression. The patient is not nervous/anxious.          Current Outpatient Medications:   •  Accu-Chek FastClix Lancets misc, 1 each 2 (Two) Times a Day., Disp: 204 each, Rfl: 2  •  allopurinol (ZYLOPRIM) 300 MG tablet, TAKE 1 TABLET BY MOUTH  DAILY, Disp: 30 tablet, Rfl: 0  •  apixaban (Eliquis) 5 MG tablet tablet, Take 1 tablet by mouth Every 12 (Twelve) Hours., Disp: 180 tablet, Rfl: 3  •  Ascorbic Acid 300 MG chewable tablet, Chew 300 mg Every Morning., Disp: , Rfl:   •  diphenhydrAMINE-acetaminophen (TYLENOL PM)  MG tablet per tablet, Take 2 tablets by mouth At Night As  Needed for Sleep., Disp: , Rfl:   •  donepezil (ARICEPT) 5 MG tablet, TAKE 1 TABLET BY MOUTH AT  NIGHT, Disp: 30 tablet, Rfl: 1  •  fenofibrate (TRICOR) 145 MG tablet, TAKE 1 TABLET BY MOUTH  DAILY, Disp: 90 tablet, Rfl: 1  •  fluticasone (FLONASE) 50 MCG/ACT nasal spray, 2 sprays into the nostril(s) as directed by provider Daily., Disp: 48 g, Rfl: 3  •  glipizide (GLUCOTROL) 10 MG tablet, TAKE 1 TABLET BY MOUTH  TWICE DAILY BEFORE MEALS, Disp: 180 tablet, Rfl: 1  •  glucose blood (Accu-Chek SmartView) test strip, Dx code 11.42 testing bs 2 x day, Disp: 200 each, Rfl: 3  •  Glyxambi 25-5 MG tablet, TAKE 1 TABLET BY MOUTH  DAILY WITH BREAKFAST, Disp: 90 tablet, Rfl: 1  •  Insulin Pen Needle (Droplet Pen Needles) 32G X 4 MM misc, USING 3 TIMES DAILY, Disp: 300 each, Rfl: 1  •  L-Methylfolate-B6-B12 3-35-2 MG tablet, Take 1 tablet by mouth 2 (Two) Times a Day., Disp: 180 tablet, Rfl: 1  •  lisinopril (PRINIVIL,ZESTRIL) 2.5 MG tablet, TAKE 1 TABLET BY MOUTH  DAILY, Disp: 90 tablet, Rfl: 1  •  melatonin 5 MG tablet tablet, Take 5 mg by mouth Every Night., Disp: , Rfl:   •  Multiple Vitamins-Minerals (MULTIVITAMIN ADULT PO), Take 1 tablet by mouth Daily., Disp: , Rfl:   •  Myrbetriq 50 MG tablet sustained-release 24 hour 24 hr tablet, Take 50 mg by mouth Daily., Disp: , Rfl:   •  Omega-3 Fatty Acids (FISH OIL) 1200 MG capsule capsule, Take 2,000 mg by mouth 2 (Two) Times a Day With Meals. HOLD FOR SURGERY, Disp: , Rfl:   •  oxyCODONE-acetaminophen (PERCOCET) 5-325 MG per tablet, Take 1 tablet by mouth 2 (Two) Times a Day As Needed for Severe Pain ., Disp: 50 tablet, Rfl: 0  •  polyethylene glycol (MIRALAX) 17 GM/SCOOP powder, Take 17 g by mouth Daily. Once a day, Disp: , Rfl:   •  pregabalin (LYRICA) 100 MG capsule, Take 1 capsule by mouth 3 (Three) Times a Day., Disp: 270 capsule, Rfl: 0  •  rosuvastatin (CRESTOR) 10 MG tablet, TAKE 1 TABLET EVERY DAY, Disp: 90 tablet, Rfl: 1  •  Toujeo Max SoloStar 300 UNIT/ML solution  pen-injector injection, Inject 100 Units under the skin into the appropriate area as directed Daily., Disp: 10 pen, Rfl: 1  •  traZODone (DESYREL) 150 MG tablet, Take 3 tablets by mouth Every Night. (Patient taking differently: Take 150 mg by mouth Every Night. Can take up to 2.5 tablets), Disp: 270 tablet, Rfl: 3  •  vitamin D (ERGOCALCIFEROL) 1.25 MG (14175 UT) capsule capsule, TAKE 1 CAPSULE TWICE WEEKLY (Patient taking differently: 50,000 Units Every 7 (Seven) Days.), Disp: 26 capsule, Rfl: 1    Past Medical History:   Diagnosis Date   • Acute embolism and thrombosis of deep vein of right distal lower extremity ([facility])    • Acute gangrenous cholecystitis     FEB 2018   • Allergic    • Arthritis    • Atrial fibrillation with RVR ([facility])     HISTORY   • Benign prostatic hyperplasia without lower urinary tract symptoms    • Cancer of bladder ([facility]) 2016   • Colon polyp    • Discitis of lumbar region    • DVT (deep venous thrombosis) ([facility])     MARCH 2018   • Essential hypertension    • Murray catheter in place     LOSS OF SENSATION BLADDER. BECAUSE OF WOUND AT THE TIME   • Gout    • Heel ulcer ([facility])     RIGHT. FOLLOWED BY WOUND CARE. GETTING BETTER   • History of sepsis    • Hyperlipidemia    • Loss, sensation     LOWER EXTREMTIES. RELATED TO LAST BACK SURGERY.   • MRSA infection     LEFT LEG.    • Open wound     LOW TO MIDDLE CRACK BUTT. SEES WOUND CARE. WILL BE RESTARTED ON DIFFUCAN AND NYSTATIN POWER. REDNESS IN GROIN   • Open wound     WITH MEDICATED DRESSING LEFT SHIN AREA.    • CELINA (obstructive sleep apnea)     NO MACHINE   • Osteoarthritis    • Paroxysmal atrial fibrillation ([facility])    • PVC (premature ventricular contraction)    • Sepsis ([facility]) 02/2018   • Sepsis due to Escherichia coli ([facility])    • Skin carcinoma 2012    MELANOMA.2 PLACES BACK AND EAR   • Type 2 diabetes mellitus ([facility])    • Vitamin D deficiency    • Weakness        Past Surgical History:   Procedure Laterality Date   • ABDOMINAL SURGERY     •  APPENDECTOMY N/A    • CHOLECYSTECTOMY WITH INTRAOPERATIVE CHOLANGIOGRAM N/A 2018    Procedure: CHOLECYSTECTOMY LAPAROSCOPIC INTRAOPERATIVE CHOLANGIOGRAM;  Surgeon: Maegan Correa MD;  Location: [facility];  Service:    • COLONOSCOPY N/A     h/o of polyps   • EYE SURGERY Bilateral     glass removal from eye, lens transplant   • JOINT REPLACEMENT     • LAMINECTOMY N/A 2016    L2-L3, L3-L4, L4-L5, and L5-S1 bilateral lamincectomy, medial facetectomy & [id number], Dr. Kaiden Valdes   • LUMBAR FUSION N/A 3/7/2018    Procedure: LUMBAR FUSION MINIMALLY INVASIVE TRANSFORAMINAL LUMBAR INTERBODY IRRIGATION AND DEBRIDEMENT AND FUSION.  IRRIGATION AND DEBRIDEMENT OF EPIDURAL ABCESS LUMBAR 2-4. BONE MORPHOGENIC PROTEIN, ALPHATEC NOVEL AND ILLICO, EMG AND SSEP NEUROMONITORING.;  Surgeon: Manish Marr DO;  Location: [facility];  Service: Orthopedic Spine   • SKIN BIOPSY     • TOTAL KNEE ARTHROPLASTY Right 2005    Dr. Washington Evans   • VENA CAVA FILTER INSERTION Right 3/6/2018    Procedure: VENA CAVA FILTER INSERTION;  Surgeon: Alexis Thakkar MD;  Location: [facility] ;  Service:    • VENA CAVA FILTER REMOVAL N/A 12/3/2018    Procedure: VENA CAVA FILTER REMOVAL WITH VENOCAVAGRAM;  Surgeon: Alexis Thakkar MD;  Location: [facility] ;  Service: Vascular       Family History   Problem Relation Age of Onset   • Esophageal cancer Mother    • No Known Problems Father    • Diabetes Maternal Grandmother    • Heart attack Maternal Grandfather        Social History     Tobacco Use   • Smoking status: Former Smoker     Types: Cigars     Quit date:      Years since quittin.4   • Smokeless tobacco: Former User     Types: Chew   • Tobacco comment: Caffeine daily   Vaping Use   • Vaping Use: Never used   Substance Use Topics   • Alcohol use: Not Currently   • Drug use: No         ECG 12 Lead    Date/Time: 2022 10:42 AM  Performed by: Tasha Burr  "MD  Authorized by: Tasha Burr MD   Comparison: compared with previous ECG   Comparison to previous ECG: PVC is new  Rhythm: sinus rhythm  Ectopy: unifocal PVCs  Conduction: non-specific intraventricular conduction delay               Objective:     Visit Vitals  /70 (BP Location: Left arm, Patient Position: Sitting, Cuff Size: Large Adult)   Pulse 83   Ht 176.5 cm (69.5\")   Wt 127 kg (280 lb)   SpO2 96%   BMI 40.76 kg/m²         Constitutional:       Appearance: Normal appearance. Well-developed.   HENT:      Head: Normocephalic and atraumatic.   Neck:      Vascular: No carotid bruit or JVD.   Pulmonary:      Effort: Pulmonary effort is normal.      Breath sounds: Normal breath sounds.   Cardiovascular:      Normal rate. Regular rhythm.      Murmurs: There is a harsh midsystolic murmur at the URSB, radiating to the neck.      No gallop.   Pulses:     Radial: 2+ bilaterally.  Edema:     Peripheral edema absent.   Abdominal:      Palpations: Abdomen is soft.   Skin:     General: Skin is warm and dry.   Neurological:      Mental Status: Alert and oriented to person, place, and time.             Assessment:          Diagnosis Plan   1. Paroxysmal atrial fibrillation (HCC)     2. Dyslipidemia     3. Essential hypertension     4. PVC (premature ventricular contraction)     5. CELINA (obstructive sleep apnea)            Plan:       1.  Paroxysmal atrial fibrillation.  Remains in sinus rhythm today.  He is on chronic anticoagulation with apixaban which he appears to be tolerating.  He is okay to hold this 3 days before his back stimulator placement.  2.  Aortic valve stenosis.  In the moderate range on his last echocardiogram.  No new symptoms to suggest progression.  Plan for repeat echocardiogram at his next follow-up in 6 months.  3.  Hypertension.  Well-controlled on his current regimen medications.  4.  PVCs.  Isolated PVC on EKG today.  Not on exam.  He appears asymptomatic.  5.  Obstructive sleep apnea  6.  " Peripheral neuropathy  7.  Diabetes mellitus type 2    I will plan on seeing the patient back again in 6 months with a repeat echocardiogram.

## 2022-06-24 ENCOUNTER — HOSPITAL ENCOUNTER (OUTPATIENT)
Dept: GENERAL RADIOLOGY | Facility: HOSPITAL | Age: 82
Discharge: HOME | End: 2022-06-24

## 2022-06-24 ENCOUNTER — LAB (OUTPATIENT)
Dept: LAB | Facility: HOSPITAL | Age: 82
End: 2022-06-24

## 2022-06-24 DIAGNOSIS — Z01.818 PRE-OP EVALUATION: ICD-10-CM

## 2022-06-24 LAB
ANION GAP SERPL CALCULATED.3IONS-SCNC: 10.8 MMOL/L (ref 5–15)
BASOPHILS # BLD AUTO: 0.04 10*3/MM3 (ref 0–0.2)
BASOPHILS NFR BLD AUTO: 0.8 % (ref 0–1.5)
BUN SERPL-MCNC: 19 MG/DL (ref 8–23)
BUN/CREAT SERPL: 19.8 (ref 7–25)
CALCIUM SPEC-SCNC: 9.4 MG/DL (ref 8.6–10.5)
CHLORIDE SERPL-SCNC: 105 MMOL/L (ref 98–107)
CO2 SERPL-SCNC: 27.2 MMOL/L (ref 22–29)
CREAT SERPL-MCNC: 0.96 MG/DL (ref 0.76–1.27)
DEPRECATED RDW RBC AUTO: 49.7 FL (ref 37–54)
EGFRCR SERPLBLD CKD-EPI 2021: 79.9 ML/MIN/1.73
EOSINOPHIL # BLD AUTO: 0.16 10*3/MM3 (ref 0–0.4)
EOSINOPHIL NFR BLD AUTO: 3.1 % (ref 0.3–6.2)
ERYTHROCYTE [DISTWIDTH] IN BLOOD BY AUTOMATED COUNT: 14.5 % (ref 12.3–15.4)
GLUCOSE SERPL-MCNC: 129 MG/DL (ref 65–99)
HCT VFR BLD AUTO: 44.4 % (ref 37.5–51)
HGB BLD-MCNC: 14.3 G/DL (ref 13–17.7)
IMM GRANULOCYTES # BLD AUTO: 0.07 10*3/MM3 (ref 0–0.05)
IMM GRANULOCYTES NFR BLD AUTO: 1.4 % (ref 0–0.5)
LYMPHOCYTES # BLD AUTO: 0.96 10*3/MM3 (ref 0.7–3.1)
LYMPHOCYTES NFR BLD AUTO: 18.6 % (ref 19.6–45.3)
MCH RBC QN AUTO: 30.8 PG (ref 26.6–33)
MCHC RBC AUTO-ENTMCNC: 32.2 G/DL (ref 31.5–35.7)
MCV RBC AUTO: 95.5 FL (ref 79–97)
MONOCYTES # BLD AUTO: 0.33 10*3/MM3 (ref 0.1–0.9)
MONOCYTES NFR BLD AUTO: 6.4 % (ref 5–12)
NEUTROPHILS NFR BLD AUTO: 3.61 10*3/MM3 (ref 1.7–7)
NEUTROPHILS NFR BLD AUTO: 69.7 % (ref 42.7–76)
NRBC BLD AUTO-RTO: 0 /100 WBC (ref 0–0.2)
PLAT MORPH BLD: NORMAL
PLATELET # BLD AUTO: 153 10*3/MM3 (ref 140–450)
PMV BLD AUTO: 11 FL (ref 6–12)
POTASSIUM SERPL-SCNC: 4.6 MMOL/L (ref 3.5–5.2)
RBC # BLD AUTO: 4.65 10*6/MM3 (ref 4.14–5.8)
RBC MORPH BLD: NORMAL
SODIUM SERPL-SCNC: 143 MMOL/L (ref 136–145)
WBC MORPH BLD: NORMAL
WBC NRBC COR # BLD: 5.17 10*3/MM3 (ref 3.4–10.8)

## 2022-06-24 PROCEDURE — 80048 BASIC METABOLIC PNL TOTAL CA: CPT

## 2022-06-24 PROCEDURE — 36415 COLL VENOUS BLD VENIPUNCTURE: CPT

## 2022-06-24 PROCEDURE — 85025 COMPLETE CBC W/AUTO DIFF WBC: CPT

## 2022-06-24 PROCEDURE — 71046 X-RAY EXAM CHEST 2 VIEWS: CPT

## 2022-06-24 PROCEDURE — 85007 BL SMEAR W/DIFF WBC COUNT: CPT

## 2022-06-26 NOTE — PROGRESS NOTES
Subjective   Jesus Beckham is a 81 y.o. male.     History of Present Illness     Chief Complaint:   Chief Complaint   Patient presents with   • Dementia     MED REFILL DUE   / multiple pharm per pt    • Insomnia   • Gout   • Pre-op Exam     BACK PAIN   • diabetic foot exam      Forms to be completed        Jesus Beckham 81 y.o. male who presents today for Medical Management of the below listed issues. 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 ([facility])   • Nausea, vomiting, and diarrhea   • Colitis presumed infectious   • Urinary tract infection in male   • DDD (degenerative disc disease), lumbar   • Bilateral leg weakness   • History of lumbar spinal fusion   • Carpal tunnel syndrome   • Adhesive arachnoiditis   • Heart murmur   • Chronic anticoagulation   • Diverticulosis   • Hematuria   • Lower obstructive uropathy   • Splenic lesion   • Suprapubic catheter dysfunction ([facility])   • Obesity (BMI 30-39.9)   • Aortic valve sclerosis   • Venous insufficiency of left leg   • Memory difficulties   • Chronic pain syndrome   • Controlled type 2 diabetes mellitus, with long-term current use of insulin ([facility])   • Encounter for long-term (current) use of high-risk medication   • Therapeutic opioid induced constipation   • Abnormal CT of brain   • Suprapubic catheter ([facility])   • Chronic bladder pain   • Proteinuria   • Lumbar radiculopathy   • Nonrheumatic aortic valve stenosis   .   Since the last visit, He has overall felt well and he needs clearance for upcoming Neurostimulator placement for chronic back pain.  .he has been compliant with   Current Outpatient Medications:   •  allopurinol (ZYLOPRIM) 300 MG tablet, TAKE 1 TABLET BY MOUTH  DAILY, Disp: 30 tablet, Rfl: 0  •  donepezil (ARICEPT) 5 MG tablet, TAKE 1 TABLET BY MOUTH AT  NIGHT, Disp: 30 tablet, Rfl: 1  •  traZODone (DESYREL) 150 MG tablet, Take 3 tablets by mouth Every Night. (Patient taking differently: Take 150 mg by mouth Every Night. Can take up to 2.5 tablets), Disp: 270 tablet, Rfl: 3  •  Accu-Chek FastClix Lancets misc, 1 each 2 (Two) Times a Day., Disp: 204 each, Rfl: 2  •  apixaban (Eliquis) 5 MG tablet tablet, Take 1 tablet by mouth Every 12 (Twelve) Hours., Disp: 180 tablet, Rfl: 3  •  Ascorbic Acid 300 MG chewable tablet, Chew 300 mg Every Morning., Disp: , Rfl:   •  diphenhydrAMINE-acetaminophen (TYLENOL PM)  MG tablet per tablet, Take 2 tablets by mouth At Night As Needed for Sleep., Disp: , Rfl:   •  fenofibrate (TRICOR) 145 MG tablet, TAKE 1 TABLET BY MOUTH  DAILY, Disp: 90 tablet, Rfl: 1  •  fluticasone (FLONASE) 50 MCG/ACT nasal spray, 2 sprays into the nostril(s) as directed by provider Daily., Disp: 48 g, Rfl: 3  •  glipizide (GLUCOTROL) 10 MG tablet, TAKE 1 TABLET BY MOUTH  TWICE DAILY BEFORE MEALS, Disp: 180 tablet, Rfl: 1  •  glucose blood (Accu-Chek SmartView) test strip, Dx code 11.42 testing bs 2 x day, Disp: 200 each, Rfl: 3  •  Glyxambi 25-5 MG tablet, TAKE 1 TABLET BY MOUTH  DAILY WITH BREAKFAST, Disp: 90 tablet, Rfl: 1  •  Insulin Pen Needle (Droplet Pen Needles) 32G X 4 MM misc, USING 3 TIMES DAILY, Disp: 300 each, Rfl: 1  •  L-Methylfolate-B6-B12 3-35-2 MG tablet, Take 1 tablet by mouth 2 (Two) Times a Day., Disp: 180 tablet, Rfl: 1  •  lisinopril (PRINIVIL,ZESTRIL) 2.5 MG tablet, TAKE 1 TABLET BY MOUTH  DAILY, Disp: 90 tablet, Rfl: 1  •  melatonin 5 MG tablet tablet, Take 5 mg by mouth Every  "Night., Disp: , Rfl:   •  Multiple Vitamins-Minerals (MULTIVITAMIN ADULT PO), Take 1 tablet by mouth Daily., Disp: , Rfl:   •  Myrbetriq 50 MG tablet sustained-release 24 hour 24 hr tablet, Take 50 mg by mouth Daily., Disp: , Rfl:   •  Omega-3 Fatty Acids (FISH OIL) 1200 MG capsule capsule, Take 2,000 mg by mouth 2 (Two) Times a Day With Meals. HOLD FOR SURGERY, Disp: , Rfl:   •  oxyCODONE-acetaminophen (PERCOCET) 5-325 MG per tablet, Take 1 tablet by mouth 2 (Two) Times a Day As Needed for Severe Pain ., Disp: 50 tablet, Rfl: 0  •  polyethylene glycol (MIRALAX) 17 GM/SCOOP powder, Take 17 g by mouth Daily. Once a day, Disp: , Rfl:   •  pregabalin (LYRICA) 100 MG capsule, Take 1 capsule by mouth 3 (Three) Times a Day., Disp: 270 capsule, Rfl: 0  •  rosuvastatin (CRESTOR) 10 MG tablet, TAKE 1 TABLET EVERY DAY, Disp: 90 tablet, Rfl: 1  •  Toujeo Max SoloStar 300 UNIT/ML solution pen-injector injection, Inject 100 Units under the skin into the appropriate area as directed Daily., Disp: 10 pen, Rfl: 1  •  vitamin D (ERGOCALCIFEROL) 1.25 MG (41638 UT) capsule capsule, TAKE 1 CAPSULE TWICE WEEKLY (Patient taking differently: 50,000 Units Every 7 (Seven) Days.), Disp: 26 capsule, Rfl: 1.  He denies medication side effects.    All of the other chronic condition(s) listed above are stable w/o issues.    /58   Pulse 76   Temp 97.9 °F (36.6 °C) (Oral)   Resp 16   Ht 177.8 cm (70\")   Wt 127 kg (280 lb)   BMI 40.18 kg/m²     Results for orders placed or performed in visit on 06/24/22   Basic Metabolic Panel    Specimen: Blood   Result Value Ref Range    Glucose 129 (H) 65 - 99 mg/dL    BUN 19 8 - 23 mg/dL    Creatinine 0.96 0.76 - 1.27 mg/dL    Sodium 143 136 - 145 mmol/L    Potassium 4.6 3.5 - 5.2 mmol/L    Chloride 105 98 - 107 mmol/L    CO2 27.2 22.0 - 29.0 mmol/L    Calcium 9.4 8.6 - 10.5 mg/dL    BUN/Creatinine Ratio 19.8 7.0 - 25.0    Anion Gap 10.8 5.0 - 15.0 mmol/L    eGFR 79.9 >60.0 mL/min/1.73   CBC Auto " Differential    Specimen: Blood   Result Value Ref Range    WBC 5.17 3.40 - 10.80 10*3/mm3    RBC 4.65 4.14 - 5.80 10*6/mm3    Hemoglobin 14.3 13.0 - 17.7 g/dL    Hematocrit 44.4 37.5 - 51.0 %    MCV 95.5 79.0 - 97.0 fL    MCH 30.8 26.6 - 33.0 pg    MCHC 32.2 31.5 - 35.7 g/dL    RDW 14.5 12.3 - 15.4 %    RDW-SD 49.7 37.0 - 54.0 fl    MPV 11.0 6.0 - 12.0 fL    Platelets 153 140 - 450 10*3/mm3    Neutrophil % 69.7 42.7 - 76.0 %    Lymphocyte % 18.6 (L) 19.6 - 45.3 %    Monocyte % 6.4 5.0 - 12.0 %    Eosinophil % 3.1 0.3 - 6.2 %    Basophil % 0.8 0.0 - 1.5 %    Immature Grans % 1.4 (H) 0.0 - 0.5 %    Neutrophils, Absolute 3.61 1.70 - 7.00 10*3/mm3    Lymphocytes, Absolute 0.96 0.70 - 3.10 10*3/mm3    Monocytes, Absolute 0.33 0.10 - 0.90 10*3/mm3    Eosinophils, Absolute 0.16 0.00 - 0.40 10*3/mm3    Basophils, Absolute 0.04 0.00 - 0.20 10*3/mm3    Immature Grans, Absolute 0.07 (H) 0.00 - 0.05 10*3/mm3    nRBC 0.0 0.0 - 0.2 /100 WBC   Scan Slide    Specimen: Blood   Result Value Ref Range    RBC Morphology Normal Normal    WBC Morphology Normal Normal    Platelet Morphology Normal Normal             The following portions of the patient's history were reviewed and updated as appropriate: allergies, current medications, past family history, past medical history, past social history, past surgical history, and problem list.    Review of Systems   Constitutional: Negative for activity change, chills and fever.   Respiratory: Negative for cough.    Cardiovascular: Negative for chest pain.   Psychiatric/Behavioral: Negative for dysphoric mood.       Objective   Physical Exam  Constitutional:       General: He is not in acute distress.     Appearance: He is well-developed.   Cardiovascular:      Rate and Rhythm: Normal rate and regular rhythm.      Pulses:           Dorsalis pedis pulses are 1+ on the right side and 1+ on the left side.        Posterior tibial pulses are 1+ on the right side and 1+ on the left side.      Heart  sounds: Murmur heard.    Systolic murmur is present with a grade of 2/6.  Pulmonary:      Effort: Pulmonary effort is normal.      Breath sounds: Normal breath sounds.   Feet:      Right foot:      Protective Sensation: 10 sites tested. 0 sites sensed.      Left foot:      Protective Sensation: 10 sites tested. 0 sites sensed.   Neurological:      Mental Status: He is alert and oriented to person, place, and time.   Psychiatric:         Behavior: Behavior normal.         Thought Content: Thought content normal.     Recent labs from specialist reviewed by me at today's visit.        Diagnoses and all orders for this visit:    1. Preoperative clearance (Primary)    2. Idiopathic gout, unspecified chronicity, unspecified site    3. Primary insomnia    4. Memory difficulties    5. Chronic seasonal allergic rhinitis due to pollen    Pt is stable and cleared for his upcoming procedure.

## 2022-06-27 ENCOUNTER — OFFICE VISIT (OUTPATIENT)
Dept: FAMILY MEDICINE CLINIC | Facility: CLINIC | Age: 82
End: 2022-06-27

## 2022-06-27 VITALS
WEIGHT: 280 LBS | SYSTOLIC BLOOD PRESSURE: 107 MMHG | DIASTOLIC BLOOD PRESSURE: 58 MMHG | HEART RATE: 76 BPM | TEMPERATURE: 97.9 F | HEIGHT: 70 IN | BODY MASS INDEX: 40.09 KG/M2 | RESPIRATION RATE: 16 BRPM

## 2022-06-27 DIAGNOSIS — J30.1 CHRONIC SEASONAL ALLERGIC RHINITIS DUE TO POLLEN: Chronic | ICD-10-CM

## 2022-06-27 DIAGNOSIS — M10.00 IDIOPATHIC GOUT, UNSPECIFIED CHRONICITY, UNSPECIFIED SITE: Chronic | ICD-10-CM

## 2022-06-27 DIAGNOSIS — Z01.818 PREOPERATIVE CLEARANCE: Primary | ICD-10-CM

## 2022-06-27 DIAGNOSIS — F51.01 PRIMARY INSOMNIA: Chronic | ICD-10-CM

## 2022-06-27 DIAGNOSIS — R41.3 MEMORY DIFFICULTIES: Chronic | ICD-10-CM

## 2022-06-27 PROCEDURE — 99214 OFFICE O/P EST MOD 30 MIN: CPT | Performed by: FAMILY MEDICINE

## 2022-06-27 RX ORDER — DONEPEZIL HYDROCHLORIDE 5 MG/1
5 TABLET, FILM COATED ORAL
Qty: 90 TABLET | Refills: 3 | Status: SHIPPED | OUTPATIENT
Start: 2022-06-27

## 2022-06-27 RX ORDER — TRAZODONE HYDROCHLORIDE 150 MG/1
150 TABLET ORAL NIGHTLY
Qty: 90 TABLET | Refills: 3 | Status: SHIPPED | OUTPATIENT
Start: 2022-06-27 | End: 2022-11-15 | Stop reason: SDUPTHER

## 2022-06-27 RX ORDER — ALLOPURINOL 300 MG/1
300 TABLET ORAL DAILY
Qty: 90 TABLET | Refills: 3 | Status: SHIPPED | OUTPATIENT
Start: 2022-06-27

## 2022-06-27 RX ORDER — FLUTICASONE PROPIONATE 50 MCG
2 SPRAY, SUSPENSION (ML) NASAL DAILY
Qty: 48 G | Refills: 3 | Status: SHIPPED | OUTPATIENT
Start: 2022-06-27

## 2022-06-29 ENCOUNTER — LAB (OUTPATIENT)
Dept: LAB | Facility: SURGERY CENTER | Age: 82
End: 2022-06-29

## 2022-06-29 DIAGNOSIS — Z98.1 HISTORY OF LUMBAR SPINAL FUSION: ICD-10-CM

## 2022-06-29 DIAGNOSIS — M54.16 LUMBAR RADICULOPATHY: ICD-10-CM

## 2022-06-29 LAB — SARS-COV-2 ORF1AB RESP QL NAA+PROBE: NOT DETECTED

## 2022-06-29 PROCEDURE — U0004 COV-19 TEST NON-CDC HGH THRU: HCPCS | Performed by: ANESTHESIOLOGY

## 2022-07-01 ENCOUNTER — HOSPITAL ENCOUNTER (OUTPATIENT)
Facility: SURGERY CENTER | Age: 82
Setting detail: HOSPITAL OUTPATIENT SURGERY
Discharge: HOME | End: 2022-07-01
Attending: ANESTHESIOLOGY | Admitting: ANESTHESIOLOGY

## 2022-07-01 ENCOUNTER — HOSPITAL ENCOUNTER (OUTPATIENT)
Dept: GENERAL RADIOLOGY | Facility: SURGERY CENTER | Age: 82
Setting detail: HOSPITAL OUTPATIENT SURGERY
End: 2022-07-01

## 2022-07-01 ENCOUNTER — ANESTHESIA (OUTPATIENT)
Dept: SURGERY | Facility: SURGERY CENTER | Age: 82
End: 2022-07-01

## 2022-07-01 ENCOUNTER — ANESTHESIA EVENT (OUTPATIENT)
Dept: SURGERY | Facility: SURGERY CENTER | Age: 82
End: 2022-07-01

## 2022-07-01 VITALS
OXYGEN SATURATION: 94 % | BODY MASS INDEX: 40.32 KG/M2 | HEART RATE: 75 BPM | TEMPERATURE: 97.7 F | SYSTOLIC BLOOD PRESSURE: 146 MMHG | DIASTOLIC BLOOD PRESSURE: 75 MMHG | WEIGHT: 277 LBS | RESPIRATION RATE: 16 BRPM

## 2022-07-01 DIAGNOSIS — Z98.1 HISTORY OF LUMBAR SPINAL FUSION: ICD-10-CM

## 2022-07-01 DIAGNOSIS — Z41.9 SURGERY, ELECTIVE: ICD-10-CM

## 2022-07-01 DIAGNOSIS — M54.16 LUMBAR RADICULOPATHY: ICD-10-CM

## 2022-07-01 LAB — GLUCOSE BLDC GLUCOMTR-MCNC: 142 MG/DL (ref 70–130)

## 2022-07-01 PROCEDURE — 76000 FLUOROSCOPY <1 HR PHYS/QHP: CPT

## 2022-07-01 PROCEDURE — 25010000002 PROPOFOL 10 MG/ML EMULSION: Performed by: ANESTHESIOLOGY

## 2022-07-01 PROCEDURE — 0 CEFAZOLIN SODIUM-DEXTROSE 2-3 GM-%(50ML) RECONSTITUTED SOLUTION: Performed by: ANESTHESIOLOGY

## 2022-07-01 PROCEDURE — 0 LIDOCAINE 1 % SOLUTION 50 ML VIAL: Performed by: ANESTHESIOLOGY

## 2022-07-01 PROCEDURE — 63685 INS/RPLC SPI NPG/RCVR POCKET: CPT | Performed by: ANESTHESIOLOGY

## 2022-07-01 PROCEDURE — 63650 IMPLANT NEUROELECTRODES: CPT | Performed by: ANESTHESIOLOGY

## 2022-07-01 PROCEDURE — S0260 H&P FOR SURGERY: HCPCS | Performed by: ANESTHESIOLOGY

## 2022-07-01 PROCEDURE — 95972 ALYS CPLX SP/PN NPGT W/PRGRM: CPT | Performed by: ANESTHESIOLOGY

## 2022-07-01 PROCEDURE — C1897 LEAD, NEUROSTIM TEST KIT: HCPCS | Performed by: ANESTHESIOLOGY

## 2022-07-01 PROCEDURE — 25010000002 FENTANYL CITRATE (PF) 100 MCG/2ML SOLUTION: Performed by: ANESTHESIOLOGY

## 2022-07-01 DEVICE — KT LD TRIAL STIM OCTRODE IMPULSE 8CH 60CM: Type: IMPLANTABLE DEVICE | Site: BACK | Status: FUNCTIONAL

## 2022-07-01 RX ORDER — SODIUM CHLORIDE 0.9 % (FLUSH) 0.9 %
10 SYRINGE (ML) INJECTION AS NEEDED
Status: DISCONTINUED | OUTPATIENT
Start: 2022-07-01 | End: 2022-07-01 | Stop reason: HOSPADM

## 2022-07-01 RX ORDER — FAMOTIDINE 10 MG
20 TABLET ORAL
Status: DISCONTINUED | OUTPATIENT
Start: 2022-07-01 | End: 2022-07-01 | Stop reason: HOSPADM

## 2022-07-01 RX ORDER — CEFAZOLIN SODIUM 2 G/50ML
2 SOLUTION INTRAVENOUS ONCE
Status: COMPLETED | OUTPATIENT
Start: 2022-07-01 | End: 2022-07-01

## 2022-07-01 RX ORDER — FENTANYL CITRATE 50 UG/ML
INJECTION, SOLUTION INTRAMUSCULAR; INTRAVENOUS AS NEEDED
Status: DISCONTINUED | OUTPATIENT
Start: 2022-07-01 | End: 2022-07-01 | Stop reason: SURG

## 2022-07-01 RX ORDER — FENTANYL CITRATE 50 UG/ML
50 INJECTION, SOLUTION INTRAMUSCULAR; INTRAVENOUS
Status: DISCONTINUED | OUTPATIENT
Start: 2022-07-01 | End: 2022-07-01 | Stop reason: HOSPADM

## 2022-07-01 RX ORDER — ONDANSETRON 2 MG/ML
4 INJECTION INTRAMUSCULAR; INTRAVENOUS ONCE AS NEEDED
Status: DISCONTINUED | OUTPATIENT
Start: 2022-07-01 | End: 2022-07-01 | Stop reason: HOSPADM

## 2022-07-01 RX ORDER — SODIUM CHLORIDE, SODIUM LACTATE, POTASSIUM CHLORIDE, CALCIUM CHLORIDE 600; 310; 30; 20 MG/100ML; MG/100ML; MG/100ML; MG/100ML
1000 INJECTION, SOLUTION INTRAVENOUS CONTINUOUS
Status: DISCONTINUED | OUTPATIENT
Start: 2022-07-01 | End: 2022-07-01 | Stop reason: HOSPADM

## 2022-07-01 RX ORDER — FAMOTIDINE 10 MG/ML
20 INJECTION, SOLUTION INTRAVENOUS
Status: DISCONTINUED | OUTPATIENT
Start: 2022-07-01 | End: 2022-07-01 | Stop reason: HOSPADM

## 2022-07-01 RX ORDER — FAMOTIDINE 10 MG/ML
20 INJECTION, SOLUTION INTRAVENOUS
Status: COMPLETED | OUTPATIENT
Start: 2022-07-01 | End: 2022-07-01

## 2022-07-01 RX ORDER — LIDOCAINE HYDROCHLORIDE 10 MG/ML
0.5 INJECTION, SOLUTION INFILTRATION; PERINEURAL ONCE AS NEEDED
Status: DISCONTINUED | OUTPATIENT
Start: 2022-07-01 | End: 2022-07-01 | Stop reason: HOSPADM

## 2022-07-01 RX ORDER — SODIUM CHLORIDE 0.9 % (FLUSH) 0.9 %
10 SYRINGE (ML) INJECTION EVERY 12 HOURS SCHEDULED
Status: DISCONTINUED | OUTPATIENT
Start: 2022-07-01 | End: 2022-07-01 | Stop reason: HOSPADM

## 2022-07-01 RX ADMIN — FENTANYL CITRATE 25 MCG: 50 INJECTION INTRAMUSCULAR; INTRAVENOUS at 09:02

## 2022-07-01 RX ADMIN — FENTANYL CITRATE 25 MCG: 50 INJECTION INTRAMUSCULAR; INTRAVENOUS at 09:08

## 2022-07-01 RX ADMIN — CEFAZOLIN SODIUM 2 G: 2 SOLUTION INTRAVENOUS at 08:42

## 2022-07-01 RX ADMIN — SODIUM CHLORIDE, POTASSIUM CHLORIDE, SODIUM LACTATE AND CALCIUM CHLORIDE 1000 ML: 600; 310; 30; 20 INJECTION, SOLUTION INTRAVENOUS at 07:56

## 2022-07-01 RX ADMIN — PROPOFOL 25 MCG/KG/MIN: 10 INJECTION, EMULSION INTRAVENOUS at 08:53

## 2022-07-01 RX ADMIN — FENTANYL CITRATE 25 MCG: 50 INJECTION INTRAMUSCULAR; INTRAVENOUS at 08:51

## 2022-07-01 RX ADMIN — FENTANYL CITRATE 25 MCG: 50 INJECTION INTRAMUSCULAR; INTRAVENOUS at 08:44

## 2022-07-01 RX ADMIN — FAMOTIDINE 20 MG: 10 INJECTION, SOLUTION INTRAVENOUS at 08:12

## 2022-07-01 NOTE — ANESTHESIA PREPROCEDURE EVALUATION
Anesthesia Evaluation     Patient summary reviewed and Nursing notes reviewed   no history of anesthetic complications:  NPO Solid Status: > 6 hours  NPO Liquid Status: > 6 hours           Airway   Mallampati: II  TM distance: >3 FB  Neck ROM: full  no difficulty expected and No difficulty expected  Dental - normal exam     Pulmonary - normal exam    breath sounds clear to auscultation  (+) sleep apnea,   (-) rhonchi, decreased breath sounds, wheezes, rales, stridor  Cardiovascular     NYHA Classification: I  ECG reviewed  Rhythm: regular  Rate: normal    (+) hypertension, valvular problems/murmurs AS, dysrhythmias Paroxysmal Atrial Fib, PVC, murmur, DVT, hyperlipidemia,   (-) weak pulses, friction rub, systolic click, carotid bruits, JVD, peripheral edema      Neuro/Psych  (+) weakness, numbness,    GI/Hepatic/Renal/Endo    (+) obesity,   diabetes mellitus type 2,     Musculoskeletal     (+) back pain, radiculopathy  Abdominal  - normal exam    Abdomen: soft.   Substance History - negative use     OB/GYN negative ob/gyn ROS         Other   arthritis,                      Anesthesia Plan    ASA 3     MAC     intravenous induction     Anesthetic plan, risks, benefits, and alternatives have been provided, discussed and informed consent has been obtained with: patient.        CODE STATUS:

## 2022-07-01 NOTE — ANESTHESIA POSTPROCEDURE EVALUATION
Patient: Jesus Beckham    Procedure Summary     Date: 07/01/22 Room / Location: [facility]    Anesthesia Start: 0841 Anesthesia Stop: 0952    Procedure: SPINAL CORD STIMULATOR INSERTION PHASE 1 ([facility]) 4 DAY TRIAL ONLY DUE TO ELIQUIS HOLD. (N/A Back) Diagnosis:       Lumbar radiculopathy      History of lumbar spinal fusion      (Lumbar radiculopathy [M54.16])      (History of lumbar spinal fusion [Z98.1])    Surgeons: Cody Holguin MD Provider: El Trejo MD    Anesthesia Type: MAC ASA Status: 3          Anesthesia Type: MAC    Vitals  Vitals Value Taken Time   /75 07/01/22 1012   Temp 36.5 °C (97.7 °F) 07/01/22 0949   Pulse 75 07/01/22 1012   Resp 16 07/01/22 1012   SpO2 94 % 07/01/22 1012           Post Anesthesia Care and Evaluation    Patient location during evaluation: bedside  Patient participation: complete - patient participated  Level of consciousness: awake and alert  Pain management: adequate    Airway patency: patent  Anesthetic complications: No anesthetic complications    Cardiovascular status: acceptable  Respiratory status: acceptable  Hydration status: acceptable    Comments: /75   Pulse 75   Temp 36.5 °C (97.7 °F) (Temporal)   Resp 16   Wt 126 kg (277 lb)   SpO2 94%   BMI 40.32 kg/m²

## 2022-07-05 ENCOUNTER — OFFICE VISIT (OUTPATIENT)
Dept: PAIN MEDICINE | Facility: CLINIC | Age: 82
End: 2022-07-05

## 2022-07-05 ENCOUNTER — PREP FOR SURGERY (OUTPATIENT)
Dept: SURGERY | Facility: SURGERY CENTER | Age: 82
End: 2022-07-05

## 2022-07-05 VITALS
BODY MASS INDEX: 40.32 KG/M2 | SYSTOLIC BLOOD PRESSURE: 126 MMHG | RESPIRATION RATE: 12 BRPM | TEMPERATURE: 96.6 F | DIASTOLIC BLOOD PRESSURE: 71 MMHG | HEIGHT: 70 IN | OXYGEN SATURATION: 94 % | HEART RATE: 82 BPM

## 2022-07-05 DIAGNOSIS — Z79.899 ENCOUNTER FOR LONG-TERM (CURRENT) USE OF HIGH-RISK MEDICATION: ICD-10-CM

## 2022-07-05 DIAGNOSIS — G89.4 CHRONIC PAIN SYNDROME: Primary | ICD-10-CM

## 2022-07-05 DIAGNOSIS — M54.16 LUMBAR RADICULOPATHY: ICD-10-CM

## 2022-07-05 DIAGNOSIS — M51.36 DDD (DEGENERATIVE DISC DISEASE), LUMBAR: ICD-10-CM

## 2022-07-05 PROCEDURE — 99024 POSTOP FOLLOW-UP VISIT: CPT | Performed by: NURSE PRACTITIONER

## 2022-07-05 NOTE — PROGRESS NOTES
CHIEF COMPLAINT  Back pain    Subjective   Jesus Beckham is a 81 y.o. male  who presents to the office for follow-up of procedure.  He completed a SCS trial phase 1   on  7/1/2022 performed by Dr. Holguin for management of leg pain/back pain. Patient reports at least 50% relief from the procedure. Reports he had no complaints of pain on the left side. Coverage on right side was not as significant, however reprogramming/remapping today in office able to achieve similar pain control on right side.  He also reports less pain after walking during the trial than he typically experiences.      Patient remained masked during entire encounter. No cough present. I donned a mask and eye protection throughout entire visit. Prior to donning mask and eye protection, hand hygiene was performed, as well as when it was doffed.  I was closer than 6 feet, but not for an extended period of time. No obvious exposure to any bodily fluids.    Back Pain  This is a chronic problem. The current episode started more than 1 year ago. The problem occurs daily. The problem has been waxing and waning since onset. The pain is present in the lumbar spine. The quality of the pain is described as aching and burning. The pain radiates to the left foot and right foot. The pain is at a severity of 5/10. The pain is moderate. The symptoms are aggravated by standing (walking). Associated symptoms include numbness (BILATERAL LEGS) and weakness. Pertinent negatives include no abdominal pain, chest pain, dysuria, fever or headaches. Risk factors include obesity and sedentary lifestyle. He has tried analgesics for the symptoms. The treatment provided mild relief.      PEG Assessment   What number best describes your pain on average in the past week?8  What number best describes how, during the past week, pain has interfered with your enjoyment of life?8  What number best describes how, during the past week, pain has interfered with your general activity?   "8    The following portions of the patient's history were reviewed and updated as appropriate: allergies, current medications, past family history, past medical history, past social history, past surgical history and problem list.    Review of Systems   Constitutional: Positive for fatigue. Negative for activity change and fever.   HENT: Negative for congestion.    Eyes: Negative for visual disturbance.   Respiratory: Negative for cough and chest tightness.    Cardiovascular: Negative for chest pain.   Gastrointestinal: Negative for abdominal pain, constipation and diarrhea.   Genitourinary: Negative for difficulty urinating and dysuria.   Musculoskeletal: Positive for back pain.   Neurological: Positive for weakness and numbness (BILATERAL LEGS). Negative for dizziness, light-headedness and headaches.   Psychiatric/Behavioral: Negative for agitation, sleep disturbance and suicidal ideas. The patient is not nervous/anxious.      I have reviewed and confirmed the accuracy of the ROS as documented by the MA/LPN/RN DANNY Bardales     Vitals:    07/05/22 0940   BP: 126/71   BP Location: Left arm   Patient Position: Sitting   Pulse: 82   Resp: 12   Temp: 96.6 °F (35.9 °C)   SpO2: 94%   Weight: Comment: unable to stand   Height: 176.5 cm (69.5\")   PainSc:   5   PainLoc: Comment: LEG     Objective   Physical Exam  Vitals and nursing note reviewed.   Constitutional:       General: He is not in acute distress.     Appearance: Normal appearance. He is obese.   Pulmonary:      Effort: Pulmonary effort is normal. No respiratory distress.   Musculoskeletal:      Lumbar back: Tenderness present.   Skin:     General: Skin is warm and dry.   Neurological:      Mental Status: He is alert and oriented to person, place, and time.      Gait: Gait abnormal (motorized wheelchair).   Psychiatric:         Mood and Affect: Mood normal.         Behavior: Behavior normal.       Assessment & Plan   Diagnoses and all orders for this " visit:    1. Chronic pain syndrome (Primary)    2. Lumbar radiculopathy    3. DDD (degenerative disc disease), lumbar    4. Encounter for long-term (current) use of high-risk medication      --- Plan to move forward with surgical paddle Implant with Dr. Bailon     -- Follow-up for implant... Seeking auth for SCS Phase 2   -- I feel that this patient is an appropriate candidate for SCS Phase Two implantation.  We plan to use the [facility] system.    -- This patient does not have any evidence of sepsis nor coagulopathy.  -- Patient is not a clear candidate for neurosurgical intervention, and has no other reasonable options for basic interventions, injection therapy, and physical therapy.  -- We plan to use the SCS trial findings to reproduce that same placement location as we proceed with implantation SCS Phase 2.    -- Goals are to improve functionality and activity as well as decrease and/or eliminate the need for oral medication management.      This patient presents today for in-office removal of spinal cord stimulation trialing leads.  The dressings were removed and the leads were exposed.  There was no indication of infection or irritation.  The leads were easily pulled from the epidural space and removed.  Lead tips were intact.     The patient had a very successful trial with consistent pain relief of at least 50%.  The patient is pleased with the results and is requesting that we proceed to the permanent implantation of the leads along with the implantable pulse generator.      We discussed reasonable expectations about the permanent device performance, and I feel that the patient expresses reasonable expectations.  The patient states that if the relief provided from the permanent device is the same as the trialing system then this would be regarded as a success.  We discussed risks, including but not limited to bleeding, infection, nerve or spinal cord injury, postprocedural pain, device  malfunction, lead migration, risk of shock, and risk of loss of long term pain relief.  The patient verbalized informed consent to proceed.  We will seek authorization for SCS Phase 2, with the [employer] system.        --- Continue medication regimen  --- The urine drug screen confirmation from 3/14/2022 has been reviewed and the result is appropriate based on patient history and FAINA report  --- The patient signed an updated copy of the controlled substance agreement on 9/13/2021  --- Follow-up 2 months/sooner if needed          FAINA REPORT  As part of the patient's treatment plan, I am prescribing controlled substances. The patient has been made aware of appropriate use of such medications, including potential risk of somnolence, limited ability to drive and/or work safely, and the potential for dependence or overdose. It has also been made clear that these medications are for use by this patient only, without concomitant use of alcohol or other substances unless prescribed.     Patient has completed prescribing agreement detailing terms of continued prescribing of controlled substances, including monitoring FAINA reports, urine drug screening, and pill counts if necessary. The patient is aware that inappropriate use will results in cessation of prescribing such medications.    As the clinician, I personally reviewed the FAINA from 7/5/2022 while the patient was in the office today.    History and physical exam exhibit continued safe and appropriate use of controlled substances.    Dictated utilizing Dragon dictation.      This document is intended for medical expert use only. Reading of this document by patients and/or patient's family without participating medical staff guidance may result in misinterpretation and unintended morbidity.   Any interpretation of such data is the responsibility of the patient and/or family member responsible for the patient in concert with their primary or specialist providers,  not to be left for sources of online searches such as [employer], [employer] or similar queries. Relying on these approaches to knowledge may result in misinterpretation, misguided goals of care and even death should patients or family members try recommendations outside of the realm of professional medical care in a supervised way.

## 2022-07-07 ENCOUNTER — TELEPHONE (OUTPATIENT)
Dept: FAMILY MEDICINE CLINIC | Facility: CLINIC | Age: 82
End: 2022-07-07

## 2022-07-07 ENCOUNTER — TELEPHONE (OUTPATIENT)
Dept: ENDOCRINOLOGY | Age: 82
End: 2022-07-07

## 2022-07-07 DIAGNOSIS — G47.33 OSA (OBSTRUCTIVE SLEEP APNEA): Primary | ICD-10-CM

## 2022-07-07 RX ORDER — ROSUVASTATIN CALCIUM 10 MG/1
10 TABLET, COATED ORAL DAILY
Qty: 90 TABLET | Refills: 1 | Status: SHIPPED | OUTPATIENT
Start: 2022-07-07 | End: 2022-12-01

## 2022-07-07 NOTE — TELEPHONE ENCOUNTER
Caller: yMa Beckham    Relationship: Emergency Contact    Best call back number: [phone] (H)    What orders are you requesting (i.e. lab or imaging): AT HOME SLEEP STUDY     In what timeframe would the patient need to come in: ASAP     Where will you receive your lab/imaging services: AT HOME PREFERED    Additional notes: PATIENTS WIFE IS REQUESTING DR. MILES ORDER AN AT HOME SLEEP STUDY BECAUSE THE PATIENT HAS SOME DIFFICULTY BREATHING IN THE MORNING TIMES AND FEELS WEAK AND KIND OF DIZZY IN THE MORNINGS. BEEN GOING ON FOR SEVERAL MONTHS

## 2022-07-08 ENCOUNTER — TELEPHONE (OUTPATIENT)
Dept: NEUROSURGERY | Facility: CLINIC | Age: 82
End: 2022-07-08

## 2022-07-08 NOTE — TELEPHONE ENCOUNTER
I spoke with Mya the patients wife. She advised me that Mr. Beckham finished his stimulator trial on July 5th with Dr. Holguin's office and would like to know if we have received the reports. She also reports the patient would like to follow through with the surgery.     Call back :     [phone]

## 2022-07-12 NOTE — TELEPHONE ENCOUNTER
I CALLED AND SPOKE TO THE PATIENT'S WIFE REGARDING THIS AND ADVISED THIS WOULD BE DISCUSSED AT THE NEXT VISIT WITH DR. NARVAEZ. SHE VOICED UNDERSTANDING.

## 2022-07-21 DIAGNOSIS — E11.42 DIABETIC PERIPHERAL NEUROPATHY: ICD-10-CM

## 2022-07-21 RX ORDER — PREGABALIN 100 MG/1
CAPSULE ORAL
Qty: 270 CAPSULE | Refills: 0 | Status: SHIPPED | OUTPATIENT
Start: 2022-07-21 | End: 2022-09-30

## 2022-07-27 ENCOUNTER — APPOINTMENT (OUTPATIENT)
Dept: SLEEP MEDICINE | Facility: HOSPITAL | Age: 82
End: 2022-07-27

## 2022-07-28 ENCOUNTER — OFFICE VISIT (OUTPATIENT)
Dept: SLEEP MEDICINE | Facility: HOSPITAL | Age: 82
End: 2022-07-28

## 2022-07-28 VITALS
HEIGHT: 70 IN | OXYGEN SATURATION: 93 % | SYSTOLIC BLOOD PRESSURE: 109 MMHG | BODY MASS INDEX: 39.65 KG/M2 | HEART RATE: 77 BPM | DIASTOLIC BLOOD PRESSURE: 66 MMHG | WEIGHT: 277 LBS

## 2022-07-28 DIAGNOSIS — R06.83 SNORING: ICD-10-CM

## 2022-07-28 DIAGNOSIS — R41.3 MEMORY DIFFICULTIES: ICD-10-CM

## 2022-07-28 DIAGNOSIS — G47.8 NON-RESTORATIVE SLEEP: ICD-10-CM

## 2022-07-28 DIAGNOSIS — E66.01 CLASS 3 SEVERE OBESITY DUE TO EXCESS CALORIES WITHOUT SERIOUS COMORBIDITY WITH BODY MASS INDEX (BMI) OF 40.0 TO 44.9 IN ADULT: ICD-10-CM

## 2022-07-28 DIAGNOSIS — Z93.59 SUPRAPUBIC CATHETER: ICD-10-CM

## 2022-07-28 DIAGNOSIS — G47.30 OBSERVED SLEEP APNEA: Primary | ICD-10-CM

## 2022-07-28 DIAGNOSIS — R29.898 BILATERAL LEG WEAKNESS: ICD-10-CM

## 2022-07-28 DIAGNOSIS — E11.42 DIABETIC PERIPHERAL NEUROPATHY: ICD-10-CM

## 2022-07-28 DIAGNOSIS — G47.10 HYPERSOMNIA: ICD-10-CM

## 2022-07-28 PROBLEM — E66.813 CLASS 3 SEVERE OBESITY DUE TO EXCESS CALORIES WITHOUT SERIOUS COMORBIDITY WITH BODY MASS INDEX (BMI) OF 40.0 TO 44.9 IN ADULT: Status: ACTIVE | Noted: 2022-07-28

## 2022-07-28 PROCEDURE — G0463 HOSPITAL OUTPT CLINIC VISIT: HCPCS

## 2022-07-28 PROCEDURE — 99204 OFFICE O/P NEW MOD 45 MIN: CPT | Performed by: INTERNAL MEDICINE

## 2022-07-28 RX ORDER — ZOLPIDEM TARTRATE 5 MG/1
5 TABLET ORAL TAKE AS DIRECTED
Qty: 2 TABLET | Refills: 0 | Status: SHIPPED | OUTPATIENT
Start: 2022-07-28 | End: 2022-11-15

## 2022-07-28 NOTE — PROGRESS NOTES
[facility]  [address]  [address]  [place] [address]  Phone   Fax       Jesus Beckham  [id number]   1940  81 y.o.  male      PCP:Magdy Ricardo MD    Type of service: Initial New Patient Office Visit  Date of service: 7/28/2022    Chief Complaint   Patient presents with   • Witnessed Apnea   • Snoring   • Non-restorative Sleep   • Fatigue   • Dry Mouth   • Daytime Sleepiness   • Obesity       History of present illness;  Jesus Beckham 81 y.o.  is a new patient for me and was seen today for sleep related problems of snoring, non-restorative sleep and witnessed apneas. The symptoms are present for many years and they are persistent in nature.  The snoring is present in all positions and it is loud.  Has no history of prior sleep evaluation and sleep studies. Patient has no prior surgery namely tonsillectomy, nasal surgery and UPPP.     He is accompanied by his wife.  Patient is in a motorized scooter.  Patient is not limited mobility he says that he is wheelchair-bound because of his severe neuropathy and has no strength in the lower extremities.  In addition he has a suprapubic catheter.  Patient reports that he falls asleep sitting in his wheelchair and has daytime excessive sleepiness and tiredness.    Patient gives the following sleep history.  Sleep schedule:  Bedtime: 9 PM  Wake time: 6:30 AM  Normally takes about 30 minutes to fall asleep  Average hours of sleep 6-7  Number of naps per day off and on  Symptoms  In addition to snoring, nonrestorative sleep and witnessed apneas patient gives the following associated symptoms.  Have you ever awakened gasping for breath, coughing, choking: Yes   Change in weight,  Yes gained 30 pounds  Morning headaches  No   Awaken with a sore throat or dry mouth  Yes   Leg jerking at night:  No   Crawly feeling/urge sensation to move in the legs: No   Teeth grinding:Yes   Have you ever awakened at night with a  "sour taste or burning sensation in your chest:  No   Do you have muscle weakness with laughing or anger or sleep paralysis:  No   Have you ever felt paralyzed while going to sleep or waking up:  No   Sleepwalking, nightmares, No   Nocturia (urination at night): Has a suprapubic catheter  Memory Problem:Yes     Past medical history: (Relevant to sleep medicine)  1. Diabetes mellitus  2. Neuropathy  3. Dementia  4. Hypertension  5. Neuropathy  6. Immobility      • Medications are reviewed by me and documented in the encounter  • Allergies reviewed and documented in encounter    Social history:  Do you drive a commercial vehicle:  No   Shift work:  No   Tobacco use:  No   Alcohol use:  0 per week  Caffeinated drinks: 4    FAMILY HISTORY (Your mother, father, brothers and sisters)  1. Hypertension  2. Obesity    REVIEW OF SYSTEMS.  Full review of systems available on the intake form which is scanned in the media tab.  The relevant positive are noted below  1. Daytime excessive sleepiness with Byron Sleepiness Scale :Total score: 18   2. Snoring  3. Fatigue  4. Nasal congestion  5. Shortness of breath  6. Anxiety  7. Depression  8. Immobility      Physical exam:  Vitals:    07/28/22 1418   BP: 109/66   Pulse: 77   SpO2: 93%   Weight: 126 kg (277 lb)   Height: 176.5 cm (69.5\")    Body mass index is 40.32 kg/m². Neck Circumference: 20 inches  HEENT: Head is atraumatic, normocephalic  Eyes: pupils are round equal and reacting to light and accommodation, conjunctiva normal  Nose: no nasal septal defects or deviation and the nasal passages are clear, no nasal polyps,  Throat: tonsils are nonenlarged, tongue normal, oral airway Mallampati class   NECK:Neck Circumference: 20 inches, trachea is in the midline, thyroid not enlarged  RESPIRATORY SYSTEM: Breath sounds are equal on both sides, there are no wheezes   CARDIOVASULAR SYSTEM: Heart sounds are regular rhythm and tamela rate, no edema  EXTREMITES: No cyanosis, " clubbing  NEUROLOGICAL SYSTEM: Oriented x 3, patient is in a motorized scooter with April suprapubic catheter    Office notes from care team reviewed. Office note dated June 27, 2022,reviewed  Labs reviewed.  TSH Results:     Most Recent A1C    HGBA1C Most Recent 5/12/22   Hemoglobin A1C 7.2 (A)   (A) Abnormal value       Comments are available for some flowsheets but are not being displayed.              Assessment and plan:  · Witnessed apnea (R06.81) patient's symptoms and examination is consistent with sleep apnea (G47.30)  I have talked to the patient about the signs and symptoms of sleep apnea. In addition, I have also discussed pathophysiology of sleep apnea.  I also discussed the complications of untreated sleep apnea including effects on hypertension, diabetes mellitus and nonrestorative sleep with hypersomnia which can increase risk for motor vehicle accidents.  Untreated sleep apnea is also a risk factor for development of atrial fibrillation, pulmonary hypertension and stroke.  Discussed in detail of various testing methods including home-based and lab based sleep studies.  Based on history and physical examination the most appropriate study is split-night study.  The order for the sleep study is placed in [facility].  The test will be scheduled after approval from insurance. Treatment and management will be discussed after the test is completed.  Patient was given opportunity to ask questions and all the questions were answered.  A prescription for zolpidem has been given.  Needs a hospital bed to  · Snoring (R06.83) snoring is the sound created by turbulent airflow vibrating upper airway soft tissue.  I have also discussed factors affecting snoring including sleep deprivation, sleeping on the back and alcohol ingestion. To minimize snoring, patient is advised to have adequate sleep, sleep on the side and avoid alcohol and sedative medications before bedtime  · Daytime excessive sleepiness .  It was assessed  with Vinton Sleepiness Scale of Total score: 18.  There are many causes for daytime excessive sleepiness including sleep depression, shiftwork syndrome, depression and other medical disorders including heart, kidney and liver failure.  The most serious cause of excessive sleepiness is due to neurological conditions like narcolepsy/cataplexy.  But the most common cause of excessive sleepiness is due to sleep apnea with frequent awakenings during sleep time.  I have discussed safety of driving and to remain vigilant while driving.  · Obesity 3, with BMI Body mass index is 40.32 kg/m².. I have discussed the relationship between weight and sleep apnea.There is direct correlation between weight and severity of sleep apnea.  Weight reduction is encouraged, as it is going to reduce the severity of sleep apnea. I have also discussed with the patient diet and exercise to achieve ideal body weight    I have also discussed with the patient the following  • Sleep hygiene: Maintaining a regular bedtime and wake time, not to watch television or work in bed, limit caffeine-containing beverages before bed time and avoid naps during the day  • Adequate amount of sleep.  Generally most people needs about 7 to 8 hours of sleep.  • Return for 31 to 90 days after PAP setup with down load..  Patient's questions were answered.        Sruthi Dempsey MD  Sleep Medicine.  Medical Director, [facility]  7/28/2022 ,

## 2022-08-04 ENCOUNTER — TELEPHONE (OUTPATIENT)
Dept: PAIN MEDICINE | Facility: CLINIC | Age: 82
End: 2022-08-04

## 2022-08-04 DIAGNOSIS — Z79.899 ENCOUNTER FOR LONG-TERM (CURRENT) USE OF HIGH-RISK MEDICATION: ICD-10-CM

## 2022-08-04 RX ORDER — OXYCODONE HYDROCHLORIDE AND ACETAMINOPHEN 5; 325 MG/1; MG/1
1 TABLET ORAL 2 TIMES DAILY PRN
Qty: 50 TABLET | Refills: 0 | Status: SHIPPED | OUTPATIENT
Start: 2022-08-04 | End: 2022-09-23 | Stop reason: SDUPTHER

## 2022-08-12 ENCOUNTER — LAB (OUTPATIENT)
Dept: LAB | Facility: HOSPITAL | Age: 82
End: 2022-08-12

## 2022-08-12 DIAGNOSIS — G47.8 NON-RESTORATIVE SLEEP: ICD-10-CM

## 2022-08-12 DIAGNOSIS — G47.30 OBSERVED SLEEP APNEA: ICD-10-CM

## 2022-08-12 DIAGNOSIS — Z93.59 SUPRAPUBIC CATHETER: ICD-10-CM

## 2022-08-12 DIAGNOSIS — G47.10 HYPERSOMNIA: ICD-10-CM

## 2022-08-12 DIAGNOSIS — E66.01 CLASS 3 SEVERE OBESITY DUE TO EXCESS CALORIES WITHOUT SERIOUS COMORBIDITY WITH BODY MASS INDEX (BMI) OF 40.0 TO 44.9 IN ADULT: ICD-10-CM

## 2022-08-12 DIAGNOSIS — R06.83 SNORING: ICD-10-CM

## 2022-08-12 DIAGNOSIS — R41.3 MEMORY DIFFICULTIES: ICD-10-CM

## 2022-08-12 DIAGNOSIS — R29.898 BILATERAL LEG WEAKNESS: ICD-10-CM

## 2022-08-12 DIAGNOSIS — E11.42 DIABETIC PERIPHERAL NEUROPATHY: ICD-10-CM

## 2022-08-12 LAB — SARS-COV-2 ORF1AB RESP QL NAA+PROBE: NOT DETECTED

## 2022-08-12 PROCEDURE — U0004 COV-19 TEST NON-CDC HGH THRU: HCPCS

## 2022-08-12 PROCEDURE — C9803 HOPD COVID-19 SPEC COLLECT: HCPCS

## 2022-08-15 ENCOUNTER — HOSPITAL ENCOUNTER (OUTPATIENT)
Dept: SLEEP MEDICINE | Facility: HOSPITAL | Age: 82
Discharge: HOME | End: 2022-08-15
Admitting: INTERNAL MEDICINE

## 2022-08-15 DIAGNOSIS — E11.42 DIABETIC PERIPHERAL NEUROPATHY: ICD-10-CM

## 2022-08-15 DIAGNOSIS — R06.83 SNORING: ICD-10-CM

## 2022-08-15 DIAGNOSIS — G47.8 NON-RESTORATIVE SLEEP: ICD-10-CM

## 2022-08-15 DIAGNOSIS — G47.30 OBSERVED SLEEP APNEA: ICD-10-CM

## 2022-08-15 DIAGNOSIS — E66.01 CLASS 3 SEVERE OBESITY DUE TO EXCESS CALORIES WITHOUT SERIOUS COMORBIDITY WITH BODY MASS INDEX (BMI) OF 40.0 TO 44.9 IN ADULT: ICD-10-CM

## 2022-08-15 DIAGNOSIS — R29.898 BILATERAL LEG WEAKNESS: ICD-10-CM

## 2022-08-15 DIAGNOSIS — Z93.59 SUPRAPUBIC CATHETER: ICD-10-CM

## 2022-08-15 DIAGNOSIS — R41.3 MEMORY DIFFICULTIES: ICD-10-CM

## 2022-08-15 DIAGNOSIS — G47.10 HYPERSOMNIA: ICD-10-CM

## 2022-08-15 PROCEDURE — 95811 POLYSOM 6/>YRS CPAP 4/> PARM: CPT

## 2022-08-15 PROCEDURE — 95811 POLYSOM 6/>YRS CPAP 4/> PARM: CPT | Performed by: INTERNAL MEDICINE

## 2022-08-15 NOTE — PROGRESS NOTES
Subjective   Patient ID: Jesus Beckham is a 81 y.o. male is here today for follow-up for back pain after spinal cord stimulator trial. He was last seen on 2/16/22 and reported constant back pain that radiates into bilateral legs with numbness, tingling and weakness.    Mr. Beckham reports his spinal cord stimulator trial went very well and he was pleased with the result. He states that he would like to move forward with the permanent stimulator. He continues to have back pain with leg pain. His numbness, tingling and weakness continues as well.     He is with his wife.  I been following this patient for several years.  He he is fused from L2-L4.  One of his original pathologies was for an infection.  But he has adhesive arachnoiditis which has resulted in chronic low back pain and bilateral leg pain.  He has baseline leg weakness.  He is work with Dr. Holguin and he gets Percocet from him.  He recently had a spinal cord stimulator trial about a month ago with the Abbott system and it helped quite a bit.  Had reduced his overall back and bilateral leg pain.  He is in a wheelchair and he has bilateral baseline leg weakness.  It obviously did not affect that.  He has a bladder stimulator already.  The internal pulse generator is in the right buttock region.  He does have a cardiac history and was seen by his cardiologist Dr. Burr recently apparently had a stress test which he passed.  We will verify this.  He has a history of DVT and has been on Eliquis.  He did have a IVC filter placed by Dr. Thakkar in 2018 but it was removed.  I suspect he will need to have another filter placed and we will send him to Dr. Mustafa for this since I believe Dr. Thakkar has stepdown from active clinical practice.  The Eliquis obviously will need to be stopped prior to the surgery.  We will move forward in a two-stage process beginning with placement of an Abbott thoracic surgical lead to about T9 followed by in-house  programming and placement of a left IPG assuming that the surgical trial is successful.  He knows that he will follow-up with our nurse practitioners.  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  We will move forward of this as soon as we can.        Back Pain  This is a chronic problem. The current episode started more than 1 year ago. The problem occurs constantly. The problem is unchanged. The pain is present in the lumbar spine. The quality of the pain is described as aching, shooting and burning. The pain radiates to the left thigh, left knee, left foot, right foot, right knee and right thigh. The pain is at a severity of 7/10. The pain is moderate. The symptoms are aggravated by lying down. Associated symptoms include leg pain, numbness, tingling and weakness. Pertinent negatives include no bladder incontinence, bowel incontinence or fever.       The following portions of the patient's history were reviewed and updated as appropriate: allergies, current medications, past family history, past medical history, past social history, past surgical history and problem list.    Review of Systems   Constitutional: Positive for activity change. Negative for fever.   Gastrointestinal: Negative for bowel incontinence.   Genitourinary: Negative for bladder incontinence.   Musculoskeletal: Positive for back pain.   Neurological: Positive for tingling, weakness and numbness.   Psychiatric/Behavioral: Positive for sleep disturbance.   All other systems reviewed and are negative.      Objective   Physical Exam  Constitutional:       Appearance: He is well-developed.   HENT:      Head: Normocephalic and atraumatic.   Eyes:      Extraocular Movements: EOM normal.      Conjunctiva/sclera: Conjunctivae normal.      Pupils: Pupils are equal, round, and reactive to light.   Neck:      Vascular: No carotid bruit.   Neurological:       Mental Status: He is oriented to person, place, and time.      Coordination: Finger-Nose-Finger Test and Heel to Shin Test normal.      Gait: Gait is intact.      Deep Tendon Reflexes:      Reflex Scores:       Tricep reflexes are 2+ on the right side and 2+ on the left side.       Bicep reflexes are 2+ on the right side and 2+ on the left side.       Brachioradialis reflexes are 2+ on the right side and 2+ on the left side.       Patellar reflexes are 2+ on the right side and 2+ on the left side.       Achilles reflexes are 2+ on the right side and 2+ on the left side.  Psychiatric:         Speech: Speech normal.       Neurologic Exam     Mental Status   Oriented to person, place, and time.   Registration of memory: Good recent and remote memory.   Attention: normal. Concentration: normal.   Speech: speech is normal   Level of consciousness: alert  Knowledge: consistent with education.     Cranial Nerves     CN II   Visual fields full to confrontation.   Visual acuity: normal    CN III, IV, VI   Pupils are equal, round, and reactive to light.  Extraocular motions are normal.     CN V   Facial sensation intact.   Right corneal reflex: normal  Left corneal reflex: normal    CN VII   Facial expression full, symmetric.   Right facial weakness: none  Left facial weakness: none    CN VIII   Hearing: intact    CN IX, X   Palate: symmetric    CN XI   Right sternocleidomastoid strength: normal  Left sternocleidomastoid strength: normal    CN XII   Tongue: not atrophic  Tongue deviation: none    Motor Exam   Muscle bulk: normal  Right arm tone: normal  Left arm tone: normal  Right leg tone: normal  Left leg tone: normal    Strength   Strength 5/5 except as noted.   Right neck flexion: 4/5  Left neck flexion: 4/5  Right neck extension: 4/5  Left neck extension: 4/5  Right deltoid: 4/5  Left deltoid: 4/5  Right biceps: 4/5  Left biceps: 4/5  Right triceps: 4/5  Left triceps: 4/5  Right wrist flexion: 4/5  Left wrist flexion:  4/5  Right wrist extension: 4/5  Left wrist extension: 4/5  Right interossei: 4/5  Left interossei: 4/5  Right abdominals: 4/5  Left abdominals: 4/5  Right iliopsoas: 4/5  Left iliopsoas: 4/5  Right quadriceps: 4/5  Left quadriceps: 4/5  Right hamstrin/5  Left hamstrin/5  Right glutei: 4/5  Left glutei: 4/5  Right anterior tibial: 4/5  Left anterior tibial: 4/5  Right posterior tibial: 4/5  Left posterior tibial: 4/5  Right peroneal: 4/5  Left peroneal: 4/5  Right gastroc: 4/5  Left gastroc: 4/5In a wheelchair     Sensory Exam   Light touch normal.     Gait, Coordination, and Reflexes     Gait  Gait: normal    Coordination   Finger to nose coordination: normal  Heel to shin coordination: normal    Reflexes   Right brachioradialis: 2+  Left brachioradialis: 2+  Right biceps: 2+  Left biceps: 2+  Right triceps: 2+  Left triceps: 2+  Right patellar: 2+  Left patellar: 2+  Right achilles: 2+  Left achilles: 2+  Right : 2+  Left : 2+      Assessment & Plan   Independent Review of Radiographic Studies:      I reviewed x-rays done in 2019 which showed good positioning of the hardware and interbody cages from L2-L4.  Agree with the report.    Medical Decision Making:      We will have the vascular service see him to see if an IVC filter needs to be placed again prior to the surgery.  We will verify cardiac clearance with his cardiologist Dr. Burr.  We will proceed with a two-step staged to the placement of a spinal cord stimulator.  Phase 1 or stage I will be a T11 laminotomy for placement of a Abbott surgical lead to about T9 followed by in-hospital testing.  If the testing recreates the trial then we will put the internal pulse generator the next day in the left gluteal region.  The goal of surgery is placement of a functional spinal cord stimulator system to reduce his overall back and leg pain and improve his quality of life.  The risks include, but are not limited to, infection possible  requiring explantation, hemorrhage requiring transfusion or reoperation, CSF leak requiring reoperation, incomplete relief of symptoms, potential need for additional surgeries in the future because of system malfunction, stroke, paralysis, coma, and death.  He agrees to proceed.    He knows he will be seen by the nurse practitioners at the 2-week maria elena but I will see him again at the 6-month maria elena after the surgery.      Diagnoses and all orders for this visit:    1. Adhesive arachnoiditis (Primary)  -     Case Request; Standing  -     COVID PRE-OP / PRE-PROCEDURE SCREENING ORDER (NO ISOLATION) - Swab, Nasopharynx; Future  -     Case Request  -     Case Request; Standing  -     COVID PRE-OP / PRE-PROCEDURE SCREENING ORDER (NO ISOLATION) - Swab, Nasopharynx; Future  -     Case Request    2. Bilateral leg weakness    3. History of lumbar spinal fusion    4. History of DVT of lower extremity  -     Ambulatory Referral to Vascular Surgery    Other orders  -     Follow Anesthesia Guidelines / Standing Orders; Future  -     Provide NPO Instructions to Patient; Future  -     Chlorhexidine Skin Prep; Future  -     Obtain informed consent  -     Follow Anesthesia Guidelines / Standing Orders; Future  -     Obtain informed consent  -     Provide NPO Instructions to Patient; Future  -     Chlorhexidine Skin Prep; Future      Return for 2 weeks after surgery with an APC.         Addendum: From Dr. Espinoza:    Damon Espinoza MD Villanueva, Wayne, MD  I am sending you my full note but wanted to shoot you a message to tell you that I do not think he would clearlybenefit from a filter.  He had a DVT that was provoked by a major illness a few years ago and does not have any DVT in either leg right now.  I would recommend holding Eliquis 2 days prior to and obviously day of the procedure. Feel free to call me if he would like to discuss.

## 2022-08-22 ENCOUNTER — OFFICE VISIT (OUTPATIENT)
Dept: NEUROSURGERY | Facility: CLINIC | Age: 82
End: 2022-08-22

## 2022-08-22 VITALS
OXYGEN SATURATION: 93 % | SYSTOLIC BLOOD PRESSURE: 124 MMHG | DIASTOLIC BLOOD PRESSURE: 84 MMHG | TEMPERATURE: 97.8 F | HEART RATE: 73 BPM | WEIGHT: 277 LBS | BODY MASS INDEX: 39.65 KG/M2 | HEIGHT: 70 IN

## 2022-08-22 DIAGNOSIS — R29.898 BILATERAL LEG WEAKNESS: ICD-10-CM

## 2022-08-22 DIAGNOSIS — Z98.1 HISTORY OF LUMBAR SPINAL FUSION: ICD-10-CM

## 2022-08-22 DIAGNOSIS — Z86.718 HISTORY OF DVT OF LOWER EXTREMITY: ICD-10-CM

## 2022-08-22 DIAGNOSIS — G03.9 ADHESIVE ARACHNOIDITIS: Primary | ICD-10-CM

## 2022-08-22 PROCEDURE — 99214 OFFICE O/P EST MOD 30 MIN: CPT | Performed by: NEUROLOGICAL SURGERY

## 2022-08-22 RX ORDER — CEFAZOLIN SODIUM 2 G/100ML
2 INJECTION, SOLUTION INTRAVENOUS ONCE
Status: CANCELLED | OUTPATIENT
Start: 2022-11-18 | End: 2022-08-22

## 2022-08-22 RX ORDER — CEFAZOLIN SODIUM 2 G/100ML
2 INJECTION, SOLUTION INTRAVENOUS ONCE
Status: CANCELLED | OUTPATIENT
Start: 2022-11-17 | End: 2022-08-22

## 2022-08-23 ENCOUNTER — TELEPHONE (OUTPATIENT)
Dept: NEUROSURGERY | Facility: CLINIC | Age: 82
End: 2022-08-23

## 2022-08-24 ENCOUNTER — TELEPHONE (OUTPATIENT)
Dept: NEUROSURGERY | Facility: CLINIC | Age: 82
End: 2022-08-24

## 2022-08-24 NOTE — TELEPHONE ENCOUNTER
----- Message from Charlie Bailon MD sent at 8/23/2022  1:04 PM EDT -----  Unfortunately, the patient is just going to have to wait.  I do not mind if he sees Dr. Espinoza.  But tell the patient we need to have the input before moving forward with surgery.  ----- Message -----  From: Hubert Schreiber  Sent: 8/23/2022   1:01 PM EDT  To: Charlie Bailon MD    I called Dr Mustafa's office regarding getting pt in to be seen for a possible IVC filter placement and was told that the next opening is not until 10/10/22 at 3pm and that is with Dr Espinoza. I was told that it would be further out for the pt to see Dr Mustafa. Dr Mustafa's office said that they have no US space and it is causing everything to be backed up. I know the pt is really wanting surgery soon. Dr Burr is out of the office until next week and a message has been sent to her regarding pt's cardiac clearance.

## 2022-08-24 NOTE — TELEPHONE ENCOUNTER
I S/W PT'S WIFE AND INFORMED HER PER DR NARVAEZ Unfortunately, the patient is just going to have to wait.  I do not mind if he sees Dr. Espinoza.  But tell the patient we need to have the input before moving forward with surgery. I GAVE HER APPT INFO FOR 10/10/22 3PM W/DR ESPINOZA. I ALSO INFORMED HER THAT A MSG WAS SENT TO DR HOWARD W/[facility] FOR CARDIAC CLEARANCE AND THAT DR HOWARD IS OUT OF THE OFFICE THIS WEEK. PT'S WIFE VOICED UNDERSTANDING.

## 2022-09-01 ENCOUNTER — OFFICE VISIT (OUTPATIENT)
Dept: PAIN MEDICINE | Facility: CLINIC | Age: 82
End: 2022-09-01

## 2022-09-01 VITALS
TEMPERATURE: 97.7 F | BODY MASS INDEX: 40.32 KG/M2 | SYSTOLIC BLOOD PRESSURE: 116 MMHG | HEART RATE: 71 BPM | HEIGHT: 70 IN | RESPIRATION RATE: 18 BRPM | DIASTOLIC BLOOD PRESSURE: 70 MMHG | OXYGEN SATURATION: 95 %

## 2022-09-01 DIAGNOSIS — R41.3 MEMORY DIFFICULTIES: ICD-10-CM

## 2022-09-01 DIAGNOSIS — T40.2X5A THERAPEUTIC OPIOID INDUCED CONSTIPATION: ICD-10-CM

## 2022-09-01 DIAGNOSIS — M54.16 LUMBAR RADICULOPATHY: ICD-10-CM

## 2022-09-01 DIAGNOSIS — M51.36 DDD (DEGENERATIVE DISC DISEASE), LUMBAR: ICD-10-CM

## 2022-09-01 DIAGNOSIS — G89.4 CHRONIC PAIN SYNDROME: Primary | ICD-10-CM

## 2022-09-01 DIAGNOSIS — Z79.899 ENCOUNTER FOR LONG-TERM (CURRENT) USE OF HIGH-RISK MEDICATION: ICD-10-CM

## 2022-09-01 DIAGNOSIS — R06.83 SNORING: ICD-10-CM

## 2022-09-01 DIAGNOSIS — E11.42 DIABETIC PERIPHERAL NEUROPATHY: ICD-10-CM

## 2022-09-01 DIAGNOSIS — G47.10 HYPERSOMNIA: ICD-10-CM

## 2022-09-01 DIAGNOSIS — E66.01 CLASS 3 SEVERE OBESITY DUE TO EXCESS CALORIES WITHOUT SERIOUS COMORBIDITY WITH BODY MASS INDEX (BMI) OF 40.0 TO 44.9 IN ADULT: ICD-10-CM

## 2022-09-01 DIAGNOSIS — K59.03 THERAPEUTIC OPIOID INDUCED CONSTIPATION: ICD-10-CM

## 2022-09-01 DIAGNOSIS — G47.33 OSA (OBSTRUCTIVE SLEEP APNEA): Primary | ICD-10-CM

## 2022-09-01 PROCEDURE — 99214 OFFICE O/P EST MOD 30 MIN: CPT | Performed by: NURSE PRACTITIONER

## 2022-09-01 NOTE — PROGRESS NOTES
CHIEF COMPLAINT  Follow-up for leg pain.    Subjective   Jesus Beckham is a 81 y.o. male  who presents for follow-up.  He has a history of back pain, leg pain.  Today his pain is 7/10VAS in severity.  He continues with Percocet 5/325 1-2/day. Generally only takes at bedtime.  He is reporting some constipation as a side effect. Not managed with prune juice, Miralax.  Has been given samples of Relistor in the past with some success.       He also continues with Lyrica which is managed by Dr. Hernandez.     Patient has history of spinal infection with surgery on L2-3 and L3-4. With a diagnosis of adhesive arachnoiditis. He is followed by Dr. Bailon    Patient underwent a positive spinal cord stimulator trial 7/1/2022 with Dr. Holguin.  He plans to move forward with permanent implant of a surgical paddle lead with Dr. Bailon.  For still waiting on vascular clearance, may need IVC filter placed before implant.    Back Pain  This is a chronic problem. The current episode started more than 1 year ago. The problem occurs daily. The problem has been waxing and waning since onset. The pain is present in the lumbar spine. The quality of the pain is described as aching and burning. The pain radiates to the left foot and right foot. The pain is at a severity of 7/10. The pain is moderate. The symptoms are aggravated by standing (walking). Associated symptoms include numbness and weakness. Pertinent negatives include no abdominal pain, chest pain, dysuria, fever or headaches. Risk factors include obesity and sedentary lifestyle. He has tried analgesics for the symptoms. The treatment provided mild relief.      PEG Assessment   What number best describes your pain on average in the past week?7  What number best describes how, during the past week, pain has interfered with your enjoyment of life?10  What number best describes how, during the past week, pain has interfered with your general activity?  6    The following  "portions of the patient's history were reviewed and updated as appropriate: allergies, current medications, past family history, past medical history, past social history, past surgical history and problem list.    Review of Systems   Constitutional: Positive for fatigue. Negative for fever.   HENT: Positive for congestion.    Eyes: Negative for visual disturbance.   Respiratory: Positive for shortness of breath. Negative for cough and wheezing.    Cardiovascular: Negative for chest pain.   Gastrointestinal: Positive for constipation. Negative for abdominal pain.   Genitourinary: Negative for difficulty urinating and dysuria.   Musculoskeletal: Positive for back pain.   Neurological: Positive for weakness and numbness. Negative for headaches.   Psychiatric/Behavioral: Positive for sleep disturbance. Negative for suicidal ideas. The patient is not nervous/anxious.      I have reviewed and confirmed the accuracy of the ROS as documented by the MA/LPN/RN DANNY Bardales    Vitals:    09/01/22 1444   BP: 116/70   Pulse: 71   Resp: 18   Temp: 97.7 °F (36.5 °C)   SpO2: 95%   Height: 176.5 cm (69.5\")   PainSc:   7   PainLoc: Leg     Objective   Physical Exam  Vitals and nursing note reviewed.   Constitutional:       General: He is not in acute distress.     Appearance: Normal appearance. He is obese.   Pulmonary:      Effort: Pulmonary effort is normal. No respiratory distress.   Musculoskeletal:      Lumbar back: Tenderness present. Positive right straight leg raise test and positive left straight leg raise test.   Skin:     General: Skin is warm and dry.   Neurological:      Mental Status: He is alert and oriented to person, place, and time.      Gait: Gait abnormal (motorized wheelchair).   Psychiatric:         Mood and Affect: Mood normal.         Behavior: Behavior normal.       Assessment & Plan   Diagnoses and all orders for this visit:    1. Chronic pain syndrome (Primary)    2. DDD (degenerative disc " disease), lumbar    3. Lumbar radiculopathy    4. Therapeutic opioid induced constipation  -     Methylnaltrexone Bromide (Relistor) 150 MG tablet; Take 3 tablets by mouth Daily.  Dispense: 90 tablet; Refill: 1    5. Encounter for long-term (current) use of high-risk medication      --- Sending Relistor into pharmacy for OIC  --- Continue Oxycodone. Refill not needed today. Patient appears stable with current regimen. No adverse effects. Regarding continuation of opioids, there is no evidence of aberrant behavior or any red flags.  The patient continues with appropriate response to opioid therapy. ADL's remain intact by self.   --- The urine drug screen confirmation from 3/14/2022 has been reviewed and the result is appropriate based on patient history and FAINA report  --- The patient signed an updated copy of the controlled substance agreement on 9/13/2021  --- Follow-up 2 months          FAINA REPORT  As part of the patient's treatment plan, I am prescribing controlled substances. The patient has been made aware of appropriate use of such medications, including potential risk of somnolence, limited ability to drive and/or work safely, and the potential for dependence or overdose. It has also been made clear that these medications are for use by this patient only, without concomitant use of alcohol or other substances unless prescribed.     Patient has completed prescribing agreement detailing terms of continued prescribing of controlled substances, including monitoring FAINA reports, urine drug screening, and pill counts if necessary. The patient is aware that inappropriate use will results in cessation of prescribing such medications.    As the clinician, I personally reviewed the FAINA from 9/1/2022 while the patient was in the office today.    History and physical exam exhibit continued safe and appropriate use of controlled substances.     Dictated utilizing Dragon dictation.     This document is intended  for medical expert use only. Reading of this document by patients and/or patient's family without participating medical staff guidance may result in misinterpretation and unintended morbidity.   Any interpretation of such data is the responsibility of the patient and/or family member responsible for the patient in concert with their primary or specialist providers, not to be left for sources of online searches such as [employer], [employer] or similar queries. Relying on these approaches to knowledge may result in misinterpretation, misguided goals of care and even death should patients or family members try recommendations outside of the realm of professional medical care in a supervised way.    Patient remained masked during entire encounter. No cough present. I donned a mask and eye protection throughout entire visit. Prior to donning mask and eye protection, hand hygiene was performed, as well as when it was doffed.  I was closer than 6 feet, but not for an extended period of time. No obvious exposure to any bodily fluids.

## 2022-09-02 ENCOUNTER — TELEPHONE (OUTPATIENT)
Dept: ENDOCRINOLOGY | Age: 82
End: 2022-09-02

## 2022-09-06 ENCOUNTER — TELEPHONE (OUTPATIENT)
Dept: SLEEP MEDICINE | Facility: HOSPITAL | Age: 82
End: 2022-09-06

## 2022-09-06 DIAGNOSIS — E78.5 DYSLIPIDEMIA: ICD-10-CM

## 2022-09-06 DIAGNOSIS — E55.9 VITAMIN D DEFICIENCY: ICD-10-CM

## 2022-09-06 DIAGNOSIS — E11.42 TYPE 2 DIABETES MELLITUS WITH PERIPHERAL NEUROPATHY: Primary | ICD-10-CM

## 2022-09-06 NOTE — TELEPHONE ENCOUNTER
Pt called for results. Faxed order to [facility] on [address]. Pt will f/u after he receives his machine.

## 2022-09-14 ENCOUNTER — LAB (OUTPATIENT)
Dept: ENDOCRINOLOGY | Age: 82
End: 2022-09-14

## 2022-09-14 DIAGNOSIS — E11.42 TYPE 2 DIABETES MELLITUS WITH PERIPHERAL NEUROPATHY: ICD-10-CM

## 2022-09-14 DIAGNOSIS — E78.5 DYSLIPIDEMIA: ICD-10-CM

## 2022-09-14 DIAGNOSIS — E55.9 VITAMIN D DEFICIENCY: ICD-10-CM

## 2022-09-15 LAB
25(OH)D3+25(OH)D2 SERPL-MCNC: 48.9 NG/ML (ref 30–100)
ALBUMIN SERPL-MCNC: 4.5 G/DL (ref 3.5–5.2)
ALBUMIN/GLOB SERPL: 2.8 G/DL
ALP SERPL-CCNC: 55 U/L (ref 39–117)
ALT SERPL-CCNC: 29 U/L (ref 1–41)
AST SERPL-CCNC: 33 U/L (ref 1–40)
BILIRUB SERPL-MCNC: 0.3 MG/DL (ref 0–1.2)
BUN SERPL-MCNC: 17 MG/DL (ref 8–23)
BUN/CREAT SERPL: 16.8 (ref 7–25)
CALCIUM SERPL-MCNC: 9.3 MG/DL (ref 8.6–10.5)
CHLORIDE SERPL-SCNC: 105 MMOL/L (ref 98–107)
CHOLEST SERPL-MCNC: 123 MG/DL (ref 0–200)
CO2 SERPL-SCNC: 28 MMOL/L (ref 22–29)
CREAT SERPL-MCNC: 1.01 MG/DL (ref 0.76–1.27)
EGFRCR-CYS SERPLBLD CKD-EPI 2021: 75.2 ML/MIN/1.73
GLOBULIN SER CALC-MCNC: 1.6 GM/DL
GLUCOSE SERPL-MCNC: 119 MG/DL (ref 65–99)
HBA1C MFR BLD: 6.9 % (ref 4.8–5.6)
HDLC SERPL-MCNC: 43 MG/DL (ref 40–60)
IMP & REVIEW OF LAB RESULTS: NORMAL
LDLC SERPL CALC-MCNC: 61 MG/DL (ref 0–100)
POTASSIUM SERPL-SCNC: 4.5 MMOL/L (ref 3.5–5.2)
PROT SERPL-MCNC: 6.1 G/DL (ref 6–8.5)
SODIUM SERPL-SCNC: 142 MMOL/L (ref 136–145)
TRIGL SERPL-MCNC: 105 MG/DL (ref 0–150)
TSH SERPL DL<=0.005 MIU/L-ACNC: 3.34 UIU/ML (ref 0.27–4.2)
VIT B12 SERPL-MCNC: >2000 PG/ML (ref 211–946)
VLDLC SERPL CALC-MCNC: 19 MG/DL (ref 5–40)

## 2022-09-20 NOTE — PROGRESS NOTES
Subjective   Jesus Beckham is a 81 y.o. male.     History of Present Illness  9/20 fu for dm 2, proteinuria, dyslipidemia, vitamin d def  / testing bs 2 x day / last dm eye exam 3/21/22 / last dm foot exam today with dr Hernandez        Patient is an 81-year-old male who came in for follow-up.  He is new to me.    He has known diabetes mellitus since 1998 and started on insulin in 2016.    He is on Toujeo 102 units every morning, Glyxambi 25/5 mg once a day and glipizide 10 mg twice a day.  He checks his blood sugar twice a day and fasting glucose runs .  Suppertime glucose around 170s.  He denies any severe hypoglycemia.  He denies any significant weight change.  Hemoglobin A1c done in September 2022 is 6.9%.    His last eye examination was in March 2022.  He has no retinopathy.  Urine microalbumin was elevated in May 2022 (urine was collected from a suprapubic catheter).  He is on lisinopril.  He has tingling, burning and pain in his toes and hands and is on Lyrica 100 mg 3 times daily.    He has hyperlipidemia and is on Crestor 10 mg/day, fenofibrate 145 mg/day and fish oil 1 capsule once a day.  He denies myalgia.  Lipid panel done in September 2022 are as follows: Cholesterol 123.  Triglycerides 105.  HDL 53.  LDL 61.    He has vitamin D deficiency and is on ergocalciferol 50,000 units once a week.  25 hydroxy vitamin D done in September 2022 is normal at 48.9 ng/mL.    He denies history of hypertension, heart attack or stroke.  He has history of paroxysmal atrial fibrillation.    He had of DVT of the lower extremities and is on Eliquis prescriber Dr. Burr.  He had pulmonary embolism in the past and had a vena caval filter placement which was later removed.        The following portions of the patient's history were reviewed and updated as appropriate: allergies, current medications, past family history, past medical history, past social history, past surgical history and problem list.    Review of Systems  "  Respiratory: Negative for shortness of breath.    Cardiovascular: Negative for chest pain and palpitations.   Gastrointestinal: Negative.    Endocrine: Negative for cold intolerance and heat intolerance.   Musculoskeletal: Negative for myalgias.   Neurological: Positive for numbness.     Vitals:    09/21/22 0803   BP: 126/72   Pulse: 80   Temp: 97.5 °F (36.4 °C)   SpO2: 97%   Weight: 128 kg (282 lb)   Height: 176.5 cm (69.5\")      Objective   Physical Exam  Constitutional:       General: He is not in acute distress.     Appearance: Normal appearance. He is not ill-appearing, toxic-appearing or diaphoretic.   Eyes:      General: No scleral icterus.        Right eye: No discharge.         Left eye: No discharge.      Extraocular Movements: Extraocular movements intact.      Conjunctiva/sclera: Conjunctivae normal.   Cardiovascular:      Rate and Rhythm: Normal rate and regular rhythm.      Heart sounds: Murmur heard.      Comments: Systolic ejection murmur at base  Pulmonary:      Effort: No respiratory distress.      Breath sounds: Normal breath sounds. No stridor. No rales.   Chest:      Chest wall: No tenderness.   Abdominal:      General: Bowel sounds are normal.      Palpations: Abdomen is soft.      Tenderness: There is no right CVA tenderness or left CVA tenderness.   Musculoskeletal:      Cervical back: Normal range of motion.   Neurological:      General: No focal deficit present.      Mental Status: He is alert and oriented to person, place, and time.      Comments: Decreased light touch on lower ext       Lab on 09/14/2022   Component Date Value Ref Range Status   • Vitamin B-12 09/14/2022 >2000 (A) 211 - 946 pg/mL Final    Results may be falsely increased if patient taking Biotin.   • TSH 09/14/2022 3.340  0.270 - 4.200 uIU/mL Final   • Total Cholesterol 09/14/2022 123  0 - 200 mg/dL Final    Comment: Cholesterol Reference Ranges  (U.S. Department of Health and Human Services ATP " III  Classifications)  Desirable          <200 mg/dL  Borderline High    200-239 mg/dL  High Risk          >240 mg/dL  Triglyceride Reference Ranges  (U.S. Department of Health and Human Services ATP III  Classifications)  Normal           <150 mg/dL  Borderline High  150-199 mg/dL  High             200-499 mg/dL  Very High        >500 mg/dL  HDL Reference Ranges  (U.S. Department of Health and Human Services ATP III  Classifications)  Low     <40 mg/dl (major risk factor for CHD)  High    >60 mg/dl ('negative' risk factor for CHD)  LDL Reference Ranges  (U.S. Department of Health and Human Services ATP III  Classifications)  Optimal          <100 mg/dL  Near Optimal     100-129 mg/dL  Borderline High  130-159 mg/dL  High             160-189 mg/dL  Very High        >189 mg/dL     • Triglycerides 09/14/2022 105  0 - 150 mg/dL Final   • HDL Cholesterol 09/14/2022 43  40 - 60 mg/dL Final   • VLDL Cholesterol Lance 09/14/2022 19  5 - 40 mg/dL Final   • LDL Chol Calc (NIH) 09/14/2022 61  0 - 100 mg/dL Final   • Hemoglobin A1C 09/14/2022 6.90 (A) 4.80 - 5.60 % Final    Comment: Hemoglobin A1C Ranges:  Increased Risk for Diabetes  5.7% to 6.4%  Diabetes                     >= 6.5%  Diabetic Goal                < 7.0%     • Glucose 09/14/2022 119 (A) 65 - 99 mg/dL Final   • BUN 09/14/2022 17  8 - 23 mg/dL Final   • Creatinine 09/14/2022 1.01  0.76 - 1.27 mg/dL Final   • EGFR Result 09/14/2022 75.2  >60.0 mL/min/1.73 Final    Comment: National Kidney Foundation and American Society of  Nephrology (ASN) Task Force recommended calculation based  on the Chronic Kidney Disease Epidemiology Collaboration  (CKD-EPI) equation refit without adjustment for race.  The GFR formula is only valid for adults with stable renal  function between ages 18 and 70.     • BUN/Creatinine Ratio 09/14/2022 16.8  7.0 - 25.0 Final   • Sodium 09/14/2022 142  136 - 145 mmol/L Final   • Potassium 09/14/2022 4.5  3.5 - 5.2 mmol/L Final   • Chloride  09/14/2022 105  98 - 107 mmol/L Final   • Total CO2 09/14/2022 28.0  22.0 - 29.0 mmol/L Final   • Calcium 09/14/2022 9.3  8.6 - 10.5 mg/dL Final   • Total Protein 09/14/2022 6.1  6.0 - 8.5 g/dL Final   • Albumin 09/14/2022 4.50  3.50 - 5.20 g/dL Final   • Globulin 09/14/2022 1.6  gm/dL Final   • A/G Ratio 09/14/2022 2.8  g/dL Final   • Total Bilirubin 09/14/2022 0.3  0.0 - 1.2 mg/dL Final   • Alkaline Phosphatase 09/14/2022 55  39 - 117 U/L Final   • AST (SGOT) 09/14/2022 33  1 - 40 U/L Final   • ALT (SGPT) 09/14/2022 29  1 - 41 U/L Final   • 25 Hydroxy, Vitamin D 09/14/2022 48.9  30.0 - 100.0 ng/ml Final    Comment: Results may be falsely increased if patient taking Biotin.  Reference Range for Total Vitamin D 25(OH)  Deficiency <20.0 ng/mL  Insufficiency 21-29 ng/mL  Sufficiency  ng/mL  Toxicity >100 ng/ml     • Interpretation 09/14/2022 Note   Final    Supplemental report is available.     Assessment & Plan   Diagnoses and all orders for this visit:    1. 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 Dyslipidemia    3. Vitamin D deficiency    4. Aortic valve sclerosis    5. Type 2 diabetes mellitus with other specified complication, with long-term current use of insulin ([facility])  -     Toujeo Max SoloStar 300 UNIT/ML solution pen-injector injection; 98 units every morning  Dispense: 29.4 mL; Refill: 2  -     glipizide (GLUCOTROL) 10 MG tablet; Take 1 tablet by mouth 2 (Two) Times a Day Before Meals.  Dispense: 180 tablet; Refill: 2      Decrease Toujeo to 98 units every morning.  Continue Glyxambi and glipizide.  Continue lisinopril 2.5 mg daily.  Continue Lyrica 100 mg/day.  Continue Crestor 10 mg/day, fenofibrate 145 mg/day and fish oil.  Continue ergocalciferol 50,000 units weekly.  Follow-up with Dr. Burr.  Flu vaccine next month.    Copy of my note sent to Dr. Magdy Ricardo, Dr. Saleh, Dr. Fernandez and Dr. Burr.    RTC 3 mos with JONH Fong NP.

## 2022-09-21 ENCOUNTER — OFFICE VISIT (OUTPATIENT)
Dept: ENDOCRINOLOGY | Age: 82
End: 2022-09-21

## 2022-09-21 VITALS
HEIGHT: 70 IN | SYSTOLIC BLOOD PRESSURE: 126 MMHG | BODY MASS INDEX: 40.37 KG/M2 | DIASTOLIC BLOOD PRESSURE: 72 MMHG | TEMPERATURE: 97.5 F | HEART RATE: 80 BPM | OXYGEN SATURATION: 97 % | WEIGHT: 282 LBS

## 2022-09-21 DIAGNOSIS — I35.8 AORTIC VALVE SCLEROSIS: ICD-10-CM

## 2022-09-21 DIAGNOSIS — E55.9 VITAMIN D DEFICIENCY: ICD-10-CM

## 2022-09-21 DIAGNOSIS — E11.69 TYPE 2 DIABETES MELLITUS WITH OTHER SPECIFIED COMPLICATION, WITH LONG-TERM CURRENT USE OF INSULIN: ICD-10-CM

## 2022-09-21 DIAGNOSIS — E78.5 DYSLIPIDEMIA: ICD-10-CM

## 2022-09-21 DIAGNOSIS — E11.42 TYPE 2 DIABETES MELLITUS WITH PERIPHERAL NEUROPATHY: Primary | ICD-10-CM

## 2022-09-21 DIAGNOSIS — Z79.4 TYPE 2 DIABETES MELLITUS WITH OTHER SPECIFIED COMPLICATION, WITH LONG-TERM CURRENT USE OF INSULIN: ICD-10-CM

## 2022-09-21 PROCEDURE — 99214 OFFICE O/P EST MOD 30 MIN: CPT | Performed by: INTERNAL MEDICINE

## 2022-09-21 RX ORDER — INSULIN GLARGINE 300 U/ML
INJECTION, SOLUTION SUBCUTANEOUS
Qty: 29.4 ML | Refills: 2 | Status: SHIPPED | OUTPATIENT
Start: 2022-09-21

## 2022-09-21 RX ORDER — GLIPIZIDE 10 MG/1
10 TABLET ORAL
Qty: 180 TABLET | Refills: 2 | Status: SHIPPED | OUTPATIENT
Start: 2022-09-21

## 2022-09-23 ENCOUNTER — TELEPHONE (OUTPATIENT)
Dept: PAIN MEDICINE | Facility: CLINIC | Age: 82
End: 2022-09-23

## 2022-09-23 DIAGNOSIS — Z79.899 ENCOUNTER FOR LONG-TERM (CURRENT) USE OF HIGH-RISK MEDICATION: ICD-10-CM

## 2022-09-23 RX ORDER — OXYCODONE HYDROCHLORIDE AND ACETAMINOPHEN 5; 325 MG/1; MG/1
1 TABLET ORAL 2 TIMES DAILY PRN
Qty: 50 TABLET | Refills: 0 | Status: SHIPPED | OUTPATIENT
Start: 2022-09-23 | End: 2022-11-10 | Stop reason: SDUPTHER

## 2022-09-23 NOTE — PROGRESS NOTES
Subjective   Jesus Beckham is a 80 y.o. male.     History of Present Illness     Chief Complaint:   Chief Complaint   Patient presents with   • Insomnia     MED REFILL    • Allergies       Jesus Beckham 80 y.o. male who presents today for Medical Management of the below listed issues and medication refills.  he has a problem list of   Patient Active Problem List   Diagnosis   • Gout   • Diabetic peripheral neuropathy (CMS/HCC)   • Dyslipidemia   • Essential hypertension   • PVC (premature ventricular contraction)   • Vitamin D deficiency   • Benign non-nodular prostatic hyperplasia without lower urinary tract symptoms   • Osteoarthritis   • Urge incontinence   • CELINA (obstructive sleep apnea)   • Acute gangrenous cholecystitis   • Discitis of lumbar region   • Paroxysmal atrial fibrillation (CMS/HCC)   • History of sepsis   • Hyperuricemia   • Chronic deep vein thrombosis (DVT) of right peroneal vein (CMS/HCC)   • Nausea, vomiting, and diarrhea   • Colitis presumed infectious   • Urinary tract infection in male   • DDD (degenerative disc disease), lumbar   • Bilateral leg weakness   • History of lumbar spinal fusion   • Carpal tunnel syndrome   • Adhesive arachnoiditis   • Heart murmur   • Chronic anticoagulation   • Diverticulosis   • Hematuria   • Lower obstructive uropathy   • Splenic lesion   • Suprapubic catheter dysfunction (CMS/HCC)   • Obesity (BMI 30-39.9)   • Aortic valve sclerosis   • Venous insufficiency of left leg   • Memory difficulties   .  Since the last visit, he has overall felt well.  he has been compliant with   Current Outpatient Medications:   •  fluticasone (FLONASE) 50 MCG/ACT nasal spray, 2 sprays into the nostril(s) as directed by provider Daily., Disp: 48 g, Rfl: 3  •  ACCU-CHEK FASTCLIX LANCETS misc, TEST BLOOD GLUCOSE TWICE DAILY, Disp: 204 each, Rfl: 1  •  allopurinol (ZYLOPRIM) 300 MG tablet, TAKE 1 TABLET EVERY DAY, Disp: 90 tablet, Rfl: 3  •  diphenhydrAMINE-acetaminophen  (TYLENOL PM EXTRA STRENGTH)  MG tablet per tablet, Take 2 tablets by mouth At Night As Needed for Sleep., Disp: , Rfl:   •  donepezil (Aricept) 5 MG tablet, Take 1 tablet by mouth Every Night., Disp: 180 tablet, Rfl: 1  •  ELIQUIS 5 MG tablet tablet, TAKE 1 TABLET BY MOUTH EVERY 12 HOURS., Disp: 180 tablet, Rfl: 3  •  fenofibrate (TRICOR) 145 MG tablet, Take 1 tablet by mouth Daily., Disp: 90 tablet, Rfl: 1  •  glipizide (GLUCOTROL) 10 MG tablet, Take 1 tablet by mouth 2 (Two) Times a Day Before Meals., Disp: 180 tablet, Rfl: 1  •  glucose blood (Accu-Chek SmartView) test strip, CHECK BLOOD GLUCOSE TWICE DAILY, Disp: 200 each, Rfl: 0  •  Glyxambi 25-5 MG tablet, Take 1 tablet by mouth Daily With Breakfast., Disp: 90 tablet, Rfl: 1  •  Insulin Pen Needle (Droplet Pen Needles) 32G X 4 MM misc, USING 3 TIMES DAILY, Disp: 300 each, Rfl: 1  •  L-Methylfolate-B6-B12 3-35-2 MG tablet, Take 1 tablet by mouth 2 (Two) Times a Day., Disp: 180 tablet, Rfl: 1  •  melatonin 5 MG tablet tablet, Take 5 mg by mouth Every Night., Disp: , Rfl:   •  Multiple Vitamins-Minerals (MULTIVITAMIN ADULT PO), Take 1 tablet by mouth Daily., Disp: , Rfl:   •  mupirocin (BACTROBAN) 2 % ointment, , Disp: , Rfl:   •  Myrbetriq 50 MG tablet sustained-release 24 hour 24 hr tablet, , Disp: , Rfl:   •  Omega-3 Fatty Acids (FISH OIL) 1200 MG capsule capsule, Take 2,000 mg by mouth 2 (Two) Times a Day With Meals. HOLD FOR SURGERY , Disp: , Rfl:   •  polyethylene glycol (MiraLax) 17 GM/SCOOP powder, Take 17 g by mouth Daily. Once a day, Disp: , Rfl:   •  pregabalin (LYRICA) 100 MG capsule, Take 1 capsule by mouth 3 (Three) Times a Day., Disp: 270 capsule, Rfl: 0  •  rosuvastatin (CRESTOR) 10 MG tablet, Take 1 tablet by mouth Daily., Disp: 90 tablet, Rfl: 1  •  sodium chloride (NS) 0.9 % irrigation,  MLS TO IRRIGATE CATHERTER QD PRN, Disp: , Rfl:   •  tamsulosin (FLOMAX) 0.4 MG capsule 24 hr capsule, Take 1 capsule by mouth Daily. (Patient  "taking differently: Take 0.4 mg by mouth 2 (Two) Times a Day.), Disp: 30 capsule, Rfl:   •  Jovanni Hernandez SoloStar 300 UNIT/ML solution pen-injector injection, Inject 100 Units under the skin into the appropriate area as directed Daily., Disp: 31 mL, Rfl: 1  •  traZODone (DESYREL) 150 MG tablet, Take 3 tablets by mouth Every Night., Disp: 270 tablet, Rfl: 3  •  vitamin C (ASCORBIC ACID) 500 MG tablet, Take 500 mg by mouth Daily., Disp: , Rfl:   •  vitamin D (ERGOCALCIFEROL) 1.25 MG (72539 UT) capsule capsule, TAKE 1 CAPSULE TWICE WEEKLY, Disp: 26 capsule, Rfl: 0.  he denies medication side effects.    All of the other chronic condition(s) listed above are stable w/o issues.    /71   Pulse 72   Temp 97.6 °F (36.4 °C) (Oral)   Resp 16   Ht 177.8 cm (70\")   Wt 118 kg (260 lb) Comment: ARIADNA  BMI 37.31 kg/m²     Results for orders placed or performed in visit on 03/19/21   Uric Acid    Specimen: Blood   Result Value Ref Range    Uric Acid 3.0 (L) 3.8 - 8.4 mg/dL   Vitamin D 25 Hydroxy    Specimen: Blood   Result Value Ref Range    25 Hydroxy, Vitamin D 66.5 30.0 - 100.0 ng/mL   C-Peptide    Specimen: Blood   Result Value Ref Range    C-Peptide 2.9 1.1 - 4.4 ng/mL   Hemoglobin A1c    Specimen: Blood   Result Value Ref Range    Hemoglobin A1C 6.3 (H) 4.8 - 5.6 %   Lipid Panel    Specimen: Blood   Result Value Ref Range    Total Cholesterol 131 100 - 199 mg/dL    Triglycerides 105 0 - 149 mg/dL    HDL Cholesterol 50 >39 mg/dL    VLDL Cholesterol Lance 19 5 - 40 mg/dL    LDL Chol Calc (NIH) 62 0 - 99 mg/dL   Comprehensive Metabolic Panel   Result Value Ref Range    Glucose 125 (H) 65 - 99 mg/dL    BUN 22 8 - 27 mg/dL    Creatinine 1.04 0.76 - 1.27 mg/dL    eGFR Non African Am 68 >59 mL/min/1.73    eGFR African Am 79 >59 mL/min/1.73    BUN/Creatinine Ratio 21 10 - 24    Sodium 144 134 - 144 mmol/L    Potassium 4.7 3.5 - 5.2 mmol/L    Chloride 108 (H) 96 - 106 mmol/L    Total CO2 22 20 - 29 mmol/L    Calcium 9.6 " 8.6 - 10.2 mg/dL    Total Protein 6.6 6.0 - 8.5 g/dL    Albumin 4.5 3.7 - 4.7 g/dL    Globulin 2.1 1.5 - 4.5 g/dL    A/G Ratio 2.1 1.2 - 2.2    Total Bilirubin 0.3 0.0 - 1.2 mg/dL    Alkaline Phosphatase 61 39 - 117 IU/L    AST (SGOT) 30 0 - 40 IU/L    ALT (SGPT) 29 0 - 44 IU/L   Cardiovascular Risk Assessment   Result Value Ref Range    Interpretation Note    Diabetes Patient Education   Result Value Ref Range    PDF Image Not applicable    Specimen Status Report   Result Value Ref Range    Specimen Status CANCELED            The following portions of the patient's history were reviewed and updated as appropriate: allergies, current medications, past family history, past medical history, past social history, past surgical history and problem list.    Review of Systems   Constitutional: Negative for activity change, chills and fever.   Respiratory: Negative for cough.    Cardiovascular: Negative for chest pain.   Psychiatric/Behavioral: Negative for dysphoric mood.       Objective   Physical Exam  Constitutional:       General: He is not in acute distress.     Appearance: He is well-developed.   Cardiovascular:      Rate and Rhythm: Normal rate and regular rhythm.      Heart sounds: Murmur heard.   Systolic murmur is present with a grade of 2/6.     Pulmonary:      Effort: Pulmonary effort is normal.      Breath sounds: Normal breath sounds.   Neurological:      Mental Status: He is alert and oriented to person, place, and time.   Psychiatric:         Behavior: Behavior normal.         Thought Content: Thought content normal.     Labs from endocrine reviewed by me at today's visit.    Assessment/Plan   Diagnoses and all orders for this visit:    1. Primary insomnia (Primary)  -     traZODone (DESYREL) 150 MG tablet; Take 3 tablets by mouth Every Night.  Dispense: 270 tablet; Refill: 3    2. Chronic seasonal allergic rhinitis due to pollen  -     fluticasone (FLONASE) 50 MCG/ACT nasal spray; 2 sprays into the  nostril(s) as directed by provider Daily.  Dispense: 48 g; Refill: 3                Stable

## 2022-09-29 DIAGNOSIS — E11.42 DIABETIC PERIPHERAL NEUROPATHY: ICD-10-CM

## 2022-09-30 RX ORDER — PREGABALIN 100 MG/1
CAPSULE ORAL
Qty: 270 CAPSULE | Refills: 0 | Status: SHIPPED | OUTPATIENT
Start: 2022-09-30 | End: 2023-01-03

## 2022-10-21 ENCOUNTER — TELEPHONE (OUTPATIENT)
Dept: NEUROSURGERY | Facility: CLINIC | Age: 82
End: 2022-10-21

## 2022-10-21 ENCOUNTER — HOSPITAL ENCOUNTER (OUTPATIENT)
Facility: HOSPITAL | Age: 82
Setting detail: HOSPITAL OUTPATIENT SURGERY
End: 2022-10-21
Attending: NEUROLOGICAL SURGERY | Admitting: NEUROLOGICAL SURGERY

## 2022-10-21 NOTE — TELEPHONE ENCOUNTER
I RETURNED CALL AND S/W PT AND PT'S SPOUSE. I GAVE THEM PAT DATE/TIME OF 11/15/22 10:30AM AT [facility] AND SURGERY DATE/TIME OF STAGE 1 11/17/22 SURGERY START TIME OF 8AM WITH 6AM ARRIVAL AND STAGE 2 AS IN PATIENT FOR 11/18/22 AT 8AM. PT ADVISED TO STOP ALL NSAIDS/ASPIRINS 5 DAYS PRIOR TO SURGERY AND PER DR HOWARD STOP APIXABAN 3 DAYS PRIOR TO SURGERY. I ADVISED PT THAT I WILL MAIL ALL OF THE SURGERY INFORMATION TO HIM. PT AND PT'S SPOUSE VOICED UNDERSTANDING.

## 2022-11-04 ENCOUNTER — OFFICE (OUTPATIENT)
Dept: URBAN - METROPOLITAN AREA CLINIC 76 | Facility: CLINIC | Age: 82
End: 2022-11-04

## 2022-11-04 VITALS
HEIGHT: 70 IN | DIASTOLIC BLOOD PRESSURE: 66 MMHG | WEIGHT: 282 LBS | OXYGEN SATURATION: 93 % | HEART RATE: 80 BPM | SYSTOLIC BLOOD PRESSURE: 132 MMHG

## 2022-11-04 DIAGNOSIS — K59.00 CONSTIPATION, UNSPECIFIED: ICD-10-CM

## 2022-11-04 DIAGNOSIS — Z86.010 PERSONAL HISTORY OF COLONIC POLYPS: ICD-10-CM

## 2022-11-04 DIAGNOSIS — Z79.01 LONG TERM (CURRENT) USE OF ANTICOAGULANTS: ICD-10-CM

## 2022-11-04 DIAGNOSIS — K57.30 DIVERTICULOSIS OF LARGE INTESTINE WITHOUT PERFORATION OR ABS: ICD-10-CM

## 2022-11-04 DIAGNOSIS — Z83.71 FAMILY HISTORY OF COLONIC POLYPS: ICD-10-CM

## 2022-11-04 PROCEDURE — 99204 OFFICE O/P NEW MOD 45 MIN: CPT | Performed by: INTERNAL MEDICINE

## 2022-11-04 RX ORDER — NALOXEGOL OXALATE 25 MG/1
25 TABLET, FILM COATED ORAL
Qty: 30 | Refills: 11 | Status: COMPLETED
Start: 2022-11-04 | End: 2023-04-14

## 2022-11-07 DIAGNOSIS — E11.69 TYPE 2 DIABETES MELLITUS WITH OTHER SPECIFIED COMPLICATION, WITH LONG-TERM CURRENT USE OF INSULIN: ICD-10-CM

## 2022-11-07 DIAGNOSIS — Z79.4 TYPE 2 DIABETES MELLITUS WITH OTHER SPECIFIED COMPLICATION, WITH LONG-TERM CURRENT USE OF INSULIN: ICD-10-CM

## 2022-11-07 RX ORDER — MECOBAL/LEVOMEFOLAT CA/B6 PHOS 2-3-35 MG
1 TABLET ORAL 2 TIMES DAILY
Qty: 180 TABLET | Refills: 1 | Status: SHIPPED | OUTPATIENT
Start: 2022-11-07 | End: 2023-03-27

## 2022-11-10 ENCOUNTER — OFFICE VISIT (OUTPATIENT)
Dept: PAIN MEDICINE | Facility: CLINIC | Age: 82
End: 2022-11-10

## 2022-11-10 VITALS
HEART RATE: 75 BPM | BODY MASS INDEX: 41.05 KG/M2 | SYSTOLIC BLOOD PRESSURE: 124 MMHG | HEIGHT: 70 IN | TEMPERATURE: 97 F | OXYGEN SATURATION: 97 % | DIASTOLIC BLOOD PRESSURE: 74 MMHG

## 2022-11-10 DIAGNOSIS — Z79.899 ENCOUNTER FOR LONG-TERM (CURRENT) USE OF HIGH-RISK MEDICATION: ICD-10-CM

## 2022-11-10 DIAGNOSIS — M54.16 LUMBAR RADICULOPATHY: ICD-10-CM

## 2022-11-10 DIAGNOSIS — M51.36 DDD (DEGENERATIVE DISC DISEASE), LUMBAR: ICD-10-CM

## 2022-11-10 DIAGNOSIS — T40.2X5A THERAPEUTIC OPIOID INDUCED CONSTIPATION: ICD-10-CM

## 2022-11-10 DIAGNOSIS — G03.9 ADHESIVE ARACHNOIDITIS: ICD-10-CM

## 2022-11-10 DIAGNOSIS — F51.01 PRIMARY INSOMNIA: Chronic | ICD-10-CM

## 2022-11-10 DIAGNOSIS — G89.4 CHRONIC PAIN SYNDROME: Primary | ICD-10-CM

## 2022-11-10 DIAGNOSIS — K59.03 THERAPEUTIC OPIOID INDUCED CONSTIPATION: ICD-10-CM

## 2022-11-10 LAB
POC AMPHETAMINES: NEGATIVE
POC BARBITURATES: NEGATIVE
POC BENZODIAZEPHINES: NEGATIVE
POC COCAINE: NEGATIVE
POC METHADONE: NEGATIVE
POC METHAMPHETAMINE SCREEN URINE: NEGATIVE
POC OPIATES: NEGATIVE
POC OXYCODONE: POSITIVE
POC PHENCYCLIDINE: NEGATIVE
POC PROPOXYPHENE: NEGATIVE
POC THC: NEGATIVE
POC TRICYCLIC ANTIDEPRESSANTS: NEGATIVE

## 2022-11-10 PROCEDURE — 80305 DRUG TEST PRSMV DIR OPT OBS: CPT | Performed by: NURSE PRACTITIONER

## 2022-11-10 PROCEDURE — 99214 OFFICE O/P EST MOD 30 MIN: CPT | Performed by: NURSE PRACTITIONER

## 2022-11-10 RX ORDER — OXYCODONE HYDROCHLORIDE AND ACETAMINOPHEN 5; 325 MG/1; MG/1
1 TABLET ORAL 2 TIMES DAILY PRN
Qty: 50 TABLET | Refills: 0 | Status: ON HOLD | OUTPATIENT
Start: 2022-11-10 | End: 2022-11-20 | Stop reason: SDUPTHER

## 2022-11-10 RX ORDER — TRAZODONE HYDROCHLORIDE 150 MG/1
150 TABLET ORAL NIGHTLY
Qty: 90 TABLET | Refills: 3 | OUTPATIENT
Start: 2022-11-10

## 2022-11-10 RX ORDER — NALOXEGOL OXALATE 25 MG/1
1 TABLET, FILM COATED ORAL DAILY
COMMUNITY
Start: 2022-11-04 | End: 2022-11-15

## 2022-11-10 NOTE — TELEPHONE ENCOUNTER
Caller: Mya Beckham    Relationship: Emergency Contact    Best call back number: [phone]    Requested Prescriptions:   Requested Prescriptions     Pending Prescriptions Disp Refills   • traZODone (DESYREL) 150 MG tablet 90 tablet 3     Sig: Take 1 tablet by mouth Every Night.        Pharmacy where request should be sent: [facility] - [place] [address] [facility] [phone] [facility] [phone] FX     Additional details provided by patient: PATIENT TAKES TWO A NIGHT, DR MILES ADVISED HE COULD TAKE UP TO THREE. PLEASE UPDATE PRESCRIPTION. PATIENT HAS FIVE DAY SUPPLY LEFT.     PATIENT IS ALSO REQUESTING A TWO WEEK SUPPLY BE SENT TO LOCAL PHARMACY, HE WILL BE OUT BEFORE IT ARRIVES FROM THE MAIL ORDER PHARMACY.     [facility] [id number] - [address] AT [facility] [phone] [facility] [phone] FX    Does the patient have less than a 3 day supply:  [] Yes  [x] No    Bart Malin Rep   11/10/22 14:25 EST

## 2022-11-10 NOTE — PROGRESS NOTES
CHIEF COMPLAINT  F/U leg pain- patient states that his pain has remained the same since his last visit.     Subjective   Jesus Beckham is a 82 y.o. male  who presents for follow-up.  He has a history of back pain, leg pain.  Today his pain is 7/10VAS in severity.  He continues with Percocet 5/325 1-2/day. Generally only takes at bedtime.  He is reporting some constipation as a side effect. Not managed with prune juice, Miralax.  Currently taking Movantik for OIC which is helping.  Insurance denied Relistor.      He also continues with Lyrica which is managed by Dr. Hernandez.      Patient has history of spinal infection with surgery on L2-3 and L3-4. With a diagnosis of adhesive arachnoiditis. He is followed by Dr. Bailon     Patient underwent a positive spinal cord stimulator trial 7/1/2022 with Dr. Holguin.  He plans to move forward with permanent implant of a surgical paddle lead with Dr. Bailon.  Scheduled for this 11/17/2022.      Back Pain  This is a chronic problem. The current episode started more than 1 year ago. The problem occurs daily. The problem has been waxing and waning since onset. The pain is present in the lumbar spine. The quality of the pain is described as aching and burning. The pain radiates to the left foot and right foot. The pain is at a severity of 7/10. The pain is moderate. The symptoms are aggravated by standing (walking). Associated symptoms include abdominal pain, headaches, numbness and weakness. Pertinent negatives include no chest pain, dysuria or fever. Risk factors include obesity and sedentary lifestyle. He has tried analgesics for the symptoms. The treatment provided mild relief.      PEG Assessment   What number best describes your pain on average in the past week?7  What number best describes how, during the past week, pain has interfered with your enjoyment of life?10  What number best describes how, during the past week, pain has interfered with your general  "activity?  10    The following portions of the patient's history were reviewed and updated as appropriate: allergies, current medications, past family history, past medical history, past social history, past surgical history and problem list.    Review of Systems   Constitutional: Positive for fatigue. Negative for activity change, chills and fever.   HENT: Positive for congestion.    Eyes: Negative for visual disturbance.   Respiratory: Positive for shortness of breath. Negative for chest tightness.    Cardiovascular: Negative for chest pain.   Gastrointestinal: Positive for abdominal pain. Negative for constipation and diarrhea.   Genitourinary: Positive for difficulty urinating. Negative for dysuria.   Musculoskeletal: Negative for back pain.   Neurological: Positive for dizziness (in the am when rising), weakness, light-headedness, numbness and headaches.   Psychiatric/Behavioral: Positive for agitation and sleep disturbance. The patient is nervous/anxious.      I have reviewed and confirmed the accuracy of the ROS as documented by the MA/LPN/RN DANNY Bardales    Vitals:    11/10/22 0937   BP: 124/74   Pulse: 75   Temp: 97 °F (36.1 °C)   SpO2: 97%   Height: 176.5 cm (69.5\")   PainSc:   7   PainLoc: Foot     Objective   Physical Exam  Vitals and nursing note reviewed.   Constitutional:       General: He is not in acute distress.     Appearance: Normal appearance. He is obese.   Pulmonary:      Effort: Pulmonary effort is normal. No respiratory distress.   Musculoskeletal:      Lumbar back: Tenderness and bony tenderness present.   Skin:     General: Skin is warm and dry.   Neurological:      Mental Status: He is alert and oriented to person, place, and time.      Gait: Gait abnormal (motorized wheelchair).   Psychiatric:         Mood and Affect: Mood normal.         Behavior: Behavior normal.       Assessment & Plan   Diagnoses and all orders for this visit:    1. Chronic pain syndrome (Primary)    2. " DDD (degenerative disc disease), lumbar    3. Lumbar radiculopathy    4. Therapeutic opioid induced constipation    5. Adhesive arachnoiditis    6. Encounter for long-term (current) use of high-risk medication      --- Refill Percocet. Patient appears stable with current regimen. No adverse effects. Regarding continuation of opioids, there is no evidence of aberrant behavior or any red flags.  The patient continues with appropriate response to opioid therapy. ADL's remain intact by self.   --- Routine UDS in office today as part of monitoring requirements for controlled substances.  The specimen was viewed and the immunoassay result reviewed and is +OXY.  This specimen will be sent to [facility] laboratory for confirmation.     --- The patient signed an updated copy of the controlled substance agreement on 11/10/2022  --- Follow-up 2 months/sooner if needed          FAINA REPORT  As part of the patient's treatment plan, I am prescribing controlled substances. The patient has been made aware of appropriate use of such medications, including potential risk of somnolence, limited ability to drive and/or work safely, and the potential for dependence or overdose. It has also been made clear that these medications are for use by this patient only, without concomitant use of alcohol or other substances unless prescribed.     Patient has completed prescribing agreement detailing terms of continued prescribing of controlled substances, including monitoring FAINA reports, urine drug screening, and pill counts if necessary. The patient is aware that inappropriate use will results in cessation of prescribing such medications.    As the clinician, I personally reviewed the FAINA from 11/10/2022 while the patient was in the office today.    History and physical exam exhibit continued safe and appropriate use of controlled substances.    Dictated utilizing Dragon dictation.     Patient remained masked during entire encounter.  No cough present. I donned a mask and eye protection throughout entire visit. Prior to donning mask and eye protection, hand hygiene was performed, as well as when it was doffed.  I was closer than 6 feet, but not for an extended period of time. No obvious exposure to any bodily fluids.

## 2022-11-15 ENCOUNTER — TELEPHONE (OUTPATIENT)
Dept: FAMILY MEDICINE CLINIC | Facility: CLINIC | Age: 82
End: 2022-11-15

## 2022-11-15 ENCOUNTER — PRE-ADMISSION TESTING (OUTPATIENT)
Dept: PREADMISSION TESTING | Facility: HOSPITAL | Age: 82
End: 2022-11-15

## 2022-11-15 VITALS
WEIGHT: 270 LBS | HEART RATE: 78 BPM | SYSTOLIC BLOOD PRESSURE: 124 MMHG | TEMPERATURE: 98.5 F | BODY MASS INDEX: 38.65 KG/M2 | RESPIRATION RATE: 16 BRPM | HEIGHT: 70 IN | OXYGEN SATURATION: 96 % | DIASTOLIC BLOOD PRESSURE: 76 MMHG

## 2022-11-15 DIAGNOSIS — F51.01 PRIMARY INSOMNIA: Chronic | ICD-10-CM

## 2022-11-15 LAB
ANION GAP SERPL CALCULATED.3IONS-SCNC: 8.3 MMOL/L (ref 5–15)
BUN SERPL-MCNC: 20 MG/DL (ref 8–23)
BUN/CREAT SERPL: 16.1 (ref 7–25)
CALCIUM SPEC-SCNC: 9.1 MG/DL (ref 8.6–10.5)
CHLORIDE SERPL-SCNC: 96 MMOL/L (ref 98–107)
CO2 SERPL-SCNC: 28.7 MMOL/L (ref 22–29)
CREAT SERPL-MCNC: 1.24 MG/DL (ref 0.76–1.27)
DEPRECATED RDW RBC AUTO: 44.3 FL (ref 37–54)
EGFRCR SERPLBLD CKD-EPI 2021: 58.4 ML/MIN/1.73
ERYTHROCYTE [DISTWIDTH] IN BLOOD BY AUTOMATED COUNT: 13.7 % (ref 12.3–15.4)
GLUCOSE SERPL-MCNC: 197 MG/DL (ref 65–99)
HCT VFR BLD AUTO: 40.2 % (ref 37.5–51)
HGB BLD-MCNC: 13.7 G/DL (ref 13–17.7)
MCH RBC QN AUTO: 30.2 PG (ref 26.6–33)
MCHC RBC AUTO-ENTMCNC: 34.1 G/DL (ref 31.5–35.7)
MCV RBC AUTO: 88.7 FL (ref 79–97)
PLATELET # BLD AUTO: 145 10*3/MM3 (ref 140–450)
PMV BLD AUTO: 10.8 FL (ref 6–12)
POTASSIUM SERPL-SCNC: 4.8 MMOL/L (ref 3.5–5.2)
RBC # BLD AUTO: 4.53 10*6/MM3 (ref 4.14–5.8)
SODIUM SERPL-SCNC: 133 MMOL/L (ref 136–145)
WBC NRBC COR # BLD: 5 10*3/MM3 (ref 3.4–10.8)

## 2022-11-15 PROCEDURE — 85027 COMPLETE CBC AUTOMATED: CPT

## 2022-11-15 PROCEDURE — 36415 COLL VENOUS BLD VENIPUNCTURE: CPT

## 2022-11-15 PROCEDURE — 80048 BASIC METABOLIC PNL TOTAL CA: CPT

## 2022-11-15 RX ORDER — TRAZODONE HYDROCHLORIDE 150 MG/1
150 TABLET ORAL NIGHTLY
Qty: 14 TABLET | Refills: 3 | Status: ON HOLD | OUTPATIENT
Start: 2022-11-15 | End: 2022-11-18 | Stop reason: SDUPTHER

## 2022-11-15 RX ORDER — GLUCOSAMINE/CHONDR SU A SOD 750-600 MG
2 TABLET ORAL DAILY
COMMUNITY

## 2022-11-15 RX ORDER — TRAZODONE HYDROCHLORIDE 150 MG/1
150 TABLET ORAL NIGHTLY
Qty: 14 TABLET | Refills: 0 | Status: CANCELLED | OUTPATIENT
Start: 2022-11-15

## 2022-11-15 NOTE — DISCHARGE INSTRUCTIONS
Take the following medications the morning of surgery: ALLOPURINOL, PERCOCET, LYRICA     ARRIVAL TIME: 600AM      If you are on prescription narcotic pain medication to control your pain you may also take that medication the morning of surgery.    General Instructions:  Do not eat solid food after midnight the night before surgery.  You may drink clear liquids day of surgery but must stop at least one hour before your hospital arrival time.  It is beneficial for you to have a clear drink that contains carbohydrates the day of surgery.  We suggest a 12 to 20 ounce bottle of Gatorade or Powerade for non-diabetic patients or a 12 to 20 ounce bottle of G2 or Powerade Zero for diabetic patients. (Pediatric patients, are not advised to drink a 12 to 20 ounce carbohydrate drink)    Clear liquids are liquids you can see through.  Nothing red in color.     Plain water                               Sports drinks  Sodas                                   Gelatin (Jell-O)  Fruit juices without pulp such as white grape juice and apple juice  Popsicles that contain no fruit or yogurt  Tea or coffee (no cream or milk added)  Gatorade / Powerade  G2 / Powerade Zero    Patients who avoid smoking, chewing tobacco and alcohol for 4 weeks prior to surgery have a reduced risk of post-operative complications.  Quit smoking as many days before surgery as you can.  Do not smoke, use chewing tobacco or drink alcohol the day of surgery.   If applicable bring your C-PAP/ BI-PAP machine.  Bring any papers given to you in the doctor’s office.  Wear clean comfortable clothes.  Do not wear contact lenses, false eyelashes or make-up.  Bring a case for your glasses.   Bring crutches or walker if applicable.  Remove all piercings.  Leave jewelry and any other valuables at home.  Hair extensions with metal clips must be removed prior to surgery.  The Pre-Admission Testing nurse will instruct you to bring medications if unable to obtain an accurate  list in Pre-Admission Testing.     Preventing a Surgical Site Infection:  For 2 to 3 days before surgery, avoid shaving with a razor because the razor can irritate skin and make it easier to develop an infection.    Any areas of open skin can increase the risk of a post-operative wound infection by allowing bacteria to enter and travel throughout the body.  Notify your surgeon if you have any skin wounds / rashes even if it is not near the expected surgical site.  The area will need assessed to determine if surgery should be delayed until it is healed.  The night prior to surgery shower using a fresh bar of anti-bacterial soap (such as Dial) and clean washcloth.  Sleep in a clean bed with clean clothing.  Do not allow pets to sleep with you.  Shower on the morning of surgery using a fresh bar of anti-bacterial soap (such as Dial) and clean washcloth.  Dry with a clean towel and dress in clean clothing.  Ask your surgeon if you will be receiving antibiotics prior to surgery.  Make sure you, your family, and all healthcare providers clean their hands with soap and water or an alcohol based hand  before caring for you or your wound.    Day of surgery:  Your arrival time is approximately two hours before your scheduled surgery time.  Upon arrival, a Pre-op nurse and Anesthesiologist will review your health history, obtain vital signs, and answer questions you may have.  The only belongings needed at this time will be a list of your home medications and if applicable your C-PAP/BI-PAP machine.  A Pre-op nurse will start an IV and you may receive medication in preparation for surgery, including something to help you relax.     Please be aware that surgery does come with discomfort.  We want to make every effort to control your discomfort so please discuss any uncontrolled symptoms with your nurse.   Your doctor will most likely have prescribed pain medications.      If you are going home after surgery you will  receive individualized written care instructions before being discharged.  A responsible adult must drive you to and from the hospital on the day of your surgery and stay with you for 24 hours.  Discharge prescriptions can be filled by the hospital pharmacy during regular pharmacy hours.  If you are having surgery late in the day/evening your prescription may be e-prescribed to your pharmacy.  Please verify your pharmacy hours or chose a 24 hour pharmacy to avoid not having access to your prescription because your pharmacy has closed for the day.    If you are staying overnight following surgery, you will be transported to your hospital room following the recovery period.  [facility] has all private rooms.    If you have any questions please call Pre-Admission Testing at [phone].  Deductibles and co-payments are collected on the day of service. Please be prepared to pay the required co-pay, deductible or deposit on the day of service as defined by your plan.    Call your surgeon immediately if you experience any of the following symptoms:  Sore Throat  Shortness of Breath or difficulty breathing  Cough  Chills  Body soreness or muscle pain  Headache  Fever  New loss of taste or smell  Do not arrive for your surgery ill.  Your procedure will need to be rescheduled to another time.  You will need to call your physician before the day of surgery to avoid any unnecessary exposure to hospital staff as well as other patients.     CHLORHEXIDINE CLOTH INSTRUCTIONS  The morning of surgery follow these instructions using the Chlorhexidine cloths you've been given.  These steps reduce bacteria on the body.  Do not use the cloths near your eyes, ears mouth, genitalia or on open wounds.  Throw the cloths away after use but do not try to flush them down a toilet.      Open and remove one cloth at a time from the package.    Leave the cloth unfolded and begin the bathing.  Massage the skin with the cloths  using gentle pressure to remove bacteria.  Do not scrub harshly.   Follow the steps below with one 2% CHG cloth per area (6 total cloths).  One cloth for neck, shoulders and chest.  One cloth for both arms, hands, fingers and underarms (do underarms last).  One cloth for the abdomen followed by groin.  One cloth for right leg and foot including between the toes.  One cloth for left leg and foot including between the toes.  The last cloth is to be used for the back of the neck, back and buttocks.    Allow the CHG to air dry 3 minutes on the skin which will give it time to work and decrease the chance of irritation.  The skin may feel sticky until it is dry.  Do not rinse with water or any other liquid or you will lose the beneficial effects of the CHG.  If mild skin irritation occurs, do rinse the skin to remove the CHG.  Report this to the nurse at time of admission.  Do not apply lotions, creams, ointments, deodorants or perfumes after using the clothes. Dress in clean clothes before coming to the hospital.

## 2022-11-15 NOTE — TELEPHONE ENCOUNTER
Caller: Mya Beckham    Relationship: Emergency Contact    Best call back number: [phone]    What is the best time to reach you: ANY    Who are you requesting to speak with (clinical staff, provider,  specific staff member): CLINICAL    What was the call regarding: PATIENT'S WIFE CALLED TO CHECK ON THE REQUEST FOR A SHORT SUPPLY OF HER HUSBANDS traZODone (DESYREL) 150 MG tablet  14 DAY SUPPLY TO [employer] # [id number].  [facility]    Do you require a callback: YES.

## 2022-11-16 RX ORDER — LANCETS
EACH MISCELLANEOUS
Qty: 500 EACH | Refills: 4 | Status: SHIPPED | OUTPATIENT
Start: 2022-11-16 | End: 2022-12-27 | Stop reason: SDUPTHER

## 2022-11-17 ENCOUNTER — ANESTHESIA EVENT (OUTPATIENT)
Dept: PERIOP | Facility: HOSPITAL | Age: 82
End: 2022-11-17

## 2022-11-17 ENCOUNTER — ANESTHESIA (OUTPATIENT)
Dept: PERIOP | Facility: HOSPITAL | Age: 82
End: 2022-11-17

## 2022-11-17 ENCOUNTER — APPOINTMENT (OUTPATIENT)
Dept: GENERAL RADIOLOGY | Facility: HOSPITAL | Age: 82
End: 2022-11-17

## 2022-11-17 ENCOUNTER — HOSPITAL ENCOUNTER (OUTPATIENT)
Facility: HOSPITAL | Age: 82
Discharge: HOME | End: 2022-11-20
Attending: NEUROLOGICAL SURGERY | Admitting: NEUROLOGICAL SURGERY

## 2022-11-17 DIAGNOSIS — G03.9 ADHESIVE ARACHNOIDITIS: ICD-10-CM

## 2022-11-17 DIAGNOSIS — Z79.899 ENCOUNTER FOR LONG-TERM (CURRENT) USE OF HIGH-RISK MEDICATION: ICD-10-CM

## 2022-11-17 LAB
ABO GROUP BLD: NORMAL
BLD GP AB SCN SERPL QL: NEGATIVE
GLUCOSE BLDC GLUCOMTR-MCNC: 120 MG/DL (ref 70–130)
GLUCOSE BLDC GLUCOMTR-MCNC: 121 MG/DL (ref 70–130)
GLUCOSE BLDC GLUCOMTR-MCNC: 136 MG/DL (ref 70–130)
GLUCOSE BLDC GLUCOMTR-MCNC: 98 MG/DL (ref 70–130)
RH BLD: NEGATIVE
T&S EXPIRATION DATE: NORMAL

## 2022-11-17 PROCEDURE — 86850 RBC ANTIBODY SCREEN: CPT | Performed by: ANESTHESIOLOGY

## 2022-11-17 PROCEDURE — 25010000002 ONDANSETRON PER 1 MG: Performed by: NURSE ANESTHETIST, CERTIFIED REGISTERED

## 2022-11-17 PROCEDURE — 25010000002 METHOCARBAMOL 1000 MG/10ML SOLUTION: Performed by: NEUROLOGICAL SURGERY

## 2022-11-17 PROCEDURE — 25010000002 MIDAZOLAM PER 1 MG: Performed by: ANESTHESIOLOGY

## 2022-11-17 PROCEDURE — 72020 X-RAY EXAM OF SPINE 1 VIEW: CPT

## 2022-11-17 PROCEDURE — 76000 FLUOROSCOPY <1 HR PHYS/QHP: CPT

## 2022-11-17 PROCEDURE — 25010000002 HYDROMORPHONE PER 4 MG: Performed by: NURSE ANESTHETIST, CERTIFIED REGISTERED

## 2022-11-17 PROCEDURE — C1778 LEAD, NEUROSTIMULATOR: HCPCS | Performed by: NEUROLOGICAL SURGERY

## 2022-11-17 PROCEDURE — 86900 BLOOD TYPING SEROLOGIC ABO: CPT | Performed by: ANESTHESIOLOGY

## 2022-11-17 PROCEDURE — 86901 BLOOD TYPING SEROLOGIC RH(D): CPT | Performed by: ANESTHESIOLOGY

## 2022-11-17 PROCEDURE — 25010000002 FENTANYL CITRATE (PF) 50 MCG/ML SOLUTION: Performed by: NURSE ANESTHETIST, CERTIFIED REGISTERED

## 2022-11-17 PROCEDURE — 25010000002 CEFAZOLIN PER 500 MG: Performed by: NEUROLOGICAL SURGERY

## 2022-11-17 PROCEDURE — 63655 IMPLANT NEUROELECTRODES: CPT | Performed by: NEUROLOGICAL SURGERY

## 2022-11-17 PROCEDURE — 25010000002 PROPOFOL 10 MG/ML EMULSION: Performed by: NURSE ANESTHETIST, CERTIFIED REGISTERED

## 2022-11-17 PROCEDURE — 25010000002 CEFAZOLIN IN DEXTROSE 2-4 GM/100ML-% SOLUTION: Performed by: NEUROLOGICAL SURGERY

## 2022-11-17 PROCEDURE — 82962 GLUCOSE BLOOD TEST: CPT

## 2022-11-17 PROCEDURE — C1883 ADAPT/EXT, PACING/NEURO LEAD: HCPCS | Performed by: NEUROLOGICAL SURGERY

## 2022-11-17 PROCEDURE — 63655 IMPLANT NEUROELECTRODES: CPT | Performed by: SPECIALIST/TECHNOLOGIST, OTHER

## 2022-11-17 DEVICE — EXT LD STIM SGL 30CM: Type: IMPLANTABLE DEVICE | Site: BACK | Status: FUNCTIONAL

## 2022-11-17 DEVICE — LD STIM PENTA PADL KT 16CH 3MM 60CM: Type: IMPLANTABLE DEVICE | Site: BACK | Status: FUNCTIONAL

## 2022-11-17 DEVICE — FLOSEAL HEMOSTATIC MATRIX, 10ML
Type: IMPLANTABLE DEVICE | Site: BACK | Status: FUNCTIONAL
Brand: FLOSEAL HEMOSTATIC MATRIX

## 2022-11-17 RX ORDER — FENTANYL CITRATE 50 UG/ML
50 INJECTION, SOLUTION INTRAMUSCULAR; INTRAVENOUS
Status: DISCONTINUED | OUTPATIENT
Start: 2022-11-17 | End: 2022-11-17 | Stop reason: HOSPADM

## 2022-11-17 RX ORDER — DROPERIDOL 2.5 MG/ML
0.62 INJECTION, SOLUTION INTRAMUSCULAR; INTRAVENOUS ONCE
Status: DISCONTINUED | OUTPATIENT
Start: 2022-11-17 | End: 2022-11-17

## 2022-11-17 RX ORDER — FAMOTIDINE 10 MG/ML
20 INJECTION, SOLUTION INTRAVENOUS ONCE
Status: COMPLETED | OUTPATIENT
Start: 2022-11-17 | End: 2022-11-17

## 2022-11-17 RX ORDER — ROCURONIUM BROMIDE 10 MG/ML
INJECTION, SOLUTION INTRAVENOUS AS NEEDED
Status: DISCONTINUED | OUTPATIENT
Start: 2022-11-17 | End: 2022-11-17 | Stop reason: SURG

## 2022-11-17 RX ORDER — GLIPIZIDE 10 MG/1
10 TABLET ORAL
Status: DISCONTINUED | OUTPATIENT
Start: 2022-11-17 | End: 2022-11-19

## 2022-11-17 RX ORDER — GLYBURIDE-METFORMIN HYDROCHLORIDE 2.5; 5 MG/1; MG/1
1 TABLET ORAL
Status: DISCONTINUED | OUTPATIENT
Start: 2022-11-17 | End: 2022-11-17

## 2022-11-17 RX ORDER — SODIUM CHLORIDE 0.9 % (FLUSH) 0.9 %
3-10 SYRINGE (ML) INJECTION AS NEEDED
Status: DISCONTINUED | OUTPATIENT
Start: 2022-11-17 | End: 2022-11-17 | Stop reason: HOSPADM

## 2022-11-17 RX ORDER — LIDOCAINE HYDROCHLORIDE 20 MG/ML
INJECTION, SOLUTION EPIDURAL; INFILTRATION; INTRACAUDAL; PERINEURAL AS NEEDED
Status: DISCONTINUED | OUTPATIENT
Start: 2022-11-17 | End: 2022-11-17 | Stop reason: SURG

## 2022-11-17 RX ORDER — CHOLECALCIFEROL (VITAMIN D3) 125 MCG
5 CAPSULE ORAL NIGHTLY
Status: DISCONTINUED | OUTPATIENT
Start: 2022-11-17 | End: 2022-11-20 | Stop reason: HOSPADM

## 2022-11-17 RX ORDER — HYDROMORPHONE HYDROCHLORIDE 1 MG/ML
0.5 INJECTION, SOLUTION INTRAMUSCULAR; INTRAVENOUS; SUBCUTANEOUS
Status: DISCONTINUED | OUTPATIENT
Start: 2022-11-17 | End: 2022-11-19

## 2022-11-17 RX ORDER — CYCLOBENZAPRINE HCL 10 MG
10 TABLET ORAL 3 TIMES DAILY PRN
Status: DISCONTINUED | OUTPATIENT
Start: 2022-11-17 | End: 2022-11-20 | Stop reason: HOSPADM

## 2022-11-17 RX ORDER — DOCUSATE SODIUM 100 MG/1
100 CAPSULE, LIQUID FILLED ORAL 2 TIMES DAILY PRN
Status: DISCONTINUED | OUTPATIENT
Start: 2022-11-17 | End: 2022-11-20 | Stop reason: HOSPADM

## 2022-11-17 RX ORDER — EPHEDRINE SULFATE 50 MG/ML
INJECTION INTRAVENOUS AS NEEDED
Status: DISCONTINUED | OUTPATIENT
Start: 2022-11-17 | End: 2022-11-17 | Stop reason: SURG

## 2022-11-17 RX ORDER — CEFAZOLIN SODIUM 2 G/100ML
2 INJECTION, SOLUTION INTRAVENOUS ONCE
Status: COMPLETED | OUTPATIENT
Start: 2022-11-17 | End: 2022-11-17

## 2022-11-17 RX ORDER — NALOXONE HCL 0.4 MG/ML
0.2 VIAL (ML) INJECTION AS NEEDED
Status: DISCONTINUED | OUTPATIENT
Start: 2022-11-17 | End: 2022-11-17

## 2022-11-17 RX ORDER — NALOXEGOL OXALATE 25 MG/1
25 TABLET, FILM COATED ORAL EVERY MORNING
COMMUNITY
End: 2022-12-28

## 2022-11-17 RX ORDER — SODIUM CHLORIDE, SODIUM LACTATE, POTASSIUM CHLORIDE, CALCIUM CHLORIDE 600; 310; 30; 20 MG/100ML; MG/100ML; MG/100ML; MG/100ML
100 INJECTION, SOLUTION INTRAVENOUS CONTINUOUS
Status: DISCONTINUED | OUTPATIENT
Start: 2022-11-17 | End: 2022-11-20 | Stop reason: HOSPADM

## 2022-11-17 RX ORDER — SODIUM CHLORIDE 0.9 % (FLUSH) 0.9 %
10 SYRINGE (ML) INJECTION EVERY 12 HOURS SCHEDULED
Status: DISCONTINUED | OUTPATIENT
Start: 2022-11-17 | End: 2022-11-20 | Stop reason: HOSPADM

## 2022-11-17 RX ORDER — SODIUM CHLORIDE, SODIUM LACTATE, POTASSIUM CHLORIDE, CALCIUM CHLORIDE 600; 310; 30; 20 MG/100ML; MG/100ML; MG/100ML; MG/100ML
9 INJECTION, SOLUTION INTRAVENOUS CONTINUOUS
Status: DISCONTINUED | OUTPATIENT
Start: 2022-11-17 | End: 2022-11-17

## 2022-11-17 RX ORDER — HYDRALAZINE HYDROCHLORIDE 20 MG/ML
5 INJECTION INTRAMUSCULAR; INTRAVENOUS
Status: DISCONTINUED | OUTPATIENT
Start: 2022-11-17 | End: 2022-11-17

## 2022-11-17 RX ORDER — ROSUVASTATIN CALCIUM 10 MG/1
10 TABLET, COATED ORAL DAILY
Status: DISCONTINUED | OUTPATIENT
Start: 2022-11-17 | End: 2022-11-20 | Stop reason: HOSPADM

## 2022-11-17 RX ORDER — BUPIVACAINE HYDROCHLORIDE AND EPINEPHRINE 2.5; 5 MG/ML; UG/ML
INJECTION, SOLUTION EPIDURAL; INFILTRATION; INTRACAUDAL; PERINEURAL AS NEEDED
Status: DISCONTINUED | OUTPATIENT
Start: 2022-11-17 | End: 2022-11-17 | Stop reason: HOSPADM

## 2022-11-17 RX ORDER — PROPOFOL 10 MG/ML
VIAL (ML) INTRAVENOUS AS NEEDED
Status: DISCONTINUED | OUTPATIENT
Start: 2022-11-17 | End: 2022-11-17 | Stop reason: SURG

## 2022-11-17 RX ORDER — ONDANSETRON 4 MG/1
4 TABLET, FILM COATED ORAL EVERY 6 HOURS PRN
Status: DISCONTINUED | OUTPATIENT
Start: 2022-11-17 | End: 2022-11-20 | Stop reason: HOSPADM

## 2022-11-17 RX ORDER — ONDANSETRON 2 MG/ML
4 INJECTION INTRAMUSCULAR; INTRAVENOUS ONCE AS NEEDED
Status: COMPLETED | OUTPATIENT
Start: 2022-11-17 | End: 2022-11-17

## 2022-11-17 RX ORDER — DONEPEZIL HYDROCHLORIDE 5 MG/1
5 TABLET, FILM COATED ORAL NIGHTLY
Status: DISCONTINUED | OUTPATIENT
Start: 2022-11-17 | End: 2022-11-20 | Stop reason: HOSPADM

## 2022-11-17 RX ORDER — OXYCODONE AND ACETAMINOPHEN 7.5; 325 MG/1; MG/1
1 TABLET ORAL EVERY 4 HOURS PRN
Status: DISCONTINUED | OUTPATIENT
Start: 2022-11-17 | End: 2022-11-17

## 2022-11-17 RX ORDER — METHOCARBAMOL 100 MG/ML
500 INJECTION, SOLUTION INTRAMUSCULAR; INTRAVENOUS ONCE
Status: COMPLETED | OUTPATIENT
Start: 2022-11-17 | End: 2022-11-17

## 2022-11-17 RX ORDER — INSULIN LISPRO 100 [IU]/ML
0-9 INJECTION, SOLUTION INTRAVENOUS; SUBCUTANEOUS
Status: DISCONTINUED | OUTPATIENT
Start: 2022-11-17 | End: 2022-11-20 | Stop reason: HOSPADM

## 2022-11-17 RX ORDER — FLUTICASONE PROPIONATE 50 MCG
2 SPRAY, SUSPENSION (ML) NASAL DAILY
Status: DISCONTINUED | OUTPATIENT
Start: 2022-11-17 | End: 2022-11-20 | Stop reason: HOSPADM

## 2022-11-17 RX ORDER — SODIUM CHLORIDE 0.9 % (FLUSH) 0.9 %
3 SYRINGE (ML) INJECTION EVERY 12 HOURS SCHEDULED
Status: DISCONTINUED | OUTPATIENT
Start: 2022-11-17 | End: 2022-11-17 | Stop reason: HOSPADM

## 2022-11-17 RX ORDER — FENOFIBRATE 145 MG/1
145 TABLET, COATED ORAL DAILY
Status: DISCONTINUED | OUTPATIENT
Start: 2022-11-18 | End: 2022-11-20 | Stop reason: HOSPADM

## 2022-11-17 RX ORDER — PREGABALIN 100 MG/1
100 CAPSULE ORAL 3 TIMES DAILY
Status: DISCONTINUED | OUTPATIENT
Start: 2022-11-17 | End: 2022-11-20 | Stop reason: HOSPADM

## 2022-11-17 RX ORDER — ALLOPURINOL 300 MG/1
300 TABLET ORAL DAILY
Status: DISCONTINUED | OUTPATIENT
Start: 2022-11-17 | End: 2022-11-20 | Stop reason: HOSPADM

## 2022-11-17 RX ORDER — MAGNESIUM HYDROXIDE 1200 MG/15ML
LIQUID ORAL AS NEEDED
Status: DISCONTINUED | OUTPATIENT
Start: 2022-11-17 | End: 2022-11-17 | Stop reason: HOSPADM

## 2022-11-17 RX ORDER — KETAMINE HCL IN NACL, ISO-OSM 100MG/10ML
SYRINGE (ML) INJECTION AS NEEDED
Status: DISCONTINUED | OUTPATIENT
Start: 2022-11-17 | End: 2022-11-17 | Stop reason: SURG

## 2022-11-17 RX ORDER — SODIUM CHLORIDE 0.9 % (FLUSH) 0.9 %
10 SYRINGE (ML) INJECTION AS NEEDED
Status: DISCONTINUED | OUTPATIENT
Start: 2022-11-17 | End: 2022-11-20 | Stop reason: HOSPADM

## 2022-11-17 RX ORDER — DIPHENHYDRAMINE HCL 25 MG
25 CAPSULE ORAL
Status: DISCONTINUED | OUTPATIENT
Start: 2022-11-17 | End: 2022-11-17

## 2022-11-17 RX ORDER — MIDAZOLAM HYDROCHLORIDE 1 MG/ML
0.5 INJECTION INTRAMUSCULAR; INTRAVENOUS
Status: DISCONTINUED | OUTPATIENT
Start: 2022-11-17 | End: 2022-11-17 | Stop reason: HOSPADM

## 2022-11-17 RX ORDER — NICOTINE POLACRILEX 4 MG
15 LOZENGE BUCCAL
Status: DISCONTINUED | OUTPATIENT
Start: 2022-11-17 | End: 2022-11-20 | Stop reason: HOSPADM

## 2022-11-17 RX ORDER — AMOXICILLIN 250 MG
1 CAPSULE ORAL NIGHTLY PRN
Status: DISCONTINUED | OUTPATIENT
Start: 2022-11-17 | End: 2022-11-20 | Stop reason: HOSPADM

## 2022-11-17 RX ORDER — OXYCODONE AND ACETAMINOPHEN 7.5; 325 MG/1; MG/1
1 TABLET ORAL EVERY 4 HOURS PRN
Status: DISCONTINUED | OUTPATIENT
Start: 2022-11-17 | End: 2022-11-19

## 2022-11-17 RX ORDER — CEFAZOLIN SODIUM 2 G/100ML
2 INJECTION, SOLUTION INTRAVENOUS ONCE
Status: DISCONTINUED | OUTPATIENT
Start: 2022-11-17 | End: 2022-11-17

## 2022-11-17 RX ORDER — ONDANSETRON 2 MG/ML
INJECTION INTRAMUSCULAR; INTRAVENOUS AS NEEDED
Status: DISCONTINUED | OUTPATIENT
Start: 2022-11-17 | End: 2022-11-17 | Stop reason: SURG

## 2022-11-17 RX ORDER — POLYETHYLENE GLYCOL 3350 17 G/17G
1 POWDER, FOR SOLUTION ORAL DAILY
Status: DISCONTINUED | OUTPATIENT
Start: 2022-11-17 | End: 2022-11-20

## 2022-11-17 RX ORDER — DIPHENHYDRAMINE HYDROCHLORIDE 50 MG/ML
12.5 INJECTION INTRAMUSCULAR; INTRAVENOUS
Status: DISCONTINUED | OUTPATIENT
Start: 2022-11-17 | End: 2022-11-17

## 2022-11-17 RX ORDER — EPHEDRINE SULFATE 50 MG/ML
5 INJECTION, SOLUTION INTRAVENOUS ONCE AS NEEDED
Status: DISCONTINUED | OUTPATIENT
Start: 2022-11-17 | End: 2022-11-17

## 2022-11-17 RX ORDER — ONDANSETRON 2 MG/ML
4 INJECTION INTRAMUSCULAR; INTRAVENOUS EVERY 6 HOURS PRN
Status: DISCONTINUED | OUTPATIENT
Start: 2022-11-17 | End: 2022-11-20 | Stop reason: HOSPADM

## 2022-11-17 RX ORDER — NALOXONE HCL 0.4 MG/ML
0.4 VIAL (ML) INJECTION
Status: DISCONTINUED | OUTPATIENT
Start: 2022-11-17 | End: 2022-11-20 | Stop reason: HOSPADM

## 2022-11-17 RX ORDER — LISINOPRIL 2.5 MG/1
2.5 TABLET ORAL DAILY
Status: DISCONTINUED | OUTPATIENT
Start: 2022-11-17 | End: 2022-11-20 | Stop reason: HOSPADM

## 2022-11-17 RX ORDER — LEVOMEFOLATE/ALGAL OIL 15-90.314
1 CAPSULE ORAL DAILY
Status: DISCONTINUED | OUTPATIENT
Start: 2022-11-17 | End: 2022-11-17

## 2022-11-17 RX ORDER — LIDOCAINE HYDROCHLORIDE 10 MG/ML
0.5 INJECTION, SOLUTION EPIDURAL; INFILTRATION; INTRACAUDAL; PERINEURAL ONCE AS NEEDED
Status: DISCONTINUED | OUTPATIENT
Start: 2022-11-17 | End: 2022-11-17 | Stop reason: HOSPADM

## 2022-11-17 RX ORDER — LABETALOL HYDROCHLORIDE 5 MG/ML
5 INJECTION, SOLUTION INTRAVENOUS
Status: DISCONTINUED | OUTPATIENT
Start: 2022-11-17 | End: 2022-11-17

## 2022-11-17 RX ORDER — FENTANYL CITRATE 50 UG/ML
50 INJECTION, SOLUTION INTRAMUSCULAR; INTRAVENOUS
Status: DISCONTINUED | OUTPATIENT
Start: 2022-11-17 | End: 2022-11-17

## 2022-11-17 RX ORDER — ACETAMINOPHEN 325 MG/1
650 TABLET ORAL EVERY 4 HOURS PRN
Status: DISCONTINUED | OUTPATIENT
Start: 2022-11-17 | End: 2022-11-20 | Stop reason: HOSPADM

## 2022-11-17 RX ORDER — CASTOR OIL AND BALSAM, PERU 788; 87 MG/G; MG/G
OINTMENT TOPICAL AS NEEDED
Status: DISCONTINUED | OUTPATIENT
Start: 2022-11-17 | End: 2022-11-17 | Stop reason: HOSPADM

## 2022-11-17 RX ORDER — PROMETHAZINE HYDROCHLORIDE 25 MG/1
25 TABLET ORAL ONCE AS NEEDED
Status: DISCONTINUED | OUTPATIENT
Start: 2022-11-17 | End: 2022-11-17

## 2022-11-17 RX ORDER — DEXTROSE MONOHYDRATE 25 G/50ML
25 INJECTION, SOLUTION INTRAVENOUS
Status: DISCONTINUED | OUTPATIENT
Start: 2022-11-17 | End: 2022-11-20 | Stop reason: HOSPADM

## 2022-11-17 RX ORDER — PROMETHAZINE HYDROCHLORIDE 25 MG/1
25 SUPPOSITORY RECTAL ONCE AS NEEDED
Status: DISCONTINUED | OUTPATIENT
Start: 2022-11-17 | End: 2022-11-17

## 2022-11-17 RX ORDER — CEFAZOLIN SODIUM 2 G/100ML
2 INJECTION, SOLUTION INTRAVENOUS EVERY 8 HOURS
Status: DISCONTINUED | OUTPATIENT
Start: 2022-11-17 | End: 2022-11-18

## 2022-11-17 RX ORDER — HYDROMORPHONE HYDROCHLORIDE 1 MG/ML
0.5 INJECTION, SOLUTION INTRAMUSCULAR; INTRAVENOUS; SUBCUTANEOUS
Status: DISCONTINUED | OUTPATIENT
Start: 2022-11-17 | End: 2022-11-17

## 2022-11-17 RX ADMIN — FENTANYL CITRATE 50 MCG: 0.05 INJECTION, SOLUTION INTRAMUSCULAR; INTRAVENOUS at 10:27

## 2022-11-17 RX ADMIN — FAMOTIDINE 20 MG: 10 INJECTION INTRAVENOUS at 07:40

## 2022-11-17 RX ADMIN — METHOCARBAMOL 500 MG: 100 INJECTION INTRAMUSCULAR; INTRAVENOUS at 11:32

## 2022-11-17 RX ADMIN — Medication 10 ML: at 20:59

## 2022-11-17 RX ADMIN — OXYCODONE HYDROCHLORIDE AND ACETAMINOPHEN 1 TABLET: 7.5; 325 TABLET ORAL at 17:02

## 2022-11-17 RX ADMIN — Medication 50 MG: at 08:12

## 2022-11-17 RX ADMIN — ROCURONIUM BROMIDE 50 MG: 10 INJECTION, SOLUTION INTRAVENOUS at 08:12

## 2022-11-17 RX ADMIN — LIDOCAINE HYDROCHLORIDE 100 MG: 20 INJECTION, SOLUTION EPIDURAL; INFILTRATION; INTRACAUDAL; PERINEURAL at 08:12

## 2022-11-17 RX ADMIN — CYCLOBENZAPRINE 10 MG: 10 TABLET, FILM COATED ORAL at 15:41

## 2022-11-17 RX ADMIN — DONEPEZIL HYDROCHLORIDE 5 MG: 5 TABLET, FILM COATED ORAL at 20:58

## 2022-11-17 RX ADMIN — HYDROMORPHONE HYDROCHLORIDE 0.5 MG: 1 INJECTION, SOLUTION INTRAMUSCULAR; INTRAVENOUS; SUBCUTANEOUS at 10:50

## 2022-11-17 RX ADMIN — CEFAZOLIN SODIUM 2 G: 2 INJECTION, SOLUTION INTRAVENOUS at 07:53

## 2022-11-17 RX ADMIN — EPHEDRINE SULFATE 10 MG: 50 INJECTION INTRAVENOUS at 08:19

## 2022-11-17 RX ADMIN — ROCURONIUM BROMIDE 30 MG: 10 INJECTION, SOLUTION INTRAVENOUS at 08:56

## 2022-11-17 RX ADMIN — SODIUM CHLORIDE, POTASSIUM CHLORIDE, SODIUM LACTATE AND CALCIUM CHLORIDE 100 ML/HR: 600; 310; 30; 20 INJECTION, SOLUTION INTRAVENOUS at 12:41

## 2022-11-17 RX ADMIN — MIDAZOLAM 0.5 MG: 1 INJECTION INTRAMUSCULAR; INTRAVENOUS at 07:57

## 2022-11-17 RX ADMIN — CEFAZOLIN SODIUM 2 G: 2 INJECTION, SOLUTION INTRAVENOUS at 15:41

## 2022-11-17 RX ADMIN — TRAZODONE HYDROCHLORIDE 150 MG: 100 TABLET ORAL at 20:58

## 2022-11-17 RX ADMIN — ONDANSETRON 4 MG: 2 INJECTION INTRAMUSCULAR; INTRAVENOUS at 09:39

## 2022-11-17 RX ADMIN — PREGABALIN 100 MG: 100 CAPSULE ORAL at 20:58

## 2022-11-17 RX ADMIN — SUGAMMADEX 400 MG: 100 INJECTION, SOLUTION INTRAVENOUS at 10:07

## 2022-11-17 RX ADMIN — OXYCODONE HYDROCHLORIDE AND ACETAMINOPHEN 1 TABLET: 7.5; 325 TABLET ORAL at 11:09

## 2022-11-17 RX ADMIN — GLIPIZIDE 10 MG: 10 TABLET ORAL at 17:10

## 2022-11-17 RX ADMIN — OXYCODONE HYDROCHLORIDE AND ACETAMINOPHEN 1 TABLET: 7.5; 325 TABLET ORAL at 20:58

## 2022-11-17 RX ADMIN — HYDROMORPHONE HYDROCHLORIDE 0.5 MG: 1 INJECTION, SOLUTION INTRAMUSCULAR; INTRAVENOUS; SUBCUTANEOUS at 11:31

## 2022-11-17 RX ADMIN — Medication 5 MG: at 20:58

## 2022-11-17 RX ADMIN — PROPOFOL 150 MG: 10 INJECTION, EMULSION INTRAVENOUS at 08:12

## 2022-11-17 RX ADMIN — EPHEDRINE SULFATE 20 MG: 50 INJECTION INTRAVENOUS at 08:39

## 2022-11-17 RX ADMIN — SODIUM CHLORIDE, POTASSIUM CHLORIDE, SODIUM LACTATE AND CALCIUM CHLORIDE 9 ML/HR: 600; 310; 30; 20 INJECTION, SOLUTION INTRAVENOUS at 07:40

## 2022-11-17 RX ADMIN — ONDANSETRON 4 MG: 2 INJECTION INTRAMUSCULAR; INTRAVENOUS at 10:30

## 2022-11-17 RX ADMIN — SODIUM CHLORIDE, POTASSIUM CHLORIDE, SODIUM LACTATE AND CALCIUM CHLORIDE 100 ML/HR: 600; 310; 30; 20 INJECTION, SOLUTION INTRAVENOUS at 21:54

## 2022-11-17 RX ADMIN — PREGABALIN 100 MG: 100 CAPSULE ORAL at 16:59

## 2022-11-17 RX ADMIN — HYDROMORPHONE HYDROCHLORIDE 0.5 MG: 1 INJECTION, SOLUTION INTRAMUSCULAR; INTRAVENOUS; SUBCUTANEOUS at 11:09

## 2022-11-17 NOTE — CONSULTS
CONSULT NOTE    INTERNAL MEDICINE   [facility]       Patient Identification:  Name: Jesus Beckham  Age: 82 y.o.  Sex: male  :  1940  MRN: [id number]             Date of Consultation:  22          Primary Care Physician: Magdy Ricardo MD                               Requesting Physician: dr price  Reason for Consultation:medical management    Chief Complaint:  82 year old gentleman who was admitted following surgery by dr price; we are asked to see him regarding medical management; he has a history of diabetes, hypertension and obesity; he is complaining of some postop pain    History of Present Illness:   As above      Past Medical History:  Past Medical History:   Diagnosis Date   • Acute embolism and thrombosis of deep vein of right distal lower extremity ([facility])    • Acute gangrenous cholecystitis     2018   • Allergic    • Arthritis    • Atrial fibrillation with RVR ([facility])     HISTORY   • Benign prostatic hyperplasia without lower urinary tract symptoms    • Cancer of bladder ([facility])    • Colon polyp    • Discitis of lumbar region    • DVT (deep venous thrombosis) ([facility])     2018   • Essential hypertension    • Murray catheter in place     LOSS OF SENSATION BLADDER. BECAUSE OF WOUND AT THE TIME   • Gout    • Heel ulcer ([facility])     RIGHT. FOLLOWED BY WOUND CARE. GETTING BETTER   • History of sepsis    • Hyperlipidemia    • Loss, sensation     LOWER EXTREMTIES. RELATED TO LAST BACK SURGERY.   • MRSA infection     LEFT LEG.    • Open wound     LOW TO MIDDLE CRACK BUTT. SEES WOUND CARE. WILL BE RESTARTED ON DIFFUCAN AND NYSTATIN POWER. REDNESS IN GROIN   • Open wound     WITH MEDICATED DRESSING LEFT SHIN AREA.    • CELINA (obstructive sleep apnea)     NO MACHINE   • Osteoarthritis    • Paroxysmal atrial fibrillation ([facility])    • PVC (premature ventricular contraction)    • Sepsis ([facility]) 2018   • Sepsis due to Escherichia coli ([facility])    • Skin carcinoma      MELANOMA.2 PLACES BACK AND EAR   • Type 2 diabetes mellitus (HCC)    • Vitamin D deficiency    • Weakness      Past Surgical History:  Past Surgical History:   Procedure Laterality Date   • ABDOMINAL SURGERY     • APPENDECTOMY N/A 1961   • CHOLECYSTECTOMY WITH INTRAOPERATIVE CHOLANGIOGRAM N/A 2/25/2018    Procedure: CHOLECYSTECTOMY LAPAROSCOPIC INTRAOPERATIVE CHOLANGIOGRAM;  Surgeon: Maegan Correa MD;  Location: [facility];  Service:    • COLONOSCOPY N/A 2010    h/o of polyps   • EYE SURGERY Bilateral 1959    glass removal from eye, lens transplant   • JOINT REPLACEMENT     • LAMINECTOMY N/A 01/18/2016    L2-L3, L3-L4, L4-L5, and L5-S1 bilateral lamincectomy, medial facetectomy & [id number], Dr. Kaiden Valdes   • LUMBAR FUSION N/A 3/7/2018    Procedure: LUMBAR FUSION MINIMALLY INVASIVE TRANSFORAMINAL LUMBAR INTERBODY IRRIGATION AND DEBRIDEMENT AND FUSION.  IRRIGATION AND DEBRIDEMENT OF EPIDURAL ABCESS LUMBAR 2-4. BONE MORPHOGENIC PROTEIN, ALPHATEC NOVEL AND ILLICO, EMG AND SSEP NEUROMONITORING.;  Surgeon: Manish Marr DO;  Location: [facility] OR;  Service: Orthopedic Spine   • SKIN BIOPSY     • SPINAL CORD STIMULATOR IMPLANT N/A 7/1/2022    Procedure: SPINAL CORD STIMULATOR INSERTION PHASE 1 ([place]) 4 DAY TRIAL ONLY DUE TO ELIQUIS HOLD.;  Surgeon: Cody Holguin MD;  Location: [facility] OR;  Service: Pain Management;  Laterality: N/A;   • TOTAL KNEE ARTHROPLASTY Right 03/07/2005    Dr. Washington Evans   • VENA CAVA FILTER INSERTION Right 3/6/2018    Procedure: VENA CAVA FILTER INSERTION;  Surgeon: Alexis Thakkar MD;  Location: [facility] 18/19;  Service:    • VENA CAVA FILTER REMOVAL N/A 12/3/2018    Procedure: VENA CAVA FILTER REMOVAL WITH VENOCAVAGRAM;  Surgeon: Alexis Thakkar MD;  Location: [facility] 18/19;  Service: Vascular      Home Meds:  Medications Prior to Admission   Medication Sig Dispense Refill Last Dose   • allopurinol (ZYLOPRIM) 300 MG tablet Take 1  tablet by mouth Daily. 90 tablet 3 11/17/2022 at 0445   • Ascorbic Acid 300 MG chewable tablet Chew 300 mg Every Morning.   11/16/2022 at 0800   • diphenhydrAMINE-acetaminophen (TYLENOL PM)  MG tablet per tablet Take 2 tablets by mouth At Night As Needed for Sleep.   11/16/2022 at 2000   • donepezil (ARICEPT) 5 MG tablet Take 1 tablet by mouth every night at bedtime. 90 tablet 3 11/16/2022 at 2000   • fenofibrate (TRICOR) 145 MG tablet TAKE 1 TABLET BY MOUTH  DAILY 90 tablet 1 11/16/2022 at 0800   • fluticasone (FLONASE) 50 MCG/ACT nasal spray 2 sprays into the nostril(s) as directed by provider Daily. 48 g 3 11/16/2022 at 0800   • glipizide (GLUCOTROL) 10 MG tablet Take 1 tablet by mouth 2 (Two) Times a Day Before Meals. 180 tablet 2 11/16/2022 at 0800   • Glucosamine HCl 1500 MG tablet Take 2 tablets by mouth Daily.   11/16/2022 at 0800   • Glyxambi 25-5 MG tablet TAKE 1 TABLET BY MOUTH  DAILY WITH BREAKFAST 90 tablet 1 11/16/2022 at 0800   • L-Methylfolate-B6-B12 3-35-2 MG tablet Take 1 tablet by mouth 2 (Two) Times a Day. 180 tablet 1 11/16/2022 at 0800   • lisinopril (PRINIVIL,ZESTRIL) 2.5 MG tablet TAKE 1 TABLET BY MOUTH  DAILY 90 tablet 1 11/16/2022 at 0800   • melatonin 5 MG tablet tablet Take 5 mg by mouth Every Night.   11/16/2022 at 2000   • Myrbetriq 50 MG tablet sustained-release 24 hour 24 hr tablet Take 50 mg by mouth Daily.   11/16/2022 at 0800   • Naloxegol Oxalate (Movantik) 25 MG tablet Take 25 mg by mouth Every Morning.   11/16/2022 at 0800   • oxyCODONE-acetaminophen (PERCOCET) 5-325 MG per tablet Take 1 tablet by mouth 2 (Two) Times a Day As Needed for Severe Pain. 50 tablet 0 11/17/2022 at 0445   • polyethylene glycol (MIRALAX) 17 GM/SCOOP powder Take 17 g by mouth Daily. Once a day   Past Week   • pregabalin (LYRICA) 100 MG capsule TAKE 1 CAPSULE BY MOUTH 3  TIMES DAILY 270 capsule 0 11/17/2022 at 0445   • rosuvastatin (CRESTOR) 10 MG tablet Take 1 tablet by mouth Daily. 90 tablet 1  11/16/2022   • Toujeo Max SoloStar 300 UNIT/ML solution pen-injector injection 98 units every morning (Patient taking differently: 98 units every morning    PER MD INSTRUCTION) 29.4 mL 2 11/16/2022 at 0800   • traZODone (DESYREL) 150 MG tablet Take 1 tablet by mouth Every Night. 14 tablet 3 11/16/2022 at 2000   • Accu-Chek Softclix Lancets lancets Check 5 times a day on insulin tx, poor control, hypoglycemia. Dx: E 11.8 500 each 4    • apixaban (Eliquis) 5 MG tablet tablet Take 1 tablet by mouth Every 12 (Twelve) Hours. (Patient taking differently: Take 5 mg by mouth Every 12 (Twelve) Hours. PER MD INSTRUCTION) 180 tablet 3 11/11/2022   • Multiple Vitamins-Minerals (MULTIVITAMIN ADULT PO) Take 1 tablet by mouth Daily.   11/11/2022   • Omega-3 Fatty Acids (FISH OIL) 1200 MG capsule capsule Take 2,000 mg by mouth 2 (Two) Times a Day With Meals. HOLD FOR SURGERY   11/11/2022   • vitamin D (ERGOCALCIFEROL) 1.25 MG (44912 UT) capsule capsule TAKE 1 CAPSULE TWICE WEEKLY (Patient taking differently: 50,000 Units Every 7 (Seven) Days.) 26 capsule 1 11/7/2022     Current Meds:     Current Facility-Administered Medications:   •  acetaminophen (TYLENOL) tablet 650 mg, 650 mg, Oral, Q4H PRN, Charlie Bailon MD  •  allopurinol (ZYLOPRIM) tablet 300 mg, 300 mg, Oral, Daily, Charlie Bailon MD  •  ceFAZolin in dextrose (ANCEF) IVPB solution 2 g, 2 g, Intravenous, Q8H, Charlie Bailon MD, 2 g at 11/17/22 1541  •  cyclobenzaprine (FLEXERIL) tablet 10 mg, 10 mg, Oral, TID PRN, Charlie Bailon MD, 10 mg at 11/17/22 1541  •  docusate sodium (COLACE) capsule 100 mg, 100 mg, Oral, BID PRN, Charlie Bailon MD  •  donepezil (ARICEPT) tablet 5 mg, 5 mg, Oral, Nightly, Charlie Bailon MD  •  [START ON 11/18/2022] empagliflozin (JARDIANCE) tablet 25 mg, 25 mg, Oral, Daily, Bernice Morgan APRN  •  [START ON 11/18/2022] fenofibrate (TRICOR) tablet 145 mg, 145 mg, Oral, Daily, Cahrlie Bailon MD  •  fluticasone (FLONASE) 50  MCG/ACT nasal spray 2 spray, 2 spray, Nasal, Daily, Charlie Bailon MD  •  glipizide (GLUCOTROL) tablet 10 mg, 10 mg, Oral, BID AC, Charlie Bailon MD, 10 mg at 11/17/22 1710  •  HYDROmorphone (DILAUDID) injection 0.5 mg, 0.5 mg, Intravenous, Q2H PRN **AND** naloxone (NARCAN) injection 0.4 mg, 0.4 mg, Intravenous, Q5 Min PRN, Charlie Bailon MD  •  [START ON 11/18/2022] insulin glargine (LANTUS, SEMGLEE) injection 78 Units, 78 Units, Subcutaneous, Daily, Bernice Morgan APRN  •  lactated ringers infusion, 100 mL/hr, Intravenous, Continuous, Charlie Bailon MD, Last Rate: 100 mL/hr at 11/17/22 1445, 100 mL/hr at 11/17/22 1445  •  [START ON 11/18/2022] linagliptin (TRADJENTA) tablet 5 mg, 5 mg, Oral, Daily, Bernice Morgan, DANNY  •  lisinopril (PRINIVIL,ZESTRIL) tablet 2.5 mg, 2.5 mg, Oral, Daily, Charlie Bailon MD  •  melatonin tablet 5 mg, 5 mg, Oral, Nightly, Charlie Bailon MD  •  Mirabegron ER (MYRBETRIQ) 24 hr tablet 50 mg, 50 mg, Oral, Daily, Charlie Bailon MD  •  [START ON 11/18/2022] Naloxegol Oxalate (MOVANTIK) tablet 25 mg, 25 mg, Oral, QAM, Charlie Bailon MD  •  ondansetron (ZOFRAN) tablet 4 mg, 4 mg, Oral, Q6H PRN **OR** ondansetron (ZOFRAN) injection 4 mg, 4 mg, Intravenous, Q6H PRN, Charlie Bailon MD  •  oxyCODONE-acetaminophen (PERCOCET) 7.5-325 MG per tablet 1 tablet, 1 tablet, Oral, Q4H PRN, Charlie Bailon MD, 1 tablet at 11/17/22 1702  •  polyethylene glycol (MIRALAX) powder 1 bottle, 1 bottle, Oral, Daily, Charlie Bailon MD  •  pregabalin (LYRICA) capsule 100 mg, 100 mg, Oral, TID, Charlie Bailon MD, 100 mg at 11/17/22 1659  •  rosuvastatin (CRESTOR) tablet 10 mg, 10 mg, Oral, Daily, Charlie Bailon MD  •  sennosides-docusate (PERICOLACE) 8.6-50 MG per tablet 1 tablet, 1 tablet, Oral, Nightly PRN, Charlie Bailon MD  •  sodium chloride 0.9 % flush 10 mL, 10 mL, Intravenous, Q12H, Charlie Bailon MD  •  sodium chloride 0.9 % flush 10 mL, 10 mL,  "Intravenous, PRN, Charlie Bailon MD  •  traZODone (DESYREL) tablet 150 mg, 150 mg, Oral, Nightly, Charlie Bailon MD  Allergies:  Allergies   Allergen Reactions   • Levaquin [Levofloxacin] Other (See Comments)     Redness, itching at IV site with IV Levaquin administration   • Other Other (See Comments)     ALCOHOL. DEATHLY ILL.    • Alcohol Nausea And Vomiting     \"Deathly sick, headaches, cold sweats\"  Cant take any medication that contains alcohol     Social History:   Social History     Socioeconomic History   • Marital status:    • Number of children: 2   • Highest education level: Some college, no degree   Tobacco Use   • Smoking status: Former     Types: Cigars     Quit date: 2017     Years since quittin.8   • Smokeless tobacco: Former     Types: Chew   • Tobacco comments:     Caffeine daily   Vaping Use   • Vaping Use: Never used   Substance and Sexual Activity   • Alcohol use: Not Currently   • Drug use: No   • Sexual activity: Defer     Family History:  Family History   Problem Relation Age of Onset   • Esophageal cancer Mother    • No Known Problems Father    • Diabetes Maternal Grandmother    • Heart attack Maternal Grandfather    • Malig Hyperthermia Neg Hx           Review of Systems  See history of present illness and past medical history.  Patient denies headache, dizziness, syncope, falls, trauma, change in vision, change in hearing, change in taste, changes in weight, changes in appetite, focal weakness, numbness, or paresthesia.  Patient denies chest pain, palpitations, dyspnea, orthopnea, PND, cough, sinus pressure, rhinorrhea, epistaxis, hemoptysis, nausea, vomiting,hematemesis, diarrhea, constipation or hematchezia.  Denies cold or heat intolerance, polydipsia, polyuria, polyphagia. Denies hematuria, pyuria, dysuria, hesitancy, frequency or urgency. Denies consumption of raw and under cooked meats foods or change in water source.  Denies fever, chills, sweats, night sweats.  " "Denies missing any routine medications. Remainder of ROS is negative.      Vitals:   /73 (BP Location: Left arm, Patient Position: Lying)   Pulse 91   Temp 97.5 °F (36.4 °C) (Oral)   Resp 19   Ht 176.5 cm (69.49\")   Wt 122 kg (270 lb)   SpO2 96%   BMI 39.31 kg/m²   I/O:     Intake/Output Summary (Last 24 hours) at 11/17/2022 1721  Last data filed at 11/17/2022 1500  Gross per 24 hour   Intake 1120 ml   Output 550 ml   Net 570 ml     Exam:  General Appearance:    Alert, cooperative, no distress, appears stated age   Head:    Normocephalic, without obvious abnormality, atraumatic   Eyes:    PERRL, conjunctivae/corneas clear, EOM's intact, both eyes   Ears:    Normal external ear canals, both ears   Nose:   Nares normal, septum midline, mucosa normal, no drainage    or sinus tenderness   Throat:   Lips, tongue, gums normal; oral mucosa pink and moist   Neck:   Supple, symmetrical, trachea midline, no adenopathy;     thyroid:  no enlargement/tenderness/nodules; no carotid    bruit or JVD   Back:     Symmetric, no curvature, ROM normal, no CVA tenderness   Lungs:     Decreased breath sounds bilaterally, respirations unlabored   Chest Wall:    No tenderness or deformity    Heart:    Regular rate and rhythm, S1 and S2 normal, no murmur, rub   or gallop   Abdomen:     Soft, nontender, bowel sounds active all four quadrants,     no masses, no hepatomegaly, no splenomegaly   Extremities:   Extremities normal, atraumatic, no cyanosis or edema   Pulses:   Pulses palpable in all extremities; symmetric all extremities   Skin:   Skin color normal, Skin is warm and dry,  no rashes or palpable lesions   Neurologic:   CNII-XII intact, motor strength grossly intact, sensation grossly intact to light touch, no focal deficits noted       Data Review:  Labs in chart were reviewed.  WBC   Date Value Ref Range Status   11/15/2022 5.00 3.40 - 10.80 10*3/mm3 Final     Hemoglobin   Date Value Ref Range Status   11/15/2022 13.7 " 13.0 - 17.7 g/dL Final     Hematocrit   Date Value Ref Range Status   11/15/2022 40.2 37.5 - 51.0 % Final     Platelets   Date Value Ref Range Status   11/15/2022 145 140 - 450 10*3/mm3 Final     Sodium   Date Value Ref Range Status   11/15/2022 133 (L) 136 - 145 mmol/L Final     Potassium   Date Value Ref Range Status   11/15/2022 4.8 3.5 - 5.2 mmol/L Final     Chloride   Date Value Ref Range Status   11/15/2022 96 (L) 98 - 107 mmol/L Final     CO2   Date Value Ref Range Status   11/15/2022 28.7 22.0 - 29.0 mmol/L Final     BUN   Date Value Ref Range Status   11/15/2022 20 8 - 23 mg/dL Final     Creatinine   Date Value Ref Range Status   11/15/2022 1.24 0.76 - 1.27 mg/dL Final     Glucose   Date Value Ref Range Status   11/15/2022 197 (H) 65 - 99 mg/dL Final     Calcium   Date Value Ref Range Status   11/15/2022 9.1 8.6 - 10.5 mg/dL Final     No results found for: AST, ALT, ALKPHOS            Imaging Results (Last 7 Days)     Procedure Component Value Units Date/Time    XR Spine Thoracic 1 View [[id number]] Collected: 11/17/22 1103     Updated: 11/17/22 1109    Narrative:      THORACIC SPINE     HISTORY: Placement of left gluteal internal pulse generator.     FINDINGS: Two AP views of the lower thoracic spine were obtained for  intraoperative localization and control during placement of an internal  pulse generator. Imaging demonstrates spinal stimulator leads extending  to overlie the lower thoracic spine in the region of T8. Retractor  device is noted. Fluoroscopy time 31.6 seconds.     This report was finalized on 11/17/2022 11:06 AM by Dr. Pop Haider M.D.       FL C Arm During Surgery [[id number]] Resulted: 11/17/22 0953     Updated: 11/17/22 0953    Narrative:      This procedure was auto-finalized with no dictation required.        Past Medical History:   Diagnosis Date   • Acute embolism and thrombosis of deep vein of right distal lower extremity (HCC)    • Acute gangrenous cholecystitis     FEB 2018    • Allergic    • Arthritis    • Atrial fibrillation with RVR (HCC)     HISTORY   • Benign prostatic hyperplasia without lower urinary tract symptoms    • Cancer of bladder (HCC) 2016   • Colon polyp    • Discitis of lumbar region    • DVT (deep venous thrombosis) ([facility])     MARCH 2018   • Essential hypertension    • Murray catheter in place     LOSS OF SENSATION BLADDER. BECAUSE OF WOUND AT THE TIME   • Gout    • Heel ulcer (HCC)     RIGHT. FOLLOWED BY WOUND CARE. GETTING BETTER   • History of sepsis    • Hyperlipidemia    • Loss, sensation     LOWER EXTREMTIES. RELATED TO LAST BACK SURGERY.   • MRSA infection     LEFT LEG.    • Open wound     LOW TO MIDDLE CRACK BUTT. SEES WOUND CARE. WILL BE RESTARTED ON DIFFUCAN AND NYSTATIN POWER. REDNESS IN GROIN   • Open wound     WITH MEDICATED DRESSING LEFT SHIN AREA.    • CELINA (obstructive sleep apnea)     NO MACHINE   • Osteoarthritis    • Paroxysmal atrial fibrillation ([facility])    • PVC (premature ventricular contraction)    • Sepsis ([facility]) 02/2018   • Sepsis due to Escherichia coli ([facility])    • Skin carcinoma 2012    MELANOMA.2 PLACES BACK AND EAR   • Type 2 diabetes mellitus ([facility])    • Vitamin D deficiency    • Weakness        Assessment:  Active Hospital Problems    Diagnosis  POA   • **Adhesive arachnoiditis [G03.9]  Unknown      Resolved Hospital Problems   No resolved problems to display.   diabetes  Hypertension  Sleep apnea  Atrial fibrillation  obesity    Plan:  Will continue home meds for diabetes  Add sliding scale, accu checks  Monitor bp on home medications  Check labs in the am  D.w patient   Thanks for involving us in his care    Bee Qureshi MD   11/17/2022  17:21 EST

## 2022-11-17 NOTE — OP NOTE
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 Her skilled assistance was necessary for the success of this case.)    Anesthesia: GET    EBL: 10 cc    Complications: none    Specimen sent:    Drains: none    Findings: Minimal epidural scar    Postoperative condition: Stable    Indications for the operation: The patient is an 82-year-old gentleman whose had previous spine surgery by another surgeon and is fused from L2-L4.  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  He has been managed by the pain doctors and they ultimately did a spinal cord stimulator with percutaneous leads using the Abbott system and he derive some benefit from this so he was being brought to the operating room today for placement of a surgical lead and placement of an internal pulse generator tomorrow after a repeat in-house trial.  He will receive medical clearance and has been off of his blood thinners.    Informed consent: He and his family understood that the goal of surgery was reduction in his overall back and leg pain and improvement his quality of life.  He understood the risks include, but are not limited to, infection possibly quiring explantation, hemorrhage requiring transfusion or reoperation, CSF leak requiring reoperation, incomplete relief of symptoms, potential need for additional surgeries in the future because of system malfunction, stroke, paralysis, coma, and death.  He  agreed to proceed.    Details of the operation: After medical clearance and cessation of his blood thinners, he was brought to the operating room and remained in his gurney in the supine position.  After induction and endotracheal ovation, he got 2 g of Kefzol as per the SCIP protocol.  He already had a chronic Murray in place.  Venous access was secured.  He was rolled over the prone position on a radiolucent Nahid spinal table.  All pressure points were padded including the brachial plexus.  We brought in the C arm and marked out the incision at about T8-10.  We knew from his percutaneous trial that most of the stimulation was done at the T8 level.  The thoracic region was then prepped and draped in usual sterile fashion.  We did a surgical timeout.  We injected 20 cc of quarter percent Marcaine with epinephrine into the paraspinous musculature at T10.  A linear incision was made at T10 with a #10 skin knife.  Hemostasis was obtained with monopolar cautery.  Dissection was taken all the way down to the thoracic fascia which was divided to the left and to the right at T10.  The spinous process of T10 was removed.  Microscope was draped and brought into the field.  Use was essential to the performance of microneurosurgical technique.  Using a 3 mm matchstick on the Eximia electric drill I did a generous central T10 laminotomy all the way down to the dura.  I widen the opening with 3 mm punches and dissected out the epidural space above with a Penfield 3 dissector and a dummy lead.  We used a Penta Abbott surgical lead and advanced it and it remained in the midline and we were able to position it so that it was essentially in the mid body of 8 with a 1 rolled leads at T9.  That was sutured into place with plastic silicone connectors to the interspinous ligament at T11.  Permanent records were obtained.  Weak but temporary connector wires and this tunnel done and checked the impedance which was all normal.  The fascia  was closed with 0 Vicryl interrupted suture.  Floseal was used.  The wire was coiled in the subcutaneous space and the subcutaneous layer was closed with 2-0 Vicryl and staples.  The exiting wires were secured to the skin with 2-0 silk.  Sterile clean dry dressings were placed.  The patient taught procedure well there were no complications and all counts correct.  He was rolled over in supine position extubated taken recovery in satisfactory condition.  He will be programmed again today and if everything all works out appropriately then we will put the internal pulse generator on the left side.  He already has a bladder stimulator on the right.

## 2022-11-17 NOTE — H&P
Primary Visit Coverage    Payer Plan Sponsor Code Group Number Group Name   [employer] [facility] MEDICARE REPLACEMENT [facility] MEDICARE REPLACEMENT  [id number]      Primary Visit Coverage Subscriber    Subscriber ID Subscriber Name Subscriber SSN Subscriber Address   [id number] POLI BECKHAM  [address] [place] [address]     Encounter Information    Encounter Information    Provider Department Encounter #   8/22/2022 10:00 AM Charlie Bailon MD [facility] [id number]     Office Visit  8/22/2022  [facility] NEUROSURGERY   Charlie Bailon MD  Neurosurgery  Adhesive arachnoiditis +3 more  Dx  Follow-up  Reason for Visit     Progress Notes  Charlie Bailon MD (Physician) • • Neurosurgery  Expand All Collapse All[]Expand All by Default     Subjective      Patient ID: Poli Beckham is a 81 y.o. male is here today for follow-up for back pain after spinal cord stimulator trial. He was last seen on 2/16/22 and reported constant back pain that radiates into bilateral legs with numbness, tingling and weakness.     Mr. Beckham reports his spinal cord stimulator trial went very well and he was pleased with the result. He states that he would like to move forward with the permanent stimulator. He continues to have back pain with leg pain. His numbness, tingling and weakness continues as well.      He is with his wife.  I been following this patient for several years.  He he is fused from L2-L4.  One of his original pathologies was for an infection.  But he has adhesive arachnoiditis which has resulted in chronic low back pain and bilateral leg pain.  He has baseline leg weakness.  He is work with Dr. Holguin and he gets Percocet from him.  He recently had a spinal cord stimulator trial about a month ago with the Abbott system and it helped quite a bit.  Had reduced his overall back and bilateral leg pain.  He is in a wheelchair and he has bilateral baseline leg  weakness.  It obviously did not affect that.  He has a bladder stimulator already.  The internal pulse generator is in the right buttock region.  He does have a cardiac history and was seen by his cardiologist Dr. Burr recently apparently had a stress test which he passed.  We will verify this.  He has a history of DVT and has been on Eliquis.  He did have a IVC filter placed by Dr. Thakkar in 2018 but it was removed.  I suspect he will need to have another filter placed and we will send him to Dr. Mustafa for this since I believe Dr. Thakkar has stepdown from active clinical practice.  The Eliquis obviously will need to be stopped prior to the surgery.  We will move forward in a two-stage process beginning with placement of an Abbott thoracic surgical lead to about T9 followed by in-house programming and placement of a left IPG assuming that the surgical trial is successful.  He knows that he will follow-up with our nurse practitioners.  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  We will move forward of this as soon as we can.           Back Pain  This is a chronic problem. The current episode started more than 1 year ago. The problem occurs constantly. The problem is unchanged. The pain is present in the lumbar spine. The quality of the pain is described as aching, shooting and burning. The pain radiates to the left thigh, left knee, left foot, right foot, right knee and right thigh. The pain is at a severity of 7/10. The pain is moderate. The symptoms are aggravated by lying down. Associated symptoms include leg pain, numbness, tingling and weakness. Pertinent negatives include no bladder incontinence, bowel incontinence or fever.         The following portions of the patient's history were reviewed and updated as appropriate: allergies, current medications, past family history, past medical history, past  social history, past surgical history and problem list.     Review of Systems   Constitutional: Positive for activity change. Negative for fever.   Gastrointestinal: Negative for bowel incontinence.   Genitourinary: Negative for bladder incontinence.   Musculoskeletal: Positive for back pain.   Neurological: Positive for tingling, weakness and numbness.   Psychiatric/Behavioral: Positive for sleep disturbance.   All other systems reviewed and are negative.              Objective      Physical Exam  Constitutional:       Appearance: He is well-developed.   HENT:      Head: Normocephalic and atraumatic.   Eyes:      Extraocular Movements: EOM normal.      Conjunctiva/sclera: Conjunctivae normal.      Pupils: Pupils are equal, round, and reactive to light.   Neck:      Vascular: No carotid bruit.   Neurological:      Mental Status: He is oriented to person, place, and time.      Coordination: Finger-Nose-Finger Test and Heel to Shin Test normal.      Gait: Gait is intact.      Deep Tendon Reflexes:      Reflex Scores:       Tricep reflexes are 2+ on the right side and 2+ on the left side.       Bicep reflexes are 2+ on the right side and 2+ on the left side.       Brachioradialis reflexes are 2+ on the right side and 2+ on the left side.       Patellar reflexes are 2+ on the right side and 2+ on the left side.       Achilles reflexes are 2+ on the right side and 2+ on the left side.  Psychiatric:         Speech: Speech normal.         Neurologic Exam      Mental Status   Oriented to person, place, and time.   Registration of memory: Good recent and remote memory.   Attention: normal. Concentration: normal.   Speech: speech is normal   Level of consciousness: alert  Knowledge: consistent with education.      Cranial Nerves      CN II   Visual fields full to confrontation.   Visual acuity: normal     CN III, IV, VI   Pupils are equal, round, and reactive to light.  Extraocular motions are normal.      CN V   Facial  sensation intact.   Right corneal reflex: normal  Left corneal reflex: normal     CN VII   Facial expression full, symmetric.   Right facial weakness: none  Left facial weakness: none     CN VIII   Hearing: intact     CN IX, X   Palate: symmetric     CN XI   Right sternocleidomastoid strength: normal  Left sternocleidomastoid strength: normal     CN XII   Tongue: not atrophic  Tongue deviation: none     Motor Exam   Muscle bulk: normal  Right arm tone: normal  Left arm tone: normal  Right leg tone: normal  Left leg tone: normal     Strength   Strength 5/5 except as noted.   Right neck flexion: 4/5  Left neck flexion: 4/5  Right neck extension: 4/5  Left neck extension: 4/5  Right deltoid: 4/5  Left deltoid: 4/5  Right biceps: 4/5  Left biceps: 4/5  Right triceps: 4/5  Left triceps: 4/5  Right wrist flexion: 4/5  Left wrist flexion: 4/5  Right wrist extension: 4/5  Left wrist extension: 4/5  Right interossei: 4/5  Left interossei: 4/5  Right abdominals: 4/5  Left abdominals: 4/5  Right iliopsoas: 4/5  Left iliopsoas: 4/5  Right quadriceps: 4/5  Left quadriceps: 4/5  Right hamstrin/5  Left hamstrin/5  Right glutei: 4/5  Left glutei: 4/5  Right anterior tibial: 4/5  Left anterior tibial: 4/5  Right posterior tibial: 4/5  Left posterior tibial: 4/5  Right peroneal: 4/5  Left peroneal: 4/5  Right gastroc: 4/5  Left gastroc: 4/5In a wheelchair      Sensory Exam   Light touch normal.      Gait, Coordination, and Reflexes      Gait  Gait: normal     Coordination   Finger to nose coordination: normal  Heel to shin coordination: normal     Reflexes   Right brachioradialis: 2+  Left brachioradialis: 2+  Right biceps: 2+  Left biceps: 2+  Right triceps: 2+  Left triceps: 2+  Right patellar: 2+  Left patellar: 2+  Right achilles: 2+  Left achilles: 2+  Right : 2+  Left : 2+              Assessment & Plan     Independent Review of Radiographic Studies:       I reviewed x-rays done in 2019 which showed  good positioning of the hardware and interbody cages from L2-L4.  Agree with the report.     Medical Decision Making:       We will have the vascular service see him to see if an IVC filter needs to be placed again prior to the surgery.  We will verify cardiac clearance with his cardiologist Dr. Burr.  We will proceed with a two-step staged to the placement of a spinal cord stimulator.  Phase 1 or stage I will be a T11 laminotomy for placement of a Abbott surgical lead to about T9 followed by in-hospital testing.  If the testing recreates the trial then we will put the internal pulse generator the next day in the left gluteal region.  The goal of surgery is placement of a functional spinal cord stimulator system to reduce his overall back and leg pain and improve his quality of life.  The risks include, but are not limited to, infection possible requiring explantation, hemorrhage requiring transfusion or reoperation, CSF leak requiring reoperation, incomplete relief of symptoms, potential need for additional surgeries in the future because of system malfunction, stroke, paralysis, coma, and death.  He agrees to proceed.     He knows he will be seen by the nurse practitioners at the 2-week maria elena but I will see him again at the 6-month maria elena after the surgery.        Diagnoses and all orders for this visit:     1. Adhesive arachnoiditis (Primary)  -     Case Request; Standing  -     COVID PRE-OP / PRE-PROCEDURE SCREENING ORDER (NO ISOLATION) - Swab, Nasopharynx; Future  -     Case Request  -     Case Request; Standing  -     COVID PRE-OP / PRE-PROCEDURE SCREENING ORDER (NO ISOLATION) - Swab, Nasopharynx; Future  -     Case Request     2. Bilateral leg weakness     3. History of lumbar spinal fusion     4. History of DVT of lower extremity  -     Ambulatory Referral to Vascular Surgery     Other orders  -     Follow Anesthesia Guidelines / Standing Orders; Future  -     Provide NPO Instructions to Patient; Future  -      "Chlorhexidine Skin Prep; Future  -     Obtain informed consent  -     Follow Anesthesia Guidelines / Standing Orders; Future  -     Obtain informed consent  -     Provide NPO Instructions to Patient; Future  -     Chlorhexidine Skin Prep; Future        Return for 2 weeks after surgery with an APC.              Addendum: From Dr. Espinoza:     Damon Espinoza MD Villanueva, Wayne, MD  I am sending you my full note but wanted to shoot you a message to tell you that I do not think he would clearlybenefit from a filter.  He had a DVT that was provoked by a major illness a few years ago and does not have any DVT in either leg right now.  I would recommend holding Eliquis 2 days prior to and obviously day of the procedure. Feel free to call me if he would like to discuss.                     Instructions       Return for 2 weeks after surgery with an APC.  Additional Documentation    Vitals:   /84   Pulse 73   Temp 97.8 °F (36.6 °C)   Ht 176.5 cm (69.5\")   Wt 126 kg (277 lb)   SpO2 93%   BMI 40.32 kg/m²   BSA 2.39 m²   Flowsheets:   Outbreak Screen,   Admission / Procedure / Surgery Location      Encounter Info:   Billing Info,   History,   Allergies,   Detailed Report,   Prep for Surgery Order Report,   PAF,   Patient Routing History,   Coding Summary,   Encounter Facesheet,   Link Facesheet,   ONC Road Map View,   AWV Summary   ...(8 more)     Communications    View Encounter Conversation Summary     Letter sent to Magdy Ricardo MD and Cody Holguin MD    [facility] [place] Preferred Visit Summary     Summary of care document sent to Angel Mustafa MD  Media  From this encounter  Scan on 8/22/2022 1224 by Hubert Schreiber: DR NARVAEZ SURGERY     Orders Placed       Other Orders   Ambulatory Referral to Vascular Surgery Closed   Case Request Once   Case Request Once   Chlorhexidine Skin Prep   Chlorhexidine Skin Prep   Follow Anesthesia Guidelines / Standing Orders   Follow Anesthesia Guidelines / " Standing Orders   Obtain informed consent   Obtain informed consent   Provide NPO Instructions to Patient   Provide NPO Instructions to Patient   ...(9 more)  Medication Changes       None     Medication List     Visit Diagnoses       Adhesive arachnoiditis     Bilateral leg weakness     History of lumbar spinal fusion     History of DVT of lower extremity     Problem List      Current Medications     Disp Start End   Accu-Chek Softclix Lancets lancets 500 each 11/16/2022    Sig: Check 5 times a day on insulin tx, poor control, hypoglycemia. Dx: E 11.8   allopurinol (ZYLOPRIM) 300 MG tablet 90 tablet 6/27/2022    Sig - Route: Take 1 tablet by mouth Daily. - Oral   apixaban (Eliquis) 5 MG tablet tablet 180 tablet 1/18/2022    Sig - Route: Take 1 tablet by mouth Every 12 (Twelve) Hours. - Oral   Ascorbic Acid 300 MG chewable tablet      Sig - Route: Chew 300 mg Every Morning. - Oral   Class: Historical Med   diphenhydrAMINE-acetaminophen (TYLENOL PM)  MG tablet per tablet      Sig - Route: Take 2 tablets by mouth At Night As Needed for Sleep. - Oral   Class: Historical Med   donepezil (ARICEPT) 5 MG tablet 90 tablet 6/27/2022    Sig - Route: Take 1 tablet by mouth every night at bedtime. - Oral   fenofibrate (TRICOR) 145 MG tablet 90 tablet 5/25/2022    Sig - Route: TAKE 1 TABLET BY MOUTH  DAILY - Oral   Notes to Pharmacy: Requesting 1 year supply   fluticasone (FLONASE) 50 MCG/ACT nasal spray 48 g 6/27/2022    Sig - Route: 2 sprays into the nostril(s) as directed by provider Daily. - Nasal   glipizide (GLUCOTROL) 10 MG tablet 180 tablet 9/21/2022    Sig - Route: Take 1 tablet by mouth 2 (Two) Times a Day Before Meals. - Oral   Glucosamine HCl 1500 MG tablet      Sig - Route: Take 2 tablets by mouth Daily. - Oral   Class: Historical Med   Glyxambi 25-5 MG tablet 90 tablet 5/25/2022    Sig - Route: TAKE 1 TABLET BY MOUTH  DAILY WITH BREAKFAST - Oral   Notes to Pharmacy: Requesting 1 year supply    L-Methylfolate-B6-B12 3-35-2 MG tablet 180 tablet 2022    Sig - Route: Take 1 tablet by mouth 2 (Two) Times a Day. - Oral   lisinopril (PRINIVIL,ZESTRIL) 2.5 MG tablet 90 tablet 2022    Sig - Route: TAKE 1 TABLET BY MOUTH  DAILY - Oral   Notes to Pharmacy: Requesting 1 year supply   melatonin 5 MG tablet tablet      Sig - Route: Take 5 mg by mouth Every Night. - Oral   Class: Historical Med   Multiple Vitamins-Minerals (MULTIVITAMIN ADULT PO)      Sig - Route: Take 1 tablet by mouth Daily. - Oral   Class: Historical Med   Myrbetriq 50 MG tablet sustained-release 24 hour 24 hr tablet  2020    Sig - Route: Take 50 mg by mouth Daily. - Oral   Class: Historical Med   Naloxegol Oxalate (Movantik) 25 MG tablet      Sig - Route: Take 25 mg by mouth Every Morning. - Oral   Class: Historical Med   Omega-3 Fatty Acids (FISH OIL) 1200 MG capsule capsule      Sig - Route: Take 2,000 mg by mouth 2 (Two) Times a Day With Meals. HOLD FOR SURGERY - Oral   Class: Historical Med   oxyCODONE-acetaminophen (PERCOCET) 5-325 MG per tablet 50 tablet 11/10/2022    Sig - Route: Take 1 tablet by mouth 2 (Two) Times a Day As Needed for Severe Pain. - Oral   Earliest Fill Date: 11/10/2022   polyethylene glycol (MIRALAX) 17 GM/SCOOP powder      Sig - Route: Take 17 g by mouth Daily. Once a day - Oral   Class: Historical Med   pregabalin (LYRICA) 100 MG capsule 270 capsule 2022    Sig: TAKE 1 CAPSULE BY MOUTH 3  TIMES DAILY   rosuvastatin (CRESTOR) 10 MG tablet 90 tablet 2022    Sig - Route: Take 1 tablet by mouth Daily. - Oral   Toujeo Max SoloStar 300 UNIT/ML solution pen-injector injection 29.4 mL 2022    Si units every morning   traZODone (DESYREL) 150 MG tablet 14 tablet 11/15/2022    Sig - Route: Take 1 tablet by mouth Every Night. - Oral   vitamin D (ERGOCALCIFEROL) 1.25 MG (75283 UT) capsule capsule 26 capsule 2021    Sig: TAKE 1 CAPSULE TWICE WEEKLY     Hospital Medications     Dose Frequency  Start End   ceFAZolin in dextrose (ANCEF) IVPB solution 2 g 2 g Once 11/17/2022    Admin Instructions: Administer within 1 hour of surgical incision. Redose 4 hours from pre-op dose if procedure ongoing or >1.5 L blood loss.   Caution: Look alike/sound alike drug alert   Route: Intravenous   famotidine (PEPCID) injection 20 mg (Completed) 20 mg Once 11/17/2022 11/17/2022   Admin Instructions: Give IV push over 2 minutes.   Route: Intravenous   fentaNYL citrate (PF) (SUBLIMAZE) injection 50 mcg 50 mcg Every 10 Minutes PRN 11/17/2022    Admin Instructions: Maximum total dose of fentanyl is 100 mcg.   If given for pain, use the following pain scale:   Mild Pain = Pain Score of 1-3, CPOT 1-2   Moderate Pain = Pain Score of 4-6, CPOT 3-4   Severe Pain = Pain Score of 7-10, CPOT 5-8   Route: Intravenous   lactated ringers infusion 9 mL/hr Continuous 11/17/2022    Route: Intravenous   lidocaine PF 1% (XYLOCAINE) injection 0.5 mL 0.5 mL Once As Needed 11/17/2022    Route: Injection   midazolam (VERSED) injection 0.5 mg 0.5 mg Every 10 Minutes PRN 11/17/2022    Admin Instructions: May repeat dose in 10 minutes one time then contact provider for additional orders.    Route: Intravenous   sodium chloride 0.9 % flush 3 mL 3 mL Every 12 Hours Scheduled 11/17/2022    Route: Intravenous   sodium chloride 0.9 % flush 3-10 mL 3-10 mL As Needed 11/17/2022    Route: Intravenous     Signed and Held Orders           Follow Anesthesia Guidelines / Standing Orders  Once,   Status:  Canceled        Electronically Signed By: Charlie Bailon MD Authorizing Provider: Charlie Bailon MD Phase of Care: Pre-Op Ordered At: 08/22/22 1215        Obtain informed consent  Once,   Status:  Canceled        Electronically Signed By: Charlie Bailon MD Authorizing Provider: Charlie Bailon MD Phase of Care: Pre-Op Ordered At: 08/22/22 1215    Question: Informed Consent Given For Answer: Ventriculoperitoneal shunt insertion       SCD  (sequential compression device)- to be placed on patient in Pre-op  Once,   Status:  Canceled        Electronically Signed By: Charlie Bailon MD Authorizing Provider: Charlie Bailon MD Phase of Care: Pre-Op Ordered At: 08/22/22 1215        Verify / Perform Chlorhexidine Skin Prep  Once,   Status:  Canceled        Comments: Chlorhexidine Skin Wipes and Instructions For All Patients Having A Procedure Requiring an Outward Incision if Not Allergic.  If Allergic, Give Antibacterial Skin Wipes and Instructions.  Do Not Use For Facial Cases or on Any Mucus Membranes.   Electronically Signed By: Charlie Bailon MD Authorizing Provider: Charlie Bailon MD Phase of Care: Pre-Op Ordered At: 08/22/22 1215        ceFAZolin in dextrose (ANCEF) IVPB solution 2 g  Once,   Status:  Canceled        Electronically Signed By: Charlie Bailon MD Authorizing Provider: Charlie Bailon MD Phase of Care: Pre-Op Ordered At: 11/17/22 0730        Follow Anesthesia Guidelines / Standing Orders  Once        Electronically Signed By: Charlie Bailon MD Authorizing Provider: Charlie Bailon MD Phase of Care: Pre-Op Ordered At: 08/22/22 1217        Obtain informed consent  Once        Electronically Signed By: Charlie Bailon MD Authorizing Provider: Charlie Bailon MD Phase of Care: Pre-Op Ordered At: 08/22/22 1217    Question: Informed Consent Given For Answer: Ventriculoperitoneal shunt insertion       SCD (sequential compression device)- to be placed on patient in Pre-op  Once        Electronically Signed By: Charlie Bailon MD Authorizing Provider: Charlie Bailon MD Phase of Care: Pre-Op Ordered At: 08/22/22 1217        Verify / Perform Chlorhexidine Skin Prep  Once        Comments: Chlorhexidine Skin Wipes and Instructions For All Patients Having A Procedure Requiring an Outward Incision if Not Allergic.  If Allergic, Give Antibacterial Skin Wipes and Instructions.  Do Not Use For Facial Cases or on Any  Mucus Membranes.   Electronically Signed By: Charlie Bailon MD Authorizing Provider: Charlie Bailon MD Phase of Care: Pre-Op Ordered At: 08/22/22 1217        ceFAZolin in dextrose (ANCEF) IVPB solution 2 g  Once        Electronically Signed By: Charlie Bailon MD Authorizing Provider: Charlie Bailon MD Phase of Care: Pre-Op Ordered At: 11/18/22 0730            No change in PE. Proceed as above.

## 2022-11-17 NOTE — BRIEF OP NOTE
SPINAL CORD STIMULATOR INSERTION PHASE 1  Progress Note    Jesus Beckham  11/17/2022    Pre-op Diagnosis:   Adhesive arachnoiditis [G03.9]       Post-Op Diagnosis Codes:     * Adhesive arachnoiditis [G03.9]    Procedure/CPT® Codes:        Procedure(s):  Thoracic ten laminotomy for placement of a surgical spinal cord stimulator lead to thoracic eight        Surgeon(s):  Charlie Bailon MD    Anesthesia: General    Staff:   Circulator: Homero St RN; Adrienne Pitt RN  Scrub Person: Nav Moser Kaitlyn  Assistant: Sarah Ramos CSA  Assistant: Sarah Ramos CSA      Estimated Blood Loss: 20 cc    Urine Voided: 100 mL    Specimens:                none          Drains:   Urethral Catheter (Active)       Suprapubic Catheter Double-lumen (Active)   Site Assessment Clean;Intact 11/17/22 0711   Collection Container Standard drainage bag 11/17/22 0711   Output (mL) 300 mL 11/17/22 0711       Findings: minimal epidural scar        Complications: none    Assistant: Sarah Ramos CSA  was responsible for performing the following activities: Retraction, Suction, Irrigation, Suturing, Closing and Placing Dressing and their skilled assistance was necessary for the success of this case.    Charlie Bailon MD     Date: 11/17/2022  Time: 10:17 EST

## 2022-11-17 NOTE — ANESTHESIA PREPROCEDURE EVALUATION
Anesthesia Evaluation     Patient summary reviewed and Nursing notes reviewed   no history of anesthetic complications:  NPO Solid Status: > 6 hours  NPO Liquid Status: > 6 hours           Airway   Mallampati: II  TM distance: >3 FB  Neck ROM: full  no difficulty expected and No difficulty expected  Dental - normal exam     Pulmonary - normal exam    breath sounds clear to auscultation  (+) sleep apnea,   (-) rhonchi, decreased breath sounds, wheezes, rales, stridor  Cardiovascular     NYHA Classification: I  ECG reviewed  Rhythm: regular  Rate: normal    (+) hypertension, valvular problems/murmurs AS, dysrhythmias Paroxysmal Atrial Fib, PVC, murmur, DVT, hyperlipidemia,   (-) weak pulses, friction rub, systolic click, carotid bruits, JVD, peripheral edema      Neuro/Psych  (+) weakness, numbness,    GI/Hepatic/Renal/Endo    (+) obesity,   diabetes mellitus type 2,     Musculoskeletal     (+) back pain, radiculopathy  Abdominal  - normal exam    Abdomen: soft.   Substance History - negative use     OB/GYN negative ob/gyn ROS         Other   arthritis,                        Anesthesia Plan    ASA 3     general     intravenous induction     Anesthetic plan, risks, benefits, and alternatives have been provided, discussed and informed consent has been obtained with: patient.    Plan discussed with CRNA.        CODE STATUS:

## 2022-11-17 NOTE — PLAN OF CARE
Goal Outcome Evaluation:  Plan of Care Reviewed With: patient        Progress: no change  Outcome Evaluation: PT IS PO TODAY FROM A [id number] WITH PHASE 1 OF PAIN STIMULATOR. AAO X 4, PAIN IMPROVED. AIR MATTRESS ORDERED FOR SKIN ISSUES ON ADMIT. ABD PAD INTACT TO MID BACK INCISION. HOB 15 DEGREES. WILL BEW NPO AFTER MN TONIGHT WITH SIPS OF MEDS FOR PHASE 2 OF SURGERY TOMORROW. PT EDUCATED REGARDING HTN AND B/P MONITORING.

## 2022-11-17 NOTE — ANESTHESIA POSTPROCEDURE EVALUATION
"Patient: Jesus Beckham    Procedure Summary     Date: 11/17/22 Room / Location: [facility] OR [address] [place] [address] MAIN OR    Anesthesia Start: 0804 Anesthesia Stop: 1023    Procedure: Thoracic eleven laminotomy for placement of a surgical spinal cord stimulator lead to thoracic nine (Back) Diagnosis:       Adhesive arachnoiditis      (Adhesive arachnoiditis [G03.9])    Surgeons: Charlie Bailon MD Provider: Fermín Taveras MD    Anesthesia Type: general ASA Status: 3          Anesthesia Type: general    Vitals  Vitals Value Taken Time   /68 11/17/22 1146   Temp 36.4 °C (97.6 °F) 11/17/22 1145   Pulse 74 11/17/22 1154   Resp 18 11/17/22 1145   SpO2 97 % 11/17/22 1155   Vitals shown include unvalidated device data.        Post Anesthesia Care and Evaluation    Patient location during evaluation: bedside  Patient participation: complete - patient participated  Level of consciousness: awake and alert  Pain management: adequate    Airway patency: patent  Anesthetic complications: No anesthetic complications    Cardiovascular status: acceptable  Respiratory status: acceptable  Hydration status: acceptable    Comments: /73 (BP Location: Left arm, Patient Position: Lying)   Pulse 91   Temp 36.4 °C (97.5 °F) (Oral)   Resp 19   Ht 176.5 cm (69.49\")   Wt 122 kg (270 lb)   SpO2 96%   BMI 39.31 kg/m²           "

## 2022-11-18 ENCOUNTER — TELEPHONE (OUTPATIENT)
Dept: FAMILY MEDICINE CLINIC | Facility: CLINIC | Age: 82
End: 2022-11-18

## 2022-11-18 ENCOUNTER — ANESTHESIA EVENT (OUTPATIENT)
Dept: PERIOP | Facility: HOSPITAL | Age: 82
End: 2022-11-18

## 2022-11-18 ENCOUNTER — ANESTHESIA (OUTPATIENT)
Dept: PERIOP | Facility: HOSPITAL | Age: 82
End: 2022-11-18

## 2022-11-18 DIAGNOSIS — F51.01 PRIMARY INSOMNIA: Chronic | ICD-10-CM

## 2022-11-18 LAB
ANION GAP SERPL CALCULATED.3IONS-SCNC: 9 MMOL/L (ref 5–15)
BASOPHILS # BLD AUTO: 0.04 10*3/MM3 (ref 0–0.2)
BASOPHILS NFR BLD AUTO: 0.6 % (ref 0–1.5)
BUN SERPL-MCNC: 15 MG/DL (ref 8–23)
BUN/CREAT SERPL: 15 (ref 7–25)
CALCIUM SPEC-SCNC: 8.9 MG/DL (ref 8.6–10.5)
CHLORIDE SERPL-SCNC: 100 MMOL/L (ref 98–107)
CO2 SERPL-SCNC: 28 MMOL/L (ref 22–29)
CREAT SERPL-MCNC: 1 MG/DL (ref 0.76–1.27)
DEPRECATED RDW RBC AUTO: 46.9 FL (ref 37–54)
EGFRCR SERPLBLD CKD-EPI 2021: 75.6 ML/MIN/1.73
EOSINOPHIL # BLD AUTO: 0.09 10*3/MM3 (ref 0–0.4)
EOSINOPHIL NFR BLD AUTO: 1.3 % (ref 0.3–6.2)
ERYTHROCYTE [DISTWIDTH] IN BLOOD BY AUTOMATED COUNT: 13.9 % (ref 12.3–15.4)
GLUCOSE BLDC GLUCOMTR-MCNC: 112 MG/DL (ref 70–130)
GLUCOSE BLDC GLUCOMTR-MCNC: 116 MG/DL (ref 70–130)
GLUCOSE BLDC GLUCOMTR-MCNC: 118 MG/DL (ref 70–130)
GLUCOSE BLDC GLUCOMTR-MCNC: 126 MG/DL (ref 70–130)
GLUCOSE BLDC GLUCOMTR-MCNC: 98 MG/DL (ref 70–130)
GLUCOSE SERPL-MCNC: 117 MG/DL (ref 65–99)
HBA1C MFR BLD: 6.8 % (ref 4.8–5.6)
HCT VFR BLD AUTO: 39.2 % (ref 37.5–51)
HGB BLD-MCNC: 12.9 G/DL (ref 13–17.7)
LYMPHOCYTES # BLD AUTO: 0.94 10*3/MM3 (ref 0.7–3.1)
LYMPHOCYTES NFR BLD AUTO: 13.4 % (ref 19.6–45.3)
MCH RBC QN AUTO: 30.3 PG (ref 26.6–33)
MCHC RBC AUTO-ENTMCNC: 32.9 G/DL (ref 31.5–35.7)
MCV RBC AUTO: 92 FL (ref 79–97)
MONOCYTES # BLD AUTO: 0.53 10*3/MM3 (ref 0.1–0.9)
MONOCYTES NFR BLD AUTO: 7.5 % (ref 5–12)
NEUTROPHILS NFR BLD AUTO: 5.38 10*3/MM3 (ref 1.7–7)
NEUTROPHILS NFR BLD AUTO: 76.3 % (ref 42.7–76)
PLATELET # BLD AUTO: 155 10*3/MM3 (ref 140–450)
PMV BLD AUTO: 10.5 FL (ref 6–12)
POTASSIUM SERPL-SCNC: 4.6 MMOL/L (ref 3.5–5.2)
RBC # BLD AUTO: 4.26 10*6/MM3 (ref 4.14–5.8)
SODIUM SERPL-SCNC: 137 MMOL/L (ref 136–145)
WBC NRBC COR # BLD: 7.04 10*3/MM3 (ref 3.4–10.8)

## 2022-11-18 PROCEDURE — 25010000002 FENTANYL CITRATE (PF) 50 MCG/ML SOLUTION: Performed by: NURSE ANESTHETIST, CERTIFIED REGISTERED

## 2022-11-18 PROCEDURE — 25010000002 ONDANSETRON PER 1 MG: Performed by: NURSE ANESTHETIST, CERTIFIED REGISTERED

## 2022-11-18 PROCEDURE — C1767 GENERATOR, NEURO NON-RECHARG: HCPCS | Performed by: NEUROLOGICAL SURGERY

## 2022-11-18 PROCEDURE — 63685 INS/RPLC SPI NPG/RCVR POCKET: CPT | Performed by: SPECIALIST/TECHNOLOGIST, OTHER

## 2022-11-18 PROCEDURE — 25010000002 HYDROMORPHONE PER 4 MG: Performed by: NURSE ANESTHETIST, CERTIFIED REGISTERED

## 2022-11-18 PROCEDURE — 80048 BASIC METABOLIC PNL TOTAL CA: CPT | Performed by: NEUROLOGICAL SURGERY

## 2022-11-18 PROCEDURE — 82962 GLUCOSE BLOOD TEST: CPT

## 2022-11-18 PROCEDURE — 85025 COMPLETE CBC W/AUTO DIFF WBC: CPT | Performed by: NEUROLOGICAL SURGERY

## 2022-11-18 PROCEDURE — 25010000002 CEFAZOLIN IN DEXTROSE 2-4 GM/100ML-% SOLUTION: Performed by: NEUROLOGICAL SURGERY

## 2022-11-18 PROCEDURE — 25010000002 CEFAZOLIN PER 500 MG: Performed by: NEUROLOGICAL SURGERY

## 2022-11-18 PROCEDURE — 25010000002 HYDROMORPHONE PER 4 MG: Performed by: NEUROLOGICAL SURGERY

## 2022-11-18 PROCEDURE — 83036 HEMOGLOBIN GLYCOSYLATED A1C: CPT | Performed by: INTERNAL MEDICINE

## 2022-11-18 PROCEDURE — 25010000002 HYDROMORPHONE 1 MG/ML SOLUTION: Performed by: NURSE ANESTHETIST, CERTIFIED REGISTERED

## 2022-11-18 PROCEDURE — 63685 INS/RPLC SPI NPG/RCVR POCKET: CPT | Performed by: NEUROLOGICAL SURGERY

## 2022-11-18 PROCEDURE — C1787 PATIENT PROGR, NEUROSTIM: HCPCS | Performed by: NEUROLOGICAL SURGERY

## 2022-11-18 PROCEDURE — 25010000002 PROPOFOL 10 MG/ML EMULSION: Performed by: NURSE ANESTHETIST, CERTIFIED REGISTERED

## 2022-11-18 DEVICE — GEN IPG SCS PROCLAIM/ELITE/5 NONRECHG 10N: Type: IMPLANTABLE DEVICE | Site: SPINE LUMBAR | Status: FUNCTIONAL

## 2022-11-18 RX ORDER — HYDRALAZINE HYDROCHLORIDE 20 MG/ML
5 INJECTION INTRAMUSCULAR; INTRAVENOUS
Status: DISCONTINUED | OUTPATIENT
Start: 2022-11-18 | End: 2022-11-18 | Stop reason: HOSPADM

## 2022-11-18 RX ORDER — FAMOTIDINE 10 MG/ML
20 INJECTION, SOLUTION INTRAVENOUS ONCE
Status: COMPLETED | OUTPATIENT
Start: 2022-11-18 | End: 2022-11-18

## 2022-11-18 RX ORDER — PROPOFOL 10 MG/ML
VIAL (ML) INTRAVENOUS AS NEEDED
Status: DISCONTINUED | OUTPATIENT
Start: 2022-11-18 | End: 2022-11-18 | Stop reason: SURG

## 2022-11-18 RX ORDER — MAGNESIUM HYDROXIDE 1200 MG/15ML
LIQUID ORAL AS NEEDED
Status: DISCONTINUED | OUTPATIENT
Start: 2022-11-18 | End: 2022-11-18 | Stop reason: HOSPADM

## 2022-11-18 RX ORDER — CEFAZOLIN SODIUM 2 G/100ML
2 INJECTION, SOLUTION INTRAVENOUS ONCE
Status: DISCONTINUED | OUTPATIENT
Start: 2022-11-18 | End: 2022-11-18

## 2022-11-18 RX ORDER — SODIUM CHLORIDE 9 MG/ML
40 INJECTION, SOLUTION INTRAVENOUS AS NEEDED
Status: DISCONTINUED | OUTPATIENT
Start: 2022-11-18 | End: 2022-11-20 | Stop reason: HOSPADM

## 2022-11-18 RX ORDER — FLUMAZENIL 0.1 MG/ML
0.2 INJECTION INTRAVENOUS AS NEEDED
Status: DISCONTINUED | OUTPATIENT
Start: 2022-11-18 | End: 2022-11-18 | Stop reason: HOSPADM

## 2022-11-18 RX ORDER — PROMETHAZINE HYDROCHLORIDE 25 MG/1
25 TABLET ORAL ONCE AS NEEDED
Status: DISCONTINUED | OUTPATIENT
Start: 2022-11-18 | End: 2022-11-18 | Stop reason: HOSPADM

## 2022-11-18 RX ORDER — WOUND DRESSING ADHESIVE - LIQUID
LIQUID MISCELLANEOUS AS NEEDED
Status: DISCONTINUED | OUTPATIENT
Start: 2022-11-18 | End: 2022-11-18 | Stop reason: HOSPADM

## 2022-11-18 RX ORDER — FENTANYL CITRATE 50 UG/ML
INJECTION, SOLUTION INTRAMUSCULAR; INTRAVENOUS AS NEEDED
Status: DISCONTINUED | OUTPATIENT
Start: 2022-11-18 | End: 2022-11-18 | Stop reason: SURG

## 2022-11-18 RX ORDER — FENTANYL CITRATE 50 UG/ML
50 INJECTION, SOLUTION INTRAMUSCULAR; INTRAVENOUS
Status: DISCONTINUED | OUTPATIENT
Start: 2022-11-18 | End: 2022-11-18 | Stop reason: HOSPADM

## 2022-11-18 RX ORDER — EPHEDRINE SULFATE 50 MG/ML
INJECTION INTRAVENOUS AS NEEDED
Status: DISCONTINUED | OUTPATIENT
Start: 2022-11-18 | End: 2022-11-18 | Stop reason: SURG

## 2022-11-18 RX ORDER — CEFAZOLIN SODIUM 2 G/100ML
2 INJECTION, SOLUTION INTRAVENOUS EVERY 8 HOURS
Status: COMPLETED | OUTPATIENT
Start: 2022-11-18 | End: 2022-11-19

## 2022-11-18 RX ORDER — SODIUM CHLORIDE, SODIUM LACTATE, POTASSIUM CHLORIDE, CALCIUM CHLORIDE 600; 310; 30; 20 MG/100ML; MG/100ML; MG/100ML; MG/100ML
9 INJECTION, SOLUTION INTRAVENOUS CONTINUOUS
Status: DISCONTINUED | OUTPATIENT
Start: 2022-11-18 | End: 2022-11-20 | Stop reason: HOSPADM

## 2022-11-18 RX ORDER — SODIUM CHLORIDE 0.9 % (FLUSH) 0.9 %
3 SYRINGE (ML) INJECTION EVERY 12 HOURS SCHEDULED
Status: DISCONTINUED | OUTPATIENT
Start: 2022-11-18 | End: 2022-11-18 | Stop reason: HOSPADM

## 2022-11-18 RX ORDER — LIDOCAINE HYDROCHLORIDE 20 MG/ML
INJECTION, SOLUTION EPIDURAL; INFILTRATION; INTRACAUDAL; PERINEURAL AS NEEDED
Status: DISCONTINUED | OUTPATIENT
Start: 2022-11-18 | End: 2022-11-18 | Stop reason: SURG

## 2022-11-18 RX ORDER — PROMETHAZINE HYDROCHLORIDE 25 MG/1
25 SUPPOSITORY RECTAL ONCE AS NEEDED
Status: DISCONTINUED | OUTPATIENT
Start: 2022-11-18 | End: 2022-11-18 | Stop reason: HOSPADM

## 2022-11-18 RX ORDER — DIPHENHYDRAMINE HYDROCHLORIDE 50 MG/ML
12.5 INJECTION INTRAMUSCULAR; INTRAVENOUS
Status: DISCONTINUED | OUTPATIENT
Start: 2022-11-18 | End: 2022-11-18 | Stop reason: HOSPADM

## 2022-11-18 RX ORDER — EPHEDRINE SULFATE 50 MG/ML
5 INJECTION, SOLUTION INTRAVENOUS ONCE AS NEEDED
Status: DISCONTINUED | OUTPATIENT
Start: 2022-11-18 | End: 2022-11-18 | Stop reason: HOSPADM

## 2022-11-18 RX ORDER — GLYCOPYRROLATE 0.2 MG/ML
INJECTION INTRAMUSCULAR; INTRAVENOUS AS NEEDED
Status: DISCONTINUED | OUTPATIENT
Start: 2022-11-18 | End: 2022-11-18 | Stop reason: SURG

## 2022-11-18 RX ORDER — DIPHENHYDRAMINE HCL 25 MG
25 CAPSULE ORAL
Status: DISCONTINUED | OUTPATIENT
Start: 2022-11-18 | End: 2022-11-18 | Stop reason: HOSPADM

## 2022-11-18 RX ORDER — OXYCODONE AND ACETAMINOPHEN 7.5; 325 MG/1; MG/1
1 TABLET ORAL EVERY 4 HOURS PRN
Status: DISCONTINUED | OUTPATIENT
Start: 2022-11-18 | End: 2022-11-18 | Stop reason: HOSPADM

## 2022-11-18 RX ORDER — LABETALOL HYDROCHLORIDE 5 MG/ML
5 INJECTION, SOLUTION INTRAVENOUS
Status: DISCONTINUED | OUTPATIENT
Start: 2022-11-18 | End: 2022-11-18 | Stop reason: HOSPADM

## 2022-11-18 RX ORDER — SODIUM CHLORIDE 0.9 % (FLUSH) 0.9 %
3-10 SYRINGE (ML) INJECTION AS NEEDED
Status: DISCONTINUED | OUTPATIENT
Start: 2022-11-18 | End: 2022-11-18 | Stop reason: HOSPADM

## 2022-11-18 RX ORDER — HYDROMORPHONE HYDROCHLORIDE 1 MG/ML
0.5 INJECTION, SOLUTION INTRAMUSCULAR; INTRAVENOUS; SUBCUTANEOUS
Status: DISCONTINUED | OUTPATIENT
Start: 2022-11-18 | End: 2022-11-18 | Stop reason: HOSPADM

## 2022-11-18 RX ORDER — TRAZODONE HYDROCHLORIDE 150 MG/1
300 TABLET ORAL NIGHTLY
Qty: 180 TABLET | Refills: 3 | Status: SHIPPED | OUTPATIENT
Start: 2022-11-18

## 2022-11-18 RX ORDER — ROCURONIUM BROMIDE 10 MG/ML
INJECTION, SOLUTION INTRAVENOUS AS NEEDED
Status: DISCONTINUED | OUTPATIENT
Start: 2022-11-18 | End: 2022-11-18 | Stop reason: SURG

## 2022-11-18 RX ORDER — ONDANSETRON 2 MG/ML
INJECTION INTRAMUSCULAR; INTRAVENOUS AS NEEDED
Status: DISCONTINUED | OUTPATIENT
Start: 2022-11-18 | End: 2022-11-18 | Stop reason: SURG

## 2022-11-18 RX ORDER — MIDAZOLAM HYDROCHLORIDE 1 MG/ML
0.5 INJECTION INTRAMUSCULAR; INTRAVENOUS
Status: DISCONTINUED | OUTPATIENT
Start: 2022-11-18 | End: 2022-11-18 | Stop reason: HOSPADM

## 2022-11-18 RX ORDER — ONDANSETRON 2 MG/ML
4 INJECTION INTRAMUSCULAR; INTRAVENOUS ONCE AS NEEDED
Status: DISCONTINUED | OUTPATIENT
Start: 2022-11-18 | End: 2022-11-18 | Stop reason: HOSPADM

## 2022-11-18 RX ORDER — NALOXONE HCL 0.4 MG/ML
0.2 VIAL (ML) INJECTION AS NEEDED
Status: DISCONTINUED | OUTPATIENT
Start: 2022-11-18 | End: 2022-11-18 | Stop reason: HOSPADM

## 2022-11-18 RX ORDER — HYDROCODONE BITARTRATE AND ACETAMINOPHEN 7.5; 325 MG/1; MG/1
1 TABLET ORAL ONCE AS NEEDED
Status: DISCONTINUED | OUTPATIENT
Start: 2022-11-18 | End: 2022-11-18 | Stop reason: HOSPADM

## 2022-11-18 RX ORDER — LIDOCAINE HYDROCHLORIDE 10 MG/ML
0.5 INJECTION, SOLUTION EPIDURAL; INFILTRATION; INTRACAUDAL; PERINEURAL ONCE AS NEEDED
Status: DISCONTINUED | OUTPATIENT
Start: 2022-11-18 | End: 2022-11-18 | Stop reason: HOSPADM

## 2022-11-18 RX ADMIN — FAMOTIDINE 20 MG: 10 INJECTION INTRAVENOUS at 08:47

## 2022-11-18 RX ADMIN — SUGAMMADEX 200 MG: 100 INJECTION, SOLUTION INTRAVENOUS at 09:44

## 2022-11-18 RX ADMIN — FENTANYL CITRATE 50 MCG: 50 INJECTION, SOLUTION INTRAMUSCULAR; INTRAVENOUS at 09:00

## 2022-11-18 RX ADMIN — Medication 5 MG: at 20:58

## 2022-11-18 RX ADMIN — SODIUM CHLORIDE, POTASSIUM CHLORIDE, SODIUM LACTATE AND CALCIUM CHLORIDE 9 ML/HR: 600; 310; 30; 20 INJECTION, SOLUTION INTRAVENOUS at 08:44

## 2022-11-18 RX ADMIN — CEFAZOLIN SODIUM 2 G: 2 INJECTION, SOLUTION INTRAVENOUS at 00:07

## 2022-11-18 RX ADMIN — PROPOFOL 150 MG: 10 INJECTION, EMULSION INTRAVENOUS at 09:00

## 2022-11-18 RX ADMIN — MIRABEGRON 50 MG: 25 TABLET, FILM COATED, EXTENDED RELEASE ORAL at 15:42

## 2022-11-18 RX ADMIN — HYDROMORPHONE HYDROCHLORIDE 0.5 MG: 1 INJECTION, SOLUTION INTRAMUSCULAR; INTRAVENOUS; SUBCUTANEOUS at 11:07

## 2022-11-18 RX ADMIN — CEFAZOLIN SODIUM 2 G: 2 INJECTION, SOLUTION INTRAVENOUS at 08:44

## 2022-11-18 RX ADMIN — CEFAZOLIN SODIUM 2 G: 2 INJECTION, SOLUTION INTRAVENOUS at 23:59

## 2022-11-18 RX ADMIN — LISINOPRIL 2.5 MG: 2.5 TABLET ORAL at 15:42

## 2022-11-18 RX ADMIN — HYDROMORPHONE HYDROCHLORIDE 0.5 MG: 1 INJECTION, SOLUTION INTRAMUSCULAR; INTRAVENOUS; SUBCUTANEOUS at 19:09

## 2022-11-18 RX ADMIN — PREGABALIN 100 MG: 100 CAPSULE ORAL at 20:58

## 2022-11-18 RX ADMIN — OXYCODONE HYDROCHLORIDE AND ACETAMINOPHEN 1 TABLET: 7.5; 325 TABLET ORAL at 01:36

## 2022-11-18 RX ADMIN — LIDOCAINE HYDROCHLORIDE 100 MG: 20 INJECTION, SOLUTION EPIDURAL; INFILTRATION; INTRACAUDAL; PERINEURAL at 09:00

## 2022-11-18 RX ADMIN — OXYCODONE HYDROCHLORIDE AND ACETAMINOPHEN 1 TABLET: 7.5; 325 TABLET ORAL at 15:43

## 2022-11-18 RX ADMIN — Medication 10 ML: at 21:01

## 2022-11-18 RX ADMIN — CEFAZOLIN SODIUM 2 G: 2 INJECTION, SOLUTION INTRAVENOUS at 15:36

## 2022-11-18 RX ADMIN — ROSUVASTATIN CALCIUM 10 MG: 10 TABLET, FILM COATED ORAL at 21:01

## 2022-11-18 RX ADMIN — FENOFIBRATE 145 MG: 145 TABLET, FILM COATED ORAL at 15:42

## 2022-11-18 RX ADMIN — SODIUM CHLORIDE, POTASSIUM CHLORIDE, SODIUM LACTATE AND CALCIUM CHLORIDE 100 ML/HR: 600; 310; 30; 20 INJECTION, SOLUTION INTRAVENOUS at 15:36

## 2022-11-18 RX ADMIN — GLIPIZIDE 10 MG: 10 TABLET ORAL at 16:37

## 2022-11-18 RX ADMIN — ROCURONIUM BROMIDE 50 MG: 10 INJECTION, SOLUTION INTRAVENOUS at 09:00

## 2022-11-18 RX ADMIN — ONDANSETRON 4 MG: 2 INJECTION INTRAMUSCULAR; INTRAVENOUS at 09:21

## 2022-11-18 RX ADMIN — TRAZODONE HYDROCHLORIDE 150 MG: 100 TABLET ORAL at 20:58

## 2022-11-18 RX ADMIN — GLYCOPYRROLATE 0.1 MG: 1 INJECTION INTRAMUSCULAR; INTRAVENOUS at 09:00

## 2022-11-18 RX ADMIN — CYCLOBENZAPRINE 10 MG: 10 TABLET, FILM COATED ORAL at 19:09

## 2022-11-18 RX ADMIN — PREGABALIN 100 MG: 100 CAPSULE ORAL at 16:37

## 2022-11-18 RX ADMIN — FENTANYL CITRATE 50 MCG: 50 INJECTION, SOLUTION INTRAMUSCULAR; INTRAVENOUS at 10:40

## 2022-11-18 RX ADMIN — DONEPEZIL HYDROCHLORIDE 5 MG: 5 TABLET, FILM COATED ORAL at 20:58

## 2022-11-18 RX ADMIN — EPHEDRINE SULFATE 5 MG: 50 INJECTION INTRAVENOUS at 09:15

## 2022-11-18 RX ADMIN — HYDROMORPHONE HYDROCHLORIDE 0.5 MG: 1 INJECTION, SOLUTION INTRAMUSCULAR; INTRAVENOUS; SUBCUTANEOUS at 10:25

## 2022-11-18 RX ADMIN — ALLOPURINOL 300 MG: 300 TABLET ORAL at 15:42

## 2022-11-18 RX ADMIN — HYDROMORPHONE HYDROCHLORIDE 0.5 MG: 1 INJECTION, SOLUTION INTRAMUSCULAR; INTRAVENOUS; SUBCUTANEOUS at 23:56

## 2022-11-18 RX ADMIN — EPHEDRINE SULFATE 5 MG: 50 INJECTION INTRAVENOUS at 09:20

## 2022-11-18 NOTE — OP NOTE
Preoperative diagnosis: 1. Adhesive arachnoiditis; 2. S/p placement of Abbot Penta surgical lead    Postoperative diagnosis: Same as above    Procedures performed: Placement of a Dozier Proclaim left paravertebral internal pulse generator    Surgeon: Uvaldo    First Assistant: Sarah Ramos  (She greatly assisted in the exposure, visualization of neural structures, control of bleeding, retraction, and closure of the incision. Her skilled assistance was necessary for the success of this case.)    Anesthesia: GET    EBL: minimal    Complications: none    Specimen sent: none    Drains: none    Findings: intact hardware    Postoperative condition: Stable    Indications for the operation: The patient is an 82-year-old gentleman with low back pain and bilateral leg pain mostly from adhesive arachnoiditis as a result of the previous spinal infection.  Yesterday he had a Penta Abbott surgical lead implanted at approximate the T9 level.  The programming was successful and he was brought to the operating room for placement of a left paravertebral internal pulse generator.    Informed consent: He and his family understood that the goal of surgery was placement of a functional spinal cord stimulator system to reduce his low back and leg pain improve his quality of life.  The risks include, but are not limited to, infection, hemorrhage requiring transfusion or reoperation, CSF leak requiring reoperation, incomplete relief of symptoms, potential need for explantation or additional surgery in the future, stroke, paralysis, coma, and death.  He agreed to proceed.    Details of the operation: The patient was taken to the operating room and remained in his gurney in the supine position.  After induction and endotracheal ovation, he was given 2 g of Kefzol as per the SCIP protocol.  He was rolled over the prone position on a radiolucent Nahid spinal table.  All pressure points were padded include the brachial plexus.  We took  down the old dressing and cut out the connecting temporary leads at the skin level.  The sutures and staples were removed from the previous surgery the day before.  The thoracic region and the lumbar paravertebral region were then prepped and draped in the usual sterile fashion.  We did not want to put this stimulator battery in the gluteal region because he sits most the time and so I put it in the left paravertebral region in the upper lumbar area.  The surgical timeout was done.  I like to 10 cc of quarter percent Marcaine in the left upper lumbar paravertebral region and made a skin incision there and undermined to make a subcutaneous pocket big enough for the internal pulse generator.  The thoracic incision was opened up.  We removed the coiled wires underneath and disconnected the truncated connecting wires.  We tunneled from the left lower paravertebral incision to the thoracic incision and passed the wires through and cleaned them off and put them into the proclaim battery and tested the impedance which were normal.  The coils were placed in the 6 thoracic region to prevent wire strain and underneath the battery.  The subcutaneous layers were closed with 2-0 Vicryl interrupted suture.  The skin was closed with a running 4-0 Vicryl subcuticular.  Steri-Strips and a clean dry dressing were placed.  The patient Toller procedure well and all counts were correct.  He was rolled over in supine position extubated taken recovery room satisfactory condition.

## 2022-11-18 NOTE — TELEPHONE ENCOUNTER
Pt wife called in stating pt takes 2 trazodone 150 mg tablets at night but the prescription was written for 1 tablet at night.

## 2022-11-18 NOTE — ANESTHESIA PREPROCEDURE EVALUATION
Anesthesia Evaluation     Patient summary reviewed and Nursing notes reviewed   no history of anesthetic complications:  NPO Solid Status: > 6 hours  NPO Liquid Status: > 6 hours           Airway   Mallampati: II  TM distance: >3 FB  Neck ROM: full  no difficulty expected and No difficulty expected  Dental - normal exam     Pulmonary - normal exam    breath sounds clear to auscultation  (+) sleep apnea,   (-) rhonchi, decreased breath sounds, wheezes, rales, stridor  Cardiovascular     NYHA Classification: I  ECG reviewed  Rhythm: regular  Rate: normal    (+) hypertension, valvular problems/murmurs AS, dysrhythmias Paroxysmal Atrial Fib, PVC, murmur, DVT, hyperlipidemia,   (-) weak pulses, friction rub, systolic click, carotid bruits, JVD, peripheral edema      Neuro/Psych  (+) weakness, numbness,    GI/Hepatic/Renal/Endo    (+) obesity, morbid obesity,  diabetes mellitus type 2,     Musculoskeletal     (+) back pain, radiculopathy  Abdominal  - normal exam    Abdomen: soft.   Substance History - negative use     OB/GYN negative ob/gyn ROS         Other   arthritis,                        Anesthesia Plan    ASA 3     general     intravenous induction     Anesthetic plan, risks, benefits, and alternatives have been provided, discussed and informed consent has been obtained with: patient.    Plan discussed with CRNA.        CODE STATUS:

## 2022-11-18 NOTE — ANESTHESIA POSTPROCEDURE EVALUATION
"Patient: Jesus Beckham    Procedure Summary     Date: 11/18/22 Room / Location: [facility] OR [address] [place] [address] MAIN OR    Anesthesia Start: 0852 Anesthesia Stop: 1032    Procedure: Placement of a left gluteal internal pulse generator (Back) Diagnosis:       Adhesive arachnoiditis      (Adhesive arachnoiditis [G03.9])    Surgeons: Charlie Bailon MD Provider: Henry Wheeler MD    Anesthesia Type: general ASA Status: 3          Anesthesia Type: general    Vitals  Vitals Value Taken Time   /70 11/18/22 1121   Temp 36.9 °C (98.4 °F) 11/18/22 1030   Pulse 86 11/18/22 1123   Resp 20 11/18/22 1030   SpO2 95 % 11/18/22 1123   Vitals shown include unvalidated device data.        Post Anesthesia Care and Evaluation    Patient location during evaluation: PACU  Patient participation: complete - patient participated  Level of consciousness: awake and alert  Pain management: adequate    Airway patency: patent  Anesthetic complications: No anesthetic complications  PONV Status: controlled  Cardiovascular status: acceptable and hemodynamically stable  Respiratory status: acceptable  Hydration status: acceptable    Comments: /96   Pulse 85   Temp 36.9 °C (98.4 °F) (Oral)   Resp 20   Ht 176.5 cm (69.49\")   Wt 122 kg (270 lb)   SpO2 96%   BMI 39.31 kg/m²       "

## 2022-11-18 NOTE — NURSING NOTE
11/18/22 1416   Wound 03/01/19 1113 Left coccyx pressure injury   Date first assessed/Time first assessed: 03/01/19 1113   Side: Left  Location: (c) coccyx  Type: pressure injury  Stage, Pressure Injury: Stage 2   Dressing Appearance dry;intact   Base pink;blanchable   Periwound intact;blanchable   Edges irregular   Drainage Amount none   WOCN: Daughter states patient has history of coccyx pressure injury. At home he sits for long intervals. Currently pressure injury is healed and blanchable. Silicone border dressing in place.  Is currently on low air loss mattress. Turn patient every 2 hours and apply heel boots.

## 2022-11-18 NOTE — ANESTHESIA PROCEDURE NOTES
Airway  Urgency: elective    Date/Time: 11/18/2022 9:03 AM  Airway not difficult    General Information and Staff    Patient location during procedure: OR  Anesthesiologist: Henry Wheeler MD  CRNA/CAA: Mague Orona CRNA    Indications and Patient Condition  Indications for airway management: airway protection    Preoxygenated: yes  Mask difficulty assessment: 3 - difficult mask (inadequate, unstable or two providers) +/- NMBA    Final Airway Details  Final airway type: endotracheal airway      Successful airway: ETT  Cuffed: yes   Successful intubation technique: direct laryngoscopy  Facilitating devices/methods: intubating stylet, anterior pressure/BURP and Bougie  Endotracheal tube insertion site: oral  Blade: Nasir  Blade size: 4  ETT size (mm): 8.0  Cormack-Lehane Classification: grade IIa - partial view of glottis  Placement verified by: chest auscultation and capnometry   Cuff volume (mL): 8  Measured from: lips  ETT/EBT  to lips (cm): 22  Number of attempts at approach: 2  Assessment: lips, teeth, and gum same as pre-op and atraumatic intubation

## 2022-11-18 NOTE — PLAN OF CARE
Goal Outcome Evaluation:  Plan of Care Reviewed With: patient     Vss, dressing c/d/I, suprapubic catheter in place, pain controlled with PO meds, low air loss mattress utilized, Q2 turn, educated on BP meds and monitoring.     Progress: improving

## 2022-11-18 NOTE — BRIEF OP NOTE
SPINAL CORD STIMULATOR INSERTION PHASE 2  Progress Note    Jesus Beckham  11/18/2022    Pre-op Diagnosis:   Adhesive arachnoiditis [G03.9]       Post-Op Diagnosis Codes:     * Adhesive arachnoiditis [G03.9]    Procedure/CPT® Codes:        Procedure(s):  Placement of a left gluteal internal pulse generator        Surgeon(s):  Charlie Bailon MD    Anesthesia: General    Staff:   Circulator: Adrienne Ptit RN  Scrub Person: Nav Moser  Vendor Representative: Billy Flaherty  Assistant: Sarah Ramos CSA  Assistant: Sarah Ramos CSA      Estimated Blood Loss: 20 mL    Urine Voided: 400 mL    Specimens:                none        Drains:   Suprapubic Catheter Double-lumen (Active)   Site Assessment Clean;Intact 11/18/22 0136   Dressing Status Intact 11/18/22 0136   Dressing Type Open to air 11/18/22 0136   Collection Container Standard drainage bag 11/18/22 0136   Output (mL) 750 mL 11/18/22 0430       [REMOVED] Urethral Catheter (Removed)       Findings: intact hardware        Complications: none    Assistant: Sarah Ramos CSA  was responsible for performing the following activities: Retraction, Suction, Irrigation, Suturing, Closing and Placing Dressing and their skilled assistance was necessary for the success of this case.    Charlie Bailon MD     Date: 11/18/2022  Time: 10:03 EST

## 2022-11-19 LAB
GLUCOSE BLDC GLUCOMTR-MCNC: 123 MG/DL (ref 70–130)
GLUCOSE BLDC GLUCOMTR-MCNC: 139 MG/DL (ref 70–130)
GLUCOSE BLDC GLUCOMTR-MCNC: 176 MG/DL (ref 70–130)
GLUCOSE BLDC GLUCOMTR-MCNC: 183 MG/DL (ref 70–130)
TROPONIN T SERPL-MCNC: 0.03 NG/ML (ref 0–0.03)

## 2022-11-19 PROCEDURE — 82962 GLUCOSE BLOOD TEST: CPT

## 2022-11-19 PROCEDURE — 99024 POSTOP FOLLOW-UP VISIT: CPT | Performed by: NEUROLOGICAL SURGERY

## 2022-11-19 PROCEDURE — 63710000001 INSULIN LISPRO (HUMAN) PER 5 UNITS: Performed by: NEUROLOGICAL SURGERY

## 2022-11-19 PROCEDURE — 25010000002 HYDROMORPHONE PER 4 MG: Performed by: NEUROLOGICAL SURGERY

## 2022-11-19 PROCEDURE — 25010000002 CEFAZOLIN IN DEXTROSE 2-4 GM/100ML-% SOLUTION: Performed by: NEUROLOGICAL SURGERY

## 2022-11-19 PROCEDURE — 84484 ASSAY OF TROPONIN QUANT: CPT | Performed by: HOSPITALIST

## 2022-11-19 RX ORDER — GLIPIZIDE 5 MG/1
5 TABLET ORAL
Status: DISCONTINUED | OUTPATIENT
Start: 2022-11-19 | End: 2022-11-19

## 2022-11-19 RX ORDER — OXYCODONE HYDROCHLORIDE AND ACETAMINOPHEN 5; 325 MG/1; MG/1
1 TABLET ORAL EVERY 6 HOURS PRN
Status: DISCONTINUED | OUTPATIENT
Start: 2022-11-19 | End: 2022-11-20 | Stop reason: HOSPADM

## 2022-11-19 RX ORDER — CALCIUM CARBONATE 200(500)MG
1 TABLET,CHEWABLE ORAL 3 TIMES DAILY PRN
Status: DISCONTINUED | OUTPATIENT
Start: 2022-11-19 | End: 2022-11-20 | Stop reason: HOSPADM

## 2022-11-19 RX ADMIN — ALLOPURINOL 300 MG: 300 TABLET ORAL at 08:54

## 2022-11-19 RX ADMIN — LISINOPRIL 2.5 MG: 2.5 TABLET ORAL at 08:55

## 2022-11-19 RX ADMIN — INSULIN LISPRO 2 UNITS: 100 INJECTION, SOLUTION INTRAVENOUS; SUBCUTANEOUS at 16:46

## 2022-11-19 RX ADMIN — INSULIN GLARGINE-YFGN 78 UNITS: 100 INJECTION, SOLUTION SUBCUTANEOUS at 09:32

## 2022-11-19 RX ADMIN — DONEPEZIL HYDROCHLORIDE 5 MG: 5 TABLET, FILM COATED ORAL at 21:06

## 2022-11-19 RX ADMIN — OXYCODONE AND ACETAMINOPHEN 1 TABLET: 5; 325 TABLET ORAL at 21:51

## 2022-11-19 RX ADMIN — PREGABALIN 100 MG: 100 CAPSULE ORAL at 08:55

## 2022-11-19 RX ADMIN — FENOFIBRATE 145 MG: 145 TABLET, FILM COATED ORAL at 08:54

## 2022-11-19 RX ADMIN — OXYCODONE HYDROCHLORIDE AND ACETAMINOPHEN 1 TABLET: 7.5; 325 TABLET ORAL at 08:55

## 2022-11-19 RX ADMIN — NALOXEGOL OXALATE 25 MG: 25 TABLET, FILM COATED ORAL at 06:56

## 2022-11-19 RX ADMIN — DOCUSATE SODIUM 100 MG: 100 CAPSULE, LIQUID FILLED ORAL at 09:42

## 2022-11-19 RX ADMIN — Medication 5 MG: at 21:06

## 2022-11-19 RX ADMIN — CEFAZOLIN SODIUM 2 G: 2 INJECTION, SOLUTION INTRAVENOUS at 08:56

## 2022-11-19 RX ADMIN — HYDROMORPHONE HYDROCHLORIDE 0.5 MG: 1 INJECTION, SOLUTION INTRAMUSCULAR; INTRAVENOUS; SUBCUTANEOUS at 02:19

## 2022-11-19 RX ADMIN — ROSUVASTATIN CALCIUM 10 MG: 10 TABLET, FILM COATED ORAL at 08:54

## 2022-11-19 RX ADMIN — CYCLOBENZAPRINE 10 MG: 10 TABLET, FILM COATED ORAL at 09:48

## 2022-11-19 RX ADMIN — CYCLOBENZAPRINE 10 MG: 10 TABLET, FILM COATED ORAL at 02:19

## 2022-11-19 RX ADMIN — LINAGLIPTIN 5 MG: 5 TABLET, FILM COATED ORAL at 08:55

## 2022-11-19 RX ADMIN — FLUTICASONE PROPIONATE 2 SPRAY: 50 SPRAY, METERED NASAL at 09:47

## 2022-11-19 RX ADMIN — TRAZODONE HYDROCHLORIDE 150 MG: 100 TABLET ORAL at 21:06

## 2022-11-19 RX ADMIN — Medication 10 ML: at 08:56

## 2022-11-19 RX ADMIN — OXYCODONE AND ACETAMINOPHEN 1 TABLET: 5; 325 TABLET ORAL at 14:19

## 2022-11-19 RX ADMIN — EMPAGLIFLOZIN 25 MG: 25 TABLET, FILM COATED ORAL at 08:55

## 2022-11-19 RX ADMIN — PREGABALIN 100 MG: 100 CAPSULE ORAL at 16:46

## 2022-11-19 RX ADMIN — Medication 10 ML: at 21:06

## 2022-11-19 RX ADMIN — GLIPIZIDE 10 MG: 10 TABLET ORAL at 06:56

## 2022-11-19 RX ADMIN — ANTACID TABLETS 1 TABLET: 500 TABLET, CHEWABLE ORAL at 17:29

## 2022-11-19 RX ADMIN — MIRABEGRON 50 MG: 25 TABLET, FILM COATED, EXTENDED RELEASE ORAL at 08:55

## 2022-11-19 RX ADMIN — PREGABALIN 100 MG: 100 CAPSULE ORAL at 21:06

## 2022-11-19 RX ADMIN — SODIUM CHLORIDE, POTASSIUM CHLORIDE, SODIUM LACTATE AND CALCIUM CHLORIDE 100 ML/HR: 600; 310; 30; 20 INJECTION, SOLUTION INTRAVENOUS at 02:18

## 2022-11-19 NOTE — PROGRESS NOTES
[facility]     LOS: 0 days     Subjective:    CC:No chief complaint on file.    DIET:  Diet Order   Procedures   • Diet: Regular/House Diet; Texture: Regular Texture (IDDSI 7); Fluid Consistency: Thin (IDDSI 0)   slightly confused per the family  No cp  No soa    Objective:    Vital Signs:  Temp:  [97.4 °F (36.3 °C)-99.5 °F (37.5 °C)] 98.8 °F (37.1 °C)  Heart Rate:  [66-94] 90  Resp:  [18] 18  BP: (122-169)/() 135/72    SpO2:  [89 %-99 %] 91 %  on  Flow (L/min):  [2-5] 2;   Device (Oxygen Therapy): nasal cannula  Body mass index is 39.31 kg/m².    Physical Exam  Constitutional:       Appearance: Normal appearance.   HENT:      Head: Normocephalic and atraumatic.   Cardiovascular:      Rate and Rhythm: Normal rate and regular rhythm.      Heart sounds: No murmur heard.    No friction rub.   Pulmonary:      Effort: Pulmonary effort is normal.      Breath sounds: Normal breath sounds.   Abdominal:      General: Bowel sounds are normal. There is no distension.      Palpations: Abdomen is soft.      Tenderness: There is no abdominal tenderness.   Skin:     General: Skin is warm and dry.   Neurological:      General: No focal deficit present.      Mental Status: He is alert and oriented to person, place, and time.   Psychiatric:         Mood and Affect: Mood normal.         Behavior: Behavior normal.         Results Review:    Glucose   Date Value Ref Range Status   11/18/2022 117 (H) 65 - 99 mg/dL Final     Results from last 7 days   Lab Units 11/18/22  0446   WBC 10*3/mm3 7.04   HEMOGLOBIN g/dL 12.9*   HEMATOCRIT % 39.2   PLATELETS 10*3/mm3 155     Results from last 7 days   Lab Units 11/18/22  0446   SODIUM mmol/L 137   POTASSIUM mmol/L 4.6   CHLORIDE mmol/L 100   CO2 mmol/L 28.0   BUN mg/dL 15   CREATININE mg/dL 1.00   CALCIUM mg/dL 8.9   GLUCOSE mg/dL 117*                 Cultures:  No results found for: BLOODCX, URINECX, WOUNDCX, MRSACX, RESPCX, STOOLCX    I have reviewed daily  medications and changes in CPOE    Scheduled meds  allopurinol, 300 mg, Oral, Daily  donepezil, 5 mg, Oral, Nightly  empagliflozin, 25 mg, Oral, Daily  fenofibrate, 145 mg, Oral, Daily  fluticasone, 2 spray, Nasal, Daily  [START ON 11/20/2022] insulin glargine, 50 Units, Subcutaneous, Daily  insulin lispro, 0-9 Units, Subcutaneous, TID With Meals  linagliptin, 5 mg, Oral, Daily  lisinopril, 2.5 mg, Oral, Daily  melatonin, 5 mg, Oral, Nightly  Mirabegron ER, 50 mg, Oral, Daily  Naloxegol Oxalate, 25 mg, Oral, QAM  polyethylene glycol, 1 bottle, Oral, Daily  pregabalin, 100 mg, Oral, TID  rosuvastatin, 10 mg, Oral, Daily  sodium chloride, 10 mL, Intravenous, Q12H  traZODone, 150 mg, Oral, Nightly        lactated ringers, 100 mL/hr, Last Rate: 100 mL/hr (11/19/22 0218)  lactated ringers, 9 mL/hr, Last Rate: 9 mL/hr (11/18/22 0844)      PRN meds  •  acetaminophen  •  cyclobenzaprine  •  dextrose  •  dextrose  •  docusate sodium  •  glucagon (human recombinant)  •  HYDROmorphone **AND** naloxone  •  ondansetron **OR** ondansetron  •  oxyCODONE-acetaminophen  •  senna-docusate sodium  •  sodium chloride  •  sodium chloride        Adhesive arachnoiditis        Assessment/Plan:  DM2  -not eating much but had 320 cc fluid intake  -decrease insulin    -hold glucotrol until taking better oral intake  -consider hold trajenta and jardiance if not taking good PO  -follows with Dr Hernandez    HTN- lisinopril    Sleep apnea    Atrial fibrillation  -on elquis ouptatient, restart when ok with JANAE    Aortic stenosis    Mild oxygen requirement    Addendum:  Patient with dyspepsia earlier and tums was ordered, patient is reported to be more confused and refused medications and according to the nurse he will likely be refusing EKG. Nurse is to explain to patient that his troponin is considered normal but is detectable and therefore recommend an ekg.  Troponin in 3/15/18 and 3/16/18 were 0.051 and 0.029.    Jose Allen,  MD  11/19/22  10:46 EST

## 2022-11-19 NOTE — PLAN OF CARE
Goal Outcome Evaluation:  Plan of Care Reviewed With: patient        Progress: improving  Outcome Evaluation: VSS. Patient sleeping for majority of night, falling asleep during care. Remains dependent on supplemental O2.

## 2022-11-19 NOTE — PROGRESS NOTES
POD 1,2  Status postplacement of an Abbott spinal cord stimulator system  Vitals:    11/18/22 2148 11/19/22 0128 11/19/22 0528 11/19/22 1056   BP: 145/73 132/72 135/72 128/72   BP Location: Left arm Left arm Left arm Left arm   Patient Position: Lying Lying Lying Lying   Pulse: 82 88 90 91   Resp: 18 18 18 16   Temp: 98.3 °F (36.8 °C) 98.3 °F (36.8 °C) 98.8 °F (37.1 °C) 98.6 °F (37 °C)   TempSrc: Oral Oral Oral Oral   SpO2: 91% 91% 91% 91%   Weight:       Height:       Dressings dry and intact  Seems a little drowsy, probably related to pain medications  We will drop it back down to oxycodone 5 mg strength from 10 mg strength.  Otherwise at neurological baseline  We will continue antibiotics  Hopefully home tomorrow  Lab Results   Component Value Date    GLUCOSE 117 (H) 11/18/2022    BUN 15 11/18/2022    CREATININE 1.00 11/18/2022    EGFRIFNONA 62 01/11/2022    EGFRIFAFRI 72 01/11/2022    BCR 15.0 11/18/2022    K 4.6 11/18/2022    CO2 28.0 11/18/2022    CALCIUM 8.9 11/18/2022    PROTENTOTREF 6.1 09/14/2022    ALBUMIN 4.50 09/14/2022    LABIL2 2.8 09/14/2022    AST 33 09/14/2022    ALT 29 09/14/2022     WBC   Date Value Ref Range Status   11/18/2022 7.04 3.40 - 10.80 10*3/mm3 Final     RBC   Date Value Ref Range Status   11/18/2022 4.26 4.14 - 5.80 10*6/mm3 Final     Hemoglobin   Date Value Ref Range Status   11/18/2022 12.9 (L) 13.0 - 17.7 g/dL Final     Hematocrit   Date Value Ref Range Status   11/18/2022 39.2 37.5 - 51.0 % Final     MCV   Date Value Ref Range Status   11/18/2022 92.0 79.0 - 97.0 fL Final     MCH   Date Value Ref Range Status   11/18/2022 30.3 26.6 - 33.0 pg Final     MCHC   Date Value Ref Range Status   11/18/2022 32.9 31.5 - 35.7 g/dL Final     RDW   Date Value Ref Range Status   11/18/2022 13.9 12.3 - 15.4 % Final     RDW-SD   Date Value Ref Range Status   11/18/2022 46.9 37.0 - 54.0 fl Final     MPV   Date Value Ref Range Status   11/18/2022 10.5 6.0 - 12.0 fL Final     Platelets   Date Value  Ref Range Status   11/18/2022 155 140 - 450 10*3/mm3 Final     Neutrophil %   Date Value Ref Range Status   11/18/2022 76.3 (H) 42.7 - 76.0 % Final     Lymphocyte %   Date Value Ref Range Status   11/18/2022 13.4 (L) 19.6 - 45.3 % Final     Monocyte %   Date Value Ref Range Status   11/18/2022 7.5 5.0 - 12.0 % Final     Eosinophil %   Date Value Ref Range Status   11/18/2022 1.3 0.3 - 6.2 % Final     Basophil %   Date Value Ref Range Status   11/18/2022 0.6 0.0 - 1.5 % Final     Neutrophils, Absolute   Date Value Ref Range Status   11/18/2022 5.38 1.70 - 7.00 10*3/mm3 Final     Lymphocytes, Absolute   Date Value Ref Range Status   11/18/2022 0.94 0.70 - 3.10 10*3/mm3 Final     Monocytes, Absolute   Date Value Ref Range Status   11/18/2022 0.53 0.10 - 0.90 10*3/mm3 Final     Eosinophils, Absolute   Date Value Ref Range Status   11/18/2022 0.09 0.00 - 0.40 10*3/mm3 Final     Basophils, Absolute   Date Value Ref Range Status   11/18/2022 0.04 0.00 - 0.20 10*3/mm3 Final

## 2022-11-19 NOTE — PROGRESS NOTES
Patient in the OR today. I discussed with the nurse. Patient was admitted by JANAE and was seen by Dr Qureshi in consultation yesteday. katelyn follow tomorrow. Currently on 2 liters.

## 2022-11-20 VITALS
OXYGEN SATURATION: 93 % | HEIGHT: 69 IN | SYSTOLIC BLOOD PRESSURE: 146 MMHG | TEMPERATURE: 98.5 F | HEART RATE: 93 BPM | DIASTOLIC BLOOD PRESSURE: 71 MMHG | WEIGHT: 268.96 LBS | RESPIRATION RATE: 18 BRPM | BODY MASS INDEX: 39.84 KG/M2

## 2022-11-20 LAB
ALBUMIN SERPL-MCNC: 3.8 G/DL (ref 3.5–5.2)
ALBUMIN/GLOB SERPL: 1.6 G/DL
ALP SERPL-CCNC: 50 U/L (ref 39–117)
ALT SERPL W P-5'-P-CCNC: 16 U/L (ref 1–41)
ANION GAP SERPL CALCULATED.3IONS-SCNC: 11.1 MMOL/L (ref 5–15)
ANISOCYTOSIS BLD QL: ABNORMAL
AST SERPL-CCNC: 30 U/L (ref 1–40)
BASOPHILS # BLD AUTO: 0.02 10*3/MM3 (ref 0–0.2)
BASOPHILS NFR BLD AUTO: 0.2 % (ref 0–1.5)
BILIRUB SERPL-MCNC: 0.7 MG/DL (ref 0–1.2)
BUN SERPL-MCNC: 22 MG/DL (ref 8–23)
BUN/CREAT SERPL: 25 (ref 7–25)
CALCIUM SPEC-SCNC: 9.4 MG/DL (ref 8.6–10.5)
CHLORIDE SERPL-SCNC: 94 MMOL/L (ref 98–107)
CO2 SERPL-SCNC: 28.9 MMOL/L (ref 22–29)
CREAT SERPL-MCNC: 0.88 MG/DL (ref 0.76–1.27)
DEPRECATED RDW RBC AUTO: 44.5 FL (ref 37–54)
EGFRCR SERPLBLD CKD-EPI 2021: 86.4 ML/MIN/1.73
EOSINOPHIL # BLD AUTO: 0.04 10*3/MM3 (ref 0–0.4)
EOSINOPHIL # BLD MANUAL: 0.09 10*3/MM3 (ref 0–0.4)
EOSINOPHIL NFR BLD AUTO: 0.5 % (ref 0.3–6.2)
EOSINOPHIL NFR BLD MANUAL: 1 % (ref 0.3–6.2)
ERYTHROCYTE [DISTWIDTH] IN BLOOD BY AUTOMATED COUNT: 13.8 % (ref 12.3–15.4)
GLOBULIN UR ELPH-MCNC: 2.4 GM/DL
GLUCOSE BLDC GLUCOMTR-MCNC: 128 MG/DL (ref 70–130)
GLUCOSE BLDC GLUCOMTR-MCNC: 162 MG/DL (ref 70–130)
GLUCOSE SERPL-MCNC: 132 MG/DL (ref 65–99)
HCT VFR BLD AUTO: 37.2 % (ref 37.5–51)
HGB BLD-MCNC: 12.5 G/DL (ref 13–17.7)
LYMPHOCYTES # BLD AUTO: 0.67 10*3/MM3 (ref 0.7–3.1)
LYMPHOCYTES # BLD MANUAL: 0.87 10*3/MM3 (ref 0.7–3.1)
LYMPHOCYTES NFR BLD AUTO: 7.8 % (ref 19.6–45.3)
LYMPHOCYTES NFR BLD MANUAL: 3 % (ref 5–12)
MCH RBC QN AUTO: 30.2 PG (ref 26.6–33)
MCHC RBC AUTO-ENTMCNC: 33.6 G/DL (ref 31.5–35.7)
MCV RBC AUTO: 89.9 FL (ref 79–97)
MICROCYTES BLD QL: ABNORMAL
MONOCYTES # BLD AUTO: 0.59 10*3/MM3 (ref 0.1–0.9)
MONOCYTES # BLD: 0.26 10*3/MM3 (ref 0.1–0.9)
MONOCYTES NFR BLD AUTO: 6.8 % (ref 5–12)
MYELOCYTES NFR BLD MANUAL: 2 % (ref 0–0)
NEUTROPHILS # BLD AUTO: 7.22 10*3/MM3 (ref 1.7–7)
NEUTROPHILS NFR BLD AUTO: 7.18 10*3/MM3 (ref 1.7–7)
NEUTROPHILS NFR BLD AUTO: 83.3 % (ref 42.7–76)
NEUTROPHILS NFR BLD MANUAL: 83.8 % (ref 42.7–76)
PLAT MORPH BLD: NORMAL
PLATELET # BLD AUTO: 148 10*3/MM3 (ref 140–450)
PMV BLD AUTO: 11 FL (ref 6–12)
POLYCHROMASIA BLD QL SMEAR: ABNORMAL
POTASSIUM SERPL-SCNC: 4.4 MMOL/L (ref 3.5–5.2)
PROT SERPL-MCNC: 6.2 G/DL (ref 6–8.5)
RBC # BLD AUTO: 4.14 10*6/MM3 (ref 4.14–5.8)
SODIUM SERPL-SCNC: 134 MMOL/L (ref 136–145)
TROPONIN T SERPL-MCNC: 0.03 NG/ML (ref 0–0.03)
VARIANT LYMPHS NFR BLD MANUAL: 10.1 % (ref 19.6–45.3)
WBC MORPH BLD: NORMAL
WBC NRBC COR # BLD: 8.62 10*3/MM3 (ref 3.4–10.8)

## 2022-11-20 PROCEDURE — 99024 POSTOP FOLLOW-UP VISIT: CPT

## 2022-11-20 PROCEDURE — 85025 COMPLETE CBC W/AUTO DIFF WBC: CPT | Performed by: HOSPITALIST

## 2022-11-20 PROCEDURE — 80053 COMPREHEN METABOLIC PANEL: CPT | Performed by: HOSPITALIST

## 2022-11-20 PROCEDURE — 85007 BL SMEAR W/DIFF WBC COUNT: CPT | Performed by: HOSPITALIST

## 2022-11-20 PROCEDURE — 84484 ASSAY OF TROPONIN QUANT: CPT | Performed by: HOSPITALIST

## 2022-11-20 PROCEDURE — 82962 GLUCOSE BLOOD TEST: CPT

## 2022-11-20 PROCEDURE — 99214 OFFICE O/P EST MOD 30 MIN: CPT | Performed by: INTERNAL MEDICINE

## 2022-11-20 PROCEDURE — 63710000001 INSULIN LISPRO (HUMAN) PER 5 UNITS: Performed by: NEUROLOGICAL SURGERY

## 2022-11-20 RX ORDER — CYCLOBENZAPRINE HCL 10 MG
10 TABLET ORAL 3 TIMES DAILY PRN
Qty: 45 TABLET | Refills: 0 | Status: SHIPPED | OUTPATIENT
Start: 2022-11-20 | End: 2022-12-28

## 2022-11-20 RX ORDER — OXYCODONE HYDROCHLORIDE AND ACETAMINOPHEN 5; 325 MG/1; MG/1
1 TABLET ORAL EVERY 4 HOURS PRN
Qty: 30 TABLET | Refills: 0 | Status: SHIPPED | OUTPATIENT
Start: 2022-11-20 | End: 2023-01-10

## 2022-11-20 RX ORDER — POLYETHYLENE GLYCOL 3350 17 G/17G
17 POWDER, FOR SOLUTION ORAL 2 TIMES DAILY
Status: DISCONTINUED | OUTPATIENT
Start: 2022-11-20 | End: 2022-11-20 | Stop reason: HOSPADM

## 2022-11-20 RX ADMIN — LISINOPRIL 2.5 MG: 2.5 TABLET ORAL at 09:09

## 2022-11-20 RX ADMIN — ACETAMINOPHEN 650 MG: 325 TABLET, FILM COATED ORAL at 10:07

## 2022-11-20 RX ADMIN — POLYETHYLENE GLYCOL 3350 17 G: 17 POWDER, FOR SOLUTION ORAL at 11:57

## 2022-11-20 RX ADMIN — MIRABEGRON 50 MG: 25 TABLET, FILM COATED, EXTENDED RELEASE ORAL at 09:09

## 2022-11-20 RX ADMIN — ALLOPURINOL 300 MG: 300 TABLET ORAL at 09:09

## 2022-11-20 RX ADMIN — ACETAMINOPHEN 650 MG: 325 TABLET, FILM COATED ORAL at 14:37

## 2022-11-20 RX ADMIN — PREGABALIN 100 MG: 100 CAPSULE ORAL at 09:09

## 2022-11-20 RX ADMIN — INSULIN LISPRO 2 UNITS: 100 INJECTION, SOLUTION INTRAVENOUS; SUBCUTANEOUS at 11:56

## 2022-11-20 RX ADMIN — DOCUSATE SODIUM 100 MG: 100 CAPSULE, LIQUID FILLED ORAL at 10:52

## 2022-11-20 RX ADMIN — LINAGLIPTIN 5 MG: 5 TABLET, FILM COATED ORAL at 09:09

## 2022-11-20 RX ADMIN — FENOFIBRATE 145 MG: 145 TABLET, FILM COATED ORAL at 09:09

## 2022-11-20 RX ADMIN — Medication 10 ML: at 09:10

## 2022-11-20 RX ADMIN — ANTACID TABLETS 1 TABLET: 500 TABLET, CHEWABLE ORAL at 09:09

## 2022-11-20 RX ADMIN — INSULIN GLARGINE-YFGN 50 UNITS: 100 INJECTION, SOLUTION SUBCUTANEOUS at 09:01

## 2022-11-20 RX ADMIN — EMPAGLIFLOZIN 25 MG: 25 TABLET, FILM COATED ORAL at 09:09

## 2022-11-20 RX ADMIN — NALOXEGOL OXALATE 25 MG: 25 TABLET, FILM COATED ORAL at 06:50

## 2022-11-20 RX ADMIN — ROSUVASTATIN CALCIUM 10 MG: 10 TABLET, FILM COATED ORAL at 09:09

## 2022-11-20 RX ADMIN — FLUTICASONE PROPIONATE 2 SPRAY: 50 SPRAY, METERED NASAL at 09:09

## 2022-11-20 NOTE — CONSULTS
[facility] Consult Note       Encounter Date:22  Patient:Jesus Beckham  :1940  MRN:[id number]    Date of Admission: 2022  Date of Encounter Visit: 22  Encounter Provider: Samuel Zelaya MD  Referring Provider: Charlie Bailon MD  Place of Service: [facility]  Patient Care Team:  Magdy Ricardo MD as PCP - General  Magdy Ricardo MD as PCP - Family Medicine  Jerrell Fry MD as Consulting Physician (Urology)  Carlos Manuel Saleh MD as Consulting Physician (Urology)  Thuan Fernandez MD as Consulting Physician (Ophthalmology)  Lito Hernandez MD as Consulting Physician (Endocrinology)  Tasha Burr MD as Consulting Physician (Cardiology)      Consulted for: Abnormal troponin    Chief Complaint: Indigestion      History of Presenting Illness:      Mr. Beckham is a 82 y.o. gentleman with past medical history notable for paroxysmal atrial fibrillation, nonrheumatic aortic valve stenoses, hypertension, mixed hyperlipidemia, history of prior DVT of the left leg, diabetes type 2, and chronic back pain who presented to the hospital for elective spinal stimulator.  He had his procedure done and was recovering yesterday when he had an episode of indigestion while eating.  He took some Tums and symptoms improved also has been having a lot of burping and belching.  Outside of this he has not had any problems such as chest discomfort or palpitations.  He denies any worsening of his underlying shortness of breath and dyspnea exertion.  Denies any syncope.  Troponins were ordered because he had indigestion.  These came back in the indeterminate range for which we were asked to see him prior to him being discharged this afternoon which was already planned.  Currently the patient is doing well without any overt chest pain or recurrent indigestion.  He still is burping.  Given his spinal stimulator they have been having troubles getting a good  EKG on him.      Review of Systems:  Review of Systems   Constitutional: Positive for malaise/fatigue.   HENT: Negative.    Eyes: Negative.    Cardiovascular: Negative.    Respiratory: Positive for shortness of breath.    Endocrine: Negative.    Hematologic/Lymphatic: Negative.    Skin: Negative.    Musculoskeletal: Negative.    Gastrointestinal: Positive for heartburn.   Genitourinary: Negative.    Neurological: Negative.    Psychiatric/Behavioral: Negative.    Allergic/Immunologic: Negative.        Medications:    Current Facility-Administered Medications:   •  acetaminophen (TYLENOL) tablet 650 mg, 650 mg, Oral, Q4H PRN, Charlie Bailon MD, 650 mg at 11/20/22 1007  •  allopurinol (ZYLOPRIM) tablet 300 mg, 300 mg, Oral, Daily, Charlie Bailon MD, 300 mg at 11/20/22 0909  •  calcium carbonate (TUMS) chewable tablet 500 mg (200 mg elemental), 1 tablet, Oral, TID PRN, Jose Allen MD, 1 tablet at 11/20/22 0909  •  cyclobenzaprine (FLEXERIL) tablet 10 mg, 10 mg, Oral, TID PRN, Charlie Bailon MD, 10 mg at 11/19/22 0948  •  dextrose (D50W) (25 g/50 mL) IV injection 25 g, 25 g, Intravenous, Q15 Min PRN, Charlie Bailon MD  •  dextrose (GLUTOSE) oral gel 15 g, 15 g, Oral, Q15 Min PRN, Charlie Bailon MD  •  docusate sodium (COLACE) capsule 100 mg, 100 mg, Oral, BID PRN, Charlie Bailon MD, 100 mg at 11/20/22 1052  •  donepezil (ARICEPT) tablet 5 mg, 5 mg, Oral, Nightly, Charlie Bailon MD, 5 mg at 11/19/22 2106  •  empagliflozin (JARDIANCE) tablet 25 mg, 25 mg, Oral, Daily, Charlie Bailon MD, 25 mg at 11/20/22 0909  •  fenofibrate (TRICOR) tablet 145 mg, 145 mg, Oral, Daily, Charlie Bailon MD, 145 mg at 11/20/22 0909  •  fluticasone (FLONASE) 50 MCG/ACT nasal spray 2 spray, 2 spray, Nasal, Daily, Charlie Bailon MD, 2 spray at 11/20/22 0909  •  glucagon (human recombinant) (GLUCAGEN DIAGNOSTIC) injection 1 mg, 1 mg, Intramuscular, Q15 Min PRN, Charlie Bailon MD  •  insulin  glargine (LANTUS, SEMGLEE) injection 50 Units, 50 Units, Subcutaneous, Daily, Edling, Jose ALVAREZ MD, 50 Units at 11/20/22 0901  •  insulin lispro (ADMELOG) injection 0-9 Units, 0-9 Units, Subcutaneous, TID With Meals, Charlie Bailon MD, 2 Units at 11/20/22 1156  •  lactated ringers infusion, 100 mL/hr, Intravenous, Continuous, Charlie Bailon MD, Last Rate: 100 mL/hr at 11/19/22 0218, 100 mL/hr at 11/19/22 0218  •  lactated ringers infusion, 9 mL/hr, Intravenous, Continuous, Charlie Bailon MD, Last Rate: 9 mL/hr at 11/18/22 0844, 9 mL/hr at 11/18/22 0844  •  linagliptin (TRADJENTA) tablet 5 mg, 5 mg, Oral, Daily, Charlie Bailon MD, 5 mg at 11/20/22 0909  •  lisinopril (PRINIVIL,ZESTRIL) tablet 2.5 mg, 2.5 mg, Oral, Daily, Charlie Bailon MD, 2.5 mg at 11/20/22 0909  •  melatonin tablet 5 mg, 5 mg, Oral, Nightly, Charlie Bailon MD, 5 mg at 11/19/22 2106  •  Mirabegron ER (MYRBETRIQ) 24 hr tablet 50 mg, 50 mg, Oral, Daily, Charlie Bailon MD, 50 mg at 11/20/22 0909  •  Naloxegol Oxalate (MOVANTIK) tablet 25 mg, 25 mg, Oral, QAM, Charlie Bailon MD, 25 mg at 11/20/22 0650  •  [DISCONTINUED] HYDROmorphone (DILAUDID) injection 0.5 mg, 0.5 mg, Intravenous, Q2H PRN, 0.5 mg at 11/19/22 0219 **AND** naloxone (NARCAN) injection 0.4 mg, 0.4 mg, Intravenous, Q5 Min PRN, Charlie Bailon MD  •  ondansetron (ZOFRAN) tablet 4 mg, 4 mg, Oral, Q6H PRN **OR** ondansetron (ZOFRAN) injection 4 mg, 4 mg, Intravenous, Q6H PRN, Charlie Bailon MD  •  oxyCODONE-acetaminophen (PERCOCET) 5-325 MG per tablet 1 tablet, 1 tablet, Oral, Q6H PRN, Charlie Bailon MD, 1 tablet at 11/19/22 2151  •  polyethylene glycol (MIRALAX) packet 17 g, 17 g, Oral, BID, Jose Allen MD, 17 g at 11/20/22 1157  •  pregabalin (LYRICA) capsule 100 mg, 100 mg, Oral, TID, Charlie Bailon MD, 100 mg at 11/20/22 0909  •  rosuvastatin (CRESTOR) tablet 10 mg, 10 mg, Oral, Daily, Charlie Bailon MD, 10 mg at 11/20/22 0909  •   "sennosides-docusate (PERICOLACE) 8.6-50 MG per tablet 1 tablet, 1 tablet, Oral, Nightly PRN, Charlie Bailon MD  •  sodium chloride 0.9 % flush 10 mL, 10 mL, Intravenous, Q12H, Charlie Bailon MD, 10 mL at 11/20/22 0910  •  sodium chloride 0.9 % flush 10 mL, 10 mL, Intravenous, PRN, Charlie Bailon MD  •  sodium chloride 0.9 % infusion 40 mL, 40 mL, Intravenous, PRN, Charlie Bailon MD  •  traZODone (DESYREL) tablet 150 mg, 150 mg, Oral, Nightly, Charlie Bailon MD, 150 mg at 11/19/22 2106    Allergies   Allergen Reactions   • Levaquin [Levofloxacin] Other (See Comments)     Redness, itching at IV site with IV Levaquin administration   • Alcohol Nausea And Vomiting     \"Deathly sick, headaches, cold sweats\"  Cant take any medication that contains alcohol       Past Medical History:   Diagnosis Date   • Acute embolism and thrombosis of deep vein of right distal lower extremity ([facility])    • Acute gangrenous cholecystitis     FEB 2018   • Allergic    • Arthritis    • Atrial fibrillation with RVR ([facility])     HISTORY   • Benign prostatic hyperplasia without lower urinary tract symptoms    • Cancer of bladder ([facility]) 2016   • Colon polyp    • Discitis of lumbar region    • DVT (deep venous thrombosis) ([facility])     MARCH 2018   • Essential hypertension    • Murray catheter in place     LOSS OF SENSATION BLADDER. BECAUSE OF WOUND AT THE TIME   • Gout    • Heel ulcer (HCC)     RIGHT. FOLLOWED BY WOUND CARE. GETTING BETTER   • History of sepsis    • Hyperlipidemia    • Loss, sensation     LOWER EXTREMTIES. RELATED TO LAST BACK SURGERY.   • MRSA infection     LEFT LEG.    • Open wound     LOW TO MIDDLE CRACK BUTT. SEES WOUND CARE. WILL BE RESTARTED ON DIFFUCAN AND NYSTATIN POWER. REDNESS IN GROIN   • Open wound     WITH MEDICATED DRESSING LEFT SHIN AREA.    • CELINA (obstructive sleep apnea)     NO MACHINE   • Osteoarthritis    • Paroxysmal atrial fibrillation (HCC)    • PVC (premature ventricular contraction)    • Sepsis " (HCC) 02/2018   • Sepsis due to Escherichia coli (HCC)    • Skin carcinoma 2012    MELANOMA.2 PLACES BACK AND EAR   • Type 2 diabetes mellitus (HCC)    • Vitamin D deficiency    • Weakness        Past Surgical History:   Procedure Laterality Date   • ABDOMINAL SURGERY     • APPENDECTOMY N/A 1961   • CHOLECYSTECTOMY WITH INTRAOPERATIVE CHOLANGIOGRAM N/A 2/25/2018    Procedure: CHOLECYSTECTOMY LAPAROSCOPIC INTRAOPERATIVE CHOLANGIOGRAM;  Surgeon: Maegan Correa MD;  Location: [facility] OR;  Service:    • COLONOSCOPY N/A 2010    h/o of polyps   • EYE SURGERY Bilateral 1959    glass removal from eye, lens transplant   • JOINT REPLACEMENT     • LAMINECTOMY N/A 01/18/2016    L2-L3, L3-L4, L4-L5, and L5-S1 bilateral lamincectomy, medial facetectomy & [id number], Dr. Kaiden Valdes   • LUMBAR FUSION N/A 3/7/2018    Procedure: LUMBAR FUSION MINIMALLY INVASIVE TRANSFORAMINAL LUMBAR INTERBODY IRRIGATION AND DEBRIDEMENT AND FUSION.  IRRIGATION AND DEBRIDEMENT OF EPIDURAL ABCESS LUMBAR 2-4. BONE MORPHOGENIC PROTEIN, ALPHATEC NOVEL AND ILLICO, EMG AND SSEP NEUROMONITORING.;  Surgeon: Manish Marr DO;  Location: [facility] OR;  Service: Orthopedic Spine   • SKIN BIOPSY     • SPINAL CORD STIMULATOR IMPLANT N/A 7/1/2022    Procedure: SPINAL CORD STIMULATOR INSERTION PHASE 1 ([place]) 4 DAY TRIAL ONLY DUE TO ELIQUIS HOLD.;  Surgeon: Cody Holguin MD;  Location: [facility] OR;  Service: Pain Management;  Laterality: N/A;   • SPINAL CORD STIMULATOR IMPLANT N/A 11/17/2022    Procedure: Thoracic eleven laminotomy for placement of a surgical spinal cord stimulator lead to thoracic nine;  Surgeon: Charlie Bailon MD;  Location: [facility] OR;  Service: Neurosurgery;  Laterality: N/A;   • TOTAL KNEE ARTHROPLASTY Right 03/07/2005    Dr. Washington Evans   • VENA CAVA FILTER INSERTION Right 3/6/2018    Procedure: VENA CAVA FILTER INSERTION;  Surgeon: Alexis Thakkar MD;  Location: [facility] 18/19;  Service:    •  VENA CAVA FILTER REMOVAL N/A 12/3/2018    Procedure: VENA CAVA FILTER REMOVAL WITH VENOCAVAGRAM;  Surgeon: Alexis Thakkar MD;  Location: [facility] ;  Service: Vascular       Social History     Socioeconomic History   • Marital status:    • Number of children: 2   • Highest education level: Some college, no degree   Tobacco Use   • Smoking status: Former     Types: Cigars     Quit date:      Years since quittin.8   • Smokeless tobacco: Former     Types: Chew   • Tobacco comments:     Caffeine daily   Vaping Use   • Vaping Use: Never used   Substance and Sexual Activity   • Alcohol use: Not Currently   • Drug use: No   • Sexual activity: Defer       Family History   Problem Relation Age of Onset   • Esophageal cancer Mother    • No Known Problems Father    • Diabetes Maternal Grandmother    • Heart attack Maternal Grandfather    • Malig Hyperthermia Neg Hx        The following portions of the patient's history were reviewed and updated as appropriate: allergies, current medications, past family history, past medical history, past social history, past surgical history and problem list.         Objective:      Temp:  [97.7 °F (36.5 °C)-98.7 °F (37.1 °C)] 98.5 °F (36.9 °C)  Heart Rate:  [87-93] 93  Resp:  [16-20] 18  BP: (136-165)/(71-87) 146/71     Intake/Output Summary (Last 24 hours) at 2022 1303  Last data filed at 2022 1029  Gross per 24 hour   Intake 680 ml   Output 7150 ml   Net -6470 ml     Body mass index is 39.16 kg/m².      22  0633 22  0746 22  1056   Weight: 122 kg (270 lb) 122 kg (270 lb) 122 kg (268 lb 15.4 oz)           Physical Exam:  Constitutional: Well appearing, well developed, no acute distress   HENT: Oropharynx clear and membrane moist  Eyes: Normal conjunctiva, no sclera icterus.  Neck: Supple, no carotid bruit bilaterally.  Cardiovascular: Regular rate and rhythm, Early peaking systolic murmur over the right upper sternal border, No  bilateral lower extremity edema.  Pulmonary: Normal respiratory effort, normal lung sounds, no wheezing.  Abdominal: Soft, nontender, no hepatosplenomegaly, liver is non-pulsatile.  Neurological: Alert and orient x 3.   Skin: Warm, dry, no ecchymosis, no rash.  Psych: Appropriate mood and affect. Normal judgment and insight.         Lab Review:   Results from last 7 days   Lab Units 11/20/22 0437 11/18/22  0446 11/15/22  1052   SODIUM mmol/L 134* 137 133*   POTASSIUM mmol/L 4.4 4.6 4.8   CHLORIDE mmol/L 94* 100 96*   CO2 mmol/L 28.9 28.0 28.7   BUN mg/dL 22 15 20   CREATININE mg/dL 0.88 1.00 1.24   GLUCOSE mg/dL 132* 117* 197*   CALCIUM mg/dL 9.4 8.9 9.1   AST (SGOT) U/L 30  --   --    ALT (SGPT) U/L 16  --   --      Results from last 7 days   Lab Units 11/20/22  0437 11/19/22  1744   TROPONIN T ng/mL 0.030 0.026     Results from last 7 days   Lab Units 11/20/22  0437 11/18/22  0446 11/15/22  1052   WBC 10*3/mm3 8.62 7.04 5.00   HEMOGLOBIN g/dL 12.5* 12.9* 13.7   HEMATOCRIT % 37.2* 39.2 40.2   PLATELETS 10*3/mm3 148 155 145                   Invalid input(s): LDLCALC  The ASCVD Risk score (Elma DK, et al., 2019) failed to calculate for the following reasons:    The 2019 ASCVD risk score is only valid for ages 40 to 79             Stress MPI 12/9/2021:  · Left ventricular ejection fraction is normal. (Calculated EF = 52%).  · Myocardial perfusion imaging indicates a normal myocardial perfusion study with no evidence of ischemia.  · Impressions are consistent with a low risk study.  · LVEF with stress is 53%.    Echocardiogram 12/10/2021 with images reviewed myself:  · Calculated left ventricular EF = 54% Estimated left ventricular EF = 54% Estimated left ventricular EF was in agreement with the calculated left ventricular EF. Left ventricular systolic function is normal. Normal left ventricular cavity size and wall thickness noted. All left ventricular wall segments contract normally. Left ventricular diastolic  function is consistent with (grade I) impaired relaxation.  · There is severe calcification of the aortic valve. Trace aortic valve regurgitation is present. Moderate aortic valve stenosis is present. The aortic valve area is underestimated due to underestimation of the left ventricular outflow tract diameter Aortic valve mean pressure gradient is 15.7 mmHg. Aortic valve area is 0.96 cm2.  · Mild mitral annular calcification is present. Trace mitral valve regurgitation is present.  · Trace tricuspid valve regurgitation is present. Estimated right ventricular systolic pressure from tricuspid regurgitation is normal (<35 mmHg). Calculated right ventricular systolic pressure from tricuspid regurgitation is 24 mmHg.        Assessment:           Adhesive arachnoiditis         Plan:       Mr. Beckham is a 82 y.o. gentleman with past medical history notable for paroxysmal atrial fibrillation, nonrheumatic aortic valve stenoses, hypertension, mixed hyperlipidemia, history of prior DVT of the left leg, diabetes type 2, and chronic back pain who presented to the hospital for elective spinal stimulator and we have been asked to comment on his indeterminate troponin level after having an episode of indigestion yesterday.  His episode of indigestion while could be cardiac in nature seems more consistent with being heartburn given that it got better with Tums has not been recurrent and really has not had any other anginal complaints leading up to his procedure.  He does have an indeterminate troponin which she has had for a number of years and particularly not out of range and where it has been in the past even with prior work-ups including stress test being normal.  Would love to get an EKG on him however with a spinal stimulator this is somewhat challenging and given that the symptom is fairly atypical would not push to have this done and in fact would be fine to leave the hospital today from my standpoint.  He has recurrent  symptoms we will be happy to see him back in the office sooner than his planned follow-up appointment which is scheduled for the end of December with a repeat echocardiogram to follow-up on his valvular heart disease.  Less he has any new or recurrent symptoms we will plan on keeping that appointment.      Indeterminate troponins:  · Patient with mildly elevated troponins in the indeterminate range but again not meeting criteria for clear elevation and this has been a chronic issue for him in the past.  · Given no other clinical signs concerning for further cardiac work-up needs would hold off on further ischemic evaluation at this time as his indigestion does seem like indigestion from heartburn rather than an anginal equivalent.    Nonrheumatic aortic stenosis:  · Murmur appreciated on exam has plans for follow-up as an outpatient with his primary cardiologist and would keep this appointment unless changes in clinical status    Paroxysmal atrial fibrillation:  · Patient may restart anticoagulation once safe from a postoperative bleeding perspective        Thank you for allowing me to participate in the care of Jesus Beckham. Feel free to contact me directly with any further questions or concerns.    Samuel Zelaya MD  [facility]  11/20/22  13:03 EST

## 2022-11-20 NOTE — DISCHARGE SUMMARY
Discharge Summary    Patient: Jesus Beckham  : 1940    Patient Care Team:  Magdy Ricardo MD as PCP - General  Magdy Ricardo MD as PCP - Family Medicine  Jerrell Fry MD as Consulting Physician (Urology)  Carlos Manuel Salhe MD as Consulting Physician (Urology)  Thuan Fernandez MD as Consulting Physician (Ophthalmology)  Lito Hernandez MD as Consulting Physician (Endocrinology)  Tasha Burr MD as Consulting Physician (Cardiology)    Date of Admit: 2022    Date of Discharge:  2022    Discharge Diagnosis:  Adhesive arachnoiditis      Procedures Performed  Procedure(s):  Placement of a left gluteal internal pulse generator       Complications: None    Consultants:   Consults     Date and Time Order Name Status Description    2022 11:18 AM Inpatient Cardiology Consult      2022 10:36 AM Inpatient Hospitalist Consult Completed           Condition on Discharge: stable    Discharge disposition: home    HPI: Jesus Beckham is a 82 y.o. 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  Patient failed conservative measures and was taken to the OR for spinal cord stimulator placement and subsequent placement of pulse generator with Dr. Bailon on 2022 and 2022, respectively.    Hospital Course: Patient admitted for above procedure. The patient was transferred to [facility] following recovery.  Patient did well postoperatively and was found to be at his neurologic baseline.  No postoperative complications.  Patient cleared for discharge home by Dr. Bailon and was discharged home to self-care on 2022 with instructions to follow-up in clinic in 2 weeks.    Vitals:    22 0908   BP: 146/71   Pulse: 93   Resp:    Temp:    SpO2:          Lab Results (last 24 hours)     Procedure Component Value Units Date/Time    POC Glucose Once [[id number]]  (Abnormal) Collected:  11/20/22 1141    Specimen: Blood Updated: 11/20/22 1145     Glucose 162 mg/dL      Comment: Meter: [id number] : [email or web address] Serafin Paiz RN       Troponin [[id number]]  (Normal) Collected: 11/20/22 0437    Specimen: Blood Updated: 11/20/22 0948     Troponin T 0.030 ng/mL     Narrative:      Troponin T Reference Range:  <= 0.03 ng/mL-   Negative for AMI  >0.03 ng/mL-     Abnormal for myocardial necrosis.  Clinicians would have to utilize clinical acumen, EKG, Troponin and serial changes to determine if it is an Acute Myocardial Infarction or myocardial injury due to an underlying chronic condition.       Results may be falsely decreased if patient taking Biotin.      POC Glucose Once [[id number]]  (Normal) Collected: 11/20/22 0606    Specimen: Blood Updated: 11/20/22 0608     Glucose 128 mg/dL      Comment: Meter: [id number] : [id number] Jonn CORREA       Manual Differential [[id number]]  (Abnormal) Collected: 11/20/22 0437    Specimen: Blood Updated: 11/20/22 0538     Neutrophil % 83.8 %      Lymphocyte % 10.1 %      Monocyte % 3.0 %      Eosinophil % 1.0 %      Myelocyte % 2.0 %      Neutrophils Absolute 7.22 10*3/mm3      Lymphocytes Absolute 0.87 10*3/mm3      Monocytes Absolute 0.26 10*3/mm3      Eosinophils Absolute 0.09 10*3/mm3      Anisocytosis Slight/1+     Microcytes Slight/1+     Polychromasia Large/3+     WBC Morphology Normal     Platelet Morphology Normal    Comprehensive Metabolic Panel [[id number]]  (Abnormal) Collected: 11/20/22 0437    Specimen: Blood Updated: 11/20/22 0537     Glucose 132 mg/dL      BUN 22 mg/dL      Creatinine 0.88 mg/dL      Sodium 134 mmol/L      Potassium 4.4 mmol/L      Chloride 94 mmol/L      CO2 28.9 mmol/L      Calcium 9.4 mg/dL      Total Protein 6.2 g/dL      Albumin 3.80 g/dL      ALT (SGPT) 16 U/L      AST (SGOT) 30 U/L      Alkaline Phosphatase 50 U/L      Total Bilirubin 0.7 mg/dL      Globulin 2.4 gm/dL      A/G Ratio 1.6 g/dL      BUN/Creatinine Ratio  25.0     Anion Gap 11.1 mmol/L      eGFR 86.4 mL/min/1.73      Comment: National Kidney Foundation and American Society of Nephrology (ASN) Task Force recommended calculation based on the Chronic Kidney Disease Epidemiology Collaboration (CKD-EPI) equation refit without adjustment for race.       Narrative:      GFR Normal >60  Chronic Kidney Disease <60  Kidney Failure <15    The GFR formula is only valid for adults with stable renal function between ages 18 and 70.    CBC & Differential [[id number]]  (Abnormal) Collected: 11/20/22 0437    Specimen: Blood Updated: 11/20/22 0506    Narrative:      The following orders were created for panel order CBC & Differential.  Procedure                               Abnormality         Status                     ---------                               -----------         ------                     CBC Auto Differential[[id number]]        Abnormal            Final result                 Please view results for these tests on the individual orders.    CBC Auto Differential [[id number]]  (Abnormal) Collected: 11/20/22 0437    Specimen: Blood Updated: 11/20/22 0506     WBC 8.62 10*3/mm3      RBC 4.14 10*6/mm3      Hemoglobin 12.5 g/dL      Hematocrit 37.2 %      MCV 89.9 fL      MCH 30.2 pg      MCHC 33.6 g/dL      RDW 13.8 %      RDW-SD 44.5 fl      MPV 11.0 fL      Platelets 148 10*3/mm3      Neutrophil % 83.3 %      Lymphocyte % 7.8 %      Monocyte % 6.8 %      Eosinophil % 0.5 %      Basophil % 0.2 %      Neutrophils, Absolute 7.18 10*3/mm3      Lymphocytes, Absolute 0.67 10*3/mm3      Monocytes, Absolute 0.59 10*3/mm3      Eosinophils, Absolute 0.04 10*3/mm3      Basophils, Absolute 0.02 10*3/mm3     POC Glucose Once [[id number]]  (Abnormal) Collected: 11/19/22 2105    Specimen: Blood Updated: 11/19/22 2107     Glucose 183 mg/dL      Comment: Meter: [id number] : [id number] Jonn San  NA       Troponin [[id number]]  (Normal) Collected: 11/19/22 1744    Specimen: Blood  Updated: 11/19/22 1829     Troponin T 0.026 ng/mL     Narrative:      Troponin T Reference Range:  <= 0.03 ng/mL-   Negative for AMI  >0.03 ng/mL-     Abnormal for myocardial necrosis.  Clinicians would have to utilize clinical acumen, EKG, Troponin and serial changes to determine if it is an Acute Myocardial Infarction or myocardial injury due to an underlying chronic condition.       Results may be falsely decreased if patient taking Biotin.      POC Glucose Once [[id number]]  (Abnormal) Collected: 11/19/22 1555    Specimen: Blood Updated: 11/19/22 1556     Glucose 176 mg/dL      Comment: Meter: [id number] : [id number] Naty Issa             XR Spine Thoracic 1 View    Result Date: 11/17/2022  THORACIC SPINE  HISTORY: Placement of left gluteal internal pulse generator.  FINDINGS: Two AP views of the lower thoracic spine were obtained for intraoperative localization and control during placement of an internal pulse generator. Imaging demonstrates spinal stimulator leads extending to overlie the lower thoracic spine in the region of T8. Retractor device is noted. Fluoroscopy time 31.6 seconds.  This report was finalized on 11/17/2022 11:06 AM by Dr. Pop Haider M.D.                      Discharge Physical Exam:    General  - awake, cooperative, in no acute distress  HEENT  - Normocephalic, atraumatic  Respiratory  - Normal respiratory rate and effort  Abdomen  - Flat, soft, NT/ND  Musculoskeletal  - Moves all extremities well, no joint swelling/tenderness  Skin  - Surgical incision well approximated, clean and dry, no swelling, redness, or drainage  NEUROLOGIC  - A/O x3  - Moves all extremities symmetrically and with good strength  - Sensation intact throughout      Discharge Medications  Inspect has been reviewed and narcotic consent is on file in the patient's chart.     Your medication list      START taking these medications      Instructions Last Dose Given Next Dose Due   cyclobenzaprine 10 MG  tablet  Commonly known as: FLEXERIL      Take 1 tablet by mouth 3 (Three) Times a Day As Needed for Muscle Spasms.          CHANGE how you take these medications      Instructions Last Dose Given Next Dose Due   oxyCODONE-acetaminophen 5-325 MG per tablet  Commonly known as: PERCOCET  What changed: when to take this      Take 1 tablet by mouth Every 4 (Four) Hours As Needed for Severe Pain.       Toutavares Max SoloStar 300 UNIT/ML solution pen-injector injection  Generic drug: Insulin Glargine (2 Unit Dial)  What changed: additional instructions      98 units every morning       traZODone 150 MG tablet  Commonly known as: DESYREL  What changed: how much to take      Take 2 tablets by mouth Every Night.       vitamin D 1.25 MG (95270 UT) capsule capsule  Commonly known as: ERGOCALCIFEROL  What changed:   · how to take this  · when to take this      TAKE 1 CAPSULE TWICE WEEKLY          CONTINUE taking these medications      Instructions Last Dose Given Next Dose Due   Accu-Chek Softclix Lancets lancets      Check 5 times a day on insulin tx, poor control, hypoglycemia. Dx: E 11.8       allopurinol 300 MG tablet  Commonly known as: ZYLOPRIM      Take 1 tablet by mouth Daily.       Ascorbic Acid 300 MG chewable tablet      Chew 300 mg Every Morning.       diphenhydrAMINE-acetaminophen  MG tablet per tablet  Commonly known as: TYLENOL PM      Take 2 tablets by mouth At Night As Needed for Sleep.       donepezil 5 MG tablet  Commonly known as: ARICEPT      Take 1 tablet by mouth every night at bedtime.       fenofibrate 145 MG tablet  Commonly known as: TRICOR      TAKE 1 TABLET BY MOUTH  DAILY       fish oil 1200 MG capsule capsule      Take 2,000 mg by mouth 2 (Two) Times a Day With Meals. HOLD FOR SURGERY       fluticasone 50 MCG/ACT nasal spray  Commonly known as: FLONASE      2 sprays into the nostril(s) as directed by provider Daily.       glipizide 10 MG tablet  Commonly known as: GLUCOTROL      Take 1 tablet by  mouth 2 (Two) Times a Day Before Meals.       Glucosamine HCl 1500 MG tablet      Take 2 tablets by mouth Daily.       Glyxambi 25-5 MG tablet  Generic drug: Empagliflozin-linaGLIPtin      TAKE 1 TABLET BY MOUTH  DAILY WITH BREAKFAST       L-Methylfolate-B6-B12 3-35-2 MG tablet      Take 1 tablet by mouth 2 (Two) Times a Day.       lisinopril 2.5 MG tablet  Commonly known as: PRINIVIL,ZESTRIL      TAKE 1 TABLET BY MOUTH  DAILY       melatonin 5 MG tablet tablet      Take 5 mg by mouth Every Night.       Movantik 25 MG tablet  Generic drug: Naloxegol Oxalate      Take 25 mg by mouth Every Morning.       multivitamin with minerals tablet tablet      Take 1 tablet by mouth Daily.       Myrbetriq 50 MG tablet sustained-release 24 hour 24 hr tablet  Generic drug: Mirabegron ER      Take 50 mg by mouth Daily.       polyethylene glycol 17 GM/SCOOP powder  Commonly known as: MIRALAX      Take 17 g by mouth Daily. Once a day       pregabalin 100 MG capsule  Commonly known as: LYRICA      TAKE 1 CAPSULE BY MOUTH 3  TIMES DAILY       rosuvastatin 10 MG tablet  Commonly known as: CRESTOR      Take 1 tablet by mouth Daily.          STOP taking these medications    apixaban 5 MG tablet tablet  Commonly known as: Eliquis              Where to Get Your Medications      These medications were sent to [facility] [place] [id number]    Hours: 7:00 AM-6:00 PM Mon-Fri, 8:00 AM-4:30 PM Sat-Sun (Closed 12-12:30PM) Phone: [phone]   · cyclobenzaprine 10 MG tablet     These medications were sent to [facility] [id number] - [address] AT [facility] - [phone]  - [phone] [place] [address]    Phone: [phone]   · traZODone 150 MG tablet     You can get these medications from any pharmacy    Bring a paper prescription for each of these medications  · oxyCODONE-acetaminophen 5-325 MG per tablet         Discharge Diet:  Regular, advance as tolerated      Activity at Discharge: No bending or twisting at the waist. No lifting greater than 5 pounds.  No driving for 1 week.  Do not soak or submerge incision underwater for 6 weeks.      Call for: questions or concerns    Follow-up Appointments  Future Appointments   Date Time Provider Department Center   12/9/2022  8:20 AM [facility]   12/23/2022  9:00 AM Yasmine Fong APRN [facility] END [facility]   12/29/2022  7:45 AM [facility] E[facility]/VAS FRONT RM [facility] ECHO [facility]   12/29/2022  8:30 AM Tasha Burr MD [facility] CD L[facility]KR [facility]   1/10/2023  9:40 AM Ignacia Layton APRN [facility] PM EASPT [facility]   6/27/2023  8:00 AM Magdy Ricardo MD [facility] PC JTWN2 [facility]      Follow-up Information     Magdy Ricardo MD .    Specialty: Family Medicine  Contact information:  [address] [facility] [address]  [place] [id number] [phone]             Charlie Bailon MD. Go in 2 week(s).    Specialty: Neurosurgery  Contact information:  [address] [facility] [facility] [address]  [place] [id number] [phone]                           I discussed the discharge instructions with patient    ASPEN Mayer  11/20/22  11:58 EST    25 min spent in reviewing records, discussion and examination of the patient and discussion with other members of the patient's medical team.           Part of this note may be an electronic transcription/translation of spoken language to printed text using the Dragon Dictation System.

## 2022-11-20 NOTE — PLAN OF CARE
"Goal Outcome Evaluation:  Plan of Care Reviewed With: patient        Progress: improving  Outcome Evaluation: Patient started the shift agitated, paranoid, confused, and uncooperative with care. Patient complaining of severe pain however refusing pain medication \"I 've caught onto youre schemes, youre just trying to keep me here longer\" This RN was able to redirect him and provide education on pain management. About an hour after, patient rang call bell requesting pain medication in a notabely more calm and less confused state. After pain medication was administered patient was able to sleep for the majority of the shift.  "

## 2022-11-20 NOTE — DISCHARGE INSTRUCTIONS
INSTRUCTION & CARE AFTER YOUR SPINE SURGERY      Do not take Eliquis for 7 days after surgery unless otherwise instructed by your surgeon.     No bending or twisting at the waist.    No lifting anything heavier than 5lbs     No driving for one week. We recommend that you limit riding in a car because of the risk of an accident. If you are a passenger, wear your seatbelt.     Walk as much as possible. Laying around or sitting around all day puts extra stress on the spine. Change your position every two hours.      Remove the bandage on the second day after surgery and leave the incision open to air. If you notice any redness, swelling or drainage, call the office.     You may shower 48 hours after surgery. Don't let the water beat directly on the incision. Gently pat the incision with mild soap and water, pat dry. Do not submerge in water, to include hot pools, tubs, pools, lakes, ocean x 4 weeks.     Don't be alarmed if you experience some of your pre-operative symptoms after going home. This is not uncommon and normally goes away in a few days but may last longer. Pain, aching and stiffness in your neck, back, and down your legs is common. If you have any questions or concerns don't hesitate to call the office.    Take pain medication only as prescribed and as needed. The expectation is that you will require less and less pain medication the further out you are from surgery, with hopes of getting to the point where your pain is adequately controlled with Tylenol or a muscle relaxer.     Do NOT take any anti-inflammatory medications, to include Aleve, Ibuprofen, Mobic, Celebrex, Naproxen, Aspirin.      You may take Tylenol (acetaminophen) but use this sparingly since your pain medication also contains acetaminophen.     Constipation is common after surgery. Your bowels are slow due to anesthesia, opioid pain medications and lack of movement. We do not want you to strain to use the restroom. Use a mild stool softener  or laxative as needed. Drink plenty of liquids to include water and juice.      Nausea is common with pain medications. To reduce this chance, do not take your medication on an empty stomach.    Follow-up with our outpatient neurosurgery clinic 2 weeks after surgery.  If you do not have an appointment scheduled by the time you leave the hospital, call our office to set up an appointment as soon as possible.

## 2022-11-20 NOTE — PLAN OF CARE
Goal Outcome Evaluation:              Outcome Evaluation: POD 2 T11-T9 laminectomy, on O2 @ 2lpm, more confused this morning, Neuro informed, pain pill dose decreased and IV Dilauidid d/c'd. On accuchecks & insulins.Mental status got better after changes in pain med, IV saline locked, done with IV ABT. PRN flexeril given, SCD's and heel boots not worn at all times, patient refuses, on specialty bed, mepilex intact on bottom.Up to BSC today but (-) BM,only passed gas and burping, Up to chair 2x today, tolerated.Complained of heartburn, PRN Tums ordered and given, STAT troponin done and negative. Needs attended.

## 2022-11-20 NOTE — PROGRESS NOTES
[facility]     LOS: 1 day     Subjective:    CC:No chief complaint on file.    DIET:  Diet Order   Procedures   • Diet: Regular/House Diet; Texture: Regular Texture (IDDSI 7); Fluid Consistency: Thin (IDDSI 0)   slightly confused per the family  No cp  No soa    Objective:    Vital Signs:  Temp:  [97.7 °F (36.5 °C)-98.7 °F (37.1 °C)] 98.5 °F (36.9 °C)  Heart Rate:  [87-93] 93  Resp:  [16-20] 18  BP: (136-165)/(71-87) 146/71    SpO2:  [90 %-93 %] 93 %  on  Flow (L/min):  [2-3] 2;   Device (Oxygen Therapy): room air  Body mass index is 39.16 kg/m².    Physical Exam  Constitutional:       Appearance: Normal appearance.   HENT:      Head: Normocephalic and atraumatic.   Cardiovascular:      Rate and Rhythm: Normal rate and regular rhythm.      Heart sounds: No murmur heard.    No friction rub.   Pulmonary:      Effort: Pulmonary effort is normal.      Breath sounds: Normal breath sounds.   Abdominal:      General: Bowel sounds are normal. There is no distension.      Palpations: Abdomen is soft.      Tenderness: There is no abdominal tenderness.   Skin:     General: Skin is warm and dry.   Neurological:      General: No focal deficit present.      Mental Status: He is alert and oriented to person, place, and time.   Psychiatric:         Mood and Affect: Mood normal.         Behavior: Behavior normal.         Results Review:    Glucose   Date Value Ref Range Status   11/20/2022 132 (H) 65 - 99 mg/dL Final   11/18/2022 117 (H) 65 - 99 mg/dL Final     Results from last 7 days   Lab Units 11/20/22  0437   WBC 10*3/mm3 8.62   HEMOGLOBIN g/dL 12.5*   HEMATOCRIT % 37.2*   PLATELETS 10*3/mm3 148     Results from last 7 days   Lab Units 11/20/22  0437   SODIUM mmol/L 134*   POTASSIUM mmol/L 4.4   CHLORIDE mmol/L 94*   CO2 mmol/L 28.9   BUN mg/dL 22   CREATININE mg/dL 0.88   CALCIUM mg/dL 9.4   BILIRUBIN mg/dL 0.7   ALK PHOS U/L 50   ALT (SGPT) U/L 16   AST (SGOT) U/L 30   GLUCOSE mg/dL 132*              Results from last 7 days   Lab Units 11/20/22  0437 11/19/22  1744   TROPONIN T ng/mL 0.030 0.026     Cultures:  No results found for: BLOODCX, URINECX, WOUNDCX, MRSACX, RESPCX, STOOLCX    I have reviewed daily medications and changes in CPOE    Scheduled meds  allopurinol, 300 mg, Oral, Daily  donepezil, 5 mg, Oral, Nightly  empagliflozin, 25 mg, Oral, Daily  fenofibrate, 145 mg, Oral, Daily  fluticasone, 2 spray, Nasal, Daily  insulin glargine, 50 Units, Subcutaneous, Daily  insulin lispro, 0-9 Units, Subcutaneous, TID With Meals  linagliptin, 5 mg, Oral, Daily  lisinopril, 2.5 mg, Oral, Daily  melatonin, 5 mg, Oral, Nightly  Mirabegron ER, 50 mg, Oral, Daily  Naloxegol Oxalate, 25 mg, Oral, QAM  polyethylene glycol, 17 g, Oral, BID  polyethylene glycol, 1 bottle, Oral, Daily  pregabalin, 100 mg, Oral, TID  rosuvastatin, 10 mg, Oral, Daily  sodium chloride, 10 mL, Intravenous, Q12H  traZODone, 150 mg, Oral, Nightly        lactated ringers, 100 mL/hr, Last Rate: 100 mL/hr (11/19/22 0218)  lactated ringers, 9 mL/hr, Last Rate: 9 mL/hr (11/18/22 0844)      PRN meds  •  acetaminophen  •  calcium carbonate  •  cyclobenzaprine  •  dextrose  •  dextrose  •  docusate sodium  •  glucagon (human recombinant)  •  [DISCONTINUED] HYDROmorphone **AND** naloxone  •  ondansetron **OR** ondansetron  •  oxyCODONE-acetaminophen  •  senna-docusate sodium  •  sodium chloride  •  sodium chloride        Adhesive arachnoiditis        Assessment/Plan:  DM2  -not eating much but had 320 cc fluid intake  -decrease insulin    -hold glucotrol until taking better oral intake  -consider hold trajenta and jardiance if not taking good PO  -follows with Dr Hernandez    Indigestion yesterday  -troponin indeterminate    Chronic constipation    HTN- lisinopril    Sleep apnea     Atrial fibrillation  -on elquis ouptatient, restart when ok with JANAE    Aortic stenosis    Mild oxygen requirement- now on room air        Jose Allen  MD  11/20/22  11:21 EST

## 2022-11-20 NOTE — PLAN OF CARE
Goal Outcome Evaluation:              Outcome Evaluation: Patient cleared by cardio and good for d/c to home today with family, stable condition, tolerated room air,needs attended.

## 2022-11-21 ENCOUNTER — TELEPHONE (OUTPATIENT)
Dept: ENDOCRINOLOGY | Age: 82
End: 2022-11-21

## 2022-11-21 NOTE — CASE MANAGEMENT/SOCIAL WORK
Case Management Discharge Note      Final Note: Home.         Selected Continued Care - Discharged on 11/20/2022 Admission date: 11/17/2022 - Discharge disposition: Home or Self Care    Destination    No services have been selected for the patient.              Durable Medical Equipment    No services have been selected for the patient.              Dialysis/Infusion    No services have been selected for the patient.              Home Medical Care    No services have been selected for the patient.              Therapy    No services have been selected for the patient.              Community Resources    No services have been selected for the patient.              Community & DME    No services have been selected for the patient.                       Final Discharge Disposition Code: 01 - home or self-care

## 2022-11-23 ENCOUNTER — TELEPHONE (OUTPATIENT)
Dept: NEUROSURGERY | Facility: CLINIC | Age: 82
End: 2022-11-23

## 2022-11-23 NOTE — TELEPHONE ENCOUNTER
Caller: Mya Beckham    Relationship to patient: Emergency Contact    Best call back number: [phone]    Patient is needing: PATIENTS WIFE CALLED TO SCHEDULE POSTOP WITH DR VERMA PATIENT STATES POST OP NEEDS TO BE 2 WEEKS OUT. PLEASE CALL PATIENTS WIFE TO SCHEDULE.

## 2022-11-27 DIAGNOSIS — E11.42 TYPE 2 DIABETES MELLITUS WITH PERIPHERAL NEUROPATHY: ICD-10-CM

## 2022-11-27 DIAGNOSIS — E55.9 VITAMIN D DEFICIENCY: ICD-10-CM

## 2022-11-27 DIAGNOSIS — E78.5 DYSLIPIDEMIA: Primary | ICD-10-CM

## 2022-11-28 RX ORDER — APIXABAN 5 MG/1
TABLET, FILM COATED ORAL
Qty: 180 TABLET | Refills: 3 | OUTPATIENT
Start: 2022-11-28

## 2022-11-30 DIAGNOSIS — Z79.4 TYPE 2 DIABETES MELLITUS WITH OTHER SPECIFIED COMPLICATION, WITH LONG-TERM CURRENT USE OF INSULIN: ICD-10-CM

## 2022-11-30 DIAGNOSIS — E11.69 TYPE 2 DIABETES MELLITUS WITH OTHER SPECIFIED COMPLICATION, WITH LONG-TERM CURRENT USE OF INSULIN: ICD-10-CM

## 2022-12-01 ENCOUNTER — OFFICE VISIT (OUTPATIENT)
Dept: FAMILY MEDICINE CLINIC | Facility: CLINIC | Age: 82
End: 2022-12-01

## 2022-12-01 VITALS
BODY MASS INDEX: 39.69 KG/M2 | HEIGHT: 69 IN | RESPIRATION RATE: 16 BRPM | HEART RATE: 80 BPM | TEMPERATURE: 97.1 F | DIASTOLIC BLOOD PRESSURE: 69 MMHG | SYSTOLIC BLOOD PRESSURE: 123 MMHG | WEIGHT: 268 LBS

## 2022-12-01 DIAGNOSIS — G03.9 ADHESIVE ARACHNOIDITIS: Primary | ICD-10-CM

## 2022-12-01 DIAGNOSIS — Z09 HOSPITAL DISCHARGE FOLLOW-UP: ICD-10-CM

## 2022-12-01 PROCEDURE — 1111F DSCHRG MED/CURRENT MED MERGE: CPT | Performed by: FAMILY MEDICINE

## 2022-12-01 PROCEDURE — 99213 OFFICE O/P EST LOW 20 MIN: CPT | Performed by: FAMILY MEDICINE

## 2022-12-01 RX ORDER — ROSUVASTATIN CALCIUM 10 MG/1
10 TABLET, COATED ORAL DAILY
Qty: 90 TABLET | Refills: 3 | Status: SHIPPED | OUTPATIENT
Start: 2022-12-01

## 2022-12-01 RX ORDER — LISINOPRIL 2.5 MG/1
2.5 TABLET ORAL DAILY
Qty: 90 TABLET | Refills: 3 | Status: ON HOLD | OUTPATIENT
Start: 2022-12-01 | End: 2023-03-01 | Stop reason: SDUPTHER

## 2022-12-01 NOTE — PROGRESS NOTES
"Subjective   History of Present Illness: Jesus Beckham is a 82 y.o. male is here today for follow-up. Mr. Beckham is two weeks out from having a SCS placed on 11-18-22 by Dr. Bailon.  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  Currently takes Lyrica and oxycodone prescribed by pain management.    Today, Mr. Beckham reports pain has gone down at least 50% and rarely has to take tylenol, very happy with his surgery.  Very little pain at site from surgery.  He is no longer taking the Percocet.  He states that he went through \"hell\" after surgery r/t confusion from anesthesia and he does not feel that he wants to take narcotics, and does not need. He has received a letter from [employer] to join the gym, he is interested.       The following portions of the patient's history were reviewed and updated as appropriate: allergies, current medications, past family history, past medical history, past social history, past surgical history and problem list.    Review of Systems   Constitutional: Positive for activity change ( w/c).   Genitourinary: Positive for difficulty urinating ( f/c).   Neurological: Positive for weakness ( generalized).   All other systems reviewed and are negative.          Objective     Vitals:    12/02/22 0823   BP: 128/70   BP Location: Left arm   Patient Position: Sitting   Cuff Size: Large Adult   Pulse: 82   Temp: 97.3 °F (36.3 °C)   TempSrc: Temporal   SpO2: 98%   Weight: Comment: Unable to weigh   Height: 176.5 cm (69.49\")     Body mass index is 39.02 kg/m².    Tobacco Use: Medium Risk   • Smoking Tobacco Use: Former   • Smokeless Tobacco Use: Former   • Passive Exposure: Not on file          Physical Exam  Vitals and nursing note reviewed.   Constitutional:       Appearance: He is well-developed.   HENT:      Head: Normocephalic and atraumatic.   Eyes: "      Conjunctiva/sclera: Conjunctivae normal.      Pupils: Pupils are equal, round, and reactive to light.   Cardiovascular:      Rate and Rhythm: Normal rate.   Pulmonary:      Effort: Pulmonary effort is normal.      Breath sounds: Normal air entry.   Musculoskeletal:      Cervical back: Neck supple.      Right lower leg: No edema.      Left lower leg: No edema.   Skin:     General: Skin is warm and dry.          Neurological:      Mental Status: He is oriented to person, place, and time.      GCS: GCS eye subscore is 4. GCS verbal subscore is 5. GCS motor subscore is 6.      Motor: Weakness present.      Deep Tendon Reflexes:      Reflex Scores:       Patellar reflexes are 2+ on the left side.  Psychiatric:         Attention and Perception: Attention normal.         Mood and Affect: Mood normal.         Speech: Speech normal.         Behavior: Behavior is cooperative.       Neurologic Exam     Mental Status   Oriented to person, place, and time.   Speech: speech is normal     Cranial Nerves     CN III, IV, VI   Pupils are equal, round, and reactive to light.    Motor Exam   Muscle bulk: decreased    Gait, Coordination, and Reflexes     Tremor   Resting tremor: absent  Intention tremor: absent  Action tremor: absent    Reflexes   Left patellar: 2+Able to stand, only when holding onto something, such as walker, chair, takes long distances and wheelchair/scooter       Assessment & Plan   Independent Review of Radiographic Studies:      No new imaging    Medical Decision Making:    Incisions to back are healing well, no erythema, no drainage, no edema, reports no temperatures.  He states that he did get a letter from [facility], I have encouraged him to contact [facility] for physical therapy to increase his stability, strength, and for stretching.  I have explained that he does not need to come back, except for as needed, and should call with any questions or concerns.  In addition, he has the contact for bath for the  spinal cord stimulator, if he has any questions in the future for her as well.  He and his wife are in agreement with this plan.    Diagnoses and all orders for this visit:    1. Status post insertion of spinal cord stimulator (Primary)      Return for PRN, any APC.

## 2022-12-02 ENCOUNTER — OFFICE VISIT (OUTPATIENT)
Dept: NEUROSURGERY | Facility: CLINIC | Age: 82
End: 2022-12-02

## 2022-12-02 VITALS
OXYGEN SATURATION: 98 % | HEART RATE: 82 BPM | TEMPERATURE: 97.3 F | BODY MASS INDEX: 39.02 KG/M2 | SYSTOLIC BLOOD PRESSURE: 128 MMHG | HEIGHT: 69 IN | DIASTOLIC BLOOD PRESSURE: 70 MMHG

## 2022-12-02 DIAGNOSIS — Z96.89 STATUS POST INSERTION OF SPINAL CORD STIMULATOR: Primary | ICD-10-CM

## 2022-12-02 PROCEDURE — 99024 POSTOP FOLLOW-UP VISIT: CPT | Performed by: NURSE PRACTITIONER

## 2022-12-09 ENCOUNTER — LAB (OUTPATIENT)
Dept: ENDOCRINOLOGY | Age: 82
End: 2022-12-09

## 2022-12-09 DIAGNOSIS — E55.9 VITAMIN D DEFICIENCY: ICD-10-CM

## 2022-12-09 DIAGNOSIS — E78.5 DYSLIPIDEMIA: ICD-10-CM

## 2022-12-09 DIAGNOSIS — E11.42 TYPE 2 DIABETES MELLITUS WITH PERIPHERAL NEUROPATHY: ICD-10-CM

## 2022-12-09 LAB
25(OH)D3+25(OH)D2 SERPL-MCNC: 48.3 NG/ML (ref 30–100)
ALBUMIN SERPL-MCNC: 4.3 G/DL (ref 3.5–5.2)
ALBUMIN/GLOB SERPL: 2.4 G/DL
ALP SERPL-CCNC: 65 U/L (ref 39–117)
ALT SERPL-CCNC: 25 U/L (ref 1–41)
AST SERPL-CCNC: 29 U/L (ref 1–40)
BILIRUB SERPL-MCNC: 0.3 MG/DL (ref 0–1.2)
BUN SERPL-MCNC: 16 MG/DL (ref 8–23)
BUN/CREAT SERPL: 15.5 (ref 7–25)
CALCIUM SERPL-MCNC: 9.5 MG/DL (ref 8.6–10.5)
CHLORIDE SERPL-SCNC: 106 MMOL/L (ref 98–107)
CHOLEST SERPL-MCNC: 134 MG/DL (ref 0–200)
CO2 SERPL-SCNC: 28 MMOL/L (ref 22–29)
CREAT SERPL-MCNC: 1.03 MG/DL (ref 0.76–1.27)
EGFRCR SERPLBLD CKD-EPI 2021: 73 ML/MIN/1.73
GLOBULIN SER CALC-MCNC: 1.8 GM/DL
GLUCOSE SERPL-MCNC: 103 MG/DL (ref 65–99)
HBA1C MFR BLD: 6.8 % (ref 4.8–5.6)
HDLC SERPL-MCNC: 45 MG/DL (ref 40–60)
IMP & REVIEW OF LAB RESULTS: NORMAL
LDLC SERPL CALC-MCNC: 67 MG/DL (ref 0–100)
POTASSIUM SERPL-SCNC: 4.6 MMOL/L (ref 3.5–5.2)
PROT SERPL-MCNC: 6.1 G/DL (ref 6–8.5)
SODIUM SERPL-SCNC: 141 MMOL/L (ref 136–145)
TRIGL SERPL-MCNC: 122 MG/DL (ref 0–150)
TSH SERPL DL<=0.005 MIU/L-ACNC: 2.93 UIU/ML (ref 0.27–4.2)
VLDLC SERPL CALC-MCNC: 22 MG/DL (ref 5–40)

## 2022-12-16 ENCOUNTER — OFFICE (OUTPATIENT)
Dept: URBAN - METROPOLITAN AREA CLINIC 76 | Facility: CLINIC | Age: 82
End: 2022-12-16

## 2022-12-16 VITALS
OXYGEN SATURATION: 93 % | HEIGHT: 70 IN | HEART RATE: 81 BPM | DIASTOLIC BLOOD PRESSURE: 56 MMHG | SYSTOLIC BLOOD PRESSURE: 112 MMHG | WEIGHT: 282 LBS

## 2022-12-16 DIAGNOSIS — R14.0 ABDOMINAL DISTENSION (GASEOUS): ICD-10-CM

## 2022-12-16 DIAGNOSIS — K57.30 DIVERTICULOSIS OF LARGE INTESTINE WITHOUT PERFORATION OR ABS: ICD-10-CM

## 2022-12-16 DIAGNOSIS — K59.00 CONSTIPATION, UNSPECIFIED: ICD-10-CM

## 2022-12-16 DIAGNOSIS — Z86.010 PERSONAL HISTORY OF COLONIC POLYPS: ICD-10-CM

## 2022-12-16 DIAGNOSIS — M62.9 DISORDER OF MUSCLE, UNSPECIFIED: ICD-10-CM

## 2022-12-16 PROCEDURE — 99214 OFFICE O/P EST MOD 30 MIN: CPT | Performed by: NURSE PRACTITIONER

## 2022-12-27 DIAGNOSIS — E11.42 TYPE 2 DIABETES MELLITUS WITH PERIPHERAL NEUROPATHY: Primary | ICD-10-CM

## 2022-12-27 RX ORDER — LANCETS
EACH MISCELLANEOUS
Qty: 500 EACH | Refills: 4 | Status: SHIPPED | OUTPATIENT
Start: 2022-12-27 | End: 2022-12-30 | Stop reason: ALTCHOICE

## 2022-12-28 ENCOUNTER — OFFICE VISIT (OUTPATIENT)
Dept: ENDOCRINOLOGY | Age: 82
End: 2022-12-28

## 2022-12-28 VITALS
DIASTOLIC BLOOD PRESSURE: 70 MMHG | TEMPERATURE: 97.8 F | WEIGHT: 280 LBS | OXYGEN SATURATION: 93 % | SYSTOLIC BLOOD PRESSURE: 114 MMHG | HEIGHT: 69 IN | HEART RATE: 60 BPM | BODY MASS INDEX: 41.47 KG/M2

## 2022-12-28 DIAGNOSIS — E11.42 TYPE 2 DIABETES MELLITUS WITH PERIPHERAL NEUROPATHY: Primary | ICD-10-CM

## 2022-12-28 DIAGNOSIS — E78.5 DYSLIPIDEMIA: ICD-10-CM

## 2022-12-28 DIAGNOSIS — I10 ESSENTIAL HYPERTENSION: ICD-10-CM

## 2022-12-28 DIAGNOSIS — E11.42 DIABETIC PERIPHERAL NEUROPATHY: ICD-10-CM

## 2022-12-28 DIAGNOSIS — E55.9 VITAMIN D DEFICIENCY: ICD-10-CM

## 2022-12-28 PROCEDURE — 99214 OFFICE O/P EST MOD 30 MIN: CPT | Performed by: NURSE PRACTITIONER

## 2022-12-28 RX ORDER — SYRING-NEEDL,DISP,INSUL,0.3 ML 30 GX5/16"
1 SYRINGE, EMPTY DISPOSABLE MISCELLANEOUS 2 TIMES DAILY
Qty: 1 EACH | Refills: 0 | Status: SHIPPED | OUTPATIENT
Start: 2022-12-28

## 2022-12-28 NOTE — PROGRESS NOTES
Chief Complaint  Chief Complaint   Patient presents with   • Diabetes     Type 2: Pt doesn't have meter, but does have readings, is up to date on eye exam, no hx of retinopathy, mild neuropathy.        Subjective          History of Present Illness    Jesus Beckham 82 y.o. presents for a follow-up evaluation for type 2 DM2     He has been diabetic since about 1998 and started insulin in about 2016.      Pt is on the following medications for their DM: Toujeo 100 units daily, glyambi 25-5 daily with breakfast and glipizide 10mg twice daily          Pt complains of chronic constipation due to pain medication (better), and numbness and tingling in feet, shortness of breath.    Denies diarrhea, chest pain and vision changes.    Weight loss of 2 lbs since last visit.    Pt does not have a history of DM retinopathy.  Last eye exam was 03/22 by Dr. Fernandez with [facility]     Pt does have a history of nephropathy.  Patient is currently taking ACE - lisinopril 2.5 mg daily     Pt does have neuropathy.  Current takes Lyrica 100mg TID for this condition which is prescribed by this office.     Pt does not have a history of CAD or CVA.    Last A1C in 12/22 was 6.8    Last microalbumin in 05/22 was positive          Blood Sugars    Blood glucoses are checked 2/day.    Fasting blood glucoses:     Pre-meal blood glucoses: 119-225    Pt has no episodes of hypoglycemia.            Hyperlipidemia     Pt denies any muscle/body aches, chest pain, or shortness of breath    Pt is currently taking Crestor 10 HS and fenofibrate 145 daily    Last lipid panel in 12/22 showed Total 134, Triglycerides 122, HDL 45 and LDL 67            Vitamin D Deficiency    Current treatment includes 50,000 units weekly    Last Vit D level was 48.3 in 12/22              I have reviewed the patient's allergies, medicines, past medical hx, family hx and social hx.    Objective   Vital Signs:   /70   Pulse 60   Temp 97.8 °F (36.6 °C)  "(Temporal)   Ht 176.5 cm (69.49\")   Wt 127 kg (280 lb) Comment: patient provided  SpO2 93%   BMI 40.77 kg/m²       Physical Exam   Physical Exam  Constitutional:       General: He is not in acute distress.     Appearance: Normal appearance. He is not diaphoretic.   HENT:      Head: Normocephalic and atraumatic.   Eyes:      General:         Right eye: No discharge.         Left eye: No discharge.   Skin:     General: Skin is warm and dry.   Neurological:      Mental Status: He is alert.   Psychiatric:         Mood and Affect: Mood normal.         Behavior: Behavior normal.                    Results Review:   Hemoglobin A1C   Date Value Ref Range Status   12/09/2022 6.80 (H) 4.80 - 5.60 % Final     Comment:     Hemoglobin A1C Ranges:  Increased Risk for Diabetes  5.7% to 6.4%  Diabetes                     >= 6.5%  Diabetic Goal                < 7.0%     11/18/2022 6.80 (H) 4.80 - 5.60 % Final   01/27/2020 8.5 (H) 4.3 - 5.6 % Final     Comment:     (note)  A1C% Reference Range:  4.3 - 5.6  Normal range  5.7 - 6.4  Pre-diabetic -increased risk for developing diabetes mellitus.  >=6.5      Diabetic -diagnostic of diabetes mellitus.  Note: For diagnosis of diabetes in individuals without unequivocal   hyperglycemia, results should be confirmed by repeat testing.    Patients with conditions that shorten erythrocyte survival, such as   recovery from acute blood loss, hemolytic anemia, kidney disease,   or the presence of unstable hemogloblins like HbSS, HbCC, and HbSC   may yield falsely decreased HbA1c test results. Iron deficiency may   yield falsely increased HbA1c test results.     Triglycerides   Date Value Ref Range Status   12/09/2022 122 0 - 150 mg/dL Final     HDL Cholesterol   Date Value Ref Range Status   12/09/2022 45 40 - 60 mg/dL Final     LDL Chol Calc (NIH)   Date Value Ref Range Status   12/09/2022 67 0 - 100 mg/dL Final     VLDL Cholesterol Lance   Date Value Ref Range Status   12/09/2022 22 5 - 40 " mg/dL Final         Assessment and Plan {CC Problem List  Visit Diagnosis  ROS  Review (Popup)  Health Maintenance  Quality  BestPractice  Medications  SmartSets  SnapShot Encounters  Media :23  Diagnoses and all orders for this visit:    1. Type 2 diabetes mellitus with peripheral neuropathy (HCC) (Primary)  -     Lancet Device misc; 1 each 2 (Two) Times a Day.  Dispense: 1 each; Refill: 0  -     Hemoglobin A1c; Future  -     Comprehensive Metabolic Panel; Future  -     Microalbumin / Creatinine Urine Ratio - Urine, Clean Catch; Future    A1C is good at 6.8% without lows  Dietary modification  Continue with Toujeo 100 units daily, glyambi 25-5 daily with breakfast and glipizide 10mg twice daily  Continue with BG checks  Check labs prior to next visit      2. Diabetic peripheral neuropathy (HCC)  -     Comprehensive Metabolic Panel; Future    Continue with Lyrica      3. Dyslipidemia  -     Comprehensive Metabolic Panel; Future  -     Lipid Panel; Future    Lipid panel is good  Continue with statin and fenofibrate      4. Essential hypertension  -     Comprehensive Metabolic Panel; Future    Stable  Continue with current medication regimen  Defer management to PCP        5. Vitamin D deficiency    Vitamin D levels are good.    Continue with supplement        Refills sent to pharmacy      RTC in 4 months with me, lab proior and 8 months with Dr. Hernandez      Follow Up     Patient was given instructions and counseling regarding her condition or for health maintenance advice. Please see specific information pulled into the AVS if appropriate.              Yasmine Fong, APRN  12/28/22

## 2022-12-29 DIAGNOSIS — E11.42 DIABETIC PERIPHERAL NEUROPATHY: ICD-10-CM

## 2022-12-30 ENCOUNTER — OFFICE VISIT (OUTPATIENT)
Dept: CARDIOLOGY | Facility: CLINIC | Age: 82
End: 2022-12-30

## 2022-12-30 ENCOUNTER — HOSPITAL ENCOUNTER (OUTPATIENT)
Dept: CARDIOLOGY | Facility: HOSPITAL | Age: 82
Discharge: HOME | End: 2022-12-30
Admitting: INTERNAL MEDICINE

## 2022-12-30 VITALS
SYSTOLIC BLOOD PRESSURE: 140 MMHG | DIASTOLIC BLOOD PRESSURE: 75 MMHG | HEIGHT: 70 IN | HEART RATE: 82 BPM | BODY MASS INDEX: 40.08 KG/M2 | WEIGHT: 279.98 LBS

## 2022-12-30 VITALS
HEART RATE: 74 BPM | SYSTOLIC BLOOD PRESSURE: 110 MMHG | DIASTOLIC BLOOD PRESSURE: 74 MMHG | BODY MASS INDEX: 40.09 KG/M2 | WEIGHT: 280 LBS | HEIGHT: 70 IN

## 2022-12-30 DIAGNOSIS — R01.1 HEART MURMUR: ICD-10-CM

## 2022-12-30 DIAGNOSIS — I35.0 NONRHEUMATIC AORTIC VALVE STENOSIS: ICD-10-CM

## 2022-12-30 DIAGNOSIS — I10 ESSENTIAL HYPERTENSION: ICD-10-CM

## 2022-12-30 DIAGNOSIS — I35.8 AORTIC VALVE SCLEROSIS: ICD-10-CM

## 2022-12-30 DIAGNOSIS — I49.3 PVC (PREMATURE VENTRICULAR CONTRACTION): ICD-10-CM

## 2022-12-30 DIAGNOSIS — Z79.01 CHRONIC ANTICOAGULATION: ICD-10-CM

## 2022-12-30 DIAGNOSIS — I87.2 VENOUS INSUFFICIENCY OF LEFT LEG: ICD-10-CM

## 2022-12-30 DIAGNOSIS — I82.551 CHRONIC DEEP VEIN THROMBOSIS (DVT) OF RIGHT PERONEAL VEIN: ICD-10-CM

## 2022-12-30 DIAGNOSIS — I48.0 PAROXYSMAL ATRIAL FIBRILLATION: ICD-10-CM

## 2022-12-30 DIAGNOSIS — E78.5 DYSLIPIDEMIA: Primary | ICD-10-CM

## 2022-12-30 LAB
ASCENDING AORTA: 3.1 CM
BH CV ECHO MEAS - ACS: 0.97 CM
BH CV ECHO MEAS - AO MAX PG: 61.1 MMHG
BH CV ECHO MEAS - AO MEAN PG: 38.6 MMHG
BH CV ECHO MEAS - AO ROOT DIAM: 3.4 CM
BH CV ECHO MEAS - AO V2 MAX: 390.9 CM/SEC
BH CV ECHO MEAS - AO V2 VTI: 80.2 CM
BH CV ECHO MEAS - AVA(I,D): 0.92 CM2
BH CV ECHO MEAS - EDV(CUBED): 143.2 ML
BH CV ECHO MEAS - EDV(MOD-SP2): 114 ML
BH CV ECHO MEAS - EDV(MOD-SP4): 178 ML
BH CV ECHO MEAS - EF(MOD-BP): 57.4 %
BH CV ECHO MEAS - EF(MOD-SP2): 59.6 %
BH CV ECHO MEAS - EF(MOD-SP4): 55.6 %
BH CV ECHO MEAS - ESV(CUBED): 46.9 ML
BH CV ECHO MEAS - ESV(MOD-SP2): 46 ML
BH CV ECHO MEAS - ESV(MOD-SP4): 79 ML
BH CV ECHO MEAS - FS: 31.1 %
BH CV ECHO MEAS - IVS/LVPW: 0.97 CM
BH CV ECHO MEAS - IVSD: 1.28 CM
BH CV ECHO MEAS - LAT PEAK E' VEL: 10.3 CM/SEC
BH CV ECHO MEAS - LV DIASTOLIC VOL/BSA (35-75): 74.2 CM2
BH CV ECHO MEAS - LV MASS(C)D: 279.5 GRAMS
BH CV ECHO MEAS - LV MAX PG: 4.6 MMHG
BH CV ECHO MEAS - LV MEAN PG: 2.9 MMHG
BH CV ECHO MEAS - LV SYSTOLIC VOL/BSA (12-30): 32.9 CM2
BH CV ECHO MEAS - LV V1 MAX: 107.1 CM/SEC
BH CV ECHO MEAS - LV V1 VTI: 23.3 CM
BH CV ECHO MEAS - LVIDD: 5.2 CM
BH CV ECHO MEAS - LVIDS: 3.6 CM
BH CV ECHO MEAS - LVOT AREA: 3.1 CM2
BH CV ECHO MEAS - LVOT DIAM: 2 CM
BH CV ECHO MEAS - LVPWD: 1.31 CM
BH CV ECHO MEAS - MED PEAK E' VEL: 5 CM/SEC
BH CV ECHO MEAS - MV A DUR: 0.12 SEC
BH CV ECHO MEAS - MV A MAX VEL: 85.3 CM/SEC
BH CV ECHO MEAS - MV DEC SLOPE: 296.1 CM/SEC2
BH CV ECHO MEAS - MV DEC TIME: 0.2 MSEC
BH CV ECHO MEAS - MV E MAX VEL: 58.7 CM/SEC
BH CV ECHO MEAS - MV E/A: 0.69
BH CV ECHO MEAS - MV MAX PG: 4.5 MMHG
BH CV ECHO MEAS - MV MEAN PG: 1.58 MMHG
BH CV ECHO MEAS - MV P1/2T: 50.5 MSEC
BH CV ECHO MEAS - MV V2 VTI: 17.3 CM
BH CV ECHO MEAS - MVA(P1/2T): 4.4 CM2
BH CV ECHO MEAS - MVA(VTI): 4.3 CM2
BH CV ECHO MEAS - PA ACC TIME: 0.1 SEC
BH CV ECHO MEAS - PA PR(ACCEL): 34.6 MMHG
BH CV ECHO MEAS - PA V2 MAX: 109.7 CM/SEC
BH CV ECHO MEAS - PULM A REVS DUR: 0.17 SEC
BH CV ECHO MEAS - PULM A REVS VEL: 28.5 CM/SEC
BH CV ECHO MEAS - PULM DIAS VEL: 30.1 CM/SEC
BH CV ECHO MEAS - PULM S/D: 1.09
BH CV ECHO MEAS - PULM SYS VEL: 32.9 CM/SEC
BH CV ECHO MEAS - QP/QS: 0.93
BH CV ECHO MEAS - RV MAX PG: 2.43 MMHG
BH CV ECHO MEAS - RV V1 MAX: 78 CM/SEC
BH CV ECHO MEAS - RV V1 VTI: 12.6 CM
BH CV ECHO MEAS - RVOT DIAM: 2.6 CM
BH CV ECHO MEAS - SI(MOD-SP2): 28.3 ML/M2
BH CV ECHO MEAS - SI(MOD-SP4): 41.2 ML/M2
BH CV ECHO MEAS - SV(LVOT): 73.4 ML
BH CV ECHO MEAS - SV(MOD-SP2): 68 ML
BH CV ECHO MEAS - SV(MOD-SP4): 99 ML
BH CV ECHO MEAS - SV(RVOT): 68.1 ML
BH CV ECHO MEAS - TAPSE (>1.6): 2.6 CM
BH CV ECHO MEASUREMENTS AVERAGE E/E' RATIO: 7.67
BH CV XLRA - RV BASE: 3.8 CM
BH CV XLRA - RV LENGTH: 6.7 CM
BH CV XLRA - RV MID: 3.2 CM
BH CV XLRA - TDI S': 10.6 CM/SEC
LEFT ATRIUM VOLUME INDEX: 18.7 ML/M2
MAXIMAL PREDICTED HEART RATE: 139 BPM
SINUS: 3.5 CM
STJ: 2.7 CM
STRESS TARGET HR: 118 BPM

## 2022-12-30 PROCEDURE — 93000 ELECTROCARDIOGRAM COMPLETE: CPT | Performed by: NURSE PRACTITIONER

## 2022-12-30 PROCEDURE — 25010000002 PERFLUTREN (DEFINITY) 8.476 MG IN SODIUM CHLORIDE (PF) 0.9 % 10 ML INJECTION: Performed by: INTERNAL MEDICINE

## 2022-12-30 PROCEDURE — 93306 TTE W/DOPPLER COMPLETE: CPT

## 2022-12-30 PROCEDURE — 99214 OFFICE O/P EST MOD 30 MIN: CPT | Performed by: NURSE PRACTITIONER

## 2022-12-30 PROCEDURE — 93306 TTE W/DOPPLER COMPLETE: CPT | Performed by: INTERNAL MEDICINE

## 2022-12-30 RX ORDER — LANCING DEVICE/LANCETS
KIT MISCELLANEOUS
Qty: 1 KIT | Refills: 0 | Status: SHIPPED | OUTPATIENT
Start: 2022-12-30

## 2022-12-30 RX ADMIN — PERFLUTREN 2 ML: 6.52 INJECTION, SUSPENSION INTRAVENOUS at 13:47

## 2022-12-30 NOTE — H&P (VIEW-ONLY)
Subjective:     Encounter Date:12/30/2022      Patient ID: Jesus Beckham is a 82 y.o. male.    Chief Complaint:follow up AS, PAF  History of Present Illness  This is a 81 y/o man who follows with Dr. Burr and is new to me today. He has a pmhx of paroxysmal atrial fibrillation, nonrheumatic aortic valve stenosis, hypertension, hyperlipidemia, DVT, diabetes, and chronic back pain. He had a stress test and echocardiogram in December 2021 for complaints of worsening shortness of breath. His stress test was negative for ischemia. Echocardiogram showed normal left ventricular systolic function and no wall motion abnormalities, EF 54%, grade 1 diastolic dysfunction, severe aortic valve calcification with moderate aortic valve stenosis and normal right ventricular systolic pressure. He was in the hospital in November for placement of a spinal stimulator and was noted to have an indeterminate troponin. He was asymptomatic so no workup was pursued.    He is here today for a follow up visit. His pain has greatly improved since having the spinal stimulator. He has shortness of breath all the time but this is not any worse than normal. Very little activity makes him short of breath but it resolves very quickly with rest. He fatigues easily. He also has dizziness when he gets up and walks. He denies any syncope. He has fallen a number of times though due to balance issues. He denies any palpitations. He has swelling in his lower extremities, left greater than right. He denies any orthopnea or PND.     I have reviewed and updated as appropriate allergies, current medications, past family history, past medical history, past surgical history and problem list.    Review of Systems   Constitutional: Positive for malaise/fatigue. Negative for fever, weight gain and weight loss.   HENT: Negative for congestion, hoarse voice and sore throat.    Eyes: Negative for blurred vision and double vision.   Cardiovascular: Positive for  dyspnea on exertion and leg swelling. Negative for chest pain, orthopnea, palpitations and syncope.   Respiratory: Positive for shortness of breath. Negative for cough and wheezing.    Musculoskeletal: Positive for falls.   Gastrointestinal: Negative for abdominal pain, hematemesis, hematochezia and melena.   Genitourinary: Negative for dysuria and hematuria.   Neurological: Positive for dizziness and loss of balance. Negative for headaches, light-headedness and numbness.   Psychiatric/Behavioral: Negative for depression. The patient is not nervous/anxious.          Current Outpatient Medications:   •  allopurinol (ZYLOPRIM) 300 MG tablet, Take 1 tablet by mouth Daily., Disp: 90 tablet, Rfl: 3  •  Ascorbic Acid 300 MG chewable tablet, Chew 300 mg Every Morning., Disp: , Rfl:   •  diphenhydrAMINE-acetaminophen (TYLENOL PM)  MG tablet per tablet, Take 2 tablets by mouth At Night As Needed for Sleep., Disp: , Rfl:   •  donepezil (ARICEPT) 5 MG tablet, Take 1 tablet by mouth every night at bedtime., Disp: 90 tablet, Rfl: 3  •  fenofibrate (TRICOR) 145 MG tablet, TAKE 1 TABLET BY MOUTH  DAILY, Disp: 90 tablet, Rfl: 1  •  fluticasone (FLONASE) 50 MCG/ACT nasal spray, 2 sprays into the nostril(s) as directed by provider Daily., Disp: 48 g, Rfl: 3  •  glipizide (GLUCOTROL) 10 MG tablet, Take 1 tablet by mouth 2 (Two) Times a Day Before Meals., Disp: 180 tablet, Rfl: 2  •  Glucosamine HCl 1500 MG tablet, Take 2 tablets by mouth Daily., Disp: , Rfl:   •  Glyxambi 25-5 MG tablet, TAKE 1 TABLET BY MOUTH  DAILY WITH BREAKFAST, Disp: 90 tablet, Rfl: 1  •  L-Methylfolate-B6-B12 3-35-2 MG tablet, Take 1 tablet by mouth 2 (Two) Times a Day., Disp: 180 tablet, Rfl: 1  •  Lancet Device misc, 1 each 2 (Two) Times a Day., Disp: 1 each, Rfl: 0  •  Lancets Misc. (Accu-Chek FastClix Lancet) kit, Dispense 1 lancing device to check 5 times a day. Dx: E 1, Disp: 1 kit, Rfl: 0  •  lisinopril (PRINIVIL,ZESTRIL) 2.5 MG tablet, TAKE 1  TABLET BY MOUTH  DAILY, Disp: 90 tablet, Rfl: 3  •  melatonin 5 MG tablet tablet, Take 5 mg by mouth Every Night., Disp: , Rfl:   •  Multiple Vitamins-Minerals (MULTIVITAMIN ADULT PO), Take 1 tablet by mouth Daily., Disp: , Rfl:   •  Myrbetriq 50 MG tablet sustained-release 24 hour 24 hr tablet, Take 50 mg by mouth Daily., Disp: , Rfl:   •  Omega-3 Fatty Acids (FISH OIL) 1200 MG capsule capsule, Take 2,000 mg by mouth 2 (Two) Times a Day With Meals. HOLD FOR SURGERY, Disp: , Rfl:   •  pregabalin (LYRICA) 100 MG capsule, TAKE 1 CAPSULE BY MOUTH 3  TIMES DAILY, Disp: 270 capsule, Rfl: 0  •  rosuvastatin (CRESTOR) 10 MG tablet, TAKE 1 TABLET BY MOUTH  DAILY, Disp: 90 tablet, Rfl: 3  •  Toujeo Max SoloStar 300 UNIT/ML solution pen-injector injection, 98 units every morning (Patient taking differently: 98 units every morning  PER MD INSTRUCTION), Disp: 29.4 mL, Rfl: 2  •  traZODone (DESYREL) 150 MG tablet, Take 2 tablets by mouth Every Night., Disp: 180 tablet, Rfl: 3  •  vitamin D (ERGOCALCIFEROL) 1.25 MG (12846 UT) capsule capsule, TAKE 1 CAPSULE TWICE WEEKLY (Patient taking differently: 50,000 Units Every 7 (Seven) Days.), Disp: 26 capsule, Rfl: 1  •  oxyCODONE-acetaminophen (PERCOCET) 5-325 MG per tablet, Take 1 tablet by mouth Every 4 (Four) Hours As Needed for Severe Pain., Disp: 30 tablet, Rfl: 0  No current facility-administered medications for this visit.    Past Medical History:   Diagnosis Date   • Acute embolism and thrombosis of deep vein of right distal lower extremity (HCC)    • Acute gangrenous cholecystitis     FEB 2018   • Allergic    • Arthritis    • Atrial fibrillation with RVR (HCC)     HISTORY   • Benign prostatic hyperplasia without lower urinary tract symptoms    • Cancer of bladder (HCC) 2016   • Colon polyp    • Discitis of lumbar region    • DVT (deep venous thrombosis) (HCC)     MARCH 2018   • Essential hypertension    • Murray catheter in place     LOSS OF SENSATION BLADDER. BECAUSE OF WOUND  AT THE TIME   • Gout    • Heel ulcer (HCC)     RIGHT. FOLLOWED BY WOUND CARE. GETTING BETTER   • History of sepsis    • Hyperlipidemia    • Loss, sensation     LOWER EXTREMTIES. RELATED TO LAST BACK SURGERY.   • MRSA infection     LEFT LEG.    • Open wound     LOW TO MIDDLE CRACK BUTT. SEES WOUND CARE. WILL BE RESTARTED ON DIFFUCAN AND NYSTATIN POWER. REDNESS IN GROIN   • Open wound     WITH MEDICATED DRESSING LEFT SHIN AREA.    • CELINA (obstructive sleep apnea)     NO MACHINE   • Osteoarthritis    • Paroxysmal atrial fibrillation (HCC)    • PVC (premature ventricular contraction)    • Sepsis (HCC) 02/2018   • Sepsis due to Escherichia coli (HCC)    • Skin carcinoma 2012    MELANOMA.2 PLACES BACK AND EAR   • Type 2 diabetes mellitus (HCC)    • Vitamin D deficiency    • Weakness        Past Surgical History:   Procedure Laterality Date   • ABDOMINAL SURGERY     • APPENDECTOMY N/A 1961   • CHOLECYSTECTOMY WITH INTRAOPERATIVE CHOLANGIOGRAM N/A 2/25/2018    Procedure: CHOLECYSTECTOMY LAPAROSCOPIC INTRAOPERATIVE CHOLANGIOGRAM;  Surgeon: Maegan Correa MD;  Location: [facility];  Service:    • COLONOSCOPY N/A 2010    h/o of polyps   • EYE SURGERY Bilateral 1959    glass removal from eye, lens transplant   • JOINT REPLACEMENT     • LAMINECTOMY N/A 01/18/2016    L2-L3, L3-L4, L4-L5, and L5-S1 bilateral lamincectomy, medial facetectomy & [id number], Dr. Kaiden Valdes   • LUMBAR FUSION N/A 3/7/2018    Procedure: LUMBAR FUSION MINIMALLY INVASIVE TRANSFORAMINAL LUMBAR INTERBODY IRRIGATION AND DEBRIDEMENT AND FUSION.  IRRIGATION AND DEBRIDEMENT OF EPIDURAL ABCESS LUMBAR 2-4. BONE MORPHOGENIC PROTEIN, ALPHATEC NOVEL AND ILLICO, EMG AND SSEP NEUROMONITORING.;  Surgeon: Manish Marr DO;  Location: [facility];  Service: Orthopedic Spine   • SKIN BIOPSY     • SPINAL CORD STIMULATOR IMPLANT N/A 7/1/2022    Procedure: SPINAL CORD STIMULATOR INSERTION PHASE 1 ([place]) 4 DAY TRIAL ONLY DUE TO ELIQUIS HOLD.;  Surgeon:  Cody Holguin MD;  Location: [facility] MA[facility] OR;  Service: Pain Management;  Laterality: N/A;   • SPINAL CORD STIMULATOR IMPLANT N/A 2022    Procedure: Thoracic eleven laminotomy for placement of a surgical spinal cord stimulator lead to thoracic nine;  Surgeon: Charlie Bailon MD;  Location: [facility] OR;  Service: Neurosurgery;  Laterality: N/A;   • SPINAL CORD STIMULATOR IMPLANT N/A 2022    Procedure: Placement of a left gluteal internal pulse generator;  Surgeon: Charlie Bailon MD;  Location: [facility];  Service: Neurosurgery;  Laterality: N/A;   • TOTAL KNEE ARTHROPLASTY Right 2005    Dr. Washington Evans   • VENA CAVA FILTER INSERTION Right 3/6/2018    Procedure: VENA CAVA FILTER INSERTION;  Surgeon: Alexis Thakkar MD;  Location: [facility] ;  Service:    • VENA CAVA FILTER REMOVAL N/A 12/3/2018    Procedure: VENA CAVA FILTER REMOVAL WITH VENOCAVAGRAM;  Surgeon: Alexis Thakkar MD;  Location: [facility] ;  Service: Vascular       Family History   Problem Relation Age of Onset   • Esophageal cancer Mother    • No Known Problems Father    • Diabetes Maternal Grandmother    • Heart attack Maternal Grandfather    • Malig Hyperthermia Neg Hx        Social History     Tobacco Use   • Smoking status: Former     Types: Cigars     Quit date:      Years since quittin.9   • Smokeless tobacco: Former     Types: Chew   • Tobacco comments:     Caffeine daily   Vaping Use   • Vaping Use: Never used   Substance Use Topics   • Alcohol use: Not Currently   • Drug use: No         ECG 12 Lead    Date/Time: 2022 2:49 PM  Performed by: Cherelle Hernandez APRN  Authorized by: Cherelle Hernandez APRN   Comparison: compared with previous ECG from 2022  Similar to previous ECG  Rhythm: sinus rhythm  Ectopy: multifocal PVCs  Comments: No significant change from previous EKG               Objective:     Visit Vitals  /74 (BP Location: Left arm, Patient  "Position: Sitting, Cuff Size: Adult)   Pulse 74   Ht 177 cm (69.69\")   Wt 127 kg (280 lb)   BMI 40.53 kg/m²             Physical Exam  Constitutional:       Appearance: Normal appearance. He is obese.   HENT:      Head: Normocephalic.   Neck:      Vascular: No carotid bruit.   Cardiovascular:      Rate and Rhythm: Normal rate and regular rhythm.      Chest Wall: PMI is not displaced.      Pulses: Normal pulses.           Radial pulses are 2+ on the right side and 2+ on the left side.      Heart sounds: Murmur heard.    Systolic murmur is present with a grade of 2/6.    No friction rub. No gallop.   Pulmonary:      Effort: Pulmonary effort is normal.      Breath sounds: Normal breath sounds.   Abdominal:      General: Bowel sounds are normal. There is no distension.      Palpations: Abdomen is soft.   Musculoskeletal:      Right lower leg: No edema.      Left lower le+ Edema present.   Skin:     General: Skin is warm and dry.      Capillary Refill: Capillary refill takes less than 2 seconds.   Neurological:      Mental Status: He is alert and oriented to person, place, and time.   Psychiatric:         Mood and Affect: Mood normal.         Behavior: Behavior normal.         Thought Content: Thought content normal.          Lab Review:   Lipid Panel    Lipid Panel 22   Total Cholesterol 139 123 134   Triglycerides 148 105 122   HDL Cholesterol 42 43 45   VLDL Cholesterol 26 19 22   LDL Cholesterol  71 61 67      Comments are available for some flowsheets but are not being displayed.               Cardiac Procedures:   1. Nuclear stress test 21:  • Left ventricular ejection fraction is normal. (Calculated EF = 52%).  • Myocardial perfusion imaging indicates a normal myocardial perfusion study with no evidence of ischemia.  • Impressions are consistent with a low risk study.  • LVEF with stress is 53%.  2. Echocardiogram 21  • Calculated left ventricular EF = 54% Estimated left " ventricular EF = 54% Estimated left ventricular EF was in agreement with the calculated left ventricular EF. Left ventricular systolic function is normal. Normal left ventricular cavity size and wall thickness noted. All left ventricular wall segments contract normally. Left ventricular diastolic function is consistent with (grade I) impaired relaxation.  • There is severe calcification of the aortic valve. Trace aortic valve regurgitation is present. Moderate aortic valve stenosis is present. The aortic valve area is underestimated due to underestimation of the left ventricular outflow tract diameter Aortic valve mean pressure gradient is 15.7 mmHg. Aortic valve area is 0.96 cm2.  • Mild mitral annular calcification is present. Trace mitral valve regurgitation is present.  • Trace tricuspid valve regurgitation is present. Estimated right ventricular systolic pressure from tricuspid regurgitation is normal (<35 mmHg). Calculated right ventricular systolic pressure from tricuspid regurgitation is 24 mmHg.     3. Echocardiogram 11/21/19:  • Left ventricular systolic function is normal. Estimated EF appears to be in the range of 61 - 65%. Normal regional wall motion and cavity size.  • Normal left ventricular cavity size, wall thickness, mass and mass index noted  • Left ventricular diastolic function is normal. Normal left atrial pressure. There is no evidence of a left ventricular thrombus present.  • Normal right ventricular cavity size, wall thickness, systolic function and septal motion noted.  • The valve exhibits sclerosis(peak velocity<2.5cm/sec). There is moderate calcification of the aortic valve.No significant aortic valve regurgitation is present. No hemodynamically significant aortic valve stenosis is present.  • No significant changes since prior study.         Assessment:         Diagnoses and all orders for this visit:    1. Dyslipidemia (Primary)    2. Essential hypertension    3. PVC (premature  ventricular contraction)    4. Paroxysmal atrial fibrillation (HCC)    5. Heart murmur    6. Aortic valve sclerosis    7. Venous insufficiency of left leg    8. Nonrheumatic aortic valve stenosis    9. 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 Chronic anticoagulation    Other orders  -     ECG 12 Lead            Plan:       1. Aortic stenosis: Repeat echocardiogram performed today showed slightly worsening of his stenosis with valve area measuring 0.9 cm2, max pressure gradient of 61 mmHg and mean pressure gradient of 39 mmHg. Discussed preliminary results of echocardiogram with patient and his wife. He does have symptoms possibly suggestive of worsening stenosis. Will await the official reading of his echocardiogram to determine if TAVR is something that may be considered for him.   2. PAF: in sinus rhythm on EKG today. On Eliquis for anticoagulation.   3. HTN: blood pressure is well controlled on current regimen. No changes.  4. Venous insufficiency: left lower extremity. Swelling is at baseline.  5. Peripheral neuropathy  6. Diabetes  7. Obstructive sleep apnea    Thank you for allowing me to participate in this patient's care. Please call with any questions or concerns. Mr. Beckham will follow up with Dr. Burr in 6 months.          Your medication list          Accurate as of December 30, 2022  3:04 PM. If you have any questions, ask your nurse or doctor.            START taking these medications      Instructions Last Dose Given Next Dose Due   Accu-Chek FastClix Lancet kit  Started by: DANNY Marie      Dispense 1 lancing device to check 5 times a day. Dx: E 1          CHANGE how you take these medications      Instructions Last Dose Given Next Dose Due   Jovanni Hernandez SoloStar 300 UNIT/ML solution pen-injector injection  Generic drug: Insulin Glargine (2 Unit Dial)  What changed: additional instructions      98 units every morning       vitamin D 1.25 MG (42874 UT) capsule  capsule  Commonly known as: ERGOCALCIFEROL  What changed:   · how to take this  · when to take this      TAKE 1 CAPSULE TWICE WEEKLY          CONTINUE taking these medications      Instructions Last Dose Given Next Dose Due   allopurinol 300 MG tablet  Commonly known as: ZYLOPRIM      Take 1 tablet by mouth Daily.       Ascorbic Acid 300 MG chewable tablet      Chew 300 mg Every Morning.       diphenhydrAMINE-acetaminophen  MG tablet per tablet  Commonly known as: TYLENOL PM      Take 2 tablets by mouth At Night As Needed for Sleep.       donepezil 5 MG tablet  Commonly known as: ARICEPT      Take 1 tablet by mouth every night at bedtime.       fenofibrate 145 MG tablet  Commonly known as: TRICOR      TAKE 1 TABLET BY MOUTH  DAILY       fish oil 1200 MG capsule capsule      Take 2,000 mg by mouth 2 (Two) Times a Day With Meals. HOLD FOR SURGERY       fluticasone 50 MCG/ACT nasal spray  Commonly known as: FLONASE      2 sprays into the nostril(s) as directed by provider Daily.       glipizide 10 MG tablet  Commonly known as: GLUCOTROL      Take 1 tablet by mouth 2 (Two) Times a Day Before Meals.       Glucosamine HCl 1500 MG tablet      Take 2 tablets by mouth Daily.       Glyxambi 25-5 MG tablet  Generic drug: Empagliflozin-linaGLIPtin      TAKE 1 TABLET BY MOUTH  DAILY WITH BREAKFAST       L-Methylfolate-B6-B12 3-35-2 MG tablet      Take 1 tablet by mouth 2 (Two) Times a Day.       Lancet Device misc      1 each 2 (Two) Times a Day.       lisinopril 2.5 MG tablet  Commonly known as: PRINIVIL,ZESTRIL      TAKE 1 TABLET BY MOUTH  DAILY       melatonin 5 MG tablet tablet      Take 5 mg by mouth Every Night.       multivitamin with minerals tablet tablet      Take 1 tablet by mouth Daily.       Myrbetriq 50 MG tablet sustained-release 24 hour 24 hr tablet  Generic drug: Mirabegron ER      Take 50 mg by mouth Daily.       oxyCODONE-acetaminophen 5-325 MG per tablet  Commonly known as: PERCOCET      Take 1 tablet  by mouth Every 4 (Four) Hours As Needed for Severe Pain.       pregabalin 100 MG capsule  Commonly known as: LYRICA      TAKE 1 CAPSULE BY MOUTH 3  TIMES DAILY       rosuvastatin 10 MG tablet  Commonly known as: CRESTOR      TAKE 1 TABLET BY MOUTH  DAILY       traZODone 150 MG tablet  Commonly known as: DESYREL      Take 2 tablets by mouth Every Night.          STOP taking these medications    Accu-Chek Softclix Lancets lancets  Stopped by: DANNY Marie              Where to Get Your Medications      These medications were sent to [facility] - [place], [place] - [address]  - [phone] [facility] [phone] FX  [address], [facility] [address]    Phone: [phone]   · Accu-Chek FastClix Lancet kit           DANNY Miller  12/30/22  10:57 AM EST

## 2022-12-30 NOTE — PROGRESS NOTES
Subjective:     Encounter Date:12/30/2022      Patient ID: Jesus Beckham is a 82 y.o. male.    Chief Complaint:follow up AS, PAF  History of Present Illness  This is a 81 y/o man who follows with Dr. Burr and is new to me today. He has a pmhx of paroxysmal atrial fibrillation, nonrheumatic aortic valve stenosis, hypertension, hyperlipidemia, DVT, diabetes, and chronic back pain. He had a stress test and echocardiogram in December 2021 for complaints of worsening shortness of breath. His stress test was negative for ischemia. Echocardiogram showed normal left ventricular systolic function and no wall motion abnormalities, EF 54%, grade 1 diastolic dysfunction, severe aortic valve calcification with moderate aortic valve stenosis and normal right ventricular systolic pressure. He was in the hospital in November for placement of a spinal stimulator and was noted to have an indeterminate troponin. He was asymptomatic so no workup was pursued.    He is here today for a follow up visit. His pain has greatly improved since having the spinal stimulator. He has shortness of breath all the time but this is not any worse than normal. Very little activity makes him short of breath but it resolves very quickly with rest. He fatigues easily. He also has dizziness when he gets up and walks. He denies any syncope. He has fallen a number of times though due to balance issues. He denies any palpitations. He has swelling in his lower extremities, left greater than right. He denies any orthopnea or PND.     I have reviewed and updated as appropriate allergies, current medications, past family history, past medical history, past surgical history and problem list.    Review of Systems   Constitutional: Positive for malaise/fatigue. Negative for fever, weight gain and weight loss.   HENT: Negative for congestion, hoarse voice and sore throat.    Eyes: Negative for blurred vision and double vision.   Cardiovascular: Positive for  dyspnea on exertion and leg swelling. Negative for chest pain, orthopnea, palpitations and syncope.   Respiratory: Positive for shortness of breath. Negative for cough and wheezing.    Musculoskeletal: Positive for falls.   Gastrointestinal: Negative for abdominal pain, hematemesis, hematochezia and melena.   Genitourinary: Negative for dysuria and hematuria.   Neurological: Positive for dizziness and loss of balance. Negative for headaches, light-headedness and numbness.   Psychiatric/Behavioral: Negative for depression. The patient is not nervous/anxious.          Current Outpatient Medications:   •  allopurinol (ZYLOPRIM) 300 MG tablet, Take 1 tablet by mouth Daily., Disp: 90 tablet, Rfl: 3  •  Ascorbic Acid 300 MG chewable tablet, Chew 300 mg Every Morning., Disp: , Rfl:   •  diphenhydrAMINE-acetaminophen (TYLENOL PM)  MG tablet per tablet, Take 2 tablets by mouth At Night As Needed for Sleep., Disp: , Rfl:   •  donepezil (ARICEPT) 5 MG tablet, Take 1 tablet by mouth every night at bedtime., Disp: 90 tablet, Rfl: 3  •  fenofibrate (TRICOR) 145 MG tablet, TAKE 1 TABLET BY MOUTH  DAILY, Disp: 90 tablet, Rfl: 1  •  fluticasone (FLONASE) 50 MCG/ACT nasal spray, 2 sprays into the nostril(s) as directed by provider Daily., Disp: 48 g, Rfl: 3  •  glipizide (GLUCOTROL) 10 MG tablet, Take 1 tablet by mouth 2 (Two) Times a Day Before Meals., Disp: 180 tablet, Rfl: 2  •  Glucosamine HCl 1500 MG tablet, Take 2 tablets by mouth Daily., Disp: , Rfl:   •  Glyxambi 25-5 MG tablet, TAKE 1 TABLET BY MOUTH  DAILY WITH BREAKFAST, Disp: 90 tablet, Rfl: 1  •  L-Methylfolate-B6-B12 3-35-2 MG tablet, Take 1 tablet by mouth 2 (Two) Times a Day., Disp: 180 tablet, Rfl: 1  •  Lancet Device misc, 1 each 2 (Two) Times a Day., Disp: 1 each, Rfl: 0  •  Lancets Misc. (Accu-Chek FastClix Lancet) kit, Dispense 1 lancing device to check 5 times a day. Dx: E 1, Disp: 1 kit, Rfl: 0  •  lisinopril (PRINIVIL,ZESTRIL) 2.5 MG tablet, TAKE 1  TABLET BY MOUTH  DAILY, Disp: 90 tablet, Rfl: 3  •  melatonin 5 MG tablet tablet, Take 5 mg by mouth Every Night., Disp: , Rfl:   •  Multiple Vitamins-Minerals (MULTIVITAMIN ADULT PO), Take 1 tablet by mouth Daily., Disp: , Rfl:   •  Myrbetriq 50 MG tablet sustained-release 24 hour 24 hr tablet, Take 50 mg by mouth Daily., Disp: , Rfl:   •  Omega-3 Fatty Acids (FISH OIL) 1200 MG capsule capsule, Take 2,000 mg by mouth 2 (Two) Times a Day With Meals. HOLD FOR SURGERY, Disp: , Rfl:   •  pregabalin (LYRICA) 100 MG capsule, TAKE 1 CAPSULE BY MOUTH 3  TIMES DAILY, Disp: 270 capsule, Rfl: 0  •  rosuvastatin (CRESTOR) 10 MG tablet, TAKE 1 TABLET BY MOUTH  DAILY, Disp: 90 tablet, Rfl: 3  •  Toujeo Max SoloStar 300 UNIT/ML solution pen-injector injection, 98 units every morning (Patient taking differently: 98 units every morning  PER MD INSTRUCTION), Disp: 29.4 mL, Rfl: 2  •  traZODone (DESYREL) 150 MG tablet, Take 2 tablets by mouth Every Night., Disp: 180 tablet, Rfl: 3  •  vitamin D (ERGOCALCIFEROL) 1.25 MG (58851 UT) capsule capsule, TAKE 1 CAPSULE TWICE WEEKLY (Patient taking differently: 50,000 Units Every 7 (Seven) Days.), Disp: 26 capsule, Rfl: 1  •  oxyCODONE-acetaminophen (PERCOCET) 5-325 MG per tablet, Take 1 tablet by mouth Every 4 (Four) Hours As Needed for Severe Pain., Disp: 30 tablet, Rfl: 0  No current facility-administered medications for this visit.    Past Medical History:   Diagnosis Date   • Acute embolism and thrombosis of deep vein of right distal lower extremity (HCC)    • Acute gangrenous cholecystitis     FEB 2018   • Allergic    • Arthritis    • Atrial fibrillation with RVR (HCC)     HISTORY   • Benign prostatic hyperplasia without lower urinary tract symptoms    • Cancer of bladder (HCC) 2016   • Colon polyp    • Discitis of lumbar region    • DVT (deep venous thrombosis) (HCC)     MARCH 2018   • Essential hypertension    • Murray catheter in place     LOSS OF SENSATION BLADDER. BECAUSE OF WOUND  AT THE TIME   • Gout    • Heel ulcer (HCC)     RIGHT. FOLLOWED BY WOUND CARE. GETTING BETTER   • History of sepsis    • Hyperlipidemia    • Loss, sensation     LOWER EXTREMTIES. RELATED TO LAST BACK SURGERY.   • MRSA infection     LEFT LEG.    • Open wound     LOW TO MIDDLE CRACK BUTT. SEES WOUND CARE. WILL BE RESTARTED ON DIFFUCAN AND NYSTATIN POWER. REDNESS IN GROIN   • Open wound     WITH MEDICATED DRESSING LEFT SHIN AREA.    • CELINA (obstructive sleep apnea)     NO MACHINE   • Osteoarthritis    • Paroxysmal atrial fibrillation (HCC)    • PVC (premature ventricular contraction)    • Sepsis (HCC) 02/2018   • Sepsis due to Escherichia coli (HCC)    • Skin carcinoma 2012    MELANOMA.2 PLACES BACK AND EAR   • Type 2 diabetes mellitus (HCC)    • Vitamin D deficiency    • Weakness        Past Surgical History:   Procedure Laterality Date   • ABDOMINAL SURGERY     • APPENDECTOMY N/A 1961   • CHOLECYSTECTOMY WITH INTRAOPERATIVE CHOLANGIOGRAM N/A 2/25/2018    Procedure: CHOLECYSTECTOMY LAPAROSCOPIC INTRAOPERATIVE CHOLANGIOGRAM;  Surgeon: Maegan Correa MD;  Location: [facility];  Service:    • COLONOSCOPY N/A 2010    h/o of polyps   • EYE SURGERY Bilateral 1959    glass removal from eye, lens transplant   • JOINT REPLACEMENT     • LAMINECTOMY N/A 01/18/2016    L2-L3, L3-L4, L4-L5, and L5-S1 bilateral lamincectomy, medial facetectomy & [id number], Dr. Kaiden Valdes   • LUMBAR FUSION N/A 3/7/2018    Procedure: LUMBAR FUSION MINIMALLY INVASIVE TRANSFORAMINAL LUMBAR INTERBODY IRRIGATION AND DEBRIDEMENT AND FUSION.  IRRIGATION AND DEBRIDEMENT OF EPIDURAL ABCESS LUMBAR 2-4. BONE MORPHOGENIC PROTEIN, ALPHATEC NOVEL AND ILLICO, EMG AND SSEP NEUROMONITORING.;  Surgeon: Manish Marr DO;  Location: [facility];  Service: Orthopedic Spine   • SKIN BIOPSY     • SPINAL CORD STIMULATOR IMPLANT N/A 7/1/2022    Procedure: SPINAL CORD STIMULATOR INSERTION PHASE 1 ([place]) 4 DAY TRIAL ONLY DUE TO ELIQUIS HOLD.;  Surgeon:  Cody Holguin MD;  Location: [facility] MAIN OR;  Service: Pain Management;  Laterality: N/A;   • SPINAL CORD STIMULATOR IMPLANT N/A 2022    Procedure: Thoracic eleven laminotomy for placement of a surgical spinal cord stimulator lead to thoracic nine;  Surgeon: Charlie Bailon MD;  Location: [facility] OR;  Service: Neurosurgery;  Laterality: N/A;   • SPINAL CORD STIMULATOR IMPLANT N/A 2022    Procedure: Placement of a left gluteal internal pulse generator;  Surgeon: Charlie Bailon MD;  Location: [facility];  Service: Neurosurgery;  Laterality: N/A;   • TOTAL KNEE ARTHROPLASTY Right 2005    Dr. Washington Evans   • VENA CAVA FILTER INSERTION Right 3/6/2018    Procedure: VENA CAVA FILTER INSERTION;  Surgeon: Alexis Thakkar MD;  Location: [facility] ;  Service:    • VENA CAVA FILTER REMOVAL N/A 12/3/2018    Procedure: VENA CAVA FILTER REMOVAL WITH VENOCAVAGRAM;  Surgeon: Alexis Thakkar MD;  Location: [facility] ;  Service: Vascular       Family History   Problem Relation Age of Onset   • Esophageal cancer Mother    • No Known Problems Father    • Diabetes Maternal Grandmother    • Heart attack Maternal Grandfather    • Malig Hyperthermia Neg Hx        Social History     Tobacco Use   • Smoking status: Former     Types: Cigars     Quit date:      Years since quittin.9   • Smokeless tobacco: Former     Types: Chew   • Tobacco comments:     Caffeine daily   Vaping Use   • Vaping Use: Never used   Substance Use Topics   • Alcohol use: Not Currently   • Drug use: No         ECG 12 Lead    Date/Time: 2022 2:49 PM  Performed by: Cherelle Hernandez APRN  Authorized by: Cherelle Hernandez APRN   Comparison: compared with previous ECG from 2022  Similar to previous ECG  Rhythm: sinus rhythm  Ectopy: multifocal PVCs  Comments: No significant change from previous EKG               Objective:     Visit Vitals  /74 (BP Location: Left arm, Patient  "Position: Sitting, Cuff Size: Adult)   Pulse 74   Ht 177 cm (69.69\")   Wt 127 kg (280 lb)   BMI 40.53 kg/m²             Physical Exam  Constitutional:       Appearance: Normal appearance. He is obese.   HENT:      Head: Normocephalic.   Neck:      Vascular: No carotid bruit.   Cardiovascular:      Rate and Rhythm: Normal rate and regular rhythm.      Chest Wall: PMI is not displaced.      Pulses: Normal pulses.           Radial pulses are 2+ on the right side and 2+ on the left side.      Heart sounds: Murmur heard.    Systolic murmur is present with a grade of 2/6.    No friction rub. No gallop.   Pulmonary:      Effort: Pulmonary effort is normal.      Breath sounds: Normal breath sounds.   Abdominal:      General: Bowel sounds are normal. There is no distension.      Palpations: Abdomen is soft.   Musculoskeletal:      Right lower leg: No edema.      Left lower le+ Edema present.   Skin:     General: Skin is warm and dry.      Capillary Refill: Capillary refill takes less than 2 seconds.   Neurological:      Mental Status: He is alert and oriented to person, place, and time.   Psychiatric:         Mood and Affect: Mood normal.         Behavior: Behavior normal.         Thought Content: Thought content normal.          Lab Review:   Lipid Panel    Lipid Panel 22   Total Cholesterol 139 123 134   Triglycerides 148 105 122   HDL Cholesterol 42 43 45   VLDL Cholesterol 26 19 22   LDL Cholesterol  71 61 67      Comments are available for some flowsheets but are not being displayed.               Cardiac Procedures:   1. Nuclear stress test 21:  • Left ventricular ejection fraction is normal. (Calculated EF = 52%).  • Myocardial perfusion imaging indicates a normal myocardial perfusion study with no evidence of ischemia.  • Impressions are consistent with a low risk study.  • LVEF with stress is 53%.  2. Echocardiogram 21  • Calculated left ventricular EF = 54% Estimated left " ventricular EF = 54% Estimated left ventricular EF was in agreement with the calculated left ventricular EF. Left ventricular systolic function is normal. Normal left ventricular cavity size and wall thickness noted. All left ventricular wall segments contract normally. Left ventricular diastolic function is consistent with (grade I) impaired relaxation.  • There is severe calcification of the aortic valve. Trace aortic valve regurgitation is present. Moderate aortic valve stenosis is present. The aortic valve area is underestimated due to underestimation of the left ventricular outflow tract diameter Aortic valve mean pressure gradient is 15.7 mmHg. Aortic valve area is 0.96 cm2.  • Mild mitral annular calcification is present. Trace mitral valve regurgitation is present.  • Trace tricuspid valve regurgitation is present. Estimated right ventricular systolic pressure from tricuspid regurgitation is normal (<35 mmHg). Calculated right ventricular systolic pressure from tricuspid regurgitation is 24 mmHg.     3. Echocardiogram 11/21/19:  • Left ventricular systolic function is normal. Estimated EF appears to be in the range of 61 - 65%. Normal regional wall motion and cavity size.  • Normal left ventricular cavity size, wall thickness, mass and mass index noted  • Left ventricular diastolic function is normal. Normal left atrial pressure. There is no evidence of a left ventricular thrombus present.  • Normal right ventricular cavity size, wall thickness, systolic function and septal motion noted.  • The valve exhibits sclerosis(peak velocity<2.5cm/sec). There is moderate calcification of the aortic valve.No significant aortic valve regurgitation is present. No hemodynamically significant aortic valve stenosis is present.  • No significant changes since prior study.         Assessment:         Diagnoses and all orders for this visit:    1. Dyslipidemia (Primary)    2. Essential hypertension    3. PVC (premature  ventricular contraction)    4. Paroxysmal atrial fibrillation (HCC)    5. Heart murmur    6. Aortic valve sclerosis    7. Venous insufficiency of left leg    8. Nonrheumatic aortic valve stenosis    9. 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 Chronic anticoagulation    Other orders  -     ECG 12 Lead            Plan:       1. Aortic stenosis: Repeat echocardiogram performed today showed slightly worsening of his stenosis with valve area measuring 0.9 cm2, max pressure gradient of 61 mmHg and mean pressure gradient of 39 mmHg. Discussed preliminary results of echocardiogram with patient and his wife. He does have symptoms possibly suggestive of worsening stenosis. Will await the official reading of his echocardiogram to determine if TAVR is something that may be considered for him.   2. PAF: in sinus rhythm on EKG today. On Eliquis for anticoagulation.   3. HTN: blood pressure is well controlled on current regimen. No changes.  4. Venous insufficiency: left lower extremity. Swelling is at baseline.  5. Peripheral neuropathy  6. Diabetes  7. Obstructive sleep apnea    Thank you for allowing me to participate in this patient's care. Please call with any questions or concerns. Mr. Beckham will follow up with Dr. Burr in 6 months.          Your medication list          Accurate as of December 30, 2022  3:04 PM. If you have any questions, ask your nurse or doctor.            START taking these medications      Instructions Last Dose Given Next Dose Due   Accu-Chek FastClix Lancet kit  Started by: DANNY Marie      Dispense 1 lancing device to check 5 times a day. Dx: E 1          CHANGE how you take these medications      Instructions Last Dose Given Next Dose Due   Jovanni Hernandez SoloStar 300 UNIT/ML solution pen-injector injection  Generic drug: Insulin Glargine (2 Unit Dial)  What changed: additional instructions      98 units every morning       vitamin D 1.25 MG (08570 UT) capsule  capsule  Commonly known as: ERGOCALCIFEROL  What changed:   · how to take this  · when to take this      TAKE 1 CAPSULE TWICE WEEKLY          CONTINUE taking these medications      Instructions Last Dose Given Next Dose Due   allopurinol 300 MG tablet  Commonly known as: ZYLOPRIM      Take 1 tablet by mouth Daily.       Ascorbic Acid 300 MG chewable tablet      Chew 300 mg Every Morning.       diphenhydrAMINE-acetaminophen  MG tablet per tablet  Commonly known as: TYLENOL PM      Take 2 tablets by mouth At Night As Needed for Sleep.       donepezil 5 MG tablet  Commonly known as: ARICEPT      Take 1 tablet by mouth every night at bedtime.       fenofibrate 145 MG tablet  Commonly known as: TRICOR      TAKE 1 TABLET BY MOUTH  DAILY       fish oil 1200 MG capsule capsule      Take 2,000 mg by mouth 2 (Two) Times a Day With Meals. HOLD FOR SURGERY       fluticasone 50 MCG/ACT nasal spray  Commonly known as: FLONASE      2 sprays into the nostril(s) as directed by provider Daily.       glipizide 10 MG tablet  Commonly known as: GLUCOTROL      Take 1 tablet by mouth 2 (Two) Times a Day Before Meals.       Glucosamine HCl 1500 MG tablet      Take 2 tablets by mouth Daily.       Glyxambi 25-5 MG tablet  Generic drug: Empagliflozin-linaGLIPtin      TAKE 1 TABLET BY MOUTH  DAILY WITH BREAKFAST       L-Methylfolate-B6-B12 3-35-2 MG tablet      Take 1 tablet by mouth 2 (Two) Times a Day.       Lancet Device misc      1 each 2 (Two) Times a Day.       lisinopril 2.5 MG tablet  Commonly known as: PRINIVIL,ZESTRIL      TAKE 1 TABLET BY MOUTH  DAILY       melatonin 5 MG tablet tablet      Take 5 mg by mouth Every Night.       multivitamin with minerals tablet tablet      Take 1 tablet by mouth Daily.       Myrbetriq 50 MG tablet sustained-release 24 hour 24 hr tablet  Generic drug: Mirabegron ER      Take 50 mg by mouth Daily.       oxyCODONE-acetaminophen 5-325 MG per tablet  Commonly known as: PERCOCET      Take 1 tablet  by mouth Every 4 (Four) Hours As Needed for Severe Pain.       pregabalin 100 MG capsule  Commonly known as: LYRICA      TAKE 1 CAPSULE BY MOUTH 3  TIMES DAILY       rosuvastatin 10 MG tablet  Commonly known as: CRESTOR      TAKE 1 TABLET BY MOUTH  DAILY       traZODone 150 MG tablet  Commonly known as: DESYREL      Take 2 tablets by mouth Every Night.          STOP taking these medications    Accu-Chek Softclix Lancets lancets  Stopped by: DANNY Marie              Where to Get Your Medications      These medications were sent to [facility] - [place], [place] - [address]  - [phone] [facility] [phone] FX  [address], [facility] [address]    Phone: [phone]   · Accu-Chek FastClix Lancet kit           DANNY Miller  12/30/22  10:57 AM EST

## 2022-12-31 RX ORDER — ERGOCALCIFEROL 1.25 MG/1
50000 CAPSULE ORAL
Qty: 12 CAPSULE | Refills: 1 | Status: ON HOLD | OUTPATIENT
Start: 2022-12-31 | End: 2023-03-01 | Stop reason: SDUPTHER

## 2023-01-03 ENCOUNTER — TELEPHONE (OUTPATIENT)
Dept: CARDIOLOGY | Facility: CLINIC | Age: 83
End: 2023-01-03
Payer: MEDICARE

## 2023-01-03 ENCOUNTER — TRANSCRIBE ORDERS (OUTPATIENT)
Dept: CARDIOLOGY | Facility: CLINIC | Age: 83
End: 2023-01-03
Payer: MEDICARE

## 2023-01-03 DIAGNOSIS — E11.69 TYPE 2 DIABETES MELLITUS WITH OTHER SPECIFIED COMPLICATION, WITH LONG-TERM CURRENT USE OF INSULIN: ICD-10-CM

## 2023-01-03 DIAGNOSIS — Z79.4 TYPE 2 DIABETES MELLITUS WITH OTHER SPECIFIED COMPLICATION, WITH LONG-TERM CURRENT USE OF INSULIN: ICD-10-CM

## 2023-01-03 DIAGNOSIS — Z13.6 SCREENING FOR ISCHEMIC HEART DISEASE: ICD-10-CM

## 2023-01-03 DIAGNOSIS — Z01.810 PRE-OPERATIVE CARDIOVASCULAR EXAMINATION: Primary | ICD-10-CM

## 2023-01-03 DIAGNOSIS — I35.0 AORTIC STENOSIS, SEVERE: Primary | ICD-10-CM

## 2023-01-03 RX ORDER — FENOFIBRATE 145 MG/1
145 TABLET, COATED ORAL DAILY
Qty: 90 TABLET | Refills: 3 | Status: SHIPPED | OUTPATIENT
Start: 2023-01-03

## 2023-01-03 RX ORDER — PREGABALIN 100 MG/1
CAPSULE ORAL
Qty: 270 CAPSULE | Refills: 0 | Status: SHIPPED | OUTPATIENT
Start: 2023-01-03

## 2023-01-03 NOTE — TELEPHONE ENCOUNTER
Rx Controlled Substance Protocol Failed 12/29/2022 09:07 AM   Protocol Details  Medication not refilled in past 28 days        Last ov appt 12/28/22  Next ov appt 5/1/23

## 2023-01-03 NOTE — TELEPHONE ENCOUNTER
I called and spoke with the patient about the results of his echocardiogram showing worsening aortic valve stenosis that is is near or in the severe range.  He denies any worsening shortness of breath this is a chronic issue.  However I suggested we start moving towards work-up of his valve for replacement.  I suspect he would be a better candidate for TAVR.  I recommended starting with coronary angiograms to evaluate for coronary artery disease.  Explained the procedure the patient and his wife and they agreed to proceed.  Following that we will make a referral to the valve team for TAVR evaluation.    Order for cardiac catheterization placed.  Please schedule him at his convenience.

## 2023-01-06 ENCOUNTER — LAB (OUTPATIENT)
Dept: LAB | Facility: HOSPITAL | Age: 83
End: 2023-01-06
Payer: MEDICARE

## 2023-01-06 DIAGNOSIS — Z01.810 PRE-OPERATIVE CARDIOVASCULAR EXAMINATION: ICD-10-CM

## 2023-01-06 DIAGNOSIS — Z13.6 SCREENING FOR ISCHEMIC HEART DISEASE: ICD-10-CM

## 2023-01-06 LAB
ANION GAP SERPL CALCULATED.3IONS-SCNC: 8.6 MMOL/L (ref 5–15)
BASOPHILS # BLD AUTO: 0.05 10*3/MM3 (ref 0–0.2)
BASOPHILS NFR BLD AUTO: 0.9 % (ref 0–1.5)
BUN SERPL-MCNC: 19 MG/DL (ref 8–23)
BUN/CREAT SERPL: 16.4 (ref 7–25)
CALCIUM SPEC-SCNC: 8.9 MG/DL (ref 8.6–10.5)
CHLORIDE SERPL-SCNC: 102 MMOL/L (ref 98–107)
CO2 SERPL-SCNC: 28.4 MMOL/L (ref 22–29)
CREAT SERPL-MCNC: 1.16 MG/DL (ref 0.76–1.27)
DEPRECATED RDW RBC AUTO: 47.8 FL (ref 37–54)
EGFRCR SERPLBLD CKD-EPI 2021: 63.3 ML/MIN/1.73
EOSINOPHIL # BLD AUTO: 0.13 10*3/MM3 (ref 0–0.4)
EOSINOPHIL NFR BLD AUTO: 2.4 % (ref 0.3–6.2)
ERYTHROCYTE [DISTWIDTH] IN BLOOD BY AUTOMATED COUNT: 14.4 % (ref 12.3–15.4)
GLUCOSE SERPL-MCNC: 159 MG/DL (ref 65–99)
HCT VFR BLD AUTO: 39.7 % (ref 37.5–51)
HGB BLD-MCNC: 13.2 G/DL (ref 13–17.7)
IMM GRANULOCYTES # BLD AUTO: 0.07 10*3/MM3 (ref 0–0.05)
IMM GRANULOCYTES NFR BLD AUTO: 1.3 % (ref 0–0.5)
LYMPHOCYTES # BLD AUTO: 0.92 10*3/MM3 (ref 0.7–3.1)
LYMPHOCYTES NFR BLD AUTO: 17.1 % (ref 19.6–45.3)
MCH RBC QN AUTO: 30.7 PG (ref 26.6–33)
MCHC RBC AUTO-ENTMCNC: 33.2 G/DL (ref 31.5–35.7)
MCV RBC AUTO: 92.3 FL (ref 79–97)
MONOCYTES # BLD AUTO: 0.28 10*3/MM3 (ref 0.1–0.9)
MONOCYTES NFR BLD AUTO: 5.2 % (ref 5–12)
NEUTROPHILS NFR BLD AUTO: 3.92 10*3/MM3 (ref 1.7–7)
NEUTROPHILS NFR BLD AUTO: 73.1 % (ref 42.7–76)
NRBC BLD AUTO-RTO: 0 /100 WBC (ref 0–0.2)
PLATELET # BLD AUTO: 148 10*3/MM3 (ref 140–450)
PMV BLD AUTO: 11.2 FL (ref 6–12)
POTASSIUM SERPL-SCNC: 4.5 MMOL/L (ref 3.5–5.2)
RBC # BLD AUTO: 4.3 10*6/MM3 (ref 4.14–5.8)
SODIUM SERPL-SCNC: 139 MMOL/L (ref 136–145)
WBC NRBC COR # BLD: 5.37 10*3/MM3 (ref 3.4–10.8)

## 2023-01-06 PROCEDURE — 85025 COMPLETE CBC W/AUTO DIFF WBC: CPT

## 2023-01-06 PROCEDURE — 36415 COLL VENOUS BLD VENIPUNCTURE: CPT

## 2023-01-06 PROCEDURE — 80048 BASIC METABOLIC PNL TOTAL CA: CPT

## 2023-01-07 DIAGNOSIS — E11.42 TYPE 2 DIABETES MELLITUS WITH PERIPHERAL NEUROPATHY: ICD-10-CM

## 2023-01-07 RX ORDER — LANCETS
EACH MISCELLANEOUS
Qty: 500 EACH | Refills: 3 | Status: SHIPPED | OUTPATIENT
Start: 2023-01-07

## 2023-01-10 ENCOUNTER — OFFICE VISIT (OUTPATIENT)
Dept: PAIN MEDICINE | Facility: CLINIC | Age: 83
End: 2023-01-10
Payer: MEDICARE

## 2023-01-10 VITALS
HEIGHT: 70 IN | HEART RATE: 80 BPM | TEMPERATURE: 97.7 F | RESPIRATION RATE: 16 BRPM | BODY MASS INDEX: 40.54 KG/M2 | DIASTOLIC BLOOD PRESSURE: 78 MMHG | OXYGEN SATURATION: 92 % | SYSTOLIC BLOOD PRESSURE: 122 MMHG

## 2023-01-10 DIAGNOSIS — Z96.89 SPINAL CORD STIMULATOR STATUS: ICD-10-CM

## 2023-01-10 DIAGNOSIS — M54.16 LUMBAR RADICULOPATHY: ICD-10-CM

## 2023-01-10 DIAGNOSIS — G89.4 CHRONIC PAIN SYNDROME: Primary | ICD-10-CM

## 2023-01-10 DIAGNOSIS — M51.36 DDD (DEGENERATIVE DISC DISEASE), LUMBAR: ICD-10-CM

## 2023-01-10 PROCEDURE — 99213 OFFICE O/P EST LOW 20 MIN: CPT | Performed by: NURSE PRACTITIONER

## 2023-01-10 NOTE — PROGRESS NOTES
CHIEF COMPLAINT  Back Pain     Subjective   Jesus Beckham is a 82 y.o. male  who presents for follow-up.  He has a history of chronic back pain.  He is s/p SCS implant (Dr. Bailon) 11/17/2022.  Overall he is doing quite well.  Reporting 60% improvement in pain. He has discontinued use of Oxycodone.  He is sleeping better. Once cardiac issues taken care of he wishes to begin physical therapy.      He is scheduled for cardiac cath 1/16/2023, needs aortic valve replacement.      History of Present Illness     PEG Assessment   What number best describes your pain on average in the past week?5-6  What number best describes how, during the past week, pain has interfered with your enjoyment of life?0  What number best describes how, during the past week, pain has interfered with your general activity?  0      The following portions of the patient's history were reviewed and updated as appropriate: allergies, current medications, past family history, past medical history, past social history, past surgical history and problem list.    Review of Systems   Constitutional: Negative for fever.   HENT: Positive for congestion.    Eyes: Negative for visual disturbance.   Respiratory: Positive for shortness of breath.    Cardiovascular: Negative for chest pain.   Gastrointestinal: Negative for constipation and diarrhea.   Genitourinary: Negative for difficulty urinating and dysuria.   Musculoskeletal: Positive for back pain.   Neurological: Positive for numbness.   Psychiatric/Behavioral: Positive for sleep disturbance.     I have reviewed and confirmed the accuracy of the ROS as documented by the MA/LPN/RN DANNY Bardales    Vitals:    01/10/23 1000   BP: 122/78   Pulse: 80   Resp: 16   Temp: 97.7 °F (36.5 °C)   SpO2: 92%   Height: 177 cm (69.69\")   PainSc:   2   PainLoc: Back         Objective   Physical Exam  Vitals and nursing note reviewed.   Constitutional:       General: He is not in acute distress.      Appearance: Normal appearance. He is obese.   Pulmonary:      Effort: Pulmonary effort is normal. No respiratory distress.   Skin:         Neurological:      Mental Status: He is alert and oriented to person, place, and time.      Gait: Gait abnormal (motorized wheelchair).   Psychiatric:         Mood and Affect: Mood normal.         Behavior: Behavior normal.             Assessment & Plan   Diagnoses and all orders for this visit:    1. Chronic pain syndrome (Primary)    2. DDD (degenerative disc disease), lumbar  -     Ambulatory Referral to Physical Therapy Evaluate and treat    3. Lumbar radiculopathy  -     Ambulatory Referral to Physical Therapy Evaluate and treat    4. Spinal cord stimulator status      --- PT to eval/treat - significant physical deconditioning, will need help with strength and balance   --- Reprogramming today with Anna Marie (Tasia). I was not actively involved in this process.   --- Follow-up 1 year or sooner if needed          As the clinician, I personally reviewed the FAINA from 1/10/2023 while the patient was in the office today.      Patient remained masked during entire encounter. No cough present. I donned a mask and eye protection throughout entire visit. Prior to donning mask and eye protection, hand hygiene was performed, as well as when it was doffed.  I was closer than 6 feet, but not for an extended period of time. No obvious exposure to any bodily fluids.

## 2023-01-16 ENCOUNTER — HOSPITAL ENCOUNTER (OUTPATIENT)
Facility: HOSPITAL | Age: 83
Setting detail: HOSPITAL OUTPATIENT SURGERY
Discharge: HOME | End: 2023-01-16
Attending: INTERNAL MEDICINE | Admitting: INTERNAL MEDICINE
Payer: MEDICARE

## 2023-01-16 ENCOUNTER — DOCUMENTATION (OUTPATIENT)
Dept: CARDIOLOGY | Facility: HOSPITAL | Age: 83
End: 2023-01-16
Payer: MEDICARE

## 2023-01-16 VITALS
HEIGHT: 70 IN | BODY MASS INDEX: 40.09 KG/M2 | OXYGEN SATURATION: 92 % | WEIGHT: 280 LBS | DIASTOLIC BLOOD PRESSURE: 66 MMHG | TEMPERATURE: 97.6 F | HEART RATE: 74 BPM | SYSTOLIC BLOOD PRESSURE: 119 MMHG | RESPIRATION RATE: 18 BRPM

## 2023-01-16 DIAGNOSIS — I35.0 AORTIC STENOSIS, SEVERE: ICD-10-CM

## 2023-01-16 LAB — GLUCOSE BLDC GLUCOMTR-MCNC: 141 MG/DL (ref 70–130)

## 2023-01-16 PROCEDURE — 82810 BLOOD GASES O2 SAT ONLY: CPT

## 2023-01-16 PROCEDURE — C1769 GUIDE WIRE: HCPCS | Performed by: INTERNAL MEDICINE

## 2023-01-16 PROCEDURE — C1894 INTRO/SHEATH, NON-LASER: HCPCS | Performed by: INTERNAL MEDICINE

## 2023-01-16 PROCEDURE — 0 IOPAMIDOL PER 1 ML: Performed by: INTERNAL MEDICINE

## 2023-01-16 PROCEDURE — 99152 MOD SED SAME PHYS/QHP 5/>YRS: CPT | Performed by: INTERNAL MEDICINE

## 2023-01-16 PROCEDURE — 93460 R&L HRT ART/VENTRICLE ANGIO: CPT | Performed by: INTERNAL MEDICINE

## 2023-01-16 PROCEDURE — 85018 HEMOGLOBIN: CPT

## 2023-01-16 PROCEDURE — 82962 GLUCOSE BLOOD TEST: CPT

## 2023-01-16 PROCEDURE — 25010000002 MIDAZOLAM PER 1 MG: Performed by: INTERNAL MEDICINE

## 2023-01-16 PROCEDURE — 25010000002 HEPARIN (PORCINE) PER 1000 UNITS: Performed by: INTERNAL MEDICINE

## 2023-01-16 PROCEDURE — C1887 CATHETER, GUIDING: HCPCS | Performed by: INTERNAL MEDICINE

## 2023-01-16 PROCEDURE — 25010000002 FENTANYL CITRATE (PF) 50 MCG/ML SOLUTION: Performed by: INTERNAL MEDICINE

## 2023-01-16 PROCEDURE — 85014 HEMATOCRIT: CPT

## 2023-01-16 RX ORDER — SODIUM CHLORIDE 9 MG/ML
75 INJECTION, SOLUTION INTRAVENOUS CONTINUOUS
Status: DISCONTINUED | OUTPATIENT
Start: 2023-01-16 | End: 2023-01-16 | Stop reason: HOSPADM

## 2023-01-16 RX ORDER — HEPARIN SODIUM 1000 [USP'U]/ML
INJECTION, SOLUTION INTRAVENOUS; SUBCUTANEOUS
Status: DISCONTINUED | OUTPATIENT
Start: 2023-01-16 | End: 2023-01-16 | Stop reason: HOSPADM

## 2023-01-16 RX ORDER — VERAPAMIL HYDROCHLORIDE 2.5 MG/ML
INJECTION, SOLUTION INTRAVENOUS
Status: DISCONTINUED | OUTPATIENT
Start: 2023-01-16 | End: 2023-01-16 | Stop reason: HOSPADM

## 2023-01-16 RX ORDER — SODIUM CHLORIDE 0.9 % (FLUSH) 0.9 %
10 SYRINGE (ML) INJECTION AS NEEDED
Status: DISCONTINUED | OUTPATIENT
Start: 2023-01-16 | End: 2023-01-16 | Stop reason: HOSPADM

## 2023-01-16 RX ORDER — SODIUM CHLORIDE 9 MG/ML
40 INJECTION, SOLUTION INTRAVENOUS AS NEEDED
Status: DISCONTINUED | OUTPATIENT
Start: 2023-01-16 | End: 2023-01-16 | Stop reason: HOSPADM

## 2023-01-16 RX ORDER — LIDOCAINE HYDROCHLORIDE 20 MG/ML
INJECTION, SOLUTION INFILTRATION; PERINEURAL
Status: DISCONTINUED | OUTPATIENT
Start: 2023-01-16 | End: 2023-01-16 | Stop reason: HOSPADM

## 2023-01-16 RX ORDER — MIDAZOLAM HYDROCHLORIDE 1 MG/ML
INJECTION INTRAMUSCULAR; INTRAVENOUS
Status: DISCONTINUED | OUTPATIENT
Start: 2023-01-16 | End: 2023-01-16 | Stop reason: HOSPADM

## 2023-01-16 RX ORDER — SODIUM CHLORIDE 0.9 % (FLUSH) 0.9 %
10 SYRINGE (ML) INJECTION EVERY 12 HOURS SCHEDULED
Status: DISCONTINUED | OUTPATIENT
Start: 2023-01-16 | End: 2023-01-16 | Stop reason: HOSPADM

## 2023-01-16 RX ORDER — FENTANYL CITRATE 50 UG/ML
INJECTION, SOLUTION INTRAMUSCULAR; INTRAVENOUS
Status: DISCONTINUED | OUTPATIENT
Start: 2023-01-16 | End: 2023-01-16 | Stop reason: HOSPADM

## 2023-01-16 RX ORDER — ACETAMINOPHEN 325 MG/1
650 TABLET ORAL EVERY 4 HOURS PRN
Status: DISCONTINUED | OUTPATIENT
Start: 2023-01-16 | End: 2023-01-16 | Stop reason: HOSPADM

## 2023-01-16 RX ADMIN — SODIUM CHLORIDE 75 ML/HR: 9 INJECTION, SOLUTION INTRAVENOUS at 07:56

## 2023-01-16 NOTE — Clinical Note
Hemostasis started on the right brachial vein. Manual pressure applied to vessel. Manual pressure was held by AK RTR. Manual pressure was held for 5 min. Hemostasis achieved successfully. Closure device additional comment: Tegaderm and 4x4

## 2023-01-16 NOTE — NURSING NOTE
After speaking with Dr Burr I met with Mr Beckham and his spouse Mya. I introduced the structural heart program and briefly discussed the evaluation process. They were given our introductory packet and information concerning shared decision making I provided our contact information and they will call with any further questions We will arrange for Mr beckham to see Dr nielsen in the office win the next week or so.

## 2023-01-16 NOTE — INTERVAL H&P NOTE
H&P updated. The patient was examined and the following changes are noted:  echo with progression in aortic stenosis to severe.  Discussed with patient and decided to proceed with further work up with plans for referral to valve team.  Today he denies any significant change in his dyspnea on exertion. For right and left cardiac catheterization today.

## 2023-01-16 NOTE — Clinical Note
A 5 fr sheath was  inserted into the right brachial vein. Sheath insertion not delayed. 20 G angiocath exchanged for 5 fr glidesheath

## 2023-01-17 LAB
HCT VFR BLDA CALC: 40 % (ref 38–51)
HCT VFR BLDA CALC: 42 % (ref 38–51)
HGB BLDA-MCNC: 13.6 G/DL (ref 12–17)
HGB BLDA-MCNC: 14.3 G/DL (ref 12–17)
SAO2 % BLDA: 73 % (ref 95–98)
SAO2 % BLDA: 99 % (ref 95–98)

## 2023-01-23 ENCOUNTER — OFFICE VISIT (OUTPATIENT)
Dept: CARDIOLOGY | Facility: CLINIC | Age: 83
End: 2023-01-23
Payer: MEDICARE

## 2023-01-23 VITALS
HEART RATE: 82 BPM | BODY MASS INDEX: 40.09 KG/M2 | DIASTOLIC BLOOD PRESSURE: 70 MMHG | HEIGHT: 70 IN | WEIGHT: 280 LBS | SYSTOLIC BLOOD PRESSURE: 126 MMHG

## 2023-01-23 DIAGNOSIS — I35.0 AORTIC STENOSIS, SEVERE: Primary | ICD-10-CM

## 2023-01-23 PROCEDURE — 99214 OFFICE O/P EST MOD 30 MIN: CPT | Performed by: INTERNAL MEDICINE

## 2023-01-23 NOTE — PROGRESS NOTES
[facility] New Patient Office Note     Encounter Date:23  Patient:Jesus Beckham  :1940  MRN:[id number]    Referring Provider: Tasha Burr MD    Consulted for: Nonrheumatic aortic stenosis    Chief Complaint:   Chief Complaint   Patient presents with   • TAVR Consult     History of Presenting Illness:      Mr. Beckham is a 82 y.o. gentleman with past medical history notable for nonrheumatic aortic stenosis, paroxysmal atrial fibrillation on chronic anticoagulation, diabetes type 2 on insulin therapy, hypertension, mixed hyperlipidemia, chronic back pain with spinal stimulator and wheelchair use, history of bladder cancer status post suprapubic catheter placement, and recurrent DVTs who presents to our office for initial valuation regarding his aortic stenosis.  His primary cardiologist has been closely following his valvular heart disease.  Repeat echocardiogram was performed this past December.  Since his last echocardiogram a year prior he had significant progression from moderate to now severe aortic stenosis.  Hemodynamics were also confirmed on cardiac catheterization earlier this month.      Review of Systems:  Review of Systems   Constitutional: Positive for malaise/fatigue.   HENT: Negative.    Eyes: Negative.    Cardiovascular: Positive for dyspnea on exertion and leg swelling.   Respiratory: Positive for shortness of breath.    Endocrine: Negative.    Hematologic/Lymphatic: Negative.    Skin: Negative.    Musculoskeletal: Negative.    Gastrointestinal: Negative.    Genitourinary: Negative.    Neurological: Negative.    Psychiatric/Behavioral: Negative.    Allergic/Immunologic: Negative.        Current Outpatient Medications on File Prior to Visit   Medication Sig Dispense Refill   • Accu-Chek FastClix Lancets misc Testing bs 5 times daily 500 each 3   • allopurinol (ZYLOPRIM) 300 MG tablet Take 1 tablet by mouth Daily. 90 tablet 3   • apixaban (ELIQUIS) 5 MG  tablet tablet Take 1 tablet by mouth 2 (Two) Times a Day. Resume on 1/17/2023 60 tablet    • Ascorbic Acid 300 MG chewable tablet Chew 300 mg Every Morning.     • diphenhydrAMINE-acetaminophen (TYLENOL PM)  MG tablet per tablet Take 2 tablets by mouth At Night As Needed for Sleep.     • donepezil (ARICEPT) 5 MG tablet Take 1 tablet by mouth every night at bedtime. 90 tablet 3   • fenofibrate (TRICOR) 145 MG tablet TAKE 1 TABLET BY MOUTH  DAILY 90 tablet 3   • fluticasone (FLONASE) 50 MCG/ACT nasal spray 2 sprays into the nostril(s) as directed by provider Daily. 48 g 3   • glipizide (GLUCOTROL) 10 MG tablet Take 1 tablet by mouth 2 (Two) Times a Day Before Meals. 180 tablet 2   • Glucosamine HCl 1500 MG tablet Take 2 tablets by mouth Daily.     • Glyxambi 25-5 MG tablet TAKE 1 TABLET BY MOUTH  DAILY WITH BREAKFAST 90 tablet 1   • L-Methylfolate-B6-B12 3-35-2 MG tablet Take 1 tablet by mouth 2 (Two) Times a Day. 180 tablet 1   • Lancet Device misc 1 each 2 (Two) Times a Day. 1 each 0   • Lancets Misc. (Accu-Chek FastClix Lancet) kit Dispense 1 lancing device to check 5 times a day. Dx: E 1 1 kit 0   • lisinopril (PRINIVIL,ZESTRIL) 2.5 MG tablet TAKE 1 TABLET BY MOUTH  DAILY 90 tablet 3   • melatonin 5 MG tablet tablet Take 5 mg by mouth Every Night.     • Multiple Vitamins-Minerals (MULTIVITAMIN ADULT PO) Take 1 tablet by mouth Daily.     • Myrbetriq 50 MG tablet sustained-release 24 hour 24 hr tablet Take 50 mg by mouth Daily.     • Omega-3 Fatty Acids (FISH OIL) 1200 MG capsule capsule Take 2,000 mg by mouth 2 (Two) Times a Day With Meals. HOLD FOR SURGERY     • pregabalin (LYRICA) 100 MG capsule TAKE 1 CAPSULE BY MOUTH 3  TIMES DAILY 270 capsule 0   • rosuvastatin (CRESTOR) 10 MG tablet TAKE 1 TABLET BY MOUTH  DAILY 90 tablet 3   • Toujeo Max SoloStar 300 UNIT/ML solution pen-injector injection 98 units every morning (Patient taking differently: 98 units every morning    PER MD INSTRUCTION) 29.4 mL 2   •  "traZODone (DESYREL) 150 MG tablet Take 2 tablets by mouth Every Night. 180 tablet 3   • vitamin D (ERGOCALCIFEROL) 1.25 MG (08368 UT) capsule capsule Take 1 capsule by mouth Every 7 (Seven) Days. 12 capsule 1   • Wheat Dextrin (BENEFIBER DRINK MIX PO) Take 2 teaspoon(s) by mouth 2 (Two) Times a Day.       No current facility-administered medications on file prior to visit.       Allergies   Allergen Reactions   • Levaquin [Levofloxacin] Other (See Comments)     Redness, itching at IV site with IV Levaquin administration   • Alcohol Nausea And Vomiting     \"Deathly sick, headaches, cold sweats\"  Cant take any medication that contains alcohol       Past Medical History:   Diagnosis Date   • Acute embolism and thrombosis of deep vein of right distal lower extremity ([facility])    • Acute gangrenous cholecystitis     FEB 2018   • Allergic    • Arthritis    • Atrial fibrillation with RVR ([facility])     HISTORY   • Benign prostatic hyperplasia without lower urinary tract symptoms    • Cancer of bladder ([facility]) 2016   • Colon polyp    • Discitis of lumbar region    • DVT (deep venous thrombosis) ([facility])     MARCH 2018   • Essential hypertension    • Murray catheter in place     LOSS OF SENSATION BLADDER. BECAUSE OF WOUND AT THE TIME   • Gout    • Heel ulcer ([facility])     RIGHT. FOLLOWED BY WOUND CARE. GETTING BETTER   • History of sepsis    • Hyperlipidemia    • Loss, sensation     LOWER EXTREMTIES. RELATED TO LAST BACK SURGERY.   • MRSA infection     LEFT LEG.    • Open wound     LOW TO MIDDLE CRACK BUTT. SEES WOUND CARE. WILL BE RESTARTED ON DIFFUCAN AND NYSTATIN POWER. REDNESS IN GROIN   • Open wound     WITH MEDICATED DRESSING LEFT SHIN AREA.    • CELINA (obstructive sleep apnea)     NO MACHINE   • Osteoarthritis    • Paroxysmal atrial fibrillation ([facility])    • PVC (premature ventricular contraction)    • Sepsis ([facility]) 02/2018   • Sepsis due to Escherichia coli ([facility])    • Skin carcinoma 2012    MELANOMA.2 PLACES BACK AND EAR   • Type 2 diabetes " mellitus ([facility])    • Vitamin D deficiency    • Weakness        Past Surgical History:   Procedure Laterality Date   • ABDOMINAL SURGERY     • APPENDECTOMY N/A 1961   • CARDIAC CATHETERIZATION N/A 1/16/2023    Procedure: Coronary angiography;  Surgeon: Tasha Burr MD;  Location: [facility] CATH INVASIVE LOCATION;  Service: Cardiology;  Laterality: N/A;   • CARDIAC CATHETERIZATION N/A 1/16/2023    Procedure: Left Heart Cath;  Surgeon: Tasha Burr MD;  [facility] C[facility] INVASIVE LOC[facility];  Service: Cardiology;  Laterality: N/A;   • CARDIAC CATHETERIZATION N/A 1/16/2023    Procedure: Right Heart Cath;  Surgeon: Tasha Burr MD;  [facility] C[facility]VASIVE LOC[facility];  Service: Cardiology;  Laterality: N/A;   • CHOLECYSTECTOMY WITH INTRAOPERATIVE CHOLANGIOGRAM N/A 2/25/2018    Procedure: CHOLECYSTECTOMY LAPAROSCOPIC INTRAOPERATIVE CHOLANGIOGRAM;  Surgeon: Maegan Correa MD;  Location: [facility] MAIN OR;  Service:    • COLONOSCOPY N/A 2010    h/o of polyps   • EYE SURGERY Bilateral 1959    glass removal from eye, lens transplant   • JOINT REPLACEMENT     • LAMINECTOMY N/A 01/18/2016    L2-L3, L3-L4, L4-L5, and L5-S1 bilateral lamincectomy, medial facetectomy & [id number], Dr. Kaiden Valdes   • LUMBAR FUSION N/A 3/7/2018    Procedure: LUMBAR FUSION MINIMALLY INVASIVE TRANSFORAMINAL LUMBAR INTERBODY IRRIGATION AND DEBRIDEMENT AND FUSION.  IRRIGATION AND DEBRIDEMENT OF EPIDURAL ABCESS LUMBAR 2-4. BONE MORPHOGENIC PROTEIN, ALPHATEC NOVEL AND ILLICO, EMG AND SSEP NEUROMONITORING.;  Surgeon: Manish Marr DO;  Location: [facility] MAIN OR;  Service: Orthopedic Spine   • SKIN BIOPSY     • SPINAL CORD STIMULATOR IMPLANT N/A 7/1/2022    Procedure: SPINAL CORD STIMULATOR INSERTION PHASE 1 ([place]) 4 DAY TRIAL ONLY DUE TO JAKOB HOLD.;  Surgeon: Cody Holguin MD;  Location: [facility] MAIN OR;  Service: Pain Management;  Laterality: N/A;   • SPINAL CORD STIMULATOR IMPLANT N/A 11/17/2022    Procedure: Thoracic  eleven laminotomy for placement of a surgical spinal cord stimulator lead to thoracic nine;  Surgeon: Charlie Bailon MD;  Location: [facility];  Service: Neurosurgery;  Laterality: N/A;   • SPINAL CORD STIMULATOR IMPLANT N/A 2022    Procedure: Placement of a left gluteal internal pulse generator;  Surgeon: Charlie Bailon MD;  Location: [facility];  Service: Neurosurgery;  Laterality: N/A;   • TOTAL KNEE ARTHROPLASTY Right 2005    Dr. Washington Evans   • VENA CAVA FILTER INSERTION Right 3/6/2018    Procedure: VENA CAVA FILTER INSERTION;  Surgeon: Alexis Thakkar MD;  Location: [facility] ;  Service:    • VENA CAVA FILTER REMOVAL N/A 12/3/2018    Procedure: VENA CAVA FILTER REMOVAL WITH VENOCAVAGRAM;  Surgeon: Alexis Thakkar MD;  Location: [facility] OR ;  Service: Vascular       Social History     Socioeconomic History   • Marital status:    • Number of children: 2   • Highest education level: Some college, no degree   Tobacco Use   • Smoking status: Former     Types: Cigars     Quit date:      Years since quittin.0   • Smokeless tobacco: Former     Types: Chew   • Tobacco comments:     Caffeine daily   Vaping Use   • Vaping Use: Never used   Substance and Sexual Activity   • Alcohol use: Not Currently   • Drug use: No   • Sexual activity: Defer       Family History   Problem Relation Age of Onset   • Esophageal cancer Mother    • No Known Problems Father    • Diabetes Maternal Grandmother    • Heart attack Maternal Grandfather    • Malig Hyperthermia Neg Hx        The following portions of the patient's history were reviewed and updated as appropriate: allergies, current medications, past family history, past medical history, past social history, past surgical history and problem list.       Objective:       Vitals:    23 0958   BP: 126/70   BP Location: Left arm   Patient Position: Sitting   Pulse: 82   Weight: 127 kg (280 lb)   Height:  "177.8 cm (70\")       Body mass index is 40.18 kg/m².    Physical Exam:  Constitutional: Well appearing, Well-developed, No acute distress   HENT: Oropharynx clear and membrane moist  Eyes: Normal conjunctiva, no sclera icterus.  Neck: Supple, no carotid bruit bilaterally.  Cardiovascular: Regular rate and rhythm, Late peaking systolic murmur over the right upper sternal border, 1+ bilateral lower extremity edema.  Pulmonary: Normal respiratory effort, Normal lung sounds, no wheezing.  Abdominal: Soft, nontender, no hepatosplenomegaly, liver is non-pulsatile.  Neurological: Alert and orient x 3.   Skin: Warm, dry, no ecchymosis, no rash.  Psych: Appropriate mood and affect. Normal judgment and insight.      Lab Results   Component Value Date     01/06/2023     12/09/2022    K 4.5 01/06/2023    K 4.6 12/09/2022     01/06/2023     12/09/2022    CO2 28.4 01/06/2023    CO2 28.0 12/09/2022    BUN 19 01/06/2023    BUN 16 12/09/2022    CREATININE 1.16 01/06/2023    CREATININE 1.03 12/09/2022    EGFRIFNONA 62 01/11/2022    EGFRIFNONA 65 11/29/2021    EGFRIFAFRI 72 01/11/2022    EGFRIFAFRI 79 09/28/2021    GLUCOSE 159 (H) 01/06/2023    GLUCOSE 103 (H) 12/09/2022    CALCIUM 8.9 01/06/2023    CALCIUM 9.5 12/09/2022    PROTENTOTREF 6.1 12/09/2022    PROTENTOTREF 6.1 09/14/2022    ALBUMIN 4.30 12/09/2022    ALBUMIN 3.80 11/20/2022    BILITOT 0.3 12/09/2022    BILITOT 0.7 11/20/2022    AST 29 12/09/2022    AST 30 11/20/2022    ALT 25 12/09/2022    ALT 16 11/20/2022     Lab Results   Component Value Date    WBC 5.37 01/06/2023    WBC 8.62 11/20/2022    HGB 13.6 01/16/2023    HGB 14.3 01/16/2023    HCT 40 01/16/2023    HCT 42 01/16/2023    MCV 92.3 01/06/2023    MCV 89.9 11/20/2022     01/06/2023     11/20/2022     Lab Results   Component Value Date    TRIG 122 12/09/2022    TRIG 105 09/14/2022    HDL 45 12/09/2022    HDL 43 09/14/2022    LDL 67 12/09/2022    LDL 61 09/14/2022     Lab Results "   Component Value Date    PROBNP 2,134.0 (H) 03/15/2018    PROBNP 1,443.0 02/28/2018     Lab Results   Component Value Date    TROPONINT 0.030 11/20/2022     Lab Results   Component Value Date    TSH 2.930 12/09/2022    TSH 3.340 09/14/2022       Cardiac catheterization 1/3/2023 with images reviewed by myself:  · Left main artery: Large-caliber vessel with 0% stenosis.  The vessel bifurcates into left anterior descending and left circumflex arteries.  · Left anterior descending artery: Proximally large-caliber vessel with 0% stenosis.  Proximal vessel gives rise to a small caliber first diagonal branch with no significant disease.  The mid vessel remains a large caliber vessel with 0% stenosis.  The distal vessel bifurcates into a small caliber second diagonal branch and small caliber distal LAD both with no significant disease.  · Left circumflex artery: Large-caliber vessel with 0% proximal to mid stenosis.  The mid vessel gives rise to a large caliber, branching obtuse marginal branch with no significant stenosis.  The distal continuation circumflex vessel tapers to small caliber with 0% stenosis.  · Right coronary artery: Large-caliber vessel with 0% proximal to distal stenosis.  The distal vessel bifurcates into a moderate caliber posterior descending and a small caliber posterolateral branch both with no significant disease.  This is a right dominant system.  · Pulmonary wedge pressure: 15/17, 15  · Pulmonary artery pressure: 41/10, 2  · Right ventricular pressure: 37/8,   · Right atrial pressure: 20/11, 11  · Pulmonary artery saturation: 73% on 3 L nasal cannula  · Right radial artery saturation: 99%  · Juma calculated cardiac output 5.43 L/min, cardiac index 2.26 L/min/m²    Echocardiogram 12/30/2022 with images reviewed by myself:  · Left ventricular systolic function is normal. Left ventricular ejection fraction appears to be 56 - 60%.  · Left ventricular diastolic function is consistent with (grade I)  impaired relaxation.  · Moderate to severe aortic valve stenosis is present. Aortic valve area is 0.9 cm2.  · Peak velocity of the flow distal to the aortic valve is 390.9 cm/s. Aortic valve maximum pressure gradient is 61 mmHg. Aortic valve mean pressure gradient is 39 mmHg.    Echocardiogram 12/9/2021:  · Left ventricular ejection fraction is normal. (Calculated EF = 52%).  · Myocardial perfusion imaging indicates a normal myocardial perfusion study with no evidence of ischemia.  · Impressions are consistent with a low risk study.  · LVEF with stress is 53%.        Assessment:          Diagnosis Plan   1. Aortic stenosis, severe  CT angio TAVR chest abdomen pelvis w wo contrast    Ambulatory Referral to Cardiothoracic Surgery             Plan:       Mr. Beckham is a 82 y.o. gentleman with past medical history notable for nonrheumatic aortic stenosis, paroxysmal atrial fibrillation on chronic anticoagulation, diabetes type 2 on insulin therapy, hypertension, mixed hyperlipidemia, chronic back pain with spinal stimulator and wheelchair use, history of bladder cancer status post suprapubic catheter placement, and recurrent DVTs who presents to our office for initial valuation regarding his aortic stenosis.  Overall patient is symptomatic symptoms have been progressive over the last year.  We will further evaluate for possible transcatheter aortic valve options given his multiple comorbidities and age a less invasive approach would probably suit him better.  We will get a CTA to further evaluate his arterial access and his sizing for his aortic root.  Have also put in referral for our cardiac surgeons to also see.  Based upon our review of the data make a final and best decision for him regarding best treatment options to address his valvular heart disease      Nonrheumatic aortic stenosis:  · Patient with symptomatic severe aortic stenosis based upon invasive cardiac catheterization measurements  · We will proceed  forward with CTA and cardiac surgery evaluation to help finalize plans for aortic valve replacement      Follow-up:  After CTA and surgical evaluation      Thank you for allowing me to participate in the care of Jesus Beckham. Feel free to contact me directly with any further questions or concerns.    Samuel Zelaya MD  [facility]  01/23/23  10:35 EST      Shared decision making statement:  Aortic Stenosis education material has been provided to the patient. This included a printed brochure detailing pathophysiology of aortic stenosis, patient specific aortic stenosis symptoms, and treatment options (medical therapy, surgical aortic valve replacement, and transcatheter aortic valve replacement).   We reviewed the Shared Decision Making Tool for treatment of Aortic Stenosis to compare/contrast the options of TAVR/SAVR/medical management. We discussed patient's goals of care: To feel better. Patient has expressed following concerns regarding Aortic Stenosis and/or treatment options: Pursue the best and easiest option to get him through his procedure given his chronic medical issues  Patient has a Living Will: yes  Patient has a Power of : yes

## 2023-02-08 ENCOUNTER — HOSPITAL ENCOUNTER (OUTPATIENT)
Dept: CT IMAGING | Facility: HOSPITAL | Age: 83
Discharge: HOME | End: 2023-02-08
Admitting: INTERNAL MEDICINE
Payer: MEDICARE

## 2023-02-08 ENCOUNTER — OFFICE VISIT (OUTPATIENT)
Dept: CARDIAC SURGERY | Facility: CLINIC | Age: 83
End: 2023-02-08
Payer: MEDICARE

## 2023-02-08 VITALS
BODY MASS INDEX: 40.09 KG/M2 | DIASTOLIC BLOOD PRESSURE: 77 MMHG | HEIGHT: 70 IN | WEIGHT: 280 LBS | SYSTOLIC BLOOD PRESSURE: 126 MMHG | TEMPERATURE: 97.1 F | HEART RATE: 83 BPM | RESPIRATION RATE: 20 BRPM | OXYGEN SATURATION: 94 %

## 2023-02-08 VITALS — HEART RATE: 80 BPM

## 2023-02-08 DIAGNOSIS — I50.32 CHRONIC DIASTOLIC CONGESTIVE HEART FAILURE: ICD-10-CM

## 2023-02-08 DIAGNOSIS — Z85.51 HISTORY OF BLADDER CANCER: ICD-10-CM

## 2023-02-08 DIAGNOSIS — E11.42 TYPE 2 DIABETES MELLITUS WITH PERIPHERAL NEUROPATHY: ICD-10-CM

## 2023-02-08 DIAGNOSIS — I35.0 AORTIC VALVE STENOSIS, SEVERE: Primary | ICD-10-CM

## 2023-02-08 DIAGNOSIS — Z96.89 S/P INSERTION OF SPINAL CORD STIMULATOR: ICD-10-CM

## 2023-02-08 DIAGNOSIS — Z93.59 SUPRAPUBIC CATHETER: ICD-10-CM

## 2023-02-08 DIAGNOSIS — I82.551 CHRONIC DEEP VEIN THROMBOSIS (DVT) OF RIGHT PERONEAL VEIN: ICD-10-CM

## 2023-02-08 DIAGNOSIS — E78.5 DYSLIPIDEMIA: ICD-10-CM

## 2023-02-08 DIAGNOSIS — I10 ESSENTIAL HYPERTENSION: ICD-10-CM

## 2023-02-08 DIAGNOSIS — I35.0 AORTIC STENOSIS, SEVERE: ICD-10-CM

## 2023-02-08 DIAGNOSIS — M54.16 LUMBAR RADICULOPATHY: ICD-10-CM

## 2023-02-08 DIAGNOSIS — E66.9 OBESITY (BMI 30-39.9): ICD-10-CM

## 2023-02-08 DIAGNOSIS — Z99.89 OBSTRUCTIVE SLEEP APNEA ON CPAP: ICD-10-CM

## 2023-02-08 DIAGNOSIS — M10.00 IDIOPATHIC GOUT, UNSPECIFIED CHRONICITY, UNSPECIFIED SITE: ICD-10-CM

## 2023-02-08 DIAGNOSIS — I48.0 PAROXYSMAL ATRIAL FIBRILLATION: ICD-10-CM

## 2023-02-08 DIAGNOSIS — G47.33 OBSTRUCTIVE SLEEP APNEA ON CPAP: ICD-10-CM

## 2023-02-08 LAB — CREAT BLDA-MCNC: 1.1 MG/DL (ref 0.6–1.3)

## 2023-02-08 PROCEDURE — 99204 OFFICE O/P NEW MOD 45 MIN: CPT | Performed by: THORACIC SURGERY (CARDIOTHORACIC VASCULAR SURGERY)

## 2023-02-08 PROCEDURE — 0 IOPAMIDOL PER 1 ML: Performed by: INTERNAL MEDICINE

## 2023-02-08 PROCEDURE — 71275 CT ANGIOGRAPHY CHEST: CPT

## 2023-02-08 PROCEDURE — 74174 CTA ABD&PLVS W/CONTRAST: CPT

## 2023-02-08 PROCEDURE — 82565 ASSAY OF CREATININE: CPT

## 2023-02-08 RX ADMIN — IOPAMIDOL 100 ML: 755 INJECTION, SOLUTION INTRAVENOUS at 10:17

## 2023-02-08 NOTE — PROGRESS NOTES
BCS CONSULT    Reason for Consultation: Surgical opinion regarding aortic valve stenosis.    History of Present Illness:     This is a pleasant 82-year-old  gentleman with progressive aortic valve stenosis discovered on serial echocardiogram.  He also has been suffering from increasing shortness of breath even on minimal exertion.  He has multiple other medical issues, including morbid obesity, diabetes with peripheral neuropathy, lumbar radiculopathy with a spinal cord stimulator, obstructive sleep apnea, etc.  He is poorly mobile and can ambulate a few feet only even with the help of a walker.  Most of the time he is in a motorized wheelchair.    Transthoracic echocardiogram on 12/30/2022 showed an aortic valve mean gradient of 39 mmHg, a V-max of 3.91 m/s, and an aortic valve area of 0.92 cm².  Images also showed no aortic valve regurgitation, mild mitral valve regurgitation, and trace tricuspid valve regurgitation.  The left ventricular action fraction was preserved.    A left heart catheterization performed on 1/16/2023 showed no significant coronary disease with a dominant right coronary system.  The mean gradient across the patient's aortic valve is 38 mmHg with an estimated aortic valve area of 0.93 cm².    A TAVR protocol CT angiogram was performed earlier today.  The official reading still not available.  I was able to review the images however.  It appears that the aortic annular area is 5.2 cm², the aortic annular perimeter is 82.6 mm, the right coronary height is 10.8 mm, and the left coronary height is 15.7 mm.  The ST junction diameter is 28.4 mm and the aortic valve sinuses appear to be generous.  The images suggest that the patient is accessible by the bilateral iliofemoral systems.    Review of Systems   Constitutional: Positive for activity change and fatigue. Negative for appetite change, chills, diaphoresis, fever and unexpected weight change.   HENT: Negative for congestion, dental  problem, hearing loss, mouth sores, nosebleeds, postnasal drip, rhinorrhea, sore throat and trouble swallowing.    Eyes: Negative for photophobia, pain, discharge, redness, itching and visual disturbance.   Respiratory: Positive for apnea and shortness of breath. Negative for cough, choking, chest tightness, wheezing and stridor.    Cardiovascular: Positive for palpitations and leg swelling. Negative for chest pain.   Gastrointestinal: Negative for abdominal pain, constipation, diarrhea, nausea and vomiting.   Endocrine: Negative for cold intolerance and heat intolerance.   Genitourinary: Negative for difficulty urinating, dysuria, flank pain, frequency, hematuria and urgency.   Musculoskeletal: Positive for arthralgias, back pain, gait problem, joint swelling, myalgias and neck stiffness. Negative for neck pain.   Skin: Negative for color change, pallor, rash and wound.   Allergic/Immunologic: Negative for environmental allergies, food allergies and immunocompromised state.   Neurological: Positive for light-headedness. Negative for dizziness, tremors, seizures, syncope, facial asymmetry, speech difficulty, weakness, numbness and headaches.   Hematological: Negative for adenopathy. Does not bruise/bleed easily.   Psychiatric/Behavioral: Negative for agitation, behavioral problems, confusion, decreased concentration, dysphoric mood, hallucinations, self-injury, sleep disturbance and suicidal ideas. The patient is not nervous/anxious and is not hyperactive.         Past Medical History:   Diagnosis Date   • Acute embolism and thrombosis of deep vein of right distal lower extremity ([facility])    • Acute gangrenous cholecystitis     FEB 2018   • Allergic    • Arthritis    • Atrial fibrillation with RVR ([facility])     HISTORY   • Benign prostatic hyperplasia without lower urinary tract symptoms    • Cancer of bladder ([facility]) 2016   • Colon polyp    • Discitis of lumbar region    • DVT (deep venous thrombosis) ([facility])     MARCH 2018    • Essential hypertension    • Murray catheter in place     LOSS OF SENSATION BLADDER. BECAUSE OF WOUND AT THE TIME   • Gout    • Heel ulcer (HCC)     RIGHT. FOLLOWED BY WOUND CARE. GETTING BETTER   • History of sepsis    • Hyperlipidemia    • Loss, sensation     LOWER EXTREMTIES. RELATED TO LAST BACK SURGERY.   • MRSA infection     LEFT LEG.    • Open wound     LOW TO MIDDLE CRACK BUTT. SEES WOUND CARE. WILL BE RESTARTED ON DIFFUCAN AND NYSTATIN POWER. REDNESS IN GROIN   • Open wound     WITH MEDICATED DRESSING LEFT SHIN AREA.    • CELINA (obstructive sleep apnea)     NO MACHINE   • Osteoarthritis    • Paroxysmal atrial fibrillation (HCC)    • PVC (premature ventricular contraction)    • Sepsis (HCC) 02/2018   • Sepsis due to Escherichia coli (HCC)    • Skin carcinoma 2012    MELANOMA.2 PLACES BACK AND EAR   • Type 2 diabetes mellitus (HCC)    • Vitamin D deficiency    • Weakness      Past Surgical History:   Procedure Laterality Date   • ABDOMINAL SURGERY     • APPENDECTOMY N/A 1961   • CARDIAC CATHETERIZATION N/A 1/16/2023    Procedure: Coronary angiography;  Surgeon: Tasha Burr MD;  Location: [facility] CATH INVASIVE LOCATION;  Service: Cardiology;  Laterality: N/A;   • CARDIAC CATHETERIZATION N/A 1/16/2023    Procedure: Left Heart Cath;  Surgeon: Tasha Burr MD;  Location:  [facility] CATH INVASIVE LOCATION;  Service: Cardiology;  Laterality: N/A;   • CARDIAC CATHETERIZATION N/A 1/16/2023    Procedure: Right Heart Cath;  Surgeon: Tasha Burr MD;  Location:  [facility] CATH INVASIVE LOCATION;  Service: Cardiology;  Laterality: N/A;   • CHOLECYSTECTOMY WITH INTRAOPERATIVE CHOLANGIOGRAM N/A 2/25/2018    Procedure: CHOLECYSTECTOMY LAPAROSCOPIC INTRAOPERATIVE CHOLANGIOGRAM;  Surgeon: Maegan Correa MD;  Location: [facility] MAIN OR;  Service:    • COLONOSCOPY N/A 2010    h/o of polyps   • EYE SURGERY Bilateral 1959    glass removal from eye, lens transplant   • JOINT REPLACEMENT     • LAMINECTOMY N/A 01/18/2016     L2-L3, L3-L4, L4-L5, and L5-S1 bilateral lamincectomy, medial facetectomy & [id number], Dr. Kaiden Valdes   • LUMBAR FUSION N/A 3/7/2018    Procedure: LUMBAR FUSION MINIMALLY INVASIVE TRANSFORAMINAL LUMBAR INTERBODY IRRIGATION AND DEBRIDEMENT AND FUSION.  IRRIGATION AND DEBRIDEMENT OF EPIDURAL ABCESS LUMBAR 2-4. BONE MORPHOGENIC PROTEIN, ALPHATEC NOVEL AND ILLICO, EMG AND SSEP NEUROMONITORING.;  Surgeon: Manish Marr DO;  Location: [facility] OR;  Service: Orthopedic Spine   • SKIN BIOPSY     • SPINAL CORD STIMULATOR IMPLANT N/A 7/1/2022    Procedure: SPINAL CORD STIMULATOR INSERTION PHASE 1 ([place]) 4 DAY TRIAL ONLY DUE TO ELIQUIS HOLD.;  Surgeon: Cody Holguin MD;  Location: [facility] OR;  Service: Pain Management;  Laterality: N/A;   • SPINAL CORD STIMULATOR IMPLANT N/A 11/17/2022    Procedure: Thoracic eleven laminotomy for placement of a surgical spinal cord stimulator lead to thoracic nine;  Surgeon: Charlie Bailon MD;  Location: [facility];  Service: Neurosurgery;  Laterality: N/A;   • SPINAL CORD STIMULATOR IMPLANT N/A 11/18/2022    Procedure: Placement of a left gluteal internal pulse generator;  Surgeon: Charlie Bailon MD;  Location: [facility];  Service: Neurosurgery;  Laterality: N/A;   • TOTAL KNEE ARTHROPLASTY Right 03/07/2005    Dr. Washington Evans   • VENA CAVA FILTER INSERTION Right 3/6/2018    Procedure: VENA CAVA FILTER INSERTION;  Surgeon: Alexis Thakkar MD;  Location: [facility] 18/19;  Service:    • VENA CAVA FILTER REMOVAL N/A 12/3/2018    Procedure: VENA CAVA FILTER REMOVAL WITH VENOCAVAGRAM;  Surgeon: Alexis Thakkar MD;  Location: [facility] OR 18/19;  Service: Vascular     Family History   Problem Relation Age of Onset   • Esophageal cancer Mother    • No Known Problems Father    • Diabetes Maternal Grandmother    • Heart attack Maternal Grandfather    • Malig Hyperthermia Neg Hx      Social History     Tobacco Use   • Smoking status:  Former     Types: Cigars     Quit date: 2017     Years since quittin.1   • Smokeless tobacco: Former     Types: Chew   • Tobacco comments:     Caffeine daily   Vaping Use   • Vaping Use: Never used   Substance Use Topics   • Alcohol use: Not Currently   • Drug use: No     (Not in a hospital admission)      Current Outpatient Medications:   •  Accu-Chek FastClix Lancets misc, Testing bs 5 times daily, Disp: 500 each, Rfl: 3  •  allopurinol (ZYLOPRIM) 300 MG tablet, Take 1 tablet by mouth Daily., Disp: 90 tablet, Rfl: 3  •  apixaban (ELIQUIS) 5 MG tablet tablet, Take 1 tablet by mouth 2 (Two) Times a Day. Resume on 2023, Disp: 60 tablet, Rfl:   •  Ascorbic Acid 300 MG chewable tablet, Chew 300 mg Every Morning., Disp: , Rfl:   •  diphenhydrAMINE-acetaminophen (TYLENOL PM)  MG tablet per tablet, Take 2 tablets by mouth At Night As Needed for Sleep., Disp: , Rfl:   •  donepezil (ARICEPT) 5 MG tablet, Take 1 tablet by mouth every night at bedtime., Disp: 90 tablet, Rfl: 3  •  fenofibrate (TRICOR) 145 MG tablet, TAKE 1 TABLET BY MOUTH  DAILY, Disp: 90 tablet, Rfl: 3  •  fluticasone (FLONASE) 50 MCG/ACT nasal spray, 2 sprays into the nostril(s) as directed by provider Daily., Disp: 48 g, Rfl: 3  •  glipizide (GLUCOTROL) 10 MG tablet, Take 1 tablet by mouth 2 (Two) Times a Day Before Meals., Disp: 180 tablet, Rfl: 2  •  Glucosamine HCl 1500 MG tablet, Take 2 tablets by mouth Daily., Disp: , Rfl:   •  Glyxambi 25-5 MG tablet, TAKE 1 TABLET BY MOUTH  DAILY WITH BREAKFAST, Disp: 90 tablet, Rfl: 1  •  L-Methylfolate-B6-B12 3-35-2 MG tablet, Take 1 tablet by mouth 2 (Two) Times a Day., Disp: 180 tablet, Rfl: 1  •  Lancet Device misc, 1 each 2 (Two) Times a Day., Disp: 1 each, Rfl: 0  •  Lancets Misc. (Accu-Chek FastClix Lancet) kit, Dispense 1 lancing device to check 5 times a day. Dx: E 1, Disp: 1 kit, Rfl: 0  •  lisinopril (PRINIVIL,ZESTRIL) 2.5 MG tablet, TAKE 1 TABLET BY MOUTH  DAILY, Disp: 90 tablet, Rfl:  "3  •  melatonin 5 MG tablet tablet, Take 5 mg by mouth Every Night., Disp: , Rfl:   •  Multiple Vitamins-Minerals (MULTIVITAMIN ADULT PO), Take 1 tablet by mouth Daily., Disp: , Rfl:   •  Myrbetriq 50 MG tablet sustained-release 24 hour 24 hr tablet, Take 50 mg by mouth Daily., Disp: , Rfl:   •  Omega-3 Fatty Acids (FISH OIL) 1200 MG capsule capsule, Take 2,000 mg by mouth 2 (Two) Times a Day With Meals. HOLD FOR SURGERY, Disp: , Rfl:   •  pregabalin (LYRICA) 100 MG capsule, TAKE 1 CAPSULE BY MOUTH 3  TIMES DAILY, Disp: 270 capsule, Rfl: 0  •  rosuvastatin (CRESTOR) 10 MG tablet, TAKE 1 TABLET BY MOUTH  DAILY, Disp: 90 tablet, Rfl: 3  •  Toujeo Max SoloStar 300 UNIT/ML solution pen-injector injection, 98 units every morning (Patient taking differently: 98 units every morning  PER MD INSTRUCTION), Disp: 29.4 mL, Rfl: 2  •  traZODone (DESYREL) 150 MG tablet, Take 2 tablets by mouth Every Night., Disp: 180 tablet, Rfl: 3  •  vitamin D (ERGOCALCIFEROL) 1.25 MG (30287 UT) capsule capsule, Take 1 capsule by mouth Every 7 (Seven) Days., Disp: 12 capsule, Rfl: 1  •  Wheat Dextrin (BENEFIBER DRINK MIX PO), Take 2 teaspoon(s) by mouth 2 (Two) Times a Day., Disp: , Rfl:   No current facility-administered medications for this visit.    Allergies:  Levaquin [levofloxacin] and Alcohol    Objective      Vital Signs  Temp:  [97.1 °F (36.2 °C)] 97.1 °F (36.2 °C)  Heart Rate:  [80-83] 83  Resp:  [20] 20  BP: (126)/(77) 126/77    Flowsheet Rows    Flowsheet Row First Filed Value   Admission Height 177.8 cm (70\") Documented at 02/08/2023 1310   Admission Weight 127 kg (280 lb) Documented at 02/08/2023 1310        177.8 cm (70\")    Physical Exam  Vitals reviewed.   Constitutional:       General: He is awake. He is not in acute distress.     Appearance: Normal appearance. He is well-developed. He is morbidly obese. He is not toxic-appearing or diaphoretic.      Interventions: He is not intubated.     Comments: Wheelchair-bound.   HENT: "      Head: Normocephalic and atraumatic. No right periorbital erythema or left periorbital erythema.      Jaw: There is normal jaw occlusion.      Right Ear: Hearing normal.      Left Ear: Hearing normal.      Nose: Nose normal. No nasal deformity, signs of injury, congestion or rhinorrhea.      Right Nostril: No foreign body or epistaxis.      Left Nostril: No foreign body or epistaxis.      Mouth/Throat:      Lips: Pink.      Mouth: Mucous membranes are moist.      Dentition: Abnormal dentition. Does not have dentures. No dental tenderness, gingival swelling, dental caries, dental abscesses or gum lesions.      Tongue: No lesions.      Palate: No mass.      Pharynx: Oropharynx is clear. No oropharyngeal exudate or posterior oropharyngeal erythema.      Tonsils: No tonsillar exudate.   Eyes:      General: Lids are normal. No allergic shiner, visual field deficit or scleral icterus.        Right eye: No foreign body, discharge or hordeolum.         Left eye: No foreign body, discharge or hordeolum.      Extraocular Movements: Extraocular movements intact.      Right eye: Normal extraocular motion and no nystagmus.      Left eye: Normal extraocular motion and no nystagmus.      Conjunctiva/sclera: Conjunctivae normal.      Right eye: Right conjunctiva is not injected. No chemosis, exudate or hemorrhage.     Left eye: Left conjunctiva is not injected. No chemosis, exudate or hemorrhage.     Pupils: Pupils are equal, round, and reactive to light.   Neck:      Thyroid: No thyroid mass, thyromegaly or thyroid tenderness.      Vascular: Normal carotid pulses. No carotid bruit, hepatojugular reflux or JVD.      Trachea: Trachea and phonation normal. No tracheal tenderness, tracheostomy, abnormal tracheal secretions or tracheal deviation.      Meningeal: Brudzinski's sign and Kernig's sign absent.   Cardiovascular:      Rate and Rhythm: Normal rate and regular rhythm.  No extrasystoles are present.     Chest Wall: PMI is  not displaced. No thrill.      Pulses:           Radial pulses are 2+ on the right side and 2+ on the left side.        Femoral pulses are 2+ on the right side and 2+ on the left side.       Dorsalis pedis pulses are 1+ on the right side and 1+ on the left side.      Heart sounds: Murmur heard.    Systolic murmur is present with a grade of 4/6.   No diastolic murmur is present.    No friction rub. No gallop.   Pulmonary:      Effort: Pulmonary effort is normal. No tachypnea, bradypnea, accessory muscle usage, prolonged expiration, respiratory distress or retractions. He is not intubated.      Breath sounds: No stridor, decreased air movement or transmitted upper airway sounds. Examination of the right-lower field reveals rales. Examination of the left-lower field reveals rales. Rales present. No decreased breath sounds, wheezing or rhonchi.   Chest:      Chest wall: No mass, lacerations, deformity, swelling, tenderness, crepitus or edema.      Comments: No sternal abnormality.  Abdominal:      General: Abdomen is protuberant. Bowel sounds are normal. There is no distension or abdominal bruit. There are no signs of injury.      Palpations: Abdomen is soft. There is no shifting dullness, fluid wave, hepatomegaly, splenomegaly, mass or pulsatile mass.      Tenderness: There is no abdominal tenderness. There is no right CVA tenderness or left CVA tenderness. Negative signs include Vazquez's sign, Rovsing's sign and McBurney's sign.      Comments: Suprapubic catheter in place.   Musculoskeletal:         General: No swelling, tenderness, deformity or signs of injury. Normal range of motion.      Cervical back: Full passive range of motion without pain, normal range of motion and neck supple. No erythema, signs of trauma, rigidity, torticollis or tenderness. No spinous process tenderness or muscular tenderness. Normal range of motion.      Right lower le+ Edema present.      Left lower le+ Edema present.    Lymphadenopathy:      Cervical: No cervical adenopathy.      Right cervical: No superficial cervical adenopathy.     Left cervical: No superficial cervical adenopathy.      Upper Body:      Right upper body: No supraclavicular adenopathy.      Left upper body: No supraclavicular adenopathy.   Skin:     General: Skin is warm and dry.      Capillary Refill: Capillary refill takes 2 to 3 seconds.      Coloration: Skin is not jaundiced or pale.      Findings: No bruising, erythema, lesion or rash.   Neurological:      General: No focal deficit present.      Mental Status: He is alert and oriented to person, place, and time. Mental status is at baseline.      GCS: GCS eye subscore is 4. GCS verbal subscore is 5. GCS motor subscore is 6.      Cranial Nerves: Cranial nerves 2-12 are intact. No cranial nerve deficit.      Sensory: Sensation is intact. No sensory deficit.      Motor: Motor function is intact. No weakness.      Coordination: Coordination is intact. Coordination normal.      Gait: Gait is intact. Gait normal.      Deep Tendon Reflexes: Reflexes normal.   Psychiatric:         Attention and Perception: Attention and perception normal.         Mood and Affect: Mood and affect normal.         Speech: Speech normal.         Behavior: Behavior normal. Behavior is cooperative.         Thought Content: Thought content normal. Thought content is not paranoid or delusional. Thought content does not include homicidal or suicidal ideation. Thought content does not include homicidal or suicidal plan.         Cognition and Memory: Cognition and memory normal.         Judgment: Judgment normal.       Results Review:       Assessment/Plan:     This pleasant 82-year-old  gentleman with severe aortic valve stenosis and chronic diastolic congestive heart failure, [place] Heart Association grade 3 at least.  He has multiple severe comorbidities, including morbid obesity, obstructive sleep apnea, diabetes mellitus with  peripheral neuropathy, lumbar radiculopathy, etc.  Clearly, his mortality risk for traditional sternotomy and aortic valve replacement surgery would be quite high.  It would be appropriate to offer him a TAVR.  Although the official of report is still pending it appears that his CT angiogram study suggests he is accessible by the transfemoral route and his aortic annulus would accommodate a reasonably sized transcatheter prosthesis.  We will wait for the official result and then plan for his TAVR.    Although he does not have an outright dental infection he does have suboptimal dentition.  I have asked him to reach out to his dentist for clearance.  The patient believes he can potentially see his dentist this week.    Thank you for this kind consultation.    Antony Goldstein MD  02/08/23  14:23 EST

## 2023-02-09 ENCOUNTER — PREP FOR SURGERY (OUTPATIENT)
Dept: OTHER | Facility: HOSPITAL | Age: 83
End: 2023-02-09
Payer: MEDICARE

## 2023-02-09 DIAGNOSIS — I50.32 CHRONIC DIASTOLIC (CONGESTIVE) HEART FAILURE: ICD-10-CM

## 2023-02-09 DIAGNOSIS — I35.0 AORTIC STENOSIS, SEVERE: Primary | ICD-10-CM

## 2023-02-09 DIAGNOSIS — R79.9 ABNORMAL FINDING OF BLOOD CHEMISTRY, UNSPECIFIED: ICD-10-CM

## 2023-02-09 DIAGNOSIS — I35.0 AORTIC VALVE STENOSIS, ETIOLOGY OF CARDIAC VALVE DISEASE UNSPECIFIED: Primary | ICD-10-CM

## 2023-02-09 DIAGNOSIS — R79.1 ABNORMAL COAGULATION PROFILE: ICD-10-CM

## 2023-02-09 RX ORDER — CHLORHEXIDINE GLUCONATE 0.12 MG/ML
15 RINSE ORAL ONCE
Status: CANCELLED | OUTPATIENT
Start: 2023-02-09 | End: 2023-02-09

## 2023-02-09 RX ORDER — CHLORHEXIDINE GLUCONATE 0.12 MG/ML
15 RINSE ORAL EVERY 12 HOURS
Status: CANCELLED | OUTPATIENT
Start: 2023-02-09 | End: 2023-02-10

## 2023-02-14 ENCOUNTER — PATIENT ROUNDING (BHMG ONLY) (OUTPATIENT)
Dept: CARDIAC SURGERY | Facility: CLINIC | Age: 83
End: 2023-02-14
Payer: MEDICARE

## 2023-02-14 NOTE — PROGRESS NOTES
A My Chart message has been sent to the patient for PATIENT ROUNDING with [facility]

## 2023-02-16 ENCOUNTER — TELEPHONE (OUTPATIENT)
Dept: CARDIOLOGY | Facility: HOSPITAL | Age: 83
End: 2023-02-16
Payer: MEDICARE

## 2023-02-16 NOTE — TELEPHONE ENCOUNTER
After receiving a message from Dr Curran I left him a message asking him to send a letter indicating that Mr Beckham has no active infection and has dental clearance for the TAVR procedure

## 2023-02-23 DIAGNOSIS — E11.69 TYPE 2 DIABETES MELLITUS WITH OTHER SPECIFIED COMPLICATION, WITH LONG-TERM CURRENT USE OF INSULIN: ICD-10-CM

## 2023-02-23 DIAGNOSIS — Z79.4 TYPE 2 DIABETES MELLITUS WITH OTHER SPECIFIED COMPLICATION, WITH LONG-TERM CURRENT USE OF INSULIN: ICD-10-CM

## 2023-02-24 ENCOUNTER — ANESTHESIA EVENT (OUTPATIENT)
Dept: PERIOP | Facility: HOSPITAL | Age: 83
DRG: 267 | End: 2023-02-24
Payer: MEDICARE

## 2023-02-24 ENCOUNTER — DOCUMENTATION (OUTPATIENT)
Dept: CARDIOLOGY | Facility: HOSPITAL | Age: 83
End: 2023-02-24
Payer: MEDICARE

## 2023-02-24 ENCOUNTER — HOSPITAL ENCOUNTER (OUTPATIENT)
Dept: GENERAL RADIOLOGY | Facility: HOSPITAL | Age: 83
Discharge: HOME | End: 2023-02-24
Payer: MEDICARE

## 2023-02-24 ENCOUNTER — PRE-ADMISSION TESTING (OUTPATIENT)
Dept: PREADMISSION TESTING | Facility: HOSPITAL | Age: 83
End: 2023-02-24
Payer: MEDICARE

## 2023-02-24 VITALS
TEMPERATURE: 97.8 F | HEIGHT: 70 IN | DIASTOLIC BLOOD PRESSURE: 66 MMHG | OXYGEN SATURATION: 95 % | SYSTOLIC BLOOD PRESSURE: 116 MMHG | BODY MASS INDEX: 39.51 KG/M2 | HEART RATE: 76 BPM | WEIGHT: 276 LBS | RESPIRATION RATE: 18 BRPM

## 2023-02-24 DIAGNOSIS — I35.0 AORTIC STENOSIS, SEVERE: ICD-10-CM

## 2023-02-24 LAB
ABO GROUP BLD: NORMAL
ALBUMIN SERPL-MCNC: 4.1 G/DL (ref 3.5–5.2)
ALBUMIN/GLOB SERPL: 1.7 G/DL
ALP SERPL-CCNC: 64 U/L (ref 39–117)
ALT SERPL W P-5'-P-CCNC: 35 U/L (ref 1–41)
ANION GAP SERPL CALCULATED.3IONS-SCNC: 8.1 MMOL/L (ref 5–15)
APTT PPP: 32.5 SECONDS (ref 22.7–35.4)
AST SERPL-CCNC: 45 U/L (ref 1–40)
BASOPHILS # BLD AUTO: 0.03 10*3/MM3 (ref 0–0.2)
BASOPHILS NFR BLD AUTO: 0.9 % (ref 0–1.5)
BILIRUB SERPL-MCNC: 0.5 MG/DL (ref 0–1.2)
BLD GP AB SCN SERPL QL: NEGATIVE
BUN SERPL-MCNC: 19 MG/DL (ref 8–23)
BUN/CREAT SERPL: 17.9 (ref 7–25)
CALCIUM SPEC-SCNC: 9 MG/DL (ref 8.6–10.5)
CHLORIDE SERPL-SCNC: 97 MMOL/L (ref 98–107)
CLOSE TME COLL+ADP + EPINEP PNL BLD: 82 % (ref 86–100)
CO2 SERPL-SCNC: 29.9 MMOL/L (ref 22–29)
CREAT SERPL-MCNC: 1.06 MG/DL (ref 0.76–1.27)
DEPRECATED RDW RBC AUTO: 48.2 FL (ref 37–54)
EGFRCR SERPLBLD CKD-EPI 2021: 70.5 ML/MIN/1.73
EOSINOPHIL # BLD AUTO: 0.1 10*3/MM3 (ref 0–0.4)
EOSINOPHIL NFR BLD AUTO: 3 % (ref 0.3–6.2)
ERYTHROCYTE [DISTWIDTH] IN BLOOD BY AUTOMATED COUNT: 14.5 % (ref 12.3–15.4)
GLOBULIN UR ELPH-MCNC: 2.4 GM/DL
GLUCOSE SERPL-MCNC: 265 MG/DL (ref 65–99)
HBA1C MFR BLD: 7.2 % (ref 4.8–5.6)
HCT VFR BLD AUTO: 41.6 % (ref 37.5–51)
HGB BLD-MCNC: 13.7 G/DL (ref 13–17.7)
INR PPP: 1.12 (ref 0.9–1.1)
LYMPHOCYTES # BLD AUTO: 0.65 10*3/MM3 (ref 0.7–3.1)
LYMPHOCYTES NFR BLD AUTO: 19.8 % (ref 19.6–45.3)
MCH RBC QN AUTO: 30.1 PG (ref 26.6–33)
MCHC RBC AUTO-ENTMCNC: 32.9 G/DL (ref 31.5–35.7)
MCV RBC AUTO: 91.4 FL (ref 79–97)
MONOCYTES # BLD AUTO: 0.29 10*3/MM3 (ref 0.1–0.9)
MONOCYTES NFR BLD AUTO: 8.8 % (ref 5–12)
NEUTROPHILS NFR BLD AUTO: 2.15 10*3/MM3 (ref 1.7–7)
NEUTROPHILS NFR BLD AUTO: 65.7 % (ref 42.7–76)
NT-PROBNP SERPL-MCNC: 184 PG/ML (ref 0–1800)
PLATELET # BLD AUTO: 144 10*3/MM3 (ref 140–450)
PMV BLD AUTO: 10.8 FL (ref 6–12)
POTASSIUM SERPL-SCNC: 4.5 MMOL/L (ref 3.5–5.2)
PROT SERPL-MCNC: 6.5 G/DL (ref 6–8.5)
PROTHROMBIN TIME: 14.5 SECONDS (ref 11.7–14.2)
RBC # BLD AUTO: 4.55 10*6/MM3 (ref 4.14–5.8)
RH BLD: NEGATIVE
SARS-COV-2 ORF1AB RESP QL NAA+PROBE: NOT DETECTED
SODIUM SERPL-SCNC: 135 MMOL/L (ref 136–145)
T&S EXPIRATION DATE: NORMAL
WBC NRBC COR # BLD: 3.28 10*3/MM3 (ref 3.4–10.8)

## 2023-02-24 PROCEDURE — 85610 PROTHROMBIN TIME: CPT

## 2023-02-24 PROCEDURE — 83880 ASSAY OF NATRIURETIC PEPTIDE: CPT

## 2023-02-24 PROCEDURE — 85576 BLOOD PLATELET AGGREGATION: CPT

## 2023-02-24 PROCEDURE — 71046 X-RAY EXAM CHEST 2 VIEWS: CPT

## 2023-02-24 PROCEDURE — U0004 COV-19 TEST NON-CDC HGH THRU: HCPCS

## 2023-02-24 PROCEDURE — 80053 COMPREHEN METABOLIC PANEL: CPT

## 2023-02-24 PROCEDURE — 36415 COLL VENOUS BLD VENIPUNCTURE: CPT

## 2023-02-24 PROCEDURE — C9803 HOPD COVID-19 SPEC COLLECT: HCPCS

## 2023-02-24 PROCEDURE — 85025 COMPLETE CBC W/AUTO DIFF WBC: CPT

## 2023-02-24 PROCEDURE — 86901 BLOOD TYPING SEROLOGIC RH(D): CPT

## 2023-02-24 PROCEDURE — 86850 RBC ANTIBODY SCREEN: CPT

## 2023-02-24 PROCEDURE — 83036 HEMOGLOBIN GLYCOSYLATED A1C: CPT

## 2023-02-24 PROCEDURE — 86900 BLOOD TYPING SEROLOGIC ABO: CPT

## 2023-02-24 PROCEDURE — 85730 THROMBOPLASTIN TIME PARTIAL: CPT

## 2023-02-24 RX ORDER — CHLORHEXIDINE GLUCONATE 0.12 MG/ML
15 RINSE ORAL
COMMUNITY
End: 2023-03-01 | Stop reason: HOSPADM

## 2023-02-24 RX ORDER — CHLORHEXIDINE GLUCONATE 0.12 MG/ML
15 RINSE ORAL EVERY 12 HOURS
Status: DISPENSED | OUTPATIENT
Start: 2023-02-24 | End: 2023-02-25

## 2023-02-24 RX ORDER — CETIRIZINE HYDROCHLORIDE 10 MG/1
10 TABLET ORAL DAILY
COMMUNITY

## 2023-02-24 RX ORDER — ACETAMINOPHEN 500 MG
500 TABLET ORAL EVERY 6 HOURS PRN
COMMUNITY

## 2023-02-24 NOTE — DISCHARGE INSTRUCTIONS
Take the following medications the morning of surgery with a small sip of water: NONE    ARRIVAL TIME IS 0730 ON 02/28/2023    If you are on prescription narcotic pain medication to control your pain you may also take that medication the morning of surgery.    General Instructions:  Do not eat or drink anything after midnight the night before surgery.  Infants may have breast milk up to four hours before surgery.  Infants drinking formula may drink formula up to six hours before surgery.   Patients who avoid smoking, chewing tobacco and alcohol for 4 weeks prior to surgery have a reduced risk of post-operative complications.  Quit smoking as many days before surgery as you can.  Do not smoke, use chewing tobacco or drink alcohol the day of surgery.   If applicable bring your C-PAP/ BI-PAP machine.  Bring any papers given to you in the doctor’s office.  Wear clean comfortable clothes.  Do not wear contact lenses, false eyelashes or make-up.  Bring a case for your glasses.   Bring crutches or walker if applicable.  Remove all piercings.  Leave jewelry and any other valuables at home.  Hair extensions with metal clips must be removed prior to surgery.  The Pre-Admission Testing nurse will instruct you to bring medications if unable to obtain an accurate list in Pre-Admission Testing.        If you were given a blood bank ID arm band remember to bring it with you the day of surgery.    Preventing a Surgical Site Infection:  For 2 to 3 days before surgery, avoid shaving with a razor because the razor can irritate skin and make it easier to develop an infection.    Any areas of open skin can increase the risk of a post-operative wound infection by allowing bacteria to enter and travel throughout the body.  Notify your surgeon if you have any skin wounds / rashes even if it is not near the expected surgical site.  The area will need assessed to determine if surgery should be delayed until it is healed.  The night prior to  surgery shower using a fresh bar of anti-bacterial soap (such as Dial) and clean washcloth.  Sleep in a clean bed with clean clothing.  Do not allow pets to sleep with you.  Shower on the morning of surgery using a fresh bar of anti-bacterial soap (such as Dial) and clean washcloth.  Dry with a clean towel and dress in clean clothing.  Ask your surgeon if you will be receiving antibiotics prior to surgery.  Make sure you, your family, and all healthcare providers clean their hands with soap and water or an alcohol based hand  before caring for you or your wound.        BACTROBAN NASAL OINTMENT  There are many germs normally in your nose. Bactroban is an ointment that will help reduce these germs. Please follow these instructions for Bactroban use:        __#1__The day before surgery in the evening              Date______02/27/2023__    _#2___The day of surgery in the morning    Date__02/28/2023______    **Squirt ½ package of Bactroban Ointment onto a cotton applicator and apply to inside of 1st nostril.  Squirt the remaining Bactroban and apply to the inside of the other nostril.    PERIDEX- ORAL:  Use only if your surgeon has ordered  Use the night before and morning of surgery - Swish, gargle, and spit - do not swallow.       Day of surgery:ARRIVAL TIME IS 0730 ON 02/28/2023  Your arrival time is approximately two hours before your scheduled surgery time.  Upon arrival, a Pre-op nurse and Anesthesiologist will review your health history, obtain vital signs, and answer questions you may have.  The only belongings needed at this time will be your home medications and if applicable your C-PAP/BI-PAP machine.  A Pre-op nurse will start an IV and you may receive medication in preparation for surgery, including something to help you relax.      Please be aware that surgery does come with discomfort.  We want to make every effort to control your discomfort so please discuss any uncontrolled symptoms with your  nurse.   Your doctor will most likely have prescribed pain medications.      If you are going home after surgery you will receive individualized written care instructions before being discharged.  A responsible adult must drive you to and from the hospital on the day of your surgery and stay with you for 24 hours.  Discharge prescriptions can be filled by the hospital pharmacy during regular pharmacy hours.  If you are having surgery late in the day/evening your prescription may be e-prescribed to your pharmacy.  Please verify your pharmacy hours or chose a 24 hour pharmacy to avoid not having access to your prescription because your pharmacy has closed for the day.    If you are staying overnight following surgery, you will be transported to your hospital room following the recovery period.  [facility] has all private rooms.    If you have any questions please call Pre-Admission Testing at [phone].  Deductibles and co-payments are collected on the day of service. Please be prepared to pay the required co-pay, deductible or deposit on the day of service as defined by your plan.    Call your surgeon immediately if you experience any of the following symptoms:  Sore Throat  Shortness of Breath or difficulty breathing  Cough  Chills  Body soreness or muscle pain  Headache  Fever  New loss of taste or smell  Do not arrive for your surgery ill.  Your procedure will need to be rescheduled to another time.  You will need to call your physician before the day of surgery to avoid any unnecessary exposure to hospital staff as well as other patients.

## 2023-02-24 NOTE — ANESTHESIA PREPROCEDURE EVALUATION
Anesthesia Evaluation     Patient summary reviewed and Nursing notes reviewed   no history of anesthetic complications:  NPO Solid Status: > 8 hours  NPO Liquid Status: > 8 hours           Airway   Mallampati: II  Dental - normal exam     Pulmonary - normal exam   (+) a smoker Former, sleep apnea,   Cardiovascular     (+) hypertension less than 2 medications, valvular problems/murmurs AS, dysrhythmias Atrial Fib, Paroxysmal Atrial Fib, murmur, DVT, hyperlipidemia,       Neuro/Psych  (+) weakness, numbness,    GI/Hepatic/Renal/Endo    (+) obesity, morbid obesity,  diabetes mellitus type 2,     Musculoskeletal     Abdominal    Substance History      OB/GYN          Other   arthritis,                      Anesthesia Plan    ASA 4     general, Martha and CVL     (Patient cannot lay flat secondary to back pain)  intravenous induction     Anesthetic plan, risks, benefits, and alternatives have been provided, discussed and informed consent has been obtained with: patient.        CODE STATUS:

## 2023-02-24 NOTE — NURSING NOTE
I met with Mr Beckham and his spouse Mya while they were here for his preadmission testing He had had labs completed and they have been reviewed The urinalysis completed by [facility] on 2/21 was also reviewed He has been provided oral and nasal medications with instructions for use prior to his TAVR procedure which is scheduled for 2/28/23 with a 6 am arrival time. He lives at home with his spouse and is capable of ambulating only a few feet with the assistance of a walker He primarily gets around with a electric chair. He does not require supplemental oxygen. I inspected his skin and it is dry with only slight redness in his groin access site area. He has not been hospitalized during the last twelve months for heart failure. Mr Beckham will only take one half of his usual 98units of insulin the morning of the procedure.He does have a suprapubic catheter along with a  Bladder and spinal stimulators.. His eliquis has been stopped. I was able to answer his questions and again provided our contact information should he have any further concerns

## 2023-02-27 DIAGNOSIS — Z79.4 TYPE 2 DIABETES MELLITUS WITH OTHER SPECIFIED COMPLICATION, WITH LONG-TERM CURRENT USE OF INSULIN: ICD-10-CM

## 2023-02-27 DIAGNOSIS — E11.69 TYPE 2 DIABETES MELLITUS WITH OTHER SPECIFIED COMPLICATION, WITH LONG-TERM CURRENT USE OF INSULIN: ICD-10-CM

## 2023-02-27 RX ORDER — EMPAGLIFLOZIN AND LINAGLIPTIN 25; 5 MG/1; MG/1
1 TABLET, FILM COATED ORAL
Qty: 90 TABLET | Refills: 3 | Status: SHIPPED | OUTPATIENT
Start: 2023-02-27

## 2023-02-28 ENCOUNTER — APPOINTMENT (OUTPATIENT)
Dept: PERIOP | Facility: HOSPITAL | Age: 83
DRG: 267 | End: 2023-02-28
Payer: MEDICARE

## 2023-02-28 ENCOUNTER — HOSPITAL ENCOUNTER (INPATIENT)
Facility: HOSPITAL | Age: 83
LOS: 1 days | Discharge: HOME | DRG: 267 | End: 2023-03-01
Attending: THORACIC SURGERY (CARDIOTHORACIC VASCULAR SURGERY) | Admitting: THORACIC SURGERY (CARDIOTHORACIC VASCULAR SURGERY)
Payer: MEDICARE

## 2023-02-28 ENCOUNTER — ANESTHESIA (OUTPATIENT)
Dept: PERIOP | Facility: HOSPITAL | Age: 83
DRG: 267 | End: 2023-02-28
Payer: MEDICARE

## 2023-02-28 ENCOUNTER — ANCILLARY PROCEDURE (OUTPATIENT)
Dept: PERIOP | Facility: HOSPITAL | Age: 83
DRG: 267 | End: 2023-02-28
Payer: MEDICARE

## 2023-02-28 DIAGNOSIS — I35.0 AORTIC VALVE STENOSIS, ETIOLOGY OF CARDIAC VALVE DISEASE UNSPECIFIED: ICD-10-CM

## 2023-02-28 DIAGNOSIS — E11.69 TYPE 2 DIABETES MELLITUS WITH OTHER SPECIFIED COMPLICATION, WITH LONG-TERM CURRENT USE OF INSULIN: ICD-10-CM

## 2023-02-28 DIAGNOSIS — Z79.4 TYPE 2 DIABETES MELLITUS WITH OTHER SPECIFIED COMPLICATION, WITH LONG-TERM CURRENT USE OF INSULIN: ICD-10-CM

## 2023-02-28 DIAGNOSIS — Z95.2 S/P TAVR (TRANSCATHETER AORTIC VALVE REPLACEMENT): Primary | ICD-10-CM

## 2023-02-28 DIAGNOSIS — I35.0 AORTIC STENOSIS, SEVERE: ICD-10-CM

## 2023-02-28 LAB
ACT BLD: 119 SECONDS (ref 82–152)
ACT BLD: 143 SECONDS (ref 82–152)
ACT BLD: 347 SECONDS (ref 82–152)
GLUCOSE BLDC GLUCOMTR-MCNC: 116 MG/DL (ref 70–130)
GLUCOSE BLDC GLUCOMTR-MCNC: 126 MG/DL (ref 70–130)
GLUCOSE BLDC GLUCOMTR-MCNC: 164 MG/DL (ref 70–130)
GLUCOSE BLDC GLUCOMTR-MCNC: 255 MG/DL (ref 70–130)

## 2023-02-28 PROCEDURE — C1894 INTRO/SHEATH, NON-LASER: HCPCS | Performed by: THORACIC SURGERY (CARDIOTHORACIC VASCULAR SURGERY)

## 2023-02-28 PROCEDURE — C1769 GUIDE WIRE: HCPCS | Performed by: THORACIC SURGERY (CARDIOTHORACIC VASCULAR SURGERY)

## 2023-02-28 PROCEDURE — 85347 COAGULATION TIME ACTIVATED: CPT

## 2023-02-28 PROCEDURE — 25010000002 ONDANSETRON PER 1 MG: Performed by: STUDENT IN AN ORGANIZED HEALTH CARE EDUCATION/TRAINING PROGRAM

## 2023-02-28 PROCEDURE — C1751 CATH, INF, PER/CENT/MIDLINE: HCPCS | Performed by: STUDENT IN AN ORGANIZED HEALTH CARE EDUCATION/TRAINING PROGRAM

## 2023-02-28 PROCEDURE — 25510000001 IOPAMIDOL PER 1 ML: Performed by: THORACIC SURGERY (CARDIOTHORACIC VASCULAR SURGERY)

## 2023-02-28 PROCEDURE — B24BZZ4 ULTRASONOGRAPHY OF HEART WITH AORTA, TRANSESOPHAGEAL: ICD-10-PCS | Performed by: THORACIC SURGERY (CARDIOTHORACIC VASCULAR SURGERY)

## 2023-02-28 PROCEDURE — C1889 IMPLANT/INSERT DEVICE, NOC: HCPCS | Performed by: THORACIC SURGERY (CARDIOTHORACIC VASCULAR SURGERY)

## 2023-02-28 PROCEDURE — 25010000002 FENTANYL CITRATE (PF) 50 MCG/ML SOLUTION: Performed by: STUDENT IN AN ORGANIZED HEALTH CARE EDUCATION/TRAINING PROGRAM

## 2023-02-28 PROCEDURE — 82962 GLUCOSE BLOOD TEST: CPT

## 2023-02-28 PROCEDURE — 25010000002 MORPHINE PER 10 MG: Performed by: INTERNAL MEDICINE

## 2023-02-28 PROCEDURE — 25010000002 PROTAMINE SULFATE PER 10 MG: Performed by: STUDENT IN AN ORGANIZED HEALTH CARE EDUCATION/TRAINING PROGRAM

## 2023-02-28 PROCEDURE — 33361 REPLACE AORTIC VALVE PERQ: CPT | Performed by: INTERNAL MEDICINE

## 2023-02-28 PROCEDURE — C1760 CLOSURE DEV, VASC: HCPCS | Performed by: THORACIC SURGERY (CARDIOTHORACIC VASCULAR SURGERY)

## 2023-02-28 PROCEDURE — 25010000002 PROPOFOL 10 MG/ML EMULSION: Performed by: STUDENT IN AN ORGANIZED HEALTH CARE EDUCATION/TRAINING PROGRAM

## 2023-02-28 PROCEDURE — 02RF38Z REPLACEMENT OF AORTIC VALVE WITH ZOOPLASTIC TISSUE, PERCUTANEOUS APPROACH: ICD-10-PCS | Performed by: THORACIC SURGERY (CARDIOTHORACIC VASCULAR SURGERY)

## 2023-02-28 PROCEDURE — 25010000002 MIDAZOLAM PER 1 MG: Performed by: ANESTHESIOLOGY

## 2023-02-28 PROCEDURE — 82947 ASSAY GLUCOSE BLOOD QUANT: CPT

## 2023-02-28 PROCEDURE — 33361 REPLACE AORTIC VALVE PERQ: CPT | Performed by: THORACIC SURGERY (CARDIOTHORACIC VASCULAR SURGERY)

## 2023-02-28 PROCEDURE — 85014 HEMATOCRIT: CPT

## 2023-02-28 PROCEDURE — 25010000002 HEPARIN (PORCINE) PER 1000 UNITS: Performed by: STUDENT IN AN ORGANIZED HEALTH CARE EDUCATION/TRAINING PROGRAM

## 2023-02-28 PROCEDURE — 93355 ECHO TRANSESOPHAGEAL (TEE): CPT | Performed by: STUDENT IN AN ORGANIZED HEALTH CARE EDUCATION/TRAINING PROGRAM

## 2023-02-28 PROCEDURE — 25010000002 FENTANYL CITRATE (PF) 50 MCG/ML SOLUTION: Performed by: ANESTHESIOLOGY

## 2023-02-28 PROCEDURE — 85018 HEMOGLOBIN: CPT

## 2023-02-28 PROCEDURE — 25010000002 CEFAZOLIN 1-4 GM/50ML-% SOLUTION: Performed by: STUDENT IN AN ORGANIZED HEALTH CARE EDUCATION/TRAINING PROGRAM

## 2023-02-28 PROCEDURE — 82803 BLOOD GASES ANY COMBINATION: CPT

## 2023-02-28 DEVICE — VLV HEART TRNSCATH SAPIEN/COMMANDER 26MM: Type: IMPLANTABLE DEVICE | Site: HEART | Status: FUNCTIONAL

## 2023-02-28 RX ORDER — HYDRALAZINE HYDROCHLORIDE 20 MG/ML
5 INJECTION INTRAMUSCULAR; INTRAVENOUS
Status: CANCELLED | OUTPATIENT
Start: 2023-02-28

## 2023-02-28 RX ORDER — FENTANYL CITRATE 50 UG/ML
50 INJECTION, SOLUTION INTRAMUSCULAR; INTRAVENOUS
Status: DISCONTINUED | OUTPATIENT
Start: 2023-02-28 | End: 2023-02-28 | Stop reason: HOSPADM

## 2023-02-28 RX ORDER — FLUMAZENIL 0.1 MG/ML
0.2 INJECTION INTRAVENOUS AS NEEDED
Status: CANCELLED | OUTPATIENT
Start: 2023-02-28

## 2023-02-28 RX ORDER — CHOLECALCIFEROL (VITAMIN D3) 125 MCG
5 CAPSULE ORAL NIGHTLY PRN
Status: DISCONTINUED | OUTPATIENT
Start: 2023-02-28 | End: 2023-03-01 | Stop reason: HOSPADM

## 2023-02-28 RX ORDER — KETAMINE HCL IN NACL, ISO-OSM 100MG/10ML
SYRINGE (ML) INJECTION AS NEEDED
Status: DISCONTINUED | OUTPATIENT
Start: 2023-02-28 | End: 2023-02-28 | Stop reason: SURG

## 2023-02-28 RX ORDER — INSULIN LISPRO 100 [IU]/ML
0-14 INJECTION, SOLUTION INTRAVENOUS; SUBCUTANEOUS
Status: DISCONTINUED | OUTPATIENT
Start: 2023-02-28 | End: 2023-03-01 | Stop reason: HOSPADM

## 2023-02-28 RX ORDER — SODIUM CHLORIDE, SODIUM LACTATE, POTASSIUM CHLORIDE, CALCIUM CHLORIDE 600; 310; 30; 20 MG/100ML; MG/100ML; MG/100ML; MG/100ML
9 INJECTION, SOLUTION INTRAVENOUS CONTINUOUS
Status: DISCONTINUED | OUTPATIENT
Start: 2023-02-28 | End: 2023-02-28

## 2023-02-28 RX ORDER — ONDANSETRON 2 MG/ML
4 INJECTION INTRAMUSCULAR; INTRAVENOUS ONCE AS NEEDED
Status: CANCELLED | OUTPATIENT
Start: 2023-02-28

## 2023-02-28 RX ORDER — DROPERIDOL 2.5 MG/ML
0.62 INJECTION, SOLUTION INTRAMUSCULAR; INTRAVENOUS
Status: CANCELLED | OUTPATIENT
Start: 2023-02-28

## 2023-02-28 RX ORDER — PROTAMINE SULFATE 10 MG/ML
INJECTION, SOLUTION INTRAVENOUS AS NEEDED
Status: DISCONTINUED | OUTPATIENT
Start: 2023-02-28 | End: 2023-02-28 | Stop reason: SURG

## 2023-02-28 RX ORDER — NOREPINEPHRINE BITARTRATE 1 MG/ML
INJECTION, SOLUTION INTRAVENOUS CONTINUOUS PRN
Status: DISCONTINUED | OUTPATIENT
Start: 2023-02-28 | End: 2023-02-28 | Stop reason: SURG

## 2023-02-28 RX ORDER — ACETAMINOPHEN,DIPHENHYDRAMINE HCL 500; 25 MG/1; MG/1
2 TABLET, FILM COATED ORAL NIGHTLY PRN
Status: DISCONTINUED | OUTPATIENT
Start: 2023-02-28 | End: 2023-02-28 | Stop reason: ALTCHOICE

## 2023-02-28 RX ORDER — SODIUM CHLORIDE 9 MG/ML
100 INJECTION, SOLUTION INTRAVENOUS CONTINUOUS
Status: ACTIVE | OUTPATIENT
Start: 2023-02-28 | End: 2023-02-28

## 2023-02-28 RX ORDER — IPRATROPIUM BROMIDE AND ALBUTEROL SULFATE 2.5; .5 MG/3ML; MG/3ML
3 SOLUTION RESPIRATORY (INHALATION) ONCE AS NEEDED
Status: CANCELLED | OUTPATIENT
Start: 2023-02-28

## 2023-02-28 RX ORDER — PROMETHAZINE HYDROCHLORIDE 25 MG/1
25 SUPPOSITORY RECTAL ONCE AS NEEDED
Status: CANCELLED | OUTPATIENT
Start: 2023-02-28

## 2023-02-28 RX ORDER — SODIUM CHLORIDE 0.9 % (FLUSH) 0.9 %
3 SYRINGE (ML) INJECTION EVERY 12 HOURS SCHEDULED
Status: DISCONTINUED | OUTPATIENT
Start: 2023-02-28 | End: 2023-02-28 | Stop reason: HOSPADM

## 2023-02-28 RX ORDER — PROMETHAZINE HYDROCHLORIDE 25 MG/1
25 TABLET ORAL ONCE AS NEEDED
Status: CANCELLED | OUTPATIENT
Start: 2023-02-28

## 2023-02-28 RX ORDER — VANCOMYCIN 2 GRAM/500 ML IN 0.9 % SODIUM CHLORIDE INTRAVENOUS
15 ONCE
Status: DISCONTINUED | OUTPATIENT
Start: 2023-02-28 | End: 2023-02-28 | Stop reason: HOSPADM

## 2023-02-28 RX ORDER — ONDANSETRON 4 MG/1
4 TABLET, FILM COATED ORAL EVERY 6 HOURS PRN
Status: DISCONTINUED | OUTPATIENT
Start: 2023-02-28 | End: 2023-03-01 | Stop reason: HOSPADM

## 2023-02-28 RX ORDER — DIPHENHYDRAMINE HYDROCHLORIDE 50 MG/ML
12.5 INJECTION INTRAMUSCULAR; INTRAVENOUS
Status: CANCELLED | OUTPATIENT
Start: 2023-02-28

## 2023-02-28 RX ORDER — HYDROCODONE BITARTRATE AND ACETAMINOPHEN 5; 325 MG/1; MG/1
1 TABLET ORAL EVERY 4 HOURS PRN
Status: DISCONTINUED | OUTPATIENT
Start: 2023-02-28 | End: 2023-03-01 | Stop reason: HOSPADM

## 2023-02-28 RX ORDER — FAMOTIDINE 10 MG/ML
20 INJECTION, SOLUTION INTRAVENOUS ONCE
Status: COMPLETED | OUTPATIENT
Start: 2023-02-28 | End: 2023-02-28

## 2023-02-28 RX ORDER — EPHEDRINE SULFATE 50 MG/ML
5 INJECTION, SOLUTION INTRAVENOUS ONCE AS NEEDED
Status: CANCELLED | OUTPATIENT
Start: 2023-02-28

## 2023-02-28 RX ORDER — HEPARIN SODIUM 1000 [USP'U]/ML
INJECTION, SOLUTION INTRAVENOUS; SUBCUTANEOUS AS NEEDED
Status: DISCONTINUED | OUTPATIENT
Start: 2023-02-28 | End: 2023-02-28 | Stop reason: SURG

## 2023-02-28 RX ORDER — ACETAMINOPHEN 325 MG/1
650 TABLET ORAL EVERY 4 HOURS PRN
Status: DISCONTINUED | OUTPATIENT
Start: 2023-02-28 | End: 2023-03-01 | Stop reason: HOSPADM

## 2023-02-28 RX ORDER — HYDROCODONE BITARTRATE AND ACETAMINOPHEN 5; 325 MG/1; MG/1
1 TABLET ORAL ONCE AS NEEDED
Status: CANCELLED | OUTPATIENT
Start: 2023-02-28

## 2023-02-28 RX ORDER — FENTANYL CITRATE 50 UG/ML
INJECTION, SOLUTION INTRAMUSCULAR; INTRAVENOUS
Status: DISCONTINUED | OUTPATIENT
Start: 2023-02-28 | End: 2023-03-01 | Stop reason: HOSPADM

## 2023-02-28 RX ORDER — TRAZODONE HYDROCHLORIDE 100 MG/1
300 TABLET ORAL NIGHTLY PRN
Status: DISCONTINUED | OUTPATIENT
Start: 2023-02-28 | End: 2023-03-01 | Stop reason: HOSPADM

## 2023-02-28 RX ORDER — PROPOFOL 10 MG/ML
VIAL (ML) INTRAVENOUS AS NEEDED
Status: DISCONTINUED | OUTPATIENT
Start: 2023-02-28 | End: 2023-02-28 | Stop reason: SURG

## 2023-02-28 RX ORDER — HYDRALAZINE HYDROCHLORIDE 20 MG/ML
10 INJECTION INTRAMUSCULAR; INTRAVENOUS EVERY 6 HOURS PRN
Status: DISCONTINUED | OUTPATIENT
Start: 2023-02-28 | End: 2023-03-01 | Stop reason: HOSPADM

## 2023-02-28 RX ORDER — ONDANSETRON 2 MG/ML
4 INJECTION INTRAMUSCULAR; INTRAVENOUS EVERY 6 HOURS PRN
Status: DISCONTINUED | OUTPATIENT
Start: 2023-02-28 | End: 2023-03-01 | Stop reason: HOSPADM

## 2023-02-28 RX ORDER — ROCURONIUM BROMIDE 10 MG/ML
INJECTION, SOLUTION INTRAVENOUS AS NEEDED
Status: DISCONTINUED | OUTPATIENT
Start: 2023-02-28 | End: 2023-02-28 | Stop reason: SURG

## 2023-02-28 RX ORDER — LIDOCAINE HYDROCHLORIDE 10 MG/ML
0.5 INJECTION, SOLUTION EPIDURAL; INFILTRATION; INTRACAUDAL; PERINEURAL ONCE AS NEEDED
Status: DISCONTINUED | OUTPATIENT
Start: 2023-02-28 | End: 2023-02-28 | Stop reason: HOSPADM

## 2023-02-28 RX ORDER — LIDOCAINE HYDROCHLORIDE 20 MG/ML
INJECTION, SOLUTION INTRAVENOUS AS NEEDED
Status: DISCONTINUED | OUTPATIENT
Start: 2023-02-28 | End: 2023-02-28 | Stop reason: SURG

## 2023-02-28 RX ORDER — SODIUM CHLORIDE 0.9 % (FLUSH) 0.9 %
3-10 SYRINGE (ML) INJECTION AS NEEDED
Status: DISCONTINUED | OUTPATIENT
Start: 2023-02-28 | End: 2023-02-28 | Stop reason: HOSPADM

## 2023-02-28 RX ORDER — FENTANYL CITRATE 50 UG/ML
25 INJECTION, SOLUTION INTRAMUSCULAR; INTRAVENOUS
Status: CANCELLED | OUTPATIENT
Start: 2023-02-28

## 2023-02-28 RX ORDER — LISINOPRIL 2.5 MG/1
2.5 TABLET ORAL DAILY
Status: DISCONTINUED | OUTPATIENT
Start: 2023-02-28 | End: 2023-03-01 | Stop reason: HOSPADM

## 2023-02-28 RX ORDER — LABETALOL HYDROCHLORIDE 5 MG/ML
5 INJECTION, SOLUTION INTRAVENOUS
Status: CANCELLED | OUTPATIENT
Start: 2023-02-28

## 2023-02-28 RX ORDER — DEXTROSE MONOHYDRATE 25 G/50ML
25 INJECTION, SOLUTION INTRAVENOUS
Status: DISCONTINUED | OUTPATIENT
Start: 2023-02-28 | End: 2023-03-01 | Stop reason: HOSPADM

## 2023-02-28 RX ORDER — SODIUM CHLORIDE 9 MG/ML
INJECTION, SOLUTION INTRAVENOUS CONTINUOUS PRN
Status: DISCONTINUED | OUTPATIENT
Start: 2023-02-28 | End: 2023-02-28 | Stop reason: SURG

## 2023-02-28 RX ORDER — CHLORHEXIDINE GLUCONATE 0.12 MG/ML
15 RINSE ORAL ONCE
Status: DISCONTINUED | OUTPATIENT
Start: 2023-02-28 | End: 2023-02-28 | Stop reason: HOSPADM

## 2023-02-28 RX ORDER — ALLOPURINOL 300 MG/1
300 TABLET ORAL DAILY
Status: DISCONTINUED | OUTPATIENT
Start: 2023-02-28 | End: 2023-03-01 | Stop reason: HOSPADM

## 2023-02-28 RX ORDER — MORPHINE SULFATE 2 MG/ML
2 INJECTION, SOLUTION INTRAMUSCULAR; INTRAVENOUS EVERY 4 HOURS PRN
Status: DISCONTINUED | OUTPATIENT
Start: 2023-02-28 | End: 2023-02-28

## 2023-02-28 RX ORDER — MIDAZOLAM HYDROCHLORIDE 1 MG/ML
0.5 INJECTION INTRAMUSCULAR; INTRAVENOUS
Status: DISCONTINUED | OUTPATIENT
Start: 2023-02-28 | End: 2023-02-28 | Stop reason: HOSPADM

## 2023-02-28 RX ORDER — MIDAZOLAM HYDROCHLORIDE 1 MG/ML
INJECTION INTRAMUSCULAR; INTRAVENOUS
Status: DISCONTINUED | OUTPATIENT
Start: 2023-02-28 | End: 2023-03-01 | Stop reason: HOSPADM

## 2023-02-28 RX ORDER — DONEPEZIL HYDROCHLORIDE 5 MG/1
5 TABLET, FILM COATED ORAL NIGHTLY
Status: DISCONTINUED | OUTPATIENT
Start: 2023-02-28 | End: 2023-03-01 | Stop reason: HOSPADM

## 2023-02-28 RX ORDER — ONDANSETRON 2 MG/ML
INJECTION INTRAMUSCULAR; INTRAVENOUS AS NEEDED
Status: DISCONTINUED | OUTPATIENT
Start: 2023-02-28 | End: 2023-02-28 | Stop reason: SURG

## 2023-02-28 RX ORDER — NICOTINE POLACRILEX 4 MG
15 LOZENGE BUCCAL
Status: DISCONTINUED | OUTPATIENT
Start: 2023-02-28 | End: 2023-03-01 | Stop reason: HOSPADM

## 2023-02-28 RX ORDER — FENTANYL CITRATE 50 UG/ML
INJECTION, SOLUTION INTRAMUSCULAR; INTRAVENOUS AS NEEDED
Status: DISCONTINUED | OUTPATIENT
Start: 2023-02-28 | End: 2023-02-28 | Stop reason: SURG

## 2023-02-28 RX ORDER — PREGABALIN 100 MG/1
100 CAPSULE ORAL 3 TIMES DAILY
Status: DISCONTINUED | OUTPATIENT
Start: 2023-02-28 | End: 2023-03-01 | Stop reason: HOSPADM

## 2023-02-28 RX ORDER — HYDROMORPHONE HYDROCHLORIDE 1 MG/ML
0.25 INJECTION, SOLUTION INTRAMUSCULAR; INTRAVENOUS; SUBCUTANEOUS
Status: CANCELLED | OUTPATIENT
Start: 2023-02-28

## 2023-02-28 RX ORDER — NALOXONE HCL 0.4 MG/ML
0.2 VIAL (ML) INJECTION AS NEEDED
Status: CANCELLED | OUTPATIENT
Start: 2023-02-28

## 2023-02-28 RX ORDER — CEFAZOLIN SODIUM 1 G/50ML
INJECTION, SOLUTION INTRAVENOUS AS NEEDED
Status: DISCONTINUED | OUTPATIENT
Start: 2023-02-28 | End: 2023-02-28 | Stop reason: SURG

## 2023-02-28 RX ORDER — HYDROCODONE BITARTRATE AND ACETAMINOPHEN 7.5; 325 MG/1; MG/1
1 TABLET ORAL EVERY 4 HOURS PRN
Status: CANCELLED | OUTPATIENT
Start: 2023-02-28 | End: 2023-03-07

## 2023-02-28 RX ORDER — CALCIUM CHLORIDE 100 MG/ML
INJECTION INTRAVENOUS; INTRAVENTRICULAR AS NEEDED
Status: DISCONTINUED | OUTPATIENT
Start: 2023-02-28 | End: 2023-02-28 | Stop reason: SURG

## 2023-02-28 RX ORDER — IBUPROFEN 600 MG/1
1 TABLET ORAL
Status: DISCONTINUED | OUTPATIENT
Start: 2023-02-28 | End: 2023-03-01 | Stop reason: HOSPADM

## 2023-02-28 RX ORDER — LIDOCAINE HYDROCHLORIDE AND EPINEPHRINE 10; 10 MG/ML; UG/ML
INJECTION, SOLUTION INFILTRATION; PERINEURAL AS NEEDED
Status: DISCONTINUED | OUTPATIENT
Start: 2023-02-28 | End: 2023-02-28 | Stop reason: HOSPADM

## 2023-02-28 RX ADMIN — DEXMEDETOMIDINE 0.5 MCG/KG/HR: 100 INJECTION, SOLUTION, CONCENTRATE INTRAVENOUS at 11:40

## 2023-02-28 RX ADMIN — ONDANSETRON 4 MG: 2 INJECTION INTRAMUSCULAR; INTRAVENOUS at 12:42

## 2023-02-28 RX ADMIN — ROCURONIUM BROMIDE 50 MG: 50 INJECTION INTRAVENOUS at 11:12

## 2023-02-28 RX ADMIN — SODIUM CHLORIDE: 9 INJECTION, SOLUTION INTRAVENOUS at 11:00

## 2023-02-28 RX ADMIN — CALCIUM CHLORIDE 0.5 G: 100 INJECTION, SOLUTION INTRAVENOUS at 12:38

## 2023-02-28 RX ADMIN — MIRABEGRON 50 MG: 25 TABLET, FILM COATED, EXTENDED RELEASE ORAL at 15:38

## 2023-02-28 RX ADMIN — LIDOCAINE HYDROCHLORIDE 100 MG: 20 INJECTION, SOLUTION INTRAVENOUS at 11:12

## 2023-02-28 RX ADMIN — CEFAZOLIN SODIUM 3 G: 1 INJECTION, SOLUTION INTRAVENOUS at 11:50

## 2023-02-28 RX ADMIN — SUGAMMADEX 200 MG: 100 INJECTION, SOLUTION INTRAVENOUS at 13:06

## 2023-02-28 RX ADMIN — FAMOTIDINE 20 MG: 10 INJECTION INTRAVENOUS at 07:26

## 2023-02-28 RX ADMIN — ALLOPURINOL 300 MG: 300 TABLET ORAL at 15:37

## 2023-02-28 RX ADMIN — FENTANYL CITRATE 50 MCG: 0.05 INJECTION, SOLUTION INTRAMUSCULAR; INTRAVENOUS at 11:12

## 2023-02-28 RX ADMIN — PROPOFOL 50 MG: 10 INJECTION, EMULSION INTRAVENOUS at 11:12

## 2023-02-28 RX ADMIN — FENTANYL CITRATE 25 MCG: 50 INJECTION, SOLUTION INTRAMUSCULAR; INTRAVENOUS at 09:24

## 2023-02-28 RX ADMIN — MORPHINE SULFATE 2 MG: 2 INJECTION, SOLUTION INTRAMUSCULAR; INTRAVENOUS at 14:01

## 2023-02-28 RX ADMIN — PREGABALIN 100 MG: 100 CAPSULE ORAL at 15:45

## 2023-02-28 RX ADMIN — SODIUM CHLORIDE 100 ML/HR: 9 INJECTION, SOLUTION INTRAVENOUS at 14:06

## 2023-02-28 RX ADMIN — LISINOPRIL 2.5 MG: 2.5 TABLET ORAL at 15:38

## 2023-02-28 RX ADMIN — HEPARIN SODIUM 18000 UNITS: 1000 INJECTION, SOLUTION INTRAVENOUS; SUBCUTANEOUS at 12:17

## 2023-02-28 RX ADMIN — MIDAZOLAM 1 MG: 1 INJECTION INTRAMUSCULAR; INTRAVENOUS at 09:24

## 2023-02-28 RX ADMIN — MIDAZOLAM 1 MG: 1 INJECTION INTRAMUSCULAR; INTRAVENOUS at 09:26

## 2023-02-28 RX ADMIN — PROTAMINE SULFATE 100 MG: 10 INJECTION, SOLUTION INTRAVENOUS at 12:46

## 2023-02-28 RX ADMIN — Medication 20 MG: at 11:12

## 2023-02-28 RX ADMIN — PREGABALIN 100 MG: 100 CAPSULE ORAL at 20:28

## 2023-02-28 RX ADMIN — DONEPEZIL HYDROCHLORIDE 5 MG: 5 TABLET, FILM COATED ORAL at 20:28

## 2023-02-28 RX ADMIN — PROPOFOL 50 MCG/KG/MIN: 10 INJECTION, EMULSION INTRAVENOUS at 11:40

## 2023-02-28 RX ADMIN — NOREPINEPHRINE BITARTRATE 0.03 MCG/KG/MIN: 1 INJECTION, SOLUTION, CONCENTRATE INTRAVENOUS at 11:24

## 2023-02-28 NOTE — ANESTHESIA POSTPROCEDURE EVALUATION
"Patient: Jesus Beckham    Procedure Summary     Date: 02/28/23 Room / Location: [address] CARDIOVASCULAR OPERATING ROOM    Anesthesia Start: 1104 Anesthesia Stop: 1325    Procedures:       TTE TRANSFEMORAL TRANSCATHETER AORTIC VALVE REPLACEMENT PERCUTANEOUS APPROACH (Chest)      Transfemoral Transcatheter Aortic Valve Replacement with intraoperative TTE and possible open surgical rescue Diagnosis:       Aortic valve stenosis, etiology of cardiac valve disease unspecified      (Aortic valve stenosis, etiology of cardiac valve disease unspecified [I35.0])    Surgeons: Antony Goldstein MD; Samuel Zelaya MD Provider: Henry Wheeler MD    Anesthesia Type: general, Martha, CVL ASA Status: 4          Anesthesia Type: general, Martha, CVL    Vitals  Vitals Value Taken Time   /66 02/28/23 1323   Temp     Pulse 77 02/28/23 1325   Resp     SpO2 88 % 02/28/23 1325   Vitals shown include unvalidated device data.        Post Anesthesia Care and Evaluation    Patient location during evaluation: PACU  Patient participation: complete - patient participated  Level of consciousness: awake and alert  Pain management: adequate    Airway patency: patent  Anesthetic complications: No anesthetic complications  PONV Status: controlled  Cardiovascular status: acceptable and hemodynamically stable  Respiratory status: acceptable  Hydration status: acceptable    Comments: /70 (BP Location: Left arm, Patient Position: Lying)   Pulse 80   Temp 36.6 °C (97.8 °F) (Oral)   Resp 16   Ht 177.8 cm (70\")   SpO2 95%   BMI 39.60 kg/m²       "

## 2023-02-28 NOTE — ANESTHESIA PROCEDURE NOTES
Diagnostic IntraOp Anesthesia Prakash    Procedure Performed: Diagnostic IntraOp Anesthesia Prakash       Start Time:        End Time:      Preanesthesia Checklist:  Patient identified, IV assessed, risks and benefits discussed, monitors and equipment assessed, procedure being performed at surgeon's request and anesthesia consent obtained.    General Procedure Information  Diagnostic Indications for Echo:  assessment of ascending aorta, assessment of surgical repair, defect repair evaluation and hemodynamic monitoring  Physician Requesting Echo: Antony Goldstein MD  Location performed:  OR  Intubated  Bite block placed  Heart visualized  Probe Insertion:  Easy  Probe Type:  Multiplane  Modalities:  2D only, color flow mapping, continuous wave Doppler and pulse wave Doppler    Echocardiographic and Doppler Measurements    Ventricles    Right Ventricle:  Cavity size normal.  Global function mildly impaired.    Left Ventricle:  Cavity size normal.  Global Function normal.          Valves    Aortic Valve:  Annulus calcified.  Stenosis moderate.  Area: .9 cm².  Mean Gradient: 22 mmHg.  Regurgitation trace.  Leaflet motions restricted.      Mitral Valve:  Regurgitation trace.      Tricuspid Valve:  Regurgitation trace.    Pulmonic Valve:  Regurgitation absent.        Aorta    Ascending Aorta:  Size normal.  Dissection not present.    Aortic Arch:  Size normal.  Dissection not present.    Descending Aorta:  Size normal.  Dissection not present.  Plaque thickness less than 3 mm.        Atria    Right Atrium:  Size normal.    Left Atrium:  Size normal.  Left atrial appendage normal.      Septa    Atrial Septum:  Intra-atrial septal morphology lipomatous hypertrophy.          Diastolic Function Measurements:  Diastolic Dysfunction Grade= indeterminate  E=  ms  A=  ms  E/A Ratio=   DT=  ms  S/D=   IVRT=    Other Findings  Pericardium:  normal  Pulmonary Arteries:  normal  Pulmonary Venous Flow:  normal    Anesthesia  Information  Performed Personally  Anesthesiologist:  Henry Wheeler MD      Echocardiogram Comments:       82 year old male with severe AS presents for TAVR.  - Normal LV size, +LVH, normal LVEF  - Normal RV size, mildly reduced RV function  - Mod-severe AS: trileaflet, mean gradient 20-25 mmHg, ADELA 0.9 cm2  - Trace MR  - No pericardial effusion  - No aortic dissection    S/p TAVR 26 mm:  - Normal BiV  - s/p 26 mm AVR: well seated, no Pvl, mean 6, ADELA 1.9 cm2  - No pericardial effusion  - No aortic dissection    Findings discussed with surgical team

## 2023-02-28 NOTE — ANESTHESIA PROCEDURE NOTES
Airway  Urgency: elective    Date/Time: 2/28/2023 11:16 AM  Airway not difficult    General Information and Staff    Patient location during procedure: OR  Anesthesiologist: Henry Wheeler MD    Indications and Patient Condition  Indications for airway management: airway protection    Preoxygenated: yes  MILS maintained throughout  Mask difficulty assessment: 2 - vent by mask + OA or adjuvant +/- NMBA    Final Airway Details  Final airway type: endotracheal airway      Successful airway: ETT  Cuffed: yes   Successful intubation technique: direct laryngoscopy  Endotracheal tube insertion site: oral  Blade: Nasir  Blade size: 4  ETT size (mm): 8.0  Cormack-Lehane Classification: grade IIb - view of arytenoids or posterior of glottis only  Placement verified by: chest auscultation and capnometry   Measured from: teeth  ETT/EBT  to teeth (cm): 22  Number of attempts at approach: 1  Assessment: lips, teeth, and gum same as pre-op and atraumatic intubation

## 2023-02-28 NOTE — ANESTHESIA PROCEDURE NOTES
Arterial Line      Patient reassessed immediately prior to procedure    Patient location during procedure: pre-op  Start time: 2/28/2023 9:24 AM  Stop Time:2/28/2023 9:26 AM       Line placed for hemodynamic monitoring.  Performed By   Anesthesiologist: Shaka Saldaña MD   Preanesthetic Checklist  Completed: patient identified, IV checked, risks and benefits discussed, surgical consent, monitors and equipment checked, pre-op evaluation and timeout performed  Arterial Line Prep    Sterile Tech: gloves and cap  Prep: ChloraPrep  Patient monitoring: blood pressure monitoring, continuous pulse oximetry and EKG  Arterial Line Procedure   Laterality:left  Location:  radial artery  Catheter size: 20 G   Guidance: landmark technique  Number of attempts: 1  Successful placement: yes   Post Assessment   Dressing Type: occlusive dressing applied and wrist guard applied.   Complications no  Circ/Move/Sens Assessment: normal and unchanged.   Patient Tolerance: patient tolerated the procedure well with no apparent complications

## 2023-02-28 NOTE — ANESTHESIA PROCEDURE NOTES
Central Line      Patient reassessed immediately prior to procedure    Patient location during procedure: OR  Start time: 2/28/2023 11:30 AM  Stop Time:2/28/2023 11:40 AM  Indications: vascular access  Staff  Anesthesiologist: Henry Wheeler MD  Preanesthetic Checklist  Completed: patient identified, IV checked, site marked, risks and benefits discussed, surgical consent, monitors and equipment checked, pre-op evaluation and timeout performed  Central Line Prep  Sterile Tech:cap, gloves, gown, mask and sterile barriers  Prep: chloraprep  Patient monitoring: blood pressure monitoring, continuous pulse oximetry and EKG  Central Line Procedure  Laterality:right  Location:internal jugular  Catheter Type:single lumen  Catheter Size:6 Fr  Guidance:ultrasound guided  PROCEDURE NOTE/ULTRASOUND INTERPRETATION.  Using ultrasound guidance the potential vascular sites for insertion of the catheter were visualized to determine the patency of the vessel to be used for vascular access.  After selecting the appropriate site for insertion, the needle was visualized under ultrasound being inserted into the internal jugular vein, followed by ultrasound confirmation of wire and catheter placement. There were no abnormalities seen on ultrasound; an image was taken; and the patient tolerated the procedure with no complications. Images: still images obtained, printed/placed on chart  Assessment  Post procedure:biopatch applied, line sutured and occlusive dressing applied  Assessement:blood return through all ports, free fluid flow, no pneumothorax on x-ray and placement verified by x-ray  Complications:no  Patient Tolerance:patient tolerated the procedure well with no apparent complications

## 2023-02-28 NOTE — TELEPHONE ENCOUNTER
The original prescription was discontinued on 11/15/2022 by Irina Pisano, MYRA for the following reason: *Therapy completed. Renewing this prescription may not be appropriate.      Diabetic Supplies Protocol Failed 02/27/2023 10:12 AM   Protocol Details  Active on medication list

## 2023-03-01 ENCOUNTER — APPOINTMENT (OUTPATIENT)
Dept: CARDIOLOGY | Facility: HOSPITAL | Age: 83
DRG: 267 | End: 2023-03-01
Payer: MEDICARE

## 2023-03-01 ENCOUNTER — READMISSION MANAGEMENT (OUTPATIENT)
Dept: CALL CENTER | Facility: HOSPITAL | Age: 83
End: 2023-03-01
Payer: MEDICARE

## 2023-03-01 VITALS
TEMPERATURE: 98 F | WEIGHT: 278.3 LBS | HEIGHT: 70 IN | SYSTOLIC BLOOD PRESSURE: 122 MMHG | RESPIRATION RATE: 20 BRPM | OXYGEN SATURATION: 94 % | BODY MASS INDEX: 39.84 KG/M2 | HEART RATE: 75 BPM | DIASTOLIC BLOOD PRESSURE: 65 MMHG

## 2023-03-01 LAB
ANION GAP SERPL CALCULATED.3IONS-SCNC: 7 MMOL/L (ref 5–15)
BASE EXCESS BLDA CALC-SCNC: 1 MMOL/L (ref -5–5)
BUN SERPL-MCNC: 19 MG/DL (ref 8–23)
BUN/CREAT SERPL: 17.8 (ref 7–25)
CA-I BLDA-SCNC: ABNORMAL MMOL/L
CALCIUM SPEC-SCNC: 9 MG/DL (ref 8.6–10.5)
CHLORIDE SERPL-SCNC: 104 MMOL/L (ref 98–107)
CO2 BLDA-SCNC: 28 MMOL/L (ref 24–29)
CO2 SERPL-SCNC: 27 MMOL/L (ref 22–29)
CREAT SERPL-MCNC: 1.07 MG/DL (ref 0.76–1.27)
DEPRECATED RDW RBC AUTO: 46.3 FL (ref 37–54)
EGFRCR SERPLBLD CKD-EPI 2021: 69.7 ML/MIN/1.73
ERYTHROCYTE [DISTWIDTH] IN BLOOD BY AUTOMATED COUNT: 14 % (ref 12.3–15.4)
GLUCOSE BLDC GLUCOMTR-MCNC: 118 MG/DL (ref 70–130)
GLUCOSE BLDC GLUCOMTR-MCNC: 185 MG/DL (ref 70–130)
GLUCOSE BLDC GLUCOMTR-MCNC: 88 MG/DL (ref 70–130)
GLUCOSE SERPL-MCNC: 145 MG/DL (ref 65–99)
HCO3 BLDA-SCNC: 26.7 MMOL/L (ref 22–26)
HCT VFR BLD AUTO: 36.9 % (ref 37.5–51)
HCT VFR BLDA CALC: 36 % (ref 38–51)
HGB BLD-MCNC: 12.6 G/DL (ref 13–17.7)
HGB BLDA-MCNC: 12.2 G/DL (ref 12–17)
MCH RBC QN AUTO: 30.8 PG (ref 26.6–33)
MCHC RBC AUTO-ENTMCNC: 34.1 G/DL (ref 31.5–35.7)
MCV RBC AUTO: 90.2 FL (ref 79–97)
PCO2 BLDA: 46.7 MM HG (ref 35–45)
PH BLDA: 7.38 PH UNITS (ref 7.35–7.6)
PLATELET # BLD AUTO: 143 10*3/MM3 (ref 140–450)
PMV BLD AUTO: 11.2 FL (ref 6–12)
PO2 BLDA: 315 MMHG (ref 80–105)
POTASSIUM BLDA-SCNC: 4.3 MMOL/L (ref 3.5–4.9)
POTASSIUM SERPL-SCNC: 4.7 MMOL/L (ref 3.5–5.2)
QT INTERVAL: 394 MS
RBC # BLD AUTO: 4.09 10*6/MM3 (ref 4.14–5.8)
SAO2 % BLDA: 100 % (ref 95–98)
SODIUM SERPL-SCNC: 138 MMOL/L (ref 136–145)
WBC NRBC COR # BLD: 7.79 10*3/MM3 (ref 3.4–10.8)

## 2023-03-01 PROCEDURE — 99232 SBSQ HOSP IP/OBS MODERATE 35: CPT | Performed by: INTERNAL MEDICINE

## 2023-03-01 PROCEDURE — 80048 BASIC METABOLIC PNL TOTAL CA: CPT | Performed by: INTERNAL MEDICINE

## 2023-03-01 PROCEDURE — 82962 GLUCOSE BLOOD TEST: CPT

## 2023-03-01 PROCEDURE — 93306 TTE W/DOPPLER COMPLETE: CPT | Performed by: INTERNAL MEDICINE

## 2023-03-01 PROCEDURE — 93306 TTE W/DOPPLER COMPLETE: CPT

## 2023-03-01 PROCEDURE — 99239 HOSP IP/OBS DSCHRG MGMT >30: CPT | Performed by: NURSE PRACTITIONER

## 2023-03-01 PROCEDURE — 93005 ELECTROCARDIOGRAM TRACING: CPT | Performed by: INTERNAL MEDICINE

## 2023-03-01 PROCEDURE — 85027 COMPLETE CBC AUTOMATED: CPT | Performed by: INTERNAL MEDICINE

## 2023-03-01 PROCEDURE — 93010 ELECTROCARDIOGRAM REPORT: CPT | Performed by: INTERNAL MEDICINE

## 2023-03-01 PROCEDURE — 25510000001 PERFLUTREN (DEFINITY) 8.476 MG IN SODIUM CHLORIDE (PF) 0.9 % 10 ML INJECTION: Performed by: INTERNAL MEDICINE

## 2023-03-01 RX ORDER — LISINOPRIL 2.5 MG/1
2.5 TABLET ORAL DAILY
Start: 2023-03-01

## 2023-03-01 RX ORDER — ERGOCALCIFEROL 1.25 MG/1
50000 CAPSULE ORAL
Start: 2023-03-01

## 2023-03-01 RX ADMIN — INSULIN GLARGINE-YFGN 98 UNITS: 100 INJECTION, SOLUTION SUBCUTANEOUS at 08:40

## 2023-03-01 RX ADMIN — MIRABEGRON 50 MG: 25 TABLET, FILM COATED, EXTENDED RELEASE ORAL at 08:40

## 2023-03-01 RX ADMIN — LISINOPRIL 2.5 MG: 2.5 TABLET ORAL at 08:40

## 2023-03-01 RX ADMIN — PERFLUTREN 2 ML: 6.52 INJECTION, SUSPENSION INTRAVENOUS at 07:44

## 2023-03-01 RX ADMIN — ALLOPURINOL 300 MG: 300 TABLET ORAL at 08:40

## 2023-03-01 RX ADMIN — PREGABALIN 100 MG: 100 CAPSULE ORAL at 08:40

## 2023-03-01 NOTE — PLAN OF CARE
Goal Outcome Evaluation:    Problem: Adult Inpatient Plan of Care  Goal: Plan of Care Review  Outcome: Met  Goal: Patient-Specific Goal (Individualized)  Outcome: Met  Goal: Absence of Hospital-Acquired Illness or Injury  Outcome: Met  Intervention: Identify and Manage Fall Risk  Intervention: Prevent Skin Injury  Intervention: Prevent and Manage VTE (Venous Thromboembolism) Risk  Intervention: Prevent Infection  Goal: Optimal Comfort and Wellbeing  Outcome: Met  Intervention: Monitor Pain and Promote Comfort  Intervention: Provide Person-Centered Care  Goal: Readiness for Transition of Care  Outcome: Met

## 2023-03-01 NOTE — DISCHARGE SUMMARY
Date of Admission: 2/28/23  Date of Discharge:  3/1/2023    Discharge Diagnosis:   - severe aortic valve stenosis s/p TAVR  - chronic diastolic congestive heart failure NYHA III  - chronic atrial fibrillation on chronic anticoagulation  - hypertension  - hx of DVTs  - DM II  - hyperlipidemia  - chronic back pain with spinal stimulator  - presence of suprapubic catheter/bladder stimulator  - obesity    Presenting Problem/History of Present Illness  Aortic valve stenosis, etiology of cardiac valve disease unspecified [I35.0]     Hospital Course  Patient is a 82 y.o. male presented for previously scheduled cardiac surgery. 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 Goldstein and Dr. Zelaya (see op note for full report). Post-operatively he did well. Post-op echocardiogram showed well functioning valve. EKG was without significant change/conduction issues. Plan to resume oral anticoagulant this evening. He was weaned from oxygen. His mobility is at baseline, and he is tolerating the current medication regimen. On POD#1 he was deemed ready for discharge home. He is to follow up with DANNY Jauregui in 1 week, and Dr. Zelaya in 1 month with a repeat echocardiogram.     Procedures Performed  Procedure(s):  TTE TRANSFEMORAL TRANSCATHETER AORTIC VALVE REPLACEMENT PERCUTANEOUS APPROACH  Transfemoral Transcatheter Aortic Valve Replacement with intraoperative TTE and possible open surgical rescue       Consults:   Consults     No orders found for last 30 day(s).          Pertinent Test Results:    Lab Results   Component Value Date    WBC 7.79 03/01/2023    HGB 12.6 (L) 03/01/2023    HCT 36.9 (L) 03/01/2023    MCV 90.2 03/01/2023     03/01/2023      Lab Results   Component Value Date    GLUCOSE 145 (H) 03/01/2023    CALCIUM 9.0 03/01/2023     03/01/2023    K 4.7 03/01/2023    CO2 27.0 03/01/2023     03/01/2023    BUN 19 03/01/2023    CREATININE 1.07 03/01/2023     EGFRIFAFRI 72 01/11/2022    EGFRIFNONA 62 01/11/2022    BCR 17.8 03/01/2023    ANIONGAP 7.0 03/01/2023     Lab Results   Component Value Date    INR 1.12 (H) 02/24/2023    PROTIME 14.5 (H) 02/24/2023     Condition on Discharge:  Stable    Vital Signs  Temp:  [97.6 °F (36.4 °C)-99 °F (37.2 °C)] 98 °F (36.7 °C)  Heart Rate:  [74-95] 75  Resp:  [12-20] 20  BP: ()/(56-73) 122/65  Arterial Line BP: (131)/(56) 131/56      Discharge Disposition      Discharge Medications     Discharge Medications      New Medications      Instructions Start Date   glucose blood test strip  Commonly known as: Accu-Chek SmartView   TEST BLOOD SUGAR 2 TIMES  DAY         Changes to Medications      Instructions Start Date   L-Methylfolate-B6-B12 3-35-2 MG tablet  What changed: additional instructions   1 tablet, Oral, 2 Times Daily      Toujeo Max SoloStar 300 UNIT/ML solution pen-injector injection  Generic drug: Insulin Glargine (2 Unit Dial)  What changed:   · how much to take  · how to take this  · additional instructions   98 units every morning         Continue These Medications      Instructions Start Date   Accu-Chek FastClix Lancet kit   Dispense 1 lancing device to check 5 times a day. Dx: E 1      Accu-Chek FastClix Lancets misc   Testing bs 5 times daily      acetaminophen 500 MG tablet  Commonly known as: TYLENOL   500 mg, Oral, Every 6 Hours PRN      allopurinol 300 MG tablet  Commonly known as: ZYLOPRIM   300 mg, Oral, Daily      apixaban 5 MG tablet tablet  Commonly known as: ELIQUIS   5 mg, Oral, 2 Times Daily, HOLDING PER MD INSTRUCTIONS      Ascorbic Acid 300 MG chewable tablet   300 mg, Oral, Every Morning, HOLDING FOR SURGERY      BENEFIBER DRINK MIX PO   2 teaspoon(s), Oral, 2 Times Daily      cetirizine 10 MG tablet  Commonly known as: zyrTEC   10 mg, Oral, Daily      diphenhydrAMINE-acetaminophen  MG tablet per tablet  Commonly known as: TYLENOL PM   2 tablets, Oral, Nightly PRN      donepezil 5 MG  tablet  Commonly known as: ARICEPT   5 mg, Oral, Every Night at Bedtime      fenofibrate 145 MG tablet  Commonly known as: TRICOR   145 mg, Oral, Daily      fish oil 1200 MG capsule capsule   2,000 mg, Oral, 2 Times Daily With Meals, HOLDING FOR SURGERY      fluticasone 50 MCG/ACT nasal spray  Commonly known as: FLONASE   2 sprays, Nasal, Daily      glipizide 10 MG tablet  Commonly known as: GLUCOTROL   10 mg, Oral, 2 Times Daily Before Meals      Glucosamine HCl 1500 MG tablet   2 tablets, Oral, Daily, HOLDING FOR SURGERY      Glyxambi 25-5 MG tablet  Generic drug: Empagliflozin-linaGLIPtin   1 tablet, Oral, Daily With Breakfast      Lancet Device misc   1 each, Does not apply, 2 Times Daily      lisinopril 2.5 MG tablet  Commonly known as: PRINIVIL,ZESTRIL   2.5 mg, Oral, Daily, TAKES AT LUNCHTIME      melatonin 5 MG tablet tablet   5 mg, Oral, Nightly      multivitamin with minerals tablet tablet   1 tablet, Oral, Daily, HOLDING FOR SURGERY      Myrbetriq 50 MG tablet sustained-release 24 hour 24 hr tablet  Generic drug: Mirabegron ER   50 mg, Oral, Daily      pregabalin 100 MG capsule  Commonly known as: LYRICA   TAKE 1 CAPSULE BY MOUTH 3  TIMES DAILY      rosuvastatin 10 MG tablet  Commonly known as: CRESTOR   10 mg, Oral, Daily      traZODone 150 MG tablet  Commonly known as: DESYREL   300 mg, Oral, Nightly      vitamin D 1.25 MG (67562 UT) capsule capsule  Commonly known as: ERGOCALCIFEROL   50,000 Units, Oral, Every 7 Days, HOLD FOR SURGERY         Stop These Medications    chlorhexidine 0.12 % solution  Commonly known as: PERIDEX     mupirocin 2 % nasal ointment  Commonly known as: BACTROBAN            Discharge Diet:     Activity at Discharge:   Activity Instructions     Activity as Tolerated      Measure Weight      Daily weight, notify if over 3 lbs in one day          Follow-up Appointments  Future Appointments   Date Time Provider Department Center   3/8/2023  1:30 PM Emely Acosta APRN [facility]  [facility]   3/27/2023  9:15 AM [facility]   3/27/2023  9:45 AM Samuel Zelaya MD [facility]   4/24/2023  8:30 AM [facility]   5/1/2023  8:00 AM Yasmine Fong APRN [facility]   6/27/2023  8:00 AM Magdy Ricardo MD [facility]   7/7/2023 11:15 AM Tasha Burr MD [facility]   8/28/2023  9:00 AM Lito Hernandez MD [facility]   1/3/2024 10:00 AM [facility] 1 [facility]   1/3/2024 10:30 AM Samuel Zelaya MD [facility]     Additional Instructions for the Follow-ups that You Need to Schedule     Ambulatory Referral to Cardiac Rehab   As directed      Discharge Follow-up with Specialty: Dr. Zelaya; 1 Month   As directed      Specialty: Dr. Zelaya    Follow Up: 1 Month         Discharge Follow-up with Specified Provider: DANNY Jauregui; 1 Week   As directed      To: DANNY Jauregui    Follow Up: 1 Week               Test Results Pending at Discharge    DANNY Galvan  03/01/23  10:04 EST

## 2023-03-01 NOTE — PROGRESS NOTES
"[facility] Progress Note       Encounter Date:23  Patient:Jesus Beckham  :1940  MRN:[id number]      Chief Complaint: Follow up TAVR      Subjective:        Overall doing well no complaint    Review of Systems:  Review of Systems   Constitutional: Negative for malaise/fatigue.   Cardiovascular: Negative for chest pain.   Respiratory: Negative for shortness of breath.        Medications:  Scheduled Meds:  allopurinol, 300 mg, Oral, Daily  donepezil, 5 mg, Oral, Nightly  insulin glargine, 98 Units, Subcutaneous, QAM AC  insulin lispro, 0-14 Units, Subcutaneous, TID AC  lisinopril, 2.5 mg, Oral, Daily  Mirabegron ER, 50 mg, Oral, Daily  pregabalin, 100 mg, Oral, TID    Continuous Infusions:   PRN Meds:  •  acetaminophen  •  dextrose  •  dextrose  •  fentaNYL citrate (PF)  •  glucagon (human recombinant)  •  hydrALAZINE  •  HYDROcodone-acetaminophen  •  melatonin  •  midazolam  •  ondansetron **OR** ondansetron  •  traZODone         Objective:       Vitals:    23 2025 23 0015 23 0400 23 0656   BP: 111/61 98/56 117/61 130/73   BP Location: Right arm Left arm Left arm    Patient Position: Lying Lying Lying    Pulse: 79 80 95 75   Resp: 18 18 18    Temp: 99 °F (37.2 °C) 98.8 °F (37.1 °C) 97.8 °F (36.6 °C)    TempSrc: Oral Oral Oral    SpO2:       Weight:    125 kg (276 lb)   Height:    177.8 cm (70\")           Physical Exam:  Constitutional: Well appearing, well developed, no acute distress   HENT: Oropharynx clear and membrane moist  Eyes: Normal conjunctiva, no sclera icterus.  Neck: Supple, no carotid bruit bilaterally.  Cardiovascular: Regular rate and rhythm, No Murmur, No bilateral lower extremity edema.  Pulmonary: Normal respiratory effort, normal lung sounds, no wheezing.  Abdominal: Soft, nontender, no hepatosplenomegaly, liver is non-pulsatile.  Neurological: Alert and orient x 3.   Skin: Warm, dry, no ecchymosis, no rash.  Bilateral access sites are " healing well  Psych: Appropriate mood and affect. Normal judgment and insight.           Lab Review:   Results from last 7 days   Lab Units 03/01/23  0321 02/24/23  0817   SODIUM mmol/L 138 135*   POTASSIUM mmol/L 4.7 4.5   CHLORIDE mmol/L 104 97*   CO2 mmol/L 27.0 29.9*   BUN mg/dL 19 19   CREATININE mg/dL 1.07 1.06   GLUCOSE mg/dL 145* 265*   CALCIUM mg/dL 9.0 9.0   AST (SGOT) U/L  --  45*   ALT (SGPT) U/L  --  35         Results from last 7 days   Lab Units 03/01/23  0321 02/28/23  1147 02/24/23  0823   WBC 10*3/mm3 7.79  --  3.28*   HEMOGLOBIN g/dL 12.6*  --  13.7   HEMOGLOBIN, POC g/dL  --  12.2  --    HEMATOCRIT % 36.9*  --  41.6   HEMATOCRIT POC %  --  36*  --    PLATELETS 10*3/mm3 143  --  144     Results from last 7 days   Lab Units 02/24/23  0823   INR  1.12*   APTT seconds 32.5               Invalid input(s): LDLCALC  Results from last 7 days   Lab Units 02/24/23  0817   PROBNP pg/mL 184.0           Echocardiogram 3/1/2023 with images reviewed myself and final report pending:  · Normal left ventricular function  · Normal-appearing 26 mm NAHOMY ultra aortic valve bioprostheses with mean gradient of approximately 14 mmHg with no significant valvular or paravalvular leak.    TAVR 2/28/2023:  · Successful placement of 26 mm NAHOMY ultra aortic valve bioprosthesis with trace paravalvular leak.  · Intraoperative transesophageal echocardiogram demonstrated an aortic valve area of 1.9 cm 2 with a mean gradient of 6 mmHg at the end of case.      Prior cardiac testing:      Cardiac catheterization 1/3/2023 with images reviewed by myself:  • Left main artery: Large-caliber vessel with 0% stenosis.  The vessel bifurcates into left anterior descending and left circumflex arteries.  • Left anterior descending artery: Proximally large-caliber vessel with 0% stenosis.  Proximal vessel gives rise to a small caliber first diagonal branch with no significant disease.  The mid vessel remains a large caliber vessel with 0%  stenosis.  The distal vessel bifurcates into a small caliber second diagonal branch and small caliber distal LAD both with no significant disease.  • Left circumflex artery: Large-caliber vessel with 0% proximal to mid stenosis.  The mid vessel gives rise to a large caliber, branching obtuse marginal branch with no significant stenosis.  The distal continuation circumflex vessel tapers to small caliber with 0% stenosis.  • Right coronary artery: Large-caliber vessel with 0% proximal to distal stenosis.  The distal vessel bifurcates into a moderate caliber posterior descending and a small caliber posterolateral branch both with no significant disease.  This is a right dominant system.  • Pulmonary wedge pressure: 15/17, 15  • Pulmonary artery pressure: 41/10, 2  • Right ventricular pressure: 37/8,   • Right atrial pressure: 20/11, 11  • Pulmonary artery saturation: 73% on 3 L nasal cannula  • Right radial artery saturation: 99%  • Juma calculated cardiac output 5.43 L/min, cardiac index 2.26 L/min/m²     Echocardiogram 12/30/2022 with images reviewed by myself:  • Left ventricular systolic function is normal. Left ventricular ejection fraction appears to be 56 - 60%.  • Left ventricular diastolic function is consistent with (grade I) impaired relaxation.  • Moderate to severe aortic valve stenosis is present. Aortic valve area is 0.9 cm2.  • Peak velocity of the flow distal to the aortic valve is 390.9 cm/s. Aortic valve maximum pressure gradient is 61 mmHg. Aortic valve mean pressure gradient is 39 mmHg.     Echocardiogram 12/9/2021:  • Left ventricular ejection fraction is normal. (Calculated EF = 52%).  • Myocardial perfusion imaging indicates a normal myocardial perfusion study with no evidence of ischemia.  • Impressions are consistent with a low risk study.  • LVEF with stress is 53%.       Assessment:          Diagnosis Plan   1. Aortic valve stenosis, etiology of cardiac valve disease unspecified  Cardiac  Catheterization/Vascular Study    Cardiac Catheterization/Vascular Study      2. Aortic stenosis, severe  Ambulatory Referral to Cardiac Rehab             Plan:       Mr. Beckham is a 82 y.o. 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    Overall patient did well with placement of a 26 mm sapient ultra aortic valve bioprostheses without any significant issues via transfemoral approach yesterday.  His echocardiogram today shows normal valve function.  We will follow-up on his EKG this morning and if conduction remains normal which it appears to be we will discharge home later today.    Status post TAVR:  · Echocardiogram today demonstrates normal valve function  · Overall recovered nicely  · We will we will restart anticoagulation for management of his paroxysmal atrial fibrillation  · Follow-up echocardiogram in 1 month             Samuel Zelaya MD  [facility]  03/01/23  08:02 EST

## 2023-03-01 NOTE — CONSULTS
Met with patient and his family, discussed benefits of cardiac rehab. Provided phase II information along with the contact information for cardiac rehab at [facility] Requested for referral to be sent.

## 2023-03-01 NOTE — OUTREACH NOTE
Prep Survey    Flowsheet Row Responses   [facility] patient discharged from? [place]   Is LACE score < 7 ? Yes   Eligibility [facility]   Date of Admission 02/28/23   Date of Discharge 03/01/23   Discharge Disposition Home or Self Care   Discharge diagnosis severe aortic valve stenosis s/p TAVR   Does the patient have one of the following disease processes/diagnoses(primary or secondary)? Other   Does the patient have Home health ordered? No   Is there a DME ordered? No   Prep survey completed? Yes          Breanna ARMIJO - Registered Nurse

## 2023-03-02 ENCOUNTER — TRANSITIONAL CARE MANAGEMENT TELEPHONE ENCOUNTER (OUTPATIENT)
Dept: CALL CENTER | Facility: HOSPITAL | Age: 83
End: 2023-03-02
Payer: MEDICARE

## 2023-03-02 NOTE — OUTREACH NOTE
Call Center TCM Note    Flowsheet Row Responses   [facility] patient discharged from? [place]   Does the patient have one of the following disease processes/diagnoses(primary or secondary)? Other   TCM attempt successful? Yes   Call start time 1405   Call end time 1408   Discharge diagnosis severe aortic valve stenosis s/p TAVR   Meds reviewed with patient/caregiver? Yes   Is the patient having any side effects they believe may be caused by any medication additions or changes? No   Does the patient have all medications ordered at discharge? Yes   Is the patient taking all medications as directed (includes completed medication regime)? Yes   Does the patient have an appointment with their PCP within 7 days of discharge? Yes   Has home health visited the patient within 72 hours of discharge? N/A   Psychosocial issues? No   Did the patient receive a copy of their discharge instructions? Yes   Nursing interventions Reviewed instructions with patient   What is the patient's perception of their health status since discharge? Improving   Is the patient/caregiver able to teach back signs and symptoms related to disease process for when to call PCP? Yes   Is the patient/caregiver able to teach back signs and symptoms related to disease process for when to call 911? Yes   Is the patient/caregiver able to teach back the hierarchy of who to call/visit for symptoms/problems? PCP, Specialist, Home health nurse, Urgent Care, ED, 911 Yes   If the patient is a current smoker, are they able to teach back resources for cessation? Not a smoker   TCM call completed? Yes   Wrap up additional comments D/C DX: severe aortic valve stenosis s/p TAVR - pt doing well, minimal soreness at surgical site. Short frequent walks. All medications in place. 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 TCM APPT with PCP Dr Ricardo is 03/06/2023, CARDIO MD follow up 03/08/2023   Call end time 1408          Debra See,  MA    3/2/2023, 14:10 EST

## 2023-03-03 LAB
AORTIC DIMENSIONLESS INDEX: 0.3 (DI)
ASCENDING AORTA: 3.6 CM
BH CV ECHO AV AORTIC VALVE AT ACCEL TIME CALCULATED: 71 MSEC
BH CV ECHO MEAS - AO MAX PG: 25.6 MMHG
BH CV ECHO MEAS - AO MEAN PG: 13.9 MMHG
BH CV ECHO MEAS - AO V2 MAX: 253 CM/SEC
BH CV ECHO MEAS - AO V2 VTI: 50 CM
BH CV ECHO MEAS - AT: 0.07 SEC
BH CV ECHO MEAS - AVA(I,D): 0.94 CM2
BH CV ECHO MEAS - CONTRAST EF 4CH: 56 CM2
BH CV ECHO MEAS - EDV(MOD-SP2): 107 ML
BH CV ECHO MEAS - EDV(MOD-SP4): 100 ML
BH CV ECHO MEAS - EF(MOD-BP): 55.9 %
BH CV ECHO MEAS - EF(MOD-SP2): 56.1 %
BH CV ECHO MEAS - EF(MOD-SP4): 52 %
BH CV ECHO MEAS - ESV(MOD-SP2): 47 ML
BH CV ECHO MEAS - ESV(MOD-SP4): 48 ML
BH CV ECHO MEAS - LAT PEAK E' VEL: 9.7 CM/SEC
BH CV ECHO MEAS - LV DIASTOLIC VOL/BSA (35-75): 41.8 CM2
BH CV ECHO MEAS - LV MAX PG: 2.7 MMHG
BH CV ECHO MEAS - LV MEAN PG: 1.49 MMHG
BH CV ECHO MEAS - LV SYSTOLIC VOL/BSA (12-30): 20.1 CM2
BH CV ECHO MEAS - LV V1 MAX: 82.7 CM/SEC
BH CV ECHO MEAS - LV V1 VTI: 16.9 CM
BH CV ECHO MEAS - LVOT AREA: 2.8 CM2
BH CV ECHO MEAS - LVOT DIAM: 1.89 CM
BH CV ECHO MEAS - MED PEAK E' VEL: 6.5 CM/SEC
BH CV ECHO MEAS - MV A DUR: 0.13 SEC
BH CV ECHO MEAS - MV A MAX VEL: 77.1 CM/SEC
BH CV ECHO MEAS - MV DEC SLOPE: 231.4 CM/SEC2
BH CV ECHO MEAS - MV DEC TIME: 310 MSEC
BH CV ECHO MEAS - MV E MAX VEL: 69.8 CM/SEC
BH CV ECHO MEAS - MV E/A: 0.91
BH CV ECHO MEAS - MV MAX PG: 2.39 MMHG
BH CV ECHO MEAS - MV MEAN PG: 1.27 MMHG
BH CV ECHO MEAS - MV P1/2T: 96.8 MSEC
BH CV ECHO MEAS - MV V2 VTI: 18.1 CM
BH CV ECHO MEAS - MVA(P1/2T): 2.27 CM2
BH CV ECHO MEAS - MVA(VTI): 2.6 CM2
BH CV ECHO MEAS - PA ACC TIME: 0.11 SEC
BH CV ECHO MEAS - PA PR(ACCEL): 29.1 MMHG
BH CV ECHO MEAS - PA V2 MAX: 123.5 CM/SEC
BH CV ECHO MEAS - PULM A REVS DUR: 0.13 SEC
BH CV ECHO MEAS - PULM A REVS VEL: 28.7 CM/SEC
BH CV ECHO MEAS - PULM DIAS VEL: 34.1 CM/SEC
BH CV ECHO MEAS - PULM S/D: 1.09
BH CV ECHO MEAS - PULM SYS VEL: 37.1 CM/SEC
BH CV ECHO MEAS - RAP SYSTOLE: 8 MMHG
BH CV ECHO MEAS - RV MAX PG: 3.1 MMHG
BH CV ECHO MEAS - RV V1 MAX: 87.4 CM/SEC
BH CV ECHO MEAS - RV V1 VTI: 15.4 CM
BH CV ECHO MEAS - RVSP: 34 MMHG
BH CV ECHO MEAS - SI(MOD-SP2): 25.1 ML/M2
BH CV ECHO MEAS - SI(MOD-SP4): 21.7 ML/M2
BH CV ECHO MEAS - SV(LVOT): 47.2 ML
BH CV ECHO MEAS - SV(MOD-SP2): 60 ML
BH CV ECHO MEAS - SV(MOD-SP4): 52 ML
BH CV ECHO MEAS - TAPSE (>1.6): 2.5 CM
BH CV ECHO MEAS - TR MAX PG: 26 MMHG
BH CV ECHO MEAS - TR MAX VEL: 255.2 CM/SEC
BH CV ECHO MEASUREMENTS AVERAGE E/E' RATIO: 8.62
BH CV XLRA - RV BASE: 3.5 CM
BH CV XLRA - RV LENGTH: 8.8 CM
BH CV XLRA - RV MID: 2.11 CM
BH CV XLRA - TDI S': 16.6 CM/SEC
LEFT ATRIUM VOLUME INDEX: 25.8 ML/M2
MAXIMAL PREDICTED HEART RATE: 139 BPM
STRESS TARGET HR: 118 BPM

## 2023-03-03 RX ORDER — ERGOCALCIFEROL 1.25 MG/1
CAPSULE ORAL
Qty: 13 CAPSULE | Refills: 3 | OUTPATIENT
Start: 2023-03-03

## 2023-03-05 NOTE — PROGRESS NOTES
Transitional Care Follow Up Visit  Subjective     Jesus Beckham is a 82 y.o. male who presents for a transitional care management visit.    Within 48 business hours after discharge our office contacted him via telephone to coordinate his care and needs.      I reviewed and discussed the details of that call along with the discharge summary, hospital problems, inpatient lab results, inpatient diagnostic studies, and consultation reports with Jesus.     Current outpatient and discharge medications have been reconciled for the patient.  Reviewed by: Magdy Ricardo MD      Date of TCM Phone Call 3/1/2023   [facility]   Date of Admission 2/28/2023   Date of Discharge 3/1/2023   Discharge Disposition Home or Self Care     Risk for Readmission (LACE) Score: 6 (3/1/2023  6:00 AM)      History of Present Illness   Course During Hospital Stay:      Date of Admission: 2/28/23  Date of Discharge:  3/1/2023     Discharge Diagnosis:   - severe aortic valve stenosis s/p TAVR  - chronic diastolic congestive heart failure NYHA III  - chronic atrial fibrillation on chronic anticoagulation  - hypertension  - hx of DVTs  - DM II  - hyperlipidemia  - chronic back pain with spinal stimulator  - presence of suprapubic catheter/bladder stimulator  - obesity     Presenting Problem/History of Present Illness  Aortic valve stenosis, etiology of cardiac valve disease unspecified [I35.0]      Hospital Course  Patient is a 82 y.o. male presented for previously scheduled cardiac surgery. 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 Goldstein and Dr. Zelaya (see op note for full report). Post-operatively he did well. Post-op echocardiogram showed well functioning valve. EKG was without significant change/conduction issues. Plan to resume oral anticoagulant this evening. He was weaned from oxygen. His mobility is at baseline, and he is tolerating the current medication regimen. On  "POD#1 he was deemed ready for discharge home. He is to follow up with DANNY Jauregui in 1 week, and Dr. Zelaya in 1 month with a repeat echocardiogram.     Current outpatient and discharge medications have been reconciled for the patient.  Reviewed by: Magdy Ricardo MD         The following portions of the patient's history were reviewed and updated as appropriate: allergies, current medications, past family history, past medical history, past social history, past surgical history and problem list.    Review of Systems   Constitutional: Negative for activity change, chills and fever.   Respiratory: Negative for cough.    Cardiovascular: Negative for chest pain.   Psychiatric/Behavioral: Negative for dysphoric mood.       /60   Pulse 81   Temp 96.8 °F (36 °C) (Skin)   Resp 16   Ht 177.8 cm (70\")   Wt 126 kg (278 lb)   SpO2 94%   BMI 39.89 kg/m²     Objective   Physical Exam  Constitutional:       General: He is not in acute distress.     Appearance: He is well-developed.   Cardiovascular:      Rate and Rhythm: Normal rate and regular rhythm.      Comments: Murmur is resolved  Pulmonary:      Effort: Pulmonary effort is normal.      Breath sounds: Normal breath sounds.   Neurological:      Mental Status: He is alert and oriented to person, place, and time.   Psychiatric:         Behavior: Behavior normal.         Thought Content: Thought content normal.     Hospital records reviewed with pt confirming HPI.      Assessment & Plan   Diagnoses and all orders for this visit:    1. Nonrheumatic aortic valve stenosis (Primary)    2. S/P TAVR (transcatheter aortic valve replacement)    3. Hospital discharge follow-up    4. Anemia, unspecified type  -     CBC & Differential    Pt to keep all scheduled specialist f/u appt and complete cardiac rehabb.              "

## 2023-03-06 ENCOUNTER — TELEPHONE (OUTPATIENT)
Dept: ENDOCRINOLOGY | Age: 83
End: 2023-03-06

## 2023-03-06 ENCOUNTER — OFFICE VISIT (OUTPATIENT)
Dept: FAMILY MEDICINE CLINIC | Facility: CLINIC | Age: 83
End: 2023-03-06
Payer: MEDICARE

## 2023-03-06 VITALS
SYSTOLIC BLOOD PRESSURE: 105 MMHG | HEIGHT: 70 IN | HEART RATE: 81 BPM | DIASTOLIC BLOOD PRESSURE: 60 MMHG | OXYGEN SATURATION: 94 % | BODY MASS INDEX: 39.8 KG/M2 | RESPIRATION RATE: 16 BRPM | WEIGHT: 278 LBS | TEMPERATURE: 96.8 F

## 2023-03-06 DIAGNOSIS — I35.0 NONRHEUMATIC AORTIC VALVE STENOSIS: Primary | ICD-10-CM

## 2023-03-06 DIAGNOSIS — Z09 HOSPITAL DISCHARGE FOLLOW-UP: ICD-10-CM

## 2023-03-06 DIAGNOSIS — E11.69 TYPE 2 DIABETES MELLITUS WITH OTHER SPECIFIED COMPLICATION, WITH LONG-TERM CURRENT USE OF INSULIN: ICD-10-CM

## 2023-03-06 DIAGNOSIS — Z95.2 S/P TAVR (TRANSCATHETER AORTIC VALVE REPLACEMENT): ICD-10-CM

## 2023-03-06 DIAGNOSIS — Z79.4 TYPE 2 DIABETES MELLITUS WITH OTHER SPECIFIED COMPLICATION, WITH LONG-TERM CURRENT USE OF INSULIN: ICD-10-CM

## 2023-03-06 DIAGNOSIS — D64.9 ANEMIA, UNSPECIFIED TYPE: ICD-10-CM

## 2023-03-06 PROCEDURE — 99495 TRANSJ CARE MGMT MOD F2F 14D: CPT | Performed by: FAMILY MEDICINE

## 2023-03-06 RX ORDER — FUROSEMIDE 20 MG/1
20 TABLET ORAL 2 TIMES DAILY PRN
COMMUNITY

## 2023-03-06 NOTE — TELEPHONE ENCOUNTER
PT'S WIFE CALLED TO GET A REFILL OF THE FOLLOWING MED:    glucose blood (Accu-Chek SmartView) test strip [66619]     GOING TO:    [facility] ([facility] ) - [place], [place] - [address] [facility] [phone] [facility] [phone] FX

## 2023-03-07 LAB
BASOPHILS # BLD AUTO: 0.05 10*3/MM3 (ref 0–0.2)
BASOPHILS NFR BLD AUTO: 0.9 % (ref 0–1.5)
EOSINOPHIL # BLD AUTO: 0.13 10*3/MM3 (ref 0–0.4)
EOSINOPHIL NFR BLD AUTO: 2.3 % (ref 0.3–6.2)
ERYTHROCYTE [DISTWIDTH] IN BLOOD BY AUTOMATED COUNT: 14 % (ref 12.3–15.4)
HCT VFR BLD AUTO: 40.1 % (ref 37.5–51)
HGB BLD-MCNC: 13.7 G/DL (ref 13–17.7)
IMM GRANULOCYTES # BLD AUTO: 0.08 10*3/MM3 (ref 0–0.05)
IMM GRANULOCYTES NFR BLD AUTO: 1.4 % (ref 0–0.5)
LYMPHOCYTES # BLD AUTO: 0.97 10*3/MM3 (ref 0.7–3.1)
LYMPHOCYTES NFR BLD AUTO: 16.8 % (ref 19.6–45.3)
MCH RBC QN AUTO: 30.8 PG (ref 26.6–33)
MCHC RBC AUTO-ENTMCNC: 34.2 G/DL (ref 31.5–35.7)
MCV RBC AUTO: 90.1 FL (ref 79–97)
MONOCYTES # BLD AUTO: 0.39 10*3/MM3 (ref 0.1–0.9)
MONOCYTES NFR BLD AUTO: 6.8 % (ref 5–12)
NEUTROPHILS # BLD AUTO: 4.15 10*3/MM3 (ref 1.7–7)
NEUTROPHILS NFR BLD AUTO: 71.8 % (ref 42.7–76)
NRBC BLD AUTO-RTO: 0 /100 WBC (ref 0–0.2)
PLATELET # BLD AUTO: 143 10*3/MM3 (ref 140–450)
RBC # BLD AUTO: 4.45 10*6/MM3 (ref 4.14–5.8)
WBC # BLD AUTO: 5.77 10*3/MM3 (ref 3.4–10.8)

## 2023-03-08 ENCOUNTER — OFFICE VISIT (OUTPATIENT)
Dept: CARDIOLOGY | Facility: CLINIC | Age: 83
End: 2023-03-08
Payer: MEDICARE

## 2023-03-08 VITALS
WEIGHT: 228 LBS | BODY MASS INDEX: 44.76 KG/M2 | SYSTOLIC BLOOD PRESSURE: 138 MMHG | HEIGHT: 60 IN | OXYGEN SATURATION: 95 % | HEART RATE: 100 BPM | DIASTOLIC BLOOD PRESSURE: 70 MMHG

## 2023-03-08 DIAGNOSIS — Z95.2 S/P TAVR (TRANSCATHETER AORTIC VALVE REPLACEMENT): Primary | ICD-10-CM

## 2023-03-08 PROCEDURE — 3075F SYST BP GE 130 - 139MM HG: CPT | Performed by: NURSE PRACTITIONER

## 2023-03-08 PROCEDURE — 93000 ELECTROCARDIOGRAM COMPLETE: CPT | Performed by: NURSE PRACTITIONER

## 2023-03-08 PROCEDURE — 1159F MED LIST DOCD IN RCRD: CPT | Performed by: NURSE PRACTITIONER

## 2023-03-08 PROCEDURE — 1160F RVW MEDS BY RX/DR IN RCRD: CPT | Performed by: NURSE PRACTITIONER

## 2023-03-08 PROCEDURE — 3078F DIAST BP <80 MM HG: CPT | Performed by: NURSE PRACTITIONER

## 2023-03-08 PROCEDURE — 99214 OFFICE O/P EST MOD 30 MIN: CPT | Performed by: NURSE PRACTITIONER

## 2023-03-08 RX ORDER — AMOXICILLIN 500 MG/1
2000 CAPSULE ORAL SEE ADMIN INSTRUCTIONS
Qty: 12 CAPSULE | Refills: 0 | Status: SHIPPED | OUTPATIENT
Start: 2023-03-08 | End: 2023-03-27

## 2023-03-08 NOTE — PROGRESS NOTES
[facility] Follow Up Office Note     Encounter Date:23  Patient:Jesus Beckham  :1940  MRN:[id number]      Chief Complaint:   Chief Complaint   Patient presents with   • Follow-up         History of Presenting Illness:        Jesus Beckham is a 82 y.o. male who is here for follow-up.  He is a patient of Dr Zelaya and Dr Burr.    Patient has medical history significant for severe nonrheumatic aortic stenosis s/p TAVR, PAF on chronic anticoagulation, DM II, essential hypertension, mixed hyperlipidemia, chronic back pain with spinal stimulator and wheelchair use, history of bladder cancer s/p suprapubic catheter placement, and recurrent DVTs.    Patient is s/p TAVR on 23 with 26mm Jennifer via transfemoral approach. He did well post-procedure and post-op echo showed valve gradients within defined limits. He was discharged on POD 1 and continued on apixaban.    Today patient reports his breathing is improved since valve replacement. He has stable ANNALISE, denies palpitations or dizziness. He does not have any concerns at bilateral groin stick sites.    Review of Systems:  Review of Systems   Cardiovascular: Positive for leg swelling. Negative for chest pain, dyspnea on exertion, orthopnea and palpitations.   Respiratory: Negative for shortness of breath.        Current Outpatient Medications on File Prior to Visit   Medication Sig Dispense Refill   • Accu-Chek FastClix Lancets misc Testing bs 5 times daily 500 each 3   • acetaminophen (TYLENOL) 500 MG tablet Take 1 tablet by mouth Every 6 (Six) Hours As Needed for Mild Pain.     • allopurinol (ZYLOPRIM) 300 MG tablet Take 1 tablet by mouth Daily. 90 tablet 3   • apixaban (ELIQUIS) 5 MG tablet tablet Take 1 tablet by mouth 2 (Two) Times a Day. HOLDING PER MD INSTRUCTIONS     • Ascorbic Acid 300 MG chewable tablet Chew 300 mg Every Morning. HOLDING FOR SURGERY     • cetirizine (zyrTEC) 10 MG tablet Take 1 tablet by mouth Daily.     •  diphenhydrAMINE-acetaminophen (TYLENOL PM)  MG tablet per tablet Take 2 tablets by mouth At Night As Needed for Sleep.     • donepezil (ARICEPT) 5 MG tablet Take 1 tablet by mouth every night at bedtime. 90 tablet 3   • fenofibrate (TRICOR) 145 MG tablet TAKE 1 TABLET BY MOUTH  DAILY 90 tablet 3   • fluticasone (FLONASE) 50 MCG/ACT nasal spray 2 sprays into the nostril(s) as directed by provider Daily. 48 g 3   • furosemide (LASIX) 20 MG tablet Take 1 tablet by mouth 2 (Two) Times a Day As Needed.     • glipizide (GLUCOTROL) 10 MG tablet Take 1 tablet by mouth 2 (Two) Times a Day Before Meals. 180 tablet 2   • Glucosamine HCl 1500 MG tablet Take 2 tablets by mouth Daily. HOLDING FOR SURGERY     • glucose blood (Accu-Chek SmartView) test strip Dx code E11.42 TEST BLOOD SUGAR 2 TIMES   each 3   • Glyxambi 25-5 MG tablet TAKE 1 TABLET BY MOUTH  DAILY WITH BREAKFAST 90 tablet 3   • L-Methylfolate-B6-B12 3-35-2 MG tablet Take 1 tablet by mouth 2 (Two) Times a Day. 180 tablet 1   • Lancet Device misc 1 each 2 (Two) Times a Day. 1 each 0   • Lancets Misc. (Accu-Chek FastClix Lancet) kit Dispense 1 lancing device to check 5 times a day. Dx: E 1 1 kit 0   • lisinopril (PRINIVIL,ZESTRIL) 2.5 MG tablet Take 1 tablet by mouth Daily. TAKES AT LUNCHTIME     • melatonin 5 MG tablet tablet Take 1 tablet by mouth Every Night.     • Multiple Vitamins-Minerals (MULTIVITAMIN ADULT PO) Take 1 tablet by mouth Daily. HOLDING FOR SURGERY     • Myrbetriq 50 MG tablet sustained-release 24 hour 24 hr tablet Take 50 mg by mouth Daily.     • Omega-3 Fatty Acids (FISH OIL) 1200 MG capsule capsule Take 2,000 mg by mouth 2 (Two) Times a Day With Meals. HOLDING FOR SURGERY     • pregabalin (LYRICA) 100 MG capsule TAKE 1 CAPSULE BY MOUTH 3  TIMES DAILY (Patient taking differently: Take 1 capsule by mouth 3 (Three) Times a Day.) 270 capsule 0   • rosuvastatin (CRESTOR) 10 MG tablet TAKE 1 TABLET BY MOUTH  DAILY 90 tablet 3   • Toujeo Max  "SoloStar 300 UNIT/ML solution pen-injector injection 98 units every morning (Patient taking differently: Inject 98 Units under the skin into the appropriate area as directed. 98 units every morning    PER MD INSTRUCTION) 29.4 mL 2   • traZODone (DESYREL) 150 MG tablet Take 2 tablets by mouth Every Night. 180 tablet 3   • vitamin D (ERGOCALCIFEROL) 1.25 MG (18037 UT) capsule capsule Take 1 capsule by mouth Every 7 (Seven) Days. HOLD FOR SURGERY     • Wheat Dextrin (BENEFIBER DRINK MIX PO) Take 2 teaspoon(s) by mouth 2 (Two) Times a Day.       No current facility-administered medications on file prior to visit.       Allergies   Allergen Reactions   • Levaquin [Levofloxacin] Other (See Comments)     Redness, itching at IV site with IV Levaquin administration   • Alcohol Nausea And Vomiting     \"Deathly sick, headaches, cold sweats\"  Cant take any medication that contains alcohol   • Other Other (See Comments)     Pt's wife reports that after pt's cardiac cath, pt had hallucinations and agitation with pain medications. She is unsure which medication.        Past Medical History:   Diagnosis Date   • Acute embolism and thrombosis of deep vein of right distal lower extremity (HCC)    • Acute gangrenous cholecystitis     FEB 2018   • Allergic    • Aortic valve stenosis    • Arthritis    • Atrial fibrillation with RVR ([facility])     HISTORY   • Benign prostatic hyperplasia without lower urinary tract symptoms    • Cancer of bladder ([facility]) 2016   • Colon polyp    • Discitis of lumbar region    • DVT (deep venous thrombosis) ([facility])     MARCH 2018   • Essential hypertension    • Murray catheter in place     LOSS OF SENSATION BLADDER. BECAUSE OF WOUND AT THE TIME   • Gout    • Heel ulcer ([facility])     RIGHT. FOLLOWED BY WOUND CARE. GETTING BETTER   • History of sepsis    • Pala (hard of hearing)     HEARING AIDS BOTH EARS   • Hyperlipidemia    • Loss, sensation     LOWER EXTREMTIES. RELATED TO LAST BACK SURGERY.   • MRSA infection     LEFT " LEG.    • Open wound     WITH MEDICATED DRESSING LEFT SHIN AREA.(RESOLVED PER PATIENT)   • CELINA (obstructive sleep apnea)     NO MACHINE   • Osteoarthritis    • Paroxysmal atrial fibrillation (HCC)    • PVC (premature ventricular contraction)    • Sepsis (HCC) 02/2018   • Sepsis due to Escherichia coli (HCC)    • Skin carcinoma 2012    MELANOMA.2 PLACES BACK AND EAR   • Type 2 diabetes mellitus (HCC)    • Vitamin D deficiency    • Weakness        Past Surgical History:   Procedure Laterality Date   • ABDOMINAL SURGERY     • APPENDECTOMY N/A 1961   • CARDIAC CATHETERIZATION N/A 01/16/2023    Procedure: Coronary angiography;  Surgeon: Tasha Burr MD;  Location:  [facility];  Service: Cardiology;  Laterality: N/A;   • CARDIAC CATHETERIZATION N/A 01/16/2023    Procedure: Left Heart Cath;  Surgeon: Tasha Burr MD;  Location:  [facility];  Service: Cardiology;  Laterality: N/A;   • CARDIAC CATHETERIZATION N/A 01/16/2023    Procedure: Right Heart Cath;  Surgeon: Tasha Burr MD;  Location:  [facility];  Service: Cardiology;  Laterality: N/A;   • CHOLECYSTECTOMY WITH INTRAOPERATIVE CHOLANGIOGRAM N/A 02/25/2018    Procedure: CHOLECYSTECTOMY LAPAROSCOPIC INTRAOPERATIVE CHOLANGIOGRAM;  Surgeon: Maegan Correa MD;  Location: [facility] MAIN OR;  Service:    • COLONOSCOPY N/A 2010    h/o of polyps   • COLONOSCOPY W/ BIOPSIES AND POLYPECTOMY  2019   • EYE SURGERY Bilateral 1959    glass removal from left eye, lens transplant   • JOINT REPLACEMENT Right 2005    RIGHT KNEE   • LAMINECTOMY N/A 01/18/2016    L2-L3, L3-L4, L4-L5, and L5-S1 bilateral lamincectomy, medial facetectomy & [id number], Dr. Kaiden Valdes   • LUMBAR FUSION N/A 03/07/2018    Procedure: LUMBAR FUSION MINIMALLY INVASIVE TRANSFORAMINAL LUMBAR INTERBODY IRRIGATION AND DEBRIDEMENT AND FUSION.  IRRIGATION AND DEBRIDEMENT OF EPIDURAL ABCESS LUMBAR 2-4. BONE MORPHOGENIC PROTEIN, ALPHATEC NOVEL AND  ILLICO, EMG AND SSEP NEUROMONITORING.;  Surgeon: Manish Marr DO;  Location: [facility] OR;  Service: Orthopedic Spine   • SKIN BIOPSY      BACK AND FACE   • SPINAL CORD STIMULATOR IMPLANT N/A 2022    Procedure: SPINAL CORD STIMULATOR INSERTION PHASE 1 ([place]) 4 DAY TRIAL ONLY DUE TO ELIQUIS HOLD.;  Surgeon: Cody Holguin MD;  Location: [facility] MA[facility] OR;  Service: Pain Management;  Laterality: N/A;   • SPINAL CORD STIMULATOR IMPLANT N/A 2022    Procedure: Thoracic eleven laminotomy for placement of a surgical spinal cord stimulator lead to thoracic nine;  Surgeon: Charlie Bailon MD;  Location: [facility] OR;  Service: Neurosurgery;  Laterality: N/A;   • SPINAL CORD STIMULATOR IMPLANT N/A 2022    Procedure: Placement of a left gluteal internal pulse generator;  Surgeon: Charlie Bailon MD;  Location: [facility] OR;  Service: Neurosurgery;  Laterality: N/A;   • TOTAL KNEE ARTHROPLASTY Right 2005    Dr. Washington Evans   • VENA CAVA FILTER INSERTION Right 2018    Procedure: VENA CAVA FILTER INSERTION;  Surgeon: Alexis Thakkar MD;  Location: [facility] ;  Service:    • VENA CAVA FILTER REMOVAL N/A 2018    Procedure: VENA CAVA FILTER REMOVAL WITH VENOCAVAGRAM;  Surgeon: Alexis Thakkar MD;  Location: [facility] ;  Service: Vascular       Social History     Socioeconomic History   • Marital status:    • Number of children: 2   • Highest education level: Some college, no degree   Tobacco Use   • Smoking status: Former     Types: Cigars, Pipe     Quit date: 2017     Years since quittin.1   • Smokeless tobacco: Former     Types: Chew     Quit date:    • Tobacco comments:     Caffeine daily   Vaping Use   • Vaping Use: Never used   Substance and Sexual Activity   • Alcohol use: Not Currently   • Drug use: No   • Sexual activity: Defer       Family History   Problem Relation Age of Onset   • Esophageal cancer Mother    • No  "Known Problems Father    • Diabetes Maternal Grandmother    • Heart attack Maternal Grandfather    • Malig Hyperthermia Neg Hx        The following portions of the patient's history were reviewed and updated as appropriate: allergies, current medications, past family history, past medical history, past social history, past surgical history and problem list.       Objective:       Vitals:    03/08/23 1348   BP: 138/70   BP Location: Left arm   Patient Position: Sitting   Cuff Size: Adult   Pulse: 100   SpO2: 95%   Weight: 103 kg (228 lb)   Height: 70 cm (27.56\")         Physical Exam:  Constitutional: Chronically ill appearing, pleasant and conversant  HENT: Oropharynx clear and membrane moist  Eyes: Normal conjunctiva, no sclera icterus  Neck: Supple, no carotid bruit bilaterally  Cardiovascular: Regular rate and rhythm, No Murmur, No bilateral lower extremity edema  Pulmonary: Normal respiratory effort, normal lung sounds, no wheezing  Neurological: Alert and orient x 3  Skin: Warm, dry, no ecchymosis, no rash  Psych: Appropriate mood and affect. Normal judgment and insight   Bilateral groin sites soft without hematoma      Lab Results   Component Value Date     03/01/2023     (L) 02/24/2023    K 4.7 03/01/2023    K 4.5 02/24/2023     03/01/2023    CL 97 (L) 02/24/2023    CO2 27.0 03/01/2023    CO2 29.9 (H) 02/24/2023    BUN 19 03/01/2023    BUN 19 02/24/2023    CREATININE 1.07 03/01/2023    CREATININE 1.06 02/24/2023    EGFRIFNONA 62 01/11/2022    EGFRIFNONA 65 11/29/2021    EGFRIFAFRI 72 01/11/2022    EGFRIFAFRI 79 09/28/2021    GLUCOSE 145 (H) 03/01/2023    GLUCOSE 265 (H) 02/24/2023    CALCIUM 9.0 03/01/2023    CALCIUM 9.0 02/24/2023    PROTENTOTREF 6.1 12/09/2022    PROTENTOTREF 6.1 09/14/2022    ALBUMIN 4.1 02/24/2023    ALBUMIN 4.30 12/09/2022    BILITOT 0.5 02/24/2023    BILITOT 0.3 12/09/2022    AST 45 (H) 02/24/2023    AST 29 12/09/2022    ALT 35 02/24/2023    ALT 25 12/09/2022     Lab " Results   Component Value Date    WBC 5.77 03/06/2023    WBC 7.79 03/01/2023    HGB 13.7 03/06/2023    HGB 12.6 (L) 03/01/2023    HCT 40.1 03/06/2023    HCT 36.9 (L) 03/01/2023    MCV 90.1 03/06/2023    MCV 90.2 03/01/2023     03/06/2023     03/01/2023     Lab Results   Component Value Date    TRIG 122 12/09/2022    TRIG 105 09/14/2022    HDL 45 12/09/2022    HDL 43 09/14/2022    LDL 67 12/09/2022    LDL 61 09/14/2022     Lab Results   Component Value Date    PROBNP 184.0 02/24/2023    PROBNP 2,134.0 (H) 03/15/2018     Lab Results   Component Value Date    TROPONINT 0.030 11/20/2022     Lab Results   Component Value Date    TSH 2.930 12/09/2022    TSH 3.340 09/14/2022           ECG 12 Lead    Date/Time: 3/8/2023 2:05 PM  Performed by: Emely Acosta APRN  Authorized by: Emely Acosta APRN   Comparison: compared with previous ECG from 3/1/2023  Rhythm: sinus rhythm  Rate: normal  QRS axis: normal                 Assessment:          Diagnosis Plan   1. S/P TAVR (transcatheter aortic valve replacement)  ECG 12 Lead             Plan:          Nonrheumatic aortic stenosis status post TAVR  · Echocardiogram postop with well-seated valve with peak and mean gradients within defined limits  · Resumed on anticoagulation  · Discussed dental prophylaxis -- sent Rx for amoxicillin  · Discussed cardiac rehab, he is planning to complete program  · 1 month follow-up with Dr. Zelaya, echo prior to appointment    PAF on chronic anticoagulation  · Continue apixaban    Essential hypertension  · BP is controlled    Mixed hyperlipidemia  · Continue rosuvastatin    Patient is seen for post-TAVR follow-up. He is doing well since procedure. I am not recommending any changes. May resume wearing compression for lymphedema in another week.  Follow-up with Dr Zelaya in about a month.      Orders Placed This Encounter   Procedures   • ECG 12 Lead     This order was created via procedure documentation     Order  Specific Question:   Release to patient     Answer:   Routine Release            DANNY Burk  [facility]  03/08/23  14:28 EST

## 2023-03-14 ENCOUNTER — OFFICE VISIT (OUTPATIENT)
Dept: WOUND CARE | Facility: HOSPITAL | Age: 83
End: 2023-03-14
Payer: MEDICARE

## 2023-03-14 PROCEDURE — G0463 HOSPITAL OUTPT CLINIC VISIT: HCPCS

## 2023-03-14 PROCEDURE — 97602 WOUND(S) CARE NON-SELECTIVE: CPT

## 2023-03-27 ENCOUNTER — OFFICE VISIT (OUTPATIENT)
Dept: CARDIOLOGY | Facility: CLINIC | Age: 83
End: 2023-03-27
Payer: MEDICARE

## 2023-03-27 ENCOUNTER — LAB (OUTPATIENT)
Dept: LAB | Facility: HOSPITAL | Age: 83
End: 2023-03-27
Payer: MEDICARE

## 2023-03-27 ENCOUNTER — HOSPITAL ENCOUNTER (OUTPATIENT)
Dept: CARDIOLOGY | Facility: HOSPITAL | Age: 83
Discharge: HOME | End: 2023-03-27
Payer: MEDICARE

## 2023-03-27 VITALS
OXYGEN SATURATION: 97 % | WEIGHT: 227 LBS | DIASTOLIC BLOOD PRESSURE: 88 MMHG | BODY MASS INDEX: 32.5 KG/M2 | HEART RATE: 103 BPM | HEIGHT: 70 IN | SYSTOLIC BLOOD PRESSURE: 128 MMHG

## 2023-03-27 VITALS — WEIGHT: 228 LBS | HEART RATE: 81 BPM | BODY MASS INDEX: 32.64 KG/M2 | HEIGHT: 70 IN

## 2023-03-27 DIAGNOSIS — Z95.2 S/P TAVR (TRANSCATHETER AORTIC VALVE REPLACEMENT): ICD-10-CM

## 2023-03-27 DIAGNOSIS — Z95.2 S/P TAVR (TRANSCATHETER AORTIC VALVE REPLACEMENT): Primary | ICD-10-CM

## 2023-03-27 PROBLEM — I35.0 AORTIC VALVE STENOSIS, ETIOLOGY OF CARDIAC VALVE DISEASE UNSPECIFIED: Status: RESOLVED | Noted: 2023-02-28 | Resolved: 2023-03-27

## 2023-03-27 LAB
ANION GAP SERPL CALCULATED.3IONS-SCNC: 10 MMOL/L (ref 5–15)
AORTIC DIMENSIONLESS INDEX: 0.3 (DI)
ASCENDING AORTA: 2.7 CM
BH CV ECHO AV AORTIC VALVE AT ACCEL TIME CALCULATED: 59 MSEC
BH CV ECHO MEAS - AO MAX PG: 24.9 MMHG
BH CV ECHO MEAS - AO MEAN PG: 13 MMHG
BH CV ECHO MEAS - AO ROOT DIAM: 3.6 CM
BH CV ECHO MEAS - AO V2 MAX: 249.3 CM/SEC
BH CV ECHO MEAS - AO V2 VTI: 46.5 CM
BH CV ECHO MEAS - AT: 0.06 SEC
BH CV ECHO MEAS - AVA(I,D): 1.06 CM2
BH CV ECHO MEAS - EDV(CUBED): 107.1 ML
BH CV ECHO MEAS - EDV(MOD-SP2): 119 ML
BH CV ECHO MEAS - EDV(MOD-SP4): 149 ML
BH CV ECHO MEAS - EF(MOD-BP): 64.3 %
BH CV ECHO MEAS - EF(MOD-SP2): 63.9 %
BH CV ECHO MEAS - EF(MOD-SP4): 66.4 %
BH CV ECHO MEAS - ESV(CUBED): 36.5 ML
BH CV ECHO MEAS - ESV(MOD-SP2): 43 ML
BH CV ECHO MEAS - ESV(MOD-SP4): 50 ML
BH CV ECHO MEAS - FS: 30.1 %
BH CV ECHO MEAS - IVS/LVPW: 1.15 CM
BH CV ECHO MEAS - IVSD: 1.35 CM
BH CV ECHO MEAS - LAT PEAK E' VEL: 10.2 CM/SEC
BH CV ECHO MEAS - LV DIASTOLIC VOL/BSA (35-75): 68.2 CM2
BH CV ECHO MEAS - LV MASS(C)D: 231.4 GRAMS
BH CV ECHO MEAS - LV MAX PG: 2.6 MMHG
BH CV ECHO MEAS - LV MEAN PG: 1.49 MMHG
BH CV ECHO MEAS - LV SYSTOLIC VOL/BSA (12-30): 22.9 CM2
BH CV ECHO MEAS - LV V1 MAX: 80.6 CM/SEC
BH CV ECHO MEAS - LV V1 VTI: 14.8 CM
BH CV ECHO MEAS - LVIDD: 4.7 CM
BH CV ECHO MEAS - LVIDS: 3.3 CM
BH CV ECHO MEAS - LVOT AREA: 3.3 CM2
BH CV ECHO MEAS - LVOT DIAM: 2.06 CM
BH CV ECHO MEAS - LVPWD: 1.17 CM
BH CV ECHO MEAS - MED PEAK E' VEL: 6.6 CM/SEC
BH CV ECHO MEAS - MV A DUR: 0.18 SEC
BH CV ECHO MEAS - MV A MAX VEL: 86.5 CM/SEC
BH CV ECHO MEAS - MV DEC SLOPE: 238.1 CM/SEC2
BH CV ECHO MEAS - MV DEC TIME: 0.32 MSEC
BH CV ECHO MEAS - MV E MAX VEL: 68.6 CM/SEC
BH CV ECHO MEAS - MV E/A: 0.79
BH CV ECHO MEAS - MV MAX PG: 3.2 MMHG
BH CV ECHO MEAS - MV MEAN PG: 1.6 MMHG
BH CV ECHO MEAS - MV P1/2T: 93.5 MSEC
BH CV ECHO MEAS - MV V2 VTI: 21.2 CM
BH CV ECHO MEAS - MVA(P1/2T): 2.35 CM2
BH CV ECHO MEAS - MVA(VTI): 2.33 CM2
BH CV ECHO MEAS - PA ACC TIME: 0.04 SEC
BH CV ECHO MEAS - PA PR(ACCEL): 58.8 MMHG
BH CV ECHO MEAS - PA V2 MAX: 146.6 CM/SEC
BH CV ECHO MEAS - PULM A REVS DUR: 0.16 SEC
BH CV ECHO MEAS - PULM A REVS VEL: 33.1 CM/SEC
BH CV ECHO MEAS - PULM DIAS VEL: 33.1 CM/SEC
BH CV ECHO MEAS - PULM S/D: 1.22
BH CV ECHO MEAS - PULM SYS VEL: 40.5 CM/SEC
BH CV ECHO MEAS - QP/QS: 1.06
BH CV ECHO MEAS - RAP SYSTOLE: 3 MMHG
BH CV ECHO MEAS - RV MAX PG: 3.1 MMHG
BH CV ECHO MEAS - RV V1 MAX: 88.7 CM/SEC
BH CV ECHO MEAS - RV V1 VTI: 15.9 CM
BH CV ECHO MEAS - RVOT DIAM: 2.05 CM
BH CV ECHO MEAS - RVSP: 16 MMHG
BH CV ECHO MEAS - SI(MOD-SP2): 34.8 ML/M2
BH CV ECHO MEAS - SI(MOD-SP4): 45.3 ML/M2
BH CV ECHO MEAS - SV(LVOT): 49.4 ML
BH CV ECHO MEAS - SV(MOD-SP2): 76 ML
BH CV ECHO MEAS - SV(MOD-SP4): 99 ML
BH CV ECHO MEAS - SV(RVOT): 52.5 ML
BH CV ECHO MEAS - TAPSE (>1.6): 3.1 CM
BH CV ECHO MEAS - TR MAX PG: 12.6 MMHG
BH CV ECHO MEAS - TR MAX VEL: 177.8 CM/SEC
BH CV ECHO MEASUREMENTS AVERAGE E/E' RATIO: 8.17
BH CV XLRA - RV BASE: 3.5 CM
BH CV XLRA - RV LENGTH: 8.3 CM
BH CV XLRA - RV MID: 2.8 CM
BH CV XLRA - TDI S': 16.1 CM/SEC
BUN SERPL-MCNC: 23 MG/DL (ref 8–23)
BUN/CREAT SERPL: 20.5 (ref 7–25)
CALCIUM SPEC-SCNC: 9.2 MG/DL (ref 8.6–10.5)
CHLORIDE SERPL-SCNC: 99 MMOL/L (ref 98–107)
CO2 SERPL-SCNC: 29 MMOL/L (ref 22–29)
CREAT SERPL-MCNC: 1.12 MG/DL (ref 0.76–1.27)
DEPRECATED RDW RBC AUTO: 46.6 FL (ref 37–54)
EGFRCR SERPLBLD CKD-EPI 2021: 66 ML/MIN/1.73
ERYTHROCYTE [DISTWIDTH] IN BLOOD BY AUTOMATED COUNT: 14.3 % (ref 12.3–15.4)
GLUCOSE SERPL-MCNC: 216 MG/DL (ref 65–99)
HCT VFR BLD AUTO: 39.6 % (ref 37.5–51)
HGB BLD-MCNC: 13.6 G/DL (ref 13–17.7)
LEFT ATRIUM VOLUME INDEX: 27 ML/M2
MAXIMAL PREDICTED HEART RATE: 139 BPM
MCH RBC QN AUTO: 30.9 PG (ref 26.6–33)
MCHC RBC AUTO-ENTMCNC: 34.3 G/DL (ref 31.5–35.7)
MCV RBC AUTO: 90 FL (ref 79–97)
PLATELET # BLD AUTO: 140 10*3/MM3 (ref 140–450)
PMV BLD AUTO: 11.3 FL (ref 6–12)
POTASSIUM SERPL-SCNC: 4.6 MMOL/L (ref 3.5–5.2)
RBC # BLD AUTO: 4.4 10*6/MM3 (ref 4.14–5.8)
SINUS: 2.7 CM
SODIUM SERPL-SCNC: 138 MMOL/L (ref 136–145)
STJ: 2.6 CM
STRESS TARGET HR: 118 BPM
WBC NRBC COR # BLD: 6.06 10*3/MM3 (ref 3.4–10.8)

## 2023-03-27 PROCEDURE — 93306 TTE W/DOPPLER COMPLETE: CPT

## 2023-03-27 PROCEDURE — 99214 OFFICE O/P EST MOD 30 MIN: CPT | Performed by: INTERNAL MEDICINE

## 2023-03-27 PROCEDURE — 3079F DIAST BP 80-89 MM HG: CPT | Performed by: INTERNAL MEDICINE

## 2023-03-27 PROCEDURE — 1159F MED LIST DOCD IN RCRD: CPT | Performed by: INTERNAL MEDICINE

## 2023-03-27 PROCEDURE — 3074F SYST BP LT 130 MM HG: CPT | Performed by: INTERNAL MEDICINE

## 2023-03-27 PROCEDURE — 93000 ELECTROCARDIOGRAM COMPLETE: CPT | Performed by: INTERNAL MEDICINE

## 2023-03-27 PROCEDURE — 25510000001 PERFLUTREN (DEFINITY) 8.476 MG IN SODIUM CHLORIDE (PF) 0.9 % 10 ML INJECTION: Performed by: INTERNAL MEDICINE

## 2023-03-27 PROCEDURE — 1160F RVW MEDS BY RX/DR IN RCRD: CPT | Performed by: INTERNAL MEDICINE

## 2023-03-27 PROCEDURE — 93306 TTE W/DOPPLER COMPLETE: CPT | Performed by: INTERNAL MEDICINE

## 2023-03-27 PROCEDURE — 80048 BASIC METABOLIC PNL TOTAL CA: CPT

## 2023-03-27 PROCEDURE — 85027 COMPLETE CBC AUTOMATED: CPT

## 2023-03-27 RX ADMIN — PERFLUTREN 2 ML: 6.52 INJECTION, SUSPENSION INTRAVENOUS at 10:03

## 2023-03-27 NOTE — PROGRESS NOTES
[facility] Follow Up Office Note     Encounter Date:23  Patient:Jesus Beckham  :1940  MRN:[id number]    Referring Provider: Tasha Burr MD      Chief Complaint:   Chief Complaint   Patient presents with   • Aortic Stenosis     History of Presenting Illness:      Mr. Beckham is a 82 y.o. gentleman with past medical history notable for nonrheumatic aortic stenosis s/p TAVR, paroxysmal atrial fibrillation on chronic anticoagulation, diabetes type 2 on insulin therapy, hypertension, mixed hyperlipidemia, chronic back pain with spinal stimulator and wheelchair use, history of bladder cancer status post suprapubic catheter placement, and recurrent DVTs who presents to our office for follow up after recent TAVR.  Overall since his procedure he is done well and has more energy and less shortness breath.        Review of Systems:  Review of Systems   Constitutional: Positive for malaise/fatigue.   HENT: Negative.    Eyes: Negative.    Cardiovascular: Positive for leg swelling.   Respiratory: Negative.    Endocrine: Negative.    Hematologic/Lymphatic: Negative.    Skin: Negative.    Musculoskeletal: Negative.    Gastrointestinal: Negative.    Genitourinary: Negative.    Neurological: Negative.    Psychiatric/Behavioral: Negative.    Allergic/Immunologic: Negative.        Current Outpatient Medications on File Prior to Visit   Medication Sig Dispense Refill   • Accu-Chek FastClix Lancets misc Testing bs 5 times daily 500 each 3   • acetaminophen (TYLENOL) 500 MG tablet Take 1 tablet by mouth Every 6 (Six) Hours As Needed for Mild Pain.     • allopurinol (ZYLOPRIM) 300 MG tablet Take 1 tablet by mouth Daily. 90 tablet 3   • apixaban (ELIQUIS) 5 MG tablet tablet Take 1 tablet by mouth 2 (Two) Times a Day. HOLDING PER MD INSTRUCTIONS     • Ascorbic Acid 300 MG chewable tablet Chew 300 mg Every Morning. HOLDING FOR SURGERY     • cetirizine (zyrTEC) 10 MG tablet Take 1 tablet by  mouth Daily.     • diphenhydrAMINE-acetaminophen (TYLENOL PM)  MG tablet per tablet Take 2 tablets by mouth At Night As Needed for Sleep.     • donepezil (ARICEPT) 5 MG tablet Take 1 tablet by mouth every night at bedtime. 90 tablet 3   • fenofibrate (TRICOR) 145 MG tablet TAKE 1 TABLET BY MOUTH  DAILY 90 tablet 3   • fluticasone (FLONASE) 50 MCG/ACT nasal spray 2 sprays into the nostril(s) as directed by provider Daily. 48 g 3   • furosemide (LASIX) 20 MG tablet Take 1 tablet by mouth 2 (Two) Times a Day As Needed.     • glipizide (GLUCOTROL) 10 MG tablet Take 1 tablet by mouth 2 (Two) Times a Day Before Meals. 180 tablet 2   • Glucosamine HCl 1500 MG tablet Take 2 tablets by mouth Daily. HOLDING FOR SURGERY     • glucose blood (Accu-Chek SmartView) test strip Dx code E11.42 TEST BLOOD SUGAR 2 TIMES   each 3   • Glyxambi 25-5 MG tablet TAKE 1 TABLET BY MOUTH  DAILY WITH BREAKFAST 90 tablet 3   • Lancet Device misc 1 each 2 (Two) Times a Day. 1 each 0   • Lancets Misc. (Accu-Chek FastClix Lancet) kit Dispense 1 lancing device to check 5 times a day. Dx: E 1 1 kit 0   • lisinopril (PRINIVIL,ZESTRIL) 2.5 MG tablet Take 1 tablet by mouth Daily. TAKES AT LUNCHTIME     • melatonin 5 MG tablet tablet Take 1 tablet by mouth Every Night.     • Multiple Vitamins-Minerals (MULTIVITAMIN ADULT PO) Take 1 tablet by mouth Daily. HOLDING FOR SURGERY     • Myrbetriq 50 MG tablet sustained-release 24 hour 24 hr tablet Take 50 mg by mouth Daily.     • Omega-3 Fatty Acids (FISH OIL) 1200 MG capsule capsule Take 2,000 mg by mouth 2 (Two) Times a Day With Meals. HOLDING FOR SURGERY     • pregabalin (LYRICA) 100 MG capsule TAKE 1 CAPSULE BY MOUTH 3  TIMES DAILY (Patient taking differently: Take 1 capsule by mouth 3 (Three) Times a Day.) 270 capsule 0   • rosuvastatin (CRESTOR) 10 MG tablet TAKE 1 TABLET BY MOUTH  DAILY 90 tablet 3   • Toujeo Max SoloStar 300 UNIT/ML solution pen-injector injection 98 units every morning  "(Patient taking differently: Inject 98 Units under the skin into the appropriate area as directed. 98 units every morning    PER MD INSTRUCTION) 29.4 mL 2   • traZODone (DESYREL) 150 MG tablet Take 2 tablets by mouth Every Night. 180 tablet 3   • vitamin D (ERGOCALCIFEROL) 1.25 MG (94402 UT) capsule capsule Take 1 capsule by mouth Every 7 (Seven) Days. HOLD FOR SURGERY     • Wheat Dextrin (BENEFIBER DRINK MIX PO) Take 2 teaspoon(s) by mouth 2 (Two) Times a Day.     • [DISCONTINUED] amoxicillin (AMOXIL) 500 MG capsule Take 4 capsules by mouth See Admin Instructions. 4 capsules 1 hour prior to procedure 12 capsule 0   • [DISCONTINUED] L-Methylfolate-B6-B12 3-35-2 MG tablet Take 1 tablet by mouth 2 (Two) Times a Day. 180 tablet 1     Current Facility-Administered Medications on File Prior to Visit   Medication Dose Route Frequency Provider Last Rate Last Admin   • [COMPLETED] perflutren (DEFINITY) 8.476 mg in Sodium Chloride (PF) 0.9 % 10 mL injection  2 mL Intravenous Once in imaging Samuel Zelaya MD   2 mL at 03/27/23 1003       Allergies   Allergen Reactions   • Levaquin [Levofloxacin] Other (See Comments)     Redness, itching at IV site with IV Levaquin administration   • Alcohol Nausea And Vomiting     \"Deathly sick, headaches, cold sweats\"  Cant take any medication that contains alcohol   • Other Other (See Comments)     Pt's wife reports that after pt's cardiac cath, pt had hallucinations and agitation with pain medications. She is unsure which medication.        Past Medical History:   Diagnosis Date   • Acute embolism and thrombosis of deep vein of right distal lower extremity (HCC)    • Acute gangrenous cholecystitis     FEB 2018   • Allergic    • Aortic valve stenosis    • Arthritis    • Atrial fibrillation with RVR (HCC)     HISTORY   • Benign prostatic hyperplasia without lower urinary tract symptoms    • Cancer of bladder (HCC) 2016   • Colon polyp    • Discitis of lumbar region    • DVT (deep venous " thrombosis) (HCC)     MARCH 2018   • Essential hypertension    • Murray catheter in place     LOSS OF SENSATION BLADDER. BECAUSE OF WOUND AT THE TIME   • Gout    • Heel ulcer (HCC)     RIGHT. FOLLOWED BY WOUND CARE. GETTING BETTER   • History of sepsis    • [place] (hard of hearing)     HEARING AIDS BOTH EARS   • Hyperlipidemia    • Loss, sensation     LOWER EXTREMTIES. RELATED TO LAST BACK SURGERY.   • MRSA infection     LEFT LEG.    • Open wound     WITH MEDICATED DRESSING LEFT SHIN AREA.(RESOLVED PER PATIENT)   • CELINA (obstructive sleep apnea)     NO MACHINE   • Osteoarthritis    • Paroxysmal atrial fibrillation (HCC)    • PVC (premature ventricular contraction)    • Sepsis (HCC) 02/2018   • Sepsis due to Escherichia coli ([facility])    • Skin carcinoma 2012    MELANOMA.2 PLACES BACK AND EAR   • Type 2 diabetes mellitus (HCC)    • Vitamin D deficiency    • Weakness        Past Surgical History:   Procedure Laterality Date   • ABDOMINAL SURGERY     • AORTIC VALVE REPAIR/REPLACEMENT N/A 2/28/2023    Procedure: TTE TRANSFEMORAL TRANSCATHETER AORTIC VALVE REPLACEMENT PERCUTANEOUS APPROACH;  Surgeon: Antony Goldstein MD;  Location: [facility];  Service: Cardiothoracic;  Laterality: N/A;   • AORTIC VALVE REPAIR/REPLACEMENT N/A 2/28/2023    Procedure: Transfemoral Transcatheter Aortic Valve Replacement with intraoperative TTE and possible open surgical rescue;  Surgeon: Samuel Zelaya MD;  Location: [facility];  Service: Cardiovascular;  Laterality: N/A;   • APPENDECTOMY N/A 1961   • CARDIAC CATHETERIZATION N/A 01/16/2023    Procedure: Coronary angiography;  Surgeon: Tasha Burr MD;  Location: [facility] CATH INVASIVE LOCATION;  Service: Cardiology;  Laterality: N/A;   • CARDIAC CATHETERIZATION N/A 01/16/2023    Procedure: Left Heart Cath;  Surgeon: Tasha Burr MD;  Location: [facility] CATH INVASIVE LOCATION;  Service: Cardiology;  Laterality: N/A;   • CARDIAC CATHETERIZATION N/A 01/16/2023    Procedure: Right Heart  Cath;  Surgeon: Tasha Burr MD;  Location: [facility] CATH INVASIVE LOCATION;  Service: Cardiology;  Laterality: N/A;   • CHOLECYSTECTOMY WITH INTRAOPERATIVE CHOLANGIOGRAM N/A 02/25/2018    Procedure: CHOLECYSTECTOMY LAPAROSCOPIC INTRAOPERATIVE CHOLANGIOGRAM;  Surgeon: Maegan Correa MD;  Location: [facility] OR;  Service:    • COLONOSCOPY N/A 2010    h/o of polyps   • COLONOSCOPY W/ BIOPSIES AND POLYPECTOMY  2019   • EYE SURGERY Bilateral 1959    glass removal from left eye, lens transplant   • JOINT REPLACEMENT Right 2005    RIGHT KNEE   • LAMINECTOMY N/A 01/18/2016    L2-L3, L3-L4, L4-L5, and L5-S1 bilateral lamincectomy, medial facetectomy & [id number], Dr. Kaiden Valdes   • LUMBAR FUSION N/A 03/07/2018    Procedure: LUMBAR FUSION MINIMALLY INVASIVE TRANSFORAMINAL LUMBAR INTERBODY IRRIGATION AND DEBRIDEMENT AND FUSION.  IRRIGATION AND DEBRIDEMENT OF EPIDURAL ABCESS LUMBAR 2-4. BONE MORPHOGENIC PROTEIN, ALPHATEC NOVEL AND ILLICO, EMG AND SSEP NEUROMONITORING.;  Surgeon: Manish Marr DO;  Location: [facility];  Service: Orthopedic Spine   • SKIN BIOPSY      BACK AND FACE   • SPINAL CORD STIMULATOR IMPLANT N/A 07/01/2022    Procedure: SPINAL CORD STIMULATOR INSERTION PHASE 1 ([place]) 4 DAY TRIAL ONLY DUE TO ELIQUIS HOLD.;  Surgeon: Cody Holguin MD;  Location: [facility] MAIN OR;  Service: Pain Management;  Laterality: N/A;   • SPINAL CORD STIMULATOR IMPLANT N/A 11/17/2022    Procedure: Thoracic eleven laminotomy for placement of a surgical spinal cord stimulator lead to thoracic nine;  Surgeon: Charlie Bailon MD;  Location: [facility] OR;  Service: Neurosurgery;  Laterality: N/A;   • SPINAL CORD STIMULATOR IMPLANT N/A 11/18/2022    Procedure: Placement of a left gluteal internal pulse generator;  Surgeon: Charlie Bailon MD;  Location: [facility] MAIN OR;  Service: Neurosurgery;  Laterality: N/A;   • TOTAL KNEE ARTHROPLASTY Right 03/07/2005    Dr. Washington Evans   • VENA CAVA FILTER  "INSERTION Right 2018    Procedure: VENA CAVA FILTER INSERTION;  Surgeon: Alexis Thakkar MD;  [facility] ;  Service:    • VENA CAVA FILTER REMOVAL N/A 2018    Procedure: VENA CAVA FILTER REMOVAL WITH VENOCAVAGRAM;  Surgeon: Alexis Thakkar MD;  [facility] ;  Service: Vascular       Social History     Socioeconomic History   • Marital status:    • Number of children: 2   • Highest education level: Some college, no degree   Tobacco Use   • Smoking status: Former     Types: Cigars, Pipe     Quit date:      Years since quittin.2   • Smokeless tobacco: Former     Types: Chew     Quit date:    • Tobacco comments:     Caffeine daily   Vaping Use   • Vaping Use: Never used   Substance and Sexual Activity   • Alcohol use: Not Currently   • Drug use: No   • Sexual activity: Defer       Family History   Problem Relation Age of Onset   • Esophageal cancer Mother    • No Known Problems Father    • Diabetes Maternal Grandmother    • Heart attack Maternal Grandfather    • Malig Hyperthermia Neg Hx        The following portions of the patient's history were reviewed and updated as appropriate: allergies, current medications, past family history, past medical history, past social history, past surgical history and problem list.       Objective:       Vitals:    23 1032   BP: 128/88   Pulse: 103   SpO2: 97%   Weight: 103 kg (227 lb)   Height: 177.8 cm (70\")       Body mass index is 32.57 kg/m².    Physical Exam:  Constitutional: Well appearing, Well-developed, No acute distress, arrived in wheelchair  HENT: Oropharynx clear and membrane moist  Eyes: Normal conjunctiva, no sclera icterus.  Neck: Supple, no carotid bruit bilaterally.  Cardiovascular: Regular rate and rhythm, Early peaking systolic murmur over the right upper sternal border, 1+ bilateral lower extremity edema.  Pulmonary: Normal respiratory effort, Normal lung sounds, no " wheezing.  Neurological: Alert and orient x 3.   Skin: Warm, dry, no ecchymosis, no rash.  Psych: Appropriate mood and affect. Normal judgment and insight.      Lab Results   Component Value Date     03/01/2023     (L) 02/24/2023    K 4.7 03/01/2023    K 4.5 02/24/2023     03/01/2023    CL 97 (L) 02/24/2023    CO2 27.0 03/01/2023    CO2 29.9 (H) 02/24/2023    BUN 19 03/01/2023    BUN 19 02/24/2023    CREATININE 1.07 03/01/2023    CREATININE 1.06 02/24/2023    EGFRIFNONA 62 01/11/2022    EGFRIFNONA 65 11/29/2021    EGFRIFAFRI 72 01/11/2022    EGFRIFAFRI 79 09/28/2021    GLUCOSE 145 (H) 03/01/2023    GLUCOSE 265 (H) 02/24/2023    CALCIUM 9.0 03/01/2023    CALCIUM 9.0 02/24/2023    PROTENTOTREF 6.1 12/09/2022    PROTENTOTREF 6.1 09/14/2022    ALBUMIN 4.1 02/24/2023    ALBUMIN 4.30 12/09/2022    BILITOT 0.5 02/24/2023    BILITOT 0.3 12/09/2022    AST 45 (H) 02/24/2023    AST 29 12/09/2022    ALT 35 02/24/2023    ALT 25 12/09/2022     Lab Results   Component Value Date    WBC 5.77 03/06/2023    WBC 7.79 03/01/2023    HGB 13.7 03/06/2023    HGB 12.6 (L) 03/01/2023    HCT 40.1 03/06/2023    HCT 36.9 (L) 03/01/2023    MCV 90.1 03/06/2023    MCV 90.2 03/01/2023     03/06/2023     03/01/2023     Lab Results   Component Value Date    TRIG 122 12/09/2022    TRIG 105 09/14/2022    HDL 45 12/09/2022    HDL 43 09/14/2022    LDL 67 12/09/2022    LDL 61 09/14/2022     Lab Results   Component Value Date    PROBNP 184.0 02/24/2023    PROBNP 2,134.0 (H) 03/15/2018     Lab Results   Component Value Date    TROPONINT 0.030 11/20/2022     Lab Results   Component Value Date    TSH 2.930 12/09/2022    TSH 3.340 09/14/2022       ECG 12 Lead    Date/Time: 3/27/2023 10:51 AM  Performed by: Samuel Zelaya MD  Authorized by: Samuel Zelaya MD   Comparison: compared with previous ECG from 3/8/2023  Similar to previous ECG  Rhythm: sinus rhythm  Conduction comments: QRS fairly normal unfortunately baseline  artifact limits further interpretation            Echocardiogram 3/27/2023 with images reviewed by myself and final report pending:  · Aortic valve area is 1.1 cm2.  · Peak velocity of the flow distal to the aortic valve is 249.3 cm/s. Aortic valve maximum pressure gradient is 25 mmHg. Aortic valve mean pressure gradient is 13 mmHg. Aortic valve dimensionless index is 0.3 .  · There is a prosthetic aortic valve present.  No aortic or paravalvular regurgitation  · Estimated right ventricular systolic pressure from tricuspid regurgitation is normal (<35 mmHg). Calculated right ventricular systolic pressure from tricuspid regurgitation is 16 mmHg.    Echocardiogram 3/1/2023:  • Normal left ventricular function  • Normal-appearing 26 mm NAHOMY ultra aortic valve bioprostheses with mean gradient of approximately 14 mmHg with no significant valvular or paravalvular leak.     TAVR 2/28/2023:  • Successful placement of 26 mm NAHOMY ultra aortic valve bioprosthesis with trace paravalvular leak.  • Intraoperative transesophageal echocardiogram demonstrated an aortic valve area of 1.9 cm 2 with a mean gradient of 6 mmHg at the end of case.    Cardiac catheterization 1/3/2023:  · Left main artery: Large-caliber vessel with 0% stenosis.  The vessel bifurcates into left anterior descending and left circumflex arteries.  · Left anterior descending artery: Proximally large-caliber vessel with 0% stenosis.  Proximal vessel gives rise to a small caliber first diagonal branch with no significant disease.  The mid vessel remains a large caliber vessel with 0% stenosis.  The distal vessel bifurcates into a small caliber second diagonal branch and small caliber distal LAD both with no significant disease.  · Left circumflex artery: Large-caliber vessel with 0% proximal to mid stenosis.  The mid vessel gives rise to a large caliber, branching obtuse marginal branch with no significant stenosis.  The distal continuation circumflex vessel  tapers to small caliber with 0% stenosis.  · Right coronary artery: Large-caliber vessel with 0% proximal to distal stenosis.  The distal vessel bifurcates into a moderate caliber posterior descending and a small caliber posterolateral branch both with no significant disease.  This is a right dominant system.  · Pulmonary wedge pressure: 15/17, 15  · Pulmonary artery pressure: 41/10, 2  · Right ventricular pressure: 37/8,   · Right atrial pressure: 20/11, 11  · Pulmonary artery saturation: 73% on 3 L nasal cannula  · Right radial artery saturation: 99%  · Juma calculated cardiac output 5.43 L/min, cardiac index 2.26 L/min/m²    Echocardiogram 12/30/2022:  · Left ventricular systolic function is normal. Left ventricular ejection fraction appears to be 56 - 60%.  · Left ventricular diastolic function is consistent with (grade I) impaired relaxation.  · Moderate to severe aortic valve stenosis is present. Aortic valve area is 0.9 cm2.  · Peak velocity of the flow distal to the aortic valve is 390.9 cm/s. Aortic valve maximum pressure gradient is 61 mmHg. Aortic valve mean pressure gradient is 39 mmHg.    Echocardiogram 12/9/2021:  · Left ventricular ejection fraction is normal. (Calculated EF = 52%).  · Myocardial perfusion imaging indicates a normal myocardial perfusion study with no evidence of ischemia.  · Impressions are consistent with a low risk study.  · LVEF with stress is 53%.        Assessment:          Diagnosis Plan   1. S/P TAVR (transcatheter aortic valve replacement)  ECG 12 Lead             Plan:       Mr. Beckham is a 82 y.o. gentleman with past medical history notable for nonrheumatic aortic stenosis s/p TAVR, paroxysmal atrial fibrillation on chronic anticoagulation, diabetes type 2 on insulin therapy, hypertension, mixed hyperlipidemia, chronic back pain with spinal stimulator and wheelchair use, history of bladder cancer status post suprapubic catheter placement, and recurrent DVTs who presents to  our office scheduled follow up after TAVR.  Overall he is doing well.  His echocardiogram today shows normal valve function and appearance.  We will plan on seeing him back in 1 year with repeat echocardiogram otherwise we will follow-up with his primary cardiologist in the next 6 months      Nonrheumatic aortic stenosis/ Status Post TAVR:  · Continue anticoagulation as part of management for recurrent DVTs  · Echo today demonstrates normal valve function  · NYHA Class II      Follow-up:  1 year with echocardiogram as part of TVT registry, patient will follow-up with primary cardiologist as scheduled in July      Thank you for allowing me to participate in the care of Jesus Beckham. Feel free to contact me directly with any further questions or concerns.    Samuel Zelaya MD  [facility]  03/27/23  10:51 EDT

## 2023-03-30 DIAGNOSIS — F51.01 PRIMARY INSOMNIA: Chronic | ICD-10-CM

## 2023-03-30 RX ORDER — TRAZODONE HYDROCHLORIDE 150 MG/1
300 TABLET ORAL NIGHTLY
Qty: 180 TABLET | Refills: 3 | OUTPATIENT
Start: 2023-03-30

## 2023-03-30 NOTE — TELEPHONE ENCOUNTER
Caller: Mya Beckham    Relationship: Emergency Contact    Best call back number: [phone]    Requested Prescriptions:   Requested Prescriptions     Pending Prescriptions Disp Refills   • traZODone (DESYREL) 150 MG tablet 180 tablet 3     Sig: Take 2 tablets by mouth Every Night.        Pharmacy where request should be sent: [facility] [id number] [address] & [facility] - [phone] [facility] [phone] FX     Last office visit with prescribing clinician: 3/6/2023   Last telemedicine visit with prescribing clinician: 6/27/2023   Next office visit with prescribing clinician: 6/27/2023     Additional details provided by patient: PATIENTS WIFE STATED THE PATIENT IS OUT OF THIS MEDICATION, AS THIS PRESCRIPTION IS WRITTEN FOR 1 TABLET  NIGHTLY, WHIST THE PATIENT TAKES 2.    PATIENTS WIFE STATED THE PHARMACY WILL NOT REFILL TIHS PRESCRIPTION BECAUSE OF THIS.    Does the patient have less than a 3 day supply:  [x] Yes  [] No    Would you like a call back once the refill request has been completed: [] Yes [x] No    Bart Perea Rep   03/30/23 12:42 EDT

## 2023-04-04 ENCOUNTER — OFFICE VISIT (OUTPATIENT)
Dept: WOUND CARE | Facility: HOSPITAL | Age: 83
End: 2023-04-04
Payer: MEDICARE

## 2023-04-04 PROCEDURE — G0463 HOSPITAL OUTPT CLINIC VISIT: HCPCS

## 2023-04-14 ENCOUNTER — OFFICE (OUTPATIENT)
Dept: URBAN - METROPOLITAN AREA CLINIC 76 | Facility: CLINIC | Age: 83
End: 2023-04-14

## 2023-04-14 VITALS
DIASTOLIC BLOOD PRESSURE: 77 MMHG | HEIGHT: 70 IN | OXYGEN SATURATION: 97 % | HEART RATE: 84 BPM | SYSTOLIC BLOOD PRESSURE: 118 MMHG | WEIGHT: 281 LBS

## 2023-04-14 DIAGNOSIS — Z79.4 TYPE 2 DIABETES MELLITUS WITH OTHER SPECIFIED COMPLICATION, WITH LONG-TERM CURRENT USE OF INSULIN: ICD-10-CM

## 2023-04-14 DIAGNOSIS — D17.79 BENIGN LIPOMATOUS NEOPLASM OF OTHER SITES: ICD-10-CM

## 2023-04-14 DIAGNOSIS — Z83.71 FAMILY HISTORY OF COLONIC POLYPS: ICD-10-CM

## 2023-04-14 DIAGNOSIS — R14.0 ABDOMINAL DISTENSION (GASEOUS): ICD-10-CM

## 2023-04-14 DIAGNOSIS — K59.00 CONSTIPATION, UNSPECIFIED: ICD-10-CM

## 2023-04-14 DIAGNOSIS — K64.9 UNSPECIFIED HEMORRHOIDS: ICD-10-CM

## 2023-04-14 DIAGNOSIS — K57.30 DIVERTICULOSIS OF LARGE INTESTINE WITHOUT PERFORATION OR ABS: ICD-10-CM

## 2023-04-14 DIAGNOSIS — Z79.01 LONG TERM (CURRENT) USE OF ANTICOAGULANTS: ICD-10-CM

## 2023-04-14 DIAGNOSIS — Z86.010 PERSONAL HISTORY OF COLONIC POLYPS: ICD-10-CM

## 2023-04-14 DIAGNOSIS — E11.69 TYPE 2 DIABETES MELLITUS WITH OTHER SPECIFIED COMPLICATION, WITH LONG-TERM CURRENT USE OF INSULIN: ICD-10-CM

## 2023-04-14 DIAGNOSIS — R15.9 FULL INCONTINENCE OF FECES: ICD-10-CM

## 2023-04-14 PROBLEM — D12.2 BENIGN NEOPLASM OF ASCENDING COLON: Status: ACTIVE | Noted: 2019-07-29

## 2023-04-14 PROCEDURE — 99214 OFFICE O/P EST MOD 30 MIN: CPT | Performed by: NURSE PRACTITIONER

## 2023-04-14 RX ORDER — HYDROCORTISONE 25 MG/G
CREAM TOPICAL
Qty: 28.35 | Refills: 6 | Status: ACTIVE
Start: 2023-04-14

## 2023-04-23 DIAGNOSIS — M10.00 IDIOPATHIC GOUT, UNSPECIFIED CHRONICITY, UNSPECIFIED SITE: Chronic | ICD-10-CM

## 2023-04-23 DIAGNOSIS — R41.3 MEMORY DIFFICULTIES: Chronic | ICD-10-CM

## 2023-04-24 ENCOUNTER — LAB (OUTPATIENT)
Dept: ENDOCRINOLOGY | Age: 83
End: 2023-04-24
Payer: MEDICARE

## 2023-04-24 DIAGNOSIS — E11.42 TYPE 2 DIABETES MELLITUS WITH PERIPHERAL NEUROPATHY: ICD-10-CM

## 2023-04-24 DIAGNOSIS — E78.5 DYSLIPIDEMIA: ICD-10-CM

## 2023-04-24 DIAGNOSIS — E11.42 DIABETIC PERIPHERAL NEUROPATHY: ICD-10-CM

## 2023-04-24 DIAGNOSIS — I10 ESSENTIAL HYPERTENSION: ICD-10-CM

## 2023-04-24 RX ORDER — DONEPEZIL HYDROCHLORIDE 5 MG/1
5 TABLET, FILM COATED ORAL
Qty: 90 TABLET | Refills: 3 | OUTPATIENT
Start: 2023-04-24

## 2023-04-24 RX ORDER — ALLOPURINOL 300 MG/1
300 TABLET ORAL DAILY
Qty: 90 TABLET | Refills: 3 | OUTPATIENT
Start: 2023-04-24

## 2023-04-25 ENCOUNTER — OFFICE VISIT (OUTPATIENT)
Dept: WOUND CARE | Facility: HOSPITAL | Age: 83
End: 2023-04-25
Payer: MEDICARE

## 2023-04-25 LAB
ALBUMIN SERPL-MCNC: 4.3 G/DL (ref 3.5–5.2)
ALBUMIN/GLOB SERPL: 2 G/DL
ALP SERPL-CCNC: 62 U/L (ref 39–117)
ALT SERPL-CCNC: 29 U/L (ref 1–41)
AST SERPL-CCNC: 33 U/L (ref 1–40)
BILIRUB SERPL-MCNC: 0.3 MG/DL (ref 0–1.2)
BUN SERPL-MCNC: 18 MG/DL (ref 8–23)
BUN/CREAT SERPL: 16.8 (ref 7–25)
CALCIUM SERPL-MCNC: 9.9 MG/DL (ref 8.6–10.5)
CHLORIDE SERPL-SCNC: 100 MMOL/L (ref 98–107)
CHOLEST SERPL-MCNC: 131 MG/DL (ref 0–200)
CO2 SERPL-SCNC: 28.4 MMOL/L (ref 22–29)
CREAT SERPL-MCNC: 1.07 MG/DL (ref 0.76–1.27)
EGFRCR SERPLBLD CKD-EPI 2021: 69.7 ML/MIN/1.73
GLOBULIN SER CALC-MCNC: 2.2 GM/DL
GLUCOSE SERPL-MCNC: 72 MG/DL (ref 65–99)
HBA1C MFR BLD: 6.8 % (ref 4.8–5.6)
HDLC SERPL-MCNC: 38 MG/DL (ref 40–60)
IMP & REVIEW OF LAB RESULTS: NORMAL
LDLC SERPL CALC-MCNC: 65 MG/DL (ref 0–100)
POTASSIUM SERPL-SCNC: 4.8 MMOL/L (ref 3.5–5.2)
PROT SERPL-MCNC: 6.5 G/DL (ref 6–8.5)
SODIUM SERPL-SCNC: 137 MMOL/L (ref 136–145)
TRIGL SERPL-MCNC: 164 MG/DL (ref 0–150)
UNABLE TO VOID: NORMAL
VLDLC SERPL CALC-MCNC: 28 MG/DL (ref 5–40)

## 2023-04-25 PROCEDURE — G0463 HOSPITAL OUTPT CLINIC VISIT: HCPCS

## 2023-04-30 DIAGNOSIS — E11.69 TYPE 2 DIABETES MELLITUS WITH OTHER SPECIFIED COMPLICATION, WITH LONG-TERM CURRENT USE OF INSULIN: ICD-10-CM

## 2023-04-30 DIAGNOSIS — Z79.4 TYPE 2 DIABETES MELLITUS WITH OTHER SPECIFIED COMPLICATION, WITH LONG-TERM CURRENT USE OF INSULIN: ICD-10-CM

## 2023-05-01 ENCOUNTER — OFFICE VISIT (OUTPATIENT)
Dept: ENDOCRINOLOGY | Age: 83
End: 2023-05-01
Payer: MEDICARE

## 2023-05-01 VITALS
WEIGHT: 277.2 LBS | SYSTOLIC BLOOD PRESSURE: 114 MMHG | TEMPERATURE: 97.5 F | OXYGEN SATURATION: 95 % | DIASTOLIC BLOOD PRESSURE: 60 MMHG | BODY MASS INDEX: 39.69 KG/M2 | HEART RATE: 80 BPM | HEIGHT: 70 IN

## 2023-05-01 DIAGNOSIS — E11.42 TYPE 2 DIABETES MELLITUS WITH PERIPHERAL NEUROPATHY: Primary | ICD-10-CM

## 2023-05-01 DIAGNOSIS — E55.9 VITAMIN D DEFICIENCY: ICD-10-CM

## 2023-05-01 DIAGNOSIS — E78.5 DYSLIPIDEMIA: ICD-10-CM

## 2023-05-01 DIAGNOSIS — E11.42 DIABETIC PERIPHERAL NEUROPATHY: ICD-10-CM

## 2023-05-01 RX ORDER — GLIPIZIDE 10 MG/1
TABLET ORAL
Qty: 180 TABLET | Refills: 3 | Status: SHIPPED | OUTPATIENT
Start: 2023-05-01

## 2023-05-01 RX ORDER — PREGABALIN 100 MG/1
100 CAPSULE ORAL 3 TIMES DAILY
Qty: 270 CAPSULE | Refills: 0 | Status: SHIPPED | OUTPATIENT
Start: 2023-05-01

## 2023-05-01 RX ORDER — IPRATROPIUM BROMIDE 42 UG/1
SPRAY, METERED NASAL
COMMUNITY
Start: 2023-04-26

## 2023-05-01 RX ORDER — VIBEGRON 75 MG/1
TABLET, FILM COATED ORAL
COMMUNITY
Start: 2023-04-19

## 2023-05-01 NOTE — PROGRESS NOTES
The ABCs of the Annual Wellness Visit  Subsequent Medicare Wellness Visit    Subjective      Jesus Beckham is a 82 y.o. male who presents for a Subsequent Medicare Wellness Visit.    The following portions of the patient's history were reviewed and   updated as appropriate: allergies, current medications, past family history, past medical history, past social history, past surgical history and problem list.    Compared to one year ago, the patient feels his physical   health is the same.    Compared to one year ago, the patient feels his mental   health is the same.    Recent Hospitalizations:  This patient has had a [facility] admission record on file within the last 365 days.    Current Medical Providers:  Patient Care Team:  Magdy Ricardo MD as PCP - General  Magdy Ricardo MD as PCP - Family Medicine  Jerrell Fry MD as Consulting Physician (Urology)  Carlos Manuel Saleh MD as Consulting Physician (Urology)  Thuan Fernandez MD as Consulting Physician (Ophthalmology)  Lito Hernandez MD as Consulting Physician (Endocrinology)  Tasha Burr MD as Consulting Physician (Cardiology)  Samuel Zelaya MD as Consulting Physician (Cardiology)  Thuan Alvarado MD as Consulting Physician (Gastroenterology)    Outpatient Medications Prior to Visit   Medication Sig Dispense Refill   • Accu-Chek FastClix Lancets misc Testing bs 5 times daily 500 each 3   • acetaminophen (TYLENOL) 500 MG tablet Take 1 tablet by mouth Every 6 (Six) Hours As Needed for Mild Pain.     • allopurinol (ZYLOPRIM) 300 MG tablet Take 1 tablet by mouth Daily. 90 tablet 3   • apixaban (ELIQUIS) 5 MG tablet tablet Take 1 tablet by mouth 2 (Two) Times a Day. HOLDING PER MD INSTRUCTIONS     • Ascorbic Acid 300 MG chewable tablet Chew 300 mg Every Morning. HOLDING FOR SURGERY     • cetirizine (zyrTEC) 10 MG tablet Take 1 tablet by mouth Daily.     • diphenhydrAMINE-acetaminophen (TYLENOL PM)  MG tablet per tablet  Take 2 tablets by mouth At Night As Needed for Sleep.     • donepezil (ARICEPT) 5 MG tablet Take 1 tablet by mouth every night at bedtime. 90 tablet 3   • fenofibrate (TRICOR) 145 MG tablet TAKE 1 TABLET BY MOUTH  DAILY 90 tablet 3   • fluticasone (FLONASE) 50 MCG/ACT nasal spray 2 sprays into the nostril(s) as directed by provider Daily. (Patient not taking: Reported on 5/1/2023) 48 g 3   • furosemide (LASIX) 20 MG tablet Take 1 tablet by mouth 2 (Two) Times a Day As Needed.     • Gemtesa 75 MG tablet      • glipizide (GLUCOTROL) 10 MG tablet TAKE 1 TABLET BY MOUTH  TWICE DAILY BEFORE MEALS 180 tablet 3   • Glucosamine HCl 1500 MG tablet Take 2 tablets by mouth Daily. HOLDING FOR SURGERY     • glucose blood (Accu-Chek SmartView) test strip Dx code E11.42 TEST BLOOD SUGAR 3-4 TIMES   each 3   • Glyxambi 25-5 MG tablet TAKE 1 TABLET BY MOUTH  DAILY WITH BREAKFAST 90 tablet 3   • ipratropium (ATROVENT) 0.06 % nasal spray      • Lancet Device misc 1 each 2 (Two) Times a Day. 1 each 0   • Lancets Misc. (Accu-Chek FastClix Lancet) kit Dispense 1 lancing device to check 5 times a day. Dx: E 1 1 kit 0   • lisinopril (PRINIVIL,ZESTRIL) 2.5 MG tablet Take 1 tablet by mouth Daily. TAKES AT LUNCHTIME     • melatonin 5 MG tablet tablet Take 1 tablet by mouth Every Night.     • Multiple Vitamins-Minerals (MULTIVITAMIN ADULT PO) Take 1 tablet by mouth Daily. HOLDING FOR SURGERY     • Omega-3 Fatty Acids (FISH OIL) 1200 MG capsule capsule Take 2,000 mg by mouth 2 (Two) Times a Day With Meals. HOLDING FOR SURGERY     • pregabalin (LYRICA) 100 MG capsule Take 1 capsule by mouth 3 (Three) Times a Day. 270 capsule 0   • rosuvastatin (CRESTOR) 10 MG tablet TAKE 1 TABLET BY MOUTH  DAILY 90 tablet 3   • Toujeo Max SoloStar 300 UNIT/ML solution pen-injector injection 98 units every morning (Patient taking differently: Inject 98 Units under the skin into the appropriate area as directed. 98 units every morning    PER MD INSTRUCTION)  29.4 mL 2   • traZODone (DESYREL) 150 MG tablet Take 2 tablets by mouth Every Night. 180 tablet 3   • vitamin D (ERGOCALCIFEROL) 1.25 MG (81716 UT) capsule capsule Take 1 capsule by mouth Every 7 (Seven) Days. HOLD FOR SURGERY     • Wheat Dextrin (BENEFIBER DRINK MIX PO) Take 2 teaspoon(s) by mouth 2 (Two) Times a Day.       No facility-administered medications prior to visit.       No opioid medication identified on active medication list. I have reviewed chart for other potential  high risk medication/s and harmful drug interactions in the elderly.          Aspirin is not on active medication list.  Aspirin use is not indicated based on review of current medical condition/s. Risk of harm outweighs potential benefits.  .    Patient Active Problem List   Diagnosis   • Gout   • Diabetic peripheral neuropathy   • Dyslipidemia   • Essential hypertension   • PVC (premature ventricular contraction)   • Vitamin D deficiency   • Benign non-nodular prostatic hyperplasia without lower urinary tract symptoms   • Osteoarthritis   • Urge incontinence   • Observed sleep apnea   • Acute gangrenous cholecystitis   • Discitis of lumbar region   • Paroxysmal atrial fibrillation (HCC)   • History of sepsis   • Hyperuricemia   • Chronic deep vein thrombosis (DVT) of right peroneal vein   • Nausea, vomiting, and diarrhea   • Colitis presumed infectious   • Urinary tract infection in male   • DDD (degenerative disc disease), lumbar   • Bilateral leg weakness   • History of lumbar spinal fusion   • Carpal tunnel syndrome   • Adhesive arachnoiditis   • Chronic anticoagulation   • Diverticulosis   • Hematuria   • Lower obstructive uropathy   • Splenic lesion   • Suprapubic catheter dysfunction   • Obesity (BMI 30-39.9)   • Venous insufficiency of left leg   • Memory difficulties   • Chronic pain syndrome   • Type 2 diabetes mellitus with peripheral neuropathy   • Encounter for long-term (current) use of high-risk medication   • Therapeutic  "opioid induced constipation   • Abnormal CT of brain   • Suprapubic catheter   • Chronic bladder pain   • Proteinuria   • Lumbar radiculopathy   • Nonrheumatic aortic valve stenosis   • Snoring   • Class 3 severe obesity due to excess calories without serious comorbidity with body mass index (BMI) of 40.0 to 44.9 in adult   • Non-restorative sleep   • Hypersomnia   • History of DVT of lower extremity   • Status post insertion of spinal cord stimulator   • S/P TAVR (transcatheter aortic valve replacement)     Advance Care Planning   Advance Care Planning     Advance Directive is on file.  ACP discussion was held with the patient during this visit. Patient has an advance directive in EMR which is still valid.      Objective    Vitals:    23 1006   BP: 113/60   Pulse: 80   Resp: 16   Temp: 98.1 °F (36.7 °C)   TempSrc: Oral   SpO2: 92%   Weight: Comment: wheelchair   Height: 177.8 cm (70\")   PainSc:   8     Estimated body mass index is 39.77 kg/m² as calculated from the following:    Height as of this encounter: 177.8 cm (70\").    Weight as of 23: 126 kg (277 lb 3.2 oz).    Class 2 Severe Obesity (BMI >=35 and <=39.9). Obesity-related health conditions include the following: hypertension, diabetes mellitus and dyslipidemias. Obesity is unchanged. BMI is is above average; BMI management plan is completed. We discussed portion control and increasing exercise.      Does the patient have evidence of cognitive impairment?   No    Lab Results   Component Value Date    CHLPL 131 2023    TRIG 164 (H) 2023    HDL 38 (L) 2023    LDL 65 2023    VLDL 28 2023    HGBA1C 6.80 (H) 2023    HGBA1C 7.20 (H) 2023          HEALTH RISK ASSESSMENT    Smoking Status:  Social History     Tobacco Use   Smoking Status Former   • Types: Cigars, Pipe   • Quit date:    • Years since quittin.3   Smokeless Tobacco Former   • Types: Chew   • Quit date:    Tobacco Comments    Caffeine daily "     Alcohol Consumption:  Social History     Substance and Sexual Activity   Alcohol Use Not Currently     Fall Risk Screen:    BREEZYADI Fall Risk Assessment was completed, and patient is at HIGH risk for falls. Assessment completed on:3/6/2023    Depression Screenin/2/2023    10:08 AM   PHQ-2/PHQ-9 Depression Screening   Little Interest or Pleasure in Doing Things 0-->not at all   Feeling Down, Depressed or Hopeless 0-->not at all   PHQ-9: Brief Depression Severity Measure Score 0       Health Habits and Functional and Cognitive Screenin/2/2023    10:00 AM   Functional & Cognitive Status   Do you have difficulty preparing food and eating? No   Do you have difficulty bathing yourself, getting dressed or grooming yourself? No   Do you have difficulty using the toilet? No   Do you have difficulty moving around from place to place? No   Do you have trouble with steps or getting out of a bed or a chair? No   Current Diet Well Balanced Diet   Dental Exam Up to date   Eye Exam Up to date   Exercise (times per week) 3 times per week   Current Exercises Include Walking   Do you need help using the phone?  No   Are you deaf or do you have serious difficulty hearing?  No   Do you need help with transportation? No   Do you need help shopping? No   Do you need help preparing meals?  No   Do you need help with housework?  No   Do you need help with laundry? No   Do you need help taking your medications? No   Do you need help managing money? No   Do you ever drive or ride in a car without wearing a seat belt? No   Have you felt unusual stress, anger or loneliness in the last month? No   Who do you live with? Spouse   If you need help, do you have trouble finding someone available to you? Yes   Have you been bothered in the last four weeks by sexual problems? No   Do you have difficulty concentrating, remembering or making decisions? No       Age-appropriate Screening Schedule:  Refer to the list below for future  screening recommendations based on patient's age, sex and/or medical conditions. Orders for these recommended tests are listed in the plan section. The patient has been provided with a written plan.    Health Maintenance   Topic Date Due   • Pneumococcal Vaccine 65+ (1 - PCV) Never done   • PROSTATE CANCER SCREENING  05/22/2019   • ZOSTER VACCINE (2 of 2) 05/02/2023 (Originally 11/19/2013)   • URINE MICROALBUMIN  05/12/2023   • INFLUENZA VACCINE  08/01/2023   • HEMOGLOBIN A1C  10/24/2023   • DIABETIC EYE EXAM  04/12/2024   • LIPID PANEL  04/24/2024   • ANNUAL WELLNESS VISIT  05/02/2024   • COLORECTAL CANCER SCREENING  07/29/2029   • COVID-19 Vaccine  Completed   • TDAP/TD VACCINES  Discontinued                  CMS Preventative Services Quick Reference  Risk Factors Identified During Encounter:    None Identified    The above risks/problems have been discussed with the patient.  Pertinent information has been shared with the patient in the After Visit Summary.    Diagnoses and all orders for this visit:    1. Medicare annual wellness visit, subsequent (Primary)    2. Right hip pain  -     Ambulatory Referral to Orthopedic Surgery    3. Bilateral hearing loss, unspecified hearing loss type  -     Ambulatory Referral to ENT (Otolaryngology)        Follow Up:   Next Medicare Wellness visit to be scheduled in 1 year.      An After Visit Summary and PPPS were made available to the patient.

## 2023-05-01 NOTE — TELEPHONE ENCOUNTER
PATIENT'S WIFE CALLED ASKING IF WE CAN SEND A REFILL OF LYRICA [employer] [facility].       TOLD HER IT MAY TAKE 24-48 HOURS

## 2023-05-01 NOTE — PROGRESS NOTES
Chief Complaint  Chief Complaint   Patient presents with   • Diabetes     Type 2: Pt doesn't have meter, does have readings, is up to date on eye exam, no hx of retinopathy, sever neuropathy.        Subjective          History of Present Illness    Jesus Beckham 82 y.o. presents for a follow-up evaluation for type 2 DM2     He has been diabetic since about 1998 and started insulin in about 2016.      Pt is on the following medications for their DM: Toujeo 100 units daily, glyambi 25-5 daily with breakfast and glipizide 10mg twice daily           Pt complains of chronic constipation due to pain medication (better), and numbness and tingling in feet, shortness of breath (better) with activity    Denies diarrhea, chest pain and vision changes    Weight loss of 3 lbs since last visit.    Pt does not have a history of diabetic retinopathy.  Last eye exam was 04/23     Pt does have a history of nephropathy.  Patient is currently taking ACE - lisinopril 2.5 mg daily     Pt does have neuropathy.  Current takes Lyrica 100mg TID for this condition which is prescribed by this office.     Pt does not have a history of CAD or CVA.  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 Goldstein and Dr. Zelaya     Last A1C in 04/23 was 6.8%    Last microalbumin in 05/22 was positive          Blood Sugars    Blood glucoses are checked 2/day.    Fasting blood glucoses: 78 - 161    Pre-meal blood glucoses: 93 - 245    Pt has no episodes of hypoglycemia.            Hyperlipidemia     Pt denies any muscle/body aches and chest pain    Pt is currently taking Crestor 10 HS and fenofibrate 145 daily    Last lipid panel in 04/23 showed Total 131, HDL 38, LDL 65 and Triglycerides 164            Vitamin D Deficiency     Current treatment includes 50,000 units weekly     Last Vit D level was 48.3 in 12/22              I have reviewed the patient's allergies, medicines, past medical hx, family hx and social  "hx.    Objective   Vital Signs:   /60   Pulse 80   Temp 97.5 °F (36.4 °C) (Temporal)   Ht 177.8 cm (70\")   Wt 126 kg (277 lb 3.2 oz) Comment: patient provided  SpO2 95%   BMI 39.77 kg/m²       Physical Exam   Physical Exam  Constitutional:       General: He is not in acute distress.     Appearance: Normal appearance. He is obese. He is not diaphoretic.   HENT:      Head: Normocephalic and atraumatic.   Eyes:      General:         Right eye: No discharge.         Left eye: No discharge.   Skin:     General: Skin is warm and dry.   Neurological:      Mental Status: He is alert.   Psychiatric:         Mood and Affect: Mood normal.         Behavior: Behavior normal.                    Results Review:   Hemoglobin A1C   Date Value Ref Range Status   04/24/2023 6.80 (H) 4.80 - 5.60 % Final     Comment:     Hemoglobin A1C Ranges:  Increased Risk for Diabetes  5.7% to 6.4%  Diabetes                     >= 6.5%  Diabetic Goal                < 7.0%     02/24/2023 7.20 (H) 4.80 - 5.60 % Final   01/27/2020 8.5 (H) 4.3 - 5.6 % Final     Comment:     (note)  A1C% Reference Range:  4.3 - 5.6  Normal range  5.7 - 6.4  Pre-diabetic -increased risk for developing diabetes mellitus.  >=6.5      Diabetic -diagnostic of diabetes mellitus.  Note: For diagnosis of diabetes in individuals without unequivocal   hyperglycemia, results should be confirmed by repeat testing.    Patients with conditions that shorten erythrocyte survival, such as   recovery from acute blood loss, hemolytic anemia, kidney disease,   or the presence of unstable hemogloblins like HbSS, HbCC, and HbSC   may yield falsely decreased HbA1c test results. Iron deficiency may   yield falsely increased HbA1c test results.     Triglycerides   Date Value Ref Range Status   04/24/2023 164 (H) 0 - 150 mg/dL Final     HDL Cholesterol   Date Value Ref Range Status   04/24/2023 38 (L) 40 - 60 mg/dL Final     LDL Chol Calc (NIH)   Date Value Ref Range Status "   04/24/2023 65 0 - 100 mg/dL Final     VLDL Cholesterol Lance   Date Value Ref Range Status   04/24/2023 28 5 - 40 mg/dL Final         Assessment and Plan {CC Problem List  Visit Diagnosis  ROS  Review (Popup)  Health Maintenance  Quality  BestPractice  Medications  SmartSets  SnapShot Encounters  Media :23  Diagnoses and all orders for this visit:    1. Type 2 diabetes mellitus with peripheral neuropathy (Primary)  -     Hemoglobin A1c; Future  -     Comprehensive Metabolic Panel; Future  -     Microalbumin / Creatinine Urine Ratio - Urine, Clean Catch; Future    A1c is great at 6.8%  Continue with Toujeo 100 units daily, glyambi 25-5 daily with breakfast and glipizide 10mg twice daily  Continue with BG checks        2. Dyslipidemia  -     Comprehensive Metabolic Panel; Future  -     Lipid Panel; Future    Total cholesterol and LDL are normal, continue with statin.    Triglycerides are elevated, decrease dietary fat and carbohydrate intake.  Continue with fenofibrate        3. Vitamin D deficiency  -     Vitamin D,25-Hydroxy; Future    Continue with supplement        RTC on 08/28/23 with Dr. Hernandez, labs prior - needs lab appointment        Follow Up     Patient was given instructions and counseling regarding her condition or for health maintenance advice. Please see specific information pulled into the AVS if appropriate.              Yasmine Fong, APRN  05/01/23

## 2023-05-02 ENCOUNTER — OFFICE VISIT (OUTPATIENT)
Dept: FAMILY MEDICINE CLINIC | Facility: CLINIC | Age: 83
End: 2023-05-02
Payer: MEDICARE

## 2023-05-02 VITALS
TEMPERATURE: 98.1 F | RESPIRATION RATE: 16 BRPM | DIASTOLIC BLOOD PRESSURE: 60 MMHG | HEIGHT: 70 IN | BODY MASS INDEX: 39.77 KG/M2 | HEART RATE: 80 BPM | SYSTOLIC BLOOD PRESSURE: 113 MMHG | OXYGEN SATURATION: 92 %

## 2023-05-02 DIAGNOSIS — Z79.4 TYPE 2 DIABETES MELLITUS WITH OTHER SPECIFIED COMPLICATION, WITH LONG-TERM CURRENT USE OF INSULIN: ICD-10-CM

## 2023-05-02 DIAGNOSIS — H91.93 BILATERAL HEARING LOSS, UNSPECIFIED HEARING LOSS TYPE: ICD-10-CM

## 2023-05-02 DIAGNOSIS — E11.69 TYPE 2 DIABETES MELLITUS WITH OTHER SPECIFIED COMPLICATION, WITH LONG-TERM CURRENT USE OF INSULIN: ICD-10-CM

## 2023-05-02 DIAGNOSIS — Z00.00 MEDICARE ANNUAL WELLNESS VISIT, SUBSEQUENT: Primary | ICD-10-CM

## 2023-05-02 DIAGNOSIS — M25.551 RIGHT HIP PAIN: ICD-10-CM

## 2023-05-02 RX ORDER — INSULIN GLARGINE 300 U/ML
100 INJECTION, SOLUTION SUBCUTANEOUS DAILY
Qty: 30 ML | Refills: 1 | Status: SHIPPED | OUTPATIENT
Start: 2023-05-02 | End: 2023-07-31

## 2023-05-02 RX ORDER — LANCETS
EACH MISCELLANEOUS
Qty: 200 EACH | Refills: 3 | Status: SHIPPED | OUTPATIENT
Start: 2023-05-02

## 2023-05-02 NOTE — PATIENT INSTRUCTIONS
Medicare Wellness  Personal Prevention Plan of Service     Date of Office Visit:    Encounter Provider:  Magdy Ricardo MD  Place of Service:  [facility]  Patient Name: Jesus Beckham  :  1940    As part of the Medicare Wellness portion of your visit today, we are providing you with this personalized preventive plan of services (PPPS). This plan is based upon recommendations of the [place] Preventive Services Task Force (USPSTF) and the Advisory Committee on Immunization Practices (ACIP).    This lists the preventive care services that should be considered, and provides dates of when you are due. Items listed as completed are up-to-date and do not require any further intervention.    Health Maintenance   Topic Date Due    Pneumococcal Vaccine 65+ (1 - PCV) Never done    PROSTATE CANCER SCREENING  2019    ZOSTER VACCINE (2 of 2) 2023 (Originally 2013)    URINE MICROALBUMIN  2023    INFLUENZA VACCINE  2023    HEMOGLOBIN A1C  10/24/2023    DIABETIC EYE EXAM  2024    LIPID PANEL  2024    ANNUAL WELLNESS VISIT  2024    COLORECTAL CANCER SCREENING  2029    COVID-19 Vaccine  Completed    TDAP/TD VACCINES  Discontinued       Orders Placed This Encounter   Procedures    Ambulatory Referral to Orthopedic Surgery     Referral Priority:   Urgent     Referral Type:   Consultation     Referral Reason:   Specialty Services Required     Referred to Provider:   Dennis Hawkins MD     Requested Specialty:   Orthopedic Surgery     Number of Visits Requested:   1    Ambulatory Referral to ENT (Otolaryngology)     Referral Priority:   Routine     Referral Type:   Consultation     Referral Reason:   Specialty Services Required     Referred to Provider:   Bertrand Reynolds MD     Requested Specialty:   Otolaryngology     Number of Visits Requested:   1       No follow-ups on file.

## 2023-05-04 ENCOUNTER — OFFICE VISIT (OUTPATIENT)
Dept: ORTHOPEDIC SURGERY | Facility: CLINIC | Age: 83
End: 2023-05-04
Payer: MEDICARE

## 2023-05-04 VITALS — HEIGHT: 70 IN | TEMPERATURE: 97.9 F | WEIGHT: 278 LBS | BODY MASS INDEX: 39.8 KG/M2

## 2023-05-04 DIAGNOSIS — R52 PAIN: Primary | ICD-10-CM

## 2023-05-04 DIAGNOSIS — Z96.651 S/P TKR (TOTAL KNEE REPLACEMENT), RIGHT: ICD-10-CM

## 2023-05-04 DIAGNOSIS — M70.61 TROCHANTERIC BURSITIS OF RIGHT HIP: ICD-10-CM

## 2023-05-04 RX ORDER — LIDOCAINE HYDROCHLORIDE 10 MG/ML
2 INJECTION, SOLUTION EPIDURAL; INFILTRATION; INTRACAUDAL; PERINEURAL
Status: COMPLETED | OUTPATIENT
Start: 2023-05-04 | End: 2023-05-04

## 2023-05-04 RX ORDER — TRIAMCINOLONE ACETONIDE 40 MG/ML
80 INJECTION, SUSPENSION INTRA-ARTICULAR; INTRAMUSCULAR
Status: COMPLETED | OUTPATIENT
Start: 2023-05-04 | End: 2023-05-04

## 2023-05-04 RX ADMIN — LIDOCAINE HYDROCHLORIDE 2 ML: 10 INJECTION, SOLUTION EPIDURAL; INFILTRATION; INTRACAUDAL; PERINEURAL at 16:05

## 2023-05-04 RX ADMIN — TRIAMCINOLONE ACETONIDE 80 MG: 40 INJECTION, SUSPENSION INTRA-ARTICULAR; INTRAMUSCULAR at 16:05

## 2023-05-04 NOTE — PROGRESS NOTES
"Patient: Jesus Beckham  YOB: 1940 82 y.o. male  Medical Record Number: [id number]    Chief Complaints:   Chief Complaint   Patient presents with   • Right Hip - Pain       History of Present Illness:Jesus Beckham is a 82 y.o. male who presents with as a new patient to our practice with multiple orthopedic complaints.  Patient reports that he is primarily having right lower extremity pain.  Specifically right hip and right knee pain.  Patient states that he has been having right hip issues off and on for the last 2 years with his symptoms progressively worsening over the last 2 to 3 months.  Patient states that he is mostly wheelchair-bound due to multiple back problems.  Patient states that he previously has had back surgeries by Dr. Barajas, she had complications to the spine.  Patient reports approximately 2 to 3 months ago he fell getting out of his chair onto the floor landing on his right hip.  Patient reports this is when he started experiencing this posterior lateral hip pain.  Patient states the pain is constant and worsens with specific movements.  States the pain is exacerbated by prolonged sitting and or laying on the side.  Patient also reports that he has right knee pain, does have a history of a total knee replacement done by Dr. Washington askew in 2005.  Patient reports he does not know whether his symptoms is all from his back or if he is having true knee and hip issues.  He is here today to discuss further treatment options.    Allergies:   Allergies   Allergen Reactions   • Levaquin [Levofloxacin] Other (See Comments)     Redness, itching at IV site with IV Levaquin administration   • Alcohol Nausea And Vomiting     \"Deathly sick, headaches, cold sweats\"  Cant take any medication that contains alcohol   • Other Other (See Comments)     Pt's wife reports that after pt's cardiac cath, pt had hallucinations and agitation with pain medications. She is unsure which medication.  "       Medications:   Current Outpatient Medications   Medication Sig Dispense Refill   • Accu-Chek FastClix Lancets misc Dx code E11.42 testing bs 2 xday 200 each 3   • acetaminophen (TYLENOL) 500 MG tablet Take 1 tablet by mouth Every 6 (Six) Hours As Needed for Mild Pain.     • allopurinol (ZYLOPRIM) 300 MG tablet Take 1 tablet by mouth Daily. 90 tablet 3   • apixaban (ELIQUIS) 5 MG tablet tablet Take 1 tablet by mouth 2 (Two) Times a Day. HOLDING PER MD INSTRUCTIONS     • Ascorbic Acid 300 MG chewable tablet Chew 300 mg Every Morning. HOLDING FOR SURGERY     • cetirizine (zyrTEC) 10 MG tablet Take 1 tablet by mouth Daily.     • diphenhydrAMINE-acetaminophen (TYLENOL PM)  MG tablet per tablet Take 2 tablets by mouth At Night As Needed for Sleep.     • donepezil (ARICEPT) 5 MG tablet Take 1 tablet by mouth every night at bedtime. 90 tablet 3   • fenofibrate (TRICOR) 145 MG tablet TAKE 1 TABLET BY MOUTH  DAILY 90 tablet 3   • fluticasone (FLONASE) 50 MCG/ACT nasal spray 2 sprays into the nostril(s) as directed by provider Daily. 48 g 3   • furosemide (LASIX) 20 MG tablet Take 1 tablet by mouth 2 (Two) Times a Day As Needed.     • Gemtesa 75 MG tablet      • glipizide (GLUCOTROL) 10 MG tablet TAKE 1 TABLET BY MOUTH  TWICE DAILY BEFORE MEALS 180 tablet 3   • Glucosamine HCl 1500 MG tablet Take 2 tablets by mouth Daily. HOLDING FOR SURGERY     • glucose blood (Accu-Chek SmartView) test strip Dx code E11.42 TEST BLOOD SUGAR 2 TIMES   each 3   • Glyxambi 25-5 MG tablet TAKE 1 TABLET BY MOUTH  DAILY WITH BREAKFAST 90 tablet 3   • Lancet Device misc 1 each 2 (Two) Times a Day. 1 each 0   • Lancets Misc. (Accu-Chek FastClix Lancet) kit Dispense 1 lancing device to check 5 times a day. Dx: E 1 1 kit 0   • lisinopril (PRINIVIL,ZESTRIL) 2.5 MG tablet Take 1 tablet by mouth Daily. TAKES AT LUNCHTIME     • melatonin 5 MG tablet tablet Take 1 tablet by mouth Every Night.     • Multiple Vitamins-Minerals (MULTIVITAMIN  "ADULT PO) Take 1 tablet by mouth Daily. HOLDING FOR SURGERY     • Omega-3 Fatty Acids (FISH OIL) 1200 MG capsule capsule Take 2,000 mg by mouth 2 (Two) Times a Day With Meals. HOLDING FOR SURGERY     • pregabalin (LYRICA) 100 MG capsule Take 1 capsule by mouth 3 (Three) Times a Day. 270 capsule 0   • rosuvastatin (CRESTOR) 10 MG tablet TAKE 1 TABLET BY MOUTH  DAILY 90 tablet 3   • Toujeo Max SoloStar 300 UNIT/ML solution pen-injector injection Inject 100 Units under the skin into the appropriate area as directed Daily for 90 days. 30 mL 1   • traZODone (DESYREL) 150 MG tablet Take 2 tablets by mouth Every Night. 180 tablet 3   • vitamin D (ERGOCALCIFEROL) 1.25 MG (64659 UT) capsule capsule Take 1 capsule by mouth Every 7 (Seven) Days. HOLD FOR SURGERY     • Wheat Dextrin (BENEFIBER DRINK MIX PO) Take 2 teaspoon(s) by mouth 2 (Two) Times a Day.     • ipratropium (ATROVENT) 0.06 % nasal spray  (Patient not taking: Reported on 5/4/2023)       No current facility-administered medications for this visit.         The following portions of the patient's history were reviewed and updated as appropriate: allergies, current medications, past family history, past medical history, past social history, past surgical history and problem list.    Review of Systems:   Pertinent positives/negative listed in HPI above    Physical Exam:   Vitals:    05/04/23 0935   Temp: 97.9 °F (36.6 °C)   Weight: 126 kg (278 lb)   Height: 177.8 cm (70\")   PainSc:   7       General: A and O x 3, ASA, NAD      Hip:  right    LEG ALIGNMENT:     Neutral   ,    equal leg lengths    GAIT: Not tested due to being in a wheelchair    SKIN:     No abnormality    RANGE OF MOTION:     Stiff hip with some joint irritability    STRENGTH:     4 / 5    hip flexion and abduction    DISTAL PULSES:    Paplable    DISTAL SENSATION :   Intact    LYMPHATICS:     No   lymphadenopathy    OTHER:          - Negative Stinchfeld test      - Negative log roll      + Very tender " to palpation trochanteric bursa         Radiology:  Xrays 2views right hip (AP bilateral hips, and lateral of the hip) ordered and reviewed for evaluation of hip pain  demonstrating  moderate joint space narrowing as well as trochanteric spurring around the greater trochanter of the right hip.  No other hip x-rays available for comparison purposes.     Xrays: 3 views(AP bilateral knees, lateral right, and sunrise bilateral knees) were ordered and reviewed for evaluation of right knee replacement. They demonstrate a well positioned, well aligned knee replacement without complicating factors noted.  Other x-rays were available for comparison purposes.    Assessment/Plan: Trochanteric bursitis right hip /primary osteoarthritis right hip /history of right total knee    Treatment options as well as imaging results were discussed in detail with the patient.  At this point in time we would like to proceed forward with conservative measures.  Patient has obvious trochanteric bursitis of the right hip and reports this is the area of concern that he is having the most specific pain.  Patient's x-rays does show that he has advanced hip arthritis which could be giving a mild contribution to his pain symptoms, specially down to the knee.  I am going to give the patient a prescription for physical therapy to work on hip fascial stretches and exercises.  Were also going to do a trochanteric bursal injection.  Patient can follow back up with me on an as-needed basis.    Large Joint Arthrocentesis: R greater trochanteric bursa  Date/Time: 5/4/2023 4:05 PM  Consent given by: patient  Site marked: site marked  Timeout: Immediately prior to procedure a time out was called to verify the correct patient, procedure, equipment, support staff and site/side marked as required   Supporting Documentation  Indications: pain   Procedure Details  Location: hip - R greater trochanteric bursa  Preparation: Patient was prepped and draped in the  usual sterile fashion  Needle size: 18 G  Approach: lateral  Medications administered: 2 mL lidocaine PF 1% 1 %; 80 mg triamcinolone acetonide 40 MG/ML  Patient tolerance: patient tolerated the procedure well with no immediate complications              Karthik Campbell, DANNY  5/4/2023

## 2023-05-16 ENCOUNTER — OFFICE VISIT (OUTPATIENT)
Dept: WOUND CARE | Facility: HOSPITAL | Age: 83
End: 2023-05-16
Payer: MEDICARE

## 2023-05-23 ENCOUNTER — TELEPHONE (OUTPATIENT)
Dept: ENDOCRINOLOGY | Age: 83
End: 2023-05-23

## 2023-05-23 RX ORDER — BLOOD-GLUCOSE METER
1 EACH MISCELLANEOUS SEE ADMIN INSTRUCTIONS
Qty: 1 KIT | Refills: 0 | Status: SHIPPED | OUTPATIENT
Start: 2023-05-23

## 2023-05-23 NOTE — TELEPHONE ENCOUNTER
Caller: Mya Beckham    Relationship: Emergency Contact    Best call back number: [phone]    Requested Prescriptions:  ACCUCHEK GUIDE     Pharmacy where request should be sent: [facility] -   [phone]  Last office visit with prescribing clinician: 9/21/2022   Last telemedicine visit with prescribing clinician: Visit date not found   Next office visit with prescribing clinician: 8/28/2023     Additional details provided by patient: [facility] HAS CALLED PT AND INFORMED THEM THAT THE ACCUCHEK WILL NO LONGER BE MANUFACTURED AND ARE REQUESTING AN RX FOR THE ACCUCHEK GUIDE AND ACCOMPANYING TEST STRIPS.     Does the patient have less than a 3 day supply:  [] Yes  [x] No    Would you like a call back once the refill request has been completed: [x] Yes [] No    If the office needs to give you a call back, can they leave a voicemail: [x] Yes [] No    Bart Nolan Rep   05/23/23 10:16 EDT

## 2023-05-25 DIAGNOSIS — R41.3 MEMORY DIFFICULTIES: Chronic | ICD-10-CM

## 2023-05-25 DIAGNOSIS — M10.00 IDIOPATHIC GOUT, UNSPECIFIED CHRONICITY, UNSPECIFIED SITE: Chronic | ICD-10-CM

## 2023-05-26 RX ORDER — ALLOPURINOL 300 MG/1
300 TABLET ORAL DAILY
Qty: 30 TABLET | Refills: 0 | Status: SHIPPED | OUTPATIENT
Start: 2023-05-26

## 2023-05-26 RX ORDER — DONEPEZIL HYDROCHLORIDE 5 MG/1
5 TABLET, FILM COATED ORAL
Qty: 30 TABLET | Refills: 0 | Status: SHIPPED | OUTPATIENT
Start: 2023-05-26

## 2023-05-26 NOTE — TELEPHONE ENCOUNTER
Rx Refill Note  Requested Prescriptions     Pending Prescriptions Disp Refills   • donepezil (ARICEPT) 5 MG tablet [Pharmacy Med Name: Donepezil HCl 5 MG Oral Tablet] 90 tablet 3     Sig: TAKE 1 TABLET BY MOUTH  EVERY NIGHT AT BEDTIME   • allopurinol (ZYLOPRIM) 300 MG tablet [Pharmacy Med Name: Allopurinol 300 MG Oral Tablet] 90 tablet 3     Sig: TAKE 1 TABLET BY MOUTH  DAILY      Last office visit with prescribing clinician: 5/2/2023   Last telemedicine visit with prescribing clinician: Visit date not found   Next office visit with prescribing clinician: 6/27/2023

## 2023-05-30 ENCOUNTER — TELEPHONE (OUTPATIENT)
Dept: ENDOCRINOLOGY | Age: 83
End: 2023-05-30

## 2023-05-30 DIAGNOSIS — E11.42 TYPE 2 DIABETES MELLITUS WITH PERIPHERAL NEUROPATHY: Primary | ICD-10-CM

## 2023-05-30 RX ORDER — BLOOD-GLUCOSE METER
KIT MISCELLANEOUS
Qty: 1 EACH | Refills: 0 | Status: SHIPPED | OUTPATIENT
Start: 2023-05-30

## 2023-05-30 RX ORDER — LANCETS 30 GAUGE
EACH MISCELLANEOUS
Qty: 300 EACH | Refills: 4 | Status: SHIPPED | OUTPATIENT
Start: 2023-05-30

## 2023-05-30 NOTE — TELEPHONE ENCOUNTER
Hub staff attempted to follow warm transfer process and was unsuccessful     Caller: ORLANDO DILLON ([facility])     Relationship to patient: PHARMACY    Best call back number: [phone] (REFERENCE #: [id number])    Patient is needing: ORLANDO DILLON FROM [facility] CALLED STATING THAT THE PT SAYS THAT THE PRESCRIPTION FOR THE ACCU-CHECK TEST STRIPS SAY THAT THE PT SHOULD TEST 2 TIMES A DAY// THE PT STATES THAT THEY TEST 3 TIMES A DAY// [facility] WOULD ALSO LIKE A NEW PRESCRIPTION WRITTEN FOR THE ACCU-CUECK GUIDE METER AND THE SOFT-CLICK LANCETS BECAUSE, PER THE PHARMACY, ACCU-CHECK NO LONGER MAKES THE [facility] VIEW METERS

## 2023-05-31 ENCOUNTER — TELEPHONE (OUTPATIENT)
Dept: CARDIOLOGY | Facility: CLINIC | Age: 83
End: 2023-05-31

## 2023-05-31 NOTE — TELEPHONE ENCOUNTER
Called and spoke to patient.  He meets with Dr. Hawkins tomorrow.  I let him know that his TAVR will not be an issue if they choose to move forward with surgery.

## 2023-05-31 NOTE — TELEPHONE ENCOUNTER
Caller: Jesus Beckham    Relationship: Self    Best call back number: [phone]    What is the best time to reach you: ANY    Who are you requesting to speak with (clinical staff, provider,  specific staff member): CLINICAL      What was the call regarding: PATIENT STATED THAT HE HAD VALVE PROCEDURE DONE 02.28.23 AND HE IS SUPPOSE TO HAVE HIP REPLACEMENT SURGERY AND HE WANTS TO KNOW IF HE CAN BE CLEARED FOR THIS SURGERY. HE GOES TO ORTHOPEDIC DOCTOR TOMORROW MORNING. PLEASE CONTACT PATIENT.     Is it okay if the provider responds through [facility]: NO, PLEASE CALL PATIENT.

## 2023-06-01 ENCOUNTER — OFFICE VISIT (OUTPATIENT)
Dept: ORTHOPEDIC SURGERY | Facility: CLINIC | Age: 83
End: 2023-06-01

## 2023-06-01 VITALS — HEIGHT: 70 IN | WEIGHT: 275 LBS | TEMPERATURE: 97.5 F | BODY MASS INDEX: 39.37 KG/M2

## 2023-06-01 DIAGNOSIS — R52 PAIN: Primary | ICD-10-CM

## 2023-06-01 PROBLEM — E78.00 PURE HYPERCHOLESTEROLEMIA: Status: ACTIVE | Noted: 2023-06-01

## 2023-06-01 PROBLEM — C80.1 MALIGNANT (PRIMARY) NEOPLASM, UNSPECIFIED: Status: ACTIVE | Noted: 2023-06-01

## 2023-06-01 PROBLEM — M19.90 ARTHRITIS: Status: ACTIVE | Noted: 2023-06-01

## 2023-06-01 PROBLEM — R00.8 OTHER ABNORMALITIES OF HEART BEAT: Status: ACTIVE | Noted: 2023-06-01

## 2023-06-01 PROBLEM — R14.0 ABDOMINAL DISTENSION (GASEOUS): Status: ACTIVE | Noted: 2023-06-01

## 2023-06-01 PROBLEM — E11.9 DIABETES MELLITUS: Status: ACTIVE | Noted: 2023-06-01

## 2023-06-01 PROBLEM — Z86.010 PERSONAL HISTORY OF COLONIC POLYPS: Status: ACTIVE | Noted: 2019-07-29

## 2023-06-01 PROBLEM — C80.1 CANCER: Status: ACTIVE | Noted: 2023-06-01

## 2023-06-01 PROBLEM — K63.5 COLON POLYPS: Status: ACTIVE | Noted: 2023-06-01

## 2023-06-01 PROBLEM — Z86.0100 PERSONAL HISTORY OF COLONIC POLYPS: Status: ACTIVE | Noted: 2019-07-29

## 2023-06-01 PROBLEM — G47.30 SLEEP APNEA: Status: ACTIVE | Noted: 2023-06-01

## 2023-06-01 PROBLEM — R15.9 FECAL INCONTINENCE: Status: ACTIVE | Noted: 2023-06-01

## 2023-06-01 PROBLEM — D12.2 BENIGN NEOPLASM OF ASCENDING COLON: Status: ACTIVE | Noted: 2019-07-29

## 2023-06-01 PROBLEM — Z79.01 LONG TERM (CURRENT) USE OF ANTICOAGULANTS: Status: ACTIVE | Noted: 2023-06-01

## 2023-06-01 PROBLEM — K59.00 CONSTIPATION: Status: ACTIVE | Noted: 2023-06-01

## 2023-06-01 PROBLEM — M54.9 BACK PAIN: Status: ACTIVE | Noted: 2023-06-01

## 2023-06-01 PROBLEM — D68.9 COAGULATION DEFECT, UNSPECIFIED: Status: ACTIVE | Noted: 2023-06-01

## 2023-06-01 PROBLEM — K64.9 UNSPECIFIED HEMORRHOIDS: Status: ACTIVE | Noted: 2023-06-01

## 2023-06-01 PROBLEM — I48.91 ATRIAL FIBRILLATION: Status: ACTIVE | Noted: 2018-03-01

## 2023-06-01 PROBLEM — M54.9 DORSALGIA, UNSPECIFIED: Status: ACTIVE | Noted: 2023-06-01

## 2023-06-01 PROBLEM — M62.9 DISORDER OF MUSCLE, UNSPECIFIED: Status: ACTIVE | Noted: 2023-06-01

## 2023-06-01 PROCEDURE — 1159F MED LIST DOCD IN RCRD: CPT | Performed by: ORTHOPAEDIC SURGERY

## 2023-06-01 PROCEDURE — 1160F RVW MEDS BY RX/DR IN RCRD: CPT | Performed by: ORTHOPAEDIC SURGERY

## 2023-06-01 PROCEDURE — 99213 OFFICE O/P EST LOW 20 MIN: CPT | Performed by: ORTHOPAEDIC SURGERY

## 2023-06-01 NOTE — PROGRESS NOTES
"Patient: Jesus Beckham  YOB: 1940 82 y.o. male  Medical Record Number: [id number]    Chief Complaints:   Chief Complaint   Patient presents with   • Right Hip - Follow-up       History of Present Illness:Jesus Beckham is a 82 y.o. male who presents with complaints of right hip pain it is posterior but primarily in the buttock.  He does have a history of chronic back issues as well.    Allergies:   Allergies   Allergen Reactions   • Levaquin [Levofloxacin] Other (See Comments)     Redness, itching at IV site with IV Levaquin administration   • Alcohol Nausea And Vomiting     \"Deathly sick, headaches, cold sweats\"  Cant take any medication that contains alcohol   • Other Other (See Comments)     Pt's wife reports that after pt's cardiac cath, pt had hallucinations and agitation with pain medications. She is unsure which medication.        Medications:   Current Outpatient Medications   Medication Sig Dispense Refill   • acetaminophen (TYLENOL) 500 MG tablet Take 1 tablet by mouth Every 6 (Six) Hours As Needed for Mild Pain.     • allopurinol (ZYLOPRIM) 300 MG tablet TAKE 1 TABLET BY MOUTH  DAILY 30 tablet 0   • apixaban (ELIQUIS) 5 MG tablet tablet Take 1 tablet by mouth 2 (Two) Times a Day. HOLDING PER MD INSTRUCTIONS     • Ascorbic Acid 300 MG chewable tablet Chew 300 mg Every Morning. HOLDING FOR SURGERY     • Blood Glucose Monitoring Suppl (Accu-Chek Guide) w/Device kit 1 kit See Admin Instructions. Dx code E11.42 TEST BLOOD SUGAR 2 TIMES  DAY    1 kit 0   • cetirizine (zyrTEC) 10 MG tablet Take 1 tablet by mouth Daily.     • diphenhydrAMINE-acetaminophen (TYLENOL PM)  MG tablet per tablet Take 2 tablets by mouth At Night As Needed for Sleep.     • donepezil (ARICEPT) 5 MG tablet TAKE 1 TABLET BY MOUTH  EVERY NIGHT AT BEDTIME 30 tablet 0   • fenofibrate (TRICOR) 145 MG tablet TAKE 1 TABLET BY MOUTH  DAILY 90 tablet 3   • fluticasone (FLONASE) 50 MCG/ACT nasal spray 2 sprays into the " nostril(s) as directed by provider Daily. 48 g 3   • furosemide (LASIX) 20 MG tablet Take 1 tablet by mouth 2 (Two) Times a Day As Needed.     • Gemtesa 75 MG tablet      • glipizide (GLUCOTROL) 10 MG tablet TAKE 1 TABLET BY MOUTH  TWICE DAILY BEFORE MEALS 180 tablet 3   • Glucosamine HCl 1500 MG tablet Take 2 tablets by mouth Daily. HOLDING FOR SURGERY     • glucose blood test strip ACCU-CUECK GUIDE test strips. Check 3 times a day Dx: E 11.42 300 each 4   • glucose monitor monitoring kit ACCU-CUECK GUIDE METER.  Testing 3 times daily. Dx: E 11.42 1 each 0   • Glyxambi 25-5 MG tablet TAKE 1 TABLET BY MOUTH  DAILY WITH BREAKFAST 90 tablet 3   • ipratropium (ATROVENT) 0.06 % nasal spray      • Lancets Misc. (Accu-Chek FastClix Lancet) kit Dispense 1 lancing device to check 5 times a day. Dx: E 1 1 kit 0   • Lancets misc SOFT-CLICK LANCETS: Check 3 times a day. Dx: E 11.42 300 each 4   • lisinopril (PRINIVIL,ZESTRIL) 2.5 MG tablet Take 1 tablet by mouth Daily. TAKES AT LUNCHTIME     • melatonin 5 MG tablet tablet Take 1 tablet by mouth Every Night.     • Multiple Vitamins-Minerals (MULTIVITAMIN ADULT PO) Take 1 tablet by mouth Daily. HOLDING FOR SURGERY     • Omega-3 Fatty Acids (FISH OIL) 1200 MG capsule capsule Take 2,000 mg by mouth 2 (Two) Times a Day With Meals. HOLDING FOR SURGERY     • pregabalin (LYRICA) 100 MG capsule Take 1 capsule by mouth 3 (Three) Times a Day. 270 capsule 0   • rosuvastatin (CRESTOR) 10 MG tablet TAKE 1 TABLET BY MOUTH  DAILY 90 tablet 3   • Toujeo Max SoloStar 300 UNIT/ML solution pen-injector injection Inject 100 Units under the skin into the appropriate area as directed Daily for 90 days. 30 mL 1   • traZODone (DESYREL) 150 MG tablet Take 2 tablets by mouth Every Night. 180 tablet 3   • vitamin D (ERGOCALCIFEROL) 1.25 MG (06684 UT) capsule capsule Take 1 capsule by mouth Every 7 (Seven) Days. HOLD FOR SURGERY     • Wheat Dextrin (BENEFIBER DRINK MIX PO) Take 2 teaspoon(s) by mouth 2  "(Two) Times a Day.     • Lancet Device misc 1 each 2 (Two) Times a Day. (Patient not taking: Reported on 6/1/2023) 1 each 0     No current facility-administered medications for this visit.         The following portions of the patient's history were reviewed and updated as appropriate: allergies, current medications, past family history, past medical history, past social history, past surgical history and problem list.    Review of Systems:   Pertinent positives/negative listed in HPI above    Physical Exam:   Vitals:    06/01/23 1059   Temp: 97.5 °F (36.4 °C)   Weight: 125 kg (275 lb)   Height: 177.8 cm (70\")   PainSc:   6   PainLoc: Hip       General: A and O x 3, ASA, NAD   Seated in a wheelchair has difficulty arising from a chair due to leg weakness.  He has pitting edema from mid tibial distal.  He does have mild soreness with flexion internal rotation of the right hip          Radiology:  Xrays 2views right hip (AP bilateral hips, and lateral of the hip) ordered previously and reviewed for evaluation of hip pain  demonstrating  moderate joint space narrowing and peripheral osteophyte formation    Assessment/Plan: Moderate right hip osteoarthritis.  I do not think this is entirely the source of his discomfort.  Very likely that he has a large component of his pain which results from lumbar radiculopathy.  He is been followed by spine surgeon but felt to be not a good candidate for surgical intervention.  I recommended that he follow back up with a spine specialist as I have a low suspicion of hip involvement contributing to this significant posterior hip and low back pain.      There are no diagnoses linked to this encounter.    Dennis Hawkins MD  6/1/2023  "

## 2023-06-13 ENCOUNTER — OFFICE VISIT (OUTPATIENT)
Dept: WOUND CARE | Facility: HOSPITAL | Age: 83
End: 2023-06-13
Payer: MEDICARE

## 2023-06-13 ENCOUNTER — LAB REQUISITION (OUTPATIENT)
Dept: LAB | Facility: HOSPITAL | Age: 83
End: 2023-06-13
Payer: MEDICARE

## 2023-06-13 DIAGNOSIS — E11.621 TYPE 2 DIABETES MELLITUS WITH FOOT ULCER (CODE): ICD-10-CM

## 2023-06-13 PROCEDURE — 87147 CULTURE TYPE IMMUNOLOGIC: CPT | Performed by: SURGERY

## 2023-06-13 PROCEDURE — G0463 HOSPITAL OUTPT CLINIC VISIT: HCPCS

## 2023-06-13 PROCEDURE — 87075 CULTR BACTERIA EXCEPT BLOOD: CPT | Performed by: SURGERY

## 2023-06-13 PROCEDURE — 87070 CULTURE OTHR SPECIMN AEROBIC: CPT | Performed by: SURGERY

## 2023-06-13 PROCEDURE — 87186 SC STD MICRODIL/AGAR DIL: CPT | Performed by: SURGERY

## 2023-06-13 PROCEDURE — 87077 CULTURE AEROBIC IDENTIFY: CPT | Performed by: SURGERY

## 2023-06-13 PROCEDURE — 87205 SMEAR GRAM STAIN: CPT | Performed by: SURGERY

## 2023-06-17 DIAGNOSIS — M10.00 IDIOPATHIC GOUT, UNSPECIFIED CHRONICITY, UNSPECIFIED SITE: Chronic | ICD-10-CM

## 2023-06-17 DIAGNOSIS — R41.3 MEMORY DIFFICULTIES: Chronic | ICD-10-CM

## 2023-06-17 LAB
BACTERIA SPEC AEROBE CULT: ABNORMAL
GRAM STN SPEC: ABNORMAL

## 2023-06-18 LAB — BACTERIA SPEC ANAEROBE CULT: NORMAL

## 2023-06-19 RX ORDER — ALLOPURINOL 300 MG/1
300 TABLET ORAL DAILY
Qty: 30 TABLET | Refills: 0 | Status: SHIPPED | OUTPATIENT
Start: 2023-06-19

## 2023-06-19 RX ORDER — DONEPEZIL HYDROCHLORIDE 5 MG/1
5 TABLET, FILM COATED ORAL
Qty: 30 TABLET | Refills: 0 | Status: SHIPPED | OUTPATIENT
Start: 2023-06-19

## 2023-08-01 ENCOUNTER — OFFICE VISIT (OUTPATIENT)
Dept: WOUND CARE | Facility: HOSPITAL | Age: 83
End: 2023-08-01
Payer: MEDICARE

## 2023-08-14 DIAGNOSIS — E11.42 DIABETIC PERIPHERAL NEUROPATHY: ICD-10-CM

## 2023-08-14 RX ORDER — PREGABALIN 100 MG/1
100 CAPSULE ORAL 3 TIMES DAILY
Qty: 270 CAPSULE | Refills: 0 | Status: SHIPPED | OUTPATIENT
Start: 2023-08-14

## 2023-08-14 NOTE — TELEPHONE ENCOUNTER
Caller: Jesus Beckham    Relationship: Self    Best call back number: [phone]    Requested Prescriptions:   Requested Prescriptions     Pending Prescriptions Disp Refills    pregabalin (LYRICA) 100 MG capsule 270 capsule 0     Sig: Take 1 capsule by mouth 3 (Three) Times a Day.        Pharmacy where request should be sent:  [facility]    Last office visit with prescribing clinician: 9/21/2022   Last telemedicine visit with prescribing clinician: Visit date not found   Next office visit with prescribing clinician: 8/28/2023     Additional details provided by patient:     Does the patient have less than a 3 day supply:  [x] Yes  [] No    Would you like a call back once the refill request has been completed: [x] Yes [] No    If the office needs to give you a call back, can they leave a voicemail: [x] Yes [] No    Bart Dumas Rep   08/14/23 08:47 EDT

## 2023-08-15 DIAGNOSIS — E11.42 TYPE 2 DIABETES MELLITUS WITH PERIPHERAL NEUROPATHY: Primary | ICD-10-CM

## 2023-08-15 RX ORDER — PREGABALIN 100 MG/1
100 CAPSULE ORAL 3 TIMES DAILY
Qty: 15 CAPSULE | Refills: 0 | Status: SHIPPED | OUTPATIENT
Start: 2023-08-15 | End: 2024-08-14

## 2023-08-17 NOTE — TELEPHONE ENCOUNTER
Caller: Mya Beckham    Relationship to patient: Emergency Contact    Best call back number: [phone]     Patient is needing: PT SPOUSE CALLED STATING [facility] HAS NOT RECEIVED THIS REFILL OR EMERGENCY AMOUNT. PT NEEDS THIS MEDICATION ASAP. PT IS OUT AND NEEDS IT SENT TO THE [facility] ON FILE. PLEASE CHECK AND ADVISE.

## 2023-08-22 ENCOUNTER — OFFICE VISIT (OUTPATIENT)
Dept: WOUND CARE | Facility: HOSPITAL | Age: 83
End: 2023-08-22
Payer: MEDICARE

## 2023-08-22 PROCEDURE — G0463 HOSPITAL OUTPT CLINIC VISIT: HCPCS

## 2023-08-28 ENCOUNTER — OFFICE VISIT (OUTPATIENT)
Dept: ENDOCRINOLOGY | Age: 83
End: 2023-08-28
Payer: MEDICARE

## 2023-08-28 ENCOUNTER — TELEPHONE (OUTPATIENT)
Dept: FAMILY MEDICINE CLINIC | Facility: CLINIC | Age: 83
End: 2023-08-28
Payer: MEDICARE

## 2023-08-28 VITALS
HEART RATE: 72 BPM | OXYGEN SATURATION: 95 % | TEMPERATURE: 95.4 F | SYSTOLIC BLOOD PRESSURE: 102 MMHG | HEIGHT: 70 IN | BODY MASS INDEX: 39.6 KG/M2 | DIASTOLIC BLOOD PRESSURE: 70 MMHG

## 2023-08-28 DIAGNOSIS — I10 ESSENTIAL HYPERTENSION: ICD-10-CM

## 2023-08-28 DIAGNOSIS — M46.46 DISCITIS OF LUMBAR REGION: ICD-10-CM

## 2023-08-28 DIAGNOSIS — E11.42 TYPE 2 DIABETES MELLITUS WITH PERIPHERAL NEUROPATHY: Primary | ICD-10-CM

## 2023-08-28 DIAGNOSIS — Z93.59 SUPRAPUBIC CATHETER: ICD-10-CM

## 2023-08-28 DIAGNOSIS — Z98.1 HISTORY OF LUMBAR SPINAL FUSION: ICD-10-CM

## 2023-08-28 DIAGNOSIS — E11.42 DIABETIC PERIPHERAL NEUROPATHY: ICD-10-CM

## 2023-08-28 DIAGNOSIS — G47.33 OBSTRUCTIVE SLEEP APNEA SYNDROME: ICD-10-CM

## 2023-08-28 DIAGNOSIS — Z86.718 HISTORY OF DVT OF LOWER EXTREMITY: ICD-10-CM

## 2023-08-28 DIAGNOSIS — E78.5 DYSLIPIDEMIA: ICD-10-CM

## 2023-08-28 DIAGNOSIS — M19.90 ARTHRITIS: ICD-10-CM

## 2023-08-28 DIAGNOSIS — Z95.2 S/P TAVR (TRANSCATHETER AORTIC VALVE REPLACEMENT): ICD-10-CM

## 2023-08-28 DIAGNOSIS — C80.1 CANCER: Primary | ICD-10-CM

## 2023-08-28 PROBLEM — G47.30 OBSERVED SLEEP APNEA: Status: RESOLVED | Noted: 2018-02-23 | Resolved: 2023-08-28

## 2023-08-28 PROCEDURE — 99214 OFFICE O/P EST MOD 30 MIN: CPT | Performed by: INTERNAL MEDICINE

## 2023-08-28 PROCEDURE — 3078F DIAST BP <80 MM HG: CPT | Performed by: INTERNAL MEDICINE

## 2023-08-28 PROCEDURE — 3074F SYST BP LT 130 MM HG: CPT | Performed by: INTERNAL MEDICINE

## 2023-08-28 RX ORDER — PREGABALIN 150 MG/1
CAPSULE ORAL
Qty: 270 CAPSULE | Refills: 1 | Status: SHIPPED | OUTPATIENT
Start: 2023-08-28

## 2023-08-28 RX ORDER — OXYCODONE HYDROCHLORIDE AND ACETAMINOPHEN 5; 325 MG/1; MG/1
1 TABLET ORAL EVERY 6 HOURS PRN
COMMUNITY

## 2023-08-28 NOTE — LETTER
August 28, 2023     Magdy Ricardo MD  [address]  [address]  [facility] [address]    Patient: Jesus Beckham   YOB: 1940   Date of Visit: 8/28/2023     Dear Magdy Ricardo MD:       Thank you for referring Jesus Beckham to me for evaluation. Below are the relevant portions of my assessment and plan of care.    If you have questions, please do not hesitate to call me. I look forward to following Jesus along with you.         Sincerely,        Lito Hernandez MD        CC: MD Tasha Rowell MD Rajanna B Ramaswamy, MD Ramsey Kevin Majzoub, MD Yson, Lito PEACE MD  08/28/23 1626  Sign when Signing Visit  Subjective  Jesus Beckham is a 82 y.o. male.     History of Present Illness        He has known diabetes mellitus since 1998 and started on insulin in 2016.     He is on Toujeo 100 units every morning, Glyxambi 25/5 mg once a day and glipizide 10 mg twice a day.  He checks his blood sugar twice a day and fasting glucose runs .  Suppertime glucose .  He has few hypoglycemic episodes.  He denies any significant weight change since 5/23.  Hemoglobin A1c done in 8/23 is 6.7%.     His last eye examination was in 5/23.  He has no retinopathy.  Urine microalbumin was elevated in May 2022 (urine was collected from a suprapubic catheter).  He is on lisinopril.  He has tingling, burning and pain in his toes and hands despite being on Lyrica 100 mg 3 times daily.  He has neurostimulator in place.    He has chronic wound on both feet and follows at [facility].  He is being followed by Dr. Barber.     He has hyperlipidemia and is on Crestor 10 mg/day, fenofibrate 145 mg/day and fish oil 1 capsule once a day.  He denies myalgia.  Lipid panel done in are as follows: Cholesterol 135.  HDL 37.  LDL 69.  Triglycerides 170.     He has vitamin D deficiency and is on ergocalciferol 50,000 units once a week.  25 hydroxy vitamin D done in August 2023 is normal at  "37.4 ng/ML.     He had transcatheter aortic valve replacement for aortic stenosis in February 2022.  He has history of paroxysmal atrial fibrillation.   He has SOB after walking 200 steps.     He had of DVT of the lower extremities and is on Eliquis prescriber Dr. Burr.  He had pulmonary embolism in the past and had a vena caval filter placement which was later removed.    He has sleep apnea and is unable to tolerate CPAP.  He was last seen by Dr. Dempsey in August 2022.  He wakes up rested.    He has urinary bladder cancer and has suprapubic catheter.  He gets intermittent Botox injection.  He follows with Dr. Saleh.       The following portions of the patient's history were reviewed and updated as appropriate: allergies, current medications, past family history, past medical history, past social history, past surgical history, and problem list.    Review of Systems   Respiratory:  Positive for shortness of breath.    Cardiovascular:  Positive for leg swelling. Negative for chest pain and palpitations.   Gastrointestinal:  Positive for constipation. Negative for anal bleeding and blood in stool.   Endocrine: Negative for cold intolerance and heat intolerance.   Genitourinary:  Negative for hematuria.   Musculoskeletal:  Negative for myalgias.   Neurological:  Positive for numbness.   Vitals:    08/28/23 0914   BP: 102/70   Pulse: 72   Temp: 95.4 øF (35.2 øC)   TempSrc: Temporal   SpO2: 95%   Height: 177.8 cm (70\")      Objective  Physical Exam  Constitutional:       Appearance: He is obese.   Eyes:      General: No scleral icterus.        Right eye: No discharge.         Left eye: No discharge.   Neck:      Vascular: No carotid bruit.   Cardiovascular:      Rate and Rhythm: Normal rate and regular rhythm.      Heart sounds: Normal heart sounds. No murmur heard.  Pulmonary:      Effort: No respiratory distress.      Breath sounds: Normal breath sounds. No stridor.   Abdominal:      General: Bowel sounds are " normal.      Palpations: Abdomen is soft.      Tenderness: There is no right CVA tenderness or left CVA tenderness.   Musculoskeletal:      Right lower leg: No edema.      Left lower leg: No edema.      Comments: ACE bandages on both legs and was not examined.   Lymphadenopathy:      Cervical: No cervical adenopathy.   Neurological:      Mental Status: He is oriented to person, place, and time.     Results Encounter on 08/20/2023   Component Date Value Ref Range Status    Hemoglobin A1C 08/21/2023 6.7 (H)  4.8 - 5.6 % Final    Comment:          Prediabetes: 5.7 - 6.4           Diabetes: >6.4           Glycemic control for adults with diabetes: <7.0      Glucose 08/21/2023 139 (H)  70 - 99 mg/dL Final    BUN 08/21/2023 21  8 - 27 mg/dL Final    Creatinine 08/21/2023 1.19  0.76 - 1.27 mg/dL Final    EGFR Result 08/21/2023 61  >59 mL/min/1.73 Final    BUN/Creatinine Ratio 08/21/2023 18  10 - 24 Final    Sodium 08/21/2023 142  134 - 144 mmol/L Final    Potassium 08/21/2023 4.4  3.5 - 5.2 mmol/L Final    Chloride 08/21/2023 105  96 - 106 mmol/L Final    Total CO2 08/21/2023 24  20 - 29 mmol/L Final    Calcium 08/21/2023 9.0  8.6 - 10.2 mg/dL Final    Total Protein 08/21/2023 6.2  6.0 - 8.5 g/dL Final    Albumin 08/21/2023 4.0  3.7 - 4.7 g/dL Final    Globulin 08/21/2023 2.2  1.5 - 4.5 g/dL Final    A/G Ratio 08/21/2023 1.8  1.2 - 2.2 Final    Total Bilirubin 08/21/2023 0.3  0.0 - 1.2 mg/dL Final    Alkaline Phosphatase 08/21/2023 66  44 - 121 IU/L Final    AST (SGOT) 08/21/2023 25  0 - 40 IU/L Final    ALT (SGPT) 08/21/2023 30  0 - 44 IU/L Final    Total Cholesterol 08/21/2023 135  100 - 199 mg/dL Final    Triglycerides 08/21/2023 170 (H)  0 - 149 mg/dL Final    HDL Cholesterol 08/21/2023 37 (L)  >39 mg/dL Final    VLDL Cholesterol Lance 08/21/2023 29  5 - 40 mg/dL Final    LDL Chol Calc (NIH) 08/21/2023 69  0 - 99 mg/dL Final    25 Hydroxy, Vitamin D 08/21/2023 37.4  30.0 - 100.0 ng/mL Final     Comment: Vitamin D deficiency has been defined by the [place] of  Medicine and an Endocrine Society practice guideline as a  level of serum 25-OH vitamin D less than 20 ng/mL (1,2).  The Endocrine Society went on to further define vitamin D  insufficiency as a level between 21 and 29 ng/mL (2).  1. IOM ([place] of Medicine). 2010. Dietary reference     intakes for calcium and D. [place]: The     National Academies Press.  2. Abena MF, Janice AGOSTO, Diane DIMAS, et al.     Evaluation, treatment, and prevention of vitamin D     deficiency: an Endocrine Society clinical practice     guideline. JCEM. 2011 Jul; 96(7):1911-30.      Interpretation 08/21/2023 Note   Final    Supplemental report is available.    Unable to Void 08/21/2023 Comment   Final    Comment: The patient was not able to render a urine sample and has been  instructed to return for a urine collection at their earliest  convenience.  The urine testing that you have requested has  been deleted from this report.  When the patient returns and  provides a urine specimen, the urine testing will be performed  and separately reported.      Specimen Status 08/21/2023 CANCELED   Final-Edited    Comment: Test not performed. Patient was unable to provide a self-collected  specimen for the requested testing. The following test(s) were not  performed:        TEST:  [id number]  Albumin/Creatinine Ratio,Urine    Result canceled by the ancillary.       Assessment & Plan  Diagnoses and all orders for this visit:    1. 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 Dyslipidemia    3. Essential hypertension    4. History of DVT of lower extremity    5. S/P TAVR (transcatheter aortic valve replacement)    6. Obstructive sleep apnea syndrome    7. Suprapubic catheter    8. History of lumbar spinal fusion    9. Diabetic peripheral neuropathy  -     pregabalin (LYRICA) 150 MG capsule; 1 capsule 3 times daily  Indications: Diabetes with Nerve  Disease, Neuropathic Pain  Dispense: 270 capsule; Refill: 1      Continue Toujeo 100 units every morning, Glyxambi 25/5 mg daily, and glipizide 10 mg twice a day.  May take Lyrica 100 mg 1 capsule in the morning, 1 capsule every afternoon, and 2 capsules in the evening until current supply runs out and then switch to 150 mg 3 times a day.    Follow-up with plastic surgery.    Continue Crestor 10 mg/day, fenofibrate 145 mg/day and fish oil 1 capsule twice a day.    Continue ergocalciferol 50,000 units weekly.    Follow-up with cardiology as scheduled.    Advised to follow-up with Dr. Dempsey.    Flu vaccine this fall.    Copy of my note sent to Dr. Ricardo, Dr. Dempsey, Dr. Burr, Dr. Saleh, and Dr. Barber.    RTC 4 mos with JONH Fong NP and with me in 8 mos.  Total time 72 minutes

## 2023-08-28 NOTE — PROGRESS NOTES
Subjective   Jesus Beckham is a 82 y.o. male.     History of Present Illness        He has known diabetes mellitus since 1998 and started on insulin in 2016.     He is on Toujeo 100 units every morning, Glyxambi 25/5 mg once a day and glipizide 10 mg twice a day.  He checks his blood sugar twice a day and fasting glucose runs .  Suppertime glucose .  He has few hypoglycemic episodes.  He denies any significant weight change since 5/23.  Hemoglobin A1c done in 8/23 is 6.7%.     His last eye examination was in 5/23.  He has no retinopathy.  Urine microalbumin was elevated in May 2022 (urine was collected from a suprapubic catheter).  He is on lisinopril.  He has tingling, burning and pain in his toes and hands despite being on Lyrica 100 mg 3 times daily.  He has neurostimulator in place.    He has chronic wound on both feet and follows at [facility].  He is being followed by Dr. Barber.     He has hyperlipidemia and is on Crestor 10 mg/day, fenofibrate 145 mg/day and fish oil 1 capsule once a day.  He denies myalgia.  Lipid panel done in are as follows: Cholesterol 135.  HDL 37.  LDL 69.  Triglycerides 170.     He has vitamin D deficiency and is on ergocalciferol 50,000 units once a week.  25 hydroxy vitamin D done in August 2023 is normal at 37.4 ng/ML.     He had transcatheter aortic valve replacement for aortic stenosis in February 2022.  He has history of paroxysmal atrial fibrillation.   He has SOB after walking 200 steps.     He had of DVT of the lower extremities and is on Eliquis prescriber Dr. Burr.  He had pulmonary embolism in the past and had a vena caval filter placement which was later removed.    He has sleep apnea and is unable to tolerate CPAP.  He was last seen by Dr. Dempsey in August 2022.  He wakes up rested.    He has urinary bladder cancer and has suprapubic catheter.  He gets intermittent Botox injection.  He follows with Dr. Saleh.       The following portions of  "the patient's history were reviewed and updated as appropriate: allergies, current medications, past family history, past medical history, past social history, past surgical history, and problem list.    Review of Systems   Respiratory:  Positive for shortness of breath.    Cardiovascular:  Positive for leg swelling. Negative for chest pain and palpitations.   Gastrointestinal:  Positive for constipation. Negative for anal bleeding and blood in stool.   Endocrine: Negative for cold intolerance and heat intolerance.   Genitourinary:  Negative for hematuria.   Musculoskeletal:  Negative for myalgias.   Neurological:  Positive for numbness.   Vitals:    08/28/23 0914   BP: 102/70   Pulse: 72   Temp: 95.4 øF (35.2 øC)   TempSrc: Temporal   SpO2: 95%   Height: 177.8 cm (70\")      Objective   Physical Exam  Constitutional:       Appearance: He is obese.   Eyes:      General: No scleral icterus.        Right eye: No discharge.         Left eye: No discharge.   Neck:      Vascular: No carotid bruit.   Cardiovascular:      Rate and Rhythm: Normal rate and regular rhythm.      Heart sounds: Normal heart sounds. No murmur heard.  Pulmonary:      Effort: No respiratory distress.      Breath sounds: Normal breath sounds. No stridor.   Abdominal:      General: Bowel sounds are normal.      Palpations: Abdomen is soft.      Tenderness: There is no right CVA tenderness or left CVA tenderness.   Musculoskeletal:      Right lower leg: No edema.      Left lower leg: No edema.      Comments: ACE bandages on both legs and was not examined.   Lymphadenopathy:      Cervical: No cervical adenopathy.   Neurological:      Mental Status: He is oriented to person, place, and time.     Results Encounter on 08/20/2023   Component Date Value Ref Range Status    Hemoglobin A1C 08/21/2023 6.7 (H)  4.8 - 5.6 % Final    Comment:          Prediabetes: 5.7 - 6.4           Diabetes: >6.4           Glycemic control for adults with diabetes: <7.0      " Glucose 08/21/2023 139 (H)  70 - 99 mg/dL Final    BUN 08/21/2023 21  8 - 27 mg/dL Final    Creatinine 08/21/2023 1.19  0.76 - 1.27 mg/dL Final    EGFR Result 08/21/2023 61  >59 mL/min/1.73 Final    BUN/Creatinine Ratio 08/21/2023 18  10 - 24 Final    Sodium 08/21/2023 142  134 - 144 mmol/L Final    Potassium 08/21/2023 4.4  3.5 - 5.2 mmol/L Final    Chloride 08/21/2023 105  96 - 106 mmol/L Final    Total CO2 08/21/2023 24  20 - 29 mmol/L Final    Calcium 08/21/2023 9.0  8.6 - 10.2 mg/dL Final    Total Protein 08/21/2023 6.2  6.0 - 8.5 g/dL Final    Albumin 08/21/2023 4.0  3.7 - 4.7 g/dL Final    Globulin 08/21/2023 2.2  1.5 - 4.5 g/dL Final    A/G Ratio 08/21/2023 1.8  1.2 - 2.2 Final    Total Bilirubin 08/21/2023 0.3  0.0 - 1.2 mg/dL Final    Alkaline Phosphatase 08/21/2023 66  44 - 121 IU/L Final    AST (SGOT) 08/21/2023 25  0 - 40 IU/L Final    ALT (SGPT) 08/21/2023 30  0 - 44 IU/L Final    Total Cholesterol 08/21/2023 135  100 - 199 mg/dL Final    Triglycerides 08/21/2023 170 (H)  0 - 149 mg/dL Final    HDL Cholesterol 08/21/2023 37 (L)  >39 mg/dL Final    VLDL Cholesterol Lance 08/21/2023 29  5 - 40 mg/dL Final    LDL Chol Calc (NIH) 08/21/2023 69  0 - 99 mg/dL Final    25 Hydroxy, Vitamin D 08/21/2023 37.4  30.0 - 100.0 ng/mL Final    Comment: Vitamin D deficiency has been defined by the [place] of  Medicine and an Endocrine Society practice guideline as a  level of serum 25-OH vitamin D less than 20 ng/mL (1,2).  The Endocrine Society went on to further define vitamin D  insufficiency as a level between 21 and 29 ng/mL (2).  1. IOM ([place] of Medicine). 2010. Dietary reference     intakes for calcium and D. [place]: The     National Academies Press.  2. Abena MISHRA, Janice AGOSTO, Diane DIMAS, et al.     Evaluation, treatment, and prevention of vitamin D     deficiency: an Endocrine Society clinical practice     guideline. JCEM. 2011 Jul; 96(7):1911-30.      Interpretation 08/21/2023 Note   Final     Supplemental report is available.    Unable to Void 08/21/2023 Comment   Final    Comment: The patient was not able to render a urine sample and has been  instructed to return for a urine collection at their earliest  convenience.  The urine testing that you have requested has  been deleted from this report.  When the patient returns and  provides a urine specimen, the urine testing will be performed  and separately reported.      Specimen Status 08/21/2023 CANCELED   Final-Edited    Comment: Test not performed. Patient was unable to provide a self-collected  specimen for the requested testing. The following test(s) were not  performed:        TEST:  [id number]  Albumin/Creatinine Ratio,Urine    Result canceled by the ancillary.       Assessment & Plan   Diagnoses and all orders for this visit:    1. 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 Dyslipidemia    3. Essential hypertension    4. History of DVT of lower extremity    5. S/P TAVR (transcatheter aortic valve replacement)    6. Obstructive sleep apnea syndrome    7. Suprapubic catheter    8. History of lumbar spinal fusion    9. Diabetic peripheral neuropathy  -     pregabalin (LYRICA) 150 MG capsule; 1 capsule 3 times daily  Indications: Diabetes with Nerve Disease, Neuropathic Pain  Dispense: 270 capsule; Refill: 1      Continue Toujeo 100 units every morning, Glyxambi 25/5 mg daily, and glipizide 10 mg twice a day.  May take Lyrica 100 mg 1 capsule in the morning, 1 capsule every afternoon, and 2 capsules in the evening until current supply runs out and then switch to 150 mg 3 times a day.    Follow-up with plastic surgery.    Continue Crestor 10 mg/day, fenofibrate 145 mg/day and fish oil 1 capsule twice a day.    Continue ergocalciferol 50,000 units weekly.    Follow-up with cardiology as scheduled.    Advised to follow-up with Dr. Dempsey.    Flu vaccine this fall.    Copy of my note sent to Dr. Ricardo, Dr. Dempsey,   Leno, Dr. Saleh, and Dr. Barber.    RTC 4 mos with JONH Fong NP and with me in 8 mos.  Total time 72 minutes

## 2023-08-28 NOTE — TELEPHONE ENCOUNTER
Caller: Jesus Beckham    Relationship: Self    Best call back number:     [phone]       Requested Prescriptions: LIFT CHAIR         Pharmacy where request should be sent:  PATIENT IS CALLING TO REQUEST AN ORDER FOR A LIFT CHAIR TO BE FAXED TO [facility] [address] [employer].   FAX #[phone]  Last office visit with prescribing clinician: 6/27/2023   Last telemedicine visit with prescribing clinician: Visit date not found   Next office visit with prescribing clinician: Visit date not found         Does the patient have less than a 3 day supply:  [x] Yes  [] No    Would you like a call back once the refill request has been completed: [] Yes [] No    If the office needs to give you a call back, can they leave a voicemail: [] Yes [] No    Bart Higuera Rep   08/28/23 14:09 EDT     PLEASE ADVISE.

## 2023-08-29 ENCOUNTER — TELEPHONE (OUTPATIENT)
Dept: FAMILY MEDICINE CLINIC | Facility: CLINIC | Age: 83
End: 2023-08-29

## 2023-08-29 NOTE — TELEPHONE ENCOUNTER
This pt called regarding a prescription for a medical lift chair from [facility]    Fax  [phone]: Jessica Mendenhall      Please advise    Requesting  a call back

## 2023-08-29 NOTE — TELEPHONE ENCOUNTER
ORDERS HAVE BEEN FAXED FOR LIFT CHAIR ALONG WITH [employer] TO    Fax  [phone]: Jessica Mendenhall

## 2023-08-30 ENCOUNTER — TELEPHONE (OUTPATIENT)
Dept: FAMILY MEDICINE CLINIC | Facility: CLINIC | Age: 83
End: 2023-08-30

## 2023-09-19 ENCOUNTER — OFFICE VISIT (OUTPATIENT)
Dept: WOUND CARE | Facility: HOSPITAL | Age: 83
End: 2023-09-19
Payer: MEDICARE

## 2023-09-19 PROCEDURE — 17250 CHEM CAUT OF GRANLTJ TISSUE: CPT

## 2023-10-08 ENCOUNTER — HOSPITAL ENCOUNTER (EMERGENCY)
Facility: HOSPITAL | Age: 83
Discharge: HOME | End: 2023-10-08
Attending: EMERGENCY MEDICINE | Admitting: EMERGENCY MEDICINE
Payer: MEDICARE

## 2023-10-08 ENCOUNTER — APPOINTMENT (OUTPATIENT)
Dept: GENERAL RADIOLOGY | Facility: HOSPITAL | Age: 83
End: 2023-10-08
Payer: MEDICARE

## 2023-10-08 ENCOUNTER — APPOINTMENT (OUTPATIENT)
Dept: CT IMAGING | Facility: HOSPITAL | Age: 83
End: 2023-10-08
Payer: MEDICARE

## 2023-10-08 VITALS
TEMPERATURE: 97.5 F | BODY MASS INDEX: 39.51 KG/M2 | HEART RATE: 75 BPM | SYSTOLIC BLOOD PRESSURE: 140 MMHG | OXYGEN SATURATION: 94 % | RESPIRATION RATE: 18 BRPM | DIASTOLIC BLOOD PRESSURE: 74 MMHG | WEIGHT: 276 LBS | HEIGHT: 70 IN

## 2023-10-08 DIAGNOSIS — M53.3 COCCYX PAIN: ICD-10-CM

## 2023-10-08 DIAGNOSIS — M25.521 RIGHT ELBOW PAIN: Primary | ICD-10-CM
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
## 2023-10-08 PROCEDURE — 73070 X-RAY EXAM OF ELBOW: CPT

## 2023-10-08 PROCEDURE — 99284 EMERGENCY DEPT VISIT MOD MDM: CPT

## 2023-10-08 PROCEDURE — 72131 CT LUMBAR SPINE W/O DYE: CPT

## 2023-10-08 RX ORDER — MECLIZINE HCL 12.5 MG/1
12.5 TABLET ORAL 3 TIMES DAILY PRN
Qty: 30 TABLET | Refills: 0 | Status: SHIPPED | OUTPATIENT
Start: 2023-10-08

## 2023-10-08 RX ORDER — OXYCODONE HYDROCHLORIDE AND ACETAMINOPHEN 5; 325 MG/1; MG/1
1 TABLET ORAL ONCE
Status: COMPLETED | OUTPATIENT
Start: 2023-10-08 | End: 2023-10-08

## 2023-10-08 RX ORDER — ACETAMINOPHEN 500 MG
1000 TABLET ORAL ONCE
Status: COMPLETED | OUTPATIENT
Start: 2023-10-08 | End: 2023-10-08

## 2023-10-08 RX ORDER — METHOCARBAMOL 500 MG/1
500 TABLET, FILM COATED ORAL ONCE
Status: COMPLETED | OUTPATIENT
Start: 2023-10-08 | End: 2023-10-08

## 2023-10-08 RX ADMIN — OXYCODONE HYDROCHLORIDE AND ACETAMINOPHEN 1 TABLET: 5; 325 TABLET ORAL at 11:05

## 2023-10-08 RX ADMIN — METHOCARBAMOL TABLETS 500 MG: 500 TABLET, COATED ORAL at 10:44

## 2023-10-08 RX ADMIN — ACETAMINOPHEN 1000 MG: 500 TABLET ORAL at 10:37

## 2023-10-08 NOTE — DISCHARGE INSTRUCTIONS
Follow-up with primary care provider.  Take all home medications as prescribed.  Consider physical therapy to help with strengthening.  Return to emergency department for any worsening symptoms.

## 2023-10-08 NOTE — ED PROVIDER NOTES
EMERGENCY DEPARTMENT ENCOUNTER    Room Number:  08/08  PCP: Magdy Ricardo MD  Discussed/ obtained information from independent historians: EMS, family at bedside      HPI:  Chief Complaint: Fall    Context: Jesus Beckham is a 82 y.o. male who presents to the ED c/o fall.  Patient reports he was attempting to get up out of his easy chair to his walker.  He reports his legs gave out and he fell onto his butt.  He also hit his right elbow.  He denies head injury or LOC.  Patient was previously anticoagulated on Eliquis, reports he has been out of this for the past 2 months.  He reports he has had chronic issues with intermittent dizziness.  Patient denies headache, neck pain, chest pain, dyspnea, abdominal pain, or any other systemic complaint.      External (non-ED) record review:   Patient seen in the [facility] center on 9/19/2023.  Unable to load note to review assessment and plan.  Reviewed prior laboratory studies.  Most recent CMP with creatinine 1.19.  Most recent hemoglobin A1c 6.7.      PAST MEDICAL HISTORY  Active Ambulatory Problems     Diagnosis Date Noted    Gout 04/01/2016    Diabetic peripheral neuropathy 04/01/2016    Dyslipidemia 04/01/2016    Essential hypertension 07/29/2016    PVC (premature ventricular contraction) 10/26/2016    Vitamin D deficiency 04/28/2017    Benign non-nodular prostatic hyperplasia without lower urinary tract symptoms 04/28/2017    Osteoarthritis 09/28/2017    Urge incontinence 09/28/2017    Acute gangrenous cholecystitis 02/24/2018    Discitis of lumbar region 03/05/2018    Paroxysmal atrial fibrillation 03/16/2018    History of sepsis 03/16/2018    Hyperuricemia 05/22/2018    Chronic deep vein thrombosis (DVT) of right peroneal vein 12/03/2018    Nausea, vomiting, and diarrhea 03/01/2019    Colitis presumed infectious 03/01/2019    Urinary tract infection in male 03/01/2019    DDD (degenerative disc disease), lumbar 07/03/2019    Bilateral leg weakness 07/03/2019     History of lumbar spinal fusion 07/03/2019    Carpal tunnel syndrome 02/19/2015    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    Chronic anticoagulation 01/27/2020    Diverticulosis 01/27/2020    Hematuria 01/27/2020    Lower obstructive uropathy 01/27/2020    Splenic lesion 01/27/2020    Suprapubic catheter dysfunction 01/27/2020    Obesity (BMI 30-39.9) 11/17/2020    Venous insufficiency of left leg 03/02/2021    Memory difficulties 03/03/2021    Chronic pain syndrome 09/13/2021    Type 2 diabetes mellitus with peripheral neuropathy 09/28/2021    Encounter for long-term (current) use of high-risk medication 10/13/2021    Therapeutic opioid induced constipation 10/13/2021    Abnormal CT of brain 11/28/2021    Suprapubic catheter 11/28/2021    Chronic bladder pain 01/10/2022    Proteinuria 01/25/2022    Lumbar radiculopathy 06/10/2022    Nonrheumatic aortic valve stenosis 06/23/2022    Snoring 07/28/2022    Class 3 severe obesity due to excess calories without serious comorbidity with body mass index (BMI) of 40.0 to 44.9 in adult 07/28/2022    Non-restorative sleep 07/28/2022    Hypersomnia 07/28/2022    History of DVT of lower extremity 08/22/2022    Status post insertion of spinal cord stimulator 12/02/2022    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    Abdominal distension (gaseous) 06/01/2023    Back pain 06/01/2023    Dorsalgia, unspecified 06/01/2023    Benign neoplasm of ascending colon 07/29/2019    Benign neoplasm of descending colon 11/02/2015    Benign neoplasm of transverse colon 11/02/2015    Benign neoplasm of cecum 11/02/2015    Colon polyps 06/01/2023    Rectal polyp 11/02/2015    Disorder of muscle, unspecified 06/01/2023    Family history of colonic polyps 11/02/2015    Constipation 06/01/2023    Fecal incontinence 06/01/2023    History of colonic polyps 11/02/2015    Personal history of colonic polyps 07/29/2019    Cancer 06/01/2023    Malignant (primary) neoplasm, unspecified  06/01/2023    Other abnormalities of heart beat 06/01/2023    Pure hypercholesterolemia 06/01/2023    Unspecified hemorrhoids 06/01/2023    Atrial fibrillation 03/01/2018    Long term (current) use of anticoagulants 06/01/2023    Dvrtclos of lg int w/o perforation or abscess w/o bleeding 11/02/2015    Encounter for screening for malignant neoplasm of colon 11/02/2015    Sleep apnea 06/01/2023    Arthritis 06/01/2023    Coagulation defect, unspecified 06/01/2023    Diabetes mellitus 06/01/2023     Resolved Ambulatory Problems     Diagnosis Date Noted    Uncontrolled type 2 diabetes mellitus 04/01/2016    Primary osteoarthritis involving multiple joints 04/04/2016    Allergic rhinitis due to pollen 04/04/2016    Sepsis 02/23/2018    Secondary thrombocytopenia 02/23/2018    Hypotension 02/23/2018    Observed sleep apnea 02/23/2018    Acute respiratory failure with hypoxia 02/23/2018    Leg edema 02/23/2018    RSV (acute bronchiolitis due to respiratory syncytial virus) 02/23/2018    Bacteremia due to Escherichia coli 02/24/2018    RUQ abdominal pain 02/24/2018    Acute deep vein thrombosis of calf, right 03/05/2018    Sepsis due to Escherichia coli 02/22/2018    Elevated troponin 03/16/2018    Aortic valve stenosis, etiology of cardiac valve disease unspecified 02/28/2023     Past Medical History:   Diagnosis Date    Acute embolism and thrombosis of deep vein of right distal lower extremity     Allergic     Aortic valve stenosis     Atrial fibrillation with RVR     Benign prostatic hyperplasia without lower urinary tract symptoms     Cancer of bladder 2016    Colon polyp     DVT (deep venous thrombosis)     Murray catheter in place     Heel ulcer     [place] (hard of hearing)     Hyperlipidemia     Loss, sensation     MRSA infection     Open wound     CELINA (obstructive sleep apnea)     Skin carcinoma 2012    Type 2 diabetes mellitus     Weakness          PAST SURGICAL HISTORY  Past Surgical History:   Procedure Laterality  Date    ABDOMINAL SURGERY      AORTIC VALVE REPAIR/REPLACEMENT N/A 2/28/2023    Procedure: TTE TRANSFEMORAL TRANSCATHETER AORTIC VALVE REPLACEMENT PERCUTANEOUS APPROACH;  Surgeon: Antony Goldstein MD;  [facility];  Service: Cardiothoracic;  Laterality: N/A;    AORTIC VALVE REPAIR/REPLACEMENT N/A 2/28/2023    Procedure: Transfemoral Transcatheter Aortic Valve Replacement with intraoperative TTE and possible open surgical rescue;  Surgeon: Samuel Zelaya MD;  Location: [facility] CVOR;  Service: Cardiovascular;  Laterality: N/A;    APPENDECTOMY N/A 1961    CARDIAC CATHETERIZATION N/A 01/16/2023    Procedure: Coronary angiography;  Surgeon: Tasha Burr MD;  Location: [facility] CATH INVASIVE LOCATION;  Service: Cardiology;  Laterality: N/A;    CARDIAC CATHETERIZATION N/A 01/16/2023    Procedure: Left Heart Cath;  Surgeon: Tasha Burr MD;  Location:  [facility] CATH INVASIVE LOCATION;  Service: Cardiology;  Laterality: N/A;    CARDIAC CATHETERIZATION N/A 01/16/2023    Procedure: Right Heart Cath;  Surgeon: Tasha Burr MD;  Location:  [facility] CATH INVASIVE LOCATION;  Service: Cardiology;  Laterality: N/A;    CHOLECYSTECTOMY WITH INTRAOPERATIVE CHOLANGIOGRAM N/A 02/25/2018    Procedure: CHOLECYSTECTOMY LAPAROSCOPIC INTRAOPERATIVE CHOLANGIOGRAM;  Surgeon: Maegan Correa MD;  Location: [facility] MAIN OR;  Service:     COLONOSCOPY N/A 2010    h/o of polyps    COLONOSCOPY W/ BIOPSIES AND POLYPECTOMY  2019    EYE SURGERY Bilateral 1959    glass removal from left eye, lens transplant    JOINT REPLACEMENT Right 2005    RIGHT KNEE    LAMINECTOMY N/A 01/18/2016    L2-L3, L3-L4, L4-L5, and L5-S1 bilateral lamincectomy, medial facetectomy & [id number], Dr. Kaiden Valdes    LUMBAR FUSION N/A 03/07/2018    Procedure: LUMBAR FUSION MINIMALLY INVASIVE TRANSFORAMINAL LUMBAR INTERBODY IRRIGATION AND DEBRIDEMENT AND FUSION.  IRRIGATION AND DEBRIDEMENT OF EPIDURAL ABCESS LUMBAR 2-4. BONE MORPHOGENIC PROTEIN, ALPHATEC  NOVEL AND ILLICO, EMG AND SSEP NEUROMONITORING.;  Surgeon: Manish Marr DO;  Location: [facility] MAIN OR;  Service: Orthopedic Spine    SKIN BIOPSY      BACK AND FACE    SPINAL CORD STIMULATOR IMPLANT N/A 2022    Procedure: SPINAL CORD STIMULATOR INSERTION PHASE 1 ([place]) 4 DAY TRIAL ONLY DUE TO ELIQUIS HOLD.;  Surgeon: Cody Holguin MD;  Location: [facility] MAIN OR;  Service: Pain Management;  Laterality: N/A;    SPINAL CORD STIMULATOR IMPLANT N/A 2022    Procedure: Thoracic eleven laminotomy for placement of a surgical spinal cord stimulator lead to thoracic nine;  Surgeon: Charlie Bailon MD;  Location: [facility] MAIN OR;  Service: Neurosurgery;  Laterality: N/A;    SPINAL CORD STIMULATOR IMPLANT N/A 2022    Procedure: Placement of a left gluteal internal pulse generator;  Surgeon: Charlie Bailon MD;  Location: [facility] OR;  Service: Neurosurgery;  Laterality: N/A;    TOTAL KNEE ARTHROPLASTY Right 2005    Dr. Washington Evans    VENA CAVA FILTER INSERTION Right 2018    Procedure: VENA CAVA FILTER INSERTION;  Surgeon: Alexis Thakkar MD;  Location: [facility] OR ;  Service:     VENA CAVA FILTER REMOVAL N/A 2018    Procedure: VENA CAVA FILTER REMOVAL WITH VENOCAVAGRAM;  Surgeon: Alexis Thakakr MD;  Location: [facility] OR ;  Service: Vascular         FAMILY HISTORY  Family History   Problem Relation Age of Onset    Esophageal cancer Mother     No Known Problems Father     Diabetes Maternal Grandmother     Heart attack Maternal Grandfather     Malig Hyperthermia Neg Hx          SOCIAL HISTORY  Social History     Socioeconomic History    Marital status:     Number of children: 2    Highest education level: Some college, no degree   Tobacco Use    Smoking status: Former     Types: Cigars, Pipe     Quit date: 2017     Years since quittin.7    Smokeless tobacco: Former     Types: Chew     Quit date:     Tobacco comments:     Caffeine  daily   Vaping Use    Vaping Use: Never used   Substance and Sexual Activity    Alcohol use: Not Currently    Drug use: No    Sexual activity: Defer         ALLERGIES  Levaquin [levofloxacin], Alcohol, and Other        REVIEW OF SYSTEMS  Review of Systems   Constitutional:  Negative for chills and fever.   HENT:  Negative for ear pain and sore throat.    Respiratory:  Negative for cough and shortness of breath.    Cardiovascular:  Negative for chest pain and palpitations.   Gastrointestinal:  Negative for abdominal pain and vomiting.   Genitourinary:  Negative for dysuria and hematuria.   Musculoskeletal:  Positive for arthralgias and back pain. Negative for joint swelling.   Skin:  Negative for pallor and rash.   Neurological:  Negative for syncope and headaches.   Psychiatric/Behavioral:  Negative for confusion and hallucinations.             PHYSICAL EXAM  ED Triage Vitals   Temp Heart Rate Resp BP SpO2   10/08/23 0944 10/08/23 0942 10/08/23 0942 10/08/23 0942 10/08/23 0942   97.5 øF (36.4 øC) 76 18 146/77 98 %      Temp src Heart Rate Source Patient Position BP Location FiO2 (%)   10/08/23 0944 10/08/23 0942 -- -- --   Oral Monitor          Physical Exam  Constitutional:       General: He is not in acute distress.     Appearance: Normal appearance. He is obese.   HENT:      Head: Normocephalic and atraumatic.      Nose: Nose normal.      Mouth/Throat:      Mouth: Mucous membranes are moist.   Eyes:      Conjunctiva/sclera: Conjunctivae normal.      Pupils: Pupils are equal, round, and reactive to light.   Cardiovascular:      Rate and Rhythm: Normal rate and regular rhythm.      Pulses: Normal pulses.      Heart sounds: Normal heart sounds.      Comments: Distal pulses intact.  Chronic bilateral lower extremity edema  Pulmonary:      Effort: Pulmonary effort is normal.      Breath sounds: Normal breath sounds.   Abdominal:      General: There is no distension.   Musculoskeletal:         General: Normal range of  motion.      Cervical back: Normal range of motion and neck supple.      Comments: Mild right elbow tenderness.  No deformity.  No midline vertebral spine tenderness.  No step-off or deformity.   Skin:     General: Skin is warm.      Capillary Refill: Capillary refill takes less than 2 seconds.   Neurological:      General: No focal deficit present.      Mental Status: He is alert and oriented to person, place, and time.   Psychiatric:         Mood and Affect: Mood normal.         Vital signs and nursing notes reviewed.          RADIOLOGY  CT Lumbar Spine Without Contrast    Result Date: 10/8/2023  CT LUMBAR SPINE WITHOUT CONTRAST  HISTORY: Fall, back pain.  COMPARISON: MRI lumbar spine 09/23/2019 and CT angiogram of the abdomen and pelvis 02/08/2023.  FINDINGS: The study is hampered by patient motion. There is partial visualization of a spinal stimulator entering the spinal canal at the level of T9-T10. Extensive ankylosis is noted involving the thoracolumbar region. The CT angiogram of 02/08/2023 demonstrates anterior fusion from T3 to L5. The patient has undergone fusion from L2 to L4 with bilateral transpedicular screws, posterior rods and interbody graft material as well as a decompressive laminectomy at L4. There is a grade 1 retrolisthesis of L2 upon L3. Vacuum disc effect is noted at L5-S1.  L1-L2: There is no evidence of disc bulge or herniation.  L2-L3: A mild broad-based disc osteophyte complex is present resulting in mild flattening of the ventral surface of the thecal sac. Beam-hardening artifact limits evaluation, but no obvious disc herniation is appreciated.  L3-L4: Beam-hardening artifact limits evaluation of the spinal canal somewhat, but there is no evidence of a disc herniation. Moderate foraminal stenosis to the left is appreciated secondary to extension of osteophyte into the neural foramen.  L4-L5: Moderate-to-severe facet degenerative disease is present bilaterally. Mild-to-moderate lateral  recess narrowing is present bilaterally, more prominent on the left secondary to posterior element degenerative disease and a broad-based disc osteophyte complex. There is mild and moderate foraminal stenosis on the left and right respectively secondary to loss of disc height and extension of osteophyte into the neural foramen.  L5-S1: Moderate and moderate-to-severe facet degenerative disease is present on the right and left respectively. Moderate foraminal stenosis is present bilaterally secondary to loss of disc height and extension of a disc osteophyte complex into the neural foramen.      1.  There is partial visualization of a spinal stimulator. 2.  The patient has undergone fusion from L2 to L4. Beam-hardening artifact limits evaluation somewhat, but no obvious disc herniation is appreciated. 3.  There is extensive ankylosis of the thoracolumbar spine. Vacuum disc effect is noted at L5-S1. 4.  Moderate foraminal stenosis is present to the right at L4-L5 and to the left at L3-L4. 5.  There is no evidence of fracture. The sacrum is not included in the field-of-view.  Radiation dose reduction techniques were utilized, including automated exposure control and exposure modulation based on body size.       XR Elbow 2 View Right    Result Date: 10/8/2023  XR ELBOW 2 VW RIGHT-  INDICATIONS: Trauma.  TECHNIQUE: 2 VIEWS OF THE RIGHT ELBOW  COMPARISON: None available  FINDINGS:  No acute fracture, erosion, dislocation, or elbow effusion. Degenerative spurring is present at the elbow. Follow-up/further evaluation can be obtained as indications persist.       As described.    This report was finalized on 10/8/2023 10:33 AM by Dr. Chris Posey M.D on Workstation: [facility]       Ordered the above noted radiological studies. Reviewed by me in PACS.            MEDICATIONS GIVEN IN ER  Medications   acetaminophen (TYLENOL) tablet 1,000 mg (1,000 mg Oral Given 10/8/23 1037)   methocarbamol (ROBAXIN) tablet 500 mg (500 mg  Oral Given 10/8/23 1044)   oxyCODONE-acetaminophen (PERCOCET) 5-325 MG per tablet 1 tablet (1 tablet Oral Given 10/8/23 1105)             MEDICAL DECISION MAKING, PROGRESS, and CONSULTS    All labs have been independently reviewed by me.  All radiology studies have been reviewed by me and I have also reviewed the radiology report.   EKG's independently viewed and interpreted by me.  Discussion below represents my analysis of pertinent findings related to patient's condition, differential diagnosis, treatment plan and final disposition.            Orders placed during this visit:  Orders Placed This Encounter   Procedures    XR Elbow 2 View Right    CT Lumbar Spine Without Contrast           Differential diagnosis:  Right elbow contusion, right elbow fracture, sacral fracture, lumbar compression fracture, low back strain, low back contusion      Independent interpretation of labs, radiology studies, and discussions with consultants:  ED Course as of 10/08/23 1341   Sun Oct 08, 2023   1045 Right elbow x-ray independently interpreted me as no acute fracture [MP]   1156 CT lumbar spine independently interpreted me as no fracture [MP]   1340 Patient presents emergency department with low back pain and right elbow pain after fall.  Worked up today with CT scan of lumbar spine as well as right elbow x-ray.  No evidence for acute traumatic injury on imaging today.  Patient already on chronic Percocet.  Encouraged him to follow-up closely with PCP, discussed ED return precautions.  Is well-appearing, hemodynamically stable, and therefore appropriate for discharge. [MP]      ED Course User Index  [MP] Dianna Cook PA-C         Additional orders considered but not ordered:  CT scan of head      Additional sources:    - Chronic or social conditions impacting care: Atrial fibrillation          DIAGNOSIS  Final diagnoses:   Right elbow pain   Coccyx pain   Fall, initial encounter           Follow Up:  Magdy Ricardo  MD  [address] [address]  [facility] [address]  [phone]    Schedule an appointment as soon as possible for a visit   For reevaluation after ED visit      RX:     Medication List        New Prescriptions      meclizine 12.5 MG tablet  Commonly known as: ANTIVERT  Take 1 tablet by mouth 3 (Three) Times a Day As Needed for Dizziness.               Where to Get Your Medications        These medications were sent to [facility] ([facility]) - [place], [place] - [address] - [phone]  - [phone]   [address], [facility] [address]      Phone: [phone]   meclizine 12.5 MG tablet         Latest Documented Vital Signs:  As of 13:35 EDT  BP- 140/74 HR- 75 Temp- 97.5 øF (36.4 øC) (Oral) O2 sat- 94%              --    Please note that portions of this were completed with a voice recognition program.       Note Disclaimer: At [facility], we believe that sharing information builds trust and better relationships. You are receiving this note because you are receiving care at [facility] or recently visited. It is possible you will see health information before a provider has talked with you about it. This kind of information can be easy to misunderstand. To help you fully understand what it means for your health, we urge you to discuss this note with your provider.             Dianna Cook PA-C  10/08/23 0542

## 2023-10-08 NOTE — ED NOTES
Right leg gave out and pt fell.  He reports tailbone pain.  He reports he is dizzy.  Pt was 93% on RA - is now on 4L O2

## 2023-10-08 NOTE — ED PROVIDER NOTES
MD ATTESTATION NOTE    The LETICIA and I have discussed this patient's history, physical exam, and treatment plan.  I have reviewed the documentation and personally had a face to face interaction with the patient. I affirm the documentation and agree with the treatment and plan.  The attached note describes my personal findings.      I provided a substantive portion of the care of the patient.  I personally performed the physical exam in its entirety, and below are my findings.      Brief HPI: 82-year-old male who presents emergency room stating that his right leg gave out and fell landing on his tailbone.  Patient has multiple medical problems including lymphedema and significant peripheral neuropathy which causes his legs to give out and him to fall.  He has chronic back and leg pain with a neurostimulator and takes Percocet for his pain.    General : 82-year-old chronically ill appearing male patient is awake alert and oriented and in mild distress  HEENT: NCAT  Back: Lower back tenderness  Ext: No acute abnormalities with right elbow pain on movement  The patient does have lymphedema and bilateral legs are wrapped with chronic weakness  Skin: No rash  Neuro: Awake with a nonfocal neuro exam and at baseline per family  Psych: Normal mood and affect      Plan: We will x-ray the patient's elbow and CT his lumbar spine.  We will treat him with muscle relaxer and Percocet    The patient's elbow film is negative acute.  The patient CT lumbar spine was negative acute.  At this point I believe the patient is stable for discharge home     Pop Mullen MD  10/08/23 1257

## 2023-10-09 NOTE — TELEPHONE ENCOUNTER
Caller: Mya Beckham    Relationship: Emergency Contact    Best call back number:  [phone]    Requested Prescriptions:   Requested Prescriptions     Pending Prescriptions Disp Refills    apixaban (ELIQUIS) 5 MG tablet tablet       Sig: Take 1 tablet by mouth 2 (Two) Times a Day. HOLDING PER MD INSTRUCTIONS        Pharmacy where request should be sent: [facility] [id number] [address] & [facility] - [phone] [facility] [phone] FX     Last office visit with prescribing clinician: 6/23/2022   Last telemedicine visit with prescribing clinician: Visit date not found   Next office visit with prescribing clinician: Visit date not found     [facility] HAS SENT A REQUEST, BUT THE PATIENT IS COMPLETELY OUT OF THE MEDICATION AT THIS TIME. PLEASE SEND A 1 MONTH SUPPLY TO HOLD THE PATIENT OVER UNTIL THE PRESCRIPTION IS SENT FROM [facility].    Does the patient have less than a 3 day supply:  [x] Yes  [] No    Would you like a call back once the refill request has been completed: [x] Yes [] No    If the office needs to give you a call back, can they leave a voicemail: [x] Yes [] No    Bart Estevez Rep   10/09/23 10:48 EDT

## 2023-10-12 RX ORDER — FUROSEMIDE 20 MG/1
20 TABLET ORAL 2 TIMES DAILY PRN
OUTPATIENT
Start: 2023-10-12

## 2023-10-12 NOTE — TELEPHONE ENCOUNTER
Caller: Mya Beckham    Relationship: Emergency Contact    Best call back number: [phone]     Requested Prescriptions:   Requested Prescriptions     Pending Prescriptions Disp Refills    furosemide (LASIX) 20 MG tablet       Sig: Take 1 tablet by mouth 2 (Two) Times a Day As Needed.        Pharmacy where request should be sent: [facility] [id number] [address] & [facility] - [phone] [facility] [phone] FX     Last office visit with prescribing clinician: 6/27/2023   Last telemedicine visit with prescribing clinician: Visit date not found   Next office visit with prescribing clinician: Visit date not found     Additional details provided by patient: PATIENT IS OUT OF THIS MEDICATUION    Does the patient have less than a 3 day supply:  [x] Yes  [] No    Would you like a call back once the refill request has been completed: [] Yes [x] No    Bart Perea Rep   10/12/23 08:45 EDT

## 2023-10-20 NOTE — TELEPHONE ENCOUNTER
Received call from patient pharmacy that this medicine is currently on hold , directions says on Hold for further instructions per MD is error pharmacy needs this to be resent without this direction , pharmacy will be holding this til fixed .

## 2023-10-24 ENCOUNTER — OFFICE VISIT (OUTPATIENT)
Dept: WOUND CARE | Facility: HOSPITAL | Age: 83
End: 2023-10-24
Payer: MEDICARE

## 2023-10-24 PROCEDURE — G0463 HOSPITAL OUTPT CLINIC VISIT: HCPCS

## 2023-10-24 RX ORDER — ROSUVASTATIN CALCIUM 10 MG/1
10 TABLET, COATED ORAL DAILY
Qty: 90 TABLET | Refills: 2 | Status: SHIPPED | OUTPATIENT
Start: 2023-10-24

## 2023-10-24 NOTE — TELEPHONE ENCOUNTER
Rx Refill Note  Requested Prescriptions     Pending Prescriptions Disp Refills    rosuvastatin (CRESTOR) 10 MG tablet [Pharmacy Med Name: Rosuvastatin Calcium 10 MG Oral Tablet] 90 tablet 3     Sig: TAKE 1 TABLET BY MOUTH DAILY      Last office visit with prescribing clinician: 8/28/2023   Last telemedicine visit with prescribing clinician: Visit date not found   Next office visit with prescribing clinician: 1/3/2024                         Would you like a call back once the refill request has been completed: [] Yes [] No    If the office needs to give you a call back, can they leave a voicemail: [] Yes [] No    Mila Bullock  10/24/23, 08:30 EDT

## 2023-10-25 ENCOUNTER — TELEPHONE (OUTPATIENT)
Dept: CARDIOLOGY | Facility: CLINIC | Age: 83
End: 2023-10-25

## 2023-10-25 NOTE — TELEPHONE ENCOUNTER
Called and spoke with wife. She is aware.     Can we get him an apt with Dr. Burr or Cherelle jessica? Thanks

## 2023-10-25 NOTE — TELEPHONE ENCOUNTER
Let him know that Jenna is fine with the type of valve he has (it can not be used in people with a metallic valve).    I would get him for an appointment with Cherelle or myself.  thanks

## 2023-10-25 NOTE — TELEPHONE ENCOUNTER
Caller: DEONNA    Relationship: [employer]    Best call back number: [phone]        What was the call regarding: PT HAS BEEN EXPERIENCING WEAKNESS/TIREDNESS ALL THE TIME EVER SINCE RESTARTING HIS ELIQUIS.  THEY ARE NOT SURE IF THIS IS THE CAUSE, BUT WANTED DR. HOWARD'S OPINION OR TO SET UP AN APPOINTMENT TO DISCUSS HIS SYMPTOMS.    THEY ALSO SAW A COMMERCIAL ON TV ABOUT ELIQUIS SHOULD NOT BE TAKEN WITH ARTIFICIAL HEART VALVES.   DEONNA STATE THE PATIENT HAS HAD A VAVLE REPLACEMENT

## 2023-11-03 DIAGNOSIS — E11.69 TYPE 2 DIABETES MELLITUS WITH OTHER SPECIFIED COMPLICATION, WITH LONG-TERM CURRENT USE OF INSULIN: ICD-10-CM

## 2023-11-03 DIAGNOSIS — Z79.4 TYPE 2 DIABETES MELLITUS WITH OTHER SPECIFIED COMPLICATION, WITH LONG-TERM CURRENT USE OF INSULIN: ICD-10-CM

## 2023-11-03 RX ORDER — INSULIN GLARGINE 300 U/ML
INJECTION, SOLUTION SUBCUTANEOUS
Qty: 90 ML | Refills: 2 | Status: SHIPPED | OUTPATIENT
Start: 2023-11-03

## 2023-11-03 RX ORDER — INSULIN GLARGINE 300 U/ML
INJECTION, SOLUTION SUBCUTANEOUS
Qty: 30 ML | Refills: 6 | Status: SHIPPED | OUTPATIENT
Start: 2023-11-03 | End: 2023-11-03 | Stop reason: SDUPTHER

## 2023-11-03 NOTE — TELEPHONE ENCOUNTER
Rx Refill Note  Requested Prescriptions     Pending Prescriptions Disp Refills    Jovanni Hernandez SoloStar 300 UNIT/ML solution pen-injector injection [Pharmacy Med Name: Toujesuso Max SoloStar 300 UNIT/ML Subcutaneous Solution Pen-injector] 30 mL 3     Sig: INJECT 100 UNITS SUBCUTANEOUSLY  INTO THE APPROPRIATE AREA AS  DIRECTED DAILY      Last office visit with prescribing clinician: 8/28/2023   Last telemedicine visit with prescribing clinician: Visit date not found   Next office visit with prescribing clinician: 1/3/2024                         Would you like a call back once the refill request has been completed: [] Yes [] No    If the office needs to give you a call back, can they leave a voicemail: [] Yes [] No    Mila Bullock  11/03/23, 08:26 EDT

## 2023-11-11 DIAGNOSIS — Z79.4 TYPE 2 DIABETES MELLITUS WITH OTHER SPECIFIED COMPLICATION, WITH LONG-TERM CURRENT USE OF INSULIN: ICD-10-CM

## 2023-11-11 DIAGNOSIS — E11.69 TYPE 2 DIABETES MELLITUS WITH OTHER SPECIFIED COMPLICATION, WITH LONG-TERM CURRENT USE OF INSULIN: ICD-10-CM

## 2023-11-13 RX ORDER — LISINOPRIL 2.5 MG/1
2.5 TABLET ORAL DAILY
Qty: 90 TABLET | Refills: 2 | Status: SHIPPED | OUTPATIENT
Start: 2023-11-13

## 2023-11-14 ENCOUNTER — OFFICE VISIT (OUTPATIENT)
Dept: WOUND CARE | Facility: HOSPITAL | Age: 83
End: 2023-11-14
Payer: MEDICARE

## 2023-11-14 ENCOUNTER — OFFICE VISIT (OUTPATIENT)
Age: 83
End: 2023-11-14
Payer: MEDICARE

## 2023-11-14 VITALS
SYSTOLIC BLOOD PRESSURE: 120 MMHG | HEIGHT: 70 IN | BODY MASS INDEX: 39.51 KG/M2 | WEIGHT: 276 LBS | DIASTOLIC BLOOD PRESSURE: 81 MMHG | HEART RATE: 76 BPM

## 2023-11-14 DIAGNOSIS — E78.00 PURE HYPERCHOLESTEROLEMIA: ICD-10-CM

## 2023-11-14 DIAGNOSIS — E78.5 DYSLIPIDEMIA: Primary | ICD-10-CM

## 2023-11-14 DIAGNOSIS — I49.3 PVC (PREMATURE VENTRICULAR CONTRACTION): ICD-10-CM

## 2023-11-14 DIAGNOSIS — I35.0 NONRHEUMATIC AORTIC VALVE STENOSIS: ICD-10-CM

## 2023-11-14 DIAGNOSIS — I48.0 PAROXYSMAL ATRIAL FIBRILLATION: ICD-10-CM

## 2023-11-14 DIAGNOSIS — I10 ESSENTIAL HYPERTENSION: ICD-10-CM

## 2023-11-14 PROCEDURE — 3079F DIAST BP 80-89 MM HG: CPT | Performed by: NURSE PRACTITIONER

## 2023-11-14 PROCEDURE — 93000 ELECTROCARDIOGRAM COMPLETE: CPT | Performed by: NURSE PRACTITIONER

## 2023-11-14 PROCEDURE — 3074F SYST BP LT 130 MM HG: CPT | Performed by: NURSE PRACTITIONER

## 2023-11-14 PROCEDURE — G0463 HOSPITAL OUTPT CLINIC VISIT: HCPCS

## 2023-11-14 PROCEDURE — 99214 OFFICE O/P EST MOD 30 MIN: CPT | Performed by: NURSE PRACTITIONER

## 2023-11-14 PROCEDURE — 1160F RVW MEDS BY RX/DR IN RCRD: CPT | Performed by: NURSE PRACTITIONER

## 2023-11-14 PROCEDURE — 1159F MED LIST DOCD IN RCRD: CPT | Performed by: NURSE PRACTITIONER

## 2023-11-14 RX ORDER — FUROSEMIDE 20 MG/1
20 TABLET ORAL 2 TIMES DAILY PRN
Qty: 60 TABLET | Refills: 0 | Status: SHIPPED | OUTPATIENT
Start: 2023-11-14

## 2023-11-14 NOTE — PROGRESS NOTES
Subjective:     Encounter Date:11/14/2023      Patient ID: Jesus Beckham is a 83 y.o. male.    Chief Complaint:follow up AS, PAF  History of Present Illness  This is a 81 y/o man who follows with Dr. Burr and is known to me. He has a pmhx of paroxysmal atrial fibrillation, nonrheumatic aortic valve stenosis, hypertension, hyperlipidemia, DVT, diabetes, and chronic back pain.     He contacted the office with concerns about him feeling tired all the time since restarting Eliquis. He was scheduled to see me. He tells me that he is here for refills on his furosemide. He denies any chest pain. He does have shortness of breath with little activity such as walking around the house. This is not new. The swelling in his legs has improved. He has been going to wound care and his right lower extremity wound is nearly healed and the left lower extremity wound has almost healed as well. He denies any worsening orthopnea or PND.     Prior history:  He had a stress test and echocardiogram in December 2021 for complaints of worsening shortness of breath. His stress test was negative for ischemia. Echocardiogram showed normal left ventricular systolic function and no wall motion abnormalities, EF 54%, grade 1 diastolic dysfunction, severe aortic valve calcification with moderate aortic valve stenosis and normal right ventricular systolic pressure. He was in the hospital in November for placement of a spinal stimulator and was noted to have an indeterminate troponin. He was asymptomatic so no workup was pursued.    I saw him in December 2022 and he was doing well. He had undergone surgery for a spinal stimulator and his pain had improved. He was still struggling with shortness of breath and swelling but overall both were stable. An echocardiogram was obtained that showed worsening stenosis of his aortic valve. He was referred to Dr. Zelaya for TAVR evaluation. He ultimately underwent a TAVR on 2/28/23. He recovered well  from this.     I saw him in follow up in July 2023 and he was doing fairly well. He was still struggling with some swelling but overall felt good. He as not having to take PRN furosemide very often. I felt that some of his swelling was duet o venous insufficiency. He as to follow up in 3 months.     I have reviewed and updated as appropriate allergies, current medications, past family history, past medical history, past surgical history and problem list.    Review of Systems   Constitutional: Positive for malaise/fatigue. Negative for fever, weight gain and weight loss.   HENT:  Negative for congestion, hoarse voice and sore throat.    Eyes:  Negative for blurred vision and double vision.   Cardiovascular:  Positive for dyspnea on exertion and leg swelling. Negative for chest pain, orthopnea, palpitations and syncope.   Respiratory:  Positive for shortness of breath. Negative for cough and wheezing.    Gastrointestinal:  Negative for abdominal pain, hematemesis, hematochezia and melena.   Genitourinary:  Negative for dysuria and hematuria.   Neurological:  Negative for dizziness, headaches, light-headedness and numbness.   Psychiatric/Behavioral:  Negative for depression. The patient is not nervous/anxious.          Current Outpatient Medications:     acetaminophen (TYLENOL) 500 MG tablet, Take 1 tablet by mouth Every 6 (Six) Hours As Needed for Mild Pain., Disp: , Rfl:     allopurinol (ZYLOPRIM) 300 MG tablet, Take 1 tablet by mouth Daily., Disp: 90 tablet, Rfl: 3    apixaban (ELIQUIS) 5 MG tablet tablet, Take 1 tablet by mouth 2 (Two) Times a Day., Disp: 90 tablet, Rfl: 3    Ascorbic Acid 300 MG chewable tablet, Chew 300 mg Every Morning. HOLDING FOR SURGERY, Disp: , Rfl:     Blood Glucose Monitoring Suppl (Accu-Chek Guide) w/Device kit, 1 kit See Admin Instructions. Dx code E11.42 TEST BLOOD SUGAR 2 TIMES  DAY  , Disp: 1 kit, Rfl: 0    cetirizine (zyrTEC) 10 MG tablet, Take 1 tablet by mouth Daily., Disp: , Rfl:      diphenhydrAMINE-acetaminophen (TYLENOL PM)  MG tablet per tablet, Take 2 tablets by mouth At Night As Needed for Sleep., Disp: , Rfl:     donepezil (ARICEPT) 5 MG tablet, Take 1 tablet by mouth every night at bedtime., Disp: 90 tablet, Rfl: 3    fenofibrate (TRICOR) 145 MG tablet, TAKE 1 TABLET BY MOUTH  DAILY, Disp: 90 tablet, Rfl: 3    fluticasone (FLONASE) 50 MCG/ACT nasal spray, 2 sprays into the nostril(s) as directed by provider Daily., Disp: 48 g, Rfl: 3    furosemide (LASIX) 20 MG tablet, Take 1 tablet by mouth 2 (Two) Times a Day As Needed (swelling)., Disp: 60 tablet, Rfl: 0    Gemtesa 75 MG tablet, , Disp: , Rfl:     glipizide (GLUCOTROL) 10 MG tablet, TAKE 1 TABLET BY MOUTH  TWICE DAILY BEFORE MEALS, Disp: 180 tablet, Rfl: 3    Glucosamine HCl 1500 MG tablet, Take 2 tablets by mouth Daily. HOLDING FOR SURGERY, Disp: , Rfl:     glucose blood test strip, ACCU-CUECK GUIDE test strips. Check 3 times a day Dx: E 11.42, Disp: 300 each, Rfl: 4    glucose monitor monitoring kit, ACCU-CUECK GUIDE METER.  Testing 3 times daily. Dx: E 11.42, Disp: 1 each, Rfl: 0    Glyxambi 25-5 MG tablet, TAKE 1 TABLET BY MOUTH  DAILY WITH BREAKFAST, Disp: 90 tablet, Rfl: 3    ipratropium (ATROVENT) 0.06 % nasal spray, , Disp: , Rfl:     Lancet Device misc, 1 each 2 (Two) Times a Day., Disp: 1 each, Rfl: 0    Lancets Misc. (Accu-Chek FastClix Lancet) kit, Dispense 1 lancing device to check 5 times a day. Dx: E 1, Disp: 1 kit, Rfl: 0    Lancets misc, SOFT-CLICK LANCETS: Check 3 times a day. Dx: E 11.42, Disp: 300 each, Rfl: 4    lisinopril (PRINIVIL,ZESTRIL) 2.5 MG tablet, TAKE 1 TABLET BY MOUTH DAILY, Disp: 90 tablet, Rfl: 2    meclizine (ANTIVERT) 12.5 MG tablet, Take 1 tablet by mouth 3 (Three) Times a Day As Needed for Dizziness., Disp: 30 tablet, Rfl: 0    melatonin 5 MG tablet tablet, Take 1 tablet by mouth Every Night., Disp: , Rfl:     Multiple Vitamins-Minerals (MULTIVITAMIN ADULT PO), Take 1 tablet by mouth  Daily. HOLDING FOR SURGERY, Disp: , Rfl:     Omega-3 Fatty Acids (FISH OIL) 1200 MG capsule capsule, Take 2,000 mg by mouth 2 (Two) Times a Day With Meals. HOLDING FOR SURGERY, Disp: , Rfl:     oxyCODONE-acetaminophen (PERCOCET) 5-325 MG per tablet, Take 1 tablet by mouth Every 6 (Six) Hours As Needed., Disp: , Rfl:     pregabalin (LYRICA) 150 MG capsule, 1 capsule 3 times daily  Indications: Diabetes with Nerve Disease, Neuropathic Pain, Disp: 270 capsule, Rfl: 1    rosuvastatin (CRESTOR) 10 MG tablet, TAKE 1 TABLET BY MOUTH DAILY, Disp: 90 tablet, Rfl: 2    Toujeo Max SoloStar 300 UNIT/ML solution pen-injector injection, 100 units daily, Disp: 90 mL, Rfl: 2    traZODone (DESYREL) 150 MG tablet, Take 2 tablets by mouth Every Night., Disp: 180 tablet, Rfl: 3    vitamin D (ERGOCALCIFEROL) 1.25 MG (81310 UT) capsule capsule, TAKE 1 CAPSULE BY MOUTH EVERY 7  DAYS, Disp: 11 capsule, Rfl: 3    Wheat Dextrin (BENEFIBER DRINK MIX PO), Take 2 teaspoon(s) by mouth 2 (Two) Times a Day., Disp: , Rfl:     linezolid (ZYVOX) 600 MG tablet, , Disp: , Rfl:     Past Medical History:   Diagnosis Date    Acute embolism and thrombosis of deep vein of right distal lower extremity     Acute gangrenous cholecystitis     FEB 2018    Allergic     Aortic valve stenosis     Arthritis     Atrial fibrillation with RVR     HISTORY    Benign prostatic hyperplasia without lower urinary tract symptoms     Cancer of bladder 2016    Colon polyp     Discitis of lumbar region     DVT (deep venous thrombosis)     MARCH 2018    Essential hypertension     Murray catheter in place     LOSS OF SENSATION BLADDER. BECAUSE OF WOUND AT THE TIME    Gout     Heel ulcer     RIGHT. FOLLOWED BY WOUND CARE. GETTING BETTER    History of sepsis     Andreafski (hard of hearing)     HEARING AIDS BOTH EARS    Hyperlipidemia     Loss, sensation     LOWER EXTREMTIES. RELATED TO LAST BACK SURGERY.    MRSA infection     LEFT LEG.     Nausea, vomiting, and diarrhea 03/01/2019    Open  wound     WITH MEDICATED DRESSING LEFT SHIN AREA.(RESOLVED PER PATIENT)    CELINA (obstructive sleep apnea)     NO MACHINE    Osteoarthritis     Paroxysmal atrial fibrillation     PVC (premature ventricular contraction)     Sepsis 02/2018    Sepsis due to Escherichia coli     Skin carcinoma 2012    MELANOMA.2 PLACES BACK AND EAR    Type 2 diabetes mellitus     Vitamin D deficiency     Weakness        Past Surgical History:   Procedure Laterality Date    ABDOMINAL SURGERY      AORTIC VALVE REPAIR/REPLACEMENT N/A 2/28/2023    Procedure: TTE TRANSFEMORAL TRANSCATHETER AORTIC VALVE REPLACEMENT PERCUTANEOUS APPROACH;  Surgeon: Antony Goldstein MD;  Location: [facility];  Service: Cardiothoracic;  Laterality: N/A;    AORTIC VALVE REPAIR/REPLACEMENT N/A 2/28/2023    Procedure: Transfemoral Transcatheter Aortic Valve Replacement with intraoperative TTE and possible open surgical rescue;  Surgeon: Samuel Zelaya MD;  Location: [facility];  Service: Cardiovascular;  Laterality: N/A;    APPENDECTOMY N/A 1961    CARDIAC CATHETERIZATION N/A 01/16/2023    Procedure: Coronary angiography;  Surgeon: Tasha Burr MD;  Location: [facility] CATH INVASIVE LOCATION;  Service: Cardiology;  Laterality: N/A;    CARDIAC CATHETERIZATION N/A 01/16/2023    Procedure: Left Heart Cath;  Surgeon: Tasha Burr MD;  Location: [facility] CATH INVASIVE LOCATION;  Service: Cardiology;  Laterality: N/A;    CARDIAC CATHETERIZATION N/A 01/16/2023    Procedure: Right Heart Cath;  Surgeon: Tasha Burr MD;  Location: [facility] CATH INVASIVE LOCATION;  Service: Cardiology;  Laterality: N/A;    CHOLECYSTECTOMY WITH INTRAOPERATIVE CHOLANGIOGRAM N/A 02/25/2018    Procedure: CHOLECYSTECTOMY LAPAROSCOPIC INTRAOPERATIVE CHOLANGIOGRAM;  Surgeon: Maegan Correa MD;  Location: [facility] OR;  Service:     COLONOSCOPY N/A 2010    h/o of polyps    COLONOSCOPY W/ BIOPSIES AND POLYPECTOMY  2019    EYE SURGERY Bilateral 1959    glass 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 Kaiden Valdes    LUMBAR FUSION N/A 03/07/2018    Procedure: LUMBAR FUSION MINIMALLY INVASIVE TRANSFORAMINAL LUMBAR INTERBODY IRRIGATION AND DEBRIDEMENT AND FUSION.  IRRIGATION AND DEBRIDEMENT OF EPIDURAL ABCESS LUMBAR 2-4. BONE MORPHOGENIC PROTEIN, ALPHATEC NOVEL AND ILLICO, EMG AND SSEP NEUROMONITORING.;  Surgeon: Manish Marr DO;  Location: [facility];  Service: Orthopedic Spine    SKIN BIOPSY      BACK AND FACE    SPINAL CORD STIMULATOR IMPLANT N/A 07/01/2022    Procedure: SPINAL CORD STIMULATOR INSERTION PHASE 1 ([place]) 4 DAY TRIAL ONLY DUE TO ELIQUIS HOLD.;  Surgeon: Cody Holguin MD;  Location: [facility] OR;  Service: Pain Management;  Laterality: N/A;    SPINAL CORD STIMULATOR IMPLANT N/A 11/17/2022    Procedure: Thoracic eleven laminotomy for placement of a surgical spinal cord stimulator lead to thoracic nine;  Surgeon: Charlie Bailon MD;  Location: [facility];  Service: Neurosurgery;  Laterality: N/A;    SPINAL CORD STIMULATOR IMPLANT N/A 11/18/2022    Procedure: Placement of a left gluteal internal pulse generator;  Surgeon: Charlie Bailon MD;  Location: [facility];  Service: Neurosurgery;  Laterality: N/A;    TOTAL KNEE ARTHROPLASTY Right 03/07/2005    Dr. Washington Evans    VENA CAVA FILTER INSERTION Right 03/06/2018    Procedure: VENA CAVA FILTER INSERTION;  Surgeon: Alexis Thakkar MD;  Location: [facility] 18/19;  Service:     VENA CAVA FILTER REMOVAL N/A 12/03/2018    Procedure: VENA CAVA FILTER REMOVAL WITH VENOCAVAGRAM;  Surgeon: Alexis Thakkar MD;  Location: [facility] 18/19;  Service: Vascular       Family History   Problem Relation Age of Onset    Esophageal cancer Mother     No Known Problems Father     Diabetes Maternal Grandmother     Heart attack  "Maternal Grandfather     Malig Hyperthermia Neg Hx        Social History     Tobacco Use    Smoking status: Former     Types: Cigars, Pipe     Quit date: 2017     Years since quittin.8    Smokeless tobacco: Former     Types: Chew     Quit date:     Tobacco comments:     Caffeine daily   Vaping Use    Vaping Use: Never used   Substance Use Topics    Alcohol use: Not Currently    Drug use: No         ECG 12 Lead    Date/Time: 2023 3:33 PM  Performed by: Cherelle Hernandez APRN    Authorized by: Cherelle Hernandez APRN  Comparison: compared with previous ECG from 2023  Similar to previous ECG  Rhythm: sinus rhythm  Ectopy: unifocal PVCs             Objective:     Visit Vitals  /81   Pulse 76   Ht 177.8 cm (70\")   Wt 125 kg (276 lb)   BMI 39.60 kg/m²             Physical Exam  Constitutional:       Appearance: Normal appearance. He is obese.   HENT:      Head: Normocephalic.   Neck:      Vascular: No carotid bruit.   Cardiovascular:      Rate and Rhythm: Normal rate and regular rhythm.      Chest Wall: PMI is not displaced.      Pulses: Normal pulses.           Radial pulses are 2+ on the right side and 2+ on the left side.      Heart sounds: Murmur heard.      Systolic murmur is present with a grade of 2/6.      No friction rub. No gallop.   Pulmonary:      Effort: Pulmonary effort is normal.      Breath sounds: Normal breath sounds.   Abdominal:      General: Bowel sounds are normal. There is no distension.      Palpations: Abdomen is soft.   Musculoskeletal:      Right lower le+ Edema present.      Left lower le+ Edema present.   Skin:     General: Skin is warm and dry.      Capillary Refill: Capillary refill takes less than 2 seconds.   Neurological:      Mental Status: He is alert and oriented to person, place, and time.   Psychiatric:         Mood and Affect: Mood normal.         Behavior: Behavior normal.         Thought Content: Thought content normal.          Lab Review:   Lipid Panel "          12/9/2022    08:21 4/24/2023    12:30 8/21/2023    08:02   Lipid Panel   Total Cholesterol 134  131  135    Triglycerides 122  164  170    HDL Cholesterol 45  38  37    VLDL Cholesterol 22  28  29    LDL Cholesterol  67  65  69        Cardiac Procedures:   Nuclear stress test 12/9/21:  Left ventricular ejection fraction is normal. (Calculated EF = 52%).  Myocardial perfusion imaging indicates a normal myocardial perfusion study with no evidence of ischemia.  Impressions are consistent with a low risk study.  LVEF with stress is 53%.  Echocardiogram 12/9/21  Calculated left ventricular EF = 54% Estimated left ventricular EF = 54% Estimated left ventricular EF was in agreement with the calculated left ventricular EF. Left ventricular systolic function is normal. Normal left ventricular cavity size and wall thickness noted. All left ventricular wall segments contract normally. Left ventricular diastolic function is consistent with (grade I) impaired relaxation.  There is severe calcification of the aortic valve. Trace aortic valve regurgitation is present. Moderate aortic valve stenosis is present. The aortic valve area is underestimated due to underestimation of the left ventricular outflow tract diameter Aortic valve mean pressure gradient is 15.7 mmHg. Aortic valve area is 0.96 cm2.  Mild mitral annular calcification is present. Trace mitral valve regurgitation is present.  Trace tricuspid valve regurgitation is present. Estimated right ventricular systolic pressure from tricuspid regurgitation is normal (<35 mmHg). Calculated right ventricular systolic pressure from tricuspid regurgitation is 24 mmHg.     Echocardiogram 11/21/19:  Left ventricular systolic function is normal. Estimated EF appears to be in the range of 61 - 65%. Normal regional wall motion and cavity size.  Normal left ventricular cavity size, wall thickness, mass and mass index noted  Left ventricular diastolic function is normal. Normal  left atrial pressure. There is no evidence of a left ventricular thrombus present.  Normal right ventricular cavity size, wall thickness, systolic function and septal motion noted.  The valve exhibits sclerosis(peak velocity<2.5cm/sec). There is moderate calcification of the aortic valve.No significant aortic valve regurgitation is present. No hemodynamically significant aortic valve stenosis is present.  No significant changes since prior study.         Assessment:         Diagnoses and all orders for this visit:    1. Dyslipidemia (Primary)    2. Essential hypertension    3. Nonrheumatic aortic valve stenosis    4. Paroxysmal atrial fibrillation    5. Pure hypercholesterolemia    6. PVC (premature ventricular contraction)    Other orders  -     furosemide (LASIX) 20 MG tablet; Take 1 tablet by mouth 2 (Two) Times a Day As Needed (swelling).  Dispense: 60 tablet; Refill: 0            Plan:       1. Aortic stenosis: s/p TAVR in February. Continues to do well since with no issues. Follow up echocardiogram post TAVR in March looked good. Will follow up with Dr. Zelaya in January 2024.  2. PAF: in sinus rhythm on EKG today. On Eliquis for anticoagulation.   3. HTN: blood pressure is well controlled on current regimen. No changes.  4. Lower extremity swelling: Overall appears to be improved. Will refill PRN furosemide.  5. Peripheral neuropathy  6. Diabetes  7. Obstructive sleep apnea    Thank you for allowing me to participate in this patient's care. Please call with any questions or concerns. Mr. Beckham will follow up with Dr. Burr in 6 months.          Your medication list            Accurate as of November 14, 2023  3:26 PM. If you have any questions, ask your nurse or doctor.                CHANGE how you take these medications        Instructions Last Dose Given Next Dose Due   furosemide 20 MG tablet  Commonly known as: LASIX  What changed: reasons to take this  Changed by: DANNY Miller      Take 1  tablet by mouth 2 (Two) Times a Day As Needed (swelling).              CONTINUE taking these medications        Instructions Last Dose Given Next Dose Due   Accu-Chek FastClix Lancet kit      Dispense 1 lancing device to check 5 times a day. Dx: E 1       Accu-Chek Guide w/Device kit      1 kit See Admin Instructions. Dx code E11.42 TEST BLOOD SUGAR 2 TIMES  DAY       acetaminophen 500 MG tablet  Commonly known as: TYLENOL      Take 1 tablet by mouth Every 6 (Six) Hours As Needed for Mild Pain.       allopurinol 300 MG tablet  Commonly known as: ZYLOPRIM      Take 1 tablet by mouth Daily.       apixaban 5 MG tablet tablet  Commonly known as: ELIQUIS      Take 1 tablet by mouth 2 (Two) Times a Day.       Ascorbic Acid 300 MG chewable tablet      Chew 300 mg Every Morning. HOLDING FOR SURGERY       BENEFIBER DRINK MIX PO      Take 2 teaspoon(s) by mouth 2 (Two) Times a Day.       cetirizine 10 MG tablet  Commonly known as: zyrTEC      Take 1 tablet by mouth Daily.       diphenhydrAMINE-acetaminophen  MG tablet per tablet  Commonly known as: TYLENOL PM      Take 2 tablets by mouth At Night As Needed for Sleep.       donepezil 5 MG tablet  Commonly known as: ARICEPT      Take 1 tablet by mouth every night at bedtime.       fenofibrate 145 MG tablet  Commonly known as: TRICOR      TAKE 1 TABLET BY MOUTH  DAILY       fish oil 1200 MG capsule capsule      Take 2,000 mg by mouth 2 (Two) Times a Day With Meals. HOLDING FOR SURGERY       fluticasone 50 MCG/ACT nasal spray  Commonly known as: FLONASE      2 sprays into the nostril(s) as directed by provider Daily.       Gemtesa 75 MG tablet  Generic drug: Vibegron           glipizide 10 MG tablet  Commonly known as: GLUCOTROL      TAKE 1 TABLET BY MOUTH  TWICE DAILY BEFORE MEALS       Glucosamine HCl 1500 MG tablet      Take 2 tablets by mouth Daily. HOLDING FOR SURGERY       glucose blood test strip      ACCU-CUECK GUIDE test strips. Check 3 times a day Dx: E 11.42        glucose monitor monitoring kit      ACCU-CUECK GUIDE METER.  Testing 3 times daily. Dx: E 11.42       Glyxambi 25-5 MG tablet  Generic drug: Empagliflozin-linaGLIPtin      TAKE 1 TABLET BY MOUTH  DAILY WITH BREAKFAST       ipratropium 0.06 % nasal spray  Commonly known as: ATROVENT           Lancet Device misc      1 each 2 (Two) Times a Day.       Lancets misc      SOFT-CLICK LANCETS: Check 3 times a day. Dx: E 11.42       linezolid 600 MG tablet  Commonly known as: ZYVOX           lisinopril 2.5 MG tablet  Commonly known as: PRINIVIL,ZESTRIL      TAKE 1 TABLET BY MOUTH DAILY       meclizine 12.5 MG tablet  Commonly known as: ANTIVERT      Take 1 tablet by mouth 3 (Three) Times a Day As Needed for Dizziness.       melatonin 5 MG tablet tablet      Take 1 tablet by mouth Every Night.       multivitamin with minerals tablet tablet      Take 1 tablet by mouth Daily. HOLDING FOR SURGERY       oxyCODONE-acetaminophen 5-325 MG per tablet  Commonly known as: PERCOCET      Take 1 tablet by mouth Every 6 (Six) Hours As Needed.       pregabalin 150 MG capsule  Commonly known as: LYRICA      1 capsule 3 times daily  Indications: Diabetes with Nerve Disease, Neuropathic Pain       rosuvastatin 10 MG tablet  Commonly known as: CRESTOR      TAKE 1 TABLET BY MOUTH DAILY       Toujeo Max SoloStar 300 UNIT/ML solution pen-injector injection  Generic drug: Insulin Glargine (2 Unit Dial)      100 units daily       traZODone 150 MG tablet  Commonly known as: DESYREL      Take 2 tablets by mouth Every Night.       vitamin D 1.25 MG (70314 UT) capsule capsule  Commonly known as: ERGOCALCIFEROL      TAKE 1 CAPSULE BY MOUTH EVERY 7  DAYS                 Where to Get Your Medications        These medications were sent to [facility] [id number] - [facility] [address] - [phone]  - [phone] [place] [address]      Phone: [phone]   furosemide 20 MG  tablet           Cherelle Hernandez, APRN  11/14/23  10:57 AM EST

## 2023-11-21 RX ORDER — FUROSEMIDE 20 MG/1
20 TABLET ORAL 2 TIMES DAILY PRN
Qty: 60 TABLET | Refills: 0 | Status: SHIPPED | OUTPATIENT
Start: 2023-11-21

## 2023-12-12 ENCOUNTER — OFFICE VISIT (OUTPATIENT)
Dept: WOUND CARE | Facility: HOSPITAL | Age: 83
End: 2023-12-12
Payer: MEDICARE

## 2023-12-12 PROCEDURE — G0463 HOSPITAL OUTPT CLINIC VISIT: HCPCS

## 2023-12-15 ENCOUNTER — OFFICE VISIT (OUTPATIENT)
Dept: PAIN MEDICINE | Facility: CLINIC | Age: 83
End: 2023-12-15
Payer: MEDICARE

## 2023-12-15 VITALS
SYSTOLIC BLOOD PRESSURE: 120 MMHG | OXYGEN SATURATION: 92 % | HEART RATE: 73 BPM | RESPIRATION RATE: 20 BRPM | HEIGHT: 70 IN | TEMPERATURE: 96.8 F | BODY MASS INDEX: 39.6 KG/M2 | DIASTOLIC BLOOD PRESSURE: 62 MMHG

## 2023-12-15 DIAGNOSIS — E11.42 TYPE 2 DIABETES MELLITUS WITH PERIPHERAL NEUROPATHY: ICD-10-CM

## 2023-12-15 DIAGNOSIS — M54.16 LUMBAR RADICULOPATHY: Primary | ICD-10-CM

## 2023-12-15 DIAGNOSIS — Z96.89 SPINAL CORD STIMULATOR STATUS: ICD-10-CM

## 2023-12-15 DIAGNOSIS — G89.4 CHRONIC PAIN SYNDROME: Primary | ICD-10-CM

## 2023-12-15 DIAGNOSIS — M51.36 DDD (DEGENERATIVE DISC DISEASE), LUMBAR: ICD-10-CM

## 2023-12-15 DIAGNOSIS — M54.16 LUMBAR RADICULOPATHY: ICD-10-CM

## 2023-12-15 PROCEDURE — 80305 DRUG TEST PRSMV DIR OPT OBS: CPT | Performed by: NURSE PRACTITIONER

## 2023-12-15 PROCEDURE — 3078F DIAST BP <80 MM HG: CPT | Performed by: NURSE PRACTITIONER

## 2023-12-15 PROCEDURE — 3074F SYST BP LT 130 MM HG: CPT | Performed by: NURSE PRACTITIONER

## 2023-12-15 PROCEDURE — 99214 OFFICE O/P EST MOD 30 MIN: CPT | Performed by: NURSE PRACTITIONER

## 2023-12-15 PROCEDURE — 1160F RVW MEDS BY RX/DR IN RCRD: CPT | Performed by: NURSE PRACTITIONER

## 2023-12-15 PROCEDURE — 1159F MED LIST DOCD IN RCRD: CPT | Performed by: NURSE PRACTITIONER

## 2023-12-15 PROCEDURE — 1125F AMNT PAIN NOTED PAIN PRSNT: CPT | Performed by: NURSE PRACTITIONER

## 2023-12-15 NOTE — PROGRESS NOTES
CHIEF COMPLAINT  Back pain  Leg pain  Feet pain  Patient stated he has neuropathy in his feet.     Subjective   Jesus Beckham is a 83 y.o. male  who presents for follow-up.  He has a history of back pain, foot pain.  Patient has not been seen in clinic in nearly a year.  At that point in time he was doing quite well status post SCS implant, reporting significant pain relief and was able to wean off of pain medication.  SCS implant (Dr. Bailon) 11/17/2022, [employer] system.  Today he reports that his pain is an 8 out of 10.  Primary pain complaint bilateral feet.  He is prescribed Lyrica 150 mg 3 times daily by his endocrinologist.    Today he requests to resume Percocet 5/325 at bedtime.  Previously did well with the regimen of 1-2 tabs at bedtime.  No adverse reactions other than occasional constipation.  Has Movantik at home if needed.     Back Pain  This is a chronic problem. The current episode started more than 1 year ago. The problem occurs daily. The problem has been waxing and waning since onset. The pain is present in the lumbar spine. The quality of the pain is described as aching and burning. The pain radiates to the left foot and right foot. The pain is at a severity of 8/10. The pain is moderate. The symptoms are aggravated by standing (walking). Associated symptoms include headaches, leg pain, numbness and weakness. Pertinent negatives include no abdominal pain, chest pain, dysuria or fever. Risk factors include obesity and sedentary lifestyle. He has tried analgesics for the symptoms. The treatment provided mild relief.   Leg Pain   The pain is present in the right leg and left leg. The pain is at a severity of 8/10. The pain has been Fluctuating since onset. Associated symptoms include numbness.        PEG Assessment   What number best describes your pain on average in the past week?8  What number best describes how, during the past week, pain has interfered with your enjoyment of life?9  What  "number best describes how, during the past week, pain has interfered with your general activity?  9    Review of Pertinent Medical Data ---    The following portions of the patient's history were reviewed and updated as appropriate: allergies, current medications, past family history, past medical history, past social history, past surgical history, and problem list.    Review of Systems   Constitutional:  Positive for activity change and fatigue. Negative for fever.   Cardiovascular:  Negative for chest pain.   Gastrointestinal:  Positive for constipation. Negative for abdominal pain and diarrhea.   Genitourinary:  Negative for difficulty urinating and dysuria.   Musculoskeletal:  Positive for back pain.   Neurological:  Positive for dizziness, weakness, light-headedness, numbness and headaches.   Psychiatric/Behavioral:  Positive for agitation and sleep disturbance. Negative for suicidal ideas. The patient is nervous/anxious.        I have reviewed and confirmed the accuracy of the ROS as documented by the MA/LPN/RN DANNY Bardales    Vitals:    12/15/23 1036   BP: 120/62   BP Location: Left arm   Patient Position: Sitting   Cuff Size: Large Adult   Pulse: 73   Resp: 20   Temp: 96.8 °F (36 °C)   SpO2: 92%   Height: 177.8 cm (70\")   PainSc:   8   PainLoc: Leg       Objective   Physical Exam  Vitals and nursing note reviewed.   Constitutional:       General: He is not in acute distress.     Appearance: Normal appearance. He is obese.   Pulmonary:      Effort: Pulmonary effort is normal. No respiratory distress.   Musculoskeletal:      Lumbar back: Tenderness present.   Skin:         Neurological:      Mental Status: He is alert and oriented to person, place, and time.      Gait: Gait abnormal (motorized wheelchair).   Psychiatric:         Mood and Affect: Mood normal.         Behavior: Behavior normal.         Assessment & Plan   Diagnoses and all orders for this visit:    1. Chronic pain syndrome " (Primary)  -     Urine Drug Screen Confirmation - Urine, Clean Catch; Future  -     POC Urine Drug Screen, Triage    2. DDD (degenerative disc disease), lumbar  -     Urine Drug Screen Confirmation - Urine, Clean Catch; Future  -     POC Urine Drug Screen, Triage    3. Lumbar radiculopathy  -     Urine Drug Screen Confirmation - Urine, Clean Catch; Future  -     POC Urine Drug Screen, Triage    4. Spinal cord stimulator status  -     Urine Drug Screen Confirmation - Urine, Clean Catch; Future  -     POC Urine Drug Screen, Triage    5. Type 2 diabetes mellitus with peripheral neuropathy  -     Urine Drug Screen Confirmation - Urine, Clean Catch; Future  -     POC Urine Drug Screen, Triage        --- Resume/refill Percocet. Patient appears stable with current regimen. No adverse effects. Regarding continuation of opioids, there is no evidence of aberrant behavior or any red flags.  The patient continues with appropriate response to opioid therapy. ADL's remain intact by self.   --- Routine UDS in office today as part of monitoring requirements for controlled substances.  The specimen was viewed and the immunoassay result reviewed and is NEG.  This specimen will be sent to [facility] for confirmation.     --- The patient signed an updated copy of the controlled substance agreement on 12/15/2023  --- Reprogramming ASAP with [employer].  I have explained to patient that it may take more than 1 session of reprogramming to achieve optimal results.    Jesus Beckham reports a pain score of 8.  Given his pain assessment as noted, treatment options were discussed and the following options were decided upon as a follow-up plan to address the patient's pain:  see plan above .      --- Follow-up 1 month                  FAINA [facility]  As part of the patient's treatment plan, I am prescribing controlled substances. The patient has been made aware of appropriate use of such medications, including potential risk of  somnolence, limited ability to drive and/or work safely, and the potential for dependence or overdose. It has also been made clear that these medications are for use by this patient only, without concomitant use of alcohol or other substances unless prescribed.     Patient has completed prescribing agreement detailing terms of continued prescribing of controlled substances, including monitoring FAINA reports, urine drug screening, and pill counts if necessary. The patient is aware that inappropriate use will results in cessation of prescribing such medications.    As the clinician, I personally reviewed the FAINA from 12/15/2023 while the patient was in the office today.    History and physical exam exhibit continued safe and appropriate use of controlled substances.       Dictated utilizing Dragon dictation.

## 2023-12-15 NOTE — TELEPHONE ENCOUNTER
Caller: DEONNA MONROE    Relationship to patient: SPOUSE    Best call back number: [phone]    Patient is needing: PATIENT'S SPOUSE, DEONNA STATED THE PATIENT SEEN [facility] TODAY AND THEY WERE GOING TO CALL IN HIS PRESCRIPTION FOR PERCOCET BUT DEONNA SPOKE WITH PITER AND THEY NEVER RECEIVED THE PRESCRIPTION. DEONNA STATED THIS IS A NEW REFILL SO SHE DID NOT KNOW THE MG. DEONNA WOULD LIKE A CALL BACK TO DISCUSS THIS ASAP. THANK YOU!

## 2023-12-17 RX ORDER — OXYCODONE HYDROCHLORIDE AND ACETAMINOPHEN 5; 325 MG/1; MG/1
1 TABLET ORAL 2 TIMES DAILY PRN
Qty: 60 TABLET | Refills: 0 | Status: SHIPPED | OUTPATIENT
Start: 2023-12-17

## 2023-12-26 RX ORDER — FUROSEMIDE 20 MG/1
TABLET ORAL
Qty: 60 TABLET | Refills: 11 | Status: SHIPPED | OUTPATIENT
Start: 2023-12-26

## 2024-01-03 ENCOUNTER — LAB (OUTPATIENT)
Dept: LAB | Facility: HOSPITAL | Age: 84
End: 2024-01-03
Payer: MEDICARE

## 2024-01-03 ENCOUNTER — HOSPITAL ENCOUNTER (OUTPATIENT)
Dept: CARDIOLOGY | Facility: HOSPITAL | Age: 84
Discharge: HOME | End: 2024-01-03
Payer: MEDICARE

## 2024-01-03 ENCOUNTER — OFFICE VISIT (OUTPATIENT)
Age: 84
End: 2024-01-03
Payer: MEDICARE

## 2024-01-03 VITALS
HEIGHT: 70 IN | SYSTOLIC BLOOD PRESSURE: 146 MMHG | BODY MASS INDEX: 39.45 KG/M2 | HEART RATE: 75 BPM | DIASTOLIC BLOOD PRESSURE: 78 MMHG | WEIGHT: 275.57 LBS

## 2024-01-03 VITALS
SYSTOLIC BLOOD PRESSURE: 104 MMHG | DIASTOLIC BLOOD PRESSURE: 62 MMHG | BODY MASS INDEX: 39.6 KG/M2 | HEART RATE: 75 BPM | HEIGHT: 70 IN

## 2024-01-03 DIAGNOSIS — Z95.2 S/P TAVR (TRANSCATHETER AORTIC VALVE REPLACEMENT): Primary | ICD-10-CM

## 2024-01-03 DIAGNOSIS — Z95.2 S/P TAVR (TRANSCATHETER AORTIC VALVE REPLACEMENT): ICD-10-CM

## 2024-01-03 LAB
ANION GAP SERPL CALCULATED.3IONS-SCNC: 14 MMOL/L (ref 5–15)
AORTIC DIMENSIONLESS INDEX: 0.5 (DI)
BH CV ECHO MEAS - AO MAX PG: 13.2 MMHG
BH CV ECHO MEAS - AO MEAN PG: 7.4 MMHG
BH CV ECHO MEAS - AO ROOT DIAM: 2.3 CM
BH CV ECHO MEAS - AO V2 MAX: 181.3 CM/SEC
BH CV ECHO MEAS - AO V2 VTI: 42.9 CM
BH CV ECHO MEAS - AVA(I,D): 1.67 CM2
BH CV ECHO MEAS - EDV(CUBED): 69.5 ML
BH CV ECHO MEAS - EDV(MOD-SP2): 109 ML
BH CV ECHO MEAS - EDV(MOD-SP4): 162 ML
BH CV ECHO MEAS - EF(MOD-BP): 55 %
BH CV ECHO MEAS - EF(MOD-SP2): 56 %
BH CV ECHO MEAS - EF(MOD-SP4): 56.2 %
BH CV ECHO MEAS - ESV(CUBED): 20 ML
BH CV ECHO MEAS - ESV(MOD-SP2): 48 ML
BH CV ECHO MEAS - ESV(MOD-SP4): 71 ML
BH CV ECHO MEAS - FS: 33.9 %
BH CV ECHO MEAS - IVS/LVPW: 0.93 CM
BH CV ECHO MEAS - IVSD: 1.22 CM
BH CV ECHO MEAS - LAT PEAK E' VEL: 8.9 CM/SEC
BH CV ECHO MEAS - LV DIASTOLIC VOL/BSA (35-75): 67.7 CM2
BH CV ECHO MEAS - LV MASS(C)D: 186.2 GRAMS
BH CV ECHO MEAS - LV MAX PG: 4 MMHG
BH CV ECHO MEAS - LV MEAN PG: 2.23 MMHG
BH CV ECHO MEAS - LV SYSTOLIC VOL/BSA (12-30): 29.7 CM2
BH CV ECHO MEAS - LV V1 MAX: 100.4 CM/SEC
BH CV ECHO MEAS - LV V1 VTI: 21 CM
BH CV ECHO MEAS - LVIDD: 4.1 CM
BH CV ECHO MEAS - LVIDS: 2.7 CM
BH CV ECHO MEAS - LVOT AREA: 3.4 CM2
BH CV ECHO MEAS - LVOT DIAM: 2.08 CM
BH CV ECHO MEAS - LVPWD: 1.31 CM
BH CV ECHO MEAS - MED PEAK E' VEL: 5.7 CM/SEC
BH CV ECHO MEAS - MV A DUR: 0.17 SEC
BH CV ECHO MEAS - MV A MAX VEL: 61.8 CM/SEC
BH CV ECHO MEAS - MV DEC SLOPE: 256 CM/SEC2
BH CV ECHO MEAS - MV DEC TIME: 0.13 SEC
BH CV ECHO MEAS - MV E MAX VEL: 36.6 CM/SEC
BH CV ECHO MEAS - MV E/A: 0.59
BH CV ECHO MEAS - MV MAX PG: 2.39 MMHG
BH CV ECHO MEAS - MV MEAN PG: 1.32 MMHG
BH CV ECHO MEAS - MV P1/2T: 85.6 MSEC
BH CV ECHO MEAS - MV V2 VTI: 20.4 CM
BH CV ECHO MEAS - MVA(P1/2T): 2.6 CM2
BH CV ECHO MEAS - MVA(VTI): 3.5 CM2
BH CV ECHO MEAS - PA ACC TIME: 0.1 SEC
BH CV ECHO MEAS - PA V2 MAX: 89.5 CM/SEC
BH CV ECHO MEAS - QP/QS: 0.95
BH CV ECHO MEAS - RV MAX PG: 1.93 MMHG
BH CV ECHO MEAS - RV V1 MAX: 69.5 CM/SEC
BH CV ECHO MEAS - RV V1 VTI: 17 CM
BH CV ECHO MEAS - RVOT DIAM: 2.26 CM
BH CV ECHO MEAS - SI(MOD-SP2): 25.5 ML/M2
BH CV ECHO MEAS - SI(MOD-SP4): 38 ML/M2
BH CV ECHO MEAS - SV(LVOT): 71.7 ML
BH CV ECHO MEAS - SV(MOD-SP2): 61 ML
BH CV ECHO MEAS - SV(MOD-SP4): 91 ML
BH CV ECHO MEAS - SV(RVOT): 68 ML
BH CV ECHO MEAS - TAPSE (>1.6): 1.73 CM
BH CV ECHO MEASUREMENTS AVERAGE E/E' RATIO: 5.01
BH CV XLRA - RV BASE: 2.6 CM
BH CV XLRA - RV LENGTH: 7.3 CM
BH CV XLRA - RV MID: 2.12 CM
BH CV XLRA - TDI S': 10.2 CM/SEC
BUN SERPL-MCNC: 23 MG/DL (ref 8–23)
BUN/CREAT SERPL: 17.6 (ref 7–25)
CALCIUM SPEC-SCNC: 9.1 MG/DL (ref 8.6–10.5)
CHLORIDE SERPL-SCNC: 98 MMOL/L (ref 98–107)
CO2 SERPL-SCNC: 26 MMOL/L (ref 22–29)
CREAT SERPL-MCNC: 1.31 MG/DL (ref 0.76–1.27)
DEPRECATED RDW RBC AUTO: 49 FL (ref 37–54)
EGFRCR SERPLBLD CKD-EPI 2021: 54.3 ML/MIN/1.73
ERYTHROCYTE [DISTWIDTH] IN BLOOD BY AUTOMATED COUNT: 14.8 % (ref 12.3–15.4)
GLUCOSE SERPL-MCNC: 186 MG/DL (ref 65–99)
HCT VFR BLD AUTO: 39.7 % (ref 37.5–51)
HGB BLD-MCNC: 13.5 G/DL (ref 13–17.7)
LEFT ATRIUM VOLUME INDEX: 17.8 ML/M2
MCH RBC QN AUTO: 30.8 PG (ref 26.6–33)
MCHC RBC AUTO-ENTMCNC: 34 G/DL (ref 31.5–35.7)
MCV RBC AUTO: 90.6 FL (ref 79–97)
PLATELET # BLD AUTO: 142 10*3/MM3 (ref 140–450)
PMV BLD AUTO: 11.5 FL (ref 6–12)
POTASSIUM SERPL-SCNC: 4.6 MMOL/L (ref 3.5–5.2)
RBC # BLD AUTO: 4.38 10*6/MM3 (ref 4.14–5.8)
SINUS: 3 CM
SODIUM SERPL-SCNC: 138 MMOL/L (ref 136–145)
WBC NRBC COR # BLD AUTO: 5.3 10*3/MM3 (ref 3.4–10.8)

## 2024-01-03 PROCEDURE — 36415 COLL VENOUS BLD VENIPUNCTURE: CPT

## 2024-01-03 PROCEDURE — 93306 TTE W/DOPPLER COMPLETE: CPT

## 2024-01-03 PROCEDURE — 80048 BASIC METABOLIC PNL TOTAL CA: CPT

## 2024-01-03 PROCEDURE — 25510000001 PERFLUTREN (DEFINITY) 8.476 MG IN SODIUM CHLORIDE (PF) 0.9 % 10 ML INJECTION: Performed by: INTERNAL MEDICINE

## 2024-01-03 PROCEDURE — 85027 COMPLETE CBC AUTOMATED: CPT

## 2024-01-03 RX ADMIN — PERFLUTREN 2 ML: 6.52 INJECTION, SUSPENSION INTRAVENOUS at 10:42

## 2024-01-03 NOTE — PROGRESS NOTES
[facility] Follow Up Office Note     Encounter Date:24  Patient:Jesus Beckham  :1940  MRN:[id number]    Referring Provider: Tasha Burr MD      Chief Complaint:   Chief Complaint   Patient presents with    1 year post TAVR     History of Presenting Illness:      Mr. Beckham is a 83 y.o. gentleman with past medical history notable for nonrheumatic aortic stenosis s/p TAVR, paroxysmal atrial fibrillation on chronic anticoagulation, diabetes type 2 on insulin therapy, hypertension, mixed hyperlipidemia, chronic back pain with spinal stimulator and wheelchair use, history of bladder cancer status post suprapubic catheter placement, and recurrent DVTs who presents to our office for follow up after TAVR a year ago.  From a cardiac standpoint he has been doing well no new issues      Review of Systems:  Review of Systems   Constitutional: Negative. Positive for malaise/fatigue.   HENT: Negative.     Eyes: Negative.    Cardiovascular: Negative.  Positive for leg swelling.   Respiratory: Negative.  Positive for shortness of breath.    Endocrine: Negative.    Hematologic/Lymphatic: Negative.    Skin: Negative.    Musculoskeletal: Negative.    Gastrointestinal: Negative.    Genitourinary: Negative.    Neurological: Negative.    Psychiatric/Behavioral: Negative.     Allergic/Immunologic: Negative.        Current Outpatient Medications on File Prior to Visit   Medication Sig Dispense Refill    acetaminophen (TYLENOL) 500 MG tablet Take 1 tablet by mouth Every 6 (Six) Hours As Needed for Mild Pain.      allopurinol (ZYLOPRIM) 300 MG tablet Take 1 tablet by mouth Daily. 90 tablet 3    apixaban (ELIQUIS) 5 MG tablet tablet Take 1 tablet by mouth 2 (Two) Times a Day. 90 tablet 3    Ascorbic Acid 300 MG chewable tablet Chew 300 mg Every Morning. HOLDING FOR SURGERY      Blood Glucose Monitoring Suppl (Accu-Chek Guide) w/Device kit 1 kit See Admin Instructions. Dx code E11.42 TEST  BLOOD SUGAR 2 TIMES  DAY    1 kit 0    cetirizine (zyrTEC) 10 MG tablet Take 1 tablet by mouth Daily.      diphenhydrAMINE-acetaminophen (TYLENOL PM)  MG tablet per tablet Take 2 tablets by mouth At Night As Needed for Sleep.      donepezil (ARICEPT) 5 MG tablet Take 1 tablet by mouth every night at bedtime. 90 tablet 3    fenofibrate (TRICOR) 145 MG tablet TAKE 1 TABLET BY MOUTH  DAILY 90 tablet 3    fluticasone (FLONASE) 50 MCG/ACT nasal spray 2 sprays into the nostril(s) as directed by provider Daily. 48 g 3    furosemide (LASIX) 20 MG tablet TAKE 1 TABLET BY MOUTH TWICE  DAILY AS NEEDED FOR SWELLING 60 tablet 11    glipizide (GLUCOTROL) 10 MG tablet TAKE 1 TABLET BY MOUTH  TWICE DAILY BEFORE MEALS 180 tablet 3    Glucosamine HCl 1500 MG tablet Take 2 tablets by mouth Daily. HOLDING FOR SURGERY      glucose blood test strip ACCU-CUECK GUIDE test strips. Check 3 times a day Dx: E 11.42 300 each 4    glucose monitor monitoring kit ACCU-[employer] GUIDE METER.  Testing 3 times daily. Dx: E 11.42 1 each 0    Glyxambi 25-5 MG tablet TAKE 1 TABLET BY MOUTH  DAILY WITH BREAKFAST 90 tablet 3    ipratropium (ATROVENT) 0.06 % nasal spray       Lancet Device misc 1 each 2 (Two) Times a Day. 1 each 0    Lancets Misc. (Accu-Chek FastClix Lancet) kit Dispense 1 lancing device to check 5 times a day. Dx: E 1 1 kit 0    Lancets misc SOFT-CLICK LANCETS: Check 3 times a day. Dx: E 11.42 300 each 4    linezolid (ZYVOX) 600 MG tablet       lisinopril (PRINIVIL,ZESTRIL) 2.5 MG tablet TAKE 1 TABLET BY MOUTH DAILY 90 tablet 2    meclizine (ANTIVERT) 12.5 MG tablet Take 1 tablet by mouth 3 (Three) Times a Day As Needed for Dizziness. 30 tablet 0    melatonin 5 MG tablet tablet Take 1 tablet by mouth Every Night.      Multiple Vitamins-Minerals (MULTIVITAMIN ADULT PO) Take 1 tablet by mouth Daily. HOLDING FOR SURGERY      Omega-3 Fatty Acids (FISH OIL) 1200 MG capsule capsule Take 2,000 mg by mouth 2 (Two) Times a Day With Meals.  "HOLDING FOR SURGERY      oxyCODONE-acetaminophen (PERCOCET) 5-325 MG per tablet Take 1 tablet by mouth 2 (Two) Times a Day As Needed for Severe Pain. 60 tablet 0    pregabalin (LYRICA) 150 MG capsule 1 capsule 3 times daily  Indications: Diabetes with Nerve Disease, Neuropathic Pain 270 capsule 1    rosuvastatin (CRESTOR) 10 MG tablet TAKE 1 TABLET BY MOUTH DAILY 90 tablet 2    Toujeo Max SoloStar 300 UNIT/ML solution pen-injector injection 100 units daily 90 mL 2    traZODone (DESYREL) 150 MG tablet Take 2 tablets by mouth Every Night. 180 tablet 3    vitamin D (ERGOCALCIFEROL) 1.25 MG (94571 UT) capsule capsule TAKE 1 CAPSULE BY MOUTH EVERY 7  DAYS 11 capsule 3    Wheat Dextrin (BENEFIBER DRINK MIX PO) Take 2 teaspoon(s) by mouth 2 (Two) Times a Day.      Gemtesa 75 MG tablet  (Patient not taking: Reported on 1/3/2024)       Current Facility-Administered Medications on File Prior to Visit   Medication Dose Route Frequency Provider Last Rate Last Admin    [COMPLETED] perflutren (DEFINITY) 8.476 mg in Sodium Chloride (PF) 0.9 % 10 mL injection  2 mL Intravenous Once in imaging Samuel Zelaya MD   2 mL at 01/03/24 1042       Allergies   Allergen Reactions    Levaquin [Levofloxacin] Other (See Comments)     Redness, itching at IV site with IV Levaquin administration    Alcohol Nausea And Vomiting     \"Deathly sick, headaches, cold sweats\"  Cant take any medication that contains alcohol    Other Other (See Comments)     Pt's wife reports that after pt's cardiac cath, pt had hallucinations and agitation with pain medications. She is unsure which medication.        Past Medical History:   Diagnosis Date    Acute embolism and thrombosis of deep vein of right distal lower extremity     Acute gangrenous cholecystitis     FEB 2018    Allergic     Aortic valve stenosis     Arthritis     Atrial fibrillation with RVR     HISTORY    Benign prostatic hyperplasia without lower urinary tract symptoms     Cancer of bladder 2016 "    Colon polyp     Discitis of lumbar region     DVT (deep venous thrombosis)     MARCH 2018    Essential hypertension     Murray catheter in place     LOSS OF SENSATION BLADDER. BECAUSE OF WOUND AT THE TIME    Gout     Heel ulcer     RIGHT. FOLLOWED BY WOUND CARE. GETTING BETTER    History of sepsis     [place] (hard of hearing)     HEARING AIDS BOTH EARS    Hyperlipidemia     Loss, sensation     LOWER EXTREMTIES. RELATED TO LAST BACK SURGERY.    MRSA infection     LEFT LEG.     Nausea, vomiting, and diarrhea 03/01/2019    Open wound     WITH MEDICATED DRESSING LEFT SHIN AREA.(RESOLVED PER PATIENT)    CELINA (obstructive sleep apnea)     NO MACHINE    Osteoarthritis     Paroxysmal atrial fibrillation     PVC (premature ventricular contraction)     Sepsis 02/2018    Sepsis due to Escherichia coli     Skin carcinoma 2012    MELANOMA.2 PLACES BACK AND EAR    Type 2 diabetes mellitus     Vitamin D deficiency     Weakness        Past Surgical History:   Procedure Laterality Date    ABDOMINAL SURGERY      AORTIC VALVE REPAIR/REPLACEMENT N/A 2/28/2023    Procedure: TTE TRANSFEMORAL TRANSCATHETER AORTIC VALVE REPLACEMENT PERCUTANEOUS APPROACH;  Surgeon: Antony Goldstein MD;  Location: [facility];  Service: Cardiothoracic;  Laterality: N/A;    AORTIC VALVE REPAIR/REPLACEMENT N/A 2/28/2023    Procedure: Transfemoral Transcatheter Aortic Valve Replacement with intraoperative TTE and possible open surgical rescue;  Surgeon: Samuel Zelaya MD;  Location: [facility] CVOR;  Service: Cardiovascular;  Laterality: N/A;    APPENDECTOMY N/A 1961    CARDIAC CATHETERIZATION N/A 01/16/2023    Procedure: Coronary angiography;  Surgeon: Tasha Burr MD;  [facility];  Service: Cardiology;  Laterality: N/A;    CARDIAC CATHETERIZATION N/A 01/16/2023    Procedure: Left Heart Cath;  Surgeon: Tasha Burr MD;  Location: [facility] CATH INVASIVE LOCATION;  Service: Cardiology;  Laterality: N/A;    CARDIAC  CATHETERIZATION N/A 01/16/2023    Procedure: Right Heart Cath;  Surgeon: Tasha Burr MD;  Location: [facility] CATH INVASIVE LOCATION;  Service: Cardiology;  Laterality: N/A;    CHOLECYSTECTOMY WITH INTRAOPERATIVE CHOLANGIOGRAM N/A 02/25/2018    Procedure: CHOLECYSTECTOMY LAPAROSCOPIC INTRAOPERATIVE CHOLANGIOGRAM;  Surgeon: Maegan Correa MD;  Location: [facility] OR;  Service:     COLONOSCOPY N/A 2010    h/o of polyps    COLONOSCOPY W/ BIOPSIES AND POLYPECTOMY  2019    EYE SURGERY Bilateral 1959    glass removal from left eye, lens transplant    JOINT REPLACEMENT Right 2005    RIGHT KNEE    LAMINECTOMY N/A 01/18/2016    L2-L3, L3-L4, L4-L5, and L5-S1 bilateral lamincectomy, medial facetectomy & [id number], Dr. Kaiden Valdes    LUMBAR FUSION N/A 03/07/2018    Procedure: LUMBAR FUSION MINIMALLY INVASIVE TRANSFORAMINAL LUMBAR INTERBODY IRRIGATION AND DEBRIDEMENT AND FUSION.  IRRIGATION AND DEBRIDEMENT OF EPIDURAL ABCESS LUMBAR 2-4. BONE MORPHOGENIC PROTEIN, ALPHATEC NOVEL AND ILLICO, EMG AND SSEP NEUROMONITORING.;  Surgeon: Manish Marr DO;  Location: [facility] OR;  Service: Orthopedic Spine    SKIN BIOPSY      BACK AND FACE    SPINAL CORD STIMULATOR IMPLANT N/A 07/01/2022    Procedure: SPINAL CORD STIMULATOR INSERTION PHASE 1 ([place]) 4 DAY TRIAL ONLY DUE TO ELIQUIS HOLD.;  Surgeon: Cody Holguin MD;  Location: [facility] MAIN OR;  Service: Pain Management;  Laterality: N/A;    SPINAL CORD STIMULATOR IMPLANT N/A 11/17/2022    Procedure: Thoracic eleven laminotomy for placement of a surgical spinal cord stimulator lead to thoracic nine;  Surgeon: Charlie Bailon MD;  Location: [facility] OR;  Service: Neurosurgery;  Laterality: N/A;    SPINAL CORD STIMULATOR IMPLANT N/A 11/18/2022    Procedure: Placement of a left gluteal internal pulse generator;  Surgeon: Charlie Bailon MD;  Location: [facility] OR;  Service: Neurosurgery;  Laterality: N/A;    TOTAL KNEE ARTHROPLASTY Right 03/07/2005     "Dr. Washington Evans    VENA CAVA FILTER INSERTION Right 2018    Procedure: VENA CAVA FILTER INSERTION;  Surgeon: Alexis Thakkar MD;  Location: [facility] OR ;  Service:     VENA CAVA FILTER REMOVAL N/A 2018    Procedure: VENA CAVA FILTER REMOVAL WITH VENOCAVAGRAM;  Surgeon: Alexis Thakkar MD;  Location: [facility] OR ;  Service: Vascular       Social History     Socioeconomic History    Marital status:     Number of children: 2    Highest education level: Some college, no degree   Tobacco Use    Smoking status: Former     Types: Cigars, Pipe     Quit date:      Years since quittin.0    Smokeless tobacco: Former     Types: Chew     Quit date:     Tobacco comments:     Caffeine daily   Vaping Use    Vaping Use: Never used   Substance and Sexual Activity    Alcohol use: Not Currently    Drug use: No    Sexual activity: Defer       Family History   Problem Relation Age of Onset    Esophageal cancer Mother     No Known Problems Father     Diabetes Maternal Grandmother     Heart attack Maternal Grandfather     Malig Hyperthermia Neg Hx        The following portions of the patient's history were reviewed and updated as appropriate: allergies, current medications, past family history, past medical history, past social history, past surgical history and problem list.       Objective:       Vitals:    24 1007   BP: 104/62   BP Location: Left arm   Patient Position: Sitting   Pulse: 75   Height: 177.8 cm (70\")         Body mass index is 39.6 kg/m².    Physical Exam:  Constitutional: Well appearing, Well-developed, No acute distress, arrived in motorized wheelchair  HENT: Oropharynx clear and membrane moist  Eyes: Normal conjunctiva, no sclera icterus.  Neck: Supple, no carotid bruit bilaterally.  Cardiovascular: Regular rate and rhythm, Early peaking systolic murmur over the right upper sternal border, 1+ bilateral lower extremity edema.  Pulmonary: Normal " respiratory effort, normal lung sounds, no wheezing.  Neurological: Alert and orient x 3.   Skin: Warm, dry, no ecchymosis, no rash.  Psych: Appropriate mood and affect. Normal judgment and insight.     Lab Results   Component Value Date     01/03/2024     08/21/2023    K 4.6 01/03/2024    K 4.4 08/21/2023    CL 98 01/03/2024     08/21/2023    CO2 26.0 01/03/2024    CO2 24 08/21/2023    BUN 23 01/03/2024    BUN 21 08/21/2023    CREATININE 1.31 (H) 01/03/2024    CREATININE 1.19 08/21/2023    EGFRIFNONA 62 01/11/2022    EGFRIFNONA 65 11/29/2021    EGFRIFAFRI 72 01/11/2022    EGFRIFAFRI 79 09/28/2021    GLUCOSE 186 (H) 01/03/2024    GLUCOSE 139 (H) 08/21/2023    CALCIUM 9.1 01/03/2024    CALCIUM 9.0 08/21/2023    PROTENTOTREF 6.2 08/21/2023    PROTENTOTREF 6.5 04/24/2023    ALBUMIN 4.0 08/21/2023    ALBUMIN 4.2 07/07/2023    BILITOT 0.3 08/21/2023    BILITOT 0.3 07/07/2023    AST 25 08/21/2023    AST 41 (H) 07/07/2023    ALT 30 08/21/2023    ALT 40 07/07/2023     Lab Results   Component Value Date    WBC 5.30 01/03/2024    WBC 6.06 03/27/2023    HGB 13.5 01/03/2024    HGB 13.6 03/27/2023    HCT 39.7 01/03/2024    HCT 39.6 03/27/2023    MCV 90.6 01/03/2024    MCV 90.0 03/27/2023     01/03/2024     03/27/2023     Lab Results   Component Value Date    TRIG 170 (H) 08/21/2023    TRIG 164 (H) 04/24/2023    HDL 37 (L) 08/21/2023    HDL 38 (L) 04/24/2023    LDL 69 08/21/2023    LDL 65 04/24/2023     Lab Results   Component Value Date    PROBNP 98.2 07/07/2023    PROBNP 184.0 02/24/2023     Lab Results   Component Value Date    TROPONINT 0.030 11/20/2022     Lab Results   Component Value Date    TSH 2.930 12/09/2022    TSH 3.340 09/14/2022       ECG 12 Lead    Date/Time: 1/3/2024 11:01 AM  Performed by: Samuel Zelaya MD    Authorized by: Samuel Zelaya MD  Comparison: compared with previous ECG from 11/14/2023  Similar to previous ECG  Rhythm: sinus rhythm  Ectopy: unifocal  PVCs  Conduction: 1st degree AV block          Echocardiogram 1/3/2024 with images reviewed by myself:  Normal-appearing 26 mm Soriano ultra bioprosthetic TAVR with no paravalvular or valvular regurgitation and appropriate gradients across the valve    Echocardiogram 3/27/2023:  Aortic valve area is 1.1 cm2.  Peak velocity of the flow distal to the aortic valve is 249.3 cm/s. Aortic valve maximum pressure gradient is 25 mmHg. Aortic valve mean pressure gradient is 13 mmHg. Aortic valve dimensionless index is 0.3 .  There is a prosthetic aortic valve present.  No aortic or paravalvular regurgitation  Estimated right ventricular systolic pressure from tricuspid regurgitation is normal (<35 mmHg). Calculated right ventricular systolic pressure from tricuspid regurgitation is 16 mmHg.    Echocardiogram 3/1/2023:  Normal left ventricular function  Normal-appearing 26 mm NAHOMY ultra aortic valve bioprostheses with mean gradient of approximately 14 mmHg with no significant valvular or paravalvular leak.     TAVR 2/28/2023:  Successful placement of 26 mm NAHOMY ultra aortic valve bioprosthesis with trace paravalvular leak.  Intraoperative transesophageal echocardiogram demonstrated an aortic valve area of 1.9 cm 2 with a mean gradient of 6 mmHg at the end of case.    Cardiac catheterization 1/3/2023:  Left main artery: Large-caliber vessel with 0% stenosis.  The vessel bifurcates into left anterior descending and left circumflex arteries.  Left anterior descending artery: Proximally large-caliber vessel with 0% stenosis.  Proximal vessel gives rise to a small caliber first diagonal branch with no significant disease.  The mid vessel remains a large caliber vessel with 0% stenosis.  The distal vessel bifurcates into a small caliber second diagonal branch and small caliber distal LAD both with no significant disease.  Left circumflex artery: Large-caliber vessel with 0% proximal to mid stenosis.  The mid vessel gives rise to  a large caliber, branching obtuse marginal branch with no significant stenosis.  The distal continuation circumflex vessel tapers to small caliber with 0% stenosis.  Right coronary artery: Large-caliber vessel with 0% proximal to distal stenosis.  The distal vessel bifurcates into a moderate caliber posterior descending and a small caliber posterolateral branch both with no significant disease.  This is a right dominant system.  Pulmonary wedge pressure: 15/17, 15  Pulmonary artery pressure: 41/10, 2  Right ventricular pressure: 37/8,   Right atrial pressure: 20/11, 11  Pulmonary artery saturation: 73% on 3 L nasal cannula  Right radial artery saturation: 99%  Juma calculated cardiac output 5.43 L/min, cardiac index 2.26 L/min/m²    Echocardiogram 12/30/2022:  Left ventricular systolic function is normal. Left ventricular ejection fraction appears to be 56 - 60%.  Left ventricular diastolic function is consistent with (grade I) impaired relaxation.  Moderate to severe aortic valve stenosis is present. Aortic valve area is 0.9 cm2.  Peak velocity of the flow distal to the aortic valve is 390.9 cm/s. Aortic valve maximum pressure gradient is 61 mmHg. Aortic valve mean pressure gradient is 39 mmHg.    Echocardiogram 12/9/2021:  Left ventricular ejection fraction is normal. (Calculated EF = 52%).  Myocardial perfusion imaging indicates a normal myocardial perfusion study with no evidence of ischemia.  Impressions are consistent with a low risk study.  LVEF with stress is 53%.        Assessment:          Diagnosis Plan   1. S/P TAVR (transcatheter aortic valve replacement)  ECG 12 Lead               Plan:       Mr. Beckham is a 83 y.o. gentleman with past medical history notable for nonrheumatic aortic stenosis s/p TAVR, paroxysmal atrial fibrillation on chronic anticoagulation, diabetes type 2 on insulin therapy, hypertension, mixed hyperlipidemia, chronic back pain with spinal stimulator and wheelchair use, history of  bladder cancer status post suprapubic catheter placement, and recurrent DVTs who presents to our office scheduled follow up after TAVR.  Overall patient is doing great from a TAVR standpoint.  No changes are needed at this time we will continue to follow-up with his primary cardiologist as scheduled.      Nonrheumatic aortic stenosis/ Status Post TAVR:  Continue anticoagulation as part of management for recurrent DVTs  Echo today demonstrates normal valve function  NYHA Class II      Follow-up:  Follow-up with primary cardiologist as scheduled, no further follow-up needed with us      Thank you for allowing me to participate in the care of Jesus JOANNE Chapincito. Feel free to contact me directly with any further questions or concerns.    Samuel Zelaya MD  [facility]  01/03/24  11:04 EST

## 2024-01-06 DIAGNOSIS — E11.69 TYPE 2 DIABETES MELLITUS WITH OTHER SPECIFIED COMPLICATION, WITH LONG-TERM CURRENT USE OF INSULIN: ICD-10-CM

## 2024-01-06 DIAGNOSIS — Z79.4 TYPE 2 DIABETES MELLITUS WITH OTHER SPECIFIED COMPLICATION, WITH LONG-TERM CURRENT USE OF INSULIN: ICD-10-CM

## 2024-01-08 RX ORDER — FENOFIBRATE 145 MG/1
145 TABLET, COATED ORAL DAILY
Qty: 90 TABLET | Refills: 3 | Status: SHIPPED | OUTPATIENT
Start: 2024-01-08

## 2024-01-08 NOTE — TELEPHONE ENCOUNTER
Rx Refill Note  Requested Prescriptions     Pending Prescriptions Disp Refills    fenofibrate (TRICOR) 145 MG tablet [Pharmacy Med Name: Fenofibrate 145 MG Oral Tablet] 90 tablet 3     Sig: TAKE 1 TABLET BY MOUTH DAILY      Last office visit with prescribing clinician: 5/1/2023   Last telemedicine visit with prescribing clinician: Visit date not found   Next office visit with prescribing clinician: 2/5/2024                         Would you like a call back once the refill request has been completed: [] Yes [] No    If the office needs to give you a call back, can they leave a voicemail: [] Yes [] No    Rosio Bullock MA  01/08/24, 07:54 EST

## 2024-01-10 DIAGNOSIS — Z79.4 TYPE 2 DIABETES MELLITUS WITH OTHER SPECIFIED COMPLICATION, WITH LONG-TERM CURRENT USE OF INSULIN: ICD-10-CM

## 2024-01-10 DIAGNOSIS — E11.69 TYPE 2 DIABETES MELLITUS WITH OTHER SPECIFIED COMPLICATION, WITH LONG-TERM CURRENT USE OF INSULIN: ICD-10-CM

## 2024-01-10 RX ORDER — EMPAGLIFLOZIN AND LINAGLIPTIN 25; 5 MG/1; MG/1
1 TABLET, FILM COATED ORAL
Qty: 90 TABLET | Refills: 2 | Status: SHIPPED | OUTPATIENT
Start: 2024-01-10

## 2024-01-10 NOTE — TELEPHONE ENCOUNTER
Rx Refill Note  Requested Prescriptions     Pending Prescriptions Disp Refills    Glyxambi 25-5 MG tablet [Pharmacy Med Name: Glyxambi 25-5 MG Oral Tablet] 90 tablet 3     Sig: TAKE 1 TABLET BY MOUTH DAILY  WITH BREAKFAST      Last office visit with prescribing clinician: 5/1/2023   Last telemedicine visit with prescribing clinician: Visit date not found   Next office visit with prescribing clinician: 2/5/2024                         Would you like a call back once the refill request has been completed: [] Yes [] No    If the office needs to give you a call back, can they leave a voicemail: [] Yes [] No    Rosio Bullock MA  01/10/24, 08:21 EST

## 2024-01-17 ENCOUNTER — APPOINTMENT (OUTPATIENT)
Dept: GENERAL RADIOLOGY | Facility: HOSPITAL | Age: 84
End: 2024-01-17
Payer: MEDICARE

## 2024-01-17 ENCOUNTER — HOSPITAL ENCOUNTER (INPATIENT)
Facility: HOSPITAL | Age: 84
LOS: 2 days | Discharge: HOME | End: 2024-01-20
Attending: STUDENT IN AN ORGANIZED HEALTH CARE EDUCATION/TRAINING PROGRAM | Admitting: STUDENT IN AN ORGANIZED HEALTH CARE EDUCATION/TRAINING PROGRAM
Payer: MEDICARE

## 2024-01-17 DIAGNOSIS — T83.511A URINARY TRACT INFECTION ASSOCIATED WITH INDWELLING URETHRAL CATHETER, INITIAL ENCOUNTER: Primary | ICD-10-CM

## 2024-01-17 DIAGNOSIS — L03.116 CELLULITIS OF LEFT LOWER EXTREMITY: ICD-10-CM

## 2024-01-17 DIAGNOSIS — G93.40 ENCEPHALOPATHY: ICD-10-CM

## 2024-01-17 DIAGNOSIS — N39.0 URINARY TRACT INFECTION ASSOCIATED WITH INDWELLING URETHRAL CATHETER, INITIAL ENCOUNTER: Primary | ICD-10-CM

## 2024-01-17 LAB
ALBUMIN SERPL-MCNC: 4.6 G/DL (ref 3.5–5.2)
ALBUMIN/GLOB SERPL: 1.9 G/DL
ALP SERPL-CCNC: 68 U/L (ref 39–117)
ALT SERPL W P-5'-P-CCNC: 38 U/L (ref 1–41)
ANION GAP SERPL CALCULATED.3IONS-SCNC: 14.4 MMOL/L (ref 5–15)
AST SERPL-CCNC: 45 U/L (ref 1–40)
B PARAPERT DNA SPEC QL NAA+PROBE: NOT DETECTED
B PERT DNA SPEC QL NAA+PROBE: NOT DETECTED
BACTERIA UR QL AUTO: ABNORMAL /HPF
BASOPHILS # BLD AUTO: 0.03 10*3/MM3 (ref 0–0.2)
BASOPHILS NFR BLD AUTO: 0.2 % (ref 0–1.5)
BILIRUB SERPL-MCNC: 0.7 MG/DL (ref 0–1.2)
BILIRUB UR QL STRIP: NEGATIVE
BUN SERPL-MCNC: 19 MG/DL (ref 8–23)
BUN/CREAT SERPL: 14.8 (ref 7–25)
C PNEUM DNA NPH QL NAA+NON-PROBE: NOT DETECTED
CALCIUM SPEC-SCNC: 9.7 MG/DL (ref 8.6–10.5)
CHLORIDE SERPL-SCNC: 94 MMOL/L (ref 98–107)
CLARITY UR: ABNORMAL
CO2 SERPL-SCNC: 27.6 MMOL/L (ref 22–29)
COLOR UR: YELLOW
CREAT SERPL-MCNC: 1.28 MG/DL (ref 0.76–1.27)
D-LACTATE SERPL-SCNC: 1.5 MMOL/L (ref 0.5–2)
D-LACTATE SERPL-SCNC: 2.6 MMOL/L (ref 0.5–2)
DEPRECATED RDW RBC AUTO: 47.8 FL (ref 37–54)
EGFRCR SERPLBLD CKD-EPI 2021: 55.9 ML/MIN/1.73
EOSINOPHIL # BLD AUTO: 0.01 10*3/MM3 (ref 0–0.4)
EOSINOPHIL NFR BLD AUTO: 0.1 % (ref 0.3–6.2)
ERYTHROCYTE [DISTWIDTH] IN BLOOD BY AUTOMATED COUNT: 14.6 % (ref 12.3–15.4)
FLUAV SUBTYP SPEC NAA+PROBE: NOT DETECTED
FLUBV RNA ISLT QL NAA+PROBE: NOT DETECTED
GLOBULIN UR ELPH-MCNC: 2.4 GM/DL
GLUCOSE SERPL-MCNC: 159 MG/DL (ref 65–99)
GLUCOSE UR STRIP-MCNC: ABNORMAL MG/DL
HADV DNA SPEC NAA+PROBE: NOT DETECTED
HCOV 229E RNA SPEC QL NAA+PROBE: NOT DETECTED
HCOV HKU1 RNA SPEC QL NAA+PROBE: NOT DETECTED
HCOV NL63 RNA SPEC QL NAA+PROBE: NOT DETECTED
HCOV OC43 RNA SPEC QL NAA+PROBE: NOT DETECTED
HCT VFR BLD AUTO: 45.4 % (ref 37.5–51)
HGB BLD-MCNC: 15.1 G/DL (ref 13–17.7)
HGB UR QL STRIP.AUTO: ABNORMAL
HMPV RNA NPH QL NAA+NON-PROBE: NOT DETECTED
HPIV1 RNA ISLT QL NAA+PROBE: NOT DETECTED
HPIV2 RNA SPEC QL NAA+PROBE: NOT DETECTED
HPIV3 RNA NPH QL NAA+PROBE: NOT DETECTED
HPIV4 P GENE NPH QL NAA+PROBE: NOT DETECTED
HYALINE CASTS UR QL AUTO: ABNORMAL /LPF
KETONES UR QL STRIP: NEGATIVE
LEUKOCYTE ESTERASE UR QL STRIP.AUTO: ABNORMAL
LYMPHOCYTES # BLD AUTO: 0.5 10*3/MM3 (ref 0.7–3.1)
LYMPHOCYTES NFR BLD AUTO: 2.7 % (ref 19.6–45.3)
M PNEUMO IGG SER IA-ACNC: NOT DETECTED
MCH RBC QN AUTO: 29.7 PG (ref 26.6–33)
MCHC RBC AUTO-ENTMCNC: 33.3 G/DL (ref 31.5–35.7)
MCV RBC AUTO: 89.2 FL (ref 79–97)
MONOCYTES # BLD AUTO: 0.68 10*3/MM3 (ref 0.1–0.9)
MONOCYTES NFR BLD AUTO: 3.7 % (ref 5–12)
NEUTROPHILS NFR BLD AUTO: 17.13 10*3/MM3 (ref 1.7–7)
NEUTROPHILS NFR BLD AUTO: 92.5 % (ref 42.7–76)
NITRITE UR QL STRIP: POSITIVE
PH UR STRIP.AUTO: 5.5 [PH] (ref 5–8)
PLATELET # BLD AUTO: 135 10*3/MM3 (ref 140–450)
PMV BLD AUTO: 11.3 FL (ref 6–12)
POTASSIUM SERPL-SCNC: 4.8 MMOL/L (ref 3.5–5.2)
PROCALCITONIN SERPL-MCNC: 0.58 NG/ML (ref 0–0.25)
PROT SERPL-MCNC: 7 G/DL (ref 6–8.5)
PROT UR QL STRIP: ABNORMAL
RBC # BLD AUTO: 5.09 10*6/MM3 (ref 4.14–5.8)
RBC # UR STRIP: ABNORMAL /HPF
REF LAB TEST METHOD: ABNORMAL
RHINOVIRUS RNA SPEC NAA+PROBE: NOT DETECTED
RSV RNA NPH QL NAA+NON-PROBE: NOT DETECTED
SARS-COV-2 RNA NPH QL NAA+NON-PROBE: NOT DETECTED
SODIUM SERPL-SCNC: 136 MMOL/L (ref 136–145)
SP GR UR STRIP: 1.02 (ref 1–1.03)
SQUAMOUS #/AREA URNS HPF: ABNORMAL /HPF
TROPONIN T SERPL HS-MCNC: 40 NG/L
UROBILINOGEN UR QL STRIP: ABNORMAL
WBC # UR STRIP: ABNORMAL /HPF
WBC NRBC COR # BLD AUTO: 18.49 10*3/MM3 (ref 3.4–10.8)
YEAST URNS QL MICRO: ABNORMAL /HPF

## 2024-01-17 PROCEDURE — 85025 COMPLETE CBC W/AUTO DIFF WBC: CPT | Performed by: STUDENT IN AN ORGANIZED HEALTH CARE EDUCATION/TRAINING PROGRAM

## 2024-01-17 PROCEDURE — G0378 HOSPITAL OBSERVATION PER HR: HCPCS

## 2024-01-17 PROCEDURE — 71045 X-RAY EXAM CHEST 1 VIEW: CPT

## 2024-01-17 PROCEDURE — 84145 PROCALCITONIN (PCT): CPT | Performed by: STUDENT IN AN ORGANIZED HEALTH CARE EDUCATION/TRAINING PROGRAM

## 2024-01-17 PROCEDURE — 80053 COMPREHEN METABOLIC PANEL: CPT | Performed by: STUDENT IN AN ORGANIZED HEALTH CARE EDUCATION/TRAINING PROGRAM

## 2024-01-17 PROCEDURE — 87077 CULTURE AEROBIC IDENTIFY: CPT | Performed by: STUDENT IN AN ORGANIZED HEALTH CARE EDUCATION/TRAINING PROGRAM

## 2024-01-17 PROCEDURE — 0202U NFCT DS 22 TRGT SARS-COV-2: CPT | Performed by: STUDENT IN AN ORGANIZED HEALTH CARE EDUCATION/TRAINING PROGRAM

## 2024-01-17 PROCEDURE — 87040 BLOOD CULTURE FOR BACTERIA: CPT | Performed by: STUDENT IN AN ORGANIZED HEALTH CARE EDUCATION/TRAINING PROGRAM

## 2024-01-17 PROCEDURE — 93010 ELECTROCARDIOGRAM REPORT: CPT | Performed by: INTERNAL MEDICINE

## 2024-01-17 PROCEDURE — 83605 ASSAY OF LACTIC ACID: CPT | Performed by: STUDENT IN AN ORGANIZED HEALTH CARE EDUCATION/TRAINING PROGRAM

## 2024-01-17 PROCEDURE — 84484 ASSAY OF TROPONIN QUANT: CPT | Performed by: STUDENT IN AN ORGANIZED HEALTH CARE EDUCATION/TRAINING PROGRAM

## 2024-01-17 PROCEDURE — 25810000003 SODIUM CHLORIDE 0.9 % SOLUTION: Performed by: STUDENT IN AN ORGANIZED HEALTH CARE EDUCATION/TRAINING PROGRAM

## 2024-01-17 PROCEDURE — 36415 COLL VENOUS BLD VENIPUNCTURE: CPT | Performed by: STUDENT IN AN ORGANIZED HEALTH CARE EDUCATION/TRAINING PROGRAM

## 2024-01-17 PROCEDURE — 87086 URINE CULTURE/COLONY COUNT: CPT | Performed by: STUDENT IN AN ORGANIZED HEALTH CARE EDUCATION/TRAINING PROGRAM

## 2024-01-17 PROCEDURE — 81001 URINALYSIS AUTO W/SCOPE: CPT | Performed by: STUDENT IN AN ORGANIZED HEALTH CARE EDUCATION/TRAINING PROGRAM

## 2024-01-17 PROCEDURE — 93005 ELECTROCARDIOGRAM TRACING: CPT | Performed by: STUDENT IN AN ORGANIZED HEALTH CARE EDUCATION/TRAINING PROGRAM

## 2024-01-17 PROCEDURE — 87186 SC STD MICRODIL/AGAR DIL: CPT | Performed by: STUDENT IN AN ORGANIZED HEALTH CARE EDUCATION/TRAINING PROGRAM

## 2024-01-17 PROCEDURE — P9612 CATHETERIZE FOR URINE SPEC: HCPCS

## 2024-01-17 PROCEDURE — 99285 EMERGENCY DEPT VISIT HI MDM: CPT

## 2024-01-17 PROCEDURE — 25010000002 CEFTRIAXONE PER 250 MG: Performed by: STUDENT IN AN ORGANIZED HEALTH CARE EDUCATION/TRAINING PROGRAM

## 2024-01-17 RX ORDER — SODIUM CHLORIDE 0.9 % (FLUSH) 0.9 %
10 SYRINGE (ML) INJECTION EVERY 12 HOURS SCHEDULED
Status: DISCONTINUED | OUTPATIENT
Start: 2024-01-17 | End: 2024-01-20 | Stop reason: HOSPADM

## 2024-01-17 RX ORDER — NITROGLYCERIN 0.4 MG/1
0.4 TABLET SUBLINGUAL
Status: DISCONTINUED | OUTPATIENT
Start: 2024-01-17 | End: 2024-01-20 | Stop reason: HOSPADM

## 2024-01-17 RX ORDER — GUAIFENESIN 200 MG/10ML
200 LIQUID ORAL 3 TIMES DAILY PRN
COMMUNITY

## 2024-01-17 RX ORDER — CHOLECALCIFEROL (VITAMIN D3) 125 MCG
5 CAPSULE ORAL NIGHTLY
Status: DISCONTINUED | OUTPATIENT
Start: 2024-01-17 | End: 2024-01-20 | Stop reason: HOSPADM

## 2024-01-17 RX ORDER — AMOXICILLIN 250 MG
2 CAPSULE ORAL 2 TIMES DAILY
Status: DISCONTINUED | OUTPATIENT
Start: 2024-01-17 | End: 2024-01-20 | Stop reason: HOSPADM

## 2024-01-17 RX ORDER — IPRATROPIUM BROMIDE 42 UG/1
1 SPRAY, METERED NASAL 3 TIMES DAILY
Status: DISCONTINUED | OUTPATIENT
Start: 2024-01-17 | End: 2024-01-20 | Stop reason: HOSPADM

## 2024-01-17 RX ORDER — NYSTATIN 100000 U/G
1 CREAM TOPICAL 2 TIMES DAILY
Status: DISCONTINUED | OUTPATIENT
Start: 2024-01-17 | End: 2024-01-20 | Stop reason: HOSPADM

## 2024-01-17 RX ORDER — BISACODYL 10 MG
10 SUPPOSITORY, RECTAL RECTAL DAILY PRN
Status: DISCONTINUED | OUTPATIENT
Start: 2024-01-17 | End: 2024-01-20 | Stop reason: HOSPADM

## 2024-01-17 RX ORDER — SODIUM CHLORIDE 0.9 % (FLUSH) 0.9 %
10 SYRINGE (ML) INJECTION AS NEEDED
Status: DISCONTINUED | OUTPATIENT
Start: 2024-01-17 | End: 2024-01-20 | Stop reason: HOSPADM

## 2024-01-17 RX ORDER — ALLOPURINOL 300 MG/1
300 TABLET ORAL DAILY
Status: DISCONTINUED | OUTPATIENT
Start: 2024-01-18 | End: 2024-01-20 | Stop reason: HOSPADM

## 2024-01-17 RX ORDER — HYDROCORTISONE ACETATE PRAMOXINE HCL 2.5; 1 G/100G; G/100G
3 CREAM TOPICAL 3 TIMES DAILY
COMMUNITY

## 2024-01-17 RX ORDER — ACETAMINOPHEN 500 MG
1000 TABLET ORAL ONCE
Status: COMPLETED | OUTPATIENT
Start: 2024-01-17 | End: 2024-01-17

## 2024-01-17 RX ORDER — BISACODYL 5 MG/1
5 TABLET, DELAYED RELEASE ORAL DAILY PRN
Status: DISCONTINUED | OUTPATIENT
Start: 2024-01-17 | End: 2024-01-20 | Stop reason: HOSPADM

## 2024-01-17 RX ORDER — POLYETHYLENE GLYCOL 3350 17 G/17G
17 POWDER, FOR SOLUTION ORAL DAILY PRN
Status: DISCONTINUED | OUTPATIENT
Start: 2024-01-17 | End: 2024-01-20 | Stop reason: HOSPADM

## 2024-01-17 RX ORDER — DONEPEZIL HYDROCHLORIDE 5 MG/1
5 TABLET, FILM COATED ORAL NIGHTLY
Status: DISCONTINUED | OUTPATIENT
Start: 2024-01-17 | End: 2024-01-20 | Stop reason: HOSPADM

## 2024-01-17 RX ORDER — NYSTATIN 100000 U/G
1 CREAM TOPICAL 2 TIMES DAILY
COMMUNITY

## 2024-01-17 RX ORDER — SODIUM CHLORIDE 9 MG/ML
150 INJECTION, SOLUTION INTRAVENOUS CONTINUOUS
Status: DISCONTINUED | OUTPATIENT
Start: 2024-01-17 | End: 2024-01-19

## 2024-01-17 RX ORDER — SODIUM CHLORIDE 9 MG/ML
40 INJECTION, SOLUTION INTRAVENOUS AS NEEDED
Status: DISCONTINUED | OUTPATIENT
Start: 2024-01-17 | End: 2024-01-20 | Stop reason: HOSPADM

## 2024-01-17 RX ADMIN — DONEPEZIL HYDROCHLORIDE 5 MG: 5 TABLET, FILM COATED ORAL at 22:16

## 2024-01-17 RX ADMIN — APIXABAN 5 MG: 5 TABLET, FILM COATED ORAL at 22:16

## 2024-01-17 RX ADMIN — Medication 10 ML: at 22:17

## 2024-01-17 RX ADMIN — CEFTRIAXONE 2 G: 2 INJECTION, POWDER, FOR SOLUTION INTRAMUSCULAR; INTRAVENOUS at 15:06

## 2024-01-17 RX ADMIN — NYSTATIN 1 APPLICATION: 100000 CREAM TOPICAL at 22:17

## 2024-01-17 RX ADMIN — IPRATROPIUM BROMIDE 1 SPRAY: 42 SPRAY, METERED NASAL at 22:17

## 2024-01-17 RX ADMIN — ACETAMINOPHEN 1000 MG: 500 TABLET ORAL at 13:39

## 2024-01-17 RX ADMIN — SODIUM CHLORIDE 150 ML/HR: 9 INJECTION, SOLUTION INTRAVENOUS at 18:11

## 2024-01-17 RX ADMIN — SODIUM CHLORIDE 1000 ML: 9 INJECTION, SOLUTION INTRAVENOUS at 15:05

## 2024-01-17 RX ADMIN — Medication 5 MG: at 22:17

## 2024-01-17 RX ADMIN — DOCUSATE SODIUM 50MG AND SENNOSIDES 8.6MG 2 TABLET: 8.6; 5 TABLET, FILM COATED ORAL at 22:16

## 2024-01-17 NOTE — ED NOTES
Pt has bladder stimulator that is connected to a phone via [employer]-to[employer]; pts family cannot get stimulator to turn off which could to be the reason why the EKG interpretation is tachycardic/PVCs

## 2024-01-17 NOTE — H&P
Patient Name:  Jesus Beckham  YOB: 1940  MRN:  [id number]  Admit Date:  1/17/2024  Patient Care Team:  Magdy Ricardo MD as PCP - General  Magdy Ricardo MD as PCP - Family Medicine  Jerrell Fry MD as Consulting Physician (Urology)  Carlos Manuel Saleh MD as Consulting Physician (Urology)  Thuan Fernandez MD as Consulting Physician (Ophthalmology)  Lito Hernandez MD as Consulting Physician (Endocrinology)  Tasha Burr MD as Consulting Physician (Cardiology)  Samuel Zelaya MD as Consulting Physician (Cardiology)  Thuan Alvarado MD as Consulting Physician (Gastroenterology)      Subjective   History Present Illness     Chief Complaint   Patient presents with    Dysuria    Polyuria    Altered Mental Status     This is a 83-year-old man with multiple medical problems including type 2 diabetes, atrial fibrillation, colon cancer, history of DVT, as well as a suprapubic catheter who presents to the hospital with worsening altered mental status.     He had a urinalysis in the emergency department that showed 2+ bacteria as well as positive nitrites.  It was also noted that he had some left lower extremity erythema and tenderness that was concerning for cellulitis.  He had a temperature to 101.8 and his lactic acid was 2.6 with a white blood cell count elevation 18,000.  He was started on ceftriaxone and admitted for further evaluation management    Personal History     Past Medical History:   Diagnosis Date    Acute embolism and thrombosis of deep vein of right distal lower extremity     Acute gangrenous cholecystitis     FEB 2018    Allergic     Aortic valve stenosis     Arthritis     Atrial fibrillation with RVR     HISTORY    Benign prostatic hyperplasia without lower urinary tract symptoms     Cancer of bladder 2016    Colon polyp     Discitis of lumbar region     DVT (deep venous thrombosis)     MARCH 2018    Essential hypertension     Murray catheter in place      LOSS OF SENSATION BLADDER. BECAUSE OF WOUND AT THE TIME    Gout     Heel ulcer     RIGHT. FOLLOWED BY WOUND CARE. GETTING BETTER    History of sepsis     [place] (hard of hearing)     HEARING AIDS BOTH EARS    Hyperlipidemia     Loss, sensation     LOWER EXTREMTIES. RELATED TO LAST BACK SURGERY.    MRSA infection     LEFT LEG.     Nausea, vomiting, and diarrhea 03/01/2019    Open wound     WITH MEDICATED DRESSING LEFT SHIN AREA.(RESOLVED PER PATIENT)    CELINA (obstructive sleep apnea)     NO MACHINE    Osteoarthritis     Paroxysmal atrial fibrillation     PVC (premature ventricular contraction)     Sepsis 02/2018    Sepsis due to Escherichia coli     Skin carcinoma 2012    MELANOMA.2 PLACES BACK AND EAR    Type 2 diabetes mellitus     Vitamin D deficiency     Weakness      Past Surgical History:   Procedure Laterality Date    ABDOMINAL SURGERY      AORTIC VALVE REPAIR/REPLACEMENT N/A 2/28/2023    Procedure: TTE TRANSFEMORAL TRANSCATHETER AORTIC VALVE REPLACEMENT PERCUTANEOUS APPROACH;  Surgeon: Antony Goldstein MD;  Location: [facility];  Service: Cardiothoracic;  Laterality: N/A;    AORTIC VALVE REPAIR/REPLACEMENT N/A 2/28/2023    Procedure: Transfemoral Transcatheter Aortic Valve Replacement with intraoperative TTE and possible open surgical rescue;  Surgeon: Samuel Zelaya MD;  Location: [facility];  Service: Cardiovascular;  Laterality: N/A;    APPENDECTOMY N/A 1961    CARDIAC CATHETERIZATION N/A 01/16/2023    Procedure: Coronary angiography;  Surgeon: Tasha Burr MD;  Location: [facility] CATH INVASIVE LOCATION;  Service: Cardiology;  Laterality: N/A;    CARDIAC CATHETERIZATION N/A 01/16/2023    Procedure: Left Heart Cath;  Surgeon: Tasha Burr MD;  Location: [facility] CATH INVASIVE LOCATION;  Service: Cardiology;  Laterality: N/A;    CARDIAC CATHETERIZATION N/A 01/16/2023    Procedure: Right Heart Cath;  Surgeon: Tasha Burr MD;  [facility] C[facility]VASIVE [facility];  Service: Cardiology;   Laterality: N/A;    CHOLECYSTECTOMY WITH INTRAOPERATIVE CHOLANGIOGRAM N/A 02/25/2018    Procedure: CHOLECYSTECTOMY LAPAROSCOPIC INTRAOPERATIVE CHOLANGIOGRAM;  Surgeon: Maegan Correa MD;  Location: [facility] OR;  Service:     COLONOSCOPY N/A 2010    h/o of polyps    COLONOSCOPY W/ BIOPSIES AND POLYPECTOMY  2019    EYE SURGERY Bilateral 1959    glass removal from left eye, lens transplant    JOINT REPLACEMENT Right 2005    RIGHT KNEE    LAMINECTOMY N/A 01/18/2016    L2-L3, L3-L4, L4-L5, and L5-S1 bilateral lamincectomy, medial facetectomy & [id number], Dr. Kaiden Valdes    LUMBAR FUSION N/A 03/07/2018    Procedure: LUMBAR FUSION MINIMALLY INVASIVE TRANSFORAMINAL LUMBAR INTERBODY IRRIGATION AND DEBRIDEMENT AND FUSION.  IRRIGATION AND DEBRIDEMENT OF EPIDURAL ABCESS LUMBAR 2-4. BONE MORPHOGENIC PROTEIN, ALPHATEC NOVEL AND ILLICO, EMG AND SSEP NEUROMONITORING.;  Surgeon: Manish Marr DO;  Location: [facility];  Service: Orthopedic Spine    SKIN BIOPSY      BACK AND FACE    SPINAL CORD STIMULATOR IMPLANT N/A 07/01/2022    Procedure: SPINAL CORD STIMULATOR INSERTION PHASE 1 ([place]) 4 DAY TRIAL ONLY DUE TO ELIQUIS HOLD.;  Surgeon: Cody Holguin MD;  [place] [facility] [place];  Service: Pain Management;  Laterality: N/A;    SPINAL CORD STIMULATOR IMPLANT N/A 11/17/2022    Procedure: Thoracic eleven laminotomy for placement of a surgical spinal cord stimulator lead to thoracic nine;  Surgeon: Charlie Bailon MD;  Location: [facility] OR;  Service: Neurosurgery;  Laterality: N/A;    SPINAL CORD STIMULATOR IMPLANT N/A 11/18/2022    Procedure: Placement of a left gluteal internal pulse generator;  Surgeon: Charlie Bailon MD;  Location: [facility] OR;  Service: Neurosurgery;  Laterality: N/A;    TOTAL KNEE ARTHROPLASTY Right 03/07/2005    Dr. Washington Evans    VENA CAVA FILTER INSERTION Right 03/06/2018    Procedure: VENA CAVA FILTER INSERTION;  Surgeon: Alexis Thakkar MD;  Location: [facility]  HYBRID OR ;  Service:     VENA CAVA FILTER REMOVAL N/A 2018    Procedure: VENA CAVA FILTER REMOVAL WITH VENOCAVAGRAM;  Surgeon: Alexis Thakkar MD;  Location: [facility] OR ;  Service: Vascular     Family History   Problem Relation Age of Onset    Esophageal cancer Mother     No Known Problems Father     Diabetes Maternal Grandmother     Heart attack Maternal Grandfather     Malig Hyperthermia Neg Hx      Social History     Tobacco Use    Smoking status: Former     Types: Cigars, Pipe     Quit date:      Years since quittin.0    Smokeless tobacco: Former     Types: Chew     Quit date:     Tobacco comments:     Caffeine daily   Vaping Use    Vaping Use: Never used   Substance Use Topics    Alcohol use: Not Currently    Drug use: No     No current facility-administered medications on file prior to encounter.     Current Outpatient Medications on File Prior to Encounter   Medication Sig Dispense Refill    acetaminophen (TYLENOL) 500 MG tablet Take 1 tablet by mouth Every 6 (Six) Hours As Needed for Mild Pain.      allopurinol (ZYLOPRIM) 300 MG tablet Take 1 tablet by mouth Daily. 90 tablet 3    apixaban (ELIQUIS) 5 MG tablet tablet Take 1 tablet by mouth 2 (Two) Times a Day. 90 tablet 3    Ascorbic Acid 300 MG chewable tablet Chew 300 mg Every Morning. HOLDING FOR SURGERY      Blood Glucose Monitoring Suppl (Accu-Chek Guide) w/Device kit 1 kit See Admin Instructions. Dx code E11.42 TEST BLOOD SUGAR 2 TIMES  DAY    1 kit 0    diphenhydrAMINE-acetaminophen (TYLENOL PM)  MG tablet per tablet Take 2 tablets by mouth At Night As Needed for Sleep.      donepezil (ARICEPT) 5 MG tablet Take 1 tablet by mouth every night at bedtime. 90 tablet 3    fenofibrate (TRICOR) 145 MG tablet TAKE 1 TABLET BY MOUTH DAILY 90 tablet 3    fluticasone (FLONASE) 50 MCG/ACT nasal spray 2 sprays into the nostril(s) as directed by provider Daily. 48 g 3    furosemide (LASIX) 20 MG tablet TAKE 1 TABLET BY  MOUTH TWICE  DAILY AS NEEDED FOR SWELLING 60 tablet 11    glipizide (GLUCOTROL) 10 MG tablet TAKE 1 TABLET BY MOUTH  TWICE DAILY BEFORE MEALS 180 tablet 3    Glucosamine HCl 1500 MG tablet Take 2 tablets by mouth Daily. HOLDING FOR SURGERY      glucose blood test strip ACCU-CUECK GUIDE test strips. Check 3 times a day Dx: E 11.42 300 each 4    glucose monitor monitoring kit ACCU-CUECK GUIDE METER.  Testing 3 times daily. Dx: E 11.42 1 each 0    Glyxambi 25-5 MG tablet TAKE 1 TABLET BY MOUTH DAILY  WITH BREAKFAST 90 tablet 2    guaifenesin (ROBITUSSIN) 100 MG/5ML liquid Take 10 mL by mouth 3 (Three) Times a Day As Needed for Cough.      Hydrocort-Pramoxine, Perianal, (ANALPRAM-HC) 2.5-1 % rectal cream Insert 3 application  into the rectum 3 (Three) Times a Day.      ipratropium (ATROVENT) 0.06 % nasal spray       Lancet Device misc 1 each 2 (Two) Times a Day. 1 each 0    Lancets Misc. (Accu-Chek FastClix Lancet) kit Dispense 1 lancing device to check 5 times a day. Dx: E 1 1 kit 0    Lancets misc SOFT-CLICK LANCETS: Check 3 times a day. Dx: E 11.42 300 each 4    lisinopril (PRINIVIL,ZESTRIL) 2.5 MG tablet TAKE 1 TABLET BY MOUTH DAILY 90 tablet 2    melatonin 5 MG tablet tablet Take 1 tablet by mouth Every Night.      Multiple Vitamins-Minerals (MULTIVITAMIN ADULT PO) Take 1 tablet by mouth Daily. HOLDING FOR SURGERY      nystatin (MYCOSTATIN) 769347 UNIT/GM cream Apply 1 Application topically to the appropriate area as directed 2 (Two) Times a Day.      Omega-3 Fatty Acids (FISH OIL) 1200 MG capsule capsule Take 2,000 mg by mouth 2 (Two) Times a Day With Meals. HOLDING FOR SURGERY      oxyCODONE-acetaminophen (PERCOCET) 5-325 MG per tablet Take 1 tablet by mouth 2 (Two) Times a Day As Needed for Severe Pain. 60 tablet 0    pregabalin (LYRICA) 150 MG capsule 1 capsule 3 times daily  Indications: Diabetes with Nerve Disease, Neuropathic Pain 270 capsule 1    rosuvastatin (CRESTOR) 10 MG tablet TAKE 1 TABLET BY MOUTH  "DAILY 90 tablet 2    Jovanni Max SoloStar 300 UNIT/ML solution pen-injector injection 100 units daily 90 mL 2    traZODone (DESYREL) 150 MG tablet Take 2 tablets by mouth Every Night. 180 tablet 3    vitamin D (ERGOCALCIFEROL) 1.25 MG (08809 UT) capsule capsule TAKE 1 CAPSULE BY MOUTH EVERY 7  DAYS 11 capsule 3    Wheat Dextrin (BENEFIBER DRINK MIX PO) Take 2 teaspoon(s) by mouth 2 (Two) Times a Day.      linezolid (ZYVOX) 600 MG tablet       [DISCONTINUED] cetirizine (zyrTEC) 10 MG tablet Take 1 tablet by mouth Daily.      [DISCONTINUED] Gemtesa 75 MG tablet  (Patient not taking: Reported on 1/3/2024)      [DISCONTINUED] meclizine (ANTIVERT) 12.5 MG tablet Take 1 tablet by mouth 3 (Three) Times a Day As Needed for Dizziness. 30 tablet 0     Allergies   Allergen Reactions    Levaquin [Levofloxacin] Other (See Comments)     Redness, itching at IV site with IV Levaquin administration    Alcohol Nausea And Vomiting     \"Deathly sick, headaches, cold sweats\"  Cant take any medication that contains alcohol    Other Other (See Comments)     Pt's wife reports that after pt's cardiac cath, pt had hallucinations and agitation with pain medications. She is unsure which medication.        Objective    Objective     Vital Signs  Temp:  [99.1 °F (37.3 °C)-101.1 °F (38.4 °C)] 99.1 °F (37.3 °C)  Heart Rate:  [] 100  Resp:  [18] 18  BP: ()/(50-93) 95/54  SpO2:  [88 %-98 %] 94 %  on  Flow (L/min):  [2-3] 2;   Device (Oxygen Therapy): nasal cannula  Body mass index is 39.37 kg/m².    Physical Exam  Constitutional:       General: He is in acute distress.      Appearance: He is ill-appearing.   HENT:      Head: Normocephalic and atraumatic.   Cardiovascular:      Rate and Rhythm: Tachycardia present. Rhythm irregular.   Pulmonary:      Effort: Pulmonary effort is normal. No respiratory distress.   Abdominal:      General: There is distension.      Palpations: Abdomen is soft.      Tenderness: There is abdominal tenderness. "   Skin:     General: Skin is warm and dry.   Neurological:      Mental Status: He is alert. He is disoriented.         Results Review:  I reviewed the patient's new clinical results.  I reviewed the patient's new imaging results and agree with the interpretation.  I reviewed the patient's other test results and agree with the interpretation  I personally viewed and interpreted the patient's EKG/Telemetry data  Discussed with ED provider.    Lab Results (last 24 hours)       Procedure Component Value Units Date/Time    Blood Culture - Blood, Arm, Left [[id number]] Collected: 01/17/24 1320    Specimen: Blood from Arm, Left Updated: 01/17/24 1329    CBC & Differential [[id number]]  (Abnormal) Collected: 01/17/24 1340    Specimen: Blood Updated: 01/17/24 1403    Narrative:      The following orders were created for panel order CBC & Differential.  Procedure                               Abnormality         Status                     ---------                               -----------         ------                     CBC Auto Differential[[id number]]        Abnormal            Final result                 Please view results for these tests on the individual orders.    Comprehensive Metabolic Panel [[id number]]  (Abnormal) Collected: 01/17/24 1340    Specimen: Blood Updated: 01/17/24 1430     Glucose 159 mg/dL      BUN 19 mg/dL      Creatinine 1.28 mg/dL      Sodium 136 mmol/L      Potassium 4.8 mmol/L      Comment: Slight hemolysis detected by analyzer. Result may be falsely elevated.        Chloride 94 mmol/L      CO2 27.6 mmol/L      Calcium 9.7 mg/dL      Total Protein 7.0 g/dL      Albumin 4.6 g/dL      ALT (SGPT) 38 U/L      AST (SGOT) 45 U/L      Comment: Slight hemolysis detected by analyzer. Result may be falsely elevated.        Alkaline Phosphatase 68 U/L      Total Bilirubin 0.7 mg/dL      Globulin 2.4 gm/dL      A/G Ratio 1.9 g/dL      BUN/Creatinine Ratio 14.8     Anion Gap 14.4 mmol/L      eGFR 55.9  "mL/min/1.73     Narrative:      GFR Normal >60  Chronic Kidney Disease <60  Kidney Failure <15    The GFR formula is only valid for adults with stable renal function between ages 18 and 70.    Single High Sensitivity Troponin T [[id number]]  (Abnormal) Collected: 01/17/24 1340    Specimen: Blood Updated: 01/17/24 1419     HS Troponin T 40 ng/L     Narrative:      High Sensitive Troponin T Reference Range:  <14.0 ng/L- Negative Female for AMI  <22.0 ng/L- Negative Male for AMI  >=14 - Abnormal Female indicating possible myocardial injury.  >=22 - Abnormal Male indicating possible myocardial injury.   Clinicians would have to utilize clinical acumen, EKG, Troponin, and serial changes to determine if it is an Acute Myocardial Infarction or myocardial injury due to an underlying chronic condition.         Blood Culture - Blood, Arm, Left [[id number]] Collected: 01/17/24 1340    Specimen: Blood from Arm, Left Updated: 01/17/24 1345    Lactic Acid, Plasma [[id number]]  (Abnormal) Collected: 01/17/24 1340    Specimen: Blood Updated: 01/17/24 1411     Lactate 2.6 mmol/L     Procalcitonin [[id number]]  (Abnormal) Collected: 01/17/24 1340    Specimen: Blood Updated: 01/17/24 1419     Procalcitonin 0.58 ng/mL     Narrative:      As a Marker for Sepsis (Non-Neonates):    1. <0.5 ng/mL represents a low risk of severe sepsis and/or septic shock.  2. >2 ng/mL represents a high risk of severe sepsis and/or septic shock.    As a Marker for Lower Respiratory Tract Infections that require antibiotic therapy:    PCT on Admission    Antibiotic Therapy       6-12 Hrs later    >0.5                Strongly Recommended  >0.25 - <0.5        Recommended   0.1 - 0.25          Discouraged              Remeasure/reassess PCT  <0.1                Strongly Discouraged     Remeasure/reassess PCT    As 28 day mortality risk marker: \"Change in Procalcitonin Result\" (>80% or <=80%) if Day 0 (or Day 1) and Day 4 values are available. Refer to " http://[email or web address]-pct-calculator.com    Change in PCT <=80%  A decrease of PCT levels below or equal to 80% defines a positive change in PCT test result representing a higher risk for 28-day all-cause mortality of patients diagnosed with severe sepsis for septic shock.    Change in PCT >80%  A decrease of PCT levels of more than 80% defines a negative change in PCT result representing a lower risk for 28-day all-cause mortality of patients diagnosed with severe sepsis or septic shock.       CBC Auto Differential [[id number]]  (Abnormal) Collected: 01/17/24 1340    Specimen: Blood Updated: 01/17/24 1403     WBC 18.49 10*3/mm3      RBC 5.09 10*6/mm3      Hemoglobin 15.1 g/dL      Hematocrit 45.4 %      MCV 89.2 fL      MCH 29.7 pg      MCHC 33.3 g/dL      RDW 14.6 %      RDW-SD 47.8 fl      MPV 11.3 fL      Platelets 135 10*3/mm3      Neutrophil % 92.5 %      Lymphocyte % 2.7 %      Monocyte % 3.7 %      Eosinophil % 0.1 %      Basophil % 0.2 %      Neutrophils, Absolute 17.13 10*3/mm3      Lymphocytes, Absolute 0.50 10*3/mm3      Monocytes, Absolute 0.68 10*3/mm3      Eosinophils, Absolute 0.01 10*3/mm3      Basophils, Absolute 0.03 10*3/mm3     Respiratory Panel PCR w/COVID-19(SARS-CoV-2) SAMANTHA/DAVID/LETICIA/PAD/COR/DARRIN In-House, NP Swab in UTM/VTM, 2 HR TAT - Swab, Nasopharynx [[id number]]  (Normal) Collected: 01/17/24 1350    Specimen: Swab from Nasopharynx Updated: 01/17/24 1505     ADENOVIRUS, PCR Not Detected     Coronavirus 229E Not Detected     Coronavirus HKU1 Not Detected     Coronavirus NL63 Not Detected     Coronavirus OC43 Not Detected     COVID19 Not Detected     Human Metapneumovirus Not Detected     Human Rhinovirus/Enterovirus Not Detected     Influenza A PCR Not Detected     Influenza B PCR Not Detected     Parainfluenza Virus 1 Not Detected     Parainfluenza Virus 2 Not Detected     Parainfluenza Virus 3 Not Detected     Parainfluenza Virus 4 Not Detected     RSV, PCR Not Detected     Bordetella pertussis  pcr Not Detected     Bordetella parapertussis PCR Not Detected     Chlamydophila pneumoniae PCR Not Detected     Mycoplasma pneumo by PCR Not Detected    Narrative:      In the setting of a positive respiratory panel with a viral infection PLUS a negative procalcitonin without other underlying concern for bacterial infection, consider observing off antibiotics or discontinuation of antibiotics and continue supportive care. If the respiratory panel is positive for atypical bacterial infection (Bordetella pertussis, Chlamydophila pneumoniae, or Mycoplasma pneumoniae), consider antibiotic de-escalation to target atypical bacterial infection.    Urinalysis With Culture If Indicated - Indwelling Urethral Catheter [[id number]]  (Abnormal) Collected: 01/17/24 1351    Specimen: Urine from Indwelling Urethral Catheter Updated: 01/17/24 1410     Color, UA Yellow     Appearance, UA Turbid     pH, UA 5.5     Specific Gravity, UA 1.022     Glucose, UA >=1000 mg/dL (3+)     Ketones, UA Negative     Bilirubin, UA Negative     Blood, UA Large (3+)     Protein, UA 30 mg/dL (1+)     Leuk Esterase, UA Moderate (2+)     Nitrite, UA Positive     Urobilinogen, UA 1.0 E.U./dL    Narrative:      In absence of clinical symptoms, the presence of pyuria, bacteria, and/or nitrites on the urinalysis result does not correlate with infection.    Urinalysis, Microscopic Only - Indwelling Urethral Catheter [[id number]]  (Abnormal) Collected: 01/17/24 1351    Specimen: Urine from Indwelling Urethral Catheter Updated: 01/17/24 1417     RBC, UA 6-10 /HPF      WBC, UA 6-10 /HPF      Bacteria, UA 2+ /HPF      Squamous Epithelial Cells, UA None Seen /HPF      Yeast, UA Moderate/2+ Budding Yeast /HPF      Hyaline Casts, UA None Seen /LPF      Methodology Manual Light Microscopy    Urine Culture - Urine, Indwelling Urethral Catheter [[id number]] Collected: 01/17/24 1351    Specimen: Urine from Indwelling Urethral Catheter Updated: 01/17/24 1417    STAT Lactic  Acid, Reflex [[id number]] Collected: 01/17/24 1650    Specimen: Blood Updated: 01/17/24 1706            Results for orders placed or performed during the hospital encounter of 03/01/19   Blood Culture - Blood, Arm, Right    Specimen: Arm, Right; Blood   Result Value Ref Range    Blood Culture No growth at 5 days        Imaging Results (Last 24 Hours)       Procedure Component Value Units Date/Time    XR Chest 1 View [[id number]] Collected: 01/17/24 1354     Updated: 01/17/24 1358    Narrative:      XR CHEST 1 VW-1/17/2024     HISTORY: Altered mental status.     Heart size is within normal limits. Lungs are underinflated but appear  clear. Spinal leads terminate over the thoracic spine. Prosthetic valve  probably at the aortic root is seen.       Impression:      1. No acute process.        This report was finalized on 1/17/2024 1:55 PM by Dr. Cipriano Zambrano M.D on Workstation: [id number]               Results for orders placed during the hospital encounter of 01/03/24    Adult Transthoracic Echo Complete W/ Cont if Necessary Per Protocol    Interpretation Summary    Left ventricular systolic function is normal. Calculated left ventricular EF = 55%    Left ventricular wall thickness is consistent with mild concentric hypertrophy.    Left ventricular diastolic function was normal.    There is a TAVR valve present.    Aortic valve area is 1.7 cm2.    Peak velocity of the flow distal to the aortic valve is 181.3 cm/s. Aortic valve maximum pressure gradient is 13 mmHg. Aortic valve mean pressure gradient is 7 mmHg.      SCANNED - TELEMETRY     Final Result      SCANNED - TELEMETRY     Final Result                 Assessment/Plan     Active Hospital Problems    Diagnosis  POA    **UTI (urinary tract infection) [N39.0]  Yes    S/P TAVR (transcatheter aortic valve replacement) [Z95.2]  Not Applicable    Status post insertion of spinal cord stimulator [Z96.89]  Not Applicable    History of DVT of lower extremity [Z86.718]   Not Applicable    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       Sepsis urinary tract infection  Suprapubic catheter  Temperature 101, lactic acid 2.6, white blood cell count elevation 18,000  S/p fluid bolus, stat continuous fluids   Rocephin    Atrial fibrillation  History of DVT  Resume home anticoagulation as well as rate control medication    Atrial stenosis status post TAVR  Frequent PVCs  .  Lumbar discitis status post I&D with residual neurogenic bladder requiring suprapubic catheter  -Urology consult.  Reportedly  suprapubic catheter is due to be changed    I discussed the patient's findings and my recommendations with patient, family, and nursing staff.    VTE Prophylaxis - SCDs.  Code Status - Full code.       Uriel Jj MD  [facility]  01/17/24  17:32 EST

## 2024-01-17 NOTE — ED NOTES
Nursing report ED to floor  Jesus Beckham  83 y.o.  male    HPI :   Chief Complaint   Patient presents with    Dysuria    Polyuria    Altered Mental Status       Admitting doctor:   Uriel Jj MD    Admitting diagnosis:   The primary encounter diagnosis was Urinary tract infection associated with indwelling urethral catheter, initial encounter. Diagnoses of Cellulitis of left lower extremity and Encephalopathy were also pertinent to this visit.    Code status:   Current Code Status       Date Active Code Status Order ID Comments User Context       Prior            Allergies:   Levaquin [levofloxacin], Alcohol, and Other    Isolation:   No active isolations    Intake and Output  No intake or output data in the 24 hours ending 01/17/24 1541    Weight:       01/17/24  1357   Weight: 125 kg (275 lb 9.2 oz)       Most recent vitals:   Vitals:    01/17/24 1431 01/17/24 1501 01/17/24 1502 01/17/24 1505   BP: 141/57 127/93     Pulse: 106  112    Resp:       Temp:       TempSrc:       SpO2: 90%  90% 92%   Weight:       Height:           Active LDAs/IV Access:   Lines, Drains & Airways       Active LDAs       Name Placement date Placement time Site Days    Peripheral IV 01/17/24 1344 Anterior;Left Forearm 01/17/24  1344  Forearm  less than 1    Suprapubic Catheter Double-lumen --  --  -- --                    Labs (abnormal labs have a star):   Labs Reviewed   COMPREHENSIVE METABOLIC PANEL - Abnormal; Notable for the following components:       Result Value    Glucose 159 (*)     Creatinine 1.28 (*)     Chloride 94 (*)     AST (SGOT) 45 (*)     eGFR 55.9 (*)     All other components within normal limits    Narrative:     GFR Normal >60  Chronic Kidney Disease <60  Kidney Failure <15    The GFR formula is only valid for adults with stable renal function between ages 18 and 70.   URINALYSIS W/ CULTURE IF INDICATED - Abnormal; Notable for the following components:    Appearance, UA Turbid (*)     Glucose, UA >=1000 mg/dL  "(3+) (*)     Blood, UA Large (3+) (*)     Protein, UA 30 mg/dL (1+) (*)     Leuk Esterase, UA Moderate (2+) (*)     Nitrite, UA Positive (*)     All other components within normal limits    Narrative:     In absence of clinical symptoms, the presence of pyuria, bacteria, and/or nitrites on the urinalysis result does not correlate with infection.   SINGLE HSTROPONIN T - Abnormal; Notable for the following components:    HS Troponin T 40 (*)     All other components within normal limits    Narrative:     High Sensitive Troponin T Reference Range:  <14.0 ng/L- Negative Female for AMI  <22.0 ng/L- Negative Male for AMI  >=14 - Abnormal Female indicating possible myocardial injury.  >=22 - Abnormal Male indicating possible myocardial injury.   Clinicians would have to utilize clinical acumen, EKG, Troponin, and serial changes to determine if it is an Acute Myocardial Infarction or myocardial injury due to an underlying chronic condition.        LACTIC ACID, PLASMA - Abnormal; Notable for the following components:    Lactate 2.6 (*)     All other components within normal limits   PROCALCITONIN - Abnormal; Notable for the following components:    Procalcitonin 0.58 (*)     All other components within normal limits    Narrative:     As a Marker for Sepsis (Non-Neonates):    1. <0.5 ng/mL represents a low risk of severe sepsis and/or septic shock.  2. >2 ng/mL represents a high risk of severe sepsis and/or septic shock.    As a Marker for Lower Respiratory Tract Infections that require antibiotic therapy:    PCT on Admission    Antibiotic Therapy       6-12 Hrs later    >0.5                Strongly Recommended  >0.25 - <0.5        Recommended   0.1 - 0.25          Discouraged              Remeasure/reassess PCT  <0.1                Strongly Discouraged     Remeasure/reassess PCT    As 28 day mortality risk marker: \"Change in Procalcitonin Result\" (>80% or <=80%) if Day 0 (or Day 1) and Day 4 values are available. Refer to " [email or web address]    Change in PCT <=80%  A decrease of PCT levels below or equal to 80% defines a positive change in PCT test result representing a higher risk for 28-day all-cause mortality of patients diagnosed with severe sepsis for septic shock.    Change in PCT >80%  A decrease of PCT levels of more than 80% defines a negative change in PCT result representing a lower risk for 28-day all-cause mortality of patients diagnosed with severe sepsis or septic shock.      CBC WITH AUTO DIFFERENTIAL - Abnormal; Notable for the following components:    WBC 18.49 (*)     Platelets 135 (*)     Neutrophil % 92.5 (*)     Lymphocyte % 2.7 (*)     Monocyte % 3.7 (*)     Eosinophil % 0.1 (*)     Neutrophils, Absolute 17.13 (*)     Lymphocytes, Absolute 0.50 (*)     All other components within normal limits   URINALYSIS, MICROSCOPIC ONLY - Abnormal; Notable for the following components:    RBC, UA 6-10 (*)     WBC, UA 6-10 (*)     Bacteria, UA 2+ (*)     All other components within normal limits   RESPIRATORY PANEL PCR W/ COVID-19 (SARS-COV-2), NP SWAB IN UTM/VTP, 2 HR TAT - Normal    Narrative:     In the setting of a positive respiratory panel with a viral infection PLUS a negative procalcitonin without other underlying concern for bacterial infection, consider observing off antibiotics or discontinuation of antibiotics and continue supportive care. If the respiratory panel is positive for atypical bacterial infection (Bordetella pertussis, Chlamydophila pneumoniae, or Mycoplasma pneumoniae), consider antibiotic de-escalation to target atypical bacterial infection.   BLOOD CULTURE   BLOOD CULTURE   URINE CULTURE   LACTIC ACID, REFLEX   CBC AND DIFFERENTIAL    Narrative:     The following orders were created for panel order CBC & Differential.  Procedure                               Abnormality         Status                     ---------                               -----------         ------                      CBC Auto Differential[[id number]]        Abnormal            Final result                 Please view results for these tests on the individual orders.       EKG:   No orders to display       Meds given in ED:   Medications   acetaminophen (TYLENOL) tablet 1,000 mg (1,000 mg Oral Given 24 1339)   sodium chloride 0.9 % bolus 1,000 mL (1,000 mL Intravenous New Bag 24 1505)   cefTRIAXone (ROCEPHIN) 2 g in sodium chloride 0.9 % 100 mL IVPB-VTB (2 g Intravenous New Bag 24 1506)       Imaging results:  XR Chest 1 View    Result Date: 2024  1. No acute process.   This report was finalized on 2024 1:55 PM by Dr. Cipriano Zambrano M.D on Workstation: [id number]       Ambulatory status:   - has not ambulated in ED x2 assist    Social issues:   Social History     Socioeconomic History    Marital status:     Number of children: 2    Highest education level: Some college, no degree   Tobacco Use    Smoking status: Former     Types: Cigars, Pipe     Quit date: 2017     Years since quittin.0    Smokeless tobacco: Former     Types: Chew     Quit date:     Tobacco comments:     Caffeine daily   Vaping Use    Vaping Use: Never used   Substance and Sexual Activity    Alcohol use: Not Currently    Drug use: No    Sexual activity: Defer       NIH Stroke Scale:       Beatris Orta RN  24 15:41 EST

## 2024-01-17 NOTE — ED TRIAGE NOTES
Pt to ED from home for altered mental status, dysuria and polyuria. Pt is bed bound. Pts o2 was 90's room air, EMS placed pt on 3 L NC.     PT has a suprapubic catheter.

## 2024-01-17 NOTE — ED PROVIDER NOTES
EMERGENCY DEPARTMENT ENCOUNTER  Room Number:  39/39  PCP: Magdy Ricardo MD  Independent Historians: Patient and EMS      HPI:  Chief Complaint: had concerns including Dysuria, Polyuria, and Altered Mental Status.     Context: Jesus Beckham is a 83 y.o. male with a medical history of HTN, A-fib, DVT, suprapubic catheter who presents to the ED c/o acute alteration in mental status.  Family members report patient with increasing lethargy and confusion at home.  They are additionally concerned for possible UTI through patient's suprapubic catheter.  Patient is minimally participant in the interview and does answer some questions appropriately but also trails off.      Review of prior external notes (non-ED) -and- Review of prior external test results outside of this encounter: Office visit with cardiology from 1/3/2024 reviewed and notable for history of nonrheumatic aortic stenosis s/p TAVR, A-fib on chronic anticoagulation, diabetes, hypertension, hyperlipidemia, history of bladder cancer status post suprapubic catheter placement and recurrent DVTs.  Plan at that time was to continue current anticoagulation and follow-up as needed    PAST MEDICAL HISTORY  Active Ambulatory Problems     Diagnosis Date Noted    Gout 04/01/2016    Diabetic peripheral neuropathy 04/01/2016    Dyslipidemia 04/01/2016    Essential hypertension 07/29/2016    PVC (premature ventricular contraction) 10/26/2016    Vitamin D deficiency 04/28/2017    Benign non-nodular prostatic hyperplasia without lower urinary tract symptoms 04/28/2017    Osteoarthritis 09/28/2017    Urge incontinence 09/28/2017    Acute gangrenous cholecystitis 02/24/2018    Discitis of lumbar region 03/05/2018    Paroxysmal atrial fibrillation 03/16/2018    History of sepsis 03/16/2018    Hyperuricemia 05/22/2018    Chronic deep vein thrombosis (DVT) of right peroneal vein 12/03/2018    Nausea, vomiting, and diarrhea 03/01/2019    Colitis presumed infectious 03/01/2019     Urinary tract infection in male 03/01/2019    DDD (degenerative disc disease), lumbar 07/03/2019    Bilateral leg weakness 07/03/2019    History of lumbar spinal fusion 07/03/2019    Carpal tunnel syndrome 02/19/2015    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    Chronic anticoagulation 01/27/2020    Diverticulosis 01/27/2020    Hematuria 01/27/2020    Lower obstructive uropathy 01/27/2020    Splenic lesion 01/27/2020    Suprapubic catheter dysfunction 01/27/2020    Obesity (BMI 30-39.9) 11/17/2020    Venous insufficiency of left leg 03/02/2021    Memory difficulties 03/03/2021    Chronic pain syndrome 09/13/2021    Type 2 diabetes mellitus with peripheral neuropathy 09/28/2021    Encounter for long-term (current) use of high-risk medication 10/13/2021    Therapeutic opioid induced constipation 10/13/2021    Abnormal CT of brain 11/28/2021    Suprapubic catheter 11/28/2021    Chronic bladder pain 01/10/2022    Proteinuria 01/25/2022    Lumbar radiculopathy 06/10/2022    Nonrheumatic aortic valve stenosis 06/23/2022    Snoring 07/28/2022    Class 3 severe obesity due to excess calories without serious comorbidity with body mass index (BMI) of 40.0 to 44.9 in adult 07/28/2022    Non-restorative sleep 07/28/2022    Hypersomnia 07/28/2022    History of DVT of lower extremity 08/22/2022    Status post insertion of spinal cord stimulator 12/02/2022    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    Abdominal distension (gaseous) 06/01/2023    Back pain 06/01/2023    Dorsalgia, unspecified 06/01/2023    Benign neoplasm of ascending colon 07/29/2019    Benign neoplasm of descending colon 11/02/2015    Benign neoplasm of transverse colon 11/02/2015    Benign neoplasm of cecum 11/02/2015    Colon polyps 06/01/2023    Rectal polyp 11/02/2015    Disorder of muscle, unspecified 06/01/2023    Family history of colonic polyps 11/02/2015    Constipation 06/01/2023    Fecal incontinence 06/01/2023    History of colonic  polyps 11/02/2015    Personal history of colonic polyps 07/29/2019    Cancer 06/01/2023    Malignant (primary) neoplasm, unspecified 06/01/2023    Other abnormalities of heart beat 06/01/2023    Pure hypercholesterolemia 06/01/2023    Unspecified hemorrhoids 06/01/2023    Atrial fibrillation 03/01/2018    Long term (current) use of anticoagulants 06/01/2023    Dvrtclos of lg int w/o perforation or abscess w/o bleeding 11/02/2015    Encounter for screening for malignant neoplasm of colon 11/02/2015    Sleep apnea 06/01/2023    Arthritis 06/01/2023    Coagulation defect, unspecified 06/01/2023    Diabetes mellitus 06/01/2023     Resolved Ambulatory Problems     Diagnosis Date Noted    Uncontrolled type 2 diabetes mellitus 04/01/2016    Primary osteoarthritis involving multiple joints 04/04/2016    Allergic rhinitis due to pollen 04/04/2016    Sepsis 02/23/2018    Secondary thrombocytopenia 02/23/2018    Hypotension 02/23/2018    Observed sleep apnea 02/23/2018    Acute respiratory failure with hypoxia 02/23/2018    Leg edema 02/23/2018    RSV (acute bronchiolitis due to respiratory syncytial virus) 02/23/2018    Bacteremia due to Escherichia coli 02/24/2018    RUQ abdominal pain 02/24/2018    Acute deep vein thrombosis of calf, right 03/05/2018    Sepsis due to Escherichia coli 02/22/2018    Elevated troponin 03/16/2018    Aortic valve stenosis, etiology of cardiac valve disease unspecified 02/28/2023     Past Medical History:   Diagnosis Date    Acute embolism and thrombosis of deep vein of right distal lower extremity     Allergic     Aortic valve stenosis     Atrial fibrillation with RVR     Benign prostatic hyperplasia without lower urinary tract symptoms     Cancer of bladder 2016    Colon polyp     DVT (deep venous thrombosis)     Murray catheter in place     Heel ulcer     [place] (hard of hearing)     Hyperlipidemia     Loss, sensation     MRSA infection     Open wound     CELINA (obstructive sleep apnea)     Skin  carcinoma 2012    Type 2 diabetes mellitus     Weakness          PAST SURGICAL HISTORY  Past Surgical History:   Procedure Laterality Date    ABDOMINAL SURGERY      AORTIC VALVE REPAIR/REPLACEMENT N/A 2/28/2023    Procedure: TTE TRANSFEMORAL TRANSCATHETER AORTIC VALVE REPLACEMENT PERCUTANEOUS APPROACH;  Surgeon: Antony Goldstein MD;  Location: [facility];  Service: Cardiothoracic;  Laterality: N/A;    AORTIC VALVE REPAIR/REPLACEMENT N/A 2/28/2023    Procedure: Transfemoral Transcatheter Aortic Valve Replacement with intraoperative TTE and possible open surgical rescue;  Surgeon: Samuel Zelaya MD;  Location: [facility] CVOR;  Service: Cardiovascular;  Laterality: N/A;    APPENDECTOMY N/A 1961    CARDIAC CATHETERIZATION N/A 01/16/2023    Procedure: Coronary angiography;  Surgeon: Tasha Burr MD;  [facility];  Service: Cardiology;  Laterality: N/A;    CARDIAC CATHETERIZATION N/A 01/16/2023    Procedure: Left Heart Cath;  Surgeon: Tasha Burr MD;  Location: [facility] CATH INVASIVE LOCATION;  Service: Cardiology;  Laterality: N/A;    CARDIAC CATHETERIZATION N/A 01/16/2023    Procedure: Right Heart Cath;  Surgeon: Tasha Burr MD;  [facility];  Service: Cardiology;  Laterality: N/A;    CHOLECYSTECTOMY WITH INTRAOPERATIVE CHOLANGIOGRAM N/A 02/25/2018    Procedure: CHOLECYSTECTOMY LAPAROSCOPIC INTRAOPERATIVE CHOLANGIOGRAM;  Surgeon: Maegan Correa MD;  Location: [facility] OR;  Service:     COLONOSCOPY N/A 2010    h/o of polyps    COLONOSCOPY W/ BIOPSIES AND POLYPECTOMY  2019    EYE SURGERY Bilateral 1959    glass removal from left eye, lens transplant    JOINT REPLACEMENT Right 2005    RIGHT KNEE    LAMINECTOMY N/A 01/18/2016    L2-L3, L3-L4, L4-L5, and L5-S1 bilateral lamincectomy, medial facetectomy & [id number], Dr. Kaiden Valdes    LUMBAR FUSION N/A 03/07/2018    Procedure: LUMBAR FUSION MINIMALLY INVASIVE TRANSFORAMINAL LUMBAR INTERBODY  IRRIGATION AND DEBRIDEMENT AND FUSION.  IRRIGATION AND DEBRIDEMENT OF EPIDURAL ABCESS LUMBAR 2-4. BONE MORPHOGENIC PROTEIN, ALPHATEC NOVEL AND ILLICO, EMG AND SSEP NEUROMONITORING.;  Surgeon: Manish Marr DO;  Location: [facility];  Service: Orthopedic Spine    SKIN BIOPSY      BACK AND FACE    SPINAL CORD STIMULATOR IMPLANT N/A 07/01/2022    Procedure: SPINAL CORD STIMULATOR INSERTION PHASE 1 ([place]) 4 DAY TRIAL ONLY DUE TO ELIQUIS HOLD.;  Surgeon: Cody Holguin MD;  Location: [facility] MAIN OR;  Service: Pain Management;  Laterality: N/A;    SPINAL CORD STIMULATOR IMPLANT N/A 11/17/2022    Procedure: Thoracic eleven laminotomy for placement of a surgical spinal cord stimulator lead to thoracic nine;  Surgeon: Charlie Bailon MD;  Location: [facility];  Service: Neurosurgery;  Laterality: N/A;    SPINAL CORD STIMULATOR IMPLANT N/A 11/18/2022    Procedure: Placement of a left gluteal internal pulse generator;  Surgeon: Charlie Bailon MD;  Location: [facility];  Service: Neurosurgery;  Laterality: N/A;    TOTAL KNEE ARTHROPLASTY Right 03/07/2005    Dr. Washington Evans    VENA CAVA FILTER INSERTION Right 03/06/2018    Procedure: VENA CAVA FILTER INSERTION;  Surgeon: Alexis Thakkar MD;  Location: [facility] 18/19;  Service:     VENA CAVA FILTER REMOVAL N/A 12/03/2018    Procedure: VENA CAVA FILTER REMOVAL WITH VENOCAVAGRAM;  Surgeon: Alexis Thakkar MD;  Location: [facility] 18/19;  Service: Vascular         FAMILY HISTORY  Family History   Problem Relation Age of Onset    Esophageal cancer Mother     No Known Problems Father     Diabetes Maternal Grandmother     Heart attack Maternal Grandfather     Malig Hyperthermia Neg Hx          SOCIAL HISTORY  Social History     Socioeconomic History    Marital status:     Number of children: 2    Highest education level: Some college, no degree   Tobacco Use    Smoking status: Former     Types: Cigars, Pipe     Quit date:  2017     Years since quittin.0    Smokeless tobacco: Former     Types: Chew     Quit date: 2018    Tobacco comments:     Caffeine daily   Vaping Use    Vaping Use: Never used   Substance and Sexual Activity    Alcohol use: Not Currently    Drug use: No    Sexual activity: Defer         ALLERGIES  Levaquin [levofloxacin], Alcohol, and Other      REVIEW OF SYSTEMS  Review of Systems  Included in HPI  All systems reviewed and negative except for those discussed in HPI.      PHYSICAL EXAM    I have reviewed the triage vital signs and nursing notes.    ED Triage Vitals   Temp Heart Rate Resp BP SpO2   24 1258 24 1256 24 1256 24 1256 24 1256   (!) 101.1 °F (38.4 °C) 61 18 118/50 98 %      Temp src Heart Rate Source Patient Position BP Location FiO2 (%)   24 1258 24 1256 -- -- --   Tympanic Monitor          Physical Exam  GENERAL: Lethargic and ill-appearing, no acute distress  SKIN: Warm, dry  HENT: Normocephalic, atraumatic  EYES: no scleral icterus  CV: regular rhythm, regular rate  RESPIRATORY: normal effort, lungs clear  ABDOMEN: soft, nontender, nondistended.  Suprapubic catheter in place  MUSCULOSKELETAL: no deformity.  Erythematous left lower extremity is warm to touch  NEURO: alert, moves all extremities, follows commands      LAB RESULTS  Recent Results (from the past 24 hour(s))   Comprehensive Metabolic Panel    Collection Time: 24  1:40 PM    Specimen: Blood   Result Value Ref Range    Glucose 159 (H) 65 - 99 mg/dL    BUN 19 8 - 23 mg/dL    Creatinine 1.28 (H) 0.76 - 1.27 mg/dL    Sodium 136 136 - 145 mmol/L    Potassium 4.8 3.5 - 5.2 mmol/L    Chloride 94 (L) 98 - 107 mmol/L    CO2 27.6 22.0 - 29.0 mmol/L    Calcium 9.7 8.6 - 10.5 mg/dL    Total Protein 7.0 6.0 - 8.5 g/dL    Albumin 4.6 3.5 - 5.2 g/dL    ALT (SGPT) 38 1 - 41 U/L    AST (SGOT) 45 (H) 1 - 40 U/L    Alkaline Phosphatase 68 39 - 117 U/L    Total Bilirubin 0.7 0.0 - 1.2 mg/dL    Globulin 2.4  gm/dL    A/G Ratio 1.9 g/dL    BUN/Creatinine Ratio 14.8 7.0 - 25.0    Anion Gap 14.4 5.0 - 15.0 mmol/L    eGFR 55.9 (L) >60.0 mL/min/1.73   Single High Sensitivity Troponin T    Collection Time: 01/17/24  1:40 PM    Specimen: Blood   Result Value Ref Range    HS Troponin T 40 (H) <22 ng/L   Lactic Acid, Plasma    Collection Time: 01/17/24  1:40 PM    Specimen: Blood   Result Value Ref Range    Lactate 2.6 (C) 0.5 - 2.0 mmol/L   Procalcitonin    Collection Time: 01/17/24  1:40 PM    Specimen: Blood   Result Value Ref Range    Procalcitonin 0.58 (H) 0.00 - 0.25 ng/mL   CBC Auto Differential    Collection Time: 01/17/24  1:40 PM    Specimen: Blood   Result Value Ref Range    WBC 18.49 (H) 3.40 - 10.80 10*3/mm3    RBC 5.09 4.14 - 5.80 10*6/mm3    Hemoglobin 15.1 13.0 - 17.7 g/dL    Hematocrit 45.4 37.5 - 51.0 %    MCV 89.2 79.0 - 97.0 fL    MCH 29.7 26.6 - 33.0 pg    MCHC 33.3 31.5 - 35.7 g/dL    RDW 14.6 12.3 - 15.4 %    RDW-SD 47.8 37.0 - 54.0 fl    MPV 11.3 6.0 - 12.0 fL    Platelets 135 (L) 140 - 450 10*3/mm3    Neutrophil % 92.5 (H) 42.7 - 76.0 %    Lymphocyte % 2.7 (L) 19.6 - 45.3 %    Monocyte % 3.7 (L) 5.0 - 12.0 %    Eosinophil % 0.1 (L) 0.3 - 6.2 %    Basophil % 0.2 0.0 - 1.5 %    Neutrophils, Absolute 17.13 (H) 1.70 - 7.00 10*3/mm3    Lymphocytes, Absolute 0.50 (L) 0.70 - 3.10 10*3/mm3    Monocytes, Absolute 0.68 0.10 - 0.90 10*3/mm3    Eosinophils, Absolute 0.01 0.00 - 0.40 10*3/mm3    Basophils, Absolute 0.03 0.00 - 0.20 10*3/mm3   Respiratory Panel PCR w/COVID-19(SARS-CoV-2) SAMANTHA/DAVID/LETICIA/PAD/COR/DARRIN In-House, NP Swab in [facility], 2 HR TAT - Swab, Nasopharynx    Collection Time: 01/17/24  1:50 PM    Specimen: Nasopharynx; Swab   Result Value Ref Range    ADENOVIRUS, PCR Not Detected Not Detected    Coronavirus 229E Not Detected Not Detected    Coronavirus HKU1 Not Detected Not Detected    Coronavirus NL63 Not Detected Not Detected    Coronavirus OC43 Not Detected Not Detected    COVID19 Not Detected Not  Detected - Ref. Range    Human Metapneumovirus Not Detected Not Detected    Human Rhinovirus/Enterovirus Not Detected Not Detected    Influenza A PCR Not Detected Not Detected    Influenza B PCR Not Detected Not Detected    Parainfluenza Virus 1 Not Detected Not Detected    Parainfluenza Virus 2 Not Detected Not Detected    Parainfluenza Virus 3 Not Detected Not Detected    Parainfluenza Virus 4 Not Detected Not Detected    RSV, PCR Not Detected Not Detected    Bordetella pertussis pcr Not Detected Not Detected    Bordetella parapertussis PCR Not Detected Not Detected    Chlamydophila pneumoniae PCR Not Detected Not Detected    Mycoplasma pneumo by PCR Not Detected Not Detected   Urinalysis With Culture If Indicated - Indwelling Urethral Catheter    Collection Time: 01/17/24  1:51 PM    Specimen: Indwelling Urethral Catheter; Urine   Result Value Ref Range    Color, UA Yellow Yellow, Straw    Appearance, UA Turbid (A) Clear    pH, UA 5.5 5.0 - 8.0    Specific Gravity, UA 1.022 1.005 - 1.030    Glucose, UA >=1000 mg/dL (3+) (A) Negative    Ketones, UA Negative Negative    Bilirubin, UA Negative Negative    Blood, UA Large (3+) (A) Negative    Protein, UA 30 mg/dL (1+) (A) Negative    Leuk Esterase, UA Moderate (2+) (A) Negative    Nitrite, UA Positive (A) Negative    Urobilinogen, UA 1.0 E.U./dL 0.2 - 1.0 E.U./dL   Urinalysis, Microscopic Only - Indwelling Urethral Catheter    Collection Time: 01/17/24  1:51 PM    Specimen: Indwelling Urethral Catheter; Urine   Result Value Ref Range    RBC, UA 6-10 (A) None Seen, 0-2 /HPF    WBC, UA 6-10 (A) None Seen, 0-2 /HPF    Bacteria, UA 2+ (A) None Seen /HPF    Squamous Epithelial Cells, UA None Seen None Seen, 0-2 /HPF    Yeast, UA Moderate/2+ Budding Yeast None Seen /HPF    Hyaline Casts, UA None Seen None Seen /LPF    Methodology Manual Light Microscopy          RADIOLOGY  XR Chest 1 View    Result Date: 1/17/2024  XR CHEST 1 VW-1/17/2024  HISTORY: Altered mental status.   Heart size is within normal limits. Lungs are underinflated but appear clear. Spinal leads terminate over the thoracic spine. Prosthetic valve probably at the aortic root is seen.      1. No acute process.   This report was finalized on 1/17/2024 1:55 PM by Dr. Cipriano Zambrano M.D on Workstation: [id number]         MEDICATIONS GIVEN IN ER  Medications   acetaminophen (TYLENOL) tablet 1,000 mg (1,000 mg Oral Given 1/17/24 1339)   sodium chloride 0.9 % bolus 1,000 mL (1,000 mL Intravenous New Bag 1/17/24 1505)   cefTRIAXone (ROCEPHIN) 2 g in sodium chloride 0.9 % 100 mL IVPB-VTB (0 g Intravenous Stopped 1/17/24 1548)         ORDERS PLACED DURING THIS VISIT:  Orders Placed This Encounter   Procedures    Blood Culture - Blood,    Blood Culture - Blood,    Respiratory Panel PCR w/COVID-19(SARS-CoV-2) SAMANTHA/DAVID/LETICIA/PAD/COR/DARRIN In-House, NP Swab in UTM/VTM, 2 HR TAT - Swab, Nasopharynx    Urine Culture - Urine,    XR Chest 1 View    Comprehensive Metabolic Panel    Urinalysis With Culture If Indicated - Urine, Catheter    Single High Sensitivity Troponin T    Lactic Acid, Plasma    Procalcitonin    CBC Auto Differential    Urinalysis, Microscopic Only - Urine, Clean Catch    STAT Lactic Acid, Reflex    [facility] (on-call MD unless specified) Details    SCANNED - TELEMETRY      SCANNED - TELEMETRY      Initiate Observation Status    CBC & Differential         OUTPATIENT MEDICATION MANAGEMENT:  No current Epic-ordered facility-administered medications on file.     Current Outpatient Medications Ordered in Epic   Medication Sig Dispense Refill    acetaminophen (TYLENOL) 500 MG tablet Take 1 tablet by mouth Every 6 (Six) Hours As Needed for Mild Pain.      allopurinol (ZYLOPRIM) 300 MG tablet Take 1 tablet by mouth Daily. 90 tablet 3    apixaban (ELIQUIS) 5 MG tablet tablet Take 1 tablet by mouth 2 (Two) Times a Day. 90 tablet 3    Ascorbic Acid 300 MG chewable tablet Chew 300 mg Every Morning. HOLDING FOR SURGERY      Blood Glucose  Monitoring Suppl (Accu-Chek Guide) w/Device kit 1 kit See Admin Instructions. Dx code E11.42 TEST BLOOD SUGAR 2 TIMES  DAY    1 kit 0    cetirizine (zyrTEC) 10 MG tablet Take 1 tablet by mouth Daily.      diphenhydrAMINE-acetaminophen (TYLENOL PM)  MG tablet per tablet Take 2 tablets by mouth At Night As Needed for Sleep.      donepezil (ARICEPT) 5 MG tablet Take 1 tablet by mouth every night at bedtime. 90 tablet 3    fenofibrate (TRICOR) 145 MG tablet TAKE 1 TABLET BY MOUTH DAILY 90 tablet 3    fluticasone (FLONASE) 50 MCG/ACT nasal spray 2 sprays into the nostril(s) as directed by provider Daily. 48 g 3    furosemide (LASIX) 20 MG tablet TAKE 1 TABLET BY MOUTH TWICE  DAILY AS NEEDED FOR SWELLING 60 tablet 11    Gemtesa 75 MG tablet  (Patient not taking: Reported on 1/3/2024)      glipizide (GLUCOTROL) 10 MG tablet TAKE 1 TABLET BY MOUTH  TWICE DAILY BEFORE MEALS 180 tablet 3    Glucosamine HCl 1500 MG tablet Take 2 tablets by mouth Daily. HOLDING FOR SURGERY      glucose blood test strip ACCU-CUECK GUIDE test strips. Check 3 times a day Dx: E 11.42 300 each 4    glucose monitor monitoring kit ACCU-CUECK GUIDE METER.  Testing 3 times daily. Dx: E 11.42 1 each 0    Glyxambi 25-5 MG tablet TAKE 1 TABLET BY MOUTH DAILY  WITH BREAKFAST 90 tablet 2    ipratropium (ATROVENT) 0.06 % nasal spray       Lancet Device misc 1 each 2 (Two) Times a Day. 1 each 0    Lancets Misc. (Accu-Chek FastClix Lancet) kit Dispense 1 lancing device to check 5 times a day. Dx: E 1 1 kit 0    Lancets misc SOFT-CLICK LANCETS: Check 3 times a day. Dx: E 11.42 300 each 4    linezolid (ZYVOX) 600 MG tablet       lisinopril (PRINIVIL,ZESTRIL) 2.5 MG tablet TAKE 1 TABLET BY MOUTH DAILY 90 tablet 2    meclizine (ANTIVERT) 12.5 MG tablet Take 1 tablet by mouth 3 (Three) Times a Day As Needed for Dizziness. 30 tablet 0    melatonin 5 MG tablet tablet Take 1 tablet by mouth Every Night.      Multiple Vitamins-Minerals (MULTIVITAMIN ADULT PO) Take  1 tablet by mouth Daily. HOLDING FOR SURGERY      Omega-3 Fatty Acids (FISH OIL) 1200 MG capsule capsule Take 2,000 mg by mouth 2 (Two) Times a Day With Meals. HOLDING FOR SURGERY      oxyCODONE-acetaminophen (PERCOCET) 5-325 MG per tablet Take 1 tablet by mouth 2 (Two) Times a Day As Needed for Severe Pain. 60 tablet 0    pregabalin (LYRICA) 150 MG capsule 1 capsule 3 times daily  Indications: Diabetes with Nerve Disease, Neuropathic Pain 270 capsule 1    rosuvastatin (CRESTOR) 10 MG tablet TAKE 1 TABLET BY MOUTH DAILY 90 tablet 2    Toujeo Max SoloStar 300 UNIT/ML solution pen-injector injection 100 units daily 90 mL 2    traZODone (DESYREL) 150 MG tablet Take 2 tablets by mouth Every Night. 180 tablet 3    vitamin D (ERGOCALCIFEROL) 1.25 MG (70810 UT) capsule capsule TAKE 1 CAPSULE BY MOUTH EVERY 7  DAYS 11 capsule 3    Wheat Dextrin (BENEFIBER DRINK MIX PO) Take 2 teaspoon(s) by mouth 2 (Two) Times a Day.           PROGRESS, DATA ANALYSIS, CONSULTS, AND MEDICAL DECISION MAKING  All labs have been independently interpreted by me.  All radiology studies have been reviewed by me. All EKG's have been independently viewed and interpreted by me.  Discussion below represents my analysis of pertinent findings related to patient's condition, differential diagnosis, treatment plan and final disposition.    Differential diagnosis includes but is not limited to cellulitis, UTI, encephalopathy.    ED Course as of 01/17/24 1615   Wed Jan 17, 2024   1430 Nitrite, UA(!): Positive [MW]   1430 Leukocytes, UA(!): Moderate (2+) [MW]   1431 Bacteria, UA(!): 2+ [MW]   1431 WBC, UA(!): 6-10 [MW]   1431 HS Troponin T(!): 40 [MW]   1431 Procalcitonin(!): 0.58 [MW]   1431 Lactate(!!): 2.6 [MW]   1431 WBC(!): 18.49 [MW]   1614 Evaluation of patient is consistent with nitrite positive UTI, urine sent for culture.  Blood cultures and lactic acid obtained.  Lactic acid found to be 2.6.  Additional concern for left lower extremity cellulitis.   Patient is noted to have a leukocytosis of 18 and a mildly elevated troponin.  Patient has been intermittently hypoxic requiring supplementary O2.  RPP is negative.  Patient will be admitted for further evaluation and management.  Patient received 1 L fluid bolus.  No evidence of septic shock. [MW]   5906 Discussed with Dr. Jj who agrees to admit [MW]      ED Course User Index  [MW] Jerrell Marinelli MD         AS OF 16:15 EST VITALS:    BP - 125/52  HR - 107  TEMP - (!) 101.1 °F (38.4 °C) (Tympanic)  O2 SATS - (!) 88%    COMPLEXITY OF CARE  The patient requires admission.      DIAGNOSIS  Final diagnoses:   Urinary tract infection associated with indwelling urethral catheter, initial encounter   Cellulitis of left lower extremity   Encephalopathy         DISPOSITION  ED Disposition       ED Disposition   Decision to Admit    Condition   --    Comment   Level of Care: Telemetry [5]   Diagnosis: UTI (urinary tract infection) [[id number]]   Admitting Physician: KATHIE JJ [[id number]]   Attending Physician: KATHIE JJ [[id number]]                  Please note that portions of this document were completed with a voice recognition program.    Note Disclaimer: At [facility], we believe that sharing information builds trust and better relationships. You are receiving this note because you recently visited [facility]. It is possible you will see health information before a provider has talked with you about it. This kind of information can be easy to misunderstand. To help you fully understand what it means for your health, we urge you to discuss this note with your provider.         Jerrell Marinelli MD  01/17/24 [id number]

## 2024-01-18 ENCOUNTER — APPOINTMENT (OUTPATIENT)
Dept: GENERAL RADIOLOGY | Facility: HOSPITAL | Age: 84
End: 2024-01-18
Payer: MEDICARE

## 2024-01-18 LAB
ALBUMIN SERPL-MCNC: 3.3 G/DL (ref 3.5–5.2)
ALBUMIN/GLOB SERPL: 1.3 G/DL
ALP SERPL-CCNC: 51 U/L (ref 39–117)
ALT SERPL W P-5'-P-CCNC: 30 U/L (ref 1–41)
ANION GAP SERPL CALCULATED.3IONS-SCNC: 11.5 MMOL/L (ref 5–15)
AST SERPL-CCNC: 34 U/L (ref 1–40)
BILIRUB SERPL-MCNC: 0.8 MG/DL (ref 0–1.2)
BUN SERPL-MCNC: 21 MG/DL (ref 8–23)
BUN/CREAT SERPL: 15.6 (ref 7–25)
CALCIUM SPEC-SCNC: 8.6 MG/DL (ref 8.6–10.5)
CHLORIDE SERPL-SCNC: 101 MMOL/L (ref 98–107)
CO2 SERPL-SCNC: 23.5 MMOL/L (ref 22–29)
CREAT SERPL-MCNC: 1.35 MG/DL (ref 0.76–1.27)
DEPRECATED RDW RBC AUTO: 49.1 FL (ref 37–54)
EGFRCR SERPLBLD CKD-EPI 2021: 52.4 ML/MIN/1.73
ERYTHROCYTE [DISTWIDTH] IN BLOOD BY AUTOMATED COUNT: 15 % (ref 12.3–15.4)
GLOBULIN UR ELPH-MCNC: 2.6 GM/DL
GLUCOSE SERPL-MCNC: 92 MG/DL (ref 65–99)
HCT VFR BLD AUTO: 38.5 % (ref 37.5–51)
HGB BLD-MCNC: 13.1 G/DL (ref 13–17.7)
MAGNESIUM SERPL-MCNC: 2.2 MG/DL (ref 1.6–2.4)
MCH RBC QN AUTO: 30.7 PG (ref 26.6–33)
MCHC RBC AUTO-ENTMCNC: 34 G/DL (ref 31.5–35.7)
MCV RBC AUTO: 90.2 FL (ref 79–97)
PHOSPHATE SERPL-MCNC: 3 MG/DL (ref 2.5–4.5)
PLATELET # BLD AUTO: 119 10*3/MM3 (ref 140–450)
PMV BLD AUTO: 11.2 FL (ref 6–12)
POTASSIUM SERPL-SCNC: 4.1 MMOL/L (ref 3.5–5.2)
PROT SERPL-MCNC: 5.9 G/DL (ref 6–8.5)
QT INTERVAL: 302 MS
QT INTERVAL: 322 MS
QTC INTERVAL: 458 MS
QTC INTERVAL: 481 MS
RBC # BLD AUTO: 4.27 10*6/MM3 (ref 4.14–5.8)
SODIUM SERPL-SCNC: 136 MMOL/L (ref 136–145)
WBC NRBC COR # BLD AUTO: 13.59 10*3/MM3 (ref 3.4–10.8)

## 2024-01-18 PROCEDURE — 85027 COMPLETE CBC AUTOMATED: CPT | Performed by: STUDENT IN AN ORGANIZED HEALTH CARE EDUCATION/TRAINING PROGRAM

## 2024-01-18 PROCEDURE — 97162 PT EVAL MOD COMPLEX 30 MIN: CPT

## 2024-01-18 PROCEDURE — 93005 ELECTROCARDIOGRAM TRACING: CPT | Performed by: NURSE PRACTITIONER

## 2024-01-18 PROCEDURE — 97535 SELF CARE MNGMENT TRAINING: CPT

## 2024-01-18 PROCEDURE — 97166 OT EVAL MOD COMPLEX 45 MIN: CPT

## 2024-01-18 PROCEDURE — 25810000003 SODIUM CHLORIDE 0.9 % SOLUTION: Performed by: STUDENT IN AN ORGANIZED HEALTH CARE EDUCATION/TRAINING PROGRAM

## 2024-01-18 PROCEDURE — 73590 X-RAY EXAM OF LOWER LEG: CPT

## 2024-01-18 PROCEDURE — 83735 ASSAY OF MAGNESIUM: CPT | Performed by: STUDENT IN AN ORGANIZED HEALTH CARE EDUCATION/TRAINING PROGRAM

## 2024-01-18 PROCEDURE — 84100 ASSAY OF PHOSPHORUS: CPT | Performed by: STUDENT IN AN ORGANIZED HEALTH CARE EDUCATION/TRAINING PROGRAM

## 2024-01-18 PROCEDURE — 93010 ELECTROCARDIOGRAM REPORT: CPT | Performed by: INTERNAL MEDICINE

## 2024-01-18 PROCEDURE — 25010000002 CEFTRIAXONE PER 250 MG: Performed by: STUDENT IN AN ORGANIZED HEALTH CARE EDUCATION/TRAINING PROGRAM

## 2024-01-18 PROCEDURE — 0T2BX0Z CHANGE DRAINAGE DEVICE IN BLADDER, EXTERNAL APPROACH: ICD-10-PCS | Performed by: UROLOGY

## 2024-01-18 PROCEDURE — 80053 COMPREHEN METABOLIC PANEL: CPT | Performed by: STUDENT IN AN ORGANIZED HEALTH CARE EDUCATION/TRAINING PROGRAM

## 2024-01-18 PROCEDURE — 97530 THERAPEUTIC ACTIVITIES: CPT

## 2024-01-18 RX ORDER — OXYCODONE HYDROCHLORIDE AND ACETAMINOPHEN 5; 325 MG/1; MG/1
1 TABLET ORAL 2 TIMES DAILY PRN
Status: DISCONTINUED | OUTPATIENT
Start: 2024-01-18 | End: 2024-01-20 | Stop reason: HOSPADM

## 2024-01-18 RX ORDER — OXYCODONE HYDROCHLORIDE AND ACETAMINOPHEN 5; 325 MG/1; MG/1
1 TABLET ORAL ONCE
Status: COMPLETED | OUTPATIENT
Start: 2024-01-18 | End: 2024-01-18

## 2024-01-18 RX ADMIN — SODIUM CHLORIDE 150 ML/HR: 9 INJECTION, SOLUTION INTRAVENOUS at 22:32

## 2024-01-18 RX ADMIN — IPRATROPIUM BROMIDE 1 SPRAY: 42 SPRAY, METERED NASAL at 15:20

## 2024-01-18 RX ADMIN — OXYCODONE AND ACETAMINOPHEN 1 TABLET: 5; 325 TABLET ORAL at 13:32

## 2024-01-18 RX ADMIN — APIXABAN 5 MG: 5 TABLET, FILM COATED ORAL at 08:30

## 2024-01-18 RX ADMIN — SODIUM CHLORIDE 150 ML/HR: 9 INJECTION, SOLUTION INTRAVENOUS at 01:45

## 2024-01-18 RX ADMIN — Medication 10 ML: at 08:32

## 2024-01-18 RX ADMIN — Medication 5 MG: at 20:47

## 2024-01-18 RX ADMIN — IPRATROPIUM BROMIDE 1 SPRAY: 42 SPRAY, METERED NASAL at 08:31

## 2024-01-18 RX ADMIN — SODIUM CHLORIDE 150 ML/HR: 9 INJECTION, SOLUTION INTRAVENOUS at 15:15

## 2024-01-18 RX ADMIN — NYSTATIN 1 APPLICATION: 100000 CREAM TOPICAL at 08:31

## 2024-01-18 RX ADMIN — NYSTATIN 1 APPLICATION: 100000 CREAM TOPICAL at 20:48

## 2024-01-18 RX ADMIN — DOCUSATE SODIUM 50MG AND SENNOSIDES 8.6MG 2 TABLET: 8.6; 5 TABLET, FILM COATED ORAL at 08:31

## 2024-01-18 RX ADMIN — OXYCODONE AND ACETAMINOPHEN 1 TABLET: 5; 325 TABLET ORAL at 01:43

## 2024-01-18 RX ADMIN — DONEPEZIL HYDROCHLORIDE 5 MG: 5 TABLET, FILM COATED ORAL at 20:47

## 2024-01-18 RX ADMIN — CEFTRIAXONE 2000 MG: 2 INJECTION, POWDER, FOR SOLUTION INTRAMUSCULAR; INTRAVENOUS at 15:13

## 2024-01-18 RX ADMIN — IPRATROPIUM BROMIDE 1 SPRAY: 42 SPRAY, METERED NASAL at 20:47

## 2024-01-18 RX ADMIN — APIXABAN 5 MG: 5 TABLET, FILM COATED ORAL at 20:46

## 2024-01-18 RX ADMIN — ALLOPURINOL 300 MG: 300 TABLET ORAL at 08:30

## 2024-01-18 RX ADMIN — SODIUM CHLORIDE 150 ML/HR: 9 INJECTION, SOLUTION INTRAVENOUS at 08:31

## 2024-01-18 RX ADMIN — Medication 10 ML: at 20:47

## 2024-01-18 RX ADMIN — OXYCODONE AND ACETAMINOPHEN 1 TABLET: 5; 325 TABLET ORAL at 15:12

## 2024-01-18 NOTE — NURSING NOTE
[employer] investigated Bladder stimulator.  No issues with device.  Device is actually turned off and has been for some time per representative. The battery is almost depleted. SP cath in placed and will be changed per NP.  Pt continues to have PVCs on the telemetry monitor.

## 2024-01-18 NOTE — CONSULTS
1/18/2024: Consult received to discontinue COVID rule out isolation. Covid testing negative, no reported exposures. Header resolved as per request.

## 2024-01-18 NOTE — NURSING NOTE
Pt complaints of pain post administration of percocet 5mg on MAR. Telephone orders to give 1x dose of percocet 5mg now per Dr Jj.

## 2024-01-18 NOTE — CASE MANAGEMENT/SOCIAL WORK
Discharge Planning Assessment  [facility]     Patient Name: Jesus Beckham  MRN: [id number]  Today's Date: 1/18/2024    Admit Date: 1/17/2024    Plan: Home with spouse and possible home health   Discharge Needs Assessment       Row Name 01/18/24 1505       Living Environment    People in Home spouse    Current Living Arrangements home    Potentially Unsafe Housing Conditions none    Primary Care Provided by spouse/significant other    Provides Primary Care For no one, unable/limited ability to care for self    Family Caregiver if Needed spouse    Quality of Family Relationships helpful;involved;supportive    Able to Return to Prior Arrangements yes       Transition Planning    Patient/Family Anticipates Transition to home with help/services    Patient/Family Anticipated Services at Transition home health care    Transportation Anticipated family or friend will provide       Discharge Needs Assessment    Readmission Within the Last 30 Days no previous admission in last 30 days    Equipment Currently Used at Home walker, rolling;wheelchair;wheelchair, motorized;lift device;shower chair;ramp    Concerns to be Addressed discharge planning    Equipment Needed After Discharge none    Provided Post Acute Provider List? Yes    Post Acute Provider List Home Health                   Discharge Plan       Row Name 01/18/24 1508       Plan    Plan Home with spouse and possible home health    Plan Comments S/W pt at bedside and his wife Mya by phone ([phone]).  Facesheet info confirmed.  Pt and Mya lives in a single story house with a ramp to enter.  Mya assists pt at home as needed.  Home DME includes a walker, w/c, motorized w/c, shower chair, lift chair which is what he sleeps in, and compression system for his legs.  Mya provides transport as needed. Pt has used [facility] in the past and would like to use them again.  Referral sent to Yessy/ Erik.  Pt has been to rehab at [facility] and   [facility].  PT eval is pending.  Plan at present is home with assist of spouse and home health.  CCP will continue to follow and assist as needed ............Adrienne BORJA/ MICHEAL                  Continued Care and Services - Admitted Since 1/17/2024       Home Medical Care       Service Provider Request Status Selected Services Address Phone Fax Patient Preferred    [facility] Pending - Request Sent [address], [facility] [id number] [phone] --                     Demographic Summary       Row Name 01/18/24 1504       General Information    Admission Type inpatient    Arrived From home    Referral Source admission list    Reason for Consult discharge planning    Preferred Language English                   Functional Status       Row Name 01/18/24 1504       Functional Status    Usual Activity Tolerance fair    Current Activity Tolerance fair       Functional Status, IADL    Medications assistive person    Meal Preparation assistive person    Housekeeping assistive person    Laundry assistive person    Shopping assistive person       Employment/    Employment Status retired                      Adrienne Win RN

## 2024-01-18 NOTE — PROGRESS NOTES
Name: Jesus Beckham ADMIT: 2024   : 1940  PCP: Magdy Ricardo MD    MRN: [id number] LOS: 0 days   AGE/SEX: 83 y.o. male  ROOM: [facility]/     Subjective   His mentation is dramatically better today.  His suprapubic catheter was changed by urology.  He has improved blood pressure count is also improving.  His left leg remains swollen, tender,  erythematous.    Objective   Objective   Vital Signs  Temp:  [98.8 °F (37.1 °C)-99.9 °F (37.7 °C)] 99.7 °F (37.6 °C)  Heart Rate:  [] 95  Resp:  [16-18] 18  BP: ()/(52-93) 133/92  SpO2:  [88 %-95 %] 95 %  on  Flow (L/min):  [2-3] 2;   Device (Oxygen Therapy): nasal cannula  Body mass index is 39.37 kg/m².  Physical Exam  Constitutional:       General: He is not in acute distress.  HENT:      Head: Normocephalic and atraumatic.   Eyes:      Extraocular Movements: Extraocular movements intact.      Pupils: Pupils are equal, round, and reactive to light.   Cardiovascular:      Rate and Rhythm: Normal rate and regular rhythm.      Pulses: Normal pulses.      Heart sounds: No murmur heard.  Pulmonary:      Effort: Pulmonary effort is normal. No respiratory distress.   Abdominal:      General: There is distension.      Palpations: Abdomen is soft.      Tenderness: There is no abdominal tenderness.   Musculoskeletal:      Comments: LLE is swollen, red, and tender    Neurological:      Mental Status: He is alert.       Results Review     I reviewed the patient's new clinical results.  Results from last 7 days   Lab Units 24  0436 24  1340   WBC 10*3/mm3 13.59* 18.49*   HEMOGLOBIN g/dL 13.1 15.1   PLATELETS 10*3/mm3 119* 135*     Results from last 7 days   Lab Units 24  0436 24  1340   SODIUM mmol/L 136 136   POTASSIUM mmol/L 4.1 4.8   CHLORIDE mmol/L 101 94*   CO2 mmol/L 23.5 27.6   BUN mg/dL 21 19   CREATININE mg/dL 1.35* 1.28*   GLUCOSE mg/dL 92 159*   Estimated Creatinine Clearance: 56 mL/min (A) (by C-G formula based on SCr of  "1.35 mg/dL (H)).  Results from last 7 days   Lab Units 01/18/24  0436 01/17/24  1340   ALBUMIN g/dL 3.3* 4.6   BILIRUBIN mg/dL 0.8 0.7   ALK PHOS U/L 51 68   AST (SGOT) U/L 34 45*   ALT (SGPT) U/L 30 38     Results from last 7 days   Lab Units 01/18/24  0436 01/17/24  1340   CALCIUM mg/dL 8.6 9.7   ALBUMIN g/dL 3.3* 4.6   MAGNESIUM mg/dL 2.2  --    PHOSPHORUS mg/dL 3.0  --      Results from last 7 days   Lab Units 01/17/24  1650 01/17/24  1340   PROCALCITONIN ng/mL  --  0.58*   LACTATE mmol/L 1.5 2.6*     COVID19   Date Value Ref Range Status   01/17/2024 Not Detected Not Detected - Ref. Range Final   02/24/2023 Not Detected Not Detected - Ref. Range Final     No results found for: \"HGBA1C\", \"POCGLU\"  Results for orders placed or performed during the hospital encounter of 01/17/24   Blood Culture - Blood, Arm, Left    Specimen: Arm, Left; Blood   Result Value Ref Range    Blood Culture No growth at 24 hours          XR Chest 1 View  Narrative: XR CHEST 1 VW-1/17/2024     HISTORY: Altered mental status.     Heart size is within normal limits. Lungs are underinflated but appear  clear. Spinal leads terminate over the thoracic spine. Prosthetic valve  probably at the aortic root is seen.     Impression: 1. No acute process.        This report was finalized on 1/17/2024 1:55 PM by Dr. Cipriano Zambrano M.D on Workstation: [id number]       Scheduled Medications  allopurinol, 300 mg, Oral, Daily  apixaban, 5 mg, Oral, BID  cefTRIAXone, 2,000 mg, Intravenous, Q24H  donepezil, 5 mg, Oral, Nightly  ipratropium, 1 spray, Each Nare, TID  melatonin, 5 mg, Oral, Nightly  nystatin, 1 Application, Topical, BID  senna-docusate sodium, 2 tablet, Oral, BID  sodium chloride, 10 mL, Intravenous, Q12H    Infusions  sodium chloride, 150 mL/hr, Last Rate: 150 mL/hr (01/18/24 4911)    Diet  Diet: Regular/House Diet; Texture: Regular Texture (IDDSI 7); Fluid Consistency: Thin (IDDSI 0)       Assessment/Plan     Active Hospital Problems    " Diagnosis  POA    **UTI (urinary tract infection) [N39.0]  Yes    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       83 y.o. male admitted with UTI (urinary tract infection).    Sepsis urinary tract infection  Suprapubic catheter  Temperature 101, lactic acid 2.6, white blood cell count elevation 18,000  S/p fluid bolus, stat continuous fluids   On Rocephin, continue.  Follow cultures.  Urine culture with greater than 100,000 CFU's of gram-negative bacilli.  Blood cultures pending     Atrial fibrillation  History of DVT  Resume home eliquis      Atrial stenosis status post TAVR  Frequent PVCs  .  Lumbar discitis status post I&D with residual neurogenic bladder requiring suprapubic catheter  -Urology consulted , SPT changed    Hypertension  Holding off on antihypertensives right now due to softer blood pressures as well as sepsis.          Eliquis (home med) for DVT prophylaxis.  Full code.  Discussed with patient and family.  Anticipate discharge home in 2-3 days.      Uriel Jj MD  [facility]  01/18/24  13:57 EST

## 2024-01-18 NOTE — PLAN OF CARE
"Goal Outcome Evaluation:  Plan of Care Reviewed With: patient, family           Outcome Evaluation: Pt is 84 yo male admitted to [facility] with UTI. PMH significant for TAVR, lumbar discitis s/p I&D, neurogenic bladder requiring suprapubic catheter, spinal cord stimulation, DM with peripheral neuropathy. Patient lives with spouse, uses rwx for very short household ambulation, primarily uses w/c for mobility. Today, patient awake and alert, c/o intense pain and declines to perform most mobility. Pt repeatedly stating he \"wants to die\". Pt rolled R and L in bed with maxAx2, repositioned for comfort. Pt has impairments consisting of generalized weakness and pain limiting all mobility. Unsure of d/c recommendations as patient not fully cooperative with PT attempts today due to uncontrolled pain.      Anticipated Discharge Disposition (PT): skilled nursing facility                        "

## 2024-01-18 NOTE — PLAN OF CARE
Goal Outcome Evaluation:      Patient did not have any acute events overnight at this time.  Patient did complain of back and bilateral neuropathy foot pain, provider notified, new order entered.  Rhythm change, provider notified, Stat EKG ordered.  IV fluids being administered as ordered.  Patient on 2L oxygen.  Suprapubic catheter patent.  Daughter stayed the night.  Patient alert and oriented at this time. Will continue to monitor and provide care as appropriate and ordered.  Bed in lowest position, call bell and personal items in reach.  Oral fluids encouraged as oral mucosa dry this morning.

## 2024-01-18 NOTE — PLAN OF CARE
Goal Outcome Evaluation:  Plan of Care Reviewed With: patient, spouse, family           Outcome Evaluation: Patient is an 83 year old male admitted to [facility] with AMS found to have a UTI. Patient lives at home with spouse, with no steps, and requires mod A with self care, mod A with transfers, primarily uses a wc within the home, and wife is primary caregiver. Patient presents today with generalized weakness is A&Ox4. Patient requires max A with bed mobility, mod A to sit upright unsupported at EOB. Patient able to perform sit to stand transfer from EOB x2, requires mod/max Ax2 with rwx to steady self. Patient able to perform stand pivot transfer to bedside commode with mod Ax2 verbal cues and use of rwx. Patient is dependent with returning to supine, positioning self in bed. Patient will benefit from skilled OT to address generalized deconditioning and current functional deficits with self care. Recommend SNF at dc if eligible, vs return home with HH and 24 hour care.      Anticipated Discharge Disposition (OT): skilled nursing facility, home with 24/7 care, home with home health

## 2024-01-18 NOTE — CONSULTS
"Assessment Date:  01/18/24    NUTRITION SCREENING      Reason for Encounter Nursing Admission Screen    Diagnosis/Problem Sepsis/UTI        PO Diet Diet: Regular/House Diet; Texture: Regular Texture (IDDSI 7); Fluid Consistency: Thin (IDDSI 0)   Supplements None    PO Intake % None recorded-visited patient this afternoon who was eating lunch. Wife reports his intake has been very good.        Medications MAR reviewed by RD   Labs  Listed below, reviewed   Physical Findings Visually well nourished at visit.    GI Function No BM since admit    Skin Status Intact, wounds documented are old.        Height  Weight  BMI  Weight Trend     Height: 178 cm (70.08\")  Weight: 125 kg (275 lb) (01/17/24 1648)  Body mass index is 39.37 kg/m².  Stable       Nutrition Problem (PES) No nutrition diagnosis at this time.       Intervention/Plan Will see patient prn or review at next LOS     RD to follow up per protocol.     Results from last 7 days   Lab Units 01/18/24  0436 01/17/24  1340   SODIUM mmol/L 136 136   POTASSIUM mmol/L 4.1 4.8   CHLORIDE mmol/L 101 94*   CO2 mmol/L 23.5 27.6   BUN mg/dL 21 19   CREATININE mg/dL 1.35* 1.28*   CALCIUM mg/dL 8.6 9.7   BILIRUBIN mg/dL 0.8 0.7   ALK PHOS U/L 51 68   ALT (SGPT) U/L 30 38   AST (SGOT) U/L 34 45*   GLUCOSE mg/dL 92 159*     Results from last 7 days   Lab Units 01/18/24  0436   MAGNESIUM mg/dL 2.2   PHOSPHORUS mg/dL 3.0   HEMOGLOBIN g/dL 13.1   HEMATOCRIT % 38.5     Lab Results   Component Value Date    HGBA1C 6.7 (H) 08/21/2023         Electronically signed by:  Chery Michelle RD  01/18/24 13:35 EST  "

## 2024-01-18 NOTE — DISCHARGE PLACEMENT REQUEST
"MatteoPoli bah JOANNE (83 y.o. Male)       Date of Birth   1940    Social Security Number       Address   [address] [place] [address]    Home Phone   [phone]    MRN   [id number]       Mor[employer]   [employer]ism    Marital Status                               Admission Date   1/17/24    Admission Type   Emergency    Admitting Provider   Uriel Jj MD    Attending Provider   Uriel Jj MD    Department, Room/Bed   [address], S[address]/1       Discharge Date       Discharge Disposition       Discharge Destination                                 Attending Provider: Uriel Jj MD    Allergies: Levaquin [Levofloxacin], Alcohol, Other    Isolation: None   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)   Code Status: CPR    Ht: 178 cm (70.08\")   Wt: 125 kg (275 lb)    Admission Cmt: None   Principal Problem: UTI (urinary tract infection) [N39.0]                   Active Insurance as of 1/17/2024       Primary Coverage       Payor Plan Insurance Group Employer/Plan Group    [facility] MEDICARE REPLACEMENT [facility] GROUP [id number]       Payor Plan Address Payor Plan Phone Number Payor Plan Fax Number Effective Dates    [address] [address]   2/1/2023 - None Entered    [facility] [id number]         Subscriber Name Subscriber Birth Date Member ID       POLI MONROE 1940 [id number]                     Emergency Contacts        (Rel.) Home Phone Work Phone Mobile Phone    Mya Monroe (Spouse) [phone] -- [phone]    LEILA WEAVER (Daughter) -- -- [phone]    Kaylie Nunez (Sister) [phone] -- --                "

## 2024-01-18 NOTE — THERAPY EVALUATION
Patient Name: Jesus Beckham  : 1940    MRN: [id number]                              Today's Date: 2024       Admit Date: 2024    Visit Dx:     ICD-10-CM ICD-9-CM   1. Urinary tract infection associated with indwelling urethral catheter, initial encounter  T83.511A 996.64    N39.0 599.0   2. Cellulitis of left lower extremity  L03.116 682.6   3. Encephalopathy  G93.40 348.30     Patient Active Problem List   Diagnosis    Gout    Diabetic peripheral neuropathy    Dyslipidemia    Essential hypertension    PVC (premature ventricular contraction)    Vitamin D deficiency    Benign non-nodular prostatic hyperplasia without lower urinary tract symptoms    Osteoarthritis    Urge incontinence    Acute gangrenous cholecystitis    Discitis of lumbar region    Paroxysmal atrial fibrillation    History of sepsis    Hyperuricemia    Chronic deep vein thrombosis (DVT) of right peroneal vein    Nausea, vomiting, and diarrhea    Colitis presumed infectious    Urinary tract infection in male    DDD (degenerative disc disease), lumbar    Bilateral leg weakness    History of lumbar spinal fusion    Carpal tunnel syndrome    Adhesive arachnoiditis    Chronic anticoagulation    Diverticulosis    Hematuria    Lower obstructive uropathy    Splenic lesion    Suprapubic catheter dysfunction    Obesity (BMI 30-39.9)    Venous insufficiency of left leg    Memory difficulties    Chronic pain syndrome    Type 2 diabetes mellitus with peripheral neuropathy    Encounter for long-term (current) use of high-risk medication    Therapeutic opioid induced constipation    Abnormal CT of brain    Suprapubic catheter    Chronic bladder pain    Proteinuria    Lumbar radiculopathy    Nonrheumatic aortic valve stenosis    Snoring    Class 3 severe obesity due to excess calories without serious comorbidity with body mass index (BMI) of 40.0 to 44.9 in adult    Non-restorative sleep    Hypersomnia    History of DVT of lower extremity     Status post insertion of spinal cord stimulator    S/P TAVR (transcatheter aortic valve replacement)    Abdominal distension (gaseous)    Back pain    Dorsalgia, unspecified    Benign neoplasm of ascending colon    Benign neoplasm of descending colon    Benign neoplasm of transverse colon    Benign neoplasm of cecum    Colon polyps    Rectal polyp    Disorder of muscle, unspecified    Family history of colonic polyps    Constipation    Fecal incontinence    History of colonic polyps    Personal history of colonic polyps    Cancer    Malignant (primary) neoplasm, unspecified    Other abnormalities of heart beat    Pure hypercholesterolemia    Unspecified hemorrhoids    Atrial fibrillation    Long term (current) use of anticoagulants    Dvrtclos of lg int w/o perforation or abscess w/o bleeding    Encounter for screening for malignant neoplasm of colon    Sleep apnea    Arthritis    Coagulation defect, unspecified    Diabetes mellitus    UTI (urinary tract infection)     Past Medical History:   Diagnosis Date    Acute embolism and thrombosis of deep vein of right distal lower extremity     Acute gangrenous cholecystitis     FEB 2018    Allergic     Aortic valve stenosis     Arthritis     Atrial fibrillation with RVR     HISTORY    Benign prostatic hyperplasia without lower urinary tract symptoms     Cancer of bladder 2016    Colon polyp     Discitis of lumbar region     DVT (deep venous thrombosis)     MARCH 2018    Essential hypertension     Murray catheter in place     LOSS OF SENSATION BLADDER. BECAUSE OF WOUND AT THE TIME    Gout     Heel ulcer     RIGHT. FOLLOWED BY WOUND CARE. GETTING BETTER    History of sepsis     [place] (hard of hearing)     HEARING AIDS BOTH EARS    Hyperlipidemia     Loss, sensation     LOWER EXTREMTIES. RELATED TO LAST BACK SURGERY.    MRSA infection     LEFT LEG.     Nausea, vomiting, and diarrhea 03/01/2019    Open wound     WITH MEDICATED DRESSING LEFT SHIN AREA.(RESOLVED PER PATIENT)     CELINA (obstructive sleep apnea)     NO MACHINE    Osteoarthritis     Paroxysmal atrial fibrillation     PVC (premature ventricular contraction)     Sepsis 02/2018    Sepsis due to Escherichia coli     Skin carcinoma 2012    MELANOMA.2 PLACES BACK AND EAR    Type 2 diabetes mellitus     Vitamin D deficiency     Weakness      Past Surgical History:   Procedure Laterality Date    ABDOMINAL SURGERY      AORTIC VALVE REPAIR/REPLACEMENT N/A 2/28/2023    Procedure: TTE TRANSFEMORAL TRANSCATHETER AORTIC VALVE REPLACEMENT PERCUTANEOUS APPROACH;  Surgeon: Antony Goldstein MD;  Location: [facility];  Service: Cardiothoracic;  Laterality: N/A;    AORTIC VALVE REPAIR/REPLACEMENT N/A 2/28/2023    Procedure: Transfemoral Transcatheter Aortic Valve Replacement with intraoperative TTE and possible open surgical rescue;  Surgeon: Samuel Zelaya MD;  Location: [facility];  Service: Cardiovascular;  Laterality: N/A;    APPENDECTOMY N/A 1961    CARDIAC CATHETERIZATION N/A 01/16/2023    Procedure: Coronary angiography;  Surgeon: Tasha Burr MD;  Location: [facility] CATH INVASIVE LOCATION;  Service: Cardiology;  Laterality: N/A;    CARDIAC CATHETERIZATION N/A 01/16/2023    Procedure: Left Heart Cath;  Surgeon: Tasha Burr MD;  Location: [facility] C[facility]VASIVE LOC[facility];  Service: Cardiology;  Laterality: N/A;    CARDIAC CATHETERIZATION N/A 01/16/2023    Procedure: Right Heart Cath;  Surgeon: Tasha Burr MD;  [facility];  Service: Cardiology;  Laterality: N/A;    CHOLECYSTECTOMY WITH INTRAOPERATIVE CHOLANGIOGRAM N/A 02/25/2018    Procedure: CHOLECYSTECTOMY LAPAROSCOPIC INTRAOPERATIVE CHOLANGIOGRAM;  Surgeon: Maegan Correa MD;  Location: [facility] OR;  Service:     COLONOSCOPY N/A 2010    h/o of polyps    COLONOSCOPY W/ BIOPSIES AND POLYPECTOMY  2019    EYE SURGERY Bilateral 1959    glass removal from left eye, lens transplant    JOINT REPLACEMENT Right 2005    RIGHT KNEE    LAMINECTOMY N/A  01/18/2016    L2-L3, L3-L4, L4-L5, and L5-S1 bilateral lamincectomy, medial facetectomy & [id number], Dr. Kaiden Valdes    LUMBAR FUSION N/A 03/07/2018    Procedure: LUMBAR FUSION MINIMALLY INVASIVE TRANSFORAMINAL LUMBAR INTERBODY IRRIGATION AND DEBRIDEMENT AND FUSION.  IRRIGATION AND DEBRIDEMENT OF EPIDURAL ABCESS LUMBAR 2-4. BONE MORPHOGENIC PROTEIN, ALPHATEC NOVEL AND ILLICO, EMG AND SSEP NEUROMONITORING.;  Surgeon: Manish Marr DO;  Location: [facility] OR;  Service: Orthopedic Spine    SKIN BIOPSY      BACK AND FACE    SPINAL CORD STIMULATOR IMPLANT N/A 07/01/2022    Procedure: SPINAL CORD STIMULATOR INSERTION PHASE 1 ([place]) 4 DAY TRIAL ONLY DUE TO ELIQUIS HOLD.;  Surgeon: Cody Holguin MD;  Location: [facility];  Service: Pain Management;  Laterality: N/A;    SPINAL CORD STIMULATOR IMPLANT N/A 11/17/2022    Procedure: Thoracic eleven laminotomy for placement of a surgical spinal cord stimulator lead to thoracic nine;  Surgeon: Charlie Bailon MD;  Location: [facility];  Service: Neurosurgery;  Laterality: N/A;    SPINAL CORD STIMULATOR IMPLANT N/A 11/18/2022    Procedure: Placement of a left gluteal internal pulse generator;  Surgeon: Charlie Bailon MD;  Location: [facility];  Service: Neurosurgery;  Laterality: N/A;    TOTAL KNEE ARTHROPLASTY Right 03/07/2005    Dr. Washington Evans    VENA CAVA FILTER INSERTION Right 03/06/2018    Procedure: VENA CAVA FILTER INSERTION;  Surgeon: Alexis Thakkar MD;  Location: [facility] 18/19;  Service:     VENA CAVA FILTER REMOVAL N/A 12/03/2018    Procedure: VENA CAVA FILTER REMOVAL WITH VENOCAVAGRAM;  Surgeon: Alexis Thakkar MD;  Location: [facility] OR 18/19;  Service: Vascular      General Information       Row Name 01/18/24 1537          Physical Therapy Time and Intention    Document Type evaluation  -EM     Mode of Treatment individual therapy;physical therapy  -EM       Row Name 01/18/24 1537          General  "Information    Patient Profile Reviewed yes  -EM     Prior Level of Function min assist:;all household mobility  pt reports he ambulates short distance in house (where w/c won't fit thru) with rwx, states he \"used to do it by myself\", unclear on time frame or level of assist immediately prior to admission  -EM     Existing Precautions/Restrictions fall  -EM     Barriers to Rehab medically complex;previous functional deficit  -EM       Row Name 01/18/24 1537          Living Environment    People in Home spouse  -EM       Row Name 01/18/24 1537          Home Main Entrance    Number of Stairs, Main Entrance none  -EM       Row Name 01/18/24 1537          Cognition    Orientation Status (Cognition) oriented x 3  -EM       Row Name 01/18/24 1537          Safety Issues, Functional Mobility    Impairments Affecting Function (Mobility) endurance/activity tolerance;pain;strength  -EM               User Key  (r) = Recorded By, (t) = Taken By, (c) = Cosigned By      Initials Name Provider Type    Tisha Montanez PT Physical Therapist                   Mobility       Row Name 01/18/24 1539          Bed Mobility    Bed Mobility rolling left;rolling right  -EM     Rolling Left [place] (Bed Mobility) maximum assist (25% patient effort);2 person assist  -EM     Rolling Right [place] (Bed Mobility) maximum assist (25% patient effort);2 person assist  -EM     Assistive Device (Bed Mobility) draw sheet  -EM     Comment, (Bed Mobility) pt declined to attempt any EOB sitting, states he \"just wants to die\" and he is tired of being in so much pain, pt repeated this multiple times in presence of RN and family  -EM               User Key  (r) = Recorded By, (t) = Taken By, (c) = Cosigned By      Initials Name Provider Type    Tisha Montanez PT Physical Therapist                   Obj/Interventions       Row Name 01/18/24 1543          Range of Motion Comprehensive    General Range of Motion bilateral upper " "extremity ROM WNL  -EM     Comment, General Range of Motion pt did not move LLE at all, c/o too much pain, RLE WNL at ankle but also declined to move R knee or hip  -EM       Row Name 01/18/24 1543          Strength Comprehensive (MMT)    Comment, General Manual Muscle Testing (MMT) Assessment pt declined to move polina LEs (especially LLE) due to pain  -EM       Row Name 01/18/24 1543          Sensory Assessment (Somatosensory)    Sensory Assessment (Somatosensory) other (see comments)  pt states he has neuropathy \"from the waist down\", and in his hands  -EM               User Key  (r) = Recorded By, (t) = Taken By, (c) = Cosigned By      Initials Name Provider Type    EM Tisha Munoz, PT Physical Therapist                   Goals/Plan       Row Name 01/18/24 7106          Bed Mobility Goal 1 (PT)    Activity/Assistive Device (Bed Mobility Goal 1, PT) bed mobility activities, all  -EM     [place] Level/Cues Needed (Bed Mobility Goal 1, PT) moderate assist (50-74% patient effort)  -EM     Time Frame (Bed Mobility Goal 1, PT) 2 weeks  -EM       Row Name 01/18/24 5658          Transfer Goal 1 (PT)    Activity/Assistive Device (Transfer Goal 1, PT) transfers, all;walker, rolling  -EM     [place] Level/Cues Needed (Transfer Goal 1, PT) moderate assist (50-74% patient effort)  -EM     Time Frame (Transfer Goal 1, PT) 2 weeks  -EM       Row Name 01/18/24 6003          Gait Training Goal 1 (PT)    Activity/Assistive Device (Gait Training Goal 1, PT) gait (walking locomotion);walker, rolling  -EM     [place] Level (Gait Training Goal 1, PT) moderate assist (50-74% patient effort)  -EM     Distance (Gait Training Goal 1, PT) 10  -EM     Time Frame (Gait Training Goal 1, PT) 2 weeks  -EM       Row Name 01/18/24 2074          Therapy Assessment/Plan (PT)    Planned Therapy Interventions (PT) bed mobility training;home exercise program;patient/family education;transfer training;gait training;strengthening  " "-EM               User Key  (r) = Recorded By, (t) = Taken By, (c) = Cosigned By      Initials Name Provider Type    EM Tisha Munoz, PT Physical Therapist                   Clinical Impression       Row Name 01/18/24 1545          Pain    Pretreatment Pain Rating 10/10  -EM     Posttreatment Pain Rating 10/10  -EM     Pain Location generalized  -EM     Pre/Posttreatment Pain Comment pt c/o pain \"all over\" but especially in LLE, had pain meds about an hour prior to PT attempt, then had more pain meds upon PT arrival. Pt states \"nothing helps\", he is \"tired of being in so much pain\" and \"want to die NOW\". RN and family present for entire conversation   -EM     Pain Intervention(s) Repositioned  -EM       Row Name 01/18/24 1548          Plan of Care Review    Plan of Care Reviewed With patient;family  -EM     Outcome Evaluation Pt is 82 yo male admitted to [facility] with UTI. PMH significant for TAVR, lumbar discitis s/p I&D, neurogenic bladder requiring suprapubic catheter, spinal cord stimulation, DM with peripheral neuropathy. Patient lives with spouse, uses rwx for very short household ambulation, primarily uses w/c for mobility. Today, patient awake and alert, c/o intense pain and declines to perform most mobility. Pt repeatedly stating he \"wants to die\". Pt rolled R and L in bed with maxAx2, repositioned for comfort. Pt has impairments consisting of generalized weakness and pain limiting all mobility. Unsure of d/c recommendations as patient not fully cooperative with PT attempts today due to uncontrolled pain.  -EM       Row Name 01/18/24 1545          Therapy Assessment/Plan (PT)    Patient/Family Therapy Goals Statement (PT) decrease pain  -EM     Rehab Potential (PT) fair, will monitor progress closely  -EM     Criteria for Skilled Interventions Met (PT) yes;skilled treatment is necessary  -EM     Therapy Frequency (PT) 5 times/wk  -EM       Row Name 01/18/24 1544          Positioning and Restraints    " Pre-Treatment Position in bed  -EM     Post Treatment Position bed  -EM     In Bed side lying right;call light within reach;exit alarm on;with family/caregiver;notified nsg  -EM               User Key  (r) = Recorded By, (t) = Taken By, (c) = Cosigned By      Initials Name Provider Type    EM Tisha Munoz, PT Physical Therapist                   Outcome Measures       Row Name 01/18/24 1553          How much help from another person do you currently need...    Turning from your back to your side while in flat bed without using bedrails? 2  -EM     Moving from lying on back to sitting on the side of a flat bed without bedrails? 1  -EM     Moving to and from a bed to a chair (including a wheelchair)? 1  -EM     Standing up from a chair using your arms (e.g., wheelchair, bedside chair)? 1  -EM     Climbing 3-5 steps with a railing? 1  -EM     To walk in hospital room? 1  -EM     AM-PAC 6 Clicks Score (PT) 7  -EM     Highest Level of Mobility Goal 2 --> Bed activities/dependent transfer  -       Row Name 01/18/24 1023          Modified [place] Scale    Modified [place] Scale 4 - Moderately severe disability.  Unable to walk without assistance, and unable to attend to own bodily needs without assistance.  -       Row Name 01/18/24 1023          Functional Assessment    Outcome Measure Options AM-PAC 6 Clicks Daily Activity (OT);Modified [place]  -               User Key  (r) = Recorded By, (t) = Taken By, (c) = Cosigned By      Initials Name Provider Type    Tisha Montanez, PT Physical Therapist     Nat Mooney OT Occupational Therapist                                 Physical Therapy Education       Title: PT OT SLP Therapies (In Progress)       Topic: Physical Therapy (In Progress)       Point: Mobility training (In Progress)       Learning Progress Summary             Patient Acceptance, E, NR by EM at 1/18/2024 8834                                         User Key       Initials Effective  "Dates Name Provider Type Discipline    EM 06/16/21 -  Tisha Munoz PT Physical Therapist PT                  PT Recommendation and Plan  Planned Therapy Interventions (PT): bed mobility training, home exercise program, patient/family education, transfer training, gait training, strengthening  Plan of Care Reviewed With: patient, family  Outcome Evaluation: Pt is 82 yo male admitted [facility] with UTI. PMH significant for TAVR, lumbar discitis s/p I&D, neurogenic bladder requiring suprapubic catheter, spinal cord stimulation, DM with peripheral neuropathy. Patient lives with spouse, uses rwx for very short household ambulation, primarily uses w/c for mobility. Today, patient awake and alert, c/o intense pain and declines to perform most mobility. Pt repeatedly stating he \"wants to die\". Pt rolled R and L in bed with maxAx2, repositioned for comfort. Pt has impairments consisting of generalized weakness and pain limiting all mobility. Unsure of d/c recommendations as patient not fully cooperative with PT attempts today due to uncontrolled pain.     Time Calculation:         PT Charges       Row Name 01/18/24 1554             Time Calculation    Start Time 1510  -EM      Stop Time 1526  -EM      Time Calculation (min) 16 min  -EM      PT Received On 01/18/24  -EM      PT - Next Appointment 01/19/24  -EM      PT Goal Re-Cert Due Date 02/01/24  -EM         Time Calculation- PT    Total Timed Code Minutes- PT 8 minute(s)  -EM         Timed Charges    15507 - PT Therapeutic Activity Minutes 8  -EM         Total Minutes    Timed Charges Total Minutes 8  -EM       Total Minutes 8  -EM                User Key  (r) = Recorded By, (t) = Taken By, (c) = Cosigned By      Initials Name Provider Type    EM Tisha Munoz PT Physical Therapist                  Therapy Charges for Today       Code Description Service Date Service Provider Modifiers Qty    51928100206  PT THERAPEUTIC ACT EA 15 MIN 1/18/2024 Alexander, " Tisha ALVAREZ, PT GP 1    79039729625 HC PT EVAL MOD COMPLEXITY 2 1/18/2024 Tisha Munoz, PT GP 1    84934646961 HC PT THER SUPP EA 15 MIN 1/18/2024 Tisha Munoz, PT GP 1            PT G-Codes  Outcome Measure Options: AM-PAC 6 Clicks Daily Activity (OT), Modified [place]  AM-PAC 6 Clicks Score (PT): 7  AM-PAC 6 Clicks Score (OT): 10  Modified [place] Scale: 4 - Moderately severe disability.  Unable to walk without assistance, and unable to attend to own bodily needs without assistance.  PT Discharge Summary  Anticipated Discharge Disposition (PT): skilled nursing facility    Tisha Munoz, PT  1/18/2024

## 2024-01-18 NOTE — CONSULTS
[facility] Catheter Placement    Patient Identification:  NAME:  Jesus Beckham  Age:  83 y.o.   Sex:  male   :  1940   MRN:  [id number]     Patient currently a  patient of Dr. Saleh with [facility] scheduled to have his SP tube changed yesterday in the office but was admitted to the hospital. Urology consulted for SP tube change. Patient typically comes to our office for SP change q 3-4 weeks.     Patient instructed on procedure and verbal consent given for exchange  Hand hygiene performed and clean gloves donned  Patient bed raised and positioned supine  Abdominal area exposed and sterile drape placed below umbilical region  Existing catheter deflated with return of 10 cc of fluid from balloon  Catheter removed and placed away from sterile field without difficulty  Removed gloves   Using aseptic technique, donned sterile gloves and prepped stoma sight with betadine  Using sterile technique a lubricated 20 fr azul catheter was inserted  Immediate return of urine with visibile sediment noted in the tubing and drainage bag  Balloon inflated with 10 mL of water  75 cc of yellow urine with sediment immediately returned to bag  Catheter secured  Patient tolerated well    Kathrine Arora, DANNY  24  11:52 EST

## 2024-01-18 NOTE — THERAPY EVALUATION
Patient Name: Jesus Beckham  : 1940    MRN: [id number]                              Today's Date: 2024       Admit Date: 2024    Visit Dx:     ICD-10-CM ICD-9-CM   1. Urinary tract infection associated with indwelling urethral catheter, initial encounter  T83.511A 996.64    N39.0 599.0   2. Cellulitis of left lower extremity  L03.116 682.6   3. Encephalopathy  G93.40 348.30     Patient Active Problem List   Diagnosis    Gout    Diabetic peripheral neuropathy    Dyslipidemia    Essential hypertension    PVC (premature ventricular contraction)    Vitamin D deficiency    Benign non-nodular prostatic hyperplasia without lower urinary tract symptoms    Osteoarthritis    Urge incontinence    Acute gangrenous cholecystitis    Discitis of lumbar region    Paroxysmal atrial fibrillation    History of sepsis    Hyperuricemia    Chronic deep vein thrombosis (DVT) of right peroneal vein    Nausea, vomiting, and diarrhea    Colitis presumed infectious    Urinary tract infection in male    DDD (degenerative disc disease), lumbar    Bilateral leg weakness    History of lumbar spinal fusion    Carpal tunnel syndrome    Adhesive arachnoiditis    Chronic anticoagulation    Diverticulosis    Hematuria    Lower obstructive uropathy    Splenic lesion    Suprapubic catheter dysfunction    Obesity (BMI 30-39.9)    Venous insufficiency of left leg    Memory difficulties    Chronic pain syndrome    Type 2 diabetes mellitus with peripheral neuropathy    Encounter for long-term (current) use of high-risk medication    Therapeutic opioid induced constipation    Abnormal CT of brain    Suprapubic catheter    Chronic bladder pain    Proteinuria    Lumbar radiculopathy    Nonrheumatic aortic valve stenosis    Snoring    Class 3 severe obesity due to excess calories without serious comorbidity with body mass index (BMI) of 40.0 to 44.9 in adult    Non-restorative sleep    Hypersomnia    History of DVT of lower extremity     Status post insertion of spinal cord stimulator    S/P TAVR (transcatheter aortic valve replacement)    Abdominal distension (gaseous)    Back pain    Dorsalgia, unspecified    Benign neoplasm of ascending colon    Benign neoplasm of descending colon    Benign neoplasm of transverse colon    Benign neoplasm of cecum    Colon polyps    Rectal polyp    Disorder of muscle, unspecified    Family history of colonic polyps    Constipation    Fecal incontinence    History of colonic polyps    Personal history of colonic polyps    Cancer    Malignant (primary) neoplasm, unspecified    Other abnormalities of heart beat    Pure hypercholesterolemia    Unspecified hemorrhoids    Atrial fibrillation    Long term (current) use of anticoagulants    Dvrtclos of lg int w/o perforation or abscess w/o bleeding    Encounter for screening for malignant neoplasm of colon    Sleep apnea    Arthritis    Coagulation defect, unspecified    Diabetes mellitus    UTI (urinary tract infection)     Past Medical History:   Diagnosis Date    Acute embolism and thrombosis of deep vein of right distal lower extremity     Acute gangrenous cholecystitis     FEB 2018    Allergic     Aortic valve stenosis     Arthritis     Atrial fibrillation with RVR     HISTORY    Benign prostatic hyperplasia without lower urinary tract symptoms     Cancer of bladder 2016    Colon polyp     Discitis of lumbar region     DVT (deep venous thrombosis)     MARCH 2018    Essential hypertension     Murray catheter in place     LOSS OF SENSATION BLADDER. BECAUSE OF WOUND AT THE TIME    Gout     Heel ulcer     RIGHT. FOLLOWED BY WOUND CARE. GETTING BETTER    History of sepsis     [place] (hard of hearing)     HEARING AIDS BOTH EARS    Hyperlipidemia     Loss, sensation     LOWER EXTREMTIES. RELATED TO LAST BACK SURGERY.    MRSA infection     LEFT LEG.     Nausea, vomiting, and diarrhea 03/01/2019    Open wound     WITH MEDICATED DRESSING LEFT SHIN AREA.(RESOLVED PER PATIENT)     CELINA (obstructive sleep apnea)     NO MACHINE    Osteoarthritis     Paroxysmal atrial fibrillation     PVC (premature ventricular contraction)     Sepsis 02/2018    Sepsis due to Escherichia coli     Skin carcinoma 2012    MELANOMA.2 PLACES BACK AND EAR    Type 2 diabetes mellitus     Vitamin D deficiency     Weakness      Past Surgical History:   Procedure Laterality Date    ABDOMINAL SURGERY      AORTIC VALVE REPAIR/REPLACEMENT N/A 2/28/2023    Procedure: TTE TRANSFEMORAL TRANSCATHETER AORTIC VALVE REPLACEMENT PERCUTANEOUS APPROACH;  Surgeon: Antony Goldstein MD;  Location: [facility];  Service: Cardiothoracic;  Laterality: N/A;    AORTIC VALVE REPAIR/REPLACEMENT N/A 2/28/2023    Procedure: Transfemoral Transcatheter Aortic Valve Replacement with intraoperative TTE and possible open surgical rescue;  Surgeon: Samuel Zelaya MD;  Location: [facility];  Service: Cardiovascular;  Laterality: N/A;    APPENDECTOMY N/A 1961    CARDIAC CATHETERIZATION N/A 01/16/2023    Procedure: Coronary angiography;  Surgeon: Tasha Burr MD;  Location: [facility] CATH INVASIVE LOCATION;  Service: Cardiology;  Laterality: N/A;    CARDIAC CATHETERIZATION N/A 01/16/2023    Procedure: Left Heart Cath;  Surgeon: Tasha Burr MD;  [facility];  Service: Cardiology;  Laterality: N/A;    CARDIAC CATHETERIZATION N/A 01/16/2023    Procedure: Right Heart Cath;  Surgeon: Tasha Burr MD;  [facility];  Service: Cardiology;  Laterality: N/A;    CHOLECYSTECTOMY WITH INTRAOPERATIVE CHOLANGIOGRAM N/A 02/25/2018    Procedure: CHOLECYSTECTOMY LAPAROSCOPIC INTRAOPERATIVE CHOLANGIOGRAM;  Surgeon: Maegan Correa MD;  Location: [facility] OR;  Service:     COLONOSCOPY N/A 2010    h/o of polyps    COLONOSCOPY W/ BIOPSIES AND POLYPECTOMY  2019    EYE SURGERY Bilateral 1959    glass removal from left eye, lens transplant    JOINT REPLACEMENT Right 2005    RIGHT KNEE    LAMINECTOMY N/A  01/18/2016    L2-L3, L3-L4, L4-L5, and L5-S1 bilateral lamincectomy, medial facetectomy & [id number], Dr. Kaiden Valdes    LUMBAR FUSION N/A 03/07/2018    Procedure: LUMBAR FUSION MINIMALLY INVASIVE TRANSFORAMINAL LUMBAR INTERBODY IRRIGATION AND DEBRIDEMENT AND FUSION.  IRRIGATION AND DEBRIDEMENT OF EPIDURAL ABCESS LUMBAR 2-4. BONE MORPHOGENIC PROTEIN, ALPHATEC NOVEL AND ILLICO, EMG AND SSEP NEUROMONITORING.;  Surgeon: Manish Marr DO;  Location: [facility] OR;  Service: Orthopedic Spine    SKIN BIOPSY      BACK AND FACE    SPINAL CORD STIMULATOR IMPLANT N/A 07/01/2022    Procedure: SPINAL CORD STIMULATOR INSERTION PHASE 1 ([place]) 4 DAY TRIAL ONLY DUE TO ELIQUIS HOLD.;  Surgeon: Cody Holguin MD;  [place] [facility];  Service: Pain Management;  Laterality: N/A;    SPINAL CORD STIMULATOR IMPLANT N/A 11/17/2022    Procedure: Thoracic eleven laminotomy for placement of a surgical spinal cord stimulator lead to thoracic nine;  Surgeon: Charlie Bailon MD;  Location: [facility];  Service: Neurosurgery;  Laterality: N/A;    SPINAL CORD STIMULATOR IMPLANT N/A 11/18/2022    Procedure: Placement of a left gluteal internal pulse generator;  Surgeon: Charlie Bailon MD;  Location: [facility];  Service: Neurosurgery;  Laterality: N/A;    TOTAL KNEE ARTHROPLASTY Right 03/07/2005    Dr. [place] Evans    VENA CAVA FILTER INSERTION Right 03/06/2018    Procedure: VENA CAVA FILTER INSERTION;  Surgeon: Alexis Thakkar MD;  Location: [facility] 18/19;  Service:     VENA CAVA FILTER REMOVAL N/A 12/03/2018    Procedure: VENA CAVA FILTER REMOVAL WITH VENOCAVAGRAM;  Surgeon: Alexis Thakkar MD;  Location: [facility] OR 18/19;  Service: Vascular      General Information       Row Name 01/18/24 1016          OT Time and Intention    Document Type evaluation  -     Mode of Treatment individual therapy;occupational therapy  -       Row Name 01/18/24 1016          General Information     Patient Profile Reviewed yes  -     Prior Level of Function mod assist:  With ADLs, transfers, mobility  -     Existing Precautions/Restrictions fall  -     Barriers to Rehab medically complex;previous functional deficit  -Critical access hospital Name 01/18/24 1016          Living Environment    People in Home spouse  -Critical access hospital Name 01/18/24 1016          Home Main Entrance    Number of Stairs, Main Entrance none  -     Stair Railings, Main Entrance none  -Critical access hospital Name 01/18/24 1016          Stairs Within Home, Primary    Number of Stairs, Within Home, Primary none  -     Stair Railings, Within Home, Primary none  -Critical access hospital Name 01/18/24 1016          Cognition    Orientation Status (Cognition) oriented x 4  -Critical access hospital Name 01/18/24 1016          Safety Issues, Functional Mobility    Safety Issues Affecting Function (Mobility) safety precautions follow-through/compliance;safety precaution awareness  -     Impairments Affecting Function (Mobility) endurance/activity tolerance;pain;shortness of breath;balance;muscle tone abnormal;strength  -               User Key  (r) = Recorded By, (t) = Taken By, (c) = Cosigned By      Initials Name Provider Type     Nat Mooney OT Occupational Therapist                     Mobility/ADL's       [facility] Name 01/18/24 1017          Bed Mobility    Bed Mobility bed mobility (all) activities  -     All Activities, [place] (Bed Mobility) moderate assist (50% patient effort);2 person assist;maximum assist (25% patient effort)  -     Bed Mobility, Safety Issues impaired trunk control for bed mobility  -     Assistive Device (Bed Mobility) bed rails;head of bed elevated  -Critical access hospital Name 01/18/24 1017          Transfers    Transfers sit-stand transfer;toilet transfer  -Critical access hospital Name 01/18/24 1017          Sit-Stand Transfer    Sit-Stand [place] (Transfers) moderate assist (50% patient effort);maximum assist (25% patient effort);2 person  assist;nonverbal cues (demo/gesture);verbal cues  -     Assistive Device (Sit-Stand Transfers) walker, 4-wheeled  -[facility] Name 01/18/24 1017          Toilet Transfer    Type (Toilet Transfer) stand pivot/stand step  -     [place] Level (Toilet Transfer) moderate assist (50% patient effort);maximum assist (25% patient effort);2 person assist  -     Assistive Device (Toilet Transfer) commode, bedside without drop arms  -[facility] Name 01/18/24 1017          Activities of Daily Living    BADL Assessment/Intervention toileting  -[facility] Name 01/18/24 1017          Toileting Assessment/Training    [place] Level (Toileting) toileting skills;perform perineal hygiene;maximum assist (25% patient effort)  1 person to assist wit standing, 1 person to assist with hygiene  -     Assistive Devices (Toileting) commode, bedside without drop arms  -     Position (Toileting) supported sitting;supported standing  -               User Key  (r) = Recorded By, (t) = Taken By, (c) = Cosigned By      Initials Name Provider Type     Nat Mooney OT Occupational Therapist                   Obj/Interventions       [facility] Name 01/18/24 1019          Sensory Assessment (Somatosensory)    Sensory Assessment (Somatosensory) sensation intact  -LH       Row Name 01/18/24 1019          Vision Assessment/Intervention    Visual Impairment/Limitations WFL  -[facility] Name 01/18/24 1019          Range of Motion Comprehensive    General Range of Motion bilateral upper extremity ROM WFL  -[facility] Name 01/18/24 1019          Strength Comprehensive (MMT)    General Manual Muscle Testing (MMT) Assessment upper extremity strength deficits identified  -     Comment, General Manual Muscle Testing (MMT) Assessment generalized UE and trunk weakness  -[facility] Name 01/18/24 1019          Motor Skills    Motor Skills functional endurance  -     Functional Endurance Fair  -[facility] Name 01/18/24 1019           Balance    Balance Assessment sitting static balance;sitting dynamic balance;sit to stand dynamic balance;hip strategy assessment;standing static balance  -     Static Sitting Balance moderate assist  -     Dynamic Sitting Balance 2-person assist;moderate assist  -     Position, Sitting Balance unsupported;sitting edge of bed  -     Sit to Stand Dynamic Balance moderate assist;maximum assist;2-person assist  -     Static Standing Balance moderate assist;maximum assist;2-person assist  -     Balance Interventions standing;sitting;occupation based/functional task  -               User Key  (r) = Recorded By, (t) = Taken By, (c) = Cosigned By      Initials Name Provider Type     Nat Mooney OT Occupational Therapist                   Goals/Plan       Row Name 01/18/24 1023          Bed Mobility Goal 1 (OT)    Activity/Assistive Device (Bed Mobility Goal 1, OT) bed mobility activities, all  -     [place] Level/Cues Needed (Bed Mobility Goal 1, OT) minimum assist (75% or more patient effort)  -     Time Frame (Bed Mobility Goal 1, OT) short term goal (STG);2 weeks  -LH       Row Name 01/18/24 1023          Transfer Goal 1 (OT)    Activity/Assistive Device (Transfer Goal 1, OT) transfers, all  -     [place] Level/Cues Needed (Transfer Goal 1, OT) minimum assist (75% or more patient effort)  -     Time Frame (Transfer Goal 1, OT) short term goal (STG);2 weeks  -     Progress/Outcome (Transfer Goal 1, OT) new goal  -LH       Row Name 01/18/24 1023          Dressing Goal 1 (OT)    Activity/Device (Dressing Goal 1, OT) dressing skills, all;upper body dressing;lower body dressing  -     [place]/Cues Needed (Dressing Goal 1, OT) moderate assist (50-74% patient effort)  -     Time Frame (Dressing Goal 1, OT) short term goal (STG);2 weeks  -     Progress/Outcome (Dressing Goal 1, OT) new goal  -[facility] Name 01/18/24 1023          Toileting Goal 1 (OT)    Activity/Device  (Toileting Goal 1, OT) toileting skills, all  -     [place] Level/Cues Needed (Toileting Goal 1, OT) moderate assist (50-74% patient effort)  -     Time Frame (Toileting Goal 1, OT) short term goal (STG);2 weeks  -     Progress/Outcome (Toileting Goal 1, OT) new goal  -       Row Name 01/18/24 1023          Grooming Goal 1 (OT)    Activity/Device (Grooming Goal 1, OT) grooming skills, all  -     [place] (Grooming Goal 1, OT) modified independence  -     Time Frame (Grooming Goal 1, OT) short term goal (STG);2 weeks  -     Progress/Outcome (Grooming Goal 1, OT) new goal  -       Row Name 01/18/24 1023          Problem Specific Goal 1 (OT)    Problem Specific Goal 1 (OT) Patient will implement energy conservation and work simplification strategies in order to complete ADL routine independently.  -     Time Frame (Problem Specific Goal 1, OT) short term goal (STG);2 weeks  -     Progress/Outcome (Problem Specific Goal 1, OT) new goal  -       Row Name 01/18/24 1023          Therapy Assessment/Plan (OT)    Planned Therapy Interventions (OT) activity tolerance training;BADL retraining;functional balance retraining;occupation/activity based interventions;patient/caregiver education/training;strengthening exercise;transfer/mobility retraining  -               User Key  (r) = Recorded By, (t) = Taken By, (c) = Cosigned By      Initials Name Provider Type     Nat Mooney, OT Occupational Therapist                   Clinical Impression       Row Name 01/18/24 1020          Pain Assessment    Pretreatment Pain Rating 6/10  -     Posttreatment Pain Rating 7/10  [facility]     Pre/Posttreatment Pain Comment Generalized pain, groin pain  -     Pain Intervention(s) Repositioned  -       Row Name 01/18/24 1020          Plan of Care Review    Plan of Care Reviewed With patient;spouse;family  -     Outcome Evaluation Patient is an 83 year old male admitted to [facility] with AMS found to have a UTI.  Patient lives at home with spouse, with no steps, and requires mod A with self care, mod A with transfers, primarily uses a wc within the home, and wife is primary caregiver. Patient presents today with generalized weakness is A&Ox4. Patient requires max A with bed mobility, mod A to sit upright unsupported at EOB. Patient able to perform sit to stand transfer from EOB x2, requires mod/max Ax2 with rwx to steady self. Patient able to perform stand pivot transfer to bedside commode with mod Ax2 verbal cues and use of rwx. Patient is dependent with returning to supine, positioning self in bed. Patient will benefit from skilled OT to address generalized deconditioning and current functional deficits with self care. Recommend SNF [place] if eligible, vs return home with  and 24 hour care.  -       Row Name 01/18/24 1020          Therapy Assessment/Plan (OT)    Therapy Frequency (OT) 5 times/wk  -       Row Name 01/18/24 1020          Therapy Plan Review/Discharge Plan (OT)    Anticipated Discharge Disposition (OT) skilled nursing facility;home with 24/7 care;home with home health  -       Row Name 01/18/24 1020          Positioning and Restraints    Pre-Treatment Position in bed  -     Post Treatment Position bed  -LH     In Bed fowlers;call light within reach;encouraged to call for assist;exit alarm on;with family/caregiver  -               User Key  (r) = Recorded By, (t) = Taken By, (c) = Cosigned By      Initials Name Provider Type     Nat Mooney, OT Occupational Therapist                   Outcome Measures       Row Name 01/18/24 1023          How much help from another is currently needed...    Putting on and taking off regular lower body clothing? 1  -LH     Bathing (including washing, rinsing, and drying) 1  -LH     Toileting (which includes using toilet bed pan or urinal) 1  -LH     Putting on and taking off regular upper body clothing 2  -LH     Taking care of personal grooming (such as brushing  teeth) 2  -     Eating meals 3  -     AM-PAC 6 Clicks Score (OT) 10  -       Row Name 01/18/24 1023          Modified [place] Scale    Modified [place] Scale 4 - Moderately severe disability.  Unable to walk without assistance, and unable to attend to own bodily needs without assistance.  -       Row Name 01/18/24 1023          Functional Assessment    Outcome Measure Options AM-PAC 6 Clicks Daily Activity (OT);Modified [place]  -               User Key  (r) = Recorded By, (t) = Taken By, (c) = Cosigned By      Initials Name Provider Type     Nat Mooney OT Occupational Therapist                    Occupational Therapy Education       Title: PT OT SLP Therapies (Done)       Topic: Occupational Therapy (Done)       Point: ADL training (Done)       Description:   Instruct learner(s) on proper safety adaptation and remediation techniques during self care or transfers.   Instruct in proper use of assistive devices.                  Learning Progress Summary             Patient Acceptance, E,TB, VU by  at 1/18/2024 1042    Comment: Instructed in role of OT, discharge planning, home safety, fall prevention, CG education   Family Acceptance, E,TB, VU by  at 1/18/2024 1042    Comment: Instructed in role of OT, discharge planning, home safety, fall prevention, CG education                         Point: Home exercise program (Done)       Description:   Instruct learner(s) on appropriate technique for monitoring, assisting and/or progressing therapeutic exercises/activities.                  Learning Progress Summary             Patient Acceptance, E,TB, VU by  at 1/18/2024 1042    Comment: Instructed in role of OT, discharge planning, home safety, fall prevention, CG education   Family Acceptance, E,TB, VU by  at 1/18/2024 1042    Comment: Instructed in role of OT, discharge planning, home safety, fall prevention, CG education                         Point: Precautions (Done)       Description:   Instruct  learner(s) on prescribed precautions during self-care and functional transfers.                  Learning Progress Summary             Patient Acceptance, E,TB, VU by  at 1/18/2024 1042    Comment: Instructed in role of OT, discharge planning, home safety, fall prevention, CG education   Family Acceptance, E,TB, VU by  at 1/18/2024 1042    Comment: Instructed in role of OT, discharge planning, home safety, fall prevention, CG education                         Point: Body mechanics (Done)       Description:   Instruct learner(s) on proper positioning and spine alignment during self-care, functional mobility activities and/or exercises.                  Learning Progress Summary             Patient Acceptance, E,TB, VU by  at 1/18/2024 1042    Comment: Instructed in role of OT, discharge planning, home safety, fall prevention, CG education   Family Acceptance, E,TB, VU by  at 1/18/2024 1042    Comment: Instructed in role of OT, discharge planning, home safety, fall prevention, CG education                                         User Key       Initials Effective Dates Name Provider Type Discipline     08/31/23 -  Nat Mooney, RONAL Occupational Therapist OT                  OT Recommendation and Plan  Planned Therapy Interventions (OT): activity tolerance training, BADL retraining, functional balance retraining, occupation/activity based interventions, patient/caregiver education/training, strengthening exercise, transfer/mobility retraining  Therapy Frequency (OT): 5 times/wk  Plan of Care Review  Plan of Care Reviewed With: patient, spouse, family  Outcome Evaluation: Patient is an 83 year old male admitted to [facility] with AMS found to have a UTI. Patient lives at home with spouse, with no steps, and requires mod A with self care, mod A with transfers, primarily uses a wc within the home, and wife is primary caregiver. Patient presents today with generalized weakness is A&Ox4. Patient requires max A with bed  mobility, mod A to sit upright unsupported at EOB. Patient able to perform sit to stand transfer from EOB x2, requires mod/max Ax2 with rwx to steady self. Patient able to perform stand pivot transfer to bedside commode with mod Ax2 verbal cues and use of rwx. Patient is dependent with returning to supine, positioning self in bed. Patient will benefit from skilled OT to address generalized deconditioning and current functional deficits with self care. [place] SNF [place] if eligible, vs return home with  and 24 hour care.     Time Calculation:   Evaluation Complexity (OT)  Review Occupational Profile/Medical/Therapy History Complexity: expanded/moderate complexity  Assessment, Occupational Performance/Identification of Deficit Complexity: 3-5 performance deficits  Clinical Decision Making Complexity (OT): detailed assessment/moderate complexity  Overall Complexity of Evaluation (OT): moderate complexity     Time Calculation- OT       Row Name 01/18/24 1042             Time Calculation- OT    OT Start Time 1000  -      OT Stop Time 1030  -      OT Time Calculation (min) 30 min  -      Total Timed Code Minutes- OT 15 minute(s)  -      OT Non-Billable Time (min) 15 min  -      OT Received On 01/18/24  -      OT - Next Appointment 01/19/24  -      OT Goal Re-Cert Due Date 02/01/24  -         Timed Charges    38683 - OT Self Care/Mgmt Minutes 15  -LH         Untimed Charges    OT Eval/Re-eval Minutes 15  -LH         Total Minutes    Timed Charges Total Minutes 15  -LH      Untimed Charges Total Minutes 15  -LH       Total Minutes 30  -LH                User Key  (r) = Recorded By, (t) = Taken By, (c) = Cosigned By      Initials Name Provider Type     Nat Mooney OT Occupational Therapist                  Therapy Charges for Today       Code Description Service Date Service Provider Modifiers Qty    [id number]  OT SELF CARE/MGMT/TRAIN EA 15 MIN 1/18/2024 Nat Mooney OT GO 1    [id number]  OT  EVAL MOD COMPLEXITY 3 1/18/2024 Nat Mooney, OT GO 1                 Nat Mooney, RONAL  1/18/2024

## 2024-01-19 LAB
ANION GAP SERPL CALCULATED.3IONS-SCNC: 11.9 MMOL/L (ref 5–15)
BACTERIA SPEC AEROBE CULT: ABNORMAL
BUN SERPL-MCNC: 21 MG/DL (ref 8–23)
BUN/CREAT SERPL: 19.6 (ref 7–25)
CALCIUM SPEC-SCNC: 8.3 MG/DL (ref 8.6–10.5)
CHLORIDE SERPL-SCNC: 107 MMOL/L (ref 98–107)
CO2 SERPL-SCNC: 20.1 MMOL/L (ref 22–29)
CREAT SERPL-MCNC: 1.07 MG/DL (ref 0.76–1.27)
DEPRECATED RDW RBC AUTO: 47.8 FL (ref 37–54)
EGFRCR SERPLBLD CKD-EPI 2021: 69.3 ML/MIN/1.73
ERYTHROCYTE [DISTWIDTH] IN BLOOD BY AUTOMATED COUNT: 14.7 % (ref 12.3–15.4)
GLUCOSE BLDC GLUCOMTR-MCNC: 160 MG/DL (ref 70–130)
GLUCOSE SERPL-MCNC: 93 MG/DL (ref 65–99)
HCT VFR BLD AUTO: 37.2 % (ref 37.5–51)
HGB BLD-MCNC: 12.7 G/DL (ref 13–17.7)
MCH RBC QN AUTO: 30.6 PG (ref 26.6–33)
MCHC RBC AUTO-ENTMCNC: 34.1 G/DL (ref 31.5–35.7)
MCV RBC AUTO: 89.6 FL (ref 79–97)
PLATELET # BLD AUTO: 110 10*3/MM3 (ref 140–450)
PMV BLD AUTO: 11.7 FL (ref 6–12)
POTASSIUM SERPL-SCNC: 4 MMOL/L (ref 3.5–5.2)
RBC # BLD AUTO: 4.15 10*6/MM3 (ref 4.14–5.8)
SODIUM SERPL-SCNC: 139 MMOL/L (ref 136–145)
WBC NRBC COR # BLD AUTO: 9.69 10*3/MM3 (ref 3.4–10.8)

## 2024-01-19 PROCEDURE — 97530 THERAPEUTIC ACTIVITIES: CPT

## 2024-01-19 PROCEDURE — 97110 THERAPEUTIC EXERCISES: CPT

## 2024-01-19 PROCEDURE — 97535 SELF CARE MNGMENT TRAINING: CPT

## 2024-01-19 PROCEDURE — 25010000002 MORPHINE PER 10 MG: Performed by: NURSE PRACTITIONER

## 2024-01-19 PROCEDURE — 85027 COMPLETE CBC AUTOMATED: CPT | Performed by: STUDENT IN AN ORGANIZED HEALTH CARE EDUCATION/TRAINING PROGRAM

## 2024-01-19 PROCEDURE — 25810000003 SODIUM CHLORIDE 0.9 % SOLUTION: Performed by: STUDENT IN AN ORGANIZED HEALTH CARE EDUCATION/TRAINING PROGRAM

## 2024-01-19 PROCEDURE — 25010000002 CEFTRIAXONE PER 250 MG: Performed by: STUDENT IN AN ORGANIZED HEALTH CARE EDUCATION/TRAINING PROGRAM

## 2024-01-19 PROCEDURE — 82948 REAGENT STRIP/BLOOD GLUCOSE: CPT

## 2024-01-19 PROCEDURE — 80048 BASIC METABOLIC PNL TOTAL CA: CPT | Performed by: STUDENT IN AN ORGANIZED HEALTH CARE EDUCATION/TRAINING PROGRAM

## 2024-01-19 RX ORDER — OXYCODONE HYDROCHLORIDE AND ACETAMINOPHEN 5; 325 MG/1; MG/1
1 TABLET ORAL ONCE
Status: COMPLETED | OUTPATIENT
Start: 2024-01-19 | End: 2024-01-19

## 2024-01-19 RX ORDER — ROSUVASTATIN CALCIUM 10 MG/1
10 TABLET, COATED ORAL DAILY
Status: DISCONTINUED | OUTPATIENT
Start: 2024-01-19 | End: 2024-01-20 | Stop reason: HOSPADM

## 2024-01-19 RX ORDER — PREGABALIN 75 MG/1
150 CAPSULE ORAL EVERY 8 HOURS SCHEDULED
Status: DISCONTINUED | OUTPATIENT
Start: 2024-01-19 | End: 2024-01-20 | Stop reason: HOSPADM

## 2024-01-19 RX ORDER — MORPHINE SULFATE 2 MG/ML
2 INJECTION, SOLUTION INTRAMUSCULAR; INTRAVENOUS ONCE
Status: COMPLETED | OUTPATIENT
Start: 2024-01-19 | End: 2024-01-19

## 2024-01-19 RX ADMIN — Medication 10 ML: at 20:55

## 2024-01-19 RX ADMIN — DONEPEZIL HYDROCHLORIDE 5 MG: 5 TABLET, FILM COATED ORAL at 20:54

## 2024-01-19 RX ADMIN — DOCUSATE SODIUM 50MG AND SENNOSIDES 8.6MG 2 TABLET: 8.6; 5 TABLET, FILM COATED ORAL at 08:34

## 2024-01-19 RX ADMIN — APIXABAN 5 MG: 5 TABLET, FILM COATED ORAL at 20:54

## 2024-01-19 RX ADMIN — PREGABALIN 150 MG: 75 CAPSULE ORAL at 15:14

## 2024-01-19 RX ADMIN — APIXABAN 5 MG: 5 TABLET, FILM COATED ORAL at 08:34

## 2024-01-19 RX ADMIN — IPRATROPIUM BROMIDE 1 SPRAY: 42 SPRAY, METERED NASAL at 20:54

## 2024-01-19 RX ADMIN — MORPHINE SULFATE 2 MG: 2 INJECTION, SOLUTION INTRAMUSCULAR; INTRAVENOUS at 01:00

## 2024-01-19 RX ADMIN — NYSTATIN 1 APPLICATION: 100000 CREAM TOPICAL at 20:54

## 2024-01-19 RX ADMIN — CEFTRIAXONE 2000 MG: 2 INJECTION, POWDER, FOR SOLUTION INTRAMUSCULAR; INTRAVENOUS at 15:14

## 2024-01-19 RX ADMIN — ROSUVASTATIN CALCIUM 10 MG: 10 TABLET, FILM COATED ORAL at 12:00

## 2024-01-19 RX ADMIN — TRAZODONE HYDROCHLORIDE 150 MG: 100 TABLET ORAL at 20:54

## 2024-01-19 RX ADMIN — OXYCODONE AND ACETAMINOPHEN 1 TABLET: 5; 325 TABLET ORAL at 09:39

## 2024-01-19 RX ADMIN — Medication 10 ML: at 08:36

## 2024-01-19 RX ADMIN — OXYCODONE AND ACETAMINOPHEN 1 TABLET: 5; 325 TABLET ORAL at 00:08

## 2024-01-19 RX ADMIN — ALLOPURINOL 300 MG: 300 TABLET ORAL at 08:34

## 2024-01-19 RX ADMIN — PREGABALIN 150 MG: 75 CAPSULE ORAL at 20:54

## 2024-01-19 RX ADMIN — OXYCODONE AND ACETAMINOPHEN 1 TABLET: 5; 325 TABLET ORAL at 23:06

## 2024-01-19 RX ADMIN — Medication 5 MG: at 20:54

## 2024-01-19 RX ADMIN — NYSTATIN 1 APPLICATION: 100000 CREAM TOPICAL at 08:36

## 2024-01-19 RX ADMIN — SODIUM CHLORIDE 150 ML/HR: 9 INJECTION, SOLUTION INTRAVENOUS at 05:14

## 2024-01-19 NOTE — PLAN OF CARE
Goal Outcome Evaluation:  Plan of Care Reviewed With: patient        Progress: improving  Outcome Evaluation: Pt VSS. Pt A&ox3-4. IV abx given. Chlorohexadine care completed for suprapubic catheter. PRN pain medication given. Bath given. patient resting in bed.

## 2024-01-19 NOTE — THERAPY TREATMENT NOTE
Patient Name: Jesus Beckham  : 1940    MRN: [id number]                              Today's Date: 2024       Admit Date: 2024    Visit Dx:     ICD-10-CM ICD-9-CM   1. Urinary tract infection associated with indwelling urethral catheter, initial encounter  T83.511A 996.64    N39.0 599.0   2. Cellulitis of left lower extremity  L03.116 682.6   3. Encephalopathy  G93.40 348.30     Patient Active Problem List   Diagnosis    Gout    Diabetic peripheral neuropathy    Dyslipidemia    Essential hypertension    PVC (premature ventricular contraction)    Vitamin D deficiency    Benign non-nodular prostatic hyperplasia without lower urinary tract symptoms    Osteoarthritis    Urge incontinence    Acute gangrenous cholecystitis    Discitis of lumbar region    Paroxysmal atrial fibrillation    History of sepsis    Hyperuricemia    Chronic deep vein thrombosis (DVT) of right peroneal vein    Nausea, vomiting, and diarrhea    Colitis presumed infectious    Urinary tract infection in male    DDD (degenerative disc disease), lumbar    Bilateral leg weakness    History of lumbar spinal fusion    Carpal tunnel syndrome    Adhesive arachnoiditis    Chronic anticoagulation    Diverticulosis    Hematuria    Lower obstructive uropathy    Splenic lesion    Suprapubic catheter dysfunction    Obesity (BMI 30-39.9)    Venous insufficiency of left leg    Memory difficulties    Chronic pain syndrome    Type 2 diabetes mellitus with peripheral neuropathy    Encounter for long-term (current) use of high-risk medication    Therapeutic opioid induced constipation    Abnormal CT of brain    Suprapubic catheter    Chronic bladder pain    Proteinuria    Lumbar radiculopathy    Nonrheumatic aortic valve stenosis    Snoring    Class 3 severe obesity due to excess calories without serious comorbidity with body mass index (BMI) of 40.0 to 44.9 in adult    Non-restorative sleep    Hypersomnia    History of DVT of lower extremity     Status post insertion of spinal cord stimulator    S/P TAVR (transcatheter aortic valve replacement)    Abdominal distension (gaseous)    Back pain    Dorsalgia, unspecified    Benign neoplasm of ascending colon    Benign neoplasm of descending colon    Benign neoplasm of transverse colon    Benign neoplasm of cecum    Colon polyps    Rectal polyp    Disorder of muscle, unspecified    Family history of colonic polyps    Constipation    Fecal incontinence    History of colonic polyps    Personal history of colonic polyps    Cancer    Malignant (primary) neoplasm, unspecified    Other abnormalities of heart beat    Pure hypercholesterolemia    Unspecified hemorrhoids    Atrial fibrillation    Long term (current) use of anticoagulants    Dvrtclos of lg int w/o perforation or abscess w/o bleeding    Encounter for screening for malignant neoplasm of colon    Sleep apnea    Arthritis    Coagulation defect, unspecified    Diabetes mellitus    UTI (urinary tract infection)     Past Medical History:   Diagnosis Date    Acute embolism and thrombosis of deep vein of right distal lower extremity     Acute gangrenous cholecystitis     FEB 2018    Allergic     Aortic valve stenosis     Arthritis     Atrial fibrillation with RVR     HISTORY    Benign prostatic hyperplasia without lower urinary tract symptoms     Cancer of bladder 2016    Colon polyp     Discitis of lumbar region     DVT (deep venous thrombosis)     MARCH 2018    Essential hypertension     Murray catheter in place     LOSS OF SENSATION BLADDER. BECAUSE OF WOUND AT THE TIME    Gout     Heel ulcer     RIGHT. FOLLOWED BY WOUND CARE. GETTING BETTER    History of sepsis     [place] (hard of hearing)     HEARING AIDS BOTH EARS    Hyperlipidemia     Loss, sensation     LOWER EXTREMTIES. RELATED TO LAST BACK SURGERY.    MRSA infection     LEFT LEG.     Nausea, vomiting, and diarrhea 03/01/2019    Open wound     WITH MEDICATED DRESSING LEFT SHIN AREA.(RESOLVED PER PATIENT)     CELINA (obstructive sleep apnea)     NO MACHINE    Osteoarthritis     Paroxysmal atrial fibrillation     PVC (premature ventricular contraction)     Sepsis 02/2018    Sepsis due to Escherichia coli     Skin carcinoma 2012    MELANOMA.2 PLACES BACK AND EAR    Type 2 diabetes mellitus     Vitamin D deficiency     Weakness      Past Surgical History:   Procedure Laterality Date    ABDOMINAL SURGERY      AORTIC VALVE REPAIR/REPLACEMENT N/A 2/28/2023    Procedure: TTE TRANSFEMORAL TRANSCATHETER AORTIC VALVE REPLACEMENT PERCUTANEOUS APPROACH;  Surgeon: Antony Goldstein MD;  Location: [facility];  Service: Cardiothoracic;  Laterality: N/A;    AORTIC VALVE REPAIR/REPLACEMENT N/A 2/28/2023    Procedure: Transfemoral Transcatheter Aortic Valve Replacement with intraoperative TTE and possible open surgical rescue;  Surgeon: Samuel Zelaya MD;  Location: [facility];  Service: Cardiovascular;  Laterality: N/A;    APPENDECTOMY N/A 1961    CARDIAC CATHETERIZATION N/A 01/16/2023    Procedure: Coronary angiography;  Surgeon: Tasha Burr MD;  Location: [facility] CATH INVASIVE LOCATION;  Service: Cardiology;  Laterality: N/A;    CARDIAC CATHETERIZATION N/A 01/16/2023    Procedure: Left Heart Cath;  Surgeon: Tasha Burr MD;  Location: [facility] CATH INVASIVE LOCATION;  Service: Cardiology;  Laterality: N/A;    CARDIAC CATHETERIZATION N/A 01/16/2023    Procedure: Right Heart Cath;  Surgeon: Tasha Burr MD;  Location: [facility] CATH INVASIVE LOCATION;  Service: Cardiology;  Laterality: N/A;    CHOLECYSTECTOMY WITH INTRAOPERATIVE CHOLANGIOGRAM N/A 02/25/2018    Procedure: CHOLECYSTECTOMY LAPAROSCOPIC INTRAOPERATIVE CHOLANGIOGRAM;  Surgeon: Maegan Correa MD;  Location: [facility] OR;  Service:     COLONOSCOPY N/A 2010    h/o of polyps    COLONOSCOPY W/ BIOPSIES AND POLYPECTOMY  2019    EYE SURGERY Bilateral 1959    glass removal from left eye, lens transplant    JOINT REPLACEMENT Right 2005    RIGHT KNEE    LAMINECTOMY N/A  01/18/2016    L2-L3, L3-L4, L4-L5, and L5-S1 bilateral lamincectomy, medial facetectomy & [id number], Dr. Kaiden Valdes    LUMBAR FUSION N/A 03/07/2018    Procedure: LUMBAR FUSION MINIMALLY INVASIVE TRANSFORAMINAL LUMBAR INTERBODY IRRIGATION AND DEBRIDEMENT AND FUSION.  IRRIGATION AND DEBRIDEMENT OF EPIDURAL ABCESS LUMBAR 2-4. BONE MORPHOGENIC PROTEIN, ALPHATEC NOVEL AND ILLICO, EMG AND SSEP NEUROMONITORING.;  Surgeon: Manish Marr DO;  Location: [facility] OR;  Service: Orthopedic Spine    SKIN BIOPSY      BACK AND FACE    SPINAL CORD STIMULATOR IMPLANT N/A 07/01/2022    Procedure: SPINAL CORD STIMULATOR INSERTION PHASE 1 ([place]) 4 DAY TRIAL ONLY DUE TO ELIQUIS HOLD.;  Surgeon: Cody Holguin MD;  Location: [facility] OR;  Service: Pain Management;  Laterality: N/A;    SPINAL CORD STIMULATOR IMPLANT N/A 11/17/2022    Procedure: Thoracic eleven laminotomy for placement of a surgical spinal cord stimulator lead to thoracic nine;  Surgeon: Charlie Bailon MD;  Location: [facility];  Service: Neurosurgery;  Laterality: N/A;    SPINAL CORD STIMULATOR IMPLANT N/A 11/18/2022    Procedure: Placement of a left gluteal internal pulse generator;  Surgeon: Charlie Bailon MD;  Location: [facility];  Service: Neurosurgery;  Laterality: N/A;    TOTAL KNEE ARTHROPLASTY Right 03/07/2005    Dr. Washington Evans    VENA CAVA FILTER INSERTION Right 03/06/2018    Procedure: VENA CAVA FILTER INSERTION;  Surgeon: Alexis Thakkar MD;  Location: [facility] 18/19;  Service:     VENA CAVA FILTER REMOVAL N/A 12/03/2018    Procedure: VENA CAVA FILTER REMOVAL WITH VENOCAVAGRAM;  Surgeon: Alexis Thakkar MD;  Location: [facility] 18/19;  Service: Vascular      General Information       Row Name 01/19/24 1433          OT Time and Intention    Document Type therapy note (daily note)  -     Mode of Treatment occupational therapy;physical therapy;co-treatment  -       Row Name 01/19/24 1430           General Information    Patient Profile Reviewed yes  -     Prior Level of Function min assist:;mod assist:  -     Existing Precautions/Restrictions fall  -     Barriers to Rehab medically complex;previous functional deficit  -       Row Name 01/19/24 1433          Living Environment    People in Home spouse  -       Row Name 01/19/24 1433          Home Main Entrance    Number of Stairs, Main Entrance none  -     Stair Railings, Main Entrance none  -       Row Name 01/19/24 1433          Cognition    Orientation Status (Cognition) oriented x 3  -       Row Name 01/19/24 1433          Safety Issues, Functional Mobility    Safety Issues Affecting Function (Mobility) safety precautions follow-through/compliance;problem-solving;positioning of assistive device;impulsivity  -     Impairments Affecting Function (Mobility) endurance/activity tolerance;pain;strength  -     Comment, Safety Issues/Impairments (Mobility) PT/OT cotreatment medically appropriate and necessary due to patient acuity level, to maximize therapeutic benefit due to impaired act tolerance, and for safety of patient and staff. Treatment focused on progression of care and goals established in POC.  -               User Key  (r) = Recorded By, (t) = Taken By, (c) = Cosigned By      Initials Name Provider Type     Nat Mooney OT Occupational Therapist                     Mobility/ADL's       Row Name 01/19/24 1433          Bed Mobility    Bed Mobility bed mobility (all) activities  -     All Activities, [place] (Bed Mobility) moderate assist (50% patient effort);2 person assist;maximum assist (25% patient effort)  -     Rolling Left [place] (Bed Mobility) 2 person assist;moderate assist (50% patient effort)  -     Rolling Right [place] (Bed Mobility) 2 person assist;moderate assist (50% patient effort)  -     Bed Mobility, Safety Issues impaired trunk control for bed mobility  -     Assistive Device (Bed  Mobility) draw sheet  -[facility] Name 01/19/24 1433          Transfers    Transfers sit-stand transfer  -[facility] Name 01/19/24 1433          Sit-Stand Transfer    Sit-Stand Walker (Transfers) moderate assist (50% patient effort);maximum assist (25% patient effort);2 person assist;nonverbal cues (demo/gesture);verbal cues  -     Assistive Device (Sit-Stand Transfers) walker, front-wheeled  -[facility] Name 01/19/24 1433          Functional Mobility    Patient was able to Ambulate no, other medical factors prevent ambulation  -     Reason Patient was unable to Ambulate Excessive Weakness;Uncontrolled Pain  -[facility] Name 01/19/24 1433          Activities of Daily Living    BADL Assessment/Intervention toileting  -LH       Row Name 01/19/24 1433          Toileting Assessment/Training    Walker Level (Toileting) toileting skills;perform perineal hygiene;maximum assist (25% patient effort)  -     Position (Toileting) supported sitting;supported standing  -               User Key  (r) = Recorded By, (t) = Taken By, (c) = Cosigned By      Initials Name Provider Type     Nat Mooney OT Occupational Therapist                   Obj/Interventions       [facility] Name 01/19/24 1435          Sensory Assessment (Somatosensory)    Sensory Assessment Patient reports BLE neuropathy  -[facility] Name 01/19/24 1435          Vision Assessment/Intervention    Visual Impairment/Limitations WFL  -[facility] Name 01/19/24 1435          Range of Motion Comprehensive    General Range of Motion bilateral upper extremity ROM WNL  -[facility] Name 01/19/24 1435          Strength Comprehensive (MMT)    General Manual Muscle Testing (MMT) Assessment upper extremity strength deficits identified  -     Comment, General Manual Muscle Testing (MMT) Assessment generalized weakness  -[facility] Name 01/19/24 1435          Motor Skills    Motor Skills functional endurance  -     Functional Endurance Poor  -        Row Name 01/19/24 1435          Balance    Balance Assessment sitting static balance;sitting dynamic balance;sit to stand dynamic balance;standing static balance  -     Static Sitting Balance minimal assist;contact guard  -     Dynamic Sitting Balance contact guard;minimal assist  [facility]     Position, Sitting Balance unsupported;sitting edge of bed  -     Sit to Stand Dynamic Balance maximum assist;moderate assist;2-person assist  -     Static Standing Balance moderate assist;maximum assist;2-person assist  -     Position/Device Used, Standing Balance supported;walker, front-wheeled  -     Balance Interventions sitting;standing;occupation based/functional task  -               User Key  (r) = Recorded By, (t) = Taken By, (c) = Cosigned By      Initials Name Provider Type     Nat Mooney OT Occupational Therapist                   Goals/Plan    No documentation.                  Clinical Impression       Row Name 01/19/24 1436          Pain Assessment    Pre/Posttreatment Pain Comment Reports severe pain from waist down  -     Pain Intervention(s) Repositioned  -       Row Name 01/19/24 1436          Plan of Care Review    Plan of Care Reviewed With patient;spouse  -     Outcome Evaluation Patient seen for OT/PT cotreatment today. Patient appears a bit anxious, became diaphoretic during session following standing, and reports light headedness while seated at EOB. Patient required max A x2 to come to sitting at EOB from supine. Patient performed sit to stand x2 requiring mod/max Ax2 with verbal cues for weight shifting. Patient able to slide feet towards head of bed with max instruction from PT, and mod Ax2. Patient had bowel movement in bed, realized upon standing and is dependent with toileting, and LB dressing today. Nursing and nurses aid notified of bowel movement. Patient requires maxAx2 to return to supine, and left with nursing staff. Anticipate need for SNF at [facility].  -        Row Name 01/19/24 1436          Therapy Assessment/Plan (OT)    Rehab Potential (OT) good, to achieve stated therapy goals  -     Criteria for Skilled Therapeutic Interventions Met (OT) yes;meets criteria;skilled treatment is necessary  -     Therapy Frequency (OT) 5 times/wk  -       Row Name 01/19/24 1436          Therapy Plan Review/Discharge Plan (OT)    Anticipated Discharge Disposition (OT) skilled nursing facility  -       Row Name 01/19/24 1436          Positioning and Restraints    Pre-Treatment Position in bed  -     Post Treatment Position bed  -     In Bed with nsg;call light within reach;encouraged to call for assist;supine  -               User Key  (r) = Recorded By, (t) = Taken By, (c) = Cosigned By      Initials Name Provider Type     Nat Mooney, RONAL Occupational Therapist                   Outcome Measures       Row Name 01/19/24 1439          How much help from another is currently needed...    Putting on and taking off regular lower body clothing? 1  -LH     Bathing (including washing, rinsing, and drying) 1  -LH     Toileting (which includes using toilet bed pan or urinal) 1  -LH     Putting on and taking off regular upper body clothing 2  -LH     Taking care of personal grooming (such as brushing teeth) 2  -LH     Eating meals 3  -LH     AM-PAC 6 Clicks Score (OT) 10  -       Row Name 01/19/24 0800          How much help from another person do you currently need...    Turning from your back to your side while in flat bed without using bedrails? 2  -TL     Moving from lying on back to sitting on the side of a flat bed without bedrails? 1  -TL     Moving to and from a bed to a chair (including a wheelchair)? 1  -TL     Standing up from a chair using your arms (e.g., wheelchair, bedside chair)? 1  -TL     Climbing 3-5 steps with a railing? 1  -TL     To walk in hospital room? 1  -TL     AM-PAC 6 Clicks Score (PT) 7  -TL     Highest Level of Mobility Goal 2 --> Bed  activities/dependent transfer  -       Row Name 01/19/24 1439          Modified Rufus Scale    Modified McLeod Scale 4 - Moderately severe disability.  Unable to walk without assistance, and unable to attend to own bodily needs without assistance.  -       Row Name 01/19/24 1439          Functional Assessment    Outcome Measure Options AM-PAC 6 Clicks Daily Activity (OT);Modified McLeod  -               User Key  (r) = Recorded By, (t) = Taken By, (c) = Cosigned By      Initials Name Provider Type     Alexis Mcfadden RN Registered Nurse     Nat Mooney OT Occupational Therapist                    Occupational Therapy Education       Title: PT OT SLP Therapies (In Progress)       Topic: Occupational Therapy (Done)       Point: ADL training (Done)       Description:   Instruct learner(s) on proper safety adaptation and remediation techniques during self care or transfers.   Instruct in proper use of assistive devices.                  Learning Progress Summary             Patient Acceptance, E,TB, VU by  at 1/18/2024 1042    Comment: Instructed in role of OT, discharge planning, home safety, fall prevention, CG education   Family Acceptance, E,TB, VU by  at 1/18/2024 1042    Comment: Instructed in role of OT, discharge planning, home safety, fall prevention, CG education                         Point: Home exercise program (Done)       Description:   Instruct learner(s) on appropriate technique for monitoring, assisting and/or progressing therapeutic exercises/activities.                  Learning Progress Summary             Patient Acceptance, E,TB, VU by  at 1/18/2024 1042    Comment: Instructed in role of OT, discharge planning, home safety, fall prevention, CG education   Family Acceptance, E,TB, VU by  at 1/18/2024 1042    Comment: Instructed in role of OT, discharge planning, home safety, fall prevention, CG education                         Point: Precautions (Done)       Description:    Instruct learner(s) on prescribed precautions during self-care and functional transfers.                  Learning Progress Summary             Patient Acceptance, E,TB, VU by  at 1/18/2024 1042    Comment: Instructed in role of OT, discharge planning, home safety, fall prevention, CG education   Family Acceptance, E,TB, VU by  at 1/18/2024 1042    Comment: Instructed in role of OT, discharge planning, home safety, fall prevention, CG education                         Point: Body mechanics (Done)       Description:   Instruct learner(s) on proper positioning and spine alignment during self-care, functional mobility activities and/or exercises.                  Learning Progress Summary             Patient Acceptance, E,TB, VU by  at 1/18/2024 1042    Comment: Instructed in role of OT, discharge planning, home safety, fall prevention, CG education   Family Acceptance, E,TB, VU by  at 1/18/2024 1042    Comment: Instructed in role of OT, discharge planning, home safety, fall prevention, CG education                                         User Key       Initials Effective Dates Name Provider Type Discipline     08/31/23 -  Nat Mooney OT Occupational Therapist OT                  OT Recommendation and Plan  Planned Therapy Interventions (OT): activity tolerance training, BADL retraining, functional balance retraining, occupation/activity based interventions, patient/caregiver education/training, strengthening exercise, transfer/mobility retraining  Therapy Frequency (OT): 5 times/wk  Plan of Care Review  Plan of Care Reviewed With: patient, spouse  Outcome Evaluation: Patient seen for OT/PT cotreatment today. Patient appears a bit anxious, became diaphoretic during session following standing, and reports light headedness while seated at EOB. Patient required max A x2 to come to sitting at EOB from supine. Patient performed sit to stand x2 requiring mod/max Ax2 with verbal cues for weight shifting. Patient  able to slide feet towards head of bed with max instruction from PT, and mod Ax2. Patient had bowel movement in bed, realized upon standing and is dependent with toileting, and LB dressing today. Nursing and nurses aid notified of bowel movement. Patient requires maxAx2 to return to supine, and left with nursing staff. Anticipate need for SNF at [facility].     Time Calculation:   Evaluation Complexity (OT)  Review Occupational Profile/Medical/Therapy History Complexity: expanded/moderate complexity  Assessment, Occupational Performance/Identification of Deficit Complexity: 3-5 performance deficits  Clinical Decision Making Complexity (OT): detailed assessment/moderate complexity  Overall Complexity of Evaluation (OT): moderate complexity     Time Calculation- OT       Row Name 01/19/24 1439             Time Calculation- OT    OT Start Time 1245  -      OT Stop Time 1325  -      OT Time Calculation (min) 40 min  -      Total Timed Code Minutes- OT 40 minute(s)  -      OT Received On 01/19/24  -      OT - Next Appointment 01/22/24  -         Timed Charges    39221 - OT Therapeutic Activity Minutes 20  -LH      28221 - OT Self Care/Mgmt Minutes 20  -         Total Minutes    Timed Charges Total Minutes 40  -       Total Minutes 40  -LH                User Key  (r) = Recorded By, (t) = Taken By, (c) = Cosigned By      Initials Name Provider Type     Nat Mooney OT Occupational Therapist                  Therapy Charges for Today       Code Description Service Date Service Provider Modifiers Qty    19892016310 HC OT SELF CARE/MGMT/TRAIN EA 15 MIN 1/18/2024 Nat Mooney, OT GO 1    52817818696 HC OT EVAL MOD COMPLEXITY 3 1/18/2024 Nat Mooney OT GO 1    83835390098 HC OT THERAPEUTIC ACT EA 15 MIN 1/19/2024 Nat Mooney, OT GO 2    76951639757 HC OT SELF CARE/MGMT/TRAIN EA 15 MIN 1/19/2024 Nat Mooney OT GO 1                 Nat Mooney OT  1/19/2024

## 2024-01-19 NOTE — CASE MANAGEMENT/SOCIAL WORK
Continued Stay Note  [facility]     Patient Name: Jesus Beckham  MRN: [id number]  Today's Date: 1/19/2024    Admit Date: 1/17/2024    Plan: TBD - home with spouse and [facility] vs SNF   Discharge Plan       Row Name 01/19/24 1149       Plan    Plan TBD - home with spouse and [facility] vs SNF    Plan Comments S/w pt and his spouse Mya at bedside.  Discussed PT notes from yesterday.  CCP discussed DC options including home health vs SNF pending precert.   Mya states PT is scheduled to come back today.  If pt is still weaker than baseline, he might need SNF.  He has been to [facility] in the past and would like to go there again if needed.  Referral sent to [facility] pending.  [facility] and is following. ...........Adrienne BORJA/ MICHEAL                   Discharge Codes    No documentation.                 Expected Discharge Date and Time       Expected Discharge Date Expected Discharge Time    Jan 20, 2024               Adrienne Win RN

## 2024-01-19 NOTE — PLAN OF CARE
Goal Outcome Evaluation:  Plan of Care Reviewed With: patient, spouse        Progress: improving  Outcome Evaluation: Pt agreed to PT/OT session, pt [place] supine to sit w/max/mod 2, stood x2 w/rwx, able to take 2 steps toward HOB, required lots of encouragement due to incr fatigue and lack of motivation, he has issues with anxiety, but if given precise directions and rest breaks he does much better, educ on PLB technique, nsg came to assist at end of session as pt had BM during standing and req immediate care and clean-up, pt felt good about todays session, will need SNU at DC at current level of assist      Anticipated Discharge Disposition (PT): skilled nursing facility

## 2024-01-19 NOTE — PLAN OF CARE
Goal Outcome Evaluation:  Plan of Care Reviewed With: patient, spouse           Outcome Evaluation: Patient seen for OT/PT cotreatment today. Patient appears a bit anxious, became diaphoretic during session following standing, and reports light headedness while seated at EOB. Patient required max A x2 to come to sitting at EOB from supine. Patient performed sit to stand x2 requiring mod/max Ax2 with verbal cues for weight shifting. Patient able to slide feet towards head of bed with max instruction from PT, and mod Ax2. Patient had bowel movement in bed, realized upon standing and is dependent with toileting, and LB dressing today. Nursing and nurses aid notified of bowel movement. Patient requires maxAx2 to return to supine, and left with nursing staff. Anticipate need for SNF at acute OR.      Anticipated Discharge Disposition (OT): skilled nursing facility

## 2024-01-19 NOTE — PLAN OF CARE
Pt. disoriented to place and situation. Forgetful. Family at bedside. Complained of pain to back and left foot. Called Izabel DELGADO two times about back and foot pain. Pain medicine given. See MAR. Oxygen applied at 2 L by NC. IVF is at 150 ml/hr. Pt. tolerated well to suprapubic catheter. Call light within reach.    Goal Outcome Evaluation:  Plan of Care Reviewed With: patient, family  Progress: no change     Problem: Adult Inpatient Plan of Care  Goal: Absence of Hospital-Acquired Illness or Injury  Intervention: Prevent Skin Injury  Recent Flowsheet Documentation  Taken 1/19/2024 0411 by Jesus Alberto Mckeon RN  Body Position: supine, legs elevated  Taken 1/19/2024 0204 by Jesus Alberto Mckeon RN  Body Position:   right   supine, legs elevated  Taken 1/19/2024 0008 by Jesus Alberto Mckeon RN  Body Position:   right   side-lying  Skin Protection: adhesive use limited  Taken 1/18/2024 2218 by Jesus Alberto Mckeon RN  Body Position: supine, legs elevated  Taken 1/18/2024 2046 by Jesus Alberto Mckeon RN  Body Position:   right   side-lying  Skin Protection: adhesive use limited     Problem: Adult Inpatient Plan of Care  Goal: Absence of Hospital-Acquired Illness or Injury  Intervention: Prevent and Manage VTE (Venous Thromboembolism) Risk  Recent Flowsheet Documentation  Taken 1/19/2024 0411 by Jesus Alberto Mckoen RN  Activity Management: bedrest  Taken 1/19/2024 0204 by Jesus Alberto Mckeon RN  Activity Management: bedrest  Taken 1/19/2024 0008 by Jesus Alberto Mckeon RN  Activity Management: bedrest  VTE Prevention/Management: (on Eliquis) other (see comments)  Range of Motion: active ROM (range of motion) encouraged  Taken 1/18/2024 2218 by Jesus Alberto Mckeon RN  Activity Management: bedrest  Taken 1/18/2024 2046 by Jesus Alberto Mckeon RN  Activity Management: bedrest  VTE Prevention/Management: (On Eliquis) other (see comments)  Range of Motion: active ROM (range of motion) encouraged     Problem: Adult Inpatient Plan of Care  Goal: Optimal Comfort and Wellbeing  Outcome: Ongoing,  Progressing  Intervention: Monitor Pain and Promote Comfort  Recent Flowsheet Documentation  Taken 1/19/2024 0100 by Jesus Alberto Mckeon RN  Pain Management Interventions:   see MAR   relaxation techniques promoted   quiet environment facilitated   position adjusted   pillow support provided  Taken 1/19/2024 0008 by Jesus Alberto Mckeon RN  Pain Management Interventions:   see MAR   relaxation techniques promoted   quiet environment facilitated   position adjusted   pillow support provided  Taken 1/18/2024 2046 by Jesus Alberto Mckeon RN  Pain Management Interventions:   relaxation techniques promoted   quiet environment facilitated   position adjusted   pillow support provided  Intervention: Provide Person-Centered Care  Recent Flowsheet Documentation  Taken 1/19/2024 0008 by Jesus Alberto Mckeon RN  Trust Relationship/Rapport:   care explained   choices provided   emotional support provided   empathic listening provided   questions answered   questions encouraged   thoughts/feelings acknowledged  Taken 1/18/2024 2046 by Jesus Alberto Mckeon RN  Trust Relationship/Rapport:   care explained   choices provided   emotional support provided   empathic listening provided   questions answered   questions encouraged   thoughts/feelings acknowledged     Problem: Adult Inpatient Plan of Care  Goal: Plan of Care Review  Outcome: Ongoing, Progressing  Flowsheets (Taken 1/19/2024 0455)  Progress: no change  Plan of Care Reviewed With:   patient   family

## 2024-01-19 NOTE — PROGRESS NOTES
" LOS: 1 day     Name: Jesus Beckham  Age: 83 y.o.  Sex: male  :  1940  MRN: [id number]         Primary Care Physician: Magdy Ricardo MD    Subjective   Subjective  Main complaint is pain in his legs due to chronic neuropathy.  No other acute overnight events.  Asking when he can go home.    Objective   Vital Signs  Temp:  [97.5 °F (36.4 °C)-99.7 °F (37.6 °C)] 97.5 °F (36.4 °C)  Heart Rate:  [] 93  Resp:  [18] 18  BP: (123-162)/(49-92) 162/81  Body mass index is 39.37 kg/m².    Objective:  General Appearance:  Comfortable and in no acute distress (Chronically ill-appearing).    Vital signs: (most recent): Blood pressure 162/81, pulse 93, temperature 97.5 °F (36.4 °C), temperature source Oral, resp. rate 18, height 178 cm (70.08\"), weight 125 kg (275 lb), SpO2 96%.    Lungs:  Normal effort and normal respiratory rate.  He is not in respiratory distress.  There are decreased breath sounds.    Heart: Normal rate.  Regular rhythm.    Abdomen: Abdomen is soft.  Bowel sounds are normal.   There is no abdominal tenderness.     Extremities: There is dependent edema.  There is no local swelling.    Neurological: Patient is alert and oriented to person, place and time.    Skin:  Warm and dry.                Results Review:       I reviewed the patient's new clinical results.    Results from last 7 days   Lab Units 24  0445 24  0436 24  1340   WBC 10*3/mm3 9.69 13.59* 18.49*   HEMOGLOBIN g/dL 12.7* 13.1 15.1   PLATELETS 10*3/mm3 110* 119* 135*     Results from last 7 days   Lab Units 24  0445 24  0436 24  1340   SODIUM mmol/L 139 136 136   POTASSIUM mmol/L 4.0 4.1 4.8   CHLORIDE mmol/L 107 101 94*   CO2 mmol/L 20.1* 23.5 27.6   BUN mg/dL 21 21 19   CREATININE mg/dL 1.07 1.35* 1.28*   CALCIUM mg/dL 8.3* 8.6 9.7   GLUCOSE mg/dL 93 92 159*                 Scheduled Meds:   allopurinol, 300 mg, Oral, Daily  apixaban, 5 mg, Oral, BID  cefTRIAXone, 2,000 mg, Intravenous, " Q24H  donepezil, 5 mg, Oral, Nightly  ipratropium, 1 spray, Each Nare, TID  melatonin, 5 mg, Oral, Nightly  nystatin, 1 Application, Topical, BID  senna-docusate sodium, 2 tablet, Oral, BID  sodium chloride, 10 mL, Intravenous, Q12H      PRN Meds:     senna-docusate sodium **AND** polyethylene glycol **AND** bisacodyl **AND** bisacodyl    nitroglycerin    oxyCODONE-acetaminophen    sodium chloride    sodium chloride  Continuous Infusions:       Assessment & Plan   Active Hospital Problems    Diagnosis  POA    **UTI (urinary tract infection) [N39.0]  Yes    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       Assessment & Plan    83 y.o. male admitted with UTI (urinary tract infection).     Sepsis urinary tract infection  Suprapubic catheter  Temperature 101, lactic acid 2.6, white blood cell count elevation 18,000  Sepsis parameters have improved.  Hemodynamically stable.  Stop IV fluids.  Continue Rocephin.  Await 48-hour blood culture results     Atrial fibrillation  History of DVT  Atrial stenosis status post TAVR  Frequent PVCs  Rates are controlled.  On home dose of Eliquis  .  Lumbar discitis status post I&D with residual neurogenic bladder requiring suprapubic catheter  -Urology consulted.  Suprapubic catheter exchanged.     Hypertension  Holding off on antihypertensives right now due to softer blood pressures as well as sepsis.  Will for stop the IV fluids and observe.     Neuropathic pain  -Restart home dose of Lyrica    Eliquis (home med) for DVT prophylaxis.  Full  code.  Discussed with patient and family.  Anticipate discharge home in 1-2 days.      Expected Discharge Date: 1/20/2024; Expected Discharge Time:      Jesus Tanner MD  [facility]  01/19/24  10:27 EST

## 2024-01-19 NOTE — THERAPY TREATMENT NOTE
Patient Name: Jesus Beckham  : 1940    MRN: [id number]                              Today's Date: 2024       Admit Date: 2024    Visit Dx:     ICD-10-CM ICD-9-CM   1. Urinary tract infection associated with indwelling urethral catheter, initial encounter  T83.511A 996.64    N39.0 599.0   2. Cellulitis of left lower extremity  L03.116 682.6   3. Encephalopathy  G93.40 348.30     Patient Active Problem List   Diagnosis    Gout    Diabetic peripheral neuropathy    Dyslipidemia    Essential hypertension    PVC (premature ventricular contraction)    Vitamin D deficiency    Benign non-nodular prostatic hyperplasia without lower urinary tract symptoms    Osteoarthritis    Urge incontinence    Acute gangrenous cholecystitis    Discitis of lumbar region    Paroxysmal atrial fibrillation    History of sepsis    Hyperuricemia    Chronic deep vein thrombosis (DVT) of right peroneal vein    Nausea, vomiting, and diarrhea    Colitis presumed infectious    Urinary tract infection in male    DDD (degenerative disc disease), lumbar    Bilateral leg weakness    History of lumbar spinal fusion    Carpal tunnel syndrome    Adhesive arachnoiditis    Chronic anticoagulation    Diverticulosis    Hematuria    Lower obstructive uropathy    Splenic lesion    Suprapubic catheter dysfunction    Obesity (BMI 30-39.9)    Venous insufficiency of left leg    Memory difficulties    Chronic pain syndrome    Type 2 diabetes mellitus with peripheral neuropathy    Encounter for long-term (current) use of high-risk medication    Therapeutic opioid induced constipation    Abnormal CT of brain    Suprapubic catheter    Chronic bladder pain    Proteinuria    Lumbar radiculopathy    Nonrheumatic aortic valve stenosis    Snoring    Class 3 severe obesity due to excess calories without serious comorbidity with body mass index (BMI) of 40.0 to 44.9 in adult    Non-restorative sleep    Hypersomnia    History of DVT of lower extremity     Status post insertion of spinal cord stimulator    S/P TAVR (transcatheter aortic valve replacement)    Abdominal distension (gaseous)    Back pain    Dorsalgia, unspecified    Benign neoplasm of ascending colon    Benign neoplasm of descending colon    Benign neoplasm of transverse colon    Benign neoplasm of cecum    Colon polyps    Rectal polyp    Disorder of muscle, unspecified    Family history of colonic polyps    Constipation    Fecal incontinence    History of colonic polyps    Personal history of colonic polyps    Cancer    Malignant (primary) neoplasm, unspecified    Other abnormalities of heart beat    Pure hypercholesterolemia    Unspecified hemorrhoids    Atrial fibrillation    Long term (current) use of anticoagulants    Dvrtclos of lg int w/o perforation or abscess w/o bleeding    Encounter for screening for malignant neoplasm of colon    Sleep apnea    Arthritis    Coagulation defect, unspecified    Diabetes mellitus    UTI (urinary tract infection)     Past Medical History:   Diagnosis Date    Acute embolism and thrombosis of deep vein of right distal lower extremity     Acute gangrenous cholecystitis     FEB 2018    Allergic     Aortic valve stenosis     Arthritis     Atrial fibrillation with RVR     HISTORY    Benign prostatic hyperplasia without lower urinary tract symptoms     Cancer of bladder 2016    Colon polyp     Discitis of lumbar region     DVT (deep venous thrombosis)     MARCH 2018    Essential hypertension     Murray catheter in place     LOSS OF SENSATION BLADDER. BECAUSE OF WOUND AT THE TIME    Gout     Heel ulcer     RIGHT. FOLLOWED BY WOUND CARE. GETTING BETTER    History of sepsis     [place] (hard of hearing)     HEARING AIDS BOTH EARS    Hyperlipidemia     Loss, sensation     LOWER EXTREMTIES. RELATED TO LAST BACK SURGERY.    MRSA infection     LEFT LEG.     Nausea, vomiting, and diarrhea 03/01/2019    Open wound     WITH MEDICATED DRESSING LEFT SHIN AREA.(RESOLVED PER PATIENT)     CELINA (obstructive sleep apnea)     NO MACHINE    Osteoarthritis     Paroxysmal atrial fibrillation     PVC (premature ventricular contraction)     Sepsis 02/2018    Sepsis due to Escherichia coli     Skin carcinoma 2012    MELANOMA.2 PLACES BACK AND EAR    Type 2 diabetes mellitus     Vitamin D deficiency     Weakness      Past Surgical History:   Procedure Laterality Date    ABDOMINAL SURGERY      AORTIC VALVE REPAIR/REPLACEMENT N/A 2/28/2023    Procedure: TTE TRANSFEMORAL TRANSCATHETER AORTIC VALVE REPLACEMENT PERCUTANEOUS APPROACH;  Surgeon: Antony Goldstein MD;  Location: [facility];  Service: Cardiothoracic;  Laterality: N/A;    AORTIC VALVE REPAIR/REPLACEMENT N/A 2/28/2023    Procedure: Transfemoral Transcatheter Aortic Valve Replacement with intraoperative TTE and possible open surgical rescue;  Surgeon: Samuel Zelaya MD;  Location: [facility];  Service: Cardiovascular;  Laterality: N/A;    APPENDECTOMY N/A 1961    CARDIAC CATHETERIZATION N/A 01/16/2023    Procedure: Coronary angiography;  Surgeon: Tasha Burr MD;  Location: [facility] CATH INVASIVE LOCATION;  Service: Cardiology;  Laterality: N/A;    CARDIAC CATHETERIZATION N/A 01/16/2023    Procedure: Left Heart Cath;  Surgeon: Tasha Burr MD;  [facility]ASIVE LOC[facility];  Service: Cardiology;  Laterality: N/A;    CARDIAC CATHETERIZATION N/A 01/16/2023    Procedure: Right Heart Cath;  Surgeon: Tasha Burr MD;  [facility];  Service: Cardiology;  Laterality: N/A;    CHOLECYSTECTOMY WITH INTRAOPERATIVE CHOLANGIOGRAM N/A 02/25/2018    Procedure: CHOLECYSTECTOMY LAPAROSCOPIC INTRAOPERATIVE CHOLANGIOGRAM;  Surgeon: Maegan Correa MD;  Location: [facility] OR;  Service:     COLONOSCOPY N/A 2010    h/o of polyps    COLONOSCOPY W/ BIOPSIES AND POLYPECTOMY  2019    EYE SURGERY Bilateral 1959    glass removal from left eye, lens transplant    JOINT REPLACEMENT Right 2005    RIGHT KNEE    LAMINECTOMY N/A  01/18/2016    L2-L3, L3-L4, L4-L5, and L5-S1 bilateral lamincectomy, medial facetectomy & [id number], Dr. Kaiden Valdes    LUMBAR FUSION N/A 03/07/2018    Procedure: LUMBAR FUSION MINIMALLY INVASIVE TRANSFORAMINAL LUMBAR INTERBODY IRRIGATION AND DEBRIDEMENT AND FUSION.  IRRIGATION AND DEBRIDEMENT OF EPIDURAL ABCESS LUMBAR 2-4. BONE MORPHOGENIC PROTEIN, ALPHATEC NOVEL AND ILLICO, EMG AND SSEP NEUROMONITORING.;  Surgeon: Manish Marr DO;  Location: [facility];  Service: Orthopedic Spine    SKIN BIOPSY      BACK AND FACE    SPINAL CORD STIMULATOR IMPLANT N/A 07/01/2022    Procedure: SPINAL CORD STIMULATOR INSERTION PHASE 1 ([place]) 4 DAY TRIAL ONLY DUE TO ELIQUIS HOLD.;  Surgeon: Cody Holguin MD;  Location: [facility] OR;  Service: Pain Management;  Laterality: N/A;    SPINAL CORD STIMULATOR IMPLANT N/A 11/17/2022    Procedure: Thoracic eleven laminotomy for placement of a surgical spinal cord stimulator lead to thoracic nine;  Surgeon: Charlie Bailon MD;  Location: [facility];  Service: Neurosurgery;  Laterality: N/A;    SPINAL CORD STIMULATOR IMPLANT N/A 11/18/2022    Procedure: Placement of a left gluteal internal pulse generator;  Surgeon: Charlie Bailon MD;  Location: [facility];  Service: Neurosurgery;  Laterality: N/A;    TOTAL KNEE ARTHROPLASTY Right 03/07/2005    Dr. Washington Evans    VENA CAVA FILTER INSERTION Right 03/06/2018    Procedure: VENA CAVA FILTER INSERTION;  Surgeon: Alexis Thakkar MD;  Location: [facility] 18/19;  Service:     VENA CAVA FILTER REMOVAL N/A 12/03/2018    Procedure: VENA CAVA FILTER REMOVAL WITH VENOCAVAGRAM;  Surgeon: Alexis Thakkar MD;  Location: [facility] 18/19;  Service: Vascular      General Information       Row Name 01/19/24 1542          Physical Therapy Time and Intention    Document Type therapy note (daily note)  -JM     Mode of Treatment occupational therapy;physical therapy;co-treatment  -       Row Name  01/19/24 1543          General Information    Patient Profile Reviewed yes  -     Existing Precautions/Restrictions fall  -       Row Name 01/19/24 1543          Cognition    Orientation Status (Cognition) oriented x 3  -       Row Name 01/19/24 1543          Safety Issues, Functional Mobility    Impairments Affecting Function (Mobility) endurance/activity tolerance;pain;strength  -     Comment, Safety Issues/Impairments (Mobility) high anxiety, needed multiple rest breaks to tolerate  -               User Key  (r) = Recorded By, (t) = Taken By, (c) = Cosigned By      Initials Name Provider Type    Izabel Vincent PTA Physical Therapist Assistant                   Mobility       Row Name 01/19/24 1544          Bed Mobility    Bed Mobility supine-sit;sit-supine  -     Supine-Sit [place] (Bed Mobility) moderate assist (50% patient effort);maximum assist (25% patient effort);2 person assist;verbal cues;nonverbal cues (demo/gesture);1 person to manage equipment  3rd person to manage equipment and set up bed  -     Assistive Device (Bed Mobility) draw sheet;bed rails  -     Comment, (Bed Mobility) pt in better spirits today, but did have anxiety during session-dakota w/rest breaks throughout  -       Row Name 01/19/24 1544          Sit-Stand Transfer    Sit-Stand [place] (Transfers) 2 person assist;moderate assist (50% patient effort);maximum assist (25% patient effort);verbal cues;nonverbal cues (demo/gesture)  -     Assistive Device (Sit-Stand Transfers) walker, front-wheeled  -     Comment, (Sit-Stand Transfer) cues for safe hand placement and to lock in knees for safety, very shaky upon 1st attempt, improved w/2nd trial, req seated rest in between  -       Row Name 01/19/24 1544          Gait/Stairs (Locomotion)    [place] Level (Gait) 2 person assist;moderate assist (50% patient effort);maximum assist (25% patient effort);verbal cues;nonverbal cues (demo/gesture)  -      Distance in Feet (Gait) --  2 side-steps toward HOB, cues req  -               User Key  (r) = Recorded By, (t) = Taken By, (c) = Cosigned By      Initials Name Provider Type    Izabel Vincent PTA Physical Therapist Assistant                   Obj/Interventions       [facility] Name 01/19/24 1549          Motor Skills    Therapeutic Exercise --  APs throughout due to ankle swelling incr L and to prevent incr dizziness w/sitting  -               User Key  (r) = Recorded By, (t) = Taken By, (c) = Cosigned By      Initials Name Provider Type    Izabel Vincent PTA Physical Therapist Assistant                   Goals/Plan    No documentation.                  Clinical Impression       [facility] Name 01/19/24 1551          Pain    Pre/Posttreatment Pain Comment not specific on pain level , pain from waist down  -     Pain Intervention(s) Repositioned;Elevated;Rest  -[facility] Name 01/19/24 1551          Plan of Care Review    Plan of Care Reviewed With patient;spouse  -     Progress improving  -     Outcome Evaluation Pt agreed to PT/OT session, pt [place] supine to sit w/max/mod 2, stood x2 w/rwx, able to take 2 steps toward HOB, required lots of encouragement due to incr fatigue and lack of motivation, he has issues with anxiety, but if given precise directions and rest breaks he does much better, educ on PLB technique, nsg came to assist at end of session as pt had BM during standing and req immediate care and clean-up, pt felt good about todays session, will need SNU at DC at current level of assist  -[facility] Name 01/19/24 1551          Therapy Assessment/Plan (PT)    Rehab Potential (PT) fair, will monitor progress closely  -     Criteria for Skilled Interventions Met (PT) yes  -[facility] Name 01/19/24 1551          Vital Signs    O2 Delivery Pre Treatment room air  -JM       Row Name 01/19/24 1551          Positioning and Restraints    Pre-Treatment Position in bed  -     Post Treatment Position  bed  -JM     In Bed supine;with nsg  -               User Key  (r) = Recorded By, (t) = Taken By, (c) = Cosigned By      Initials Name Provider Type    Izabel Vincent PTA Physical Therapist Assistant                   Outcome Measures       Row Name 01/19/24 1726 01/19/24 0800       How much help from another person do you currently need...    Turning from your back to your side while in flat bed without using bedrails? 2  -JM 2  -TL    Moving from lying on back to sitting on the side of a flat bed without bedrails? 2  -JM 1  -TL    Moving to and from a bed to a chair (including a wheelchair)? 1  -JM 1  -TL    Standing up from a chair using your arms (e.g., wheelchair, bedside chair)? 2  -JM 1  -TL    Climbing 3-5 steps with a railing? 1  -JM 1  -TL    To walk in hospital room? 1  -JM 1  -TL    AM-PAC 6 Clicks Score (PT) 9  - 7  -TL    Highest Level of Mobility Goal 3 --> Sit at edge of bed  - 2 --> Bed activities/dependent transfer  -      Row Name 01/19/24 1439          Modified Cotter Scale    Modified Cotter Scale 4 - Moderately severe disability.  Unable to walk without assistance, and unable to attend to own bodily needs without assistance.  -       Row Name 01/19/24 1439          Functional Assessment    Outcome Measure Options AM-PAC 6 Clicks Daily Activity (OT);Modified Rufus  -               User Key  (r) = Recorded By, (t) = Taken By, (c) = Cosigned By      Initials Name Provider Type    Izabel Vincent PTA Physical Therapist Assistant    Alexis Gómez RN Registered Nurse     Nat Mooney OT Occupational Therapist                                 Physical Therapy Education       Title: PT OT SLP Therapies (Done)       Topic: Physical Therapy (Done)       Point: Mobility training (Done)       Learning Progress Summary             Patient Acceptance, E,D, VU,NR by KENY at 1/19/2024 1727    Acceptance, E, VU by LUZ at 1/19/2024 1547    Acceptance, E, NR by EM at 1/18/2024 1554    Family Acceptance, E,D, VU,NR by KENY at 1/19/2024 1727                                         User Key       Initials Effective Dates Name Provider Type Discipline    EM 06/16/21 -  Tisha Munoz PT Physical Therapist PT     03/07/18 -  Izabel Lilly PTA Physical Therapist Assistant PT    TL 12/27/22 -  Alexis Mcfadden, RN Registered Nurse Nurse                  PT Recommendation and Plan     Plan of Care Reviewed With: patient, spouse  Progress: improving  Outcome Evaluation: Pt agreed to PT/OT session, pt [place] supine to sit w/max/mod 2, stood x2 w/rwx, able to take 2 steps toward HOB, required lots of encouragement due to incr fatigue and lack of motivation, he has issues with anxiety, but if given precise directions and rest breaks he does much better, educ on PLB technique, nsg came to assist at end of session as pt had BM during standing and req immediate care and clean-up, pt felt good about todays session, will need SNU at DC at current level of assist     Time Calculation:         PT Charges       Row Name 01/19/24 1555             Time Calculation    Start Time 1245  -      Stop Time 1349  -      Time Calculation (min) 64 min  -      PT Received On 01/19/24  -KENY      PT - Next Appointment 01/22/24  -KENY                User Key  (r) = Recorded By, (t) = Taken By, (c) = Cosigned By      Initials Name Provider Type     Izabel Lilly PTA Physical Therapist Assistant                  Therapy Charges for Today       Code Description Service Date Service Provider Modifiers Qty    15166692002 HC PT THER PROC EA 15 MIN 1/19/2024 Izabel Lilly PTA GP 4    85044872066 HC PT THER SUPP EA 15 MIN 1/19/2024 Izabel Lilly PTA GP 3            PT G-Codes  Outcome Measure Options: AM-PAC 6 Clicks Daily Activity (OT), Modified [facility]  AM-PAC 6 Clicks Score (PT): 9  AM-PAC 6 Clicks Score (OT): 10  Modified Rufus Scale: 4 - Moderately severe disability.  Unable to walk without assistance, and unable  to attend to own bodily needs without assistance.  PT Discharge Summary  Anticipated Discharge Disposition (PT): skilled nursing facility    Izabel Lilly, PTA  1/19/2024

## 2024-01-20 ENCOUNTER — READMISSION MANAGEMENT (OUTPATIENT)
Dept: CALL CENTER | Facility: HOSPITAL | Age: 84
End: 2024-01-20
Payer: MEDICARE

## 2024-01-20 VITALS
DIASTOLIC BLOOD PRESSURE: 71 MMHG | OXYGEN SATURATION: 96 % | SYSTOLIC BLOOD PRESSURE: 148 MMHG | HEIGHT: 70 IN | WEIGHT: 275 LBS | HEART RATE: 98 BPM | RESPIRATION RATE: 18 BRPM | BODY MASS INDEX: 39.37 KG/M2 | TEMPERATURE: 98.6 F

## 2024-01-20 LAB
ANION GAP SERPL CALCULATED.3IONS-SCNC: 15.3 MMOL/L (ref 5–15)
BUN SERPL-MCNC: 18 MG/DL (ref 8–23)
BUN/CREAT SERPL: 20.9 (ref 7–25)
CALCIUM SPEC-SCNC: 9 MG/DL (ref 8.6–10.5)
CHLORIDE SERPL-SCNC: 106 MMOL/L (ref 98–107)
CO2 SERPL-SCNC: 20.7 MMOL/L (ref 22–29)
CREAT SERPL-MCNC: 0.86 MG/DL (ref 0.76–1.27)
DEPRECATED RDW RBC AUTO: 48.3 FL (ref 37–54)
EGFRCR SERPLBLD CKD-EPI 2021: 86.5 ML/MIN/1.73
ERYTHROCYTE [DISTWIDTH] IN BLOOD BY AUTOMATED COUNT: 14.7 % (ref 12.3–15.4)
GLUCOSE SERPL-MCNC: 107 MG/DL (ref 65–99)
HCT VFR BLD AUTO: 37.9 % (ref 37.5–51)
HGB BLD-MCNC: 12.5 G/DL (ref 13–17.7)
MCH RBC QN AUTO: 30.1 PG (ref 26.6–33)
MCHC RBC AUTO-ENTMCNC: 33 G/DL (ref 31.5–35.7)
MCV RBC AUTO: 91.3 FL (ref 79–97)
PLATELET # BLD AUTO: 121 10*3/MM3 (ref 140–450)
PMV BLD AUTO: 11.7 FL (ref 6–12)
POTASSIUM SERPL-SCNC: 4.3 MMOL/L (ref 3.5–5.2)
RBC # BLD AUTO: 4.15 10*6/MM3 (ref 4.14–5.8)
SODIUM SERPL-SCNC: 142 MMOL/L (ref 136–145)
WBC NRBC COR # BLD AUTO: 7.65 10*3/MM3 (ref 3.4–10.8)

## 2024-01-20 PROCEDURE — 85027 COMPLETE CBC AUTOMATED: CPT | Performed by: INTERNAL MEDICINE

## 2024-01-20 PROCEDURE — 97116 GAIT TRAINING THERAPY: CPT

## 2024-01-20 PROCEDURE — 97530 THERAPEUTIC ACTIVITIES: CPT

## 2024-01-20 PROCEDURE — 80048 BASIC METABOLIC PNL TOTAL CA: CPT | Performed by: INTERNAL MEDICINE

## 2024-01-20 RX ORDER — CEPHALEXIN 500 MG/1
500 CAPSULE ORAL 4 TIMES DAILY
Qty: 28 CAPSULE | Refills: 0 | Status: SHIPPED | OUTPATIENT
Start: 2024-01-20 | End: 2024-01-27

## 2024-01-20 RX ADMIN — ALLOPURINOL 300 MG: 300 TABLET ORAL at 08:37

## 2024-01-20 RX ADMIN — OXYCODONE AND ACETAMINOPHEN 1 TABLET: 5; 325 TABLET ORAL at 13:06

## 2024-01-20 RX ADMIN — PREGABALIN 150 MG: 75 CAPSULE ORAL at 05:33

## 2024-01-20 RX ADMIN — APIXABAN 5 MG: 5 TABLET, FILM COATED ORAL at 08:37

## 2024-01-20 RX ADMIN — PREGABALIN 150 MG: 75 CAPSULE ORAL at 13:06

## 2024-01-20 RX ADMIN — ROSUVASTATIN CALCIUM 10 MG: 10 TABLET, FILM COATED ORAL at 08:37

## 2024-01-20 RX ADMIN — IPRATROPIUM BROMIDE 1 SPRAY: 42 SPRAY, METERED NASAL at 08:37

## 2024-01-20 RX ADMIN — NYSTATIN 1 APPLICATION: 100000 CREAM TOPICAL at 08:37

## 2024-01-20 NOTE — PLAN OF CARE
Goal Outcome Evaluation:  Plan of Care Reviewed With: patient, family        Progress: improving  Outcome Evaluation: Pt seen for PT this am. He is doing well and states he feels stronger today. Pt eager to go home at DC vs SNU. Pt is showing improvement w mobility today. He was able to transition to EOB w mod A x2. Pt able to initiate movement w BLE, but uses BUE to pull up on therapist. Once positioned at EOB, pt able to stand w mod A x 2 and Rwx. Pt w slight R lean. He was able to ambulate today, approx 30 ft w Rwx and CGA/min A x2. No overt unsteadiness noted. Pt does fatigue easily. He was assisted back to bed at end of session. Discussed progress w pt, family, and RN. Pt will have several people at home to assist w mobility. He has all necessary equipment. Pt doing better today and likely able to manage at home w assistance. Family feels confident assisting pt at home. Plans to DC home likely today. Recommend HHPT upon DC.      Anticipated Discharge Disposition (PT): home with assist, home with home health

## 2024-01-20 NOTE — PLAN OF CARE
Pt. disoriented to situation sometimes. Family at bedside. Complained of pain to back and left leg. Pain pill PRN given. See MAR. VSS. Oxygen applied at 1 L by NC. PVCs and Ventricular Rhythm on Tele frequently. Suprapubic Catheter tolerated well. Call light within reach.    Goal Outcome Evaluation:  Plan of Care Reviewed With: patient, daughter  Progress: improving     Problem: Fall Injury Risk  Goal: Absence of Fall and Fall-Related Injury  Outcome: Ongoing, Progressing  Intervention: Identify and Manage Contributors  Recent Flowsheet Documentation  Taken 1/19/2024 2306 by Jesus Alberto Mckeon RN  Medication Review/Management: medications reviewed  Taken 1/19/2024 2054 by Jesus Alberto Mckeon RN  Medication Review/Management: medications reviewed  Intervention: Promote Injury-Free Environment  Recent Flowsheet Documentation  Taken 1/20/2024 0404 by Jesus Alberto Mckeon RN  Safety Promotion/Fall Prevention:   safety round/check completed   room organization consistent   lighting adjusted   fall prevention program maintained   clutter free environment maintained   activity supervised  Taken 1/20/2024 0201 by Jesus Alberto Mckeon RN  Safety Promotion/Fall Prevention:   safety round/check completed   room organization consistent   lighting adjusted   fall prevention program maintained   clutter free environment maintained   activity supervised  Taken 1/20/2024 0001 by Jesus Alberto Mckeon RN  Safety Promotion/Fall Prevention:   safety round/check completed   room organization consistent   lighting adjusted   fall prevention program maintained   clutter free environment maintained   activity supervised  Taken 1/19/2024 2147 by Jesus Alberto Mckeon RN  Safety Promotion/Fall Prevention:   safety round/check completed   room organization consistent   lighting adjusted   fall prevention program maintained   clutter free environment maintained   activity supervised  Taken 1/19/2024 2054 by Jesus Alberto Mckeon RN  Safety Promotion/Fall Prevention:   safety round/check completed   room  organization consistent   lighting adjusted   fall prevention program maintained   clutter free environment maintained   activity supervised     Problem: Pain Acute  Goal: Acceptable Pain Control and Functional Ability  Outcome: Ongoing, Progressing  Intervention: Prevent or Manage Pain  Recent Flowsheet Documentation  Taken 1/19/2024 2306 by Jesus Alberto Mckeon RN  Medication Review/Management: medications reviewed  Taken 1/19/2024 2054 by Jesus Alberto Mckeon RN  Medication Review/Management: medications reviewed  Intervention: Develop Pain Management Plan  Recent Flowsheet Documentation  Taken 1/19/2024 2306 by Jesus Alberto Mckeon RN  Pain Management Interventions:   see MAR   relaxation techniques promoted   quiet environment facilitated   position adjusted   pillow support provided  Intervention: Optimize Psychosocial Wellbeing  Recent Flowsheet Documentation  Taken 1/20/2024 0001 by Jesus Alberto Mckeon RN  Supportive Measures: relaxation techniques promoted  Taken 1/19/2024 2054 by Jesus Alberto Mckeon RN  Supportive Measures:   active listening utilized   verbalization of feelings encouraged     Problem: Adult Inpatient Plan of Care  Goal: Plan of Care Review  Outcome: Ongoing, Progressing  Flowsheets (Taken 1/20/2024 0421)  Progress: improving  Plan of Care Reviewed With:   patient   daughter

## 2024-01-20 NOTE — OUTREACH NOTE
Prep Survey      Flowsheet Row Responses   [facility] [place] patient discharged from? [facility]   Is LACE score < 7 ? No   Eligibility [facility]   Date of Admission 01/17/24   Date of Discharge 01/20/24   [place] [facility]   Discharge diagnosis UTI (urinary tract infection)   Does the patient have one of the following disease processes/diagnoses(primary or secondary)? Other   Does the patient have Home health ordered? Yes   What is the Home health agency?  [facility]   Is there a DME ordered? Yes   What DME was ordered? [employer]   Medication alerts for this patient see AVS   Prep survey completed? Yes            Dinah SIMPSON - Registered Nurse

## 2024-01-20 NOTE — THERAPY TREATMENT NOTE
Patient Name: Jesus Beckham  : 1940    MRN: [id number]                              Today's Date: 2024       Admit Date: 2024    Visit Dx:     ICD-10-CM ICD-9-CM   1. Urinary tract infection associated with indwelling urethral catheter, initial encounter  T83.511A 996.64    N39.0 599.0   2. Cellulitis of left lower extremity  L03.116 682.6   3. Encephalopathy  G93.40 348.30     Patient Active Problem List   Diagnosis    Gout    Diabetic peripheral neuropathy    Dyslipidemia    Essential hypertension    PVC (premature ventricular contraction)    Vitamin D deficiency    Benign non-nodular prostatic hyperplasia without lower urinary tract symptoms    Osteoarthritis    Urge incontinence    Acute gangrenous cholecystitis    Discitis of lumbar region    Paroxysmal atrial fibrillation    History of sepsis    Hyperuricemia    Chronic deep vein thrombosis (DVT) of right peroneal vein    Nausea, vomiting, and diarrhea    Colitis presumed infectious    Urinary tract infection in male    DDD (degenerative disc disease), lumbar    Bilateral leg weakness    History of lumbar spinal fusion    Carpal tunnel syndrome    Adhesive arachnoiditis    Chronic anticoagulation    Diverticulosis    Hematuria    Lower obstructive uropathy    Splenic lesion    Suprapubic catheter dysfunction    Obesity (BMI 30-39.9)    Venous insufficiency of left leg    Memory difficulties    Chronic pain syndrome    Type 2 diabetes mellitus with peripheral neuropathy    Encounter for long-term (current) use of high-risk medication    Therapeutic opioid induced constipation    Abnormal CT of brain    Suprapubic catheter    Chronic bladder pain    Proteinuria    Lumbar radiculopathy    Nonrheumatic aortic valve stenosis    Snoring    Class 3 severe obesity due to excess calories without serious comorbidity with body mass index (BMI) of 40.0 to 44.9 in adult    Non-restorative sleep    Hypersomnia    History of DVT of lower extremity     Status post insertion of spinal cord stimulator    S/P TAVR (transcatheter aortic valve replacement)    Abdominal distension (gaseous)    Back pain    Dorsalgia, unspecified    Benign neoplasm of ascending colon    Benign neoplasm of descending colon    Benign neoplasm of transverse colon    Benign neoplasm of cecum    Colon polyps    Rectal polyp    Disorder of muscle, unspecified    Family history of colonic polyps    Constipation    Fecal incontinence    History of colonic polyps    Personal history of colonic polyps    Cancer    Malignant (primary) neoplasm, unspecified    Other abnormalities of heart beat    Pure hypercholesterolemia    Unspecified hemorrhoids    Atrial fibrillation    Long term (current) use of anticoagulants    Dvrtclos of lg int w/o perforation or abscess w/o bleeding    Encounter for screening for malignant neoplasm of colon    Sleep apnea    Arthritis    Coagulation defect, unspecified    Diabetes mellitus    UTI (urinary tract infection)     Past Medical History:   Diagnosis Date    Acute embolism and thrombosis of deep vein of right distal lower extremity     Acute gangrenous cholecystitis     FEB 2018    Allergic     Aortic valve stenosis     Arthritis     Atrial fibrillation with RVR     HISTORY    Benign prostatic hyperplasia without lower urinary tract symptoms     Cancer of bladder 2016    Colon polyp     Discitis of lumbar region     DVT (deep venous thrombosis)     MARCH 2018    Essential hypertension     Murray catheter in place     LOSS OF SENSATION BLADDER. BECAUSE OF WOUND AT THE TIME    Gout     Heel ulcer     RIGHT. FOLLOWED BY WOUND CARE. GETTING BETTER    History of sepsis     [place] (hard of hearing)     HEARING AIDS BOTH EARS    Hyperlipidemia     Loss, sensation     LOWER EXTREMTIES. RELATED TO LAST BACK SURGERY.    MRSA infection     LEFT LEG.     Nausea, vomiting, and diarrhea 03/01/2019    Open wound     WITH MEDICATED DRESSING LEFT SHIN AREA.(RESOLVED PER PATIENT)     CELINA (obstructive sleep apnea)     NO MACHINE    Osteoarthritis     Paroxysmal atrial fibrillation     PVC (premature ventricular contraction)     Sepsis 02/2018    Sepsis due to Escherichia coli     Skin carcinoma 2012    MELANOMA.2 PLACES BACK AND EAR    Type 2 diabetes mellitus     Vitamin D deficiency     Weakness      Past Surgical History:   Procedure Laterality Date    ABDOMINAL SURGERY      AORTIC VALVE REPAIR/REPLACEMENT N/A 2/28/2023    Procedure: TTE TRANSFEMORAL TRANSCATHETER AORTIC VALVE REPLACEMENT PERCUTANEOUS APPROACH;  Surgeon: Antony Goldstein MD;  Location: [facility];  Service: Cardiothoracic;  Laterality: N/A;    AORTIC VALVE REPAIR/REPLACEMENT N/A 2/28/2023    Procedure: Transfemoral Transcatheter Aortic Valve Replacement with intraoperative TTE and possible open surgical rescue;  Surgeon: Samuel Zelaya MD;  Location: [facility];  Service: Cardiovascular;  Laterality: N/A;    APPENDECTOMY N/A 1961    CARDIAC CATHETERIZATION N/A 01/16/2023    Procedure: Coronary angiography;  Surgeon: Tasha Burr MD;  Location[facility] CATH INVASIVE LOCATION;  Service: Cardiology;  Laterality: N/A;    CARDIAC CATHETERIZATION N/A 01/16/2023    Procedure: Left Heart Cath;  Surgeon: Tasha Burr MD;  [facility];  Service: Cardiology;  Laterality: N/A;    CARDIAC CATHETERIZATION N/A 01/16/2023    Procedure: Right Heart Cath;  Surgeon: Tasha Burr MD;  [facility];  Service: Cardiology;  Laterality: N/A;    CHOLECYSTECTOMY WITH INTRAOPERATIVE CHOLANGIOGRAM N/A 02/25/2018    Procedure: CHOLECYSTECTOMY LAPAROSCOPIC INTRAOPERATIVE CHOLANGIOGRAM;  Surgeon: Maegan Correa MD;  Location: [facility] OR;  Service:     COLONOSCOPY N/A 2010    h/o of polyps    COLONOSCOPY W/ BIOPSIES AND POLYPECTOMY  2019    EYE SURGERY Bilateral 1959    glass removal from left eye, lens transplant    JOINT REPLACEMENT Right 2005    RIGHT KNEE    LAMINECTOMY N/A  01/18/2016    L2-L3, L3-L4, L4-L5, and L5-S1 bilateral lamincectomy, medial facetectomy & [id number], Dr. Kaiden Valdes    LUMBAR FUSION N/A 03/07/2018    Procedure: LUMBAR FUSION MINIMALLY INVASIVE TRANSFORAMINAL LUMBAR INTERBODY IRRIGATION AND DEBRIDEMENT AND FUSION.  IRRIGATION AND DEBRIDEMENT OF EPIDURAL ABCESS LUMBAR 2-4. BONE MORPHOGENIC PROTEIN, ALPHATEC NOVEL AND ILLICO, EMG AND SSEP NEUROMONITORING.;  Surgeon: Manish Marr DO;  Location: [facility];  Service: Orthopedic Spine    SKIN BIOPSY      BACK AND FACE    SPINAL CORD STIMULATOR IMPLANT N/A 07/01/2022    Procedure: SPINAL CORD STIMULATOR INSERTION PHASE 1 ([place]) 4 DAY TRIAL ONLY DUE TO ELIQUIS HOLD.;  Surgeon: Cody Holguin MD;  Location: [facility] OR;  Service: Pain Management;  Laterality: N/A;    SPINAL CORD STIMULATOR IMPLANT N/A 11/17/2022    Procedure: Thoracic eleven laminotomy for placement of a surgical spinal cord stimulator lead to thoracic nine;  Surgeon: Charlie Bailon MD;  Location: [facility];  Service: Neurosurgery;  Laterality: N/A;    SPINAL CORD STIMULATOR IMPLANT N/A 11/18/2022    Procedure: Placement of a left gluteal internal pulse generator;  Surgeon: Charlie Bailon MD;  Location: [facility];  Service: Neurosurgery;  Laterality: N/A;    TOTAL KNEE ARTHROPLASTY Right 03/07/2005    Dr. Washington Evans    VENA CAVA FILTER INSERTION Right 03/06/2018    Procedure: VENA CAVA FILTER INSERTION;  Surgeon: Alexis Thakkar MD;  Location: [facility] 18/19;  Service:     VENA CAVA FILTER REMOVAL N/A 12/03/2018    Procedure: VENA CAVA FILTER REMOVAL WITH VENOCAVAGRAM;  Surgeon: Alexis Thakkar MD;  Location: [facility] OR 18/19;  Service: Vascular      General Information       Row Name 01/20/24 1230          Physical Therapy Time and Intention    Document Type therapy note (daily note)  -EJ     Mode of Treatment physical therapy  -EJ       Row Name 01/20/24 1230          General  Information    Existing Precautions/Restrictions fall  -EJ               User Key  (r) = Recorded By, (t) = Taken By, (c) = Cosigned By      Initials Name Provider Type    Eliane Fernandez, PT Physical Therapist                   Mobility       Row Name 01/20/24 1230          Bed Mobility    Supine-Sit [place] (Bed Mobility) verbal cues;2 person assist;moderate assist (50% patient effort);nonverbal cues (demo/gesture)  -EJ     Sit-Supine [place] (Bed Mobility) verbal cues;moderate assist (50% patient effort);2 person assist;nonverbal cues (demo/gesture)  -EJ     Assistive Device (Bed Mobility) bed rails;head of bed elevated  -EJ     Comment, (Bed Mobility) some assist w BLE, pt also using BUE to pull up on therapist to reach EOB  -EJ       Row Name 01/20/24 1230          Sit-Stand Transfer    Sit-Stand [place] (Transfers) verbal cues;nonverbal cues (demo/gesture);moderate assist (50% patient effort);2 person assist  -EJ     Assistive Device (Sit-Stand Transfers) walker, front-wheeled  -EJ       Row Name 01/20/24 1230          Gait/Stairs (Locomotion)    [place] Level (Gait) verbal cues;nonverbal cues (demo/gesture);minimum assist (75% patient effort);2 person assist  -EJ     Assistive Device (Gait) walker, front-wheeled  -EJ     Distance in Feet (Gait) 30  -EJ     Deviations/Abnormal Patterns (Gait) maicol decreased;stride length decreased  -EJ     Bilateral Gait Deviations forward flexed posture;heel strike decreased  -EJ     Comment, (Gait/Stairs) slow pace, but steady, slight R lean  -EJ               User Key  (r) = Recorded By, (t) = Taken By, (c) = Cosigned By      Initials Name Provider Type    Eliane Fernandez, PT Physical Therapist                   Obj/Interventions    No documentation.                  Goals/Plan    No documentation.                  Clinical Impression       Row Name 01/20/24 1234          Pain    Pretreatment Pain Rating 7/10  -EJ     Posttreatment Pain  Rating 7/10  -EJ     Pain Location - Side/Orientation Bilateral  -EJ     Pain Location lower  -EJ     Pain Location - extremity  -EJ     Pain Intervention(s) Repositioned;Ambulation/increased activity  -EJ       Row Name 01/20/24 1234          Plan of Care Review    Plan of Care Reviewed With patient;family  -EJ     Progress improving  -EJ     Outcome Evaluation Pt seen for PT this am. He is doing well and states he feels stronger today. Pt eager to go home at [place] vs SNU. Pt is showing improvement w mobility today. He was able to transition to EOB w mod A x2. Pt able to initiate movement w BLE, but uses BUE to pull up on therapist. Once positioned at EOB, pt able to stand w mod A x 2 and Rwx. Pt w slight R lean. He was able to ambulate today, approx 30 ft w Rwx and CGA/min A x2. No overt unsteadiness noted. Pt does fatigue easily. He was assisted back to bed at end of session. Discussed progress w pt, family, and RN. Pt will have several people at home to assist w mobility. He has all necessary equipment. Pt doing better today and likely able to manage at home w assistance. Family feels confident assisting pt at home. Plans to DC home likely today. Recommend HHPT upon DC.  -EJ       Row Name 01/20/24 1234          Positioning and Restraints    Pre-Treatment Position in bed  -EJ     Post Treatment Position bed  -EJ     In Bed notified nsg;supine;call light within reach;encouraged to call for assist;exit alarm on;with family/caregiver  -EJ               User Key  (r) = Recorded By, (t) = Taken By, (c) = Cosigned By      Initials Name Provider Type    Eliane Fernandez, PT Physical Therapist                   Outcome Measures       Row Name 01/20/24 1240 01/20/24 0800       How much help from another person do you currently need...    Turning from your back to your side while in flat bed without using bedrails? 3  -EJ 2  -TL    Moving from lying on back to sitting on the side of a flat bed without bedrails? 2  -EJ 2   -TL    Moving to and from a bed to a chair (including a wheelchair)? 2  -EJ 1  -TL    Standing up from a chair using your arms (e.g., wheelchair, bedside chair)? 2  -EJ 2  -TL    Climbing 3-5 steps with a railing? 1  -EJ 1  -TL    To walk in hospital room? 3  -EJ 1  -TL    AM-PAC 6 Clicks Score (PT) 13  -EJ 9  -TL    Highest Level of Mobility Goal 4 --> Transfer to chair/commode  -EJ 3 --> Sit at edge of bed  -TL              User Key  (r) = Recorded By, (t) = Taken By, (c) = Cosigned By      Initials Name Provider Type    EJ Eliane Nova, PT Physical Therapist    TL Alexis Mcfadden, RN Registered Nurse                                 Physical Therapy Education       Title: PT OT SLP Therapies (Done)       Topic: Physical Therapy (Done)       Point: Mobility training (Done)       Learning Progress Summary             Patient Acceptance, E,D, VU,NR by  at 1/19/2024 1727    Acceptance, E, VU by  at 1/19/2024 1547    Acceptance, E, NR by  at 1/18/2024 1554   Family Acceptance, E,D, VU,NR by  at 1/19/2024 1727                                         User Key       Initials Effective Dates Name Provider Type Discipline     06/16/21 -  Tisha Munoz PT Physical Therapist PT     03/07/18 -  Izabel Lilly PTA Physical Therapist Assistant PT     12/27/22 -  Alexis Mcfadden, RN Registered Nurse Nurse                  PT Recommendation and Plan     Plan of Care Reviewed With: patient, family  Progress: improving  Outcome Evaluation: Pt seen for PT this am. He is doing well and states he feels stronger today. Pt eager to go home at [place] vs [facility]. Pt is showing improvement w mobility today. He was able to transition to EOB w mod A x2. Pt able to initiate movement w BLE, but uses BUE to pull up on therapist. Once positioned at EOB, pt able to stand w mod A x 2 and Rwx. Pt w slight R lean. He was able to ambulate today, approx 30 ft w Rwx and CGA/min A x2. No overt unsteadiness noted. Pt does fatigue easily. He  was assisted back to bed at end of session. Discussed progress w pt, family, and RN. Pt will have several people at home to assist w mobility. He has all necessary equipment. Pt doing better today and likely able to manage at home w assistance. Family feels confident assisting pt at home. Plans to DC home likely today. Recommend HHPT upon DC.     Time Calculation:         PT Charges       Row Name 01/20/24 1241             Time Calculation    Start Time 1117  -EJ      Stop Time 1140  -EJ      Time Calculation (min) 23 min  -EJ      PT Received On 01/21/24  -EJ      PT - Next Appointment 01/22/24  -EJ         Timed Charges    45951 - Gait Training Minutes  10  -EJ      62037 - PT Therapeutic Activity Minutes 13  -EJ         Total Minutes    Timed Charges Total Minutes 23  -EJ       Total Minutes 23  -EJ                User Key  (r) = Recorded By, (t) = Taken By, (c) = Cosigned By      Initials Name Provider Type    Eliane Fernandez, PT Physical Therapist                  Therapy Charges for Today       Code Description Service Date Service Provider Modifiers Qty    82395806009 HC PT THERAPEUTIC ACT EA 15 MIN 1/20/2024 Eliane Nova, PT GP 1    24143206799 HC GAIT TRAINING EA 15 MIN 1/20/2024 Eliane Nova, PT GP 1    52680665138 HC PT THER SUPP EA 15 MIN 1/20/2024 Eliane Nova, PT GP 1            PT G-Codes  Outcome Measure Options: AM-PAC 6 Clicks Daily Activity (OT), Modified [place]  AM-PAC 6 Clicks Score (PT): 13  AM-PAC 6 Clicks Score (OT): 10  Modified [place] Scale: 4 - Moderately severe disability.  Unable to walk without assistance, and unable to attend to own bodily needs without assistance.  PT Discharge Summary  Anticipated Discharge Disposition (PT): home with assist, home with home health    Eliane Nova, PT  1/20/2024

## 2024-01-20 NOTE — DISCHARGE SUMMARY
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       DETAILS OF HOSPITAL STAY     Pertinent Test Results and Procedures Performed    Chest x-ray:  1. No acute process.     X-ray of the left tibia and fibula:  There is mild diffuse osteoporosis and there is some moderate   generalized soft tissue swelling of the calf and at the ankle. No   underlying radiopaque foreign body is seen. There are prominent   degenerative changes at the partially visualized knee.     Hospital Course  This is an 83-year-old male with multiple medical problems including type 2 diabetes, atrial fibrillation, colon cancer, history of DVT, as well as a suprapubic catheter who presents to the hospital with worsening altered mental status.  Please see H&P for full details of admission.  He was found to be septic stemming from urinary tract infection.  He was placed on Rocephin.  Urine culture grew Klebsiella sensitive to cephalosporins.  Blood cultures were negative.  He has responded well to the antibiotics and IV fluids.  He is now overall much improved.  He worked with physical therapy today and was able to ambulate out into the hallway.  Discussed with patient and his family at the bedside and they would like to take him home with home health services.  He is medically stable,  overall improved, and will be discharged today.  His suprapubic catheter was exchanged while he was here.  He will go home on oral antibiotics for the next 7 days to complete a 10-day total course.      Physical Exam at Discharge:  General: No acute distress, AAOx3  HEENT: EOMI, PERRL  Cardiovascular: +s1 and s2, RRR  Lungs: No rhonchi or wheezing  Abdomen: soft, nontender    Consults:   Consults       Date and Time Order Name Status Description    1/17/2024  5:29 PM Inpatient Urology Consult Completed     1/17/2024  3:18 PM [facility] (on-call MD unless specified) Details                Condition on Discharge: Stable, improved    Discharge Disposition  Home or Self Care    Discharge Medications     Discharge Medications        New Medications        Instructions Start Date   cephalexin 500 MG capsule  Commonly known as: Keflex   500 mg, Oral, 4 Times Daily             Continue These Medications        Instructions Start Date   Accu-Chek FastClix Lancet kit   Dispense 1 lancing device to check 5 times a day. Dx: E 1      Accu-Chek Guide w/Device kit   1 kit, Does not apply, See Admin Instructions, Dx code E11.42 TEST BLOOD SUGAR 2 TIMES  DAY        acetaminophen 500 MG tablet  Commonly known as: TYLENOL   500 mg, Oral, Every 6 Hours PRN      allopurinol 300 MG tablet  Commonly known as: ZYLOPRIM   300 mg, Oral, Daily      apixaban 5 MG tablet tablet  Commonly known as: ELIQUIS   5 mg, Oral, 2 Times Daily      Ascorbic Acid 300 MG chewable tablet   300 mg, Oral, Every Morning, HOLDING FOR SURGERY      BENEFIBER DRINK MIX PO   2 teaspoon(s), Oral, 2 Times Daily      diphenhydrAMINE-acetaminophen  MG tablet per tablet  Commonly known as: TYLENOL PM   2 tablets, Oral, Nightly PRN      donepezil 5 MG tablet  Commonly known as: ARICEPT   5 mg, Oral, Every Night at Bedtime      fenofibrate 145 MG tablet  Commonly known as: TRICOR   145 mg, Oral, Daily      fish oil 1200 MG capsule capsule   2,000 mg, Oral, 2 Times Daily  With Meals, HOLDING FOR SURGERY      fluticasone 50 MCG/ACT nasal spray  Commonly known as: FLONASE   2 sprays, Nasal, Daily      furosemide 20 MG tablet  Commonly known as: LASIX   TAKE 1 TABLET BY MOUTH TWICE  DAILY AS NEEDED FOR SWELLING      glipizide 10 MG tablet  Commonly known as: GLUCOTROL   TAKE 1 TABLET BY MOUTH  TWICE DAILY BEFORE MEALS      Glucosamine HCl 1500 MG tablet   2 tablets, Oral, Daily, HOLDING FOR SURGERY      glucose blood test strip   ACCU-CUECK GUIDE test strips. Check 3 times a day Dx: E 11.42      glucose monitor monitoring kit   ACCU-CUECK GUIDE METER.  Testing 3 times daily. Dx: E 11.42      Glyxambi 25-5 MG tablet  Generic drug: Empagliflozin-linaGLIPtin   1 tablet, Oral, Daily With Breakfast      guaifenesin 100 MG/5ML liquid  Commonly known as: ROBITUSSIN   200 mg, Oral, 3 Times Daily PRN      Hydrocort-Pramoxine (Perianal) 2.5-1 % rectal cream  Commonly known as: ANALPRAM-HC   3 application , Rectal, 3 Times Daily      ipratropium 0.06 % nasal spray  Commonly known as: ATROVENT   No dose, route, or frequency recorded.      Lancet Device misc   1 each, Does not apply, 2 Times Daily      Lancets misc   SOFT-CLICK LANCETS: Check 3 times a day. Dx: E 11.42      lisinopril 2.5 MG tablet  Commonly known as: PRINIVIL,ZESTRIL   2.5 mg, Oral, Daily      melatonin 5 MG tablet tablet   5 mg, Oral, Nightly      multivitamin with minerals tablet tablet   1 tablet, Oral, Daily, HOLDING FOR SURGERY      nystatin 666408 UNIT/GM cream  Commonly known as: MYCOSTATIN   1 Application, Topical, 2 Times Daily      oxyCODONE-acetaminophen 5-325 MG per tablet  Commonly known as: PERCOCET   1 tablet, Oral, 2 Times Daily PRN      pregabalin 150 MG capsule  Commonly known as: LYRICA   1 capsule 3 times daily      rosuvastatin 10 MG tablet  Commonly known as: CRESTOR   10 mg, Oral, Daily      Toujeo Max SoloStar 300 UNIT/ML solution pen-injector injection  Generic drug: Insulin Glargine (2 Unit Dial)   100  units daily      traZODone 150 MG tablet  Commonly known as: DESYREL   300 mg, Oral, Nightly      vitamin D 1.25 MG (10747 UT) capsule capsule  Commonly known as: ERGOCALCIFEROL   TAKE 1 CAPSULE BY MOUTH EVERY 7  DAYS             Stop These Medications      linezolid 600 MG tablet  Commonly known as: ZYVOX              Discharge Diet:     Activity at Discharge:     Follow-up Appointments  Future Appointments   Date Time Provider Department Center   1/29/2024  8:10 AM [facility]   2/5/2024  9:00 AM Yasmine Fong APRN [facility]   5/16/2024 11:15 AM Tasha Burr MD [facility]     Additional Instructions for the Follow-ups that You Need to Schedule       Ambulatory Referral to Home Health ([facility])   As directed      Face to Face Visit Date: 1/20/2024   Follow-up provider for Plan of Care?: I treated the patient in an acute care facility and will not continue treatment after discharge.   Follow-up provider: TERRIE RICARDO [[id number]]   Reason/Clinical Findings: weakness and deconditioning   Describe mobility limitations that make leaving home difficult: weakness and deconditioning   Nursing/Therapeutic Services Requested: Skilled Nursing Physical Therapy Occupational Therapy   Skilled nursing orders: Monthly catheter care Medication education   Frequency: 1 Week 1        Discharge Follow-up with PCP   As directed       Currently Documented PCP:    Terrie Ricardo MD    PCP Phone Number:    [phone]     Follow Up Details: 1 week                Test Results Pending at Discharge  Pending Labs       Order Current Status    Blood Culture - Blood, Arm, Left Preliminary result    Blood Culture - Blood, Arm, Left Preliminary result            I have examined and discussed discharge planning with the patient today.    I wore full protective equipment throughout the patient encounter including eye protection and facemask.  Hand hygiene was performed before donning protective  equipment and after removal when leaving the room.     Jesus Tanner MD  01/20/24  12:08 EST    Time: Discharge greater than 30 min

## 2024-01-22 ENCOUNTER — TELEPHONE (OUTPATIENT)
Dept: ENDOCRINOLOGY | Age: 84
End: 2024-01-22

## 2024-01-22 ENCOUNTER — TRANSITIONAL CARE MANAGEMENT TELEPHONE ENCOUNTER (OUTPATIENT)
Dept: CALL CENTER | Facility: HOSPITAL | Age: 84
End: 2024-01-22
Payer: MEDICARE

## 2024-01-22 DIAGNOSIS — E11.42 TYPE 2 DIABETES MELLITUS WITH PERIPHERAL NEUROPATHY: Primary | ICD-10-CM

## 2024-01-22 DIAGNOSIS — E78.5 DYSLIPIDEMIA: ICD-10-CM

## 2024-01-22 DIAGNOSIS — E55.9 VITAMIN D DEFICIENCY: ICD-10-CM

## 2024-01-22 LAB
BACTERIA SPEC AEROBE CULT: NORMAL
BACTERIA SPEC AEROBE CULT: NORMAL

## 2024-01-22 NOTE — OUTREACH NOTE
Call Center TCM Note      Flowsheet Row Responses   [facility], [place], operated by [facility] patient discharged from? [place]   Does the patient have one of the following disease processes/diagnoses(primary or secondary)? Other   TCM attempt successful? Yes   Call start time 1114   Call end time 1116   Discharge diagnosis UTI (urinary tract infection)   Meds reviewed with patient/caregiver? Yes   Is the patient having any side effects they believe may be caused by any medication additions or changes? No   Does the patient have all medications ordered at discharge? Yes   Is the patient taking all medications as directed (includes completed medication regime)? Yes   Does the patient have an appointment with their PCP within 7-14 days of discharge? No   Nursing Interventions Patient desires to follow up with specialty only, Routed TCM call to PCP office, Patient declined scheduling/rescheduling appointment at this time   What is the Home health agency?  [facility]   Has home health visited the patient within 72 hours of discharge? Yes   Psychosocial issues? No   Did the patient receive a copy of their discharge instructions? Yes   Nursing interventions Reviewed instructions with patient   What is the patient's perception of their health status since discharge? Improving   Is the patient/caregiver able to teach back signs and symptoms related to disease process for when to call PCP? Yes   Is the patient/caregiver able to teach back signs and symptoms related to disease process for when to call 911? Yes   Is the patient/caregiver able to teach back the hierarchy of who to call/visit for symptoms/problems? PCP, Specialist, Home health nurse, Urgent Care, ED, 911 Yes   If the patient is a current smoker, are they able to teach back resources for cessation? Not a smoker   TCM call completed? Yes   Wrap up additional comments D/C DX: UTI w/sepsis - This very oj gentleman doing well. States he is moving about well with his walker, and  feeing much better. New rx Cephalexin in place, [facility] was in the home at time of my call. Pt declines TCM APPT with PCP Dr Ricardo for now. Pt sees his urologist Dr Saleh tomorrow, and will follow up with PCP as needed following outcome of this appt.   Call end time 1118            Debra See MA    1/22/2024, 11:20 EST

## 2024-01-23 ENCOUNTER — TELEPHONE (OUTPATIENT)
Dept: FAMILY MEDICINE CLINIC | Facility: CLINIC | Age: 84
End: 2024-01-23

## 2024-01-23 NOTE — TELEPHONE ENCOUNTER
Caller: JEFFERSON GOLDMAN[facility]    Best call back number: [phone]*    What was the call regarding: JEFFERSON WITH [facility] CALLING REQUESTING VERBAL ORDERS, PHYSICAL THERAPY FOR MOBILITY TRAINING, ONE DAY A WEEK, FOR 9 WEEKS. REQUEST CALLBACK WITH VERBAL.

## 2024-01-24 ENCOUNTER — TELEPHONE (OUTPATIENT)
Dept: FAMILY MEDICINE CLINIC | Facility: CLINIC | Age: 84
End: 2024-01-24
Payer: MEDICARE

## 2024-01-24 NOTE — TELEPHONE ENCOUNTER
OT from [facility] called regarding this pt. Needing orders for OT for twice a week for 4 weeks.     Francy    [phone]

## 2024-01-30 ENCOUNTER — READMISSION MANAGEMENT (OUTPATIENT)
Dept: CALL CENTER | Facility: HOSPITAL | Age: 84
End: 2024-01-30
Payer: MEDICARE

## 2024-01-30 NOTE — OUTREACH NOTE
Medical Week 2 Survey      Flowsheet Row Responses   [facility] patient discharged from? [place]   Does the patient have one of the following disease processes/diagnoses(primary or secondary)? Other   Week 2 attempt successful? Yes   Call start time 1546   Discharge diagnosis UTI (urinary tract infection)   Call end time 1550   Meds reviewed with patient/caregiver? Yes   Is the patient having any side effects they believe may be caused by any medication additions or changes? No   Does the patient have all medications ordered at discharge? Yes   Is the patient taking all medications as directed (includes completed medication regime)? Yes   Has the patient kept scheduled appointments due by today? N/A   Comments Has not seen PCP yet and reports he didn't feel it was necessary   What is the Home health agency?  [facility]   Has home health visited the patient within 72 hours of discharge? Yes   Did the patient receive a copy of their discharge instructions? Yes   Nursing interventions Reviewed instructions with patient   What is the patient's perception of their health status since discharge? Improving  [Pt reports UTI s/s have resolved, he did however report that he believes he is getting over covid.  He had a low grade fever, congestion and coughing.  Reports he is getting his appetite back now.  Pt denies questions for this RN]   Is the patient/caregiver able to teach back signs and symptoms related to disease process for when to call PCP? Yes   Is the patient/caregiver able to teach back signs and symptoms related to disease process for when to call 911? Yes   Additional teach back comments Pt is being followed by --will leave on for another call as not been seen in f/u yet   Week 2 Call Completed? Yes   Call end time 1550            TIFFANY LAZO - Registered Nurse

## 2024-02-01 ENCOUNTER — TELEPHONE (OUTPATIENT)
Dept: FAMILY MEDICINE CLINIC | Facility: CLINIC | Age: 84
End: 2024-02-01
Payer: MEDICARE

## 2024-02-01 NOTE — TELEPHONE ENCOUNTER
FORMS FAXED TO [facility]OB[facility] [phone] CONFIRMATION ATTACHED

## 2024-02-05 ENCOUNTER — OFFICE VISIT (OUTPATIENT)
Dept: ENDOCRINOLOGY | Age: 84
End: 2024-02-05
Payer: MEDICARE

## 2024-02-05 VITALS
OXYGEN SATURATION: 96 % | TEMPERATURE: 96.9 F | WEIGHT: 280 LBS | HEIGHT: 70 IN | DIASTOLIC BLOOD PRESSURE: 84 MMHG | HEART RATE: 66 BPM | BODY MASS INDEX: 40.09 KG/M2 | SYSTOLIC BLOOD PRESSURE: 126 MMHG

## 2024-02-05 DIAGNOSIS — I10 ESSENTIAL HYPERTENSION: ICD-10-CM

## 2024-02-05 DIAGNOSIS — E78.5 DYSLIPIDEMIA: ICD-10-CM

## 2024-02-05 DIAGNOSIS — E55.9 VITAMIN D DEFICIENCY: ICD-10-CM

## 2024-02-05 DIAGNOSIS — E11.42 TYPE 2 DIABETES MELLITUS WITH PERIPHERAL NEUROPATHY: Primary | ICD-10-CM

## 2024-02-05 PROCEDURE — 99214 OFFICE O/P EST MOD 30 MIN: CPT | Performed by: NURSE PRACTITIONER

## 2024-02-05 PROCEDURE — 3079F DIAST BP 80-89 MM HG: CPT | Performed by: NURSE PRACTITIONER

## 2024-02-05 PROCEDURE — 3074F SYST BP LT 130 MM HG: CPT | Performed by: NURSE PRACTITIONER

## 2024-02-05 NOTE — PROGRESS NOTES
Chief Complaint  Chief Complaint   Patient presents with    Diabetes     Type 2: Pt doesn't have meter today, does have readings, is up to date on eye exam, no hx of retinopathy, moderate neuropathy. Pt states that he is needing some refills.        Subjective          History of Present Illness    Jesus Beckham 83 y.o.  presents for a follow-up evaluation for type 2 DM2     He has been diabetic since about 1998 and started insulin in about 2016.      Pt is on the following medications for their DM: Toujeo 88 units daily, glyambi 25-5 daily with breakfast and glipizide 10mg twice daily           Pt complains of numbness and tingling in feet and shortness of breath with activity     Denies chest pain and vision changes    Pt denies a history of diabetic retinopathy.  Last eye exam was 05/23    Pt does have a history of nephropathy.  Patient is currently taking ACE - lisinopril 2.5 mg daily     Pt does have neuropathy.  Current takes Lyrica 150mg TID for this condition which is prescribed by this office.     Pt does not have a history of CAD or CVA.  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 Goldstein and Dr. Zelaya     Last A1C in 08/23 was 6.7    UA in 01/24 showed proteinuria          Blood Sugars    Blood glucoses are checked 2/day.    Fasting blood glucoses: 79 - 156    Pre-meal blood glucoses: 63 - 253    Pt has rare episodes of hypoglycemia.            Hyperlipidemia     Pt is currently taking Crestor 10 HS and fenofibrate 145 daily     Last lipid panel in 08/23 showed Total 135, HDL 37, LDL 69 and Triglycerides 170            Vitamin D Deficiency     Current treatment includes 50,000 units weekly     Last Vit D level was 37.4 in 08/23            Other History    He has urinary bladder cancer and has suprapubic catheter.  He follows with Dr. Saleh.       Pt was hospitalized in 01/24 for UTI        I have reviewed the patient's allergies, medicines, past medical hx,  "family hx and social hx.    Objective   Vital Signs:   /84   Pulse 66   Temp 96.9 °F (36.1 °C) (Temporal)   Ht 178 cm (70.08\")   Wt 127 kg (280 lb) Comment: patient provided  SpO2 96%   BMI 40.09 kg/m²       Physical Exam   Physical Exam  Constitutional:       General: He is not in acute distress.     Appearance: Normal appearance. He is obese. He is not diaphoretic.   HENT:      Head: Normocephalic and atraumatic.   Eyes:      General:         Right eye: No discharge.         Left eye: No discharge.   Skin:     General: Skin is warm and dry.   Neurological:      Mental Status: He is alert.   Psychiatric:         Mood and Affect: Mood normal.         Behavior: Behavior normal.                    Results Review:   Hemoglobin A1C   Date Value Ref Range Status   08/21/2023 6.7 (H) 4.8 - 5.6 % Final     Comment:              Prediabetes: 5.7 - 6.4           Diabetes: >6.4           Glycemic control for adults with diabetes: <7.0     02/24/2023 7.20 (H) 4.80 - 5.60 % Final   01/27/2020 8.5 (H) 4.3 - 5.6 % Final     Comment:     (note)  A1C% Reference Range:  4.3 - 5.6  Normal range  5.7 - 6.4  Pre-diabetic -increased risk for developing diabetes mellitus.  >=6.5      Diabetic -diagnostic of diabetes mellitus.  Note: For diagnosis of diabetes in individuals without unequivocal   hyperglycemia, results should be confirmed by repeat testing.    Patients with conditions that shorten erythrocyte survival, such as   recovery from acute blood loss, hemolytic anemia, kidney disease,   or the presence of unstable hemogloblins like HbSS, HbCC, and HbSC   may yield falsely decreased HbA1c test results. Iron deficiency may   yield falsely increased HbA1c test results.     Triglycerides   Date Value Ref Range Status   08/21/2023 170 (H) 0 - 149 mg/dL Final     HDL Cholesterol   Date Value Ref Range Status   08/21/2023 37 (L) >39 mg/dL Final     LDL Chol Calc (NIH)   Date Value Ref Range Status   08/21/2023 69 0 - 99 mg/dL " Final     VLDL Cholesterol Lance   Date Value Ref Range Status   08/21/2023 29 5 - 40 mg/dL Final         Assessment and Plan {CC Problem List  Visit Diagnosis  ROS  Review (Popup)  Health Maintenance  Quality  BestPractice  Medications  SmartSets  SnapShot Encounters  Media :23  Diagnoses and all orders for this visit:    1. Type 2 diabetes mellitus with peripheral neuropathy (Primary)  -     Hemoglobin A1c  -     Comprehensive Metabolic Panel    Continue with  Toujeo 88 units daily - just decreased it 2 days ago and now 105-125  Continue with glyambi 25-5 daily with breakfast and glipizide 10mg twice daily  Continue with BG checks  Check labs      2. Dyslipidemia  -     Comprehensive Metabolic Panel  -     Lipid Panel    Check labs today  Continue with statin and fenofibrate      3. Essential hypertension  -     Comprehensive Metabolic Panel    Stable  Continue with current medication regimen  Defer management to PCP      4. Vitamin D deficiency  -     Vitamin D,25-Hydroxy    Check labs today  Continue with supplement          No refills needed at this time        Labs today  RTC in 4 months with me, labs prior and 8 months with Dr. Hernandez      Follow Up     Patient was given instructions and counseling regarding her condition or for health maintenance advice. Please see specific information pulled into the AVS if appropriate.              Yasmine Fong, APRN  02/05/24

## 2024-02-06 LAB
25(OH)D3+25(OH)D2 SERPL-MCNC: 53.6 NG/ML (ref 30–100)
ALBUMIN SERPL-MCNC: 4.2 G/DL (ref 3.5–5.2)
ALBUMIN/GLOB SERPL: 1.7 G/DL
ALP SERPL-CCNC: 71 U/L (ref 39–117)
ALT SERPL-CCNC: 24 U/L (ref 1–41)
AST SERPL-CCNC: 29 U/L (ref 1–40)
BILIRUB SERPL-MCNC: 0.3 MG/DL (ref 0–1.2)
BUN SERPL-MCNC: 21 MG/DL (ref 8–23)
BUN/CREAT SERPL: 17.1 (ref 7–25)
CALCIUM SERPL-MCNC: 9.3 MG/DL (ref 8.6–10.5)
CHLORIDE SERPL-SCNC: 103 MMOL/L (ref 98–107)
CHOLEST SERPL-MCNC: 131 MG/DL (ref 0–200)
CO2 SERPL-SCNC: 26.1 MMOL/L (ref 22–29)
CREAT SERPL-MCNC: 1.23 MG/DL (ref 0.76–1.27)
EGFRCR SERPLBLD CKD-EPI 2021: 58.6 ML/MIN/1.73
GLOBULIN SER CALC-MCNC: 2.5 GM/DL
GLUCOSE SERPL-MCNC: 104 MG/DL (ref 65–99)
HBA1C MFR BLD: 6.8 % (ref 4.8–5.6)
HDLC SERPL-MCNC: 46 MG/DL (ref 40–60)
IMP & REVIEW OF LAB RESULTS: NORMAL
LDLC SERPL CALC-MCNC: 60 MG/DL (ref 0–100)
POTASSIUM SERPL-SCNC: 4.7 MMOL/L (ref 3.5–5.2)
PROT SERPL-MCNC: 6.7 G/DL (ref 6–8.5)
SODIUM SERPL-SCNC: 138 MMOL/L (ref 136–145)
TRIGL SERPL-MCNC: 148 MG/DL (ref 0–150)
VLDLC SERPL CALC-MCNC: 25 MG/DL (ref 5–40)

## 2024-02-08 DIAGNOSIS — E11.42 TYPE 2 DIABETES MELLITUS WITH PERIPHERAL NEUROPATHY: ICD-10-CM

## 2024-02-08 RX ORDER — LANCETS 30 GAUGE
EACH MISCELLANEOUS
Qty: 300 EACH | Refills: 4 | Status: SHIPPED | OUTPATIENT
Start: 2024-02-08

## 2024-02-08 NOTE — TELEPHONE ENCOUNTER
Rx Refill Note  Requested Prescriptions     Pending Prescriptions Disp Refills    Lancets misc 300 each 4     Sig: SOFT-CLICK LANCETS: Check 3 times a day. Dx: E 11.42      Last office visit with prescribing clinician: 2/5/2024   Last telemedicine visit with prescribing clinician: Visit date not found   Next office visit with prescribing clinician: 6/5/2024                         Would you like a call back once the refill request has been completed: [] Yes [] No    If the office needs to give you a call back, can they leave a voicemail: [] Yes [] No    Mila Bullock  02/08/24, 15:11 EST

## 2024-02-10 ENCOUNTER — APPOINTMENT (OUTPATIENT)
Dept: CT IMAGING | Facility: HOSPITAL | Age: 84
End: 2024-02-10
Payer: MEDICARE

## 2024-02-10 ENCOUNTER — HOSPITAL ENCOUNTER (EMERGENCY)
Facility: HOSPITAL | Age: 84
Discharge: HOME | End: 2024-02-10
Attending: EMERGENCY MEDICINE
Payer: MEDICARE

## 2024-02-10 VITALS
HEART RATE: 76 BPM | BODY MASS INDEX: 40.09 KG/M2 | SYSTOLIC BLOOD PRESSURE: 152 MMHG | TEMPERATURE: 97.9 F | WEIGHT: 280 LBS | DIASTOLIC BLOOD PRESSURE: 80 MMHG | RESPIRATION RATE: 16 BRPM | OXYGEN SATURATION: 91 % | HEIGHT: 70 IN

## 2024-02-10 DIAGNOSIS — S09.90XA CLOSED HEAD INJURY, INITIAL ENCOUNTER: ICD-10-CM

## 2024-02-10 DIAGNOSIS — S20.211A CONTUSION OF RIGHT CHEST WALL, INITIAL ENCOUNTER: Primary | ICD-10-CM

## 2024-02-10 DIAGNOSIS — S16.1XXA STRAIN OF NECK MUSCLE, INITIAL ENCOUNTER: ICD-10-CM

## 2024-02-10 PROCEDURE — 72125 CT NECK SPINE W/O DYE: CPT

## 2024-02-10 PROCEDURE — 90471 IMMUNIZATION ADMIN: CPT | Performed by: EMERGENCY MEDICINE

## 2024-02-10 PROCEDURE — 70450 CT HEAD/BRAIN W/O DYE: CPT

## 2024-02-10 PROCEDURE — 25010000002 TETANUS-DIPHTH-ACELL PERTUSSIS 5-2.5-18.5 LF-MCG/0.5 SUSPENSION PREFILLED SYRINGE: Performed by: EMERGENCY MEDICINE

## 2024-02-10 PROCEDURE — 25010000002 HYDROMORPHONE PER 4 MG: Performed by: EMERGENCY MEDICINE

## 2024-02-10 PROCEDURE — 99284 EMERGENCY DEPT VISIT MOD MDM: CPT

## 2024-02-10 PROCEDURE — 71250 CT THORAX DX C-: CPT

## 2024-02-10 PROCEDURE — 90715 TDAP VACCINE 7 YRS/> IM: CPT | Performed by: EMERGENCY MEDICINE

## 2024-02-10 PROCEDURE — 96374 THER/PROPH/DIAG INJ IV PUSH: CPT

## 2024-02-10 PROCEDURE — 70486 CT MAXILLOFACIAL W/O DYE: CPT

## 2024-02-10 PROCEDURE — 96376 TX/PRO/DX INJ SAME DRUG ADON: CPT

## 2024-02-10 RX ORDER — HYDROMORPHONE HYDROCHLORIDE 1 MG/ML
0.5 INJECTION, SOLUTION INTRAMUSCULAR; INTRAVENOUS; SUBCUTANEOUS ONCE
Status: COMPLETED | OUTPATIENT
Start: 2024-02-10 | End: 2024-02-10

## 2024-02-10 RX ORDER — LIDOCAINE 50 MG/G
1 PATCH TOPICAL EVERY 24 HOURS
Qty: 6 EACH | Refills: 0 | Status: SHIPPED | OUTPATIENT
Start: 2024-02-10

## 2024-02-10 RX ORDER — OXYCODONE HYDROCHLORIDE AND ACETAMINOPHEN 5; 325 MG/1; MG/1
.5-1 TABLET ORAL EVERY 6 HOURS PRN
Qty: 6 TABLET | Refills: 0 | Status: SHIPPED | OUTPATIENT
Start: 2024-02-10

## 2024-02-10 RX ADMIN — HYDROMORPHONE HYDROCHLORIDE 0.5 MG: 1 INJECTION, SOLUTION INTRAMUSCULAR; INTRAVENOUS; SUBCUTANEOUS at 17:30

## 2024-02-10 RX ADMIN — HYDROMORPHONE HYDROCHLORIDE 0.5 MG: 1 INJECTION, SOLUTION INTRAMUSCULAR; INTRAVENOUS; SUBCUTANEOUS at 15:13

## 2024-02-10 RX ADMIN — TETANUS TOXOID, REDUCED DIPHTHERIA TOXOID AND ACELLULAR PERTUSSIS VACCINE, ADSORBED 0.5 ML: 5; 2.5; 8; 8; 2.5 SUSPENSION INTRAMUSCULAR at 15:15

## 2024-02-10 NOTE — DISCHARGE INSTRUCTIONS
Take medication as prescribed for pain.  Follow-up with your primary care doctor for further evaluation.  Return immediately for any further falls or injury.

## 2024-02-10 NOTE — ED PROVIDER NOTES
EMERGENCY DEPARTMENT ENCOUNTER  Room Number:  19/19  PCP: Magdy Ricardo MD  Independent Historians: Patient and EMS      HPI:  Chief Complaint: had concerns including Head Injury.     A complete HPI/ROS/PMH/PSH/SH/FH are unobtainable due to: None    Chronic or social conditions impacting patient care (Social Determinants of Health): None      Context: Jesus Beckham is a 83 y.o. male with a medical history of chronic indwelling Murray catheter, type 2 diabetes, weakness, A-fib with RVR, MRSA, DVT, anticoagulated who presents to the ED c/o acute fall.  The patient reports that he was using his walker in the bathroom and he had a mechanical fall.  He reports he fell and landed between the toilet and the tub.  He was unable to get up.  Family was unable to get him up as well.  They called 911.  EMS was able to get him up.  He states he was on the ground for about 15 minutes.  He was placed in a cervical collar.  He is on Eliquis.  He did hit his head.  He reports wounds to his bilateral arms.  EMS reports he has skin tears to his bilateral elbows.  The patient reports right sided upper back pain.  He reports chronic low back pain which is unchanged.  He does not know when he last had a tetanus shot.      Review of prior external notes (non-ED) -and- Review of prior external test results outside of this encounter:  Laboratory evaluation 1/20/2024 shows a BMP with a mildly low CO2 of 20.  CBC on the same date was normal.    Prescription drug monitoring program review: FAINA reviewed by Greg Zacarias MD KASPER query complete and reviewed. Treatment plan to include limited course of prescribed controlled substance. Risks including addiction, benefits, and alternatives presented to patient.    PAST MEDICAL HISTORY  Active Ambulatory Problems     Diagnosis Date Noted    Gout 04/01/2016    Diabetic peripheral neuropathy 04/01/2016    Dyslipidemia 04/01/2016    Essential hypertension 07/29/2016    PVC (premature  ventricular contraction) 10/26/2016    Vitamin D deficiency 04/28/2017    Benign non-nodular prostatic hyperplasia without lower urinary tract symptoms 04/28/2017    Osteoarthritis 09/28/2017    Urge incontinence 09/28/2017    Acute gangrenous cholecystitis 02/24/2018    Discitis of lumbar region 03/05/2018    Paroxysmal atrial fibrillation 03/16/2018    History of sepsis 03/16/2018    Hyperuricemia 05/22/2018    Chronic deep vein thrombosis (DVT) of right peroneal vein 12/03/2018    Nausea, vomiting, and diarrhea 03/01/2019    Colitis presumed infectious 03/01/2019    Urinary tract infection in male 03/01/2019    DDD (degenerative disc disease), lumbar 07/03/2019    Bilateral leg weakness 07/03/2019    History of lumbar spinal fusion 07/03/2019    Carpal tunnel syndrome 02/19/2015    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    Chronic anticoagulation 01/27/2020    Diverticulosis 01/27/2020    Hematuria 01/27/2020    Lower obstructive uropathy 01/27/2020    Splenic lesion 01/27/2020    Suprapubic catheter dysfunction 01/27/2020    Obesity (BMI 30-39.9) 11/17/2020    Venous insufficiency of left leg 03/02/2021    Memory difficulties 03/03/2021    Chronic pain syndrome 09/13/2021    Type 2 diabetes mellitus with peripheral neuropathy 09/28/2021    Encounter for long-term (current) use of high-risk medication 10/13/2021    Therapeutic opioid induced constipation 10/13/2021    Abnormal CT of brain 11/28/2021    Suprapubic catheter 11/28/2021    Chronic bladder pain 01/10/2022    Proteinuria 01/25/2022    Lumbar radiculopathy 06/10/2022    Nonrheumatic aortic valve stenosis 06/23/2022    Snoring 07/28/2022    Class 3 severe obesity due to excess calories without serious comorbidity with body mass index (BMI) of 40.0 to 44.9 in adult 07/28/2022    Non-restorative sleep 07/28/2022    Hypersomnia 07/28/2022    History of DVT of lower extremity 08/22/2022    Status post insertion of spinal cord stimulator 12/02/2022    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    Abdominal distension (gaseous) 06/01/2023    Back pain 06/01/2023    Dorsalgia, unspecified 06/01/2023    Benign neoplasm of ascending colon 07/29/2019    Benign neoplasm of descending colon 11/02/2015    Benign neoplasm of transverse colon 11/02/2015    Benign neoplasm of cecum 11/02/2015    Colon polyps 06/01/2023    Rectal polyp 11/02/2015    Disorder of muscle, unspecified 06/01/2023    Family history of colonic polyps 11/02/2015    Constipation 06/01/2023    Fecal incontinence 06/01/2023    History of colonic polyps 11/02/2015    Personal history of colonic polyps 07/29/2019    Cancer 06/01/2023    Malignant (primary) neoplasm, unspecified 06/01/2023    Other abnormalities of heart beat 06/01/2023    Pure hypercholesterolemia 06/01/2023    Unspecified hemorrhoids 06/01/2023    Atrial fibrillation 03/01/2018    Long term (current) use of anticoagulants 06/01/2023    Dvrtclos of lg int w/o perforation or abscess w/o bleeding 11/02/2015    Encounter for screening for malignant neoplasm of colon 11/02/2015    Sleep apnea 06/01/2023    Arthritis 06/01/2023    Coagulation defect, unspecified 06/01/2023    Diabetes mellitus 06/01/2023    UTI (urinary tract infection) 01/17/2024     Resolved Ambulatory Problems     Diagnosis Date Noted    Uncontrolled type 2 diabetes mellitus 04/01/2016    Primary osteoarthritis involving multiple joints 04/04/2016    Allergic rhinitis due to pollen 04/04/2016    Sepsis 02/23/2018    Secondary thrombocytopenia 02/23/2018    Hypotension 02/23/2018    Observed sleep apnea 02/23/2018    Acute respiratory failure with hypoxia 02/23/2018    Leg edema 02/23/2018    RSV (acute bronchiolitis due to respiratory syncytial virus) 02/23/2018    Bacteremia due to Escherichia coli 02/24/2018    RUQ abdominal pain 02/24/2018    Acute deep vein thrombosis of calf, right 03/05/2018    Sepsis due to Escherichia coli 02/22/2018    Elevated troponin  03/16/2018    Aortic valve stenosis, etiology of cardiac valve disease unspecified 02/28/2023     Past Medical History:   Diagnosis Date    Acute embolism and thrombosis of deep vein of right distal lower extremity     Allergic     Aortic valve stenosis     Atrial fibrillation with RVR     Benign prostatic hyperplasia without lower urinary tract symptoms     Cancer of bladder 2016    Colon polyp     DVT (deep venous thrombosis)     Murray catheter in place     Heel ulcer     [place] (hard of hearing)     Hyperlipidemia     Loss, sensation     MRSA infection     Open wound     CELINA (obstructive sleep apnea)     Skin carcinoma 2012    Type 2 diabetes mellitus     Weakness          PAST SURGICAL HISTORY  Past Surgical History:   Procedure Laterality Date    ABDOMINAL SURGERY      AORTIC VALVE REPAIR/REPLACEMENT N/A 2/28/2023    Procedure: TTE TRANSFEMORAL TRANSCATHETER AORTIC VALVE REPLACEMENT PERCUTANEOUS APPROACH;  Surgeon: Antony Goldstein MD;  Location: [facility];  Service: Cardiothoracic;  Laterality: N/A;    AORTIC VALVE REPAIR/REPLACEMENT N/A 2/28/2023    Procedure: Transfemoral Transcatheter Aortic Valve Replacement with intraoperative TTE and possible open surgical rescue;  Surgeon: Samuel Zelaya MD;  Location: [facility];  Service: Cardiovascular;  Laterality: N/A;    APPENDECTOMY N/A 1961    CARDIAC CATHETERIZATION N/A 01/16/2023    Procedure: Coronary angiography;  Surgeon: Tasha Burr MD;  [facility];  Service: Cardiology;  Laterality: N/A;    CARDIAC CATHETERIZATION N/A 01/16/2023    Procedure: Left Heart Cath;  Surgeon: Tasha Burr MD;  Location:  [facility] CATH INVASIVE LOCATION;  Service: Cardiology;  Laterality: N/A;    CARDIAC CATHETERIZATION N/A 01/16/2023    Procedure: Right Heart Cath;  Surgeon: Tasha Burr MD;  [facility];  Service: Cardiology;  Laterality: N/A;    CHOLECYSTECTOMY WITH INTRAOPERATIVE CHOLANGIOGRAM N/A  02/25/2018    Procedure: CHOLECYSTECTOMY LAPAROSCOPIC INTRAOPERATIVE CHOLANGIOGRAM;  Surgeon: Maegan Correa MD;  Location: [facility] OR;  Service:     COLONOSCOPY N/A 2010    h/o of polyps    COLONOSCOPY W/ BIOPSIES AND POLYPECTOMY  2019    EYE SURGERY Bilateral 1959    glass removal from left eye, lens transplant    JOINT REPLACEMENT Right 2005    RIGHT KNEE    LAMINECTOMY N/A 01/18/2016    L2-L3, L3-L4, L4-L5, and L5-S1 bilateral lamincectomy, medial facetectomy & [id number], Dr. Kaiden Valdes    LUMBAR FUSION N/A 03/07/2018    Procedure: LUMBAR FUSION MINIMALLY INVASIVE TRANSFORAMINAL LUMBAR INTERBODY IRRIGATION AND DEBRIDEMENT AND FUSION.  IRRIGATION AND DEBRIDEMENT OF EPIDURAL ABCESS LUMBAR 2-4. BONE MORPHOGENIC PROTEIN, ALPHATEC NOVEL AND ILLICO, EMG AND SSEP NEUROMONITORING.;  Surgeon: Manish Marr DO;  Location: [facility] OR;  Service: Orthopedic Spine    SKIN BIOPSY      BACK AND FACE    SPINAL CORD STIMULATOR IMPLANT N/A 07/01/2022    Procedure: SPINAL CORD STIMULATOR INSERTION PHASE 1 ([place]) 4 DAY TRIAL ONLY DUE TO ELIQUIS HOLD.;  Surgeon: Cody Holguin MD;  Location: [facility] OR;  Service: Pain Management;  Laterality: N/A;    SPINAL CORD STIMULATOR IMPLANT N/A 11/17/2022    Procedure: Thoracic eleven laminotomy for placement of a surgical spinal cord stimulator lead to thoracic nine;  Surgeon: Charlie Bailon MD;  Location: [facility] OR;  Service: Neurosurgery;  Laterality: N/A;    SPINAL CORD STIMULATOR IMPLANT N/A 11/18/2022    Procedure: Placement of a left gluteal internal pulse generator;  Surgeon: Charlie Bailon MD;  Location: [facility] OR;  Service: Neurosurgery;  Laterality: N/A;    TOTAL KNEE ARTHROPLASTY Right 03/07/2005    Dr. Washington Evans    VENA CAVA FILTER INSERTION Right 03/06/2018    Procedure: VENA CAVA FILTER INSERTION;  Surgeon: Alexis Thakkar MD;  Location: [facility] 18/19;  Service:     VENA CAVA FILTER REMOVAL N/A 12/03/2018     Procedure: VENA CAVA FILTER REMOVAL WITH VENOCAVAGRAM;  Surgeon: Alexis Thakkar MD;  Location: [facility] OR ;  Service: Vascular         FAMILY HISTORY  Family History   Problem Relation Age of Onset    Esophageal cancer Mother     No Known Problems Father     Diabetes Maternal Grandmother     Heart attack Maternal Grandfather     Malig Hyperthermia Neg Hx          SOCIAL HISTORY  Social History     Socioeconomic History    Marital status:     Number of children: 2    Highest education level: Some college, no degree   Tobacco Use    Smoking status: Former     Types: Cigars, Pipe     Quit date:      Years since quittin.1    Smokeless tobacco: Former     Types: Chew     Quit date:     Tobacco comments:     Caffeine daily   Vaping Use    Vaping Use: Never used   Substance and Sexual Activity    Alcohol use: Not Currently    Drug use: No    Sexual activity: Defer         ALLERGIES  Levaquin [levofloxacin], Alcohol, and Other      REVIEW OF SYSTEMS  Review of Systems  Included in HPI  All systems reviewed and negative except for those discussed in HPI.      PHYSICAL EXAM    I have reviewed the triage vital signs and nursing notes.    ED Triage Vitals [02/10/24 1445]   Temp Heart Rate Resp BP SpO2   97.9 °F (36.6 °C) 88 16 135/69 94 %      Temp src Heart Rate Source Patient Position BP Location FiO2 (%)   Tympanic Monitor -- -- --       Physical Exam  GENERAL: Awake, alert, no acute distress, chronically ill-appearing, oriented x 4  SKIN: Warm, dry  HENT: Normocephalic, atraumatic  EYES: no scleral icterus  CV: regular rhythm, regular rate  RESPIRATORY: normal effort, lungs clear  ABDOMEN: soft, nontender, nondistended  MUSCULOSKELETAL: no deformity, no midline thoracic or lumbar spine tenderness.  He is in a cervical collar and has diffuse cervical spine and paraspinal tenderness.  He has tenderness in his right posterior lateral upper back and the ribs.  No bruising.  No open wounds.   Bilateral elbows with superficial skin tears.  No deep laceration.  No bony tenderness.  Normal range of motion.  Small abrasion to the left lateral lower thigh.  Hemostatic.  Murray catheter in place.  Left lower extremity swelling compared to the right which the patient reports is chronic and unchanged.  NEURO: alert, moves all extremities, follows commands      NIH:                                                             LAB RESULTS  No results found for this or any previous visit (from the past 24 hour(s)).      RADIOLOGY  CT Chest Without Contrast Diagnostic    Result Date: 2/10/2024  CT CHEST WO CONTRAST DIAGNOSTIC-  Radiation dose reduction techniques were utilized, including automated exposure control and exposure modulation based on body size.  Clinical: Right-sided pain, fell  COMPARISON examination 2/23/2018  FINDINGS: 1. Mediastinum is stable. There are small lymph nodes again demonstrated. Interval TAVR. Cardiac size within normal limits. No pericardial or esophageal abnormality seen.  2. No pneumothorax, pleural effusion or acute airspace disease is demonstrated. No lung contusion seen.  3. No rib fracture or chest wall abnormality. No acute osseous abnormality of the sternum or thoracic spine.  CONCLUSION: No chest wall abnormality nor acute intrathoracic abnormality seen.     This report was finalized on 2/10/2024 5:30 PM by Dr. Gonzalez Honeycutt M.D on Workstation: [id number]      CT Head Without Contrast, CT Cervical Spine Without Contrast, CT Facial Bones Without Contrast    Result Date: 2/10/2024  CT HEAD AND CERVICAL SPINE WITHOUT CONTRAST  CLINICAL HISTORY: [] Mechanical fall.  TECHNIQUE: CT scan of the head, face and cervical spine was obtained with axial soft tissue and bone algorithm images. No intravenous contrast was administered. Sagittal and coronal reconstructions were obtained.  COMPARISON: [] Head 1/10/2022.  FINDINGS:  No intracranial hemorrhage.  No midline shift or mass effect.  No  CT evidence of acute territorial infarction.  No extraaxial collection.  No hydrocephalus.  Clear visualized portions of the paranasal sinuses and mastoid air cells.  Cervical lordosis without subluxation.  Vertebral body heights are maintained without fracture. Moderate disc degeneration C2-C7. Isolated moderate right C2-C3 facet arthropathy. No appreciable moderate or severe spinal canal stenosis. Multilevel likely mild spinal canal stenoses. No prevertebral soft tissue swelling.  No evidence of facial fracture.       1. No acute intracranial abnormality. 2. No evidence of cervical spine fracture or traumatic subluxation. 3. No evidence of facial fracture.     Radiation dose reduction techniques were utilized, including automated exposure control and exposure modulation based on body size.  This report was finalized on 2/10/2024 5:10 PM by Dr. Jerrell Keene M.D on Workstation: [id number]         MEDICATIONS GIVEN IN ER  Medications   Tetanus-Diphth-Acell Pertussis (BOOSTRIX) injection 0.5 mL (0.5 mL Intramuscular Given 2/10/24 1515)   HYDROmorphone (DILAUDID) injection 0.5 mg (0.5 mg Intravenous Given 2/10/24 1513)   HYDROmorphone (DILAUDID) injection 0.5 mg (0.5 mg Intravenous Given 2/10/24 1730)         ORDERS PLACED DURING THIS VISIT:  Orders Placed This Encounter   Procedures    CT Head Without Contrast    CT Cervical Spine Without Contrast    CT Chest Without Contrast Diagnostic    CT Facial Bones Without Contrast    Wound Care         OUTPATIENT MEDICATION MANAGEMENT:  No current Epic-ordered facility-administered medications on file.     Current Outpatient Medications Ordered in Epic   Medication Sig Dispense Refill    acetaminophen (TYLENOL) 500 MG tablet Take 1 tablet by mouth Every 6 (Six) Hours As Needed for Mild Pain.      allopurinol (ZYLOPRIM) 300 MG tablet Take 1 tablet by mouth Daily. 90 tablet 3    apixaban (ELIQUIS) 5 MG tablet tablet Take 1 tablet by mouth 2 (Two) Times a Day. 90 tablet 3     Ascorbic Acid 300 MG chewable tablet Chew 300 mg Every Morning. HOLDING FOR SURGERY      Blood Glucose Monitoring Suppl (Accu-Chek Guide) w/Device kit 1 kit See Admin Instructions. Dx code E11.42 TEST BLOOD SUGAR 2 TIMES  DAY    1 kit 0    diphenhydrAMINE-acetaminophen (TYLENOL PM)  MG tablet per tablet Take 2 tablets by mouth At Night As Needed for Sleep.      donepezil (ARICEPT) 5 MG tablet Take 1 tablet by mouth every night at bedtime. 90 tablet 3    fenofibrate (TRICOR) 145 MG tablet TAKE 1 TABLET BY MOUTH DAILY 90 tablet 3    fluticasone (FLONASE) 50 MCG/ACT nasal spray 2 sprays into the nostril(s) as directed by provider Daily. 48 g 3    furosemide (LASIX) 20 MG tablet TAKE 1 TABLET BY MOUTH TWICE  DAILY AS NEEDED FOR SWELLING 60 tablet 11    glipizide (GLUCOTROL) 10 MG tablet TAKE 1 TABLET BY MOUTH  TWICE DAILY BEFORE MEALS 180 tablet 3    Glucosamine HCl 1500 MG tablet Take 2 tablets by mouth Daily. HOLDING FOR SURGERY      glucose blood test strip ACCU-CUECK GUIDE test strips. Check 3 times a day Dx: E 11.42 300 each 4    Glyxambi 25-5 MG tablet TAKE 1 TABLET BY MOUTH DAILY  WITH BREAKFAST 90 tablet 2    guaifenesin (ROBITUSSIN) 100 MG/5ML liquid Take 10 mL by mouth 3 (Three) Times a Day As Needed for Cough.      Hydrocort-Pramoxine, Perianal, (ANALPRAM-HC) 2.5-1 % rectal cream Insert 3 Applications into the rectum 3 (Three) Times a Day.      ipratropium (ATROVENT) 0.06 % nasal spray       Lancets misc SOFT-CLICK LANCETS: Check 3 times a day. Dx: E 11.42 300 each 4    lidocaine (LIDODERM) 5 % Place 1 patch on the skin as directed by provider Daily. Remove & Discard patch within 12 hours or as directed by MD 6 each 0    lisinopril (PRINIVIL,ZESTRIL) 2.5 MG tablet TAKE 1 TABLET BY MOUTH DAILY 90 tablet 2    melatonin 5 MG tablet tablet Take 1 tablet by mouth Every Night.      Multiple Vitamins-Minerals (MULTIVITAMIN ADULT PO) Take 1 tablet by mouth Daily. HOLDING FOR SURGERY      nystatin (MYCOSTATIN)  565644 UNIT/GM cream Apply 1 Application topically to the appropriate area as directed 2 (Two) Times a Day.      Omega-3 Fatty Acids (FISH OIL) 1200 MG capsule capsule Take 2,000 mg by mouth 2 (Two) Times a Day With Meals. HOLDING FOR SURGERY      oxyCODONE-acetaminophen (PERCOCET) 5-325 MG per tablet Take 1 tablet by mouth 2 (Two) Times a Day As Needed for Severe Pain. 60 tablet 0    oxyCODONE-acetaminophen (PERCOCET) 5-325 MG per tablet Take 0.5-1 tablets by mouth Every 6 (Six) Hours As Needed for Severe Pain. 6 tablet 0    pregabalin (LYRICA) 150 MG capsule 1 capsule 3 times daily  Indications: Diabetes with Nerve Disease, Neuropathic Pain 270 capsule 1    rosuvastatin (CRESTOR) 10 MG tablet TAKE 1 TABLET BY MOUTH DAILY 90 tablet 2    Toujeo Max SoloStar 300 UNIT/ML solution pen-injector injection 100 units daily (Patient taking differently: 100 units daily    **88 units daily) 90 mL 2    traZODone (DESYREL) 150 MG tablet Take 2 tablets by mouth Every Night. 180 tablet 3    vitamin D (ERGOCALCIFEROL) 1.25 MG (45490 UT) capsule capsule TAKE 1 CAPSULE BY MOUTH EVERY 7  DAYS 11 capsule 3    Wheat Dextrin (BENEFIBER DRINK MIX PO) Take 2 teaspoon(s) by mouth 2 (Two) Times a Day.           PROCEDURES  Procedures            PROGRESS, DATA ANALYSIS, CONSULTS, AND MEDICAL DECISION MAKING  All labs have been independently interpreted by me.  All radiology studies have been reviewed by me. All EKG's have been independently viewed and interpreted by me.  Discussion below represents my analysis of pertinent findings related to patient's condition, differential diagnosis, treatment plan and final disposition.    Differential diagnosis includes but is not limited to intracranial hemorrhage, spine fracture, rib fracture, pneumothorax.    Clinical Scores:                  ED Course as of 02/10/24 1759   Sat Feb 10, 2024   1604 CT Head Without Contrast  My independent interpretation of the CT of the head is no acute hemorrhage  [TR]   4909 I reviewed the workup and findings with the patient and family at the bedside.  Answered all questions.  His imaging is all negative for fracture or bleeding.  He does express concern about a place on his right buttock that has been painful for a while.  Family states there is no wound.  I examined it and I see no open wounds, no skin breakdown.  He has tenderness in his right buttock.  He plans to follow-up with his primary care doctor about this.  He asked for Percocet for home by name. [TR]      ED Course User Index  [TR] Greg Zacarias MD             AS OF 17:59 EST VITALS:    BP - 135/69  HR - 88  TEMP - 97.9 °F (36.6 °C) (Tympanic)  O2 SATS - 94%    COMPLEXITY OF CARE  Admission was considered but after careful review of the patient's presentation, physical examination, diagnostic results, and response to treatment the patient may be safely discharged with outpatient follow-up.      DIAGNOSIS  Final diagnoses:   Contusion of right chest wall, initial encounter   Closed head injury, initial encounter   Strain of neck muscle, initial encounter         DISPOSITION  ED Disposition       ED Disposition   Discharge    Condition   Stable    Comment   --                Please note that portions of this document were completed with a voice recognition program.    Note Disclaimer: At [facility], we believe that sharing information builds trust and better relationships. You are receiving this note because you recently visited [facility]. It is possible you will see health information before a provider has talked with you about it. This kind of information can be easy to misunderstand. To help you fully understand what it means for your health, we urge you to discuss this note with your provider.         Greg Zacarias MD  02/10/24 5187

## 2024-02-12 ENCOUNTER — TELEPHONE (OUTPATIENT)
Dept: FAMILY MEDICINE CLINIC | Facility: CLINIC | Age: 84
End: 2024-02-12

## 2024-02-12 ENCOUNTER — TELEPHONE (OUTPATIENT)
Dept: FAMILY MEDICINE CLINIC | Facility: CLINIC | Age: 84
End: 2024-02-12
Payer: MEDICARE

## 2024-02-12 NOTE — TELEPHONE ENCOUNTER
Physical Therapy called on this patient to inform us that he had fallen during therapy. Asked patient if he wanted to be seen but he didn't feel like it was neccessary unless the doctor wanted him to be seen.     Best callback: [phone]

## 2024-02-22 ENCOUNTER — TELEPHONE (OUTPATIENT)
Dept: FAMILY MEDICINE CLINIC | Facility: CLINIC | Age: 84
End: 2024-02-22
Payer: MEDICARE

## 2024-02-22 NOTE — TELEPHONE ENCOUNTER
[employer] called regarding this pt. Checking to see if we received a faxed from this company.    Please advise    Britney    [phone]

## 2024-02-22 NOTE — TELEPHONE ENCOUNTER
LEFT MESSAGE WITH OMARI WE HAVE NOT RECEIVED ANY FORMS ON THIS PATIENT AS OF  02/22/2024 .OK FOR THE HUB TO RELAY MESSAGE

## 2024-02-25 NOTE — PROGRESS NOTES
Subjective   Jesus Beckham is a 83 y.o. male.     CC: Balance Issues/Falls    History of Present Illness     Pt with myriad medical issues comes in today reporting issues with gait/balance that have led to a few falls. Pt reports tripped and fell in the BR 10 days ago and went to the ED on 2/10/24 with this note:    Context: Jesus Beckham is a 83 y.o. male with a medical history of chronic indwelling Murray catheter, type 2 diabetes, weakness, A-fib with RVR, MRSA, DVT, anticoagulated who presents to the ED c/o acute fall.  The patient reports that he was using his walker in the bathroom and he had a mechanical fall.  He reports he fell and landed between the toilet and the tub.  He was unable to get up.  Family was unable to get him up as well.  They called 911.  EMS was able to get him up.  He states he was on the ground for about 15 minutes.  He was placed in a cervical collar.  He is on Eliquis.  He did hit his head.  He reports wounds to his bilateral arms.  EMS reports he has skin tears to his bilateral elbows.  The patient reports right sided upper back pain.  He reports chronic low back pain which is unchanged.  He does not know when he last had a tetanus shot.        CT Head Without Contrast  My independent interpretation of the CT of the head is no acute hemorrhage [TR]  7721  I reviewed the workup and findings with the patient and family at the bedside.  Answered all questions.  His imaging is all negative for fracture or bleeding.  He does express concern about a place on his right buttock that has been painful for a while.  Family states there is no wound.  I examined it and I see no open wounds, no skin breakdown.  He has tenderness in his right buttock.  He plans to follow-up with his primary care doctor about this.  He asked for Percocet for home by name. [TR]    DIAGNOSIS  Final diagnoses:  Contusion of right chest wall, initial encounter  Closed head injury, initial encounter  Strain of neck  "muscle, initial encounter    Current outpatient and discharge medications have been reconciled for the patient.  Reviewed by: Magdy Ricardo MD    Patient is getting home health currently and going through home PT.  His biggest concern is chronic severe neuropathy pain issues, currently on max dose of Lyrica as well as Percocet from his pain management office, and still rates his pain today on a 8-9 scale.    The following portions of the patient's history were reviewed and updated as appropriate: allergies, current medications, past family history, past medical history, past social history, past surgical history, and problem list.    Review of Systems   Constitutional:  Negative for activity change, chills and fever.   Respiratory:  Negative for cough.    Cardiovascular:  Negative for chest pain.   Psychiatric/Behavioral:  Negative for dysphoric mood.        BP 98/58   Pulse 84   Temp 98.7 °F (37.1 °C) (Temporal)   Resp 16   Ht 177.8 cm (70\")   SpO2 93%   BMI 40.18 kg/m²     Objective   Physical Exam  Constitutional:       General: He is not in acute distress.     Appearance: He is well-developed.   Pulmonary:      Effort: Pulmonary effort is normal.   Neurological:      Mental Status: He is alert and oriented to person, place, and time.   Psychiatric:         Behavior: Behavior normal.         Thought Content: Thought content normal.         Assessment & Plan   Diagnoses and all orders for this visit:    1. Falls frequently (Primary)    2. Balance problem  Comments:  Patient to continue physical therapy    3. Diabetic peripheral neuropathy  -     Discontinue: amitriptyline (ELAVIL) 10 MG tablet; Take 2 tablets by mouth Every Night.  Dispense: 180 tablet; Refill: 1  -     amitriptyline (ELAVIL) 10 MG tablet; Take 2 tablets by mouth Every Night.  Dispense: 28 tablet; Refill: 1    Patient to hold his trazodone and will introduce Elavil 1-2 tabs nightly to see if it helps both with sleep as well as his " neuropathy.  Will titrate to effect.  Patient's blood pressure is never this low, and seems to be an issue of hydration today.  Stressed good hydration.

## 2024-02-26 ENCOUNTER — OFFICE VISIT (OUTPATIENT)
Dept: FAMILY MEDICINE CLINIC | Facility: CLINIC | Age: 84
End: 2024-02-26
Payer: MEDICARE

## 2024-02-26 VITALS
BODY MASS INDEX: 40.18 KG/M2 | HEIGHT: 70 IN | OXYGEN SATURATION: 93 % | DIASTOLIC BLOOD PRESSURE: 58 MMHG | SYSTOLIC BLOOD PRESSURE: 98 MMHG | TEMPERATURE: 98.7 F | HEART RATE: 84 BPM | RESPIRATION RATE: 16 BRPM

## 2024-02-26 DIAGNOSIS — R29.6 FALLS FREQUENTLY: Primary | ICD-10-CM

## 2024-02-26 DIAGNOSIS — E11.42 DIABETIC PERIPHERAL NEUROPATHY: ICD-10-CM

## 2024-02-26 DIAGNOSIS — R26.89 BALANCE PROBLEM: ICD-10-CM

## 2024-02-26 PROCEDURE — 99213 OFFICE O/P EST LOW 20 MIN: CPT | Performed by: FAMILY MEDICINE

## 2024-02-26 RX ORDER — AMITRIPTYLINE HYDROCHLORIDE 10 MG/1
20 TABLET, FILM COATED ORAL NIGHTLY
Qty: 180 TABLET | Refills: 1 | Status: SHIPPED | OUTPATIENT
Start: 2024-02-26 | End: 2024-02-26 | Stop reason: SDUPTHER

## 2024-02-26 RX ORDER — AMITRIPTYLINE HYDROCHLORIDE 10 MG/1
20 TABLET, FILM COATED ORAL NIGHTLY
Qty: 28 TABLET | Refills: 1 | Status: SHIPPED | OUTPATIENT
Start: 2024-02-26

## 2024-02-26 RX ORDER — DOXYCYCLINE 100 MG/1
CAPSULE ORAL
COMMUNITY
Start: 2024-02-23

## 2024-02-26 RX ORDER — NITROFURANTOIN 25; 75 MG/1; MG/1
CAPSULE ORAL
COMMUNITY
Start: 2024-02-16 | End: 2024-02-28

## 2024-02-26 RX ORDER — 3-HYDROXYBUTYRIC ACID, SODIUM 100 %
POWDER (GRAM) MISCELLANEOUS
COMMUNITY
Start: 2024-01-29

## 2024-02-28 ENCOUNTER — OFFICE VISIT (OUTPATIENT)
Dept: ENDOCRINOLOGY | Age: 84
End: 2024-02-28
Payer: MEDICARE

## 2024-02-28 VITALS
BODY MASS INDEX: 40.8 KG/M2 | OXYGEN SATURATION: 92 % | HEIGHT: 70 IN | SYSTOLIC BLOOD PRESSURE: 130 MMHG | DIASTOLIC BLOOD PRESSURE: 82 MMHG | WEIGHT: 285 LBS | TEMPERATURE: 97.1 F | HEART RATE: 87 BPM

## 2024-02-28 DIAGNOSIS — E55.9 VITAMIN D DEFICIENCY: ICD-10-CM

## 2024-02-28 DIAGNOSIS — E78.5 DYSLIPIDEMIA: ICD-10-CM

## 2024-02-28 DIAGNOSIS — E11.42 TYPE 2 DIABETES MELLITUS WITH PERIPHERAL NEUROPATHY: Primary | ICD-10-CM

## 2024-02-28 PROCEDURE — 99214 OFFICE O/P EST MOD 30 MIN: CPT | Performed by: NURSE PRACTITIONER

## 2024-02-28 PROCEDURE — 3079F DIAST BP 80-89 MM HG: CPT | Performed by: NURSE PRACTITIONER

## 2024-02-28 PROCEDURE — 3075F SYST BP GE 130 - 139MM HG: CPT | Performed by: NURSE PRACTITIONER

## 2024-02-28 NOTE — PROGRESS NOTES
Chief Complaint  Chief Complaint   Patient presents with    Diabetes     Type 2: Pt doesn't have meter today, checks bs twice a day, is up to date on eye exam, no hx of retinopathy, moderate neuropathy. Pt wants to talk about side effects of Toujeo.        Subjective          History of Present Illness    Jesus Beckham 83 y.o. presents for a follow-up evaluation for type 2 DM2     He has been diabetic since about 1998 and started insulin in about 2016.      Pt is on the following medications for their DM: Toujeo 88 units daily, glyambi 25-5 daily with breakfast and glipizide 10mg twice daily           Pt complains of numbness and tingling in feet, joint pain, trouble sleeping,trouble moving around, sweaty, shaky, trouble concentrating and shortness of breath with activity     Pt denies a history of diabetic retinopathy.  Last eye exam was 05/23     Pt does have a history of nephropathy.  Patient is currently taking ACE - lisinopril 2.5 mg daily     Pt does have neuropathy.  Current takes Lyrica 150mg TID for this condition which is prescribed by this office.     Pt does not have a history of CAD or CVA.  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 Goldstein and Dr. Zelaya     Last A1C in 02/24 was 6.8     UA in 01/24 showed proteinuria          Blood Sugars    Blood glucoses are checked 2/day.    Fasting blood glucoses: 100 - 130s    Pre-meal blood glucoses:     Pt has rare episodes of hypoglycemia.      Pt has hypoglycemia symptoms in the 70s-80s          Hyperlipidemia     Pt is currently taking Crestor 10 HS and fenofibrate 145 daily     Last lipid panel in 02/24 showed Total 131, HDL 46, LDL 60 and Triglycerides 148              Vitamin D Deficiency     Current treatment includes 50,000 units weekly     Last Vit D level was 53.6 in 02/24                Other History     He has urinary bladder cancer and has suprapubic catheter.  He follows with Dr. Saleh.               I  "have reviewed the patient's allergies, medicines, past medical hx, family hx and social hx.    Objective   Vital Signs:   /82   Pulse 87   Temp 97.1 °F (36.2 °C) (Temporal)   Ht 177.8 cm (70\")   Wt 129 kg (285 lb) Comment: patient provided  SpO2 92%   BMI 40.89 kg/m²       Physical Exam   Physical Exam  Constitutional:       General: He is not in acute distress.     Appearance: Normal appearance. He is obese. He is not diaphoretic.   HENT:      Head: Normocephalic and atraumatic.   Eyes:      General:         Right eye: No discharge.         Left eye: No discharge.   Skin:     General: Skin is warm and dry.   Neurological:      Mental Status: He is alert.   Psychiatric:         Mood and Affect: Mood normal.         Behavior: Behavior normal.                    Results Review:   Hemoglobin A1C   Date Value Ref Range Status   02/05/2024 6.80 (H) 4.80 - 5.60 % Final     Comment:     Hemoglobin A1C Ranges:  Increased Risk for Diabetes  5.7% to 6.4%  Diabetes                     >= 6.5%  Diabetic Goal                < 7.0%     02/24/2023 7.20 (H) 4.80 - 5.60 % Final   01/27/2020 8.5 (H) 4.3 - 5.6 % Final     Comment:     (note)  A1C% Reference Range:  4.3 - 5.6  Normal range  5.7 - 6.4  Pre-diabetic -increased risk for developing diabetes mellitus.  >=6.5      Diabetic -diagnostic of diabetes mellitus.  Note: For diagnosis of diabetes in individuals without unequivocal   hyperglycemia, results should be confirmed by repeat testing.    Patients with conditions that shorten erythrocyte survival, such as   recovery from acute blood loss, hemolytic anemia, kidney disease,   or the presence of unstable hemogloblins like HbSS, HbCC, and HbSC   may yield falsely decreased HbA1c test results. Iron deficiency may   yield falsely increased HbA1c test results.     Triglycerides   Date Value Ref Range Status   02/05/2024 148 0 - 150 mg/dL Final     HDL Cholesterol   Date Value Ref Range Status   02/05/2024 46 40 - 60 " mg/dL Final     LDL Chol Calc ([facility])   Date Value Ref Range Status   02/05/2024 60 0 - 100 mg/dL Final     VLDL Cholesterol Lance   Date Value Ref Range Status   02/05/2024 25 5 - 40 mg/dL Final         Assessment and Plan {CC Problem List  Visit Diagnosis  ROS  Review (Popup)  Health Maintenance  Quality  BestPractice  Medications  SmartSets  SnapShot Encounters  Media :23  Diagnoses and all orders for this visit:    1. Type 2 diabetes mellitus with peripheral neuropathy (Primary)    Pt and wife came into office today with a listen of symptoms that the patient has been experiencing and thought it to be the Toujeo.  Some of the symptoms included joint pain, trouble sleeping,trouble moving around, occasional sweaty, occasional shaking, trouble concentrating and dizziness.  Advised them that some of these symptoms would only occur if his blood sugar is low.  Patient stated that he will get up in the morning, go to the bathroom and then go sit in his chair and he will be sweaty and shaking, but when he checked his blood sugar it was in the 140s.  Pt also stated that he has had very rare occasion of BGs in the 70s/80s since backing off the Toujeo dose noted at last visit.  Advised patient and wife that I do not think that Touejo is the cause of his symptoms, but to continue checking his blood sugar when he has these symptoms to rule out hypoglycemia as the cause.  A lot of the medications the patient is on can cause the symptoms that he is describing and well as some of the medical diagnoses he has, like OA, obesity, A. Fib and neuropathy to name a few.  Wife asked to change Touejo to something else.  Advised that we could, but that I do not think it would help with any of these symptoms.  After a discussion they decided to continue with Touejo.  Pt and wife stated that he was also having trouble sleeping due to pain, but that his PCP changed his medication to help sleep and to help with the pain.  He has been  sleeping better the last couple of days and also has been feeling better the past couple of days  Continue with Toujeo 88 units daily, glyambi 25-5 daily with breakfast and glipizide 10mg twice daily  Continue with BG checks - will try to avoid BGs in the 70s/80s since that is when he has symptoms of hypoglycemia      2. Dyslipidemia    Continue with statin and fenofibrate      3. Vitamin D deficiency    Continue with supplement           RTC on 06/05/24 with me and on 10/07/24 with Dr. Hernandez      Follow Up     Patient was given instructions and counseling regarding her condition or for health maintenance advice. Please see specific information pulled into the AVS if appropriate.              Yasmine Fong, DANNY  02/28/24

## 2024-03-07 RX ORDER — APIXABAN 5 MG/1
5 TABLET, FILM COATED ORAL 2 TIMES DAILY
Qty: 180 TABLET | Refills: 3 | Status: SHIPPED | OUTPATIENT
Start: 2024-03-07

## 2024-03-16 DIAGNOSIS — E11.42 DIABETIC PERIPHERAL NEUROPATHY: ICD-10-CM

## 2024-03-18 DIAGNOSIS — E11.42 DIABETIC PERIPHERAL NEUROPATHY: ICD-10-CM

## 2024-03-18 RX ORDER — PREGABALIN 150 MG/1
CAPSULE ORAL
Qty: 270 CAPSULE | Refills: 0 | Status: SHIPPED | OUTPATIENT
Start: 2024-03-18

## 2024-03-18 RX ORDER — PREGABALIN 150 MG/1
CAPSULE ORAL
Qty: 270 CAPSULE | Refills: 1 | OUTPATIENT
Start: 2024-03-18

## 2024-03-18 NOTE — TELEPHONE ENCOUNTER
Rx Refill Note  Requested Prescriptions     Pending Prescriptions Disp Refills    pregabalin (LYRICA) 150 MG capsule 270 capsule 1     Si capsule 3 times daily  Indications: Diabetes with Nerve Disease, Neuropathic Pain      Last office visit with prescribing clinician: 2023   Last telemedicine visit with prescribing clinician: Visit date not found   Next office visit with prescribing clinician: 10/7/2024                         Would you like a call back once the refill request has been completed: [] Yes [] No    If the office needs to give you a call back, can they leave a voicemail: [] Yes [] No    Mila Bullock  24, 14:56 EDT

## 2024-03-18 NOTE — TELEPHONE ENCOUNTER
Rx Refill Note  Requested Prescriptions     Pending Prescriptions Disp Refills    pregabalin (LYRICA) 150 MG capsule [Pharmacy Med Name: Pregabalin 150 MG Oral Capsule] 270 capsule 0     Sig: TAKE 1 CAPSULE BY MOUTH 3 TIMES  DAILY FOR DIABETES WITH NERVE  DISEASE, NEUROPATHIC PAIN      Last office visit with prescribing clinician: 8/28/2023   Last telemedicine visit with prescribing clinician: Visit date not found   Next office visit with prescribing clinician: 10/7/2024                         Would you like a call back once the refill request has been completed: [] Yes [] No    If the office needs to give you a call back, can they leave a voicemail: [] Yes [] No    Mila Bullock  03/18/24, 07:53 EDT

## 2024-03-18 NOTE — TELEPHONE ENCOUNTER
Caller: Mya Beckham    Relationship: Emergency Contact    Best call back number:     [phone]       Requested Prescriptions:   Requested Prescriptions     Pending Prescriptions Disp Refills    pregabalin (LYRICA) 150 MG capsule 270 capsule 1     Si capsule 3 times daily  Indications: Diabetes with Nerve Disease, Neuropathic Pain        Pharmacy where request should be sent:    [facility] - [place], [place] - [facility] [phone]  - [phone] [facility] [address] Phone: [phone] Fax: [phone] Hours: Not open 24 hours   Last office visit with prescribing clinician: 2023   Last telemedicine visit with prescribing clinician: Visit date not found   Next office visit with prescribing clinician: 10/7/2024     Additional details provided by patient: PT HAS 1WK AND A HALF LEFT OF MEDICATION PLEASE ADVISE    Does the patient have less than a 3 day supply:  [x] Yes  [] No    Would you like a call back once the refill request has been completed: [x] Yes [] No    If the office needs to give you a call back, can they leave a voicemail: [x] Yes [] No    Boyd Duenas   24 14:44 EDT

## 2024-03-25 ENCOUNTER — TELEPHONE (OUTPATIENT)
Dept: CARDIOLOGY | Facility: CLINIC | Age: 84
End: 2024-03-25
Payer: MEDICARE

## 2024-03-25 NOTE — TELEPHONE ENCOUNTER
Caller: TIERNEY WITH [facility]    Best call back number: [phone]    What is the best time to reach you: ANYTIME    Who are you requesting to speak with (clinical staff, provider,  specific staff member): CARMELA    What was the call regarding: ELIZ FROM [facility] REACHING OUT TO FU ON CLEARANCE REQUESTS - SHE STATES SHE SPOKE WITH A CARMELA AND WAS GIVEN ANOTHER FAX NUMBER TO SEND IT TO BUT THE NUMBER WILL NOT GO THROUGH FOR HER - SHE REACHED OUT AND WAS TRANSFERRED TO THE  OF [place] AND SHE IS WANTING TO GET THIS CLEARED UP -

## 2024-03-31 DIAGNOSIS — Z79.4 TYPE 2 DIABETES MELLITUS WITH OTHER SPECIFIED COMPLICATION, WITH LONG-TERM CURRENT USE OF INSULIN: ICD-10-CM

## 2024-03-31 DIAGNOSIS — E11.69 TYPE 2 DIABETES MELLITUS WITH OTHER SPECIFIED COMPLICATION, WITH LONG-TERM CURRENT USE OF INSULIN: ICD-10-CM

## 2024-04-01 RX ORDER — GLIPIZIDE 10 MG/1
TABLET ORAL
Qty: 180 TABLET | Refills: 3 | Status: SHIPPED | OUTPATIENT
Start: 2024-04-01

## 2024-04-01 RX ORDER — ERGOCALCIFEROL 1.25 MG/1
CAPSULE ORAL
Qty: 13 CAPSULE | Refills: 1 | Status: SHIPPED | OUTPATIENT
Start: 2024-04-01

## 2024-04-01 NOTE — TELEPHONE ENCOUNTER
Rx Refill Note  Requested Prescriptions     Pending Prescriptions Disp Refills    glipizide (GLUCOTROL) 10 MG tablet [Pharmacy Med Name: glipiZIDE 10 MG Oral Tablet] 180 tablet 3     Sig: TAKE 1 TABLET BY MOUTH TWICE  DAILY BEFORE MEALS      Last office visit with prescribing clinician: 2/28/2024   Last telemedicine visit with prescribing clinician: Visit date not found   Next office visit with prescribing clinician: 6/5/2024                         Would you like a call back once the refill request has been completed: [] Yes [] No    If the office needs to give you a call back, can they leave a voicemail: [] Yes [] No    Mila Bullock  04/01/24, 07:53 EDT

## 2024-04-01 NOTE — TELEPHONE ENCOUNTER
Rx Refill Note  Requested Prescriptions     Pending Prescriptions Disp Refills    vitamin D (ERGOCALCIFEROL) 1.25 MG (28020 UT) capsule capsule [Pharmacy Med Name: Vitamin D (Ergocalciferol) 1.25 MG (22654 UT) Oral Capsule] 13 capsule 3     Sig: TAKE 1 CAPSULE BY MOUTH EVERY 7  DAYS      Last office visit with prescribing clinician: 8/28/2023   Last telemedicine visit with prescribing clinician: Visit date not found   Next office visit with prescribing clinician: 10/7/2024                         Would you like a call back once the refill request has been completed: [] Yes [] No    If the office needs to give you a call back, can they leave a voicemail: [] Yes [] No    Mila Bullock  04/01/24, 08:25 EDT

## 2024-04-03 ENCOUNTER — TELEPHONE (OUTPATIENT)
Dept: FAMILY MEDICINE CLINIC | Facility: CLINIC | Age: 84
End: 2024-04-03
Payer: MEDICARE

## 2024-04-03 NOTE — TELEPHONE ENCOUNTER
FORMS FAXED TO [facility] AT  [phone]  CONFIRMATION ATTACHED JS

## 2024-04-08 ENCOUNTER — LAB (OUTPATIENT)
Dept: LAB | Facility: HOSPITAL | Age: 84
End: 2024-04-08
Payer: MEDICARE

## 2024-04-08 ENCOUNTER — TRANSCRIBE ORDERS (OUTPATIENT)
Dept: LAB | Facility: HOSPITAL | Age: 84
End: 2024-04-08
Payer: MEDICARE

## 2024-04-08 DIAGNOSIS — N39.41 URGE INCONTINENCE: ICD-10-CM

## 2024-04-08 DIAGNOSIS — N39.41 URGE INCONTINENCE: Primary | ICD-10-CM

## 2024-04-08 LAB
ANION GAP SERPL CALCULATED.3IONS-SCNC: 10.3 MMOL/L (ref 5–15)
BASOPHILS # BLD AUTO: 0.02 10*3/MM3 (ref 0–0.2)
BASOPHILS NFR BLD AUTO: 0.4 % (ref 0–1.5)
BUN SERPL-MCNC: 18 MG/DL (ref 8–23)
BUN/CREAT SERPL: 17.1 (ref 7–25)
CALCIUM SPEC-SCNC: 9.2 MG/DL (ref 8.6–10.5)
CHLORIDE SERPL-SCNC: 100 MMOL/L (ref 98–107)
CO2 SERPL-SCNC: 28.7 MMOL/L (ref 22–29)
CREAT SERPL-MCNC: 1.05 MG/DL (ref 0.76–1.27)
DEPRECATED RDW RBC AUTO: 48.9 FL (ref 37–54)
EGFRCR SERPLBLD CKD-EPI 2021: 70.9 ML/MIN/1.73
EOSINOPHIL # BLD AUTO: 0.2 10*3/MM3 (ref 0–0.4)
EOSINOPHIL NFR BLD AUTO: 4.2 % (ref 0.3–6.2)
ERYTHROCYTE [DISTWIDTH] IN BLOOD BY AUTOMATED COUNT: 14.6 % (ref 12.3–15.4)
GLUCOSE SERPL-MCNC: 237 MG/DL (ref 65–99)
HCT VFR BLD AUTO: 40.8 % (ref 37.5–51)
HGB BLD-MCNC: 13 G/DL (ref 13–17.7)
LYMPHOCYTES # BLD AUTO: 0.77 10*3/MM3 (ref 0.7–3.1)
LYMPHOCYTES NFR BLD AUTO: 16.2 % (ref 19.6–45.3)
MCH RBC QN AUTO: 29.1 PG (ref 26.6–33)
MCHC RBC AUTO-ENTMCNC: 31.9 G/DL (ref 31.5–35.7)
MCV RBC AUTO: 91.5 FL (ref 79–97)
MONOCYTES # BLD AUTO: 0.36 10*3/MM3 (ref 0.1–0.9)
MONOCYTES NFR BLD AUTO: 7.6 % (ref 5–12)
NEUTROPHILS NFR BLD AUTO: 3.3 10*3/MM3 (ref 1.7–7)
NEUTROPHILS NFR BLD AUTO: 69.7 % (ref 42.7–76)
PLATELET # BLD AUTO: 134 10*3/MM3 (ref 140–450)
PMV BLD AUTO: 11.2 FL (ref 6–12)
POTASSIUM SERPL-SCNC: 4.6 MMOL/L (ref 3.5–5.2)
RBC # BLD AUTO: 4.46 10*6/MM3 (ref 4.14–5.8)
SODIUM SERPL-SCNC: 139 MMOL/L (ref 136–145)
WBC NRBC COR # BLD AUTO: 4.74 10*3/MM3 (ref 3.4–10.8)

## 2024-04-08 PROCEDURE — 80048 BASIC METABOLIC PNL TOTAL CA: CPT

## 2024-04-08 PROCEDURE — 36415 COLL VENOUS BLD VENIPUNCTURE: CPT

## 2024-04-08 PROCEDURE — 85025 COMPLETE CBC W/AUTO DIFF WBC: CPT

## 2024-05-01 DIAGNOSIS — F51.01 PRIMARY INSOMNIA: Chronic | ICD-10-CM

## 2024-05-02 RX ORDER — TRAZODONE HYDROCHLORIDE 150 MG/1
300 TABLET ORAL NIGHTLY
Qty: 180 TABLET | Refills: 3 | OUTPATIENT
Start: 2024-05-02

## 2024-05-15 NOTE — PROGRESS NOTES
Subjective:     Encounter Date:05/16/2024      Patient ID: Jesus Beckham is a 83 y.o. male.    Chief Complaint:  History of Present Illness    The patient is a 83-year-old male with diabetes mellitus type 2, chronic lower extremity edema, hypertension, dyslipidemia, chronic lower back pain, bladder cancer, paroxsymal atrial fibrillation, prior left lower extremity DVT, who presents for follow up.      In 12/2021 the patient reported reported increasing symptoms of shortness of breath recommended proceeding with a stress test and an echocardiogram.  These were both performed on 12/9/2021.  His stress test was negative for ischemia.  Echocardiogram showed normal left ventricular systolic function and wall motion with an EF of 54%, grade 1 diastolic dysfunction, severe aortic valve calcification with at least moderate aortic valve stenosis, and a normal right ventricular systolic pressure.     In 12/2022 the patient underwent a repeat echocardiogram.  The patient reported increased activity after he had a spinal stimulator placed.  He did note shortness of breath with activity which he reported this was stable.  However his echocardiogram at that time showed worsening aortic valve stenosis.  Findings included a normal left ventricular function and wall motion with an EF of 56 to 60%, grade 1 diastolic dysfunction, aortic valve area 0.9 cm², peak velocity 3.9 m/s, and a mean gradient of 39 mmHg.  The patient denies any worsening symptoms but I recommended proceeding with coronary angiograms.    Cardiac catheterization performed on 1/16/2023 and showed normal coronary arteries, mild pulmonary hypertension, and evidence of severe aortic valve stenosis.  The patient was subsequently referred to the structural heart team.    Patient was evaluated by Dr. Zelaya on 1/23/2023 who recommended proceeding with workup for TAVR.  Patient ultimately underwent TAVR on 3/17/2023 with a 26 mm NAHOMY ultra aortic valve via  transfemoral approach.  Since then the patient has followed up over the last year with Dr. Zelaya and has been doing well.  Repeat echocardiograms have shown normal valve function including from 1/23/2024.    He returns today for routine follow-up.  He continues to have dyspnea on exertion which is largely stable.  He reports his activity is largely limited by his lower extremity neuropathy.  He reports that this is his main complaint.  He cannot seem to get much relief from anything that they have tried so far.  He denies any chest pain, palpitations, near-syncope or syncope.  He has chronic bilateral lower extremity swelling that is largely stable that they have been managing with wrapping his legs and leg elevation.  He takes the furosemide as needed.  According to his wife he takes 20 mg about 3 times a week when it appears that the swelling is not improving with the above measures or is worsening.       Prior History:  I saw the patient initially in 10/2016 for preoperative evaluation before resection of a bladder mass.  A preoperative EKG performed at that time showed evidence of ventricular bigeminy.  He was sent to Dr. Ricardo's office for further evaluation and a repeat EKG at that time showed no evidence of PVCs but did show an ectopic atrial rhythm.  Following that office visit I set him up for an echocardiogram and a stress test in 10/2016 that were both unremarkable.       He was not seen again until 2/2018 when he was admitted for Escherichia coli sepsis, acute respiratory failure, and cholecystitis.  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  Additionally he underwent a cholecystectomy.  Postoperatively he went into atrial fibrillation for which she was managed with metoprolol tartrate.  During that admission he also was diagnosed with an acute DVT of his right lower extremity for which she had an IVC filter placed before his surgery and  postoperatively was started on apixaban.  An echocardiogram during that admission performed on 2/23/2018 showed normal left ventricular systolic function and diastolic function with no significant valvular disease.  He was readmitted soon after discharge in 3/2018 with tachycardia, diaphoresis, and hypertension.  On arrival to the emergency room he was found to be in atrial fibrillation with rapid ventricular rate.  Additionally he was found to have an indeterminate troponin.  However he had no EKG changes or symptoms suggestive of acute coronary syndrome.  At the time and felt that the indeterminate troponin was due to demand ischemia from his atrial fibrillation with rapid ventricular rate and did not recommend any further workup.  He eventually converted back to sinus rhythm and was managed with both metoprolol and digoxin.  He was continued on anticoagulation with apixaban.  Since then the metoprolol was discontinued due to concerns it was causing fatigue.       He eventually had his IVC filter removed in 12/2018.      His mobility has declined over the years to the point he is only able to ambulate short distances with a walker and is otherwise using a wheelchair. Due to incontinence issues he has a suprapubic catheter in place.  He has had a bladder stimulator placed.     He underwent an echocardiogram in 11/2019 to evaluate a systolic murmur noted on exam.  This is performed on 11/21/2019.  Showed normal left ventricular systolic function wall motion with an EF of 60 to 65%, normal diastolic function, aortic valve sclerosis, and otherwise no significant valvular disease.    Review of Systems   Constitutional: Positive for malaise/fatigue.   HENT:  Negative for hearing loss, hoarse voice, nosebleeds and sore throat.    Eyes:  Negative for pain.   Cardiovascular:  Positive for dyspnea on exertion and leg swelling. Negative for chest pain, claudication, cyanosis, irregular heartbeat, near-syncope, orthopnea,  palpitations, paroxysmal nocturnal dyspnea and syncope.   Respiratory:  Negative for shortness of breath and snoring.    Endocrine: Negative for cold intolerance, heat intolerance, polydipsia, polyphagia and polyuria.   Skin:  Negative for itching and rash.   Musculoskeletal:  Negative for arthritis, falls, joint pain, joint swelling, muscle cramps, muscle weakness and myalgias.   Gastrointestinal:  Negative for constipation, diarrhea, dysphagia, heartburn, hematemesis, hematochezia, melena, nausea and vomiting.   Genitourinary:  Negative for frequency, hematuria and hesitancy.   Neurological:  Positive for paresthesias. Negative for excessive daytime sleepiness, dizziness, headaches, light-headedness, numbness and weakness.   Psychiatric/Behavioral:  Negative for depression. The patient is not nervous/anxious.          Current Outpatient Medications:     acetaminophen (TYLENOL) 500 MG tablet, Take 1 tablet by mouth Every 6 (Six) Hours As Needed for Mild Pain., Disp: , Rfl:     allopurinol (ZYLOPRIM) 300 MG tablet, Take 1 tablet by mouth Daily., Disp: 90 tablet, Rfl: 3    amitriptyline (ELAVIL) 10 MG tablet, Take 2 tablets by mouth Every Night., Disp: 28 tablet, Rfl: 1    Ascorbic Acid 300 MG chewable tablet, Chew 300 mg Every Morning. HOLDING FOR SURGERY, Disp: , Rfl:     Blood Glucose Monitoring Suppl (Accu-Chek Guide) w/Device kit, 1 kit See Admin Instructions. Dx code E11.42 TEST BLOOD SUGAR 2 TIMES  DAY  , Disp: 1 kit, Rfl: 0    diphenhydrAMINE-acetaminophen (TYLENOL PM)  MG tablet per tablet, Take 2 tablets by mouth At Night As Needed for Sleep., Disp: , Rfl:     donepezil (ARICEPT) 5 MG tablet, Take 1 tablet by mouth every night at bedtime., Disp: 90 tablet, Rfl: 3    doxycycline (MONODOX) 100 MG capsule, , Disp: , Rfl:     Eliquis 5 MG tablet tablet, TAKE 1 TABLET BY MOUTH TWICE  DAILY, Disp: 180 tablet, Rfl: 3    fenofibrate (TRICOR) 145 MG tablet, TAKE 1 TABLET BY MOUTH DAILY, Disp: 90 tablet, Rfl:  3    fluticasone (FLONASE) 50 MCG/ACT nasal spray, 2 sprays into the nostril(s) as directed by provider Daily., Disp: 48 g, Rfl: 3    furosemide (LASIX) 20 MG tablet, TAKE 1 TABLET BY MOUTH TWICE  DAILY AS NEEDED FOR SWELLING, Disp: 60 tablet, Rfl: 11    Gemtesa 75 MG tablet, , Disp: , Rfl:     glipizide (GLUCOTROL) 10 MG tablet, TAKE 1 TABLET BY MOUTH TWICE  DAILY BEFORE MEALS, Disp: 180 tablet, Rfl: 3    Glucosamine HCl 1500 MG tablet, Take 2 tablets by mouth Daily. HOLDING FOR SURGERY, Disp: , Rfl:     glucose blood test strip, ACCU-CUECK GUIDE test strips. Check 3 times a day Dx: E 11.42, Disp: 300 each, Rfl: 4    Glyxambi 25-5 MG tablet, TAKE 1 TABLET BY MOUTH DAILY  WITH BREAKFAST, Disp: 90 tablet, Rfl: 2    guaifenesin (ROBITUSSIN) 100 MG/5ML liquid, Take 10 mL by mouth 3 (Three) Times a Day As Needed for Cough., Disp: , Rfl:     Hydrocort-Pramoxine, Perianal, (ANALPRAM-HC) 2.5-1 % rectal cream, Insert 3 Applications into the rectum 3 (Three) Times a Day., Disp: , Rfl:     ipratropium (ATROVENT) 0.06 % nasal spray, , Disp: , Rfl:     Lancets misc, SOFT-CLICK LANCETS: Check 3 times a day. Dx: E 11.42, Disp: 300 each, Rfl: 4    lidocaine (LIDODERM) 5 %, Place 1 patch on the skin as directed by provider Daily. Remove & Discard patch within 12 hours or as directed by MD, Disp: 6 each, Rfl: 0    lisinopril (PRINIVIL,ZESTRIL) 2.5 MG tablet, TAKE 1 TABLET BY MOUTH DAILY, Disp: 90 tablet, Rfl: 2    melatonin 5 MG tablet tablet, Take 1 tablet by mouth Every Night., Disp: , Rfl:     Multiple Vitamins-Minerals (MULTIVITAMIN ADULT PO), Take 1 tablet by mouth Daily. HOLDING FOR SURGERY, Disp: , Rfl:     nystatin (MYCOSTATIN) 588216 UNIT/GM cream, Apply 1 Application topically to the appropriate area as directed 2 (Two) Times a Day., Disp: , Rfl:     Omega-3 Fatty Acids (FISH OIL) 1200 MG capsule capsule, Take 2,000 mg by mouth 2 (Two) Times a Day With Meals. HOLDING FOR SURGERY, Disp: , Rfl:     oxyCODONE-acetaminophen  (PERCOCET) 5-325 MG per tablet, Take 1 tablet by mouth 2 (Two) Times a Day As Needed for Severe Pain., Disp: 60 tablet, Rfl: 0    pregabalin (LYRICA) 150 MG capsule, TAKE 1 CAPSULE BY MOUTH 3 TIMES  DAILY FOR DIABETES WITH NERVE  DISEASE, NEUROPATHIC PAIN, Disp: 270 capsule, Rfl: 0    rosuvastatin (CRESTOR) 10 MG tablet, TAKE 1 TABLET BY MOUTH DAILY, Disp: 90 tablet, Rfl: 2    Sodium 3-Hydroxybutyrate (DL-3-Hydroxybutyric Acid Sod) powder, DICLOFENAC/ LIDOCAINE 4/5% APPLY 1-2 GRAMS (1-2 PUMPS) TO THE AFFECTED AREA 3-4 TIMES DAILY., Disp: , Rfl:     Toujeo Max SoloStar 300 UNIT/ML solution pen-injector injection, 100 units daily (Patient taking differently: 100 units daily  **88 units daily), Disp: 90 mL, Rfl: 2    traZODone (DESYREL) 150 MG tablet, Take 2 tablets by mouth Every Night., Disp: 180 tablet, Rfl: 3    vitamin D (ERGOCALCIFEROL) 1.25 MG (32929 UT) capsule capsule, TAKE 1 CAPSULE BY MOUTH EVERY 7  DAYS, Disp: 13 capsule, Rfl: 1    Wheat Dextrin (BENEFIBER DRINK MIX PO), Take 2 teaspoon(s) by mouth 2 (Two) Times a Day., Disp: , Rfl:     Past Medical History:   Diagnosis Date    Acute embolism and thrombosis of deep vein of right distal lower extremity     Acute gangrenous cholecystitis     FEB 2018    Allergic     Aortic valve stenosis     Arthritis     Atrial fibrillation with RVR     HISTORY    Benign prostatic hyperplasia without lower urinary tract symptoms     Cancer of bladder 2016    Colon polyp     Discitis of lumbar region     DVT (deep venous thrombosis)     MARCH 2018    Essential hypertension     Murray catheter in place     LOSS OF SENSATION BLADDER. BECAUSE OF WOUND AT THE TIME    Gout     Heel ulcer     RIGHT. FOLLOWED BY WOUND CARE. GETTING BETTER    History of sepsis     [place] (hard of hearing)     HEARING AIDS BOTH EARS    Hyperlipidemia     Loss, sensation     LOWER EXTREMTIES. RELATED TO LAST BACK SURGERY.    MRSA infection     LEFT LEG.     Nausea, vomiting, and diarrhea 03/01/2019    Open  wound     WITH MEDICATED DRESSING LEFT SHIN AREA.(RESOLVED PER PATIENT)    CELINA (obstructive sleep apnea)     NO MACHINE    Osteoarthritis     Paroxysmal atrial fibrillation     PVC (premature ventricular contraction)     Sepsis 02/2018    Sepsis due to Escherichia coli     Skin carcinoma 2012    MELANOMA.2 PLACES BACK AND EAR    Type 2 diabetes mellitus     Vitamin D deficiency     Weakness        Past Surgical History:   Procedure Laterality Date    ABDOMINAL SURGERY      AORTIC VALVE REPAIR/REPLACEMENT N/A 2/28/2023    Procedure: TTE TRANSFEMORAL TRANSCATHETER AORTIC VALVE REPLACEMENT PERCUTANEOUS APPROACH;  Surgeon: Antony Goldstein MD;  Location: [facility];  Service: Cardiothoracic;  Laterality: N/A;    AORTIC VALVE REPAIR/REPLACEMENT N/A 2/28/2023    Procedure: Transfemoral Transcatheter Aortic Valve Replacement with intraoperative TTE and possible open surgical rescue;  Surgeon: Samuel Zelaya MD;  Location: [facility];  Service: Cardiovascular;  Laterality: N/A;    APPENDECTOMY N/A 1961    CARDIAC CATHETERIZATION N/A 01/16/2023    Procedure: Coronary angiography;  Surgeon: Tasha Burr MD;  Location: [facility] CATH INVASIVE LOCATION;  Service: Cardiology;  Laterality: N/A;    CARDIAC CATHETERIZATION N/A 01/16/2023    Procedure: Left Heart Cath;  Surgeon: Tasha Burr MD;  Location: [facility] CATH INVASIVE LOCATION;  Service: Cardiology;  Laterality: N/A;    CARDIAC CATHETERIZATION N/A 01/16/2023    Procedure: Right Heart Cath;  Surgeon: Tasha Burr MD;  Location: [facility] CATH INVASIVE LOCATION;  Service: Cardiology;  Laterality: N/A;    CHOLECYSTECTOMY WITH INTRAOPERATIVE CHOLANGIOGRAM N/A 02/25/2018    Procedure: CHOLECYSTECTOMY LAPAROSCOPIC INTRAOPERATIVE CHOLANGIOGRAM;  Surgeon: Maegan Correa MD;  Location: [facility] OR;  Service:     COLONOSCOPY N/A 2010    h/o of polyps    COLONOSCOPY W/ BIOPSIES AND POLYPECTOMY  2019    EYE SURGERY Bilateral 1959    glass removal from left eye,  lens transplant    JOINT REPLACEMENT Right 2005    RIGHT KNEE    LAMINECTOMY N/A 01/18/2016    L2-L3, L3-L4, L4-L5, and L5-S1 bilateral lamincectomy, medial facetectomy & [id number], Dr. Kaiden Valdes    LUMBAR FUSION N/A 03/07/2018    Procedure: LUMBAR FUSION MINIMALLY INVASIVE TRANSFORAMINAL LUMBAR INTERBODY IRRIGATION AND DEBRIDEMENT AND FUSION.  IRRIGATION AND DEBRIDEMENT OF EPIDURAL ABCESS LUMBAR 2-4. BONE MORPHOGENIC PROTEIN, ALPHATEC NOVEL AND ILLICO, EMG AND SSEP NEUROMONITORING.;  Surgeon: Manish Marr DO;  Location: [facility];  Service: Orthopedic Spine    SKIN BIOPSY      BACK AND FACE    SPINAL CORD STIMULATOR IMPLANT N/A 07/01/2022    Procedure: SPINAL CORD STIMULATOR INSERTION PHASE 1 ([place]) 4 DAY TRIAL ONLY DUE TO ELIQUIS HOLD.;  Surgeon: Cody Holguin MD;  Location: [facility] OR;  Service: Pain Management;  Laterality: N/A;    SPINAL CORD STIMULATOR IMPLANT N/A 11/17/2022    Procedure: Thoracic eleven laminotomy for placement of a surgical spinal cord stimulator lead to thoracic nine;  Surgeon: Charlie Bailon MD;  Location: [facility];  Service: Neurosurgery;  Laterality: N/A;    SPINAL CORD STIMULATOR IMPLANT N/A 11/18/2022    Procedure: Placement of a left gluteal internal pulse generator;  Surgeon: Charlie Bailon MD;  Location: [facility];  Service: Neurosurgery;  Laterality: N/A;    TOTAL KNEE ARTHROPLASTY Right 03/07/2005    Dr. Washington Evans    VENA CAVA FILTER INSERTION Right 03/06/2018    Procedure: VENA CAVA FILTER INSERTION;  Surgeon: Alexis Thakkar MD;  Location: [facility] 18/19;  Service:     VENA CAVA FILTER REMOVAL N/A 12/03/2018    Procedure: VENA CAVA FILTER REMOVAL WITH VENOCAVAGRAM;  Surgeon: Alexis Thakkar MD;  Location: [facility] 18/19;  Service: Vascular       Family History   Problem Relation Age of Onset    Esophageal cancer Mother     No Known Problems Father     Diabetes Maternal Grandmother     Heart attack  "Maternal Grandfather     Malig Hyperthermia Neg Hx        Social History     Tobacco Use    Smoking status: Former     Types: Cigars, Pipe     Quit date: 2017     Years since quittin.3    Smokeless tobacco: Former     Types: Chew     Quit date:     Tobacco comments:     Caffeine daily   Vaping Use    Vaping status: Never Used   Substance Use Topics    Alcohol use: Not Currently    Drug use: No         ECG 12 Lead    Date/Time: 2024 12:44 PM  Performed by: Tasha Burr MD    Authorized by: Tasha Burr MD  Comparison: compared with previous ECG   Comparison to previous ECG: Ectopic rhythm is new  Comments: Ectopic atrial rhythm             Objective:     Visit Vitals  /77 (BP Location: Left arm, Patient Position: Sitting, Cuff Size: Adult)   Pulse 81   Ht 177.8 cm (70\")   Wt 129 kg (285 lb)   SpO2 91%   BMI 40.89 kg/m²         Constitutional:       Appearance: Normal appearance. Well-developed.   HENT:      Head: Normocephalic and atraumatic.   Neck:      Vascular: No carotid bruit or JVD.   Pulmonary:      Effort: Pulmonary effort is normal.      Breath sounds: Normal breath sounds.   Cardiovascular:      Normal rate. Regular rhythm.      No gallop.       Comments: Bilateral lower extremities are wrapped  Pulses:     Radial: 2+ bilaterally.  Edema:     Peripheral edema present.  Abdominal:      Palpations: Abdomen is soft.   Skin:     General: Skin is warm and dry.   Neurological:      Mental Status: Alert and oriented to person, place, and time.             Assessment:          Diagnosis Plan   1. Nonrheumatic aortic valve stenosis        2. Atrial fibrillation, unspecified type        3. Dyslipidemia        4. Essential hypertension        5. S/P TAVR (transcatheter aortic valve replacement)        6. PVC (premature ventricular contraction)        7. History of DVT of lower extremity        8. Long term (current) use of anticoagulants        9. Type 2 diabetes mellitus with peripheral " neuropathy        10. Obstructive sleep apnea syndrome               Plan:       1.  Aortic valve stenosis status post TAVR.  Doing well from the standpoint.  Echocardiogram earlier this year showed normal function.  2.  Paroxysmal atrial fibrillation.  Maintaining sinus rhythm today.  On chronic anticoagulation with apixaban which she continues to tolerate.  Continue current management.  3.  Hypertension.  Well-controlled on current regimen medications.  Continue the same.  4.  Hyperlipidemia.  On rosuvastatin which is managed by cardiology.  5.  Dyspnea on exertion.  Largely stable.  Coronary angiograms little over a year ago were normal.  Echocardiogram earlier this year was unremarkable with normal function of his bioprosthetic valve.  Suspect this is largely due to deconditioning.  6.  Diabetes mellitus type 2.  7.  Obstructive sleep apnea  8.  Peripheral neuropathy.  Appears to be the patient's primary complaint.  He reports that this is really limiting his quality of life.  Made a long discussion about possible treatment options.  Patient is going to discuss further with Dr. Ricardo.    Plan on seeing the patient back again in 6 months.

## 2024-05-16 ENCOUNTER — OFFICE VISIT (OUTPATIENT)
Age: 84
End: 2024-05-16
Payer: MEDICARE

## 2024-05-16 ENCOUNTER — TELEPHONE (OUTPATIENT)
Dept: FAMILY MEDICINE CLINIC | Facility: CLINIC | Age: 84
End: 2024-05-16
Payer: MEDICARE

## 2024-05-16 VITALS
DIASTOLIC BLOOD PRESSURE: 77 MMHG | BODY MASS INDEX: 40.8 KG/M2 | OXYGEN SATURATION: 91 % | WEIGHT: 285 LBS | SYSTOLIC BLOOD PRESSURE: 122 MMHG | HEIGHT: 70 IN | HEART RATE: 81 BPM

## 2024-05-16 DIAGNOSIS — I48.91 ATRIAL FIBRILLATION, UNSPECIFIED TYPE: ICD-10-CM

## 2024-05-16 DIAGNOSIS — G47.33 OBSTRUCTIVE SLEEP APNEA SYNDROME: ICD-10-CM

## 2024-05-16 DIAGNOSIS — Z86.718 HISTORY OF DVT OF LOWER EXTREMITY: ICD-10-CM

## 2024-05-16 DIAGNOSIS — Z79.01 LONG TERM (CURRENT) USE OF ANTICOAGULANTS: ICD-10-CM

## 2024-05-16 DIAGNOSIS — Z95.2 S/P TAVR (TRANSCATHETER AORTIC VALVE REPLACEMENT): ICD-10-CM

## 2024-05-16 DIAGNOSIS — I10 ESSENTIAL HYPERTENSION: ICD-10-CM

## 2024-05-16 DIAGNOSIS — I49.3 PVC (PREMATURE VENTRICULAR CONTRACTION): ICD-10-CM

## 2024-05-16 DIAGNOSIS — E11.42 TYPE 2 DIABETES MELLITUS WITH PERIPHERAL NEUROPATHY: ICD-10-CM

## 2024-05-16 DIAGNOSIS — I35.0 NONRHEUMATIC AORTIC VALVE STENOSIS: Primary | ICD-10-CM

## 2024-05-16 DIAGNOSIS — E78.5 DYSLIPIDEMIA: ICD-10-CM

## 2024-05-16 RX ORDER — VIBEGRON 75 MG/1
TABLET, FILM COATED ORAL
COMMUNITY
Start: 2024-03-27

## 2024-05-16 NOTE — TELEPHONE ENCOUNTER
Spoke with patient to inform him that Dr. Ricardo is out the office today and will return on Tuesday and I will give him the message to call patient. Patient verbalized understanding

## 2024-05-16 NOTE — TELEPHONE ENCOUNTER
Caller: Jesus Beckham    Relationship: Self    Best call back number: [phone]    PATIENT IS REQUESTING A CALLBACK FROM DR MILES PLEASE, DIRECTLY, NOT ANY NURSES IN OFFICE HE SAYS.  THIS IS REGARDING HIS LEGS.    PLEASE ADVISE

## 2024-05-16 NOTE — LETTER
May 16, 2024       No Recipients    Patient: Jesus Beckham   YOB: 1940   Date of Visit: 5/16/2024       Dear Magdy Ricardo MD,    Jesus Beckham was in my office today. Below are the relevant portions of my assessment and plan of care.           If you have questions, please do not hesitate to call me. I look forward to following Jesus along with you.         Sincerely,        Tasha Burr MD        CC:   No Recipients

## 2024-05-22 DIAGNOSIS — M10.00 IDIOPATHIC GOUT, UNSPECIFIED CHRONICITY, UNSPECIFIED SITE: Chronic | ICD-10-CM

## 2024-05-22 DIAGNOSIS — R41.3 MEMORY DIFFICULTIES: Chronic | ICD-10-CM

## 2024-05-23 RX ORDER — DONEPEZIL HYDROCHLORIDE 5 MG/1
5 TABLET, FILM COATED ORAL
Qty: 90 TABLET | Refills: 0 | Status: SHIPPED | OUTPATIENT
Start: 2024-05-23

## 2024-05-23 RX ORDER — ALLOPURINOL 300 MG/1
300 TABLET ORAL DAILY
Qty: 90 TABLET | Refills: 0 | Status: SHIPPED | OUTPATIENT
Start: 2024-05-23

## 2024-05-28 ENCOUNTER — OFFICE VISIT (OUTPATIENT)
Dept: WOUND CARE | Facility: HOSPITAL | Age: 84
End: 2024-05-28
Payer: MEDICARE

## 2024-05-28 PROCEDURE — G0463 HOSPITAL OUTPT CLINIC VISIT: HCPCS

## 2024-05-29 DIAGNOSIS — E11.42 DIABETIC PERIPHERAL NEUROPATHY: ICD-10-CM

## 2024-05-29 RX ORDER — PREGABALIN 150 MG/1
CAPSULE ORAL
Qty: 270 CAPSULE | Refills: 0 | OUTPATIENT
Start: 2024-05-29

## 2024-05-29 NOTE — TELEPHONE ENCOUNTER
Caller: Mya Beckham    Relationship: Emergency Contact    Best call back number: [phone]    Requested Prescriptions:   Requested Prescriptions     Pending Prescriptions Disp Refills    pregabalin (LYRICA) 150 MG capsule 270 capsule 0     Sig: TAKE 1 CAPSULE BY MOUTH 3 TIMES  DAILY FOR DIABETES WITH NERVE  DISEASE, NEUROPATHIC PAIN        Pharmacy where request should be sent:    [facility]  Last office visit with prescribing clinician: 8/28/2023   Last telemedicine visit with prescribing clinician: Visit date not found   Next office visit with prescribing clinician: 10/7/2024       Does the patient have less than a 3 day supply:  [] Yes  [] No    Would you like a call back once the refill request has been completed: [] Yes [] No    If the office needs to give you a call back, can they leave a voicemail: [] Yes [] No    Bart Oswald Rep   05/29/24 10:30 EDT

## 2024-05-29 NOTE — TELEPHONE ENCOUNTER
Refill for Lyrica refused.  He had a 90-day prescription filled on March 20, 2024.  Refill request is too early.

## 2024-06-03 DIAGNOSIS — E11.42 DIABETIC PERIPHERAL NEUROPATHY: ICD-10-CM

## 2024-06-03 RX ORDER — PREGABALIN 150 MG/1
CAPSULE ORAL
Qty: 270 CAPSULE | Refills: 0 | Status: SHIPPED | OUTPATIENT
Start: 2024-06-03

## 2024-06-03 NOTE — TELEPHONE ENCOUNTER
Hub staff attempted to follow warm transfer process and was unsuccessful     Caller: Mya Beckham    Relationship to patient: Emergency Contact    Best call back number: [phone]    Patient is needing: PATIENT IS CALLING BACK REGARDING THIS REFILL. THEY ARE UNABLE TO GET ON [employer] TO READ THE MESSAGE, CAN SOMEONE PLEASE CALL THE PATIENT AND LET THEM KNOW THE STATUS. THANK YOU

## 2024-06-03 NOTE — TELEPHONE ENCOUNTER
Called and spoke with pt and he stated that he requested the refill because he doesn't want to run out before [facility] can send out the rx.   Please advise.

## 2024-06-05 ENCOUNTER — OFFICE VISIT (OUTPATIENT)
Dept: ENDOCRINOLOGY | Age: 84
End: 2024-06-05
Payer: MEDICARE

## 2024-06-05 VITALS
BODY MASS INDEX: 40.83 KG/M2 | HEART RATE: 85 BPM | DIASTOLIC BLOOD PRESSURE: 78 MMHG | SYSTOLIC BLOOD PRESSURE: 124 MMHG | TEMPERATURE: 96.9 F | WEIGHT: 285.2 LBS | OXYGEN SATURATION: 93 % | HEIGHT: 70 IN

## 2024-06-05 DIAGNOSIS — E11.42 TYPE 2 DIABETES MELLITUS WITH PERIPHERAL NEUROPATHY: Primary | ICD-10-CM

## 2024-06-05 DIAGNOSIS — E55.9 VITAMIN D DEFICIENCY: ICD-10-CM

## 2024-06-05 DIAGNOSIS — E78.5 DYSLIPIDEMIA: ICD-10-CM

## 2024-06-05 PROCEDURE — 3074F SYST BP LT 130 MM HG: CPT | Performed by: NURSE PRACTITIONER

## 2024-06-05 PROCEDURE — 3078F DIAST BP <80 MM HG: CPT | Performed by: NURSE PRACTITIONER

## 2024-06-05 PROCEDURE — 99214 OFFICE O/P EST MOD 30 MIN: CPT | Performed by: NURSE PRACTITIONER

## 2024-06-05 NOTE — PROGRESS NOTES
Chief Complaint  Chief Complaint   Patient presents with    Diabetes     Type 2 : Pt doesn't have meter today, checks bs twice a day, is up to date on eye exam, no hx of retinopathy, moderate neuropathy.         Subjective          History of Present Illness    Jesus Beckham 83 y.o.  presents for a follow-up evaluation for type 2 DM2     He has been diabetic since about 1998 and started insulin in about 2016.      Pt is on the following medications for their DM: Toujeo 84-88 units daily, glyambi 25-5 daily with breakfast and glipizide 10mg twice daily       Pt complains of constipation, shortness of breath with activity and numbness and tingling in feet    Denies diarrhea, chest pain, shortness of breath, vision changes or numbness and tingling in feet/hands.    Pt does not have diabetic retinopathy.  Last eye exam was 05/24     Pt does have a history of nephropathy.  Patient is currently taking ACE - lisinopril 2.5 mg daily     Pt does have neuropathy.  Current takes Lyrica 150mg TID for this condition which is prescribed by this office.     Pt denies a history of CAD or CVA.  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 Goldstein and Dr. Zelaya     Last A1C in 05/24 was 7.0    Last microalbumin in 05/22 was positive          Blood Sugars    Blood glucoses are checked 2/day.    Fasting blood glucoses: 74 - 129    Pre-meal blood glucoses: 106 - 268    Pt denies/has episodes of hypoglycemia.          Hypoglycemic symptoms in 70s/80s            Hyperlipidemia     Pt is currently taking Crestor 10 HS and fenofibrate 145 daily     Last lipid panel in 05/24 showed Total 146, HDL 41, LDL 75 and Triglycerides 175            Vitamin D Deficiency     Current treatment includes 50,000 units weekly     Last Vit D level was 48.0 in 05/24                Other History     He has urinary bladder cancer and has suprapubic catheter.  He follows with Dr. Saleh.             I have reviewed the  "patient's allergies, medicines, past medical hx, family hx and social hx.    Objective   Vital Signs:   /78   Pulse 85   Temp 96.9 °F (36.1 °C) (Temporal)   Ht 177.8 cm (70\")   Wt 129 kg (285 lb 3.2 oz)   SpO2 93%   BMI 40.92 kg/m²       Physical Exam   Physical Exam  Constitutional:       General: He is not in acute distress.     Appearance: Normal appearance. He is obese. He is not diaphoretic.   HENT:      Head: Normocephalic and atraumatic.   Eyes:      General:         Right eye: No discharge.         Left eye: No discharge.   Skin:     General: Skin is warm and dry.   Neurological:      Mental Status: He is alert.   Psychiatric:         Mood and Affect: Mood normal.         Behavior: Behavior normal.                    Results Review:   Hemoglobin A1C   Date Value Ref Range Status   05/28/2024 7.00 (H) 4.80 - 5.60 % Final     Comment:     Hemoglobin A1C Ranges:  Increased Risk for Diabetes  5.7% to 6.4%  Diabetes                     >= 6.5%  Diabetic Goal                < 7.0%     02/24/2023 7.20 (H) 4.80 - 5.60 % Final   01/27/2020 8.5 (H) 4.3 - 5.6 % Final     Comment:     (note)  A1C% Reference Range:  4.3 - 5.6  Normal range  5.7 - 6.4  Pre-diabetic -increased risk for developing diabetes mellitus.  >=6.5      Diabetic -diagnostic of diabetes mellitus.  Note: For diagnosis of diabetes in individuals without unequivocal   hyperglycemia, results should be confirmed by repeat testing.    Patients with conditions that shorten erythrocyte survival, such as   recovery from acute blood loss, hemolytic anemia, kidney disease,   or the presence of unstable hemogloblins like HbSS, HbCC, and HbSC   may yield falsely decreased HbA1c test results. Iron deficiency may   yield falsely increased HbA1c test results.     Triglycerides   Date Value Ref Range Status   05/28/2024 175 (H) 0 - 150 mg/dL Final     HDL Cholesterol   Date Value Ref Range Status   05/28/2024 41 40 - 60 mg/dL Final     LDL Chol Calc (NIH) "   Date Value Ref Range Status   05/28/2024 75 0 - 100 mg/dL Final     VLDL Cholesterol Lance   Date Value Ref Range Status   05/28/2024 30 5 - 40 mg/dL Final         Assessment and Plan {CC Problem List  Visit Diagnosis  ROS  Review (Popup)  Health Maintenance  Quality  BestPractice  Medications  SmartSets  SnapShot Encounters  Media :23  Diagnoses and all orders for this visit:    1. Type 2 diabetes mellitus with peripheral neuropathy (Primary)  -     Hemoglobin A1c; Future  -     Comprehensive Metabolic Panel; Future  -     Microalbumin / Creatinine Urine Ratio - Urine, Clean Catch; Future  -     TSH Rfx On Abnormal To Free T4; Future    A1c is good at 7%  Continue with Toujeo 84-88 units daily, glyambi 25-5 daily with breakfast and glipizide 10mg twice daily   TSH is slightly elevated, but pt states that he was recently ill and on antibiotics - recheck at next visit      2. Dyslipidemia  -     Comprehensive Metabolic Panel; Future  -     Lipid Panel; Future    Total cholesterol and LDL are normal, continue with statin.    Triglycerides are elevated, decrease dietary fat and carbohydrate intake.   Continue with fenofibrate      3. Vitamin D deficiency    Vitamin D levels are good.    Continue with supplement         RTC on 10/07/24 with Dr. Hernandez and labs on 09/23/24      Follow Up     Patient was given instructions and counseling regarding her condition or for health maintenance advice. Please see specific information pulled into the AVS if appropriate.                Yasmine Fong, DANNY  06/05/24

## 2024-06-06 ENCOUNTER — TELEPHONE (OUTPATIENT)
Dept: ENDOCRINOLOGY | Age: 84
End: 2024-06-06

## 2024-06-06 NOTE — TELEPHONE ENCOUNTER
No I did not call the patient.  I sent a message to Dr. Hernandez the day of his last visit about his Lyrica prescription since they said he had adjusted dose

## 2024-06-06 NOTE — TELEPHONE ENCOUNTER
6/6 called and sw pt told him it would not be filled until after 6/13 he stated dr Temple increased it to 4 x day/ told him we would not fill it for 4 times day he needs to call Dr Temple or his pain management

## 2024-06-06 NOTE — TELEPHONE ENCOUNTER
Hub staff attempted to follow warm transfer process and was unsuccessful     Caller: Mya Beckham    Relationship to patient: Emergency Contact    Best call back number: [phone]    Patient is needing: SPOUSE WAS RETURNING A CALL TO SOMEONE IN THE OFFICE. PLEASE CALL HER BACK.

## 2024-06-14 DIAGNOSIS — Z79.4 TYPE 2 DIABETES MELLITUS WITH OTHER SPECIFIED COMPLICATION, WITH LONG-TERM CURRENT USE OF INSULIN: ICD-10-CM

## 2024-06-14 DIAGNOSIS — E11.69 TYPE 2 DIABETES MELLITUS WITH OTHER SPECIFIED COMPLICATION, WITH LONG-TERM CURRENT USE OF INSULIN: ICD-10-CM

## 2024-06-17 DIAGNOSIS — E11.42 DIABETIC PERIPHERAL NEUROPATHY: ICD-10-CM

## 2024-06-17 RX ORDER — ROSUVASTATIN CALCIUM 10 MG/1
10 TABLET, COATED ORAL DAILY
Qty: 90 TABLET | Refills: 1 | Status: SHIPPED | OUTPATIENT
Start: 2024-06-17

## 2024-06-17 RX ORDER — PREGABALIN 150 MG/1
CAPSULE ORAL
Qty: 270 CAPSULE | Refills: 0 | OUTPATIENT
Start: 2024-06-17

## 2024-06-17 RX ORDER — LISINOPRIL 2.5 MG/1
2.5 TABLET ORAL DAILY
Qty: 90 TABLET | Refills: 1 | Status: SHIPPED | OUTPATIENT
Start: 2024-06-17

## 2024-06-17 NOTE — TELEPHONE ENCOUNTER
Caller: Mya Beckham    Relationship: Emergency Contact    Best call back number: [phone]     Requested Prescriptions:   pregabalin (LYRICA) 150 MG capsule [72838] (Order [id number])     Pharmacy where request should be sent: [facility] ([facility]) - [place] [address] [facility] [phone] [facility] [phone]      Last office visit with prescribing clinician: 8/28/2023     Next office visit with prescribing clinician: 10/7/2024     Additional details provided by patient: PATIENT ATTEMPTED TO REFILL LAST WEEK BUT WAS DENIED FOR BEING TOO SOON.     Does the patient have less than a 3 day supply:  [] Yes  [x] No    Would you like a call back once the refill request has been completed: [] Yes [x] No    If the office needs to give you a call back, can they leave a voicemail: [] Yes [x] No

## 2024-06-17 NOTE — TELEPHONE ENCOUNTER
Rx Refill Note  Requested Prescriptions     Pending Prescriptions Disp Refills    rosuvastatin (CRESTOR) 10 MG tablet [Pharmacy Med Name: Rosuvastatin Calcium 10 MG Oral Tablet] 90 tablet 3     Sig: TAKE 1 TABLET BY MOUTH DAILY    lisinopril (PRINIVIL,ZESTRIL) 2.5 MG tablet [Pharmacy Med Name: Lisinopril 2.5 MG Oral Tablet] 90 tablet 3     Sig: TAKE 1 TABLET BY MOUTH DAILY      Last office visit with prescribing clinician: 8/28/2023   Last telemedicine visit with prescribing clinician: Visit date not found   Next office visit with prescribing clinician: 10/7/2024                         Would you like a call back once the refill request has been completed: [] Yes [] No    If the office needs to give you a call back, can they leave a voicemail: [] Yes [] No    Rosio Bullock MA  06/17/24, 07:50 EDT

## 2024-06-25 ENCOUNTER — OFFICE VISIT (OUTPATIENT)
Dept: WOUND CARE | Facility: HOSPITAL | Age: 84
End: 2024-06-25
Payer: MEDICARE

## 2024-06-25 DIAGNOSIS — E11.42 TYPE 2 DIABETES MELLITUS WITH PERIPHERAL NEUROPATHY: ICD-10-CM

## 2024-06-25 RX ORDER — BLOOD SUGAR DIAGNOSTIC
STRIP MISCELLANEOUS 3 TIMES DAILY
Qty: 300 EACH | Refills: 3 | Status: SHIPPED | OUTPATIENT
Start: 2024-06-25

## 2024-06-25 NOTE — TELEPHONE ENCOUNTER
Rx Refill Note  Requested Prescriptions     Pending Prescriptions Disp Refills    Accu-Chek Guide test strip [Pharmacy Med Name: Accu-Chek Guide In Vitro Strip] 300 each 3     Sig: TEST 3 TIMES DAILY      Last office visit with prescribing clinician: 6/5/2024   Last telemedicine visit with prescribing clinician: Visit date not found   Next office visit with prescribing clinician: Visit date not found                         Would you like a call back once the refill request has been completed: [] Yes [] No    If the office needs to give you a call back, can they leave a voicemail: [] Yes [] No    Mila Bullock  06/25/24, 07:28 EDT

## 2024-07-08 DIAGNOSIS — E11.42 DIABETIC PERIPHERAL NEUROPATHY: ICD-10-CM

## 2024-07-09 RX ORDER — AMITRIPTYLINE HYDROCHLORIDE 10 MG/1
20 TABLET, FILM COATED ORAL NIGHTLY
Qty: 180 TABLET | Refills: 3 | OUTPATIENT
Start: 2024-07-09

## 2024-07-23 ENCOUNTER — OFFICE VISIT (OUTPATIENT)
Dept: WOUND CARE | Facility: HOSPITAL | Age: 84
End: 2024-07-23
Payer: MEDICARE

## 2024-07-24 DIAGNOSIS — M10.00 IDIOPATHIC GOUT, UNSPECIFIED CHRONICITY, UNSPECIFIED SITE: Chronic | ICD-10-CM

## 2024-07-24 DIAGNOSIS — E11.42 DIABETIC PERIPHERAL NEUROPATHY: ICD-10-CM

## 2024-07-24 DIAGNOSIS — R41.3 MEMORY DIFFICULTIES: Chronic | ICD-10-CM

## 2024-07-26 RX ORDER — DONEPEZIL HYDROCHLORIDE 5 MG/1
5 TABLET, FILM COATED ORAL
Qty: 30 TABLET | Refills: 0 | Status: SHIPPED | OUTPATIENT
Start: 2024-07-26

## 2024-07-26 RX ORDER — ALLOPURINOL 300 MG/1
300 TABLET ORAL DAILY
Qty: 30 TABLET | Refills: 0 | Status: SHIPPED | OUTPATIENT
Start: 2024-07-26

## 2024-07-26 RX ORDER — AMITRIPTYLINE HYDROCHLORIDE 10 MG/1
20 TABLET, FILM COATED ORAL NIGHTLY
Qty: 60 TABLET | Refills: 0 | Status: SHIPPED | OUTPATIENT
Start: 2024-07-26

## 2024-08-07 DIAGNOSIS — Z79.4 TYPE 2 DIABETES MELLITUS WITH OTHER SPECIFIED COMPLICATION, WITH LONG-TERM CURRENT USE OF INSULIN: ICD-10-CM

## 2024-08-07 DIAGNOSIS — E11.69 TYPE 2 DIABETES MELLITUS WITH OTHER SPECIFIED COMPLICATION, WITH LONG-TERM CURRENT USE OF INSULIN: ICD-10-CM

## 2024-08-08 RX ORDER — EMPAGLIFLOZIN AND LINAGLIPTIN 25; 5 MG/1; MG/1
1 TABLET, FILM COATED ORAL
Qty: 90 TABLET | Refills: 1 | Status: SHIPPED | OUTPATIENT
Start: 2024-08-08

## 2024-08-08 NOTE — TELEPHONE ENCOUNTER
Rx Refill Note  Requested Prescriptions     Pending Prescriptions Disp Refills    Glyxambi 25-5 MG tablet [Pharmacy Med Name: Glyxambi 25-5 MG Oral Tablet] 90 tablet 3     Sig: TAKE 1 TABLET BY MOUTH DAILY  WITH BREAKFAST      Last office visit with prescribing clinician: 8/28/2023   Last telemedicine visit with prescribing clinician: Visit date not found   Next office visit with prescribing clinician: 10/7/2024                         Would you like a call back once the refill request has been completed: [] Yes [] No    If the office needs to give you a call back, can they leave a voicemail: [] Yes [] No    Mila Bullock  08/08/24, 07:38 EDT

## 2024-08-09 DIAGNOSIS — E11.42 DIABETIC PERIPHERAL NEUROPATHY: ICD-10-CM

## 2024-08-11 RX ORDER — AMITRIPTYLINE HYDROCHLORIDE 10 MG/1
20 TABLET, FILM COATED ORAL NIGHTLY
Qty: 60 TABLET | Refills: 0 | Status: SHIPPED | OUTPATIENT
Start: 2024-08-11

## 2024-08-13 ENCOUNTER — OFFICE VISIT (OUTPATIENT)
Dept: WOUND CARE | Facility: HOSPITAL | Age: 84
End: 2024-08-13
Payer: MEDICARE

## 2024-08-15 DIAGNOSIS — M10.00 IDIOPATHIC GOUT, UNSPECIFIED CHRONICITY, UNSPECIFIED SITE: Chronic | ICD-10-CM

## 2024-08-15 DIAGNOSIS — R41.3 MEMORY DIFFICULTIES: Chronic | ICD-10-CM

## 2024-08-16 RX ORDER — ALLOPURINOL 300 MG/1
300 TABLET ORAL DAILY
Qty: 30 TABLET | Refills: 0 | Status: SHIPPED | OUTPATIENT
Start: 2024-08-16

## 2024-08-16 RX ORDER — DONEPEZIL HYDROCHLORIDE 5 MG/1
5 TABLET, FILM COATED ORAL
Qty: 30 TABLET | Refills: 0 | Status: SHIPPED | OUTPATIENT
Start: 2024-08-16

## 2024-08-16 NOTE — TELEPHONE ENCOUNTER
Last Ov- 2/26/2024   Nex OV- Visit date not found     Instructions      Return in about 6 months (around 8/26/2024) for Recheck.  
CAD (coronary artery disease)    Eczema    Heart attack    HLD (hyperlipidemia)    HTN (hypertension)

## 2024-08-20 DIAGNOSIS — F51.01 PRIMARY INSOMNIA: Chronic | ICD-10-CM

## 2024-08-20 RX ORDER — ERGOCALCIFEROL 1.25 MG/1
CAPSULE ORAL
Qty: 13 CAPSULE | Refills: 1 | Status: SHIPPED | OUTPATIENT
Start: 2024-08-20

## 2024-08-20 RX ORDER — TRAZODONE HYDROCHLORIDE 150 MG/1
300 TABLET ORAL NIGHTLY
Qty: 28 TABLET | Refills: 0 | Status: SHIPPED | OUTPATIENT
Start: 2024-08-20

## 2024-08-20 NOTE — TELEPHONE ENCOUNTER
Caller: Jesus Beckham    Relationship: Self    Best call back number: [phone]     Requested Prescriptions:   Requested Prescriptions     Pending Prescriptions Disp Refills    traZODone (DESYREL) 150 MG tablet 180 tablet 3     Sig: Take 2 tablets by mouth Every Night.        Pharmacy where request should be sent: [facility] ([facility]) - [place] [address] [facility] [phone] [facility] [phone]      Last office visit with prescribing clinician: 2/26/2024   Last telemedicine visit with prescribing clinician: Visit date not found   Next office visit with prescribing clinician: Visit date not found     Additional details provided by patient:     Does the patient have less than a 3 day supply:  [] Yes  [x] No    Would you like a call back once the refill request has been completed: [] Yes [] No    If the office needs to give you a call back, can they leave a voicemail: [] Yes [] No    Bart Mackay Rep   08/20/24 13:30 EDT

## 2024-08-20 NOTE — TELEPHONE ENCOUNTER
Rx Refill Note  Requested Prescriptions     Pending Prescriptions Disp Refills    vitamin D (ERGOCALCIFEROL) 1.25 MG (13464 UT) capsule capsule [Pharmacy Med Name: Vitamin D (Ergocalciferol) 1.25 MG (52217 UT) Oral Capsule] 13 capsule 3     Sig: TAKE 1 CAPSULE BY MOUTH EVERY 7  DAYS      Last office visit with prescribing clinician: 8/28/2023   Last telemedicine visit with prescribing clinician: Visit date not found   Next office visit with prescribing clinician: 10/7/2024                         Would you like a call back once the refill request has been completed: [] Yes [] No    If the office needs to give you a call back, can they leave a voicemail: [] Yes [] No    Rosio Bullock MA  08/20/24, 07:51 EDT

## 2024-08-30 DIAGNOSIS — E11.42 DIABETIC PERIPHERAL NEUROPATHY: ICD-10-CM

## 2024-09-02 RX ORDER — AMITRIPTYLINE HYDROCHLORIDE 10 MG/1
20 TABLET ORAL NIGHTLY
Qty: 30 TABLET | Refills: 0 | Status: SHIPPED | OUTPATIENT
Start: 2024-09-02

## 2024-09-04 DIAGNOSIS — M10.00 IDIOPATHIC GOUT, UNSPECIFIED CHRONICITY, UNSPECIFIED SITE: Chronic | ICD-10-CM

## 2024-09-04 DIAGNOSIS — R41.3 MEMORY DIFFICULTIES: Chronic | ICD-10-CM

## 2024-09-05 RX ORDER — DONEPEZIL HYDROCHLORIDE 5 MG/1
5 TABLET, FILM COATED ORAL
Qty: 30 TABLET | Refills: 0 | Status: SHIPPED | OUTPATIENT
Start: 2024-09-05

## 2024-09-05 RX ORDER — ALLOPURINOL 300 MG/1
300 TABLET ORAL DAILY
Qty: 30 TABLET | Refills: 0 | Status: SHIPPED | OUTPATIENT
Start: 2024-09-05

## 2024-09-05 NOTE — TELEPHONE ENCOUNTER
Last Ov- 2/26/2024   Nex OV- Visit date not found     Instructions  Return in about 6 months (around 8/26/2024) for Recheck.

## 2024-09-06 DIAGNOSIS — E11.42 DIABETIC PERIPHERAL NEUROPATHY: ICD-10-CM

## 2024-09-08 RX ORDER — AMITRIPTYLINE HYDROCHLORIDE 10 MG/1
20 TABLET ORAL NIGHTLY
Qty: 30 TABLET | Refills: 23 | OUTPATIENT
Start: 2024-09-08

## 2024-09-09 ENCOUNTER — TELEPHONE (OUTPATIENT)
Dept: ENDOCRINOLOGY | Age: 84
End: 2024-09-09
Payer: MEDICARE

## 2024-09-09 DIAGNOSIS — E11.42 DIABETIC PERIPHERAL NEUROPATHY: ICD-10-CM

## 2024-09-09 DIAGNOSIS — F51.01 PRIMARY INSOMNIA: Chronic | ICD-10-CM

## 2024-09-09 RX ORDER — PREGABALIN 150 MG/1
150 CAPSULE ORAL 3 TIMES DAILY
Qty: 270 CAPSULE | Refills: 0 | Status: SHIPPED | OUTPATIENT
Start: 2024-09-09

## 2024-09-09 RX ORDER — TRAZODONE HYDROCHLORIDE 150 MG/1
300 TABLET ORAL NIGHTLY
Qty: 14 TABLET | Refills: 0 | Status: SHIPPED | OUTPATIENT
Start: 2024-09-09

## 2024-09-09 NOTE — TELEPHONE ENCOUNTER
Rx Refill Note  Requested Prescriptions     Pending Prescriptions Disp Refills    pregabalin (LYRICA) 150 MG capsule 270 capsule 0     Sig: TAKE 1 CAPSULE BY MOUTH 3 TIMES  DAILY FOR DIABETES WITH NERVE  DISEASE, NEUROPATHIC PAIN      Last office visit with prescribing clinician: 8/28/2023   Last telemedicine visit with prescribing clinician: Visit date not found   Next office visit with prescribing clinician: 10/7/2024                         Would you like a call back once the refill request has been completed: [] Yes [] No    If the office needs to give you a call back, can they leave a voicemail: [] Yes [] No    Mila Bullock  09/09/24, 10:18 EDT

## 2024-09-09 NOTE — TELEPHONE ENCOUNTER
Caller: Mya Beckham    Relationship: Emergency Contact    Best call back number: [phone]    Requested Prescriptions: pregabalin (LYRICA) 150 MG capsule     Pharmacy where request should be sent:    [facility] ([facility] - [place], [place] - [address] [facility] - [phone]  - [phone]   [address] [place] [address], [facility] [address]  Phone: [phone]  Fax: [phone]     Last office visit with prescribing clinician: 8/28/2023   Last telemedicine visit with prescribing clinician: Visit date not found   Next office visit with prescribing clinician: 10/7/2024     Additional details provided by patient: HAS A WEEK'S WORTH LEFT    Does the patient have less than a 3 day supply:  [] Yes  [x] No    Would you like a call back once the refill request has been completed: [x] Yes [] No    If the office needs to give you a call back, can they leave a voicemail: [x] Yes [] No    Bart Dolan Rep   09/09/24 10:10 EDT

## 2024-09-09 NOTE — TELEPHONE ENCOUNTER
Caller: Mya Beckham    Relationship: Emergency Contact    Best call back number: [phone]    Requested Prescriptions:   Requested Prescriptions     Pending Prescriptions Disp Refills    traZODone (DESYREL) 150 MG tablet 28 tablet 0     Sig: Take 2 tablets by mouth Every Night.        Pharmacy where request should be sent: [facility] ([facility]) - [place] [address] [facility] [phone] [facility] [phone]      Last office visit with prescribing clinician: 2/26/2024   Last telemedicine visit with prescribing clinician: Visit date not found   Next office visit with prescribing clinician: Visit date not found     Additional details provided by patient: PATIENT IS OUT OF MEDICATION HAS BEEN OUT A FEW DAYS     Does the patient have less than a 3 day supply:  [] Yes  [] No    Would you like a call back once the refill request has been completed: [] Yes [x] No    If the office needs to give you a call back, can they leave a voicemail: [] Yes [x] No    Bart Perez   09/09/24 13:49 EDT

## 2024-09-10 ENCOUNTER — OFFICE VISIT (OUTPATIENT)
Dept: WOUND CARE | Facility: HOSPITAL | Age: 84
End: 2024-09-10
Payer: MEDICARE

## 2024-09-18 DIAGNOSIS — F51.01 PRIMARY INSOMNIA: Chronic | ICD-10-CM

## 2024-09-18 RX ORDER — TRAZODONE HYDROCHLORIDE 150 MG/1
300 TABLET ORAL NIGHTLY
Qty: 14 TABLET | Refills: 0 | OUTPATIENT
Start: 2024-09-18

## 2024-09-26 DIAGNOSIS — R41.3 MEMORY DIFFICULTIES: Chronic | ICD-10-CM

## 2024-09-26 DIAGNOSIS — M10.00 IDIOPATHIC GOUT, UNSPECIFIED CHRONICITY, UNSPECIFIED SITE: Chronic | ICD-10-CM

## 2024-09-27 RX ORDER — DONEPEZIL HYDROCHLORIDE 5 MG/1
5 TABLET, FILM COATED ORAL
Qty: 30 TABLET | Refills: 0 | Status: SHIPPED | OUTPATIENT
Start: 2024-09-27

## 2024-09-27 RX ORDER — ALLOPURINOL 300 MG/1
300 TABLET ORAL DAILY
Qty: 30 TABLET | Refills: 0 | Status: SHIPPED | OUTPATIENT
Start: 2024-09-27

## 2024-10-02 RX ORDER — MIRABEGRON 50 MG/1
50 TABLET, FILM COATED, EXTENDED RELEASE ORAL DAILY
COMMUNITY
Start: 2024-09-23

## 2024-10-02 NOTE — PROGRESS NOTES
Subjective   The ABCs of the Annual Wellness Visit  Medicare Wellness Visit      Jesus Beckham is a 83 y.o. patient who presents for a Medicare Wellness Visit.    The following portions of the patient's history were reviewed and   updated as appropriate: allergies, current medications, past family history, past medical history, past social history, past surgical history, and problem list.    Compared to one year ago, the patient's physical   health is the same.  Compared to one year ago, the patient's mental   health is the same.    Recent Hospitalizations:  This patient has had a [facility] admission record on file within the last 365 days.  Current Medical Providers:  Patient Care Team:  Magdy Ricardo MD as PCP - General  Magdy Ricardo MD as PCP - Family Medicine  Jerrell Fry MD as Consulting Physician (Urology)  Carlos Manuel Saleh MD as Consulting Physician (Urology)  Thuan Fernandez MD as Consulting Physician (Ophthalmology)  Lito Hernandez MD as Consulting Physician (Endocrinology)  Tasha Burr MD as Consulting Physician (Cardiology)  Samuel Zelaya MD as Consulting Physician (Cardiology)  Thuan Alvarado MD as Consulting Physician (Gastroenterology)  Emmett Barber MD as Surgeon (Plastic Surgery)  Cherelle Mora APRN as Nurse Practitioner (Nurse Practitioner)  Ignacia Layton APRN as Nurse Practitioner (Pain Medicine)  Yasmine Fong APRN as Nurse Practitioner (Nurse Practitioner)    Outpatient Medications Prior to Visit   Medication Sig Dispense Refill    Myrbetriq 50 MG tablet sustained-release 24 hour 24 hr tablet Take 50 mg by mouth Daily.      Accu-Chek Guide test strip TEST 3 TIMES DAILY 300 each 3    acetaminophen (TYLENOL) 500 MG tablet Take 1 tablet by mouth Every 6 (Six) Hours As Needed for Mild Pain.      Ascorbic Acid 300 MG chewable tablet Chew 300 mg Every Morning. HOLDING FOR SURGERY      Blood Glucose Monitoring Suppl (Accu-Chek Guide)  w/Device kit 1 kit See Admin Instructions. Dx code E11.42 TEST BLOOD SUGAR 2 TIMES  DAY    1 kit 0    diphenhydrAMINE-acetaminophen (TYLENOL PM)  MG tablet per tablet Take 2 tablets by mouth At Night As Needed for Sleep.      Eliquis 5 MG tablet tablet TAKE 1 TABLET BY MOUTH TWICE  DAILY 180 tablet 3    fenofibrate (TRICOR) 145 MG tablet TAKE 1 TABLET BY MOUTH DAILY 90 tablet 3    furosemide (LASIX) 20 MG tablet TAKE 1 TABLET BY MOUTH TWICE  DAILY AS NEEDED FOR SWELLING 60 tablet 11    Gemtesa 75 MG tablet       glipizide (GLUCOTROL) 10 MG tablet TAKE 1 TABLET BY MOUTH TWICE  DAILY BEFORE MEALS 180 tablet 3    Glucosamine HCl 1500 MG tablet Take 2 tablets by mouth Daily. HOLDING FOR SURGERY      Glyxambi 25-5 MG tablet TAKE 1 TABLET BY MOUTH DAILY  WITH BREAKFAST 90 tablet 1    guaifenesin (ROBITUSSIN) 100 MG/5ML liquid Take 10 mL by mouth 3 (Three) Times a Day As Needed for Cough.      Hydrocort-Pramoxine, Perianal, (ANALPRAM-HC) 2.5-1 % rectal cream Insert 3 Applications into the rectum 3 (Three) Times a Day.      ipratropium (ATROVENT) 0.06 % nasal spray       Lancets misc SOFT-CLICK LANCETS: Check 3 times a day. Dx: E 11.42 300 each 4    lidocaine (LIDODERM) 5 % Place 1 patch on the skin as directed by provider Daily. Remove & Discard patch within 12 hours or as directed by MD 6 each 0    lisinopril (PRINIVIL,ZESTRIL) 2.5 MG tablet TAKE 1 TABLET BY MOUTH DAILY 90 tablet 1    melatonin 5 MG tablet tablet Take 1 tablet by mouth Every Night.      Multiple Vitamins-Minerals (MULTIVITAMIN ADULT PO) Take 1 tablet by mouth Daily. HOLDING FOR SURGERY      nystatin (MYCOSTATIN) 961582 UNIT/GM cream Apply 1 Application topically to the appropriate area as directed 2 (Two) Times a Day.      Omega-3 Fatty Acids (FISH OIL) 1200 MG capsule capsule Take 2,000 mg by mouth 2 (Two) Times a Day With Meals. HOLDING FOR SURGERY      pregabalin (LYRICA) 150 MG capsule Take 1 capsule by mouth 3 times a day. TAKE 1 CAPSULE BY  MOUTH 3 TIMES  DAILY FOR DIABETES WITH NERVE  DISEASE, NEUROPATHIC PAIN  Indications: Diabetes with Nerve Disease 270 capsule 0    rosuvastatin (CRESTOR) 10 MG tablet TAKE 1 TABLET BY MOUTH DAILY 90 tablet 1    Sodium 3-Hydroxybutyrate (DL-3-Hydroxybutyric Acid Sod) powder DICLOFENAC/ LIDOCAINE 4/5% APPLY 1-2 GRAMS (1-2 PUMPS) TO THE AFFECTED AREA 3-4 TIMES DAILY.      Toujeo Max SoloStar 300 UNIT/ML solution pen-injector injection 100 units daily (Patient taking differently: 100 units daily    **88 units daily) 90 mL 2    vitamin D (ERGOCALCIFEROL) 1.25 MG (65784 UT) capsule capsule TAKE 1 CAPSULE BY MOUTH EVERY 7  DAYS 13 capsule 1    Wheat Dextrin (BENEFIBER DRINK MIX PO) Take 2 teaspoon(s) by mouth 2 (Two) Times a Day.      allopurinol (ZYLOPRIM) 300 MG tablet TAKE 1 TABLET BY MOUTH DAILY 30 tablet 0    amitriptyline (ELAVIL) 10 MG tablet TAKE 2 TABLETS BY MOUTH AT NIGHT 30 tablet 0    donepezil (ARICEPT) 5 MG tablet TAKE 1 TABLET BY MOUTH EVERY  NIGHT AT BEDTIME 30 tablet 0    fluticasone (FLONASE) 50 MCG/ACT nasal spray 2 sprays into the nostril(s) as directed by provider Daily. 48 g 3    traZODone (DESYREL) 150 MG tablet Take 2 tablets by mouth Every Night. 14 tablet 0     No facility-administered medications prior to visit.     Opioid medication/s are on active medication list.  and I have evaluated his active treatment plan and pain score trends (see table).  Vitals:    10/03/24 0927   PainSc:   7     I have reviewed the chart for potential of high risk medication and harmful drug interactions in the elderly.        Aspirin is not on active medication list.  Aspirin use is not indicated based on review of current medical condition/s. Risk of harm outweighs potential benefits.  .    Patient Active Problem List   Diagnosis    Gout    Diabetic peripheral neuropathy    Dyslipidemia    Essential hypertension    PVC (premature ventricular contraction)    Vitamin D deficiency    Benign non-nodular prostatic  hyperplasia without lower urinary tract symptoms    Osteoarthritis    Urge incontinence    Acute gangrenous cholecystitis    Discitis of lumbar region    Paroxysmal atrial fibrillation    History of sepsis    Hyperuricemia    Chronic deep vein thrombosis (DVT) of right peroneal vein    Nausea, vomiting, and diarrhea    Colitis presumed infectious    Urinary tract infection in male    DDD (degenerative disc disease), lumbar    Bilateral leg weakness    History of lumbar spinal fusion    Carpal tunnel syndrome    Adhesive arachnoiditis    Chronic anticoagulation    Diverticulosis    Hematuria    Lower obstructive uropathy    Splenic lesion    Suprapubic catheter dysfunction    Obesity (BMI 30-39.9)    Venous insufficiency of left leg    Memory difficulties    Chronic pain syndrome    Type 2 diabetes mellitus with peripheral neuropathy    Encounter for long-term (current) use of high-risk medication    Therapeutic opioid induced constipation    Abnormal CT of brain    Suprapubic catheter    Chronic bladder pain    Proteinuria    Lumbar radiculopathy    Nonrheumatic aortic valve stenosis    Snoring    Class 3 severe obesity due to excess calories without serious comorbidity with body mass index (BMI) of 40.0 to 44.9 in adult    Non-restorative sleep    Hypersomnia    History of DVT of lower extremity    Status post insertion of spinal cord stimulator    S/P TAVR (transcatheter aortic valve replacement)    Abdominal distension (gaseous)    Back pain    Dorsalgia, unspecified    Benign neoplasm of ascending colon    Benign neoplasm of descending colon    Benign neoplasm of transverse colon    Benign neoplasm of cecum    Colon polyps    Rectal polyp    Disorder of muscle, unspecified    Family history of colonic polyps    Constipation    Fecal incontinence    History of colonic polyps    Personal history of colonic polyps    Cancer    Malignant (primary) neoplasm, unspecified    Other abnormalities of heart beat    Pure  "hypercholesterolemia    Unspecified hemorrhoids    Atrial fibrillation    Long term (current) use of anticoagulants    Dvrtclos of lg int w/o perforation or abscess w/o bleeding    Encounter for screening for malignant neoplasm of colon    Sleep apnea    Arthritis    Coagulation defect, unspecified    Diabetes mellitus    UTI (urinary tract infection)     Advance Care Planning Advance Directive is on file.  ACP discussion was held with the patient during this visit. Patient has an advance directive in EMR which is still valid.             Objective   Vitals:    10/03/24 0927   BP: 112/58   Pulse: 90   Resp: 18   Temp: 97.9 °F (36.6 °C)   TempSrc: Oral   SpO2: 98%   Weight: 127 kg (280 lb)  Comment: WHEELCHAIR   Height: 177.8 cm (70\")   PainSc:   7       Estimated body mass index is 40.18 kg/m² as calculated from the following:    Height as of this encounter: 177.8 cm (70\").    Weight as of this encounter: 127 kg (280 lb).    Class 3 Severe Obesity (BMI >=40). Obesity-related health conditions include the following: hypertension, diabetes mellitus, and dyslipidemias. Obesity is unchanged. BMI is is above average; BMI management plan is completed. We discussed portion control and increasing exercise.       Does the patient have evidence of cognitive impairment? No  Lab Results   Component Value Date    CHLPL 138 2024    TRIG 179 (H) 2024    HDL 36 (L) 2024    LDL 72 2024    VLDL 30 2024    HGBA1C 7.40 (H) 2024                                                                                                Health  Risk Assessment    Smoking Status:  Social History     Tobacco Use   Smoking Status Former    Types: Cigars, Pipe    Quit date:     Years since quittin.7   Smokeless Tobacco Former    Types: Chew    Quit date:    Tobacco Comments    Caffeine daily     Alcohol Consumption:  Social History     Substance and Sexual Activity   Alcohol Use Not Currently       Fall Risk " Screen  STEADI Fall Risk Assessment has not been completed.    Depression Screenin/27/2023     8:01 AM   PHQ-2/PHQ-9 Depression Screening   Little Interest or Pleasure in Doing Things 0-->not at all   Feeling Down, Depressed or Hopeless 0-->not at all   PHQ-9: Brief Depression Severity Measure Score 0     Health Habits and Functional and Cognitive Screening:      10/3/2024     9:00 AM   Functional & Cognitive Status   Do you have difficulty preparing food and eating? No   Do you have difficulty bathing yourself, getting dressed or grooming yourself? No   Do you have difficulty using the toilet? Yes   Do you have difficulty moving around from place to place? Yes   Do you have trouble with steps or getting out of a bed or a chair? Yes   Current Diet Limited Junk Food   Dental Exam Up to date   Eye Exam Up to date   Exercise (times per week) 0 times per week   Current Exercises Include No Regular Exercise   Do you need help using the phone?  No   Are you deaf or do you have serious difficulty hearing?  No   Do you need help to go to places out of walking distance? Yes   Do you need help shopping? Yes   Do you need help preparing meals?  Yes   Do you need help with housework?  Yes   Do you need help with laundry? Yes   Do you need help taking your medications? Yes   Do you need help managing money? No   Do you ever drive or ride in a car without wearing a seat belt? No   Have you felt unusual stress, anger or loneliness in the last month? No   Who do you live with? Spouse   If you need help, do you have trouble finding someone available to you? No   Have you been bothered in the last four weeks by sexual problems? No   Do you have difficulty concentrating, remembering or making decisions? No           Age-appropriate Screening Schedule:  Refer to the list below for future screening recommendations based on patient's age, sex and/or medical conditions. Orders for these recommended tests are listed in the plan  section. The patient has been provided with a written plan.    Health Maintenance List  Health Maintenance   Topic Date Due    PROSTATE CANCER SCREENING  05/22/2019    URINE MICROALBUMIN  05/12/2023    ZOSTER VACCINE (2 of 2) 11/06/2024 (Originally 11/19/2013)    Pneumococcal Vaccine 65+ (1 of 2 - PCV) 12/07/2024 (Originally 10/26/1946)    COVID-19 Vaccine (7 - 2023-24 season) 12/23/2024 (Originally 9/1/2024)    INFLUENZA VACCINE  03/31/2025 (Originally 8/1/2024)    HEMOGLOBIN A1C  03/23/2025    DIABETIC EYE EXAM  05/08/2025    LIPID PANEL  09/23/2025    ANNUAL WELLNESS VISIT  10/03/2025    BMI FOLLOWUP  10/03/2025    COLORECTAL CANCER SCREENING  07/29/2029    RSV Vaccine - Adults  Completed    TDAP/TD VACCINES  Discontinued                                                                                                                                                CMS Preventative Services Quick Reference  Risk Factors Identified During Encounter  None Identified    The above risks/problems have been discussed with the patient.  Pertinent information has been shared with the patient in the After Visit Summary.  An After Visit Summary and PPPS were made available to the patient.    Follow Up:   Next Medicare Wellness visit to be scheduled in 1 year.         Additional E&M Note during same encounter follows:  Patient has additional, significant, and separately identifiable condition(s)/problem(s) that require work above and beyond the Medicare Wellness Visit     Chief Complaint  Insomnia (MED REFILL DUE /LAB RESULTS /ENDO  ), Arthritis, medicare welness (Due ), and Allergies (MAIL ORDER PHARM )    Subjective   HPI  Jesus is also being seen today for additional medical problem/s. Pt having a good deal of pain issues with his legs, mainly at night, affecting his sleep.     Review of Systems   Constitutional:  Negative for chills and fever.   HENT:  Negative for congestion, ear pain and sinus pressure.    Eyes:   "Negative for pain and visual disturbance.   Respiratory:  Negative for cough and shortness of breath.    Cardiovascular:  Negative for chest pain.   Gastrointestinal:  Negative for abdominal pain.   Genitourinary:  Negative for difficulty urinating and dysuria.   Skin: Negative.    Neurological: Negative.    Psychiatric/Behavioral:  Negative for dysphoric mood.               Objective   Vital Signs:  /58   Pulse 90   Temp 97.9 °F (36.6 °C) (Oral)   Resp 18   Ht 177.8 cm (70\")   Wt 127 kg (280 lb) Comment: WHEELCHAIR  SpO2 98%   BMI 40.18 kg/m²   Physical Exam  Vitals and nursing note reviewed.   Constitutional:       General: He is not in acute distress.     Appearance: He is well-developed.   Cardiovascular:      Rate and Rhythm: Normal rate and regular rhythm.   Pulmonary:      Effort: Pulmonary effort is normal.      Breath sounds: Normal breath sounds.   Neurological:      Mental Status: He is alert and oriented to person, place, and time.   Psychiatric:         Behavior: Behavior normal.         Thought Content: Thought content normal.     Labs from specialist reviewed by me at today's visit.    The following data was reviewed by: Magdy Ricardo MD on 10/03/2024:        Assessment and Plan Additional age appropriate preventative wellness advice topics were discussed during today's preventative wellness exam(some topics already addressed during AWV portion of the note above):    Physical Activity: Advised cardiovascular activity 150 minutes per week as tolerated. (example brisk walk for 30 minutes, 5 days a week).     Nutrition: Discussed nutrition plan with patient. Information shared in after visit summary. Goal is for a well balanced diet to enhance overall health.              Encounter for subsequent annual wellness visit (AWV) in Medicare patient    Idiopathic gout, unspecified chronicity, unspecified site    Diabetic peripheral neuropathy  Diabetes is stable.   Continue current treatment " regimen.  Diabetes will be reassessed in 6 months  Memory difficulties    Chronic seasonal allergic rhinitis due to pollen    Primary insomnia    Lumbar radiculopathy    No orders of the defined types were placed in this encounter.    New Medications Ordered This Visit   Medications    allopurinol (ZYLOPRIM) 300 MG tablet     Sig: Take 1 tablet by mouth Daily.     Dispense:  90 tablet     Refill:  3    amitriptyline (ELAVIL) 10 MG tablet     Sig: Take 2 tablets by mouth Every Night.     Dispense:  180 tablet     Refill:  3    donepezil (ARICEPT) 5 MG tablet     Sig: Take 1 tablet by mouth every night at bedtime.     Dispense:  90 tablet     Refill:  3    fluticasone (FLONASE) 50 MCG/ACT nasal spray     Sig: Administer 2 sprays into the nostril(s) as directed by provider Daily.     Dispense:  48 g     Refill:  3    traZODone (DESYREL) 150 MG tablet     Sig: Take 2 tablets by mouth Every Night.     Dispense:  180 tablet     Refill:  3    traMADol (ULTRAM) 50 MG tablet     Sig: Take 1-2 tablets by mouth Every Night.     Dispense:  60 tablet     Refill:  2        I spent 15 minutes caring for Jesus on this date of service. This time includes time spent by me in the following activities:preparing for the visit, reviewing tests, performing a medically appropriate examination and/or evaluation , ordering medications, tests, or procedures, and documenting information in the medical record  Follow Up   No follow-ups on file.  Patient was given instructions and counseling regarding his condition or for health maintenance advice. Please see specific information pulled into the AVS if appropriate.

## 2024-10-03 ENCOUNTER — OFFICE VISIT (OUTPATIENT)
Dept: FAMILY MEDICINE CLINIC | Facility: CLINIC | Age: 84
End: 2024-10-03
Payer: MEDICARE

## 2024-10-03 VITALS
DIASTOLIC BLOOD PRESSURE: 58 MMHG | RESPIRATION RATE: 18 BRPM | HEART RATE: 90 BPM | TEMPERATURE: 97.9 F | HEIGHT: 70 IN | WEIGHT: 280 LBS | OXYGEN SATURATION: 98 % | BODY MASS INDEX: 40.09 KG/M2 | SYSTOLIC BLOOD PRESSURE: 112 MMHG

## 2024-10-03 DIAGNOSIS — J30.1 CHRONIC SEASONAL ALLERGIC RHINITIS DUE TO POLLEN: Chronic | ICD-10-CM

## 2024-10-03 DIAGNOSIS — E11.42 DIABETIC PERIPHERAL NEUROPATHY: ICD-10-CM

## 2024-10-03 DIAGNOSIS — R41.3 MEMORY DIFFICULTIES: Chronic | ICD-10-CM

## 2024-10-03 DIAGNOSIS — M54.16 LUMBAR RADICULOPATHY: ICD-10-CM

## 2024-10-03 DIAGNOSIS — M10.00 IDIOPATHIC GOUT, UNSPECIFIED CHRONICITY, UNSPECIFIED SITE: Chronic | ICD-10-CM

## 2024-10-03 DIAGNOSIS — F51.01 PRIMARY INSOMNIA: Chronic | ICD-10-CM

## 2024-10-03 DIAGNOSIS — Z00.00 ENCOUNTER FOR SUBSEQUENT ANNUAL WELLNESS VISIT (AWV) IN MEDICARE PATIENT: Primary | ICD-10-CM

## 2024-10-03 RX ORDER — ALLOPURINOL 300 MG/1
300 TABLET ORAL DAILY
Qty: 90 TABLET | Refills: 3 | Status: SHIPPED | OUTPATIENT
Start: 2024-10-03

## 2024-10-03 RX ORDER — AMITRIPTYLINE HYDROCHLORIDE 10 MG/1
20 TABLET ORAL NIGHTLY
Qty: 180 TABLET | Refills: 3 | Status: SHIPPED | OUTPATIENT
Start: 2024-10-03

## 2024-10-03 RX ORDER — TRAMADOL HYDROCHLORIDE 50 MG/1
50-100 TABLET ORAL NIGHTLY
Qty: 60 TABLET | Refills: 2 | Status: SHIPPED | OUTPATIENT
Start: 2024-10-03

## 2024-10-03 RX ORDER — TRAZODONE HYDROCHLORIDE 150 MG/1
300 TABLET ORAL NIGHTLY
Qty: 180 TABLET | Refills: 3 | Status: SHIPPED | OUTPATIENT
Start: 2024-10-03

## 2024-10-03 RX ORDER — DONEPEZIL HYDROCHLORIDE 5 MG/1
5 TABLET, FILM COATED ORAL
Qty: 90 TABLET | Refills: 3 | Status: SHIPPED | OUTPATIENT
Start: 2024-10-03

## 2024-10-03 RX ORDER — FLUTICASONE PROPIONATE 50 UG/1
2 SPRAY, METERED NASAL DAILY
Qty: 48 G | Refills: 3 | Status: SHIPPED | OUTPATIENT
Start: 2024-10-03

## 2024-10-03 NOTE — PATIENT INSTRUCTIONS
Medicare Wellness  Personal Prevention Plan of Service     Date of Office Visit:    Encounter Provider:  Magdy Ricardo MD  Place of Service:  [facility]  Patient Name: Jesus Beckham  :  1940    As part of the Medicare Wellness portion of your visit today, we are providing you with this personalized preventive plan of services (PPPS). This plan is based upon recommendations of the [place] Preventive Services Task Force (USPSTF) and the Advisory Committee on Immunization Practices (ACIP).    This lists the preventive care services that should be considered, and provides dates of when you are due. Items listed as completed are up-to-date and do not require any further intervention.    Health Maintenance   Topic Date Due    PROSTATE CANCER SCREENING  2019    URINE MICROALBUMIN  2023    ZOSTER VACCINE (2 of 2) 2024 (Originally 2013)    Pneumococcal Vaccine 65+ (1 of 2 - PCV) 2024 (Originally 10/26/1946)    COVID-19 Vaccine ( - -24 season) 2024 (Originally 2024)    INFLUENZA VACCINE  2025 (Originally 2024)    HEMOGLOBIN A1C  2025    DIABETIC EYE EXAM  2025    LIPID PANEL  2025    ANNUAL WELLNESS VISIT  10/03/2025    BMI FOLLOWUP  10/03/2025    COLORECTAL CANCER SCREENING  2029    RSV Vaccine - Adults  Completed    TDAP/TD VACCINES  Discontinued       No orders of the defined types were placed in this encounter.      Return in about 3 months (around 1/3/2025) for Recheck.

## 2024-10-07 ENCOUNTER — OFFICE VISIT (OUTPATIENT)
Dept: ENDOCRINOLOGY | Age: 84
End: 2024-10-07
Payer: MEDICARE

## 2024-10-07 VITALS
BODY MASS INDEX: 40.18 KG/M2 | DIASTOLIC BLOOD PRESSURE: 62 MMHG | HEIGHT: 70 IN | SYSTOLIC BLOOD PRESSURE: 118 MMHG | HEART RATE: 72 BPM | TEMPERATURE: 97.7 F | OXYGEN SATURATION: 92 %

## 2024-10-07 DIAGNOSIS — C67.9 MALIGNANT NEOPLASM OF URINARY BLADDER, UNSPECIFIED SITE: ICD-10-CM

## 2024-10-07 DIAGNOSIS — E11.42 TYPE 2 DIABETES MELLITUS WITH PERIPHERAL NEUROPATHY: Primary | ICD-10-CM

## 2024-10-07 DIAGNOSIS — E78.5 DYSLIPIDEMIA: ICD-10-CM

## 2024-10-07 DIAGNOSIS — G47.33 OBSTRUCTIVE SLEEP APNEA SYNDROME: ICD-10-CM

## 2024-10-07 DIAGNOSIS — E55.9 VITAMIN D DEFICIENCY: ICD-10-CM

## 2024-10-07 DIAGNOSIS — Z91.119 DIETARY NONCOMPLIANCE: ICD-10-CM

## 2024-10-07 PROCEDURE — 3078F DIAST BP <80 MM HG: CPT | Performed by: INTERNAL MEDICINE

## 2024-10-07 PROCEDURE — 3074F SYST BP LT 130 MM HG: CPT | Performed by: INTERNAL MEDICINE

## 2024-10-07 PROCEDURE — 99214 OFFICE O/P EST MOD 30 MIN: CPT | Performed by: INTERNAL MEDICINE

## 2024-10-07 NOTE — PROGRESS NOTES
Subjective   Jesus Beckham is a 83 y.o. male.     History of Present Illness     He has known diabetes mellitus since 1998 and started on insulin in 2016.     He is on Toujeo  units every morning, Glyxambi 25/5 mg once a day and glipizide 10 mg twice a day.  He checks his blood sugar twice a day.  Fasting glucose .  Suppertime glucose .  He has few hypoglycemic episodes.  He admits to dietary noncompliance.  Hemoglobin A1c done in September 2024 is 7.4%.     His last eye examination was in 5/24.  He has no retinopathy.  Urine microalbumin was elevated in May 2022 (urine was collected from a suprapubic catheter).  He is on lisinopril.  He has tingling, burning and pain in his toes and hands despite being on Lyrica 150 mg 3 times daily.  He has neurostimulator in place.     He has chronic wound on both feet and follows at [facility].  He is being followed by Dr. Barber.     He has hyperlipidemia and is on Crestor 10 mg/day, fenofibrate 145 mg/day and fish oil 1 capsule once a day.  He denies myalgia.  Lipid panel done in September 2024 as follows: Cholesterol 138.  HDL 36.  LDL 72.  Triglycerides 179.     He has vitamin D deficiency and is on ergocalciferol 50,000 units once a week.  25-hydroxy vitamin D done in May 2024 is normal at 48.0 ng/mL.     He had transcatheter aortic valve replacement for aortic stenosis in February 2022.  He has history of paroxysmal atrial fibrillation.   He denies PND.  He denies orthopnea.     He had of DVT of the lower extremities and is on Eliquis prescriber Dr. Burr.  He had pulmonary embolism in the past and had a vena caval filter placement which was later removed.     He has sleep apnea and is unable to tolerate CPAP.  He was last seen by Dr. Dempsey in August 2022.  He wakes up rested.     He has urinary bladder cancer and has suprapubic catheter.   He follows with Dr. Saleh.    He has chronic constipation.  He is on MiraLAX and fiber supplements.   "His last colonoscopy was in 2019 and polyps were removed by Dr. Alvarado.  He does not want another colonoscopy    The following portions of the patient's history were reviewed and updated as appropriate: allergies, current medications, past family history, past medical history, past social history, past surgical history, and problem list.    Review of Systems   Eyes:  Negative for visual disturbance.   Respiratory:  Negative for cough and wheezing.    Cardiovascular:  Negative for chest pain and palpitations.   Gastrointestinal:  Positive for constipation.   Musculoskeletal:  Negative for myalgias.   Neurological:  Negative for numbness.     Vitals:    10/07/24 0822   BP: 118/62   Pulse: 72   Temp: 97.7 °F (36.5 °C)   TempSrc: Oral   SpO2: 92%   Height: 177.8 cm (70\")      Objective   Physical Exam  Constitutional:       Appearance: Normal appearance. He is not toxic-appearing or diaphoretic.   Eyes:      General: No scleral icterus.        Right eye: No discharge.         Left eye: No discharge.      Extraocular Movements: Extraocular movements intact.   Cardiovascular:      Rate and Rhythm: Normal rate and regular rhythm.      Heart sounds: Normal heart sounds. No murmur heard.  Pulmonary:      Breath sounds: No rales.   Chest:      Chest wall: No tenderness.   Abdominal:      General: Bowel sounds are normal.      Palpations: Abdomen is soft.      Tenderness: There is no right CVA tenderness or left CVA tenderness.   Musculoskeletal:      Right lower leg: Edema present.      Left lower leg: Edema present.   Neurological:      Mental Status: He is alert and oriented to person, place, and time.   Psychiatric:         Behavior: Behavior normal.       Results Encounter on 09/23/2024   Component Date Value Ref Range Status    Hemoglobin A1C 09/23/2024 7.40 (H)  4.80 - 5.60 % Final    Comment: Hemoglobin A1C Ranges:  Increased Risk for Diabetes  5.7% to 6.4%  Diabetes                     >= 6.5%  Diabetic Goal        "         < 7.0%      Glucose 09/23/2024 141 (H)  65 - 99 mg/dL Final    BUN 09/23/2024 31 (H)  8 - 23 mg/dL Final    Creatinine 09/23/2024 1.08  0.76 - 1.27 mg/dL Final    EGFR Result 09/23/2024 68.5  >60.0 mL/min/1.73 Final    Comment: GFR Normal >60  Chronic Kidney Disease <60  Kidney Failure <15  The GFR formula is only valid for adults with stable renal  function between ages 18 and 70.      BUN/Creatinine Ratio 09/23/2024 28.7 (H)  7.0 - 25.0 Final    Sodium 09/23/2024 140  136 - 145 mmol/L Final    Potassium 09/23/2024 4.6  3.5 - 5.2 mmol/L Final    Chloride 09/23/2024 102  98 - 107 mmol/L Final    Total CO2 09/23/2024 28.1  22.0 - 29.0 mmol/L Final    Calcium 09/23/2024 9.3  8.6 - 10.5 mg/dL Final    Total Protein 09/23/2024 5.8 (L)  6.0 - 8.5 g/dL Final    Albumin 09/23/2024 4.4  3.5 - 5.2 g/dL Final    Globulin 09/23/2024 1.4  gm/dL Final    A/G Ratio 09/23/2024 3.1  g/dL Final    Total Bilirubin 09/23/2024 0.4  0.0 - 1.2 mg/dL Final    Alkaline Phosphatase 09/23/2024 71  39 - 117 U/L Final    AST (SGOT) 09/23/2024 24  1 - 40 U/L Final    ALT (SGPT) 09/23/2024 28  1 - 41 U/L Final    Total Cholesterol 09/23/2024 138  0 - 200 mg/dL Final    Comment: Cholesterol Reference Ranges  (U.[place] Department of Health and Human Services ATP III  Classifications)  Desirable          <200 mg/dL  Borderline High    200-239 mg/dL  High Risk          >240 mg/dL  Triglyceride Reference Ranges  (U.S. Department of Health and Human Services ATP III  Classifications)  Normal           <150 mg/dL  Borderline High  150-199 mg/dL  High             200-499 mg/dL  Very High        >500 mg/dL  HDL Reference Ranges  (U.S. Department of Health and Human Services ATP III  Classifications)  Low     <40 mg/dl (major risk factor for CHD)  High    >60 mg/dl ('negative' risk factor for CHD)  LDL Reference Ranges  (U.S. Department of Health and Human Services ATP III  Classifications)  Optimal          <100 mg/dL  Near Optimal     100-129  mg/dL  Borderline High  130-159 mg/dL  High             160-189 mg/dL  Very High        >189 mg/dL      Triglycerides 09/23/2024 179 (H)  0 - 150 mg/dL Final    HDL Cholesterol 09/23/2024 36 (L)  40 - 60 mg/dL Final    VLDL Cholesterol Lance 09/23/2024 30  5 - 40 mg/dL Final    LDL Chol Calc (NIH) 09/23/2024 72  0 - 100 mg/dL Final    TSH 09/23/2024 3.820  0.270 - 4.200 uIU/mL Final    Unable to Void 09/23/2024 Comment   Final    Comment: The patient was not able to render a urine sample and has been  instructed to return for a urine collection at their earliest  convenience.  The urine testing that you have requested has  been deleted from this report.  When the patient returns and  provides a urine specimen, the urine testing will be performed  and separately reported.      Interpretation 09/23/2024 Note   Final    Supplemental report is available.     Assessment & Plan   Diagnoses and all orders for this visit:    1. 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 Dietary noncompliance    3. Dyslipidemia    4. Vitamin D deficiency    5. Obstructive sleep apnea syndrome    6. Malignant neoplasm of urinary bladder, unspecified site      Continue Toujeo, Glyxambi and glipizide.  Patient has recently reduced his carbohydrate intake.  Continue Lyrica.    Continue Crestor 10 mg a day, fenofibrate 145 mg/day, and fish oil 1 capsule/day.    Continue ergocalciferol 50,000 units weekly.    Suggest follow-up with sleep medicine.  Advised to discuss with pain management about pain pumps.    Flu vaccine this fall.    Copy of my note sent to Dr. Ricardo, and Dr. Saleh.    RTC 4 mos with JONH Fong NP

## 2024-10-08 ENCOUNTER — OFFICE VISIT (OUTPATIENT)
Dept: WOUND CARE | Facility: HOSPITAL | Age: 84
End: 2024-10-08
Payer: MEDICARE

## 2024-10-08 PROCEDURE — G0463 HOSPITAL OUTPT CLINIC VISIT: HCPCS

## 2024-10-29 ENCOUNTER — OFFICE VISIT (OUTPATIENT)
Dept: WOUND CARE | Facility: HOSPITAL | Age: 84
End: 2024-10-29
Payer: MEDICARE

## 2024-11-11 NOTE — PROGRESS NOTES
Subjective:     Encounter Date:11/12/2024      Patient ID: Jesus Beckham is a 84 y.o. male.    Chief Complaint:follow up AS, PAF  History of Present Illness  This is a 85 y/o man who follows with Dr. Burr and is known to me. He has a pmhx of paroxysmal atrial fibrillation, nonrheumatic aortic valve stenosis, hypertension, hyperlipidemia, DVT, diabetes, and chronic back pain.     He is here today for a follow up visit. For the last several months, he has felt weaker. As a result, he is not able to do as much. His wife feels that there may be some depression as well. He gets out of breath walk, but feels that this is a little worse due to weakness and deconditioning. He feels like his legs are giving out on him. He says that he can barely walk with the walker now. He has a lot of pain in his legs due to neuropathy. Unfortunately, due to sitting most of the time, he has developed sciatic pain as well. He says his swell is well controlled. He has a compression device that he uses daily and this has helped a great deal. He denies chest pain, palpitations, dizziness or syncope.     Prior history:  He had a stress test and echocardiogram in December 2021 for complaints of worsening shortness of breath. His stress test was negative for ischemia. Echocardiogram showed normal left ventricular systolic function and no wall motion abnormalities, EF 54%, grade 1 diastolic dysfunction, severe aortic valve calcification with moderate aortic valve stenosis and normal right ventricular systolic pressure. He was in the hospital in November for placement of a spinal stimulator and was noted to have an indeterminate troponin. He was asymptomatic so no workup was pursued.    I saw him in December 2022 and he was doing well. He had undergone surgery for a spinal stimulator and his pain had improved. He was still struggling with shortness of breath and swelling but overall both were stable. An echocardiogram was obtained that  showed worsening stenosis of his aortic valve. He was referred to Dr. Zelaya for TAVR evaluation. He ultimately underwent a TAVR on 2/28/23. He recovered well from this.     I saw him in follow up in July 2023 and he was doing fairly well. He was still struggling with some swelling but overall felt good. He as not having to take PRN furosemide very often. I felt that some of his swelling was due to venous insufficiency. He was to follow up in 3 months.     He was seen by Dr. Burr in May 2024. From a cardiac standpoint, he was doing well and no changes were made.    I have reviewed and updated as appropriate allergies, current medications, past family history, past medical history, past surgical history and problem list.    Review of Systems   Constitutional: Positive for malaise/fatigue. Negative for fever, weight gain and weight loss.   HENT:  Negative for congestion, hoarse voice and sore throat.    Eyes:  Negative for blurred vision and double vision.   Cardiovascular:  Positive for dyspnea on exertion and leg swelling. Negative for chest pain, orthopnea, palpitations and syncope.   Respiratory:  Positive for shortness of breath. Negative for cough and wheezing.    Gastrointestinal:  Negative for abdominal pain, hematemesis, hematochezia and melena.   Genitourinary:  Negative for dysuria and hematuria.   Neurological:  Positive for weakness. Negative for dizziness, headaches, light-headedness and numbness.   Psychiatric/Behavioral:  Negative for depression. The patient is not nervous/anxious.          Current Outpatient Medications:     Accu-Chek Guide test strip, TEST 3 TIMES DAILY, Disp: 300 each, Rfl: 3    acetaminophen (TYLENOL) 500 MG tablet, Take 1 tablet by mouth Every 6 (Six) Hours As Needed for Mild Pain., Disp: , Rfl:     allopurinol (ZYLOPRIM) 300 MG tablet, Take 1 tablet by mouth Daily., Disp: 90 tablet, Rfl: 3    amitriptyline (ELAVIL) 10 MG tablet, Take 2 tablets by mouth Every Night., Disp: 180  tablet, Rfl: 3    amoxicillin-clavulanate (AUGMENTIN) 875-125 MG per tablet, Take 1 tablet by mouth 2 (Two) Times a Day., Disp: , Rfl:     Ascorbic Acid 300 MG chewable tablet, Chew 300 mg Every Morning. HOLDING FOR SURGERY, Disp: , Rfl:     Blood Glucose Monitoring Suppl (Accu-Chek Guide) w/Device kit, 1 kit See Admin Instructions. Dx code E11.42 TEST BLOOD SUGAR 2 TIMES  DAY  , Disp: 1 kit, Rfl: 0    diphenhydrAMINE-acetaminophen (TYLENOL PM)  MG tablet per tablet, Take 2 tablets by mouth At Night As Needed for Sleep., Disp: , Rfl:     donepezil (ARICEPT) 5 MG tablet, Take 1 tablet by mouth every night at bedtime., Disp: 90 tablet, Rfl: 3    Eliquis 5 MG tablet tablet, TAKE 1 TABLET BY MOUTH TWICE  DAILY, Disp: 180 tablet, Rfl: 3    fenofibrate (TRICOR) 145 MG tablet, TAKE 1 TABLET BY MOUTH DAILY, Disp: 90 tablet, Rfl: 3    fluticasone (FLONASE) 50 MCG/ACT nasal spray, Administer 2 sprays into the nostril(s) as directed by provider Daily., Disp: 48 g, Rfl: 3    furosemide (LASIX) 20 MG tablet, TAKE 1 TABLET BY MOUTH TWICE  DAILY AS NEEDED FOR SWELLING, Disp: 60 tablet, Rfl: 11    glipizide (GLUCOTROL) 10 MG tablet, TAKE 1 TABLET BY MOUTH TWICE  DAILY BEFORE MEALS, Disp: 180 tablet, Rfl: 3    Glucosamine HCl 1500 MG tablet, Take 2 tablets by mouth Daily. HOLDING FOR SURGERY, Disp: , Rfl:     Glyxambi 25-5 MG tablet, TAKE 1 TABLET BY MOUTH DAILY  WITH BREAKFAST, Disp: 90 tablet, Rfl: 1    guaifenesin (ROBITUSSIN) 100 MG/5ML liquid, Take 10 mL by mouth 3 (Three) Times a Day As Needed for Cough., Disp: , Rfl:     Hydrocort-Pramoxine, Perianal, (ANALPRAM-HC) 2.5-1 % rectal cream, Insert 3 Applications into the rectum 3 (Three) Times a Day., Disp: , Rfl:     ipratropium (ATROVENT) 0.06 % nasal spray, , Disp: , Rfl:     Lancets misc, SOFT-CLICK LANCETS: Check 3 times a day. Dx: E 11.42, Disp: 300 each, Rfl: 4    lidocaine (LIDODERM) 5 %, Place 1 patch on the skin as directed by provider Daily. Remove & Discard  patch within 12 hours or as directed by MD, Disp: 6 each, Rfl: 0    lisinopril (PRINIVIL,ZESTRIL) 2.5 MG tablet, TAKE 1 TABLET BY MOUTH DAILY, Disp: 90 tablet, Rfl: 1    melatonin 5 MG tablet tablet, Take 1 tablet by mouth Every Night., Disp: , Rfl:     Multiple Vitamins-Minerals (MULTIVITAMIN ADULT PO), Take 1 tablet by mouth Daily. HOLDING FOR SURGERY, Disp: , Rfl:     Myrbetriq 50 MG tablet sustained-release 24 hour 24 hr tablet, Take 50 mg by mouth Daily., Disp: , Rfl:     nystatin (MYCOSTATIN) 282131 UNIT/GM cream, Apply 1 Application topically to the appropriate area as directed 2 (Two) Times a Day., Disp: , Rfl:     Omega-3 Fatty Acids (FISH OIL) 1200 MG capsule capsule, Take 2,000 mg by mouth 2 (Two) Times a Day With Meals. HOLDING FOR SURGERY, Disp: , Rfl:     pregabalin (LYRICA) 150 MG capsule, Take 1 capsule by mouth 3 times a day. TAKE 1 CAPSULE BY MOUTH 3 TIMES  DAILY FOR DIABETES WITH NERVE  DISEASE, NEUROPATHIC PAIN  Indications: Diabetes with Nerve Disease, Disp: 270 capsule, Rfl: 0    rosuvastatin (CRESTOR) 10 MG tablet, TAKE 1 TABLET BY MOUTH DAILY, Disp: 90 tablet, Rfl: 1    Sodium 3-Hydroxybutyrate (DL-3-Hydroxybutyric Acid Sod) powder, DICLOFENAC/ LIDOCAINE 4/5% APPLY 1-2 GRAMS (1-2 PUMPS) TO THE AFFECTED AREA 3-4 TIMES DAILY., Disp: , Rfl:     Toujeo Max SoloStar 300 UNIT/ML solution pen-injector injection, 100 units daily (Patient taking differently: 100 units daily  **88 units daily), Disp: 90 mL, Rfl: 2    traMADol (ULTRAM) 50 MG tablet, Take 1-2 tablets by mouth Every Night., Disp: 60 tablet, Rfl: 2    traZODone (DESYREL) 150 MG tablet, Take 2 tablets by mouth Every Night., Disp: 180 tablet, Rfl: 3    vitamin D (ERGOCALCIFEROL) 1.25 MG (70965 UT) capsule capsule, TAKE 1 CAPSULE BY MOUTH EVERY 7  DAYS, Disp: 13 capsule, Rfl: 1    Wheat Dextrin (BENEFIBER DRINK MIX PO), Take 2 teaspoon(s) by mouth 2 (Two) Times a Day., Disp: , Rfl:     Gemtesa 75 MG tablet, , Disp: , Rfl:     Past Medical  History:   Diagnosis Date    Acute embolism and thrombosis of deep vein of right distal lower extremity     Acute gangrenous cholecystitis     FEB 2018    Allergic     Aortic valve stenosis     Arthritis     Atrial fibrillation with RVR     HISTORY    Benign prostatic hyperplasia without lower urinary tract symptoms     Cancer of bladder 2016    Colon polyp     Discitis of lumbar region     DVT (deep venous thrombosis)     MARCH 2018    Essential hypertension     Murray catheter in place     LOSS OF SENSATION BLADDER. BECAUSE OF WOUND AT THE TIME    Gout     Heel ulcer     RIGHT. FOLLOWED BY WOUND CARE. GETTING BETTER    History of sepsis     [place] (hard of hearing)     HEARING AIDS BOTH EARS    Hyperlipidemia     Loss, sensation     LOWER EXTREMTIES. RELATED TO LAST BACK SURGERY.    MRSA infection     LEFT LEG.     Nausea, vomiting, and diarrhea 03/01/2019    Open wound     WITH MEDICATED DRESSING LEFT SHIN AREA.(RESOLVED PER PATIENT)    CELINA (obstructive sleep apnea)     NO MACHINE    Osteoarthritis     Paroxysmal atrial fibrillation     PVC (premature ventricular contraction)     Sepsis 02/2018    Sepsis due to Escherichia coli     Skin carcinoma 2012    MELANOMA.2 PLACES BACK AND EAR    Type 2 diabetes mellitus     Vitamin D deficiency     Weakness        Past Surgical History:   Procedure Laterality Date    ABDOMINAL SURGERY      AORTIC VALVE REPAIR/REPLACEMENT N/A 2/28/2023    Procedure: TTE TRANSFEMORAL TRANSCATHETER AORTIC VALVE REPLACEMENT PERCUTANEOUS APPROACH;  Surgeon: Antony Goldstein MD;  Location: [facility];  Service: Cardiothoracic;  Laterality: N/A;    AORTIC VALVE REPAIR/REPLACEMENT N/A 2/28/2023    Procedure: Transfemoral Transcatheter Aortic Valve Replacement with intraoperative TTE and possible open surgical rescue;  Surgeon: Samuel Zelaya MD;  Location: [facility];  Service: Cardiovascular;  Laterality: N/A;    APPENDECTOMY N/A 1961    CARDIAC CATHETERIZATION N/A 01/16/2023    Procedure:  Coronary angiography;  Surgeon: Tasha Burr MD;  Location: [facility] CATH INVASIVE LOCATION;  Service: Cardiology;  Laterality: N/A;    CARDIAC CATHETERIZATION N/A 01/16/2023    Procedure: Left Heart Cath;  Surgeon: Tasha Burr MD;  [facility];  Service: Cardiology;  Laterality: N/A;    CARDIAC CATHETERIZATION N/A 01/16/2023    Procedure: Right Heart Cath;  Surgeon: Tasha Burr MD;  Location: [facility] CATH INVASIVE LOCATION;  Service: Cardiology;  Laterality: N/A;    CHOLECYSTECTOMY WITH INTRAOPERATIVE CHOLANGIOGRAM N/A 02/25/2018    Procedure: CHOLECYSTECTOMY LAPAROSCOPIC INTRAOPERATIVE CHOLANGIOGRAM;  Surgeon: Maegan Correa MD;  Location: [facility] OR;  Service:     COLONOSCOPY N/A 2010    h/o of polyps    COLONOSCOPY W/ BIOPSIES AND POLYPECTOMY  2019    EYE SURGERY Bilateral 1959    glass removal from left eye, lens transplant    JOINT REPLACEMENT Right 2005    RIGHT KNEE    LAMINECTOMY N/A 01/18/2016    L2-L3, L3-L4, L4-L5, and L5-S1 bilateral lamincectomy, medial facetectomy & [id number], Dr. Kaiden Valdes    LUMBAR FUSION N/A 03/07/2018    Procedure: LUMBAR FUSION MINIMALLY INVASIVE TRANSFORAMINAL LUMBAR INTERBODY IRRIGATION AND DEBRIDEMENT AND FUSION.  IRRIGATION AND DEBRIDEMENT OF EPIDURAL ABCESS LUMBAR 2-4. BONE MORPHOGENIC PROTEIN, ALPHATEC NOVEL AND ILLICO, EMG AND SSEP NEUROMONITORING.;  Surgeon: Manish Marr DO;  Location: [facility] OR;  Service: Orthopedic Spine    SKIN BIOPSY      BACK AND FACE    SPINAL CORD STIMULATOR IMPLANT N/A 07/01/2022    Procedure: SPINAL CORD STIMULATOR INSERTION PHASE 1 ([place]) 4 DAY TRIAL ONLY DUE TO ELIQUIS HOLD.;  Surgeon: Cody Holguin MD;  Location: [facility] OR;  Service: Pain Management;  Laterality: N/A;    SPINAL CORD STIMULATOR IMPLANT N/A 11/17/2022    Procedure: Thoracic eleven laminotomy for placement of a surgical spinal cord stimulator lead to thoracic nine;  Surgeon: Charlie Bailon MD;   "Location: [facility] OR;  Service: Neurosurgery;  Laterality: N/A;    SPINAL CORD STIMULATOR IMPLANT N/A 2022    Procedure: Placement of a left gluteal internal pulse generator;  Surgeon: Charlie Bailon MD;  Location: [facility] OR;  Service: Neurosurgery;  Laterality: N/A;    TOTAL KNEE ARTHROPLASTY Right 2005    Dr. Washington Evans    VENA CAVA FILTER INSERTION Right 2018    Procedure: VENA CAVA FILTER INSERTION;  Surgeon: Alexis Thakkar MD;  Location: [facility] OR ;  Service:     VENA CAVA FILTER REMOVAL N/A 2018    Procedure: VENA CAVA FILTER REMOVAL WITH VENOCAVAGRAM;  Surgeon: Alexis Thakkar MD;  Location: [facility] OR ;  Service: Vascular       Family History   Problem Relation Age of Onset    Esophageal cancer Mother     No Known Problems Father     Diabetes Maternal Grandmother     Heart attack Maternal Grandfather     Malig Hyperthermia Neg Hx        Social History     Tobacco Use    Smoking status: Former     Types: Cigars, Pipe     Quit date:      Years since quittin.8    Smokeless tobacco: Former     Types: Chew     Quit date:     Tobacco comments:     Caffeine daily   Vaping Use    Vaping status: Never Used   Substance Use Topics    Alcohol use: Not Currently    Drug use: No         ECG 12 Lead    Date/Time: 2024 3:37 PM  Performed by: Cherelle Mora APRN    Authorized by: Cherelle Mora APRN  Comparison: compared with previous ECG from 2024  Comparison to previous ECG: Ectopic atrial rhythm             Objective:     Visit Vitals  /82 (BP Location: Right arm, Patient Position: Sitting, Cuff Size: Adult)   Pulse 77   Ht 177.8 cm (70\")   Wt 127 kg (280 lb)   BMI 40.18 kg/m²               Physical Exam  Constitutional:       Appearance: Normal appearance. He is obese.   HENT:      Head: Normocephalic.   Neck:      Vascular: No carotid bruit.   Cardiovascular:      Rate and Rhythm: Normal rate and regular rhythm.      " Chest Wall: PMI is not displaced.      Pulses: Normal pulses.           Radial pulses are 2+ on the right side and 2+ on the left side.      Heart sounds: Murmur heard.      Systolic murmur is present with a grade of 2/6.      No friction rub. No gallop.      Comments: Bilateral legs are wrapped  Pulmonary:      Effort: Pulmonary effort is normal.      Breath sounds: Normal breath sounds.   Abdominal:      General: Bowel sounds are normal. There is no distension.      Palpations: Abdomen is soft.   Skin:     General: Skin is warm and dry.      Capillary Refill: Capillary refill takes less than 2 seconds.   Neurological:      Mental Status: He is alert and oriented to person, place, and time.   Psychiatric:         Mood and Affect: Mood normal.         Behavior: Behavior normal.         Thought Content: Thought content normal.          Lab Review:   Lipid Panel          2/5/2024    09:32 5/28/2024    08:16 9/23/2024    08:40   Lipid Panel   Total Cholesterol 131  146  138    Triglycerides 148  175  179    HDL Cholesterol 46  41  36    VLDL Cholesterol 25  30  30    LDL Cholesterol  60  75  72        Cardiac Procedures:   Nuclear stress test 12/9/21:  Left ventricular ejection fraction is normal. (Calculated EF = 52%).  Myocardial perfusion imaging indicates a normal myocardial perfusion study with no evidence of ischemia.  Impressions are consistent with a low risk study.  LVEF with stress is 53%.  Echocardiogram 12/9/21  Calculated left ventricular EF = 54% Estimated left ventricular EF = 54% Estimated left ventricular EF was in agreement with the calculated left ventricular EF. Left ventricular systolic function is normal. Normal left ventricular cavity size and wall thickness noted. All left ventricular wall segments contract normally. Left ventricular diastolic function is consistent with (grade I) impaired relaxation.  There is severe calcification of the aortic valve. Trace aortic valve regurgitation is  present. Moderate aortic valve stenosis is present. The aortic valve area is underestimated due to underestimation of the left ventricular outflow tract diameter Aortic valve mean pressure gradient is 15.7 mmHg. Aortic valve area is 0.96 cm2.  Mild mitral annular calcification is present. Trace mitral valve regurgitation is present.  Trace tricuspid valve regurgitation is present. Estimated right ventricular systolic pressure from tricuspid regurgitation is normal (<35 mmHg). Calculated right ventricular systolic pressure from tricuspid regurgitation is 24 mmHg.     Echocardiogram 11/21/19:  Left ventricular systolic function is normal. Estimated EF appears to be in the range of 61 - 65%. Normal regional wall motion and cavity size.  Normal left ventricular cavity size, wall thickness, mass and mass index noted  Left ventricular diastolic function is normal. Normal left atrial pressure. There is no evidence of a left ventricular thrombus present.  Normal right ventricular cavity size, wall thickness, systolic function and septal motion noted.  The valve exhibits sclerosis(peak velocity<2.5cm/sec). There is moderate calcification of the aortic valve.No significant aortic valve regurgitation is present. No hemodynamically significant aortic valve stenosis is present.  No significant changes since prior study.         Assessment:         Diagnoses and all orders for this visit:    1. Venous insufficiency of left leg (Primary)    2. S/P TAVR (transcatheter aortic valve replacement)    3. PVC (premature ventricular contraction)    4. Paroxysmal atrial fibrillation    5. Nonrheumatic aortic valve stenosis    6. Essential hypertension    7. Dyslipidemia    Other orders  -     ECG 12 Lead            Plan:       1. Aortic stenosis: s/p TAVR in February 2023. Continues to do well since with no issues. Follow up echocardiogram post TAVR in March 2023 looked good.   2. PAF: in sinus rhythm on EKG today. On Eliquis for  anticoagulation.   3. HTN: blood pressure is well controlled on current regimen. No changes.  4. Lower extremity swelling: Overall appears to be improved. Continue PRN furosemide.  5. Peripheral neuropathy  6. Diabetes  7. Obstructive sleep apnea  8. Generalized weakness: secondary to his neuropathy which causes a great deal of pain. He follows up with his PCP soon. I asked that he discuss with Dr. Ricardo possible options for treatment of his pain as well as depression.    Thank you for allowing me to participate in this patient's care. Please call with any questions or concerns. Mr. Beckham will follow up with Dr. Burr in 6 months.          Your medication list            Accurate as of November 12, 2024  3:37 PM. If you have any questions, ask your nurse or doctor.                CHANGE how you take these medications        Instructions Last Dose Given Next Dose Due   Toujeo Max SoloStar 300 UNIT/ML solution pen-injector injection  Generic drug: Insulin Glargine (2 Unit Dial)  What changed: additional instructions      100 units daily              CONTINUE taking these medications        Instructions Last Dose Given Next Dose Due   Accu-Chek Guide test strip  Generic drug: glucose blood      TEST 3 TIMES DAILY       Accu-Chek Guide w/Device kit      1 kit See Admin Instructions. Dx code E11.42 TEST BLOOD SUGAR 2 TIMES  DAY       acetaminophen 500 MG tablet  Commonly known as: TYLENOL      Take 1 tablet by mouth Every 6 (Six) Hours As Needed for Mild Pain.       allopurinol 300 MG tablet  Commonly known as: ZYLOPRIM      Take 1 tablet by mouth Daily.       amitriptyline 10 MG tablet  Commonly known as: ELAVIL      Take 2 tablets by mouth Every Night.       amoxicillin-clavulanate 875-125 MG per tablet  Commonly known as: AUGMENTIN      Take 1 tablet by mouth 2 (Two) Times a Day.       Ascorbic Acid 300 MG chewable tablet      Chew 300 mg Every Morning. HOLDING FOR SURGERY       BENEFIBER DRINK MIX PO      Take 2  teaspoon(s) by mouth 2 (Two) Times a Day.       diphenhydrAMINE-acetaminophen  MG tablet per tablet  Commonly known as: TYLENOL PM      Take 2 tablets by mouth At Night As Needed for Sleep.       DL-3-Hydroxybutyric Acid Sod powder      DICLOFENAC/ LIDOCAINE 4/5% APPLY 1-2 GRAMS (1-2 PUMPS) TO THE AFFECTED AREA 3-4 TIMES DAILY.       donepezil 5 MG tablet  Commonly known as: ARICEPT      Take 1 tablet by mouth every night at bedtime.       Eliquis 5 MG tablet tablet  Generic drug: apixaban      TAKE 1 TABLET BY MOUTH TWICE  DAILY       fenofibrate 145 MG tablet  Commonly known as: TRICOR      TAKE 1 TABLET BY MOUTH DAILY       fish oil 1200 MG capsule capsule      Take 2,000 mg by mouth 2 (Two) Times a Day With Meals. HOLDING FOR SURGERY       fluticasone 50 MCG/ACT nasal spray  Commonly known as: FLONASE      Administer 2 sprays into the nostril(s) as directed by provider Daily.       furosemide 20 MG tablet  Commonly known as: LASIX      TAKE 1 TABLET BY MOUTH TWICE  DAILY AS NEEDED FOR SWELLING       Gemtesa 75 MG tablet  Generic drug: Vibegron           glipizide 10 MG tablet  Commonly known as: GLUCOTROL      TAKE 1 TABLET BY MOUTH TWICE  DAILY BEFORE MEALS       Glucosamine HCl 1500 MG tablet      Take 2 tablets by mouth Daily. HOLDING FOR SURGERY       Glyxambi 25-5 MG tablet  Generic drug: Empagliflozin-linaGLIPtin      TAKE 1 TABLET BY MOUTH DAILY  WITH BREAKFAST       guaifenesin 100 MG/5ML liquid  Commonly known as: ROBITUSSIN      Take 10 mL by mouth 3 (Three) Times a Day As Needed for Cough.       Hydrocort-Pramoxine (Perianal) 2.5-1 % rectal cream  Commonly known as: ANALPRAM-HC      Insert 3 Applications into the rectum 3 (Three) Times a Day.       ipratropium 0.06 % nasal spray  Commonly known as: ATROVENT           Lancets misc      SOFT-CLICK LANCETS: Check 3 times a day. Dx: E 11.42       lidocaine 5 %  Commonly known as: LIDODERM      Place 1 patch on the skin as directed by provider Daily.  Remove & Discard patch within 12 hours or as directed by MD       lisinopril 2.5 MG tablet  Commonly known as: PRINIVIL,ZESTRIL      TAKE 1 TABLET BY MOUTH DAILY       melatonin 5 MG tablet tablet      Take 1 tablet by mouth Every Night.       multivitamin with minerals tablet tablet      Take 1 tablet by mouth Daily. HOLDING FOR SURGERY       Myrbetriq 50 MG tablet sustained-release 24 hour 24 hr tablet  Generic drug: Mirabegron ER      Take 50 mg by mouth Daily.       nystatin 229305 UNIT/GM cream  Commonly known as: MYCOSTATIN      Apply 1 Application topically to the appropriate area as directed 2 (Two) Times a Day.       pregabalin 150 MG capsule  Commonly known as: LYRICA      Take 1 capsule by mouth 3 times a day. TAKE 1 CAPSULE BY MOUTH 3 TIMES  DAILY FOR DIABETES WITH NERVE  DISEASE, NEUROPATHIC PAIN  Indications: Diabetes with Nerve Disease       rosuvastatin 10 MG tablet  Commonly known as: CRESTOR      TAKE 1 TABLET BY MOUTH DAILY       traMADol 50 MG tablet  Commonly known as: ULTRAM      Take 1-2 tablets by mouth Every Night.       traZODone 150 MG tablet  Commonly known as: DESYREL      Take 2 tablets by mouth Every Night.       vitamin D 1.25 MG (47972 UT) capsule capsule  Commonly known as: ERGOCALCIFEROL      TAKE 1 CAPSULE BY MOUTH EVERY 7  DAYS                  DANNY Montana  11/12/24  10:57 AM EST

## 2024-11-12 ENCOUNTER — OFFICE VISIT (OUTPATIENT)
Dept: CARDIOLOGY | Facility: CLINIC | Age: 84
End: 2024-11-12
Payer: MEDICARE

## 2024-11-12 VITALS
SYSTOLIC BLOOD PRESSURE: 140 MMHG | HEIGHT: 70 IN | WEIGHT: 280 LBS | DIASTOLIC BLOOD PRESSURE: 82 MMHG | HEART RATE: 77 BPM | BODY MASS INDEX: 40.09 KG/M2

## 2024-11-12 DIAGNOSIS — Z79.4 TYPE 2 DIABETES MELLITUS WITH OTHER SPECIFIED COMPLICATION, WITH LONG-TERM CURRENT USE OF INSULIN: ICD-10-CM

## 2024-11-12 DIAGNOSIS — I48.0 PAROXYSMAL ATRIAL FIBRILLATION: ICD-10-CM

## 2024-11-12 DIAGNOSIS — I49.3 PVC (PREMATURE VENTRICULAR CONTRACTION): ICD-10-CM

## 2024-11-12 DIAGNOSIS — E78.5 DYSLIPIDEMIA: ICD-10-CM

## 2024-11-12 DIAGNOSIS — Z95.2 S/P TAVR (TRANSCATHETER AORTIC VALVE REPLACEMENT): ICD-10-CM

## 2024-11-12 DIAGNOSIS — I10 ESSENTIAL HYPERTENSION: ICD-10-CM

## 2024-11-12 DIAGNOSIS — I87.2 VENOUS INSUFFICIENCY OF LEFT LEG: Primary | ICD-10-CM

## 2024-11-12 DIAGNOSIS — I35.0 NONRHEUMATIC AORTIC VALVE STENOSIS: ICD-10-CM

## 2024-11-12 DIAGNOSIS — E11.69 TYPE 2 DIABETES MELLITUS WITH OTHER SPECIFIED COMPLICATION, WITH LONG-TERM CURRENT USE OF INSULIN: ICD-10-CM

## 2024-11-12 PROCEDURE — 3077F SYST BP >= 140 MM HG: CPT | Performed by: NURSE PRACTITIONER

## 2024-11-12 PROCEDURE — 99214 OFFICE O/P EST MOD 30 MIN: CPT | Performed by: NURSE PRACTITIONER

## 2024-11-12 PROCEDURE — 93000 ELECTROCARDIOGRAM COMPLETE: CPT | Performed by: NURSE PRACTITIONER

## 2024-11-12 PROCEDURE — 3079F DIAST BP 80-89 MM HG: CPT | Performed by: NURSE PRACTITIONER

## 2024-11-13 RX ORDER — LISINOPRIL 2.5 MG/1
2.5 TABLET ORAL DAILY
Qty: 90 TABLET | Refills: 2 | Status: SHIPPED | OUTPATIENT
Start: 2024-11-13

## 2024-11-13 RX ORDER — ROSUVASTATIN CALCIUM 10 MG/1
10 TABLET, COATED ORAL DAILY
Qty: 90 TABLET | Refills: 2 | Status: SHIPPED | OUTPATIENT
Start: 2024-11-13

## 2024-11-13 NOTE — TELEPHONE ENCOUNTER
Rx Refill Note  Requested Prescriptions     Pending Prescriptions Disp Refills    rosuvastatin (CRESTOR) 10 MG tablet [Pharmacy Med Name: Rosuvastatin Calcium 10 MG Oral Tablet] 90 tablet 3     Sig: TAKE 1 TABLET BY MOUTH DAILY    lisinopril (PRINIVIL,ZESTRIL) 2.5 MG tablet [Pharmacy Med Name: Lisinopril 2.5 MG Oral Tablet] 90 tablet 3     Sig: TAKE 1 TABLET BY MOUTH DAILY      Last office visit with prescribing clinician: 6/5/2024   Last telemedicine visit with prescribing clinician: Visit date not found   Next office visit with prescribing clinician: Visit date not found                         Would you like a call back once the refill request has been completed: [] Yes [] No    If the office needs to give you a call back, can they leave a voicemail: [] Yes [] No    Rosio Bullock MA  11/13/24, 07:12 EST

## 2024-11-19 ENCOUNTER — OFFICE VISIT (OUTPATIENT)
Dept: WOUND CARE | Facility: HOSPITAL | Age: 84
End: 2024-11-19
Payer: MEDICARE

## 2024-12-09 ENCOUNTER — TELEPHONE (OUTPATIENT)
Dept: ENDOCRINOLOGY | Age: 84
End: 2024-12-09
Payer: MEDICARE

## 2024-12-09 DIAGNOSIS — E11.42 DIABETIC PERIPHERAL NEUROPATHY: ICD-10-CM

## 2024-12-09 RX ORDER — PREGABALIN 150 MG/1
150 CAPSULE ORAL 3 TIMES DAILY
Qty: 270 CAPSULE | Refills: 0 | Status: SHIPPED | OUTPATIENT
Start: 2024-12-09

## 2024-12-09 NOTE — TELEPHONE ENCOUNTER
Rx Refill Note  Requested Prescriptions     Pending Prescriptions Disp Refills    pregabalin (LYRICA) 150 MG capsule 270 capsule 0     Sig: Take 1 capsule by mouth 3 times a day. TAKE 1 CAPSULE BY MOUTH 3 TIMES  DAILY FOR DIABETES WITH NERVE  DISEASE, NEUROPATHIC PAIN  Indications: Diabetes with Nerve Disease      Last office visit with prescribing clinician: 10/7/2024   Last telemedicine visit with prescribing clinician: Visit date not found   Next office visit with prescribing clinician: 2/7/2025                         Would you like a call back once the refill request has been completed: [] Yes [] No    If the office needs to give you a call back, can they leave a voicemail: [] Yes [] No    Mila Bullock  12/09/24, 11:13 EST

## 2024-12-09 NOTE — TELEPHONE ENCOUNTER
Caller: Mya Beckham    Relationship: Emergency Contact    Best call back number: [phone]    Requested Prescriptions:   Requested Prescriptions     Pending Prescriptions Disp Refills    pregabalin (LYRICA) 150 MG capsule 270 capsule 0     Sig: Take 1 capsule by mouth 3 times a day. TAKE 1 CAPSULE BY MOUTH 3 TIMES  DAILY FOR DIABETES WITH NERVE  DISEASE, NEUROPATHIC PAIN  Indications: Diabetes with Nerve Disease        Pharmacy where request should be sent:      Last office visit with prescribing clinician: 10/7/2024   Last telemedicine visit with prescribing clinician: Visit date not found   Next office visit with prescribing clinician: 2/7/2025     Does the patient have less than a 3 day supply:  [] Yes  [x] No    Would you like a call back once the refill request has been completed: [x] Yes [] No    If the office needs to give you a call back, can they leave a voicemail: [x] Yes [] No    Bart Bo Rep   12/09/24 11:11 EST

## 2024-12-12 DIAGNOSIS — Z79.4 TYPE 2 DIABETES MELLITUS WITH OTHER SPECIFIED COMPLICATION, WITH LONG-TERM CURRENT USE OF INSULIN: ICD-10-CM

## 2024-12-12 DIAGNOSIS — E11.69 TYPE 2 DIABETES MELLITUS WITH OTHER SPECIFIED COMPLICATION, WITH LONG-TERM CURRENT USE OF INSULIN: ICD-10-CM

## 2024-12-12 RX ORDER — FENOFIBRATE 145 MG/1
145 TABLET, COATED ORAL DAILY
Qty: 90 TABLET | Refills: 2 | Status: SHIPPED | OUTPATIENT
Start: 2024-12-12

## 2024-12-12 NOTE — TELEPHONE ENCOUNTER
Rx Refill Note  Requested Prescriptions     Pending Prescriptions Disp Refills    fenofibrate (TRICOR) 145 MG tablet [Pharmacy Med Name: Fenofibrate 145 MG Oral Tablet] 90 tablet 3     Sig: TAKE 1 TABLET BY MOUTH DAILY      Last office visit with prescribing clinician: 10/7/2024   Last telemedicine visit with prescribing clinician: Visit date not found   Next office visit with prescribing clinician: 2/7/2025                         Would you like a call back once the refill request has been completed: [] Yes [] No    If the office needs to give you a call back, can they leave a voicemail: [] Yes [] No    Rosio Bullock MA  12/12/24, 07:42 EST

## 2025-01-15 DIAGNOSIS — Z79.4 TYPE 2 DIABETES MELLITUS WITH OTHER SPECIFIED COMPLICATION, WITH LONG-TERM CURRENT USE OF INSULIN: ICD-10-CM

## 2025-01-15 DIAGNOSIS — E11.69 TYPE 2 DIABETES MELLITUS WITH OTHER SPECIFIED COMPLICATION, WITH LONG-TERM CURRENT USE OF INSULIN: ICD-10-CM

## 2025-01-15 RX ORDER — EMPAGLIFLOZIN AND LINAGLIPTIN 25; 5 MG/1; MG/1
1 TABLET, FILM COATED ORAL
Qty: 90 TABLET | Refills: 2 | Status: SHIPPED | OUTPATIENT
Start: 2025-01-15

## 2025-01-15 NOTE — TELEPHONE ENCOUNTER
Rx Refill Note  Requested Prescriptions     Pending Prescriptions Disp Refills    Glyxambi 25-5 MG tablet [Pharmacy Med Name: Glyxambi 25-5 MG Oral Tablet] 90 tablet 3     Sig: TAKE 1 TABLET BY MOUTH DAILY  WITH BREAKFAST      Last office visit with prescribing clinician: 6/5/2024   Last telemedicine visit with prescribing clinician: Visit date not found   Next office visit with prescribing clinician: Visit date not found                         Would you like a call back once the refill request has been completed: [] Yes [] No    If the office needs to give you a call back, can they leave a voicemail: [] Yes [] No    Mila Bullock  01/15/25, 07:28 EST

## 2025-01-21 ENCOUNTER — OFFICE VISIT (OUTPATIENT)
Dept: WOUND CARE | Facility: HOSPITAL | Age: 85
End: 2025-01-21
Payer: MEDICARE

## 2025-01-27 RX ORDER — ERGOCALCIFEROL 1.25 MG/1
50000 CAPSULE, LIQUID FILLED ORAL WEEKLY
Qty: 13 CAPSULE | Refills: 2 | Status: SHIPPED | OUTPATIENT
Start: 2025-01-27

## 2025-01-27 NOTE — TELEPHONE ENCOUNTER
Rx Refill Note  Requested Prescriptions     Pending Prescriptions Disp Refills    vitamin D (ERGOCALCIFEROL) 1.25 MG (17930 UT) capsule capsule [Pharmacy Med Name: Vitamin D (Ergocalciferol) 1.25 MG (82480 UT) Oral Capsule] 13 capsule 3     Sig: TAKE 1 CAPSULE BY MOUTH EVERY 7  DAYS      Last office visit with prescribing clinician: 6/5/2024   Last telemedicine visit with prescribing clinician: Visit date not found   Next office visit with prescribing clinician: Visit date not found                         Would you like a call back once the refill request has been completed: [] Yes [] No    If the office needs to give you a call back, can they leave a voicemail: [] Yes [] No    Mila Bullock  01/27/25, 07:50 EST

## 2025-01-28 DIAGNOSIS — M54.16 LUMBAR RADICULOPATHY: ICD-10-CM

## 2025-01-28 DIAGNOSIS — E11.42 DIABETIC PERIPHERAL NEUROPATHY: ICD-10-CM

## 2025-01-28 RX ORDER — TRAMADOL HYDROCHLORIDE 50 MG/1
50-100 TABLET ORAL NIGHTLY
Qty: 60 TABLET | Refills: 2 | Status: CANCELLED | OUTPATIENT
Start: 2025-01-28

## 2025-01-28 NOTE — TELEPHONE ENCOUNTER
PATIENT REQUESTING A REFILL ON TRAMADOL   PLEASE ADVISE  LAST SEEN  10/03/2024 DR MILES   /  Return in about 3 months (around 1/3/2025) for Recheck.

## 2025-01-28 NOTE — TELEPHONE ENCOUNTER
Caller: POLI MONROE    Relationship:     Best call back number:[phone]     Requested Prescriptions:   Requested Prescriptions      No prescriptions requested or ordered in this encounter        Pharmacy where request should be sent: [facility] [id number] - [address] [facility] [phone]  - [phone] FX     Last office visit with prescribing clinician: 10/3/2024   Last telemedicine visit with prescribing clinician: Visit date not found   Next office visit with prescribing clinician: Visit date not found     Additional details provided by patient:     Does the patient have less than a 3 day supply:  [x] Yes  [] No    Would you like a call back once the refill request has been completed: [] Yes [x] No    If the office needs to give you a call back, can they leave a voicemail: [x] Yes [] No    Bart Christianson Rep   01/28/25 13:42 EST

## 2025-01-28 NOTE — TELEPHONE ENCOUNTER
HUB TO RELAY  Left patient a voicemail to inform him per Dr. Ricardo I gave him a 90-day refill of that in October.  Because it is a controlled substance, he will need an appointment in order to get it refilled.  He can do that as a video if he would like. Instructed patient to contact our office to schedule follow-up appt.

## 2025-01-30 DIAGNOSIS — E11.42 DIABETIC PERIPHERAL NEUROPATHY: ICD-10-CM

## 2025-01-30 DIAGNOSIS — M54.16 LUMBAR RADICULOPATHY: ICD-10-CM

## 2025-01-30 RX ORDER — TRAMADOL HYDROCHLORIDE 50 MG/1
50-100 TABLET ORAL NIGHTLY
Qty: 30 TABLET | Refills: 0 | Status: SHIPPED | OUTPATIENT
Start: 2025-01-30

## 2025-01-30 NOTE — TELEPHONE ENCOUNTER
Rx Refill Note  Requested Prescriptions      No prescriptions requested or ordered in this encounter      Last office visit with prescribing clinician: 10/3/2024   Last telemedicine visit with prescribing clinician: Visit date not found   Next office visit with prescribing clinician: 2/4/2025                         Would you like a call back once the refill request has been completed: [] Yes [] No    If the office needs to give you a call back, can they leave a voicemail: [] Yes [] No    Palmira Obregon MA  01/30/25, 11:24 EST    No controlled substance   Urine drug screen 12/15/23    Pt wants to know if he can get a refill on tramadol until his appt?

## 2025-02-03 NOTE — PROGRESS NOTES
Chief Complaint:   Chief Complaint   Patient presents with    Arthritis       Jesus Beckham 84 y.o. male who presents today for Medical Management of the below listed issues. He  has a problem list of   Patient Active Problem List   Diagnosis    Gout    Diabetic peripheral neuropathy    Dyslipidemia    Essential hypertension    PVC (premature ventricular contraction)    Vitamin D deficiency    Benign non-nodular prostatic hyperplasia without lower urinary tract symptoms    Osteoarthritis    Urge incontinence    Acute gangrenous cholecystitis    Discitis of lumbar region    Paroxysmal atrial fibrillation    History of sepsis    Hyperuricemia    Chronic deep vein thrombosis (DVT) of right peroneal vein    Nausea, vomiting, and diarrhea    Colitis presumed infectious    Urinary tract infection in male    DDD (degenerative disc disease), lumbar    Bilateral leg weakness    History of lumbar spinal fusion    Carpal tunnel syndrome    Adhesive arachnoiditis    Chronic anticoagulation    Diverticulosis    Hematuria    Lower obstructive uropathy    Splenic lesion    Suprapubic catheter dysfunction    Obesity (BMI 30-39.9)    Venous insufficiency of left leg    Memory difficulties    Chronic pain syndrome    Type 2 diabetes mellitus with peripheral neuropathy    Encounter for long-term (current) use of high-risk medication    Therapeutic opioid induced constipation    Abnormal CT of brain    Suprapubic catheter    Chronic bladder pain    Proteinuria    Lumbar radiculopathy    Nonrheumatic aortic valve stenosis    Snoring    Class 3 severe obesity due to excess calories without serious comorbidity with body mass index (BMI) of 40.0 to 44.9 in adult    Non-restorative sleep    Hypersomnia    History of DVT of lower extremity    Status post insertion of spinal cord stimulator    S/P TAVR (transcatheter aortic valve replacement)    Abdominal distension (gaseous)    Back pain    Dorsalgia, unspecified    Benign neoplasm of  ascending colon    Benign neoplasm of descending colon    Benign neoplasm of transverse colon    Benign neoplasm of cecum    Colon polyps    Rectal polyp    Disorder of muscle, unspecified    Family history of colonic polyps    Constipation    Fecal incontinence    History of colonic polyps    Personal history of colonic polyps    Cancer    Malignant (primary) neoplasm, unspecified    Other abnormalities of heart beat    Pure hypercholesterolemia    Unspecified hemorrhoids    Atrial fibrillation    Long term (current) use of anticoagulants    Dvrtclos of lg int w/o perforation or abscess w/o bleeding    Encounter for screening for malignant neoplasm of colon    Sleep apnea    Arthritis    Coagulation defect, unspecified    Diabetes mellitus    UTI (urinary tract infection)   .  Since the last visit, He has overall felt well.  he has been compliant with   Current Outpatient Medications:     traMADol (ULTRAM) 50 MG tablet, Take 1-2 tablets by mouth Every Night., Disp: 60 tablet, Rfl: 2    Accu-Chek Guide test strip, TEST 3 TIMES DAILY, Disp: 300 each, Rfl: 3    acetaminophen (TYLENOL) 500 MG tablet, Take 1 tablet by mouth Every 6 (Six) Hours As Needed for Mild Pain., Disp: , Rfl:     allopurinol (ZYLOPRIM) 300 MG tablet, Take 1 tablet by mouth Daily., Disp: 90 tablet, Rfl: 3    amitriptyline (ELAVIL) 10 MG tablet, Take 2 tablets by mouth Every Night., Disp: 180 tablet, Rfl: 3    amoxicillin-clavulanate (AUGMENTIN) 875-125 MG per tablet, Take 1 tablet by mouth 2 (Two) Times a Day., Disp: , Rfl:     Ascorbic Acid 300 MG chewable tablet, Chew 300 mg Every Morning. HOLDING FOR SURGERY, Disp: , Rfl:     Blood Glucose Monitoring Suppl (Accu-Chek Guide) w/Device kit, 1 kit See Admin Instructions. Dx code E11.42 TEST BLOOD SUGAR 2 TIMES  DAY  , Disp: 1 kit, Rfl: 0    diphenhydrAMINE-acetaminophen (TYLENOL PM)  MG tablet per tablet, Take 2 tablets by mouth At Night As Needed for Sleep., Disp: , Rfl:     donepezil  (ARICEPT) 5 MG tablet, Take 1 tablet by mouth every night at bedtime., Disp: 90 tablet, Rfl: 3    Eliquis 5 MG tablet tablet, TAKE 1 TABLET BY MOUTH TWICE  DAILY, Disp: 180 tablet, Rfl: 3    fenofibrate (TRICOR) 145 MG tablet, TAKE 1 TABLET BY MOUTH DAILY, Disp: 90 tablet, Rfl: 2    fluticasone (FLONASE) 50 MCG/ACT nasal spray, Administer 2 sprays into the nostril(s) as directed by provider Daily., Disp: 48 g, Rfl: 3    furosemide (LASIX) 20 MG tablet, TAKE 1 TABLET BY MOUTH TWICE  DAILY AS NEEDED FOR SWELLING, Disp: 60 tablet, Rfl: 11    Gemtesa 75 MG tablet, , Disp: , Rfl:     glipizide (GLUCOTROL) 10 MG tablet, TAKE 1 TABLET BY MOUTH TWICE  DAILY BEFORE MEALS, Disp: 180 tablet, Rfl: 3    Glucosamine HCl 1500 MG tablet, Take 2 tablets by mouth Daily. HOLDING FOR SURGERY, Disp: , Rfl:     Glyxambi 25-5 MG tablet, TAKE 1 TABLET BY MOUTH DAILY  WITH BREAKFAST, Disp: 90 tablet, Rfl: 2    guaifenesin (ROBITUSSIN) 100 MG/5ML liquid, Take 10 mL by mouth 3 (Three) Times a Day As Needed for Cough., Disp: , Rfl:     Hydrocort-Pramoxine, Perianal, (ANALPRAM-HC) 2.5-1 % rectal cream, Insert 3 Applications into the rectum 3 (Three) Times a Day., Disp: , Rfl:     ipratropium (ATROVENT) 0.06 % nasal spray, , Disp: , Rfl:     Lancets misc, SOFT-CLICK LANCETS: Check 3 times a day. Dx: E 11.42, Disp: 300 each, Rfl: 4    lidocaine (LIDODERM) 5 %, Place 1 patch on the skin as directed by provider Daily. Remove & Discard patch within 12 hours or as directed by MD, Disp: 6 each, Rfl: 0    lisinopril (PRINIVIL,ZESTRIL) 2.5 MG tablet, TAKE 1 TABLET BY MOUTH DAILY, Disp: 90 tablet, Rfl: 2    melatonin 5 MG tablet tablet, Take 1 tablet by mouth Every Night., Disp: , Rfl:     Multiple Vitamins-Minerals (MULTIVITAMIN ADULT PO), Take 1 tablet by mouth Daily. HOLDING FOR SURGERY, Disp: , Rfl:     Myrbetriq 50 MG tablet sustained-release 24 hour 24 hr tablet, Take 50 mg by mouth Daily., Disp: , Rfl:     nystatin (MYCOSTATIN) 174902 UNIT/GM  "cream, Apply 1 Application topically to the appropriate area as directed 2 (Two) Times a Day., Disp: , Rfl:     Omega-3 Fatty Acids (FISH OIL) 1200 MG capsule capsule, Take 2,000 mg by mouth 2 (Two) Times a Day With Meals. HOLDING FOR SURGERY, Disp: , Rfl:     pregabalin (LYRICA) 150 MG capsule, Take 1 capsule by mouth 3 times a day. TAKE 1 CAPSULE BY MOUTH 3 TIMES  DAILY FOR DIABETES WITH NERVE  DISEASE, NEUROPATHIC PAIN  Indications: Diabetes with Nerve Disease, Disp: 270 capsule, Rfl: 0    rosuvastatin (CRESTOR) 10 MG tablet, TAKE 1 TABLET BY MOUTH DAILY, Disp: 90 tablet, Rfl: 2    Sodium 3-Hydroxybutyrate (DL-3-Hydroxybutyric Acid Sod) powder, DICLOFENAC/ LIDOCAINE 4/5% APPLY 1-2 GRAMS (1-2 PUMPS) TO THE AFFECTED AREA 3-4 TIMES DAILY., Disp: , Rfl:     Toujeo Max SoloStar 300 UNIT/ML solution pen-injector injection, 100 units daily (Patient taking differently: 100 units daily  **88 units daily), Disp: 90 mL, Rfl: 2    traZODone (DESYREL) 150 MG tablet, Take 2 tablets by mouth Every Night., Disp: 180 tablet, Rfl: 3    vitamin D (ERGOCALCIFEROL) 1.25 MG (00731 UT) capsule capsule, TAKE 1 CAPSULE BY MOUTH EVERY 7  DAYS, Disp: 13 capsule, Rfl: 2    Wheat Dextrin (BENEFIBER DRINK MIX PO), Take 2 teaspoon(s) by mouth 2 (Two) Times a Day., Disp: , Rfl: .  He denies medication side effects.    All of the other chronic condition(s) listed above are stable w/o issues.    /56   Pulse 80   Temp 98.2 °F (36.8 °C) (Oral)   Resp 16   Ht 177.8 cm (70\")   SpO2 94%   BMI 40.18 kg/m²     Results for orders placed or performed in visit on 09/23/24   Hemoglobin A1c    Collection Time: 09/23/24  8:40 AM    Specimen: Blood   Result Value Ref Range    Hemoglobin A1C 7.40 (H) 4.80 - 5.60 %   Comprehensive Metabolic Panel    Collection Time: 09/23/24  8:40 AM    Specimen: Blood   Result Value Ref Range    Glucose 141 (H) 65 - 99 mg/dL    BUN 31 (H) 8 - 23 mg/dL    Creatinine 1.08 0.76 - 1.27 mg/dL    EGFR Result 68.5 >60.0 " mL/min/1.73    BUN/Creatinine Ratio 28.7 (H) 7.0 - 25.0    Sodium 140 136 - 145 mmol/L    Potassium 4.6 3.5 - 5.2 mmol/L    Chloride 102 98 - 107 mmol/L    Total CO2 28.1 22.0 - 29.0 mmol/L    Calcium 9.3 8.6 - 10.5 mg/dL    Total Protein 5.8 (L) 6.0 - 8.5 g/dL    Albumin 4.4 3.5 - 5.2 g/dL    Globulin 1.4 gm/dL    A/G Ratio 3.1 g/dL    Total Bilirubin 0.4 0.0 - 1.2 mg/dL    Alkaline Phosphatase 71 39 - 117 U/L    AST (SGOT) 24 1 - 40 U/L    ALT (SGPT) 28 1 - 41 U/L   Lipid Panel    Collection Time: 09/23/24  8:40 AM    Specimen: Blood   Result Value Ref Range    Total Cholesterol 138 0 - 200 mg/dL    Triglycerides 179 (H) 0 - 150 mg/dL    HDL Cholesterol 36 (L) 40 - 60 mg/dL    VLDL Cholesterol Lance 30 5 - 40 mg/dL    LDL Chol Calc (NIH) 72 0 - 100 mg/dL   TSH Rfx On Abnormal To Free T4    Collection Time: 09/23/24  8:40 AM    Specimen: Blood   Result Value Ref Range    TSH 3.820 0.270 - 4.200 uIU/mL   Unable To Void    Collection Time: 09/23/24  8:40 AM   Result Value Ref Range    Unable to Void Comment    Cardiovascular Risk Assessment    Collection Time: 09/23/24  8:40 AM   Result Value Ref Range    Interpretation Note      *Note: Due to a large number of results and/or encounters for the requested time period, some results have not been displayed. A complete set of results can be found in Results Review.             The following portions of the patient's history were reviewed and updated as appropriate: allergies, current medications, past family history, past medical history, past social history, past surgical history, and problem list.    Review of Systems   Constitutional:  Negative for activity change, chills and fever.   Respiratory:  Negative for cough.    Cardiovascular:  Negative for chest pain.   Psychiatric/Behavioral:  Negative for dysphoric mood.        Objective             Physical Exam  Vitals and nursing note reviewed.   Constitutional:       General: He is not in acute distress.     Appearance:  He is well-developed.   Cardiovascular:      Rate and Rhythm: Normal rate and regular rhythm.   Pulmonary:      Effort: Pulmonary effort is normal.      Breath sounds: Normal breath sounds.   Neurological:      Mental Status: He is alert and oriented to person, place, and time.   Psychiatric:         Behavior: Behavior normal.         Thought Content: Thought content normal.             Diagnoses and all orders for this visit:    1. Diabetic peripheral neuropathy  -     traMADol (ULTRAM) 50 MG tablet; Take 1-2 tablets by mouth Every Night.  Dispense: 60 tablet; Refill: 2    2. Lumbar radiculopathy  -     traMADol (ULTRAM) 50 MG tablet; Take 1-2 tablets by mouth Every Night.  Dispense: 60 tablet; Refill: 2

## 2025-02-04 ENCOUNTER — OFFICE VISIT (OUTPATIENT)
Dept: FAMILY MEDICINE CLINIC | Facility: CLINIC | Age: 85
End: 2025-02-04
Payer: MEDICARE

## 2025-02-04 VITALS
DIASTOLIC BLOOD PRESSURE: 56 MMHG | RESPIRATION RATE: 16 BRPM | HEIGHT: 70 IN | TEMPERATURE: 98.2 F | SYSTOLIC BLOOD PRESSURE: 102 MMHG | HEART RATE: 80 BPM | OXYGEN SATURATION: 94 % | BODY MASS INDEX: 40.18 KG/M2

## 2025-02-04 DIAGNOSIS — M54.16 LUMBAR RADICULOPATHY: ICD-10-CM

## 2025-02-04 DIAGNOSIS — E11.42 DIABETIC PERIPHERAL NEUROPATHY: ICD-10-CM

## 2025-02-04 PROCEDURE — 99213 OFFICE O/P EST LOW 20 MIN: CPT | Performed by: FAMILY MEDICINE

## 2025-02-04 PROCEDURE — 1160F RVW MEDS BY RX/DR IN RCRD: CPT | Performed by: FAMILY MEDICINE

## 2025-02-04 PROCEDURE — 3074F SYST BP LT 130 MM HG: CPT | Performed by: FAMILY MEDICINE

## 2025-02-04 PROCEDURE — 1159F MED LIST DOCD IN RCRD: CPT | Performed by: FAMILY MEDICINE

## 2025-02-04 PROCEDURE — 1125F AMNT PAIN NOTED PAIN PRSNT: CPT | Performed by: FAMILY MEDICINE

## 2025-02-04 PROCEDURE — 3078F DIAST BP <80 MM HG: CPT | Performed by: FAMILY MEDICINE

## 2025-02-04 RX ORDER — TRAMADOL HYDROCHLORIDE 50 MG/1
50-100 TABLET ORAL NIGHTLY
Qty: 60 TABLET | Refills: 2 | Status: SHIPPED | OUTPATIENT
Start: 2025-02-04

## 2025-02-07 ENCOUNTER — OFFICE VISIT (OUTPATIENT)
Dept: ENDOCRINOLOGY | Age: 85
End: 2025-02-07
Payer: MEDICARE

## 2025-02-07 VITALS
WEIGHT: 280 LBS | BODY MASS INDEX: 40.09 KG/M2 | DIASTOLIC BLOOD PRESSURE: 74 MMHG | HEART RATE: 77 BPM | HEIGHT: 70 IN | SYSTOLIC BLOOD PRESSURE: 140 MMHG | OXYGEN SATURATION: 92 %

## 2025-02-07 DIAGNOSIS — Z86.718 HISTORY OF DVT OF LOWER EXTREMITY: ICD-10-CM

## 2025-02-07 DIAGNOSIS — Z95.2 S/P TAVR (TRANSCATHETER AORTIC VALVE REPLACEMENT): ICD-10-CM

## 2025-02-07 DIAGNOSIS — E11.42 TYPE 2 DIABETES MELLITUS WITH PERIPHERAL NEUROPATHY: Primary | ICD-10-CM

## 2025-02-07 DIAGNOSIS — G47.33 OBSTRUCTIVE SLEEP APNEA SYNDROME: ICD-10-CM

## 2025-02-07 DIAGNOSIS — C67.9 MALIGNANT NEOPLASM OF URINARY BLADDER, UNSPECIFIED SITE: ICD-10-CM

## 2025-02-07 DIAGNOSIS — E78.5 DYSLIPIDEMIA: ICD-10-CM

## 2025-02-07 DIAGNOSIS — E55.9 VITAMIN D DEFICIENCY: ICD-10-CM

## 2025-02-07 LAB
25(OH)D3+25(OH)D2 SERPL-MCNC: 48.8 NG/ML (ref 30–100)
ALBUMIN SERPL-MCNC: 4 G/DL (ref 3.5–5.2)
ALBUMIN/GLOB SERPL: 1.7 G/DL
ALP SERPL-CCNC: 78 U/L (ref 39–117)
ALT SERPL-CCNC: 30 U/L (ref 1–41)
AST SERPL-CCNC: 29 U/L (ref 1–40)
BILIRUB SERPL-MCNC: 0.3 MG/DL (ref 0–1.2)
BUN SERPL-MCNC: 18 MG/DL (ref 8–23)
BUN/CREAT SERPL: 17.8 (ref 7–25)
CALCIUM SERPL-MCNC: 9.1 MG/DL (ref 8.6–10.5)
CHLORIDE SERPL-SCNC: 97 MMOL/L (ref 98–107)
CHOLEST SERPL-MCNC: 121 MG/DL (ref 0–200)
CO2 SERPL-SCNC: 26.4 MMOL/L (ref 22–29)
CREAT SERPL-MCNC: 1.01 MG/DL (ref 0.76–1.27)
EGFRCR SERPLBLD CKD-EPI 2021: 73.8 ML/MIN/1.73
GLOBULIN SER CALC-MCNC: 2.4 GM/DL
GLUCOSE SERPL-MCNC: 253 MG/DL (ref 65–99)
HBA1C MFR BLD: 7.5 % (ref 4.8–5.6)
HDLC SERPL-MCNC: 36 MG/DL (ref 40–60)
IMP & REVIEW OF LAB RESULTS: NORMAL
LDLC SERPL CALC-MCNC: 57 MG/DL (ref 0–100)
POTASSIUM SERPL-SCNC: 4.8 MMOL/L (ref 3.5–5.2)
PROT SERPL-MCNC: 6.4 G/DL (ref 6–8.5)
SODIUM SERPL-SCNC: 135 MMOL/L (ref 136–145)
TRIGL SERPL-MCNC: 162 MG/DL (ref 0–150)
TSH SERPL DL<=0.005 MIU/L-ACNC: 4.16 UIU/ML (ref 0.27–4.2)
VLDLC SERPL CALC-MCNC: 28 MG/DL (ref 5–40)

## 2025-02-07 NOTE — PROGRESS NOTES
Subjective   Jesus Beckham is a 84 y.o. male.     History of Present Illness     He has known diabetes mellitus since 1998 and started on insulin in 2016.     He is on Toujeo 88-92 units every morning, Glyxambi 25/5 mg once a day and glipizide 10 mg twice a day.  He checks his blood sugar twice a day.  Fasting glucose .  Suppertime glucose 105-345.  He has few hypoglycemic episodes.  He admits to dietary noncompliance.      His last eye examination was in 5/24.  He has no diabetic retinopathy.  Urine microalbumin was elevated in May 2022 (urine was collected from a suprapubic catheter).  He is on lisinopril.  He has tingling, burning and pain in his toes and hands and is on Lyrica 150 mg 3 times daily.  He has neurostimulator in place.  He was started on tramadol by Dr. Ricardo.     He has chronic wound on both feet and follows [facility].  He is being followed by Dr. Barber.  He is not on antibiotics.     He has hyperlipidemia and is on Crestor 10 mg/day, fenofibrate 145 mg/day and fish oil 1 capsule once a day.  He denies myalgia.       He has vitamin D deficiency and is on ergocalciferol 50,000 units once a week.       He had transcatheter aortic valve replacement for aortic stenosis in February 2022.  He has history of paroxysmal atrial fibrillation.   He denies PND.  He denies orthopnea.     He had of DVT of the lower extremities and is on Amari prescriber Dr. Burr.  He had pulmonary embolism in the past and had a vena caval filter placement which was later removed.     He has sleep apnea and is unable to tolerate CPAP.  He was last seen by Dr. Dempsey in August 2022.  He wakes up rested.     He has urinary bladder cancer and has suprapubic catheter.   He follows with Dr. Saleh.     He has chronic constipation.  He is on MiraLAX and fiber supplements.  His last colonoscopy was in 2019 and polyps were removed by Dr. Alvarado.  He does not want another colonoscopy       The following  "portions of the patient's history were reviewed and updated as appropriate: allergies, current medications, past family history, past medical history, past social history, past surgical history, and problem list.    Review of Systems   Eyes:  Negative for visual disturbance.   Respiratory:  Negative for shortness of breath.    Cardiovascular:  Negative for chest pain and palpitations.   Gastrointestinal: Negative.    Genitourinary:         Has suprapubic catheter     Vitals:    02/07/25 0918   BP: 140/74   BP Location: Left arm   Pulse: 77   SpO2: 92%   Weight: 127 kg (280 lb)   Height: 177.8 cm (70\")      Objective   Physical Exam  Constitutional:       Appearance: Normal appearance. He is not toxic-appearing or diaphoretic.   Eyes:      General: No scleral icterus.        Right eye: No discharge.         Left eye: No discharge.      Extraocular Movements: Extraocular movements intact.   Cardiovascular:      Rate and Rhythm: Normal rate and regular rhythm.   Pulmonary:      Breath sounds: No wheezing or rales.   Abdominal:      General: Bowel sounds are normal.      Palpations: Abdomen is soft.      Tenderness: There is no right CVA tenderness or left CVA tenderness.   Neurological:      Mental Status: He is alert.       Results Encounter on 09/23/2024   Component Date Value Ref Range Status    Hemoglobin A1C 09/23/2024 7.40 (H)  4.80 - 5.60 % Final    Comment: Hemoglobin A1C Ranges:  Increased Risk for Diabetes  5.7% to 6.4%  Diabetes                     >= 6.5%  Diabetic Goal                < 7.0%      Glucose 09/23/2024 141 (H)  65 - 99 mg/dL Final    BUN 09/23/2024 31 (H)  8 - 23 mg/dL Final    Creatinine 09/23/2024 1.08  0.76 - 1.27 mg/dL Final    EGFR Result 09/23/2024 68.5  >60.0 mL/min/1.73 Final    Comment: GFR Normal >60  Chronic Kidney Disease <60  Kidney Failure <15  The GFR formula is only valid for adults with stable renal  function between ages 18 and 70.      BUN/Creatinine Ratio 09/23/2024 28.7 (H) "  7.0 - 25.0 Final    Sodium 09/23/2024 140  136 - 145 mmol/L Final    Potassium 09/23/2024 4.6  3.5 - 5.2 mmol/L Final    Chloride 09/23/2024 102  98 - 107 mmol/L Final    Total CO2 09/23/2024 28.1  22.0 - 29.0 mmol/L Final    Calcium 09/23/2024 9.3  8.6 - 10.5 mg/dL Final    Total Protein 09/23/2024 5.8 (L)  6.0 - 8.5 g/dL Final    Albumin 09/23/2024 4.4  3.5 - 5.2 g/dL Final    Globulin 09/23/2024 1.4  gm/dL Final    A/G Ratio 09/23/2024 3.1  g/dL Final    Total Bilirubin 09/23/2024 0.4  0.0 - 1.2 mg/dL Final    Alkaline Phosphatase 09/23/2024 71  39 - 117 U/L Final    AST (SGOT) 09/23/2024 24  1 - 40 U/L Final    ALT (SGPT) 09/23/2024 28  1 - 41 U/L Final    Total Cholesterol 09/23/2024 138  0 - 200 mg/dL Final    Comment: Cholesterol Reference Ranges  (U.S. Department of Health and Human Services ATP III  Classifications)  Desirable          <200 mg/dL  Borderline High    200-239 mg/dL  High Risk          >240 mg/dL  Triglyceride Reference Ranges  (U.S. Department of Health and Human Services ATP III  Classifications)  Normal           <150 mg/dL  Borderline High  150-199 mg/dL  High             200-499 mg/dL  Very High        >500 mg/dL  HDL Reference Ranges  (U.S. Department of Health and Human Services ATP III  Classifications)  Low     <40 mg/dl (major risk factor for CHD)  High    >60 mg/dl ('negative' risk factor for CHD)  LDL Reference Ranges  (U.S. Department of Health and Human Services ATP III  Classifications)  Optimal          <100 mg/dL  Near Optimal     100-129 mg/dL  Borderline High  130-159 mg/dL  High             160-189 mg/dL  Very High        >189 mg/dL      Triglycerides 09/23/2024 179 (H)  0 - 150 mg/dL Final    HDL Cholesterol 09/23/2024 36 (L)  40 - 60 mg/dL Final    VLDL Cholesterol Lance 09/23/2024 30  5 - 40 mg/dL Final    LDL Chol Calc (NIH) 09/23/2024 72  0 - 100 mg/dL Final    TSH 09/23/2024 3.820  0.270 - 4.200 uIU/mL Final    Unable to Void 09/23/2024 Comment   Final    Comment: The  patient was not able to render a urine sample and has been  instructed to return for a urine collection at their earliest  convenience.  The urine testing that you have requested has  been deleted from this report.  When the patient returns and  provides a urine specimen, the urine testing will be performed  and separately reported.      Interpretation 09/23/2024 Note   Final    Supplemental report is available.     Assessment & Plan   Diagnoses and all orders for this visit:    1. Type 2 diabetes mellitus with peripheral neuropathy (Primary)  -     Comprehensive Metabolic Panel  -     Hemoglobin A1c  -     TSH Rfx On Abnormal To Free T4    2. Dyslipidemia  -     Lipid Panel  -     TSH Rfx On Abnormal To Free T4    3. Vitamin D deficiency  -     Vitamin D,25-Hydroxy    4. S/P TAVR (transcatheter aortic valve replacement)    5. History of DVT of lower extremity    6. Obstructive sleep apnea syndrome    7. Malignant neoplasm of urinary bladder, unspecified site      Continue Toujeo, Glyxambi, and glipizide.  Will ask pharmacy to check if he qualifies for freestyle blayne 3+ sensor.  Continue Lyrica and tramadol per Dr. Ricardo.    Follow-up with Dr. Victoria.    Continue Crestor 10 mg/day, fenofibrate 145 mg/day, and fish oil 1 capsule daily    Continue ergocalciferol 50,000 units once a week.    Follow-up with Dr. Burr and Dr. Saleh as scheduled.    Copy of my note sent to Dr. Ricardo, Dr. Martinez, Dr. Burr and Dr. Saleh.    RTC 4 mos.

## 2025-02-07 NOTE — LETTER
February 7, 2025     Magdy Ricardo MD  [address] [facility]  [address]  [facility] [address]    Patient: Jesus Beckham   YOB: 1940   Date of Visit: 2/7/2025     Dear Magdy Ricardo MD:       Thank you for referring Jesus Beckham to me for evaluation. Below are the relevant portions of my assessment and plan of care.    If you have questions, please do not hesitate to call me. I look forward to following Jesus along with you.         Sincerely,        Lito Hernandez MD        CC: MD Emmett Lin MD Ganesh K Kartha, MD Yson, Lito PEACE MD  02/07/25 1019  Sign when Signing Visit  Subjective  Jesus Beckham is a 84 y.o. male.     History of Present Illness     He has known diabetes mellitus since 1998 and started on insulin in 2016.     He is on Toujeo 88-92 units every morning, Glyxambi 25/5 mg once a day and glipizide 10 mg twice a day.  He checks his blood sugar twice a day.  Fasting glucose .  Suppertime glucose 105-345.  He has few hypoglycemic episodes.  He admits to dietary noncompliance.      His last eye examination was in 5/24.  He has no diabetic retinopathy.  Urine microalbumin was elevated in May 2022 (urine was collected from a suprapubic catheter).  He is on lisinopril.  He has tingling, burning and pain in his toes and hands and is on Lyrica 150 mg 3 times daily.  He has neurostimulator in place.  He was started on tramadol by Dr. Ricardo.     He has chronic wound on both feet and follows [facility].  He is being followed by Dr. Barber.  He is not on antibiotics.     He has hyperlipidemia and is on Crestor 10 mg/day, fenofibrate 145 mg/day and fish oil 1 capsule once a day.  He denies myalgia.       He has vitamin D deficiency and is on ergocalciferol 50,000 units once a week.       He had transcatheter aortic valve replacement for aortic stenosis in February 2022.  He has history of paroxysmal atrial fibrillation.   He denies PND.  " He denies orthopnea.     He had of DVT of the lower extremities and is on Eliquis prescriber Dr. Burr.  He had pulmonary embolism in the past and had a vena caval filter placement which was later removed.     He has sleep apnea and is unable to tolerate CPAP.  He was last seen by Dr. Dempsey in August 2022.  He wakes up rested.     He has urinary bladder cancer and has suprapubic catheter.   He follows with Dr. Saleh.     He has chronic constipation.  He is on MiraLAX and fiber supplements.  His last colonoscopy was in 2019 and polyps were removed by Dr. Alvarado.  He does not want another colonoscopy       The following portions of the patient's history were reviewed and updated as appropriate: allergies, current medications, past family history, past medical history, past social history, past surgical history, and problem list.    Review of Systems   Eyes:  Negative for visual disturbance.   Respiratory:  Negative for shortness of breath.    Cardiovascular:  Negative for chest pain and palpitations.   Gastrointestinal: Negative.    Genitourinary:         Has suprapubic catheter     Vitals:    02/07/25 0918   BP: 140/74   BP Location: Left arm   Pulse: 77   SpO2: 92%   Weight: 127 kg (280 lb)   Height: 177.8 cm (70\")      Objective  Physical Exam  Constitutional:       Appearance: Normal appearance. He is not toxic-appearing or diaphoretic.   Eyes:      General: No scleral icterus.        Right eye: No discharge.         Left eye: No discharge.      Extraocular Movements: Extraocular movements intact.   Cardiovascular:      Rate and Rhythm: Normal rate and regular rhythm.   Pulmonary:      Breath sounds: No wheezing or rales.   Abdominal:      General: Bowel sounds are normal.      Palpations: Abdomen is soft.      Tenderness: There is no right CVA tenderness or left CVA tenderness.   Neurological:      Mental Status: He is alert.       Results Encounter on 09/23/2024   Component Date Value Ref Range Status "   • Hemoglobin A1C 09/23/2024 7.40 (H)  4.80 - 5.60 % Final    Comment: Hemoglobin A1C Ranges:  Increased Risk for Diabetes  5.7% to 6.4%  Diabetes                     >= 6.5%  Diabetic Goal                < 7.0%     • Glucose 09/23/2024 141 (H)  65 - 99 mg/dL Final   • BUN 09/23/2024 31 (H)  8 - 23 mg/dL Final   • Creatinine 09/23/2024 1.08  0.76 - 1.27 mg/dL Final   • EGFR Result 09/23/2024 68.5  >60.0 mL/min/1.73 Final    Comment: GFR Normal >60  Chronic Kidney Disease <60  Kidney Failure <15  The GFR formula is only valid for adults with stable renal  function between ages 18 and 70.     • BUN/Creatinine Ratio 09/23/2024 28.7 (H)  7.0 - 25.0 Final   • Sodium 09/23/2024 140  136 - 145 mmol/L Final   • Potassium 09/23/2024 4.6  3.5 - 5.2 mmol/L Final   • Chloride 09/23/2024 102  98 - 107 mmol/L Final   • Total CO2 09/23/2024 28.1  22.0 - 29.0 mmol/L Final   • Calcium 09/23/2024 9.3  8.6 - 10.5 mg/dL Final   • Total Protein 09/23/2024 5.8 (L)  6.0 - 8.5 g/dL Final   • Albumin 09/23/2024 4.4  3.5 - 5.2 g/dL Final   • Globulin 09/23/2024 1.4  gm/dL Final   • A/G Ratio 09/23/2024 3.1  g/dL Final   • Total Bilirubin 09/23/2024 0.4  0.0 - 1.2 mg/dL Final   • Alkaline Phosphatase 09/23/2024 71  39 - 117 U/L Final   • AST (SGOT) 09/23/2024 24  1 - 40 U/L Final   • ALT (SGPT) 09/23/2024 28  1 - 41 U/L Final   • Total Cholesterol 09/23/2024 138  0 - 200 mg/dL Final    Comment: Cholesterol Reference Ranges  (U.S. Department of Health and Human Services ATP III  Classifications)  Desirable          <200 mg/dL  Borderline High    200-239 mg/dL  High Risk          >240 mg/dL  Triglyceride Reference Ranges  (U.S. Department of Health and Human Services ATP III  Classifications)  Normal           <150 mg/dL  Borderline High  150-199 mg/dL  High             200-499 mg/dL  Very High        >500 mg/dL  HDL Reference Ranges  (U.S. Department of Health and Human Services ATP III  Classifications)  Low     <40 mg/dl (major risk factor  for CHD)  High    >60 mg/dl ('negative' risk factor for CHD)  LDL Reference Ranges  (U.S. Department of Health and Human Services ATP III  Classifications)  Optimal          <100 mg/dL  Near Optimal     100-129 mg/dL  Borderline High  130-159 mg/dL  High             160-189 mg/dL  Very High        >189 mg/dL     • Triglycerides 09/23/2024 179 (H)  0 - 150 mg/dL Final   • HDL Cholesterol 09/23/2024 36 (L)  40 - 60 mg/dL Final   • VLDL Cholesterol Lance 09/23/2024 30  5 - 40 mg/dL Final   • LDL Chol Calc (NIH) 09/23/2024 72  0 - 100 mg/dL Final   • TSH 09/23/2024 3.820  0.270 - 4.200 uIU/mL Final   • Unable to Void 09/23/2024 Comment   Final    Comment: The patient was not able to render a urine sample and has been  instructed to return for a urine collection at their earliest  convenience.  The urine testing that you have requested has  been deleted from this report.  When the patient returns and  provides a urine specimen, the urine testing will be performed  and separately reported.     • Interpretation 09/23/2024 Note   Final    Supplemental report is available.     Assessment & Plan  Diagnoses and all orders for this visit:    1. Type 2 diabetes mellitus with peripheral neuropathy (Primary)  -     Comprehensive Metabolic Panel  -     Hemoglobin A1c  -     TSH Rfx On Abnormal To Free T4    2. Dyslipidemia  -     Lipid Panel  -     TSH Rfx On Abnormal To Free T4    3. Vitamin D deficiency  -     Vitamin D,25-Hydroxy    4. S/P TAVR (transcatheter aortic valve replacement)    5. History of DVT of lower extremity    6. Obstructive sleep apnea syndrome    7. Malignant neoplasm of urinary bladder, unspecified site      Continue Toujeo, Glyxambi, and glipizide.  Will ask pharmacy to check if he qualifies for freestyle blayne 3+ sensor.  Continue Lyrica and tramadol per Dr. Ricardo.    Follow-up with Dr. Victoria.    Continue Crestor 10 mg/day, fenofibrate 145 mg/day, and fish oil 1 capsule daily    Continue ergocalciferol  50,000 units once a week.    Follow-up with Dr. Burr and Dr. Saleh as scheduled.    Copy of my note sent to Dr. Ricardo, Dr. Martinez, Dr. Burr and Dr. Saleh.    RTC 4 mos.

## 2025-02-08 NOTE — PROGRESS NOTES
Blood sugar 253.  Hemoglobin A1c slightly elevated at 7.5%.  Sensor download after starting CGM.  LDL 57.  HDL 36.  Triglycerides 162.  Continue Crestor 10 mg/day, fenofibrate 145 mg a day and fish oil 1 capsule/day.  Normal thyroid function test.  Normal vitamin D.  Continue ergocalciferol 50,000 units weekly.  Copy of labs sent to Dr. Ricardo, Dr. Ty, Dr. Burr, and Dr. Saleh.   please notify patient of results and instructions.

## 2025-02-10 ENCOUNTER — TELEPHONE (OUTPATIENT)
Dept: ENDOCRINOLOGY | Age: 85
End: 2025-02-10
Payer: MEDICARE

## 2025-02-10 NOTE — TELEPHONE ENCOUNTER
Caller: DEONNA MONROE    Relationship to patient: [facility]    Best call back number: [phone]    Patient is needing: PATIENTS INSURANCE WONT COVER FREESTYLE YAIMA AT ALL. HE IS NEEDING A DIFFERENT CALLED IN, THE INSURANCE DID NOT GIVE THEM WHAT THEY WILL COVER. PLEASE CALL PATIENT BACK         Goal Outcome Evaluation:  Pt resting in bed. Chest Xray and CT completed. No acute changes this shift. VSS. Will continue plan of care.

## 2025-02-10 NOTE — TELEPHONE ENCOUNTER
Called and spoke with pt. Informed pt that typically we would send the order DME with his insurance. Pt stated he was okay if we sent the Rx DME.

## 2025-02-18 DIAGNOSIS — E11.42 TYPE 2 DIABETES MELLITUS WITH PERIPHERAL NEUROPATHY: Primary | ICD-10-CM

## 2025-02-18 RX ORDER — ACYCLOVIR 800 MG/1
1 TABLET ORAL
Qty: 6 EACH | Refills: 3 | Status: SHIPPED | OUTPATIENT
Start: 2025-02-18

## 2025-02-18 RX ORDER — KETOROLAC TROMETHAMINE 30 MG/ML
1 INJECTION, SOLUTION INTRAMUSCULAR; INTRAVENOUS DAILY
Qty: 1 EACH | Refills: 1 | Status: SHIPPED | OUTPATIENT
Start: 2025-02-18

## 2025-02-25 ENCOUNTER — OFFICE VISIT (OUTPATIENT)
Dept: WOUND CARE | Facility: HOSPITAL | Age: 85
End: 2025-02-25
Payer: MEDICARE

## 2025-02-25 PROCEDURE — G0463 HOSPITAL OUTPT CLINIC VISIT: HCPCS

## 2025-03-03 DIAGNOSIS — E11.42 DIABETIC PERIPHERAL NEUROPATHY: ICD-10-CM

## 2025-03-03 RX ORDER — PREGABALIN 150 MG/1
150 CAPSULE ORAL 3 TIMES DAILY
Qty: 270 CAPSULE | Refills: 0 | Status: SHIPPED | OUTPATIENT
Start: 2025-03-03

## 2025-03-03 NOTE — TELEPHONE ENCOUNTER
Rx Refill Note  Requested Prescriptions      No prescriptions requested or ordered in this encounter      Last office visit with prescribing clinician: 2/7/2025   Last telemedicine visit with prescribing clinician: Visit date not found   Next office visit with prescribing clinician: 6/17/2025                         Would you like a call back once the refill request has been completed: [] Yes [] No    If the office needs to give you a call back, can they leave a voicemail: [] Yes [] No    Leah Aviles MA  03/03/25, 14:44 EST     See Willis report

## 2025-03-25 ENCOUNTER — OFFICE VISIT (OUTPATIENT)
Dept: WOUND CARE | Facility: HOSPITAL | Age: 85
End: 2025-03-25
Payer: MEDICARE

## 2025-04-09 RX ORDER — APIXABAN 5 MG/1
5 TABLET, FILM COATED ORAL 2 TIMES DAILY
Qty: 180 TABLET | Refills: 3 | Status: SHIPPED | OUTPATIENT
Start: 2025-04-09

## 2025-04-22 ENCOUNTER — OFFICE VISIT (OUTPATIENT)
Dept: WOUND CARE | Facility: HOSPITAL | Age: 85
End: 2025-04-22
Payer: MEDICARE

## 2025-04-22 PROCEDURE — G0463 HOSPITAL OUTPT CLINIC VISIT: HCPCS

## 2025-05-04 DIAGNOSIS — E11.69 TYPE 2 DIABETES MELLITUS WITH OTHER SPECIFIED COMPLICATION, WITH LONG-TERM CURRENT USE OF INSULIN: ICD-10-CM

## 2025-05-04 DIAGNOSIS — Z79.4 TYPE 2 DIABETES MELLITUS WITH OTHER SPECIFIED COMPLICATION, WITH LONG-TERM CURRENT USE OF INSULIN: ICD-10-CM

## 2025-05-05 RX ORDER — GLIPIZIDE 10 MG/1
10 TABLET ORAL
Qty: 180 TABLET | Refills: 2 | Status: SHIPPED | OUTPATIENT
Start: 2025-05-05

## 2025-05-05 NOTE — TELEPHONE ENCOUNTER
Rx Refill Note  Requested Prescriptions     Pending Prescriptions Disp Refills    glipizide (GLUCOTROL) 10 MG tablet [Pharmacy Med Name: glipiZIDE 10 MG Oral Tablet] 180 tablet 3     Sig: TAKE 1 TABLET BY MOUTH TWICE  DAILY BEFORE MEALS      Last office visit with prescribing clinician: Visit date not found   Last telemedicine visit with prescribing clinician: Visit date not found   Next office visit with prescribing clinician: Visit date not found                         Would you like a call back once the refill request has been completed: [] Yes [] No    If the office needs to give you a call back, can they leave a voicemail: [] Yes [] No    Basilia Rossi MA  05/05/25, 08:22 EDT

## 2025-05-08 NOTE — CASE MANAGEMENT/SOCIAL WORK
Case Management Discharge Note      Final Note: dc home with [facility]  on 1/20/24    Provided Post Acute Provider List?: Yes  Post Acute Provider List: Home Health    Selected Continued Care - Discharged on 1/20/2024 Admission date: 1/17/2024 - Discharge disposition: Home or Self Care      Destination    No services have been selected for the patient.                Durable Medical Equipment    No services have been selected for the patient.                Dialysis/Infusion    No services have been selected for the patient.                Home Medical Care       Service Provider Selected Services Address Phone Fax Patient Preferred    [facility] [address], [place], [address], [facility] [address] [phone] --              Therapy    No services have been selected for the patient.                Community Resources    No services have been selected for the patient.                Community & DME    No services have been selected for the patient.                         Final Discharge Disposition Code: 06 - home with home health care   Writer spoke with patient over the phone to convey lab results. Patient states that she is tolerating the current Lamotrigine 350 mg twice daily dosing well. Denies dizziness. She did confirm that her lab was drawn prior to her am dose. Patient informed that f/u appt was not scheduled and agreed to call transfer to  to schedule this f/u appt. Patient thanked writer for call.

## 2025-05-14 ENCOUNTER — OFFICE VISIT (OUTPATIENT)
Dept: FAMILY MEDICINE CLINIC | Facility: CLINIC | Age: 85
End: 2025-05-14
Payer: MEDICARE

## 2025-05-14 VITALS
WEIGHT: 280 LBS | TEMPERATURE: 98.5 F | BODY MASS INDEX: 40.09 KG/M2 | SYSTOLIC BLOOD PRESSURE: 108 MMHG | OXYGEN SATURATION: 95 % | HEART RATE: 76 BPM | HEIGHT: 70 IN | DIASTOLIC BLOOD PRESSURE: 58 MMHG | RESPIRATION RATE: 16 BRPM

## 2025-05-14 DIAGNOSIS — E11.42 DIABETIC PERIPHERAL NEUROPATHY: ICD-10-CM

## 2025-05-14 DIAGNOSIS — B02.9 HERPES ZOSTER WITHOUT COMPLICATION: Primary | ICD-10-CM

## 2025-05-14 DIAGNOSIS — K64.9 HEMORRHOIDS, UNSPECIFIED HEMORRHOID TYPE: ICD-10-CM

## 2025-05-14 DIAGNOSIS — M54.16 LUMBAR RADICULOPATHY: ICD-10-CM

## 2025-05-14 PROCEDURE — 3074F SYST BP LT 130 MM HG: CPT | Performed by: FAMILY MEDICINE

## 2025-05-14 PROCEDURE — 1160F RVW MEDS BY RX/DR IN RCRD: CPT | Performed by: FAMILY MEDICINE

## 2025-05-14 PROCEDURE — 1159F MED LIST DOCD IN RCRD: CPT | Performed by: FAMILY MEDICINE

## 2025-05-14 PROCEDURE — 99214 OFFICE O/P EST MOD 30 MIN: CPT | Performed by: FAMILY MEDICINE

## 2025-05-14 PROCEDURE — 3078F DIAST BP <80 MM HG: CPT | Performed by: FAMILY MEDICINE

## 2025-05-14 PROCEDURE — 1125F AMNT PAIN NOTED PAIN PRSNT: CPT | Performed by: FAMILY MEDICINE

## 2025-05-14 RX ORDER — TRAMADOL HYDROCHLORIDE 50 MG/1
50-100 TABLET ORAL NIGHTLY
Qty: 60 TABLET | Refills: 2 | Status: SHIPPED | OUTPATIENT
Start: 2025-05-14

## 2025-05-14 RX ORDER — VALACYCLOVIR HYDROCHLORIDE 1 G/1
1000 TABLET, FILM COATED ORAL 3 TIMES DAILY
Qty: 21 TABLET | Refills: 0 | Status: SHIPPED | OUTPATIENT
Start: 2025-05-14

## 2025-05-14 RX ORDER — HYDROCORTISONE 25 MG/G
1 CREAM TOPICAL 2 TIMES DAILY
Qty: 28 G | Refills: 5 | Status: SHIPPED | OUTPATIENT
Start: 2025-05-14 | End: 2025-05-19 | Stop reason: HOSPADM

## 2025-05-14 RX ORDER — TRAMADOL HYDROCHLORIDE 50 MG/1
TABLET ORAL
Qty: 60 TABLET | OUTPATIENT
Start: 2025-05-14

## 2025-05-14 NOTE — PROGRESS NOTES
"Subjective   Jesus Beckham is a 84 y.o. male.     CC: Rash    History of Present Illness     Patient comes in today reporting a rash on the left side of the back that possibly has been there for a week, but they think maybe last.  Reports some discomfort at the site.    Patient also needs something for his hemorrhoids.    Patient also is due a refill on his tramadol, and reports no side effects.       The following portions of the patient's history were reviewed and updated as appropriate: allergies, current medications, past family history, past medical history, past social history, past surgical history, and problem list.    Review of Systems   Constitutional:  Negative for activity change, chills and fever.   Respiratory:  Negative for cough.    Cardiovascular:  Negative for chest pain.   Skin:  Positive for rash.   Psychiatric/Behavioral:  Negative for dysphoric mood.        /58   Pulse 76   Temp 98.5 °F (36.9 °C) (Oral)   Resp 16   Ht 177.8 cm (70\")   Wt 127 kg (280 lb)   SpO2 95%   BMI 40.18 kg/m²     Objective   Physical Exam  Vitals and nursing note reviewed.   Constitutional:       General: He is not in acute distress.     Appearance: He is well-developed.   Pulmonary:      Effort: Pulmonary effort is normal.   Neurological:      Mental Status: He is alert and oriented to person, place, and time.   Psychiatric:         Behavior: Behavior normal.         Thought Content: Thought content normal.                 Assessment & Plan   Diagnoses and all orders for this visit:    1. Herpes zoster without complication (Primary)  -     valACYclovir (Valtrex) 1000 MG tablet; Take 1 tablet by mouth 3 (Three) Times a Day.  Dispense: 21 tablet; Refill: 0    2. Diabetic peripheral neuropathy  -     traMADol (ULTRAM) 50 MG tablet; Take 1-2 tablets by mouth Every Night.  Dispense: 60 tablet; Refill: 2    3. Lumbar radiculopathy  -     traMADol (ULTRAM) 50 MG tablet; Take 1-2 tablets by mouth Every Night.  " Dispense: 60 tablet; Refill: 2    4. Hemorrhoids, unspecified hemorrhoid type  -     hydrocortisone 2.5 % cream; Apply 1 Application topically to the appropriate area as directed 2 (Two) Times a Day.  Dispense: 28 g; Refill: 5

## 2025-05-16 ENCOUNTER — HOSPITAL ENCOUNTER (OUTPATIENT)
Facility: HOSPITAL | Age: 85
Setting detail: OBSERVATION
Discharge: HOME HEALTH | End: 2025-05-19
Attending: EMERGENCY MEDICINE | Admitting: HOSPITALIST
Payer: MEDICARE

## 2025-05-16 ENCOUNTER — APPOINTMENT (OUTPATIENT)
Dept: CT IMAGING | Facility: HOSPITAL | Age: 85
End: 2025-05-16
Payer: MEDICARE

## 2025-05-16 DIAGNOSIS — B02.9 HERPES ZOSTER WITHOUT COMPLICATION: ICD-10-CM

## 2025-05-16 DIAGNOSIS — S32.2XXA CLOSED FRACTURE OF SACRUM AND COCCYX, INITIAL ENCOUNTER: Primary | ICD-10-CM
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
## 2025-05-16 DIAGNOSIS — R53.1 GENERALIZED WEAKNESS: ICD-10-CM

## 2025-05-16 DIAGNOSIS — G62.9 PERIPHERAL POLYNEUROPATHY: ICD-10-CM

## 2025-05-16 DIAGNOSIS — L97.419 NEUROPATHIC ULCER OF RIGHT HEEL, UNSPECIFIED ULCER STAGE: ICD-10-CM

## 2025-05-16 DIAGNOSIS — S32.10XA CLOSED FRACTURE OF SACRUM AND COCCYX, INITIAL ENCOUNTER: Primary | ICD-10-CM

## 2025-05-16 LAB
ALBUMIN SERPL-MCNC: 4.3 G/DL (ref 3.5–5.2)
ALBUMIN/GLOB SERPL: 1.7 G/DL
ALP SERPL-CCNC: 71 U/L (ref 39–117)
ALT SERPL W P-5'-P-CCNC: 24 U/L (ref 1–41)
ANION GAP SERPL CALCULATED.3IONS-SCNC: 9.8 MMOL/L (ref 5–15)
AST SERPL-CCNC: 23 U/L (ref 1–40)
BASOPHILS # BLD AUTO: 0.06 10*3/MM3 (ref 0–0.2)
BASOPHILS NFR BLD AUTO: 0.7 % (ref 0–1.5)
BILIRUB SERPL-MCNC: 0.4 MG/DL (ref 0–1.2)
BUN SERPL-MCNC: 25 MG/DL (ref 8–23)
BUN/CREAT SERPL: 22.5 (ref 7–25)
CALCIUM SPEC-SCNC: 9.4 MG/DL (ref 8.6–10.5)
CHLORIDE SERPL-SCNC: 105 MMOL/L (ref 98–107)
CO2 SERPL-SCNC: 24.2 MMOL/L (ref 22–29)
CREAT SERPL-MCNC: 1.11 MG/DL (ref 0.76–1.27)
DEPRECATED RDW RBC AUTO: 50.9 FL (ref 37–54)
EGFRCR SERPLBLD CKD-EPI 2021: 65.9 ML/MIN/1.73
EOSINOPHIL # BLD AUTO: 0.08 10*3/MM3 (ref 0–0.4)
EOSINOPHIL NFR BLD AUTO: 1 % (ref 0.3–6.2)
ERYTHROCYTE [DISTWIDTH] IN BLOOD BY AUTOMATED COUNT: 14.7 % (ref 12.3–15.4)
GLOBULIN UR ELPH-MCNC: 2.6 GM/DL
GLUCOSE SERPL-MCNC: 127 MG/DL (ref 65–99)
HCT VFR BLD AUTO: 44.8 % (ref 37.5–51)
HGB BLD-MCNC: 15.1 G/DL (ref 13–17.7)
IMM GRANULOCYTES # BLD AUTO: 0.07 10*3/MM3 (ref 0–0.05)
IMM GRANULOCYTES NFR BLD AUTO: 0.9 % (ref 0–0.5)
LIPASE SERPL-CCNC: 40 U/L (ref 13–60)
LYMPHOCYTES # BLD AUTO: 0.89 10*3/MM3 (ref 0.7–3.1)
LYMPHOCYTES NFR BLD AUTO: 11 % (ref 19.6–45.3)
MCH RBC QN AUTO: 31.7 PG (ref 26.6–33)
MCHC RBC AUTO-ENTMCNC: 33.7 G/DL (ref 31.5–35.7)
MCV RBC AUTO: 94.1 FL (ref 79–97)
MONOCYTES # BLD AUTO: 0.55 10*3/MM3 (ref 0.1–0.9)
MONOCYTES NFR BLD AUTO: 6.8 % (ref 5–12)
NEUTROPHILS NFR BLD AUTO: 6.41 10*3/MM3 (ref 1.7–7)
NEUTROPHILS NFR BLD AUTO: 79.6 % (ref 42.7–76)
NRBC BLD AUTO-RTO: 0 /100 WBC (ref 0–0.2)
PLATELET # BLD AUTO: 173 10*3/MM3 (ref 140–450)
PMV BLD AUTO: 12 FL (ref 6–12)
POTASSIUM SERPL-SCNC: 4.4 MMOL/L (ref 3.5–5.2)
PROT SERPL-MCNC: 6.9 G/DL (ref 6–8.5)
RBC # BLD AUTO: 4.76 10*6/MM3 (ref 4.14–5.8)
SODIUM SERPL-SCNC: 139 MMOL/L (ref 136–145)
WBC NRBC COR # BLD AUTO: 8.06 10*3/MM3 (ref 3.4–10.8)

## 2025-05-16 PROCEDURE — 85025 COMPLETE CBC W/AUTO DIFF WBC: CPT | Performed by: EMERGENCY MEDICINE

## 2025-05-16 PROCEDURE — G0378 HOSPITAL OBSERVATION PER HR: HCPCS

## 2025-05-16 PROCEDURE — 25010000002 KETOROLAC TROMETHAMINE PER 15 MG: Performed by: EMERGENCY MEDICINE

## 2025-05-16 PROCEDURE — 83036 HEMOGLOBIN GLYCOSYLATED A1C: CPT

## 2025-05-16 PROCEDURE — 72131 CT LUMBAR SPINE W/O DYE: CPT

## 2025-05-16 PROCEDURE — 80053 COMPREHEN METABOLIC PANEL: CPT | Performed by: EMERGENCY MEDICINE

## 2025-05-16 PROCEDURE — 72192 CT PELVIS W/O DYE: CPT

## 2025-05-16 PROCEDURE — 96372 THER/PROPH/DIAG INJ SC/IM: CPT

## 2025-05-16 PROCEDURE — 99285 EMERGENCY DEPT VISIT HI MDM: CPT

## 2025-05-16 PROCEDURE — 83690 ASSAY OF LIPASE: CPT | Performed by: EMERGENCY MEDICINE

## 2025-05-16 RX ORDER — KETOROLAC TROMETHAMINE 15 MG/ML
15 INJECTION, SOLUTION INTRAMUSCULAR; INTRAVENOUS ONCE
Status: COMPLETED | OUTPATIENT
Start: 2025-05-16 | End: 2025-05-16

## 2025-05-16 RX ORDER — SODIUM CHLORIDE 0.9 % (FLUSH) 0.9 %
10 SYRINGE (ML) INJECTION AS NEEDED
Status: DISCONTINUED | OUTPATIENT
Start: 2025-05-16 | End: 2025-05-19 | Stop reason: HOSPADM

## 2025-05-16 RX ORDER — ACETAMINOPHEN 500 MG
1000 TABLET ORAL ONCE
Status: COMPLETED | OUTPATIENT
Start: 2025-05-16 | End: 2025-05-16

## 2025-05-16 RX ADMIN — KETOROLAC TROMETHAMINE 15 MG: 15 INJECTION INTRAMUSCULAR at 21:00

## 2025-05-16 RX ADMIN — ACETAMINOPHEN 1000 MG: 500 TABLET, FILM COATED ORAL at 21:14

## 2025-05-16 NOTE — ED TRIAGE NOTES
Last time medication was refilled 5/18/2020  Quantity and number of refills 30 w/ 0  Last OV 5/21/2020  Next OV 8/2020 Pt to ER from home via [facility]. P/t co generalized weakness for several days, states he was on a new medication for his shingles and has been weak since. Pt also had a fall today, landing on his bottom. No headstrike or LOC but is on eliquis.

## 2025-05-17 PROBLEM — B02.9 HERPES ZOSTER WITHOUT COMPLICATION: Status: ACTIVE | Noted: 2025-05-17

## 2025-05-17 PROBLEM — E11.65 TYPE 2 DIABETES MELLITUS WITH HYPERGLYCEMIA, WITHOUT LONG-TERM CURRENT USE OF INSULIN: Status: ACTIVE | Noted: 2025-05-17

## 2025-05-17 PROBLEM — M48.58XA: Status: ACTIVE | Noted: 2025-05-17

## 2025-05-17 LAB
ANION GAP SERPL CALCULATED.3IONS-SCNC: 12.4 MMOL/L (ref 5–15)
BUN SERPL-MCNC: 32 MG/DL (ref 8–23)
BUN/CREAT SERPL: 26.4 (ref 7–25)
CALCIUM SPEC-SCNC: 9 MG/DL (ref 8.6–10.5)
CHLORIDE SERPL-SCNC: 107 MMOL/L (ref 98–107)
CO2 SERPL-SCNC: 21.6 MMOL/L (ref 22–29)
CREAT SERPL-MCNC: 1.21 MG/DL (ref 0.76–1.27)
DEPRECATED RDW RBC AUTO: 48.5 FL (ref 37–54)
EGFRCR SERPLBLD CKD-EPI 2021: 59.4 ML/MIN/1.73
ERYTHROCYTE [DISTWIDTH] IN BLOOD BY AUTOMATED COUNT: 14.6 % (ref 12.3–15.4)
GEN 5 1HR TROPONIN T REFLEX: 39 NG/L
GLUCOSE BLDC GLUCOMTR-MCNC: 114 MG/DL (ref 70–130)
GLUCOSE BLDC GLUCOMTR-MCNC: 148 MG/DL (ref 70–130)
GLUCOSE BLDC GLUCOMTR-MCNC: 163 MG/DL (ref 70–130)
GLUCOSE BLDC GLUCOMTR-MCNC: 175 MG/DL (ref 70–130)
GLUCOSE BLDC GLUCOMTR-MCNC: 205 MG/DL (ref 70–130)
GLUCOSE SERPL-MCNC: 111 MG/DL (ref 65–99)
HBA1C MFR BLD: 7.2 % (ref 4.8–5.6)
HCT VFR BLD AUTO: 39.6 % (ref 37.5–51)
HGB BLD-MCNC: 13.1 G/DL (ref 13–17.7)
MAGNESIUM SERPL-MCNC: 2.2 MG/DL (ref 1.6–2.4)
MCH RBC QN AUTO: 30.5 PG (ref 26.6–33)
MCHC RBC AUTO-ENTMCNC: 33.1 G/DL (ref 31.5–35.7)
MCV RBC AUTO: 92.3 FL (ref 79–97)
PLATELET # BLD AUTO: 151 10*3/MM3 (ref 140–450)
PMV BLD AUTO: 11.3 FL (ref 6–12)
POTASSIUM SERPL-SCNC: 4.2 MMOL/L (ref 3.5–5.2)
POTASSIUM SERPL-SCNC: 4.3 MMOL/L (ref 3.5–5.2)
RBC # BLD AUTO: 4.29 10*6/MM3 (ref 4.14–5.8)
SODIUM SERPL-SCNC: 141 MMOL/L (ref 136–145)
TROPONIN T % DELTA: 0
TROPONIN T NUMERIC DELTA: 0 NG/L
TROPONIN T SERPL HS-MCNC: 39 NG/L
WBC NRBC COR # BLD AUTO: 6.87 10*3/MM3 (ref 3.4–10.8)

## 2025-05-17 PROCEDURE — 25810000003 SODIUM CHLORIDE 0.9 % SOLUTION: Performed by: HOSPITALIST

## 2025-05-17 PROCEDURE — 63710000001 INSULIN LISPRO (HUMAN) PER 5 UNITS

## 2025-05-17 PROCEDURE — 83735 ASSAY OF MAGNESIUM: CPT | Performed by: HOSPITALIST

## 2025-05-17 PROCEDURE — 85027 COMPLETE CBC AUTOMATED: CPT

## 2025-05-17 PROCEDURE — 84484 ASSAY OF TROPONIN QUANT: CPT | Performed by: HOSPITALIST

## 2025-05-17 PROCEDURE — G0378 HOSPITAL OBSERVATION PER HR: HCPCS

## 2025-05-17 PROCEDURE — 84132 ASSAY OF SERUM POTASSIUM: CPT | Performed by: HOSPITALIST

## 2025-05-17 PROCEDURE — 25810000003 SODIUM CHLORIDE 0.9 % SOLUTION

## 2025-05-17 PROCEDURE — 82948 REAGENT STRIP/BLOOD GLUCOSE: CPT

## 2025-05-17 PROCEDURE — 93005 ELECTROCARDIOGRAM TRACING: CPT | Performed by: HOSPITALIST

## 2025-05-17 PROCEDURE — 93010 ELECTROCARDIOGRAM REPORT: CPT | Performed by: INTERNAL MEDICINE

## 2025-05-17 PROCEDURE — 80048 BASIC METABOLIC PNL TOTAL CA: CPT

## 2025-05-17 RX ORDER — ACETAMINOPHEN 325 MG/1
650 TABLET ORAL EVERY 4 HOURS PRN
Status: DISCONTINUED | OUTPATIENT
Start: 2025-05-17 | End: 2025-05-19 | Stop reason: HOSPADM

## 2025-05-17 RX ORDER — DEXTROSE MONOHYDRATE 25 G/50ML
25 INJECTION, SOLUTION INTRAVENOUS
Status: DISCONTINUED | OUTPATIENT
Start: 2025-05-17 | End: 2025-05-19 | Stop reason: HOSPADM

## 2025-05-17 RX ORDER — NITROGLYCERIN 0.4 MG/1
0.4 TABLET SUBLINGUAL
Status: DISCONTINUED | OUTPATIENT
Start: 2025-05-17 | End: 2025-05-19 | Stop reason: HOSPADM

## 2025-05-17 RX ORDER — DONEPEZIL HYDROCHLORIDE 10 MG/1
5 TABLET, FILM COATED ORAL NIGHTLY
Status: DISCONTINUED | OUTPATIENT
Start: 2025-05-17 | End: 2025-05-19 | Stop reason: HOSPADM

## 2025-05-17 RX ORDER — BISACODYL 10 MG
10 SUPPOSITORY, RECTAL RECTAL DAILY PRN
Status: DISCONTINUED | OUTPATIENT
Start: 2025-05-17 | End: 2025-05-17

## 2025-05-17 RX ORDER — INSULIN LISPRO 100 [IU]/ML
2-7 INJECTION, SOLUTION INTRAVENOUS; SUBCUTANEOUS
Status: DISCONTINUED | OUTPATIENT
Start: 2025-05-17 | End: 2025-05-19 | Stop reason: HOSPADM

## 2025-05-17 RX ORDER — SODIUM CHLORIDE 9 MG/ML
75 INJECTION, SOLUTION INTRAVENOUS CONTINUOUS
Status: ACTIVE | OUTPATIENT
Start: 2025-05-17 | End: 2025-05-18

## 2025-05-17 RX ORDER — NICOTINE POLACRILEX 4 MG
15 LOZENGE BUCCAL
Status: DISCONTINUED | OUTPATIENT
Start: 2025-05-17 | End: 2025-05-19 | Stop reason: HOSPADM

## 2025-05-17 RX ORDER — GLIPIZIDE 10 MG/1
10 TABLET ORAL
Status: DISCONTINUED | OUTPATIENT
Start: 2025-05-17 | End: 2025-05-19 | Stop reason: HOSPADM

## 2025-05-17 RX ORDER — AMITRIPTYLINE HYDROCHLORIDE 10 MG/1
20 TABLET ORAL NIGHTLY
Status: DISCONTINUED | OUTPATIENT
Start: 2025-05-17 | End: 2025-05-19 | Stop reason: HOSPADM

## 2025-05-17 RX ORDER — AMOXICILLIN 250 MG
2 CAPSULE ORAL 2 TIMES DAILY
Status: DISCONTINUED | OUTPATIENT
Start: 2025-05-17 | End: 2025-05-19 | Stop reason: HOSPADM

## 2025-05-17 RX ORDER — HYDROCODONE BITARTRATE AND ACETAMINOPHEN 5; 325 MG/1; MG/1
1 TABLET ORAL EVERY 4 HOURS PRN
Refills: 0 | Status: DISCONTINUED | OUTPATIENT
Start: 2025-05-17 | End: 2025-05-19 | Stop reason: HOSPADM

## 2025-05-17 RX ORDER — ONDANSETRON 2 MG/ML
4 INJECTION INTRAMUSCULAR; INTRAVENOUS EVERY 6 HOURS PRN
Status: DISCONTINUED | OUTPATIENT
Start: 2025-05-17 | End: 2025-05-19 | Stop reason: HOSPADM

## 2025-05-17 RX ORDER — DEXTROMETHORPHAN POLISTIREX 30 MG/5ML
60 SUSPENSION ORAL EVERY 12 HOURS SCHEDULED
COMMUNITY
End: 2025-05-19 | Stop reason: HOSPADM

## 2025-05-17 RX ORDER — MULTIPLE VITAMINS W/ MINERALS TAB 9MG-400MCG
1 TAB ORAL DAILY
Status: DISCONTINUED | OUTPATIENT
Start: 2025-05-17 | End: 2025-05-19 | Stop reason: HOSPADM

## 2025-05-17 RX ORDER — LIDOCAINE 4 G/G
1 PATCH TOPICAL EVERY 24 HOURS
Status: DISCONTINUED | OUTPATIENT
Start: 2025-05-17 | End: 2025-05-19 | Stop reason: HOSPADM

## 2025-05-17 RX ORDER — SENNOSIDES 8.6 MG
1300 CAPSULE ORAL EVERY 8 HOURS PRN
COMMUNITY

## 2025-05-17 RX ORDER — PREGABALIN 75 MG/1
150 CAPSULE ORAL EVERY 8 HOURS SCHEDULED
Status: DISCONTINUED | OUTPATIENT
Start: 2025-05-17 | End: 2025-05-19 | Stop reason: HOSPADM

## 2025-05-17 RX ORDER — HYDROCODONE BITARTRATE AND ACETAMINOPHEN 5; 325 MG/1; MG/1
1 TABLET ORAL ONCE
Refills: 0 | Status: COMPLETED | OUTPATIENT
Start: 2025-05-17 | End: 2025-05-17

## 2025-05-17 RX ORDER — POLYETHYLENE GLYCOL 3350 17 G/17G
17 POWDER, FOR SOLUTION ORAL DAILY PRN
Status: DISCONTINUED | OUTPATIENT
Start: 2025-05-17 | End: 2025-05-17

## 2025-05-17 RX ORDER — AMOXICILLIN 250 MG
2 CAPSULE ORAL 2 TIMES DAILY PRN
Status: DISCONTINUED | OUTPATIENT
Start: 2025-05-17 | End: 2025-05-17

## 2025-05-17 RX ORDER — IBUPROFEN 600 MG/1
1 TABLET ORAL
Status: DISCONTINUED | OUTPATIENT
Start: 2025-05-17 | End: 2025-05-19 | Stop reason: HOSPADM

## 2025-05-17 RX ORDER — LISINOPRIL 2.5 MG/1
2.5 TABLET ORAL DAILY
Status: DISCONTINUED | OUTPATIENT
Start: 2025-05-17 | End: 2025-05-19 | Stop reason: HOSPADM

## 2025-05-17 RX ORDER — POLYETHYLENE GLYCOL 3350 17 G/17G
17 POWDER, FOR SOLUTION ORAL DAILY PRN
COMMUNITY

## 2025-05-17 RX ORDER — BISACODYL 5 MG/1
5 TABLET, DELAYED RELEASE ORAL DAILY PRN
Status: DISCONTINUED | OUTPATIENT
Start: 2025-05-17 | End: 2025-05-17

## 2025-05-17 RX ORDER — ONDANSETRON 4 MG/1
4 TABLET, ORALLY DISINTEGRATING ORAL EVERY 6 HOURS PRN
Status: DISCONTINUED | OUTPATIENT
Start: 2025-05-17 | End: 2025-05-19 | Stop reason: HOSPADM

## 2025-05-17 RX ORDER — AMOXICILLIN 250 MG
2 CAPSULE ORAL 2 TIMES DAILY
Status: DISCONTINUED | OUTPATIENT
Start: 2025-05-17 | End: 2025-05-17

## 2025-05-17 RX ORDER — TRAZODONE HYDROCHLORIDE 100 MG/1
150 TABLET ORAL NIGHTLY
Status: DISCONTINUED | OUTPATIENT
Start: 2025-05-17 | End: 2025-05-19 | Stop reason: HOSPADM

## 2025-05-17 RX ORDER — POLYETHYLENE GLYCOL 3350 17 G/17G
17 POWDER, FOR SOLUTION ORAL DAILY
Status: DISCONTINUED | OUTPATIENT
Start: 2025-05-17 | End: 2025-05-19 | Stop reason: HOSPADM

## 2025-05-17 RX ORDER — BISACODYL 10 MG
10 SUPPOSITORY, RECTAL RECTAL DAILY PRN
Status: DISCONTINUED | OUTPATIENT
Start: 2025-05-17 | End: 2025-05-19 | Stop reason: HOSPADM

## 2025-05-17 RX ORDER — ALLOPURINOL 100 MG/1
300 TABLET ORAL DAILY
Status: DISCONTINUED | OUTPATIENT
Start: 2025-05-17 | End: 2025-05-19 | Stop reason: HOSPADM

## 2025-05-17 RX ORDER — OXYBUTYNIN CHLORIDE 10 MG/1
10 TABLET, EXTENDED RELEASE ORAL DAILY
Status: DISCONTINUED | OUTPATIENT
Start: 2025-05-17 | End: 2025-05-19 | Stop reason: HOSPADM

## 2025-05-17 RX ORDER — VALACYCLOVIR HYDROCHLORIDE 500 MG/1
1000 TABLET, FILM COATED ORAL EVERY 8 HOURS SCHEDULED
Status: DISCONTINUED | OUTPATIENT
Start: 2025-05-17 | End: 2025-05-19 | Stop reason: HOSPADM

## 2025-05-17 RX ORDER — FLUTICASONE PROPIONATE 50 MCG
2 SPRAY, SUSPENSION (ML) NASAL DAILY
Status: DISCONTINUED | OUTPATIENT
Start: 2025-05-17 | End: 2025-05-19 | Stop reason: HOSPADM

## 2025-05-17 RX ORDER — ROSUVASTATIN CALCIUM 10 MG/1
10 TABLET, COATED ORAL DAILY
Status: DISCONTINUED | OUTPATIENT
Start: 2025-05-17 | End: 2025-05-19 | Stop reason: HOSPADM

## 2025-05-17 RX ORDER — BISACODYL 5 MG/1
5 TABLET, DELAYED RELEASE ORAL DAILY PRN
Status: DISCONTINUED | OUTPATIENT
Start: 2025-05-17 | End: 2025-05-19 | Stop reason: HOSPADM

## 2025-05-17 RX ADMIN — APIXABAN 5 MG: 5 TABLET, FILM COATED ORAL at 21:26

## 2025-05-17 RX ADMIN — ALLOPURINOL 300 MG: 100 TABLET ORAL at 16:59

## 2025-05-17 RX ADMIN — DONEPEZIL HYDROCHLORIDE 5 MG: 10 TABLET, FILM COATED ORAL at 21:25

## 2025-05-17 RX ADMIN — INSULIN LISPRO 2 UNITS: 100 INJECTION, SOLUTION INTRAVENOUS; SUBCUTANEOUS at 21:27

## 2025-05-17 RX ADMIN — HYDROCODONE BITARTRATE AND ACETAMINOPHEN 1 TABLET: 5; 325 TABLET ORAL at 13:02

## 2025-05-17 RX ADMIN — SODIUM CHLORIDE 75 ML/HR: 9 INJECTION, SOLUTION INTRAVENOUS at 17:01

## 2025-05-17 RX ADMIN — HYDROCODONE BITARTRATE AND ACETAMINOPHEN 1 TABLET: 5; 325 TABLET ORAL at 09:07

## 2025-05-17 RX ADMIN — VALACYCLOVIR 1000 MG: 500 TABLET, FILM COATED ORAL at 13:02

## 2025-05-17 RX ADMIN — Medication 1 TABLET: at 16:59

## 2025-05-17 RX ADMIN — SODIUM CHLORIDE 50 ML/HR: 9 INJECTION, SOLUTION INTRAVENOUS at 00:56

## 2025-05-17 RX ADMIN — AMITRIPTYLINE HYDROCHLORIDE 20 MG: 10 TABLET, FILM COATED ORAL at 21:21

## 2025-05-17 RX ADMIN — PREGABALIN 150 MG: 75 CAPSULE ORAL at 13:02

## 2025-05-17 RX ADMIN — ACETAMINOPHEN 650 MG: 325 TABLET, FILM COATED ORAL at 03:41

## 2025-05-17 RX ADMIN — PREGABALIN 150 MG: 75 CAPSULE ORAL at 21:21

## 2025-05-17 RX ADMIN — TRAZODONE HYDROCHLORIDE 150 MG: 100 TABLET ORAL at 21:20

## 2025-05-17 RX ADMIN — LIDOCAINE 1 PATCH: 4 PATCH TOPICAL at 16:58

## 2025-05-17 RX ADMIN — HYDROCODONE BITARTRATE AND ACETAMINOPHEN 1 TABLET: 5; 325 TABLET ORAL at 16:59

## 2025-05-17 RX ADMIN — VALACYCLOVIR 1000 MG: 500 TABLET, FILM COATED ORAL at 21:24

## 2025-05-17 RX ADMIN — HYDROCODONE BITARTRATE AND ACETAMINOPHEN 1 TABLET: 5; 325 TABLET ORAL at 21:26

## 2025-05-17 RX ADMIN — POLYETHYLENE GLYCOL 3350 17 G: 17 POWDER, FOR SOLUTION ORAL at 13:02

## 2025-05-17 RX ADMIN — HYDROCODONE BITARTRATE AND ACETAMINOPHEN 1 TABLET: 5; 325 TABLET ORAL at 05:52

## 2025-05-17 RX ADMIN — ROSUVASTATIN CALCIUM 10 MG: 10 TABLET, FILM COATED ORAL at 16:59

## 2025-05-17 RX ADMIN — OXYBUTYNIN CHLORIDE 10 MG: 10 TABLET, EXTENDED RELEASE ORAL at 16:59

## 2025-05-17 RX ADMIN — Medication 5 MG: at 21:26

## 2025-05-17 RX ADMIN — INSULIN LISPRO 2 UNITS: 100 INJECTION, SOLUTION INTRAVENOUS; SUBCUTANEOUS at 13:03

## 2025-05-17 RX ADMIN — SENNOSIDES AND DOCUSATE SODIUM 2 TABLET: 50; 8.6 TABLET ORAL at 21:22

## 2025-05-17 RX ADMIN — SENNOSIDES AND DOCUSATE SODIUM 2 TABLET: 50; 8.6 TABLET ORAL at 09:07

## 2025-05-17 NOTE — H&P
"    Patient Name:  Jesus Beckham  YOB: 1940  MRN:  [id number]  Admit Date:  5/16/2025  Patient Care Team:  Provider, No Known as PCP - General  Magdy Ricardo MD as PCP - Family Medicine  Jerrell Fry MD as Consulting Physician (Urology)  Carlos Manuel Saleh MD as Consulting Physician (Urology)  Thuan Fernandez MD as Consulting Physician (Ophthalmology)  Lito Hernandez MD as Consulting Physician (Endocrinology)  Tasha Burr MD as Consulting Physician (Cardiology)  Samuel Zelaya MD as Consulting Physician (Cardiology)  Thuan Alvarado MD as Consulting Physician (Gastroenterology)  Emmett Barber MD as Surgeon (Plastic Surgery)  Cherelle Mora APRN as Nurse Practitioner (Nurse Practitioner)  Ignacia Layton APRN as Nurse Practitioner (Pain Medicine)  Yasmine Fong APRN as Nurse Practitioner (Nurse Practitioner)      Subjective   History Present Illness     Chief Complaint   Patient presents with    Weakness - Generalized       Mr. Beckham is a 84 y.o. male with history of TAVR, suprapubic catheter, DVT, DM2 with peripheral neuropathy, HLD, A-fib, chronic lower back pain/prior spinal fusion and chronic wounds of his feet who presented to the hospital with generalized weakness.  He reports that this has been getting worse for several days if not weeks.  He has been \"toughing it out\".  He says that yesterday was trying to move from a chair to wheelchair when his legs became weak and he says that he went to the floor though he would not classify it necessarily as a fall.  It sounds like he sort of sat down hard onto his buttocks.  He had significant pain afterward and continues to have pain in the lower back as well as in his lower extremities which he believes is due to neuropathy and missing his meds overnight.  He is very irritable currently complaining of severe pain and asking for medication.  He was recently diagnosed with shingles on his back by his " primary care provider 3 days ago and started on Valtrex.  This area is not hurting him.        Review of Systems   Constitutional:  Negative for chills and fever.   HENT:  Negative for congestion and trouble swallowing.    Eyes:  Negative for visual disturbance.   Respiratory:  Negative for cough, chest tightness, shortness of breath and wheezing.    Cardiovascular:  Negative for chest pain and palpitations.   Gastrointestinal:  Negative for abdominal distention, abdominal pain, constipation, diarrhea, nausea and vomiting.   Genitourinary:         Suprapubic catheter.  Unsure when it was changed   Musculoskeletal:  Positive for back pain. Negative for arthralgias.   Skin:  Negative for rash.   Neurological:  Positive for weakness. Negative for dizziness and headaches.   Psychiatric/Behavioral:  Negative for agitation and confusion.         Personal History     Past Medical History:   Diagnosis Date    Acute embolism and thrombosis of deep vein of right distal lower extremity     Acute gangrenous cholecystitis     FEB 2018    Allergic     Aortic valve stenosis     Arthritis     Atrial fibrillation with RVR     HISTORY    Benign prostatic hyperplasia without lower urinary tract symptoms     Cancer of bladder 2016    Colon polyp     Discitis of lumbar region     DVT (deep venous thrombosis)     MARCH 2018    Essential hypertension     Murray catheter in place     LOSS OF SENSATION BLADDER. BECAUSE OF WOUND AT THE TIME    Gout     Heel ulcer     RIGHT. FOLLOWED BY WOUND CARE. GETTING BETTER    History of sepsis     [place] (hard of hearing)     HEARING AIDS BOTH EARS    Hyperlipidemia     Loss, sensation     LOWER EXTREMTIES. RELATED TO LAST BACK SURGERY.    MRSA infection     LEFT LEG.     Nausea, vomiting, and diarrhea 03/01/2019    Open wound     WITH MEDICATED DRESSING LEFT SHIN AREA.(RESOLVED PER PATIENT)    CELINA (obstructive sleep apnea)     NO MACHINE    Osteoarthritis     Paroxysmal atrial fibrillation     PVC  (premature ventricular contraction)     Sepsis 02/2018    Sepsis due to Escherichia coli     Skin carcinoma 2012    MELANOMA.2 PLACES BACK AND EAR    Type 2 diabetes mellitus     Vitamin D deficiency     Weakness      Past Surgical History:   Procedure Laterality Date    ABDOMINAL SURGERY      AORTIC VALVE REPAIR/REPLACEMENT N/A 2/28/2023    Procedure: TTE TRANSFEMORAL TRANSCATHETER AORTIC VALVE REPLACEMENT PERCUTANEOUS APPROACH;  Surgeon: Antony Goldstein MD;  Location: [facility];  Service: Cardiothoracic;  Laterality: N/A;    AORTIC VALVE REPAIR/REPLACEMENT N/A 2/28/2023    Procedure: Transfemoral Transcatheter Aortic Valve Replacement with intraoperative TTE and possible open surgical rescue;  Surgeon: Samuel Zelaya MD;  Location: [facility];  Service: Cardiovascular;  Laterality: N/A;    APPENDECTOMY N/A 1961    CARDIAC CATHETERIZATION N/A 01/16/2023    Procedure: Coronary angiography;  Surgeon: Tasha Burr MD;  Location:  [facility]ATH INVASIVE [facility];  Service: Cardiology;  Laterality: N/A;    CARDIAC CATHETERIZATION N/A 01/16/2023    Procedure: Left Heart Cath;  Surgeon: Tasha Burr MD;  Location:  [facility];  Service: Cardiology;  Laterality: N/A;    CARDIAC CATHETERIZATION N/A 01/16/2023    Procedure: Right Heart Cath;  Surgeon: Tasha Burr MD;  Location: [facility] CATH INVASIVE LOCATION;  Service: Cardiology;  Laterality: N/A;    CHOLECYSTECTOMY WITH INTRAOPERATIVE CHOLANGIOGRAM N/A 02/25/2018    Procedure: CHOLECYSTECTOMY LAPAROSCOPIC INTRAOPERATIVE CHOLANGIOGRAM;  Surgeon: Maegan Correa MD;  Location: [facility] OR;  Service:     COLONOSCOPY N/A 2010    h/o of polyps    COLONOSCOPY W/ BIOPSIES AND POLYPECTOMY  2019    EYE SURGERY Bilateral 1959    glass removal from left eye, lens transplant    JOINT REPLACEMENT Right 2005    RIGHT KNEE    LAMINECTOMY N/A 01/18/2016    L2-L3, L3-L4, L4-L5, and L5-S1 bilateral lamincectomy, medial facetectomy & [id number],  Dr. Kaiden Valdes    LUMBAR FUSION N/A 2018    Procedure: LUMBAR FUSION MINIMALLY INVASIVE TRANSFORAMINAL LUMBAR INTERBODY IRRIGATION AND DEBRIDEMENT AND FUSION.  IRRIGATION AND DEBRIDEMENT OF EPIDURAL ABCESS LUMBAR 2-4. BONE MORPHOGENIC PROTEIN, ALPHATEC NOVEL AND ILLICO, EMG AND SSEP NEUROMONITORING.;  Surgeon: Manish Marr DO;  Location: [facility];  Service: Orthopedic Spine    SKIN BIOPSY      BACK AND FACE    SPINAL CORD STIMULATOR IMPLANT N/A 2022    Procedure: SPINAL CORD STIMULATOR INSERTION PHASE 1 ([place]) 4 DAY TRIAL ONLY DUE TO ELIQUIS HOLD.;  Surgeon: Cody Holguin MD;  [place] [facility];  Service: Pain Management;  Laterality: N/A;    SPINAL CORD STIMULATOR IMPLANT N/A 2022    Procedure: Thoracic eleven laminotomy for placement of a surgical spinal cord stimulator lead to thoracic nine;  Surgeon: Charlie Bailon MD;  Location: [facility];  Service: Neurosurgery;  Laterality: N/A;    SPINAL CORD STIMULATOR IMPLANT N/A 2022    Procedure: Placement of a left gluteal internal pulse generator;  Surgeon: Charlie Bailon MD;  Location: [facility];  Service: Neurosurgery;  Laterality: N/A;    TOTAL KNEE ARTHROPLASTY Right 2005    Dr. Washington Evans    VENA CAVA FILTER INSERTION Right 2018    Procedure: VENA CAVA FILTER INSERTION;  Surgeon: Alexis Thakkar MD;  Location: [facility] ;  Service:     VENA CAVA FILTER REMOVAL N/A 2018    Procedure: VENA CAVA FILTER REMOVAL WITH VENOCAVAGRAM;  Surgeon: Alexis Thakkar MD;  Location: [facility] ;  Service: Vascular     Family History   Problem Relation Age of Onset    Esophageal cancer Mother     No Known Problems Father     Diabetes Maternal Grandmother     Heart attack Maternal Grandfather     Malig Hyperthermia Neg Hx      Social History     Tobacco Use    Smoking status: Former     Types: Cigars, Pipe     Quit date:      Years since quittin.3     Smokeless tobacco: Former     Types: Chew     Quit date: 2018    Tobacco comments:     Caffeine daily   Vaping Use    Vaping status: Never Used   Substance Use Topics    Alcohol use: Not Currently    Drug use: No     No current facility-administered medications on file prior to encounter.     Current Outpatient Medications on File Prior to Encounter   Medication Sig Dispense Refill    Accu-Chek Guide test strip TEST 3 TIMES DAILY 300 each 3    acetaminophen (TYLENOL) 500 MG tablet Take 1 tablet by mouth Every 6 (Six) Hours As Needed for Mild Pain.      allopurinol (ZYLOPRIM) 300 MG tablet Take 1 tablet by mouth Daily. 90 tablet 3    amitriptyline (ELAVIL) 10 MG tablet Take 2 tablets by mouth Every Night. 180 tablet 3    amoxicillin-clavulanate (AUGMENTIN) 875-125 MG per tablet Take 1 tablet by mouth 2 (Two) Times a Day. (Patient not taking: Reported on 5/14/2025)      Ascorbic Acid 300 MG chewable tablet Chew 300 mg Every Morning. HOLDING FOR SURGERY      Blood Glucose Monitoring Suppl (Accu-Chek Guide) w/Device kit 1 kit See Admin Instructions. Dx code E11.42 TEST BLOOD SUGAR 2 TIMES  DAY    1 kit 0    Continuous Glucose  (FreeStyle Zay 3 [place]) device Use 1 each Daily. 1 each 1    Continuous Glucose Sensor (FreeStyle Zay 3 Sensor) misc Use 1 each Every 14 (Fourteen) Days. 6 each 3    diphenhydrAMINE-acetaminophen (TYLENOL PM)  MG tablet per tablet Take 2 tablets by mouth At Night As Needed for Sleep.      donepezil (ARICEPT) 5 MG tablet Take 1 tablet by mouth every night at bedtime. 90 tablet 3    Eliquis 5 MG tablet tablet TAKE 1 TABLET BY MOUTH TWICE  DAILY 180 tablet 3    fenofibrate (TRICOR) 145 MG tablet TAKE 1 TABLET BY MOUTH DAILY 90 tablet 2    fluticasone (FLONASE) 50 MCG/ACT nasal spray Administer 2 sprays into the nostril(s) as directed by provider Daily. 48 g 3    furosemide (LASIX) 20 MG tablet TAKE 1 TABLET BY MOUTH TWICE  DAILY AS NEEDED FOR SWELLING 60 tablet 11    Gemtesa 75 MG  tablet       glipizide (GLUCOTROL) 10 MG tablet TAKE 1 TABLET BY MOUTH TWICE  DAILY BEFORE MEALS 180 tablet 2    Glucosamine HCl 1500 MG tablet Take 2 tablets by mouth Daily. HOLDING FOR SURGERY      Glyxambi 25-5 MG tablet TAKE 1 TABLET BY MOUTH DAILY  WITH BREAKFAST 90 tablet 2    guaifenesin (ROBITUSSIN) 100 MG/5ML liquid Take 10 mL by mouth 3 (Three) Times a Day As Needed for Cough.      Hydrocort-Pramoxine, Perianal, (ANALPRAM-HC) 2.5-1 % rectal cream Insert 3 Applications into the rectum 3 (Three) Times a Day.      hydrocortisone 2.5 % cream Apply 1 Application topically to the appropriate area as directed 2 (Two) Times a Day. 28 g 5    ipratropium (ATROVENT) 0.06 % nasal spray       Lancets misc SOFT-CLICK LANCETS: Check 3 times a day. Dx: E 11.42 300 each 4    lidocaine (LIDODERM) 5 % Place 1 patch on the skin as directed by provider Daily. Remove & Discard patch within 12 hours or as directed by MD 6 each 0    lisinopril (PRINIVIL,ZESTRIL) 2.5 MG tablet TAKE 1 TABLET BY MOUTH DAILY 90 tablet 2    melatonin 5 MG tablet tablet Take 1 tablet by mouth Every Night.      Multiple Vitamins-Minerals (MULTIVITAMIN ADULT PO) Take 1 tablet by mouth Daily. HOLDING FOR SURGERY      Myrbetriq 50 MG tablet sustained-release 24 hour 24 hr tablet Take 50 mg by mouth Daily.      nystatin (MYCOSTATIN) 746290 UNIT/GM cream Apply 1 Application topically to the appropriate area as directed 2 (Two) Times a Day.      Omega-3 Fatty Acids (FISH OIL) 1200 MG capsule capsule Take 2,000 mg by mouth 2 (Two) Times a Day With Meals. HOLDING FOR SURGERY      pregabalin (LYRICA) 150 MG capsule Take 1 capsule by mouth 3 times a day. TAKE 1 CAPSULE BY MOUTH 3 TIMES  DAILY FOR DIABETES WITH NERVE  DISEASE, NEUROPATHIC PAIN  Indications: Diabetes with Nerve Disease 270 capsule 0    rosuvastatin (CRESTOR) 10 MG tablet TAKE 1 TABLET BY MOUTH DAILY 90 tablet 2    Sodium 3-Hydroxybutyrate (DL-3-Hydroxybutyric Acid Sod) powder DICLOFENAC/ LIDOCAINE  "4/5% APPLY 1-2 GRAMS (1-2 PUMPS) TO THE AFFECTED AREA 3-4 TIMES DAILY.      Jovanni Hernandez SoloStar 300 UNIT/ML solution pen-injector injection 100 units daily (Patient taking differently: 100 units daily    **88 units daily) 90 mL 2    traMADol (ULTRAM) 50 MG tablet Take 1-2 tablets by mouth Every Night. 60 tablet 2    traZODone (DESYREL) 150 MG tablet Take 2 tablets by mouth Every Night. 180 tablet 3    valACYclovir (Valtrex) 1000 MG tablet Take 1 tablet by mouth 3 (Three) Times a Day. 21 tablet 0    vitamin D (ERGOCALCIFEROL) 1.25 MG (57402 UT) capsule capsule TAKE 1 CAPSULE BY MOUTH EVERY 7  DAYS 13 capsule 2    Wheat Dextrin (BENEFIBER DRINK MIX PO) Take 2 teaspoon(s) by mouth 2 (Two) Times a Day.       Allergies   Allergen Reactions    Levaquin [Levofloxacin] Other (See Comments)     Redness, itching at IV site with IV Levaquin administration    Alcohol Nausea And Vomiting     \"Deathly sick, headaches, cold sweats\"  Cant take any medication that contains alcohol    Other Other (See Comments)     Pt's wife reports that after pt's cardiac cath, pt had hallucinations and agitation with pain medications. She is unsure which medication.     Morphine Unknown - High Severity       Objective    Objective     Vital Signs  Temp:  [98.1 °F (36.7 °C)-99.8 °F (37.7 °C)] 98.1 °F (36.7 °C)  Heart Rate:  [67-89] 89  Resp:  [18-20] 18  BP: (123-144)/() 123/72  SpO2:  [93 %-96 %] 96 %  on   ;   Device (Oxygen Therapy): room air  Body mass index is 38.05 kg/m².    Physical Exam  Vitals reviewed.   Constitutional:       General: He is not in acute distress.     Appearance: He is morbidly obese.   Cardiovascular:      Rate and Rhythm: Normal rate and regular rhythm.   Pulmonary:      Effort: No respiratory distress.      Breath sounds: Normal breath sounds. No wheezing.   Abdominal:      General: There is no distension.      Palpations: Abdomen is soft.      Tenderness: There is no abdominal tenderness.   Musculoskeletal:      " Right lower leg: No edema.      Left lower leg: No edema.   Skin:     General: Skin is warm and dry.      Comments: Faint erythematous patches on the left lateral back/rib cage, no open vesicles   Neurological:      Mental Status: He is alert and oriented to person, place, and time.      Comments: Global weakness, not unilateral.  Able to lift legs off the bed.  Dulled sensation to light touch but symmetric   Psychiatric:      Comments: Irritable mood but cooperative         Results Review:  I reviewed the patient's new clinical results.  I reviewed the patient's new imaging results and agree with the interpretation.  I reviewed the patient's other test results and agree with the interpretation  I personally viewed and interpreted the patient's EKG/Telemetry data    Lab Results (last 24 hours)       Procedure Component Value Units Date/Time    CBC & Differential [[id number]]  (Abnormal) Collected: 05/16/25 2031    Specimen: Blood from Arm, Right Updated: 05/16/25 2041    Narrative:      The following orders were created for panel order CBC & Differential.  Procedure                               Abnormality         Status                     ---------                               -----------         ------                     CBC Auto Differential[[id number]]        Abnormal            Final result                 Please view results for these tests on the individual orders.    CBC Auto Differential [[id number]]  (Abnormal) Collected: 05/16/25 2031    Specimen: Blood from Arm, Right Updated: 05/16/25 2041     WBC 8.06 10*3/mm3      RBC 4.76 10*6/mm3      Hemoglobin 15.1 g/dL      Hematocrit 44.8 %      MCV 94.1 fL      MCH 31.7 pg      MCHC 33.7 g/dL      RDW 14.7 %      RDW-SD 50.9 fl      MPV 12.0 fL      Platelets 173 10*3/mm3      Neutrophil % 79.6 %      Lymphocyte % 11.0 %      Monocyte % 6.8 %      Eosinophil % 1.0 %      Basophil % 0.7 %      Immature Grans % 0.9 %      Neutrophils, Absolute 6.41 10*3/mm3       Lymphocytes, Absolute 0.89 10*3/mm3      Monocytes, Absolute 0.55 10*3/mm3      Eosinophils, Absolute 0.08 10*3/mm3      Basophils, Absolute 0.06 10*3/mm3      Immature Grans, Absolute 0.07 10*3/mm3      nRBC 0.0 /100 WBC     Hemoglobin A1c [[id number]]  (Abnormal) Collected: 05/16/25 2031    Specimen: Blood from Arm, Right Updated: 05/17/25 0106     Hemoglobin A1C 7.20 %     Narrative:      Hemoglobin A1C Ranges:    Increased Risk for Diabetes  5.7% to 6.4%  Diabetes                     >= 6.5%  Diabetic Goal                < 7.0%    Comprehensive Metabolic Panel [[id number]]  (Abnormal) Collected: 05/16/25 2125    Specimen: Blood from Arm, Right Updated: 05/16/25 2154     Glucose 127 mg/dL      BUN 25 mg/dL      Creatinine 1.11 mg/dL      Sodium 139 mmol/L      Potassium 4.4 mmol/L      Chloride 105 mmol/L      CO2 24.2 mmol/L      Calcium 9.4 mg/dL      Total Protein 6.9 g/dL      Albumin 4.3 g/dL      ALT (SGPT) 24 U/L      AST (SGOT) 23 U/L      Alkaline Phosphatase 71 U/L      Total Bilirubin 0.4 mg/dL      Globulin 2.6 gm/dL      A/G Ratio 1.7 g/dL      BUN/Creatinine Ratio 22.5     Anion Gap 9.8 mmol/L      eGFR 65.9 mL/min/1.73     Narrative:      GFR Categories in Chronic Kidney Disease (CKD)              GFR Category          GFR (mL/min/1.73)    Interpretation  G1                    90 or greater        Normal or high (1)  G2                    60-89                Mild decrease (1)  G3a                   45-59                Mild to moderate decrease  G3b                   30-44                Moderate to severe decrease  G4                    15-29                Severe decrease  G5                    14 or less           Kidney failure    (1)In the absence of evidence of kidney disease, neither GFR category G1 or G2 fulfill the criteria for CKD.    eGFR calculation 2021 CKD-EPI creatinine equation, which does not include race as a factor    Lipase [[id number]]  (Normal) Collected: 05/16/25 2125     Specimen: Blood from Arm, Right Updated: 05/16/25 2154     Lipase 40 U/L     Basic Metabolic Panel [[id number]]  (Abnormal) Collected: 05/17/25 0551    Specimen: Blood Updated: 05/17/25 0642     Glucose 111 mg/dL      BUN 32 mg/dL      Creatinine 1.21 mg/dL      Sodium 141 mmol/L      Potassium 4.2 mmol/L      Comment: Slight hemolysis detected by analyzer. Result may be falsely elevated.        Chloride 107 mmol/L      CO2 21.6 mmol/L      Calcium 9.0 mg/dL      BUN/Creatinine Ratio 26.4     Anion Gap 12.4 mmol/L      eGFR 59.4 mL/min/1.73     Narrative:      GFR Categories in Chronic Kidney Disease (CKD)              GFR Category          GFR (mL/min/1.73)    Interpretation  G1                    90 or greater        Normal or high (1)  G2                    60-89                Mild decrease (1)  G3a                   45-59                Mild to moderate decrease  G3b                   30-44                Moderate to severe decrease  G4                    15-29                Severe decrease  G5                    14 or less           Kidney failure    (1)In the absence of evidence of kidney disease, neither GFR category G1 or G2 fulfill the criteria for CKD.    eGFR calculation 2021 CKD-EPI creatinine equation, which does not include race as a factor    CBC (No Diff) [[id number]]  (Normal) Collected: 05/17/25 0551    Specimen: Blood Updated: 05/17/25 0627     WBC 6.87 10*3/mm3      RBC 4.29 10*6/mm3      Hemoglobin 13.1 g/dL      Hematocrit 39.6 %      MCV 92.3 fL      MCH 30.5 pg      MCHC 33.1 g/dL      RDW 14.6 %      RDW-SD 48.5 fl      MPV 11.3 fL      Platelets 151 10*3/mm3     POC Glucose Once [[id number]]  (Normal) Collected: 05/17/25 0744    Specimen: Blood Updated: 05/17/25 0746     Glucose 114 mg/dL             Imaging Results (Last 24 Hours)       Procedure Component Value Units Date/Time    CT Lumbar Spine Without Contrast [[id number]] Collected: 05/16/25 2301     Updated: 05/16/25 2309    Narrative:       CT LUMBAR SPINE without contrast..     HISTORY: Low back pain after fall.     TECHNIQUE: Routine lumbar spine CT without contrast..   Radiation dose  reduction techniques were utilized, including automated exposure  control, and exposure modulation based on body size.     COMPARISON: Lumbar spine [place] [id number].     FINDINGS:     Extensive prior lumbar fusion. No evidence of fusion device loosening or  failure. No acute fracture, no lytic or blastic bone lesion.   The  paraspinous soft tissues are unremarkable.       Impression:         Stable chronic postop change, no acute abnormality.              This report was finalized on 5/16/2025 11:06 PM by Dr. Alexis Mae M.D on Workstation: [id number]       CT Pelvis Without Contrast [[id number]] Collected: 05/16/25 2255     Updated: 05/16/25 2304    Narrative:         CT PELVIS WO CONTRAST-     COMPARISON: None.     HISTORY: fall, tailbone pain     TECHNIQUE: Multidetector helical CT acquisition through the Pelvis is  provided without   IV contrast. Coronal and sagittal multiplanar  reformatted images are provided. Radiation dose reduction techniques  were utilized, including automated exposure control, and exposure  modulation based on body size.     FINDINGS: There is an acute very minimally displaced acute  sacrococcygeal fracture.     No other acute fracture is seen. There is no lytic or blastic bone  lesion.     Pelvic soft tissues show no acute findings.       Impression:      Very minimally displaced acute sacrococcygeal fracture.        This report was finalized on 5/16/2025 11:01 PM by Dr. Alexis Mae M.D on Workstation: [id number]                   Telemetry Scan   Final Result      Telemetry Scan   Final Result      Telemetry Scan   Final Result           Assessment/Plan     Active Hospital Problems    Diagnosis  POA    **Generalized weakness [R53.1]  Yes    Type 2 diabetes mellitus with hyperglycemia, without long-term current use of insulin  [E11.65]  Yes    Collapsed vertebra, not elsewhere classified, sacral and sacrococcygeal region, initial encounter for fracture [M48.58XA]  Yes    Herpes zoster without complication [B02.9]  Yes    Back pain [M54.9]  Yes    S/P TAVR (transcatheter aortic valve replacement) [Z95.2]  Not Applicable    Class 3 severe obesity due to excess calories without serious comorbidity with body mass index (BMI) of 40.0 to 44.9 in adult [E66.813, E66.01, Z68.41]  Not Applicable    Chronic anticoagulation [Z79.01]  Not Applicable    Paroxysmal atrial fibrillation [I48.0]  Yes    Essential hypertension [I10]  Yes    Diabetic peripheral neuropathy [E11.42]  Yes      Resolved Hospital Problems   No resolved problems to display.       Mr. Beckham is a 84 y.o. obese gentleman with history of diabetes and peripheral neuropathy, TAVR, DVT, PAF, recent shingles diagnosis and other issues discussed above admitted with generalized weakness and fall    Generalized weakness probably multifactorial due to confluence of his many medical issues.  Fall with minimally displaced sacrococcygeal fracture which can be managed with pain medications as needed.  Need PT and OT eval for safety, uses walker and wheelchair at baseline  Reviewed [facility] and ordered Lyrica as per dose outlined there.  Also added hydrocodone as needed for pain.  Reports significant constipation with pain medicine so I have scheduled senna and MiraLAX.  Continue Valtrex for herpes zoster of the back  Looks to be mildly dehydrated on labs with elevated BUN/creatinine ratio.  Will continue gentle hydration today  Correctional insulin ordered as needed, glucose well-controlled this morning.  Awaiting family arrival with medication list to fully reconcile all meds including his insulin dosing  Depending on progress and degree of back pain could consider further imaging of spine with MRI though would have to confirm compatibility of previous spinal hardware and of spinal cord  stimulator  D/w RN    VTE Prophylaxis - Eliquis (home med).  Confirming list  Code Status - Full code.  Wife is next of kin.  Advanced directive is on file in the computer       Jerrell Campbell MD  [facility]  05/17/25  08:52 EDT

## 2025-05-17 NOTE — CONSULTS
"     Orthopaedic Consultation      Patient: Jesus Beckham    Date of Admission: 5/16/2025  7:45 PM    YOB: 1940    Medical Record Number: [id number]    Attending Physician:  Jerrell Campbell*    Consulting Physician:  Damon Barton MD        Chief Complaints: Buttock and leg pain.    History of Present Illness: 84 y.o. male with multiple medical comorbidities fell landing on his coccyx.  He notes significant pain in the sacrococcygeal region as well as exacerbation of his bilateral lower extremity neuropathy.  The patient reports chronic pain in bilateral lower extremities.  He notes pain with any motion in the buttock and leg region.  It is better with rest.  He is requesting something for pain control.    Allergies:   Allergies   Allergen Reactions    Levaquin [Levofloxacin] Other (See Comments)     Redness, itching at IV site with IV Levaquin administration    Alcohol Nausea And Vomiting     \"Deathly sick, headaches, cold sweats\"  Cant take any medication that contains alcohol    Other Other (See Comments)     Pt's wife reports that after pt's cardiac cath, pt had hallucinations and agitation with pain medications. She is unsure which medication.     Morphine Unknown - High Severity       Medications:   Home Medications:  Medications Prior to Admission   Medication Sig Dispense Refill Last Dose/Taking    Accu-Chek Guide test strip TEST 3 TIMES DAILY 300 each 3     acetaminophen (TYLENOL) 500 MG tablet Take 1 tablet by mouth Every 6 (Six) Hours As Needed for Mild Pain.       allopurinol (ZYLOPRIM) 300 MG tablet Take 1 tablet by mouth Daily. 90 tablet 3     amitriptyline (ELAVIL) 10 MG tablet Take 2 tablets by mouth Every Night. 180 tablet 3     amoxicillin-clavulanate (AUGMENTIN) 875-125 MG per tablet Take 1 tablet by mouth 2 (Two) Times a Day. (Patient not taking: Reported on 5/14/2025)       Ascorbic Acid 300 MG chewable tablet Chew 300 mg Every Morning. HOLDING FOR SURGERY       " Blood Glucose Monitoring Suppl (Accu-Chek Guide) w/Device kit 1 kit See Admin Instructions. Dx code E11.42 TEST BLOOD SUGAR 2 TIMES  DAY    1 kit 0     Continuous Glucose  (FreeStyle Zay 3 [place]) device Use 1 each Daily. 1 each 1     Continuous Glucose Sensor (FreeStyle Zay 3 Sensor) [facility] Use 1 each Every 14 (Fourteen) Days. 6 each 3     diphenhydrAMINE-acetaminophen (TYLENOL PM)  MG tablet per tablet Take 2 tablets by mouth At Night As Needed for Sleep.       donepezil (ARICEPT) 5 MG tablet Take 1 tablet by mouth every night at bedtime. 90 tablet 3     Eliquis 5 MG tablet tablet TAKE 1 TABLET BY MOUTH TWICE  DAILY 180 tablet 3     fenofibrate (TRICOR) 145 MG tablet TAKE 1 TABLET BY MOUTH DAILY 90 tablet 2     fluticasone (FLONASE) 50 MCG/ACT nasal spray Administer 2 sprays into the nostril(s) as directed by provider Daily. 48 g 3     furosemide (LASIX) 20 MG tablet TAKE 1 TABLET BY MOUTH TWICE  DAILY AS NEEDED FOR SWELLING 60 tablet 11     Gemtesa 75 MG tablet        glipizide (GLUCOTROL) 10 MG tablet TAKE 1 TABLET BY MOUTH TWICE  DAILY BEFORE MEALS 180 tablet 2     Glucosamine HCl 1500 MG tablet Take 2 tablets by mouth Daily. HOLDING FOR SURGERY       Glyxambi 25-5 MG tablet TAKE 1 TABLET BY MOUTH DAILY  WITH BREAKFAST 90 tablet 2     guaifenesin (ROBITUSSIN) 100 MG/5ML liquid Take 10 mL by mouth 3 (Three) Times a Day As Needed for Cough.       Hydrocort-Pramoxine, Perianal, (ANALPRAM-HC) 2.5-1 % rectal cream Insert 3 Applications into the rectum 3 (Three) Times a Day.       hydrocortisone 2.5 % cream Apply 1 Application topically to the appropriate area as directed 2 (Two) Times a Day. 28 g 5     ipratropium (ATROVENT) 0.06 % nasal spray        Lancets misc SOFT-CLICK LANCETS: Check 3 times a day. Dx: E 11.42 300 each 4     lidocaine (LIDODERM) 5 % Place 1 patch on the skin as directed by provider Daily. Remove & Discard patch within 12 hours or as directed by MD 6 each 0     lisinopril  (PRINIVIL,ZESTRIL) 2.5 MG tablet TAKE 1 TABLET BY MOUTH DAILY 90 tablet 2     melatonin 5 MG tablet tablet Take 1 tablet by mouth Every Night.       Multiple Vitamins-Minerals (MULTIVITAMIN ADULT PO) Take 1 tablet by mouth Daily. HOLDING FOR SURGERY       Myrbetriq 50 MG tablet sustained-release 24 hour 24 hr tablet Take 50 mg by mouth Daily.       nystatin (MYCOSTATIN) 919043 UNIT/GM cream Apply 1 Application topically to the appropriate area as directed 2 (Two) Times a Day.       Omega-3 Fatty Acids (FISH OIL) 1200 MG capsule capsule Take 2,000 mg by mouth 2 (Two) Times a Day With Meals. HOLDING FOR SURGERY       pregabalin (LYRICA) 150 MG capsule Take 1 capsule by mouth 3 times a day. TAKE 1 CAPSULE BY MOUTH 3 TIMES  DAILY FOR DIABETES WITH NERVE  DISEASE, NEUROPATHIC PAIN  Indications: Diabetes with Nerve Disease 270 capsule 0     rosuvastatin (CRESTOR) 10 MG tablet TAKE 1 TABLET BY MOUTH DAILY 90 tablet 2     Sodium 3-Hydroxybutyrate (DL-3-Hydroxybutyric Acid Sod) powder DICLOFENAC/ LIDOCAINE 4/5% APPLY 1-2 GRAMS (1-2 PUMPS) TO THE AFFECTED AREA 3-4 TIMES DAILY.       Toujeo Max SoloStar 300 UNIT/ML solution pen-injector injection 100 units daily (Patient taking differently: 100 units daily    **88 units daily) 90 mL 2     traMADol (ULTRAM) 50 MG tablet Take 1-2 tablets by mouth Every Night. 60 tablet 2     traZODone (DESYREL) 150 MG tablet Take 2 tablets by mouth Every Night. 180 tablet 3     valACYclovir (Valtrex) 1000 MG tablet Take 1 tablet by mouth 3 (Three) Times a Day. 21 tablet 0     vitamin D (ERGOCALCIFEROL) 1.25 MG (63045 UT) capsule capsule TAKE 1 CAPSULE BY MOUTH EVERY 7  DAYS 13 capsule 2     Wheat Dextrin (BENEFIBER DRINK MIX PO) Take 2 teaspoon(s) by mouth 2 (Two) Times a Day.          Current Medications:  Scheduled Meds:insulin lispro, 2-7 Units, Subcutaneous, 4x Daily AC & at Bedtime  senna-docusate sodium, 2 tablet, Oral, BID   And  polyethylene glycol, 17 g, Oral, Daily  pregabalin, 150 mg,  Oral, Q8H  valACYclovir, 1,000 mg, Oral, Q8H      Continuous Infusions:sodium chloride, 75 mL/hr, Last Rate: 50 mL/hr (05/17/25 0803)      PRN Meds:.  acetaminophen    senna-docusate sodium **AND** polyethylene glycol **AND** bisacodyl **AND** bisacodyl    dextrose    dextrose    glucagon (human recombinant)    HYDROcodone-acetaminophen    nitroglycerin    ondansetron ODT **OR** ondansetron    [COMPLETED] Insert Peripheral IV **AND** sodium chloride    Past Medical History:   Diagnosis Date    Acute embolism and thrombosis of deep vein of right distal lower extremity     Acute gangrenous cholecystitis     FEB 2018    Allergic     Aortic valve stenosis     Arthritis     Atrial fibrillation with RVR     HISTORY    Benign prostatic hyperplasia without lower urinary tract symptoms     Cancer of bladder 2016    Colon polyp     Discitis of lumbar region     DVT (deep venous thrombosis)     MARCH 2018    Essential hypertension     Murray catheter in place     LOSS OF SENSATION BLADDER. BECAUSE OF WOUND AT THE TIME    Gout     Heel ulcer     RIGHT. FOLLOWED BY WOUND CARE. GETTING BETTER    History of sepsis     Eek (hard of hearing)     HEARING AIDS BOTH EARS    Hyperlipidemia     Loss, sensation     LOWER EXTREMTIES. RELATED TO LAST BACK SURGERY.    MRSA infection     LEFT LEG.     Nausea, vomiting, and diarrhea 03/01/2019    Open wound     WITH MEDICATED DRESSING LEFT SHIN AREA.(RESOLVED PER PATIENT)    CELINA (obstructive sleep apnea)     NO MACHINE    Osteoarthritis     Paroxysmal atrial fibrillation     PVC (premature ventricular contraction)     Sepsis 02/2018    Sepsis due to Escherichia coli     Skin carcinoma 2012    MELANOMA.2 PLACES BACK AND EAR    Type 2 diabetes mellitus     Vitamin D deficiency     Weakness      Past Surgical History:   Procedure Laterality Date    ABDOMINAL SURGERY      AORTIC VALVE REPAIR/REPLACEMENT N/A 2/28/2023    Procedure: TTE TRANSFEMORAL TRANSCATHETER AORTIC VALVE REPLACEMENT PERCUTANEOUS  APPROACH;  Surgeon: Antony Goldstein MD;  Location: [facility];  Service: Cardiothoracic;  Laterality: N/A;    AORTIC VALVE REPAIR/REPLACEMENT N/A 2/28/2023    Procedure: Transfemoral Transcatheter Aortic Valve Replacement with intraoperative TTE and possible open surgical rescue;  Surgeon: Samuel Zelaya MD;  Location: [facility];  Service: Cardiovascular;  Laterality: N/A;    APPENDECTOMY N/A 1961    CARDIAC CATHETERIZATION N/A 01/16/2023    Procedure: Coronary angiography;  Surgeon: Tasha Burr MD;  Location:  [facility];  Service: Cardiology;  Laterality: N/A;    CARDIAC CATHETERIZATION N/A 01/16/2023    Procedure: Left Heart Cath;  Surgeon: Tasha Burr MD;  Location:  [facility];  Service: Cardiology;  Laterality: N/A;    CARDIAC CATHETERIZATION N/A 01/16/2023    Procedure: Right Heart Cath;  Surgeon: Tasha Burr MD;  Location: [facility] CATH INVASIVE LOCATION;  Service: Cardiology;  Laterality: N/A;    CHOLECYSTECTOMY WITH INTRAOPERATIVE CHOLANGIOGRAM N/A 02/25/2018    Procedure: CHOLECYSTECTOMY LAPAROSCOPIC INTRAOPERATIVE CHOLANGIOGRAM;  Surgeon: Maegan Correa MD;  Location: [facility] MAIN OR;  Service:     COLONOSCOPY N/A 2010    h/o of polyps    COLONOSCOPY W/ BIOPSIES AND POLYPECTOMY  2019    EYE SURGERY Bilateral 1959    glass removal from left eye, lens transplant    JOINT REPLACEMENT Right 2005    RIGHT KNEE    LAMINECTOMY N/A 01/18/2016    L2-L3, L3-L4, L4-L5, and L5-S1 bilateral lamincectomy, medial facetectomy & [id number], Dr. Kaiden Valdes    LUMBAR FUSION N/A 03/07/2018    Procedure: LUMBAR FUSION MINIMALLY INVASIVE TRANSFORAMINAL LUMBAR INTERBODY IRRIGATION AND DEBRIDEMENT AND FUSION.  IRRIGATION AND DEBRIDEMENT OF EPIDURAL ABCESS LUMBAR 2-4. BONE MORPHOGENIC PROTEIN, ALPHATEC NOVEL AND ILLICO, EMG AND SSEP NEUROMONITORING.;  Surgeon: Manish Marr DO;  Location: [facility] MAIN OR;  Service: Orthopedic Spine    SKIN BIOPSY      BACK AND FACE     SPINAL CORD STIMULATOR IMPLANT N/A 2022    Procedure: SPINAL CORD STIMULATOR INSERTION PHASE 1 ([place]) 4 DAY TRIAL ONLY DUE TO JAKOB HOOKER;  Surgeon: Cody Holguin MD;  Location: [facility] OR;  Service: Pain Management;  Laterality: N/A;    SPINAL CORD STIMULATOR IMPLANT N/A 2022    Procedure: Thoracic eleven laminotomy for placement of a surgical spinal cord stimulator lead to thoracic nine;  Surgeon: Charlie Bailon MD;  Location: [facility];  Service: Neurosurgery;  Laterality: N/A;    SPINAL CORD STIMULATOR IMPLANT N/A 2022    Procedure: Placement of a left gluteal internal pulse generator;  Surgeon: Charlie Bailon MD;  Location: [facility];  Service: Neurosurgery;  Laterality: N/A;    TOTAL KNEE ARTHROPLASTY Right 2005    Dr. Washington Evans    VENA CAVA FILTER INSERTION Right 2018    Procedure: VENA CAVA FILTER INSERTION;  Surgeon: Alexis Thakkar MD;  Location: [facility] ;  Service:     VENA CAVA FILTER REMOVAL N/A 2018    Procedure: VENA CAVA FILTER REMOVAL WITH VENOCAVAGRAM;  Surgeon: Alexis Thakkar MD;  Location: [facility] ;  Service: Vascular     Social History     Occupational History    Occupation: RETIRED   Tobacco Use    Smoking status: Former     Types: Cigars, Pipe     Quit date:      Years since quittin.3    Smokeless tobacco: Former     Types: Chew     Quit date:     Tobacco comments:     Caffeine daily   Vaping Use    Vaping status: Never Used   Substance and Sexual Activity    Alcohol use: Not Currently    Drug use: No    Sexual activity: Defer      Social History     Social History Narrative    Not on file     Family History   Problem Relation Age of Onset    Esophageal cancer Mother     No Known Problems Father     Diabetes Maternal Grandmother     Heart attack Maternal Grandfather     Malig Hyperthermia Neg Hx          Review of Systems:   No other pertinent positives or  negatives other than what is mentioned in the HPI and below.  Constitutional: Negative for fatigue, fever, or weight loss  HEENT: No active headache.  Pulmonary: Patient denies SOA.  Cardiovascular: Patient denies any chest pain.  Gastrointestinal:  Patient denies active vomiting or diarrhea.  Musculoskeletal: Positive for buttock pain and bilateral lower extremity neuropathy.  Neurological: Patient denies active dizziness or loss of consciousness.  Positive neuropathy in bilateral lower extremities.  Skin: Patient denies any active bleeding.    Vital signs in last 24 hours:  Temp:  [98.1 °F (36.7 °C)-99.8 °F (37.7 °C)] 98.1 °F (36.7 °C)  Heart Rate:  [67-89] 89  Resp:  [18-20] 18  BP: (123-144)/() 123/72  Vitals:    05/16/25 2118 05/17/25 0010 05/17/25 0500 05/17/25 0746   BP: (!) 144/102 125/68  123/72   BP Location: Right arm   Right arm   Patient Position: Lying   Lying   Pulse:  88  89   Resp: 20 20  18   Temp: 99.8 °F (37.7 °C) 99.1 °F (37.3 °C)  98.1 °F (36.7 °C)   TempSrc: Oral   Oral   SpO2:  93%  96%   Weight:   120 kg (265 lb 3.4 oz)           Physical Exam: 84 y.o. male         General Appearance:  Alert, cooperative, in no acute distress    HEENT:    Atraumatic, Pupils are equal   Neck:   Cervical spine midline, no appreciable JVD   Lungs:     Breathing non-labored and chest rise symmetric    Heart:   Abdomen:     Rectal:       Extremities:   Pulses  Neurovascular:   Skin:   Musculoskeletal:      Pulse regular    Soft, Non-tender or distended    Deferred        No clubbing, cyanosis, or edema    Intact    Cranial nerves 2 - 12 grossly intact, sensation intact    No skin lesions  Negative logroll bilaterally.  Diffuse paresthesias of the bilateral lower extremities consistent with his neuropathy.  Motor intact to bilateral lower extremities.  Generalized weakness of the lower extremities.  Tenderness over the sacral region.     Diagnostic Tests:    Results from last 7 days   Lab Units  05/17/25  0551   WBC 10*3/mm3 6.87   HEMOGLOBIN g/dL 13.1   HEMATOCRIT % 39.6   PLATELETS 10*3/mm3 151     Results from last 7 days   Lab Units 05/17/25  0551   SODIUM mmol/L 141   POTASSIUM mmol/L 4.2   CHLORIDE mmol/L 107   CO2 mmol/L 21.6*   BUN mg/dL 32*   CREATININE mg/dL 1.21   GLUCOSE mg/dL 111*   CALCIUM mg/dL 9.0             Assessment:    Generalized weakness    Diabetic peripheral neuropathy    Essential hypertension    Paroxysmal atrial fibrillation    Chronic anticoagulation    Class 3 severe obesity due to excess calories without serious comorbidity with body mass index (BMI) of 40.0 to 44.9 in adult    S/P TAVR (transcatheter aortic valve replacement)    Back pain    Type 2 diabetes mellitus with hyperglycemia, without long-term current use of insulin    Collapsed vertebra, not elsewhere classified, sacral and sacrococcygeal region, initial encounter for fracture    Herpes zoster without complication    Minimally displaced sacrococcygeal fracture      Plan: I discussed the findings with the patient.  This will be managed conservatively.  He can weight-bear as tolerated on bilateral lower extremities with physical therapy.  He can use ice packs and pillows as needed for the pain in the sacral region.  These injuries can take a while to heal from a pain standpoint.  This was discussed with him.  He may need a donut pillow in the future.  Plan was discussed with the nurse.  No need for follow-up.  Please call with any questions.    Date: 5/17/2025  Damon Barton MD

## 2025-05-17 NOTE — ED PROVIDER NOTES
EMERGENCY DEPARTMENT ENCOUNTER  Room Number:  28/28  PCP: Provider, No Known  Independent Historians: Patient, family      HPI:  Chief Complaint: had concerns including Weakness - Generalized.     A complete HPI/ROS/PMH/PSH/SH/FH are unobtainable due to: Nothing      Context: Jesus Beckham is a 84 y.o. male with a medical history of hypertension, osteoarthritis, lumbar degenerative disc disease, suprapubic catheter, atrial fibrillation on chronic anticoagulation, who presents to the ED c/o acute weakness.  He states that his legs sometimes will just give out on him as he has severe neuropathy.  Today he was using his walker to try to get to his wheelchair and his legs suddenly gave out.  He was wearing a gait belt at the time and his spouse was able to lower him to the floor.  He denies head trauma.  He states that he did land on his buttocks.  He is complaining of pain in his tailbone and he also has chronic low back pain.  He denies head trauma.  He is anticoagulated on Eliquis.  He denies any speech or vision disturbance.  He denies fever or chills.    Family states that he is currently being treated for shingles with Valtrex that he began taking on Tuesday.  They were concerned that might be making him feel weaker.    Review of prior external notes (non-ED) -and- Review of prior external test results outside of this encounter: I reviewed PCP visit from 5/14/2025.Patient was seen for a rash on the left side of his back been there for about 1 week.  He had a vesicular eruption on the left upper back for which he was prescribed Valtrex 1 g 3 times daily.        PAST MEDICAL HISTORY  Active Ambulatory Problems     Diagnosis Date Noted    Gout 04/01/2016    Diabetic peripheral neuropathy 04/01/2016    Dyslipidemia 04/01/2016    Essential hypertension 07/29/2016    PVC (premature ventricular contraction) 10/26/2016    Vitamin D deficiency 04/28/2017    Benign non-nodular prostatic hyperplasia without lower urinary  tract symptoms 04/28/2017    Osteoarthritis 09/28/2017    Urge incontinence 09/28/2017    Acute gangrenous cholecystitis 02/24/2018    Discitis of lumbar region 03/05/2018    Paroxysmal atrial fibrillation 03/16/2018    History of sepsis 03/16/2018    Hyperuricemia 05/22/2018    Chronic deep vein thrombosis (DVT) of right peroneal vein 12/03/2018    Nausea, vomiting, and diarrhea 03/01/2019    Colitis presumed infectious 03/01/2019    Urinary tract infection in male 03/01/2019    DDD (degenerative disc disease), lumbar 07/03/2019    Bilateral leg weakness 07/03/2019    History of lumbar spinal fusion 07/03/2019    Carpal tunnel syndrome 02/19/2015    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    Chronic anticoagulation 01/27/2020    Diverticulosis 01/27/2020    Hematuria 01/27/2020    Lower obstructive uropathy 01/27/2020    Splenic lesion 01/27/2020    Suprapubic catheter dysfunction 01/27/2020    Obesity (BMI 30-39.9) 11/17/2020    Venous insufficiency of left leg 03/02/2021    Memory difficulties 03/03/2021    Chronic pain syndrome 09/13/2021    Type 2 diabetes mellitus with peripheral neuropathy 09/28/2021    Encounter for long-term (current) use of high-risk medication 10/13/2021    Therapeutic opioid induced constipation 10/13/2021    Abnormal CT of brain 11/28/2021    Suprapubic catheter 11/28/2021    Chronic bladder pain 01/10/2022    Proteinuria 01/25/2022    Lumbar radiculopathy 06/10/2022    Nonrheumatic aortic valve stenosis 06/23/2022    Snoring 07/28/2022    Class 3 severe obesity due to excess calories without serious comorbidity with body mass index (BMI) of 40.0 to 44.9 in adult 07/28/2022    Non-restorative sleep 07/28/2022    Hypersomnia 07/28/2022    History of DVT of lower extremity 08/22/2022    Status post insertion of spinal cord stimulator 12/02/2022    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    Abdominal distension (gaseous) 06/01/2023    Back pain 06/01/2023    Dorsalgia,  unspecified 06/01/2023    Benign neoplasm of ascending colon 07/29/2019    Benign neoplasm of descending colon 11/02/2015    Benign neoplasm of transverse colon 11/02/2015    Benign neoplasm of cecum 11/02/2015    Colon polyps 06/01/2023    Rectal polyp 11/02/2015    Disorder of muscle, unspecified 06/01/2023    Family history of colonic polyps 11/02/2015    Constipation 06/01/2023    Fecal incontinence 06/01/2023    History of colonic polyps 11/02/2015    Personal history of colonic polyps 07/29/2019    Cancer 06/01/2023    Malignant (primary) neoplasm, unspecified 06/01/2023    Other abnormalities of heart beat 06/01/2023    Pure hypercholesterolemia 06/01/2023    Unspecified hemorrhoids 06/01/2023    Atrial fibrillation 03/01/2018    Long term (current) use of anticoagulants 06/01/2023    Dvrtclos of lg int w/o perforation or abscess w/o bleeding 11/02/2015    Encounter for screening for malignant neoplasm of colon 11/02/2015    Sleep apnea 06/01/2023    Arthritis 06/01/2023    Coagulation defect, unspecified 06/01/2023    Diabetes mellitus 06/01/2023    UTI (urinary tract infection) 01/17/2024     Resolved Ambulatory Problems     Diagnosis Date Noted    Uncontrolled type 2 diabetes mellitus 04/01/2016    Primary osteoarthritis involving multiple joints 04/04/2016    Allergic rhinitis due to pollen 04/04/2016    Sepsis 02/23/2018    Secondary thrombocytopenia 02/23/2018    Hypotension 02/23/2018    Observed sleep apnea 02/23/2018    Acute respiratory failure with hypoxia 02/23/2018    Leg edema 02/23/2018    RSV (acute bronchiolitis due to respiratory syncytial virus) 02/23/2018    Bacteremia due to Escherichia coli 02/24/2018    RUQ abdominal pain 02/24/2018    Acute deep vein thrombosis of calf, right 03/05/2018    Sepsis due to Escherichia coli 02/22/2018    Elevated troponin 03/16/2018    Aortic valve stenosis, etiology of cardiac valve disease unspecified 02/28/2023     Past Medical History:   Diagnosis Date     Acute embolism and thrombosis of deep vein of right distal lower extremity     Allergic     Aortic valve stenosis     Atrial fibrillation with RVR     Benign prostatic hyperplasia without lower urinary tract symptoms     Cancer of bladder 2016    Colon polyp     DVT (deep venous thrombosis)     Murary catheter in place     Heel ulcer     [place] (hard of hearing)     Hyperlipidemia     Loss, sensation     MRSA infection     Open wound     CELINA (obstructive sleep apnea)     Skin carcinoma 2012    Type 2 diabetes mellitus     Weakness          PAST SURGICAL HISTORY  Past Surgical History:   Procedure Laterality Date    ABDOMINAL SURGERY      AORTIC VALVE REPAIR/REPLACEMENT N/A 2/28/2023    Procedure: TTE TRANSFEMORAL TRANSCATHETER AORTIC VALVE REPLACEMENT PERCUTANEOUS APPROACH;  Surgeon: Antony Goldstein MD;  Location: [facility] CVOR;  Service: Cardiothoracic;  Laterality: N/A;    AORTIC VALVE REPAIR/REPLACEMENT N/A 2/28/2023    Procedure: Transfemoral Transcatheter Aortic Valve Replacement with intraoperative TTE and possible open surgical rescue;  Surgeon: Samuel Zelaya MD;  Location: [facility] CVOR;  Service: Cardiovascular;  Laterality: N/A;    APPENDECTOMY N/A 1961    CARDIAC CATHETERIZATION N/A 01/16/2023    Procedure: Coronary angiography;  Surgeon: Tasha Burr MD;  Location: [facility] CATH INVASIVE LOCATION;  Service: Cardiology;  Laterality: N/A;    CARDIAC CATHETERIZATION N/A 01/16/2023    Procedure: Left Heart Cath;  Surgeon: Tasha Burr MD;  Location: [facility] CATH INVASIVE LOCATION;  Service: Cardiology;  Laterality: N/A;    CARDIAC CATHETERIZATION N/A 01/16/2023    Procedure: Right Heart Cath;  Surgeon: Tasha Burr MD;  Location: [facility] CATH INVASIVE LOCATION;  Service: Cardiology;  Laterality: N/A;    CHOLECYSTECTOMY WITH INTRAOPERATIVE CHOLANGIOGRAM N/A 02/25/2018    Procedure: CHOLECYSTECTOMY LAPAROSCOPIC INTRAOPERATIVE CHOLANGIOGRAM;  Surgeon: Maegan Correa MD;  Location: [facility] OR;   Service:     COLONOSCOPY N/A 2010    h/o of polyps    COLONOSCOPY W/ BIOPSIES AND POLYPECTOMY  2019    EYE SURGERY Bilateral 1959    glass removal from left eye, lens transplant    JOINT REPLACEMENT Right 2005    RIGHT KNEE    LAMINECTOMY N/A 01/18/2016    L2-L3, L3-L4, L4-L5, and L5-S1 bilateral lamincectomy, medial facetectomy & [id number], Dr. Kaiden Valdes    LUMBAR FUSION N/A 03/07/2018    Procedure: LUMBAR FUSION MINIMALLY INVASIVE TRANSFORAMINAL LUMBAR INTERBODY IRRIGATION AND DEBRIDEMENT AND FUSION.  IRRIGATION AND DEBRIDEMENT OF EPIDURAL ABCESS LUMBAR 2-4. BONE MORPHOGENIC PROTEIN, ALPHATEC NOVEL AND ILLICO, EMG AND SSEP NEUROMONITORING.;  Surgeon: Manish Marr DO;  Location: [facility] MAIN OR;  Service: Orthopedic Spine    SKIN BIOPSY      BACK AND FACE    SPINAL CORD STIMULATOR IMPLANT N/A 07/01/2022    Procedure: SPINAL CORD STIMULATOR INSERTION PHASE 1 ([place]) 4 DAY TRIAL ONLY DUE TO ELIQUIS HOLD.;  Surgeon: Cody Holguin MD;  [place] [facility] [place];  Service: Pain Management;  Laterality: N/A;    SPINAL CORD STIMULATOR IMPLANT N/A 11/17/2022    Procedure: Thoracic eleven laminotomy for placement of a surgical spinal cord stimulator lead to thoracic nine;  Surgeon: Charlie Bailon MD;  Location: [facility] OR;  Service: Neurosurgery;  Laterality: N/A;    SPINAL CORD STIMULATOR IMPLANT N/A 11/18/2022    Procedure: Placement of a left gluteal internal pulse generator;  Surgeon: Charlie Bailon MD;  Location: [facility] OR;  Service: Neurosurgery;  Laterality: N/A;    TOTAL KNEE ARTHROPLASTY Right 03/07/2005    Dr. Washington Evans    VENA CAVA FILTER INSERTION Right 03/06/2018    Procedure: VENA CAVA FILTER INSERTION;  Surgeon: Alexis Thakkar MD;  Location: [facility] OR 18/19;  Service:     VENA CAVA FILTER REMOVAL N/A 12/03/2018    Procedure: VENA CAVA FILTER REMOVAL WITH VENOCAVAGRAM;  Surgeon: Alexis Thakkar MD;  Location: [facility] OR 18/19;  Service: Vascular          FAMILY HISTORY  Family History   Problem Relation Age of Onset    Esophageal cancer Mother     No Known Problems Father     Diabetes Maternal Grandmother     Heart attack Maternal Grandfather     Malig Hyperthermia Neg Hx          SOCIAL HISTORY  Social History     Socioeconomic History    Marital status:     Number of children: 2    Highest education level: Some college, no degree   Tobacco Use    Smoking status: Former     Types: Cigars, Pipe     Quit date:      Years since quittin.3    Smokeless tobacco: Former     Types: Chew     Quit date:     Tobacco comments:     Caffeine daily   Vaping Use    Vaping status: Never Used   Substance and Sexual Activity    Alcohol use: Not Currently    Drug use: No    Sexual activity: Defer         ALLERGIES  Levaquin [levofloxacin], Alcohol, Other, and Morphine      REVIEW OF SYSTEMS  Review of all 14 systems is negative other than stated in the HPI above.      PHYSICAL EXAM    I have reviewed the triage vital signs and nursing notes.    ED Triage Vitals [25]   Temp Heart Rate Resp BP SpO2   98.7 °F (37.1 °C) 67 18 130/83 94 %      Temp src Heart Rate Source Patient Position BP Location FiO2 (%)   -- -- -- -- --         GENERAL: awake and alert, no acute distress  HENT: Normocephalic, atraumatic  EYES: no scleral icterus  CV: regular rhythm, regular rate, no murmur  RESPIRATORY: normal effort, lungs clear to auscultation bilaterally  ABDOMEN: soft, nondistended, nontender throughout, suprapubic catheter in place  MUSCULOSKELETAL: no deformity.  Bilateral lower extremities are wrapped with Kerlix and Ace wrap.    NEURO: alert, moves all extremities, follows commands.  5 out of 5 strength with hip flexion bilateral lower extremities.  Speech fluent and clear.  PSYCH: calm, cooperative  SKIN: Warm, dry.There is a 1 x 2 cm ulcer on the right heel with healthy appearing granulation tissue present and a small black eschar at the superior aspect  of this wound.  There is no surrounding erythema or warmth.  No drainage.          LAB RESULTS  Recent Results (from the past 24 hours)   CBC Auto Differential    Collection Time: 05/16/25  8:31 PM    Specimen: Arm, Right; Blood   Result Value Ref Range    WBC 8.06 3.40 - 10.80 10*3/mm3    RBC 4.76 4.14 - 5.80 10*6/mm3    Hemoglobin 15.1 13.0 - 17.7 g/dL    Hematocrit 44.8 37.5 - 51.0 %    MCV 94.1 79.0 - 97.0 fL    MCH 31.7 26.6 - 33.0 pg    MCHC 33.7 31.5 - 35.7 g/dL    RDW 14.7 12.3 - 15.4 %    RDW-SD 50.9 37.0 - 54.0 fl    MPV 12.0 6.0 - 12.0 fL    Platelets 173 140 - 450 10*3/mm3    Neutrophil % 79.6 (H) 42.7 - 76.0 %    Lymphocyte % 11.0 (L) 19.6 - 45.3 %    Monocyte % 6.8 5.0 - 12.0 %    Eosinophil % 1.0 0.3 - 6.2 %    Basophil % 0.7 0.0 - 1.5 %    Immature Grans % 0.9 (H) 0.0 - 0.5 %    Neutrophils, Absolute 6.41 1.70 - 7.00 10*3/mm3    Lymphocytes, Absolute 0.89 0.70 - 3.10 10*3/mm3    Monocytes, Absolute 0.55 0.10 - 0.90 10*3/mm3    Eosinophils, Absolute 0.08 0.00 - 0.40 10*3/mm3    Basophils, Absolute 0.06 0.00 - 0.20 10*3/mm3    Immature Grans, Absolute 0.07 (H) 0.00 - 0.05 10*3/mm3    nRBC 0.0 0.0 - 0.2 /100 WBC   Comprehensive Metabolic Panel    Collection Time: 05/16/25  9:25 PM    Specimen: Arm, Right; Blood   Result Value Ref Range    Glucose 127 (H) 65 - 99 mg/dL    BUN 25 (H) 8 - 23 mg/dL    Creatinine 1.11 0.76 - 1.27 mg/dL    Sodium 139 136 - 145 mmol/L    Potassium 4.4 3.5 - 5.2 mmol/L    Chloride 105 98 - 107 mmol/L    CO2 24.2 22.0 - 29.0 mmol/L    Calcium 9.4 8.6 - 10.5 mg/dL    Total Protein 6.9 6.0 - 8.5 g/dL    Albumin 4.3 3.5 - 5.2 g/dL    ALT (SGPT) 24 1 - 41 U/L    AST (SGOT) 23 1 - 40 U/L    Alkaline Phosphatase 71 39 - 117 U/L    Total Bilirubin 0.4 0.0 - 1.2 mg/dL    Globulin 2.6 gm/dL    A/G Ratio 1.7 g/dL    BUN/Creatinine Ratio 22.5 7.0 - 25.0    Anion Gap 9.8 5.0 - 15.0 mmol/L    eGFR 65.9 >60.0 mL/min/1.73   Lipase    Collection Time: 05/16/25  9:25 PM    Specimen: Arm, Right;  Blood   Result Value Ref Range    Lipase 40 13 - 60 U/L       The above labs were ordered by me and independently reviewed by me.     RADIOLOGY  CT Lumbar Spine Without Contrast  Result Date: 5/16/2025  CT LUMBAR SPINE without contrast..  HISTORY: Low back pain after fall.  TECHNIQUE: Routine lumbar spine CT without contrast..   Radiation dose reduction techniques were utilized, including automated exposure control, and exposure modulation based on body size.  COMPARISON: Lumbar spine CT [address].  FINDINGS:  Extensive prior lumbar fusion. No evidence of fusion device loosening or failure. No acute fracture, no lytic or blastic bone lesion.   The paraspinous soft tissues are unremarkable.       Stable chronic postop change, no acute abnormality.     This report was finalized on 5/16/2025 11:06 PM by Dr. Alexis Mae M.D on Workstation: [id number]      CT Pelvis Without Contrast  Result Date: 5/16/2025   CT PELVIS WO CONTRAST-  COMPARISON: None.  HISTORY: fall, tailbone pain  TECHNIQUE: Multidetector helical CT acquisition through the Pelvis is provided without   IV contrast. Coronal and sagittal multiplanar reformatted images are provided. Radiation dose reduction techniques were utilized, including automated exposure control, and exposure modulation based on body size.  FINDINGS: There is an acute very minimally displaced acute sacrococcygeal fracture.  No other acute fracture is seen. There is no lytic or blastic bone lesion.  Pelvic soft tissues show no acute findings.      Very minimally displaced acute sacrococcygeal fracture.   This report was finalized on 5/16/2025 11:01 PM by Dr. Alexis Mae M.D on Workstation: [id number]        The above radiology studies were ordered by me.  See ED course for independent interpretations.     MEDICATIONS GIVEN IN ER  Medications   sodium chloride 0.9 % flush 10 mL (has no administration in time range)   acetaminophen (TYLENOL) tablet 1,000 mg (1,000 mg Oral Given  5/16/25 2114)   ketorolac (TORADOL) injection 15 mg (15 mg Intramuscular Given 5/16/25 2100)         ORDERS PLACED DURING THIS VISIT:  Orders Placed This Encounter   Procedures    CT Pelvis Without Contrast    CT Lumbar Spine Without Contrast    Comprehensive Metabolic Panel    Lipase    CBC Auto Differential    Continuous Pulse Oximetry    Monitor Blood Pressure    [facility] (on-call MD unless specified) Details    Insert Peripheral IV    Initiate Observation Status    CBC & Differential         OUTPATIENT MEDICATION MANAGEMENT:  Current Facility-Administered Medications Ordered in Epic   Medication Dose Route Frequency Provider Last Rate Last Admin    sodium chloride 0.9 % flush 10 mL  10 mL Intravenous PRN Kaiden Fuentes MD         Current Outpatient Medications Ordered in Epic   Medication Sig Dispense Refill    Accu-Chek Guide test strip TEST 3 TIMES DAILY 300 each 3    acetaminophen (TYLENOL) 500 MG tablet Take 1 tablet by mouth Every 6 (Six) Hours As Needed for Mild Pain.      allopurinol (ZYLOPRIM) 300 MG tablet Take 1 tablet by mouth Daily. 90 tablet 3    amitriptyline (ELAVIL) 10 MG tablet Take 2 tablets by mouth Every Night. 180 tablet 3    amoxicillin-clavulanate (AUGMENTIN) 875-125 MG per tablet Take 1 tablet by mouth 2 (Two) Times a Day. (Patient not taking: Reported on 5/14/2025)      Ascorbic Acid 300 MG chewable tablet Chew 300 mg Every Morning. HOLDING FOR SURGERY      Blood Glucose Monitoring Suppl (Accu-Chek Guide) w/Device kit 1 kit See Admin Instructions. Dx code E11.42 TEST BLOOD SUGAR 2 TIMES  DAY    1 kit 0    Continuous Glucose  (FreeStyle Zay 3 Eden) device Use 1 each Daily. 1 each 1    Continuous Glucose Sensor (FreeStyle Zay 3 Sensor) [facility] Use 1 each Every 14 (Fourteen) Days. 6 each 3    diphenhydrAMINE-acetaminophen (TYLENOL PM)  MG tablet per tablet Take 2 tablets by mouth At Night As Needed for Sleep.      donepezil (ARICEPT) 5 MG tablet Take 1 tablet by mouth  every night at bedtime. 90 tablet 3    Eliquis 5 MG tablet tablet TAKE 1 TABLET BY MOUTH TWICE  DAILY 180 tablet 3    fenofibrate (TRICOR) 145 MG tablet TAKE 1 TABLET BY MOUTH DAILY 90 tablet 2    fluticasone (FLONASE) 50 MCG/ACT nasal spray Administer 2 sprays into the nostril(s) as directed by provider Daily. 48 g 3    furosemide (LASIX) 20 MG tablet TAKE 1 TABLET BY MOUTH TWICE  DAILY AS NEEDED FOR SWELLING 60 tablet 11    Gemtesa 75 MG tablet       glipizide (GLUCOTROL) 10 MG tablet TAKE 1 TABLET BY MOUTH TWICE  DAILY BEFORE MEALS 180 tablet 2    Glucosamine HCl 1500 MG tablet Take 2 tablets by mouth Daily. HOLDING FOR SURGERY      Glyxambi 25-5 MG tablet TAKE 1 TABLET BY MOUTH DAILY  WITH BREAKFAST 90 tablet 2    guaifenesin (ROBITUSSIN) 100 MG/5ML liquid Take 10 mL by mouth 3 (Three) Times a Day As Needed for Cough.      Hydrocort-Pramoxine, Perianal, (ANALPRAM-HC) 2.5-1 % rectal cream Insert 3 Applications into the rectum 3 (Three) Times a Day.      hydrocortisone 2.5 % cream Apply 1 Application topically to the appropriate area as directed 2 (Two) Times a Day. 28 g 5    ipratropium (ATROVENT) 0.06 % nasal spray       Lancets misc SOFT-CLICK LANCETS: Check 3 times a day. Dx: E 11.42 300 each 4    lidocaine (LIDODERM) 5 % Place 1 patch on the skin as directed by provider Daily. Remove & Discard patch within 12 hours or as directed by MD 6 each 0    lisinopril (PRINIVIL,ZESTRIL) 2.5 MG tablet TAKE 1 TABLET BY MOUTH DAILY 90 tablet 2    melatonin 5 MG tablet tablet Take 1 tablet by mouth Every Night.      Multiple Vitamins-Minerals (MULTIVITAMIN ADULT PO) Take 1 tablet by mouth Daily. HOLDING FOR SURGERY      Myrbetriq 50 MG tablet sustained-release 24 hour 24 hr tablet Take 50 mg by mouth Daily.      nystatin (MYCOSTATIN) 156629 UNIT/GM cream Apply 1 Application topically to the appropriate area as directed 2 (Two) Times a Day.      Omega-3 Fatty Acids (FISH OIL) 1200 MG capsule capsule Take 2,000 mg by mouth 2  (Two) Times a Day With Meals. HOLDING FOR SURGERY      pregabalin (LYRICA) 150 MG capsule Take 1 capsule by mouth 3 times a day. TAKE 1 CAPSULE BY MOUTH 3 TIMES  DAILY FOR DIABETES WITH NERVE  DISEASE, NEUROPATHIC PAIN  Indications: Diabetes with Nerve Disease 270 capsule 0    rosuvastatin (CRESTOR) 10 MG tablet TAKE 1 TABLET BY MOUTH DAILY 90 tablet 2    Sodium 3-Hydroxybutyrate (DL-3-Hydroxybutyric Acid Sod) powder DICLOFENAC/ LIDOCAINE 4/5% APPLY 1-2 GRAMS (1-2 PUMPS) TO THE AFFECTED AREA 3-4 TIMES DAILY.      Toujeo Max SoloStar 300 UNIT/ML solution pen-injector injection 100 units daily (Patient taking differently: 100 units daily    **88 units daily) 90 mL 2    traMADol (ULTRAM) 50 MG tablet Take 1-2 tablets by mouth Every Night. 60 tablet 2    traZODone (DESYREL) 150 MG tablet Take 2 tablets by mouth Every Night. 180 tablet 3    valACYclovir (Valtrex) 1000 MG tablet Take 1 tablet by mouth 3 (Three) Times a Day. 21 tablet 0    vitamin D (ERGOCALCIFEROL) 1.25 MG (88235 UT) capsule capsule TAKE 1 CAPSULE BY MOUTH EVERY 7  DAYS 13 capsule 2    Wheat Dextrin (BENEFIBER DRINK MIX PO) Take 2 teaspoon(s) by mouth 2 (Two) Times a Day.           PROCEDURES  Procedures            PROGRESS, DATA ANALYSIS, CONSULTS, AND MEDICAL DECISION MAKING  All labs have been independently interpreted by me.  All radiology studies have been reviewed by me. All EKG's have been independently viewed and interpreted by me.  Discussion below represents my analysis of pertinent findings related to patient's condition, differential diagnosis, treatment plan and final disposition.    Differential diagnosis includes but is not limited to:  Adverse effects of medication  Lumbar radiculopathy or myelopathy  Deconditioning  Peripheral neuropathy  Pelvic fracture  Sacral fracture  Lumbar compression fracture      Clinical Scores:                  ED Course as of 05/16/25 2312   Fri May 16, 2025   2104 Hemoglobin: 15.1 [JR]   2104 WBC: 8.06 [JR]    2202 CT lumbar spine independently interpreted PACS.  There is evidence of previous lumbar fusion however I do not see evidence of acute fracture.  CT pelvis also independently interpreted PACS.  No acute pelvic fracture. [JR]   2225 Patient attempted to ambulate with a walker here but he was too weak to stand.  He will need admission for further evaluation of symptoms. [JR]   2256 Discussed with DANNY Parnell for A, who agrees to admit on behalf of Dr. Scott. [JR]   2311 Radiologist does report minimally displaced sacrococcygeal fracture. [JR]      ED Course User Index  [JR] Kaiden Fuentes MD             AS OF 23:12 EDT VITALS:    BP - (!) 144/102  HR - 67  TEMP - 99.8 °F (37.7 °C) (Oral)  O2 SATS - 94%    COMPLEXITY OF CARE  The patient requires admission.      Chronic or social conditions impacting patient care (Social Determinants of Health):     DIAGNOSIS  Final diagnoses:   Generalized weakness   Fall, initial encounter   Peripheral polyneuropathy   Neuropathic ulcer of right heel, unspecified ulcer stage   Closed fracture of sacrum and coccyx, initial encounter   Herpes zoster without complication           DISPOSITION  Admit      Prescription drug monitoring program review:           Please note that portions of this document were completed with a voice recognition program.    Note Disclaimer: At [facility], we believe that sharing information builds trust and better relationships. You are receiving this note because you recently visited [facility]. It is possible you will see health information before a provider has talked with you about it. This kind of information can be easy to misunderstand. To help you fully understand what it means for your health, we urge you to discuss this note with your provider.         Kaiden Fuentes MD  05/16/25 [id number]

## 2025-05-17 NOTE — NURSING NOTE
Attempted to call family 3 times to go over medication list. Family would  but could not hear. Tried to call using two different phones, still could not hear.

## 2025-05-17 NOTE — ED NOTES
Nursing report ED to floor  Jesus Beckham  84 y.o.  male    HPI :  HPI  Stated Reason for Visit: generalized weakness for several days, recently started on new medication for shingles and has been weak since. had a fall today landing on his bottom  History Obtained From: patient, EMS    Chief Complaint  Chief Complaint   Patient presents with    Weakness - Generalized       Admitting doctor:   Mario Real MD    Admitting diagnosis:   The primary encounter diagnosis was Generalized weakness. Diagnoses of Fall, initial encounter, Peripheral polyneuropathy, and Neuropathic ulcer of right heel, unspecified ulcer stage were also pertinent to this visit.    Code status:   Current Code Status       Date Active Code Status Order ID Comments User Context       Prior            Allergies:   Levaquin [levofloxacin], Alcohol, Other, and Morphine    Isolation:   No active isolations    Intake and Output  No intake or output data in the 24 hours ending 05/16/25 2309    Weight:   There were no vitals filed for this visit.    Most recent vitals:   Vitals:    05/1940 05/16/25 2118   BP: 130/83 (!) 144/102   BP Location:  Right arm   Patient Position:  Lying   Pulse: 67    Resp: 18 20   Temp: 98.7 °F (37.1 °C) 99.8 °F (37.7 °C)   TempSrc:  Oral   SpO2: 94%        Active LDAs/IV Access:   Lines, Drains & Airways       Active LDAs       Name Placement date Placement time Site Days    Suprapubic Catheter Double-lumen --  --  -- --                    Labs (abnormal labs have a star):   Labs Reviewed   COMPREHENSIVE METABOLIC PANEL - Abnormal; Notable for the following components:       Result Value    Glucose 127 (*)     BUN 25 (*)     All other components within normal limits    Narrative:     GFR Categories in Chronic Kidney Disease (CKD)              GFR Category          GFR (mL/min/1.73)    Interpretation  G1                    90 or greater        Normal or high (1)  G2                    60-89                Mild  decrease (1)  G3a                   45-59                Mild to moderate decrease  G3b                   30-44                Moderate to severe decrease  G4                    15-29                Severe decrease  G5                    14 or less           Kidney failure    (1)In the absence of evidence of kidney disease, neither GFR category G1 or G2 fulfill the criteria for CKD.    eGFR calculation 2021 CKD-EPI creatinine equation, which does not include race as a factor   CBC WITH AUTO DIFFERENTIAL - Abnormal; Notable for the following components:    Neutrophil % 79.6 (*)     Lymphocyte % 11.0 (*)     Immature Grans % 0.9 (*)     Immature Grans, Absolute 0.07 (*)     All other components within normal limits   LIPASE - Normal   CBC AND DIFFERENTIAL    Narrative:     The following orders were created for panel order CBC & Differential.  Procedure                               Abnormality         Status                     ---------                               -----------         ------                     CBC Auto Differential[[id number]]        Abnormal            Final result                 Please view results for these tests on the individual orders.       EKG:   No orders to display       Meds given in ED:   Medications   sodium chloride 0.9 % flush 10 mL (has no administration in time range)   acetaminophen (TYLENOL) tablet 1,000 mg (1,000 mg Oral Given 5/16/25 2114)   ketorolac (TORADOL) injection 15 mg (15 mg Intramuscular Given 5/16/25 2100)       Imaging results:  CT Lumbar Spine Without Contrast  Result Date: 5/16/2025   Stable chronic postop change, no acute abnormality.     This report was finalized on 5/16/2025 11:06 PM by Dr. Alexis Mae M.D on Workstation: [id number]      CT Pelvis Without Contrast  Result Date: 5/16/2025  Very minimally displaced acute sacrococcygeal fracture.   This report was finalized on 5/16/2025 11:01 PM by Dr. Alexis Mae M.D on Workstation: [id number]         Ambulatory status:   - +1    Social issues:   Social History     Socioeconomic History    Marital status:     Number of children: 2    Highest education level: Some college, no degree   Tobacco Use    Smoking status: Former     Types: Cigars, Pipe     Quit date: 2017     Years since quittin.3    Smokeless tobacco: Former     Types: Chew     Quit date:     Tobacco comments:     Caffeine daily   Vaping Use    Vaping status: Never Used   Substance and Sexual Activity    Alcohol use: Not Currently    Drug use: No    Sexual activity: Defer       Peripheral Neurovascular  Peripheral Neurovascular (Adult)  Peripheral Neurovascular WDL: .WDL except (patietnt has weakness bilateral legs. Pt had episode of weakness and slid down to the floor. Pt did not hit head or lose consciousness)    Neuro Cognitive  Neuro Cognitive (Adult)  Cognitive/Neuro/Behavioral WDL: WDL    Learning  Learning Assessment  Learning Readiness and Ability: no barriers identified    Respiratory  Respiratory WDL  Respiratory WDL: WDL    Abdominal Pain       Pain Assessments  Pain (Adult)  (0-10) Pain Rating: Rest: 8  (0-10) Pain Rating: Activity: 10  Pain Location: other (see comments) (bilateral legs. Pt has a hx of sciatica pain that has worsened)  Pain Side/Orientation: generalized, lower  Pain Description: stabbing  Response to Pain Interventions: intervention not effective per patient    NIH Stroke Scale       Nat Ness RN  25 23:09 EDT

## 2025-05-17 NOTE — PLAN OF CARE
Goal Outcome Evaluation:           Progress: no change  Outcome Evaluation: Pt admitted to 4E for generalized weakness and fx of sacroccygeal. IV fluids started. Pt does have shingles rash on back - airborne and contact precautions maintained. PT, OT and wound consult placed. Pt has pressure injury from home - redressed area. suprapubic cath intact. pt has glucometer. AOx4. VSS. Pt safety maintained.

## 2025-05-18 LAB
ANION GAP SERPL CALCULATED.3IONS-SCNC: 11 MMOL/L (ref 5–15)
BUN SERPL-MCNC: 24 MG/DL (ref 8–23)
BUN/CREAT SERPL: 22 (ref 7–25)
CALCIUM SPEC-SCNC: 8.9 MG/DL (ref 8.6–10.5)
CHLORIDE SERPL-SCNC: 104 MMOL/L (ref 98–107)
CO2 SERPL-SCNC: 22 MMOL/L (ref 22–29)
CREAT SERPL-MCNC: 1.09 MG/DL (ref 0.76–1.27)
EGFRCR SERPLBLD CKD-EPI 2021: 67.3 ML/MIN/1.73
GLUCOSE BLDC GLUCOMTR-MCNC: 121 MG/DL (ref 70–130)
GLUCOSE BLDC GLUCOMTR-MCNC: 131 MG/DL (ref 70–130)
GLUCOSE BLDC GLUCOMTR-MCNC: 155 MG/DL (ref 70–130)
GLUCOSE BLDC GLUCOMTR-MCNC: 177 MG/DL (ref 70–130)
GLUCOSE SERPL-MCNC: 117 MG/DL (ref 65–99)
POTASSIUM SERPL-SCNC: 4 MMOL/L (ref 3.5–5.2)
QT INTERVAL: 390 MS
QTC INTERVAL: 445 MS
SODIUM SERPL-SCNC: 137 MMOL/L (ref 136–145)

## 2025-05-18 PROCEDURE — G0378 HOSPITAL OBSERVATION PER HR: HCPCS

## 2025-05-18 PROCEDURE — 63710000001 INSULIN LISPRO (HUMAN) PER 5 UNITS

## 2025-05-18 PROCEDURE — 82948 REAGENT STRIP/BLOOD GLUCOSE: CPT

## 2025-05-18 PROCEDURE — 80048 BASIC METABOLIC PNL TOTAL CA: CPT | Performed by: HOSPITALIST

## 2025-05-18 RX ADMIN — SENNOSIDES AND DOCUSATE SODIUM 2 TABLET: 50; 8.6 TABLET ORAL at 08:21

## 2025-05-18 RX ADMIN — PREGABALIN 150 MG: 75 CAPSULE ORAL at 06:43

## 2025-05-18 RX ADMIN — PREGABALIN 150 MG: 75 CAPSULE ORAL at 13:01

## 2025-05-18 RX ADMIN — ROSUVASTATIN CALCIUM 10 MG: 10 TABLET, FILM COATED ORAL at 08:21

## 2025-05-18 RX ADMIN — LISINOPRIL 2.5 MG: 2.5 TABLET ORAL at 08:21

## 2025-05-18 RX ADMIN — ALLOPURINOL 300 MG: 100 TABLET ORAL at 08:20

## 2025-05-18 RX ADMIN — INSULIN LISPRO 2 UNITS: 100 INJECTION, SOLUTION INTRAVENOUS; SUBCUTANEOUS at 12:57

## 2025-05-18 RX ADMIN — OXYBUTYNIN CHLORIDE 10 MG: 10 TABLET, EXTENDED RELEASE ORAL at 08:21

## 2025-05-18 RX ADMIN — LIDOCAINE 1 PATCH: 4 PATCH TOPICAL at 17:55

## 2025-05-18 RX ADMIN — TRAZODONE HYDROCHLORIDE 150 MG: 100 TABLET ORAL at 21:16

## 2025-05-18 RX ADMIN — AMITRIPTYLINE HYDROCHLORIDE 20 MG: 10 TABLET, FILM COATED ORAL at 21:16

## 2025-05-18 RX ADMIN — APIXABAN 5 MG: 5 TABLET, FILM COATED ORAL at 21:17

## 2025-05-18 RX ADMIN — POLYETHYLENE GLYCOL 3350 17 G: 17 POWDER, FOR SOLUTION ORAL at 08:21

## 2025-05-18 RX ADMIN — DONEPEZIL HYDROCHLORIDE 5 MG: 10 TABLET, FILM COATED ORAL at 21:17

## 2025-05-18 RX ADMIN — APIXABAN 5 MG: 5 TABLET, FILM COATED ORAL at 08:21

## 2025-05-18 RX ADMIN — Medication 1 TABLET: at 08:21

## 2025-05-18 RX ADMIN — Medication 5 MG: at 21:16

## 2025-05-18 RX ADMIN — ACETAMINOPHEN 650 MG: 325 TABLET, FILM COATED ORAL at 23:45

## 2025-05-18 RX ADMIN — HYDROCODONE BITARTRATE AND ACETAMINOPHEN 1 TABLET: 5; 325 TABLET ORAL at 08:21

## 2025-05-18 RX ADMIN — VALACYCLOVIR 1000 MG: 500 TABLET, FILM COATED ORAL at 06:44

## 2025-05-18 RX ADMIN — FLUTICASONE PROPIONATE 2 SPRAY: 50 SPRAY, METERED NASAL at 08:21

## 2025-05-18 RX ADMIN — VALACYCLOVIR 1000 MG: 500 TABLET, FILM COATED ORAL at 13:01

## 2025-05-18 RX ADMIN — HYDROCODONE BITARTRATE AND ACETAMINOPHEN 1 TABLET: 5; 325 TABLET ORAL at 04:00

## 2025-05-18 RX ADMIN — PREGABALIN 150 MG: 75 CAPSULE ORAL at 21:16

## 2025-05-18 RX ADMIN — HYDROCODONE BITARTRATE AND ACETAMINOPHEN 1 TABLET: 5; 325 TABLET ORAL at 22:08

## 2025-05-18 RX ADMIN — GLIPIZIDE 10 MG: 10 TABLET ORAL at 17:55

## 2025-05-18 RX ADMIN — INSULIN LISPRO 2 UNITS: 100 INJECTION, SOLUTION INTRAVENOUS; SUBCUTANEOUS at 21:15

## 2025-05-18 RX ADMIN — VALACYCLOVIR 1000 MG: 500 TABLET, FILM COATED ORAL at 21:16

## 2025-05-18 RX ADMIN — GLIPIZIDE 10 MG: 10 TABLET ORAL at 08:21

## 2025-05-18 NOTE — PROGRESS NOTES
Name: Jesus Beckham ADMIT: 2025   : 1940  PCP: Provider, No Known    MRN: [id number] LOS: 0 days   AGE/SEX: 84 y.o. male  ROOM: E462/1     Subjective   Subjective   He seems to be in much better spirits.  Laughing and joking, says this pain is better.  Reports he feels stronger but has not had formal assessment from any therapy yet       Objective   Objective   Vital Signs  Temp:  [97.5 °F (36.4 °C)-98.8 °F (37.1 °C)] 97.9 °F (36.6 °C)  Heart Rate:  [85-93] 85  Resp:  [16-18] 18  BP: (104-131)/(59-73) 118/67  SpO2:  [94 %-95 %] 94 %  on   ;   Device (Oxygen Therapy): room air  Body mass index is 40.46 kg/m².  Physical Exam  Vitals reviewed.   Constitutional:       Appearance: He is morbidly obese. He is not ill-appearing or toxic-appearing.   Cardiovascular:      Rate and Rhythm: Normal rate and regular rhythm.   Pulmonary:      Effort: No respiratory distress.      Breath sounds: Normal breath sounds. No wheezing.   Abdominal:      General: There is no distension.      Palpations: Abdomen is soft.      Tenderness: There is no abdominal tenderness.   Musculoskeletal:      Right lower leg: No edema.      Left lower leg: No edema.   Neurological:      Mental Status: He is alert.       Results Review     I reviewed the patient's new clinical results.  Results from last 7 days   Lab Units 25  0551 25  2031   WBC 10*3/mm3 6.87 8.06   HEMOGLOBIN g/dL 13.1 15.1   PLATELETS 10*3/mm3 151 173     Results from last 7 days   Lab Units 25  0537 25  1555 25  0551 25  2125   SODIUM mmol/L 137  --  141 139   POTASSIUM mmol/L 4.0 4.3 4.2 4.4   CHLORIDE mmol/L 104  --  107 105   CO2 mmol/L 22.0  --  21.6* 24.2   BUN mg/dL 24*  --  32* 25*   CREATININE mg/dL 1.09  --  1.21 1.11   GLUCOSE mg/dL 117*  --  111* 127*   EGFR mL/min/1.73 67.3  --  59.4* 65.9     Results from last 7 days   Lab Units 25  2125   ALBUMIN g/dL 4.3   BILIRUBIN mg/dL 0.4   ALK PHOS U/L 71   AST (SGOT)  U/L 23   ALT (SGPT) U/L 24     Results from last 7 days   Lab Units 05/18/25  0537 05/17/25  1555 05/17/25  0551 05/16/25  2125   CALCIUM mg/dL 8.9  --  9.0 9.4   ALBUMIN g/dL  --   --   --  4.3   MAGNESIUM mg/dL  --  2.2  --   --        Hemoglobin A1C   Date/Time Value Ref Range Status   05/16/2025 2031 7.20 (H) 4.80 - 5.60 % Final     Glucose   Date/Time Value Ref Range Status   05/18/2025 1152 155 (H) 70 - 130 mg/dL Final   05/18/2025 0806 131 (H) 70 - 130 mg/dL Final   05/17/2025 1956 163 (H) 70 - 130 mg/dL Final   05/17/2025 1636 148 (H) 70 - 130 mg/dL Final   05/17/2025 1419 205 (H) 70 - 130 mg/dL Final   05/17/2025 1138 175 (H) 70 - 130 mg/dL Final   05/17/2025 0744 114 70 - 130 mg/dL Final       CT Lumbar Spine Without Contrast  Result Date: 5/16/2025   Stable chronic postop change, no acute abnormality.     This report was finalized on 5/16/2025 11:06 PM by Dr. Alxeis Mae M.D on Workstation: [id number]      CT Pelvis Without Contrast  Result Date: 5/16/2025  Very minimally displaced acute sacrococcygeal fracture.   This report was finalized on 5/16/2025 11:01 PM by Dr. Alexis Mae M.D on Workstation: [id number]        I have personally reviewed all medications:  Scheduled Medications  allopurinol, 300 mg, Oral, Daily  amitriptyline, 20 mg, Oral, Nightly  apixaban, 5 mg, Oral, BID  donepezil, 5 mg, Oral, Nightly  fluticasone, 2 spray, Nasal, Daily  glipizide, 10 mg, Oral, BID AC  insulin lispro, 2-7 Units, Subcutaneous, 4x Daily AC & at Bedtime  Lidocaine, 1 patch, Transdermal, Q24H  lisinopril, 2.5 mg, Oral, Daily  melatonin, 5 mg, Oral, Nightly  multivitamin with minerals, 1 tablet, Oral, Daily  oxybutynin XL, 10 mg, Oral, Daily  senna-docusate sodium, 2 tablet, Oral, BID   And  polyethylene glycol, 17 g, Oral, Daily  pregabalin, 150 mg, Oral, Q8H  rosuvastatin, 10 mg, Oral, Daily  traZODone, 150 mg, Oral, Nightly  valACYclovir, 1,000 mg, Oral, Q8H    Infusions   Diet  Diet: Cardiac,  Diabetic; Healthy Heart (2-3 Na+); Consistent Carbohydrate; Fluid Consistency: Thin (IDDSI 0)    I have personally reviewed:  [x]  Laboratory   [x]  Microbiology   [x]  Radiology   [x]  EKG/Telemetry  [x]  Cardiology/Vascular   []  Pathology    []  Records       Assessment/Plan     Active Hospital Problems    Diagnosis  POA    **Generalized weakness [R53.1]  Yes    Type 2 diabetes mellitus with hyperglycemia, without long-term current use of insulin [E11.65]  Yes    Collapsed vertebra, not elsewhere classified, sacral and sacrococcygeal region, initial encounter for fracture [M48.58XA]  Yes    Herpes zoster without complication [B02.9]  Yes    Back pain [M54.9]  Yes    S/P TAVR (transcatheter aortic valve replacement) [Z95.2]  Not Applicable    Class 3 severe obesity due to excess calories without serious comorbidity with body mass index (BMI) of 40.0 to 44.9 in adult [E66.813, E66.01, Z68.41]  Not Applicable    Chronic anticoagulation [Z79.01]  Not Applicable    Paroxysmal atrial fibrillation [I48.0]  Yes    Essential hypertension [I10]  Yes    Diabetic peripheral neuropathy [E11.42]  Yes      Resolved Hospital Problems   No resolved problems to display.      Mr. Beckham is a 84 y.o. obese gentleman with history of diabetes and peripheral neuropathy, TAVR, DVT, PAF, recent shingles diagnosis and other issues admitted with generalized weakness and fall    Generalized weakness likely multifactorial due to his other medical problems, exacerbated by recent shingles diagnosis.  - Await PT/OT assessment of mobility.  Pretty limited at baseline    Appreciate orthopedics recommendations regarding sacrococcygeal fracture.  Supportive care with pain management as needed    DM2 with peripheral neuropathy, better on Lyrica home dose.  Diabetes well-controlled on his oral agent with correctional insulin as needed    Completing Valtrex course for shingles    Discontinue fluids      DVT prophy: Eliquis  Dispo: Home versus SNF  depending on PT and OT assessment.    Jerrell Campbell MD  [facility]  05/18/25  12:17 EDT

## 2025-05-18 NOTE — PLAN OF CARE
Goal Outcome Evaluation:           Progress: no change  Outcome Evaluation: VSS. PRN pain meds. Up to bedside commode. Airborne and contact precations maintained. IV fluids cont. Cath intact. Pt safety maintained.

## 2025-05-18 NOTE — PLAN OF CARE
Goal Outcome Evaluation:  Plan of Care Reviewed With: patient        Progress: improving  Outcome Evaluation: VSS- room air, assist x2 walker to chair and BSC. Airborn precautions maintained, IV Fluids DCd today. Suprapubic catheter in place and draining. plan of care ongoing

## 2025-05-18 NOTE — NURSING NOTE
Large BM noted with large drop of Bright red blood-- pt has known hemorrhoids. Will continue to monitor for any additional bleeding and will notify MD if bleeding continues

## 2025-05-19 ENCOUNTER — READMISSION MANAGEMENT (OUTPATIENT)
Dept: CALL CENTER | Facility: HOSPITAL | Age: 85
End: 2025-05-19
Payer: MEDICARE

## 2025-05-19 VITALS
RESPIRATION RATE: 18 BRPM | OXYGEN SATURATION: 95 % | DIASTOLIC BLOOD PRESSURE: 80 MMHG | WEIGHT: 281.53 LBS | BODY MASS INDEX: 40.39 KG/M2 | SYSTOLIC BLOOD PRESSURE: 110 MMHG | HEART RATE: 105 BPM | TEMPERATURE: 97.7 F

## 2025-05-19 LAB — GLUCOSE BLDC GLUCOMTR-MCNC: 129 MG/DL (ref 70–130)

## 2025-05-19 PROCEDURE — 97165 OT EVAL LOW COMPLEX 30 MIN: CPT

## 2025-05-19 PROCEDURE — G0378 HOSPITAL OBSERVATION PER HR: HCPCS

## 2025-05-19 PROCEDURE — 97530 THERAPEUTIC ACTIVITIES: CPT

## 2025-05-19 PROCEDURE — 82948 REAGENT STRIP/BLOOD GLUCOSE: CPT

## 2025-05-19 PROCEDURE — 97162 PT EVAL MOD COMPLEX 30 MIN: CPT

## 2025-05-19 RX ORDER — HYDROCODONE BITARTRATE AND ACETAMINOPHEN 5; 325 MG/1; MG/1
1 TABLET ORAL EVERY 4 HOURS PRN
Qty: 18 TABLET | Refills: 0 | Status: SHIPPED | OUTPATIENT
Start: 2025-05-19 | End: 2025-05-23

## 2025-05-19 RX ADMIN — ROSUVASTATIN CALCIUM 10 MG: 10 TABLET, FILM COATED ORAL at 08:41

## 2025-05-19 RX ADMIN — SENNOSIDES AND DOCUSATE SODIUM 2 TABLET: 50; 8.6 TABLET ORAL at 08:40

## 2025-05-19 RX ADMIN — Medication 1 TABLET: at 08:41

## 2025-05-19 RX ADMIN — GLIPIZIDE 10 MG: 10 TABLET ORAL at 08:41

## 2025-05-19 RX ADMIN — LISINOPRIL 2.5 MG: 2.5 TABLET ORAL at 08:41

## 2025-05-19 RX ADMIN — VALACYCLOVIR 1000 MG: 500 TABLET, FILM COATED ORAL at 05:58

## 2025-05-19 RX ADMIN — PREGABALIN 150 MG: 75 CAPSULE ORAL at 05:58

## 2025-05-19 RX ADMIN — ACETAMINOPHEN 650 MG: 325 TABLET, FILM COATED ORAL at 06:10

## 2025-05-19 RX ADMIN — ALLOPURINOL 300 MG: 100 TABLET ORAL at 08:41

## 2025-05-19 RX ADMIN — FLUTICASONE PROPIONATE 2 SPRAY: 50 SPRAY, METERED NASAL at 08:42

## 2025-05-19 RX ADMIN — HYDROCODONE BITARTRATE AND ACETAMINOPHEN 1 TABLET: 5; 325 TABLET ORAL at 08:54

## 2025-05-19 RX ADMIN — OXYBUTYNIN CHLORIDE 10 MG: 10 TABLET, EXTENDED RELEASE ORAL at 08:41

## 2025-05-19 RX ADMIN — APIXABAN 5 MG: 5 TABLET, FILM COATED ORAL at 08:41

## 2025-05-19 RX ADMIN — POLYETHYLENE GLYCOL 3350 17 G: 17 POWDER, FOR SOLUTION ORAL at 08:43

## 2025-05-19 NOTE — DISCHARGE SUMMARY
Patient Name: Jesus Beckham  : 1940  MRN: [id number]    Date of Admission: 2025  Date of Discharge:  2025  Primary Care Physician: Provider, No Known      Chief Complaint:   Weakness - Generalized      Discharge Diagnoses     Active Hospital Problems    Diagnosis  POA    **Generalized weakness [R53.1]  Yes    Type 2 diabetes mellitus with hyperglycemia, without long-term current use of insulin [E11.65]  Yes    Collapsed vertebra, not elsewhere classified, sacral and sacrococcygeal region, initial encounter for fracture [M48.58XA]  Yes    Herpes zoster without complication [B02.9]  Yes    Back pain [M54.9]  Yes    S/P TAVR (transcatheter aortic valve replacement) [Z95.2]  Not Applicable    Class 3 severe obesity due to excess calories without serious comorbidity with body mass index (BMI) of 40.0 to 44.9 in adult [E66.813, E66.01, Z68.41]  Not Applicable    Chronic anticoagulation [Z79.01]  Not Applicable    Paroxysmal atrial fibrillation [I48.0]  Yes    Essential hypertension [I10]  Yes    Diabetic peripheral neuropathy [E11.42]  Yes      Resolved Hospital Problems   No resolved problems to display.        Hospital Course     Mr. Beckham is a 84 y.o. male with a history of TAVR, DM2, PAF and recent dx of shingles who presented to [facility] initially complaining of generalized weakness and a fall.  Please see the admitting history and physical for further details.  He was found to have sacrococcygeal fx on imaging. He was having severe pain in the back and legs but mostly from neuropathy. Ortho saw him and confirmed conservative treatment for his fracture. He was given some fluids with considerable improvement in his strength. Ultimately his weakness was probably multifactorial with chronic debility made worse by recent shingles diagnosis. He worked with PT today and did quite well and felt he could manage things at home with HH and his wife's assistance.      Day of  Discharge     Subjective:  No events. Feels much better    Physical Exam:  Temp:  [97.7 °F (36.5 °C)-99.7 °F (37.6 °C)] 97.7 °F (36.5 °C)  Heart Rate:  [] 105  Resp:  [18] 18  BP: (110-122)/(62-80) 110/80  Body mass index is 40.39 kg/m².  Physical Exam  Vitals reviewed.   Constitutional:       Appearance: He is morbidly obese. He is not ill-appearing or toxic-appearing.   Cardiovascular:      Rate and Rhythm: Normal rate and regular rhythm.   Pulmonary:      Effort: No respiratory distress.      Breath sounds: Normal breath sounds. No wheezing.   Abdominal:      General: There is no distension.      Palpations: Abdomen is soft.      Tenderness: There is no abdominal tenderness.   Musculoskeletal:      Right lower leg: No edema.      Left lower leg: No edema.   Neurological:      Mental Status: He is alert.         Consultants     Consult Orders (all) (From admission, onward)       Start     Ordered    05/17/25 0702  Inpatient Orthopedic Surgery Consult  IN AM        Specialty:  Orthopedic Surgery  Provider:  Damon Barton MD    05/17/25 0027    05/17/25 0202  Inpatient Case Management  Consult  Once        Provider:  (Not yet assigned)    05/17/25 0201    05/16/25 2222  [facility] (on-call MD unless specified) Details  Once,   Status:  Canceled        Specialty:  Hospitalist  Provider:  (Not yet assigned)    05/16/25 2221                  Procedures         Imaging Results (All)       Procedure Component Value Units Date/Time    CT Lumbar Spine Without Contrast [[id number]] Collected: 05/16/25 2301     Updated: 05/16/25 2309    Narrative:      CT LUMBAR SPINE without contrast..     HISTORY: Low back pain after fall.     TECHNIQUE: Routine lumbar spine CT without contrast..   Radiation dose  reduction techniques were utilized, including automated exposure  control, and exposure modulation based on body size.     COMPARISON: Lumbar spine CT [address].     FINDINGS:     Extensive prior lumbar fusion. No  evidence of fusion device loosening or  failure. No acute fracture, no lytic or blastic bone lesion.   The  paraspinous soft tissues are unremarkable.       Impression:         Stable chronic postop change, no acute abnormality.              This report was finalized on 5/16/2025 11:06 PM by Dr. Alexis Mae M.D on Workstation: [id number]       CT Pelvis Without Contrast [[id number]] Collected: 05/16/25 2255     Updated: 05/16/25 2304    Narrative:         CT PELVIS WO CONTRAST-     COMPARISON: None.     HISTORY: fall, tailbone pain     TECHNIQUE: Multidetector helical CT acquisition through the Pelvis is  provided without   IV contrast. Coronal and sagittal multiplanar  reformatted images are provided. Radiation dose reduction techniques  were utilized, including automated exposure control, and exposure  modulation based on body size.     FINDINGS: There is an acute very minimally displaced acute  sacrococcygeal fracture.     No other acute fracture is seen. There is no lytic or blastic bone  lesion.     Pelvic soft tissues show no acute findings.       Impression:      Very minimally displaced acute sacrococcygeal fracture.        This report was finalized on 5/16/2025 11:01 PM by Dr. Alexis Mae M.D on Workstation: [id number]                 Pertinent Labs     Results from last 7 days   Lab Units 05/17/25  0551 05/16/25  2031   WBC 10*3/mm3 6.87 8.06   HEMOGLOBIN g/dL 13.1 15.1   PLATELETS 10*3/mm3 151 173     Results from last 7 days   Lab Units 05/18/25  0537 05/17/25  1555 05/17/25  0551 05/16/25  2125   SODIUM mmol/L 137  --  141 139   POTASSIUM mmol/L 4.0 4.3 4.2 4.4   CHLORIDE mmol/L 104  --  107 105   CO2 mmol/L 22.0  --  21.6* 24.2   BUN mg/dL 24*  --  32* 25*   CREATININE mg/dL 1.09  --  1.21 1.11   GLUCOSE mg/dL 117*  --  111* 127*   EGFR mL/min/1.73 67.3  --  59.4* 65.9     Results from last 7 days   Lab Units 05/16/25  2125   ALBUMIN g/dL 4.3   BILIRUBIN mg/dL 0.4   ALK PHOS U/L 71   AST  "(SGOT) U/L 23   ALT (SGPT) U/L 24     Results from last 7 days   Lab Units 05/18/25  0537 05/17/25  1555 05/17/25  0551 05/16/25  2125   CALCIUM mg/dL 8.9  --  9.0 9.4   ALBUMIN g/dL  --   --   --  4.3   MAGNESIUM mg/dL  --  2.2  --   --      Results from last 7 days   Lab Units 05/16/25  2125   LIPASE U/L 40     Results from last 7 days   Lab Units 05/17/25  2310 05/17/25  1555   HSTROP T ng/L 39* 39*           Invalid input(s): \"LDLCALC\"          Test Results Pending at Discharge     Pending Results       None              Discharge Details        Discharge Medications        New Medications        Instructions Start Date   HYDROcodone-acetaminophen 5-325 MG per tablet  Commonly known as: NORCO   1 tablet, Oral, Every 4 Hours PRN      naloxone 4 MG/0.1ML nasal spray  Commonly known as: NARCAN   Call 911. Don't prime. [place] in 1 nostril for overdose. Repeat in 2-3 minutes in other nostril if no or minimal breathing/responsiveness.             Changes to Medications        Instructions Start Date   Toujeo Max SoloStar 300 UNIT/ML solution pen-injector injection  Generic drug: Insulin Glargine (2 Unit Dial)  What changed: additional instructions   100 units daily             Continue These Medications        Instructions Start Date   Accu-Chek Guide test strip  Generic drug: glucose blood   3 Times Daily, for testing      Accu-Chek Guide w/Device kit   1 kit, Not Applicable, See Admin Instructions, Dx code E11.42 TEST BLOOD SUGAR 2 TIMES  DAY        acetaminophen 500 MG tablet  Commonly known as: TYLENOL   500 mg, Every 6 Hours PRN      acetaminophen 650 MG 8 hr tablet  Commonly known as: TYLENOL   1,300 mg, Every 8 Hours PRN      allopurinol 300 MG tablet  Commonly known as: ZYLOPRIM   300 mg, Oral, Daily      amitriptyline 10 MG tablet  Commonly known as: ELAVIL   20 mg, Oral, Nightly      Ascorbic Acid 300 MG chewable tablet   300 mg, Every Morning      BENEFIBER DRINK MIX PO   2 teaspoon(s), 2 Times Daily    "   diphenhydrAMINE-acetaminophen  MG tablet per tablet  Commonly known as: TYLENOL PM   2 tablets, Nightly PRN      DL-3-Hydroxybutyric Acid Sod powder   DICLOFENAC/ LIDOCAINE 4/5% APPLY 1-2 GRAMS (1-2 PUMPS) TO THE AFFECTED AREA 3-4 TIMES DAILY.      donepezil 5 MG tablet  Commonly known as: ARICEPT   5 mg, Oral, Every Night at Bedtime      Eliquis 5 MG tablet tablet  Generic drug: apixaban   5 mg, Oral, 2 Times Daily      fenofibrate 145 MG tablet  Commonly known as: TRICOR   145 mg, Oral, Daily      fish oil 1200 MG capsule capsule   2,000 mg, 2 Times Daily With Meals      fluticasone 50 MCG/ACT nasal spray  Commonly known as: FLONASE   2 sprays, Nasal, Daily      FreeStyle Zay 3 [place] device   1 each, Not Applicable, Daily      FreeStyle Zay 3 Sensor misc   1 each, Not Applicable, Every 14 Days      furosemide 20 MG tablet  Commonly known as: LASIX   TAKE 1 TABLET BY MOUTH TWICE  DAILY AS NEEDED FOR SWELLING      Gemtesa 75 MG tablet  Generic drug: Vibegron       glipizide 10 MG tablet  Commonly known as: GLUCOTROL   10 mg, Oral, 2 Times Daily Before Meals      Glucosamine HCl 1500 MG tablet   2 tablets, Daily      Glyxambi 25-5 MG tablet  Generic drug: Empagliflozin-linaGLIPtin   1 tablet, Oral, Daily With Breakfast      ipratropium 0.06 % nasal spray  Commonly known as: ATROVENT   No dose, route, or frequency recorded.      Lancets misc   SOFT-CLICK LANCETS: Check 3 times a day. Dx: E 11.42      lidocaine 5 %  Commonly known as: LIDODERM   1 patch, Transdermal, Every 24 Hours, Remove & Discard patch within 12 hours or as directed by MD      lisinopril 2.5 MG tablet  Commonly known as: PRINIVIL,ZESTRIL   2.5 mg, Oral, Daily      melatonin 5 MG tablet tablet   5 mg, Nightly      Movantik 25 MG tablet  Generic drug: Naloxegol Oxalate   25 mg, Oral, As Needed      multivitamin with minerals tablet tablet   1 tablet, Daily      Myrbetriq 50 MG tablet sustained-release 24 hour 24 hr tablet  Generic drug:  "Mirabegron ER   50 mg, Daily      polyethylene glycol 17 g packet  Commonly known as: MIRALAX   17 g, Oral, Daily PRN      pregabalin 150 MG capsule  Commonly known as: LYRICA   150 mg, Oral, 3 times daily, TAKE 1 CAPSULE BY MOUTH 3 TIMES  DAILY FOR DIABETES WITH NERVE  DISEASE, NEUROPATHIC PAIN      rosuvastatin 10 MG tablet  Commonly known as: CRESTOR   10 mg, Oral, Daily      traMADol 50 MG tablet  Commonly known as: ULTRAM    mg, Oral, Nightly      traZODone 150 MG tablet  Commonly known as: DESYREL   300 mg, Oral, Nightly      valACYclovir 1000 MG tablet  Commonly known as: Valtrex   1,000 mg, Oral, 3 Times Daily      vitamin D 1.25 MG (75052 UT) capsule capsule  Commonly known as: ERGOCALCIFEROL   50,000 Units, Oral, Weekly             Stop These Medications      amoxicillin-clavulanate 875-125 MG per tablet  Commonly known as: AUGMENTIN     dextromethorphan polistirex ER 30 MG/5ML Suspension Extended Release oral suspension  Commonly known as: DELSYM     guaifenesin 100 MG/5ML liquid  Commonly known as: ROBITUSSIN     Hydrocort-Pramoxine (Perianal) 2.5-1 % rectal cream  Commonly known as: ANALPRAM-HC     hydrocortisone 2.5 % cream     nystatin 099718 UNIT/GM cream  Commonly known as: MYCOSTATIN              Allergies   Allergen Reactions    Levaquin [Levofloxacin] Other (See Comments)     Redness, itching at IV site with IV Levaquin administration    Alcohol Nausea And Vomiting     \"Deathly sick, headaches, cold sweats\"  Cant take any medication that contains alcohol    Other Other (See Comments)     Pt's wife reports that after pt's cardiac cath, pt had hallucinations and agitation with pain medications. She is unsure which medication.     Morphine Unknown - High Severity       Discharge Disposition:  Home-Health Care Svc      Discharge Diet:  No active diet order      Discharge Activity:       CODE STATUS:    Code Status and Medical Interventions: CPR (Attempt to Resuscitate); Full   Ordered at: " 05/17/25 0027     Code Status (Patient has no pulse and is not breathing):    CPR (Attempt to Resuscitate)     Medical Interventions (Patient has pulse or is breathing):    Full       Future Appointments   Date Time Provider Department Center   6/3/2025  8:30 AM Emmett Barber MD  [facility]   6/17/2025 10:00 AM Lito Hernandez MD [facility]  [facility]   7/3/2025  2:45 PM Tasha Burr MD [id number]      Follow-up Information       Magdy Ricardo MD Follow up in 1 week(s).    Specialty: Family Medicine  Contact information:  [address] [id number] [phone]                             Time Spent on Discharge:  Greater than 30 minutes      Jerrell Campbell MD  [facility]  05/19/25  16:24 EDT

## 2025-05-19 NOTE — CASE MANAGEMENT/SOCIAL WORK
Continued Stay Note  [facility]     Patient Name: Jesus Beckham  MRN: [id number]  Today's Date: 5/19/2025    Admit Date: 5/16/2025    Plan: Return home with family assistance   Discharge Plan       Row Name 05/19/25 1121       Plan    Plan Return home with family assistance    Patient/Family in Agreement with Plan yes    Plan Comments CCP notified patient is interested in home health. Spoke with spouse, Mya, who states they have used caretenders in the past and is interested in using them again, but if unable to accept, she also consents to area referrals. Family can transport home. She denies other discharge planning needs. Valorie SIMPSON RN/CCP                   Discharge Codes    No documentation.                 Expected Discharge Date and Time       Expected Discharge Date Expected Discharge Time    May 18, 2025               Anabel Castro     Visit Information Date & Time Provider Department Dept. Phone Encounter #  
 10/12/2017  8:15 AM Dacia Servin MD [facility] at [address] [id number] Follow-up Instructions Return in about 4 weeks (around 11/9/2017), or if symptoms worsen or fail to improve. Your Appointments 10/12/2017  9:10 AM  
Follow Up with Bennie Kimball MD  
[facility] [address] [facility]) Appt Note: f/u                  3 mon; r/s  
 [address] [facility] [address] [id number] [address] [facility] Upcoming Health Maintenance Date Due  
 PAP AKA CERVICAL CYTOLOGY 11/19/2017 DTaP/Tdap/Td series (2 - Td) 5/17/2026 Allergies as of 10/12/2017  Review Complete On: 10/12/2017 By: Dacia Servin MD  
  
 Severity Noted Reaction Type Reactions Sulfa (Sulfonamide Antibiotics) High 03/12/2010    Anaphylaxis Codeine Medium 03/17/2010    Itching Current Immunizations  Reviewed on 5/2/2016 Name Date Influenza Vaccine (Quad) PF 9/14/2017, 10/27/2016, 10/30/2015 Influenza Vaccine PF 10/11/2013 Influenza Vaccine Split 12/13/2012, 10/5/2011 Pneumococcal Polysaccharide (PPSV-23) 4/11/2016 11:40 AM  
  
 Not reviewed this visit You Were Diagnosed With   
  
 Codes Comments Acute deep vein thrombosis (DVT) of right lower extremity, unspecified vein (HCC)    -  Primary ICD-10-CM: I82.401 ICD-9-CM: 453.40 Post-thrombotic syndrome of right lower extremity     ICD-10-CM: I87.001 ICD-9-CM: 459.10 Vitals BP Pulse Temp Resp Height(growth percentile) Weight(growth percentile) 122/74 (BP 1 Location: Right arm, BP Patient Position: Sitting) 81 97.6 °F (36.4 °C) (Oral) 18 5' 5\" (1.651 m) 241 lb (109.3 kg) LMP SpO2 BMI OB Status Smoking Status (LMP Unknown) 93% 40.1 kg/m2 Hysterectomy Never Smoker Vitals History BMI and BSA Data Body Mass Index Body Surface Area  
 40.1 kg/m 2 2.24 m 2 Preferred Pharmacy Pharmacy Name Phone [facility] [address] [place] [address] [phone] Your Updated Medication List  
  
   
This list is accurate as of: 10/12/17  8:58 AM.  Always use your most recent med list.  
  
  
  
  
 albuterol 90 mcg/actuation inhaler Commonly known as:  PROVENTIL HFA, VENTOLIN HFA, PROAIR HFA Take 1 Puff by inhalation every four (4) hours as needed for Wheezing. Comp. Stocking,Thigh,Reg,X-Lrg Misc For daily use, take off at bedtime. Please disp 1 pair  
  
 furosemide 40 mg tablet Commonly known as:  LASIX TAKE 1 TABLET BY MOUTH TWICE DAILY AS NEEDED  
  
 oxyCODONE-acetaminophen 5-325 mg per tablet Commonly known as:  PERCOCET Take 1 Tab by mouth every four (4) hours as needed for Pain. Max Daily Amount: 6 Tabs. pantoprazole 40 mg tablet Commonly known as:  PROTONIX  
TAKE 1 TAB BY MOUTH DAILY. phentermine 37.5 mg tablet Commonly known as:  ADIPEX-P Take 1/2 tablet before breakfast and before dinner  
  
 raNITIdine 150 mg tablet Commonly known as:  ZANTAC Take 150 mg by mouth two (2) times a day. topiramate 50 mg tablet Commonly known as:  TOPAMAX Take 1 Tab by mouth two (2) times a day. TYLENOL EXTRA STRENGTH 500 mg tablet Generic drug:  acetaminophen Take 1,000 mg by mouth every six (6) hours as needed for Pain.  
  
 warfarin 5 mg tablet Commonly known as:  COUMADIN Take 2 Tabs by mouth daily. Indications: DEEP VEIN THROMBOSIS PREVENTION October 2017 Details Sun Mon Tue Wed Thu Fri Sat  
  1  
  
  
  
   2  
  
  
  
   3  
  
  
  
   4  
  
  
  
   5  
  
  
  
   6  
  
  
  
   7  
  
  
  
  
  8  
  
  
  
   9  
  
  
  
   10  
  
  
  
   11  
  
  
  
   12  
  
7.5 mg  
See details    13  
  
7.5 mg  
  
   14  
  
7.5 mg  
  
  
  15  
  
7.5 mg  
  
 16  
  
7.5 mg  
  
   17  
  
7.5 mg  
  
   18  
  
7.5 mg  
  
   19  
  
7.5 mg  
  
   20  
  
7.5 mg  
  
   21  
  
7.5 mg  
  
  
  22  
  
7.5 mg  
  
   23  
  
7.5 mg  
  
   24  
  
7.5 mg  
  
   25  
  
7.5 mg  
  
   26  
  
7.5 mg  
  
   27  
  
7.5 mg  
  
   28  
  
7.5 mg  
  
  
  29  
  
7.5 mg  
  
   30  
  
7.5 mg  
  
   31  
  
7.5 mg Date Details 10/12 This INR check INR: 2.5 How to take your warfarin dose To take:  7.5 mg Take one and a half of the 5 mg tablets. November 2017 Details Felice Leone Wed Thu Fri Sat  
     1  
  
7.5 mg  
  
   2  
  
7.5 mg  
  
   3  
  
7.5 mg  
  
   4  
  
7.5 mg  
  
  
  5  
  
7.5 mg  
  
   6  
  
7.5 mg  
  
   7  
  
7.5 mg  
  
   8  
  
7.5 mg  
  
   9  
  
7.5 mg  
  
   10  
  
  
  
   11  
  
  
  
  
  12  
  
  
  
   13  
  
  
  
   14  
  
  
  
   15  
  
  
  
   16  
  
  
  
   17  
  
  
  
   18  
  
  
  
  
  19  
  
  
  
   20  
  
  
  
   21  
  
  
  
   22  
  
  
  
   23  
  
  
  
   24  
  
  
  
   25  
  
  
  
  
  26  
  
  
  
   27  
  
  
  
   28  
  
  
  
   29  
  
  
  
   30  
  
  
  
    
 Date Details No additional details Date of next INR:  11/9/2017 How to take your warfarin dose To take:  7.5 mg Take one and a half of the 5 mg tablets. We Performed the Following AMB POC PT/INR [14432 CPT(R)] Follow-up Instructions Return in about 4 weeks (around 11/9/2017), or if symptoms worsen or fail to improve. [facility]! Dear Kaylin Zepeda: Thank you for requesting a [employer] account. Our records indicate that you already have an active [employer] account. You can access your account anytime at [email or web address] Did you know that you can access your hospital and ER discharge instructions at any time in [employer]?   You can also review all of your test results from your hospital stay or ER visit. Additional Information If you have questions, please visit the Frequently Asked Questions section of the [employer] website at [email or web address] Remember, [employer] is NOT to be used for urgent needs. For medical emergencies, dial 911. Now available from your iPhone and Android! Please provide this summary of care documentation to your next provider. Your primary care clinician is listed as Vannessa Rodrigues. If you have any questions after today's visit, please call [phone].

## 2025-05-19 NOTE — THERAPY EVALUATION
Patient Name: Jesus Beckham  : 1940    MRN: [id number]                              Today's Date: 2025       Admit Date: 2025    Visit Dx:     ICD-10-CM ICD-9-CM   1. Closed fracture of sacrum and coccyx, initial encounter  S32.10XA 805.6    S32.2XXA    2. Generalized weakness  R53.1 780.79   3. Fall, initial encounter  W19.XXXA E888.9   4. Peripheral polyneuropathy  G62.9 356.9   5. Neuropathic ulcer of right heel, unspecified ulcer stage  L97.419 707.14   6. Herpes zoster without complication  B02.9 053.9     Patient Active Problem List   Diagnosis    Gout    Diabetic peripheral neuropathy    Dyslipidemia    Essential hypertension    PVC (premature ventricular contraction)    Vitamin D deficiency    Benign non-nodular prostatic hyperplasia without lower urinary tract symptoms    Osteoarthritis    Urge incontinence    Acute gangrenous cholecystitis    Discitis of lumbar region    Paroxysmal atrial fibrillation    History of sepsis    Hyperuricemia    Chronic deep vein thrombosis (DVT) of right peroneal vein    Nausea, vomiting, and diarrhea    Colitis presumed infectious    Urinary tract infection in male    DDD (degenerative disc disease), lumbar    Bilateral leg weakness    History of lumbar spinal fusion    Carpal tunnel syndrome    Adhesive arachnoiditis    Chronic anticoagulation    Diverticulosis    Hematuria    Lower obstructive uropathy    Splenic lesion    Suprapubic catheter dysfunction    Obesity (BMI 30-39.9)    Venous insufficiency of left leg    Memory difficulties    Chronic pain syndrome    Type 2 diabetes mellitus with peripheral neuropathy    Encounter for long-term (current) use of high-risk medication    Therapeutic opioid induced constipation    Abnormal CT of brain    Suprapubic catheter    Chronic bladder pain    Proteinuria    Lumbar radiculopathy    Nonrheumatic aortic valve stenosis    Snoring    Class 3 severe obesity due to excess calories without serious  comorbidity with body mass index (BMI) of 40.0 to 44.9 in adult    Non-restorative sleep    Hypersomnia    History of DVT of lower extremity    Status post insertion of spinal cord stimulator    S/P TAVR (transcatheter aortic valve replacement)    Abdominal distension (gaseous)    Back pain    Dorsalgia, unspecified    Benign neoplasm of ascending colon    Benign neoplasm of descending colon    Benign neoplasm of transverse colon    Benign neoplasm of cecum    Colon polyps    Rectal polyp    Disorder of muscle, unspecified    Family history of colonic polyps    Constipation    Fecal incontinence    History of colonic polyps    Personal history of colonic polyps    Cancer    Malignant (primary) neoplasm, unspecified    Other abnormalities of heart beat    Pure hypercholesterolemia    Unspecified hemorrhoids    Atrial fibrillation    Long term (current) use of anticoagulants    Dvrtclos of lg int w/o perforation or abscess w/o bleeding    Encounter for screening for malignant neoplasm of colon    Sleep apnea    Arthritis    Coagulation defect, unspecified    Diabetes mellitus    UTI (urinary tract infection)    Generalized weakness    Type 2 diabetes mellitus with hyperglycemia, without long-term current use of insulin    Collapsed vertebra, not elsewhere classified, sacral and sacrococcygeal region, initial encounter for fracture    Herpes zoster without complication     Past Medical History:   Diagnosis Date    Acute embolism and thrombosis of deep vein of right distal lower extremity     Acute gangrenous cholecystitis     FEB 2018    Allergic     Aortic valve stenosis     Arthritis     Atrial fibrillation with RVR     HISTORY    Benign prostatic hyperplasia without lower urinary tract symptoms     Cancer of bladder 2016    Colon polyp     Discitis of lumbar region     DVT (deep venous thrombosis)     MARCH 2018    Essential hypertension     Murray catheter in place     LOSS OF SENSATION BLADDER. BECAUSE OF WOUND AT  THE TIME    Gout     Heel ulcer     RIGHT. FOLLOWED BY WOUND CARE. GETTING BETTER    History of sepsis     [place] (hard of hearing)     HEARING AIDS BOTH EARS    Hyperlipidemia     Loss, sensation     LOWER EXTREMTIES. RELATED TO LAST BACK SURGERY.    MRSA infection     LEFT LEG.     Nausea, vomiting, and diarrhea 03/01/2019    Open wound     WITH MEDICATED DRESSING LEFT SHIN AREA.(RESOLVED PER PATIENT)    CELINA (obstructive sleep apnea)     NO MACHINE    Osteoarthritis     Paroxysmal atrial fibrillation     PVC (premature ventricular contraction)     Sepsis 02/2018    Sepsis due to Escherichia coli     Skin carcinoma 2012    MELANOMA.2 PLACES BACK AND EAR    Type 2 diabetes mellitus     Vitamin D deficiency     Weakness      Past Surgical History:   Procedure Laterality Date    ABDOMINAL SURGERY      AORTIC VALVE REPAIR/REPLACEMENT N/A 2/28/2023    Procedure: TTE TRANSFEMORAL TRANSCATHETER AORTIC VALVE REPLACEMENT PERCUTANEOUS APPROACH;  Surgeon: Antony Goldstein MD;  Location: [facility];  Service: Cardiothoracic;  Laterality: N/A;    AORTIC VALVE REPAIR/REPLACEMENT N/A 2/28/2023    Procedure: Transfemoral Transcatheter Aortic Valve Replacement with intraoperative TTE and possible open surgical rescue;  Surgeon: Samuel Zelaya MD;  Location: [facility];  Service: Cardiovascular;  Laterality: N/A;    APPENDECTOMY N/A 1961    CARDIAC CATHETERIZATION N/A 01/16/2023    Procedure: Coronary angiography;  Surgeon: Tasha Burr MD;  Location: [facility] CATH INVASIVE LOCATION;  Service: Cardiology;  Laterality: N/A;    CARDIAC CATHETERIZATION N/A 01/16/2023    Procedure: Left Heart Cath;  Surgeon: Tasha Burr MD;  Location: [facility] CATH INVASIVE LOC[facility];  Service: Cardiology;  Laterality: N/A;    CARDIAC CATHETERIZATION N/A 01/16/2023    Procedure: Right Heart Cath;  Surgeon: Tasha Burr MD;  [facility];  Service: Cardiology;  Laterality: N/A;    CHOLECYSTECTOMY WITH  INTRAOPERATIVE CHOLANGIOGRAM N/A 02/25/2018    Procedure: CHOLECYSTECTOMY LAPAROSCOPIC INTRAOPERATIVE CHOLANGIOGRAM;  Surgeon: Maegan Correa MD;  Location: [facility] OR;  Service:     COLONOSCOPY N/A 2010    h/o of polyps    COLONOSCOPY W/ BIOPSIES AND POLYPECTOMY  2019    EYE SURGERY Bilateral 1959    glass removal from left eye, lens transplant    JOINT REPLACEMENT Right 2005    RIGHT KNEE    LAMINECTOMY N/A 01/18/2016    L2-L3, L3-L4, L4-L5, and L5-S1 bilateral lamincectomy, medial facetectomy & [id number], Dr. Kaiden Valdes    LUMBAR FUSION N/A 03/07/2018    Procedure: LUMBAR FUSION MINIMALLY INVASIVE TRANSFORAMINAL LUMBAR INTERBODY IRRIGATION AND DEBRIDEMENT AND FUSION.  IRRIGATION AND DEBRIDEMENT OF EPIDURAL ABCESS LUMBAR 2-4. BONE MORPHOGENIC PROTEIN, ALPHATEC NOVEL AND ILLICO, EMG AND SSEP NEUROMONITORING.;  Surgeon: Manish Marr DO;  Location: [facility] OR;  Service: Orthopedic Spine    SKIN BIOPSY      BACK AND FACE    SPINAL CORD STIMULATOR IMPLANT N/A 07/01/2022    Procedure: SPINAL CORD STIMULATOR INSERTION PHASE 1 ([place]) 4 DAY TRIAL ONLY DUE TO ELIQUIS HOLD.;  Surgeon: Cody Holguin MD;  Location: [facility] OR;  Service: Pain Management;  Laterality: N/A;    SPINAL CORD STIMULATOR IMPLANT N/A 11/17/2022    Procedure: Thoracic eleven laminotomy for placement of a surgical spinal cord stimulator lead to thoracic nine;  Surgeon: Charlie Bailon MD;  Location: [facility];  Service: Neurosurgery;  Laterality: N/A;    SPINAL CORD STIMULATOR IMPLANT N/A 11/18/2022    Procedure: Placement of a left gluteal internal pulse generator;  Surgeon: Charlie Bailon MD;  Location: [facility] OR;  Service: Neurosurgery;  Laterality: N/A;    TOTAL KNEE ARTHROPLASTY Right 03/07/2005    Dr. Washington Evans    VENA CAVA FILTER INSERTION Right 03/06/2018    Procedure: VENA CAVA FILTER INSERTION;  Surgeon: Alexis Thakkar MD;  Location: [facility] 18/19;  Service:     VENA CAVA  FILTER REMOVAL N/A 12/03/2018    Procedure: VENA CAVA FILTER REMOVAL WITH VENOCAVAGRAM;  Surgeon: Alexis Thakkar MD;  Location: [facility] 18/19;  Service: Vascular      General Information       Row Name 05/19/25 1112          Physical Therapy Time and Intention    Document Type evaluation;discharge evaluation/summary  -EJ     Mode of Treatment physical therapy  -EJ       Row Name 05/19/25 1112          General Information    Patient Profile Reviewed yes  -EJ     Prior Level of Function independent:;min assist:;all household mobility;ADL's  uses walker, has WC as well, lift chair, ramp to enter home  -EJ     Existing Precautions/Restrictions fall  -EJ     Barriers to Rehab previous functional deficit  -EJ       Row Name 05/19/25 1112          Living Environment    Current Living Arrangements home  -EJ     People in Home spouse  -EJ       Row Name 05/19/25 1112          Cognition    Orientation Status (Cognition) oriented x 4  -EJ       Row Name 05/19/25 1112          Safety Issues/Impairments Affecting Functional Mobility    Impairments Affecting Function (Mobility) endurance/activity tolerance;strength  -EJ               User Key  (r) = Recorded By, (t) = Taken By, (c) = Cosigned By      Initials Name Provider Type    EJ Eliane Nova, PT Physical Therapist                   Mobility       Row Name 05/19/25 1113          Bed Mobility    Comment, (Bed Mobility) sitting up on EOB  -EJ       Row Name 05/19/25 1113          Sit-Stand Transfer    Sit-Stand [place] (Transfers) verbal cues;contact guard;minimum assist (75% patient effort);2 person assist  -EJ     Assistive Device (Sit-Stand Transfers) walker, front-wheeled  -EJ     Comment, (Sit-Stand Transfer) performed x 2, required min A x 2 on 1st stand from low bed height, pt able to stand from recliner chair w just CGA.  -EJ       Row Name 05/19/25 1113          Gait/Stairs (Locomotion)    [place] Level (Gait) verbal cues;contact guard   -EJ     Assistive Device (Gait) walker, front-wheeled  -EJ     Distance in Feet (Gait) 15  x 2  -EJ     Deviations/Abnormal Patterns (Gait) maicol decreased;stride length decreased  -EJ     Bilateral Gait Deviations forward flexed posture;heel strike decreased  -EJ     Comment, (Gait/Stairs) slow pace, but steady  -EJ               User Key  (r) = Recorded By, (t) = Taken By, (c) = Cosigned By      Initials Name Provider Type    Eliane Fernandez, PT Physical Therapist                   Obj/Interventions       [facility] Name 05/19/25 1114          Range of Motion Comprehensive    General Range of Motion no range of motion deficits identified  -EJ       Row Name 05/19/25 1114          Strength Comprehensive (MMT)    Comment, General Manual Muscle Testing (MMT) Assessment generalized weakness (baseline)  -EJ       Row Name 05/19/25 1114          Balance    Balance Assessment sitting static balance;standing static balance;standing dynamic balance  -EJ     Static Sitting Balance independent  -EJ     Position, Sitting Balance sitting edge of bed  -EJ     Static Standing Balance contact guard;standby assist  -EJ     Dynamic Standing Balance contact guard  -EJ     Position/Device Used, Standing Balance walker, front-wheeled  -EJ               User Key  (r) = Recorded By, (t) = Taken By, (c) = Cosigned By      Initials Name Provider Type    Eliane Fernandez, PT Physical Therapist                   Goals/Plan    No documentation.                  Clinical Impression       Row Name 05/19/25 1115          Pain    Pretreatment Pain Rating 0/10 - no pain  -EJ     Posttreatment Pain Rating 0/10 - no pain  -EJ       [facility] Name 05/19/25 1115          Plan of Care Review    Plan of Care Reviewed With patient;spouse  -EJ     Outcome Evaluation Pt seen for PT kailyn this AM. He was admitted to [facility] on 5/16 w generalized weakness and fall where he landed on his bottom. Pt found to have minimally displaced sacrococcygeal fracture, but is  WBAT on BLE per ortho. Pt has hx of HTN, OA, DDD, A fib, and DM2. Today, pt sitting up on EOB upon entry to room and spouse present. He is eager to go home. He has no complaints at this time. At baseline, pt lives at home w his wife and ambulates short distances w walker. He also has power chair and lift chair at home as well as ramp to enter home. Today, pt able to stand from low bed height w min A x 2 using Rwx. He ambulated approx 15 ft in room w CGA. Pt then sat in recliner chair and able to stand from there w just CGA. Pt ambulated again in room before returning to chair. Pt reports he is at his baseline and has all needed equipment at home. Pt w no further concerns at this time. Plans for home. PT will sign off.  -       Row Name 05/19/25 1115          Therapy Assessment/Plan (PT)    Criteria for Skilled Interventions Met (PT) no problems identified which require skilled intervention  -EJ     Therapy Frequency (PT) evaluation only  -       Row Name 05/19/25 1115          Positioning and Restraints    Pre-Treatment Position in bed  -EJ     Post Treatment Position chair  -EJ     In Chair notified nsg;reclined;call light within reach;encouraged to call for assist;exit alarm on;with family/caregiver  -EJ               User Key  (r) = Recorded By, (t) = Taken By, (c) = Cosigned By      Initials Name Provider Type    Eliane Fernandez, PT Physical Therapist                   Outcome Measures       Row Name 05/19/25 1119          How much help from another person do you currently need...    Turning from your back to your side while in flat bed without using bedrails? 3  -EJ     Moving from lying on back to sitting on the side of a flat bed without bedrails? 3  -EJ     Moving to and from a bed to a chair (including a wheelchair)? 3  -EJ     Standing up from a chair using your arms (e.g., wheelchair, bedside chair)? 3  -EJ     Climbing 3-5 steps with a railing? 2  -EJ     To walk in hospital room? 3  -EJ      AM-PAC 6 Clicks Score (PT) 17  -EJ               User Key  (r) = Recorded By, (t) = Taken By, (c) = Cosigned By      Initials Name Provider Type    Eliane Fernandez PT Physical Therapist                                 Physical Therapy Education       Title: PT OT SLP Therapies (Resolved)       Topic: Physical Therapy (Resolved)       Point: Mobility training (Resolved)       Learner Progress:  Not documented in this visit.                                  PT Recommendation and Plan     Outcome Evaluation: Pt seen for PT eval this AM. He was admitted to [facility] on 5/16 w generalized weakness and fall where he landed on his bottom. Pt found to have minimally displaced sacrococcygeal fracture, but is WBAT on BLE per ortho. Pt has hx of HTN, OA, DDD, A fib, and DM2. Today, pt sitting up on EOB upon entry to room and spouse present. He is eager to go home. He has no complaints at this time. At baseline, pt lives at home w his wife and ambulates short distances w walker. He also has power chair and lift chair at home as well as ramp to enter home. Today, pt able to stand from low bed height w min A x 2 using Rwx. He ambulated approx 15 ft in room w CGA. Pt then sat in recliner chair and able to stand from there w just CGA. Pt ambulated again in room before returning to chair. Pt reports he is at his baseline and has all needed equipment at home. Pt w no further concerns at this time. Plans for home. PT will sign off.     Time Calculation:         PT Charges       Row Name 05/19/25 1120             Time Calculation    Start Time 0910  -EJ      Stop Time 0932  -EJ      Time Calculation (min) 22 min  -EJ      PT Received On 05/19/25  -         Time Calculation- PT    Total Timed Code Minutes- PT 17 minute(s)  -EJ                User Key  (r) = Recorded By, (t) = Taken By, (c) = Cosigned By      Initials Name Provider Type    Eliane Fernandez PT Physical Therapist                  Therapy Charges for Today        Code Description Service Date Service Provider Modifiers Qty    53704047547 HC PT EVAL MOD COMPLEXITY 3 5/19/2025 Eliane Nova, PT GP 1    89314554042 HC PT THERAPEUTIC ACT EA 15 MIN 5/19/2025 Eliane Nova, PT GP 1            PT G-Codes  AM-PAC 6 Clicks Score (PT): 17  PT Discharge Summary  Anticipated Discharge Disposition (PT): home with assist, home with home health    Eliane Nova, PT  5/19/2025

## 2025-05-19 NOTE — PLAN OF CARE
Goal Outcome Evaluation:  Plan of Care Reviewed With: patient, spouse           Outcome Evaluation: Pt is a 84yom admitted 2/2 generalized wx and fall resulting in a minimally displaced sacrococcygeal fracture, but is WBAT on BLE per ortho. Pt has hx of HTN, OA, DDD, A fib, and DM2. Pt lives with his wife in [facility] with ramp entrance. Pt's wife helps with ADLs as needed but is able to ambulate short distances. Pt and wife have all AD/DME needed. Pt was able to complete household distance functional mobility with RW with CGA. Pt req min Ax2 from low surface of bed but in chair (closer to his w/c height) pt completed STS with CGA. Pt and wife endorsing pt is near baseline and pt is excited to go home. Pt with no acute OT needs and with d/c orders. Pt anticipated to be able to return home with HH therapy and help from wife as needed.    Anticipated Discharge Disposition (OT): home with assist, home with home health

## 2025-05-19 NOTE — THERAPY EVALUATION
Patient Name: Jesus Beckham  : 1940    MRN: [id number]                              Today's Date: 2025       Admit Date: 2025    Visit Dx:     ICD-10-CM ICD-9-CM   1. Closed fracture of sacrum and coccyx, initial encounter  S32.10XA 805.6    S32.2XXA    2. Generalized weakness  R53.1 780.79   3. Fall, initial encounter  W19.XXXA E888.9   4. Peripheral polyneuropathy  G62.9 356.9   5. Neuropathic ulcer of right heel, unspecified ulcer stage  L97.419 707.14   6. Herpes zoster without complication  B02.9 053.9     Patient Active Problem List   Diagnosis    Gout    Diabetic peripheral neuropathy    Dyslipidemia    Essential hypertension    PVC (premature ventricular contraction)    Vitamin D deficiency    Benign non-nodular prostatic hyperplasia without lower urinary tract symptoms    Osteoarthritis    Urge incontinence    Acute gangrenous cholecystitis    Discitis of lumbar region    Paroxysmal atrial fibrillation    History of sepsis    Hyperuricemia    Chronic deep vein thrombosis (DVT) of right peroneal vein    Nausea, vomiting, and diarrhea    Colitis presumed infectious    Urinary tract infection in male    DDD (degenerative disc disease), lumbar    Bilateral leg weakness    History of lumbar spinal fusion    Carpal tunnel syndrome    Adhesive arachnoiditis    Chronic anticoagulation    Diverticulosis    Hematuria    Lower obstructive uropathy    Splenic lesion    Suprapubic catheter dysfunction    Obesity (BMI 30-39.9)    Venous insufficiency of left leg    Memory difficulties    Chronic pain syndrome    Type 2 diabetes mellitus with peripheral neuropathy    Encounter for long-term (current) use of high-risk medication    Therapeutic opioid induced constipation    Abnormal CT of brain    Suprapubic catheter    Chronic bladder pain    Proteinuria    Lumbar radiculopathy    Nonrheumatic aortic valve stenosis    Snoring    Class 3 severe obesity due to excess calories without serious  comorbidity with body mass index (BMI) of 40.0 to 44.9 in adult    Non-restorative sleep    Hypersomnia    History of DVT of lower extremity    Status post insertion of spinal cord stimulator    S/P TAVR (transcatheter aortic valve replacement)    Abdominal distension (gaseous)    Back pain    Dorsalgia, unspecified    Benign neoplasm of ascending colon    Benign neoplasm of descending colon    Benign neoplasm of transverse colon    Benign neoplasm of cecum    Colon polyps    Rectal polyp    Disorder of muscle, unspecified    Family history of colonic polyps    Constipation    Fecal incontinence    History of colonic polyps    Personal history of colonic polyps    Cancer    Malignant (primary) neoplasm, unspecified    Other abnormalities of heart beat    Pure hypercholesterolemia    Unspecified hemorrhoids    Atrial fibrillation    Long term (current) use of anticoagulants    Dvrtclos of lg int w/o perforation or abscess w/o bleeding    Encounter for screening for malignant neoplasm of colon    Sleep apnea    Arthritis    Coagulation defect, unspecified    Diabetes mellitus    UTI (urinary tract infection)    Generalized weakness    Type 2 diabetes mellitus with hyperglycemia, without long-term current use of insulin    Collapsed vertebra, not elsewhere classified, sacral and sacrococcygeal region, initial encounter for fracture    Herpes zoster without complication     Past Medical History:   Diagnosis Date    Acute embolism and thrombosis of deep vein of right distal lower extremity     Acute gangrenous cholecystitis     FEB 2018    Allergic     Aortic valve stenosis     Arthritis     Atrial fibrillation with RVR     HISTORY    Benign prostatic hyperplasia without lower urinary tract symptoms     Cancer of bladder 2016    Colon polyp     Discitis of lumbar region     DVT (deep venous thrombosis)     MARCH 2018    Essential hypertension     Murray catheter in place     LOSS OF SENSATION BLADDER. BECAUSE OF WOUND AT  THE TIME    Gout     Heel ulcer     RIGHT. FOLLOWED BY WOUND CARE. GETTING BETTER    History of sepsis     [place] (hard of hearing)     HEARING AIDS BOTH EARS    Hyperlipidemia     Loss, sensation     LOWER EXTREMTIES. RELATED TO LAST BACK SURGERY.    MRSA infection     LEFT LEG.     Nausea, vomiting, and diarrhea 03/01/2019    Open wound     WITH MEDICATED DRESSING LEFT SHIN AREA.(RESOLVED PER PATIENT)    CELINA (obstructive sleep apnea)     NO MACHINE    Osteoarthritis     Paroxysmal atrial fibrillation     PVC (premature ventricular contraction)     Sepsis 02/2018    Sepsis due to Escherichia coli     Skin carcinoma 2012    MELANOMA.2 PLACES BACK AND EAR    Type 2 diabetes mellitus     Vitamin D deficiency     Weakness      Past Surgical History:   Procedure Laterality Date    ABDOMINAL SURGERY      AORTIC VALVE REPAIR/REPLACEMENT N/A 2/28/2023    Procedure: TTE TRANSFEMORAL TRANSCATHETER AORTIC VALVE REPLACEMENT PERCUTANEOUS APPROACH;  Surgeon: Antony Goldstein MD;  Location: [facility];  Service: Cardiothoracic;  Laterality: N/A;    AORTIC VALVE REPAIR/REPLACEMENT N/A 2/28/2023    Procedure: Transfemoral Transcatheter Aortic Valve Replacement with intraoperative TTE and possible open surgical rescue;  Surgeon: Samuel Zelaya MD;  Location: [facility];  Service: Cardiovascular;  Laterality: N/A;    APPENDECTOMY N/A 1961    CARDIAC CATHETERIZATION N/A 01/16/2023    Procedure: Coronary angiography;  Surgeon: Tasha Burr MD;  Location: [facility] CATH INVASIVE LOCATION;  Service: Cardiology;  Laterality: N/A;    CARDIAC CATHETERIZATION N/A 01/16/2023    Procedure: Left Heart Cath;  Surgeon: Tasha Burr MD;  Location[facility] CATH [facility]VASIVE LOC[facility];  Service: Cardiology;  Laterality: N/A;    CARDIAC CATHETERIZATION N/A 01/16/2023    Procedure: Right Heart Cath;  Surgeon: Tasha Burr MD;  [facility] C[facility]IVE LOC[facility];  Service: Cardiology;  Laterality: N/A;    CHOLECYSTECTOMY WITH  INTRAOPERATIVE CHOLANGIOGRAM N/A 02/25/2018    Procedure: CHOLECYSTECTOMY LAPAROSCOPIC INTRAOPERATIVE CHOLANGIOGRAM;  Surgeon: Maegan Correa MD;  Location: [facility] OR;  Service:     COLONOSCOPY N/A 2010    h/o of polyps    COLONOSCOPY W/ BIOPSIES AND POLYPECTOMY  2019    EYE SURGERY Bilateral 1959    glass removal from left eye, lens transplant    JOINT REPLACEMENT Right 2005    RIGHT KNEE    LAMINECTOMY N/A 01/18/2016    L2-L3, L3-L4, L4-L5, and L5-S1 bilateral lamincectomy, medial facetectomy & [id number], Dr. Kaiden Valdes    LUMBAR FUSION N/A 03/07/2018    Procedure: LUMBAR FUSION MINIMALLY INVASIVE TRANSFORAMINAL LUMBAR INTERBODY IRRIGATION AND DEBRIDEMENT AND FUSION.  IRRIGATION AND DEBRIDEMENT OF EPIDURAL ABCESS LUMBAR 2-4. BONE MORPHOGENIC PROTEIN, ALPHATEC NOVEL AND ILLICO, EMG AND SSEP NEUROMONITORING.;  Surgeon: Manish Marr DO;  Location: [facility] OR;  Service: Orthopedic Spine    SKIN BIOPSY      BACK AND FACE    SPINAL CORD STIMULATOR IMPLANT N/A 07/01/2022    Procedure: SPINAL CORD STIMULATOR INSERTION PHASE 1 ([place]) 4 DAY TRIAL ONLY DUE TO ELIQUIS HOLD.;  Surgeon: Cody Holguin MD;  Location: [facility] OR;  Service: Pain Management;  Laterality: N/A;    SPINAL CORD STIMULATOR IMPLANT N/A 11/17/2022    Procedure: Thoracic eleven laminotomy for placement of a surgical spinal cord stimulator lead to thoracic nine;  Surgeon: Charlie Bailon MD;  Location: [facility];  Service: Neurosurgery;  Laterality: N/A;    SPINAL CORD STIMULATOR IMPLANT N/A 11/18/2022    Procedure: Placement of a left gluteal internal pulse generator;  Surgeon: Charlie Bailon MD;  Location: [facility] OR;  Service: Neurosurgery;  Laterality: N/A;    TOTAL KNEE ARTHROPLASTY Right 03/07/2005    Dr. Washington Evans    VENA CAVA FILTER INSERTION Right 03/06/2018    Procedure: VENA CAVA FILTER INSERTION;  Surgeon: Alexis Thakkar MD;  Location: [facility] 18/19;  Service:     VENA CAVA  FILTER REMOVAL N/A 12/03/2018    Procedure: VENA CAVA FILTER REMOVAL WITH VENOCAVAGRAM;  Surgeon: Alexis Thakkar MD;  Location: [facility] 18/19;  Service: Vascular      General Information       Row Name 05/19/25 1236          OT Time and Intention    Document Type evaluation;discharge evaluation/summary  -KR     Mode of Treatment occupational therapy  -KR     Patient Effort good  -KR       Row Name 05/19/25 1236          General Information    Patient Profile Reviewed yes  -KR     Prior Level of Function independent:;min assist:;ADL's  t lives with his wife in [facility] with ramp entrance. Pt's wife helps with ADLs as needed but is able to ambulate short distances.  -KR     Existing Precautions/Restrictions fall  -KR     Barriers to Rehab previous functional deficit  -KR       Row Name 05/19/25 1236          Occupational Profile    Environmental Supports and Barriers (Occupational Profile) DME: RW, power w/c, lift recliner, grab bars, elevated toilet, shower chair  -KR       Row Name 05/19/25 1236          Living Environment    Current Living Arrangements home  -KR     People in Home spouse  -KR       Row Name 05/19/25 1236          Home Main Entrance    Number of Stairs, Main Entrance other (see comments)  ramp  -KR       Row Name 05/19/25 1236          Cognition    Orientation Status (Cognition) oriented x 4  -KR       Row Name 05/19/25 1236          Safety Issues/Impairments Affecting Functional Mobility    Impairments Affecting Function (Mobility) pain;coordination;strength;endurance/activity tolerance  -KR     Comment, Safety Issues/Impairments (Mobility) gaitbelt and non-slip socks donned.  -KR               User Key  (r) = Recorded By, (t) = Taken By, (c) = Cosigned By      Initials Name Provider Type    KR Huang Talamantes OT Occupational Therapist                     Mobility/ADL's       Row Name 05/19/25 1237          Transfers    Transfers sit-stand transfer;stand-sit transfer  -KR       Row Name  05/19/25 1237          Sit-Stand Transfer    Sit-Stand [place] (Transfers) verbal cues;contact guard;minimum assist (75% patient effort);2 person assist  -KR     Assistive Device (Sit-Stand Transfers) walker, front-wheeled  -KR     Comment, (Sit-Stand Transfer) pt req Min Ax2 for sit>stand from lower EOB. Pt able to complete sit<>stand from chair with CGA  -KR       Row Name 05/19/25 1237          Stand-Sit Transfer    Stand-Sit [place] (Transfers) contact guard;verbal cues  -KR     Assistive Device (Stand-Sit Transfers) walker, front-wheeled  -KR       Row Name 05/19/25 1237          Functional Mobility    Functional Mobility- Device walker, front-wheeled  -KR     Functional Mobility-Maintain WBing able to maintain weight bearing status  -KR     Functional Mobility- Comment pt able to complete 2 bouts of household distances with CGA and RW. pt with flexed posture but endorsing this is baseline  -KR     Patient was able to Ambulate yes  -KR       Row Name 05/19/25 1237          Mobility    Extremity Weight-bearing Status left lower extremity;right lower extremity  -KR     Left Lower Extremity (Weight-bearing Status) weight-bearing as tolerated (WBAT)  -KR     Right Lower Extremity (Weight-bearing Status) weight-bearing as tolerated (WBAT)  -KR               User Key  (r) = Recorded By, (t) = Taken By, (c) = Cosigned By      Initials Name Provider Type    Huang Whitehead OT Occupational Therapist                   Obj/Interventions       Row Name 05/19/25 1239          Vision Assessment/Intervention    Visual Impairment/Limitations WNL  -KR       Row Name 05/19/25 1239          Range of Motion Comprehensive    General Range of Motion no range of motion deficits identified  -KR       Row Name 05/19/25 1239          Strength Comprehensive (MMT)    Comment, General Manual Muscle Testing (MMT) Assessment generalized weakness  -KR       Row Name 05/19/25 1239          Balance    Balance Assessment sitting  dynamic balance;sitting static balance;standing static balance;standing dynamic balance  -KR     Static Sitting Balance modified independence  -KR     Dynamic Sitting Balance contact guard  -KR     Static Standing Balance contact guard;standby assist  -KR     Dynamic Standing Balance contact guard  -KR               User Key  (r) = Recorded By, (t) = Taken By, (c) = Cosigned By      Initials Name Provider Type    Huang Whitehead OT Occupational Therapist                   Goals/Plan       Row Name 05/19/25 1241          Transfer Goal 1 (OT)    Activity/Assistive Device (Transfer Goal 1, OT) sit-to-stand/stand-to-sit  -KR     [place] Level/Cues Needed (Transfer Goal 1, OT) contact guard required  -KR     Time Frame (Transfer Goal 1, OT) short term goal (STG);1 day  -KR     Progress/Outcome (Transfer Goal 1, OT) goal met  -KR               User Key  (r) = Recorded By, (t) = Taken By, (c) = Cosigned By      Initials Name Provider Type    Huang Whitehead OT Occupational Therapist                   Clinical Impression       Row Name 05/19/25 1240          Pain Assessment    Pretreatment Pain Rating 0/10 - no pain  -KR     Posttreatment Pain Rating 0/10 - no pain  -KR       Row Name 05/19/25 1240          Plan of Care Review    Plan of Care Reviewed With patient;spouse  -KR     Outcome Evaluation Pt is a 84yom admitted 2/2 generalized wx and fall resulting in a minimally displaced sacrococcygeal fracture, but is WBAT on BLE per ortho. Pt has hx of HTN, OA, DDD, A fib, and DM2. Pt lives with his wife in [facility] with ramp entrance. Pt's wife helps with ADLs as needed but is able to ambulate short distances. Pt and wife have all AD/DME needed. Pt was able to complete household distance functional mobility with RW with CGA. Pt req min Ax2 from low surface of bed but in chair (closer to his w/c height) pt completed STS with CGA. Pt and wife endorsing pt is near baseline and pt is excited to go home. Pt with no acute OT  needs and with d/c orders. Pt anticipated to be able to return home with HH therapy and help from wife as needed.  -KR       Row Name 05/19/25 1240          Therapy Assessment/Plan (OT)    Criteria for Skilled Therapeutic Interventions Met (OT) no;no problems identified which require skilled intervention  -KR       Row Name 05/19/25 1240          Therapy Plan Review/Discharge Plan (OT)    Anticipated Discharge Disposition (OT) home with assist;home with home health  -KR       Row Name 05/19/25 1240          Vital Signs    Pre Patient Position Sitting  -KR     Intra Patient Position Standing  -KR     Post Patient Position Sitting  -KR       Row Name 05/19/25 1240          Positioning and Restraints    Pre-Treatment Position in bed  -KR     Post Treatment Position chair  -KR     In Chair notified nsg;reclined;sitting;call light within reach;encouraged to call for assist;exit alarm on;with family/caregiver  -KR               User Key  (r) = Recorded By, (t) = Taken By, (c) = Cosigned By      Initials Name Provider Type    Huang Whitehead, OT Occupational Therapist                   Outcome Measures       Row Name 05/19/25 1241          How much help from another is currently needed...    Putting on and taking off regular lower body clothing? 3  -KR     Bathing (including washing, rinsing, and drying) 3  -KR     Toileting (which includes using toilet bed pan or urinal) 3  -KR     Putting on and taking off regular upper body clothing 3  -KR     Taking care of personal grooming (such as brushing teeth) 3  -KR     Eating meals 4  -KR     AM-PAC 6 Clicks Score (OT) 19  -KR       Row Name 05/19/25 1119          How much help from another person do you currently need...    Turning from your back to your side while in flat bed without using bedrails? 3  -EJ     Moving from lying on back to sitting on the side of a flat bed without bedrails? 3  -EJ     Moving to and from a bed to a chair (including a wheelchair)? 3  -EJ      Standing up from a chair using your arms (e.g., wheelchair, bedside chair)? 3  -EJ     Climbing 3-5 steps with a railing? 2  -EJ     To walk in hospital room? 3  -EJ     AM-PAC 6 Clicks Score (PT) 17  -EJ       Row Name 05/19/25 1241          Functional Assessment    Outcome Measure Options AM-PAC 6 Clicks Daily Activity (OT)  -KR               User Key  (r) = Recorded By, (t) = Taken By, (c) = Cosigned By      Initials Name Provider Type    EJ Eliane Nova, PT Physical Therapist    Huang Whitehead OT Occupational Therapist                    Occupational Therapy Education       Title: PT OT SLP Therapies (In Progress)       Topic: Occupational Therapy (In Progress)       Point: ADL training (Done)       Learning Progress Summary            Patient Acceptance, E,TB, VU by RANDY at 5/19/2025 1242    Comment: role of OT, d/c recs                      Point: Body mechanics (Done)       Learning Progress Summary            Patient Acceptance, E,TB, VU by RANDY at 5/19/2025 1242    Comment: role of OT, d/c recs                                      User Key       Initials Effective Dates Name Provider Type [facility] 04/01/25 -  Huang Talamantes OT Occupational Therapist OT                  OT Recommendation and Plan     Plan of Care Review  Plan of Care Reviewed With: patient, spouse  Outcome Evaluation: Pt is a 84yom admitted 2/2 generalized wx and fall resulting in a minimally displaced sacrococcygeal fracture, but is WBAT on BLE per ortho. Pt has hx of HTN, OA, DDD, A fib, and DM2. Pt lives with his wife in [facility] with ramp entrance. Pt's wife helps with ADLs as needed but is able to ambulate short distances. Pt and wife have all AD/DME needed. Pt was able to complete household distance functional mobility with RW with CGA. Pt req min Ax2 from low surface of bed but in chair (closer to his w/c height) pt completed STS with CGA. Pt and wife endorsing pt is near baseline and pt is excited to go home. Pt with no acute  OT needs and with d/c orders. Pt anticipated to be able to return home with HH therapy and help from wife as needed.     Time Calculation:   Evaluation Complexity (OT)  Review Occupational Profile/Medical/Therapy History Complexity: brief/low complexity  Assessment, Occupational Performance/Identification of Deficit Complexity: 3-5 performance deficits  Clinical Decision Making Complexity (OT): problem focused assessment/low complexity  Overall Complexity of Evaluation (OT): low complexity     Time Calculation- OT       Row Name 05/19/25 1243             Time Calculation- OT    OT Start Time 0910  -KR      OT Stop Time 0932  -KR      OT Time Calculation (min) 22 min  -KR      Total Timed Code Minutes- OT 10 minute(s)  -KR      OT Received On 05/19/25  -KR         Timed Charges    77063 - OT Therapeutic Activity Minutes 10  -KR         Total Minutes    Timed Charges Total Minutes 10  -KR       Total Minutes 10  -KR                User Key  (r) = Recorded By, (t) = Taken By, (c) = Cosigned By      Initials Name Provider Type    KR Huang Talamantes OT Occupational Therapist                  Therapy Charges for Today       Code Description Service Date Service Provider Modifiers Qty    [id number]  OT THERAPEUTIC ACT EA 15 MIN 5/19/2025 Huang Talamantes OT GO 1    70067233945 HC OT EVAL LOW COMPLEXITY 2 5/19/2025 Huang Talamantes OT GO 1                 Huang Talamantes OT  5/19/2025

## 2025-05-19 NOTE — OUTREACH NOTE
Prep Survey      Flowsheet Row Responses   [facility] facility patient discharged from? [place]   Is LACE score < 7 ? No   Eligibility Readm Mgmt   Discharge diagnosis Generalized weakness   Does the patient have one of the following disease processes/diagnoses(primary or secondary)? Other   Does the patient have Home health ordered? No   Is there a DME ordered? No   Prep survey completed? Yes            SKYLER URIOSTEGUI - Registered Nurse

## 2025-05-19 NOTE — DISCHARGE PLACEMENT REQUEST
"Poli Monroe (84 y.o. Male)       Date of Birth   1940    Social Security Number       Address   [address] [place] [address]    Home Phone       MRN   [id number]       Latter-day   Jainism    Marital Status                               Admission Date   5/16/2025    Admission Type   Emergency    Admitting Provider   Jerrell Campbell MD    Attending Provider   Jerrell Campbell MD    Department, Room/Bed   [address], [address]/1       Discharge Date       Discharge Disposition   [place] [facility]    Discharge Destination                                 Attending Provider: Jerrell Campbell MD    Allergies: Levaquin [Levofloxacin], Alcohol, Other, Morphine    Isolation: None   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)   Code Status: CPR    Ht: 177.8 cm (70\")   Wt: 128 kg (281 lb 8.4 oz)    Admission Cmt: None   Principal Problem: Generalized weakness [R53.1]                   Active Insurance as of 5/16/2025       Primary Coverage       Payor Plan Insurance Group Employer/Plan Group    [employer] MEDICARE REPLACEMENT [employer] Medicare Advantage [employer] PPO [id number]       Payor Plan Address Payor Plan Phone Number Payor Plan Fax Number Effective Dates    [address] [address]   2/1/2023 - None Entered    [facility] [id number]         Subscriber Name Subscriber Birth Date Member ID       POLI MONORE 1940 [id number]                     Emergency Contacts        (Rel.) Home Phone Work Phone Mobile Phone    Mya Monroe (Spouse) -- -- [phone]    LEILA WEAVER (Daughter) -- -- [phone]    Kaylie Nunez (Sister) [phone] -- --              "

## 2025-05-19 NOTE — PLAN OF CARE
Goal Outcome Evaluation:  Plan of Care Reviewed With: patient          Problem: Adult Inpatient Plan of Care  Goal: Plan of Care Review  Outcome: Progressing  Flowsheets (Taken 5/19/2025 4081)  Progress: improving  Outcome Evaluation: Vitals stable. Tolerating RA. Norco and Tylenol given for pain. Suprapubic cath in place and draining. Cath care complete. Dressing changed to bottom and ankle. pt had skin tears on his scrotum that were bleeding. gauze applied bleeding controled. pt had asymptomatic runs of V-Tach. Plan of care ongoing.  Pt stated Tylenol works best for pain.   Plan of Care Reviewed With: patient

## 2025-05-19 NOTE — PLAN OF CARE
Goal Outcome Evaluation:  Plan of Care Reviewed With: patient, spouse           Outcome Evaluation: Pt seen for PT eval this AM. He was admitted to [facility] on 5/16 w generalized weakness and fall where he landed on his bottom. Pt found to have minimally displaced sacrococcygeal fracture, but is WBAT on BLE per ortho. Pt has hx of HTN, OA, DDD, A fib, and DM2. Today, pt sitting up on EOB upon entry to room and spouse present. He is eager to go home. He has no complaints at this time. At baseline, pt lives at home w his wife and ambulates short distances w walker. He also has power chair and lift chair at home as well as ramp to enter home. Today, pt able to stand from low bed height w min A x 2 using Rwx. He ambulated approx 15 ft in room w CGA. Pt then sat in recliner chair and able to stand from there w just CGA. Pt ambulated again in room before returning to chair. Pt reports he is at his baseline and has all needed equipment at home. Pt w no further concerns at this time. Plans for home. PT will sign off.    Anticipated Discharge Disposition (PT): home with assist, home with home health

## 2025-05-20 NOTE — PROGRESS NOTES
Start PACC Note    Home Health Referral    Evaluated patient and spouse on Home Care and services available. Patient offered choice of available HHC and agreeable to PT services with [facility].    Isolation Precautions: No active isolations    START PATIENT REGISTRATION INFORMATION  Order Information  Order Signing Physician: Dr. Magdy Ricardo  Service Ordered RN?: No  Service Ordered PT?: Yes  Service Ordered OT?: No  Service Ordered ST?: No  Service Ordered MSW?: No  Service Ordered HHA?: No  Following Physician: Magdy Ricardo MD  Following Physician Phone: [phone]  Overseeing Physician: Magdy Ricardo MD  (Required for Residents Only)  Agreeable to Follow ? Yes  Date/Time of Call 05/20/25 10:15 EDT, Spoke with: Dr. Magdy Ricardo    Care Coordination  Same Day SOC?: No  Primary Care Physician: Magdy Ricardo MD  Primary Care Physician Phone: [phone]  Primary Care Physician Address: [address] [place] [address] / [facility] [address]  Visit Instructions: Best to call spouse at [phone] to schedule HH visits  Service Discharge Location Type: Home  Service Facility Name:   Service Floor Facility:   Service Room No:     Demographics  Patient Last Name: Chapincito  Patient First Name: Jesus  Language/Communication Barrier: no  Service Address: [address] [place] [address]  Service City: [place]  Service State: [place]  Service Zip: [address]  Service Home Phone: [phone]  Other Phone Numbers:   Telephone Information:   Mobile [phone]     Emergency Contact:   Extended Emergency Contact Information  Primary Emergency Contact: Mya Beckham  Address: [address] [place] [address] of [facility]  Mobile Phone: [phone]  Relation: Spouse  Hearing or visual needs: None  Other needs: None  Preferred language: English   needed? No  Secondary Emergency Contact: LEILA WEAVER  Mobile Phone: [phone]  Relation: Daughter    Admission Information  Admit Date:  5/16/2025  Patient Status at Discharge: Observation  Admitting Diagnosis: Peripheral polyneuropathy [G62.9]  Generalized weakness [R53.1]  Closed fracture of sacrum and coccyx, initial encounter [S32.10XA, S32.2XXA]  Fall, initial encounter [W19.XXXA]  Herpes zoster without complication [B02.9]  Neuropathic ulcer of right heel, unspecified ulcer stage [L97.419]    Caregiver Information  Caregiver First Name:   Caregiver Last Name:   Caregiver Relationship to Patient:   Caregiver Phone Number:   Caregiver Notes:     HITECH  [employer]  No      END PATIENT REGISTRATION INFORMATION    Start PACC Summary    Additional Comments:     END PACC Summary    Discharge Date: Pending    Referral Source: [facility]    Signed By: Leeann Monzon RN, 5/20/2025, 10:15 EDT     Date/Time: 05/20/25 10:15 EDT    End PACC Note

## 2025-05-20 NOTE — DISCHARGE PLACEMENT REQUEST
"Poli Monroe (84 y.o. Male)       Date of Birth   1940    Social Security Number       Address   [address] [place] [address]    Home Phone       MRN   [id number]       Catholic   Episcopal    Marital Status                               Admission Date   5/16/2025    Admission Type   Emergency    Admitting Provider   Jerrell Campbell MD    Attending Provider       Department, Room/Bed   [address], E462/1       Discharge Date   5/19/2025    Discharge Disposition   Home-Health Care [facility]    Discharge Destination                                 Attending Provider: (none)   Allergies: Levaquin [Levofloxacin], Alcohol, Other, Morphine    Isolation: None   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)   Code Status: Prior    Ht: 177.8 cm (70\")   Wt: 128 kg (281 lb 8.4 oz)    Admission Cmt: None   Principal Problem: Generalized weakness [R53.1]                   Active Insurance as of 5/16/2025       Primary Coverage       Payor Plan Insurance Group Employer/Plan Group    [employer] MEDICARE REPLACEMENT UHC Medicare Advantage GROUP PPO [id number]       Payor Plan Address Payor Plan Phone Number Payor Plan Fax Number Effective Dates    [address] [address]   2/1/2023 - None Entered    [facility] [id number]         Subscriber Name Subscriber Birth Date Member ID       POLI MONROE 1940 [id number]                     Emergency Contacts        (Rel.) Home Phone Work Phone Mobile Phone    Mya Monroe (Spouse) -- -- [phone]    LEILA WEAVER (Daughter) -- -- [phone]    Kaylie Nunez (Sister) [phone] -- --                "

## 2025-05-20 NOTE — CASE MANAGEMENT/SOCIAL WORK
Case Management Discharge Note      Final Note: Home with wife and [facility] for PT. Family transport         Selected Continued Care - Discharged on 5/19/2025 Admission date: 5/16/2025 - Discharge disposition: Home-Health Care Svc      Destination    No services have been selected for the patient.                Durable Medical Equipment    No services have been selected for the patient.                Dialysis/Infusion    No services have been selected for the patient.                Home Medical Care    No services have been selected for the patient.                Therapy    No services have been selected for the patient.                Community Resources    No services have been selected for the patient.                Community & DME    No services have been selected for the patient.                    Transportation Services  Private: Car    Final Discharge Disposition Code: 06 - home with home health care

## 2025-05-22 ENCOUNTER — READMISSION MANAGEMENT (OUTPATIENT)
Dept: CALL CENTER | Facility: HOSPITAL | Age: 85
End: 2025-05-22
Payer: MEDICARE

## 2025-05-22 NOTE — OUTREACH NOTE
Medical Week 1 Survey      Flowsheet Row Responses   [facility] [place] by [facility] patient discharged from? [facility]   Does the patient have one of the following disease processes/diagnoses(primary or secondary)? Other   Week 1 attempt successful? Yes   Call start time 1637   Call end time 1640   Discharge diagnosis Generalized weakness   Meds reviewed with patient/caregiver? Yes   Is the patient having any side effects they believe may be caused by any medication additions or changes? No   Does the patient have all medications ordered at discharge? Yes   Is the patient taking all medications as directed (includes completed medication regime)? Yes   Does the patient have a primary care provider?  Yes   Does the patient have an appointment with their PCP within 7 days of discharge? Yes   Has the patient kept scheduled appointments due by today? N/A   Psychosocial issues? No   Did the patient receive a copy of their discharge instructions? Yes   Nursing interventions Reviewed instructions with patient   What is the patient's perception of their health status since discharge? Improving   Is the patient/caregiver able to teach back signs and symptoms related to disease process for when to call PCP? Yes   Is the patient/caregiver able to teach back signs and symptoms related to disease process for when to call 911? Yes   Is the patient/caregiver able to teach back the hierarchy of who to call/visit for symptoms/problems? PCP, Specialist, Home health nurse, Urgent Care, ED, 911 Yes   Additional teach back comments States he is doing well and getting stronger.   Week 1 call completed? Yes   Graduated Yes   Graduated/Revoked comments Denies questions or needs at this time.   Call end time 1640            Namrata KWONG - Licensed Nurse

## 2025-05-28 ENCOUNTER — TELEPHONE (OUTPATIENT)
Dept: FAMILY MEDICINE CLINIC | Facility: CLINIC | Age: 85
End: 2025-05-28
Payer: MEDICARE

## 2025-05-28 NOTE — TELEPHONE ENCOUNTER
Spoke with Betsey from home health to give the verbal to VERBAL TO CONTINUE PHYSICAL THERAPY ONCE A WEEK FOR SIX WEEKS. PATIENT HAS A PRESSURE ULCER STAGE THREE. SHE WANTS A VERBAL ORDER FOR NURSING TO EVAULATE

## 2025-05-28 NOTE — TELEPHONE ENCOUNTER
Caller: AMOS MARTINTIST [facility]    Relationship: Other    Best call back number: [phone]    What was the call regarding: PATIENT WAS EVALUATED ON SATURDAY FOR PHYSICAL THERAPY, NEEDS VERBAL TO CONTINUE PHYSICAL THERAPY ONCE A WEEK FOR SIX WEEKS. PATIENT HAS A PRESSURE ULCER STAGE THREE. SHE WANTS A VERBAL ORDER FOR NURSING TO EVAULATE THIS AS WELL.

## 2025-06-03 ENCOUNTER — OFFICE VISIT (OUTPATIENT)
Dept: WOUND CARE | Facility: HOSPITAL | Age: 85
End: 2025-06-03
Payer: MEDICARE

## 2025-06-07 NOTE — PROGRESS NOTES
Subjective   Jesus Beckham is a 84 y.o. male.     CC: Hospital F/U for Coccyx Fx.    History of Present Illness     Pt returns today after recent hospitalization. That visit was as follows:    Date of Admission: 5/16/2025  Date of Discharge:  5/19/2025  Primary Care Physician: Provider, No Known        Chief Complaint:   Weakness - Generalized        Discharge Diagnoses            Active Hospital Problems     Diagnosis   POA    **Generalized weakness [R53.1]   Yes    Type 2 diabetes mellitus with hyperglycemia, without long-term current use of insulin [E11.65]   Yes    Collapsed vertebra, not elsewhere classified, sacral and sacrococcygeal region, initial encounter for fracture [M48.58XA]   Yes    Herpes zoster without complication [B02.9]   Yes    Back pain [M54.9]   Yes    S/P TAVR (transcatheter aortic valve replacement) [Z95.2]   Not Applicable    Class 3 severe obesity due to excess calories without serious comorbidity with body mass index (BMI) of 40.0 to 44.9 in adult [E66.813, E66.01, Z68.41]   Not Applicable    Chronic anticoagulation [Z79.01]   Not Applicable    Paroxysmal atrial fibrillation [I48.0]   Yes    Essential hypertension [I10]   Yes    Diabetic peripheral neuropathy [E11.42]   Yes       Resolved Hospital Problems   No resolved problems to display.         Hospital Course      Mr. Beckham is a 84 y.o. male with a history of TAVR, DM2, PAF and recent dx of shingles who presented to [facility] initially complaining of generalized weakness and a fall.  Please see the admitting history and physical for further details.  He was found to have sacrococcygeal fx on imaging. He was having severe pain in the back and legs but mostly from neuropathy. Ortho saw him and confirmed conservative treatment for his fracture. He was given some fluids with considerable improvement in his strength. Ultimately his weakness was probably multifactorial with chronic debility made worse by recent  "shingles diagnosis. He worked with PT today and did quite well and felt he could manage things at home with HH and his wife's assistance.          Current outpatient and discharge medications have been reconciled for the patient.  Reviewed by: Magdy Ricardo MD    Happily, patient reports no pain from his previous fall and reports that is doing fine.  The problem is progressively worsening neuropathy that is \"driving me insane\".  Patient is already on max dose Lyrica, 2 tramadol at bedtime, as well as 20 mg of amitriptyline.  Patient reports \"something has to be done\", as he is just not enjoying life due to the neuropathic pain.      The following portions of the patient's history were reviewed and updated as appropriate: allergies, current medications, past family history, past medical history, past social history, past surgical history, and problem list.    Review of Systems   Constitutional:  Negative for activity change, chills and fever.   Respiratory:  Negative for cough.    Cardiovascular:  Negative for chest pain.   Psychiatric/Behavioral:  Negative for dysphoric mood.      /60 (BP Location: Left arm, Patient Position: Sitting, Cuff Size: Large Adult)   Pulse 81   Temp 98.3 °F (36.8 °C) (Oral)   Ht 177.8 cm (70\")   SpO2 96%   BMI 40.39 kg/m²     Objective   Physical Exam  Vitals and nursing note reviewed.   Constitutional:       General: He is not in acute distress.     Appearance: He is well-developed.   Cardiovascular:      Rate and Rhythm: Normal rate and regular rhythm.   Pulmonary:      Effort: Pulmonary effort is normal.      Breath sounds: Normal breath sounds.   Neurological:      Mental Status: He is alert and oriented to person, place, and time.   Psychiatric:         Behavior: Behavior normal.         Thought Content: Thought content normal.     Hospital records reviewed with pt confirming HPI.      Assessment & Plan   Diagnoses and all orders for this visit:    1. Generalized weakness " (Primary)    2. Collapsed vertebra, not elsewhere classified, sacral and sacrococcygeal region, initial encounter for fracture    3. Hospital discharge follow-up    4. Diabetic peripheral neuropathy  Comments:  uncontrolled; medication adjusted  Orders:  -     amitriptyline (ELAVIL) 50 MG tablet; Take 1 tablet by mouth Every Night.  Dispense: 90 tablet; Refill: 1  -     traMADol (ULTRAM) 50 MG tablet; Take 1-2 tablets by mouth Every 6 (Six) Hours As Needed for Moderate Pain.  Dispense: 240 tablet; Refill: 2    5. Lumbar radiculopathy  -     traMADol (ULTRAM) 50 MG tablet; Take 1-2 tablets by mouth Every 6 (Six) Hours As Needed for Moderate Pain.  Dispense: 240 tablet; Refill: 2

## 2025-06-09 ENCOUNTER — OFFICE VISIT (OUTPATIENT)
Dept: FAMILY MEDICINE CLINIC | Facility: CLINIC | Age: 85
End: 2025-06-09
Payer: MEDICARE

## 2025-06-09 VITALS
OXYGEN SATURATION: 96 % | SYSTOLIC BLOOD PRESSURE: 116 MMHG | TEMPERATURE: 98.3 F | DIASTOLIC BLOOD PRESSURE: 60 MMHG | BODY MASS INDEX: 40.39 KG/M2 | HEART RATE: 81 BPM | HEIGHT: 70 IN

## 2025-06-09 DIAGNOSIS — Z09 HOSPITAL DISCHARGE FOLLOW-UP: ICD-10-CM

## 2025-06-09 DIAGNOSIS — M48.58XA COLLAPSED VERTEBRA, NOT ELSEWHERE CLASSIFIED, SACRAL AND SACROCOCCYGEAL REGION, INITIAL ENCOUNTER FOR FRACTURE: ICD-10-CM

## 2025-06-09 DIAGNOSIS — R53.1 GENERALIZED WEAKNESS: Primary | ICD-10-CM

## 2025-06-09 DIAGNOSIS — M54.16 LUMBAR RADICULOPATHY: ICD-10-CM

## 2025-06-09 DIAGNOSIS — E11.42 DIABETIC PERIPHERAL NEUROPATHY: Chronic | ICD-10-CM

## 2025-06-09 PROCEDURE — 1159F MED LIST DOCD IN RCRD: CPT | Performed by: FAMILY MEDICINE

## 2025-06-09 PROCEDURE — 1111F DSCHRG MED/CURRENT MED MERGE: CPT | Performed by: FAMILY MEDICINE

## 2025-06-09 PROCEDURE — 1160F RVW MEDS BY RX/DR IN RCRD: CPT | Performed by: FAMILY MEDICINE

## 2025-06-09 PROCEDURE — 3074F SYST BP LT 130 MM HG: CPT | Performed by: FAMILY MEDICINE

## 2025-06-09 PROCEDURE — 3078F DIAST BP <80 MM HG: CPT | Performed by: FAMILY MEDICINE

## 2025-06-09 PROCEDURE — 1125F AMNT PAIN NOTED PAIN PRSNT: CPT | Performed by: FAMILY MEDICINE

## 2025-06-09 PROCEDURE — 99214 OFFICE O/P EST MOD 30 MIN: CPT | Performed by: FAMILY MEDICINE

## 2025-06-09 RX ORDER — TRAMADOL HYDROCHLORIDE 50 MG/1
50-100 TABLET ORAL EVERY 6 HOURS PRN
Qty: 240 TABLET | Refills: 2 | Status: SHIPPED | OUTPATIENT
Start: 2025-06-09

## 2025-06-09 RX ORDER — AMITRIPTYLINE HYDROCHLORIDE 50 MG/1
50 TABLET ORAL NIGHTLY
Qty: 90 TABLET | Refills: 1 | Status: SHIPPED | OUTPATIENT
Start: 2025-06-09

## 2025-06-17 ENCOUNTER — OFFICE VISIT (OUTPATIENT)
Dept: ENDOCRINOLOGY | Age: 85
End: 2025-06-17
Payer: MEDICARE

## 2025-06-17 VITALS
OXYGEN SATURATION: 95 % | HEART RATE: 64 BPM | TEMPERATURE: 97.6 F | BODY MASS INDEX: 40.39 KG/M2 | HEIGHT: 70 IN | DIASTOLIC BLOOD PRESSURE: 58 MMHG | SYSTOLIC BLOOD PRESSURE: 108 MMHG

## 2025-06-17 DIAGNOSIS — E78.5 HYPERLIPIDEMIA, UNSPECIFIED HYPERLIPIDEMIA TYPE: ICD-10-CM

## 2025-06-17 DIAGNOSIS — C67.9 MALIGNANT NEOPLASM OF URINARY BLADDER, UNSPECIFIED SITE: ICD-10-CM

## 2025-06-17 DIAGNOSIS — Z95.2 S/P TAVR (TRANSCATHETER AORTIC VALVE REPLACEMENT): ICD-10-CM

## 2025-06-17 DIAGNOSIS — Z93.59 SUPRAPUBIC CATHETER: ICD-10-CM

## 2025-06-17 DIAGNOSIS — Z86.718 HISTORY OF DVT OF LOWER EXTREMITY: ICD-10-CM

## 2025-06-17 DIAGNOSIS — E55.9 VITAMIN D DEFICIENCY: ICD-10-CM

## 2025-06-17 DIAGNOSIS — E11.42 DIABETIC PERIPHERAL NEUROPATHY: ICD-10-CM

## 2025-06-17 DIAGNOSIS — E11.42 TYPE 2 DIABETES MELLITUS WITH PERIPHERAL NEUROPATHY: Primary | ICD-10-CM

## 2025-06-17 PROCEDURE — 3074F SYST BP LT 130 MM HG: CPT | Performed by: INTERNAL MEDICINE

## 2025-06-17 PROCEDURE — 99214 OFFICE O/P EST MOD 30 MIN: CPT | Performed by: INTERNAL MEDICINE

## 2025-06-17 PROCEDURE — 3078F DIAST BP <80 MM HG: CPT | Performed by: INTERNAL MEDICINE

## 2025-06-17 RX ORDER — PREGABALIN 150 MG/1
150 CAPSULE ORAL 3 TIMES DAILY
Qty: 270 CAPSULE | Refills: 0 | Status: SHIPPED | OUTPATIENT
Start: 2025-06-17

## 2025-06-17 RX ORDER — INSULIN GLARGINE 300 U/ML
INJECTION, SOLUTION SUBCUTANEOUS
Status: SHIPPED
Start: 2025-06-17

## 2025-06-17 NOTE — PROGRESS NOTES
Subjective   Jesus Beckham is a 84 y.o. male.     History of Present Illness     He has known diabetes mellitus since 1998 and started on insulin in 2016.     He is on Toujeo 88-92 units every morning, Glyxambi 25/5 mg once a day and glipizide 10 mg twice a day.  He has hypoglycemic episodes.  Hemoglobin A1c done in May 2025 is 7.2%.    Sensor data from June 4, 2025 to June 17, 2025 reviewed.  Time in range 62%.  Time above range 38%.  Time below range 0.  He has occasional blood sugars in the 60s in the early morning.  He has intermittent postprandial hyperglycemia mostly after lunch.     His last eye examination was in 5/24 and he has an appt next month.  He has no diabetic retinopathy.  Urine microalbumin was elevated in May 2022 (urine was collected from a suprapubic catheter).  He is on lisinopril.  He has tingling, burning and pain in his toes and hands and is on Lyrica 150 mg 3 times daily, tramadol, and amitriptyline prescribed by Dr. Ricardo.  He has neurostimulator in place.       He has chronic wound on right foot and lymphedema and follows [facility].  He is being followed by Dr. Barber.  He is not on antibiotics.     He has hyperlipidemia and is on Crestor 10 mg/day, fenofibrate 145 mg/day and fish oil 1 capsule once a day.  He denies myalgia.       He has vitamin D deficiency and is on ergocalciferol 50,000 units once a week.  25-hydroxy vitamin D done in February 2025 is normal at 48.8 ng/mL.   He had transcatheter aortic valve replacement for aortic stenosis in February 2022.  He has history of paroxysmal atrial fibrillation.   He denies PND.  He denies orthopnea.  He denies chest pain.     He had of DVT of the lower extremities and is on Eliquis prescriber Dr. Burr.  He had pulmonary embolism in the past and had a vena caval filter placement which was later removed.     He has sleep apnea and is unable to tolerate CPAP.  He was last seen by Dr. Dempsey in August 2022.  He wakes up  "rested.  His wife has not noticed that he stops breathing during sleep.     He has urinary bladder cancer and has suprapubic catheter.   He follows with Dr. Saleh.     He has chronic constipation.  He is on MiraLAX and fiber supplements.  His last colonoscopy was in 2019 and polyps were removed by Dr. Alvarado.  He does not want another colonoscopy       The following portions of the patient's history were reviewed and updated as appropriate: allergies, current medications, past family history, past medical history, past social history, past surgical history, and problem list.    Review of Systems   Eyes:  Negative for visual disturbance.   Respiratory:  Negative for shortness of breath.    Cardiovascular:  Negative for chest pain and palpitations.   Gastrointestinal:  Positive for constipation. Negative for anal bleeding and blood in stool.   Genitourinary:         Incontinent   Musculoskeletal:  Negative for myalgias.   Neurological:  Negative for numbness.     Vitals:    06/17/25 0952   BP: 108/58   Pulse: 64   Temp: 97.6 °F (36.4 °C)   TempSrc: Oral   SpO2: 95%   Height: 177.8 cm (70\")      Objective   Physical Exam  Constitutional:       General: He is not in acute distress.     Appearance: Normal appearance. He is not ill-appearing, toxic-appearing or diaphoretic.   Eyes:      General: No scleral icterus.        Right eye: No discharge.         Left eye: No discharge.   Neck:      Vascular: No carotid bruit.   Cardiovascular:      Rate and Rhythm: Normal rate and regular rhythm.      Heart sounds: No murmur heard.  Pulmonary:      Breath sounds: No rales.   Chest:      Chest wall: No tenderness.   Abdominal:      Palpations: Abdomen is soft.      Tenderness: There is no right CVA tenderness or left CVA tenderness.   Musculoskeletal:      Comments: Ace bandages on both legs and were not examined.   Lymphadenopathy:      Cervical: No cervical adenopathy.   Neurological:      Mental Status: He is alert and " oriented to person, place, and time.       Admission on 05/16/2025, Discharged on 05/19/2025   Component Date Value Ref Range Status    Glucose 05/16/2025 127 (H)  65 - 99 mg/dL Final    BUN 05/16/2025 25 (H)  8 - 23 mg/dL Final    Creatinine 05/16/2025 1.11  0.76 - 1.27 mg/dL Final    Sodium 05/16/2025 139  136 - 145 mmol/L Final    Potassium 05/16/2025 4.4  3.5 - 5.2 mmol/L Final    Chloride 05/16/2025 105  98 - 107 mmol/L Final    CO2 05/16/2025 24.2  22.0 - 29.0 mmol/L Final    Calcium 05/16/2025 9.4  8.6 - 10.5 mg/dL Final    Total Protein 05/16/2025 6.9  6.0 - 8.5 g/dL Final    Albumin 05/16/2025 4.3  3.5 - 5.2 g/dL Final    ALT (SGPT) 05/16/2025 24  1 - 41 U/L Final    AST (SGOT) 05/16/2025 23  1 - 40 U/L Final    Alkaline Phosphatase 05/16/2025 71  39 - 117 U/L Final    Total Bilirubin 05/16/2025 0.4  0.0 - 1.2 mg/dL Final    Globulin 05/16/2025 2.6  gm/dL Final    A/G Ratio 05/16/2025 1.7  g/dL Final    BUN/Creatinine Ratio 05/16/2025 22.5  7.0 - 25.0 Final    Anion Gap 05/16/2025 9.8  5.0 - 15.0 mmol/L Final    eGFR 05/16/2025 65.9  >60.0 mL/min/1.73 Final    Lipase 05/16/2025 40  13 - 60 U/L Final    WBC 05/16/2025 8.06  3.40 - 10.80 10*3/mm3 Final    RBC 05/16/2025 4.76  4.14 - 5.80 10*6/mm3 Final    Hemoglobin 05/16/2025 15.1  13.0 - 17.7 g/dL Final    Hematocrit 05/16/2025 44.8  37.5 - 51.0 % Final    MCV 05/16/2025 94.1  79.0 - 97.0 fL Final    MCH 05/16/2025 31.7  26.6 - 33.0 pg Final    MCHC 05/16/2025 33.7  31.5 - 35.7 g/dL Final    RDW 05/16/2025 14.7  12.3 - 15.4 % Final    RDW-SD 05/16/2025 50.9  37.0 - 54.0 fl Final    MPV 05/16/2025 12.0  6.0 - 12.0 fL Final    Platelets 05/16/2025 173  140 - 450 10*3/mm3 Final    Neutrophil % 05/16/2025 79.6 (H)  42.7 - 76.0 % Final    Lymphocyte % 05/16/2025 11.0 (L)  19.6 - 45.3 % Final    Monocyte % 05/16/2025 6.8  5.0 - 12.0 % Final    Eosinophil % 05/16/2025 1.0  0.3 - 6.2 % Final    Basophil % 05/16/2025 0.7  0.0 - 1.5 % Final    Immature Grans %  05/16/2025 0.9 (H)  0.0 - 0.5 % Final    Neutrophils, Absolute 05/16/2025 6.41  1.70 - 7.00 10*3/mm3 Final    Lymphocytes, Absolute 05/16/2025 0.89  0.70 - 3.10 10*3/mm3 Final    Monocytes, Absolute 05/16/2025 0.55  0.10 - 0.90 10*3/mm3 Final    Eosinophils, Absolute 05/16/2025 0.08  0.00 - 0.40 10*3/mm3 Final    Basophils, Absolute 05/16/2025 0.06  0.00 - 0.20 10*3/mm3 Final    Immature Grans, Absolute 05/16/2025 0.07 (H)  0.00 - 0.05 10*3/mm3 Final    nRBC 05/16/2025 0.0  0.0 - 0.2 /100 WBC Final    Glucose 05/17/2025 111 (H)  65 - 99 mg/dL Final    BUN 05/17/2025 32 (H)  8 - 23 mg/dL Final    Creatinine 05/17/2025 1.21  0.76 - 1.27 mg/dL Final    Sodium 05/17/2025 141  136 - 145 mmol/L Final    Potassium 05/17/2025 4.2  3.5 - 5.2 mmol/L Final    Slight hemolysis detected by analyzer. Result may be falsely elevated.    Chloride 05/17/2025 107  98 - 107 mmol/L Final    CO2 05/17/2025 21.6 (L)  22.0 - 29.0 mmol/L Final    Calcium 05/17/2025 9.0  8.6 - 10.5 mg/dL Final    BUN/Creatinine Ratio 05/17/2025 26.4 (H)  7.0 - 25.0 Final    Anion Gap 05/17/2025 12.4  5.0 - 15.0 mmol/L Final    eGFR 05/17/2025 59.4 (L)  >60.0 mL/min/1.73 Final    WBC 05/17/2025 6.87  3.40 - 10.80 10*3/mm3 Final    RBC 05/17/2025 4.29  4.14 - 5.80 10*6/mm3 Final    Hemoglobin 05/17/2025 13.1  13.0 - 17.7 g/dL Final    Hematocrit 05/17/2025 39.6  37.5 - 51.0 % Final    MCV 05/17/2025 92.3  79.0 - 97.0 fL Final    MCH 05/17/2025 30.5  26.6 - 33.0 pg Final    MCHC 05/17/2025 33.1  31.5 - 35.7 g/dL Final    RDW 05/17/2025 14.6  12.3 - 15.4 % Final    RDW-SD 05/17/2025 48.5  37.0 - 54.0 fl Final    MPV 05/17/2025 11.3  6.0 - 12.0 fL Final    Platelets 05/17/2025 151  140 - 450 10*3/mm3 Final    Hemoglobin A1C 05/16/2025 7.20 (H)  4.80 - 5.60 % Final    Glucose 05/17/2025 114  70 - 130 mg/dL Final    Glucose 05/17/2025 175 (H)  70 - 130 mg/dL Final    Glucose 05/17/2025 205 (H)  70 - 130 mg/dL Final    Magnesium 05/17/2025 2.2  1.6 - 2.4 mg/dL Final     Potassium 05/17/2025 4.3  3.5 - 5.2 mmol/L Final    QT Interval 05/17/2025 390  ms Final    QTC Interval 05/17/2025 445  ms Final    HS Troponin T 05/17/2025 39 (H)  <22 ng/L Final    Glucose 05/17/2025 148 (H)  70 - 130 mg/dL Final    HS Troponin T 05/17/2025 39 (H)  <22 ng/L Final    Troponin T Numeric Delta 05/17/2025 0  ng/L Final    Troponin T % Delta 05/17/2025 0  Abnormal if >/= 20% Final    Glucose 05/17/2025 163 (H)  70 - 130 mg/dL Final    Glucose 05/18/2025 117 (H)  65 - 99 mg/dL Final    BUN 05/18/2025 24 (H)  8 - 23 mg/dL Final    Creatinine 05/18/2025 1.09  0.76 - 1.27 mg/dL Final    Sodium 05/18/2025 137  136 - 145 mmol/L Final    Potassium 05/18/2025 4.0  3.5 - 5.2 mmol/L Final    Chloride 05/18/2025 104  98 - 107 mmol/L Final    CO2 05/18/2025 22.0  22.0 - 29.0 mmol/L Final    Calcium 05/18/2025 8.9  8.6 - 10.5 mg/dL Final    BUN/Creatinine Ratio 05/18/2025 22.0  7.0 - 25.0 Final    Anion Gap 05/18/2025 11.0  5.0 - 15.0 mmol/L Final    eGFR 05/18/2025 67.3  >60.0 mL/min/1.73 Final    Glucose 05/18/2025 131 (H)  70 - 130 mg/dL Final    Glucose 05/18/2025 155 (H)  70 - 130 mg/dL Final    Glucose 05/18/2025 121  70 - 130 mg/dL Final    Glucose 05/18/2025 177 (H)  70 - 130 mg/dL Final    Glucose 05/19/2025 129  70 - 130 mg/dL Final     Assessment & Plan   Diagnoses and all orders for this visit:    1. Type 2 diabetes mellitus with peripheral neuropathy (Primary)  -     Toujeo Max SoloStar 300 UNIT/ML solution pen-injector injection; 84 units daily  -     Comprehensive Metabolic Panel; Future  -     Hemoglobin A1c; Future  -     Lipid Panel; Future  -     TSH Rfx On Abnormal To Free T4; Future    2. Diabetic peripheral neuropathy  -     pregabalin (LYRICA) 150 MG capsule; Take 1 capsule by mouth 3 times a day. TAKE 1 CAPSULE BY MOUTH 3 TIMES  DAILY FOR DIABETES WITH NERVE  DISEASE, NEUROPATHIC PAIN  Indications: Diabetes with Nerve Disease  Dispense: 270 capsule; Refill: 0    3. Hyperlipidemia,  unspecified hyperlipidemia type  -     Lipid Panel; Future  -     TSH Rfx On Abnormal To Free T4; Future    4. Vitamin D deficiency  -     Vitamin D,25-Hydroxy; Future    5. History of DVT of lower extremity    6. S/P TAVR (transcatheter aortic valve replacement)    7. Malignant neoplasm of urinary bladder, unspecified site    8. Suprapubic catheter      Decrease Toujeo to 84 units every morning.  Continue Glyxambi 25/5 mg 1 tablet daily and glipizide 10 mg twice a day.  Continue Lyrica, tramadol and amitriptyline.    Follow-up with Dr. Barber.    Continue Crestor 10 mg/day, fenofibrate 145 mg/day and fish oil.    Continue ergocalciferol 50,000 units weekly.    Continue Eliquis    Follow-up with Dr. Saleh.    Copy of my note sent to Dr. Ricardo, Dr. Barber and Dr. Saleh.    RTC 6 mos.

## 2025-07-01 DIAGNOSIS — E11.69 TYPE 2 DIABETES MELLITUS WITH OTHER SPECIFIED COMPLICATION, WITH LONG-TERM CURRENT USE OF INSULIN: ICD-10-CM

## 2025-07-01 DIAGNOSIS — Z79.4 TYPE 2 DIABETES MELLITUS WITH OTHER SPECIFIED COMPLICATION, WITH LONG-TERM CURRENT USE OF INSULIN: ICD-10-CM

## 2025-07-02 RX ORDER — ROSUVASTATIN CALCIUM 10 MG/1
10 TABLET, COATED ORAL DAILY
Qty: 90 TABLET | Refills: 2 | Status: SHIPPED | OUTPATIENT
Start: 2025-07-02

## 2025-07-02 RX ORDER — LISINOPRIL 2.5 MG/1
2.5 TABLET ORAL DAILY
Qty: 90 TABLET | Refills: 2 | Status: SHIPPED | OUTPATIENT
Start: 2025-07-02

## 2025-07-02 NOTE — TELEPHONE ENCOUNTER
Rx Refill Note  Requested Prescriptions     Pending Prescriptions Disp Refills    lisinopril (PRINIVIL,ZESTRIL) 2.5 MG tablet [Pharmacy Med Name: Lisinopril 2.5 MG Oral Tablet] 90 tablet 3     Sig: TAKE 1 TABLET BY MOUTH DAILY    rosuvastatin (CRESTOR) 10 MG tablet [Pharmacy Med Name: Rosuvastatin Calcium 10 MG Oral Tablet] 90 tablet 3     Sig: TAKE 1 TABLET BY MOUTH DAILY      Last office visit with prescribing clinician: 6/17/2025   Last telemedicine visit with prescribing clinician: Visit date not found   Next office visit with prescribing clinician: 12/17/2025                         Would you like a call back once the refill request has been completed: [] Yes [] No    If the office needs to give you a call back, can they leave a voicemail: [] Yes [] No  Rosio Bullock MA  7/2/2025  07:43 EDT

## 2025-07-08 ENCOUNTER — OFFICE VISIT (OUTPATIENT)
Dept: WOUND CARE | Facility: HOSPITAL | Age: 85
End: 2025-07-08
Payer: MEDICARE

## 2025-07-08 ENCOUNTER — HOSPITAL ENCOUNTER (OUTPATIENT)
Dept: GENERAL RADIOLOGY | Facility: HOSPITAL | Age: 85
Discharge: HOME | End: 2025-07-08
Payer: MEDICARE

## 2025-07-08 DIAGNOSIS — E08.621 DIABETIC ULCER OF HEEL ASSOCIATED WITH DIABETES MELLITUS DUE TO UNDERLYING CONDITION, WITH FAT LAYER EXPOSED, UNSPECIFIED LATERALITY: Primary | ICD-10-CM

## 2025-07-08 DIAGNOSIS — L97.402 DIABETIC ULCER OF HEEL ASSOCIATED WITH DIABETES MELLITUS DUE TO UNDERLYING CONDITION, WITH FAT LAYER EXPOSED, UNSPECIFIED LATERALITY: Primary | ICD-10-CM

## 2025-07-08 PROCEDURE — 73630 X-RAY EXAM OF FOOT: CPT

## 2025-07-08 PROCEDURE — G0463 HOSPITAL OUTPT CLINIC VISIT: HCPCS

## 2025-07-11 DIAGNOSIS — E11.42 TYPE 2 DIABETES MELLITUS WITH PERIPHERAL NEUROPATHY: ICD-10-CM

## 2025-07-11 RX ORDER — INSULIN GLARGINE 300 U/ML
84 INJECTION, SOLUTION SUBCUTANEOUS DAILY
Qty: 24 ML | Refills: 0 | Status: SHIPPED | OUTPATIENT
Start: 2025-07-11

## 2025-07-11 RX ORDER — INSULIN GLARGINE 300 U/ML
84 INJECTION, SOLUTION SUBCUTANEOUS DAILY
Qty: 24 ML | Refills: 0 | Status: SHIPPED | OUTPATIENT
Start: 2025-07-11 | End: 2025-07-11 | Stop reason: SDUPTHER

## 2025-07-11 NOTE — TELEPHONE ENCOUNTER
Rx Refill Note  Requested Prescriptions     Pending Prescriptions Disp Refills    Jovanni Hernandez SoloStar 300 UNIT/ML solution pen-injector injection [Pharmacy Med Name: Tojesus Max SoloStar 300 UNIT/ML Subcutaneous Solution Pen-injector] 30 mL 0     Sig: INJECT 100 UNITS SUBCUTANEOUSLY  INTO THE APPROPRIATE AREA AS  DIRECTED DAILY      Last office visit with prescribing clinician: 6/17/2025   Last telemedicine visit with prescribing clinician: Visit date not found   Next office visit with prescribing clinician: 12/17/2025                         Would you like a call back once the refill request has been completed: [] Yes [] No    If the office needs to give you a call back, can they leave a voicemail: [] Yes [] No    Mila Bullock  07/11/25, 08:23 EDT  Mila Bullock  7/11/2025  08:23 EDT

## 2025-07-11 NOTE — TELEPHONE ENCOUNTER
Rx Refill Note  Requested Prescriptions     Pending Prescriptions Disp Refills    Jovanni Hernandez SoloStar 300 UNIT/ML solution pen-injector injection 24 mL 0     Sig: Inject 84 Units under the skin into the appropriate area as directed Daily.      Last office visit with prescribing clinician: 6/17/2025   Last telemedicine visit with prescribing clinician: Visit date not found   Next office visit with prescribing clinician: 12/17/2025                         Would you like a call back once the refill request has been completed: [] Yes [] No    If the office needs to give you a call back, can they leave a voicemail: [] Yes [] No    Leah Aviles MA  07/11/25, 15:12 EDT

## 2025-07-25 ENCOUNTER — READMISSION MANAGEMENT (OUTPATIENT)
Dept: CALL CENTER | Facility: HOSPITAL | Age: 85
End: 2025-07-25
Payer: MEDICARE

## 2025-07-25 NOTE — OUTREACH NOTE
Prep Survey      Flowsheet Row Responses   Congregational facility patient discharged from? Non-BH   Is LACE score < 7 ? Non-BH Discharge   Eligibility Not Eligible   What are the reasons patient is not eligible? [facility]   Does the patient have one of the following disease processes/diagnoses(primary or secondary)? Other   Prep survey completed? Yes            SKYLER URIOSTEGUI - Registered Nurse

## 2025-07-29 DIAGNOSIS — E11.69 TYPE 2 DIABETES MELLITUS WITH OTHER SPECIFIED COMPLICATION, WITH LONG-TERM CURRENT USE OF INSULIN: ICD-10-CM

## 2025-07-29 DIAGNOSIS — Z79.4 TYPE 2 DIABETES MELLITUS WITH OTHER SPECIFIED COMPLICATION, WITH LONG-TERM CURRENT USE OF INSULIN: ICD-10-CM

## 2025-07-29 DIAGNOSIS — F51.01 PRIMARY INSOMNIA: Chronic | ICD-10-CM

## 2025-07-30 ENCOUNTER — TELEPHONE (OUTPATIENT)
Dept: FAMILY MEDICINE CLINIC | Facility: CLINIC | Age: 85
End: 2025-07-30
Payer: MEDICARE

## 2025-07-30 RX ORDER — FENOFIBRATE 145 MG/1
145 TABLET, FILM COATED ORAL DAILY
Qty: 90 TABLET | Refills: 2 | Status: SHIPPED | OUTPATIENT
Start: 2025-07-30

## 2025-07-30 RX ORDER — TRAZODONE HYDROCHLORIDE 150 MG/1
300 TABLET ORAL NIGHTLY
Qty: 180 TABLET | Refills: 3 | OUTPATIENT
Start: 2025-07-30

## 2025-07-30 NOTE — TELEPHONE ENCOUNTER
SPOKE WITH  DEJA LAWRENCE [facility]  [phone] - WILL FAX OVER ORDER REQUEST  7/30/2025

## 2025-07-30 NOTE — TELEPHONE ENCOUNTER
Rx Refill Note  Requested Prescriptions     Pending Prescriptions Disp Refills    fenofibrate (TRICOR) 145 MG tablet [Pharmacy Med Name: Fenofibrate 145 MG Oral Tablet] 90 tablet 3     Sig: TAKE 1 TABLET BY MOUTH DAILY      Last office visit with prescribing clinician: 6/17/2025   Last telemedicine visit with prescribing clinician: Visit date not found   Next office visit with prescribing clinician: 12/17/2025                         Would you like a call back once the refill request has been completed: [] Yes [] No    If the office needs to give you a call back, can they leave a voicemail: [] Yes [] No    Mila Bullock  07/30/25, 07:41 EDT  Mila Bullock  7/30/2025  07:41 EDT

## 2025-07-30 NOTE — TELEPHONE ENCOUNTER
Jerri with [facility] called and is needing orders faxed back to [phone]. Please call her if you did not get them at  [phone] ext. [phone].

## 2025-07-31 DIAGNOSIS — F51.01 PRIMARY INSOMNIA: Chronic | ICD-10-CM

## 2025-08-01 RX ORDER — TRAZODONE HYDROCHLORIDE 150 MG/1
300 TABLET ORAL NIGHTLY
Qty: 180 TABLET | Refills: 3 | OUTPATIENT
Start: 2025-08-01

## 2025-08-21 DIAGNOSIS — E11.69 TYPE 2 DIABETES MELLITUS WITH OTHER SPECIFIED COMPLICATION, WITH LONG-TERM CURRENT USE OF INSULIN: ICD-10-CM

## 2025-08-21 DIAGNOSIS — Z79.4 TYPE 2 DIABETES MELLITUS WITH OTHER SPECIFIED COMPLICATION, WITH LONG-TERM CURRENT USE OF INSULIN: ICD-10-CM

## 2025-08-22 ENCOUNTER — OFFICE VISIT (OUTPATIENT)
Age: 85
End: 2025-08-22
Payer: MEDICARE

## 2025-08-22 VITALS
HEART RATE: 74 BPM | BODY MASS INDEX: 37.94 KG/M2 | WEIGHT: 265 LBS | OXYGEN SATURATION: 95 % | HEIGHT: 70 IN | SYSTOLIC BLOOD PRESSURE: 110 MMHG | DIASTOLIC BLOOD PRESSURE: 60 MMHG

## 2025-08-22 DIAGNOSIS — G47.33 OBSTRUCTIVE SLEEP APNEA SYNDROME: ICD-10-CM

## 2025-08-22 DIAGNOSIS — E78.00 PURE HYPERCHOLESTEROLEMIA: ICD-10-CM

## 2025-08-22 DIAGNOSIS — E78.5 DYSLIPIDEMIA: ICD-10-CM

## 2025-08-22 DIAGNOSIS — I48.0 PAROXYSMAL ATRIAL FIBRILLATION: ICD-10-CM

## 2025-08-22 DIAGNOSIS — Z79.01 LONG TERM (CURRENT) USE OF ANTICOAGULANTS: ICD-10-CM

## 2025-08-22 DIAGNOSIS — E11.42 TYPE 2 DIABETES MELLITUS WITH PERIPHERAL NEUROPATHY: ICD-10-CM

## 2025-08-22 DIAGNOSIS — Z95.2 S/P TAVR (TRANSCATHETER AORTIC VALVE REPLACEMENT): Primary | ICD-10-CM

## 2025-08-22 DIAGNOSIS — I35.0 NONRHEUMATIC AORTIC VALVE STENOSIS: ICD-10-CM

## 2025-08-22 DIAGNOSIS — I10 ESSENTIAL HYPERTENSION: ICD-10-CM

## 2025-08-22 DIAGNOSIS — I49.3 PVC (PREMATURE VENTRICULAR CONTRACTION): ICD-10-CM

## 2025-08-22 PROBLEM — I48.91 ATRIAL FIBRILLATION: Status: RESOLVED | Noted: 2018-03-01 | Resolved: 2025-08-22

## 2025-08-22 RX ORDER — EMPAGLIFLOZIN AND LINAGLIPTIN 25; 5 MG/1; MG/1
1 TABLET, FILM COATED ORAL
Qty: 90 TABLET | Refills: 1 | Status: SHIPPED | OUTPATIENT
Start: 2025-08-22

## (undated) DEVICE — EQUIPMENT COVER BAG TYPE 48” X 36” (122CM X 91CM): Brand: EQUIPMENT COVER BAG TYPE

## (undated) DEVICE — DECANTER BAG 9": Brand: MEDLINE INDUSTRIES, INC.

## (undated) DEVICE — ANTIBACTERIAL UNDYED BRAIDED (POLYGLACTIN 910), SYNTHETIC ABSORBABLE SUTURE: Brand: COATED VICRYL

## (undated) DEVICE — SHLD ANGIO 2LAYR CIR FEN

## (undated) DEVICE — SMOKE EVACUATION TUBING WITH 7/8 IN TO 1/4 IN REDUCER: Brand: BUFFALO FILTER

## (undated) DEVICE — 6.0MM PRECISION ROUND

## (undated) DEVICE — SPNG GZ WOVN 4X4IN 12PLY 10/BX STRL

## (undated) DEVICE — KT HDR TRIAL 2 PRT W/ EXT PULS GEN

## (undated) DEVICE — CONN TBG Y 5 IN 1 LF STRL

## (undated) DEVICE — 1010 S-DRAPE TOWEL DRAPE 10/BX: Brand: STERI-DRAPE™

## (undated) DEVICE — HI-TORQUE SUPRA CORE .035 PERIPHERAL GUIDE WIRE .035 X 190 CM: Brand: HI-TORQUE SUPRA CORE

## (undated) DEVICE — NDL HYPO ECLPS SFTY 22G 1 1/2IN

## (undated) DEVICE — PERCLOSE™ PROSTYLE™ SUTURE-MEDIATED CLOSURE AND REPAIR SYSTEM: Brand: PERCLOSE™ PROSTYLE™

## (undated) DEVICE — GW XCHG AMPLTZ XSTIF PTFE CRV .035 3X260

## (undated) DEVICE — Device

## (undated) DEVICE — GOWN,AURORA,NON-REINFORCED,2XL: Brand: MEDLINE

## (undated) DEVICE — SYR LL TP 10ML STRL

## (undated) DEVICE — SHORT SPIKE VENTED W/3-WAY SC: Brand: MEDLINE INDUSTRIES, INC.

## (undated) DEVICE — DRSNG SURESITE WNDW 4X4.5

## (undated) DEVICE — GW INQWIRE FC PTFE STR .035IN 150

## (undated) DEVICE — CATH CHOLANG 4.5F18IN BRGNDY

## (undated) DEVICE — TOOL MR8-14MH30 MR8 14CM MATCH 3MM: Brand: MIDAS REX MR8

## (undated) DEVICE — APPL CHLORAPREP HI/LITE 26ML ORNG

## (undated) DEVICE — CATH IV INSYTE AUTOGARD 14G 1 1/2IN ORNG

## (undated) DEVICE — RADIFOCUS GLIDEWIRE: Brand: GLIDEWIRE

## (undated) DEVICE — X-RAY DETECTABLE SPONGES,16 PLY: Brand: VISTEC

## (undated) DEVICE — PENCL ES MEGADINE EZ/CLEAN BUTN W/HOLSTR 10FT

## (undated) DEVICE — ENDOPATH XCEL UNIVERSAL TROCAR STABLILITY SLEEVES: Brand: ENDOPATH XCEL

## (undated) DEVICE — SUT VIC 1 CT1 36IN J947H

## (undated) DEVICE — DRSNG SURESITE123 8X12IN

## (undated) DEVICE — CATH ELECTRD PACE TEMP BI NONHEP 5F110CM

## (undated) DEVICE — GW HYDRPHLC STD ANG .035IN 180CM

## (undated) DEVICE — CATH DIAG IMPULSE FR4 5F 100CM

## (undated) DEVICE — CONTAINER,SPECIMEN,OR STERILE,4OZ: Brand: MEDLINE

## (undated) DEVICE — SYR CONTRL PRESS/LO FIX/M/LL W/THMB/RNG 10ML

## (undated) DEVICE — INTRO SHEATH ART/FEM ENGAGE .035 6F12CM

## (undated) DEVICE — CONTRL PATNT EXT IPG PROCLAIM

## (undated) DEVICE — ANGIOGRAPHIC CATHETER: Brand: IMAGER™ II

## (undated) DEVICE — PK CATH CARD 40

## (undated) DEVICE — DISPOSABLE BIPOLAR FORCEPS 7 3/4" (19.7CM) SCOVILLE BAYONET, 1.5MM TIP AND 12 FT. (3.6M) CABLE: Brand: KIRWAN

## (undated) DEVICE — TRY INTRO PERC 6F

## (undated) DEVICE — TAVR: Brand: MEDLINE INDUSTRIES, INC.

## (undated) DEVICE — 200 ML FASTURN SYRINGE WITH QUICK FILL TUBE(ANGIO-SET,PKG,200ML FASTURN SYR W/QFT,MC)(60729695): Brand: MEDRAD® MARK V PROVIS 200ML FASTURN STERILE DISPOSABLE SYRINGE & QFT

## (undated) DEVICE — SUT VIC 2/0 CT1 36IN

## (undated) DEVICE — PREMIUM WET SKIN PREP TRAY: Brand: MEDLINE INDUSTRIES, INC.

## (undated) DEVICE — DRSNG BRDR MEPILEX P/OP SIL 4X8IN

## (undated) DEVICE — PK ANGIO 40

## (undated) DEVICE — GLV SURG BIOGEL LTX PF 7 1/2

## (undated) DEVICE — COVER,TABLE,44X90,STERILE: Brand: MEDLINE

## (undated) DEVICE — LABEL SHEET CUSTOM 2X2 YELLOW: Brand: MEDLINE INDUSTRIES, INC.

## (undated) DEVICE — SUT SILK 3/0 SH CR5 18IN C0135

## (undated) DEVICE — COVER,MAYO STAND,STERILE: Brand: MEDLINE

## (undated) DEVICE — CABL EXT SPINE TRIAL M/LD 3013

## (undated) DEVICE — TOWEL,OR,DSP,ST,BLUE,STD,4/PK,20PK/CS: Brand: MEDLINE

## (undated) DEVICE — DRAPE,REIN 53X77,STERILE: Brand: MEDLINE

## (undated) DEVICE — SUT SILK 0 CT1 CR8 18IN C021D

## (undated) DEVICE — CATH VENT MIV RADL PIG ST TIP 5F 110CM

## (undated) DEVICE — CVR HNDL LT SURG ACCSSRY BLU STRL

## (undated) DEVICE — APPL CHLORAPREP W/TINT 26ML ORNG

## (undated) DEVICE — EACH LANGSTON DUAL LUMEN CATHETER IS INDICATED FOR DELIVERY OF CONTRAST MEDIUM IN ANGIOGRAPHIC STUDIES AND FOR SIMULTANEOUS PRESSURE MEASUREMENT FROM TWO SITES. THIS TYPE OF PRESSURE MEASUREMENT IS USEFUL IN DETERMINING TRANSVALVULAR, INTRAVASCULAR AND INTRAVENTRICULAR PRESSURE GRADIENTS.: Brand: LANGSTON® DUAL LUMEN CATHETER

## (undated) DEVICE — MEDICINE CUP, GRADUATED, STER: Brand: MEDLINE

## (undated) DEVICE — ELECTRD BLD EXT EDGE 1P COAT 6.5IN STRL

## (undated) DEVICE — NDL PERC 1PRT THNWALL W/BASEPLT 18G 7CM

## (undated) DEVICE — SYR LUERLOK 30CC

## (undated) DEVICE — CODMAN® SURGICAL PATTIES 3/4" X 3/4" (1.91CM X 1.91CM): Brand: CODMAN®

## (undated) DEVICE — ADHS SKIN DERMABOND TOP ADVANCED

## (undated) DEVICE — GOWN,AURORA,NONREINFORCED,XLARGE: Brand: MEDLINE

## (undated) DEVICE — DRSNG WND GZ PAD BORDERED 4X8IN STRL

## (undated) DEVICE — CATH DIAG IMPULSE AL1 6F 100CM

## (undated) DEVICE — ZINC CALCIUM ALGINATE DRESSING: Brand: KENDALL

## (undated) DEVICE — HI-TORQUE SUPRA CORE .035 PERIPHERAL GUIDE WIRE .035 X 300 CM: Brand: HI-TORQUE SUPRA CORE

## (undated) DEVICE — NEEDLE, QUINCKE 22GX3.5": Brand: MEDLINE INDUSTRIES, INC.

## (undated) DEVICE — CVR PROB 96IN LF STRL

## (undated) DEVICE — ENDOCUT SCISSOR TIP, DISPOSABLE: Brand: RENEW

## (undated) DEVICE — TUBING, SUCTION, 1/4" X 20', STRAIGHT: Brand: MEDLINE INDUSTRIES, INC.

## (undated) DEVICE — GLV SURG BIOGEL LTX PF 8

## (undated) DEVICE — GOWN ,SIRUS,NONREINFORCED SMALL: Brand: MEDLINE

## (undated) DEVICE — SUT SILK 2 SUTUPAK TIE 60IN SA8H 2STRAND

## (undated) DEVICE — PETROLATUM GAUZE CISION DRESSING,OVERWRAP: Brand: VASELINE

## (undated) DEVICE — 3M™ BAIR HUGGER® UNDERBODY BLANKET, FULL ACCESS, 10 PER CASE 63500: Brand: BAIR HUGGER™

## (undated) DEVICE — 3M™ STERI-DRAPE™ INSTRUMENT POUCH 1018: Brand: STERI-DRAPE™

## (undated) DEVICE — PK NEURO SPINE 40

## (undated) DEVICE — GLV SURG SENSICARE GREEN W/ALOE PF LF 6.5 STRL

## (undated) DEVICE — ENDOPATH XCEL BLUNT TIP TROCARS WITH SMOOTH SLEEVES: Brand: ENDOPATH XCEL

## (undated) DEVICE — Device: Brand: PORTEX

## (undated) DEVICE — SPNG LAP 18X18IN LF STRL PK/5

## (undated) DEVICE — GLV SURG BIOGEL LTX PF 6

## (undated) DEVICE — CVR TABL BACK STND

## (undated) DEVICE — NDL SPINE 22G 31/2IN BLK

## (undated) DEVICE — SUT SILK 2/0 TIES 18IN A185H

## (undated) DEVICE — FASTNR CATH PERC 5 TO12FR

## (undated) DEVICE — ENDOPATH XCEL BLADELESS TROCARS WITH STABILITY SLEEVES: Brand: ENDOPATH XCEL

## (undated) DEVICE — DRP C/ARM 41X74IN

## (undated) DEVICE — DISPOSABLE BIPOLAR CABLE 12FT. (3.6M): Brand: KIRWAN

## (undated) DEVICE — PK UNIV COMPL 40

## (undated) DEVICE — BALN PRESS WEDGE 5F 110CM

## (undated) DEVICE — STPCK 3WY D201 DISCOFIX

## (undated) DEVICE — JACKSON-PRATT 100CC BULB RESERVOIR: Brand: CARDINAL HEALTH

## (undated) DEVICE — TBG PRESS 96IN M/F ROT BRAID: Brand: MEDLINE INDUSTRIES, INC.

## (undated) DEVICE — TBG INJ CONTRL PVCCLR RIGD RA 1200PSI 10

## (undated) DEVICE — SYRINGE KIT,PACKAGED,,150FT,MK 7(ANGIO-ARTERION, 150ML SYR KIT W/QFT,MC)(60729385): Brand: MEDRAD® MARK 7 ARTERION DISPOSABLE SYRINGE 150 ML WITH QUICK FILL TUBE

## (undated) DEVICE — STPCK 3WY INTRALOCK TRNSP

## (undated) DEVICE — GW EMR FIX EXCHG J STD .035 3MM 260CM

## (undated) DEVICE — ENDOPOUCH RETRIEVER SPECIMEN RETRIEVAL BAGS: Brand: ENDOPOUCH RETRIEVER

## (undated) DEVICE — SOL NACL 0.9PCT 1000ML

## (undated) DEVICE — INTRO SHEATH ART/FEM ENGAGE .035 8F12CM

## (undated) DEVICE — APPL DURAPREP IODOPHOR APL 26ML

## (undated) DEVICE — TOTAL TRAY, 16FR 10ML SIL FOLEY, URN: Brand: MEDLINE

## (undated) DEVICE — DRAPE,CHEST,FENES,15X10,STERIL: Brand: MEDLINE

## (undated) DEVICE — SOL NS 500ML

## (undated) DEVICE — 3M™ MEDIPORE™ H SOFT CLOTH SURGICAL TAPE 2862, 2 INCH X 10 YARD (5CM X 9,1M), 12 ROLLS/CASE: Brand: 3M™ MEDIPORE™

## (undated) DEVICE — BND PRESS RADL COMFRT 14IN STRL

## (undated) DEVICE — DRP MICROSCOPE 4 BINOCULAR CV 54X150IN

## (undated) DEVICE — NDL HYPO PRECISIONGLIDE REG 25G 1 1/2

## (undated) DEVICE — DRSNG SURESITE WNDW 2.38X2.75

## (undated) DEVICE — BIPOLAR SEALER 23-112-1 AQM 6.0: Brand: AQUAMANTYS™

## (undated) DEVICE — KT MANIFLD CARDIAC

## (undated) DEVICE — 3M™ IOBAN™ 2 ANTIMICROBIAL INCISE DRAPE 6650EZ: Brand: IOBAN™ 2

## (undated) DEVICE — DRP MICROSCP LEICA W/GLASS LENS

## (undated) DEVICE — CATH DIAG IMPULSE FR4 6F 100CM

## (undated) DEVICE — GLV SURG TRIUMPH CLASSIC PF LTX 7.5 STRL

## (undated) DEVICE — YANKAUER: Brand: DEROYAL

## (undated) DEVICE — VISUALIZATION SYSTEM: Brand: CLEARIFY

## (undated) DEVICE — FR5 INFINITI MULTIPAC: Brand: INFINITI

## (undated) DEVICE — TRANSD PRESS STOPCOCK1WAY CABL48IN

## (undated) DEVICE — GLIDESHEATH BASIC HYDROPHILIC COATED INTRODUCER SHEATH: Brand: GLIDESHEATH

## (undated) DEVICE — SUTURE REMOVAL TRAY: Brand: MEDLINE INDUSTRIES, INC.

## (undated) DEVICE — CODMAN® DISPOSABLE CATHETER PASSER: Brand: CODMAN®

## (undated) DEVICE — LOU LAP CHOLE: Brand: MEDLINE INDUSTRIES, INC.

## (undated) DEVICE — RETRV CLOVERSNARE FLTR 6F 90CM BX/1EA

## (undated) DEVICE — SOL ISO/ALC 70PCT 4OZ

## (undated) DEVICE — GLV SURG PREMIERPRO ORTHO LTX PF SZ8 BRN

## (undated) DEVICE — EXTENSION SET, MALE LUER LOCK ADAPTER WITH RETRACTABLE COLLAR

## (undated) DEVICE — GLV SURG RAD SENSICARE SHLD PF LF SZ8 STRL

## (undated) DEVICE — BNDG ADHS PLSTC 1X3IN LF

## (undated) DEVICE — GLV SURG SENSICARE PI MIC PF SZ6.5 LF STRL

## (undated) DEVICE — BIOPATCH™ ANTIMICROBIAL DRESSING WITH CHLORHEXIDINE GLUCONATE IS A HYDROPHILLIC POLYURETHANE ABSORPTIVE FOAM WITH CHLORHEXIDINE GLUCONATE (CHG) WHICH INHIBITS BACTERIAL GROWTH UNDER THE DRESSING. THE DRESSING IS INTENDED TO BE USED TO ABSORB EXUDATE, COVER A WOUND CAUSED BY VASCULAR AND NONVASCULAR PERCUTANEOUS MEDICAL DEVICES DURING SURGERY, AS WELL AS REDUCE LOCAL INFECTION AND COLONIZATION OF MICROORGANISMS.: Brand: BIOPATCH

## (undated) DEVICE — MARKR SKIN W/RULR AND LBL

## (undated) DEVICE — 3M™ STERI-STRIP™ ANTIMICROBIAL SKIN CLOSURES 1/2 INCH X 4 INCHES 50/CARTON 4 CARTONS/CASE A1847: Brand: 3M™ STERI-STRIP™

## (undated) DEVICE — PATIENT RETURN ELECTRODE, SINGLE-USE, CONTACT QUALITY MONITORING, ADULT, WITH 9FT CORD, FOR PATIENTS WEIGING OVER 33LBS. (15KG): Brand: MEGADYNE

## (undated) DEVICE — OPTIFOAM GENTLE SA, POSTOP, 4X8: Brand: MEDLINE

## (undated) DEVICE — GOWN,NON-REINFORCED,SIRUS,SET IN SLV,XXL: Brand: MEDLINE

## (undated) DEVICE — SUT VIC 0/0 UR6 27IN DYED J603H

## (undated) DEVICE — GLIDESHEATH SLENDER STAINLESS STEEL KIT: Brand: GLIDESHEATH SLENDER

## (undated) DEVICE — SNAP KAP: Brand: UNBRANDED

## (undated) DEVICE — CATH DIAG IMPULSE FL3.5 5F 100CM

## (undated) DEVICE — STPLR SKIN VISISTAT WD 35CT

## (undated) DEVICE — CATH DIAG IMPULSE AL1 5F 100CM

## (undated) DEVICE — SENSR CERBRL O2 PK/2